# Patient Record
Sex: MALE | Race: WHITE | Employment: UNEMPLOYED | URBAN - METROPOLITAN AREA
[De-identification: names, ages, dates, MRNs, and addresses within clinical notes are randomized per-mention and may not be internally consistent; named-entity substitution may affect disease eponyms.]

---

## 2017-01-11 ENCOUNTER — TELEPHONE (OUTPATIENT)
Dept: NEUROLOGY | Facility: CLINIC | Age: 32
End: 2017-01-11

## 2017-01-11 DIAGNOSIS — F51.01 PRIMARY INSOMNIA: Primary | ICD-10-CM

## 2017-01-11 NOTE — TELEPHONE ENCOUNTER
Prior Authorization Retail Medication Request  Medication/Dose: Zolpidem 10 MG 1 tablet nightly PRN  Diagnosis and ICD code: Primary insomnia [F51.01]  - Primary    New/Renewal/Insurance Change PA:   Previously Tried and Failed Therapies:     Insurance ID (if provided):   Insurance Phone (if provided):     Any additional info from fax request:     If you received a fax notification from an outside Pharmacy:  Pharmacy Name:  Pharmacy #:  Pharmacy Fax:

## 2017-01-19 NOTE — TELEPHONE ENCOUNTER
[facility] Prior Authorization Team   Phone: [phone]  Fax: [phone]      PA Initiation    Medication: Zolpidem 10 MG 1 tablet nightly PRN  Insurance Company: Comment:  [facility] PMAP- [phone]  Pharmacy Filling the Rx: [facility] [place], [place] - [address]  Filling Pharmacy Phone: [phone]  Filling Pharmacy Fax: [phone]  Start Date: 1/19/2017

## 2017-01-24 NOTE — TELEPHONE ENCOUNTER
Prior Authorization Not Required    Authorization Effective Date:    Authorization Expiration Date:    Medication: Zolpidem 10 MG 1 tablet nightly PRN- no Pa is required  Approved Dose/Quantity:   Reference #:     Insurance Company: [employer] PMAP - Phone [phone] Fax [phone]:  [facility] ([facility]) PMAP- [phone]  Expected CoPay: $1.00     CoPay Card Available:      Foundation Assistance Needed:    Which Pharmacy is filling the prescription (Not needed for infusion/clinic administered): [facility] - [place], [place] - [address]  Pharmacy Notified:  yes  Patient Notified:  Pt picked up script on 01/09/17    No PA required, pt picked up script on 01/06/17

## 2017-02-13 ENCOUNTER — TRANSFERRED RECORDS (OUTPATIENT)
Dept: HEALTH INFORMATION MANAGEMENT | Facility: CLINIC | Age: 32
End: 2017-02-13

## 2017-02-14 ENCOUNTER — TRANSFERRED RECORDS (OUTPATIENT)
Dept: HEALTH INFORMATION MANAGEMENT | Facility: CLINIC | Age: 32
End: 2017-02-14

## 2017-02-22 ENCOUNTER — TRANSFERRED RECORDS (OUTPATIENT)
Dept: HEALTH INFORMATION MANAGEMENT | Facility: CLINIC | Age: 32
End: 2017-02-22

## 2017-03-03 ENCOUNTER — TRANSFERRED RECORDS (OUTPATIENT)
Dept: HEALTH INFORMATION MANAGEMENT | Facility: CLINIC | Age: 32
End: 2017-03-03

## 2017-03-03 ASSESSMENT — ENCOUNTER SYMPTOMS
HALLUCINATIONS: 0
NUMBNESS: 0
CHILLS: 0
WEIGHT GAIN: 0
FATIGUE: 1
POLYPHAGIA: 0
NIGHT SWEATS: 0
DISTURBANCES IN COORDINATION: 1
INSOMNIA: 1
DEPRESSION: 0
DIZZINESS: 1
SPEECH CHANGE: 0
ALTERED TEMPERATURE REGULATION: 0
DECREASED APPETITE: 0
PANIC: 0
FEVER: 0
HEADACHES: 1
PARALYSIS: 0
MEMORY LOSS: 0
INCREASED ENERGY: 1
NERVOUS/ANXIOUS: 0
DECREASED CONCENTRATION: 1
POLYDIPSIA: 1
TREMORS: 1
SEIZURES: 0
LOSS OF CONSCIOUSNESS: 0
WEIGHT LOSS: 0
TINGLING: 0
WEAKNESS: 1

## 2017-03-06 ENCOUNTER — RADIANT APPOINTMENT (OUTPATIENT)
Dept: GENERAL RADIOLOGY | Facility: CLINIC | Age: 32
End: 2017-03-06
Attending: PREVENTIVE MEDICINE
Payer: COMMERCIAL

## 2017-03-06 ENCOUNTER — OFFICE VISIT (OUTPATIENT)
Dept: ORTHOPEDICS | Facility: CLINIC | Age: 32
End: 2017-03-06
Payer: OTHER MISCELLANEOUS

## 2017-03-06 VITALS — DIASTOLIC BLOOD PRESSURE: 78 MMHG | SYSTOLIC BLOOD PRESSURE: 123 MMHG | HEART RATE: 89 BPM

## 2017-03-06 DIAGNOSIS — M25.552 PAIN IN LEFT HIP: ICD-10-CM

## 2017-03-06 DIAGNOSIS — M54.16 LUMBAR RADICULAR PAIN: ICD-10-CM

## 2017-03-06 DIAGNOSIS — M54.16 LUMBAR RADICULAR PAIN: Primary | ICD-10-CM

## 2017-03-06 DIAGNOSIS — M62.830 LUMBAR PARASPINAL MUSCLE SPASM: ICD-10-CM

## 2017-03-06 PROCEDURE — 99214 OFFICE O/P EST MOD 30 MIN: CPT | Performed by: PREVENTIVE MEDICINE

## 2017-03-06 PROCEDURE — 72100 X-RAY EXAM L-S SPINE 2/3 VWS: CPT | Performed by: RADIOLOGY

## 2017-03-06 RX ORDER — GABAPENTIN 100 MG/1
100 CAPSULE ORAL 3 TIMES DAILY
Qty: 60 CAPSULE | Refills: 0 | Status: SHIPPED | OUTPATIENT
Start: 2017-03-06 | End: 2017-04-05

## 2017-03-06 ASSESSMENT — PAIN SCALES - GENERAL: PAINLEVEL: SEVERE PAIN (7)

## 2017-03-06 NOTE — MR AVS SNAPSHOT
After Visit Summary   3/6/2017    Hoang Kiser    MRN: [id number]           Patient Information     Date Of Birth          1985        Visit Information        Provider Department      3/6/2017 2:20 PM Faustino Feldman MD [facility]        Today's Diagnoses     Lumbar radicular pain    -  1      Care Instructions    Thanks for coming today.  Ortho/Sports Medicine Clinic  [address] [place] [address]    To schedule future appointments in Ortho Clinic, you may call [phone].    To schedule ordered imaging by your Provider: Call [facility] at [phone]    [employer]Puntos available online at:   [email or web address]    Please call if any further questions or concerns [phone] and ask for the Orthopedic Department. Clinic hours 8 am to 5 pm.    Return to clinic if symptoms worsen.        Follow-ups after your visit        Your next 10 appointments already scheduled     Mar 08, 2017  3:30 PM CST   MR LUMBAR SPINE W/O CONTRAST with MGMR1   [facility] ([facility])    [address] [place] [address] [phone]           Take your medicines as usual, unless your doctor tells you not to. Bring a list of your current medicines to your exam (including vitamins, minerals and over-the-counter drugs). Also bring the results of similar scans you may have had.  Please remove any body piercings and hair extensions before you arrive.  Follow your doctor s orders. If you do not, we may have to postpone your exam.  You will not have contrast for this exam. You do not need to do anything special to prepare.  The MRI machine uses a strong magnet. Please wear clothes without metal (snaps, zippers). A sweatsuit works well, or we may give you a hospital gown.   **IMPORTANT** THE INSTRUCTIONS BELOW ARE ONLY FOR THOSE PATIENTS WHO HAVE BEEN TOLD THEY WILL RECEIVE SEDATION OR GENERAL ANESTHESIA DURING THEIR MRI  PROCEDURE:  IF YOU WILL RECEIVE SEDATION (take medicine to help you relax during your exam):   You must get the medicine from your doctor before you arrive. Bring the medicine to the exam. Do not take it at home.   Arrive one hour early. Bring someone who can take you home after the test. Your medicine will make you sleepy. After the exam, you may not drive, take a bus or take a taxi by yourself.   No eating 8 hours before your exam. You may have clear liquids up until 4 hours before your exam. (Clear liquids include water, clear tea, black coffee and fruit juice without pulp.)  IF YOU WILL RECEIVE ANESTHESIA (be asleep for your exam):   Arrive 1 1/2 hours early. Bring someone who can take you home after the test. You may not drive, take a bus or take a taxi by yourself.   No eating 8 hours before your exam. You may have clear liquids up until 4 hours before your exam. (Clear liquids include water, clear tea, black coffee and fruit juice without pulp.)   You will spend four to five hours in the recovery room.  Please call the Imaging Department at your exam site with any questions.            Apr 12, 2017  3:00 PM CDT   (Arrive by 2:45 PM)   Return Movement Disorder with Chuck Dominguez MD   [facility] ([facility])    [address] [place] [address] [phone]              Future tests that were ordered for you today     Open Future Orders        Priority Expected Expires Ordered    MR Lumbar Spine w/o Contrast Routine  3/6/2018 3/6/2017            Who to contact     If you have questions or need follow up information about today's clinic visit or your schedule please contact [facility] directly at [phone].  Normal or non-critical lab and imaging results will be communicated to you by MyChart, letter or phone within 4 business days after the clinic has received the results. If you do not hear from us within 7 days, please  contact the clinic through RentBits or phone. If you have a critical or abnormal lab result, we will notify you by phone as soon as possible.  Submit refill requests through RentBits or call your pharmacy and they will forward the refill request to us. Please allow 3 business days for your refill to be completed.          Additional Information About Your Visit        [employer] Information     RentBits gives you secure access to your electronic health record. If you see a primary care provider, you can also send messages to your care team and make appointments. If you have questions, please call your primary care clinic.  If you do not have a primary care provider, please call [phone] and they will assist you.      RentBits is an electronic gateway that provides easy, online access to your medical records. With RentBits, you can request a clinic appointment, read your test results, renew a prescription or communicate with your care team.     To access your existing account, please contact your [facility] or call [phone] for assistance.        Care EveryWhere ID     This is your Care EveryWhere ID. This could be used by other organizations to access your [place] medical records  [id number]        Your Vitals Were     Pulse                   89            Blood Pressure from Last 3 Encounters:   03/06/17 123/78   12/01/16 127/66   11/18/16 114/71    Weight from Last 3 Encounters:   10/12/16 82.9 kg (182 lb 11.2 oz)   04/26/16 95.3 kg (210 lb)   04/07/16 96.2 kg (212 lb)                 Today's Medication Changes          These changes are accurate as of: 3/6/17  4:05 PM.  If you have any questions, ask your nurse or doctor.               Stop taking these medicines if you haven't already. Please contact your care team if you have questions.     ammonium lactate 12 % lotion   Commonly known as:  LAC-HYDRIN   Stopped by:  Faustino Feldman MD           doxycycline 100 MG capsule    Commonly known as:  VIBRAMYCIN   Stopped by:  Faustino Feldman MD           DULoxetine 60 MG EC capsule   Commonly known as:  CYMBALTA   Stopped by:  Faustino Feldman MD           gabapentin 100 MG capsule   Commonly known as:  NEURONTIN   Stopped by:  Faustino Feldman MD           gabapentin 300 MG capsule   Commonly known as:  NEURONTIN   Stopped by:  Faustino Feldman MD           hydrOXYzine 25 MG tablet   Commonly known as:  ATARAX   Stopped by:  Faustino Feldman MD           triamcinolone 0.1 % cream   Commonly known as:  KENALOG   Stopped by:  Faustino Feldman MD           UNABLE TO FIND   Stopped by:  Faustino Feldman MD                    Primary Care Provider Office Phone # Fax #    Phill Floyd [phone]       [facility] [address] [facility] [address]        Thank you!     Thank you for choosing [facility]  for your care. Our goal is always to provide you with excellent care. Hearing back from our patients is one way we can continue to improve our services. Please take a few minutes to complete the written survey that you may receive in the mail after your visit with us. Thank you!             Your Updated Medication List - Protect others around you: Learn how to safely use, store and throw away your medicines at www.disposemymeds.org.          This list is accurate as of: 3/6/17  4:05 PM.  Always use your most recent med list.                   Brand Name Dispense Instructions for use    * amphetamine-dextroamphetamine 25 MG 24 hr capsule    ADDERALL XR    30 capsule    Take 1 capsule (25 mg) by mouth every morning       * amphetamine-dextroamphetamine 20 MG per tablet    ADDERALL     Reported on 3/6/2017       clonazePAM 0.5 MG tablet    klonoPIN         NAPROXEN PO      Take 500 mg by mouth       TRAMADOL HCL PO          zolpidem 10 MG tablet    AMBIEN    90 tablet    Take 1 tablet (10 mg) by mouth  nightly as needed for sleep       * Notice:  This list has 2 medication(s) that are the same as other medications prescribed for you. Read the directions carefully, and ask your doctor or other care provider to review them with you.

## 2017-03-06 NOTE — NURSING NOTE
"Hoang Kiser's goals for this visit include: Evaluate left hip/groin strain.   He requests these members of his care team be copied on today's visit information: no    PCP: Phill Floyd    Referring Provider:  No referring provider defined for this encounter.    Chief Complaint   Patient presents with     RECHECK     Left groin strain from work injury.        Initial /78  Pulse 89 Estimated body mass index is 28.61 kg/(m^2) as calculated from the following:    Height as of 10/12/16: 1.702 m (5' 7\").    Weight as of 10/12/16: 82.9 kg (182 lb 11.2 oz).  Medication Reconciliation: complete  "

## 2017-03-06 NOTE — PATIENT INSTRUCTIONS
Thanks for coming today.  Ortho/Sports Medicine Clinic  [address] [place] [address]    To schedule future appointments in Ortho Clinic, you may call [phone].    To schedule ordered imaging by your Provider: Call [facility] at [phone]    [employer] available online at:   [email or web address]    Please call if any further questions or concerns [phone] and ask for the Orthopedic Department. Clinic hours 8 am to 5 pm.    Return to clinic if symptoms worsen.

## 2017-03-06 NOTE — PROGRESS NOTES
HISTORY OF PRESENT ILLNESS  Mr. Kiser is a pleasant 31 year old year old male who  presents to clinic today with left leg and groin weakness.  Hoang explains that while working his left leg and groin became weak and painful.  He has been worked up for a left  Hip injury, MRI pelvis reviewed, and there doesn't seem to be a diagnosis as a result of his MRI which seems to be within normal imits  He has used flexeril, naproxen, and tramadol to this point, and done PT and been seen by [facility]  He is currently working with restrictions  Accompanied by work liason for work comp  Location: left leg and groin  Quality: weak and achy pain    Severity: 8/10 at worst    Duration: 2.5 months   Timing: occurs intermittently  Context: occurs while walking and lifting  Modifying factors:  Resting, cold packs, and non-use makes it better, movement and use makes it worse  Associated signs & symptoms: warm, stabbing pain, no tingling  Previous similar pain: no  Exercise: lifting packages and walking   Additional history: as documented    MEDICAL HISTORY  Patient Active Problem List   Diagnosis     Neuropathy (H)     Moderate major depression (H)     Tobacco abuse     Attention deficit hyperactivity disorder (ADHD), predominantly inattentive type     Primary insomnia     History of MRI of brain and brain stem     Panic disorder without agoraphobia     Abnormal involuntary movement     Tic disorder       Current Outpatient Prescriptions   Medication Sig Dispense Refill     NAPROXEN PO Take 500 mg by mouth       TRAMADOL HCL PO        zolpidem (AMBIEN) 10 MG tablet Take 1 tablet (10 mg) by mouth nightly as needed for sleep 90 tablet 1     clonazePAM (KLONOPIN) 0.5 MG tablet   0     amphetamine-dextroamphetamine (ADDERALL) 20 MG tablet Reported on 3/6/2017  0     amphetamine-dextroamphetamine (ADDERALL XR) 25 MG 24 hr capsule Take 1 capsule (25 mg) by mouth every morning 30 capsule 0       Allergies   Allergen Reactions      Buspirone Hcl Other (See Comments)     Panic attacks     Keflex [Cephalexin] Diarrhea       Family History   Problem Relation Age of Onset     Lipids Father      hyperlipidemia     Hyperlipidemia Father      Obesity Father      Arthritis Mother      Hyperlipidemia Mother      Depression Mother      Anxiety Disorder Mother      MENTAL ILLNESS Mother      Obesity Mother      Asthma Brother      Asthma Sister      Depression Other      Hearing Loss Other      Psychotic Disorder Other      CEREBROVASCULAR DISEASE Paternal Grandfather      Alzheimer Disease Paternal Grandfather      Depression Brother      MENTAL ILLNESS Brother      Bipolar     Depression Sister      MENTAL ILLNESS Brother      Bipolar     Asthma Brother      Asthma Sister      Obesity Sister      Asthma Brother      Obesity Brother      Obesity Maternal Grandmother      Asthma Sister      Asthma Brother      Obesity Other      Substance Abuse Sister      Alcohol     Genetic Disorder Maternal Grandmother      Epilepsy     Obesity Other        Additional medical/Social/Surgical histories reviewed in [facility] and updated as appropriate.     REVIEW OF SYSTEMS (3/6/2017)  10 point ROS of systems including Constitutional, Eyes, Respiratory, Cardiovascular, Gastroenterology, Genitourinary, Integumentary, Musculoskeletal, Psychiatric were all negative except for pertinent positives noted in my HPI.     PHYSICAL EXAM  Vitals:    03/06/17 1421   BP: 123/78   Pulse: 89     Vital Signs: /78  Pulse 89 Patient declined being weighed. There is no height or weight on file to calculate BMI.    General  - normal appearance, in no obvious distress  CV  - normal peripheral perfusion  Pulm  - normal respiratory pattern, non-labored  Musculoskeletal - lumbar spine  - stance: normal gait without limp, no obvious leg length discrepancy, normal heel and toe walk  - inspection: normal bone and joint alignment, no obvious scoliosis  - palpation: no paravertebral or bony  tenderness, except bilateral lumbar paraspinal muscle groups  - ROM: flexion exacerbates pain in left low back, abnormal extension- limited by pain in low back, sidebending, rotation feels pain with both movements  - strength: lower extremities 5/5 in all planes  - special tests:  (+) straight leg raise left leg  (+) slump test- left leg and hip and low back  Neuro  - patellar and Achilles DTRs 2+ bilaterally, left lower extremity sensory deficit throughout L4/5 distribution, grossly normal coordination, normal muscle tone  Skin  - no ecchymosis, erythema, warmth, or induration, no obvious rash  Psych  - interactive, appropriate, normal mood and affect    ASSESSMENT & PLAN    30 yo male with low back pain. Left hip pain, and lumbar radicular pain into left leg due to possible herniated lumbar disc    -start gabapentin 100 tid as it has worked for him in the past  Start tizanadine at night  Wean off remaining tramadol  Ordered lumbar spine xray, WNL  Will order a lumbar MRI to evaluate for herniated disc as cause of pain that has continued  Cont. HEP, stop PT for now until MRI  Given note to continue work restrictions limiting bending and weight lifting over 15 pounds    Faustino Feldman MD, CAQSM

## 2017-03-08 ENCOUNTER — RADIANT APPOINTMENT (OUTPATIENT)
Dept: GENERAL RADIOLOGY | Facility: CLINIC | Age: 32
End: 2017-03-08
Attending: PREVENTIVE MEDICINE
Payer: COMMERCIAL

## 2017-03-08 ENCOUNTER — RADIANT APPOINTMENT (OUTPATIENT)
Dept: MRI IMAGING | Facility: CLINIC | Age: 32
End: 2017-03-08
Attending: PREVENTIVE MEDICINE
Payer: COMMERCIAL

## 2017-03-08 ENCOUNTER — OFFICE VISIT (OUTPATIENT)
Dept: ORTHOPEDICS | Facility: CLINIC | Age: 32
End: 2017-03-08
Payer: OTHER MISCELLANEOUS

## 2017-03-08 VITALS — OXYGEN SATURATION: 100 % | SYSTOLIC BLOOD PRESSURE: 119 MMHG | HEART RATE: 91 BPM | DIASTOLIC BLOOD PRESSURE: 81 MMHG

## 2017-03-08 DIAGNOSIS — M62.830 LUMBAR PARASPINAL MUSCLE SPASM: Primary | ICD-10-CM

## 2017-03-08 DIAGNOSIS — M54.16 LUMBAR RADICULAR PAIN: ICD-10-CM

## 2017-03-08 DIAGNOSIS — S76.012S MUSCLE STRAIN OF LEFT HIP, SEQUELA: ICD-10-CM

## 2017-03-08 PROCEDURE — 70200 X-RAY EXAM OF EYE SOCKETS: CPT | Performed by: RADIOLOGY

## 2017-03-08 PROCEDURE — 99213 OFFICE O/P EST LOW 20 MIN: CPT | Performed by: PREVENTIVE MEDICINE

## 2017-03-08 PROCEDURE — 72148 MRI LUMBAR SPINE W/O DYE: CPT | Performed by: RADIOLOGY

## 2017-03-08 ASSESSMENT — PAIN SCALES - GENERAL: PAINLEVEL: SEVERE PAIN (6)

## 2017-03-08 NOTE — NURSING NOTE
"Hoang Kiser's goals for this visit include: Lower back pain along with bilateral hip area, here to follow up from having MRI   He requests these members of his care team be copied on today's visit information: no    PCP: Phill Floyd    Referring Provider:  No referring provider defined for this encounter.    Chief Complaint   Patient presents with     Pain     Lower back pain along with bilateral hip area, here to follow up from having MRI       Initial /81 (BP Location: Left arm, Patient Position: Chair, Cuff Size: Adult Regular)  Pulse 91  SpO2 100% Estimated body mass index is 28.61 kg/(m^2) as calculated from the following:    Height as of 10/12/16: 1.702 m (5' 7\").    Weight as of 10/12/16: 82.9 kg (182 lb 11.2 oz).  Medication Reconciliation: complete    "

## 2017-03-08 NOTE — MR AVS SNAPSHOT
After Visit Summary   3/8/2017    Hoang Kiser    MRN: [id number]           Patient Information     Date Of Birth          1985        Visit Information        Provider Department      3/8/2017 5:00 PM Faustino Feldman MD [facility]        Today's Diagnoses     Lumbar paraspinal muscle spasm    -  1       Follow-ups after your visit        Additional Services     PHYSICAL THERAPY REFERRAL (External-Prints)       Physical Therapy Referral  WORK HARDENING PLEASE FOR HIS JOB DUTIES.                  Your next 10 appointments already scheduled     Mar 21, 2017  7:40 AM CDT   Return Visit with Faustino Feldman MD   [facility]    [address] [place] [address] [phone]            Apr 12, 2017  3:00 PM CDT   (Arrive by 2:45 PM)   Return Movement Disorder with Chuck Dominguez MD   [facility] ([facility])    [address] [place] [address] [phone]              Who to contact     If you have questions or need follow up information about today's clinic visit or your schedule please contact [facility] directly at [phone].  Normal or non-critical lab and imaging results will be communicated to you by MyChart, letter or phone within 4 business days after the clinic has received the results. If you do not hear from us within 7 days, please contact the clinic through MyChart or phone. If you have a critical or abnormal lab result, we will notify you by phone as soon as possible.  Submit refill requests through Everwise or call your pharmacy and they will forward the refill request to us. Please allow 3 business days for your refill to be completed.          Additional Information About Your Visit        [employer] Information     Everwise gives you secure access to your electronic health record. If you see a primary  care provider, you can also send messages to your care team and make appointments. If you have questions, please call your primary care clinic.  If you do not have a primary care provider, please call [phone] and they will assist you.      FatRedCouch is an electronic gateway that provides easy, online access to your medical records. With FatRedCouch, you can request a clinic appointment, read your test results, renew a prescription or communicate with your care team.     To access your existing account, please contact your [facility] or call [phone] for assistance.        Care EveryWhere ID     This is your Care EveryWhere ID. This could be used by other organizations to access your [place] medical records  [id number]        Your Vitals Were     Pulse Pulse Oximetry                91 100%           Blood Pressure from Last 3 Encounters:   03/08/17 119/81   03/06/17 123/78   12/01/16 127/66    Weight from Last 3 Encounters:   10/12/16 82.9 kg (182 lb 11.2 oz)   04/26/16 95.3 kg (210 lb)   04/07/16 96.2 kg (212 lb)              We Performed the Following     PHYSICAL THERAPY REFERRAL (External-Prints)        Primary Care Provider Office Phone # Fax #    Phill Floyd [phone]       [facility] [address] [facility] [address]        Thank you!     Thank you for choosing [facility]  for your care. Our goal is always to provide you with excellent care. Hearing back from our patients is one way we can continue to improve our services. Please take a few minutes to complete the written survey that you may receive in the mail after your visit with us. Thank you!             Your Updated Medication List - Protect others around you: Learn how to safely use, store and throw away your medicines at www.disposemymeds.org.          This list is accurate as of: 3/8/17  5:09 PM.  Always use your most recent med list.                    Brand Name Dispense Instructions for use    * amphetamine-dextroamphetamine 25 MG 24 hr capsule    ADDERALL XR    30 capsule    Take 1 capsule (25 mg) by mouth every morning       * amphetamine-dextroamphetamine 20 MG per tablet    ADDERALL     Reported on 3/6/2017       clonazePAM 0.5 MG tablet    klonoPIN         gabapentin 100 MG capsule    NEURONTIN    60 capsule    Take 1 capsule (100 mg) by mouth 3 times daily       NAPROXEN PO      Take 500 mg by mouth       tiZANidine 4 MG tablet    ZANAFLEX    30 tablet    Take 1-2 tablets (4-8 mg) by mouth nightly as needed       TRAMADOL HCL PO          zolpidem 10 MG tablet    AMBIEN    90 tablet    Take 1 tablet (10 mg) by mouth nightly as needed for sleep       * Notice:  This list has 2 medication(s) that are the same as other medications prescribed for you. Read the directions carefully, and ask your doctor or other care provider to review them with you.

## 2017-03-08 NOTE — PROGRESS NOTES
HISTORY OF PRESENT ILLNESS  Hoang returns to discuss his lumbar MRI after having this afternoon, he feels better, back pain gets worse throughout the day, and he is currently working 8 hours per day.  Taking gabapentin and tizanadine PRN    Previous HPI below:     Mr. Kiser is a pleasant 31 year old year old male who  presents to clinic today with left leg and groin weakness.  Hoang explains that while working his left leg and groin became weak and painful.  He has been worked up for a left  Hip injury, MRI pelvis reviewed, and there doesn't seem to be a diagnosis as a result of his MRI which seems to be within normal imits  He has used flexeril, naproxen, and tramadol to this point, and done PT and been seen by [facility]  He is currently working with restrictions  Accompanied by work liason for work comp      Location: left leg and groin  Quality: weak and achy pain    Severity: 8/10 at worst    Duration: 2.5 months   Timing: occurs intermittently  Context: occurs while walking and lifting  Modifying factors:  Resting, cold packs, and non-use makes it better, movement and use makes it worse  Associated signs & symptoms: warm, stabbing pain, no tingling  Previous similar pain: no  Exercise: lifting packages and walking   Additional history: as documented    MEDICAL HISTORY  Patient Active Problem List   Diagnosis     Neuropathy (H)     Moderate major depression (H)     Tobacco abuse     Attention deficit hyperactivity disorder (ADHD), predominantly inattentive type     Primary insomnia     History of MRI of brain and brain stem     Panic disorder without agoraphobia     Abnormal involuntary movement     Tic disorder       Current Outpatient Prescriptions   Medication Sig Dispense Refill     NAPROXEN PO Take 500 mg by mouth       TRAMADOL HCL PO        gabapentin (NEURONTIN) 100 MG capsule Take 1 capsule (100 mg) by mouth 3 times daily 60 capsule 0     tiZANidine (ZANAFLEX) 4 MG tablet Take 1-2  tablets (4-8 mg) by mouth nightly as needed 30 tablet 1     zolpidem (AMBIEN) 10 MG tablet Take 1 tablet (10 mg) by mouth nightly as needed for sleep 90 tablet 1     clonazePAM (KLONOPIN) 0.5 MG tablet   0     amphetamine-dextroamphetamine (ADDERALL) 20 MG tablet Reported on 3/6/2017  0     amphetamine-dextroamphetamine (ADDERALL XR) 25 MG 24 hr capsule Take 1 capsule (25 mg) by mouth every morning 30 capsule 0       Allergies   Allergen Reactions     Buspirone Hcl Other (See Comments)     Panic attacks     Keflex [Cephalexin] Diarrhea       Family History   Problem Relation Age of Onset     Lipids Father      hyperlipidemia     Hyperlipidemia Father      Obesity Father      Arthritis Mother      Hyperlipidemia Mother      Depression Mother      Anxiety Disorder Mother      MENTAL ILLNESS Mother      Obesity Mother      Asthma Brother      Asthma Sister      Depression Other      Hearing Loss Other      Psychotic Disorder Other      Obesity Other      CEREBROVASCULAR DISEASE Paternal Grandfather      Alzheimer Disease Paternal Grandfather      Depression Brother      MENTAL ILLNESS Brother      Bipolar     Asthma Brother      Depression Sister      Asthma Sister      Substance Abuse Sister      Alcohol     MENTAL ILLNESS Brother      Bipolar     Asthma Brother      Asthma Sister      Obesity Sister      Asthma Brother      Obesity Brother      Obesity Maternal Grandmother      Genetic Disorder Maternal Grandmother      Epilepsy       Additional medical/Social/Surgical histories reviewed in [facility] and updated as appropriate.     REVIEW OF SYSTEMS (3/8/2017)  10 point ROS of systems including Constitutional, Eyes, Respiratory, Cardiovascular, Gastroenterology, Genitourinary, Integumentary, Musculoskeletal, Psychiatric were all negative except for pertinent positives noted in my HPI.     PHYSICAL EXAM  Vitals:    03/08/17 1626   BP: 119/81   BP Location: Left arm   Patient Position: Chair   Cuff Size: Adult Regular    Pulse: 91   SpO2: 100%     Vital Signs: /81 (BP Location: Left arm, Patient Position: Chair, Cuff Size: Adult Regular)  Pulse 91  SpO2 100% Patient declined being weighed. There is no height or weight on file to calculate BMI.    General  - normal appearance, in no obvious distress  CV  - normal peripheral perfusion  Pulm  - normal respiratory pattern, non-labored  Musculoskeletal - lumbar spine  - stance: normal gait without limp, no obvious leg length discrepancy, normal heel and toe walk  - inspection: normal bone and joint alignment, no obvious scoliosis  - palpation: no paravertebral or bony tenderness, except bilateral lumbar paraspinal muscle groups  - ROM: flexion exacerbates pain in left low back, abnormal extension- limited by pain in low back, sidebending, rotation feels pain with both movements  - strength: lower extremities 5/5 in all planes  - special tests:  (+) straight leg raise left leg- improved  (+) slump test- left leg and hip and low back- this is improved from his previous visit  Neuro  - patellar and Achilles DTRs 2+ bilaterally, today, no left lower extremity sensory deficit throughout L4/5 distribution, grossly normal coordination, normal muscle tone  Skin  - no ecchymosis, erythema, warmth, or induration, no obvious rash  Psych  - interactive, appropriate, normal mood and affect    ASSESSMENT & PLAN    32 yo male with low back pain. Left hip pain, and some mild lumbar radicular pain into left leg due to possible herniated lumbar disc    -cont. gabapentin 100 tid as it has worked for him in the past  Cont. tizanadine at night      Discussed lumbar MRI, has some small disc buldges that are not likely to be contributing to the pain in his low back, more due to conditioning and overuse repetitive activity at work  Given order for work hardening PT  Given note to continue work restrictions limiting work day hours to 6 hours and weight lifting over 25 pounds    Faustino Feldman MD,  CAQSM

## 2017-03-08 NOTE — LETTER
March 8, 2017      RE: Hoang Kiser  [address] [address]        To whom it may concern:    Hoang Kiser is under my professional care for Data Unavailable He  may return to work with the following: The employee is ABLE to return to work today with the following restrictions.    When the patient returns to work, the following restrictions apply until I re-evaluate him in 14 days.   A) Bend: Frequently (6 hours)  B) Squat: Frequently (6 hours)  C) Walk/Stand: Frequently (6 hours)  D) Reach Above Shoulders: Frequently (6 hours)  E) Lift, carry, push, and pull no more than:  25 lbs.Light duty-unable to lift more than 25 pounds.      Sincerely,      Faustino Feldman MD

## 2017-03-21 ENCOUNTER — OFFICE VISIT (OUTPATIENT)
Dept: ORTHOPEDICS | Facility: CLINIC | Age: 32
End: 2017-03-21
Payer: OTHER MISCELLANEOUS

## 2017-03-21 ENCOUNTER — MYC MEDICAL ADVICE (OUTPATIENT)
Dept: ORTHOPEDICS | Facility: CLINIC | Age: 32
End: 2017-03-21

## 2017-03-21 ENCOUNTER — TELEPHONE (OUTPATIENT)
Dept: ORTHOPEDICS | Facility: CLINIC | Age: 32
End: 2017-03-21

## 2017-03-21 VITALS — HEART RATE: 79 BPM | DIASTOLIC BLOOD PRESSURE: 78 MMHG | SYSTOLIC BLOOD PRESSURE: 133 MMHG

## 2017-03-21 DIAGNOSIS — M62.830 LUMBAR PARASPINAL MUSCLE SPASM: ICD-10-CM

## 2017-03-21 DIAGNOSIS — M54.16 LUMBAR RADICULAR PAIN: Primary | ICD-10-CM

## 2017-03-21 DIAGNOSIS — M25.552 PAIN IN LEFT HIP: ICD-10-CM

## 2017-03-21 PROCEDURE — 99213 OFFICE O/P EST LOW 20 MIN: CPT | Performed by: PREVENTIVE MEDICINE

## 2017-03-21 RX ORDER — NAPROXEN 500 MG/1
500 TABLET ORAL 2 TIMES DAILY WITH MEALS
Qty: 40 TABLET | Refills: 1 | Status: SHIPPED | OUTPATIENT
Start: 2017-03-21 | End: 2017-04-05

## 2017-03-21 RX ORDER — GABAPENTIN 100 MG/1
100 CAPSULE ORAL 3 TIMES DAILY
Qty: 90 CAPSULE | Refills: 0 | Status: SHIPPED | OUTPATIENT
Start: 2017-03-21 | End: 2017-05-17

## 2017-03-21 ASSESSMENT — PAIN SCALES - GENERAL: PAINLEVEL: MODERATE PAIN (5)

## 2017-03-21 NOTE — TELEPHONE ENCOUNTER
[facility]    Phone Message    Name of Caller: Bryant    Phone Number: Other phone number:  [phone]    Best time to return call: Any    May a detailed message be left on voicemail: yes    Relation to patient: Other Name: Bryant  Relationship: Bryant states Patient was his client    Reason for Call: Form or Letter   Type or form/letter needing completion: Bryant is requesting that patient needs a new letter stating that he needs two breaks per work day instead of just one due to increased hours. Bryant would like to new letter be faxed to his attention. Fax [phone]. Thank you  Provider: Dr. Feldman  Date form needed: ASAP  Once completed: Fax form to: [phone]      Action Taken: Message routed to:  Adult Clinics: Sports Medicine p [phone]

## 2017-03-21 NOTE — PATIENT INSTRUCTIONS
Thanks for coming today.  Ortho/Sports Medicine Clinic  [address] [place] [address]    To schedule future appointments in Ortho Clinic, you may call [phone].    To schedule ordered imaging by your Provider: Call [facility] at [phone]    [email or web address] available online at:   [email or web address]    Please call if any further questions or concerns [phone] and ask for the Orthopedic Department. Clinic hours 8 am to 5 pm.    Return to clinic if symptoms worsen.

## 2017-03-21 NOTE — LETTER
[address] [address]  [phone]        3/21/2017    Hoang Kiser  [address] [facility] [address] [facility] [address]  [phone] (home) [phone] (work)    :     1985      To Whom it May Concern:    Hoang Kiser is under my professional care for his hip injury/strain. He may return to work with the following: The employee is ABLE to return to work today with the following restrictions.     When the patient returns to work, the following restrictions apply until I re-evaluate him in 14 days.     A) Bend: Frequently (7 hours)  B) Squat: Frequently (7 hours)  C) Walk/Stand: Frequently (7 hours)  D) Reach Above Shoulders: Frequently (7 hours)  E) Lift, carry, push, and pull no more than: 25 lbs.Light duty-unable to lift more than 25 pounds    He should be allowed two break periods during his 7 hour shift.      Please contact me for questions or concerns.    Sincerely,    Faustino Feldman MD

## 2017-03-21 NOTE — MR AVS SNAPSHOT
After Visit Summary   3/21/2017    Hoang Kiser    MRN: [id number]           Patient Information     Date Of Birth          1985        Visit Information        Provider Department      3/21/2017 7:40 AM Faustino Feldman MD [facility]        Today's Diagnoses     Lumbar radicular pain    -  1    Lumbar paraspinal muscle spasm        Pain in left hip          Care Instructions    Thanks for coming today.  Ortho/Sports Medicine Clinic  [address] [place] [address]    To schedule future appointments in Ortho Clinic, you may call [phone].    To schedule ordered imaging by your Provider: Call [facility] at [phone]    [employer] available online at:   [email or web address]    Please call if any further questions or concerns [phone] and ask for the Orthopedic Department. Clinic hours 8 am to 5 pm.    Return to clinic if symptoms worsen.        Follow-ups after your visit        Your next 10 appointments already scheduled     Apr 05, 2017  5:00 PM CDT   Return Visit with Faustino Feldman MD   [facility] ([facility])    [address] [place] [address] [phone]            Apr 12, 2017  3:00 PM CDT   (Arrive by 2:45 PM)   Return Movement Disorder with Chuck Dominguez MD   [facility] ([facility])    [address] [place] [id number] [phone]              Who to contact     If you have questions or need follow up information about today's clinic visit or your schedule please contact [facility] directly at [phone].  Normal or non-critical lab and imaging results will be communicated to you by MyChart, letter or phone within 4 business days after the clinic has received the results. If you do not hear from us within 7 days, please contact the clinic through MyChart or phone. If you have a  critical or abnormal lab result, we will notify you by phone as soon as possible.  Submit refill requests through [email or web address] or call your pharmacy and they will forward the refill request to us. Please allow 3 business days for your refill to be completed.          Additional Information About Your Visit        [employer] Information     [email or web address] gives you secure access to your electronic health record. If you see a primary care provider, you can also send messages to your care team and make appointments. If you have questions, please call your primary care clinic.  If you do not have a primary care provider, please call [phone] and they will assist you.      [email or web address] is an electronic gateway that provides easy, online access to your medical records. With [email or web address], you can request a clinic appointment, read your test results, renew a prescription or communicate with your care team.     To access your existing account, please contact your [facility] or call [phone] for assistance.        Care EveryWhere ID     This is your Care EveryWhere ID. This could be used by other organizations to access your [place] medical records  [id number]        Your Vitals Were     Pulse                   79            Blood Pressure from Last 3 Encounters:   03/21/17 133/78   03/08/17 119/81   03/06/17 123/78    Weight from Last 3 Encounters:   10/12/16 82.9 kg (182 lb 11.2 oz)   04/26/16 95.3 kg (210 lb)   04/07/16 96.2 kg (212 lb)              Today, you had the following     No orders found for display         Today's Medication Changes          These changes are accurate as of: 3/21/17  8:49 AM.  If you have any questions, ask your nurse or doctor.               These medicines have changed or have updated prescriptions.        Dose/Directions    * gabapentin 100 MG capsule   Commonly known as:  NEURONTIN   This may have changed:  Another medication with the same name was added. Make sure you  understand how and when to take each.   Used for:  Lumbar radicular pain        Dose:  100 mg   Take 1 capsule (100 mg) by mouth 3 times daily   Quantity:  60 capsule   Refills:  0       * gabapentin 100 MG capsule   Commonly known as:  NEURONTIN   This may have changed:  You were already taking a medication with the same name, and this prescription was added. Make sure you understand how and when to take each.   Used for:  Lumbar radicular pain, Lumbar paraspinal muscle spasm        Dose:  100 mg   Take 1 capsule (100 mg) by mouth 3 times daily   Quantity:  90 capsule   Refills:  0       * NAPROXEN PO   This may have changed:  Another medication with the same name was added. Make sure you understand how and when to take each.        Dose:  500 mg   Take 500 mg by mouth   Refills:  0       * naproxen 500 MG tablet   Commonly known as:  NAPROSYN   This may have changed:  You were already taking a medication with the same name, and this prescription was added. Make sure you understand how and when to take each.   Used for:  Lumbar radicular pain, Lumbar paraspinal muscle spasm        Dose:  500 mg   Take 1 tablet (500 mg) by mouth 2 times daily (with meals)   Quantity:  40 tablet   Refills:  1       * tiZANidine 4 MG tablet   Commonly known as:  ZANAFLEX   This may have changed:  Another medication with the same name was added. Make sure you understand how and when to take each.   Used for:  Lumbar radicular pain        Dose:  4-8 mg   Take 1-2 tablets (4-8 mg) by mouth nightly as needed   Quantity:  30 tablet   Refills:  1       * tiZANidine 4 MG tablet   Commonly known as:  ZANAFLEX   This may have changed:  You were already taking a medication with the same name, and this prescription was added. Make sure you understand how and when to take each.   Used for:  Lumbar radicular pain, Lumbar paraspinal muscle spasm        Dose:  4-8 mg   Take 1-2 tablets (4-8 mg) by mouth nightly as needed   Quantity:  30 tablet    Refills:  1       * Notice:  This list has 6 medication(s) that are the same as other medications prescribed for you. Read the directions carefully, and ask your doctor or other care provider to review them with you.         Where to get your medicines      These medications were sent to [facility] - [place], [place] - [address], [address]  [address], [address], [facility] [address]     Phone:  [phone]     gabapentin 100 MG capsule    naproxen 500 MG tablet    tiZANidine 4 MG tablet                Primary Care Provider Office Phone # Fax #    Phill Floyd [phone]       [facility] [address] [facility] [address]        Thank you!     Thank you for choosing [facility]  for your care. Our goal is always to provide you with excellent care. Hearing back from our patients is one way we can continue to improve our services. Please take a few minutes to complete the written survey that you may receive in the mail after your visit with us. Thank you!             Your Updated Medication List - Protect others around you: Learn how to safely use, store and throw away your medicines at www.disposemymeds.org.          This list is accurate as of: 3/21/17  8:49 AM.  Always use your most recent med list.                   Brand Name Dispense Instructions for use    * amphetamine-dextroamphetamine 25 MG 24 hr capsule    ADDERALL XR    30 capsule    Take 1 capsule (25 mg) by mouth every morning       * amphetamine-dextroamphetamine 20 MG per tablet    ADDERALL     Reported on 3/6/2017       clonazePAM 0.5 MG tablet    klonoPIN         * gabapentin 100 MG capsule    NEURONTIN    60 capsule    Take 1 capsule (100 mg) by mouth 3 times daily       * gabapentin 100 MG capsule    NEURONTIN    90 capsule    Take 1 capsule (100 mg) by mouth 3 times daily       * NAPROXEN PO      Take 500 mg by mouth       * naproxen 500 MG tablet     NAPROSYN    40 tablet    Take 1 tablet (500 mg) by mouth 2 times daily (with meals)       * tiZANidine 4 MG tablet    ZANAFLEX    30 tablet    Take 1-2 tablets (4-8 mg) by mouth nightly as needed       * tiZANidine 4 MG tablet    ZANAFLEX    30 tablet    Take 1-2 tablets (4-8 mg) by mouth nightly as needed       TRAMADOL HCL PO          zolpidem 10 MG tablet    AMBIEN    90 tablet    Take 1 tablet (10 mg) by mouth nightly as needed for sleep       * Notice:  This list has 8 medication(s) that are the same as other medications prescribed for you. Read the directions carefully, and ask your doctor or other care provider to review them with you.

## 2017-03-21 NOTE — PROGRESS NOTES
HISTORY OF PRESENT ILLNESS  Shoaib returns for followup for his low back injury. He is improving overall. He feels better and is doing PT and using gabapentin and tizanadine and naproxen    He has work hardening planned for April date.  He has some work restrictions for hours and weight at this time, he feels like he can increase hours.    Accompanied by work liason for work comp      Location: left leg and groin  Quality: weak and achy pain    Severity: 4/10 at worst    Duration: 2.5+ months   Timing: occurs intermittently  Context: occurs while walking and lifting  Modifying factors:  Resting, cold packs, and non-use makes it better, movement and use makes it worse  Associated signs & symptoms: warm, stabbing pain, no tingling  Previous similar pain: no  Exercise: lifting packages and walking   Additional history: as documented    MEDICAL HISTORY  Patient Active Problem List   Diagnosis     Neuropathy (H)     Moderate major depression (H)     Tobacco abuse     Attention deficit hyperactivity disorder (ADHD), predominantly inattentive type     Primary insomnia     History of MRI of brain and brain stem     Panic disorder without agoraphobia     Abnormal involuntary movement     Tic disorder       Current Outpatient Prescriptions   Medication Sig Dispense Refill     NAPROXEN PO Take 500 mg by mouth       TRAMADOL HCL PO        gabapentin (NEURONTIN) 100 MG capsule Take 1 capsule (100 mg) by mouth 3 times daily 60 capsule 0     tiZANidine (ZANAFLEX) 4 MG tablet Take 1-2 tablets (4-8 mg) by mouth nightly as needed 30 tablet 1     zolpidem (AMBIEN) 10 MG tablet Take 1 tablet (10 mg) by mouth nightly as needed for sleep 90 tablet 1     clonazePAM (KLONOPIN) 0.5 MG tablet   0     amphetamine-dextroamphetamine (ADDERALL) 20 MG tablet Reported on 3/6/2017  0     amphetamine-dextroamphetamine (ADDERALL XR) 25 MG 24 hr capsule Take 1 capsule (25 mg) by mouth every morning 30 capsule 0       Allergies   Allergen Reactions      Buspirone Hcl Other (See Comments)     Panic attacks     Keflex [Cephalexin] Diarrhea       Family History   Problem Relation Age of Onset     Lipids Father      hyperlipidemia     Hyperlipidemia Father      Obesity Father      Arthritis Mother      Hyperlipidemia Mother      Depression Mother      Anxiety Disorder Mother      MENTAL ILLNESS Mother      Obesity Mother      Asthma Brother      Asthma Sister      Depression Other      Hearing Loss Other      Psychotic Disorder Other      Obesity Other      CEREBROVASCULAR DISEASE Paternal Grandfather      Alzheimer Disease Paternal Grandfather      Depression Brother      MENTAL ILLNESS Brother      Bipolar     Asthma Brother      Depression Sister      Asthma Sister      Substance Abuse Sister      Alcohol     MENTAL ILLNESS Brother      Bipolar     Asthma Brother      Asthma Sister      Obesity Sister      Asthma Brother      Obesity Brother      Obesity Maternal Grandmother      Genetic Disorder Maternal Grandmother      Epilepsy       Additional medical/Social/Surgical histories reviewed in [facility] and updated as appropriate.     REVIEW OF SYSTEMS (3/21/2017)  10 point ROS of systems including Constitutional, Eyes, Respiratory, Cardiovascular, Gastroenterology, Genitourinary, Integumentary, Musculoskeletal, Psychiatric were all negative except for pertinent positives noted in my HPI.     PHYSICAL EXAM  Vitals:    03/21/17 0742   BP: 133/78   Pulse: 79     Vital Signs: /78  Pulse 79 Patient declined being weighed. There is no height or weight on file to calculate BMI.    General  - normal appearance, in no obvious distress  CV  - normal peripheral perfusion  Pulm  - normal respiratory pattern, non-labored  Musculoskeletal - lumbar spine  - stance: normal gait without limp, no obvious leg length discrepancy, normal heel and toe walk  - inspection: normal bone and joint alignment, no obvious scoliosis  - palpation: no paravertebral or bony tenderness, except  bilateral lumbar paraspinal muscle groups  - ROM: flexion still exacerbates pain in left low back, abnormal extension- limited by pain in low back, sidebending, rotation feels pain with both movements  - strength: lower extremities 5/5 in all planes  - special tests:  (-) straight leg raise left leg- improved  (-) slump test- left leg and hip and low back- this is improved from his previous visit  Neuro  - patellar and Achilles DTRs 2+ bilaterally, today, no left lower extremity sensory deficit throughout L4/5 distribution, grossly normal coordination, normal muscle tone  Skin  - no ecchymosis, erythema, warmth, or induration, no obvious rash  Psych  - interactive, appropriate, normal mood and affect    ASSESSMENT & PLAN    30 yo male with low back pain. Left hip pain, and some mild lumbar radicular pain into left leg due to possible herniated lumbar disc, improved    -cont. gabapentin 100 tid as it has worked for him in the past  Cont. tizanadine at night        Plan for work hardening PT  Given note to continue work restrictions limiting work day hours to 7 hours and weight lifting over 25 pounds  Cont. PT  Faustino Feldman MD, CAQSM

## 2017-03-21 NOTE — LETTER
[address] [address]  [phone]        3/21/2017    Hoang Kiser  [address] [facility] [address]  [phone] (home) [phone] (work)    :     1985      To Whom it May Concern:    Hoang Kiser is under my professional care for his hip injury/strain. He may return to work with the following: The employee is ABLE to return to work today with the following restrictions.     When the patient returns to work, the following restrictions apply until I re-evaluate him in 14 days.     A) Bend: Frequently (7 hours)  B) Squat: Frequently (7 hours)  C) Walk/Stand: Frequently (7 hours)  D) Reach Above Shoulders: Frequently (7 hours)  E) Lift, carry, push, and pull no more than: 25 lbs.Light duty-unable to lift more than 25 pounds      Please contact me for questions or concerns.    Sincerely,    Faustino Feldman MD

## 2017-04-05 ENCOUNTER — OFFICE VISIT (OUTPATIENT)
Dept: ORTHOPEDICS | Facility: CLINIC | Age: 32
End: 2017-04-05
Payer: OTHER MISCELLANEOUS

## 2017-04-05 VITALS — HEART RATE: 90 BPM | DIASTOLIC BLOOD PRESSURE: 68 MMHG | SYSTOLIC BLOOD PRESSURE: 120 MMHG

## 2017-04-05 DIAGNOSIS — M54.16 LUMBAR RADICULAR PAIN: ICD-10-CM

## 2017-04-05 DIAGNOSIS — M62.830 LUMBAR PARASPINAL MUSCLE SPASM: ICD-10-CM

## 2017-04-05 PROCEDURE — 99213 OFFICE O/P EST LOW 20 MIN: CPT | Performed by: PREVENTIVE MEDICINE

## 2017-04-05 RX ORDER — NAPROXEN 500 MG/1
500 TABLET ORAL 2 TIMES DAILY WITH MEALS
Qty: 40 TABLET | Refills: 1 | Status: SHIPPED | OUTPATIENT
Start: 2017-04-05 | End: 2017-09-12

## 2017-04-05 RX ORDER — GABAPENTIN 100 MG/1
100 CAPSULE ORAL 3 TIMES DAILY
Qty: 60 CAPSULE | Refills: 0 | Status: SHIPPED | OUTPATIENT
Start: 2017-04-05 | End: 2017-08-11

## 2017-04-05 ASSESSMENT — PAIN SCALES - GENERAL: PAINLEVEL: MILD PAIN (3)

## 2017-04-05 NOTE — LETTER
[address] [address]  [phone]        2017    Hoang Kiser  [address] [facility] [address]  [phone] (home) [phone] (work)    :     1985      To Whom it May Concern:    Hoang Kiser is under my professional care for his hip injury/strain. He may return to work with the following: The employee is ABLE to return to work today with the following restrictions.     When the patient returns to work, the following restrictions apply until I re-evaluate him in 21 days.     A) Bend: Frequently (8 hours)  B) Squat: Frequently (8 hours)  C) Walk/Stand: Frequently (8 hours)  D) Reach Above Shoulders: Frequently (8 hours)  E) Lift, carry, push, and pull no more than: 35 lbs.Light duty-unable to lift more than 35 pounds    He should be allowed the normal break periods during his 8 hour shift.      Please contact me for questions or concerns.      Sincerely,    Faustino Feldman MD

## 2017-04-05 NOTE — PROGRESS NOTES
HISTORY OF PRESENT ILLNESS  Shoaib returns for followup for his low back injury.   He is improving overall. He feels better and is doing PT and using gabapentin and tizanadine and naproxen    He has work hardening planned for April date.  He has some work restrictions for hours and weight at this time, he feels like he can increase hours again.    Accompanied by work liason for work comp      Location: left leg and groin  Quality: weak and achy pain    Severity: 2/10 at worst    Duration: 3+ months   Timing: occurs intermittently  Context: occurs while walking and lifting  Modifying factors:  Resting, cold packs, and non-use makes it better, movement and use makes it worse  Associated signs & symptoms: warm, stabbing pain, no tingling  Previous similar pain: no  Exercise: lifting packages and walking   Additional history: as documented    MEDICAL HISTORY  Patient Active Problem List   Diagnosis     Neuropathy (H)     Moderate major depression (H)     Tobacco abuse     Attention deficit hyperactivity disorder (ADHD), predominantly inattentive type     Primary insomnia     History of MRI of brain and brain stem     Panic disorder without agoraphobia     Abnormal involuntary movement     Tic disorder       Current Outpatient Prescriptions   Medication Sig Dispense Refill     gabapentin (NEURONTIN) 100 MG capsule Take 1 capsule (100 mg) by mouth 3 times daily 90 capsule 0     tiZANidine (ZANAFLEX) 4 MG tablet Take 1-2 tablets (4-8 mg) by mouth nightly as needed 30 tablet 1     naproxen (NAPROSYN) 500 MG tablet Take 1 tablet (500 mg) by mouth 2 times daily (with meals) 40 tablet 1     NAPROXEN PO Take 500 mg by mouth       TRAMADOL HCL PO        gabapentin (NEURONTIN) 100 MG capsule Take 1 capsule (100 mg) by mouth 3 times daily 60 capsule 0     tiZANidine (ZANAFLEX) 4 MG tablet Take 1-2 tablets (4-8 mg) by mouth nightly as needed 30 tablet 1     zolpidem (AMBIEN) 10 MG tablet Take 1 tablet (10 mg) by mouth nightly as  needed for sleep 90 tablet 1     clonazePAM (KLONOPIN) 0.5 MG tablet   0     amphetamine-dextroamphetamine (ADDERALL) 20 MG tablet Reported on 3/6/2017  0     amphetamine-dextroamphetamine (ADDERALL XR) 25 MG 24 hr capsule Take 1 capsule (25 mg) by mouth every morning 30 capsule 0       Allergies   Allergen Reactions     Buspirone Hcl Other (See Comments)     Panic attacks     Keflex [Cephalexin] Diarrhea       Family History   Problem Relation Age of Onset     Lipids Father      hyperlipidemia     Hyperlipidemia Father      Obesity Father      Arthritis Mother      Hyperlipidemia Mother      Depression Mother      Anxiety Disorder Mother      MENTAL ILLNESS Mother      Obesity Mother      Asthma Brother      Asthma Sister      Depression Other      Hearing Loss Other      Psychotic Disorder Other      Obesity Other      CEREBROVASCULAR DISEASE Paternal Grandfather      Alzheimer Disease Paternal Grandfather      Depression Brother      MENTAL ILLNESS Brother      Bipolar     Asthma Brother      Depression Sister      Asthma Sister      Substance Abuse Sister      Alcohol     MENTAL ILLNESS Brother      Bipolar     Asthma Brother      Asthma Sister      Obesity Sister      Asthma Brother      Obesity Brother      Obesity Maternal Grandmother      Genetic Disorder Maternal Grandmother      Epilepsy       Additional medical/Social/Surgical histories reviewed in EPIC and updated as appropriate.     REVIEW OF SYSTEMS (4/5/2017)  10 point ROS of systems including Constitutional, Eyes, Respiratory, Cardiovascular, Gastroenterology, Genitourinary, Integumentary, Musculoskeletal, Psychiatric were all negative except for pertinent positives noted in my HPI.     PHYSICAL EXAM  Vitals:    04/05/17 1645   BP: 120/68   Pulse: 90     Vital Signs: /68  Pulse 90 Patient declined being weighed. There is no height or weight on file to calculate BMI.    General  - normal appearance, in no obvious distress  CV  - normal  peripheral perfusion  Pulm  - normal respiratory pattern, non-labored  Musculoskeletal - lumbar spine  - stance: normal gait without limp, no obvious leg length discrepancy, normal heel and toe walk  - inspection: normal bone and joint alignment, no obvious scoliosis  - palpation: no paravertebral or bony tenderness, except bilateral lumbar paraspinal muscle groups  - ROM: flexion still exacerbates a small amount of pain in left low back, slightly abnormal extension- not greatly limited by pain in low back, sidebending, rotation feels some pain with both movements  - strength: lower extremities 5/5 in all planes  - special tests:  (-) straight leg raise left leg- improved  (-) slump test- left leg and hip and low back- this is improved from his previous visit  Neuro  - patellar and Achilles DTRs 2+ bilaterally, today, no left lower extremity sensory deficit throughout L4/5 distribution, grossly normal coordination, normal muscle tone  Skin  - no ecchymosis, erythema, warmth, or induration, no obvious rash  Psych  - interactive, appropriate, normal mood and affect    ASSESSMENT & PLAN    30 yo male with low back pain. Left hip pain, and some mild lumbar radicular pain into left leg due to possible herniated lumbar disc, improved    -cont. gabapentin 100 tid as it continues to help him  Cont. tizanadine at night        Plan for work hardening PT  Given note to continue work restrictions limiting work day hours to 8 hours and weight lifting over 35 pounds  Cont. PT  Faustino Feldman MD, CAQSM

## 2017-04-05 NOTE — NURSING NOTE
"Hoang Kiser's goals for this visit include: Follow up for low back pain. Report on progress.   He requests these members of his care team be copied on today's visit information: no    PCP: Phill Floyd    Referring Provider:  ESTABLISHED PATIENT  No address on file    Chief Complaint   Patient presents with     RECHECK     Follow up for low back pain. Reporting improvement since last visit.       Initial /68  Pulse 90 Estimated body mass index is 28.61 kg/(m^2) as calculated from the following:    Height as of 10/12/16: 1.702 m (5' 7\").    Weight as of 10/12/16: 82.9 kg (182 lb 11.2 oz).  Medication Reconciliation: complete  "

## 2017-04-05 NOTE — PATIENT INSTRUCTIONS
Thanks for coming today.  Ortho/Sports Medicine Clinic  [address] [place] [address]    To schedule future appointments in Ortho Clinic, you may call [phone].    To schedule ordered imaging by your Provider: Call [facility] at [phone]    [employer] available online at:   [email or web address]    Please call if any further questions or concerns [phone] and ask for the Orthopedic Department. Clinic hours 8 am to 5 pm.    Return to clinic if symptoms worsen.

## 2017-04-05 NOTE — MR AVS SNAPSHOT
After Visit Summary   4/5/2017    Hoang Kiser    MRN: [id number]           Patient Information     Date Of Birth          1985        Visit Information        Provider Department      4/5/2017 5:00 PM Faustino Feldman MD [facility]        Today's Diagnoses     Lumbar radicular pain        Lumbar paraspinal muscle spasm          Care Instructions    Thanks for coming today.  Ortho/Sports Medicine Clinic  [address] [place] [address]    To schedule future appointments in Ortho Clinic, you may call [phone].    To schedule ordered imaging by your Provider: Call [facility] at [phone]    [employer] available online at:   [email or web address]    Please call if any further questions or concerns [phone] and ask for the Orthopedic Department. Clinic hours 8 am to 5 pm.    Return to clinic if symptoms worsen.        Follow-ups after your visit        Your next 10 appointments already scheduled     Apr 12, 2017  3:00 PM CDT   (Arrive by 2:45 PM)   Return Movement Disorder with Chuck Dominguez MD   [facility] ([facility])    [address] [place] [address] [phone]            Apr 27, 2017  8:00 AM CDT   Return Visit with Faustino Feldman MD   [facility] ([facility])    [address] [place] [address] [phone]              Who to contact     If you have questions or need follow up information about today's clinic visit or your schedule please contact [facility] directly at [phone].  Normal or non-critical lab and imaging results will be communicated to you by MyChart, letter or phone within 4 business days after the clinic has received the results. If you do not hear from us within 7 days, please contact the clinic through MyChart or phone. If you have a critical or abnormal lab result,  we will notify you by phone as soon as possible.  Submit refill requests through [employer] or call your pharmacy and they will forward the refill request to us. Please allow 3 business days for your refill to be completed.          Additional Information About Your Visit        [employer] Information     [employer] gives you secure access to your electronic health record. If you see a primary care provider, you can also send messages to your care team and make appointments. If you have questions, please call your primary care clinic.  If you do not have a primary care provider, please call [phone] and they will assist you.      [employer] is an electronic gateway that provides easy, online access to your medical records. With [employer], you can request a clinic appointment, read your test results, renew a prescription or communicate with your care team.     To access your existing account, please contact your [facility] or call [phone] for assistance.        Care EveryWhere ID     This is your Care EveryWhere ID. This could be used by other organizations to access your [place] medical records  [id number]        Your Vitals Were     Pulse                   90            Blood Pressure from Last 3 Encounters:   04/05/17 120/68   03/21/17 133/78   03/08/17 119/81    Weight from Last 3 Encounters:   10/12/16 82.9 kg (182 lb 11.2 oz)   04/26/16 95.3 kg (210 lb)   04/07/16 96.2 kg (212 lb)              Today, you had the following     No orders found for display         Where to get your medicines      These medications were sent to [facility] [address] [place] [address] AT [place] [address], [facility] [address]     Phone:  [phone]     gabapentin 100 MG capsule    naproxen 500 MG tablet    tiZANidine 4 MG tablet          Primary Care Provider Office Phone # Fax #    Phill Floyd [phone]       [facility] [address] [facility] [address]        Thank you!     Thank you for choosing [facility]  for your care. Our goal is always to provide you with excellent care. Hearing back from our patients is one way we can continue to improve our services. Please take a few minutes to complete the written survey that you may receive in the mail after your visit with us. Thank you!             Your Updated Medication List - Protect others around you: Learn how to safely use, store and throw away your medicines at www.disposemymeds.org.          This list is accurate as of: 4/5/17  5:31 PM.  Always use your most recent med list.                   Brand Name Dispense Instructions for use    * amphetamine-dextroamphetamine 25 MG 24 hr capsule    ADDERALL XR    30 capsule    Take 1 capsule (25 mg) by mouth every morning       * amphetamine-dextroamphetamine 20 MG per tablet    ADDERALL     Reported on 3/6/2017       clonazePAM 0.5 MG tablet    klonoPIN         * gabapentin 100 MG capsule    NEURONTIN    90 capsule    Take 1 capsule (100 mg) by mouth 3 times daily       * gabapentin 100 MG capsule    NEURONTIN    60 capsule    Take 1 capsule (100 mg) by mouth 3 times daily       * NAPROXEN PO      Take 500 mg by mouth       * naproxen 500 MG tablet    NAPROSYN    40 tablet    Take 1 tablet (500 mg) by mouth 2 times daily (with meals)       * tiZANidine 4 MG tablet    ZANAFLEX    30 tablet    Take 1-2 tablets (4-8 mg) by mouth nightly as needed       * tiZANidine 4 MG tablet    ZANAFLEX    30 tablet    Take 1-2 tablets (4-8 mg) by mouth nightly as needed       TRAMADOL HCL PO          zolpidem 10 MG tablet    AMBIEN    90 tablet    Take 1 tablet (10 mg) by mouth nightly as needed for sleep       * Notice:  This list has 8 medication(s) that are the same as other medications prescribed for you. Read the directions carefully, and ask your doctor or other care provider to review them with you.

## 2017-04-12 ENCOUNTER — OFFICE VISIT (OUTPATIENT)
Dept: NEUROLOGY | Facility: CLINIC | Age: 32
End: 2017-04-12

## 2017-04-12 VITALS
HEART RATE: 85 BPM | DIASTOLIC BLOOD PRESSURE: 65 MMHG | OXYGEN SATURATION: 97 % | BODY MASS INDEX: 27.28 KG/M2 | HEIGHT: 67 IN | WEIGHT: 173.8 LBS | RESPIRATION RATE: 16 BRPM | SYSTOLIC BLOOD PRESSURE: 119 MMHG

## 2017-04-12 DIAGNOSIS — F51.01 PRIMARY INSOMNIA: ICD-10-CM

## 2017-04-12 RX ORDER — ZOLPIDEM TARTRATE 10 MG/1
10 TABLET ORAL
Qty: 90 TABLET | Refills: 1 | Status: SHIPPED | OUTPATIENT
Start: 2017-04-12 | End: 2017-10-31

## 2017-04-12 RX ORDER — IBUPROFEN 600 MG/1
1-2 TABLET, FILM COATED ORAL 3 TIMES DAILY PRN
COMMUNITY
Start: 2016-12-12 | End: 2017-08-11

## 2017-04-12 ASSESSMENT — PAIN SCALES - GENERAL: PAINLEVEL: SEVERE PAIN (6)

## 2017-04-12 NOTE — MR AVS SNAPSHOT
After Visit Summary   4/12/2017    Hoang Kiser    MRN: [id number]           Patient Information     Date Of Birth          1985        Visit Information        Provider Department      4/12/2017 3:00 PM Chuck Dominguez MD [facility]        Today's Diagnoses     Primary insomnia           Follow-ups after your visit        Your next 10 appointments already scheduled     Apr 27, 2017  8:00 AM CDT   Return Visit with Faustino Feldman MD   [facility] ([facility])    [address] [place] [address] [phone]            Jun 21, 2017  3:30 PM CDT   (Arrive by 3:15 PM)   Return Movement Disorder with Chuck Dominguez MD   [facility] ([facility])    [address] [place] [address] [phone]              Who to contact     Please call your clinic at [phone] to:    Ask questions about your health    Make or cancel appointments    Discuss your medicines    Learn about your test results    Speak to your doctor   If you have compliments or concerns about an experience at your clinic, or if you wish to file a complaint, please contact [facility] Patient Relations at [phone] or email us at [email or web address]         Additional Information About Your Visit        MyChart Information     [employer] gives you secure access to your electronic health record. If you see a primary care provider, you can also send messages to your care team and make appointments. If you have questions, please call your primary care clinic.  If you do not have a primary care provider, please call [phone] and they will assist you.      [employer] is an electronic gateway that provides easy, online access to your medical records. With [employer], you can request a clinic appointment, read your test results, renew a prescription or communicate  "with your care team.     To access your existing account, please contact your [facility] or call [phone] for assistance.        Care EveryWhere ID     This is your Care EveryWhere ID. This could be used by other organizations to access your [place] medical records  [id number]        Your Vitals Were     Pulse Respirations Height Pulse Oximetry BMI (Body Mass Index)       85 16 1.702 m (5' 7\") 97% 27.22 kg/m2        Blood Pressure from Last 3 Encounters:   04/12/17 119/65   04/05/17 120/68   03/21/17 133/78    Weight from Last 3 Encounters:   04/12/17 78.8 kg (173 lb 12.8 oz)   10/12/16 82.9 kg (182 lb 11.2 oz)   04/26/16 95.3 kg (210 lb)              Today, you had the following     No orders found for display         Where to get your medicines      Some of these will need a paper prescription and others can be bought over the counter.  Ask your nurse if you have questions.     Bring a paper prescription for each of these medications     zolpidem 10 MG tablet          Primary Care Provider Office Phone # Fax #    Phill Floyd [phone]       [address] [address]        Thank you!     Thank you for choosing [facility]  for your care. Our goal is always to provide you with excellent care. Hearing back from our patients is one way we can continue to improve our services. Please take a few minutes to complete the written survey that you may receive in the mail after your visit with us. Thank you!             Your Updated Medication List - Protect others around you: Learn how to safely use, store and throw away your medicines at www.disposemymeds.org.          This list is accurate as of: 4/12/17  4:20 PM.  Always use your most recent med list.                   Brand Name Dispense Instructions for use    * amphetamine-dextroamphetamine 25 MG 24 hr capsule    ADDERALL XR    30 capsule    Take 1 capsule (25 " mg) by mouth every morning       * amphetamine-dextroamphetamine 20 MG per tablet    ADDERALL     Reported on 3/6/2017       clonazePAM 0.5 MG tablet    klonoPIN         * gabapentin 100 MG capsule    NEURONTIN    90 capsule    Take 1 capsule (100 mg) by mouth 3 times daily       * gabapentin 100 MG capsule    NEURONTIN    60 capsule    Take 1 capsule (100 mg) by mouth 3 times daily       ibuprofen 600 MG tablet    ADVIL/MOTRIN     Take 1-2 tablets by mouth 3 times daily as needed       * NAPROXEN PO      Take 500 mg by mouth       * naproxen 500 MG tablet    NAPROSYN    40 tablet    Take 1 tablet (500 mg) by mouth 2 times daily (with meals)       * tiZANidine 4 MG tablet    ZANAFLEX    30 tablet    Take 1-2 tablets (4-8 mg) by mouth nightly as needed       * tiZANidine 4 MG tablet    ZANAFLEX    30 tablet    Take 1-2 tablets (4-8 mg) by mouth nightly as needed       TRAMADOL HCL PO          zolpidem 10 MG tablet    AMBIEN    90 tablet    Take 1 tablet (10 mg) by mouth nightly as needed for sleep       * Notice:  This list has 8 medication(s) that are the same as other medications prescribed for you. Read the directions carefully, and ask your doctor or other care provider to review them with you.

## 2017-04-12 NOTE — LETTER
4/12/2017       RE: Hoang Kiser  [address] [facility] [address]     Dear Colleague,    Thank you for referring your patient, Hoang Kiser, to the [facility] NEUROLOG[facility] at [facility]. Please see a copy of my visit note below.    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  He has been taking N-acetylcysteine 1800 mg a day and has found this helpful as he did stop it for 1 month and found that this tic ear clicking was worse.  We discussed seeing Dr. Nelson who he did see in ENT for Botox injections.  He, I think appropriately, is not really wanting to do this, one just because of the nature of the procedure and secondly he is unsure if it will be covered by his insurance and he does not have any extra money to spend on this at this time.  In fact, he unfortunately got injured at work and is having even worse financial situation than usual, but he continues to meditate and have a positive mood.  He also had new sort of movements that he thinks are back spasms related to this work injury he has had.  He demonstrates them as sort of a truncal movement that did not seem very stereotyped but could possibly be a tic as well and these have for the most part subsided.      PHYSICAL EXAMINATION:   He is sitting in the chair, no distress.  No abnormal movements, although he does at times swallow during the middle of talking, which I assume is corresponding with a throat clicking.  He otherwise has no tremor, no other abnormal movements and walks normally.       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  We discussed options that have helped central palatal myoclonus including valproic acid, clonazepam and the sumatriptan.  He does not really have headaches and does not like the sound of side effects of Depakote.  He is already on a stimulant so the mix of a stimulant and sumatriptan is not ideal.  He is already taking clonazepam for anxiety and takes 0.5 mg at night, and can take more if his anxiety is worse and we agreed that probably the best option would be to trial an increase of this medication to see if it helps with his palatal movements.  He brought up a good point and I think it is prudent for him to review this with his psychiatrist as I did discuss that this can potentially lead to more depression as well as a cognitive fog and fullness in thinking so the plan will be for him to discuss increasing clonazepam with his psychiatrist, let me know via MyChart and then I will advise him to increase to maybe 1 mg at night for several days to see if there is any difference.  Otherwise, we can come up with a new strategy.  I will set a follow up with him in .      Forty-five minutes total was spent with this patient, more than half the time in discussing his diagnosis and treatment options and counseling.      Chuck Dominguez MD      D: 2017 17:14   T: 2017 12:12   MT: CHEYANNE      Name:     VLADISLAV LEUNG   MRN:      [id number]        Account:      [id number]   :      1985           Service Date: 2017      Document: [id number]

## 2017-04-12 NOTE — NURSING NOTE
Chief Complaint   Patient presents with     RECHECK     [facility]- MOVEMENT DISORDER F/U, MEDS DISCUSSION

## 2017-04-13 NOTE — PROGRESS NOTES
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  He has been taking N-acetylcysteine 1800 mg a day and has found this helpful as he did stop it for 1 month and found that this tic ear clicking was worse.  We discussed seeing Dr. Nelson who he did see in ENT for Botox injections.  He, I think appropriately, is not really wanting to do this, one just because of the nature of the procedure and secondly he is unsure if it will be covered by his insurance and he does not have any extra money to spend on this at this time.  In fact, he unfortunately got injured at work and is having even worse financial situation than usual, but he continues to meditate and have a positive mood.  He also had new sort of movements that he thinks are back spasms related to this work injury he has had.  He demonstrates them as sort of a truncal movement that did not seem very stereotyped but could possibly be a tic as well and these have for the most part subsided.      PHYSICAL EXAMINATION:   He is sitting in the chair, no distress.  No abnormal movements, although he does at times swallow during the middle of talking, which I assume is corresponding with a throat clicking.  He otherwise has no tremor, no other abnormal movements and walks normally.      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  We discussed options that have helped central palatal myoclonus including valproic acid, clonazepam and the sumatriptan.  He does not really have headaches and does not like the sound of side effects of  Depakote.  He is already on a stimulant so the mix of a stimulant and sumatriptan is not ideal.  He is already taking clonazepam for anxiety and takes 0.5 mg at night, and can take more if his anxiety is worse and we agreed that probably the best option would be to trial an increase of this medication to see if it helps with his palatal movements.  He brought up a good point and I think it is prudent for him to review this with his psychiatrist as I did discuss that this can potentially lead to more depression as well as a cognitive fog and fullness in thinking so the plan will be for him to discuss increasing clonazepam with his psychiatrist, let me know via Infotrievehart and then I will advise him to increase to maybe 1 mg at night for several days to see if there is any difference.  Otherwise, we can come up with a new strategy.  I will set a follow up with him in .      Forty-five minutes total was spent with this patient, more than half the time in discussing his diagnosis and treatment options and counseling.      MD WILLEM Leung MD             D: 2017 17:14   T: 2017 12:12   MT: CHEYANNE      Name:     VLADISLAV LEUNG   MRN:      [id number]        Account:      [id number]   :      1985           Service Date: 2017      Document: [id number]

## 2017-04-20 ENCOUNTER — TELEPHONE (OUTPATIENT)
Dept: NEUROLOGY | Facility: CLINIC | Age: 32
End: 2017-04-20

## 2017-04-26 ENCOUNTER — TRANSFERRED RECORDS (OUTPATIENT)
Dept: HEALTH INFORMATION MANAGEMENT | Facility: CLINIC | Age: 32
End: 2017-04-26

## 2017-04-26 NOTE — TELEPHONE ENCOUNTER
[facility] Prior Authorization Team   Phone: [phone]  Fax: [phone]      PA Initiation    Medication: zolpidem (AMBIEN) 10 MG tablet  Insurance Company: [employer] - Phone [phone] Fax [phone]  Pharmacy Filling the Rx: [facility] [address] [place] [address] [address] [place] [facility] [place] [address] [facility]  Filling Pharmacy Phone: [phone]  Filling Pharmacy Fax:    Start Date: 4/26/2017

## 2017-04-26 NOTE — TELEPHONE ENCOUNTER
Prior Authorization Approval    Authorization Effective Date: 4/26/2017  Authorization Expiration Date: 4/26/2020  Medication: zolpidem (AMBIEN) 10 MG tablet - APPROVED  Approved Dose/Quantity:   Reference #:     Insurance Company: [employer] - Phone [phone] Fax [phone]  Expected CoPay: $2.80     CoPay Card Available:      Foundation Assistance Needed:    Which Pharmacy is filling the prescription (Not needed for infusion/clinic administered): [facility] [address] [place], [place] [address] [place] AT [facility] [address] [facility]  Pharmacy Notified: Yes  Patient Notified: Yes    I spoke with patients insurance and Hoang is able to  60 tablets per 45 days. On June 6, 2017 he will be able to fill 90 tablets per 90 days. Pharmacy stated that the 60 tablets processed with out a problem.

## 2017-04-26 NOTE — TELEPHONE ENCOUNTER
"Called [facility] to discuss what the problem is with this prescription. Spoke to \"Coral\".    zolpidem (AMBIEN) 10 MG tablet 90 tablet 1 4/12/2017  No   Sig: Take 1 tablet (10 mg) by mouth nightly as needed for sleep       Quantity limit problem. Insurance allows 45 tabs in 75 days. I will check with the PA team to see if they have called insurance because taking 1 per night for 90 days would = 90 tabs.      "

## 2017-04-27 ENCOUNTER — OFFICE VISIT (OUTPATIENT)
Dept: ORTHOPEDICS | Facility: CLINIC | Age: 32
End: 2017-04-27
Payer: OTHER MISCELLANEOUS

## 2017-04-27 VITALS — SYSTOLIC BLOOD PRESSURE: 118 MMHG | HEART RATE: 88 BPM | OXYGEN SATURATION: 98 % | DIASTOLIC BLOOD PRESSURE: 64 MMHG

## 2017-04-27 DIAGNOSIS — M62.830 LUMBAR PARASPINAL MUSCLE SPASM: ICD-10-CM

## 2017-04-27 DIAGNOSIS — M54.16 LUMBAR RADICULAR PAIN: Primary | ICD-10-CM

## 2017-04-27 PROCEDURE — 99213 OFFICE O/P EST LOW 20 MIN: CPT | Performed by: PREVENTIVE MEDICINE

## 2017-04-27 ASSESSMENT — PAIN SCALES - GENERAL: PAINLEVEL: MILD PAIN (2)

## 2017-04-27 NOTE — PROGRESS NOTES
HISTORY OF PRESENT ILLNESS  Shoaib returns for followup for his low back injury. He continues to do PT and is getting better and more comfortable with lifting at work.   He is taking gabapentin and tizanadine. Occasional naproxen. Feels good today.    Accompanied by work liason for work comp      Location: left leg and groin  Quality: weak and achy pain    Severity: 2/10 at worst    Duration: 3+ months   Timing: occurs intermittently  Context: occurs while walking and lifting  Modifying factors:  Resting, cold packs, and non-use makes it better, movement and use makes it worse  Associated signs & symptoms: warm, stabbing pain, no tingling  Previous similar pain: no  Exercise: lifting packages and walking   Additional history: as documented    MEDICAL HISTORY  Patient Active Problem List   Diagnosis     Neuropathy (H)     Moderate major depression (H)     Tobacco abuse     Attention deficit hyperactivity disorder (ADHD), predominantly inattentive type     Primary insomnia     History of MRI of brain and brain stem     Panic disorder without agoraphobia     Abnormal involuntary movement     Tic disorder       Current Outpatient Prescriptions   Medication Sig Dispense Refill     ibuprofen (ADVIL/MOTRIN) 600 MG tablet Take 1-2 tablets by mouth 3 times daily as needed       zolpidem (AMBIEN) 10 MG tablet Take 1 tablet (10 mg) by mouth nightly as needed for sleep 90 tablet 1     tiZANidine (ZANAFLEX) 4 MG tablet Take 1-2 tablets (4-8 mg) by mouth nightly as needed 30 tablet 1     naproxen (NAPROSYN) 500 MG tablet Take 1 tablet (500 mg) by mouth 2 times daily (with meals) 40 tablet 1     gabapentin (NEURONTIN) 100 MG capsule Take 1 capsule (100 mg) by mouth 3 times daily 60 capsule 0     gabapentin (NEURONTIN) 100 MG capsule Take 1 capsule (100 mg) by mouth 3 times daily 90 capsule 0     NAPROXEN PO Take 500 mg by mouth       TRAMADOL HCL PO        tiZANidine (ZANAFLEX) 4 MG tablet Take 1-2 tablets (4-8 mg) by mouth nightly  as needed 30 tablet 1     clonazePAM (KLONOPIN) 0.5 MG tablet   0     amphetamine-dextroamphetamine (ADDERALL) 20 MG tablet Reported on 3/6/2017  0     amphetamine-dextroamphetamine (ADDERALL XR) 25 MG 24 hr capsule Take 1 capsule (25 mg) by mouth every morning 30 capsule 0       Allergies   Allergen Reactions     Buspirone Hcl Other (See Comments)     Panic attacks     Doxycycline Diarrhea, Fatigue, GI Disturbance and Nausea     Trazodone Fatigue     Keflex [Cephalexin] Diarrhea       Family History   Problem Relation Age of Onset     Lipids Father      hyperlipidemia     Hyperlipidemia Father      Obesity Father      Arthritis Mother      Hyperlipidemia Mother      Depression Mother      Anxiety Disorder Mother      MENTAL ILLNESS Mother      Obesity Mother      Asthma Brother      Asthma Sister      Depression Other      Hearing Loss Other      Psychotic Disorder Other      Obesity Other      CEREBROVASCULAR DISEASE Paternal Grandfather      Alzheimer Disease Paternal Grandfather      Depression Brother      MENTAL ILLNESS Brother      Bipolar     Asthma Brother      Depression Sister      Asthma Sister      Substance Abuse Sister      Alcohol     MENTAL ILLNESS Brother      Bipolar     Asthma Brother      Asthma Sister      Obesity Sister      Asthma Brother      Obesity Brother      Obesity Maternal Grandmother      Genetic Disorder Maternal Grandmother      Epilepsy       Additional medical/Social/Surgical histories reviewed in [facility] and updated as appropriate.     REVIEW OF SYSTEMS (4/27/2017)  10 point ROS of systems including Constitutional, Eyes, Respiratory, Cardiovascular, Gastroenterology, Genitourinary, Integumentary, Musculoskeletal, Psychiatric were all negative except for pertinent positives noted in my HPI.     PHYSICAL EXAM  Vitals:    04/27/17 0752   BP: 118/64   Pulse: 88   SpO2: 98%     Vital Signs: /64  Pulse 88  SpO2 98% Patient declined being weighed. There is no height or weight on  file to calculate BMI.    General  - normal appearance, in no obvious distress  CV  - normal peripheral perfusion  Pulm  - normal respiratory pattern, non-labored  Musculoskeletal - lumbar spine  - stance: normal gait without limp, no obvious leg length discrepancy, normal heel and toe walk  - inspection: normal bone and joint alignment, no obvious scoliosis  - palpation: no paravertebral or bony tenderness, except bilateral lumbar paraspinal muscle groups  - ROM: flexion still exacerbates a small amount of pain in left low back, slightly abnormal extension- not greatly limited by pain in low back, sidebending, rotation feels some pain with both movements  - strength: lower extremities 5/5 in all planes  - special tests:  (-) straight leg raise left leg- improved  (-) slump test- left leg and hip and low back- this is improved from his previous visit  Neuro  - patellar and Achilles DTRs 2+ bilaterally, today, no left lower extremity sensory deficit throughout L4/5 distribution, grossly normal coordination, normal muscle tone  Skin  - no ecchymosis, erythema, warmth, or induration, no obvious rash  Psych  - interactive, appropriate, normal mood and affect    ASSESSMENT & PLAN    32 yo male with low back pain. Left hip pain, and some mild lumbar radicular pain into left leg due to possible herniated lumbar disc, continues to improve    -cont. gabapentin 100 tid as it continues to help him  Cont. tizanadine at night      Given note to advance work restrictions to expected duties  Cont. PT for another month  Faustino Feldman MD, CAQSM

## 2017-04-27 NOTE — MR AVS SNAPSHOT
After Visit Summary   2017    Hoang Kiser    MRN: [id number]           Patient Information     Date Of Birth          1985        Visit Information        Provider Department      2017 8:00 AM Faustino Feldman MD [facility]        Care Instructions    Thanks for coming today.  Ortho/Sports Medicine Clinic  [address] [place] [address]    To schedule future appointments in Ortho Clinic, you may call [phone].    To schedule ordered imaging by your provider:   Call Central [facility] [phone]    To schedule an injection ordered by your provider:  Call Central Imaging Injection scheduling line: [phone]  [email or web address] available online at:  [email or web address]    Please call if any further questions or concerns ([phone]).  Clinic hours 8 am to 5 pm.    Return to clinic (call) if symptoms worsen or fail to improve.          Follow-ups after your visit        Your next 10 appointments already scheduled     May 17, 2017  3:40 PM CDT   Return Visit with Faustino Feldman MD   [facility] ([facility])    [address] [place] [id number] [phone]            2017  3:30 PM CDT   (Arrive by 3:15 PM)   Return Movement Disorder with Chuck Dominguez MD   [facility] ([facility])    [address] [place] [id number] [phone]              Who to contact     If you have questions or need follow up information about today's clinic visit or your schedule please contact [facility] directly at [phone].  Normal or non-critical lab and imaging results will be communicated to you by MyChart, letter or phone within 4 business days after the clinic has received the results. If you do not hear from us within 7 days, please contact the clinic through MyChart or phone. If you have a critical  or abnormal lab result, we will notify you by phone as soon as possible.  Submit refill requests through Use It Better or call your pharmacy and they will forward the refill request to us. Please allow 3 business days for your refill to be completed.          Additional Information About Your Visit        TinyCohart Information     Use It Better gives you secure access to your electronic health record. If you see a primary care provider, you can also send messages to your care team and make appointments. If you have questions, please call your primary care clinic.  If you do not have a primary care provider, please call [phone] and they will assist you.      Use It Better is an electronic gateway that provides easy, online access to your medical records. With Use It Better, you can request a clinic appointment, read your test results, renew a prescription or communicate with your care team.     To access your existing account, please contact your [facility] or call [phone] for assistance.        Care EveryWhere ID     This is your Care EveryWhere ID. This could be used by other organizations to access your [place] medical records  [id number]        Your Vitals Were     Pulse Pulse Oximetry                88 98%           Blood Pressure from Last 3 Encounters:   04/27/17 118/64   04/12/17 119/65   04/05/17 120/68    Weight from Last 3 Encounters:   04/12/17 78.8 kg (173 lb 12.8 oz)   10/12/16 82.9 kg (182 lb 11.2 oz)   04/26/16 95.3 kg (210 lb)              Today, you had the following     No orders found for display       Primary Care Provider Office Phone # Fax #    Phill Floyd [phone]       [facility] [address] [address] [facility] [address]        Thank you!     Thank you for choosing [facility]  for your care. Our goal is always to provide you with excellent care. Hearing back from our patients is one way we can continue to improve our  services. Please take a few minutes to complete the written survey that you may receive in the mail after your visit with us. Thank you!             Your Updated Medication List - Protect others around you: Learn how to safely use, store and throw away your medicines at www.disposemymeds.org.          This list is accurate as of: 4/27/17  8:47 AM.  Always use your most recent med list.                   Brand Name Dispense Instructions for use    * amphetamine-dextroamphetamine 25 MG 24 hr capsule    ADDERALL XR    30 capsule    Take 1 capsule (25 mg) by mouth every morning       * amphetamine-dextroamphetamine 20 MG per tablet    ADDERALL     Reported on 3/6/2017       clonazePAM 0.5 MG tablet    klonoPIN         * gabapentin 100 MG capsule    NEURONTIN    90 capsule    Take 1 capsule (100 mg) by mouth 3 times daily       * gabapentin 100 MG capsule    NEURONTIN    60 capsule    Take 1 capsule (100 mg) by mouth 3 times daily       ibuprofen 600 MG tablet    ADVIL/MOTRIN     Take 1-2 tablets by mouth 3 times daily as needed       * NAPROXEN PO      Take 500 mg by mouth       * naproxen 500 MG tablet    NAPROSYN    40 tablet    Take 1 tablet (500 mg) by mouth 2 times daily (with meals)       * tiZANidine 4 MG tablet    ZANAFLEX    30 tablet    Take 1-2 tablets (4-8 mg) by mouth nightly as needed       * tiZANidine 4 MG tablet    ZANAFLEX    30 tablet    Take 1-2 tablets (4-8 mg) by mouth nightly as needed       TRAMADOL HCL PO          zolpidem 10 MG tablet    AMBIEN    90 tablet    Take 1 tablet (10 mg) by mouth nightly as needed for sleep       * Notice:  This list has 8 medication(s) that are the same as other medications prescribed for you. Read the directions carefully, and ask your doctor or other care provider to review them with you.

## 2017-04-27 NOTE — PATIENT INSTRUCTIONS
Thanks for coming today.  Ortho/Sports Medicine Clinic  [address] [place] [address]    To schedule future appointments in Ortho Clinic, you may call [phone].    To schedule ordered imaging by your provider:   Call Central Imaging [phone]    To schedule an injection ordered by your provider:  Call Central Imaging Injection scheduling line: [phone]  [email or web address] available online at:  [email or web address]    Please call if any further questions or concerns ([phone]).  Clinic hours 8 am to 5 pm.    Return to clinic (call) if symptoms worsen or fail to improve.

## 2017-04-27 NOTE — LETTER
[facility]  [address] [place] [address]  [phone]        2017    Hoang Kiser  [address] [facility] [address]  [phone] (home) [phone] (work)    :     1985      To Whom it May Concern:  Hoang Kiser is under my professional care for his hip injury/strain. He may return to work with the following: He may return to work to do his essential job functions.  Hoang should continue to work with [employer] over the next 4 weeks.   I will re-evaluate him at that time.    Sincerely,    Faustino Feldman MD

## 2017-04-27 NOTE — NURSING NOTE
"Hoang Kiser's goals for this visit include: Rehceck lumbar pain, patient reports improvement.  He requests these members of his care team be copied on today's visit information: yes    PCP: Phill Floyd    Referring Provider:  No referring provider defined for this encounter.    Chief Complaint   Patient presents with     RECHECK     Recheck lumbar pain.       Initial /64  Pulse 88  SpO2 98% Estimated body mass index is 27.22 kg/(m^2) as calculated from the following:    Height as of 4/12/17: 1.702 m (5' 7\").    Weight as of 4/12/17: 78.8 kg (173 lb 12.8 oz).  Medication Reconciliation: complete    "

## 2017-05-05 DIAGNOSIS — F51.01 PRIMARY INSOMNIA: ICD-10-CM

## 2017-05-08 ENCOUNTER — TRANSFERRED RECORDS (OUTPATIENT)
Dept: HEALTH INFORMATION MANAGEMENT | Facility: CLINIC | Age: 32
End: 2017-05-08

## 2017-05-16 ENCOUNTER — TRANSFERRED RECORDS (OUTPATIENT)
Dept: HEALTH INFORMATION MANAGEMENT | Facility: CLINIC | Age: 32
End: 2017-05-16

## 2017-05-17 ENCOUNTER — OFFICE VISIT (OUTPATIENT)
Dept: ORTHOPEDICS | Facility: CLINIC | Age: 32
End: 2017-05-17
Payer: OTHER MISCELLANEOUS

## 2017-05-17 VITALS — SYSTOLIC BLOOD PRESSURE: 128 MMHG | HEART RATE: 97 BPM | DIASTOLIC BLOOD PRESSURE: 71 MMHG

## 2017-05-17 DIAGNOSIS — M53.3 PAIN OF LEFT SACROILIAC JOINT: Primary | ICD-10-CM

## 2017-05-17 DIAGNOSIS — M25.9 DISORDER OF LEFT SACROILIAC JOINT: ICD-10-CM

## 2017-05-17 DIAGNOSIS — M54.16 LUMBAR RADICULAR PAIN: ICD-10-CM

## 2017-05-17 DIAGNOSIS — S76.012S MUSCLE STRAIN OF LEFT HIP, SEQUELA: ICD-10-CM

## 2017-05-17 DIAGNOSIS — M47.818 ARTHROPATHY OF LEFT SACROILIAC JOINT: ICD-10-CM

## 2017-05-17 DIAGNOSIS — M62.830 LUMBAR PARASPINAL MUSCLE SPASM: ICD-10-CM

## 2017-05-17 PROCEDURE — 99199 UNLISTED SPECIAL SVC PX/RPRT: CPT | Performed by: PREVENTIVE MEDICINE

## 2017-05-17 PROCEDURE — 99213 OFFICE O/P EST LOW 20 MIN: CPT | Performed by: PREVENTIVE MEDICINE

## 2017-05-17 RX ORDER — GABAPENTIN 100 MG/1
100 CAPSULE ORAL DAILY
Qty: 30 CAPSULE | Refills: 3 | Status: SHIPPED | OUTPATIENT
Start: 2017-05-17 | End: 2017-06-09

## 2017-05-17 ASSESSMENT — PAIN SCALES - GENERAL: PAINLEVEL: MILD PAIN (3)

## 2017-05-17 NOTE — PATIENT INSTRUCTIONS
Thanks for coming today.  Ortho/Sports Medicine Clinic  [address] [place] [address]    To schedule future appointments in Ortho Clinic, you may call [phone].    To schedule ordered imaging by your Provider: Call [facility] at [phone]    GradFly available online at:   [email or web address]    Please call if any further questions or concerns [phone] and ask for the Orthopedic Department. Clinic hours 8 am to 5 pm.    Return to clinic if symptoms worsen.

## 2017-05-17 NOTE — LETTER
[facility]  [address] [place] [address]  [phone]        2017    Hoang Kiser  [address] [facility] [address]  [phone] (home) [phone] (work)    :     1985      To Whom it May Concern:  Hoang Kiser is under my professional care for his hip injury/strain, he also has a component of SI joint dysfunction/pain.  He may return to work with the following: He may return to work to do his essential job functions.  Hoang should then followup with me in 5-6 weeks.  I will re-evaluate him at that time.    Sincerely,    Faustino Feldman MD

## 2017-05-17 NOTE — MR AVS SNAPSHOT
After Visit Summary   5/17/2017    Hoang Kiser    MRN: [id number]           Patient Information     Date Of Birth          1985        Visit Information        Provider Department      5/17/2017 3:40 PM Faustino Feldman MD [facility]        Today's Diagnoses     Pain of left sacroiliac joint    -  1    Arthropathy of left sacroiliac joint        Disorder of left sacroiliac joint        Lumbar paraspinal muscle spasm        Lumbar radicular pain        Muscle strain of left hip, sequela          Care Instructions    Thanks for coming today.  Ortho[facility] Medicine Clinic  [address] [place] [address]    To schedule future appointments in Ortho Clinic, you may call [phone].    To schedule ordered imaging by your Provider: Call [facility] at [phone]    Docin available online at:   Splash[employer]dy    Please call if any further questions or concerns [phone] and ask for the Orthopedic Department. Clinic hours 8 am to 5 pm.    Return to clinic if symptoms worsen.        Follow-ups after your visit        Your next 10 appointments already scheduled     Jun 21, 2017  3:30 PM CDT   (Arrive by 3:15 PM)   Return Movement Disorder with Chuck Dominguez MD   [facility] ([facility])    [address] [place] [id number] [phone]              Future tests that were ordered for you today     Open Future Orders        Priority Expected Expires Ordered    XR Sacroiliac Therapeutic Injection Left Routine 5/17/2017 5/17/2018 5/17/2017            Who to contact     If you have questions or need follow up information about today's clinic visit or your schedule please contact [facility] directly at [phone].  Normal or non-critical lab and imaging results will be communicated to you by MyChart, letter or phone within 4 business days after the clinic has  received the results. If you do not hear from us within 7 days, please contact the clinic through Paybook or phone. If you have a critical or abnormal lab result, we will notify you by phone as soon as possible.  Submit refill requests through Paybook or call your pharmacy and they will forward the refill request to us. Please allow 3 business days for your refill to be completed.          Additional Information About Your Visit        LifeLockharLiveLoop Information     Paybook gives you secure access to your electronic health record. If you see a primary care provider, you can also send messages to your care team and make appointments. If you have questions, please call your primary care clinic.  If you do not have a primary care provider, please call [phone] and they will assist you.      Paybook is an electronic gateway that provides easy, online access to your medical records. With Paybook, you can request a clinic appointment, read your test results, renew a prescription or communicate with your care team.     To access your existing account, please contact your [facility] or call [phone] for assistance.        Care EveryWhere ID     This is your Care EveryWhere ID. This could be used by other organizations to access your [facility] medical records  [id number]        Your Vitals Were     Pulse                   97            Blood Pressure from Last 3 Encounters:   05/17/17 128/71   04/27/17 118/64   04/12/17 119/65    Weight from Last 3 Encounters:   04/12/17 78.8 kg (173 lb 12.8 oz)   10/12/16 82.9 kg (182 lb 11.2 oz)   04/26/16 95.3 kg (210 lb)                 Today's Medication Changes          These changes are accurate as of: 5/17/17  4:21 PM.  If you have any questions, ask your nurse or doctor.               These medicines have changed or have updated prescriptions.        Dose/Directions    * gabapentin 100 MG capsule   Commonly known as:  NEURONTIN   This may have  changed:  Another medication with the same name was changed. Make sure you understand how and when to take each.   Used for:  Lumbar radicular pain        Dose:  100 mg   Take 1 capsule (100 mg) by mouth 3 times daily   Quantity:  60 capsule   Refills:  0       * gabapentin 100 MG capsule   Commonly known as:  NEURONTIN   This may have changed:  when to take this   Used for:  Lumbar radicular pain, Lumbar paraspinal muscle spasm        Dose:  100 mg   Take 1 capsule (100 mg) by mouth daily   Quantity:  30 capsule   Refills:  3       * Notice:  This list has 2 medication(s) that are the same as other medications prescribed for you. Read the directions carefully, and ask your doctor or other care provider to review them with you.         Where to get your medicines      These medications were sent to [facility] [id number] [place], [place] [address]E [place] AT [place] [address] [facility] [address]     Phone:  [phone]     gabapentin 100 MG capsule    tiZANidine 4 MG tablet                Primary Care Provider Office Phone # Fax #    Phill Floyd [phone]       [facility] [address] [facility] [address]        Thank you!     Thank you for choosing [facility]  for your care. Our goal is always to provide you with excellent care. Hearing back from our patients is one way we can continue to improve our services. Please take a few minutes to complete the written survey that you may receive in the mail after your visit with us. Thank you!             Your Updated Medication List - Protect others around you: Learn how to safely use, store and throw away your medicines at www.disposemymeds.org.          This list is accurate as of: 5/17/17  4:21 PM.  Always use your most recent med list.                   Brand Name Dispense Instructions for use    * amphetamine-dextroamphetamine 25 MG 24 hr capsule    ADDERALL XR    30  capsule    Take 1 capsule (25 mg) by mouth every morning       * amphetamine-dextroamphetamine 20 MG per tablet    ADDERALL     Reported on 3/6/2017       clonazePAM 0.5 MG tablet    klonoPIN         * gabapentin 100 MG capsule    NEURONTIN    60 capsule    Take 1 capsule (100 mg) by mouth 3 times daily       * gabapentin 100 MG capsule    NEURONTIN    30 capsule    Take 1 capsule (100 mg) by mouth daily       ibuprofen 600 MG tablet    ADVIL/MOTRIN     Take 1-2 tablets by mouth 3 times daily as needed       * NAPROXEN PO      Take 500 mg by mouth       * naproxen 500 MG tablet    NAPROSYN    40 tablet    Take 1 tablet (500 mg) by mouth 2 times daily (with meals)       * tiZANidine 4 MG tablet    ZANAFLEX    30 tablet    Take 1-2 tablets (4-8 mg) by mouth nightly as needed       * tiZANidine 4 MG tablet    ZANAFLEX    30 tablet    Take 1-2 tablets (4-8 mg) by mouth nightly as needed       TRAMADOL HCL PO          zolpidem 10 MG tablet    AMBIEN    90 tablet    Take 1 tablet (10 mg) by mouth nightly as needed for sleep       * Notice:  This list has 8 medication(s) that are the same as other medications prescribed for you. Read the directions carefully, and ask your doctor or other care provider to review them with you.

## 2017-05-17 NOTE — PROGRESS NOTES
HISTORY OF PRESENT ILLNESS  Shoaib returns for followup for his low back injury and hip strain. He has been doing well, completed PT with [facility] and doing HEP. Doesn't work more than 10 hours. And has noticed more localized pain in left low back especially after standing a lot.   He is taking gabapentin and tizanadine. Occasional naproxen. Feels good today.    Accompanied by work liason for work comp      Location: left leg and groin  Quality: weak and achy pain    Severity: 2/10 at worst    Duration: 3+ months   Timing: occurs intermittently  Context: occurs while walking and lifting  Modifying factors:  Resting, cold packs, and non-use makes it better, movement and use makes it worse  Associated signs & symptoms: warm, stabbing pain, no tingling  Previous similar pain: no  Exercise: lifting packages and walking   Additional history: as documented    MEDICAL HISTORY  Patient Active Problem List   Diagnosis     Neuropathy (H)     Moderate major depression (H)     Tobacco abuse     Attention deficit hyperactivity disorder (ADHD), predominantly inattentive type     Primary insomnia     History of MRI of brain and brain stem     Panic disorder without agoraphobia     Abnormal involuntary movement     Tic disorder       Current Outpatient Prescriptions   Medication Sig Dispense Refill     ibuprofen (ADVIL/MOTRIN) 600 MG tablet Take 1-2 tablets by mouth 3 times daily as needed       zolpidem (AMBIEN) 10 MG tablet Take 1 tablet (10 mg) by mouth nightly as needed for sleep 90 tablet 1     tiZANidine (ZANAFLEX) 4 MG tablet Take 1-2 tablets (4-8 mg) by mouth nightly as needed 30 tablet 1     naproxen (NAPROSYN) 500 MG tablet Take 1 tablet (500 mg) by mouth 2 times daily (with meals) 40 tablet 1     gabapentin (NEURONTIN) 100 MG capsule Take 1 capsule (100 mg) by mouth 3 times daily 60 capsule 0     gabapentin (NEURONTIN) 100 MG capsule Take 1 capsule (100 mg) by mouth 3 times daily 90 capsule 0     NAPROXEN PO Take 500 mg by  mouth       TRAMADOL HCL PO        tiZANidine (ZANAFLEX) 4 MG tablet Take 1-2 tablets (4-8 mg) by mouth nightly as needed 30 tablet 1     clonazePAM (KLONOPIN) 0.5 MG tablet   0     amphetamine-dextroamphetamine (ADDERALL) 20 MG tablet Reported on 3/6/2017  0     amphetamine-dextroamphetamine (ADDERALL XR) 25 MG 24 hr capsule Take 1 capsule (25 mg) by mouth every morning 30 capsule 0       Allergies   Allergen Reactions     Buspirone Hcl Other (See Comments)     Panic attacks     Doxycycline Diarrhea, Fatigue, GI Disturbance and Nausea     Trazodone Fatigue     Keflex [Cephalexin] Diarrhea       Family History   Problem Relation Age of Onset     Lipids Father      hyperlipidemia     Hyperlipidemia Father      Obesity Father      Arthritis Mother      Hyperlipidemia Mother      Depression Mother      Anxiety Disorder Mother      MENTAL ILLNESS Mother      Obesity Mother      Asthma Brother      Asthma Sister      Depression Other      Hearing Loss Other      Psychotic Disorder Other      Obesity Other      CEREBROVASCULAR DISEASE Paternal Grandfather      Alzheimer Disease Paternal Grandfather      Depression Brother      MENTAL ILLNESS Brother      Bipolar     Asthma Brother      Depression Sister      Asthma Sister      Substance Abuse Sister      Alcohol     MENTAL ILLNESS Brother      Bipolar     Asthma Brother      Asthma Sister      Obesity Sister      Asthma Brother      Obesity Brother      Obesity Maternal Grandmother      Genetic Disorder Maternal Grandmother      Epilepsy       Additional medical/Social/Surgical histories reviewed in [facility] and updated as appropriate.     REVIEW OF SYSTEMS (5/17/2017)  10 point ROS of systems including Constitutional, Eyes, Respiratory, Cardiovascular, Gastroenterology, Genitourinary, Integumentary, Musculoskeletal, Psychiatric were all negative except for pertinent positives noted in my HPI.     PHYSICAL EXAM  Vitals:    05/17/17 1538   BP: 128/71   Pulse: 97     Vital  Signs: /71  Pulse 97 Patient declined being weighed. There is no height or weight on file to calculate BMI.    General  - normal appearance, in no obvious distress  CV  - normal peripheral perfusion  Pulm  - normal respiratory pattern, non-labored  Musculoskeletal - lumbar spine  - stance: normal gait without limp, no obvious leg length discrepancy, normal heel and toe walk  - inspection: normal bone and joint alignment, no obvious scoliosis  - palpation: no paravertebral or bony tenderness, except bilateral lumbar paraspinal muscle groups  - ROM: flexion still exacerbates a small amount of pain in left low back, mainly in area of left SI joint which is tender on exam today, and positive pain with testing, slightly abnormal extension- not greatly limited by pain in low back, sidebending, rotation feels some pain with both movements  - strength: lower extremities 5/5 in all planes  - special tests:  (-) straight leg raise left leg- improved  (-) slump test- left leg and hip and low back- this is improved from his previous visit  Neuro  - patellar and Achilles DTRs 2+ bilaterally, today, no left lower extremity sensory deficit throughout L4/5 distribution, grossly normal coordination, normal muscle tone  Skin  - no ecchymosis, erythema, warmth, or induration, no obvious rash  Psych  - interactive, appropriate, normal mood and affect    ASSESSMENT & PLAN    30 yo male with low back pain. Left hip pain, and some mild lumbar radicular pain into left leg due to possible herniated lumbar disc, continues to improve, also has developed some left SI joint dysfunction and pain as a result    -cont. gabapentin 100 tid as it continues to help him  Cont. tizanadine at night    Discussed the left SI joint pain and some dysfunction, will order a diagnostic and theraputic injection of left SI joint  Given note to expected duties for work  Cont. PT for another month  Faustino Feldman MD, CAM

## 2017-06-02 ENCOUNTER — HOSPITAL ENCOUNTER (OUTPATIENT)
Dept: CT IMAGING | Facility: CLINIC | Age: 32
End: 2017-06-02
Attending: PREVENTIVE MEDICINE
Payer: COMMERCIAL

## 2017-06-02 ENCOUNTER — HOSPITAL ENCOUNTER (OUTPATIENT)
Facility: CLINIC | Age: 32
Discharge: HOME | End: 2017-06-02
Attending: PREVENTIVE MEDICINE | Admitting: PREVENTIVE MEDICINE
Payer: COMMERCIAL

## 2017-06-02 VITALS
HEART RATE: 97 BPM | OXYGEN SATURATION: 97 % | TEMPERATURE: 97.4 F | SYSTOLIC BLOOD PRESSURE: 115 MMHG | DIASTOLIC BLOOD PRESSURE: 73 MMHG | RESPIRATION RATE: 18 BRPM

## 2017-06-02 DIAGNOSIS — M47.818 ARTHROPATHY OF LEFT SACROILIAC JOINT: ICD-10-CM

## 2017-06-02 DIAGNOSIS — M53.3 PAIN OF LEFT SACROILIAC JOINT: ICD-10-CM

## 2017-06-02 DIAGNOSIS — M25.9 DISORDER OF LEFT SACROILIAC JOINT: ICD-10-CM

## 2017-06-02 PROCEDURE — 25000125 ZZHC RX 250: Performed by: PREVENTIVE MEDICINE

## 2017-06-02 PROCEDURE — 27096 INJECT SACROILIAC JOINT: CPT

## 2017-06-02 RX ORDER — BUPIVACAINE HYDROCHLORIDE 2.5 MG/ML
10 INJECTION, SOLUTION EPIDURAL; INFILTRATION; INTRACAUDAL ONCE
Status: DISCONTINUED | OUTPATIENT
Start: 2017-06-02 | End: 2017-06-03 | Stop reason: HOSPADM

## 2017-06-02 RX ORDER — TRIAMCINOLONE ACETONIDE 40 MG/ML
40 INJECTION, SUSPENSION INTRA-ARTICULAR; INTRAMUSCULAR ONCE
Status: COMPLETED | OUTPATIENT
Start: 2017-06-02 | End: 2017-06-02

## 2017-06-02 RX ADMIN — TRIAMCINOLONE ACETONIDE 40 MG: 40 INJECTION, SUSPENSION INTRA-ARTICULAR; INTRAMUSCULAR at 11:26

## 2017-06-09 DIAGNOSIS — M62.830 LUMBAR PARASPINAL MUSCLE SPASM: ICD-10-CM

## 2017-06-09 DIAGNOSIS — M54.16 LUMBAR RADICULAR PAIN: ICD-10-CM

## 2017-06-13 RX ORDER — GABAPENTIN 100 MG/1
100 CAPSULE ORAL DAILY
Qty: 30 CAPSULE | Refills: 3 | Status: SHIPPED | OUTPATIENT
Start: 2017-06-13 | End: 2017-10-18

## 2017-06-19 ASSESSMENT — ENCOUNTER SYMPTOMS
MYALGIAS: 1
HALLUCINATIONS: 0
DISTURBANCES IN COORDINATION: 1
TROUBLE SWALLOWING: 0
POLYPHAGIA: 0
MEMORY LOSS: 0
DIZZINESS: 1
POLYDIPSIA: 0
TASTE DISTURBANCE: 0
CHILLS: 0
WEIGHT LOSS: 1
NUMBNESS: 1
LOSS OF CONSCIOUSNESS: 0
BACK PAIN: 1
NIGHT SWEATS: 0
NECK MASS: 0
HEMATURIA: 0
SORE THROAT: 1
FATIGUE: 1
DIFFICULTY URINATING: 0
HOARSE VOICE: 1
SMELL DISTURBANCE: 0
MUSCLE WEAKNESS: 1
TINGLING: 0
HEADACHES: 1
SINUS CONGESTION: 0
INCREASED ENERGY: 1
MUSCLE CRAMPS: 1
PARALYSIS: 0
DECREASED APPETITE: 1
NECK PAIN: 1
WEAKNESS: 1
TREMORS: 1
WEIGHT GAIN: 0
ARTHRALGIAS: 1
FLANK PAIN: 0
SINUS PAIN: 0
SEIZURES: 0
JOINT SWELLING: 0
DYSURIA: 1
STIFFNESS: 1
ALTERED TEMPERATURE REGULATION: 0
SPEECH CHANGE: 1
FEVER: 0

## 2017-06-20 ENCOUNTER — OFFICE VISIT (OUTPATIENT)
Dept: ORTHOPEDICS | Facility: CLINIC | Age: 32
End: 2017-06-20
Payer: OTHER MISCELLANEOUS

## 2017-06-20 VITALS — SYSTOLIC BLOOD PRESSURE: 122 MMHG | DIASTOLIC BLOOD PRESSURE: 76 MMHG | HEART RATE: 83 BPM

## 2017-06-20 DIAGNOSIS — M62.830 LUMBAR PARASPINAL MUSCLE SPASM: Primary | ICD-10-CM

## 2017-06-20 PROCEDURE — 99214 OFFICE O/P EST MOD 30 MIN: CPT | Performed by: PREVENTIVE MEDICINE

## 2017-06-20 PROCEDURE — 99199 UNLISTED SPECIAL SVC PX/RPRT: CPT | Performed by: PREVENTIVE MEDICINE

## 2017-06-20 RX ORDER — NAPROXEN 500 MG/1
500 TABLET ORAL 2 TIMES DAILY WITH MEALS
Qty: 40 TABLET | Refills: 1 | Status: SHIPPED | OUTPATIENT
Start: 2017-06-20 | End: 2017-08-03

## 2017-06-20 ASSESSMENT — PAIN SCALES - GENERAL: PAINLEVEL: MILD PAIN (3)

## 2017-06-20 NOTE — PROGRESS NOTES
HISTORY OF PRESENT ILLNESS  Shoaib returns for followup for his low back pain. He states that his left sided low back pain is improved, and had an SI joint injection about 3 weeks ago.   He has continued to work at his job   Working 8 hours per day 5 days per week. No restrictions for weight.   He now states that his right leg 'goes numb and tingling' after standing for a period of time.   He is taking gabapentin and tizanadine. Occasional naproxen. Feels like things are worsening.    Accompanied by work liason for work tobiBryant Angélica  [phone]      Location: low back and right leg  Quality: weak and achy pain    Severity: 8/10 at worst    Duration: 3 weeks most recently worsened  Timing: occurs intermittently  Context: occurs while walking and lifting  Modifying factors:  Resting, cold packs, and non-use makes it better, movement and use makes it worse  Associated signs & symptoms: warm, stabbing pain, no tingling  Previous similar pain: no  Exercise: lifting packages and walking   Additional history: as documented    MEDICAL HISTORY  Patient Active Problem List   Diagnosis     Neuropathy (H)     Moderate major depression (H)     Tobacco abuse     Attention deficit hyperactivity disorder (ADHD), predominantly inattentive type     Primary insomnia     History of MRI of brain and brain stem     Panic disorder without agoraphobia     Abnormal involuntary movement     Tic disorder       Current Outpatient Prescriptions   Medication Sig Dispense Refill     gabapentin (NEURONTIN) 100 MG capsule Take 1 capsule (100 mg) by mouth daily 30 capsule 3     tiZANidine (ZANAFLEX) 4 MG tablet Take 1-2 tablets (4-8 mg) by mouth nightly as needed 30 tablet 1     ibuprofen (ADVIL/MOTRIN) 600 MG tablet Take 1-2 tablets by mouth 3 times daily as needed       zolpidem (AMBIEN) 10 MG tablet Take 1 tablet (10 mg) by mouth nightly as needed for sleep 90 tablet 1     naproxen (NAPROSYN) 500 MG tablet Take 1 tablet (500 mg) by mouth 2  times daily (with meals) 40 tablet 1     gabapentin (NEURONTIN) 100 MG capsule Take 1 capsule (100 mg) by mouth 3 times daily 60 capsule 0     NAPROXEN PO Take 500 mg by mouth       TRAMADOL HCL PO        tiZANidine (ZANAFLEX) 4 MG tablet Take 1-2 tablets (4-8 mg) by mouth nightly as needed 30 tablet 1     clonazePAM (KLONOPIN) 0.5 MG tablet   0     amphetamine-dextroamphetamine (ADDERALL) 20 MG tablet Reported on 3/6/2017  0     amphetamine-dextroamphetamine (ADDERALL XR) 25 MG 24 hr capsule Take 1 capsule (25 mg) by mouth every morning 30 capsule 0       Allergies   Allergen Reactions     Buspirone Hcl Other (See Comments)     Panic attacks     Doxycycline Diarrhea, Fatigue, GI Disturbance and Nausea     Trazodone Fatigue     Keflex [Cephalexin] Diarrhea       Family History   Problem Relation Age of Onset     Lipids Father      hyperlipidemia     Hyperlipidemia Father      Obesity Father      Arthritis Mother      Hyperlipidemia Mother      Depression Mother      Anxiety Disorder Mother      MENTAL ILLNESS Mother      Obesity Mother      Asthma Brother      Asthma Sister      Depression Other      Hearing Loss Other      Psychotic Disorder Other      Obesity Other      CEREBROVASCULAR DISEASE Paternal Grandfather      Alzheimer Disease Paternal Grandfather      Depression Brother      MENTAL ILLNESS Brother      Bipolar     Asthma Brother      Depression Sister      Asthma Sister      Substance Abuse Sister      Alcohol     MENTAL ILLNESS Brother      Bipolar     Asthma Brother      Asthma Sister      Obesity Sister      Asthma Brother      Obesity Brother      Obesity Maternal Grandmother      Genetic Disorder Maternal Grandmother      Epilepsy       Additional medical/Social/Surgical histories reviewed in [facility] and updated as appropriate.     REVIEW OF SYSTEMS (6/20/2017)  10 point ROS of systems including Constitutional, Eyes, Respiratory, Cardiovascular, Gastroenterology, Genitourinary, Integumentary,  Musculoskeletal, Psychiatric were all negative except for pertinent positives noted in my HPI.     PHYSICAL EXAM  Vitals:    06/20/17 0756   BP: 122/76   Pulse: 83     Vital Signs: /76  Pulse 83 Patient declined being weighed. There is no height or weight on file to calculate BMI.    General  - normal appearance, in no obvious distress  CV  - normal peripheral perfusion  Pulm  - normal respiratory pattern, non-labored  Musculoskeletal - lumbar spine  - stance: normal gait without limp, no obvious leg length discrepancy, normal heel and toe walk  - inspection: normal bone and joint alignment, no obvious scoliosis  - palpation: no paravertebral or bony tenderness, except bilateral lumbar paraspinal muscle groups  - ROM: flexion exacerbates an amount of pain in right low back,  The left SI joint is not tender on exam today, and positive right low back pain with testing SLR , slightly abnormal extension- not greatly limited by pain in low back, sidebending, rotation feels some pain with both movements  - strength: lower extremities 5/5 in all planes  - special tests:  (-) straight leg raise right - positive  (-) slump test- left leg and hip and low back- this is improved from his previous visit  Neuro  - patellar and Achilles DTRs 2+ bilaterally, today, no significant lower extremity sensory deficit throughout L4/5 distribution, grossly normal coordination, normal muscle tone  Skin  - no ecchymosis, erythema, warmth, or induration, no obvious rash  Psych  - interactive, appropriate, normal mood and affect    ASSESSMENT & PLAN    30 yo male with low back pain due to ddd, disc herniations, worse again, spasms      -cont. gabapentin 100 tid as it continues to help him  Cont. tizanadine at night, naproxen PRN    Discussed return to temporary work resctrictions  Cont. HEP, will consider FCE and more imaging or ESTER at next visit in 2 weeks  Faustino Feldman MD, CAQSM

## 2017-06-20 NOTE — NURSING NOTE
"Hoang Kiser's goals for this visit include: Follow up for continued low back pain.   He requests these members of his care team be copied on today's visit information: no    PCP: Phill Floyd    Referring Provider:  ESTABLISHED PATIENT  No address on file    Chief Complaint   Patient presents with     RECHECK     Follow up for low back. Reporting worsening symptoms since last visit.        Initial /76  Pulse 83 Estimated body mass index is 27.22 kg/(m^2) as calculated from the following:    Height as of 4/12/17: 1.702 m (5' 7\").    Weight as of 4/12/17: 78.8 kg (173 lb 12.8 oz).  Medication Reconciliation: complete  "

## 2017-06-20 NOTE — PATIENT INSTRUCTIONS
Thanks for coming today.  Ortho[facility] Medicine Clinic  [address] [place] [address]    To schedule future appointments in Ortho Clinic, you may call [phone].    To schedule ordered imaging by your Provider: Call [facility] at [phone]    Trippy available online at:   [email or web address]    Please call if any further questions or concerns [phone] and ask for the Orthopedic Department. Clinic hours 8 am to 5 pm.    Return to clinic if symptoms worsen.

## 2017-06-20 NOTE — MR AVS SNAPSHOT
After Visit Summary   6/20/2017    Hoang Kiser    MRN: [id number]           Patient Information     Date Of Birth          1985        Visit Information        Provider Department      6/20/2017 8:00 AM Faustino Feldman MD [facility]        Today's Diagnoses     Lumbar paraspinal muscle spasm    -  1      Care Instructions    Thanks for coming today.  Ortho/Sports Medicine Clinic  [address] [place] [address]    To schedule future appointments in Ortho Clinic, you may call [phone].    To schedule ordered imaging by your Provider: Call [facility] at [phone]    Jellynote available online at:   [email or web address]    Please call if any further questions or concerns [phone] and ask for the Orthopedic Department. Clinic hours 8 am to 5 pm.    Return to clinic if symptoms worsen.          Follow-ups after your visit        Your next 10 appointments already scheduled     Jun 21, 2017  3:30 PM CDT   (Arrive by 3:15 PM)   Return Movement Disorder with Chuck Dominguez MD   [facility] Neurology ([facility])    [address] [place] [address] [phone]            Jul 06, 2017  9:20 AM CDT   Return Visit with aFustino Feldman MD   [facility] ([facility])    [address] [place] [address] [phone]              Who to contact     If you have questions or need follow up information about today's clinic visit or your schedule please contact [facility] directly at [phone].  Normal or non-critical lab and imaging results will be communicated to you by MyChart, letter or phone within 4 business days after the clinic has received the results. If you do not hear from us within 7 days, please contact the clinic through MyChart or phone. If you have a critical or abnormal lab result, we will notify you  by phone as soon as possible.  Submit refill requests through [employer] or call your pharmacy and they will forward the refill request to us. Please allow 3 business days for your refill to be completed.          Additional Information About Your Visit        [employer]Me[employer] Information     [employer] gives you secure access to your electronic health record. If you see a primary care provider, you can also send messages to your care team and make appointments. If you have questions, please call your primary care clinic.  If you do not have a primary care provider, please call [phone] and they will assist you.      [employer] is an electronic gateway that provides easy, online access to your medical records. With [employer], you can request a clinic appointment, read your test results, renew a prescription or communicate with your care team.     To access your existing account, please contact your [facility] or call [phone] for assistance.        Care EveryWhere ID     This is your Care EveryWhere ID. This could be used by other organizations to access your [place] medical records  [id number]        Your Vitals Were     Pulse                   83            Blood Pressure from Last 3 Encounters:   06/20/17 122/76   06/02/17 115/73   05/17/17 128/71    Weight from Last 3 Encounters:   04/12/17 78.8 kg (173 lb 12.8 oz)   10/12/16 82.9 kg (182 lb 11.2 oz)   04/26/16 95.3 kg (210 lb)              Today, you had the following     No orders found for display         Today's Medication Changes          These changes are accurate as of: 6/20/17  9:01 AM.  If you have any questions, ask your nurse or doctor.               These medicines have changed or have updated prescriptions.        Dose/Directions    * NAPROXEN PO   This may have changed:  Another medication with the same name was added. Make sure you understand how and when to take each.        Dose:  500 mg   Take 500 mg by mouth   Refills:  0        * naproxen 500 MG tablet   Commonly known as:  NAPROSYN   This may have changed:  Another medication with the same name was added. Make sure you understand how and when to take each.   Used for:  Lumbar radicular pain, Lumbar paraspinal muscle spasm        Dose:  500 mg   Take 1 tablet (500 mg) by mouth 2 times daily (with meals)   Quantity:  40 tablet   Refills:  1       * naproxen 500 MG tablet   Commonly known as:  NAPROSYN   This may have changed:  You were already taking a medication with the same name, and this prescription was added. Make sure you understand how and when to take each.   Used for:  Lumbar paraspinal muscle spasm        Dose:  500 mg   Take 1 tablet (500 mg) by mouth 2 times daily (with meals)   Quantity:  40 tablet   Refills:  1       * tiZANidine 4 MG tablet   Commonly known as:  ZANAFLEX   This may have changed:  Another medication with the same name was added. Make sure you understand how and when to take each.   Used for:  Lumbar radicular pain        Dose:  4-8 mg   Take 1-2 tablets (4-8 mg) by mouth nightly as needed   Quantity:  30 tablet   Refills:  1       * tiZANidine 4 MG tablet   Commonly known as:  ZANAFLEX   This may have changed:  Another medication with the same name was added. Make sure you understand how and when to take each.   Used for:  Lumbar radicular pain, Lumbar paraspinal muscle spasm        Dose:  4-8 mg   Take 1-2 tablets (4-8 mg) by mouth nightly as needed   Quantity:  30 tablet   Refills:  1       * tiZANidine 4 MG tablet   Commonly known as:  ZANAFLEX   This may have changed:  You were already taking a medication with the same name, and this prescription was added. Make sure you understand how and when to take each.   Used for:  Lumbar paraspinal muscle spasm        Dose:  4-8 mg   Take 1-2 tablets (4-8 mg) by mouth nightly as needed   Quantity:  30 tablet   Refills:  1       * Notice:  This list has 6 medication(s) that are the same as other medications  prescribed for you. Read the directions carefully, and ask your doctor or other care provider to review them with you.         Where to get your medicines      These medications were sent to [facility] - [place], [place] [address] [facility] [address] [address] [facility] [address], [facility] [address]     Phone:  [phone]     naproxen 500 MG tablet    tiZANidine 4 MG tablet                Primary Care Provider Office Phone # Fax #    Phill Floyd [phone]       [facility] [address] [address] [facility] [address]        Thank you!     Thank you for choosing [facility]  for your care. Our goal is always to provide you with excellent care. Hearing back from our patients is one way we can continue to improve our services. Please take a few minutes to complete the written survey that you may receive in the mail after your visit with us. Thank you!             Your Updated Medication List - Protect others around you: Learn how to safely use, store and throw away your medicines at www.disposemymeds.org.          This list is accurate as of: 6/20/17  9:01 AM.  Always use your most recent med list.                   Brand Name Dispense Instructions for use    * amphetamine-dextroamphetamine 25 MG 24 hr capsule    ADDERALL XR    30 capsule    Take 1 capsule (25 mg) by mouth every morning       * amphetamine-dextroamphetamine 20 MG per tablet    ADDERALL     Reported on 3/6/2017       clonazePAM 0.5 MG tablet    klonoPIN         * gabapentin 100 MG capsule    NEURONTIN    60 capsule    Take 1 capsule (100 mg) by mouth 3 times daily       * gabapentin 100 MG capsule    NEURONTIN    30 capsule    Take 1 capsule (100 mg) by mouth daily       ibuprofen 600 MG tablet    ADVIL/MOTRIN     Take 1-2 tablets by mouth 3 times daily as needed       * NAPROXEN PO      Take 500 mg by mouth       * naproxen 500 MG tablet    NAPROSYN    40 tablet    Take 1 tablet  (500 mg) by mouth 2 times daily (with meals)       * naproxen 500 MG tablet    NAPROSYN    40 tablet    Take 1 tablet (500 mg) by mouth 2 times daily (with meals)       * tiZANidine 4 MG tablet    ZANAFLEX    30 tablet    Take 1-2 tablets (4-8 mg) by mouth nightly as needed       * tiZANidine 4 MG tablet    ZANAFLEX    30 tablet    Take 1-2 tablets (4-8 mg) by mouth nightly as needed       * tiZANidine 4 MG tablet    ZANAFLEX    30 tablet    Take 1-2 tablets (4-8 mg) by mouth nightly as needed       TRAMADOL HCL PO          zolpidem 10 MG tablet    AMBIEN    90 tablet    Take 1 tablet (10 mg) by mouth nightly as needed for sleep       * Notice:  This list has 10 medication(s) that are the same as other medications prescribed for you. Read the directions carefully, and ask your doctor or other care provider to review them with you.

## 2017-06-20 NOTE — LETTER
June 20, 2017      RE: Hoang Kiser  [address] [facility] [address]        To whom it may concern:    Hoang Kiser is under my professional care for a work related condition.  He  may return to work with the following: The employee is ABLE to return to work today with the following restrictions:     When the patient returns to work, the following restrictions apply until I re-evaluate him in 2 weeks:  A) Bend: Occasionally (4 hours)  B) Squat: occasionally (4 hours)  C) Walk/Stand: Occasionally (4 hours)  D) Reach Above Shoulders: Occasionally (4 hours)  E) Lift, carry, push, and pull no more than:  10-15 lbs.    Sincerely,      Faustino Feldman MD

## 2017-06-21 ENCOUNTER — OFFICE VISIT (OUTPATIENT)
Dept: NEUROLOGY | Facility: CLINIC | Age: 32
End: 2017-06-21

## 2017-06-21 VITALS — SYSTOLIC BLOOD PRESSURE: 113 MMHG | HEART RATE: 83 BPM | DIASTOLIC BLOOD PRESSURE: 67 MMHG | HEIGHT: 67 IN

## 2017-06-21 DIAGNOSIS — G25.69 TICS OF ORGANIC ORIGIN: Primary | ICD-10-CM

## 2017-06-21 ASSESSMENT — PAIN SCALES - GENERAL: PAINLEVEL: NO PAIN (0)

## 2017-06-21 NOTE — NURSING NOTE
Chief Complaint   Patient presents with     RECHECK     Movement disorder    Solange Whitfield CMA

## 2017-06-21 NOTE — LETTER
6/21/2017       RE: Hoang Kiser  [address] [facility] [address]     Dear Colleague,    Thank you for referring your patient, Hoang Kiser, to the [facility] NEUROLOGY at [facility]. Please see a copy of my visit note below.    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 Eliseo Estrella.  For this reason, we started N-acetylcysteine.  He has found this clearly improves his ear popping or clicking.  At our last visit, which was in April, I discussed options that would treat specifically palatal myoclonus including valproic acid, clonazepam, sumatriptan.  He did try going up on the clonazepam since we met last and found it too sedating, so he went back down to 0.5 mg at night.  He also takes Ambien at night which helps with his sleep and also this ear clicking . Unfortunately, he has been going through some trouble with low back pain due to injury at work and back spasms.  He says that he would be a bit unsteady when standing upright and distracted and was diagnosed with paraspinal muscle spasms as a cause for this.      PHYSICAL EXAMINATION:     VITAL SIGNS:   Blood pressure 113/67.  Heart rate 83.     GENERAL:   He is pleasant.  He has no distress.     NEUROLOGIC:   There is no abnormal movement.  There are no tics.  He has a normal spontaneous body movements and coordination.  His walking is normal.      IMPRESSION:  31-year-old man with tics and likely Tourette syndrome who has been doing pretty well.  He continues to take Adderall for ADHD, which may be causing some  worsening of his tics, but will continue on N-acetylcysteine.  Unfortunately since I am leaving I will refer him to [facility] with Dr. Martinez who also sees patients with Tourette's syndrome.  I have given him a referral for this and he will call in the meantime if he has any further concerns or questions.      45 minutes total was spent seeing the patient, more than half that time discussing his diagnosis and treatment options.      Chuck Dominguez MD

## 2017-06-21 NOTE — MR AVS SNAPSHOT
After Visit Summary   6/21/2017    Hoang Kiser    MRN: [id number]           Patient Information     Date Of Birth          1985        Visit Information        Provider Department      6/21/2017 3:30 PM Chuck Dominguez MD [facility] Neurology        Today's Diagnoses     Tics of organic origin    -  1       Follow-ups after your visit        Additional Services     NEUROLOGY ADULT REFERRAL       Your provider has referred you to: [facility]: Neurology [facility] [phone]   [email or web address]  Movement Disorder    Reason for Referral: Consult- staci Coats    Please be aware that coverage of these services is subject to the terms and limitations of your health insurance plan.  Call member services at your health plan with any benefit or coverage questions.      Please bring the following with you to your appointment:    (1) Any X-Rays, CTs or MRIs which have been performed.  Contact the facility where they were done to arrange for  prior to your scheduled appointment.    (2) List of current medications  (3) This referral request   (4) Any documents/labs given to you for this referral            NEUROLOGY ADULT REFERRAL       Your provider has referred you to: [facility]: [facility] Neurological [facility] [phone]   [email or web address]    Reason for Referral: Consult Dr Juan Skinner    Please be aware that coverage of these services is subject to the terms and limitations of your health insurance plan.  Call member services at your health plan with any benefit or coverage questions.      Please bring the following with you to your appointment:    (1) Any X-Rays, CTs or MRIs which have been performed.  Contact the facility where they were done to arrange for  prior to your scheduled appointment.    (2) List of current medications  (3) This referral request   (4) Any documents/labs given to you for this  "referral                  Your next 10 appointments already scheduled     Jul 06, 2017  9:20 AM CDT   Return Visit with Faustino Feldman MD   [facility] ([facility])    [address] [address] [place] [address] [phone]              Who to contact     Please call your clinic at [phone] to:    Ask questions about your health    Make or cancel appointments    Discuss your medicines    Learn about your test results    Speak to your doctor   If you have compliments or concerns about an experience at your clinic, or if you wish to file a complaint, please contact [facility] Patient Relations at [phone] or email us at [email or web address]         Additional Information About Your Visit        [employer] Information     [employer] gives you secure access to your electronic health record. If you see a primary care provider, you can also send messages to your care team and make appointments. If you have questions, please call your primary care clinic.  If you do not have a primary care provider, please call [phone] and they will assist you.      [employer] is an electronic gateway that provides easy, online access to your medical records. With [employer], you can request a clinic appointment, read your test results, renew a prescription or communicate with your care team.     To access your existing account, please contact your [facility] or call [phone] for assistance.        Care EveryWhere ID     This is your Care EveryWhere ID. This could be used by other organizations to access your [place] medical records  [id number]        Your Vitals Were     Pulse Height                83 1.702 m (5' 7\")           Blood Pressure from Last 3 Encounters:   06/21/17 113/67   06/20/17 122/76   06/02/17 115/73    Weight from Last 3 Encounters:   04/12/17 78.8 kg (173 lb 12.8 oz)   10/12/16 82.9 kg (182 lb " 11.2 oz)   04/26/16 95.3 kg (210 lb)              We Performed the Following     NEUROLOGY ADULT REFERRAL     NEUROLOGY ADULT REFERRAL        Primary Care Provider Office Phone # Fax #    Phill Floyd [phone]       [facility] [address] [facility] [address]        Equal Access to Services     [facility] BETSY : Hadii aad ku hadasho Soomaali, waaxda luqadaha, qaybta kaalmada adeegyada, waxay idiin hayaan adeeg kharash la'aan . So [facility] [phone].    ATENCIÓN: Si habla español, tiene a cutler disposición servicios gratuitos de asistencia lingüística. Llame al [phone].    We comply with applicable federal civil rights laws and [place] laws. We do not discriminate on the basis of race, color, national origin, age, disability sex, sexual orientation or gender identity.            Thank you!     Thank you for choosing [facility] NEUROLOGY  for your care. Our goal is always to provide you with excellent care. Hearing back from our patients is one way we can continue to improve our services. Please take a few minutes to complete the written survey that you may receive in the mail after your visit with us. Thank you!             Your Updated Medication List - Protect others around you: Learn how to safely use, store and throw away your medicines at www.disposemymeds.org.          This list is accurate as of: 6/21/17 11:59 PM.  Always use your most recent med list.                   Brand Name Dispense Instructions for use Diagnosis    * amphetamine-dextroamphetamine 25 MG 24 hr capsule    ADDERALL XR    30 capsule    Take 1 capsule (25 mg) by mouth every morning    Attention deficit hyperactivity disorder (ADHD), predominantly inattentive type       * amphetamine-dextroamphetamine 20 MG per tablet    ADDERALL     Reported on 3/6/2017        clonazePAM 0.5 MG tablet    klonoPIN          * gabapentin 100 MG capsule    NEURONTIN    60 capsule    Take 1 capsule (100 mg) by mouth 3  times daily    Lumbar radicular pain       * gabapentin 100 MG capsule    NEURONTIN    30 capsule    Take 1 capsule (100 mg) by mouth daily    Lumbar radicular pain, Lumbar paraspinal muscle spasm       ibuprofen 600 MG tablet    ADVIL/MOTRIN     Take 1-2 tablets by mouth 3 times daily as needed        * NAPROXEN PO      Take 500 mg by mouth        * naproxen 500 MG tablet    NAPROSYN    40 tablet    Take 1 tablet (500 mg) by mouth 2 times daily (with meals)    Lumbar radicular pain, Lumbar paraspinal muscle spasm       * naproxen 500 MG tablet    NAPROSYN    40 tablet    Take 1 tablet (500 mg) by mouth 2 times daily (with meals)    Lumbar paraspinal muscle spasm       * tiZANidine 4 MG tablet    ZANAFLEX    30 tablet    Take 1-2 tablets (4-8 mg) by mouth nightly as needed    Lumbar radicular pain       * tiZANidine 4 MG tablet    ZANAFLEX    30 tablet    Take 1-2 tablets (4-8 mg) by mouth nightly as needed    Lumbar radicular pain, Lumbar paraspinal muscle spasm       * tiZANidine 4 MG tablet    ZANAFLEX    30 tablet    Take 1-2 tablets (4-8 mg) by mouth nightly as needed    Lumbar paraspinal muscle spasm       TRAMADOL HCL PO           zolpidem 10 MG tablet    AMBIEN    90 tablet    Take 1 tablet (10 mg) by mouth nightly as needed for sleep    Primary insomnia       * Notice:  This list has 10 medication(s) that are the same as other medications prescribed for you. Read the directions carefully, and ask your doctor or other care provider to review them with you.

## 2017-06-22 NOTE — PROGRESS NOTES
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 Eliseo Estrella.  For this reason, we started N-acetylcysteine.  He has found this clearly improves his ear popping or clicking.  At our last visit, which was in April, I discussed options that would treat specifically palatal myoclonus including valproic acid, clonazepam, sumatriptan.  He did try going up on the clonazepam since we met last and found it too sedating, so he went back down to 0.5 mg at night.  He also takes Ambien at night which helps with his sleep and also this ear clicking . Unfortunately, he has been going through some trouble with low back pain due to injury at work and back spasms.  He says that he would be a bit unsteady when standing upright and distracted and was diagnosed with paraspinal muscle spasms as a cause for this.      PHYSICAL EXAMINATION:     VITAL SIGNS:   Blood pressure 113/67.  Heart rate 83.     GENERAL:   He is pleasant.  He has no distress.     NEUROLOGIC:   There is no abnormal movement.  There are no tics.  He has a normal spontaneous body movements and coordination.  His walking is normal.      IMPRESSION:  31-year-old man with tics and likely Tourette syndrome who has been doing pretty well.  He continues to take Adderall for ADHD, which may be causing some worsening of his tics, but will continue on N-acetylcysteine.  Unfortunately since I am leaving I will refer him to [facility] with Dr. Martinez who also sees patients with Tourette's syndrome.  I have given him a referral for this and he will call in the meantime if he has any further concerns or  questions.      45 minutes total was spent seeing the patient, more than half that time discussing his diagnosis and treatment options.      MD WILLEM Leung MD             D: 2017 16:42   T: 2017 09:45   MT: CHEYANNE      Name:     VLADISLAV LEUNG   MRN:      [id number]        Account:      [id number]   :      1985           Service Date: 2017      Document: [id number]

## 2017-07-06 ENCOUNTER — OFFICE VISIT (OUTPATIENT)
Dept: ORTHOPEDICS | Facility: CLINIC | Age: 32
End: 2017-07-06
Payer: OTHER MISCELLANEOUS

## 2017-07-06 VITALS — DIASTOLIC BLOOD PRESSURE: 74 MMHG | HEART RATE: 90 BPM | SYSTOLIC BLOOD PRESSURE: 123 MMHG

## 2017-07-06 DIAGNOSIS — M51.26 LUMBAR DISC HERNIATION: ICD-10-CM

## 2017-07-06 DIAGNOSIS — R20.2 NUMBNESS AND TINGLING OF BOTH LEGS BELOW KNEES: ICD-10-CM

## 2017-07-06 DIAGNOSIS — R20.0 NUMBNESS AND TINGLING OF BOTH LEGS BELOW KNEES: ICD-10-CM

## 2017-07-06 DIAGNOSIS — M54.16 LUMBAR RADICULAR PAIN: Primary | ICD-10-CM

## 2017-07-06 PROCEDURE — 99199 UNLISTED SPECIAL SVC PX/RPRT: CPT | Performed by: PREVENTIVE MEDICINE

## 2017-07-06 PROCEDURE — 99213 OFFICE O/P EST LOW 20 MIN: CPT | Performed by: PREVENTIVE MEDICINE

## 2017-07-06 ASSESSMENT — PAIN SCALES - GENERAL: PAINLEVEL: MODERATE PAIN (4)

## 2017-07-06 NOTE — LETTER
[address] [address]  [phone]        2017    Hoang Kiser  [address] [facility] [address]  [phone] (home) [phone] (work)    :     1985      To Whom it May Concern:    Hoang Kiser is under my professional care for a work related condition.  He  may return to work with the following: The employee is ABLE to return to work today with the following restrictions:     When the patient returns to work, the following restrictions apply until I re-evaluate him by 2017    A) Bend: Occasionally (4 hours)  B) Squat: occasionally (4 hours)  C) Walk/Stand: Occasionally (4 hours)  D) Reach Above Shoulders: Occasionally (4 hours)  E) Lift, carry, push, and pull no more than:  10-15 lbs.    Please contact me for questions or concerns.    Sincerely,    Faustino Feldman MD

## 2017-07-06 NOTE — PROGRESS NOTES
HISTORY OF PRESENT ILLNESS  Shoaib returns for followup for his low back pain. He states that it is 'about the same'   He has continued to have symptoms toward the end of his 4 hour work shifts  He also states that his psychiatrist has been helping him manage his mental health with the stress that has come with being off of work  He has continued to work at his job with restrictions      Accompanied by work liason for work Bryant britton  [phone]      Location: low back and right leg  Quality: weak and achy pain    Severity: 8/10 at worst    Duration: 5 weeks most recently worsened  Timing: occurs intermittently  Context: occurs while walking and lifting  Modifying factors:  Resting, cold packs, and non-use makes it better, movement and use makes it worse  Associated signs & symptoms: warm, stabbing pain, no tingling  Previous similar pain: no  Exercise: lifting packages and walking   Additional history: as documented    MEDICAL HISTORY  Patient Active Problem List   Diagnosis     Neuropathy (H)     Moderate major depression (H)     Tobacco abuse     Attention deficit hyperactivity disorder (ADHD), predominantly inattentive type     Primary insomnia     History of MRI of brain and brain stem     Panic disorder without agoraphobia     Abnormal involuntary movement     Tic disorder       Current Outpatient Prescriptions   Medication Sig Dispense Refill     naproxen (NAPROSYN) 500 MG tablet Take 1 tablet (500 mg) by mouth 2 times daily (with meals) 40 tablet 1     tiZANidine (ZANAFLEX) 4 MG tablet Take 1-2 tablets (4-8 mg) by mouth nightly as needed 30 tablet 1     gabapentin (NEURONTIN) 100 MG capsule Take 1 capsule (100 mg) by mouth daily 30 capsule 3     tiZANidine (ZANAFLEX) 4 MG tablet Take 1-2 tablets (4-8 mg) by mouth nightly as needed 30 tablet 1     ibuprofen (ADVIL/MOTRIN) 600 MG tablet Take 1-2 tablets by mouth 3 times daily as needed       zolpidem (AMBIEN) 10 MG tablet Take 1 tablet (10 mg) by  mouth nightly as needed for sleep 90 tablet 1     naproxen (NAPROSYN) 500 MG tablet Take 1 tablet (500 mg) by mouth 2 times daily (with meals) 40 tablet 1     gabapentin (NEURONTIN) 100 MG capsule Take 1 capsule (100 mg) by mouth 3 times daily 60 capsule 0     NAPROXEN PO Take 500 mg by mouth       TRAMADOL HCL PO        tiZANidine (ZANAFLEX) 4 MG tablet Take 1-2 tablets (4-8 mg) by mouth nightly as needed 30 tablet 1     clonazePAM (KLONOPIN) 0.5 MG tablet   0     amphetamine-dextroamphetamine (ADDERALL) 20 MG tablet Reported on 3/6/2017  0     amphetamine-dextroamphetamine (ADDERALL XR) 25 MG 24 hr capsule Take 1 capsule (25 mg) by mouth every morning 30 capsule 0       Allergies   Allergen Reactions     Buspirone Hcl Other (See Comments)     Panic attacks     Doxycycline Diarrhea, Fatigue, GI Disturbance and Nausea     Trazodone Fatigue     Keflex [Cephalexin] Diarrhea       Family History   Problem Relation Age of Onset     Lipids Father      hyperlipidemia     Hyperlipidemia Father      Obesity Father      Arthritis Mother      Hyperlipidemia Mother      Depression Mother      Anxiety Disorder Mother      MENTAL ILLNESS Mother      Obesity Mother      Asthma Brother      Asthma Sister      Depression Other      Hearing Loss Other      Psychotic Disorder Other      Obesity Other      CEREBROVASCULAR DISEASE Paternal Grandfather      Alzheimer Disease Paternal Grandfather      Depression Brother      MENTAL ILLNESS Brother      Bipolar     Asthma Brother      Depression Sister      Asthma Sister      Substance Abuse Sister      Alcohol     MENTAL ILLNESS Brother      Bipolar     Asthma Brother      Asthma Sister      Obesity Sister      Asthma Brother      Obesity Brother      Obesity Maternal Grandmother      Genetic Disorder Maternal Grandmother      Epilepsy       Additional medical/Social/Surgical histories reviewed in [facility] and updated as appropriate.     REVIEW OF SYSTEMS (7/6/2017)  10 point ROS of systems  including Constitutional, Eyes, Respiratory, Cardiovascular, Gastroenterology, Genitourinary, Integumentary, Musculoskeletal, Psychiatric were all negative except for pertinent positives noted in my HPI.     PHYSICAL EXAM  Vitals:    07/06/17 0908   BP: 123/74   Pulse: 90     Vital Signs: /74  Pulse 90 Patient declined being weighed. There is no height or weight on file to calculate BMI.    General  - normal appearance, in no obvious distress  CV  - normal peripheral perfusion  Pulm  - normal respiratory pattern, non-labored  Musculoskeletal - lumbar spine  - stance: normal gait without limp, no obvious leg length discrepancy, normal heel and toe walk  - inspection: normal bone and joint alignment, no obvious scoliosis  - palpation: no paravertebral or bony tenderness, except bilateral lumbar paraspinal muscle groups  - ROM: flexion exacerbates an amount of pain in right low back,  The left SI joint is not tender on exam today, and positive right low back pain with testing SLR , slightly abnormal extension- not greatly limited by pain in low back, sidebending, rotation feels some pain with both movements  - strength: lower extremities 5/5 in all planes  - special tests:  (-) straight leg raise right - positive  (-) slump test- left leg and hip and low back- this is improved from his previous visit  Neuro  - patellar and Achilles DTRs 2+ bilaterally, today, no significant lower extremity sensory deficit throughout L4/5 distribution, grossly normal coordination, normal muscle tone  Skin  - no ecchymosis, erythema, warmth, or induration, no obvious rash  Psych  - interactive, appropriate, normal mood and affect  Overall unchanged from previous visit    ASSESSMENT & PLAN    30 yo male with low back pain due to ddd, disc herniations, stable      -cont. gabapentin 100 tid as it continues to help him  Cont. tizanadine at night, naproxen PRN    Discussed return to temporary work resctrictions continue to 4 hours per  day  Discussed ESTER order for disc buldging in lumbar spine due to worsened subjective radicular symptoms, will use ESTER for therapeutic and diagnostic purpose  Discussed ordering EMG bilateral LE  Will f/u in 2-3 weeks   will also discuss mental health treatment with his psychiatrist as well  Cont. HEP, will consider FCE and more imaging or ESTER at next visit in 2 weeks  Faustino Feldman MD, CAM

## 2017-07-06 NOTE — MR AVS SNAPSHOT
After Visit Summary   7/6/2017    Hoang Kiser    MRN: [id number]           Patient Information     Date Of Birth          1985        Visit Information        Provider Department      7/6/2017 9:20 AM Faustino Feldman MD [facility]        Today's Diagnoses     Lumbar radicular pain    -  1    Lumbar disc herniation        Numbness and tingling of both legs below knees          Care Instructions    Thanks for coming today.  Ortho/Sports Medicine Clinic  [address] [place] [id number]    To schedule future appointments in Ortho Clinic, you may call [phone].    To schedule ordered imaging by your Provider: Call [facility] at [phone]    [employer] available online at:   [email or web address]    Please call if any further questions or concerns [phone] and ask for the Orthopedic Department. Clinic hours 8 am to 5 pm.    Return to clinic if symptoms worsen.          Follow-ups after your visit        Your next 10 appointments already scheduled     Jul 14, 2017  8:00 AM CDT   XR LUMBAR EPIDURAL INJECTION with MGXR3, MG NEURO RAD   [facility]    [address] [address] [place] [id number] [phone]           For nerve root injection, please send or bring copies of any MRIs or other scans you have had.  Bring a list of your current medicines to your exam. (Include vitamins, minerals and over-the-counter medicines.) Leave your valuables at home.  Plan to have someone drive you home afterward.  Stop taking the following medicines (but talk to your doctor first):   If you take blood thinners, you may need to stop taking them a few days before treatment. Talk to your doctor before stopping these medicines.Stop taking Coumadin (warfarin) 3 days before treatment. Restart the day after treatment.   If you take Plavix, Ticlid, Pletal or Persantine, please ask your doctor if you should stop  these medicines. You may need extra tests on the morning of your scan. You may take your other medicines as normal.  Stop all food and drink (including water) 3 hours before your test or treatment.  Please tell the doctor:   If you are allergic to X-ray dye (contrast fluid).   If you may be pregnant.  Injections take about 30 to 45 minutes. Most people spend up to 2 hours in the clinic or hospital.  Please call the Imaging Department at your exam site with any questions            Aug 07, 2017  3:00 PM CDT   (Arrive by 2:45 PM)   EMG with Rodney Mena MD   [facility] EMG ([facility])    [address] [place] [id number] [phone]           Do not use lotions or creams on the area to be tested. If you are on blood thinners (Warfarin, Coumadin, Lovenox, etc), please contact your primary care physician to check if it is safe to stop them 3 days prior to testing. If you have anxiety, please check with your referring physician to obtain anti-anxiety medication for the procedure.            Aug 11, 2017  4:15 PM CDT   (Arrive by 4:00 PM)   New Patient Visit with Royce Taylor MD   [facility] Neurology ([facility])    [address] [place] [address] [phone]              Future tests that were ordered for you today     Open Future Orders        Priority Expected Expires Ordered    EMG Routine  7/6/2018 7/6/2017    X-ray Lumbar epidural injection Routine 7/6/2017 7/6/2018 7/6/2017            Who to contact     If you have questions or need follow up information about today's clinic visit or your schedule please contact [facility] directly at [phone].  Normal or non-critical lab and imaging results will be communicated to you by MyChart, letter or phone within 4 business days after the clinic has received the results. If you do not hear from us within 7 days, please contact the  clinic through [employer] or phone. If you have a critical or abnormal lab result, we will notify you by phone as soon as possible.  Submit refill requests through [employer] or call your pharmacy and they will forward the refill request to us. Please allow 3 business days for your refill to be completed.          Additional Information About Your Visit        Self-A-r-Thart Information     [employer] gives you secure access to your electronic health record. If you see a primary care provider, you can also send messages to your care team and make appointments. If you have questions, please call your primary care clinic.  If you do not have a primary care provider, please call [phone] and they will assist you.      [employer] is an electronic gateway that provides easy, online access to your medical records. With [employer], you can request a clinic appointment, read your test results, renew a prescription or communicate with your care team.     To access your existing account, please contact your [facility] or call [phone] for assistance.        Care EveryWhere ID     This is your Care EveryWhere ID. This could be used by other organizations to access your [facility] medical records  [id number]        Your Vitals Were     Pulse                   90            Blood Pressure from Last 3 Encounters:   07/06/17 123/74   06/21/17 113/67   06/20/17 122/76    Weight from Last 3 Encounters:   04/12/17 78.8 kg (173 lb 12.8 oz)   10/12/16 82.9 kg (182 lb 11.2 oz)   04/26/16 95.3 kg (210 lb)               Primary Care Provider Office Phone # Fax #    Phill Floyd [phone]       [facility] [address] [facility] [address]        Equal Access to Services     BERLIN MEYER : Mariana Armenta, ishan rinaldi, qaarnav barrett. So [facility] [phone].    ATENCIÓN: Si habla español, tiene a cutler disposición  servicios gratuitos de asistencia lingüística. Rodríguez oscar [phone].    We comply with applicable federal civil rights laws and [place] laws. We do not discriminate on the basis of race, color, national origin, age, disability sex, sexual orientation or gender identity.            Thank you!     Thank you for choosing [facility]  for your care. Our goal is always to provide you with excellent care. Hearing back from our patients is one way we can continue to improve our services. Please take a few minutes to complete the written survey that you may receive in the mail after your visit with us. Thank you!             Your Updated Medication List - Protect others around you: Learn how to safely use, store and throw away your medicines at www.disposemymeds.org.          This list is accurate as of: 7/6/17 10:12 AM.  Always use your most recent med list.                   Brand Name Dispense Instructions for use Diagnosis    * amphetamine-dextroamphetamine 25 MG 24 hr capsule    ADDERALL XR    30 capsule    Take 1 capsule (25 mg) by mouth every morning    Attention deficit hyperactivity disorder (ADHD), predominantly inattentive type       * amphetamine-dextroamphetamine 20 MG per tablet    ADDERALL     Reported on 3/6/2017        clonazePAM 0.5 MG tablet    klonoPIN          * gabapentin 100 MG capsule    NEURONTIN    60 capsule    Take 1 capsule (100 mg) by mouth 3 times daily    Lumbar radicular pain       * gabapentin 100 MG capsule    NEURONTIN    30 capsule    Take 1 capsule (100 mg) by mouth daily    Lumbar radicular pain, Lumbar paraspinal muscle spasm       ibuprofen 600 MG tablet    ADVIL/MOTRIN     Take 1-2 tablets by mouth 3 times daily as needed        * NAPROXEN PO      Take 500 mg by mouth        * naproxen 500 MG tablet    NAPROSYN    40 tablet    Take 1 tablet (500 mg) by mouth 2 times daily (with meals)    Lumbar radicular pain, Lumbar paraspinal muscle spasm       * naproxen 500  MG tablet    NAPROSYN    40 tablet    Take 1 tablet (500 mg) by mouth 2 times daily (with meals)    Lumbar paraspinal muscle spasm       * tiZANidine 4 MG tablet    ZANAFLEX    30 tablet    Take 1-2 tablets (4-8 mg) by mouth nightly as needed    Lumbar radicular pain       * tiZANidine 4 MG tablet    ZANAFLEX    30 tablet    Take 1-2 tablets (4-8 mg) by mouth nightly as needed    Lumbar radicular pain, Lumbar paraspinal muscle spasm       * tiZANidine 4 MG tablet    ZANAFLEX    30 tablet    Take 1-2 tablets (4-8 mg) by mouth nightly as needed    Lumbar paraspinal muscle spasm       TRAMADOL HCL PO           zolpidem 10 MG tablet    AMBIEN    90 tablet    Take 1 tablet (10 mg) by mouth nightly as needed for sleep    Primary insomnia       * Notice:  This list has 10 medication(s) that are the same as other medications prescribed for you. Read the directions carefully, and ask your doctor or other care provider to review them with you.

## 2017-07-06 NOTE — PATIENT INSTRUCTIONS
Thanks for coming today.  Ortho/Sports Medicine Clinic  [address] [place] [address]    To schedule future appointments in Ortho Clinic, you may call [phone].    To schedule ordered imaging by your Provider: Call [facility] at [phone]    [email or web address] available online at:   [email or web address]    Please call if any further questions or concerns [phone] and ask for the Orthopedic Department. Clinic hours 8 am to 5 pm.    Return to clinic if symptoms worsen.

## 2017-07-11 ENCOUNTER — TELEPHONE (OUTPATIENT)
Dept: ORTHOPEDICS | Facility: CLINIC | Age: 32
End: 2017-07-11

## 2017-07-11 NOTE — TELEPHONE ENCOUNTER
[facility] Call Center    Phone Message    Name of Caller: Bryant    Phone Number: Other phone number:  [phone]    Best time to return call: ANy    May a detailed message be left on voicemail: yes    Relation to patient: Other Name: [employer] person   Relationship: [employer]  Is there legal documentation in chart to discuss information with this person: na    Reason for Call: Other: [employer] is requesting office visit notes form 07/06/17 faxed to them along with request for approval orders for EMG and ESTER in order to get scheduled. Please fax to [phone]. Please advise.       Action Taken: Message routed to:  Adult Clinics: Sports Medicine p [id number]

## 2017-07-14 ENCOUNTER — MYC MEDICAL ADVICE (OUTPATIENT)
Dept: ORTHOPEDICS | Facility: CLINIC | Age: 32
End: 2017-07-14

## 2017-07-14 ENCOUNTER — RADIANT APPOINTMENT (OUTPATIENT)
Dept: GENERAL RADIOLOGY | Facility: CLINIC | Age: 32
End: 2017-07-14
Attending: PREVENTIVE MEDICINE
Payer: OTHER MISCELLANEOUS

## 2017-07-14 VITALS — HEART RATE: 73 BPM | DIASTOLIC BLOOD PRESSURE: 71 MMHG | OXYGEN SATURATION: 100 % | SYSTOLIC BLOOD PRESSURE: 110 MMHG

## 2017-07-14 DIAGNOSIS — M51.26 LUMBAR DISC HERNIATION: ICD-10-CM

## 2017-07-14 DIAGNOSIS — M54.16 LUMBAR RADICULAR PAIN: ICD-10-CM

## 2017-07-14 PROCEDURE — 62323 NJX INTERLAMINAR LMBR/SAC: CPT | Performed by: RADIOLOGY

## 2017-07-14 RX ORDER — IOPAMIDOL 408 MG/ML
10 INJECTION, SOLUTION INTRATHECAL ONCE
Status: COMPLETED | OUTPATIENT
Start: 2017-07-14 | End: 2017-07-14

## 2017-07-14 RX ORDER — LIDOCAINE HYDROCHLORIDE 10 MG/ML
5 INJECTION, SOLUTION EPIDURAL; INFILTRATION; INTRACAUDAL; PERINEURAL ONCE
Status: COMPLETED | OUTPATIENT
Start: 2017-07-14 | End: 2017-07-14

## 2017-07-14 RX ORDER — BUPIVACAINE HYDROCHLORIDE 5 MG/ML
10 INJECTION, SOLUTION EPIDURAL; INTRACAUDAL ONCE
Status: COMPLETED | OUTPATIENT
Start: 2017-07-14 | End: 2017-07-14

## 2017-07-14 RX ORDER — METHYLPREDNISOLONE ACETATE 80 MG/ML
80 INJECTION, SUSPENSION INTRA-ARTICULAR; INTRALESIONAL; INTRAMUSCULAR; SOFT TISSUE ONCE
Status: COMPLETED | OUTPATIENT
Start: 2017-07-14 | End: 2017-07-14

## 2017-07-14 RX ADMIN — IOPAMIDOL 2 ML: 408 INJECTION, SOLUTION INTRATHECAL at 08:11

## 2017-07-14 RX ADMIN — METHYLPREDNISOLONE ACETATE 80 MG: 80 INJECTION, SUSPENSION INTRA-ARTICULAR; INTRALESIONAL; INTRAMUSCULAR; SOFT TISSUE at 08:11

## 2017-07-14 RX ADMIN — BUPIVACAINE HYDROCHLORIDE 2 ML: 5 INJECTION, SOLUTION EPIDURAL; INTRACAUDAL at 08:12

## 2017-07-14 RX ADMIN — LIDOCAINE HYDROCHLORIDE 50 MG: 10 INJECTION, SOLUTION EPIDURAL; INFILTRATION; INTRACAUDAL; PERINEURAL at 08:11

## 2017-07-14 NOTE — PROGRESS NOTES
: sidney was seen in X-ray today for a lumbar epidural injection. Patient rated pain before procedure 4/10. After procedure patient rated pain 2/10. This pain level is (is/is not) acceptable to patient. Patient discharged home with friend.

## 2017-07-19 ENCOUNTER — TELEPHONE (OUTPATIENT)
Dept: ORTHOPEDICS | Facility: CLINIC | Age: 32
End: 2017-07-19

## 2017-07-19 NOTE — TELEPHONE ENCOUNTER
[facility] Call Center     Phone Message     Name of Caller: Bryant     Phone Number: Other phone number:  [phone]     Best time to return call: ANy     May a detailed message be left on voicemail: yes     Relation to patient: Other Name: [employer] person   Relationship: [employer]  Is there legal documentation in chart to discuss information with this person: na     Reason for Call: Other: Please send again, previously had wrong fax number.    [facility] is requesting office visit notes form 07/06/17 faxed to them along with request for approval orders for EMG and ESTER in order to get scheduled. Please fax to [phone].

## 2017-07-20 NOTE — TELEPHONE ENCOUNTER
Bryant called and has not received the July 6 office note and an order request for approval of the EMG and ESTER injection.  Please call with any questions.  [phone].  Fax: [phone].

## 2017-07-30 ASSESSMENT — ENCOUNTER SYMPTOMS
HALLUCINATIONS: 0
BACK PAIN: 1
DECREASED CONCENTRATION: 1
HEADACHES: 1
ARTHRALGIAS: 1
TINGLING: 1
WEIGHT LOSS: 1
ALTERED TEMPERATURE REGULATION: 0
SEIZURES: 0
JOINT SWELLING: 0
MUSCLE WEAKNESS: 1
WEAKNESS: 1
DISTURBANCES IN COORDINATION: 1
SINUS CONGESTION: 0
NUMBNESS: 1
WEIGHT GAIN: 1
LOSS OF CONSCIOUSNESS: 0
TREMORS: 1
FATIGUE: 1
HOARSE VOICE: 1
SMELL DISTURBANCE: 0
INSOMNIA: 1
CHILLS: 0
PANIC: 0
MEMORY LOSS: 0
INCREASED ENERGY: 1
POLYPHAGIA: 0
SINUS PAIN: 0
SPEECH CHANGE: 1
TROUBLE SWALLOWING: 0
NECK PAIN: 1
MUSCLE CRAMPS: 1
SORE THROAT: 0
MYALGIAS: 1
DIZZINESS: 1
STIFFNESS: 0
TASTE DISTURBANCE: 0
NECK MASS: 0
NIGHT SWEATS: 0
FEVER: 0
POLYDIPSIA: 1
NERVOUS/ANXIOUS: 1
PARALYSIS: 0
DEPRESSION: 1
DECREASED APPETITE: 1

## 2017-08-03 ENCOUNTER — PRE VISIT (OUTPATIENT)
Dept: NEUROLOGY | Facility: CLINIC | Age: 32
End: 2017-08-03

## 2017-08-03 DIAGNOSIS — M62.830 LUMBAR PARASPINAL MUSCLE SPASM: ICD-10-CM

## 2017-08-03 RX ORDER — NAPROXEN 500 MG/1
500 TABLET ORAL 2 TIMES DAILY WITH MEALS
Qty: 40 TABLET | Refills: 1 | Status: SHIPPED | OUTPATIENT
Start: 2017-08-03 | End: 2017-08-11

## 2017-08-03 NOTE — TELEPHONE ENCOUNTER
1.  Date/reason for appt:8/11/17, Tic disorder  2.  Referring provider: WILLEM BOWLES  3.  Call to patient (Yes / No - short description): No, referred   4.  Previous care at / records requested from:   [facility]

## 2017-08-07 ENCOUNTER — OFFICE VISIT (OUTPATIENT)
Dept: NEUROLOGY | Facility: CLINIC | Age: 32
End: 2017-08-07

## 2017-08-07 DIAGNOSIS — M54.5 CHRONIC BILATERAL LOW BACK PAIN, WITH SCIATICA PRESENCE UNSPECIFIED: Primary | ICD-10-CM

## 2017-08-07 DIAGNOSIS — R20.0 NUMBNESS AND TINGLING OF BOTH LEGS BELOW KNEES: ICD-10-CM

## 2017-08-07 DIAGNOSIS — R20.2 NUMBNESS AND TINGLING OF BOTH LEGS BELOW KNEES: ICD-10-CM

## 2017-08-07 DIAGNOSIS — G89.29 CHRONIC BILATERAL LOW BACK PAIN, WITH SCIATICA PRESENCE UNSPECIFIED: Primary | ICD-10-CM

## 2017-08-07 NOTE — LETTER
8/7/2017       RE: Hoang Kiser  [address] [facility] [address]     Dear Colleague,    Thank you for referring your patient, Hoang Kiser, to the [facility] EMG at [facility]. Please see a copy of my visit note below.        [facility]  Electrodiagnostic Laboratory    Nerve Conduction & EMG Report      Patient:       Hoang Kiser  Patient ID:    [id number]  Gender:        Male  YOB: 1985  Age:           32 Years 0 Months        History & Examination:  32 year old man with low back pain. Both sides affected, right more than left. Eval for radiculopathy.     Techniques: Motor and sensory conduction studies were done with surface recording electrodes. EMG was done with a concentric needle electrode.      Results:  Nerve conduction studies:   1. Bilateral sural and right superficial peroneal sensory responses were normal.   2. Bilateral peroneal-EDB and tibial-AH motor responses were normal.     Needle EMG of selected right and left lower limb muscles was performed as tabulated below. No abnormal spontaneous activity was observed in the sampled muscles. Motor unit potential morphology and recruitment patterns were normal.     Interpretation:  This is a normal study. There is no electrophysiologic evidence of a lumbosacral radiculopathy affecting the right or left lower extremity on the basis of this study.     Rodney Mena MD  Department of Neurology           Sensory NCS      Nerve / Sites Rec. Site Onset Peak NP Amp Ref. PP Amp Dist Morteza Ref. Temp     ms ms  V  V  V cm m/s m/s  C   R SURAL - Lat Mall      Calf Ankle 3.07 3.80 19.8 8.0 21.0 14 45.6 38.0 31.3   L SURAL - Lat Mall      Calf Ankle 2.92 3.75 15.8 8.0 17.7 14 48.0 38.0 30.4   R SUP PERONEAL      Lat Leg Du 2.97 3.70 15.5  18.7 12.5 42.1 38.0 31.5       Motor NCS      Nerve / Sites Rec. Site Lat Ref. Amp Ref. Rel Amp Dist Morteza Ref. Dur. Area Temp.     ms ms  mV mV % cm m/s m/s ms %  C   R DEEP PERONEAL - EDB      Ankle EDB 3.75 6.00 7.2 2.5 100 8   5.99 100 31.5      FibHead EDB 11.15  7.1  99.4 34 46.0 38.0 6.82 90.1 31.6      Pop Fos EDB 12.97  6.9  96.6 10 54.9 38.0 6.72 88.6 31.6   L DEEP PERONEAL - EDB      Ankle EDB 4.27 6.00 6.4 2.5 100 8   5.99 100 30.1   R TIBIAL - AH      Ankle AH 3.18 6.00 12.9 4.0 100 8   5.99 100 31.6      Pop Fos AH 12.34  9.3  72.3 42 45.8 38.0 6.61 94.9 31.6   L TIBIAL - AH      Ankle AH 3.07 6.00 16.8 4.0 100 8   6.35 100 30.7       F  Wave      Nerve Min F Lat Max F Lat Mean FLat Temp.    ms ms ms  C   R TIBIAL 50.78 60.63 55.41 31.6       EMG Summary Table     Spontaneous MUAP Recruitment    IA Fib PSW Fasc H.F. Amp Dur. PPP Pattern   R. VAST LATERALIS N None None None None N N N N   R. TIB ANTERIOR N None None None None N N N N   R. GASTROCN (MED) N None None None None N N N N   R. PERON LONGUS N None None None None N N N N   R. LUMB PSP (L) N None None None None       L. TIB ANTERIOR N None None None None N N N N   L. GASTROCN (MED) N None None None None N N N N                            Again, thank you for allowing me to participate in the care of your patient.      Sincerely,    Rodney Mena MD

## 2017-08-07 NOTE — MR AVS SNAPSHOT
After Visit Summary   8/7/2017    Hoang Kiser    MRN: [id number]           Patient Information     Date Of Birth          1985        Visit Information        Provider Department      8/7/2017 3:00 PM Rodney Mena MD [facility] EMG        Today's Diagnoses     Chronic bilateral low back pain, with sciatica presence unspecified    -  1    Numbness and tingling of both legs below knees           Follow-ups after your visit        Your next 10 appointments already scheduled     Aug 11, 2017  4:15 PM CDT   (Arrive by 4:00 PM)   New Patient Visit with Royce Taylor MD   [facility] ([facility])    [address]  [address] [facility] [address] [phone]            Aug 14, 2017  1:40 PM CDT   Return Visit with Faustino Feldman MD   [facility] ([facility])    [address] [place] [address] [phone]              Who to contact     Please call your clinic at [phone] to:    Ask questions about your health    Make or cancel appointments    Discuss your medicines    Learn about your test results    Speak to your doctor   If you have compliments or concerns about an experience at your clinic, or if you wish to file a complaint, please contact [facility] Patient Relations at [phone] or email us at [email or web address]         Additional Information About Your Visit        MyChart Information     [employer] gives you secure access to your electronic health record. If you see a primary care provider, you can also send messages to your care team and make appointments. If you have questions, please call your primary care clinic.  If you do not have a primary care provider, please call [phone] and they will assist you.      [employer] is an electronic gateway that provides easy, online access to your medical records. With [employer], you can  request a clinic appointment, read your test results, renew a prescription or communicate with your care team.     To access your existing account, please contact your [facility] or call [phone] for assistance.        Care EveryWhere ID     This is your Care EveryWhere ID. This could be used by other organizations to access your [place] medical records  [id number]         Blood Pressure from Last 3 Encounters:   07/14/17 110/71   07/06/17 123/74   06/21/17 113/67    Weight from Last 3 Encounters:   04/12/17 78.8 kg (173 lb 12.8 oz)   10/12/16 82.9 kg (182 lb 11.2 oz)   04/26/16 95.3 kg (210 lb)              We Performed the Following     EMG     HC NCS MOTOR W OR W/O F-WAVE, 7 OR 8     HC NEEDLE EMG EA EXTREMITY W/PARASPINAL AREA LIMITED     HC NEEDLE EMG EA EXTREMTY W/PARASPINAL AREA COMPLETE        Primary Care Provider Office Phone # Fax #    Phill Floyd [phone]       [address] [address]        Equal Access to Services     BERLIN MEYER : Hadii aad ku hadasho Soomaali, waaxda luqadaha, qaybta kaalmada adegunneryada, arnav lorenzana . So [facility] [phone].    ATENCIÓN: Si habla español, tiene a cutler disposición servicios gratuitos de asistencia lingüística. [facility] [phone].    We comply with applicable federal civil rights laws and [place] laws. We do not discriminate on the basis of race, color, national origin, age, disability sex, sexual orientation or gender identity.            Thank you!     Thank you for choosing [facility]  for your care. Our goal is always to provide you with excellent care. Hearing back from our patients is one way we can continue to improve our services. Please take a few minutes to complete the written survey that you may receive in the mail after your visit with us. Thank you!             Your Updated Medication List - Protect others around you:  Learn how to safely use, store and throw away your medicines at www.disposemymeds.org.          This list is accurate as of: 8/7/17  3:43 PM.  Always use your most recent med list.                   Brand Name Dispense Instructions for use Diagnosis    * amphetamine-dextroamphetamine 25 MG 24 hr capsule    ADDERALL XR    30 capsule    Take 1 capsule (25 mg) by mouth every morning    Attention deficit hyperactivity disorder (ADHD), predominantly inattentive type       * amphetamine-dextroamphetamine 20 MG per tablet    ADDERALL     Reported on 3/6/2017        clonazePAM 0.5 MG tablet    klonoPIN          * gabapentin 100 MG capsule    NEURONTIN    60 capsule    Take 1 capsule (100 mg) by mouth 3 times daily    Lumbar radicular pain       * gabapentin 100 MG capsule    NEURONTIN    30 capsule    Take 1 capsule (100 mg) by mouth daily    Lumbar radicular pain, Lumbar paraspinal muscle spasm       ibuprofen 600 MG tablet    ADVIL/MOTRIN     Take 1-2 tablets by mouth 3 times daily as needed        * NAPROXEN PO      Take 500 mg by mouth        * naproxen 500 MG tablet    NAPROSYN    40 tablet    Take 1 tablet (500 mg) by mouth 2 times daily (with meals)    Lumbar radicular pain, Lumbar paraspinal muscle spasm       * naproxen 500 MG tablet    NAPROSYN    40 tablet    Take 1 tablet (500 mg) by mouth 2 times daily (with meals)    Lumbar paraspinal muscle spasm       * tiZANidine 4 MG tablet    ZANAFLEX    30 tablet    Take 1-2 tablets (4-8 mg) by mouth nightly as needed    Lumbar radicular pain, Lumbar paraspinal muscle spasm       * tiZANidine 4 MG tablet    ZANAFLEX    30 tablet    Take 1-2 tablets (4-8 mg) by mouth nightly as needed    Lumbar paraspinal muscle spasm       * tiZANidine 4 MG tablet    ZANAFLEX    30 tablet    Take 1-2 tablets (4-8 mg) by mouth nightly as needed    Lumbar radicular pain       TRAMADOL HCL PO           zolpidem 10 MG tablet    AMBIEN    90 tablet    Take 1 tablet (10 mg) by mouth nightly as  needed for sleep    Primary insomnia       * Notice:  This list has 10 medication(s) that are the same as other medications prescribed for you. Read the directions carefully, and ask your doctor or other care provider to review them with you.

## 2017-08-07 NOTE — PROGRESS NOTES
[facility]  Electrodiagnostic Laboratory    Nerve Conduction & EMG Report          Patient:       Hoang Kiser  Patient ID:    [id number]  Gender:        Male  YOB: 1985  Age:           32 Years 0 Months        History & Examination:  32 year old man with low back pain. Both sides affected, right more than left. Eval for radiculopathy.     Techniques: Motor and sensory conduction studies were done with surface recording electrodes. EMG was done with a concentric needle electrode.      Results:  Nerve conduction studies:   1. Bilateral sural and right superficial peroneal sensory responses were normal.   2. Bilateral peroneal-EDB and tibial-AH motor responses were normal.     Needle EMG of selected right and left lower limb muscles was performed as tabulated below. No abnormal spontaneous activity was observed in the sampled muscles. Motor unit potential morphology and recruitment patterns were normal.     Interpretation:  This is a normal study. There is no electrophysiologic evidence of a lumbosacral radiculopathy affecting the right or left lower extremity on the basis of this study.     Rodney Mena MD  Department of Neurology           Sensory NCS      Nerve / Sites Rec. Site Onset Peak NP Amp Ref. PP Amp Dist Morteza Ref. Temp     ms ms  V  V  V cm m/s m/s  C   R SURAL - Lat Mall      Calf Ankle 3.07 3.80 19.8 8.0 21.0 14 45.6 38.0 31.3   L SURAL - Lat Mall      Calf Ankle 2.92 3.75 15.8 8.0 17.7 14 48.0 38.0 30.4   R SUP PERONEAL      Lat Leg Du 2.97 3.70 15.5  18.7 12.5 42.1 38.0 31.5       Motor NCS      Nerve / Sites Rec. Site Lat Ref. Amp Ref. Rel Amp Dist Morteza Ref. Dur. Area Temp.     ms ms mV mV % cm m/s m/s ms %  C   R DEEP PERONEAL - EDB      Ankle EDB 3.75 6.00 7.2 2.5 100 8   5.99 100 31.5      FibHead EDB 11.15  7.1  99.4 34 46.0 38.0 6.82 90.1 31.6      Pop Fos EDB 12.97  6.9  96.6 10 54.9 38.0 6.72 88.6 31.6   L DEEP PERONEAL - EDB      Ankle EDB 4.27 6.00 6.4 2.5  100 8   5.99 100 30.1   R TIBIAL - AH      Ankle AH 3.18 6.00 12.9 4.0 100 8   5.99 100 31.6      Pop Fos AH 12.34  9.3  72.3 42 45.8 38.0 6.61 94.9 31.6   L TIBIAL - AH      Ankle AH 3.07 6.00 16.8 4.0 100 8   6.35 100 30.7       F  Wave      Nerve Min F Lat Max F Lat Mean FLat Temp.    ms ms ms  C   R TIBIAL 50.78 60.63 55.41 31.6       EMG Summary Table     Spontaneous MUAP Recruitment    IA Fib PSW Fasc H.F. Amp Dur. PPP Pattern   R. VAST LATERALIS N None None None None N N N N   R. TIB ANTERIOR N None None None None N N N N   R. GASTROCN (MED) N None None None None N N N N   R. PERON LONGUS N None None None None N N N N   R. LUMB PSP (L) N None None None None       L. TIB ANTERIOR N None None None None N N N N   L. GASTROCN (MED) N None None None None N N N N

## 2017-08-11 ENCOUNTER — OFFICE VISIT (OUTPATIENT)
Dept: NEUROLOGY | Facility: CLINIC | Age: 32
End: 2017-08-11

## 2017-08-11 VITALS
OXYGEN SATURATION: 97 % | DIASTOLIC BLOOD PRESSURE: 61 MMHG | HEART RATE: 91 BPM | RESPIRATION RATE: 20 BRPM | HEIGHT: 67 IN | SYSTOLIC BLOOD PRESSURE: 122 MMHG | WEIGHT: 163.4 LBS | TEMPERATURE: 98.7 F | BODY MASS INDEX: 25.65 KG/M2

## 2017-08-11 DIAGNOSIS — H69.93 ETD (EUSTACHIAN TUBE DYSFUNCTION), BILATERAL: Primary | ICD-10-CM

## 2017-08-11 RX ORDER — DULOXETIN HYDROCHLORIDE 20 MG/1
60 CAPSULE, DELAYED RELEASE ORAL DAILY
Refills: 0 | COMMUNITY
Start: 2017-07-28 | End: 2018-08-13

## 2017-08-11 RX ORDER — DEXTROAMPHETAMINE SACCHARATE, AMPHETAMINE ASPARTATE MONOHYDRATE, DEXTROAMPHETAMINE SULFATE AND AMPHETAMINE SULFATE 7.5; 7.5; 7.5; 7.5 MG/1; MG/1; MG/1; MG/1
1 CAPSULE, EXTENDED RELEASE ORAL EVERY MORNING
Refills: 0 | COMMUNITY
Start: 2017-07-18 | End: 2018-07-12

## 2017-08-11 ASSESSMENT — PAIN SCALES - GENERAL: PAINLEVEL: MILD PAIN (3)

## 2017-08-11 NOTE — MR AVS SNAPSHOT
After Visit Summary   8/11/2017    Hoang Kiser    MRN: [id number]           Patient Information     Date Of Birth          1985        Visit Information        Provider Department      8/11/2017 4:15 PM Royce Taylor MD [facility] Neurology        Today's Diagnoses     ETD (eustachian tube dysfunction), bilateral    -  1       Follow-ups after your visit        Additional Services     OTOLARYNGOLOGY REFERRAL       [facility] (Otolaryngology) [facility] [phone]  [email or web address]    Please be aware that coverage of these services is subject to the terms and limitations of your health insurance plan.  Call member services at your health plan with any benefit or coverage questions.      Please bring the following with you to your appointment:    (1) Any X-Rays, CTs or MRIs which have been performed.  Contact the facility where they were done to arrange for  prior to your scheduled appointment.   (2) List of current medications  (3) This referral request   (4) Any documents/labs given to you for this referral                  Your next 10 appointments already scheduled     Aug 14, 2017  1:40 PM CDT   Return Visit with Faustino Feldman MD   [facility] ([facility])    [id number] [place] [address] [phone]              Who to contact     Please call your clinic at [phone] to:    Ask questions about your health    Make or cancel appointments    Discuss your medicines    Learn about your test results    Speak to your doctor   If you have compliments or concerns about an experience at your clinic, or if you wish to file a complaint, please contact [facility] Patient Relations at [phone] or email us at [email or web address]          "Additional Information About Your Visit        [employer] Information     [employer] gives you secure access to your electronic health record. If you see a primary care provider, you can also send messages to your care team and make appointments. If you have questions, please call your primary care clinic.  If you do not have a primary care provider, please call [phone] and they will assist you.      [employer] is an electronic gateway that provides easy, online access to your medical records. With [employer], you can request a clinic appointment, read your test results, renew a prescription or communicate with your care team.     To access your existing account, please contact your [facility] or call [phone] for assistance.        Care EveryWhere ID     This is your Care EveryWhere ID. This could be used by other organizations to access your [place] medical records  [id number]        Your Vitals Were     Pulse Temperature Respirations Height Pulse Oximetry BMI (Body Mass Index)    91 98.7  F (37.1  C) (Oral) 20 1.702 m (5' 7\") 97% 25.59 kg/m2       Blood Pressure from Last 3 Encounters:   08/11/17 122/61   07/14/17 110/71   07/06/17 123/74    Weight from Last 3 Encounters:   08/11/17 74.1 kg (163 lb 6.4 oz)   04/12/17 78.8 kg (173 lb 12.8 oz)   10/12/16 82.9 kg (182 lb 11.2 oz)              We Performed the Following     OTOLARYNGOLOGY REFERRAL        Primary Care Provider Office Phone # Fax #    Phill Floyd [phone] [address] [facility] [address]        Equal Access to Services     [facility] BETSY : Hadii aad ku hadasho Soevelyn, waaxda luqadaha, qaybta kaalmada arnav hawkins. So [facility] [phone].    ATENCIÓN: Si habla español, tiene a cutler disposición servicios gratuitos de asistencia lingüística. Llame al [phone].    We comply with applicable federal civil rights laws and [place] laws. We do not " discriminate on the basis of race, color, national origin, age, disability sex, sexual orientation or gender identity.            Thank you!     Thank you for choosing [facility] NEUROLOGY  for your care. Our goal is always to provide you with excellent care. Hearing back from our patients is one way we can continue to improve our services. Please take a few minutes to complete the written survey that you may receive in the mail after your visit with us. Thank you!             Your Updated Medication List - Protect others around you: Learn how to safely use, store and throw away your medicines at www.disposemymeds.org.          This list is accurate as of: 8/11/17  5:28 PM.  Always use your most recent med list.                   Brand Name Dispense Instructions for use Diagnosis    acetylcysteine 600 MG Caps capsule    N-ACETYL CYSTEINE     Take 1,800 mg by mouth daily        * amphetamine-dextroamphetamine 20 MG per tablet    ADDERALL     Take 20 mg by mouth daily Reported on 3/6/2017        * amphetamine-dextroamphetamine 30 MG per 24 hr capsule    ADDERALL XR     Take 1 capsule by mouth every morning        clonazePAM 0.5 MG tablet    klonoPIN     Take 0.5-1 mg by mouth nightly as needed        DULoxetine 20 MG EC capsule    CYMBALTA     Take 1 capsule by mouth daily        gabapentin 100 MG capsule    NEURONTIN    30 capsule    Take 1 capsule (100 mg) by mouth daily    Lumbar radicular pain, Lumbar paraspinal muscle spasm       naproxen 500 MG tablet    NAPROSYN    40 tablet    Take 1 tablet (500 mg) by mouth 2 times daily (with meals)    Lumbar radicular pain, Lumbar paraspinal muscle spasm       tiZANidine 4 MG tablet    ZANAFLEX    30 tablet    Take 1-2 tablets (4-8 mg) by mouth nightly as needed    Lumbar radicular pain, Lumbar paraspinal muscle spasm       zolpidem 10 MG tablet    AMBIEN    90 tablet    Take 1 tablet (10 mg) by mouth nightly as needed for sleep    Primary insomnia       * Notice:  This list  has 2 medication(s) that are the same as other medications prescribed for you. Read the directions carefully, and ask your doctor or other care provider to review them with you.

## 2017-08-11 NOTE — LETTER
"2017       RE: Hoang Kiser  [address] [address]     Dear Colleague,    Thank you for referring your patient, Hoang Kiser, to the [facility] NEUROLOGY at [facility]. Please see a copy of my visit note below.    2017      Phill Floyd MD   [facility]    [address] [place] [address]      RE: Hoang Kiser   MRN: [id number]   : 2017      Dear Dr. Floyd:      This is regard to followup on Hoang Kiser.  The patient requested neurologic followup today on 2017.  His chief complaint is that of a sensation of \"ear clicking.\"      The patient has been seen at [facility] now for some time.  He had a sensation that began in 10/2014 where he had a clicking sensation in his ears and a tension in his throat.  The patient in the past had had episodes of mouth pursing.  He does have a fairly complex history.  He has ADHD and he did have issues with the drug addiction.  He was forthright about this.  He was using higher amounts of amphetamines than he had been prescribed and had also used opiates.  He said he would snort or ingest them.  He now has had 2 years of sobriety.  He also has been diagnosed with depression along with ADHD.  He had had a prior history of absence seizures from about age 15 months until age 13.  He had been treated on Tegretol at that time and no further events.  He has been diagnosed with possible Tourette syndrome although he said he has read about Tourette and he does not think he has it.  He has been offered treatments but he has only had partial benefit from them.  He has been seen by Sameer Bain and Angelica in our department.  He had been referred to [facility] to see a Dr. Martinez, but she had evidently refused to see him because of his age.  The patient did note that he had had many air flights between  and .  He had noted trouble with " "ears then and had eustachian problems.  He said he has continued to suffer with it daily and it can occur many times a day.  He has always had trouble since that time with air flights.  The patient has been a chronic tobacco user.  He feels that nicotine has helped some of his complaints.  He had been tried on Huperzinea and then had switched over to N-acetylcysteine.  He thinks these possibly have been effective.  The patient did have an MRI scan of his head which I reviewed with him which essentially was unremarkable.  He had seen Dr. Nelson who recommended Botox but he said he cannot afford it and really is not interested in it.  He has suffered prior symptoms from repetitive use of his arms and did have some type of injection in his neck a few years ago which helped his arm and neck complaints.  At that time he had normal thyroid and B12 levels done.  He is now being treated for workmen's compensation injury that occurred on 12/12/2016 after lifting a \"tote\" that weighed 80-90 pounds.  He has had low back pain and pain that has radiated into either leg.  He recently had a normal EMG of his legs and earlier he had normal EMG of his arms.  The patient did review with me his prior history of an indication that he had an adverse reaction to BuSpar that he reported caused panic attacks.  The patient did note that with the ear popping his throat has tightened.  The patient does drink caffeinated beverage and estimates he uses between 100-150 mg of caffeine per day.  Earlier he had used some energy drinks, but he denied that now.      The patient denied any type of vocal utterances or coprolalia.  He has not had any significant eye closure and nothing to suggest random movements of his limbs or repetitive movements.  He denied any type of dystonic posturing.  Nobody in his family had any neurologic problems like this.  He has continued on Adderall now for ADHD.  The patient does use clonazepam 0.5 mg at night which he " feels helps but he does not tolerate higher doses because of fatigue issues.  He has been told to consider treatment with valproic acid or sumatriptan, but he does not want to try these medications.      Neurologic examination was entirely normal.  This included gait, station, cerebellar testing, as well as careful cranial nerve examination.  He had no obvious tics.  I did not see any palatal myoclonus.  He assured me he had had a movie made of his throat that showed this, although I could not retrieve it from our records on [facility] website.  The patient said that during the time I was with him, he had the same sensation in his throat and clicking in his ears.  I could not auscultate cervical bruits.  He had a regular cardiac rhythm.  He had no extrapyramidal findings.      IMPRESSION:   1.  Eustachian tube dysfunction.   2.  Prior history of lip pursing.      I really cannot diagnose the patient with Tourette syndrome based on today's exam.  He possibly has it, although he himself has read about it and does not feel he has it.  It is very possible he had some type of movement of his palate, but I did not observe it on today's exam either.  I did suggest that he have formal ENT evaluation now for eustachian tube dysfunction.  If this does not prove to be beneficial to him, I have talked with him about referral to another movement disorder specialist.      Thank you for allowing me to see this patient.      Sincerely yours,      Allen Taylor MD      I spent 35 minutes with the patient today.  Over 50% of the time this involved counseling and coordination of care.         ALLEN TAYLOR MD             D: 2017 05:45   T: 2017 07:17   MT: CHEYANNE      Name:     VLADISLAV LEUNG   MRN:      [id number]        Account:      [id number]   :      1985           Service Date: 2017      Document: [id number]

## 2017-08-14 ENCOUNTER — OFFICE VISIT (OUTPATIENT)
Dept: ORTHOPEDICS | Facility: CLINIC | Age: 32
End: 2017-08-14
Payer: OTHER MISCELLANEOUS

## 2017-08-14 VITALS — HEART RATE: 97 BPM | SYSTOLIC BLOOD PRESSURE: 132 MMHG | DIASTOLIC BLOOD PRESSURE: 77 MMHG

## 2017-08-14 DIAGNOSIS — M62.830 LUMBAR PARASPINAL MUSCLE SPASM: ICD-10-CM

## 2017-08-14 DIAGNOSIS — M54.16 LUMBAR RADICULAR PAIN: Primary | ICD-10-CM

## 2017-08-14 DIAGNOSIS — M51.26 LUMBAR DISC HERNIATION: ICD-10-CM

## 2017-08-14 PROCEDURE — 99213 OFFICE O/P EST LOW 20 MIN: CPT | Performed by: PREVENTIVE MEDICINE

## 2017-08-14 PROCEDURE — 99199 UNLISTED SPECIAL SVC PX/RPRT: CPT | Performed by: PREVENTIVE MEDICINE

## 2017-08-14 ASSESSMENT — PAIN SCALES - GENERAL: PAINLEVEL: MILD PAIN (2)

## 2017-08-14 NOTE — PROGRESS NOTES
HISTORY OF PRESENT ILLNESS  Shoaib returns for followup for his low back pain. He states that he is doing better. He has   He has continued to have symptoms toward the end of his 4 hour work shifts and doesn't feel comfortable advancing them at this time  He has continued to work at his job with restrictions      Accompanied by work liason for work Bryant britton  [phone]      Location: low back and right leg  Quality: weak and achy pain    Severity: 8/10 at worst    Duration: several months  Timing: occurs intermittently  Context: occurs while walking and lifting  Modifying factors:  Resting, cold packs, and non-use makes it better, movement and use makes it worse  Associated signs & symptoms: warm, stabbing pain, no tingling  Previous similar pain: no  Exercise: lifting packages and walking   Additional history: as documented    MEDICAL HISTORY  Patient Active Problem List   Diagnosis     Neuropathy (H)     Moderate major depression (H)     Tobacco abuse     Attention deficit hyperactivity disorder (ADHD), predominantly inattentive type     Primary insomnia     History of MRI of brain and brain stem     Panic disorder without agoraphobia     Abnormal involuntary movement     Tic disorder     normal emg 2017     Anxiety     Panic attack     Posttraumatic stress disorder with dissociative symptoms       Current Outpatient Prescriptions   Medication Sig Dispense Refill     DULoxetine (CYMBALTA) 20 MG EC capsule Take 1 capsule by mouth daily  0     amphetamine-dextroamphetamine (ADDERALL XR) 30 MG per 24 hr capsule Take 1 capsule by mouth every morning  0     acetylcysteine (N-ACETYL-L-CYSTEINE) 600 MG CAPS capsule Take 1,800 mg by mouth daily       gabapentin (NEURONTIN) 100 MG capsule Take 1 capsule (100 mg) by mouth daily 30 capsule 3     tiZANidine (ZANAFLEX) 4 MG tablet Take 1-2 tablets (4-8 mg) by mouth nightly as needed 30 tablet 1     zolpidem (AMBIEN) 10 MG tablet Take 1 tablet (10 mg) by mouth  nightly as needed for sleep 90 tablet 1     naproxen (NAPROSYN) 500 MG tablet Take 1 tablet (500 mg) by mouth 2 times daily (with meals) 40 tablet 1     clonazePAM (KLONOPIN) 0.5 MG tablet Take 0.5-1 mg by mouth nightly as needed   0     amphetamine-dextroamphetamine (ADDERALL) 20 MG tablet Take 20 mg by mouth daily Reported on 3/6/2017  0       Allergies   Allergen Reactions     Buspirone Hcl Other (See Comments)     Panic attacks     Doxycycline Diarrhea, Fatigue, GI Disturbance and Nausea     Trazodone Fatigue     Keflex [Cephalexin] Diarrhea       Family History   Problem Relation Age of Onset     Lipids Father      hyperlipidemia     Hyperlipidemia Father      Obesity Father      Arthritis Mother      Hyperlipidemia Mother      Depression Mother      Anxiety Disorder Mother      MENTAL ILLNESS Mother      Obesity Mother      Asthma Brother      Asthma Sister      Depression Other      Hearing Loss Other      Psychotic Disorder Other      Obesity Other      CEREBROVASCULAR DISEASE Paternal Grandfather      Alzheimer Disease Paternal Grandfather      Depression Brother      MENTAL ILLNESS Brother      Bipolar     Asthma Brother      Depression Sister      Asthma Sister      Substance Abuse Sister      Alcohol     MENTAL ILLNESS Brother      Bipolar     Asthma Brother      Asthma Sister      Obesity Sister      Asthma Brother      Obesity Brother      Obesity Maternal Grandmother      Genetic Disorder Maternal Grandmother      Epilepsy       Additional medical/Social/Surgical histories reviewed in [facility] and updated as appropriate.     REVIEW OF SYSTEMS (8/14/2017)  10 point ROS of systems including Constitutional, Eyes, Respiratory, Cardiovascular, Gastroenterology, Genitourinary, Integumentary, Musculoskeletal, Psychiatric were all negative except for pertinent positives noted in my HPI.     PHYSICAL EXAM  Vitals:    08/14/17 1324   BP: 132/77   Pulse: 97     Vital Signs: /77  Pulse 97 Patient declined  being weighed. There is no height or weight on file to calculate BMI.    General  - normal appearance, in no obvious distress  CV  - normal peripheral perfusion  Pulm  - normal respiratory pattern, non-labored  Musculoskeletal - lumbar spine  - stance: normal gait without limp, no obvious leg length discrepancy, normal heel and toe walk  - inspection: normal bone and joint alignment, no obvious scoliosis  - palpation: no paravertebral or bony tenderness, except bilateral lumbar paraspinal muscle groups  - ROM: flexion exacerbates an amount of pain in right low back,  The left SI joint is not tender on exam today, and positive right low back pain with testing SLR , slightly abnormal extension- not greatly limited by pain in low back, sidebending, rotation feels some pain with both movements  - strength: lower extremities 5/5 in all planes  - special tests:  (-) straight leg raise right - positive  (-) slump test- left leg and hip and low back- this is improved from his previous visit  Neuro  - patellar and Achilles DTRs 2+ bilaterally, today, no significant lower extremity sensory deficit throughout L4/5 distribution, grossly normal coordination, normal muscle tone  Skin  - no ecchymosis, erythema, warmth, or induration, no obvious rash  Psych  - interactive, appropriate, normal mood and affect  Overall unchanged from previous visit    ASSESSMENT & PLAN    32 yo male with low back pain due to ddd, disc herniations, stable, not resolved      -cont. gabapentin 100 tid as it continues to help him  Cont. tizanadine at night, naproxen PRN    Cont. Work restrictions    Will f/u after FCE   cont. mental health treatment with his psychiatrist as well  Ordered PT for FCE as patient is not tolerating advancing work restrictions  Faustino Feldman MD, CAQSM

## 2017-08-14 NOTE — NURSING NOTE
"Hoang Kiser's goals for this visit include:   Chief Complaint   Patient presents with     RECHECK       He requests these members of his care team be copied on today's visit information: PCP    PCP: Phill Floyd    Referring Provider:  ESTABLISHED PATIENT  No address on file    Chief Complaint   Patient presents with     RECHECK       Initial /77  Pulse 97 Estimated body mass index is 25.59 kg/(m^2) as calculated from the following:    Height as of 8/11/17: 1.702 m (5' 7\").    Weight as of 8/11/17: 74.1 kg (163 lb 6.4 oz).  Medication Reconciliation: complete   SMA Dominga       "

## 2017-08-14 NOTE — MR AVS SNAPSHOT
After Visit Summary   8/14/2017    Hoang Kiser    MRN: [id number]           Patient Information     Date Of Birth          1985        Visit Information        Provider Department      8/14/2017 1:40 PM Faustino Feldman MD [facility]        Today's Diagnoses     Lumbar radicular pain    -  1    Lumbar paraspinal muscle spasm        Lumbar disc herniation           Follow-ups after your visit        Additional Services     PHYSICAL THERAPY REFERRAL (External-Prints)       Physical Therapy Referral  FUNCTIONAL CAPACITY EXAMINATION PER PHYSICAL THERAPY                  Your next 10 appointments already scheduled     Sep 08, 2017  8:00 AM CDT   Walk In From ENT with Hugo Lee   [facility] ([facility])    [address] [place] [id number] [phone]            Sep 08, 2017  9:10 AM CDT   (Arrive by 8:55 AM)   NEW NEUROTOLOGY VISIT with Miley Trivedi MD   [facility] ([facility])    [address] [place] [id number] [phone]              Who to contact     If you have questions or need follow up information about today's clinic visit or your schedule please contact [facility] directly at [phone].  Normal or non-critical lab and imaging results will be communicated to you by MyChart, letter or phone within 4 business days after the clinic has received the results. If you do not hear from us within 7 days, please contact the clinic through MyChart or phone. If you have a critical or abnormal lab result, we will notify you by phone as soon as possible.  Submit refill requests through [employer] or call your pharmacy and they will forward the refill request to us. Please allow 3 business days for your refill to be completed.          Additional Information About Your Visit        MyChart Information      Takepin gives you secure access to your electronic health record. If you see a primary care provider, you can also send messages to your care team and make appointments. If you have questions, please call your primary care clinic.  If you do not have a primary care provider, please call [phone] and they will assist you.      Takepin is an electronic gateway that provides easy, online access to your medical records. With [employer], you can request a clinic appointment, read your test results, renew a prescription or communicate with your care team.     To access your existing account, please contact your [facility] or call [phone] for assistance.        Care EveryWhere ID     This is your Care EveryWhere ID. This could be used by other organizations to access your [facility] medical records  [id number]        Your Vitals Were     Pulse                   97            Blood Pressure from Last 3 Encounters:   08/14/17 132/77   08/11/17 122/61   07/14/17 110/71    Weight from Last 3 Encounters:   08/11/17 74.1 kg (163 lb 6.4 oz)   04/12/17 78.8 kg (173 lb 12.8 oz)   10/12/16 82.9 kg (182 lb 11.2 oz)              We Performed the Following     PHYSICAL THERAPY REFERRAL (External-Prints)        Primary Care Provider Office Phone # Fax #    Phill Floyd [phone] [address] [facility] [address]        Equal Access to Services     [facility] : Hadii rosemary sterling hadasho Socathyali, waaxda luqadaha, qaybta kaalmada shruthi, arnav lorenzana . So [facility] [phone].    ATENCIÓN: Si habla español, tiene a cutler disposición servicios gratuitos de asistencia lingüística. Llame al [phone].    We comply with applicable federal civil rights laws and [place] laws. We do not discriminate on the basis of race, color, national origin, age, disability sex, sexual orientation or gender identity.            Thank you!     Thank you for  [facility]  for your care. Our goal is always to provide you with excellent care. Hearing back from our patients is one way we can continue to improve our services. Please take a few minutes to complete the written survey that you may receive in the mail after your visit with us. Thank you!             Your Updated Medication List - Protect others around you: Learn how to safely use, store and throw away your medicines at www.disposemymeds.org.          This list is accurate as of: 8/14/17 11:59 PM.  Always use your most recent med list.                   Brand Name Dispense Instructions for use Diagnosis    acetylcysteine 600 MG Caps capsule    N-ACETYL CYSTEINE     Take 1,800 mg by mouth daily        * amphetamine-dextroamphetamine 20 MG per tablet    ADDERALL     Take 20 mg by mouth daily Reported on 3/6/2017        * amphetamine-dextroamphetamine 30 MG per 24 hr capsule    ADDERALL XR     Take 1 capsule by mouth every morning        clonazePAM 0.5 MG tablet    klonoPIN     Take 0.5-1 mg by mouth nightly as needed        DULoxetine 20 MG EC capsule    CYMBALTA     Take 1 capsule by mouth daily        gabapentin 100 MG capsule    NEURONTIN    30 capsule    Take 1 capsule (100 mg) by mouth daily    Lumbar radicular pain, Lumbar paraspinal muscle spasm       naproxen 500 MG tablet    NAPROSYN    40 tablet    Take 1 tablet (500 mg) by mouth 2 times daily (with meals)    Lumbar radicular pain, Lumbar paraspinal muscle spasm       zolpidem 10 MG tablet    AMBIEN    90 tablet    Take 1 tablet (10 mg) by mouth nightly as needed for sleep    Primary insomnia       * Notice:  This list has 2 medication(s) that are the same as other medications prescribed for you. Read the directions carefully, and ask your doctor or other care provider to review them with you.

## 2017-08-14 NOTE — PROGRESS NOTES
"2017      Phill Floyd MD   [facility]    [address] [place] [address]      RE: Hoang Kiser   MRN: [id number]   : 2017      Dear Dr. Floyd:      This is regard to followup on Hoang Kiser.  The patient requested neurologic followup today on 2017.  His chief complaint is that of a sensation of \"ear clicking.\"      The patient has been seen at [facility] [place] now for some time.  He had a sensation that began in 10/2014 where he had a clicking sensation in his ears and a tension in his throat.  The patient in the past had had episodes of mouth pursing.  He does have a fairly complex history.  He has ADHD and he did have issues with the drug addiction.  He was forthright about this.  He was using higher amounts of amphetamines than he had been prescribed and had also used opiates.  He said he would snort or ingest them.  He now has had 2 years of sobriety.  He also has been diagnosed with depression along with ADHD.  He had had a prior history of absence seizures from about age 15 months until age 13.  He had been treated on Tegretol at that time and no further events.  He has been diagnosed with possible Tourette syndrome although he said he has read about Tourette and he does not think he has it.  He has been offered treatments but he has only had partial benefit from them.  He has been seen by Sameer Bain and Angelica in our department.  He had been referred to [facility] to see a Dr. Martinez, but she had evidently refused to see him because of his age.  The patient did note that he had had many air flights between  and .  He had noted trouble with ears then and had eustachian problems.  He said he has continued to suffer with it daily and it can occur many times a day.  He has always had trouble since that time with air flights.  The patient has been a chronic tobacco user.  He feels that nicotine has helped some of his complaints.  He had " "been tried on Huperzinea and then had switched over to N-acetylcysteine.  He thinks these possibly have been effective.  The patient did have an MRI scan of his head which I reviewed with him which essentially was unremarkable.  He had seen Dr. Nelson who recommended Botox but he said he cannot afford it and really is not interested in it.  He has suffered prior symptoms from repetitive use of his arms and did have some type of injection in his neck a few years ago which helped his arm and neck complaints.  At that time he had normal thyroid and B12 levels done.  He is now being treated for workmen's compensation injury that occurred on 12/12/2016 after lifting a \"tote\" that weighed 80-90 pounds.  He has had low back pain and pain that has radiated into either leg.  He recently had a normal EMG of his legs and earlier he had normal EMG of his arms.  The patient did review with me his prior history of an indication that he had an adverse reaction to BuSpar that he reported caused panic attacks.  The patient did note that with the ear popping his throat has tightened.  The patient does drink caffeinated beverage and estimates he uses between 100-150 mg of caffeine per day.  Earlier he had used some energy drinks, but he denied that now.      The patient denied any type of vocal utterances or coprolalia.  He has not had any significant eye closure and nothing to suggest random movements of his limbs or repetitive movements.  He denied any type of dystonic posturing.  Nobody in his family had any neurologic problems like this.  He has continued on Adderall now for ADHD.  The patient does use clonazepam 0.5 mg at night which he feels helps but he does not tolerate higher doses because of fatigue issues.  He has been told to consider treatment with valproic acid or sumatriptan, but he does not want to try these medications.      Neurologic examination was entirely normal.  This included gait, station, cerebellar testing, " as well as careful cranial nerve examination.  He had no obvious tics.  I did not see any palatal myoclonus.  He assured me he had had a movie made of his throat that showed this, although I could not retrieve it from our records on [facility] website.  The patient said that during the time I was with him, he had the same sensation in his throat and clicking in his ears.  I could not auscultate cervical bruits.  He had a regular cardiac rhythm.  He had no extrapyramidal findings.      IMPRESSION:   1.  Eustachian tube dysfunction.   2.  Prior history of lip pursing.      I really cannot diagnose the patient with Tourette syndrome based on today's exam.  He possibly has it, although he himself has read about it and does not feel he has it.  It is very possible he had some type of movement of his palate, but I did not observe it on today's exam either.  I did suggest that he have formal ENT evaluation now for eustachian tube dysfunction.  If this does not prove to be beneficial to him, I have talked with him about referral to another movement disorder specialist.      Thank you for allowing me to see this patient.      Sincerely yours,      Allen Taylor MD      I spent 35 minutes with the patient today.  Over 50% of the time this involved counseling and coordination of care.         ALLEN TAYLOR MD             D: 2017 05:45   T: 2017 07:17   MT: CHEYANNE      Name:     VLADISLAV LEUNG   MRN:      [id number]        Account:      [id number]   :      1985           Service Date: 2017      Document: [id number]

## 2017-08-14 NOTE — LETTER
August 14, 2017      RE: Hoang Kiser  [address] [facility] [address] [facility] [address]       To Whom it May Concern:    Hoang Kiser is under my professional care for a work related condition.  He  may return to work with the following: The employee is ABLE to return to work today with the following restrictions:     When the patient returns to work, the following restrictions apply until I re-evaluate him by August 25th, 2017    A) Bend: Occasionally (6 hours)  B) Squat: occasionally (6 hours)  C) Walk/Stand: Occasionally (6 hours)  D) Reach Above Shoulders: Occasionally (6 hours)  E) Lift, carry, push, and pull no more than:  10-15 lbs.    Please contact me for questions or concerns.    Sincerely,    Faustino Feldman MD

## 2017-08-15 DIAGNOSIS — M54.16 LUMBAR RADICULAR PAIN: ICD-10-CM

## 2017-08-15 DIAGNOSIS — M62.830 LUMBAR PARASPINAL MUSCLE SPASM: ICD-10-CM

## 2017-08-29 DIAGNOSIS — H69.93 DYSFUNCTION OF EUSTACHIAN TUBE, BILATERAL: Primary | ICD-10-CM

## 2017-09-01 ENCOUNTER — TRANSFERRED RECORDS (OUTPATIENT)
Dept: HEALTH INFORMATION MANAGEMENT | Facility: CLINIC | Age: 32
End: 2017-09-01

## 2017-09-12 DIAGNOSIS — M62.830 LUMBAR PARASPINAL MUSCLE SPASM: ICD-10-CM

## 2017-09-12 DIAGNOSIS — M54.16 LUMBAR RADICULAR PAIN: ICD-10-CM

## 2017-09-12 RX ORDER — NAPROXEN 500 MG/1
500 TABLET ORAL 2 TIMES DAILY WITH MEALS
Qty: 40 TABLET | Refills: 1 | Status: SHIPPED | OUTPATIENT
Start: 2017-09-12 | End: 2017-10-27

## 2017-10-05 ENCOUNTER — MYC MEDICAL ADVICE (OUTPATIENT)
Dept: ORTHOPEDICS | Facility: CLINIC | Age: 32
End: 2017-10-05

## 2017-10-05 DIAGNOSIS — M62.830 LUMBAR PARASPINAL MUSCLE SPASM: ICD-10-CM

## 2017-10-05 DIAGNOSIS — M54.16 LUMBAR RADICULAR PAIN: ICD-10-CM

## 2017-10-12 ENCOUNTER — TELEPHONE (OUTPATIENT)
Dept: ORTHOPEDICS | Facility: CLINIC | Age: 32
End: 2017-10-12

## 2017-10-12 DIAGNOSIS — H69.90 DYSFUNCTION OF EUSTACHIAN TUBE: Primary | ICD-10-CM

## 2017-10-12 NOTE — TELEPHONE ENCOUNTER
Contacted patient via My Chart to let him know that it may be best for him to come in for appointment to go over the independent medical exam.

## 2017-10-12 NOTE — TELEPHONE ENCOUNTER
[facility] Call Center    Phone Message    Name of Caller: Hoang    Phone Number: Cell number on file:    Telephone Information:   Mobile [phone]       Best time to return call: ANY    May a detailed message be left on voicemail: yes    Relation to patient: Self    Reason for Call: Other: PAtient is wondering if Dr. Feldman would like him to send Independent Medical examination or come in for an office visit to go over it?     Action Taken: Message routed to:  Adult Clinics: Sports Medicine p [phone]

## 2017-10-17 DIAGNOSIS — M54.16 LUMBAR RADICULAR PAIN: ICD-10-CM

## 2017-10-17 DIAGNOSIS — M62.830 LUMBAR PARASPINAL MUSCLE SPASM: ICD-10-CM

## 2017-10-18 ENCOUNTER — TELEPHONE (OUTPATIENT)
Dept: ORTHOPEDICS | Facility: CLINIC | Age: 32
End: 2017-10-18

## 2017-10-18 ENCOUNTER — OFFICE VISIT (OUTPATIENT)
Dept: ORTHOPEDICS | Facility: CLINIC | Age: 32
End: 2017-10-18
Payer: COMMERCIAL

## 2017-10-18 DIAGNOSIS — M62.830 LUMBAR PARASPINAL MUSCLE SPASM: ICD-10-CM

## 2017-10-18 DIAGNOSIS — M54.16 LUMBAR RADICULAR PAIN: ICD-10-CM

## 2017-10-18 PROCEDURE — 99207 ZZC NO CHARGE LOS: CPT | Performed by: PREVENTIVE MEDICINE

## 2017-10-18 RX ORDER — NAPROXEN 500 MG/1
500 TABLET ORAL 2 TIMES DAILY WITH MEALS
Qty: 40 TABLET | Refills: 1 | OUTPATIENT
Start: 2017-10-18

## 2017-10-18 RX ORDER — GABAPENTIN 100 MG/1
100 CAPSULE ORAL DAILY
Qty: 30 CAPSULE | Refills: 3 | Status: SHIPPED | OUTPATIENT
Start: 2017-10-18 | End: 2018-01-24

## 2017-10-18 NOTE — MR AVS SNAPSHOT
After Visit Summary   10/18/2017    Hoang Kiser    MRN: [id number]           Patient Information     Date Of Birth          1985        Visit Information        Provider Department      10/18/2017 5:20 PM Faustino Feldman MD [facility]        Today's Diagnoses     Lumbar radicular pain        Lumbar paraspinal muscle spasm           Follow-ups after your visit        Your next 10 appointments already scheduled     Nov 21, 2017  8:30 AM CST   Walk In From ENT with Hugo Vance   [facility] ([facility])    [address] [place] [address] [phone]            Nov 21, 2017  9:10 AM CST   (Arrive by 8:55 AM)   NEW NEUROTOLOGY VISIT with Miley Trivedi MD   [facility] ([facility])    [address] [place] [address] [phone]              Who to contact     If you have questions or need follow up information about today's clinic visit or your schedule please contact [facility] directly at [phone].  Normal or non-critical lab and imaging results will be communicated to you by MyChart, letter or phone within 4 business days after the clinic has received the results. If you do not hear from us within 7 days, please contact the clinic through MyChart or phone. If you have a critical or abnormal lab result, we will notify you by phone as soon as possible.  Submit refill requests through [employer]nerPlace or call your pharmacy and they will forward the refill request to us. Please allow 3 business days for your refill to be completed.          Additional Information About Your Visit        MyChart Information     RunnerPlace gives you secure access to your electronic health record. If you see a primary care provider, you can also send messages to your care team and make appointments. If you have questions, please  call your primary care clinic.  If you do not have a primary care provider, please call [phone] and they will assist you.      [employer] is an electronic gateway that provides easy, online access to your medical records. With [employer], you can request a clinic appointment, read your test results, renew a prescription or communicate with your care team.     To access your existing account, please contact your [facility] or call [phone] for assistance.        Care EveryWhere ID     This is your Care EveryWhere ID. This could be used by other organizations to access your [facility] medical records  [id number]         Blood Pressure from Last 3 Encounters:   08/14/17 132/77   08/11/17 122/61   07/14/17 110/71    Weight from Last 3 Encounters:   08/11/17 74.1 kg (163 lb 6.4 oz)   04/12/17 78.8 kg (173 lb 12.8 oz)   10/12/16 82.9 kg (182 lb 11.2 oz)              Today, you had the following     No orders found for display         Where to get your medicines      These medications were sent to [facility] [id number] AT [facility] [address]  [address], [facility] [address]     Phone:  [phone]     gabapentin 100 MG capsule          Primary Care Provider Office Phone # Fax #    Phill Floyd [phone] [address] [facility] [address]        Equal Access to Services     BERLIN MEYER : Hadii aad ku hadasho Soomaali, waaxda luqadaha, qaybta kaalmada benjamínegyatanvi, arnav billy. So [facility] [phone].    ATENCIÓN: Si habla español, tiene a cutler disposición servicios gratuitos de asistencia lingüística. Rodríguez al [phone].    We comply with applicable federal civil rights laws and [place] laws. We do not discriminate on the basis of race, color, national origin, age, disability, sex, sexual orientation, or gender identity.            Thank you!     Thank you for choosing [facility]  for your care. Our goal is always to provide you with excellent care. Hearing back from our patients is one way we can continue to improve our services. Please take a few minutes to complete the written survey that you may receive in the mail after your visit with us. Thank you!             Your Updated Medication List - Protect others around you: Learn how to safely use, store and throw away your medicines at www.disposemymeds.org.          This list is accurate as of: 10/18/17  6:50 PM.  Always use your most recent med list.                   Brand Name Dispense Instructions for use Diagnosis    acetylcysteine 600 MG Caps capsule    N-ACETYL CYSTEINE     Take 1,800 mg by mouth daily        * amphetamine-dextroamphetamine 20 MG per tablet    ADDERALL     Take 20 mg by mouth daily Reported on 3/6/2017        * amphetamine-dextroamphetamine 30 MG per 24 hr capsule    ADDERALL XR     Take 1 capsule by mouth every morning        clonazePAM 0.5 MG tablet    klonoPIN     Take 0.5-1 mg by mouth nightly as needed        DULoxetine 20 MG EC capsule    CYMBALTA     Take 1 capsule by mouth daily        gabapentin 100 MG capsule    NEURONTIN    30 capsule    Take 1 capsule (100 mg) by mouth daily    Lumbar radicular pain, Lumbar paraspinal muscle spasm       naproxen 500 MG tablet    NAPROSYN    40 tablet    Take 1 tablet (500 mg) by mouth 2 times daily (with meals)    Lumbar radicular pain, Lumbar paraspinal muscle spasm       tiZANidine 4 MG tablet    ZANAFLEX    30 tablet    Take 1-2 tablets (4-8 mg) by mouth nightly as needed    Lumbar radicular pain, Lumbar paraspinal muscle spasm       zolpidem 10 MG tablet    AMBIEN    90 tablet    Take 1 tablet (10 mg) by mouth nightly as needed for sleep    Primary insomnia       * Notice:  This list has 2 medication(s) that are the same as other medications prescribed for you. Read the directions carefully, and ask your doctor or other care provider to  review them with you.

## 2017-10-18 NOTE — TELEPHONE ENCOUNTER
10.18.17  Patient states Dr Jewison called him regarding his appt tonight.  He is calling back. No answer in dept or lync.  Patient needs to know if he should still come in.  Needs to know in the next few minutes due to drive time.     Home Phone [phone]   Mobile [phone]

## 2017-10-27 DIAGNOSIS — M62.830 LUMBAR PARASPINAL MUSCLE SPASM: ICD-10-CM

## 2017-10-27 DIAGNOSIS — M54.16 LUMBAR RADICULAR PAIN: ICD-10-CM

## 2017-10-27 RX ORDER — NAPROXEN 500 MG/1
500 TABLET ORAL 2 TIMES DAILY WITH MEALS
Qty: 40 TABLET | Refills: 1 | Status: SHIPPED | OUTPATIENT
Start: 2017-10-27 | End: 2017-12-05

## 2017-10-31 ENCOUNTER — OFFICE VISIT (OUTPATIENT)
Dept: ORTHOPEDICS | Facility: CLINIC | Age: 32
End: 2017-10-31
Payer: COMMERCIAL

## 2017-10-31 VITALS — SYSTOLIC BLOOD PRESSURE: 116 MMHG | DIASTOLIC BLOOD PRESSURE: 79 MMHG | HEART RATE: 90 BPM | OXYGEN SATURATION: 100 %

## 2017-10-31 DIAGNOSIS — M62.830 LUMBAR PARASPINAL MUSCLE SPASM: ICD-10-CM

## 2017-10-31 DIAGNOSIS — M54.16 LUMBAR RADICULAR PAIN: ICD-10-CM

## 2017-10-31 DIAGNOSIS — M51.26 LUMBAR DISC HERNIATION: Primary | ICD-10-CM

## 2017-10-31 PROCEDURE — 99213 OFFICE O/P EST LOW 20 MIN: CPT | Performed by: PREVENTIVE MEDICINE

## 2017-10-31 ASSESSMENT — PAIN SCALES - GENERAL: PAINLEVEL: MILD PAIN (3)

## 2017-10-31 NOTE — MR AVS SNAPSHOT
After Visit Summary   10/31/2017    Hoang Kiser    MRN: [id number]           Patient Information     Date Of Birth          1985        Visit Information        Provider Department      10/31/2017 9:00 AM Faustino Feldman MD [facility]        Today's Diagnoses     Lumbar disc herniation    -  1    Lumbar paraspinal muscle spasm        Lumbar radicular pain          Care Instructions    Thanks for coming today.  Ortho/Sports Medicine Clinic  [address] [place] [address]    To schedule future appointments in Ortho Clinic, you may call [phone].    To schedule ordered imaging by your provider:   Call Central Imaging [phone]    To schedule an injection ordered by your provider:  Call Central Imaging Injection scheduling line: [phone]  Lola PirindolaharFabulyzer available online at:  [email or web address]    Please call if any further questions or concerns ([phone]).  Clinic hours 8 am to 5 pm.    Return to clinic (call) if symptoms worsen or fail to improve.          Follow-ups after your visit        Your next 10 appointments already scheduled     2017  8:30 AM CST   Walk In From ENT with Hugo Vance   [facility] ([facility])    [address] [place] [address] [phone]            2017  9:10 AM CST   (Arrive by 8:55 AM)   NEW NEUROTOLOGY VISIT with Miley Trivedi MD   [facility] ([facility])    [address] [place] [address] [phone]            2017  7:00 AM CST   Return Visit with Faustino Feldman MD   [facility] ([facility])    [id number] [place] [address] [phone]              Who to contact     If you have questions or need follow up information about today's clinic visit or your schedule  please contact [facility] directly at [phone].  Normal or non-critical lab and imaging results will be communicated to you by [employer], letter or phone within 4 business days after the clinic has received the results. If you do not hear from us within 7 days, please contact the clinic through [employer] or phone. If you have a critical or abnormal lab result, we will notify you by phone as soon as possible.  Submit refill requests through [employer] or call your pharmacy and they will forward the refill request to us. Please allow 3 business days for your refill to be completed.          Additional Information About Your Visit        Roundscapes Information     [employer]apes gives you secure access to your electronic health record. If you see a primary care provider, you can also send messages to your care team and make appointments. If you have questions, please call your primary care clinic.  If you do not have a primary care provider, please call [phone] and they will assist you.      Round[employer]apes is an electronic gateway that provides easy, online access to your medical records. With [employer], you can request a clinic appointment, read your test results, renew a prescription or communicate with your care team.     To access your existing account, please contact your [facility] or call [phone] for assistance.        Care EveryWhere ID     This is your Care EveryWhere ID. This could be used by other organizations to access your [place] medical records  [id number]        Your Vitals Were     Pulse Pulse Oximetry                90 100%           Blood Pressure from Last 3 Encounters:   10/31/17 116/79   08/14/17 132/77   08/11/17 122/61    Weight from Last 3 Encounters:   08/11/17 74.1 kg (163 lb 6.4 oz)   04/12/17 78.8 kg (173 lb 12.8 oz)   10/12/16 82.9 kg (182 lb 11.2 oz)              Today, you had the following     No orders found for display       Primary Care  Provider Office Phone # Fax #    Phill Floyd [phone] [address] [facility] [address]        Equal Access to Services     BERLIN MEYER : Mariana rosemary sterling ana Armenta, waaxda luqadaha, qaybta kaclarada shruthi, arnav perry benjamíngunner benoit laDimitriosmisty billy. So [facility] [phone].    ATENCIÓN: Si habla español, tiene a cutler disposición servicios gratuitos de asistencia lingüística. Llame al [phone].    We comply with applicable federal civil rights laws and [place] laws. We do not discriminate on the basis of race, color, national origin, age, disability, sex, sexual orientation, or gender identity.            Thank you!     Thank you for choosing [facility]  for your care. Our goal is always to provide you with excellent care. Hearing back from our patients is one way we can continue to improve our services. Please take a few minutes to complete the written survey that you may receive in the mail after your visit with us. Thank you!             Your Updated Medication List - Protect others around you: Learn how to safely use, store and throw away your medicines at www.disposemymeds.org.          This list is accurate as of: 10/31/17 11:59 PM.  Always use your most recent med list.                   Brand Name Dispense Instructions for use Diagnosis    acetylcysteine 600 MG Caps capsule    N-ACETYL CYSTEINE     Take 1,800 mg by mouth daily        * amphetamine-dextroamphetamine 20 MG per tablet    ADDERALL     Take 20 mg by mouth daily Reported on 3/6/2017        * amphetamine-dextroamphetamine 30 MG per 24 hr capsule    ADDERALL XR     Take 1 capsule by mouth every morning        clonazePAM 0.5 MG tablet    klonoPIN     Take 0.5-1 mg by mouth nightly as needed        DULoxetine 20 MG EC capsule    CYMBALTA     Take 1 capsule by mouth daily        gabapentin 100 MG capsule    NEURONTIN    30 capsule    Take 1 capsule (100 mg) by mouth daily    Lumbar radicular  pain, Lumbar paraspinal muscle spasm       naproxen 500 MG tablet    NAPROSYN    40 tablet    Take 1 tablet (500 mg) by mouth 2 times daily (with meals)    Lumbar radicular pain, Lumbar paraspinal muscle spasm       * Notice:  This list has 2 medication(s) that are the same as other medications prescribed for you. Read the directions carefully, and ask your doctor or other care provider to review them with you.

## 2017-10-31 NOTE — PROGRESS NOTES
HISTORY OF PRESENT ILLNESS  Shoaib returns for followup for his low back pain.   Still has low back pain. He states that it is constant and aching and throbbing, in his left low back. He has been working 6 hours per day with one break per shift.  He is able to tolerate doing this level of work.   He still is doing HEP and   He has continued to work at his job with restrictions  He states that he has a hearing this week regarding his status with insurance coverage and work accomodations.   He states that he has been following up with his mental health provider as well.       Location: low back and left hip area  Quality: weak and achy pain    Severity: 3-5/10 at worst    Duration: on/off for 10++ months  Timing: occurs intermittently  Context: occurs while walking and lifting and standing at work  Modifying factors:  Resting, cold packs, and non-use makes it better, movement and use makes it worse  Associated signs & symptoms: warm, stabbing pain, no tingling  Previous similar pain: no  Exercise: lifting packages and walking   Additional history: as documented    MEDICAL HISTORY  Patient Active Problem List   Diagnosis     Neuropathy     Moderate major depression (H)     Tobacco abuse     Attention deficit hyperactivity disorder (ADHD), predominantly inattentive type     Primary insomnia     History of MRI of brain and brain stem     Panic disorder without agoraphobia     Abnormal involuntary movement     Tic disorder     normal emg 2017     Anxiety     Panic attack     Posttraumatic stress disorder with dissociative symptoms       Current Outpatient Prescriptions   Medication Sig Dispense Refill     naproxen (NAPROSYN) 500 MG tablet Take 1 tablet (500 mg) by mouth 2 times daily (with meals) 40 tablet 1     gabapentin (NEURONTIN) 100 MG capsule Take 1 capsule (100 mg) by mouth daily 30 capsule 3     DULoxetine (CYMBALTA) 20 MG EC capsule Take 1 capsule by mouth daily  0     amphetamine-dextroamphetamine (ADDERALL XR)  30 MG per 24 hr capsule Take 1 capsule by mouth every morning  0     acetylcysteine (N-ACETYL-L-CYSTEINE) 600 MG CAPS capsule Take 1,800 mg by mouth daily       clonazePAM (KLONOPIN) 0.5 MG tablet Take 0.5-1 mg by mouth nightly as needed   0     amphetamine-dextroamphetamine (ADDERALL) 20 MG tablet Take 20 mg by mouth daily Reported on 3/6/2017  0     tiZANidine (ZANAFLEX) 4 MG tablet Take 1-2 tablets (4-8 mg) by mouth nightly as needed 30 tablet 1       Allergies   Allergen Reactions     Buspirone Hcl Other (See Comments)     Panic attacks     Doxycycline Diarrhea, Fatigue, GI Disturbance and Nausea     Trazodone Fatigue     Keflex [Cephalexin] Diarrhea       Family History   Problem Relation Age of Onset     Lipids Father      hyperlipidemia     Hyperlipidemia Father      Obesity Father      Arthritis Mother      Hyperlipidemia Mother      Depression Mother      Anxiety Disorder Mother      MENTAL ILLNESS Mother      Obesity Mother      Asthma Brother      Asthma Sister      Depression Other      Hearing Loss Other      Psychotic Disorder Other      Obesity Other      CEREBROVASCULAR DISEASE Paternal Grandfather      Alzheimer Disease Paternal Grandfather      Depression Brother      MENTAL ILLNESS Brother      Bipolar     Asthma Brother      Depression Sister      Asthma Sister      Substance Abuse Sister      Alcohol     MENTAL ILLNESS Brother      Bipolar     Asthma Brother      Asthma Sister      Obesity Sister      Asthma Brother      Obesity Brother      Obesity Maternal Grandmother      Genetic Disorder Maternal Grandmother      Epilepsy       Additional medical/Social/Surgical histories reviewed in [facility] and updated as appropriate.     REVIEW OF SYSTEMS (10/31/2017)  10 point ROS of systems including Constitutional, Eyes, Respiratory, Cardiovascular, Gastroenterology, Genitourinary, Integumentary, Musculoskeletal, Psychiatric were all negative except for pertinent positives noted in my HPI.     PHYSICAL  EXAM  Vitals:    10/31/17 0902   BP: 116/79   Pulse: 90   SpO2: 100%     Vital Signs: /79  Pulse 90  SpO2 100% Patient declined being weighed. There is no height or weight on file to calculate BMI.    General  - normal appearance, in no obvious distress  CV  - normal peripheral perfusion  Pulm  - normal respiratory pattern, non-labored  Musculoskeletal - lumbar spine  - stance: normal gait without limp, no obvious leg length discrepancy, normal heel and toe walk  - inspection: normal bone and joint alignment, no obvious scoliosis  - palpation: no paravertebral or bony tenderness, except bilateral lumbar paraspinal muscle groups  - ROM: flexion exacerbates a small amount of pain in right low back,  The left SI joint is not tender on exam today, and very mild right low back pain with testing SLR , slightly abnormal extension- not greatly limited by pain in low back, sidebending, rotation feels some pain with both movements  - strength: lower extremities 5/5 in all planes  - special tests:  (-) straight leg raise right   (-) slump test- left leg and hip and low back- this is improved from his previous visit  Neuro  - patellar and Achilles DTRs 2+ bilaterally, today, no significant lower extremity sensory deficit throughout L4/5 distribution, grossly normal coordination, normal muscle tone  Skin  - no ecchymosis, erythema, warmth, or induration, no obvious rash  Psych  - interactive, appropriate, normal mood and affect  Overall  Improved from previous visit    ASSESSMENT & PLAN    30 yo male with low back pain due to radicular pain and muscles spasms      -cont. gabapentin 100 tid as it continues to help him  Cont. tizanadine at night, naproxen PRN    Cont. Work restrictions as stated    I continue to recommend that he should have an FCE to determine where he is objectively on ability so that he can be advanced appropriately at his work with regards to restrictions.  With his history of mental health  condition(s) I think this would be beneficial in moving him forward in his rehabilitation   cont. mental health treatment with his psychiatrist as well    Faustino Feldman MD, CAM

## 2017-10-31 NOTE — LETTER
October 31, 2017      Hoang Kiser  [address] [facility] [address]              To whom it may concern:     Hoang Kiser is under my professional care for a work related condition.  He  may return to work with the following: The employee is ABLE to return to work today with the following restrictions:     When the patient returns to work, the following restrictions apply until I re-evaluate him by December 1st , 2017.    A) Bend: Occasionally (6 hours)  B) Squat: occasionally (6 hours)  C) Walk/Stand: Occasionally (6 hours)  D) Reach Above Shoulders: Occasionally (6 hours)  E) Lift, carry, push, and pull no more than:  25 lbs.  D) Please allow 2 breaks per 6 hour shift    Please contact me for questions or concerns.    Sincerely,    Faustino Feldman MD

## 2017-11-01 DIAGNOSIS — M54.16 LUMBAR RADICULAR PAIN: ICD-10-CM

## 2017-11-01 DIAGNOSIS — M62.830 LUMBAR PARASPINAL MUSCLE SPASM: ICD-10-CM

## 2017-11-01 NOTE — PATIENT INSTRUCTIONS
Thanks for coming today.  Ortho/Sports Medicine Clinic  [address] [place] [address]    To schedule future appointments in Ortho Clinic, you may call [phone].    To schedule ordered imaging by your provider:   Call Central Imaging [phone]    To schedule an injection ordered by your provider:  Call Central Imaging Injection scheduling line: [phone]  [employer] available online at:  [email or web address]    Please call if any further questions or concerns ([phone]).  Clinic hours 8 am to 5 pm.    Return to clinic (call) if symptoms worsen or fail to improve.

## 2017-11-20 DIAGNOSIS — H69.90 DYSFUNCTION OF EUSTACHIAN TUBE: Primary | ICD-10-CM

## 2017-11-27 ENCOUNTER — OFFICE VISIT (OUTPATIENT)
Dept: ORTHOPEDICS | Facility: CLINIC | Age: 32
End: 2017-11-27
Payer: OTHER MISCELLANEOUS

## 2017-11-27 VITALS — BODY MASS INDEX: 25.43 KG/M2 | HEIGHT: 67 IN | WEIGHT: 162 LBS

## 2017-11-27 DIAGNOSIS — M62.830 LUMBAR PARASPINAL MUSCLE SPASM: Primary | ICD-10-CM

## 2017-11-27 DIAGNOSIS — M54.16 LUMBAR RADICULAR PAIN: ICD-10-CM

## 2017-11-27 DIAGNOSIS — M25.552 PAIN IN LEFT HIP: ICD-10-CM

## 2017-11-27 DIAGNOSIS — M62.830 LUMBAR PARASPINAL MUSCLE SPASM: ICD-10-CM

## 2017-11-27 PROCEDURE — 99213 OFFICE O/P EST LOW 20 MIN: CPT | Performed by: PREVENTIVE MEDICINE

## 2017-11-27 RX ORDER — ZOLPIDEM TARTRATE 10 MG/1
1 TABLET ORAL
Refills: 1 | COMMUNITY
Start: 2017-09-15 | End: 2018-02-16

## 2017-11-27 ASSESSMENT — PAIN SCALES - GENERAL: PAINLEVEL: MILD PAIN (3)

## 2017-11-27 NOTE — MR AVS SNAPSHOT
After Visit Summary   2017    Hoang Kiser    MRN: [id number]           Patient Information     Date Of Birth          1985        Visit Information        Provider Department      2017 7:00 AM Faustino Feldman MD [facility]        Today's Diagnoses     Lumbar paraspinal muscle spasm    -  1      Care Instructions    Thanks for coming today.  Ortho/Sports Medicine Clinic  [address] [place] [address]    To schedule future appointments in Ortho Clinic, you may call [phone].    To schedule ordered imaging by your provider:   Call Central Imaging [phone]    To schedule an injection ordered by your provider:  Call Central Imaging Injection scheduling line: [phone]  MyChart available online at:  [email or web address]    Please call if any further questions or concerns ([phone]).  Clinic hours 8 am to 5 pm.    Return to clinic (call) if symptoms worsen or fail to improve.               Follow-ups after your visit        Your next 10 appointments already scheduled     2018 10:30 AM CST   Walk In From ENT with Hugo Lee   [facility]    [address] [place] [id number] [phone]            2018 11:30 AM CST   (Arrive by 11:15 AM)   NEW NEUROTOLOGY VISIT with Miley Trivedi MD   [facility] ([facility]    [address] [place] [id number] [phone]              Who to contact     If you have questions or need follow up information about today's clinic visit or your schedule please contact [facility] directly at [phone].  Normal or non-critical lab and imaging results will be communicated to you by MyChart, letter or phone within 4 business days after the clinic has received the results. If you do not hear from us  "within 7 days, please contact the clinic through Service at Home or phone. If you have a critical or abnormal lab result, we will notify you by phone as soon as possible.  Submit refill requests through Service at Home or call your pharmacy and they will forward the refill request to us. Please allow 3 business days for your refill to be completed.          Additional Information About Your Visit        [employer] Information     Service at Home gives you secure access to your electronic health record. If you see a primary care provider, you can also send messages to your care team and make appointments. If you have questions, please call your primary care clinic.  If you do not have a primary care provider, please call [phone] and they will assist you.      Service at Home is an electronic gateway that provides easy, online access to your medical records. With Service at Home, you can request a clinic appointment, read your test results, renew a prescription or communicate with your care team.     To access your existing account, please contact your [facility] or call [phone] for assistance.        Care EveryWhere ID     This is your Care EveryWhere ID. This could be used by other organizations to access your [place] medical records  [id number]        Your Vitals Were     Height BMI (Body Mass Index)                1.702 m (5' 7\") 25.37 kg/m2           Blood Pressure from Last 3 Encounters:   10/31/17 116/79   08/14/17 132/77   08/11/17 122/61    Weight from Last 3 Encounters:   11/27/17 73.5 kg (162 lb)   08/11/17 74.1 kg (163 lb 6.4 oz)   04/12/17 78.8 kg (173 lb 12.8 oz)              Today, you had the following     No orders found for display       Primary Care Provider Office Phone # Fax #    Phill Floyd [phone] [address] [facility] [address]        Equal Access to Services     BERLIN EMYER : Mariana Armenta, ishan rinaldi, qacezarta lubna hawkins, " arnav buttsgunner amaro'aan ah. So [facility] [phone].    ATENCIÓN: Si ernestola vibha, tiene a cutler disposición servicios gratuitos de asistencia lingüística. Rodríguez oscar [phone].    We comply with applicable federal civil rights laws and [place] laws. We do not discriminate on the basis of race, color, national origin, age, disability, sex, sexual orientation, or gender identity.            Thank you!     Thank you for choosing [facility]  for your care. Our goal is always to provide you with excellent care. Hearing back from our patients is one way we can continue to improve our services. Please take a few minutes to complete the written survey that you may receive in the mail after your visit with us. Thank you!             Your Updated Medication List - Protect others around you: Learn how to safely use, store and throw away your medicines at www.disposemymeds.org.          This list is accurate as of: 11/27/17  7:36 AM.  Always use your most recent med list.                   Brand Name Dispense Instructions for use Diagnosis    acetylcysteine 600 MG Caps capsule    N-ACETYL CYSTEINE     Take 1,800 mg by mouth daily        * amphetamine-dextroamphetamine 20 MG per tablet    ADDERALL     Take 20 mg by mouth daily Reported on 3/6/2017        * amphetamine-dextroamphetamine 30 MG per 24 hr capsule    ADDERALL XR     Take 1 capsule by mouth every morning        clonazePAM 0.5 MG tablet    klonoPIN     Take 0.5-1 mg by mouth nightly as needed        DULoxetine 20 MG EC capsule    CYMBALTA     Take 1 capsule by mouth daily        gabapentin 100 MG capsule    NEURONTIN    30 capsule    Take 1 capsule (100 mg) by mouth daily    Lumbar radicular pain, Lumbar paraspinal muscle spasm       naproxen 500 MG tablet    NAPROSYN    40 tablet    Take 1 tablet (500 mg) by mouth 2 times daily (with meals)    Lumbar radicular pain, Lumbar paraspinal muscle spasm       tiZANidine 4 MG tablet    ZANAFLEX     30 tablet    Take 1-2 tablets (4-8 mg) by mouth nightly as needed    Lumbar radicular pain, Lumbar paraspinal muscle spasm       * Notice:  This list has 2 medication(s) that are the same as other medications prescribed for you. Read the directions carefully, and ask your doctor or other care provider to review them with you.

## 2017-11-27 NOTE — NURSING NOTE
"Hoang Kiser's goals for this visit include: pain has increased. Scale of 1-10 patients states he is a 2-3.    He requests these members of his care team be copied on today's visit information: pcp    PCP: Phill Floyd    Referring Provider:  No referring provider defined for this encounter.    Chief Complaint   Patient presents with     RECHECK     lumbar disc       Initial Ht 1.702 m (5' 7\")  Wt 73.5 kg (162 lb)  BMI 25.37 kg/m2 Estimated body mass index is 25.37 kg/(m^2) as calculated from the following:    Height as of this encounter: 1.702 m (5' 7\").    Weight as of this encounter: 73.5 kg (162 lb).  Medication Reconciliation: complete    "

## 2017-11-27 NOTE — PATIENT INSTRUCTIONS
Thanks for coming today.  Ortho[facility]Sports Medicine Clinic  [address] [place] [address]    To schedule future appointments in Ortho Clinic, you may call [phone].    To schedule ordered imaging by your provider:   Call Central Imaging [phone]    To schedule an injection ordered by your provider:  Call Central Imaging Injection scheduling line: [phone]  [employer] available online at:  [email or web address]    Please call if any further questions or concerns ([phone]).  Clinic hours 8 am to 5 pm.    Return to clinic (call) if symptoms worsen or fail to improve.

## 2017-11-27 NOTE — PROGRESS NOTES
HISTORY OF PRESENT ILLNESS  Hoang grey   He last worked last week for 6 hours, and it worsens as the day goes on. Taking naproxen, occasionally, every other day is when he feels he can take it. Gabapentin and tizanadine as prescribed.  He continues to do HEP for his back and hips.    He recently had a hearing regarding partial disability and it did not result in his gaining any disability.  He is considering whether to return to work full time or not.  He has a plan to see his mental health physician this afternoon.  Currently sitting here, he feels spasms in his low back bilaterally. He states that he has some tingling in his left leg and around his hip has some 'throbbing'  Overall he states that he feels about the same    MEDICAL HISTORY  Patient Active Problem List   Diagnosis     Neuropathy     Moderate major depression (H)     Tobacco abuse     Attention deficit hyperactivity disorder (ADHD), predominantly inattentive type     Primary insomnia     History of MRI of brain and brain stem     Panic disorder without agoraphobia     Abnormal involuntary movement     Tic disorder     normal emg 2017     Anxiety     Panic attack     Posttraumatic stress disorder with dissociative symptoms       Current Outpatient Prescriptions   Medication Sig Dispense Refill     tiZANidine (ZANAFLEX) 4 MG tablet Take 1-2 tablets (4-8 mg) by mouth nightly as needed 30 tablet 1     naproxen (NAPROSYN) 500 MG tablet Take 1 tablet (500 mg) by mouth 2 times daily (with meals) 40 tablet 1     gabapentin (NEURONTIN) 100 MG capsule Take 1 capsule (100 mg) by mouth daily 30 capsule 3     DULoxetine (CYMBALTA) 20 MG EC capsule Take 1 capsule by mouth daily  0     amphetamine-dextroamphetamine (ADDERALL XR) 30 MG per 24 hr capsule Take 1 capsule by mouth every morning  0     acetylcysteine (N-ACETYL-L-CYSTEINE) 600 MG CAPS capsule Take 1,800 mg by mouth daily       clonazePAM (KLONOPIN) 0.5 MG tablet Take 0.5-1 mg by mouth nightly as  "needed   0     amphetamine-dextroamphetamine (ADDERALL) 20 MG tablet Take 20 mg by mouth daily Reported on 3/6/2017  0       Allergies   Allergen Reactions     Buspirone Hcl Other (See Comments)     Panic attacks     Doxycycline Diarrhea, Fatigue, GI Disturbance and Nausea     Trazodone Fatigue     Keflex [Cephalexin] Diarrhea       Family History   Problem Relation Age of Onset     Lipids Father      hyperlipidemia     Hyperlipidemia Father      Obesity Father      Arthritis Mother      Hyperlipidemia Mother      Depression Mother      Anxiety Disorder Mother      MENTAL ILLNESS Mother      Obesity Mother      Asthma Brother      Asthma Sister      Depression Other      Hearing Loss Other      Psychotic Disorder Other      Obesity Other      CEREBROVASCULAR DISEASE Paternal Grandfather      Alzheimer Disease Paternal Grandfather      Depression Brother      MENTAL ILLNESS Brother      Bipolar     Asthma Brother      Depression Sister      Asthma Sister      Substance Abuse Sister      Alcohol     MENTAL ILLNESS Brother      Bipolar     Asthma Brother      Asthma Sister      Obesity Sister      Asthma Brother      Obesity Brother      Obesity Maternal Grandmother      Genetic Disorder Maternal Grandmother      Epilepsy       Additional medical/Social/Surgical histories reviewed in [facility] and updated as appropriate.     REVIEW OF SYSTEMS (11/27/2017)  10 point ROS of systems including Constitutional, Eyes, Respiratory, Cardiovascular, Gastroenterology, Genitourinary, Integumentary, Musculoskeletal, Psychiatric were all negative except for pertinent positives noted in my HPI.     PHYSICAL EXAM  Vitals:    11/27/17 0711   Weight: 73.5 kg (162 lb)   Height: 1.702 m (5' 7\")     Vital Signs: Ht 1.702 m (5' 7\")  Wt 73.5 kg (162 lb)  BMI 25.37 kg/m2 Patient declined being weighed. Body mass index is 25.37 kg/(m^2).    General  - normal appearance, in no obvious distress  CV  - normal peripheral perfusion  Pulm  - normal " respiratory pattern, non-labored  Musculoskeletal - lumbar spine  - stance: normal gait without limp, no obvious leg length discrepancy, normal heel and toe walk  - inspection: normal bone and joint alignment, no obvious scoliosis  - palpation: no paravertebral or bony tenderness, except bilateral lumbar paraspinal muscle groups  - ROM: flexion exacerbates a small amount of pain in right low back,  The left SI joint is not tender on exam today, and very mild right low back pain with testing SLR , slightly abnormal extension- not greatly limited by pain in low back, sidebending, rotation feels some pain with both movements  Overall similar to previous visit and exam  - strength: lower extremities 5/5 in all planes  - special tests:  (-) straight leg raise right   (-) slump test- left leg and hip and low back- this is improved from his previous visit  Neuro  - patellar and Achilles DTRs 2+ bilaterally, today, no significant lower extremity sensory deficit throughout L4/5 distribution, grossly normal coordination, normal muscle tone  Skin  - no ecchymosis, erythema, warmth, or induration, no obvious rash  Psych  - interactive, appropriate, normal mood and affect  Overall  Stable exam compared to previous visit    ASSESSMENT & PLAN    30 yo male with low back pain due to radicular pain and muscles spasms, stable, not resolved      -cont. gabapentin 100 tid as it continues to help him    Cont. tizanadine at night  Decreased naproxen use as discussed    Cont. Work restrictions as stated    I continue to recommend that he should have an FCE to determine where he is objectively on ability so that he can be advanced appropriately at his work with regards to restrictions.    With his history of mental health condition(s) I think this would be beneficial in moving him forward in his rehabilitation  He has an appointment with his psychiatrist as well    Faustino Feldman MD, [facility]

## 2017-11-27 NOTE — LETTER
November 27, 2017      Hoang Moniqueerin  [address] [facility] [address]              Dear Hoang    Hoangmakenzie Kiser is under my professional care for a work related condition.  He  may return to work with the following: The employee is ABLE to return to work today with the following restrictions:     When the patient returns to work, the following restrictions apply until I re-evaluate him by January 1st , 2018.      A) Bend: Occasionally (6 hours)  B) Squat: occasionally (6 hours)  C) Walk/Stand: Occasionally (6 hours)  D) Reach Above Shoulders: Occasionally (6 hours)  E) Lift, carry, push, and pull no more than:  25 lbs.  D) Please allow 2 breaks per 6 hour shift      Sincerely,      Faustino Feldman MD

## 2017-12-05 DIAGNOSIS — M54.16 LUMBAR RADICULAR PAIN: ICD-10-CM

## 2017-12-05 DIAGNOSIS — M62.830 LUMBAR PARASPINAL MUSCLE SPASM: ICD-10-CM

## 2017-12-05 RX ORDER — NAPROXEN 500 MG/1
500 TABLET ORAL 2 TIMES DAILY WITH MEALS
Qty: 40 TABLET | Refills: 1 | Status: SHIPPED | OUTPATIENT
Start: 2017-12-05 | End: 2018-01-15

## 2017-12-18 ENCOUNTER — TELEPHONE (OUTPATIENT)
Dept: ORTHOPEDICS | Facility: CLINIC | Age: 32
End: 2017-12-18

## 2017-12-18 NOTE — TELEPHONE ENCOUNTER
[facility] Call Center    Phone Message    Name of Caller: Britney calling from [employer] [place].    Phone Number: Other phone number:  [phone]*    Reason for Call: Other:  Please call to give an update on the Health care Narrative that was sent. fax to [phone] if complete.     Action Taken: Message routed to:  Adult Clinics: Sports Medicine p [phone]

## 2017-12-21 ENCOUNTER — OFFICE VISIT (OUTPATIENT)
Dept: ORTHOPEDICS | Facility: CLINIC | Age: 32
End: 2017-12-21
Payer: COMMERCIAL

## 2017-12-21 VITALS
WEIGHT: 162 LBS | BODY MASS INDEX: 25.43 KG/M2 | SYSTOLIC BLOOD PRESSURE: 114 MMHG | DIASTOLIC BLOOD PRESSURE: 75 MMHG | HEART RATE: 82 BPM | HEIGHT: 67 IN

## 2017-12-21 DIAGNOSIS — M25.552 PAIN IN LEFT HIP: ICD-10-CM

## 2017-12-21 DIAGNOSIS — M62.830 LUMBAR PARASPINAL MUSCLE SPASM: ICD-10-CM

## 2017-12-21 DIAGNOSIS — M54.16 LUMBAR RADICULAR PAIN: Primary | ICD-10-CM

## 2017-12-21 DIAGNOSIS — M53.3 PAIN OF LEFT SACROILIAC JOINT: ICD-10-CM

## 2017-12-21 PROCEDURE — 99213 OFFICE O/P EST LOW 20 MIN: CPT | Performed by: PREVENTIVE MEDICINE

## 2017-12-21 ASSESSMENT — PAIN SCALES - GENERAL: PAINLEVEL: MILD PAIN (3)

## 2017-12-21 NOTE — NURSING NOTE
"Hoang Kiser's goals for this visit include: Follow up for low back  He requests these members of his care team be copied on today's visit information: no    PCP: Phill Floyd    Referring Provider:  No referring provider defined for this encounter.    Chief Complaint   Patient presents with     RECHECK     Follow up, reporting no change in back pain and more pain in groin area       Initial /75  Pulse 82  Ht 1.702 m (5' 7\")  Wt 73.5 kg (162 lb)  BMI 25.37 kg/m2 Estimated body mass index is 25.37 kg/(m^2) as calculated from the following:    Height as of this encounter: 1.702 m (5' 7\").    Weight as of this encounter: 73.5 kg (162 lb).  Medication Reconciliation: complete  "

## 2017-12-21 NOTE — LETTER
December 21, 2017      Hoang Kiser  [address] [address]              Hoang Kiser is under my professional care for a work related condition.  He  may return to work with the following: The employee is ABLE to return to work today with the following restrictions:     When the patient returns to work, the following restrictions apply until I re-evaluate him by January 31st , 2018.      A) Bend: Occasionally (6 hours)  B) Squat: occasionally (6 hours)  C) Walk/Stand: Occasionally (6 hours)  D) Reach Above Shoulders: Occasionally (6 hours)  E) Lift, carry, push, and pull no more than:  25 lbs.  D) Please allow 2 breaks per 6 hour shift    Sincerely,      Faustino Feldman MD

## 2017-12-21 NOTE — PATIENT INSTRUCTIONS
Thanks for coming today.  Ortho/Sports Medicine Clinic  [address] [place] [address]    To schedule future appointments in Ortho Clinic, you may call [phone].    To schedule ordered imaging by your provider:   Call Central Imaging [phone]    To schedule an injection ordered by your provider:  Call Central Imaging Injection scheduling line: [phone]  [employer] available online at:  [email or web address]    Please call if any further questions or concerns ([phone]).  Clinic hours 8 am to 5 pm.    Return to clinic (call) if symptoms worsen or fail to improve.

## 2017-12-21 NOTE — PROGRESS NOTES
HISTORY OF PRESENT ILLNESS  Hoang returns for followup. Still taking gabapentin and tizanadine also taking naproxen (1/2 pill per day)  He continues to do HEP for his back and his hips.   He is still awaiting a hearing for his job status which occurs on Jan. 12th, 2018  He is currently working 9-3.  Currently sitting here he feels muscle spasms in his low back. He feels some pain in his lower abdomen on his hips too.  He doesn't have any tingling in his legs or feet today.  He has been following up with his psychiatrist and has a plan to see them again on the 16th of Jan.  He doesn't have any feeling of improving.  His work comp physician stated that his left hip pain from work related injury was not caused by his lumbar pain problem.    MEDICAL HISTORY  Patient Active Problem List   Diagnosis     Neuropathy     Moderate major depression (H)     Tobacco abuse     Attention deficit hyperactivity disorder (ADHD), predominantly inattentive type     Primary insomnia     History of MRI of brain and brain stem     Panic disorder without agoraphobia     Abnormal involuntary movement     Tic disorder     normal emg 2017     Anxiety     Panic attack     Posttraumatic stress disorder with dissociative symptoms       Current Outpatient Prescriptions   Medication Sig Dispense Refill     naproxen (NAPROSYN) 500 MG tablet Take 1 tablet (500 mg) by mouth 2 times daily (with meals) 40 tablet 1     tiZANidine (ZANAFLEX) 4 MG tablet Take 1-2 tablets (4-8 mg) by mouth nightly as needed 30 tablet 1     zolpidem (AMBIEN) 10 MG tablet Take 1 tablet by mouth nightly as needed for sleep  1     gabapentin (NEURONTIN) 100 MG capsule Take 1 capsule (100 mg) by mouth daily 30 capsule 3     DULoxetine (CYMBALTA) 20 MG EC capsule Take 1 capsule by mouth daily  0     amphetamine-dextroamphetamine (ADDERALL XR) 30 MG per 24 hr capsule Take 1 capsule by mouth every morning  0     acetylcysteine (N-ACETYL-L-CYSTEINE) 600 MG CAPS capsule Take 1,800  "mg by mouth daily       clonazePAM (KLONOPIN) 0.5 MG tablet Take 0.5-1 mg by mouth nightly as needed   0     amphetamine-dextroamphetamine (ADDERALL) 20 MG tablet Take 20 mg by mouth daily Reported on 3/6/2017  0       Allergies   Allergen Reactions     Buspirone Hcl Other (See Comments)     Panic attacks     Doxycycline Diarrhea, Fatigue, GI Disturbance and Nausea     Trazodone Fatigue     Keflex [Cephalexin] Diarrhea       Family History   Problem Relation Age of Onset     Lipids Father      hyperlipidemia     Hyperlipidemia Father      Obesity Father      Arthritis Mother      Hyperlipidemia Mother      Depression Mother      Anxiety Disorder Mother      MENTAL ILLNESS Mother      Obesity Mother      Asthma Brother      Asthma Sister      Depression Other      Hearing Loss Other      Psychotic Disorder Other      Obesity Other      CEREBROVASCULAR DISEASE Paternal Grandfather      Alzheimer Disease Paternal Grandfather      Depression Brother      MENTAL ILLNESS Brother      Bipolar     Asthma Brother      Depression Sister      Asthma Sister      Substance Abuse Sister      Alcohol     MENTAL ILLNESS Brother      Bipolar     Asthma Brother      Asthma Sister      Obesity Sister      Asthma Brother      Obesity Brother      Obesity Maternal Grandmother      Genetic Disorder Maternal Grandmother      Epilepsy       Additional medical/Social/Surgical histories reviewed in [facility] and updated as appropriate.     REVIEW OF SYSTEMS (12/21/2017)  10 point ROS of systems including Constitutional, Eyes, Respiratory, Cardiovascular, Gastroenterology, Genitourinary, Integumentary, Musculoskeletal, Psychiatric were all negative except for pertinent positives noted in my HPI.     PHYSICAL EXAM  Vitals:    12/21/17 0718   BP: 114/75   Pulse: 82   Weight: 73.5 kg (162 lb)   Height: 1.702 m (5' 7\")     Vital Signs: /75  Pulse 82  Ht 1.702 m (5' 7\")  Wt 73.5 kg (162 lb)  BMI 25.37 kg/m2 Patient declined being weighed. " Body mass index is 25.37 kg/(m^2).    General  - normal appearance, in no obvious distress  CV  - normal peripheral perfusion  Pulm  - normal respiratory pattern, non-labored  Musculoskeletal - lumbar spine and left hip  - stance: normal gait without limp, no obvious leg length discrepancy, normal heel and toe walk  - inspection: normal bone and joint alignment, no obvious scoliosis  - palpation: no paravertebral or bony tenderness, except bilateral lumbar paraspinal muscle groups  - ROM: again, on exam today,  flexion exacerbates a small amount of pain in right low back,  The left SI joint is not tender on exam today, and very mild right low back pain with testing SLR , slightly abnormal extension- not greatly limited by pain in low back, sidebending, rotation feels some pain with both movements  Overall similar to previous visit and exam  - strength: lower extremities 5/5 in all planes  - special tests:  (-) straight leg raise right   (-) slump test- left leg and hip and low back- this is similar to his previous visit  Neuro  - patellar and Achilles DTRs 2+ bilaterally, today, no significant lower extremity sensory deficit throughout L4/5 distribution, grossly normal coordination, normal muscle tone  Skin  - no ecchymosis, erythema, warmth, or induration, no obvious rash  Psych  - interactive, appropriate, normal mood and affect  Overall stable exam to his previous visit    ASSESSMENT & PLAN    30 yo male with low back pain due to radicular pain and muscles spasms, left hip flexor pain and left SI joint pain    -cont. gabapentin 100 tid as it continues to help him    Cont. tizanadine at night  Decreased naproxen  He would like to continue his current work restrictions  To this point, I do not believe pursuing another epidural injection nor SI joint injection would improve his pain at this point  I believe he continues to have both subjective and objective feelings of pain and discomfort and that combined with his  psychiatry diagnoses contribute to his current condition and feelings of pain    I continue to recommend that he should have an FCE to determine where he is objectively on ability so that he can be advanced appropriately at his work with regards to restrictions.    With his history of mental health condition(s) I think this would be beneficial in moving him forward in his rehabilitation  He will continue to followup with his psychiatrist as well    Faustino Feldman MD, [facility]

## 2017-12-21 NOTE — MR AVS SNAPSHOT
After Visit Summary   2017    Hoang Kiser    MRN: [id number]           Patient Information     Date Of Birth          1985        Visit Information        Provider Department      2017 7:20 AM Faustino Feldman MD [facility]        Today's Diagnoses     Lumbar radicular pain    -  1    Lumbar paraspinal muscle spasm        Pain in left hip        Pain of left sacroiliac joint          Care Instructions    Thanks for coming today.  [facility]  [address] [place] [address]    To schedule future appointments in Ortho Clinic, you may call [phone].    To schedule ordered imaging by your provider:   Call Central Imaging [phone]    To schedule an injection ordered by your provider:  Call Central Imaging Injection scheduling line: [phone]  MyChart available online at:  [email or web address]    Please call if any further questions or concerns ([phone]).  Clinic hours 8 am to 5 pm.    Return to clinic (call) if symptoms worsen or fail to improve.          Follow-ups after your visit        Your next 10 appointments already scheduled     2018  3:00 PM CST   Walk In From ENT with Hugo Haney   [facility] ([facility])    [address] [place] [id number] [phone]            2018  4:00 PM CST   (Arrive by 3:45 PM)   New Patient Visit with Chuck Corona MD   [facility] ([facility])    [address] [place] [id number] [phone]              Who to contact     If you have questions or need follow up information about today's clinic visit or your schedule please contact [facility] directly at [phone].  Normal or non-critical lab and imaging results will be communicated to you by MyChart, letter or phone  "within 4 business days after the clinic has received the results. If you do not hear from us within 7 days, please contact the clinic through [employer] or phone. If you have a critical or abnormal lab result, we will notify you by phone as soon as possible.  Submit refill requests through [employer] or call your pharmacy and they will forward the refill request to us. Please allow 3 business days for your refill to be completed.          Additional Information About Your Visit        [employer] Information     [employer] gives you secure access to your electronic health record. If you see a primary care provider, you can also send messages to your care team and make appointments. If you have questions, please call your primary care clinic.  If you do not have a primary care provider, please call [phone] and they will assist you.      [employer] is an electronic gateway that provides easy, online access to your medical records. With [employer], you can request a clinic appointment, read your test results, renew a prescription or communicate with your care team.     To access your existing account, please contact your [facility] or call [phone] for assistance.        Care EveryWhere ID     This is your Care EveryWhere ID. This could be used by other organizations to access your [facility] medical records  [id number]        Your Vitals Were     Pulse Height BMI (Body Mass Index)             82 1.702 m (5' 7\") 25.37 kg/m2          Blood Pressure from Last 3 Encounters:   12/21/17 114/75   10/31/17 116/79   08/14/17 132/77    Weight from Last 3 Encounters:   12/21/17 73.5 kg (162 lb)   11/27/17 73.5 kg (162 lb)   08/11/17 74.1 kg (163 lb 6.4 oz)              Today, you had the following     No orders found for display       Primary Care Provider Office Phone # Fax #    Phill Floyd [phone] [address] [facility] [address]        Equal Access to Services     " [facility]: Hadii aad ku ana Armenta, waaxda luqadaha, qaybta kaalmada ademallory, arnav bill oseasbarbara abarca chemarilynn lorenzana . So [facility] [phone].    ATENCIÓN: Si habla español, tiene a cutler disposición servicios gratuitos de asistencia lingüística. Llame al [phone].    We comply with applicable federal civil rights laws and [place] laws. We do not discriminate on the basis of race, color, national origin, age, disability, sex, sexual orientation, or gender identity.            Thank you!     Thank you for choosing [facility]  for your care. Our goal is always to provide you with excellent care. Hearing back from our patients is one way we can continue to improve our services. Please take a few minutes to complete the written survey that you may receive in the mail after your visit with us. Thank you!             Your Updated Medication List - Protect others around you: Learn how to safely use, store and throw away your medicines at www.disposemymeds.org.          This list is accurate as of: 12/21/17 10:21 AM.  Always use your most recent med list.                   Brand Name Dispense Instructions for use Diagnosis    acetylcysteine 600 MG Caps capsule    N-ACETYL CYSTEINE     Take 1,800 mg by mouth daily        * amphetamine-dextroamphetamine 20 MG per tablet    ADDERALL     Take 20 mg by mouth daily Reported on 3/6/2017        * amphetamine-dextroamphetamine 30 MG per 24 hr capsule    ADDERALL XR     Take 1 capsule by mouth every morning        clonazePAM 0.5 MG tablet    klonoPIN     Take 0.5-1 mg by mouth nightly as needed        DULoxetine 20 MG EC capsule    CYMBALTA     Take 1 capsule by mouth daily        gabapentin 100 MG capsule    NEURONTIN    30 capsule    Take 1 capsule (100 mg) by mouth daily    Lumbar radicular pain, Lumbar paraspinal muscle spasm       naproxen 500 MG tablet    NAPROSYN    40 tablet    Take 1 tablet (500 mg) by mouth 2 times daily (with meals)     Lumbar radicular pain, Lumbar paraspinal muscle spasm       tiZANidine 4 MG tablet    ZANAFLEX    30 tablet    Take 1-2 tablets (4-8 mg) by mouth nightly as needed    Lumbar radicular pain, Lumbar paraspinal muscle spasm       zolpidem 10 MG tablet    AMBIEN     Take 1 tablet by mouth nightly as needed for sleep        * Notice:  This list has 2 medication(s) that are the same as other medications prescribed for you. Read the directions carefully, and ask your doctor or other care provider to review them with you.

## 2017-12-22 NOTE — TELEPHONE ENCOUNTER
Have not received Health care narrative for patient, will fax a request to re-send narrative to our clinic to fill out.

## 2017-12-26 DIAGNOSIS — M62.830 LUMBAR PARASPINAL MUSCLE SPASM: ICD-10-CM

## 2017-12-26 DIAGNOSIS — M54.16 LUMBAR RADICULAR PAIN: ICD-10-CM

## 2017-12-26 NOTE — TELEPHONE ENCOUNTER
Disp Refills Start End YAIR      tiZANidine (ZANAFLEX) 4 MG tablet 30 tablet 1 11/27/2017  No     Sig: Take 1-2 tablets (4-8 mg) by mouth nightly as needed     Class: E-Prescribe     Route: Oral     Order: [id number]     E-Prescribing Status: Receipt confirmed by pharmacy (11/27/2017 11:02 AM CST)

## 2017-12-27 ENCOUNTER — MYC MEDICAL ADVICE (OUTPATIENT)
Dept: ORTHOPEDICS | Facility: CLINIC | Age: 32
End: 2017-12-27

## 2018-01-02 ENCOUNTER — MYC MEDICAL ADVICE (OUTPATIENT)
Dept: ORTHOPEDICS | Facility: CLINIC | Age: 33
End: 2018-01-02

## 2018-01-05 NOTE — TELEPHONE ENCOUNTER
APPT INFO    Date /Time: 1/5/18   Reason for Appt: Eustachian tube dysfunction   Ref Provider/Clinic: Dr. Taylor   Are there internal records? Yes/No?  IF YES, list clinic names: Yes;  [facility] Neurology  [facility] ENT (Dr. Nelson 1/5/16)  Head MRI 12/11/15   Are there outside records? Yes/No? No   Patient Contact (Y/N) & Call Details: No   Action: Chart Reviewed

## 2018-01-15 DIAGNOSIS — M54.16 LUMBAR RADICULAR PAIN: ICD-10-CM

## 2018-01-15 DIAGNOSIS — M62.830 LUMBAR PARASPINAL MUSCLE SPASM: ICD-10-CM

## 2018-01-15 NOTE — TELEPHONE ENCOUNTER
naproxen (NAPROSYN) 500 MG tablet 40 tablet 1 12/5/2017  No     Sig: Take 1 tablet (500 mg) by mouth 2 times daily (with meals)     Class: E-Prescribe     Route: Oral     Order: [id number]     E-Prescribing Status: Receipt confirmed by pharmacy (12/5/2017  5:28 PM CST)

## 2018-01-18 ENCOUNTER — PRE VISIT (OUTPATIENT)
Dept: OTOLARYNGOLOGY | Facility: CLINIC | Age: 33
End: 2018-01-18

## 2018-01-24 ENCOUNTER — OFFICE VISIT (OUTPATIENT)
Dept: FAMILY MEDICINE | Facility: CLINIC | Age: 33
End: 2018-01-24
Payer: COMMERCIAL

## 2018-01-24 ENCOUNTER — MYC REFILL (OUTPATIENT)
Dept: ORTHOPEDICS | Facility: CLINIC | Age: 33
End: 2018-01-24

## 2018-01-24 VITALS
OXYGEN SATURATION: 99 % | SYSTOLIC BLOOD PRESSURE: 122 MMHG | TEMPERATURE: 97.1 F | DIASTOLIC BLOOD PRESSURE: 78 MMHG | HEIGHT: 68 IN | HEART RATE: 101 BPM | WEIGHT: 164 LBS | BODY MASS INDEX: 24.86 KG/M2

## 2018-01-24 DIAGNOSIS — Z13.1 SCREENING FOR DIABETES MELLITUS: ICD-10-CM

## 2018-01-24 DIAGNOSIS — Z23 NEED FOR HEPATITIS A VACCINATION: ICD-10-CM

## 2018-01-24 DIAGNOSIS — Z80.0 FAMILY HISTORY OF COLON CANCER: ICD-10-CM

## 2018-01-24 DIAGNOSIS — Z72.0 TOBACCO ABUSE: ICD-10-CM

## 2018-01-24 DIAGNOSIS — M62.830 LUMBAR PARASPINAL MUSCLE SPASM: ICD-10-CM

## 2018-01-24 DIAGNOSIS — M54.16 LUMBAR RADICULAR PAIN: ICD-10-CM

## 2018-01-24 DIAGNOSIS — Z13.6 CARDIOVASCULAR SCREENING; LDL GOAL LESS THAN 160: ICD-10-CM

## 2018-01-24 DIAGNOSIS — Z00.00 ROUTINE GENERAL MEDICAL EXAMINATION AT A HEALTH CARE FACILITY: Primary | ICD-10-CM

## 2018-01-24 PROCEDURE — 99395 PREV VISIT EST AGE 18-39: CPT | Mod: 25 | Performed by: FAMILY MEDICINE

## 2018-01-24 PROCEDURE — 90471 IMMUNIZATION ADMIN: CPT | Performed by: FAMILY MEDICINE

## 2018-01-24 PROCEDURE — 90632 HEPA VACCINE ADULT IM: CPT | Performed by: FAMILY MEDICINE

## 2018-01-24 RX ORDER — NICOTINE 21 MG/24HR
1 PATCH, TRANSDERMAL 24 HOURS TRANSDERMAL EVERY 24 HOURS
Qty: 30 PATCH | Refills: 0 | Status: SHIPPED | OUTPATIENT
Start: 2018-01-24 | End: 2019-02-14

## 2018-01-24 ASSESSMENT — PATIENT HEALTH QUESTIONNAIRE - PHQ9: SUM OF ALL RESPONSES TO PHQ QUESTIONS 1-9: 2

## 2018-01-24 NOTE — LETTER
My Depression Action Plan  Name: Hoang Kiser   Date of Birth 1985  Date: 1/24/2018    My doctor: Phill Floyd   My clinic: [facility]  [address] [place] [address] [phone]          GREEN    ZONE   Good Control    What it looks like:     Things are going generally well. You have normal up s and down s. You may even feel depressed from time to time, but bad moods usually last less than a day.   What you need to do:  1. Continue to care for yourself (see self care plan)  2. Check your depression survival kit and update it as needed  3. Follow your physician s recommendations including any medication.  4. Do not stop taking medication unless you consult with your physician first.           YELLOW         ZONE Getting Worse    What it looks like:     Depression is starting to interfere with your life.     It may be hard to get out of bed; you may be starting to isolate yourself from others.    Symptoms of depression are starting to last most all day and this has happened for several days.     You may have suicidal thoughts but they are not constant.   What you need to do:     1. Call your care team, your response to treatment will improve if you keep your care team informed of your progress. Yellow periods are signs an adjustment may need to be made.     2. Continue your self-care, even if you have to fake it!    3. Talk to someone in your support network    4. Open up your depression survival kit           RED    ZONE Medical Alert - Get Help    What it looks like:     Depression is seriously interfering with your life.     You may experience these or other symptoms: You can t get out of bed most days, can t work or engage in other necessary activities, you have trouble taking care of basic hygiene, or basic responsibilities, thoughts of suicide or death that will not go away, self-injurious behavior.     What you need to do:  1. Call your care team and  request a same-day appointment. If they are not available (weekends or after hours) call your local crisis line, emergency room or 911.      Electronically signed by: Tiny Amaral, January 24, 2018    Depression Self Care Plan / Survival Kit    Self-Care for Depression  Here s the deal. Your body and mind are really not as separate as most people think.  What you do and think affects how you feel and how you feel influences what you do and think. This means if you do things that people who feel good do, it will help you feel better.  Sometimes this is all it takes.  There is also a place for medication and therapy depending on how severe your depression is, so be sure to consult with your medical provider and/ or Behavioral Health Consultant if your symptoms are worsening or not improving.     In order to better manage my stress, I will:    Exercise  Get some form of exercise, every day. This will help reduce pain and release endorphins, the  feel good  chemicals in your brain. This is almost as good as taking antidepressants!  This is not the same as joining a gym and then never going! (they count on that by the way ) It can be as simple as just going for a walk or doing some gardening, anything that will get you moving.      Hygiene   Maintain good hygiene (Get out of bed in the morning, Make your bed, Brush your teeth, Take a shower, and Get dressed like you were going to work, even if you are unemployed).  If your clothes don't fit try to get ones that do.    Diet  I will strive to eat foods that are good for me, drink plenty of water, and avoid excessive sugar, caffeine, alcohol, and other mood-altering substances.  Some foods that are helpful in depression are: complex carbohydrates, B vitamins, flaxseed, fish or fish oil, fresh fruits and vegetables.    Psychotherapy  I agree to participate in Individual Therapy (if recommended).    Medication  If prescribed medications, I agree to take them.  Missing  doses can result in serious side effects.  I understand that drinking alcohol, or other illicit drug use, may cause potential side effects.  I will not stop my medication abruptly without first discussing it with my provider.    Staying Connected With Others  I will stay in touch with my friends, family members, and my primary care provider/team.    Use your imagination  Be creative.  We all have a creative side; it doesn t matter if it s oil painting, sand castles, or mud pies! This will also kick up the endorphins.    Witness Beauty  (AKA stop and smell the roses) Take a look outside, even in mid-winter. Notice colors, textures. Watch the squirrels and birds.     Service to others  Be of service to others.  There is always someone else in need.  By helping others we can  get out of ourselves  and remember the really important things.  This also provides opportunities for practicing all the other parts of the program.    Humor  Laugh and be silly!  Adjust your TV habits for less news and crime-drama and more comedy.    Control your stress  Try breathing deep, massage therapy, biofeedback, and meditation. Find time to relax each day.     My support system    Clinic Contact:  Phone number:    Contact 1:  Phone number:    Contact 2:  Phone number:    Spiritism/:  Phone number:    Therapist:  Phone number:    Local crisis center:    Phone number:    Other community support:  Phone number:

## 2018-01-24 NOTE — TELEPHONE ENCOUNTER
Message from JohnathonGarber:  Hannah Merino RN Wed Jan 24, 2018 12:47 PM        ----- Message -----   From: Hoang Kiser   Sent: 1/24/2018 12:44 PM   To: Ortho Triage-  Subject: Medication Renewal Request     Original authorizing provider: MD Hoang Arenas would like a refill of the following medications:  gabapentin (NEURONTIN) 100 MG capsule [Faustino Feldman MD]  tiZANidine (ZANAFLEX) 4 MG tablet [Faustino Feldman MD]    Preferred pharmacy: [facility] [address] [place] [address] AVE [address] AT [facility]    Comment:

## 2018-01-24 NOTE — NURSING NOTE
"Chief Complaint   Patient presents with     Physical       Initial /76  Pulse 101  Temp 97.1  F (36.2  C) (Oral)  Ht 5' 7.5\" (1.715 m)  Wt 164 lb (74.4 kg)  SpO2 99%  BMI 25.31 kg/m2 Estimated body mass index is 25.31 kg/(m^2) as calculated from the following:    Height as of this encounter: 5' 7.5\" (1.715 m).    Weight as of this encounter: 164 lb (74.4 kg).  Medication Reconciliation: complete   Tiny Amaral cma    "

## 2018-01-24 NOTE — PROGRESS NOTES
SUBJECTIVE:   CC: Hoang Kiser is an 32 year old male who presents for preventative health visit.     Physical   Annual:     Getting at least 3 servings of Calcium per day::  Yes    Bi-annual eye exam::  NO    Dental care twice a year::  Yes    Sleep apnea or symptoms of sleep apnea::  None    Diet::  Regular (no restrictions)    Frequency of exercise::  4-5 days/week    Duration of exercise::  30-45 minutes    Taking medications regularly::  Yes    Medication side effects::  Other    Additional concerns today::  YES                    Today's PHQ-2 Score:   PHQ-2 ( 1999 [employer]) 1/22/2018   Q1: Little interest or pleasure in doing things 0   Q2: Feeling down, depressed or hopeless 0   PHQ-2 Score 0   Q1: Little interest or pleasure in doing things Not at all   Q2: Feeling down, depressed or hopeless Not at all   PHQ-2 Score 0       Abuse: Current or Past(Physical, Sexual or Emotional)- CHOICE NOT TO ANSWER  Do you feel safe in your environment - Yes    Social History   Substance Use Topics     Smoking status: Current Every Day Smoker     Packs/day: 0.50     Years: 10.00     Types: Cigarettes     Start date: 1/1/2002     Smokeless tobacco: Current User      Comment: Transdermal patches have helped me the most in the past     Alcohol use 1.2 - 2.4 oz/week     0 Standard drinks or equivalent, 1 - 2 Glasses of wine, 1 - 2 Cans of beer per week      Comment: EVERY 3 MONTHS     Alcohol Use 1/22/2018   If you drink alcohol, do you typically have greater than 3 drinks per day OR greater than 7 drinks per week?   Not applicable   No flowsheet data found.    Last PSA: No results found for: PSA    Reviewed orders with patient. Reviewed health maintenance and updated orders accordingly -       Reviewed and updated as needed this visit by clinical staff  Tobacco  Allergies  Meds  Med Hx  Surg Hx  Fam Hx  Soc Hx        Reviewed and updated as needed this visit by Provider            Review of Systems      OBJECTIVE:  "  /78  Pulse 101  Temp 97.1  F (36.2  C) (Oral)  Ht 5' 7.5\" (1.715 m)  Wt 164 lb (74.4 kg)  SpO2 99%  BMI 25.31 kg/m2    Physical Exam      ASSESSMENT/PLAN:       ICD-10-CM    1. Routine general medical examination at a health care facility Z00.00 Lipid panel reflex to direct LDL Fasting     Comprehensive metabolic panel   2. Family history of colon cancer Z80.0 GASTROENTEROLOGY ADULT REF PROCEDURE ONLY   3. Need for hepatitis A vaccination Z23 HEPATITIS A VACCINE (ADULT)     VACCINE ADMINISTRATION, INITIAL   4. Tobacco abuse Z72.0 nicotine (NICODERM CQ) 14 MG/24HR 24 hr patch     nicotine (NICODERM CQ) 7 MG/24HR 24 hr patch   5. Screening for diabetes mellitus Z13.1 Comprehensive metabolic panel   6. CARDIOVASCULAR SCREENING; LDL GOAL LESS THAN 160 Z13.6 Lipid panel reflex to direct LDL Fasting     Comprehensive metabolic panel       COUNSELING:   Reviewed preventive health counseling, as reflected in patient instructions       Regular exercise       Vision screening       Family planning       Colon cancer screening       Osteoporosis Prevention/Bone Health    BP Screening:   Last 3 BP Readings:    BP Readings from Last 3 Encounters:   01/24/18 122/78   12/21/17 114/75   10/31/17 116/79       The following was recommended to the patient:  Re-screen BP within a year and recommended lifestyle modifications     reports that he has been smoking Cigarettes.  He started smoking about 16 years ago. He has a 5.00 pack-year smoking history. He uses smokeless tobacco.  Tobacco Cessation Action Plan: Pharmacotherapies : Nicotine patch  Estimated body mass index is 25.31 kg/(m^2) as calculated from the following:    Height as of this encounter: 5' 7.5\" (1.715 m).    Weight as of this encounter: 164 lb (74.4 kg).       Counseling Resources:  ATP IV Guidelines  Pooled Cohorts Equation Calculator  FRAX Risk Assessment  ICSI Preventive Guidelines  Dietary Guidelines for Americans, 2010  USDA's MyPlate  ASA " Prophylaxis  Lung CA Screening    Phill Floyd MD  [facility]  Answers for HPI/ROS submitted by the patient on 1/22/2018   PHQ-2 Score: 0    --------------------------------------------------------------------------------------------------------------------------------------    SUBJECTIVE:  Hoang Kiser is a 32 year old male who presents to the clinic today for a routine physical exam.    The patient's last physical was many  years ago.     No results found for: CHOL  No results found for: HDL  No results found for: LDL  No results found for: TRIG  No results found for: CHOLHDLRATIO  The patient's last fasting lipid panel was done MULTIPLE  years ago and the results were abnormal.       The ASCVD Risk score ([place] Jr, et al., 2013) failed to calculate for the following reasons:    The 2013 ASCVD risk score is only valid for ages 40 to 79        The patient reports that he has never been treated for high blood pressure.    The patient reports that he does not take a daily aspirin.    No results found for: HCVAB  The patient reports that he has not been screened for Hepatitis C    (Screen all baby boomers once per CDC-- the generation born from 1945 through 1965)    Immunization History   Administered Date(s) Administered     DTAP (<7y) 1985, 1985, 1985, 1985, 02/08/1986, 02/28/1986, 02/28/1986, 04/27/1987, 09/06/1990, 09/06/1995     HEPA 02/11/2010     HepB 10/27/1997, 08/19/1998, 11/20/2000     Influenza Intranasal Vaccine 4 valent 11/01/2017     MMR 04/27/1987, 10/27/1997     Pneumococcal 23 valent 02/05/2016     Poliovirus, inactivated (IPV) 1985, 1985, 02/28/1986, 04/27/1987     Tdap (Adacel,Boostrix) 01/01/2000, 02/11/2010     The patient's believes that his last tetanus shot was given 8 year(s) ago.   The patient believes that he has not had a Zostavax in the past  The patient believes that he has had a PPSV23 in the past.  The patient believes  that he has not had a PCV13 in the past.  The patient believes that he has had a seasonal flu vaccination this fall or winter.  The patient would like to have a Hepatitis A      No results found for this or any previous visit.]   The patient reports a family history of colon cancer. His brother was just diagnosed with colon cancer at age 26 in 2017. He was told he was a stage 2 and treated with chemotherapy and is in remission.   The patient reports that he has not had a colonoscopy.    The patient reports that he does performs a self testicular exam monthly.. He is not interested in a vasectomy in the near future.  The patient denies a family history of diabetes.    The patient reports that he eats or drinks 2 servings of dairy products per day. He does not take a calcium supplement at all.  The patient reports that he has dental appointments approximately every 6 months.  The patient reports that he  has an eye examination approximately every 3 year(s).    Do you currently smoke? Yes  How many years have you smoked? 12   How many packs per day did you smoke on average? 3/4  (if more than 30 pack year history and the patient is age 55-80 consider ordering an annual low dose radiation lung CT to screen for cancer)  (Do not order if patient has quit more than 15 years ago or has a health condition that limits life expectancy or could not tolerate curative lung surgery)  Are you interested having a lung CT to screen for lung cancer? N/A    If the patient has smoked more that 100 cigarettes, has the patient had an imaging study (US or CT) for an AAA between the ages of 65 and 75? N/A            Patient Active Problem List   Diagnosis     Neuropathy     Moderate major depression (H)     Tobacco abuse     Attention deficit hyperactivity disorder (ADHD), predominantly inattentive type     Primary insomnia     History of MRI of brain and brain stem     Panic disorder without agoraphobia     Abnormal involuntary movement      Tic disorder     normal emg 2017     Anxiety     Panic attack     Posttraumatic stress disorder with dissociative symptoms       Past Surgical History:   Procedure Laterality Date     HC TOOTH EXTRACTION W/FORCEP Bilateral 2003       Family History   Problem Relation Age of Onset     Lipids Father      hyperlipidemia     Hyperlipidemia Father      Obesity Father      Arthritis Mother      Hyperlipidemia Mother      Depression Mother      Anxiety Disorder Mother      MENTAL ILLNESS Mother      Obesity Mother      Asthma Brother      Asthma Sister      Depression Other      Hearing Loss Other      Psychotic Disorder Other      Obesity Other      CEREBROVASCULAR DISEASE Paternal Grandfather      Alzheimer Disease Paternal Grandfather      Depression Brother      MENTAL ILLNESS Brother      Bipolar     Asthma Brother      Depression Sister      Asthma Sister      Substance Abuse Sister      Alcohol     MENTAL ILLNESS Brother      Bipolar     Asthma Brother      Asthma Sister      Obesity Sister      Asthma Brother      Obesity Brother      Obesity Maternal Grandmother      Genetic Disorder Maternal Grandmother      Epilepsy       Social History     Social History     Marital status: Single     Spouse name: N/A     Number of children: 0     Years of education: 14     Occupational History     Assembly/Packaging      Social History Main Topics     Smoking status: Current Every Day Smoker     Packs/day: 0.50     Years: 10.00     Types: Cigarettes     Start date: 1/1/2002     Smokeless tobacco: Current User      Comment: Transdermal patches have helped me the most in the past     Alcohol use 1.2 - 2.4 oz/week     0 Standard drinks or equivalent, 1 - 2 Glasses of wine, 1 - 2 Cans of beer per week      Comment: EVERY 3 MONTHS     Drug use: No     Sexual activity: Yes     Partners: Female     Birth control/ protection: Condom, Pill      Comment: Infrequent     Other Topics Concern     Parent/Sibling W/ Cabg, Mi Or  Angioplasty Before 65f 55m? No     Caffeine Concern Yes     Coffee, Engergy Drinks, 3 cups daily     Social History Narrative    He lives in [facility] [facility] in a [place] - there are 11 people there. He arrives today through [employer] ride    He has 3 brothers and 3 sisters. He has not children. He has never been .      Mother and father are alive    One sister is an alcoholic and bulemic    depression is present in the family : mother, sister and 2 brothers are bipolar.      He denies bipolar. He has depression.    He denies recurring thoughts. He has had substance abuse issues. He has had cravings in the past.    He exercises and plays guitar and draws.      He has used meth as well as prescription pain killers.    He has used marijuana when young. Used cocaine in the past.    Has not used iv drugs    He does not drink alcohol.      50% Russian and is [place]i, english and french and a bit native.    He smokes cigarettes - 1 pack per day.       Current Outpatient Prescriptions   Medication Sig Dispense Refill     tiZANidine (ZANAFLEX) 4 MG tablet Take 1-2 tablets (4-8 mg) by mouth nightly as needed 30 tablet 1     naproxen (NAPROSYN) 500 MG tablet Take 1 tablet (500 mg) by mouth 2 times daily (with meals) 40 tablet 1     gabapentin (NEURONTIN) 100 MG capsule Take 1 capsule (100 mg) by mouth daily 30 capsule 3     DULoxetine (CYMBALTA) 20 MG EC capsule Take 1 capsule by mouth daily  0     amphetamine-dextroamphetamine (ADDERALL XR) 30 MG per 24 hr capsule Take 1 capsule by mouth every morning  0     acetylcysteine (N-ACETYL-L-CYSTEINE) 600 MG CAPS capsule Take 1,800 mg by mouth daily       clonazePAM (KLONOPIN) 0.5 MG tablet Take 0.5-1 mg by mouth nightly as needed   0     amphetamine-dextroamphetamine (ADDERALL) 20 MG tablet Take 20 mg by mouth daily Reported on 3/6/2017  0     zolpidem (AMBIEN) 10 MG tablet Take 1 tablet by mouth nightly as needed for sleep  1   PHYSICAL  "EXAMINATION:  Blood pressure 122/78, pulse 101, temperature 97.1  F (36.2  C), temperature source Oral, height 5' 7.5\" (1.715 m), weight 164 lb (74.4 kg), SpO2 99 %.  General appearance - healthy, alert and no distress  Skin - Skin color, texture, turgor normal. No rashes or lesions.  Head - Normocephalic. No masses, lesions, tenderness or abnormalities  Eyes - conjunctivae/corneas clear. PERRL, EOM's intact. Fundi benign  Ears - External ears normal. Canals clear. TM's normal.  Nose/Sinuses - Nares normal. Septum midline. Mucosa normal. No drainage or sinus tenderness.  Oropharynx - Lips, mucosa, and tongue normal. Teeth and gums normal.  Neck - Neck supple. No adenopathy. Thyroid symmetric, normal size,  Lungs - Percussion normal. Good diaphragmatic excursion. Lungs clear  Heart - PMI normal. No lifts, heaves, or thrills. RRR. No murmurs, clicks gallops or rub  Abdomen - Abdomen soft, non-tender. BS normal. No masses, organomegaly  Extremities - Extremities normal. No deformities, edema, or skin discoloration.  Musculoskeletal - Spine ROM normal. Muscular strength intact.  Peripheral pulses - radial=4/4, femoral=4/4, popliteal=4/4, dorsalis pedis=4/4,  Neuro - Gait normal. Reflexes normal and symmetric. Sensation grossly WNL.  Genitalia - Penis normal. No urethral discharge. Scrotum normal to palpation. No hernia.  Rectal - deferred      Office Visit on 03/30/2016   Component Date Value Ref Range Status     Vitamin B12 03/30/2016 337  193 - 986 pg/mL Final    Interp: 247-911 = Normal     TSH 03/30/2016 1.07  0.40 - 4.00 mU/L Final       ASSESSMENT:    ICD-10-CM    1. Routine general medical examination at a health care facility Z00.00        Well-Adult Physical Exam.  Health Maintenance Due   Topic Date Due     DEPRESSION ACTION PLAN Q1 YR  06/01/2016     PHQ-9 Q6 MONTHS  07/22/2016     JIN QUESTIONNAIRE 1 YEAR  01/22/2017     INFLUENZA VACCINE (SYSTEM ASSIGNED)  09/01/2017     Health Maintenance   Topic Date Due "     DEPRESSION ACTION PLAN Q1 YR  06/01/2016     PHQ-9 Q6 MONTHS  07/22/2016     JIN QUESTIONNAIRE 1 YEAR  01/22/2017     INFLUENZA VACCINE (SYSTEM ASSIGNED)  09/01/2017     TETANUS IMMUNIZATION (SYSTEM ASSIGNED)  02/11/2020         HEALTH CARE MAINTENENCE: The recommended screening tests and vaccinatons for this patient have been discussed as above.  The appropriate tests and vaccinations  have been ordered or declined by the patient. Please see the orders in EPIC.The patient specifically declines: n/a     Immunization Status:  up to date and documented except for Hep A    Patient Active Problem List   Diagnosis     Neuropathy     Moderate major depression (H)     Tobacco abuse     Attention deficit hyperactivity disorder (ADHD), predominantly inattentive type     Primary insomnia     History of MRI of brain and brain stem     Panic disorder without agoraphobia     Abnormal involuntary movement     Tic disorder     normal emg 2017     Anxiety     Panic attack     Posttraumatic stress disorder with dissociative symptoms        ATP III Guidelines  ICSI Preventive Guidelines    PLAN:   Check a fasting lipid profile  Hep A #2 vaccine recommended  Colonoscopy recommended  Testicular self-exam encouraged  Discussed calcium intake, vitamins and supplements. Recommended 1000 mg of calcium daily  Sunscreen use was recommended especially in the area of tatoos  Recommended dental exams every 6 months  Recommended eye exam every 1-2 years  Follow up in 1 year for the next preventative medical visit        Body mass index is 25.31 kg/(m^2).      --------------------------------------------------------------------------------------------------------------------------------------    SUBJECTIVE:  Hoang Kiser is a 32 year old male who presents to the clinic today to discuss options to help him quit smoking. He started smoking at age 20. He reports that he smokes about 3/4 ppd recently. He has tried to quit in the past using  "the following methods:Chantix, Zyban, Nicotine patch and Nicotine gum   He did not have good luck with the Chantix or zyban  He reports that Nicotine patch was the most helpful.  The patient denies a history of seizures. (both buproprion and varenicline can increase seizure risk)      Immunization History   Administered Date(s) Administered     DTAP (<7y) 1985, 1985, 1985, 1985, 02/08/1986, 02/28/1986, 02/28/1986, 04/27/1987, 09/06/1990, 09/06/1995     HEPA 02/11/2010     HepB 10/27/1997, 08/19/1998, 11/20/2000     Influenza Intranasal Vaccine 4 valent 11/01/2017     MMR 04/27/1987, 10/27/1997     Pneumococcal 23 valent 02/05/2016     Poliovirus, inactivated (IPV) 1985, 1985, 02/28/1986, 04/27/1987     Tdap (Adacel,Boostrix) 01/01/2000, 02/11/2010      He has  had a pneumovax.      OBJECTIVE:  Blood pressure 122/78, pulse 101, temperature 97.1  F (36.2  C), temperature source Oral, height 5' 7.5\" (1.715 m), weight 164 lb (74.4 kg), SpO2 99 %.      ASSESSMENT  Tobacco Abuse  Smoking cessation consultation    PLAN:  After we discussed the options and their individual efficacy and risks the patient felt that Nicotine patch was the best choice for him.  We also discussed:  -that weight gain may occur after smoking cessation and that a low fat low calorie diet and regular aerobic exercise can help prevent this.  -encouraging his spouse or significant other to quit at the same time.  Identifying a quit date and informing family and friends of your plans to quit and your quit date.      Screening Questionnaire for Adult Immunization    Are you sick today?   No   Do you have allergies to medications, food, a vaccine component or latex?   yes   Have you ever had a serious reaction after receiving a vaccination?   No   Do you have a long-term health problem with heart disease, lung disease, asthma, kidney disease, metabolic disease (e.g. diabetes), anemia, or other blood disorder?   No   Do " you have cancer, leukemia, HIV/AIDS, or any other immune system problem?   No   In the past 3 months, have you taken medications that affect  your immune system, such as prednisone, other steroids, or anticancer drugs; drugs for the treatment of rheumatoid arthritis, Crohn s disease, or psoriasis; or have you had radiation treatments?   No   Have you had a seizure, or a brain or other nervous system problem?   No   During the past year, have you received a transfusion of blood or blood     products, or been given immune (gamma) globulin or antiviral drug?   No   For women: Are you pregnant or is there a chance you could become        pregnant during the next month?   No   Have you received any vaccinations in the past 4 weeks?   No     Immunization questionnaire was positive for at least one answer.  Notified Dr. Floyd.           Screening performed by Tiny Amaral on 1/24/2018 at 10:46 AM.

## 2018-01-24 NOTE — MR AVS SNAPSHOT
After Visit Summary   1/24/2018    Hoang Kiser    MRN: [id number]           Patient Information     Date Of Birth          1985        Visit Information        Provider Department      1/24/2018 10:00 AM Phill Floyd MD [facility]        Today's Diagnoses     Routine general medical examination at a health care facility    -  1    Family history of colon cancer        Need for hepatitis A vaccination        Tobacco abuse        Screening for diabetes mellitus        CARDIOVASCULAR SCREENING; LDL GOAL LESS THAN 160          Care Instructions      Preventive Health Recommendations  Male Ages 26 - 39    Yearly exam:             See your health care provider every year in order to  o   Review health changes.   o   Discuss preventive care.    o   Review your medicines if your doctor has prescribed any.    You should be tested each year for STDs (sexually transmitted diseases), if you re at risk.     After age 35, talk to your provider about cholesterol testing. If you are at risk for heart disease, have your cholesterol tested at least every 5 years.     If you are at risk for diabetes, you should have a diabetes test (fasting glucose).  Shots: Get a flu shot each year. Get a tetanus shot every 10 years.     Nutrition:    Eat at least 5 servings of fruits and vegetables daily.     Eat whole-grain bread, whole-wheat pasta and brown rice instead of white grains and rice.     Talk to your provider about Calcium and Vitamin D.     Lifestyle    Exercise for at least 150 minutes a week (30 minutes a day, 5 days a week). This will help you control your weight and prevent disease.     Limit alcohol to one drink per day.     No smoking.     Wear sunscreen to prevent skin cancer.     See your dentist every six months for an exam and cleaning.       Please schedule a future laboratory appointment(s) and be sure to have fasted for 10 hours prior to arrival            Preventing  Osteoporosis: Meeting Your Calcium Needs    Your body needs calcium to build and repair bones. But it can't make calcium on its own. That's why it's important to eat calcium-rich foods. Some foods are naturally rich in calcium. Others have calcium added (fortified). It's best to get calcium from the foods you eat. But if you can't get enough, you may want to take calcium supplements. To meet your daily calcium needs, try the foods listed below.  Dairy Fish & beans Other sources      Source   Calcium (mg) per serving   Source   Calcium (mg) per serving   Source   Calcium (mg) per serving      Low-fat yogurt, plain   415 mg/8 oz.   Sardines, Atlantic, canned, with bones   351 mg/3 oz.   Oatmeal, instant, fortified   215 mg/1 cup   Nonfat milk   302 mg/1 cup   [place], sockeye, canned, with bones   239 mg/3 oz.   Tofu made with calcium sulfate   204 mg/3 oz.   Low-fat milk   297 mg/1 cup   Soybeans, fresh, boiled   131 mg/1/2 cup   Collards   179 mg/1/2 cup   Swiss cheese   272 mg/1 oz.   White beans, cooked   81 mg/1/2 cup   English muffin, whole wheat   175 mg/1 muffin   Cheddar cheese   205 mg/1 oz.   Navy beans, cooked   79 mg/1/2 cup   Kale   90 mg/1/2 cup   Ice cream strawberry   79 mg/1/2 cup           Orange, navel   56 mg/1 medium   Note: Calcium levels may vary depending on brand and size.  Daily calcium needs  14-18 years old: 1,300 mg  19-30 years old: 1,000 mg  31-50 years old: 1,000 mg  51-70 years old, women: 1,200 mg  51-70 years old, men: 1,000 mg  Pregnant or nursin-28 years old: 1,300 mg, 19-50 years old: 1,000 mg  Older than 70 (women and men): 1,200 mg   Date Last Reviewed: 10/17/2015    5969-1830 [employer]. [address] [place] [address]. All rights reserved. This information is not intended as a substitute for professional medical care. Always follow your healthcare professional's instructions.                Follow-ups after your visit        Additional Services      GASTROENTEROLOGY ADULT REF PROCEDURE ONLY       Last Lab Result: No results found for: CR  Body mass index is 25.31 kg/(m^2).      Patient will be contacted to schedule procedure.     Please be aware that coverage of these services is subject to the terms and limitations of your health insurance plan.  Call member services at your health plan with any benefit or coverage questions.  Any procedures must be performed at a [facility] facility OR coordinated by your clinic's referral office.    Please bring the following with you to your appointment:    (1) Any X-Rays, CTs or MRIs which have been performed.  Contact the facility where they were done to arrange for  prior to your scheduled appointment.    (2) List of current medications   (3) This referral request   (4) Any documents/labs given to you for this referral                  Follow-up notes from your care team     Return in about 1 year (around 1/24/2019) for Physical Exam.      Your next 10 appointments already scheduled     Mar 01, 2018 12:30 PM CST   Walk In From ENT with Hugo Galvez   [facility] ([facility]    [address] [place] [id number] [phone]            Mar 01, 2018  3:00 PM CST   (Arrive by 2:45 PM)   New Patient Visit with Chuck Corona MD   [facility] ([facility] [place]    [address] [place] [id number] [phone]              Future tests that were ordered for you today     Open Future Orders        Priority Expected Expires Ordered    Lipid panel reflex to direct LDL Fasting Routine  4/24/2018 1/24/2018    Comprehensive metabolic panel Routine  4/24/2018 1/24/2018            Who to contact     If you have questions or need follow up information about today's clinic visit or your schedule please contact [facility] directly at [phone].  Normal or non-critical lab and  "imaging results will be communicated to you by MyChart, letter or phone within 4 business days after the clinic has received the results. If you do not hear from us within 7 days, please contact the clinic through [employer] or phone. If you have a critical or abnormal lab result, we will notify you by phone as soon as possible.  Submit refill requests through [employer] or call your pharmacy and they will forward the refill request to us. Please allow 3 business days for your refill to be completed.          Additional Information About Your Visit        [employer] Information     [employer] gives you secure access to your electronic health record. If you see a primary care provider, you can also send messages to your care team and make appointments. If you have questions, please call your primary care clinic.  If you do not have a primary care provider, please call [phone] and they will assist you.        Care EveryWhere ID     This is your Care EveryWhere ID. This could be used by other organizations to access your [facility] medical records  [id number]        Your Vitals Were     Pulse Temperature Height Pulse Oximetry BMI (Body Mass Index)       101 97.1  F (36.2  C) (Oral) 5' 7.5\" (1.715 m) 99% 25.31 kg/m2        Blood Pressure from Last 3 Encounters:   01/24/18 122/78   12/21/17 114/75   10/31/17 116/79    Weight from Last 3 Encounters:   01/24/18 164 lb (74.4 kg)   12/21/17 162 lb (73.5 kg)   11/27/17 162 lb (73.5 kg)              We Performed the Following     GASTROENTEROLOGY ADULT REF PROCEDURE ONLY     HEPATITIS A VACCINE (ADULT)     VACCINE ADMINISTRATION, INITIAL          Today's Medication Changes          These changes are accurate as of 1/24/18 10:46 AM.  If you have any questions, ask your nurse or doctor.               Start taking these medicines.        Dose/Directions    * nicotine 14 MG/24HR 24 hr patch   Commonly known as:  NICODERM CQ   Used for:  Tobacco abuse   Started by:  Phill Floyd MD "        Dose:  1 patch   Place 1 patch onto the skin every 24 hours Use this patch first   Quantity:  30 patch   Refills:  0       * nicotine 7 MG/24HR 24 hr patch   Commonly known as:  NICODERM CQ   Used for:  Tobacco abuse   Started by:  Phill Floyd MD        Dose:  1 patch   Place 1 patch onto the skin every 24 hours   Quantity:  30 patch   Refills:  0       * Notice:  This list has 2 medication(s) that are the same as other medications prescribed for you. Read the directions carefully, and ask your doctor or other care provider to review them with you.         Where to get your medicines      These medications were sent to [id number] - [place], [place] - [facility] [facility] [address], [facility] [address]     Phone:  [phone]     nicotine 14 MG/24HR 24 hr patch    nicotine 7 MG/24HR 24 hr patch                Primary Care Provider Office Phone # Fax #    Phill Floyd [phone] [address] [facility] [address]        Equal Access to Services     [facility] AH: Hadii aad ku hadasho Soomaali, waaxda luqadaha, qaybta kaalmada adeegyada, waxay idiin hayaan adegunner lorenzana . So [facility] [phone].    ATENCIÓN: Si habla español, tiene a cutler disposición servicios gratuitos de asistencia lingüística. Llame al [phone].    We comply with applicable federal civil rights laws and [place] laws. We do not discriminate on the basis of race, color, national origin, age, disability, sex, sexual orientation, or gender identity.            Thank you!     Thank you for choosing [facility]  for your care. Our goal is always to provide you with excellent care. Hearing back from our patients is one way we can continue to improve our services. Please take a few minutes to complete the written survey that you may receive in the mail after your visit with us. Thank you!             Your Updated Medication List - Protect  others around you: Learn how to safely use, store and throw away your medicines at www.disposemymeds.org.          This list is accurate as of 1/24/18 10:46 AM.  Always use your most recent med list.                   Brand Name Dispense Instructions for use Diagnosis    acetylcysteine 600 MG Caps capsule    N-ACETYL CYSTEINE     Take 1,800 mg by mouth daily        * amphetamine-dextroamphetamine 20 MG per tablet    ADDERALL     Take 20 mg by mouth daily Reported on 3/6/2017        * amphetamine-dextroamphetamine 30 MG per 24 hr capsule    ADDERALL XR     Take 1 capsule by mouth every morning        clonazePAM 0.5 MG tablet    klonoPIN     Take 0.5-1 mg by mouth nightly as needed        DULoxetine 20 MG EC capsule    CYMBALTA     Take 1 capsule by mouth daily        gabapentin 100 MG capsule    NEURONTIN    30 capsule    Take 1 capsule (100 mg) by mouth daily    Lumbar radicular pain, Lumbar paraspinal muscle spasm       naproxen 500 MG tablet    NAPROSYN    40 tablet    Take 1 tablet (500 mg) by mouth 2 times daily (with meals)    Lumbar radicular pain, Lumbar paraspinal muscle spasm       * nicotine 14 MG/24HR 24 hr patch    NICODERM CQ    30 patch    Place 1 patch onto the skin every 24 hours Use this patch first    Tobacco abuse       * nicotine 7 MG/24HR 24 hr patch    NICODERM CQ    30 patch    Place 1 patch onto the skin every 24 hours    Tobacco abuse       tiZANidine 4 MG tablet    ZANAFLEX    30 tablet    Take 1-2 tablets (4-8 mg) by mouth nightly as needed    Lumbar radicular pain, Lumbar paraspinal muscle spasm       zolpidem 10 MG tablet    AMBIEN     Take 1 tablet by mouth nightly as needed for sleep        * Notice:  This list has 4 medication(s) that are the same as other medications prescribed for you. Read the directions carefully, and ask your doctor or other care provider to review them with you.

## 2018-01-24 NOTE — PATIENT INSTRUCTIONS
Preventive Health Recommendations  Male Ages 26 - 39    Yearly exam:             See your health care provider every year in order to  o   Review health changes.   o   Discuss preventive care.    o   Review your medicines if your doctor has prescribed any.    You should be tested each year for STDs (sexually transmitted diseases), if you re at risk.     After age 35, talk to your provider about cholesterol testing. If you are at risk for heart disease, have your cholesterol tested at least every 5 years.     If you are at risk for diabetes, you should have a diabetes test (fasting glucose).  Shots: Get a flu shot each year. Get a tetanus shot every 10 years.     Nutrition:    Eat at least 5 servings of fruits and vegetables daily.     Eat whole-grain bread, whole-wheat pasta and brown rice instead of white grains and rice.     Talk to your provider about Calcium and Vitamin D.     Lifestyle    Exercise for at least 150 minutes a week (30 minutes a day, 5 days a week). This will help you control your weight and prevent disease.     Limit alcohol to one drink per day.     No smoking.     Wear sunscreen to prevent skin cancer.     See your dentist every six months for an exam and cleaning.       Please schedule a future laboratory appointment(s) and be sure to have fasted for 10 hours prior to arrival            Preventing Osteoporosis: Meeting Your Calcium Needs    Your body needs calcium to build and repair bones. But it can't make calcium on its own. That's why it's important to eat calcium-rich foods. Some foods are naturally rich in calcium. Others have calcium added (fortified). It's best to get calcium from the foods you eat. But if you can't get enough, you may want to take calcium supplements. To meet your daily calcium needs, try the foods listed below.  Dairy Fish & beans Other sources      Source   Calcium (mg) per serving   Source   Calcium (mg) per serving   Source   Calcium (mg) per serving       Low-fat yogurt, plain   415 mg/8 oz.   Sardines, Atlantic, canned, with bones   351 mg/3 oz.   Oatmeal, instant, fortified   215 mg/1 cup   Nonfat milk   302 mg/1 cup   [place], sockeye, canned, with bones   239 mg/3 oz.   Tofu made with calcium sulfate   204 mg/3 oz.   Low-fat milk   297 mg/1 cup   Soybeans, fresh, boiled   131 mg/1/2 cup   Collards   179 mg/1/2 cup   Swiss cheese   272 mg/1 oz.   White beans, cooked   81 mg/1/2 cup   English muffin, whole wheat   175 mg/1 muffin   Cheddar cheese   205 mg/1 oz.   Navy beans, cooked   79 mg/1/2 cup   Kale   90 mg/1/2 cup   Ice cream strawberry   79 mg/1/2 cup           Orange, navel   56 mg/1 medium   Note: Calcium levels may vary depending on brand and size.  Daily calcium needs  14-18 years old: 1,300 mg  19-30 years old: 1,000 mg  31-50 years old: 1,000 mg  51-70 years old, women: 1,200 mg  51-70 years old, men: 1,000 mg  Pregnant or nursin-28 years old: 1,300 mg, 19-50 years old: 1,000 mg  Older than 70 (women and men): 1,200 mg   Date Last Reviewed: 10/17/2015    1685-5951 [employer]. [address] [place] [address]. All rights reserved. This information is not intended as a substitute for professional medical care. Always follow your healthcare professional's instructions.

## 2018-01-25 ENCOUNTER — TELEPHONE (OUTPATIENT)
Dept: SURGERY | Facility: CLINIC | Age: 33
End: 2018-01-25

## 2018-01-25 ENCOUNTER — MYC MEDICAL ADVICE (OUTPATIENT)
Dept: FAMILY MEDICINE | Facility: CLINIC | Age: 33
End: 2018-01-25

## 2018-01-25 DIAGNOSIS — R74.8 ELEVATED LIVER ENZYMES: Primary | ICD-10-CM

## 2018-01-25 DIAGNOSIS — Z13.1 SCREENING FOR DIABETES MELLITUS: ICD-10-CM

## 2018-01-25 DIAGNOSIS — Z00.00 ROUTINE GENERAL MEDICAL EXAMINATION AT A HEALTH CARE FACILITY: ICD-10-CM

## 2018-01-25 DIAGNOSIS — Z13.6 CARDIOVASCULAR SCREENING; LDL GOAL LESS THAN 160: ICD-10-CM

## 2018-01-25 LAB
ALBUMIN SERPL-MCNC: 3.9 G/DL (ref 3.4–5)
ALP SERPL-CCNC: 57 U/L (ref 40–150)
ALT SERPL W P-5'-P-CCNC: 119 U/L (ref 0–70)
ANION GAP SERPL CALCULATED.3IONS-SCNC: 7 MMOL/L (ref 3–14)
AST SERPL W P-5'-P-CCNC: 234 U/L (ref 0–45)
BILIRUB SERPL-MCNC: 0.5 MG/DL (ref 0.2–1.3)
BUN SERPL-MCNC: 11 MG/DL (ref 7–30)
CALCIUM SERPL-MCNC: 9.2 MG/DL (ref 8.5–10.1)
CHLORIDE SERPL-SCNC: 105 MMOL/L (ref 94–109)
CHOLEST SERPL-MCNC: 176 MG/DL
CO2 SERPL-SCNC: 27 MMOL/L (ref 20–32)
CREAT SERPL-MCNC: 0.92 MG/DL (ref 0.66–1.25)
GFR SERPL CREATININE-BSD FRML MDRD: >90 ML/MIN/1.7M2
GLUCOSE SERPL-MCNC: 98 MG/DL (ref 70–99)
HDLC SERPL-MCNC: 49 MG/DL
LDLC SERPL CALC-MCNC: 105 MG/DL
NONHDLC SERPL-MCNC: 127 MG/DL
POTASSIUM SERPL-SCNC: 4.3 MMOL/L (ref 3.4–5.3)
PROT SERPL-MCNC: 7.2 G/DL (ref 6.8–8.8)
SODIUM SERPL-SCNC: 139 MMOL/L (ref 133–144)
TRIGL SERPL-MCNC: 110 MG/DL

## 2018-01-25 PROCEDURE — 80061 LIPID PANEL: CPT | Performed by: FAMILY MEDICINE

## 2018-01-25 PROCEDURE — 80053 COMPREHEN METABOLIC PANEL: CPT | Performed by: FAMILY MEDICINE

## 2018-01-25 PROCEDURE — 36415 COLL VENOUS BLD VENIPUNCTURE: CPT | Performed by: FAMILY MEDICINE

## 2018-01-25 NOTE — TELEPHONE ENCOUNTER
Reason for Call:  Other call back    Detailed comments: Pt has a referral for a colonoscopy due to family history. Pt would like to ask questions about the sedation before scheduling the procedure. Please call pt to discuss.    Phone Number Patient can be reached at: Home number on file [phone] (home)    Best Time: any    Can we leave a detailed message on this number? YES    Call taken on 1/25/2018 at 2:44 PM by Coral Perez

## 2018-01-26 NOTE — TELEPHONE ENCOUNTER
Dear Hoang,   I have received the results of your blood test and your cholesterol looked good. Your liver tests were slightly abnormal. The results can be found in this letter. You were found to have an elevated ALT and/or AST. These are enzymes produced in the liver. When the liver cells are being damaged for any reason they leak this enzyme into the blood at a faster than normal rate and their level in the blood increases. This is a common problem so I don't want you to be alarmed but we do need to do further testing.   I want you to completely abstain from alcohol for 3 weeks. I also want you to not take any acetominophen(Tylenol) or any non steroidal anti-inflammatory drugs like ibuprofen (Advil and Motrin) or naproxen (Aleve and Naprosyn) during this time. I would like you to make a lab appointment at the end of the 3 weeks and then have a follow up clinic appointment with me 1 week later.   Please also let me know if you have ever had this problem noticed before by other doctors as this is the first time we have ever checked these blood tests.     Sincerely,   Phill Floyd

## 2018-01-26 NOTE — TELEPHONE ENCOUNTER
Per protocol, will route encounter to be cosigned by provider for Verbal Orders.  Dayanara Boyer RN

## 2018-01-28 RX ORDER — GABAPENTIN 100 MG/1
100 CAPSULE ORAL DAILY
Qty: 30 CAPSULE | Refills: 3 | Status: SHIPPED | OUTPATIENT
Start: 2018-01-28 | End: 2018-02-16

## 2018-01-29 RX ORDER — NAPROXEN 500 MG/1
500 TABLET ORAL 2 TIMES DAILY WITH MEALS
Qty: 40 TABLET | Refills: 1 | Status: SHIPPED | OUTPATIENT
Start: 2018-01-29 | End: 2018-04-19 | Stop reason: SINTOL

## 2018-01-29 NOTE — TELEPHONE ENCOUNTER
I have discussed this patient's issue with the RN involved and we have come up with this plan.  Phill Floyd MD

## 2018-01-31 ENCOUNTER — MYC MEDICAL ADVICE (OUTPATIENT)
Dept: ORTHOPEDICS | Facility: CLINIC | Age: 33
End: 2018-01-31

## 2018-02-01 ENCOUNTER — TELEPHONE (OUTPATIENT)
Dept: PALLIATIVE MEDICINE | Facility: CLINIC | Age: 33
End: 2018-02-01

## 2018-02-01 ENCOUNTER — OFFICE VISIT (OUTPATIENT)
Dept: ORTHOPEDICS | Facility: CLINIC | Age: 33
End: 2018-02-01
Payer: COMMERCIAL

## 2018-02-01 VITALS
HEIGHT: 68 IN | BODY MASS INDEX: 24.86 KG/M2 | SYSTOLIC BLOOD PRESSURE: 126 MMHG | HEART RATE: 92 BPM | WEIGHT: 164 LBS | DIASTOLIC BLOOD PRESSURE: 75 MMHG

## 2018-02-01 DIAGNOSIS — M62.830 LUMBAR PARASPINAL MUSCLE SPASM: ICD-10-CM

## 2018-02-01 DIAGNOSIS — M25.551 BILATERAL HIP PAIN: ICD-10-CM

## 2018-02-01 DIAGNOSIS — M54.16 LUMBAR RADICULAR PAIN: Primary | ICD-10-CM

## 2018-02-01 DIAGNOSIS — M25.552 BILATERAL HIP PAIN: ICD-10-CM

## 2018-02-01 PROCEDURE — 99214 OFFICE O/P EST MOD 30 MIN: CPT | Performed by: PREVENTIVE MEDICINE

## 2018-02-01 ASSESSMENT — PAIN SCALES - GENERAL: PAINLEVEL: MODERATE PAIN (4)

## 2018-02-01 NOTE — LETTER
February 1, 2018    Hoang Kiser  [address] [facility] [address]    Dear Hoang,                                                                   Welcome to the [facility] at the [facility] [place]. We are located on the 6th floor, [address], of the [facility] located at [address] [place] [address]. For general parking, the [facility] [address] is the closest to our building.     Your appointment at the [facility] has been scheduled on Wednesday February 28th, 2018 at 1:00 pm with Tamiko Stevens NP.    At your first visit, you will meet your team of caregivers who will help you to develop pain management strategies that will last a lifetime. You will meet with our support staff to validate parking at a reduced rate, review your insurance information, and collect your co-payment if required by your insurance company. You will also meet with a medical pain specialist and care coordinator who will assess your pain and develop a plan of care for your successful pain rehabilitation. You should expect to spend 1-2 hours at your first visit with us. Usually, patients work with us for a period of 6-12 months, and eventually return to their primary doctor once their pain management has stabilized.      To help us make your visit go as smoothly as possible, please bring the following items with you on your visit:   Completed Pain Questionnaire enclosed in this packet.  If you do not bring the completed questionnaire, we may have to reschedule your appointment.  List of any medicines that you are currently taking or have been prescribed  Important NON-[facility] medical information such as medical records or tests results (X-rays, or laboratory tests)  Your health insurance card  Financial resources to cover your co-payment or balance due at the time of service (cash, personal check, Visa, and  MasterCard are acceptable methods of payment)     Due to the demand for new patient evaluations, you must notify the scheduling department 48 hours in advance if you are not able to keep this appointment.  Failure to do so could affect your ability to reschedule with our clinic. Please do not assume that you will  receive any prescription medications at your first visit.    Please call [phone] with any questions regarding your appointment.  We look forward to meeting you and working to address your health care needs.       Sincerely,        [facility]

## 2018-02-01 NOTE — MR AVS SNAPSHOT
After Visit Summary   2/1/2018    Hoang Kiser    MRN: [id number]           Patient Information     Date Of Birth          1985        Visit Information        Provider Department      2/1/2018 8:00 AM Faustino Feldman MD [facility]        Today's Diagnoses     Lumbar radicular pain    -  1    Lumbar paraspinal muscle spasm           Follow-ups after your visit        Additional Services     PAIN MANAGEMENT REFERRAL       Your provider [place] [facility] [facility] -    Reason for Referral: Evaluation for comprehensive services- patients will be evaluated if appropriate for comprehensive service including medication changes, procedures, pain psychology, and pain physical therapy.  While involved with comprehensive services, pain providers will work with referring provider/PCP to stabilize appropriate medication management, with long-term plan of transition of prescribing back to referring provider/PCP upon completion of comprehensive services.      Please complete the following questions:    Do you have any specific questions for the pain specialist? No    Are there any red flags that may impact the assessment or management of the patient?       What is your diagnosis for the patient's pain? Lumbar radicular pain      For any questions, contact the [facility] at [phone].     **ANY DIAGNOSTIC TESTS THAT ARE NOT IN EPIC SHOULD BE SENT TO THE PAIN CENTER**    REGARDING OPIOID MEDICATIONS:  The discussion of opioids management, appropriateness of therapy, and dosing will be discussed in patients being seen for evaluation.  The pain management clinics are not long-term prescribing clinics, with transition of prescribing of medications ultimately going back to the referring provider/PCP.  If prescribing is taken over at the pain clinic, it is in actively involved patients whom are appropriate for opioids, urine drug  screening is completed, and long-term prescribing plan has been determined.  Therefore, we will not be automatically taking over prescribing at the patient's first visit.  Is this agreeable to you? agrees.     Please be aware that coverage of these services is subject to the terms and limitations of your health insurance plan.  Call member services at your health plan with any benefit or coverage questions.      Please bring the following with you to your appointment:    (1) Any X-Rays, CTs or MRIs which have been performed.  Contact the facility where they were done to arrange for  prior to your scheduled appointment.    (2) List of current medications   (3) This referral request   (4) Any documents/labs given to you for this referral                  Your next 10 appointments already scheduled     Feb 15, 2018   Procedure with Liam Kincaid MD   [facility] (--)    [address] [facility] [id number] [phone]            Mar 01, 2018 12:30 PM CST   Walk In From ENT with Hugo Galvez   [facility] ([facility])    [address] [place] [id number] [phone]            Mar 01, 2018  3:00 PM CST   (Arrive by 2:45 PM)   New Patient Visit with Chuck Corona MD   [facility] ([facility])    [address] [place] [id number] [phone]              Who to contact     If you have questions or need follow up information about today's clinic visit or your schedule please contact [facility] directly at [phone].  Normal or non-critical lab and imaging results will be communicated to you by MyChart, letter or phone within 4 business days after the clinic has received the results. If you do not hear from us within 7 days, please contact the clinic through MyChart or phone. If you have a critical or abnormal lab  "result, we will notify you by phone as soon as possible.  Submit refill requests through [facility] or call your pharmacy and they will forward the refill request to us. Please allow 3 business days for your refill to be completed.          Additional Information About Your Visit        [email or web address] Information     [facility] gives you secure access to your electronic health record. If you see a primary care provider, you can also send messages to your care team and make appointments. If you have questions, please call your primary care clinic.  If you do not have a primary care provider, please call [phone] and they will assist you.      [facility] is an electronic gateway that provides easy, online access to your medical records. With [facility], you can request a clinic appointment, read your test results, renew a prescription or communicate with your care team.     To access your existing account, please contact your [facility] or call [phone] for assistance.        Care EveryWhere ID     This is your Care EveryWhere ID. This could be used by other organizations to access your [facility] [place] medical records  [id number]        Your Vitals Were     Pulse Height BMI (Body Mass Index)             92 1.715 m (5' 7.5\") 25.31 kg/m2          Blood Pressure from Last 3 Encounters:   02/01/18 126/75   01/24/18 122/78   12/21/17 114/75    Weight from Last 3 Encounters:   02/01/18 74.4 kg (164 lb)   01/24/18 74.4 kg (164 lb)   12/21/17 73.5 kg (162 lb)              We Performed the Following     PAIN MANAGEMENT REFERRAL        Primary Care Provider Office Phone # Fax #    Phill Floyd [phone] [address] [address] [facility] [address]        Equal Access to Services     [facility] : Hadii rosemary Armenta, ishan rinaldi, qaarnav barrett. So [facility] [phone].    ATENCIÓN: Si habla español, tiene a cutler disposición " servicios gratuitos de asistencia lingüística. Rodríguez oscar [phone].    We comply with applicable federal civil rights laws and [place] laws. We do not discriminate on the basis of race, color, national origin, age, disability, sex, sexual orientation, or gender identity.            Thank you!     Thank you for choosing [facility]  for your care. Our goal is always to provide you with excellent care. Hearing back from our patients is one way we can continue to improve our services. Please take a few minutes to complete the written survey that you may receive in the mail after your visit with us. Thank you!             Your Updated Medication List - Protect others around you: Learn how to safely use, store and throw away your medicines at www.disposemymeds.org.          This list is accurate as of 2/1/18  8:36 AM.  Always use your most recent med list.                   Brand Name Dispense Instructions for use Diagnosis    acetylcysteine 600 MG Caps capsule    N-ACETYL CYSTEINE     Take 1,800 mg by mouth daily        * amphetamine-dextroamphetamine 20 MG per tablet    ADDERALL     Take 20 mg by mouth daily Reported on 3/6/2017        * amphetamine-dextroamphetamine 30 MG per 24 hr capsule    ADDERALL XR     Take 1 capsule by mouth every morning        clonazePAM 0.5 MG tablet    klonoPIN     Take 0.5-1 mg by mouth nightly as needed        DULoxetine 20 MG EC capsule    CYMBALTA     Take 1 capsule by mouth daily        gabapentin 100 MG capsule    NEURONTIN    30 capsule    Take 1 capsule (100 mg) by mouth daily    Lumbar radicular pain, Lumbar paraspinal muscle spasm       naproxen 500 MG tablet    NAPROSYN    40 tablet    Take 1 tablet (500 mg) by mouth 2 times daily (with meals)    Lumbar radicular pain, Lumbar paraspinal muscle spasm       * nicotine 14 MG/24HR 24 hr patch    NICODERM CQ    30 patch    Place 1 patch onto the skin every 24 hours Use this patch first    Tobacco abuse       *  nicotine 7 MG/24HR 24 hr patch    NICODERM CQ    30 patch    Place 1 patch onto the skin every 24 hours    Tobacco abuse       tiZANidine 4 MG tablet    ZANAFLEX    30 tablet    Take 1-2 tablets (4-8 mg) by mouth nightly as needed    Lumbar radicular pain, Lumbar paraspinal muscle spasm       zolpidem 10 MG tablet    AMBIEN     Take 1 tablet by mouth nightly as needed for sleep        * Notice:  This list has 4 medication(s) that are the same as other medications prescribed for you. Read the directions carefully, and ask your doctor or other care provider to review them with you.

## 2018-02-01 NOTE — TELEPHONE ENCOUNTER
Pain Management Center Referral      1. Confirmed address with patient? Yes  2. Confirmed phone number with patient? Yes  3. Confirmed referring provider? Yes  4. Is the PCP the same as the referring provider? No  5. Has the patient been to any previous pain clinics? No  (If yes, send [facility] with welcome letter)  6. Which insurance are we to bill for this appointment?  Med[employer] and Medicaid- pt will be switching to [employer]    7. Informed pt of cancellation (48 hour) policy? Yes    REGARDING OPIOID MEDICATIONS: We will always address appropriateness of opioid pain medications, but we generally will not automatically take on a prescribing role. When we do take on prescribing of opioids for chronic pain, it is in collaboration with the referring physician for an intermediate period of time (months), with an expectation that the primary physician or provider will assume the prescribing role if medications are effective at stable doses with demonstrated compliance. Therefore, please do not assume that your prescribing responsibilities end on the day of pain clinic consultation.  7. Informed pt of prescribing policy? Yes      8. Referring Provider: Faustino Feldman    9. Criteria for Triage Eval:   -Missed/Failed 1st DUAL appointment? N/A   -Medication Focused? N/A   -Mental Health Concerns? (e.g. Recent psych hospitalization/snap shot)? N/A   -Active substance abuse? N/A   -Patient behaviors (e.g. Offensive language/raised voice)? N/A    Ann ORANTES    [facility]

## 2018-02-01 NOTE — LETTER
2/1/2018         RE: Hoang Kiser  [address] [facility] [address] [facility] [address]        Dear Colleague,    Thank you for referring your patient, Hoang Kiser, to the [facility]. Please see a copy of my visit note below.    HISTORY OF PRESENT ILLNESS  Mr. Kiser is a pleasant 32 year old year old male who presents to clinic today with ongoing low back and hip pain. He states that he has been 'going to the gym' and trying the elliptical machine and his low back has felt a little better when he does this, he states that he gets low back and bilateral hip pain if he sits or stands too long, he is not going to PT at this time and recently came to a settlement with his previous job, he is not currently working.     Taking gabapentin 100mg at nighttime  Still taking cymbalta  Hoang explains that he is still in pain   Location: low back and hip  Quality:  achy pain    Severity: 5/10    MEDICAL HISTORY  Patient Active Problem List   Diagnosis     Neuropathy     Moderate major depression (H)     Tobacco abuse     Attention deficit hyperactivity disorder (ADHD), predominantly inattentive type     Primary insomnia     History of MRI of brain and brain stem     Panic disorder without agoraphobia     Abnormal involuntary movement     Tic disorder     normal emg 2017     Anxiety     Panic attack     Posttraumatic stress disorder with dissociative symptoms       Current Outpatient Prescriptions   Medication Sig Dispense Refill     naproxen (NAPROSYN) 500 MG tablet Take 1 tablet (500 mg) by mouth 2 times daily (with meals) 40 tablet 1     gabapentin (NEURONTIN) 100 MG capsule Take 1 capsule (100 mg) by mouth daily 30 capsule 3     tiZANidine (ZANAFLEX) 4 MG tablet Take 1-2 tablets (4-8 mg) by mouth nightly as needed 30 tablet 1     nicotine (NICODERM CQ) 14 MG/24HR 24 hr patch Place 1 patch onto the skin every 24 hours Use this patch first 30 patch 0     nicotine (NICODERM CQ) 7 MG/24HR 24 hr patch  Place 1 patch onto the skin every 24 hours 30 patch 0     zolpidem (AMBIEN) 10 MG tablet Take 1 tablet by mouth nightly as needed for sleep  1     DULoxetine (CYMBALTA) 20 MG EC capsule Take 1 capsule by mouth daily  0     amphetamine-dextroamphetamine (ADDERALL XR) 30 MG per 24 hr capsule Take 1 capsule by mouth every morning  0     acetylcysteine (N-ACETYL-L-CYSTEINE) 600 MG CAPS capsule Take 1,800 mg by mouth daily       clonazePAM (KLONOPIN) 0.5 MG tablet Take 0.5-1 mg by mouth nightly as needed   0     amphetamine-dextroamphetamine (ADDERALL) 20 MG tablet Take 20 mg by mouth daily Reported on 3/6/2017  0       Allergies   Allergen Reactions     Buspirone Hcl Other (See Comments)     Panic attacks     Doxycycline Diarrhea, Fatigue, GI Disturbance and Nausea     Trazodone Fatigue     Keflex [Cephalexin] Diarrhea       Family History   Problem Relation Age of Onset     Lipids Father      hyperlipidemia     Hyperlipidemia Father      Obesity Father      Arthritis Mother      Hyperlipidemia Mother      Depression Mother      Anxiety Disorder Mother      MENTAL ILLNESS Mother      Obesity Mother      Asthma Brother      Asthma Sister      Depression Other      Hearing Loss Other      Psychotic Disorder Other      Obesity Other      CEREBROVASCULAR DISEASE Paternal Grandfather      Alzheimer Disease Paternal Grandfather      Depression Brother      MENTAL ILLNESS Brother      Bipolar     Asthma Brother      Depression Sister      Asthma Sister      Substance Abuse Sister      Alcohol     MENTAL ILLNESS Brother      Bipolar     Asthma Brother      Asthma Sister      Obesity Sister      Asthma Brother      Obesity Brother      Obesity Maternal Grandmother      Genetic Disorder Maternal Grandmother      Epilepsy       Additional medical/Social/Surgical histories reviewed in [facility] and updated as appropriate.     REVIEW OF SYSTEMS (2/1/2018)  10 point ROS of systems including Constitutional, Eyes, Respiratory,  "Cardiovascular, Gastroenterology, Genitourinary, Integumentary, Musculoskeletal, Psychiatric were all negative except for pertinent positives noted in my HPI.     PHYSICAL EXAM  Vitals:    02/01/18 0753   BP: 126/75   Pulse: 92   Weight: 74.4 kg (164 lb)   Height: 1.715 m (5' 7.5\")     Vital Signs: /75  Pulse 92  Ht 1.715 m (5' 7.5\")  Wt 74.4 kg (164 lb)  BMI 25.31 kg/m2 Patient declined being weighed. Body mass index is 25.31 kg/(m^2).    General  - normal appearance, in no obvious distress  CV  - normal peripheral perfusion  Pulm  - normal respiratory pattern, non-labored  Musculoskeletal - lumbar spine and hips  - stance: normal gait without limp, no obvious leg length discrepancy, normal heel and toe walk  - inspection: normal bone and joint alignment, no obvious scoliosis  - palpation: no paravertebral or bony tenderness, except he states over his lower lumbar muscles and over bilateral lateral hips  - ROM: flexion exacerbates pain in low back and hips, normal extension, sidebending, rotation  No pain with internal rotation of hips, has some pain with external rotation of hips in his low back  - strength: lower extremities 5/5 in all planes  - special tests:  (+) straight leg raise- states that he has low back pain with leg extension, no radation into legs  (+) slump test- states that he has low back pain and hip pain  Neuro  - patellar and Achilles DTRs 2+ bilaterally, NO lower extremity sensory deficit throughout L5 distribution, grossly normal coordination, normal muscle tone  Skin  - no ecchymosis, erythema, warmth, or induration, no obvious rash  Psych  - interactive, appropriate, normal mood and affect    ASSESSMENT & PLAN  31 yo male with lumbar pain and radicular pain into hips from lumbar spasms and buldging discs  -patient has had a lumbar epidural in the past, and would like to reconsider it, and will refer him to pain clinic for further evaluation and management  Reviewed his medications, " and will d/c gabapentin and naproxen at this time due to his elevated liver enzymes  Cont. HEP  He will f/u with me after seeing pain clinic    Faustino Feldman MD, CAM      Again, thank you for allowing me to participate in the care of your patient.        Sincerely,        Faustino Feldman MD

## 2018-02-01 NOTE — PROGRESS NOTES
HISTORY OF PRESENT ILLNESS  Mr. Kiser is a pleasant 32 year old year old male who presents to clinic today with ongoing low back and hip pain. He states that he has been 'going to the gym' and trying the elliptical machine and his low back has felt a little better when he does this, he states that he gets low back and bilateral hip pain if he sits or stands too long, he is not going to PT at this time and recently came to a settlement with his previous job, he is not currently working.     Taking gabapentin 100mg at nighttime  Still taking cymbalta  Hoang explains that he is still in pain   Location: low back and hip  Quality:  achy pain    Severity: 5/10    MEDICAL HISTORY  Patient Active Problem List   Diagnosis     Neuropathy     Moderate major depression (H)     Tobacco abuse     Attention deficit hyperactivity disorder (ADHD), predominantly inattentive type     Primary insomnia     History of MRI of brain and brain stem     Panic disorder without agoraphobia     Abnormal involuntary movement     Tic disorder     normal emg 2017     Anxiety     Panic attack     Posttraumatic stress disorder with dissociative symptoms       Current Outpatient Prescriptions   Medication Sig Dispense Refill     naproxen (NAPROSYN) 500 MG tablet Take 1 tablet (500 mg) by mouth 2 times daily (with meals) 40 tablet 1     gabapentin (NEURONTIN) 100 MG capsule Take 1 capsule (100 mg) by mouth daily 30 capsule 3     tiZANidine (ZANAFLEX) 4 MG tablet Take 1-2 tablets (4-8 mg) by mouth nightly as needed 30 tablet 1     nicotine (NICODERM CQ) 14 MG/24HR 24 hr patch Place 1 patch onto the skin every 24 hours Use this patch first 30 patch 0     nicotine (NICODERM CQ) 7 MG/24HR 24 hr patch Place 1 patch onto the skin every 24 hours 30 patch 0     zolpidem (AMBIEN) 10 MG tablet Take 1 tablet by mouth nightly as needed for sleep  1     DULoxetine (CYMBALTA) 20 MG EC capsule Take 1 capsule by mouth daily  0     amphetamine-dextroamphetamine  (ADDERALL XR) 30 MG per 24 hr capsule Take 1 capsule by mouth every morning  0     acetylcysteine (N-ACETYL-L-CYSTEINE) 600 MG CAPS capsule Take 1,800 mg by mouth daily       clonazePAM (KLONOPIN) 0.5 MG tablet Take 0.5-1 mg by mouth nightly as needed   0     amphetamine-dextroamphetamine (ADDERALL) 20 MG tablet Take 20 mg by mouth daily Reported on 3/6/2017  0       Allergies   Allergen Reactions     Buspirone Hcl Other (See Comments)     Panic attacks     Doxycycline Diarrhea, Fatigue, GI Disturbance and Nausea     Trazodone Fatigue     Keflex [Cephalexin] Diarrhea       Family History   Problem Relation Age of Onset     Lipids Father      hyperlipidemia     Hyperlipidemia Father      Obesity Father      Arthritis Mother      Hyperlipidemia Mother      Depression Mother      Anxiety Disorder Mother      MENTAL ILLNESS Mother      Obesity Mother      Asthma Brother      Asthma Sister      Depression Other      Hearing Loss Other      Psychotic Disorder Other      Obesity Other      CEREBROVASCULAR DISEASE Paternal Grandfather      Alzheimer Disease Paternal Grandfather      Depression Brother      MENTAL ILLNESS Brother      Bipolar     Asthma Brother      Depression Sister      Asthma Sister      Substance Abuse Sister      Alcohol     MENTAL ILLNESS Brother      Bipolar     Asthma Brother      Asthma Sister      Obesity Sister      Asthma Brother      Obesity Brother      Obesity Maternal Grandmother      Genetic Disorder Maternal Grandmother      Epilepsy       Additional medical/Social/Surgical histories reviewed in [facility] and updated as appropriate.     REVIEW OF SYSTEMS (2/1/2018)  10 point ROS of systems including Constitutional, Eyes, Respiratory, Cardiovascular, Gastroenterology, Genitourinary, Integumentary, Musculoskeletal, Psychiatric were all negative except for pertinent positives noted in my HPI.     PHYSICAL EXAM  Vitals:    02/01/18 0753   BP: 126/75   Pulse: 92   Weight: 74.4 kg (164 lb)   Height:  "1.715 m (5' 7.5\")     Vital Signs: /75  Pulse 92  Ht 1.715 m (5' 7.5\")  Wt 74.4 kg (164 lb)  BMI 25.31 kg/m2 Patient declined being weighed. Body mass index is 25.31 kg/(m^2).    General  - normal appearance, in no obvious distress  CV  - normal peripheral perfusion  Pulm  - normal respiratory pattern, non-labored  Musculoskeletal - lumbar spine and hips  - stance: normal gait without limp, no obvious leg length discrepancy, normal heel and toe walk  - inspection: normal bone and joint alignment, no obvious scoliosis  - palpation: no paravertebral or bony tenderness, except he states over his lower lumbar muscles and over bilateral lateral hips  - ROM: flexion exacerbates pain in low back and hips, normal extension, sidebending, rotation  No pain with internal rotation of hips, has some pain with external rotation of hips in his low back  - strength: lower extremities 5/5 in all planes  - special tests:  (+) straight leg raise- states that he has low back pain with leg extension, no radation into legs  (+) slump test- states that he has low back pain and hip pain  Neuro  - patellar and Achilles DTRs 2+ bilaterally, NO lower extremity sensory deficit throughout L5 distribution, grossly normal coordination, normal muscle tone  Skin  - no ecchymosis, erythema, warmth, or induration, no obvious rash  Psych  - interactive, appropriate, normal mood and affect    ASSESSMENT & PLAN  31 yo male with lumbar pain and radicular pain into hips from lumbar spasms and buldging discs  -patient has had a lumbar epidural in the past, and would like to reconsider it, and will refer him to pain clinic for further evaluation and management  Reviewed his medications, and will d/c gabapentin and naproxen at this time due to his elevated liver enzymes  Cont. HEP  He will f/u with me after seeing pain clinic    Faustino Feldman MD, CAQSM    "

## 2018-02-08 ENCOUNTER — TELEPHONE (OUTPATIENT)
Dept: PALLIATIVE MEDICINE | Facility: CLINIC | Age: 33
End: 2018-02-08

## 2018-02-08 ENCOUNTER — OFFICE VISIT (OUTPATIENT)
Dept: PALLIATIVE MEDICINE | Facility: CLINIC | Age: 33
End: 2018-02-08
Attending: PREVENTIVE MEDICINE
Payer: COMMERCIAL

## 2018-02-08 ENCOUNTER — HOSPITAL ENCOUNTER (OUTPATIENT)
Facility: CLINIC | Age: 33
Setting detail: SPECIMEN
Discharge: HOME | End: 2018-02-08
Attending: NURSE PRACTITIONER | Admitting: NURSE PRACTITIONER
Payer: COMMERCIAL

## 2018-02-08 VITALS
HEART RATE: 77 BPM | OXYGEN SATURATION: 99 % | BODY MASS INDEX: 25.92 KG/M2 | DIASTOLIC BLOOD PRESSURE: 80 MMHG | RESPIRATION RATE: 16 BRPM | WEIGHT: 168 LBS | SYSTOLIC BLOOD PRESSURE: 122 MMHG

## 2018-02-08 DIAGNOSIS — G89.29 CHRONIC LOW BACK PAIN WITH SCIATICA, SCIATICA LATERALITY UNSPECIFIED, UNSPECIFIED BACK PAIN LATERALITY: ICD-10-CM

## 2018-02-08 DIAGNOSIS — M54.40 CHRONIC LOW BACK PAIN WITH SCIATICA, SCIATICA LATERALITY UNSPECIFIED, UNSPECIFIED BACK PAIN LATERALITY: ICD-10-CM

## 2018-02-08 DIAGNOSIS — Z79.891 LONG TERM (CURRENT) USE OF OPIATE ANALGESIC: Primary | ICD-10-CM

## 2018-02-08 PROCEDURE — 99205 OFFICE O/P NEW HI 60 MIN: CPT | Performed by: NURSE PRACTITIONER

## 2018-02-08 PROCEDURE — 80307 DRUG TEST PRSMV CHEM ANLYZR: CPT | Performed by: NURSE PRACTITIONER

## 2018-02-08 ASSESSMENT — PAIN SCALES - GENERAL: PAINLEVEL: MODERATE PAIN (4)

## 2018-02-08 NOTE — TELEPHONE ENCOUNTER
Pre-screening Questions for Radiology Injections:    Injection to be done at which interventional clinic site? [facility] [place]    Procedure ordered by Tamiko Stevens    Procedure ordered? Lumbar Epidural Steroid Injection    What insurance would patient like us to bill for this procedure? Blue Plus      Worker's comp or MVA (motor vehicle accident) -Any injection DO NOT SCHEDULE and route to Cyndi Scott.      [employer] insurance - For SI joint injections, DO NOT SCHEDULE and route Dominique King.      [employer]- MBB's must be scheduled at LEAST two weeks apart      [employer] - Any injection besides hip/shoulder/knee joint DO NOT SCHEDULE and route to Dominique King. She will obtain PA and call pt back to schedule procedure or notify pt of denial.       HP CIGNA-PA REQUIRED FOR NON-ESTER OR Joint injections    Any chance of pregnancy? NO   If YES, do NOT schedule and route to RN pool    Is an  needed? No     Patient has a drive home? (mandatory) YES: INFORMED    Is patient taking any blood thinners (plavix, coumadin, jantoven, warfarin, heparin, pradaxa or dabigatran )? No   If hold needed, do NOT schedule, route to RN pool     Is patient taking any aspirin products? No     If more than 325mg/day do NOT schedule; route to RN pool     For CERVICAL procedures, hold all aspirin products for 6 days.      Does the patient have a bleeding or clotting disorder? No     If YES, okay to schedule AND route to RN nurse pool    **For any patients with platelet count <100, must be forwarded to provider**    Is patient diabetic?  No  If YES, have them bring their glucometer.    Does patient have an active infection or treated for one within the past week? No     Is patient currently taking any antibiotics?  No     For patients on chronic, preventative, or prophylactic antibiotics, procedures may be scheduled.     For patients on antibiotics for active or recent infection:    Cali Ferguson,  Ronnie Boss Snitzer-antibiotic course must have been completed for 4 days    Dr. Flor-antibiotic course must have been completed for 7 days    Is patient currently taking any steroid medications? (i.e. Prednisone, Medrol)  No     For patients on steroid medications:    Karyn Cadena Burton, Snitzer-steroid course must have been completed for 4 days    -steroid course must have been completed for 7 days    Reviewed with patient:  If you are started on any steroids or antibiotics between now and your appointment, you must contact us because it may affect our ability to perform your procedure.  Yes    Is patient actively being treated for cancer or immunocompromised? No  If YES, do NOT schedule and route to RN pool     Are you able to get on and off an exam table with minimal or no assistance? Yes  If NO, do NOT schedule and route to RN pool    Are you able to roll over and lay on your stomach with minimal or no assistance? Yes  If NO, do NOT schedule and route to RN pool     Any allergies to contrast dye, iodine, shellfish, or numbing and steroid medications? No  If YES, route to RN pool AND add allergy information to appointment notes    Allergies: Buspirone hcl; Doxycycline; Trazodone; and Keflex [cephalexin]      Has the patient had a flu shot or any other vaccinations within 7 days before or after the procedure.  No     Does patient have an MRI/CT?  YES: MRI  (SI joint, hip injections, lumbar sympathetic blocks, and stellate ganglion blocks do not require an MRI)    Was the MRI done w/in the last 3 years?  Yes    Was MRI done at [facility]? Yes      If not, where was it done? N/A       If MRI was not done at [facility], [facility] or [facility] do NOT schedule and route to nursing.  If pt has an imaging disc, the injection may be scheduled but pt has to bring disc to appt. If they show up w/out disc the injection cannot be done    Reminders (please tell patient if applicable):       Instructed pt  to arrive 30 minutes early for IV start if this is for a cervical procedure, ALL sympathetic (stellate ganglion, hypogastric, or lumbar sympathetic block) and all sedation procedures (RFA, spinal cord stimulation trials).  Not Applicable   -IVs are not routinely placed for Dr. Trujillo cervical cases   -Dr. Sahu: IVs for cervical ESIs and cervical TBDs (not CMBBs/facet inj)      If NPO for sedation, informed patient that it is okay to take medications with sips of water (except if they are to hold blood thinners).  Not Applicable   *DO take blood pressure medication if it is prescribed*      If this is for a cervical ESTER, informed patient that aspirin needs to be held for 6 days.   Not Applicable      For all patients not having spinal cord stimulator (SCS) trials or radiofrequency ablations (RFAs), informed patient:    IV sedation is not provided for this procedure.  If you feel that an oral anti-anxiety medication is needed, you can discuss this further with your referring provider or primary care provider.  The Pain Clinic provider will discuss specifics of what the procedure includes at your appointment.  Most procedures last 10-20 minutes.  We use numbing medications to help with any discomfort during the procedure.  Not Applicable      Do not schedule procedures requiring IV placement in the first appointment of the day or first appointment after lunch. N/A      For patients 85 or older we recommend having an adult stay w/ them for the remainder of the day.   N/A    Does the patient have any questions?  NO  Ana Pemberton  [facility]

## 2018-02-08 NOTE — PROGRESS NOTES
"Hoang Kiser is a 32 year old male     This patient is being seen at the request of his sports med provider Dr. Feldman, for evaluation of his pain issues and recommendations for management, with specific emphasis on:     Reason for Referral: Evaluation for comprehensive services      Please complete the following questions:    Do you have any specific questions for the pain specialist? No    Are there any red flags that may impact the assessment or management of the patient?       What is your diagnosis for the patient's pain? Lumbar radicular pain    Primary Care Provider is Phill Floyd    The current opiate pain medications are being prescribed by N/A    Please see the [facility] health questionnaire which the patient completed and reviewed with me in detail    CHIEF COMPLAINT:  \"To learn how to manage my pain.\"     HISTORY OF PRESENT ILLNESS:  Hoang Kiser is a 32 year old male with history of low back pain       Pain Information:   Onset/Progression:  Pain started 12/12/16 with lifting 80 lb tote off the floor. Felt twinge in thigh and core. Groin and left hamstring pain. Had some previous back pain due to work issues. Was seen in ER that night. Was told he had muscle strain and should rest. Had to go back to work right away. Then had back pain started about three weeks after initial injury. Had PT x2 and work hardening. Still has back and both hip pain L>R. Occasional has hamstring pain as well. 2013-14 fell on a chair and handle of chair bruised area under right side of ribs.    Pain quality: Aching, Sharp and spasm, tight, pressure, burning     Pain timing: Constant     Pain rating: intensity ranges from 1/10 to 7/10, and averages 3/10 on a 0-10 scale.   Aggravating factors include: Bending over, squatting, standing or sitting for long periods of time, stairs.    Relieving factors include: Stretching, laying down on hot pad, sleeping with a pillow between knees, " medication, PT exercises      Past Pain Treatments:    Pain Clinic:   No     PT: Yes: [facility]    Psychologist: No   Relaxation techniques/biofeedback: No   Chiropractor: No   Acupuncture: No   Pharmacotherapy:     Opioids: Yes      Non-opioids:  Yes    TENs Unit:Yes   Injections: Yes: Hip injections and right L4-5 interlaminar injections, H,    Self-care:   Yes     Current Pain Relevant Medications:    Clonazepam .5 mg 1-2 tab at HS PRN  Duloxetine 20 mg daily  Gabapentin 100 mg daily: on hold re: elevated LFTs   Naproxen 500 mg BID: on hold re: elevated LFTs   Tizanidine 4 mg 1-2 tabs at HS PRN    Previous Pain Relevant Medications: (H--helped; HI--Helped initially; [facility]--Somewhat helpful; NH--No help; W--worse; SE--side effects; ?--Unsure if helpful)   NOTE: This medication information taken from patient's intake form, not medical records.    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 Multiple controlled substances, no early refills, 5 prescribers in last 12 months as of 2/8/18      PAST MEDICAL HISTORY:   Past Medical History:   Diagnosis Date     Abnormal involuntary movement 6/2/2016     Anxiety state, unspecified 04/30/2012      "Depressive disorder 2003    I have been on and off medication for depression since this     Epilepsy (H)     as a child. Says that \" he grew out of it by age 13\"     History of MRI of brain and brain stem 12/11/2015     MR BRAIN W/O & W CONTRAST, 12/11/2015 3:46 PM.  History: Myoclonus.  Comparison: None.  Technique:  1. MRI of the Brain: Sagittal T1-weighted, axial T2-weighted, axial turboFLAIR, axial susceptibility, and axial diffusion-weighted with ADC map images of the brain were obtained without intravenous contrast. After intravenous administration of gadolinium, axial and sagittal T1-weighted images of th     Neuralgia, neuritis, and radiculitis, unspecified 04/30/2012     normal emg 2017 8/8/2017    Interpretation: This is a normal study. There is no electrophysiologic evidence of a lumbosacral radiculopathy affecting the right or left lower extremity on the basis of this study.    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     FAMILY MEDICAL HISTORY:  Chronic pain: Yes Mom has OA  Family history of headaches:  No      HEALTH & LIFESTYLE PRACTICES:  Social History     Social History     Marital status: Single     Spouse name: N/A     Number of children: 0     Years of education: 14     Occupational History     Assembly/Packaging      Social History Main Topics     Smoking status: Current Every Day Smoker     Packs/day: 0.50     Years: 10.00     Types: Cigarettes     Start date: 1/1/2002     Smokeless tobacco: Current User      Comment: Transdermal patches have helped me the most in the past "     Alcohol use 1.2 - 2.4 oz/week     0 Standard drinks or equivalent, 1 - 2 Glasses of wine, 1 - 2 Cans of beer per week      Comment: EVERY 3 MONTHS     Drug use: No     Sexual activity: Yes     Partners: Female     Birth control/ protection: Condom, Pill      Comment: Infrequent     Other Topics Concern     Parent/Sibling W/ Cabg, Mi Or Angioplasty Before 65f 55m? No     Caffeine Concern Yes     Coffee, Engergy Drinks, 3 cups daily     Social History Narrative    He lives in [facility] in a chemical treatment center - there are 11 people there. He arrives today through [employer] ride    He has 3 brothers and 3 sisters. He has not children. He has never been .      Mother and father are alive    One sister is an alcoholic and bulemic    depression is present in the family : mother, sister and 2 brothers are bipolar.      He denies bipolar. He has depression.    He denies recurring thoughts. He has had substance abuse issues. He has had cravings in the past.    He exercises and plays guitar and draws.      He has used meth as well as prescription pain killers.    He has used marijuana when young. Used cocaine in the past.    Has not used iv drugs    He does not drink alcohol.      50% [place] and is Ethiopian, english and french and a bit native.    He smokes cigarettes - 1 pack per day.       ALLERGIES:  Allergies   Allergen Reactions     Buspirone Hcl Other (See Comments)     Panic attacks     Doxycycline Diarrhea, Fatigue, GI Disturbance and Nausea     Trazodone Fatigue     Keflex [Cephalexin] Diarrhea       MEDICATIONS:  Current Outpatient Prescriptions   Medication Sig Dispense Refill     naproxen (NAPROSYN) 500 MG tablet Take 1 tablet (500 mg) by mouth 2 times daily (with meals) 40 tablet 1     gabapentin (NEURONTIN) 100 MG capsule Take 1 capsule (100 mg) by mouth daily 30 capsule 3     tiZANidine (ZANAFLEX) 4 MG tablet Take 1-2 tablets (4-8 mg) by mouth nightly as needed 30 tablet 1      "nicotine (NICODERM CQ) 14 MG/24HR 24 hr patch Place 1 patch onto the skin every 24 hours Use this patch first 30 patch 0     nicotine (NICODERM CQ) 7 MG/24HR 24 hr patch Place 1 patch onto the skin every 24 hours 30 patch 0     zolpidem (AMBIEN) 10 MG tablet Take 1 tablet by mouth nightly as needed for sleep  1     DULoxetine (CYMBALTA) 20 MG EC capsule Take 1 capsule by mouth daily  0     amphetamine-dextroamphetamine (ADDERALL XR) 30 MG per 24 hr capsule Take 1 capsule by mouth every morning  0     acetylcysteine (N-ACETYL-L-CYSTEINE) 600 MG CAPS capsule Take 1,800 mg by mouth daily       clonazePAM (KLONOPIN) 0.5 MG tablet Take 0.5-1 mg by mouth nightly as needed   0     amphetamine-dextroamphetamine (ADDERALL) 20 MG tablet Take 20 mg by mouth daily Reported on 3/6/2017  0     PHQ-9: Patient Score: 7, \"somewhat difficult\"   Oswestry Disability Index: Patient score: 54%    REVIEW OF SYSTEMS:   Constitutional:  Fatigue and Malaise  Eyes/Head: Headache  Ears/Nose/Throat: Earache  Allergy/Immune: Negative  Skin:Negative  Hematologic/Lymphatic/Immunologic:Negative  Respiratory: Negative  Cardiovascular: Negative  Gastrointestinal: Negative  Endocrine: Negative  Musculoskeletal: Back pain, Joint pain, Neck pain and Stiffness  Urinary:  Negative   Any bowel or bladder incontinence: Denies   Neurologic: Tremor and Weakness, N/T  Psychiatric: Anxiety, Mood swings and Stress    PHYSICAL EXAM    /80 (BP Location: Right arm, Patient Position: Chair, Cuff Size: Adult Small)  Pulse 77  Resp 16  Wt 76.2 kg (168 lb)  SpO2 99%  BMI 25.92 kg/m2     Appearance:     A&O. Patient is appropriate.   Patient is in NAD.   Patient is well groomed and appears stated age.   Patient is not overweight/underweight.     HEENT:   Normocephalic, atraumatic, sclera, conjunctiva and pharynx normal. Pupils are equal, round. Hearing is adequate for exam .  Neck: Supple.  No deformities or adenopathy  Skin:  No rashes, erythema, breakdowns, " "lesions to exposed skin.   Hematologic:  No bruises, petechiae or ecchymosis.  Musculoskeletal:  Posture upright, shoulders and pelvis are leveled.   Deltoid: R: 5/5 L: 5/5  Biceps: R: 5/5 L: 5/5  Triceps: R: 5/5 L: 5/5  Intrinsic hand:R: 5/5 L: 5/5  Hip flexion: R: 4/5 L: 4/5  Knee ext: R: 4/5 L: 4/5  Knee flex: R: 4/5 L: 4/5  Dorsiflexion: R: 4/5 L: 4/5  Plantarflexion:R: 4/5 L: 4/5    Gait pattern:  Unable to walk on the heels and toes. Patient has antalgic gait favoring the right and left side.     Neurological:   Deep Tendon Reflex exam:   Biceps:     R:  2/4   L: 2/4   Brachioradialis   R:  2/4   L: 2/4:   Triceps:  R:  2/4   L: 2/4   Patella:  R:  2/4   L: 2/4   Achilles:  R:  2/4   L: 2/4    Saddle anesthesia: Denies    Sensory exam:   Light touch: normal bilateral upper and lower extremities    Vibration: R>L in LE   No allodynia, dysesthesia, or hyperalgesia.    Cervical spine:   Flex:  20 degrees   Ext: 20 degrees   Rotation to right: 20 degrees   Rotation to left: 20 degrees   Rotation/ext to right: painful    Rotation/ext to left: painful    Tenderness in the cervical spine at midline. No   Tenderness in the cervical paraspinal muscles. No  Thoracic spine:    Kyphosis. No   Tenderness in the thoracic spine at midline. Yes   Tenderness in the thoracic paraspinal muscles. Yes  Lumbar/Sacral spine:   Forward Flexion:  90 degrees   Ext: 20 degrees \"tight\"   Rotation/ext to right: painful    Rotation/ext to left:: painful    Lordosis. No   Tenderness in the lumbar spine at midline. Yes   Tenderness in the lumbar paraspinal muscles.Yes   Straight leg exam:    Right: positive     Left:  positive   Stephanie/Tavon's test:      Right: positive     Left:  positive   Passive internal rotation:     Right: positive     Left: positive    Active external rotation:      Right: positive     Left: positive   Tenderness over piriformis:     Right: positive     Left:  positive   Tenderness over Trochanteric Bursa:     Right: " positive     Left: positive     Psychiatric:  mentation appears normal., affect and mood normal    DIRE Score for ongoing opioid management is calculated as follows:    Diagnosis = 2 pts (slowly progressive; moderate pain/objective findings)    Intractability = 2 pts (most treatments tried; patient not fully engaged/barriers)    Risk        Psych = 2 pts (personality dysfunction/mental illness that moderately interferes with care)         Chem Hlth = 2 pts (use of medications to cope with stress; chemical dependency in remission)       Reliability = 3 pts (highly reliable with meds, appointments, treatments)       Social = 3 pts (supportive family/close relationships; involved in work/school; no isolation)       (Psych + Chem hlth + Reliability + Social) = 14    Efficacy = 2 pts (moderate benefit/function; low med dose; too early/not tried meds)    DIRE Score = 16        7-13: likely NOT suitable candidate for long-term opioid analgesia       14-21: may be a suitable candidate for long-term opioid analgesia      Previous Diagnostic Tests:   Imaging Studies:   MR LUMBAR SPINE W/O CONTRAST 3/8/2017 4:08 PM  Provided History: Radiculopathy, lumbar region  Comparison: None available  Technique: Sagittal T1-weighted, sagittal T2-weighted, sagittal STIR,  sagittal diffusion-weighted, axial T2-weighted, and axial gradient  echo images of the lumbar spine were obtained without the  administration of intravenous contrast.  Findings: Regarding numbering convention, there are 5 lumbar-type  vertebrae assumed for the purposes of this dictation.  The tip of the  conus medullaris is at  L1.  Regarding alignment, the lumbar vertebral  column appears normally aligned.  There is no significant disc height  narrowing at any level.  Regarding bone marrow signal intensity, no  abnormality is visualized on STIR images.  On a level by level basis:  T12-L1: No spinal canal or neuroforaminal stenosis.  L1-2: No spinal canal or  neuroforaminal stenosis.  L2-3: Mild posterior disc bulge. No spinal canal or neuroforaminal  stenosis.  L3-4: Mild posterior disc bulge. No spinal canal or neuroforaminal  stenosis. Mild bilateral ligamentum flavum hypertrophy.  L4-5: Mild posterior disc bulge. No spinal canal or neuroforaminal  stenosis. Mild bilateral ligamentum flavum hypertrophy.  L5-S1: Mild posterior disc bulge. No spinal canal or neuroforaminal  stenosis.  Paraspinous tissues are within normal limits.  Impression: No lumbar spine or neuroforaminal stenosis.       ASSESSMENT:   1.  Lumbar DDD, mild  2.  Lumbar muscle spasm  3.  Hx: anxiety, chemical dependence in remission.    Shoaib is a very pleasant young man who presents for evaluation of chronic low back pain sustain in a work related injury. Work comp has been settled. Interventional injections have been helpful in the past and we will repeat lumbar injection as noted below. He is interested and would benefit from multidisciplinary pain management approach and will schedule therapy assessments.     He has a hx of chemical dependency with two years sobriety. We reviewed that long term use of opiates is not recommended when other options for treating pain exist. It is reasonable to have a small amount of Tramadol for severe pain days, not to be taken daily, as he works on therapies. We will collect a urine drug screen today and will discuss options after it results.     PLAN:    Diagnosis reviewed, treatment option addressed, and risk/benefits discussed.  Self-care instructions given.  I am recommending a multidisciplinary treatment plan to help this patient better manage pain.       1. Schedule pain psychology assessment  2. Schedule physical therapy assessment  3. Schedule follow-up with CARLOS A Higuera NP-C in 2 weeks after injection   4. Labs: Urine drug screen  5. Procedures recommended: Repeat Right L 4-5 Interlaminar  ESTER injection  Interventional procedures  are done at our  [facility].   6. Medication recommendations:   1. We will talk about Tramadol after drug screen is back.    TIME SPENT:   A total of 60  minutes was spent on the patient today, greater than 50% of that time was spent on face to face counseling and care coordination regarding diagnoses and treatment options as mentioned above.    I would like to thank Dr. Feldman for allowing me to participate in the management of this patient.     CARLOS A Bass, NP-C  [facility]    Chart documentation done in part with Dragon Voice recognition Software. Although reviewed after completion, some word and grammatical error may remain.

## 2018-02-08 NOTE — MR AVS SNAPSHOT
After Visit Summary   2/8/2018    Hoang Kiser    MRN: [id number]           Patient Information     Date Of Birth          1985        Visit Information        Provider Department      2/8/2018 10:00 AM Tamiko Stevens APRN CNP [facility]        Today's Diagnoses     Long term (current) use of opiate analgesic    -  1    Chronic low back pain with sciatica, sciatica laterality unspecified, unspecified back pain laterality          Care Instructions    After Visit Instructions:     Thank you for coming to [facility] for your care. It is my goal to partner with you to help you reach your optimal state of health.     I am recommending multidisciplinary care at this time.  The focus of care will be to continue gradual rehabilitation and pain management with medication adjustments as needed.    Continue daily self-care, identifying contributing factors, and monitoring variations in pain level. Continue to integrate self-care into your life.      1. Schedule pain psychology assessment  2. Schedule physical therapy assessment  3. Schedule follow-up with CARLOS A Higuera NP-C in 2 weeks after injection   4. Labs: Urine drug screen  5. Procedures recommended: Repeat Right L 4-5 Interlaminar  ESTER injection  Interventional procedures  are done at our [place] and [place] locations.   6. Medication recommendations:   1. We will talk about Tramadol after drug screen is back.       CARLOS A Bass NP-C  [facility]    Contact information: [facility]    Please call if any side effects, questions, or concerns arise.    Nurse Triage line:  [phone]   Call this number with any questions or concerns. You may leave a detailed message anytime. Calls are typically returned Monday through Friday between 8 AM and 4:30 PM. We usually get back to you within 2 business days depending on the  issue/request.    Scheduling number: [phone].  Call this number to schedule or change appointments.          Medication Refills Policy:    For non-narcotic medications, please your pharmacy directly to request a refill and the pharmacy will call the [facility] for authorization. Please allow 3-4 days for these refills.    For narcotic refills, call the nurse triage line and leave a message requesting your refill along with the name of the pharmacy that you use. Narcotic prescriptions will be mailed to your pharmacy or you may pick them up at the clinic.  Please call 7-10 days before your refill is due  The above policy allows adequate time so that you do not run out of medication.    No Show - Late Cancellation - Late Arrival Policy  We believe regular attendance is key to your success in our program.    Any time you are unable to keep your appointment we ask that you call us at  least 24 hours in advance to let us know. This will allow us to offer the appointment time to another patient. The following is our policy for missed appointments. This also applies to appointments cancelled with less than 24 hours notice.    You will receive a letter after missing your 1st and 2nd appointments without contacting the clinic before your scheduled appointment time.     After missing 3 appointments without calling first, we will cancel all of your future appointments at [facility].    At that point, you will not be able to resume services unless approved by your care team  We understand that unforseen circumstances arise, however, out of respect for all concerned and to provide this appointment to another patient, this policy will be enforced.    Please note that most follow up appointments are 30 minutes long. If you arrive late, your provider may not be able to see you for the entire 30 minutes. Please also note that if you arrive more than 15 minutes for any appointment, it may be  rescheduled.                                     Follow-ups after your visit        Additional Services     PAIN INJECTION EVAL/TREAT/FOLLOW UP           PAIN PHD EVAL/TREAT/FOLLOW UP           PAIN PT EVAL AND TREAT                 Your next 10 appointments already scheduled     Feb 09, 2018  7:45 AM CST   LAB with CP LAB   [facility]    [address] [facility] [id number] [phone]           Please do not eat 10-12 hours before your appointment if you are coming in fasting for labs on lipids, cholesterol, or glucose (sugar). This does not apply to pregnant women. Water, hot tea and black coffee (with nothing added) are okay. Do not drink other fluids, diet soda or chew gum.            Feb 15, 2018   Procedure with Liam Kincaid MD   [facility] (--)    [address]  [facility] [id number] [phone]            Feb 16, 2018 12:15 PM CST   [facility] with Phill Floyd MD   [facility]    [address] [facility] [id number] [phone]            Mar 01, 2018 12:30 PM CST   Walk In From ENT with Hugo Galvez   [facility] ([facility])    [address] [place] [id number] [phone]            Mar 01, 2018  3:00 PM CST   (Arrive by 2:45 PM)   New Patient Visit with Chuck Corona MD   [facility] ([facility])    [address] [place] [id number] [phone]              Who to contact     If you have questions or need follow up information about today's clinic visit or your schedule please contact [facility] directly at [phone].  Normal or non-critical lab and imaging results will be communicated to you by MyChart, letter or phone within 4 business days after the clinic has  received the results. If you do not hear from us within 7 days, please contact the clinic through [employer] or phone. If you have a critical or abnormal lab result, we will notify you by phone as soon as possible.  Submit refill requests through [employer] or call your pharmacy and they will forward the refill request to us. Please allow 3 business days for your refill to be completed.          Additional Information About Your Visit        [employer] Information     [employer] gives you secure access to your electronic health record. If you see a primary care provider, you can also send messages to your care team and make appointments. If you have questions, please call your primary care clinic.  If you do not have a primary care provider, please call [phone] and they will assist you.        Care EveryWhere ID     This is your Care EveryWhere ID. This could be used by other organizations to access your [facility] medical records  [id number]        Your Vitals Were     Pulse Respirations Pulse Oximetry BMI (Body Mass Index)          77 16 99% 25.92 kg/m2         Blood Pressure from Last 3 Encounters:   02/08/18 122/80   02/01/18 126/75   01/24/18 122/78    Weight from Last 3 Encounters:   02/08/18 76.2 kg (168 lb)   02/01/18 74.4 kg (164 lb)   01/24/18 74.4 kg (164 lb)              We Performed the Following     Pain Drug Scr UR W Rptd Meds     PAIN INJECTION EVAL/TREAT/FOLLOW UP     PAIN PHD EVAL/TREAT/FOLLOW UP     PAIN PT EVAL AND TREAT        Primary Care Provider Office Phone # Fax #    Phill Floyd [phone] [address] [facility] [address]        Equal Access to Services     [facility] Center[facility]ARM[facility] : Hadii aad ku hadasho Soomaali, waaxda luqadaha, qaybta kaalmada shruthi, arnav billy. So [facility] [phone].    ATENCIÓN: Si habla español, tiene a cutler disposición servicios gratuitos de asistencia lingüística. Llame al [phone].    We comply with applicable  federal civil rights laws and [place] laws. We do not discriminate on the basis of race, color, national origin, age, disability, sex, sexual orientation, or gender identity.            Thank you!     Thank you for choosing [facility]  for your care. Our goal is always to provide you with excellent care. Hearing back from our patients is one way we can continue to improve our services. Please take a few minutes to complete the written survey that you may receive in the mail after your visit with us. Thank you!             Your Updated Medication List - Protect others around you: Learn how to safely use, store and throw away your medicines at www.disposemymeds.org.          This list is accurate as of 2/8/18 11:07 AM.  Always use your most recent med list.                   Brand Name Dispense Instructions for use Diagnosis    acetylcysteine 600 MG Caps capsule    N-ACETYL CYSTEINE     Take 1,800 mg by mouth daily        * amphetamine-dextroamphetamine 20 MG per tablet    ADDERALL     Take 20 mg by mouth daily Reported on 3/6/2017        * amphetamine-dextroamphetamine 30 MG per 24 hr capsule    ADDERALL XR     Take 1 capsule by mouth every morning        clonazePAM 0.5 MG tablet    klonoPIN     Take 0.5-1 mg by mouth nightly as needed        DULoxetine 20 MG EC capsule    CYMBALTA     Take 1 capsule by mouth daily        gabapentin 100 MG capsule    NEURONTIN    30 capsule    Take 1 capsule (100 mg) by mouth daily    Lumbar radicular pain, Lumbar paraspinal muscle spasm       naproxen 500 MG tablet    NAPROSYN    40 tablet    Take 1 tablet (500 mg) by mouth 2 times daily (with meals)    Lumbar radicular pain, Lumbar paraspinal muscle spasm       * nicotine 14 MG/24HR 24 hr patch    NICODERM CQ    30 patch    Place 1 patch onto the skin every 24 hours Use this patch first    Tobacco abuse       * nicotine 7 MG/24HR 24 hr patch    NICODERM CQ    30 patch    Place 1 patch onto the skin every 24  hours    Tobacco abuse       tiZANidine 4 MG tablet    ZANAFLEX    30 tablet    Take 1-2 tablets (4-8 mg) by mouth nightly as needed    Lumbar radicular pain, Lumbar paraspinal muscle spasm       zolpidem 10 MG tablet    AMBIEN     Take 1 tablet by mouth nightly as needed for sleep        * Notice:  This list has 4 medication(s) that are the same as other medications prescribed for you. Read the directions carefully, and ask your doctor or other care provider to review them with you.

## 2018-02-08 NOTE — NURSING NOTE
Obtained urine specimen.  Tracking number [id number] Specimen sent to the lab.        Campbell Kemp MA  [facility]

## 2018-02-08 NOTE — PATIENT INSTRUCTIONS
After Visit Instructions:     Thank you for coming to [facility] for your care. It is my goal to partner with you to help you reach your optimal state of health.     I am recommending multidisciplinary care at this time.  The focus of care will be to continue gradual rehabilitation and pain management with medication adjustments as needed.    Continue daily self-care, identifying contributing factors, and monitoring variations in pain level. Continue to integrate self-care into your life.      1. Schedule pain psychology assessment  2. Schedule physical therapy assessment  3. Schedule follow-up with CARLOS A Higuera NP-C in 2 weeks after injection   4. Labs: Urine drug screen  5. Procedures recommended: Repeat Right L 4-5 Interlaminar  ESTER injection  Interventional procedures  are done at our [place] and [place] locations.   6. Medication recommendations:   1. We will talk about Tramadol after drug screen is back.       CARLOS A Bass NP-C  [facility]3]    Contact information: [facility]    Please call if any side effects, questions, or concerns arise.    Nurse Triage line:  [phone]   Call this number with any questions or concerns. You may leave a detailed message anytime. Calls are typically returned Monday through Friday between 8 AM and 4:30 PM. We usually get back to you within 2 business days depending on the issue/request.    Scheduling number: [phone].  Call this number to schedule or change appointments.          Medication Refills Policy:    For non-narcotic medications, please your pharmacy directly to request a refill and the pharmacy will call the [facility] for authorization. Please allow 3-4 days for these refills.    For narcotic refills, call the nurse triage line and leave a message requesting your refill along with the name of the pharmacy that you use. Narcotic prescriptions will be  mailed to your pharmacy or you may pick them up at the clinic.  Please call 7-10 days before your refill is due  The above policy allows adequate time so that you do not run out of medication.    No Show - Late Cancellation - Late Arrival Policy  We believe regular attendance is key to your success in our program.    Any time you are unable to keep your appointment we ask that you call us at  least 24 hours in advance to let us know. This will allow us to offer the appointment time to another patient. The following is our policy for missed appointments. This also applies to appointments cancelled with less than 24 hours notice.    You will receive a letter after missing your 1st and 2nd appointments without contacting the clinic before your scheduled appointment time.     After missing 3 appointments without calling first, we will cancel all of your future appointments at [facility].    At that point, you will not be able to resume services unless approved by your care team  We understand that unforseen circumstances arise, however, out of respect for all concerned and to provide this appointment to another patient, this policy will be enforced.    Please note that most follow up appointments are 30 minutes long. If you arrive late, your provider may not be able to see you for the entire 30 minutes. Please also note that if you arrive more than 15 minutes for any appointment, it may be rescheduled.

## 2018-02-09 DIAGNOSIS — R74.8 ELEVATED LIVER ENZYMES: ICD-10-CM

## 2018-02-09 LAB
ALBUMIN SERPL-MCNC: 3.9 G/DL (ref 3.4–5)
ALP SERPL-CCNC: 62 U/L (ref 40–150)
ALT SERPL W P-5'-P-CCNC: 38 U/L (ref 0–70)
AST SERPL W P-5'-P-CCNC: 21 U/L (ref 0–45)
BILIRUB DIRECT SERPL-MCNC: <0.1 MG/DL (ref 0–0.2)
BILIRUB SERPL-MCNC: 0.3 MG/DL (ref 0.2–1.3)
GGT SERPL-CCNC: 37 U/L (ref 0–75)
HBV CORE IGM SERPL QL IA: NONREACTIVE
HBV SURFACE AG SERPL QL IA: NONREACTIVE
HCV AB SERPL QL IA: NONREACTIVE
PROT SERPL-MCNC: 7.1 G/DL (ref 6.8–8.8)

## 2018-02-09 PROCEDURE — 80076 HEPATIC FUNCTION PANEL: CPT | Performed by: FAMILY MEDICINE

## 2018-02-09 PROCEDURE — 36415 COLL VENOUS BLD VENIPUNCTURE: CPT | Performed by: FAMILY MEDICINE

## 2018-02-09 PROCEDURE — 82977 ASSAY OF GGT: CPT | Performed by: FAMILY MEDICINE

## 2018-02-09 PROCEDURE — 86705 HEP B CORE ANTIBODY IGM: CPT | Performed by: FAMILY MEDICINE

## 2018-02-09 PROCEDURE — 86665 EPSTEIN-BARR CAPSID VCA: CPT | Performed by: FAMILY MEDICINE

## 2018-02-09 PROCEDURE — 86803 HEPATITIS C AB TEST: CPT | Performed by: FAMILY MEDICINE

## 2018-02-09 PROCEDURE — G0499 HEPB SCREEN HIGH RISK INDIV: HCPCS | Performed by: FAMILY MEDICINE

## 2018-02-09 NOTE — TELEPHONE ENCOUNTER
APPT INFO    Date /Time: 3/1/18 at 3PM   Reason for Appt: ETD bilateral   Ref Provider/Clinic: Dr Taylor   Are there internal records? Yes/No?  IF YES, list clinic names: [facility]  [facility] -last seen in 2016  MRI Head 12/11/15   Are there outside records? Yes/No? No   Patient Contact (Y/N) & Call Details: No   Action: Chart reviewed

## 2018-02-11 ENCOUNTER — MYC MEDICAL ADVICE (OUTPATIENT)
Dept: PALLIATIVE MEDICINE | Facility: CLINIC | Age: 33
End: 2018-02-11

## 2018-02-12 LAB
EBV VCA IGG SER QL IA: >8 AI (ref 0–0.8)
EBV VCA IGM SER QL IA: 0.2 AI (ref 0–0.8)

## 2018-02-12 NOTE — TELEPHONE ENCOUNTER
I spoke to Hoang in response to his [employer] message. He had all of his questions verbally answered, and will touch base with me in a week. He is still awaiting decision with his  regarding some of his questions as to what paperwork to fill out next.   Dr Feldman

## 2018-02-13 LAB — PAIN DRUG SCR UR W RPTD MEDS: NORMAL

## 2018-02-13 NOTE — TELEPHONE ENCOUNTER
Last read by Hoang Kiser at 10:37 AM on 2/12/2018. A follow up with Tamiko Stevens is scheduled for 3/13/18.    KATERINA CardenasN, RN  Care Coordinator  [facility]

## 2018-02-14 ENCOUNTER — MYC MEDICAL ADVICE (OUTPATIENT)
Dept: PALLIATIVE MEDICINE | Facility: CLINIC | Age: 33
End: 2018-02-14

## 2018-02-14 NOTE — TELEPHONE ENCOUNTER
This is being managed in a seperate encounter.    Sarina Polanco, KATERINAN, RN  Care Coordinator  [facility]

## 2018-02-14 NOTE — TELEPHONE ENCOUNTER
Sherrell from patient    Tamiko,     Noted and thank you for the update. Tizanidine makes sense due to the short half life. I'm absolutely baffled by the Cymbalta and Adderall Test results. I take them in the morning daily. I know what withdrawals from both are like. I also know I am not able to function without them. I lost my first job in sobriety when there was a mix up with insurance coverage. I've not exhibited any symptoms. But, regardless, I'm more than happy to provide UA's, have my my Med's monitored, etc.     We'll talk more in person. Thank you for the update.     Sincerely,     Shoaib     I have a question about PAIN DRUG SCR UR W RPTD MEDS resulted on 2/13/18, 4:56 PM.

## 2018-02-15 ENCOUNTER — HOSPITAL ENCOUNTER (OUTPATIENT)
Facility: AMBULATORY SURGERY CENTER | Age: 33
Discharge: HOME | End: 2018-02-15
Attending: SURGERY | Admitting: SURGERY
Payer: COMMERCIAL

## 2018-02-15 ENCOUNTER — SURGERY (OUTPATIENT)
Age: 33
End: 2018-02-15

## 2018-02-15 VITALS
SYSTOLIC BLOOD PRESSURE: 119 MMHG | OXYGEN SATURATION: 100 % | DIASTOLIC BLOOD PRESSURE: 74 MMHG | TEMPERATURE: 97.1 F | RESPIRATION RATE: 16 BRPM

## 2018-02-15 LAB — COLONOSCOPY: NORMAL

## 2018-02-15 PROCEDURE — 99152 MOD SED SAME PHYS/QHP 5/>YRS: CPT | Performed by: SURGERY

## 2018-02-15 PROCEDURE — 88305 TISSUE EXAM BY PATHOLOGIST: CPT | Performed by: SURGERY

## 2018-02-15 PROCEDURE — G8918 PT W/O PREOP ORDER IV AB PRO: HCPCS

## 2018-02-15 PROCEDURE — 45380 COLONOSCOPY AND BIOPSY: CPT | Mod: PT | Performed by: SURGERY

## 2018-02-15 PROCEDURE — G8907 PT DOC NO EVENTS ON DISCHARG: HCPCS

## 2018-02-15 PROCEDURE — 45380 COLONOSCOPY AND BIOPSY: CPT

## 2018-02-15 RX ORDER — LIDOCAINE 40 MG/G
CREAM TOPICAL
Status: DISCONTINUED | OUTPATIENT
Start: 2018-02-15 | End: 2018-02-16 | Stop reason: HOSPADM

## 2018-02-15 RX ORDER — FENTANYL CITRATE 50 UG/ML
INJECTION, SOLUTION INTRAMUSCULAR; INTRAVENOUS PRN
Status: DISCONTINUED | OUTPATIENT
Start: 2018-02-15 | End: 2018-02-15 | Stop reason: HOSPADM

## 2018-02-15 RX ADMIN — FENTANYL CITRATE 100 MCG: 50 INJECTION, SOLUTION INTRAMUSCULAR; INTRAVENOUS at 07:34

## 2018-02-15 RX ADMIN — FENTANYL CITRATE 50 MCG: 50 INJECTION, SOLUTION INTRAMUSCULAR; INTRAVENOUS at 07:38

## 2018-02-15 RX ADMIN — FENTANYL CITRATE 50 MCG: 50 INJECTION, SOLUTION INTRAMUSCULAR; INTRAVENOUS at 07:41

## 2018-02-15 ASSESSMENT — PATIENT HEALTH QUESTIONNAIRE - PHQ9: SUM OF ALL RESPONSES TO PHQ QUESTIONS 1-9: 7

## 2018-02-16 ENCOUNTER — OFFICE VISIT (OUTPATIENT)
Dept: FAMILY MEDICINE | Facility: CLINIC | Age: 33
End: 2018-02-16
Payer: COMMERCIAL

## 2018-02-16 VITALS
HEART RATE: 85 BPM | OXYGEN SATURATION: 100 % | WEIGHT: 166 LBS | BODY MASS INDEX: 25.62 KG/M2 | SYSTOLIC BLOOD PRESSURE: 124 MMHG | DIASTOLIC BLOOD PRESSURE: 79 MMHG | TEMPERATURE: 97.5 F

## 2018-02-16 DIAGNOSIS — F51.01 PRIMARY INSOMNIA: ICD-10-CM

## 2018-02-16 DIAGNOSIS — M62.830 LUMBAR PARASPINAL MUSCLE SPASM: ICD-10-CM

## 2018-02-16 DIAGNOSIS — M54.16 LUMBAR RADICULAR PAIN: ICD-10-CM

## 2018-02-16 DIAGNOSIS — Z80.0 FAMILY HISTORY OF COLON CANCER: ICD-10-CM

## 2018-02-16 DIAGNOSIS — K76.9 HEPATOCELLULAR INJURY: Primary | ICD-10-CM

## 2018-02-16 LAB — COPATH REPORT: NORMAL

## 2018-02-16 PROCEDURE — 99214 OFFICE O/P EST MOD 30 MIN: CPT | Performed by: FAMILY MEDICINE

## 2018-02-16 RX ORDER — GABAPENTIN 100 MG/1
100 CAPSULE ORAL DAILY
Qty: 90 CAPSULE | Refills: 3 | Status: SHIPPED | OUTPATIENT
Start: 2018-02-16 | End: 2018-05-22

## 2018-02-16 RX ORDER — ZOLPIDEM TARTRATE 10 MG/1
10 TABLET ORAL
Qty: 30 TABLET | Refills: 2 | Status: SHIPPED | OUTPATIENT
Start: 2018-02-16 | End: 2018-04-19

## 2018-02-16 NOTE — PROGRESS NOTES
SUBJECTIVE:  Hoang Kiser is a 32 year old male who scheduled an appointment to discuss the following issues:    He reports that his brother was diagnosed with colon cancer at age 25 or 26.  He just had his colonoscopy but has not received the result(s) of the polyp pathology.      He last saw Dr Feldman on February 1st and was taken off of gabapentin. He has not taken it since.  He stopped taking taking Alleve after our last appointment(s) which was on January 24th .    He does feel that his back pain symptom(s) are worse since not taking the medications.     He has a shot scheduled for his back (epidural steroid injection) at the [facility]      He was taking ambien until about 6 month(s) ago.     He reports that he has difficulty falliing alseep because he can not relax. The ambien was very helpful. He used to take it most nights but not every night.           Past Medical, social, family histories, medications, and allergies reviewed and updated   ROS: other than that noted above all other review of systems was negative    ROS:       Current Outpatient Prescriptions:      tiZANidine (ZANAFLEX) 4 MG tablet, Take 1-2 tablets (4-8 mg) by mouth nightly as needed, Disp: 30 tablet, Rfl: 1     DULoxetine (CYMBALTA) 20 MG EC capsule, Take 1 capsule by mouth daily, Disp: , Rfl: 0     amphetamine-dextroamphetamine (ADDERALL XR) 30 MG per 24 hr capsule, Take 1 capsule by mouth every morning, Disp: , Rfl: 0     clonazePAM (KLONOPIN) 0.5 MG tablet, Take 0.5-1 mg by mouth nightly as needed , Disp: , Rfl: 0     amphetamine-dextroamphetamine (ADDERALL) 20 MG tablet, Take 20 mg by mouth daily Reported on 3/6/2017, Disp: , Rfl: 0     naproxen (NAPROSYN) 500 MG tablet, Take 1 tablet (500 mg) by mouth 2 times daily (with meals) (Patient not taking: Reported on 2/16/2018), Disp: 40 tablet, Rfl: 1     gabapentin (NEURONTIN) 100 MG capsule, Take 1 capsule (100 mg) by mouth daily (Patient not taking: Reported on 2/16/2018), Disp: 30  capsule, Rfl: 3     nicotine (NICODERM CQ) 14 MG/24HR 24 hr patch, Place 1 patch onto the skin every 24 hours Use this patch first (Patient not taking: Reported on 2/16/2018), Disp: 30 patch, Rfl: 0     nicotine (NICODERM CQ) 7 MG/24HR 24 hr patch, Place 1 patch onto the skin every 24 hours, Disp: 30 patch, Rfl: 0     zolpidem (AMBIEN) 10 MG tablet, Take 1 tablet by mouth nightly as needed for sleep, Disp: , Rfl: 1     acetylcysteine (N-ACETYL-L-CYSTEINE) 600 MG CAPS capsule, Take 1,800 mg by mouth daily, Disp: , Rfl:     OBJECTIVE:  /79  Pulse 85  Temp 97.5  F (36.4  C) (Oral)  Wt 166 lb (75.3 kg)  SpO2 100%  BMI 25.62 kg/m2    EXAM:  GENERAL APPEARANCE: healthy, alert and no distress  EYES: EOMI,  PERRL  HENT: ear canals and TM's normal and nose and mouth without ulcers or lesions  RESP: lungs clear to auscultation - no rales, rhonchi or wheezes  CV: regular rates and rhythm, normal S1 S2, no S3 or S4 and no murmur, click or rub -  ABDOMEN:  soft, nontender, no HSM or masses and bowel sounds normal    Results for orders placed or performed during the hospital encounter of 02/15/18   COLONOSCOPY   Result Value Ref Range    COLONOSCOPY       [facility]  Endoscopy Department-[place]  _______________________________________________________________________________  Patient Name: Hoang Kiser        Procedure Date: 2/15/2018 6:55 AM  MRN: [id number]                       YOB: 1985  Admit Type: Outpatient                Age: 32  Gender: Male                          Note Status: Finalized  Attending MD: Liam Kincaid MD       _______________________________________________________________________________     Procedure:                Colonoscopy  Indications:              Screening for colon cancer: Family history of                             colorectal cancer in distant relative(s) before age                             60, Screening in patient at increased  risk:                             Colorectal cancer in brother before age 60  Providers:                Liam Kincaid MD, Virginia Jacob RN  Referring MD:             Phill Floyd MD  Medicines:                Fen tanyl 200 micrograms IV, Midazolam 4 mg IV  Complications:            No immediate complications.  _______________________________________________________________________________  Procedure:                Pre-Anesthesia Assessment:                            - Prior to the procedure, a History and Physical                             was performed, and patient medications and                             allergies were reviewed. The risks and benefits of                             the procedure and the sedation options and risks                             were discussed with the patient. All questions were                             answered and informed consent was obtained. Patient                             identification and proposed procedure were verified                             by the physician in the pre-procedure area. Mental                             Status Examination: alert and oriented. Airway                             Examination: normal oropharyngeal airway a nd neck                             mobility. Respiratory Examination: clear to                             auscultation. CV Examination: normal. Prophylactic                             Antibiotics: The patient does not require                             prophylactic antibiotics. Prior Anticoagulants: The                             patient has taken no previous anticoagulant or                             antiplatelet agents. ASA Grade Assessment: II - A                             patient with mild systemic disease. After reviewing                             the risks and benefits, the patient was deemed in                             satisfactory condition to undergo the procedure.                              The anesthesia plan was to use moderate sedation /                             analgesia (conscious sedation). This assessment was                             completed before the administration of sedation at                             07:25 AM.                             After obtaining informed consent, the colonoscope                             was passed under direct vision. Throughout the                             procedure, the patient's blood pressure, pulse, and                             oxygen saturations were monitored continuously. The                             Colonoscope was introduced through the anus and                             advanced to the cecum, identified by appendiceal                             orifice and ileocecal valve. The colonoscopy was                             performed without difficulty. The patient tolerated                             the procedure well. The quality of the bowel                             preparation was good.                                                                                   Findings:       The perianal and digital rectal examinations were normal.       Two sessile polyps were found at 10 cm proximal to the anus. The polyps        were diminutive in size. Biopsies were ta alison with a cold forceps for        histology.       A few small-mouthed diverticula were found in the sigmoid colon.       The exam was otherwise without abnormality.                                                                                   Moderate Sedation:       Moderate (conscious) sedation was administered by the endoscopy nurse        and supervised by the endoscopist. The following parameters were        monitored: oxygen saturation, heart rate, blood pressure, and response        to care. Total physician intraservice time was 26 minutes.  Impression:               - Two diminutive polyps at 10 cm proximal to the                             anus.  Biopsied.                            - Diverticulosis in the sigmoid colon.                            - The examination was otherwise normal.  Recommendation:           - Path report will tell when next colonoscopy                             should be.                            For the diverticulosis will need to start on                              Metamucil or its equivalent for more details look                             at patient instructions.                            I would recomend that you get some genetic                             counseling with your brother so young and an uncle                             that was young also. If the genetic path report                             from your brother shows a genetic problem then that                             may change how often you need a colonoscopy. For                             right now I would have you on the 5 year plan. But                             you may be needing to have one a year or to be the                             10 year plan if you do not have the genetic                             predisposition that your brother had.                            5                                                                                     ___________________  Liam Kincaid MD  2/15/2018 8:10:19 AM  I was physically  present for the entire viewing portion of the exam.Liam Kincaid MD  Number of Addenda: 0    Note Initiated On: 2/15/2018 6:55 AM  Scope In:  Scope Out:       Orders Only on 02/09/2018   Component Date Value Ref Range Status     Bilirubin Direct 02/09/2018 <0.1  0.0 - 0.2 mg/dL Final     Bilirubin Total 02/09/2018 0.3  0.2 - 1.3 mg/dL Final     Albumin 02/09/2018 3.9  3.4 - 5.0 g/dL Final     Protein Total 02/09/2018 7.1  6.8 - 8.8 g/dL Final     Alkaline Phosphatase 02/09/2018 62  40 - 150 U/L Final     ALT 02/09/2018 38  0 - 70 U/L Final     AST 02/09/2018 21  0 - 45 U/L Final     GGT 02/09/2018 37  0  - 75 U/L Final     Hep B Surface Agn 02/09/2018 Nonreactive  NR^Nonreactive Final     Hepatitis C Antibody 02/09/2018 Nonreactive  NR^Nonreactive Final    Comment: Assay performance characteristics have not been established for newborns,   infants, and children       EBV Capsid Antibody IgG 02/09/2018 >8.0* 0.0 - 0.8 AI Final    Comment: Positive, suggests recent or past exposure  Antibody index (AI) values reflect qualitative changes in antibody   concentration that cannot be directly associated with clinical condition or   disease state.       EBV Capsid Antibody IgM 02/09/2018 0.2  0.0 - 0.8 AI Final    Comment: No detectable antibody.  Antibody index (AI) values reflect qualitative changes in antibody   concentration that cannot be directly associated with clinical condition or   disease state.       Hepatitis B Core IgM 02/09/2018 Nonreactive  NR^Nonreactive Final    Comment: A nonreactive result suggests lack of recent exposure to the virus in the   preceding 6 months.     Office Visit on 02/08/2018   Component Date Value Ref Range Status     Pain Drug SCR UR W RPTD Meds 02/08/2018 FINAL   Final    Comment: (Note)  ====================================================================  [facility] COMP DRUG ANALYSIS,UR  ====================================================================  Test                             Result       Flag       Units        Drug Present and Declared for Prescription Verification   Amphetamine                    5353         EXPECTED   ng/mg creat    Amphetamine is available as a schedule II prescription drug.  Drug Present not Declared for Prescription Verification   Oxazepam                       206          UNEXPECTED ng/mg creat    Oxazepam may be administered as a scheduled prescription    medication; it is also an expected metabolite of other    benzodiazepine drugs, including diazepam, chlordiazepoxide,    prazepam, clorazepate, halazepam, and temazepam.  Drug Absent but  Declared for Prescription Verification   Clonazepam                     Not Detected UNEXPECTED ng/mg creat   Gabapentin                     Not Detected UNEXPECTED               Tizan                           idine                     Not Detected UNEXPECTED                Tizanidine, as indicated in the declared medication list, is not    always detected even when used as directed.   Duloxetine                     Not Detected UNEXPECTED              ====================================================================  Test                      Result    Flag   Units      Ref Range        Creatinine              53               mg/dL      >=20            ====================================================================  Declared Medications:  The flagging and interpretation on this report are based on the  following declared medications.  Unexpected results may arise from  inaccuracies in the declared medications.  **Note: The testing scope of this panel includes these medications:  Amphetamine (Adderall)  Clonazepam  Clonazepam (Klonopin)  Duloxetine  Duloxetine (Cymbalta)  Gabapentin  **Note: The testing scope of this panel does not include small to  moderate amounts of these reporte                           d medications:  Tizanidine  ====================================================================  For clinical consultation, please call [phone].  ====================================================================  Analysis performed by [facility] [place] [address]     Orders Only on 01/25/2018   Component Date Value Ref Range Status     Cholesterol 01/25/2018 176  <200 mg/dL Final     Triglycerides 01/25/2018 110  <150 mg/dL Final    Fasting specimen     HDL Cholesterol 01/25/2018 49  >39 mg/dL Final     LDL Cholesterol Calculated 01/25/2018 105* <100 mg/dL Final    Comment: Above desirable:  100-129 mg/dl  Borderline High:  130-159 mg/dL  High:             160-189  mg/dL  Very high:       >189 mg/dl       Non HDL Cholesterol 01/25/2018 127  <130 mg/dL Final     Sodium 01/25/2018 139  133 - 144 mmol/L Final     Potassium 01/25/2018 4.3  3.4 - 5.3 mmol/L Final     Chloride 01/25/2018 105  94 - 109 mmol/L Final     Carbon Dioxide 01/25/2018 27  20 - 32 mmol/L Final     Anion Gap 01/25/2018 7  3 - 14 mmol/L Final     Glucose 01/25/2018 98  70 - 99 mg/dL Final    Fasting specimen     Urea Nitrogen 01/25/2018 11  7 - 30 mg/dL Final     Creatinine 01/25/2018 0.92  0.66 - 1.25 mg/dL Final     GFR Estimate 01/25/2018 >90  >60 mL/min/1.7m2 Final    Non  GFR Calc     GFR Estimate If Black 01/25/2018 >90  >60 mL/min/1.7m2 Final    African American GFR Calc     Calcium 01/25/2018 9.2  8.5 - 10.1 mg/dL Final     Bilirubin Total 01/25/2018 0.5  0.2 - 1.3 mg/dL Final     Albumin 01/25/2018 3.9  3.4 - 5.0 g/dL Final     Protein Total 01/25/2018 7.2  6.8 - 8.8 g/dL Final     Alkaline Phosphatase 01/25/2018 57  40 - 150 U/L Final     ALT 01/25/2018 119* 0 - 70 U/L Final     AST 01/25/2018 234* 0 - 45 U/L Final           ASSESSMENT/PLAN:    Family history of colon cancer:  He had his first colonoscopy which showed 2 polyps  The pathology is pending  I recommend(ed) that the most important thing to do is to keep up with his screening colonoscopies and that the frequency of those will be determined by his pathology results.       Hepatocellular injury:  Resolved since being off of naproxen and gabapentin     He will restart the gabapentin and we will recheck the liver function tests  in a month(s) at a laboratory only appointment(s)     Future labs ordered    Patient Instructions   Restart the gabapentin 100 mg at bedtime and get a laboratory appointment(s) in about 3-4 weeks

## 2018-02-16 NOTE — LETTER
My Depression Action Plan  Name: Hoang Kiser   Date of Birth 1985  Date: 2/16/2018    My doctor: Phill Floyd   My clinic: [facility]  [id number] [place] [id number] [phone]          GREEN    ZONE   Good Control    What it looks like:     Things are going generally well. You have normal up s and down s. You may even feel depressed from time to time, but bad moods usually last less than a day.   What you need to do:  1. Continue to care for yourself (see self care plan)  2. Check your depression survival kit and update it as needed  3. Follow your physician s recommendations including any medication.  4. Do not stop taking medication unless you consult with your physician first.           YELLOW         ZONE Getting Worse    What it looks like:     Depression is starting to interfere with your life.     It may be hard to get out of bed; you may be starting to isolate yourself from others.    Symptoms of depression are starting to last most all day and this has happened for several days.     You may have suicidal thoughts but they are not constant.   What you need to do:     1. Call your care team, your response to treatment will improve if you keep your care team informed of your progress. Yellow periods are signs an adjustment may need to be made.     2. Continue your self-care, even if you have to fake it!    3. Talk to someone in your support network    4. Open up your depression survival kit           RED    ZONE Medical Alert - Get Help    What it looks like:     Depression is seriously interfering with your life.     You may experience these or other symptoms: You can t get out of bed most days, can t work or engage in other necessary activities, you have trouble taking care of basic hygiene, or basic responsibilities, thoughts of suicide or death that will not go away, self-injurious behavior.     What you need to do:  1. Call your care team and  request a same-day appointment. If they are not available (weekends or after hours) call your local crisis line, emergency room or 911.          Depression Self Care Plan / Survival Kit    Self-Care for Depression  Here s the deal. Your body and mind are really not as separate as most people think.  What you do and think affects how you feel and how you feel influences what you do and think. This means if you do things that people who feel good do, it will help you feel better.  Sometimes this is all it takes.  There is also a place for medication and therapy depending on how severe your depression is, so be sure to consult with your medical provider and/ or Behavioral Health Consultant if your symptoms are worsening or not improving.     In order to better manage my stress, I will:    Exercise  Get some form of exercise, every day. This will help reduce pain and release endorphins, the  feel good  chemicals in your brain. This is almost as good as taking antidepressants!  This is not the same as joining a gym and then never going! (they count on that by the way ) It can be as simple as just going for a walk or doing some gardening, anything that will get you moving.      Hygiene   Maintain good hygiene (Get out of bed in the morning, Make your bed, Brush your teeth, Take a shower, and Get dressed like you were going to work, even if you are unemployed).  If your clothes don't fit try to get ones that do.    Diet  I will strive to eat foods that are good for me, drink plenty of water, and avoid excessive sugar, caffeine, alcohol, and other mood-altering substances.  Some foods that are helpful in depression are: complex carbohydrates, B vitamins, flaxseed, fish or fish oil, fresh fruits and vegetables.    Psychotherapy  I agree to participate in Individual Therapy (if recommended).    Medication  If prescribed medications, I agree to take them.  Missing doses can result in serious side effects.  I understand that  drinking alcohol, or other illicit drug use, may cause potential side effects.  I will not stop my medication abruptly without first discussing it with my provider.    Staying Connected With Others  I will stay in touch with my friends, family members, and my primary care provider/team.    Use your imagination  Be creative.  We all have a creative side; it doesn t matter if it s oil painting, sand castles, or mud pies! This will also kick up the endorphins.    Witness Beauty  (AKA stop and smell the roses) Take a look outside, even in mid-winter. Notice colors, textures. Watch the squirrels and birds.     Service to others  Be of service to others.  There is always someone else in need.  By helping others we can  get out of ourselves  and remember the really important things.  This also provides opportunities for practicing all the other parts of the program.    Humor  Laugh and be silly!  Adjust your TV habits for less news and crime-drama and more comedy.    Control your stress  Try breathing deep, massage therapy, biofeedback, and meditation. Find time to relax each day.     My support system    Clinic Contact:  Phone number:    Contact 1:  Phone number:    Contact 2:  Phone number:    Voodoo/:  Phone number:    Therapist:  Phone number:    Local crisis center:    Phone number:    Other community support:  Phone number:

## 2018-02-16 NOTE — MR AVS SNAPSHOT
After Visit Summary   2/16/2018    Hoang Kiser    MRN: [id number]           Patient Information     Date Of Birth          1985        Visit Information        Provider Department      2/16/2018 12:15 PM Phill Floyd MD [facility]        Today's Diagnoses     Primary insomnia    -  1    Lumbar radicular pain        Lumbar paraspinal muscle spasm        Hepatocellular injury          Care Instructions    Restart the gabapentin 100 mg at bedtime and get a laboratory appointment(s) in about 3-4 weeks           Follow-ups after your visit        Your next 10 appointments already scheduled     Feb 23, 2018  1:00 PM CST   New Visit with Fili Wesley, LP   [facility] ([facility])    [address]  [facility] [address] [phone]            Feb 23, 2018  2:30 PM CST   New Visit with Faustino Ryan PT   [facility] ([facility])    [address]  [address]  [facility] [address] [phone]            Feb 27, 2018  7:45 AM CST   Radiology Injections with Giovani Ferguson MD   [facility] [place] ([facility] [place])    [address] [facility] [address] [phone]            Mar 01, 2018 12:30 PM CST   Walk In From ENT with Hugo Galvez   [facility] Audiology ([facility])    [address]  [address]th [facility] [id number] [phone]            Mar 01, 2018  3:00 PM CST   (Arrive by 2:45 PM)   New Patient Visit with Chuck Corona MD   [facility] Ear Nose and Throat ([facility])    [address]  4th Floor  [facility] [address] [phone]            Mar 13, 2018  8:00 AM CDT   Return Visit with CARLOS A Guy CNP   [facility] ([facility])    [address]  [facility] [address] [phone]               Future tests that were ordered for you today     Open Future Orders        Priority Expected Expires Ordered    Hepatic panel Routine 3/16/2018 6/16/2018 2/16/2018            Who to contact     If you have questions or need follow up information about today's clinic visit or your schedule please contact [facility] directly at [phone].  Normal or non-critical lab and imaging results will be communicated to you by MyChart, letter or phone within 4 business days after the clinic has received the results. If you do not hear from us within 7 days, please contact the clinic through RentersQhart or phone. If you have a critical or abnormal lab result, we will notify you by phone as soon as possible.  Submit refill requests through Onyvax or call your pharmacy and they will forward the refill request to us. Please allow 3 business days for your refill to be completed.          Additional Information About Your Visit        MyChart Information     [facility] gives you secure access to your electronic health record. If you see a primary care provider, you can also send messages to your care team and make appointments. If you have questions, please call your primary care clinic.  If you do not have a primary care provider, please call [phone] and they will assist you.        Care EveryWhere ID     This is your Care EveryWhere ID. This could be used by other organizations to access your [place] medical records  [id number]        Your Vitals Were     Pulse Temperature Pulse Oximetry BMI (Body Mass Index)          85 97.5  F (36.4  C) (Oral) 100% 25.62 kg/m2         Blood Pressure from Last 3 Encounters:   02/16/18 124/79   02/15/18 119/74   02/08/18 122/80    Weight from Last 3 Encounters:   02/16/18 166 lb (75.3 kg)   02/08/18 168 lb (76.2 kg)   02/01/18 164 lb (74.4 kg)                 Where to get your medicines      These medications were sent to [facility] [address] [facility] [address] AT [facility] [place] [address], [facility] [address]     Phone:  [phone]     gabapentin 100 MG capsule         Some of these will need a paper prescription and others can be bought over the counter.  Ask your nurse if you have questions.     Bring a paper prescription for each of these medications     zolpidem 10 MG tablet          Primary Care Provider Office Phone # Fax #    Phill Floyd [phone] [address] [facility] [address]        Equal Access to Services     : Hadii aad ku hadasho Soomaali, waaxda luqadaha, qaybta kaalmada adeegyada, waxay oscarin haychavon adegunner lorenzana . So [facility] [phone].    ATENCIÓN: Si habla español, tiene a cutler disposición servicios gratuitos de asistencia lingüística. [facility] [phone].    We comply with applicable federal civil rights laws and [place] laws. We do not discriminate on the basis of race, color, national origin, age, disability, sex, sexual orientation, or gender identity.            Thank you!     Thank you for choosing [facility]  for your care. Our goal is always to provide you with excellent care. Hearing back from our patients is one way we can continue to improve our services. Please take a few minutes to complete the written survey that you may receive in the mail after your visit with us. Thank you!             Your Updated Medication List - Protect others around you: Learn how to safely use, store and throw away your medicines at www.disposemymeds.org.          This list is accurate as of 2/16/18 12:55 PM.  Always use your most recent med list.                   Brand Name Dispense Instructions for use Diagnosis    acetylcysteine 600 MG Caps capsule    N-ACETYL CYSTEINE     Take 1,800 mg by mouth daily        * amphetamine-dextroamphetamine 20 MG per tablet    ADDERALL     Take 20 mg by mouth daily Reported on 3/6/2017        *  amphetamine-dextroamphetamine 30 MG per 24 hr capsule    ADDERALL XR     Take 1 capsule by mouth every morning        clonazePAM 0.5 MG tablet    klonoPIN     Take 0.5-1 mg by mouth nightly as needed        DULoxetine 20 MG EC capsule    CYMBALTA     Take 1 capsule by mouth daily        gabapentin 100 MG capsule    NEURONTIN    90 capsule    Take 1 capsule (100 mg) by mouth daily    Lumbar radicular pain, Lumbar paraspinal muscle spasm       naproxen 500 MG tablet    NAPROSYN    40 tablet    Take 1 tablet (500 mg) by mouth 2 times daily (with meals)    Lumbar radicular pain, Lumbar paraspinal muscle spasm       * nicotine 14 MG/24HR 24 hr patch    NICODERM CQ    30 patch    Place 1 patch onto the skin every 24 hours Use this patch first    Tobacco abuse       * nicotine 7 MG/24HR 24 hr patch    NICODERM CQ    30 patch    Place 1 patch onto the skin every 24 hours    Tobacco abuse       tiZANidine 4 MG tablet    ZANAFLEX    30 tablet    Take 1-2 tablets (4-8 mg) by mouth nightly as needed    Lumbar radicular pain, Lumbar paraspinal muscle spasm       zolpidem 10 MG tablet    AMBIEN    30 tablet    Take 1 tablet (10 mg) by mouth nightly as needed for sleep    Primary insomnia       * Notice:  This list has 4 medication(s) that are the same as other medications prescribed for you. Read the directions carefully, and ask your doctor or other care provider to review them with you.

## 2018-02-18 ENCOUNTER — MYC MEDICAL ADVICE (OUTPATIENT)
Dept: ORTHOPEDICS | Facility: CLINIC | Age: 33
End: 2018-02-18

## 2018-02-18 DIAGNOSIS — M54.16 LUMBAR RADICULAR PAIN: ICD-10-CM

## 2018-02-18 DIAGNOSIS — M62.830 LUMBAR PARASPINAL MUSCLE SPASM: ICD-10-CM

## 2018-02-23 ENCOUNTER — OFFICE VISIT (OUTPATIENT)
Dept: PALLIATIVE MEDICINE | Facility: CLINIC | Age: 33
End: 2018-02-23
Attending: NURSE PRACTITIONER
Payer: COMMERCIAL

## 2018-02-23 DIAGNOSIS — G89.29 CHRONIC LOW BACK PAIN WITH SCIATICA, SCIATICA LATERALITY UNSPECIFIED, UNSPECIFIED BACK PAIN LATERALITY: Primary | ICD-10-CM

## 2018-02-23 DIAGNOSIS — G89.29 CHRONIC PAIN: Primary | ICD-10-CM

## 2018-02-23 DIAGNOSIS — M54.50 CHRONIC LOW BACK PAIN: ICD-10-CM

## 2018-02-23 DIAGNOSIS — G89.29 CHRONIC LOW BACK PAIN: ICD-10-CM

## 2018-02-23 DIAGNOSIS — M54.40 CHRONIC LOW BACK PAIN WITH SCIATICA, SCIATICA LATERALITY UNSPECIFIED, UNSPECIFIED BACK PAIN LATERALITY: Primary | ICD-10-CM

## 2018-02-23 PROCEDURE — 96150 HC HEALTH & BEHAVIOR ASSESS INITIAL, EA 15MIN: CPT | Performed by: PSYCHOLOGIST

## 2018-02-23 PROCEDURE — 97161 PT EVAL LOW COMPLEX 20 MIN: CPT | Mod: GP | Performed by: PHYSICAL THERAPIST

## 2018-02-23 PROCEDURE — 97112 NEUROMUSCULAR REEDUCATION: CPT | Mod: GP | Performed by: PHYSICAL THERAPIST

## 2018-02-23 NOTE — PROGRESS NOTES
PHYSICAL THERAPY INITIAL EVALUATION and PLAN OF CARE    Patient Name:  Hoang Kiser  : 1985  MRN:[id number]    SUBJECTIVE:  PRESENTATION AND ETIOLOGY  Chief Complaint:  LBP limiting the ability to perform perform activities of daily living.      Onset / Etiology: 2016    Pattern Since Onset: Worsened     Pertinent Medical  / Surgical History: Epic Snapshot Reviewed:  Contributing factors to the severity / complexity of the patient's chief complaint include: see providers notes     Symptom Pattern: typically worse as day progresses    Pain:  Frequency: Constant or Activity Dependent           Intensity: Current 4/10, range 1-7/10    LEVEL OF FUNCTION AT START OF CARE  Current Aggrevating Activities / Functional Limitations: Pt estimates walk 60', sit 60', stand 40'.   After walking at slower pace, pt acknowledges he likely can't walk 60 minutes without increasing his pain.  More likely 15' for walking tolerance.    Home or Community Barriers: stairs at home    Patient's selected goals for physical therapy: be able to run again, bike.  Be able to manage pain better    CURRENT / PREVIOUS INTERVENTION(S):     Relieving Activities / Self Care / Exercise: stretching, heat, baths all help some. Medications    Previous / Current therapies for current chief complaint: traditonal PT, work hardening     DEMOGRAPHICS  Employment Status: states he is looking for work    ===============================================================  OBJECTIVE:  POSTURE:  Observation: Holds breath during transitional movements.  Minimal core engagement observed to support posture and exhibits overactive UT.    GAIT, LOCOMOTION, and BALANCE:  Gait and Locomotion: Antalgic when cued to slow down.  Note decreased trunk rotation, arm swing and minimal toe off.    RANGE OF MOTION:   Active: Lumbar flex 50%, ext 25%, B shldrs WFL.  Neck flex 75%, ext 25%    MUSCLE PERFORMANCE:   Strength: Poor abdominal activation, scapular setting.   "Able to perform heel walk, toe walk. SLS x 5\" on R and on L.    ===============================================================  Today's Treatment:  Initial evaluation  Neuromuscular Reeducation 10:   Posture  and Kinesthetic Body Awareness - ed on neutral spine  Ed on chronic pain PT POC, walking program  ===============================================================  ASSESSMENT:  Physical Therapy Diagnosis:  Musculoskeletal Patterns    Patient requires PT intervention for the following impairments: Limited knowledge of condition and / or self care - inability to control symptoms, Impaired gait / weight bearing tolerance, Impaired posture / muscle imbalance, Joint hypomobility, Muscle strength and endurance limitations, Pain and Deconditioning    Anticipated Goals and Expected Outcomes:EDUCATION AND SELF CARE  Independent and safe with home exercise / self care program in 6 week(s).  Good working knowledge of posture principles / joint protection in 6 week(s).  FUNCTIONAL MOBILITY  Ambulation without increased pain or symptoms for community distances on all surfaces in 12 week(s).  ADL'S  Standing tolerance 60 minutes without increased pain or symptoms in 12 week(s).  Homemaking / Yardwork with increased ease in 12 week(s).    Rehab potential for achieving goals: fair.      Therapy Evaluation Codes:   1) History comprised of:   Personal factors that impact the plan of care:   Anxiety and Past/current experiences.   Comorbidity factors that impact the plan of care are:   Depression, Smoking and chemical dependence in remission.   Medications impacting care: Pain.   2) Examination of Body Systems comprised of:   Body structures and functions that impact the plan of care:   Lumbar spine.   Activity limitations that impact the plan of care are:   Lifting and Standing.   3) Clinical presentation characteristics are:   Stable/Uncomplicated.   4) Decision-Making   Low complexity using standardized patient assessment " instrument and/or measureable assessment of functional outcome.   Cumulative Therapy Evaluation is: Low complexity.      ===============================================================  PLAN:   Patient will benefit from skilled physical therapy consisting of: neuromuscular reeducation of: movement, balance, coordination, kinesthetic sense, posture and proprioception for sitting and / or standing activities, education in self care / home management training to include instruction in: joint protection principles and symptom control techniques, therapeutic activities to achieve improved functional performance in: daily actvities and work activities and therapeutic exercise to develop: strength and endurance, joint mobility and joint stability    Assessment will be ongoing with changes in treatment as indicated.  Benefits/risks/alternatives to treatment have been reviewed and the patient has been instructed to contact this office if there are any questions or concerns.  This plan of care has been discussed with the patient and the patient is in agreement.     Frequency / Duration:  Patient will be seen for a total of 8-12 visits; at a frequency of every 1-3 weeks.    Total Visit Time: 50  minutes            Faustino Ryan          PT                                Date:  2/23/2018    =====================================================  **  Referring Provider Certification: Referring Provider reviewing certifies that the above treatment / plan of care is required and authorized, and that the patient's plan will be reviewed every thirty (30) days **.   ======================================================     PRESENT: NA    MULTIDISCIPLINARY PATIENT / FAMILY EDUCATION RECORD  Department:  Physical Therapy    Readiness to Learn: Ability to understand verbal instructions,Ability to understand written instructions,Knowledge of educational needs / treatment plan  Specific Barriers to Learning: None  Referrals:  None  Learning Needs: Rehabilitation techniques to improve functional independence  Who: Patient  How: Demonstration,Verbal instructions,Written instructions  Response: Appropriate verbal response,Asked questions,Demonstrated ability,Verbalized recall / understanding

## 2018-02-23 NOTE — MR AVS SNAPSHOT
After Visit Summary   2/23/2018    Hoang Kiser    MRN: [id number]           Patient Information     Date Of Birth          1985        Visit Information        Provider Department      2/23/2018 2:30 PM Faustino Ryan, PT [facility]        Today's Diagnoses     Chronic pain    -  1    Chronic low back pain           Follow-ups after your visit        Your next 10 appointments already scheduled     Feb 27, 2018  7:45 AM CST   Radiology Injections with Giovani Ferguson MD   [facility] [place] ([facility] [place])    [address]  [place] [address]   [phone]            Feb 27, 2018  8:00 AM CST   XR LUMBAR EPIDURAL INJECTION with BEPAIN1   [facility] [place] [facility] ([facility] [place])    [address]  [address]  [place] [address]   [phone]           For nerve root injection, please send or bring copies of any MRIs or other scans you have had.  Bring a list of your current medicines to your exam. (Include vitamins, minerals and over-the-counter medicines.) Leave your valuables at home.  Plan to have someone drive you home afterward.  Stop taking the following medicines (but talk to your doctor first):   If you take blood thinners, you may need to stop taking them a few days before treatment. Talk to your doctor before stopping these medicines.Stop taking Coumadin (warfarin) 3 days before treatment. Restart the day after treatment.   If you take Plavix, Ticlid, Pletal or Persantine, please ask your doctor if you should stop these medicines. You may need extra tests on the morning of your scan.   If you take Xarelto (Rivaroxaban), you may need to stop taking it 24 hours before treatment. Talk to your doctor before stopping this medicine. Restart the day after treatment.  You may take your other medicines as normal.  Stop all food and drink (including water) 3 hours before your test or treatment.  Please tell  the doctor:   If you are allergic to X-ray dye (contrast fluid).   If you may be pregnant.  Injections take about 30 to 45 minutes. Most people spend up to 2 hours in the clinic or hospital.  Please call the Imaging Department at your exam site with any questions.            Mar 01, 2018 12:30 PM CST   Walk In From ENT with Hugo Galvez [facility] ([facility])    [address] [place] [id number] [phone]            Mar 01, 2018  3:00 PM CST   (Arrive by 2:45 PM)   New Patient Visit with Chuck Corona MD   [facility] Ear Nose and Throat ([facility])    [address] [place] [id number] [phone]            Mar 07, 2018  9:30 AM CST   Return Visit with Faustino Ryan PT   [facility] ([facility])    [address]  [address]  [facility] [address] [phone]            Mar 13, 2018  8:00 AM CDT   Return Visit with CARLOS A Guy CNP   [facility] ([facility])    [address]  [address]  [facility] [address] [phone]            Mar 16, 2018  7:30 AM CDT   LAB with CP LAB   [facility]    [address] [facility] [address] [phone]           Please do not eat 10-12 hours before your appointment if you are coming in fasting for labs on lipids, cholesterol, or glucose (sugar). This does not apply to pregnant women. Water, hot tea and black coffee (with nothing added) are okay. Do not drink other fluids, diet soda or chew gum.            Mar 21, 2018  9:30 AM CDT   Return Visit with Faustino Ryan PT   [facility] ([facility])    [address]  [facility] [address] [phone]              Future tests that were ordered for you today     Open Future Orders         Priority Expected Expires Ordered    XR Lumbar Epidural Injection Routine 2/23/2018 2/23/2019 2/23/2018            Who to contact     If you have questions or need follow up information about today's clinic visit or your schedule please contact [facility] directly at [phone].  Normal or non-critical lab and imaging results will be communicated to you by MyChart, letter or phone within 4 business days after the clinic has received the results. If you do not hear from us within 7 days, please contact the clinic through Thrupointhart or phone. If you have a critical or abnormal lab result, we will notify you by phone as soon as possible.  Submit refill requests through Rocketboom or call your pharmacy and they will forward the refill request to us. Please allow 3 business days for your refill to be completed.          Additional Information About Your Visit        MyChart Information     [employer] gives you secure access to your electronic health record. If you see a primary care provider, you can also send messages to your care team and make appointments. If you have questions, please call your primary care clinic.  If you do not have a primary care provider, please call [phone] and they will assist you.        Care EveryWhere ID     This is your Care EveryWhere ID. This could be used by other organizations to access your [place] medical records  [id number]         Blood Pressure from Last 3 Encounters:   02/16/18 124/79   02/15/18 119/74   02/08/18 122/80    Weight from Last 3 Encounters:   02/16/18 75.3 kg (166 lb)   02/08/18 76.2 kg (168 lb)   02/01/18 74.4 kg (164 lb)              We Performed the Following     HC PT EVAL, LOW COMPLEXITY     NEUROMUSCULAR RE-EDUCATION        Primary Care Provider Office Phone # Fax #    Phill Floyd [phone] [address] [facility] [address]        Equal Access to Services     [place] HUI: Mariana perez  Geovany, waadithyada luqadaha, qacezarta kabay hawkins, arnav perry benjamíngunner josefasalima laDimitriosmisty wenceslao. So [facility] [phone].    ATENCIÓN: Si melissa dunne, tiene a cutler disposición servicios gratuitos de asistencia lingüística. Rodríguez al [phone].    We comply with applicable federal civil rights laws and [place] laws. We do not discriminate on the basis of race, color, national origin, age, disability, sex, sexual orientation, or gender identity.            Thank you!     Thank you for choosing [facility]  for your care. Our goal is always to provide you with excellent care. Hearing back from our patients is one way we can continue to improve our services. Please take a few minutes to complete the written survey that you may receive in the mail after your visit with us. Thank you!             Your Updated Medication List - Protect others around you: Learn how to safely use, store and throw away your medicines at www.disposemymeds.org.          This list is accurate as of 2/23/18  3:32 PM.  Always use your most recent med list.                   Brand Name Dispense Instructions for use Diagnosis    acetylcysteine 600 MG Caps capsule    N-ACETYL CYSTEINE     Take 1,800 mg by mouth daily        * amphetamine-dextroamphetamine 20 MG per tablet    ADDERALL     Take 20 mg by mouth daily Reported on 3/6/2017        * amphetamine-dextroamphetamine 30 MG per 24 hr capsule    ADDERALL XR     Take 1 capsule by mouth every morning        clonazePAM 0.5 MG tablet    klonoPIN     Take 0.5-1 mg by mouth nightly as needed        DULoxetine 20 MG EC capsule    CYMBALTA     Take 1 capsule by mouth daily        gabapentin 100 MG capsule    NEURONTIN    90 capsule    Take 1 capsule (100 mg) by mouth daily    Lumbar radicular pain, Lumbar paraspinal muscle spasm       naproxen 500 MG tablet    NAPROSYN    40 tablet    Take 1 tablet (500 mg) by mouth 2 times daily (with meals)    Lumbar radicular pain, Lumbar  paraspinal muscle spasm       * nicotine 14 MG/24HR 24 hr patch    NICODERM CQ    30 patch    Place 1 patch onto the skin every 24 hours Use this patch first    Tobacco abuse       * nicotine 7 MG/24HR 24 hr patch    NICODERM CQ    30 patch    Place 1 patch onto the skin every 24 hours    Tobacco abuse       tiZANidine 4 MG tablet    ZANAFLEX    30 tablet    Take 1-2 tablets (4-8 mg) by mouth nightly as needed    Lumbar radicular pain, Lumbar paraspinal muscle spasm       zolpidem 10 MG tablet    AMBIEN    30 tablet    Take 1 tablet (10 mg) by mouth nightly as needed for sleep    Primary insomnia       * Notice:  This list has 4 medication(s) that are the same as other medications prescribed for you. Read the directions carefully, and ask your doctor or other care provider to review them with you.
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
## 2018-02-23 NOTE — MR AVS SNAPSHOT
After Visit Summary   2/23/2018    Hoang Kiser    MRN: [id number]           Patient Information     Date Of Birth          1985        Visit Information        Provider Department      2/23/2018 1:00 PM Fili Wesley LP [facility]        Today's Diagnoses     Chronic low back pain with sciatica, sciatica laterality unspecified, unspecified back pain laterality    -  1       Follow-ups after your visit        Your next 10 appointments already scheduled     Feb 27, 2018  7:45 AM CST   Radiology Injections with Giovani Ferguson MD   [facility] ([facility] [place])    [address] [facility] [address]   [phone]            Mar 01, 2018 12:30 PM CST   Walk In From ENT with Hugo Galvez   [facility] ([facility])    [address] [place] [address]   [phone]            Mar 01, 2018  3:00 PM CST   (Arrive by 2:45 PM)   New Patient Visit with Chuck Corona MD   [facility] ([facility])    [address] [place] [address]   [phone]            Mar 13, 2018  8:00 AM CDT   Return Visit with CARLOS A Guy CNP   [facility] ([facility])    [address]  [facility] [address]  [facility] [address]   [phone]            Mar 16, 2018  7:30 AM CDT   LAB with CP LAB   [facility] ([facility])    [address] [facility] [address] [phone]           Please do not eat 10-12 hours before your appointment if you are coming in fasting for labs on lipids, cholesterol, or glucose (sugar). This does not apply to pregnant women. Water, hot tea and black coffee (with nothing added) are okay. Do not drink other fluids, diet soda or chew gum.              Who to contact     If  you have questions or need follow up information about today's clinic visit or your schedule please contact [facility] directly at [phone].  Normal or non-critical lab and imaging results will be communicated to you by MyChart, letter or phone within 4 business days after the clinic has received the results. If you do not hear from us within 7 days, please contact the clinic through [employer] or phone. If you have a critical or abnormal lab result, we will notify you by phone as soon as possible.  Submit refill requests through [employer] or call your pharmacy and they will forward the refill request to us. Please allow 3 business days for your refill to be completed.          Additional Information About Your Visit        [employer] Information     [employer] gives you secure access to your electronic health record. If you see a primary care provider, you can also send messages to your care team and make appointments. If you have questions, please call your primary care clinic.  If you do not have a primary care provider, please call [phone] and they will assist you.        Care EveryWhere ID     This is your Care EveryWhere ID. This could be used by other organizations to access your [facility] medical records  [id number]         Blood Pressure from Last 3 Encounters:   02/16/18 124/79   02/15/18 119/74   02/08/18 122/80    Weight from Last 3 Encounters:   02/16/18 75.3 kg (166 lb)   02/08/18 76.2 kg (168 lb)   02/01/18 74.4 kg (164 lb)              We Performed the Following     HEALTH & BEHAVIOR ASSESS INITIAL, EA 15MIN        Primary Care Provider Office Phone # Fax #    Phill Floyd [phone] [address] [facility] [address]        Equal Access to Services     [facility]: Hadii rosemary Armenta, wajuani rinaldi, qaybta arnav cantrell . So [facility] [phone].    ATENCIÓN: Si remedios tidwell cutler  disposición servicios gratuitos de asistencia lingüística. Rodríguez oscar [phone].    We comply with applicable federal civil rights laws and [place] laws. We do not discriminate on the basis of race, color, national origin, age, disability, sex, sexual orientation, or gender identity.            Thank you!     Thank you for choosing [facility]  for your care. Our goal is always to provide you with excellent care. Hearing back from our patients is one way we can continue to improve our services. Please take a few minutes to complete the written survey that you may receive in the mail after your visit with us. Thank you!             Your Updated Medication List - Protect others around you: Learn how to safely use, store and throw away your medicines at www.disposemymeds.org.          This list is accurate as of 2/23/18  2:45 PM.  Always use your most recent med list.                   Brand Name Dispense Instructions for use Diagnosis    acetylcysteine 600 MG Caps capsule    N-ACETYL CYSTEINE     Take 1,800 mg by mouth daily        * amphetamine-dextroamphetamine 20 MG per tablet    ADDERALL     Take 20 mg by mouth daily Reported on 3/6/2017        * amphetamine-dextroamphetamine 30 MG per 24 hr capsule    ADDERALL XR     Take 1 capsule by mouth every morning        clonazePAM 0.5 MG tablet    klonoPIN     Take 0.5-1 mg by mouth nightly as needed        DULoxetine 20 MG EC capsule    CYMBALTA     Take 1 capsule by mouth daily        gabapentin 100 MG capsule    NEURONTIN    90 capsule    Take 1 capsule (100 mg) by mouth daily    Lumbar radicular pain, Lumbar paraspinal muscle spasm       naproxen 500 MG tablet    NAPROSYN    40 tablet    Take 1 tablet (500 mg) by mouth 2 times daily (with meals)    Lumbar radicular pain, Lumbar paraspinal muscle spasm       * nicotine 14 MG/24HR 24 hr patch    NICODERM CQ    30 patch    Place 1 patch onto the skin every 24 hours Use this patch first    Tobacco  abuse       * nicotine 7 MG/24HR 24 hr patch    NICODERM CQ    30 patch    Place 1 patch onto the skin every 24 hours    Tobacco abuse       tiZANidine 4 MG tablet    ZANAFLEX    30 tablet    Take 1-2 tablets (4-8 mg) by mouth nightly as needed    Lumbar radicular pain, Lumbar paraspinal muscle spasm       zolpidem 10 MG tablet    AMBIEN    30 tablet    Take 1 tablet (10 mg) by mouth nightly as needed for sleep    Primary insomnia       * Notice:  This list has 4 medication(s) that are the same as other medications prescribed for you. Read the directions carefully, and ask your doctor or other care provider to review them with you.

## 2018-02-27 ENCOUNTER — RADIANT APPOINTMENT (OUTPATIENT)
Dept: RADIOLOGY | Facility: CLINIC | Age: 33
End: 2018-02-27
Attending: ANESTHESIOLOGY
Payer: COMMERCIAL

## 2018-02-27 ENCOUNTER — RADIOLOGY INJECTION OFFICE VISIT (OUTPATIENT)
Dept: PALLIATIVE MEDICINE | Facility: CLINIC | Age: 33
End: 2018-02-27
Payer: COMMERCIAL

## 2018-02-27 VITALS — OXYGEN SATURATION: 100 % | DIASTOLIC BLOOD PRESSURE: 86 MMHG | SYSTOLIC BLOOD PRESSURE: 121 MMHG | HEART RATE: 96 BPM

## 2018-02-27 DIAGNOSIS — M54.16 LUMBAR RADICULOPATHY: ICD-10-CM

## 2018-02-27 DIAGNOSIS — M51.369 DDD (DEGENERATIVE DISC DISEASE), LUMBAR: Primary | ICD-10-CM

## 2018-02-27 PROCEDURE — 62323 NJX INTERLAMINAR LMBR/SAC: CPT | Performed by: ANESTHESIOLOGY

## 2018-02-27 ASSESSMENT — PAIN SCALES - GENERAL: PAINLEVEL: MILD PAIN (3)

## 2018-02-27 NOTE — NURSING NOTE
"Chief Complaint   Patient presents with     Pain       Initial There were no vitals taken for this visit. Estimated body mass index is 25.62 kg/(m^2) as calculated from the following:    Height as of 2/1/18: 1.715 m (5' 7.5\").    Weight as of 2/16/18: 75.3 kg (166 lb).  Medication Reconciliation: complete     Pre-procedure Intake    Have you been fasting? NA    If yes, for how long? No     Are you taking a prescribed blood thinner such as coumadin, Plavix, Xarelto?    No    If yes, when did you take your last dose? No     Do you take aspirin?  No    If cervical procedure, have you held aspirin for 6 days?   NA    Do you have any allergies to contrast dye, iodine, steroid and/or numbing medications?  NO    Are you currently taking antibiotics or have an active infection?  NO    Have you had a fever/elevated temperature within the past week? NO    Are you currently taking oral steroids? NO    Do you have a ? Yes       Are you pregnant or breastfeeding?  Not Applicable    Are the vital signs normal?  Yes    Everton Li MA           "

## 2018-02-27 NOTE — PATIENT INSTRUCTIONS
[facility]   Procedure Discharge Instructions    Today you saw: Dr. Giovani Ferguson     You had an:  Epidural steroid injection   -lumbar     Medications used:  Lidocaine   Bupivacaine   Dexamethasone Depo-medrol  Omnipaque            Be cautious when walking. Numbness and/or weakness in the lower extremities may occur for up to 6-8 hours after the procedure due to effect of the local anesthetic    Do not drive for 6 hours. The effect of the local anesthetic could slow your reflexes.     You may resume your regular activities after 24 hours    Avoid strenuous activity for the first 24 hours    You may shower, however avoid swimming, tub baths or hot tubs for 24 hours following your procedure    You may have a mild to moderate increase in pain for several days following the injection.    It may take up to 14 days for the steroid medication to start working although you may feel the effect as early as a few days after the procedure.       You may use ice packs for 10-15 minutes, 3 to 4 times a day at the injection site for comfort    Do not use heat to painful areas for 6 to 8 hours. This will give the local anesthetic time to wear off and prevent you from accidentally burning your skin.     You may use anti-inflammatory medications (such as Ibuprofen or Aleve or Advil) or Tylenol for pain control if necessary    If you were fasting, you may resume your normal diet and medications after the procedure    If you have diabetes, check your blood sugar more frequently than usual as your blood sugar may be higher than normal for 10-14 days following a steroid injection. Contact your doctor who manages your diabetes if your blood sugar is higher than usual    If you experience any of the following, call the pain center nursing line during work hours at [phone] or the after hours provider line at [phone]:  -Fever over 100 degree F  -Swelling, bleeding, redness, drainage, warmth at the  injection site  -Progressive weakness or numbness in your legs or arms  -Loss of bowel or bladder function  -Unusual headache that is not relieved by Tylenol or other pain reliever  -Unusual new onset of pain that is not improving      Phone #s:  Appointment line: [phone];  Nurse line: [phone]

## 2018-02-27 NOTE — PROGRESS NOTES
Pre procedure Diagnosis: lumbar radiculopathy, lumbar degenerative disc disease   Post procedure Diagnosis: Same  Procedure performed: lumbar interlaminar epidural steroid injection at L4-5, fluoroscopically guided, contrast controlled  Anesthesia: local  Complications: none  Operators: Giovani Ferguson MD     Indications:   Hoang Kiser is a 32 year old male was sent by CARLOS A Higuera for repeat lumbar epidural steroid injection.  The patient has a history of chronic lower back pain with pain into the hips bilaterally with occasional right knee pain.  Examination shows lumbar tenderness, normal gait, and negative straight leg raise.  he has tried conservative treatment including physical therapy, medications, and interventional procedures.    MRI was done on 3/8/17 which showed   Findings: Regarding numbering convention, there are 5 lumbar-type  vertebrae assumed for the purposes of this dictation.  The tip of the  conus medullaris is at  L1.  Regarding alignment, the lumbar vertebral  column appears normally aligned.  There is no significant disc height  narrowing at any level.  Regarding bone marrow signal intensity, no  abnormality is visualized on STIR images.  On a level by level basis:     T12-L1: No spinal canal or neuroforaminal stenosis.     L1-2: No spinal canal or neuroforaminal stenosis.     L2-3: Mild posterior disc bulge. No spinal canal or neuroforaminal  stenosis.     L3-4: Mild posterior disc bulge. No spinal canal or neuroforaminal  stenosis. Mild bilateral ligamentum flavum hypertrophy.     L4-5: Mild posterior disc bulge. No spinal canal or neuroforaminal  stenosis. Mild bilateral ligamentum flavum hypertrophy.     L5-S1: Mild posterior disc bulge. No spinal canal or neuroforaminal  stenosis.     Paraspinous tissues are within normal limits.       Impression: No lumbar spine or neuroforaminal stenosis.    Options/alternatives, benefits and risks were discussed with the patient  including but not limited to bleeding, infection, no pain relief, tissue trauma, exposure to radiation, reaction to medications, spinal cord injury, dural puncture, weakness, numbness and headache.  Questions were answered to his satisfaction and he wishes to proceed. Voluntary informed consent was obtained and signed.     Vitals were reviewed: Yes  /75  Pulse 85  Allergies were reviewed:  Yes   Medications were reviewed:  Yes   Pre-procedure pain score: 3/10    Procedure:  The patient's medical history, medications, and allergies were reviewed and reconciled.  After obtaining signed informed consent, the patient was brought into the procedure suite and was placed in a prone position on the procedure table.   A Pause for the Cause was performed.  Patient was prepped and draped in the usual sterile fashion.     The L4-5 interspace was identified with AP fluoroscopy.  A total of 2 ml of 1% lidocaine was used to anesthetize the skin and subcutaneous tissues for a midline approach.    A 20 gauge 3.5 inch Touhy needle was advanced utilizing intermittent AP and Lateral fluoroscopy and air for loss of resistance.  The epidural space was encountered on the first pass without difficulties.  Aspiration for blood and CSF was negative.  Needle position was verified by injecting 1 ml of Omnipaque 300 utilizing real-time fluoroscopy that showed good needle placement and epidural spread without signs of intravascular or intrathecal uptake.  Omnipaque wasted:  9 ml.    Then, after repeated negative aspiration for blood or CSF, a test dose was performed by injecting 1.5 ml of Lidocaine 1% with epinephrine into the epidural space which was negative for heart rate or blood pressure changes, tinnitus, metallic taste, lower extremity paresthesias, or other complaints.    Then, after repeated negative aspiration for blood or CSF, a combination of Depomedrol 40 mg, Dexamethasone 5 mg, Bupivicaine 0.5% 1 ml, diluted with 2.5 ml of  normal saline to a total injectate volume of 5 ml was injected into the epidural space in a slow and incremental fashion and the needle was restyletted and withdrawn.  All injected medications were preservative free.    The injection site was cleaned and a sterile dressing was applied.    The patient tolerated the procedure well without complications and was taken to the recovery room for continued observation.    Images were saved to PACS.    Post-procedure pain score: 1/10  Follow-up includes:   -f/u phone call in one week  -f/u with referring provider    Giovani Ferguson MD  [facility]

## 2018-02-27 NOTE — NURSING NOTE
Discharge Information    IV Discontiued Time:  NA    Amount of Fluid Infused:  NA    Discharge Criteria = When patient returns to baseline or as per MD order    Consciousness:  Pt is fully awake    Circulation:  BP +/- 20% of pre-procedure level    Respiration:  Patient is able to breathe deeply    O2 Sat:  Patient is able to maintain O2 Sat >92% on room air    Activity:  Moves 4 extremities on command    Ambulation:  Patient is able to stand and walk or stand and pivot into wheelchair    Dressing:  Clean/dry or No Dressing    Notes:   Discharge instructions and AVS given to patient    Patient meets criteria for discharge?  YES    Admitted to PCU?  No    Responsible adult present to accompany patient home?  Yes    Signature/Title:    harris rosales RN Care Coordinator  [facility]

## 2018-02-27 NOTE — MR AVS SNAPSHOT
After Visit Summary   2/27/2018    Hoang Kiser    MRN: [id number]           Patient Information     Date Of Birth          1985        Visit Information        Provider Department      2/27/2018 7:45 AM Giovani Dupree MD [facility]        Care Instructions    [facility]   Procedure Discharge Instructions    Today you saw: Dr. Giovani Ferguson     You had an:  Epidural steroid injection   -lumbar     Medications used:  Lidocaine   Bupivacaine   Dexamethasone Depo-medrol  Omnipaque            Be cautious when walking. Numbness and/or weakness in the lower extremities may occur for up to 6-8 hours after the procedure due to effect of the local anesthetic    Do not drive for 6 hours. The effect of the local anesthetic could slow your reflexes.     You may resume your regular activities after 24 hours    Avoid strenuous activity for the first 24 hours    You may shower, however avoid swimming, tub baths or hot tubs for 24 hours following your procedure    You may have a mild to moderate increase in pain for several days following the injection.    It may take up to 14 days for the steroid medication to start working although you may feel the effect as early as a few days after the procedure.       You may use ice packs for 10-15 minutes, 3 to 4 times a day at the injection site for comfort    Do not use heat to painful areas for 6 to 8 hours. This will give the local anesthetic time to wear off and prevent you from accidentally burning your skin.     You may use anti-inflammatory medications (such as Ibuprofen or Aleve or Advil) or Tylenol for pain control if necessary    If you were fasting, you may resume your normal diet and medications after the procedure    If you have diabetes, check your blood sugar more frequently than usual as your blood sugar may be higher than normal for 10-14 days following a steroid injection. Contact your doctor who  manages your diabetes if your blood sugar is higher than usual    If you experience any of the following, call the pain center nursing line during work hours at [phone] or the after hours provider line at [phone]:  -Fever over 100 degree F  -Swelling, bleeding, redness, drainage, warmth at the injection site  -Progressive weakness or numbness in your legs or arms  -Loss of bowel or bladder function  -Unusual headache that is not relieved by Tylenol or other pain reliever  -Unusual new onset of pain that is not improving      Phone #s:  Appointment line: [phone];  Nurse line: [phone]              Follow-ups after your visit        Your next 10 appointments already scheduled     Mar 01, 2018 12:30 PM CST   Walk In From ENT with Hugo Galvez   [facility] ([facility])    [address] [place] [id number] [phone]            Mar 01, 2018  3:00 PM CST   (Arrive by 2:45 PM)   New Patient Visit with Chuck Corona MD   [facility] ([facility])    [address] [place] [id number] [phone]            Mar 07, 2018  9:30 AM CST   Return Visit with Faustino Ryan PT   [facility] ([facility])    [address]  [facility] [id number] [phone]            Mar 13, 2018  8:00 AM CDT   Return Visit with CARLOS A Guy CNP   [facility] ([facility])    [address]  [address]  [facility] [id number] [phone]            Mar 16, 2018  7:30 AM CDT   LAB with CP LAB   [facility] ([facility])    [address] [facility] [address] [phone]           Please do not eat 10-12 hours before your appointment if you are coming in fasting for labs on lipids, cholesterol, or glucose (sugar). This  does not apply to pregnant women. Water, hot tea and black coffee (with nothing added) are okay. Do not drink other fluids, diet soda or chew gum.            Mar 21, 2018  9:30 AM CDT   Return Visit with Faustino Ryan PT   [facility] ([facility])    [address]  [facility] [address]  [facility] [address] [phone]              Who to contact     If you have questions or need follow up information about today's clinic visit or your schedule please contact [facility] directly at [phone].  Normal or non-critical lab and imaging results will be communicated to you by MyChart, letter or phone within 4 business days after the clinic has received the results. If you do not hear from us within 7 days, please contact the clinic through [employer] or phone. If you have a critical or abnormal lab result, we will notify you by phone as soon as possible.  Submit refill requests through [employer] or call your pharmacy and they will forward the refill request to us. Please allow 3 business days for your refill to be completed.          Additional Information About Your Visit        MyChart Information     [employer] gives you secure access to your electronic health record. If you see a primary care provider, you can also send messages to your care team and make appointments. If you have questions, please call your primary care clinic.  If you do not have a primary care provider, please call [phone] and they will assist you.        Care EveryWhere ID     This is your Care EveryWhere ID. This could be used by other organizations to access your [facility] medical records  [id number]        Your Vitals Were     Pulse                   85            Blood Pressure from Last 3 Encounters:   02/27/18 111/75   02/16/18 124/79   02/15/18 119/74    Weight from Last 3 Encounters:   02/16/18 75.3 kg (166 lb)   02/08/18 76.2 kg (168 lb)   02/01/18 74.4 kg (164 lb)              Today, you had the  following     No orders found for display       Primary Care Provider Office Phone # Fax #    Phill Floyd [phone] [address] [address] [facility] [address]        Equal Access to Services     BERLIN MEYER : Hadii rosemary ku hadrositao Soomaali, waaxda luqadaha, qaybta kaalmada adeegyada, arnav benoit laDimitriosmisty billy. So [facility] [phone].    ATENCIÓN: Si habla español, tiene a cutler disposición servicios gratuitos de asistencia lingüística. Llame al [phone].    We comply with applicable federal civil rights laws and [place] laws. We do not discriminate on the basis of race, color, national origin, age, disability, sex, sexual orientation, or gender identity.            Thank you!     Thank you for choosing [facility]  for your care. Our goal is always to provide you with excellent care. Hearing back from our patients is one way we can continue to improve our services. Please take a few minutes to complete the written survey that you may receive in the mail after your visit with us. Thank you!             Your Updated Medication List - Protect others around you: Learn how to safely use, store and throw away your medicines at www.disposemymeds.org.          This list is accurate as of 2/27/18  8:12 AM.  Always use your most recent med list.                   Brand Name Dispense Instructions for use Diagnosis    acetylcysteine 600 MG Caps capsule    N-ACETYL CYSTEINE     Take 1,800 mg by mouth daily        * amphetamine-dextroamphetamine 20 MG per tablet    ADDERALL     Take 20 mg by mouth daily Reported on 3/6/2017        * amphetamine-dextroamphetamine 30 MG per 24 hr capsule    ADDERALL XR     Take 1 capsule by mouth every morning        clonazePAM 0.5 MG tablet    klonoPIN     Take 0.5-1 mg by mouth nightly as needed        DULoxetine 20 MG EC capsule    CYMBALTA     Take 1 capsule by mouth daily        gabapentin 100 MG capsule    NEURONTIN    90 capsule    Take  1 capsule (100 mg) by mouth daily    Lumbar radicular pain, Lumbar paraspinal muscle spasm       naproxen 500 MG tablet    NAPROSYN    40 tablet    Take 1 tablet (500 mg) by mouth 2 times daily (with meals)    Lumbar radicular pain, Lumbar paraspinal muscle spasm       * nicotine 14 MG/24HR 24 hr patch    NICODERM CQ    30 patch    Place 1 patch onto the skin every 24 hours Use this patch first    Tobacco abuse       * nicotine 7 MG/24HR 24 hr patch    NICODERM CQ    30 patch    Place 1 patch onto the skin every 24 hours    Tobacco abuse       tiZANidine 4 MG tablet    ZANAFLEX    30 tablet    Take 1-2 tablets (4-8 mg) by mouth nightly as needed    Lumbar radicular pain, Lumbar paraspinal muscle spasm       zolpidem 10 MG tablet    AMBIEN    30 tablet    Take 1 tablet (10 mg) by mouth nightly as needed for sleep    Primary insomnia       * Notice:  This list has 4 medication(s) that are the same as other medications prescribed for you. Read the directions carefully, and ask your doctor or other care provider to review them with you.

## 2018-02-28 DIAGNOSIS — H69.93 ETD (EUSTACHIAN TUBE DYSFUNCTION), BILATERAL: Primary | ICD-10-CM

## 2018-03-01 ENCOUNTER — OFFICE VISIT (OUTPATIENT)
Dept: OTOLARYNGOLOGY | Facility: CLINIC | Age: 33
End: 2018-03-01

## 2018-03-01 ENCOUNTER — OFFICE VISIT (OUTPATIENT)
Dept: AUDIOLOGY | Facility: CLINIC | Age: 33
End: 2018-03-01
Attending: OTOLARYNGOLOGY

## 2018-03-01 ENCOUNTER — PRE VISIT (OUTPATIENT)
Dept: OTOLARYNGOLOGY | Facility: CLINIC | Age: 33
End: 2018-03-01

## 2018-03-01 VITALS — WEIGHT: 168 LBS | BODY MASS INDEX: 25.46 KG/M2 | HEIGHT: 68 IN

## 2018-03-01 DIAGNOSIS — M26.609 TMJ (TEMPOROMANDIBULAR JOINT SYNDROME): Primary | ICD-10-CM

## 2018-03-01 DIAGNOSIS — H93.8X3 EAR FULLNESS, BILATERAL: ICD-10-CM

## 2018-03-01 DIAGNOSIS — H93.8X3 EAR POPPING, BILATERAL: Primary | ICD-10-CM

## 2018-03-01 ASSESSMENT — PAIN SCALES - GENERAL: PAINLEVEL: NO PAIN (1)

## 2018-03-01 NOTE — MR AVS SNAPSHOT
After Visit Summary   3/1/2018    Hoang Kiser    MRN: [id number]           Patient Information     Date Of Birth          1985        Visit Information        Provider Department      3/1/2018 3:00 PM Chuck Corona MD M [facility]        Today's Diagnoses     TMJ (temporomandibular joint syndrome)    -  1      Care Instructions    The patient presents with a history of pain in the area of the temporomandibular joints.  The patient appears to have Temporomanidubular Joint Disorder.  The patient will be referred to the Dental temporomandibular joint disorder specialists for further evaluation and treatment.           Follow-ups after your visit        Additional Services     DENTAL REFERRAL       Consult for temporomandibular joint disorder                  Your next 10 appointments already scheduled     Mar 01, 2018 12:30 PM CST   Walk In From ENT with Hugo Cervantes [facility] ([facility])    [address] [place] [address]   [phone]            Mar 01, 2018  3:00 PM CST   (Arrive by 2:45 PM)   New Patient Visit with Chuck Corona MD   [facility] ([facility])    [address] [place] [address]   [phone]            Mar 07, 2018  9:30 AM CST   Return Visit with Faustino Ryan PT   [facility] ([facility])    [address]  [facility] [address] [phone]            Mar 13, 2018  8:00 AM CDT   Return Visit with CARLOS A Guy CNP   [facility] ([facility])    [address]  [facility] [address]   [phone]            Mar 16, 2018  7:30 AM CDT   LAB with CP LAB   [facility] ([facility])    [address] [place] [id number]  "  [phone]           Please do not eat 10-12 hours before your appointment if you are coming in fasting for labs on lipids, cholesterol, or glucose (sugar). This does not apply to pregnant women. Water, hot tea and black coffee (with nothing added) are okay. Do not drink other fluids, diet soda or chew gum.            Mar 21, 2018  9:30 AM CDT   Return Visit with Faustino Ryan PT   [facility] ([facility])    [address]  [address] [address] [phone]              Who to contact     Please call your clinic at [phone] to:    Ask questions about your health    Make or cancel appointments    Discuss your medicines    Learn about your test results    Speak to your doctor            Additional Information About Your Visit        [employer] Information     [employer] gives you secure access to your electronic health record. If you see a primary care provider, you can also send messages to your care team and make appointments. If you have questions, please call your primary care clinic.  If you do not have a primary care provider, please call [phone] and they will assist you.      [employer] is an electronic gateway that provides easy, online access to your medical records. With [employer], you can request a clinic appointment, read your test results, renew a prescription or communicate with your care team.     To access your existing account, please contact your [facility] or call [phone] for assistance.        Care EveryWhere ID     This is your Care EveryWhere ID. This could be used by other organizations to access your [facility] medical records  [id number]        Your Vitals Were     Height BMI (Body Mass Index)                1.72 m (5' 7.72\") 25.76 kg/m2           Blood Pressure from Last 3 Encounters:   02/27/18 121/86   02/16/18 124/79   02/15/18 119/74    Weight from Last 3 Encounters:   03/01/18 76.2 kg (168 lb) "   02/16/18 75.3 kg (166 lb)   02/08/18 76.2 kg (168 lb)              We Performed the Following     DENTAL REFERRAL        Primary Care Provider Office Phone # Fax #    Phill Floyd [phone] [address] [address] [facility] [address]        Equal Access to Services     [facility] BETSY : Hadii aad ku hadasho Soomaali, waaxda luqadaha, qaybta kaalmada adeegyada, waxay idiin hayaan adeeg chemarilynn laradhan . So [facility] [phone].    ATENCIÓN: Si habla español, tiene a cutler disposición servicios gratuitos de asistencia lingüística. Llame al [phone].    We comply with applicable federal civil rights laws and [place] laws. We do not discriminate on the basis of race, color, national origin, age, disability, sex, sexual orientation, or gender identity.            Thank you!     Thank you for choosing [facility]  for your care. Our goal is always to provide you with excellent care. Hearing back from our patients is one way we can continue to improve our services. Please take a few minutes to complete the written survey that you may receive in the mail after your visit with us. Thank you!             Your Updated Medication List - Protect others around you: Learn how to safely use, store and throw away your medicines at www.disposemymeds.org.          This list is accurate as of 3/1/18 11:04 AM.  Always use your most recent med list.                   Brand Name Dispense Instructions for use Diagnosis    acetylcysteine 600 MG Caps capsule    N-ACETYL CYSTEINE     Take 1,800 mg by mouth daily        * amphetamine-dextroamphetamine 20 MG per tablet    ADDERALL     Take 20 mg by mouth daily Reported on 3/6/2017        * amphetamine-dextroamphetamine 30 MG per 24 hr capsule    ADDERALL XR     Take 1 capsule by mouth every morning        clonazePAM 0.5 MG tablet    klonoPIN     Take 0.5-1 mg by mouth nightly as needed        DULoxetine 20 MG EC capsule    CYMBALTA     Take 1 capsule by  mouth daily        gabapentin 100 MG capsule    NEURONTIN    90 capsule    Take 1 capsule (100 mg) by mouth daily    Lumbar radicular pain, Lumbar paraspinal muscle spasm       naproxen 500 MG tablet    NAPROSYN    40 tablet    Take 1 tablet (500 mg) by mouth 2 times daily (with meals)    Lumbar radicular pain, Lumbar paraspinal muscle spasm       * nicotine 14 MG/24HR 24 hr patch    NICODERM CQ    30 patch    Place 1 patch onto the skin every 24 hours Use this patch first    Tobacco abuse       * nicotine 7 MG/24HR 24 hr patch    NICODERM CQ    30 patch    Place 1 patch onto the skin every 24 hours    Tobacco abuse       tiZANidine 4 MG tablet    ZANAFLEX    30 tablet    Take 1-2 tablets (4-8 mg) by mouth nightly as needed    Lumbar radicular pain, Lumbar paraspinal muscle spasm       zolpidem 10 MG tablet    AMBIEN    30 tablet    Take 1 tablet (10 mg) by mouth nightly as needed for sleep    Primary insomnia       * Notice:  This list has 4 medication(s) that are the same as other medications prescribed for you. Read the directions carefully, and ask your doctor or other care provider to review them with you.

## 2018-03-01 NOTE — LETTER
"3/1/2018       RE: Hoang Kiser  [address]  [address]  [facility] [address]     Dear Colleague,    Thank you for referring your patient, Hoang Kiser, to the [facility] EAR NOSE AND THROAT at [facility]. Please see a copy of my visit note below.    The patient presents with a history of pain in the area anterior to and around his ears and tinnitus with a clicking and popping sensation in the ears.  The patient reports no improvement of this problem with the use of antibiotics.  The patient has had no otorrhea.  The patient has discomfort with wide extension of the mouth and chewing.  He denies dysphagia or odynophagia.  The patient denies sinusitis, rhinitis, facial pain, nasal obstruction or purulent nasal discharge. The patient denies chronic or recurrent tonsillitis, chronic or recurrent pharyngitis. The patient denies otorrhea, eustachian tube dysfunction, ear infections or dizziness. The patient's Audiogram and Tympanogram are reviewed with him and they demonstrate bilaterally normal hearing with 100% word recognition scores and normal tympanograms.     This patient is seen in consultation at the request of Dr. Royce Taylor.    All other systems were reviewed and they are either negative or they are not directly pertinent to this Otolaryngology examination.      Past Medical History:  Past Medical History:   Diagnosis Date     Abnormal involuntary movement 6/2/2016     Anxiety state, unspecified 04/30/2012     Benign positional vertigo Dec 2016    Started after my groin/back injury. Sitting on Toilet.     Depressive disorder 2003    I have been on and off medication for depression since this     Dysphonia 2014    Nothing significant.     Epilepsy (H)     as a child. Says that \" he grew out of it by age 13\"     Gastroesophageal reflux disease 2004    Occassional. Spicy foods set it off.     Hearing problem 2015    Theorized Diag: Essential Palatal Myoclonus     " History of MRI of brain and brain stem 12/11/2015     MR BRAIN W/O & W CONTRAST, 12/11/2015 3:46 PM.  History: Myoclonus.  Comparison: None.  Technique:  1. MRI of the Brain: Sagittal T1-weighted, axial T2-weighted, axial turboFLAIR, axial susceptibility, and axial diffusion-weighted with ADC map images of the brain were obtained without intravenous contrast. After intravenous administration of gadolinium, axial and sagittal T1-weighted images of th     Hoarseness 2017    Dry mouth, started after taking Tizanidine     Neuralgia, neuritis, and radiculitis, unspecified 04/30/2012     normal emg 2017 8/8/2017    Interpretation: This is a normal study. There is no electrophysiologic evidence of a lumbosacral radiculopathy affecting the right or left lower extremity on the basis of this study.    Rodney Mena MD Department of Neurology       Panic disorder without agoraphobia 04/30/2012     Problem, psychiatric 2016    PTSD     Seizures (H) [id number]    Grew out of it. Absence Seizures.     Tic disorder 6/2/2016     Tinnitus 2015    Had it most of my life. Got worse in 2015     Past Surgical History:  Past Surgical History:   Procedure Laterality Date     BACK SURGERY      injections     COLONOSCOPY  02/15/18    Has not happened yet.     COLONOSCOPY N/A 2/15/2018    Procedure: COMBINED COLONOSCOPY, SINGLE OR MULTIPLE BIOPSY/POLYPECTOMY BY BIOPSY;;  Surgeon: Liam Kincaid MD;  Location: [facility] OR     COLONOSCOPY WITH CO2 INSUFFLATION N/A 2/15/2018    Procedure: COLONOSCOPY WITH CO2 INSUFFLATION;  COLON-FAMILY HX OF COLON CANCER/ SYPURA;  Surgeon: Liam Kincaid MD;  Location: [facility] OR     HC TOOTH EXTRACTION W/FORCEP Bilateral 2003     PE TUBES  1990     Medications:  Current Outpatient Prescriptions:      tiZANidine (ZANAFLEX) 4 MG tablet, Take 1-2 tablets (4-8 mg) by mouth nightly as needed, Disp: 30 tablet, Rfl: 1     zolpidem (AMBIEN) 10 MG tablet, Take 1 tablet (10 mg) by mouth nightly as needed for  sleep, Disp: 30 tablet, Rfl: 2     gabapentin (NEURONTIN) 100 MG capsule, Take 1 capsule (100 mg) by mouth daily, Disp: 90 capsule, Rfl: 3     naproxen (NAPROSYN) 500 MG tablet, Take 1 tablet (500 mg) by mouth 2 times daily (with meals), Disp: 40 tablet, Rfl: 1     nicotine (NICODERM CQ) 14 MG/24HR 24 hr patch, Place 1 patch onto the skin every 24 hours Use this patch first, Disp: 30 patch, Rfl: 0     nicotine (NICODERM CQ) 7 MG/24HR 24 hr patch, Place 1 patch onto the skin every 24 hours, Disp: 30 patch, Rfl: 0     DULoxetine (CYMBALTA) 20 MG EC capsule, Take 1 capsule by mouth daily, Disp: , Rfl: 0     amphetamine-dextroamphetamine (ADDERALL XR) 30 MG per 24 hr capsule, Take 1 capsule by mouth every morning, Disp: , Rfl: 0     acetylcysteine (N-ACETYL-L-CYSTEINE) 600 MG CAPS capsule, Take 1,800 mg by mouth daily, Disp: , Rfl:      clonazePAM (KLONOPIN) 0.5 MG tablet, Take 0.5-1 mg by mouth nightly as needed , Disp: , Rfl: 0     amphetamine-dextroamphetamine (ADDERALL) 20 MG tablet, Take 20 mg by mouth daily Reported on 3/6/2017, Disp: , Rfl: 0    Allergies:  Buspirone hcl; Doxycycline; Trazodone; and Keflex [cephalexin]    Physical Examination:  The patient is a well developed, well nourished male in no apparent distress.  He is normocephalic, atraumatic with pupils equally round and reactive to light.    Oral Cavity Examination:  Normal mucosa with no masses or lesions  Nasal Examination: Normal mucosa with no masses or lesions  Ear Examination: Ear canals clear, tympanic membranes and middle ear spaces normal  Neurological Examination: Facial nerve function intact and symmetric  Integumentary Examination: No lesions on the skin of the head and neck  Neck Examination: No masses or lesions, no lymphadenopathy  Endocrine Examination: Normal thyroid examination  TMJ Examination:  Malrotation in the area of the temporomandibular joints bilaterally.     Assessment and Plan:  The patient presents with a history of pain  in the area of the temporomandibular joints.  The patient appears to have Temporomanidubular Joint Disorder.  The patient will be referred to the Dental temporomandibular joint disorder specialists for further evaluation and treatment.     CC: Dr. Royce Taylor    Again, thank you for allowing me to participate in the care of your patient.    Sincerely,    Chuck Corona MD

## 2018-03-01 NOTE — PATIENT INSTRUCTIONS
The patient presents with a history of pain in the area of the temporomandibular joints.  The patient appears to have Temporomanidubular Joint Disorder.  The patient will be referred to the Dental temporomandibular joint disorder specialists for further evaluation and treatment.

## 2018-03-01 NOTE — PROGRESS NOTES
"The patient presents with a history of pain in the area anterior to and around his ears and tinnitus with a clicking and popping sensation in the ears.  The patient reports no improvement of this problem with the use of antibiotics.  The patient has had no otorrhea.  The patient has discomfort with wide extension of the mouth and chewing.  He denies dysphagia or odynophagia.  The patient denies sinusitis, rhinitis, facial pain, nasal obstruction or purulent nasal discharge. The patient denies chronic or recurrent tonsillitis, chronic or recurrent pharyngitis. The patient denies otorrhea, eustachian tube dysfunction, ear infections or dizziness. The patient's Audiogram and Tympanogram are reviewed with him and they demonstrate bilaterally normal hearing with 100% word recognition scores and normal tympanograms.     This patient is seen in consultation at the request of Dr. Royce Taylor.    All other systems were reviewed and they are either negative or they are not directly pertinent to this Otolaryngology examination.      Past Medical History:    Past Medical History:   Diagnosis Date     Abnormal involuntary movement 6/2/2016     Anxiety state, unspecified 04/30/2012     Benign positional vertigo Dec 2016    Started after my groin/back injury. Sitting on Toilet.     Depressive disorder 2003    I have been on and off medication for depression since this     Dysphonia 2014    Nothing significant.     Epilepsy (H)     as a child. Says that \" he grew out of it by age 13\"     Gastroesophageal reflux disease 2004    Occassional. Spicy foods set it off.     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  History: Myoclonus.  Comparison: None.  Technique:  1. MRI of the Brain: Sagittal T1-weighted, axial T2-weighted, axial turboFLAIR, axial susceptibility, and axial diffusion-weighted with ADC " map images of the brain were obtained without intravenous contrast. After intravenous administration of gadolinium, axial and sagittal T1-weighted images of th     Hoarseness 2017    Dry mouth, started after taking Tizanidine     Neuralgia, neuritis, and radiculitis, unspecified 04/30/2012     normal emg 2017 8/8/2017    Interpretation: This is a normal study. There is no electrophysiologic evidence of a lumbosacral radiculopathy affecting the right or left lower extremity on the basis of this study.    Rodney Mena MD Department of Neurology       Panic disorder without agoraphobia 04/30/2012     Problem, psychiatric 2016    PTSD     Seizures (H) [id number]    Grew out of it. Absence Seizures.     Tic disorder 6/2/2016     Tinnitus 2015    Had it most of my life. Got worse in 2015       Past Surgical History:    Past Surgical History:   Procedure Laterality Date     BACK SURGERY      injections     COLONOSCOPY  02/15/18    Has not happened yet.     COLONOSCOPY N/A 2/15/2018    Procedure: COMBINED COLONOSCOPY, SINGLE OR MULTIPLE BIOPSY/POLYPECTOMY BY BIOPSY;;  Surgeon: Liam Kincaid MD;  Location: [facility] OR     COLONOSCOPY WITH CO2 INSUFFLATION N/A 2/15/2018    Procedure: COLONOSCOPY WITH CO2 INSUFFLATION;  COLON-FAMILY HX OF COLON CANCER/ SYPURA;  Surgeon: Liam Kincaid MD;  Location: [facility] OR     HC TOOTH EXTRACTION W/FORCEP Bilateral 2003     PE TUBES  1990       Medications:      Current Outpatient Prescriptions:      tiZANidine (ZANAFLEX) 4 MG tablet, Take 1-2 tablets (4-8 mg) by mouth nightly as needed, Disp: 30 tablet, Rfl: 1     zolpidem (AMBIEN) 10 MG tablet, Take 1 tablet (10 mg) by mouth nightly as needed for sleep, Disp: 30 tablet, Rfl: 2     gabapentin (NEURONTIN) 100 MG capsule, Take 1 capsule (100 mg) by mouth daily, Disp: 90 capsule, Rfl: 3     naproxen (NAPROSYN) 500 MG tablet, Take 1 tablet (500 mg) by mouth 2 times daily (with meals), Disp: 40 tablet, Rfl: 1     nicotine (NICODERM CQ)  14 MG/24HR 24 hr patch, Place 1 patch onto the skin every 24 hours Use this patch first, Disp: 30 patch, Rfl: 0     nicotine (NICODERM CQ) 7 MG/24HR 24 hr patch, Place 1 patch onto the skin every 24 hours, Disp: 30 patch, Rfl: 0     DULoxetine (CYMBALTA) 20 MG EC capsule, Take 1 capsule by mouth daily, Disp: , Rfl: 0     amphetamine-dextroamphetamine (ADDERALL XR) 30 MG per 24 hr capsule, Take 1 capsule by mouth every morning, Disp: , Rfl: 0     acetylcysteine (N-ACETYL-L-CYSTEINE) 600 MG CAPS capsule, Take 1,800 mg by mouth daily, Disp: , Rfl:      clonazePAM (KLONOPIN) 0.5 MG tablet, Take 0.5-1 mg by mouth nightly as needed , Disp: , Rfl: 0     amphetamine-dextroamphetamine (ADDERALL) 20 MG tablet, Take 20 mg by mouth daily Reported on 3/6/2017, Disp: , Rfl: 0    Allergies:    Buspirone hcl; Doxycycline; Trazodone; and Keflex [cephalexin]    Physical Examination:    The patient is a well developed, well nourished male in no apparent distress.  He is normocephalic, atraumatic with pupils equally round and reactive to light.    Oral Cavity Examination:  Normal mucosa with no masses or lesions  Nasal Examination: Normal mucosa with no masses or lesions  Ear Examination: Ear canals clear, tympanic membranes and middle ear spaces normal  Neurological Examination: Facial nerve function intact and symmetric  Integumentary Examination: No lesions on the skin of the head and neck  Neck Examination: No masses or lesions, no lymphadenopathy  Endocrine Examination: Normal thyroid examination  TMJ Examination:  Malrotation in the area of the temporomandibular joints bilaterally.     Assessment and Plan:    The patient presents with a history of pain in the area of the temporomandibular joints.  The patient appears to have Temporomanidubular Joint Disorder.  The patient will be referred to the Dental temporomandibular joint disorder specialists for further evaluation and treatment.     CC: Dr. Royce Taylor

## 2018-03-01 NOTE — MR AVS SNAPSHOT
After Visit Summary   3/1/2018    Hoang Kiser    MRN: [id number]           Patient Information     Date Of Birth          1985        Visit Information        Provider Department      3/1/2018 12:30 PM Tamiko Velasquez [facility]D [facility]        Today's Diagnoses     Ear popping, bilateral    -  1    Ear fullness, bilateral           Follow-ups after your visit        Your next 10 appointments already scheduled     Mar 01, 2018 12:30 PM CST   Walk In From ENT with Hugo Cervantes [facility] ([facility])    [address] [place] [id number]   [phone]            Mar 01, 2018  3:00 PM CST   (Arrive by 2:45 PM)   New Patient Visit with MD REI Phillips [facility] Ear Nose and Throat ([facility])    [address] [place] [address]   [phone]            Mar 07, 2018  9:30 AM CST   Return Visit with Faustino Ryan PT   [facility] ([facility])    [address]  [facility] [address]   [phone]            Mar 13, 2018  8:00 AM CDT   Return Visit with CARLOS A Guy CNP   [facility] ([facility])    [address]  [address]  [facility] [address]   [phone]            Mar 16, 2018  7:30 AM CDT   [facility]    [address] [facility] [id number] [phone]           Please do not eat 10-12 hours before your appointment if you are coming in fasting for labs on lipids, cholesterol, or glucose (sugar). This does not apply to pregnant women. Water, hot tea and black coffee (with nothing added) are okay. Do not drink other fluids, diet soda or chew gum.            Mar 21, 2018  9:30 AM CDT   Return Visit with Faustino Ryan PT   [facility]  ([facility])    [address]  [address]  [facility] [address] [phone]              Who to contact     Please call your clinic at [phone] to:    Ask questions about your health    Make or cancel appointments    Discuss your medicines    Learn about your test results    Speak to your doctor            Additional Information About Your Visit        [employer] Information     [employer] gives you secure access to your electronic health record. If you see a primary care provider, you can also send messages to your care team and make appointments. If you have questions, please call your primary care clinic.  If you do not have a primary care provider, please call [phone] and they will assist you.      [employer] is an electronic gateway that provides easy, online access to your medical records. With [employer], you can request a clinic appointment, read your test results, renew a prescription or communicate with your care team.     To access your existing account, please contact your [facility] or call [phone] for assistance.        Care EveryWhere ID     This is your Care EveryWhere ID. This could be used by other organizations to access your [facility] medical records  [id number]         Blood Pressure from Last 3 Encounters:   02/27/18 121/86   02/16/18 124/79   02/15/18 119/74    Weight from Last 3 Encounters:   02/16/18 75.3 kg (166 lb)   02/08/18 76.2 kg (168 lb)   02/01/18 74.4 kg (164 lb)              We Performed the Following     AUDIOGRAM/TYMPANOGRAM - INTERFACE     [facility] Audiometry Thrshld Eval & Speech Recog ([id number])     Tymps / Reflex   ([id number])        Primary Care Provider Office Phone # Fax #    Phill Floyd [phone] [address] [facility] [address]        Equal Access to Services     BERLIN MEYER : Mariana Armenta, ishan rinaldi, arnav smith  ah. So [facility] [phone].    ATENCIÓN: Si melissa dunne, tiene a cutler disposición servicios gratuitos de asistencia lingüística. Rodríguez al [phone].    We comply with applicable federal civil rights laws and [place] laws. We do not discriminate on the basis of race, color, national origin, age, disability, sex, sexual orientation, or gender identity.            Thank you!     Thank you for choosing [facility]UD[facility]  for your care. Our goal is always to provide you with excellent care. Hearing back from our patients is one way we can continue to improve our services. Please take a few minutes to complete the written survey that you may receive in the mail after your visit with us. Thank you!             Your Updated Medication List - Protect others around you: Learn how to safely use, store and throw away your medicines at www.disposemymeds.org.          This list is accurate as of 3/1/18 10:47 AM.  Always use your most recent med list.                   Brand Name Dispense Instructions for use Diagnosis    acetylcysteine 600 MG Caps capsule    N-ACETYL CYSTEINE     Take 1,800 mg by mouth daily        * amphetamine-dextroamphetamine 20 MG per tablet    ADDERALL     Take 20 mg by mouth daily Reported on 3/6/2017        * amphetamine-dextroamphetamine 30 MG per 24 hr capsule    ADDERALL XR     Take 1 capsule by mouth every morning        clonazePAM 0.5 MG tablet    klonoPIN     Take 0.5-1 mg by mouth nightly as needed        DULoxetine 20 MG EC capsule    CYMBALTA     Take 1 capsule by mouth daily        gabapentin 100 MG capsule    NEURONTIN    90 capsule    Take 1 capsule (100 mg) by mouth daily    Lumbar radicular pain, Lumbar paraspinal muscle spasm       naproxen 500 MG tablet    NAPROSYN    40 tablet    Take 1 tablet (500 mg) by mouth 2 times daily (with meals)    Lumbar radicular pain, Lumbar paraspinal muscle spasm       * nicotine 14 MG/24HR 24 hr patch    NICODERM CQ    30 patch    Place 1 patch onto the  skin every 24 hours Use this patch first    Tobacco abuse       * nicotine 7 MG/24HR 24 hr patch    NICODERM CQ    30 patch    Place 1 patch onto the skin every 24 hours    Tobacco abuse       tiZANidine 4 MG tablet    ZANAFLEX    30 tablet    Take 1-2 tablets (4-8 mg) by mouth nightly as needed    Lumbar radicular pain, Lumbar paraspinal muscle spasm       zolpidem 10 MG tablet    AMBIEN    30 tablet    Take 1 tablet (10 mg) by mouth nightly as needed for sleep    Primary insomnia       * Notice:  This list has 4 medication(s) that are the same as other medications prescribed for you. Read the directions carefully, and ask your doctor or other care provider to review them with you.

## 2018-03-01 NOTE — PROGRESS NOTES
AUDIOLOGY REPORT    SUMMARY: Audiology visit completed. See audiogram for results.      RECOMMENDATIONS: Follow-up with ENT.    Jean Carlos Alarcon.  Licensed Audiologist  [place] #[id number]

## 2018-03-01 NOTE — NURSING NOTE
"Chief Complaint   Patient presents with     Consult     bilateral eustachian tube dysfunction     Height 1.72 m (5' 7.72\"), weight 76.2 kg (168 lb).    Chandler Ruiz LPN    "

## 2018-03-06 ENCOUNTER — TELEPHONE (OUTPATIENT)
Dept: PALLIATIVE MEDICINE | Facility: CLINIC | Age: 33
End: 2018-03-06

## 2018-03-06 NOTE — TELEPHONE ENCOUNTER
Patient had a lumbar interlaminar epidural steroid injection at L4-5 injection on 2/27/18.  Called patient for an update.      Pt reported the following details:  Feels his overall pain level has been reduced to about a 2. Said he was pain free for almost a week post injection.   Told patient that the information will be forwarded to his provider.  Also explained that, if a steroid medication was used, it could take up to 14 days to feel the full effect and if pt has any further questions or concerns pt should call the clinic at [phone].

## 2018-03-07 NOTE — TELEPHONE ENCOUNTER
Information noted - allow two weeks for steroid effect.    Giovani Ferguson MD  [facility]

## 2018-03-09 ENCOUNTER — OFFICE VISIT (OUTPATIENT)
Dept: PALLIATIVE MEDICINE | Facility: CLINIC | Age: 33
End: 2018-03-09
Payer: COMMERCIAL

## 2018-03-09 DIAGNOSIS — M54.40 CHRONIC LOW BACK PAIN WITH SCIATICA, SCIATICA LATERALITY UNSPECIFIED, UNSPECIFIED BACK PAIN LATERALITY: ICD-10-CM

## 2018-03-09 DIAGNOSIS — G89.29 CHRONIC LOW BACK PAIN: ICD-10-CM

## 2018-03-09 DIAGNOSIS — M54.50 CHRONIC LOW BACK PAIN: ICD-10-CM

## 2018-03-09 DIAGNOSIS — M51.369 DDD (DEGENERATIVE DISC DISEASE), LUMBAR: Primary | ICD-10-CM

## 2018-03-09 DIAGNOSIS — G89.29 CHRONIC LOW BACK PAIN WITH SCIATICA, SCIATICA LATERALITY UNSPECIFIED, UNSPECIFIED BACK PAIN LATERALITY: ICD-10-CM

## 2018-03-09 DIAGNOSIS — M54.16 LUMBAR RADICULOPATHY: ICD-10-CM

## 2018-03-09 DIAGNOSIS — G89.29 CHRONIC PAIN: Primary | ICD-10-CM

## 2018-03-09 PROCEDURE — 97110 THERAPEUTIC EXERCISES: CPT | Mod: GP | Performed by: PHYSICAL THERAPIST

## 2018-03-09 PROCEDURE — 97112 NEUROMUSCULAR REEDUCATION: CPT | Mod: GP | Performed by: PHYSICAL THERAPIST

## 2018-03-09 PROCEDURE — 96152 HC HEALTH AND BEHAVIOR INTERVENTION, INDIVIDUAL, EACH 15 MINUTES: CPT | Performed by: PSYCHOLOGIST

## 2018-03-09 NOTE — MR AVS SNAPSHOT
After Visit Summary   3/9/2018    Hoang Kiser    MRN: [id number]           Patient Information     Date Of Birth          1985        Visit Information        Provider Department      3/9/2018 2:00 PM Fili Wesley LP [facility]        Today's Diagnoses     DDD (degenerative disc disease), lumbar    -  1    Lumbar radiculopathy        Chronic low back pain with sciatica, sciatica laterality unspecified, unspecified back pain laterality           Follow-ups after your visit        Your next 10 appointments already scheduled     Mar 13, 2018  8:00 AM CDT   Return Visit with CARLOS A Guy CNP   [facility] ([facility])    [address]  [facility] [address] [phone]            Mar 16, 2018  7:30 AM CDT   LAB with CP LAB   [facility] ([facility]    [address] [place] [address] [phone]           Please do not eat 10-12 hours before your appointment if you are coming in fasting for labs on lipids, cholesterol, or glucose (sugar). This does not apply to pregnant women. Water, hot tea and black coffee (with nothing added) are okay. Do not drink other fluids, diet soda or chew gum.            Mar 21, 2018  9:30 AM CDT   Return Visit with Faustino Ryan PT   [facility] ([facility])    [address]  [address]  [facility] [id number] [phone]            Mar 22, 2018  9:00 AM CDT   Return Visit with Fili Wesley LP   [facility] ([facility])    [address]  [address]  [facility] [id number] [phone]              Who to contact     If you have questions or need follow up information about today's clinic visit or your schedule please contact [facility] directly at [phone].  Normal or non-critical lab and imaging  results will be communicated to you by MyChart, letter or phone within 4 business days after the clinic has received the results. If you do not hear from us within 7 days, please contact the clinic through Interfolio or phone. If you have a critical or abnormal lab result, we will notify you by phone as soon as possible.  Submit refill requests through Interfolio or call your pharmacy and they will forward the refill request to us. Please allow 3 business days for your refill to be completed.          Additional Information About Your Visit        Interfolio Information     Interfolio gives you secure access to your electronic health record. If you see a primary care provider, you can also send messages to your care team and make appointments. If you have questions, please call your primary care clinic.  If you do not have a primary care provider, please call [phone] and they will assist you.        Care EveryWhere ID     This is your Care EveryWhere ID. This could be used by other organizations to access your [place] medical records  [id number]         Blood Pressure from Last 3 Encounters:   02/27/18 121/86   02/16/18 124/79   02/15/18 119/74    Weight from Last 3 Encounters:   03/01/18 76.2 kg (168 lb)   02/16/18 75.3 kg (166 lb)   02/08/18 76.2 kg (168 lb)              We Performed the Following     HEALTH & BEHAVIOR ASSESS, INITIAL, EA 15MIN PHD        Primary Care Provider Office Phone # Fax #    Phill Floyd [phone] [address] [facility] [address]        Equal Access to Services     FRANCESCO MEYER : Hadii aad ku hadasho Soomaali, waaxda luqadaha, qaybta kaalmada adeegyada, arnav lorenzana . So [facility] [phone].    ATENCIÓN: Si habla español, tiene a cutler disposición servicios gratuitos de asistencia lingüística. Llame al [phone].    We comply with applicable federal civil rights laws and [place] laws. We do not discriminate on the basis of race,  color, national origin, age, disability, sex, sexual orientation, or gender identity.            Thank you!     Thank you for choosing [facility]  for your care. Our goal is always to provide you with excellent care. Hearing back from our patients is one way we can continue to improve our services. Please take a few minutes to complete the written survey that you may receive in the mail after your visit with us. Thank you!             Your Updated Medication List - Protect others around you: Learn how to safely use, store and throw away your medicines at www.disposemymeds.org.          This list is accurate as of 3/9/18 11:59 PM.  Always use your most recent med list.                   Brand Name Dispense Instructions for use Diagnosis    acetylcysteine 600 MG Caps capsule    N-ACETYL CYSTEINE     Take 1,800 mg by mouth daily        * amphetamine-dextroamphetamine 20 MG per tablet    ADDERALL     Take 20 mg by mouth daily Reported on 3/6/2017        * amphetamine-dextroamphetamine 30 MG per 24 hr capsule    ADDERALL XR     Take 1 capsule by mouth every morning        clonazePAM 0.5 MG tablet    klonoPIN     Take 0.5-1 mg by mouth nightly as needed        DULoxetine 20 MG EC capsule    CYMBALTA     Take 1 capsule by mouth daily        gabapentin 100 MG capsule    NEURONTIN    90 capsule    Take 1 capsule (100 mg) by mouth daily    Lumbar radicular pain, Lumbar paraspinal muscle spasm       naproxen 500 MG tablet    NAPROSYN    40 tablet    Take 1 tablet (500 mg) by mouth 2 times daily (with meals)    Lumbar radicular pain, Lumbar paraspinal muscle spasm       * nicotine 14 MG/24HR 24 hr patch    NICODERM CQ    30 patch    Place 1 patch onto the skin every 24 hours Use this patch first    Tobacco abuse       * nicotine 7 MG/24HR 24 hr patch    NICODERM CQ    30 patch    Place 1 patch onto the skin every 24 hours    Tobacco abuse       tiZANidine 4 MG tablet    ZANAFLEX    30 tablet    Take 1-2  tablets (4-8 mg) by mouth nightly as needed    Lumbar radicular pain, Lumbar paraspinal muscle spasm       zolpidem 10 MG tablet    AMBIEN    30 tablet    Take 1 tablet (10 mg) by mouth nightly as needed for sleep    Primary insomnia       * Notice:  This list has 4 medication(s) that are the same as other medications prescribed for you. Read the directions carefully, and ask your doctor or other care provider to review them with you.

## 2018-03-09 NOTE — MR AVS SNAPSHOT
After Visit Summary   3/9/2018    Hoang Kiser    MRN: [id number]           Patient Information     Date Of Birth          1985        Visit Information        Provider Department      3/9/2018 1:00 PM Faustino Ryan, PT [facility]        Today's Diagnoses     Chronic pain    -  1    Chronic low back pain           Follow-ups after your visit        Your next 10 appointments already scheduled     Mar 13, 2018  8:00 AM CDT   Return Visit with CARLOS A Guy CNP   [facility] ([facility])    [address]  [address]  [facility] [id number] [phone]            Mar 16, 2018  7:30 AM CDT   LAB with CP LAB   [facility] ([facility])    [address] [place] [address] [phone]           Please do not eat 10-12 hours before your appointment if you are coming in fasting for labs on lipids, cholesterol, or glucose (sugar). This does not apply to pregnant women. Water, hot tea and black coffee (with nothing added) are okay. Do not drink other fluids, diet soda or chew gum.            Mar 21, 2018  9:30 AM CDT   Return Visit with Faustino Ryan, PT   [facility] ([facility])    [address]  [address]  [facility] [id number] [phone]              Who to contact     If you have questions or need follow up information about today's clinic visit or your schedule please contact [facility] directly at [phone].  Normal or non-critical lab and imaging results will be communicated to you by MyChart, letter or phone within 4 business days after the clinic has received the results. If you do not hear from us within 7 days, please contact the clinic through MyChart or phone. If you have a critical or abnormal lab result, we will notify you by phone as soon as possible.  Submit refill requests through  Freepath or call your pharmacy and they will forward the refill request to us. Please allow 3 business days for your refill to be completed.          Additional Information About Your Visit        MyChart Information     Freepath gives you secure access to your electronic health record. If you see a primary care provider, you can also send messages to your care team and make appointments. If you have questions, please call your primary care clinic.  If you do not have a primary care provider, please call [phone] and they will assist you.        Care EveryWhere ID     This is your Care EveryWhere ID. This could be used by other organizations to access your [place] medical records  [id number]         Blood Pressure from Last 3 Encounters:   02/27/18 121/86   02/16/18 124/79   02/15/18 119/74    Weight from Last 3 Encounters:   03/01/18 76.2 kg (168 lb)   02/16/18 75.3 kg (166 lb)   02/08/18 76.2 kg (168 lb)              We Performed the Following     NEUROMUSCULAR RE-EDUCATION     Therapeutic Procedure per 15 min        Primary Care Provider Office Phone # Fax #    Phill Floyd [phone] [address] [facility] [address]        Equal Access to Services     BERLIN MEYER : Hadii rosemary bobbyo Soevelyn, waaxda luqadaha, qaybta kaalmada aderuthda, arnav billy. So [facility] [phone].    ATENCIÓN: Si habla español, tiene a cutler disposición servicios gratuitos de asistencia lingüística. Rodríguez al [phone].    We comply with applicable federal civil rights laws and [place] laws. We do not discriminate on the basis of race, color, national origin, age, disability, sex, sexual orientation, or gender identity.            Thank you!     Thank you for choosing [facility]  for your care. Our goal is always to provide you with excellent care. Hearing back from our patients is one way we can continue to improve our services. Please take a  few minutes to complete the written survey that you may receive in the mail after your visit with us. Thank you!             Your Updated Medication List - Protect others around you: Learn how to safely use, store and throw away your medicines at www.disposemymeds.org.          This list is accurate as of 3/9/18  2:33 PM.  Always use your most recent med list.                   Brand Name Dispense Instructions for use Diagnosis    acetylcysteine 600 MG Caps capsule    N-ACETYL CYSTEINE     Take 1,800 mg by mouth daily        * amphetamine-dextroamphetamine 20 MG per tablet    ADDERALL     Take 20 mg by mouth daily Reported on 3/6/2017        * amphetamine-dextroamphetamine 30 MG per 24 hr capsule    ADDERALL XR     Take 1 capsule by mouth every morning        clonazePAM 0.5 MG tablet    klonoPIN     Take 0.5-1 mg by mouth nightly as needed        DULoxetine 20 MG EC capsule    CYMBALTA     Take 1 capsule by mouth daily        gabapentin 100 MG capsule    NEURONTIN    90 capsule    Take 1 capsule (100 mg) by mouth daily    Lumbar radicular pain, Lumbar paraspinal muscle spasm       naproxen 500 MG tablet    NAPROSYN    40 tablet    Take 1 tablet (500 mg) by mouth 2 times daily (with meals)    Lumbar radicular pain, Lumbar paraspinal muscle spasm       * nicotine 14 MG/24HR 24 hr patch    NICODERM CQ    30 patch    Place 1 patch onto the skin every 24 hours Use this patch first    Tobacco abuse       * nicotine 7 MG/24HR 24 hr patch    NICODERM CQ    30 patch    Place 1 patch onto the skin every 24 hours    Tobacco abuse       tiZANidine 4 MG tablet    ZANAFLEX    30 tablet    Take 1-2 tablets (4-8 mg) by mouth nightly as needed    Lumbar radicular pain, Lumbar paraspinal muscle spasm       zolpidem 10 MG tablet    AMBIEN    30 tablet    Take 1 tablet (10 mg) by mouth nightly as needed for sleep    Primary insomnia       * Notice:  This list has 4 medication(s) that are the same as other medications prescribed for  you. Read the directions carefully, and ask your doctor or other care provider to review them with you.

## 2018-03-09 NOTE — PROGRESS NOTES
"Patient Name: Hoang Kiser      YOB: 1985     Medical Record Number: [id number]  Diagnosis:    Chronic pain  Chronic low back pain  Visit Info Length of Visit: 50  Arrived: On Time   Exercise/Activity Education Instruction:  Postural Training/Anatomy  Pacing/Energy Conservation  Home Program  Self Care  Activity:  Therapeutic Exercise 30':  Aerobic Conditioning (*see \"Strength/Functional Actitivities Record for details of exercises performed)  Tmill walk x 10' with good tolerance  Stretching:  Upper Ext  bilateral, Lower Ext  bilateral  Postural Training:  standing, sitting  Added stretches for KTC, thor ext  Added partial sit up with modifications (pt was doing larger motion sit ups with mult compensations until cued to modify)    Neuro re-ed 18':  Ed on neutral spine in all positions  Diaphragm breathing ed in supine with self manual cues  Ed on sequence of 'breathe, stabilize, move' concept  Body mechanics ed for supine to/from sit   Notes: Tolerated session well and engaged in the chronic pain PT approach and HEP.  Progressing appropriately toward goals.   Demonstrates/Verbalizes Technique: 3 (1= poor technique-difficulty performing exercises,significant cues required; 5= good technique-performs exercises without cues)  Body Awareness: 2, 3 (1=low awareness; 5=high awareness)  Posture/Stability: 2, 3 (1= poor posture, stability; 5= good posture, stability)   Motivational Level:   Ask questions, Eager to learn and Cooperative Participation:  Full   Patient Satisfaction:  satisfied   Response to Teaching:   cooperative, needs reinforcement and asked questions  Factors that affect learning: None   Plan of Care  Cont PT to support reactivation and integration of self regulation pain management skills.   Therapist: Faustino Ryan PT                 Date: 3/9/2018                                                                                               "

## 2018-03-12 NOTE — PROGRESS NOTES
[facility]   [facility] [place]  Behavioral Medicine Visit    Patient Name: Hoang Kiser    YOB: 1985   Medical Record Number: [id number]  Date: 3/12/2018                SUBJECTIVE: Patient reports that his pain is better he attributes this to the possibility that he has finally moved out of an environment that was very negative for him.  This occurred approximately 10 days ago.  Patient reports also that he has accomplished completing a hurddle of appearing before an unemployment board to present reasons of why he should be awarded unemployment compensation from a job he was formally at.  Patient reports that both of these events he believes have reduced his stress significantly which has made his pain more tolerable.  The patient reports he also had an epidural injection.  He is not sure whether or not it is the environment or the injection which has made his pain feel better.  He surmises perhaps it is both.    Today the patient reports he has not reviewed the material we discussed at our last visit on self hypnosis and mind-body connection.  Patient reports that the materials got packed when he moved and he has not yet found them.  He was given new copies to take with and reports he will review these prior to our next visit in 2 weeks time.          OBJECTIVE: Met with the patient on this date for an elective return appointment.  This is our second visit, the first since the initial assessment.  Today the patient reports the following: His pain level is moderately improved, his mood is mildly improved, his activity level has moderately increased, for his pain 2-3 times per day his stress level is mildly improved and his sleep is mildly improved.  Today the patient reports that he is doing self-care.  Today the patient is reporting since beginning to receive services with [facility] he has  experienced moderate improvement with his pain.   Length of Visit: 60 minutes      Assessment: Current Emotional / Mental Status    Appearance:   Appropriate   Eye Contact:   Good   Psychomotor Behavior: Normal   Attitude:   Cooperative   Orientation:   All  Speech  Rate / Production:             Normal   Volume:              Normal   Mood:    Normal  Affect:    Appropriate   Thought Content:  Clear   Thought Form:  Coherent  Logical   Insight:    Fair     ASSESSMENT:       Progress toward goals: fair.    Pain Status: improved    Emotional Status: improved              Medication / chemical use concerns: None    PLAN: Per the patient's pain provider: Degenerative disc disease, lumbar, lumbar radiculopathy, chronic low back pain with sciatica, sciatica laterality unspecified, unspecified back pain laterality  Next Appointment: Hoang Kiser will schedule a follow-up appointment in 2 weeks.  Assignment: See above  Objectives / interventions for next session: See above    Fili Wesley MA, LP, [facility]  Behavioral Pain Specialist  [facility]

## 2018-03-13 ENCOUNTER — OFFICE VISIT (OUTPATIENT)
Dept: PALLIATIVE MEDICINE | Facility: CLINIC | Age: 33
End: 2018-03-13
Payer: COMMERCIAL

## 2018-03-13 VITALS
RESPIRATION RATE: 16 BRPM | HEART RATE: 86 BPM | SYSTOLIC BLOOD PRESSURE: 120 MMHG | DIASTOLIC BLOOD PRESSURE: 84 MMHG | OXYGEN SATURATION: 100 %

## 2018-03-13 DIAGNOSIS — M54.16 LUMBAR RADICULAR PAIN: ICD-10-CM

## 2018-03-13 DIAGNOSIS — M79.2 NEUROPATHIC PAIN: Primary | ICD-10-CM

## 2018-03-13 DIAGNOSIS — M62.830 LUMBAR PARASPINAL MUSCLE SPASM: ICD-10-CM

## 2018-03-13 PROCEDURE — 99214 OFFICE O/P EST MOD 30 MIN: CPT | Performed by: NURSE PRACTITIONER

## 2018-03-13 RX ORDER — AMITRIPTYLINE HYDROCHLORIDE 10 MG/1
10-30 TABLET ORAL AT BEDTIME
Qty: 90 TABLET | Refills: 1 | Status: SHIPPED | OUTPATIENT
Start: 2018-03-13 | End: 2018-04-19

## 2018-03-13 ASSESSMENT — PAIN SCALES - GENERAL: PAINLEVEL: MILD PAIN (2)

## 2018-03-13 NOTE — PATIENT INSTRUCTIONS
After Visit Instructions:     Thank you for coming to [facility] for your care. It is my goal to partner with you to help you reach your optimal state of health.     I am recommending multidisciplinary care at this time.  The focus of care will be to continue gradual rehabilitation and pain management with medication adjustments as needed.    Continue daily self-care, identifying contributing factors, and monitoring variations in pain level. Continue to integrate self-care into your life.      1. Schedule pain psychology visits  2. Schedule physical therapy visits  3. Schedule follow-up with CARLOS A Higuera NP-C in 8 weeks  4. Medication recommendations:   1. Amitriptyline 10 m-3 tabs at bedtime. Start with 1 tab and increase by 1 tab every 4-5 days as tolerated.   2. Refill of Tizanidine 4 mg given  3. Stop Gabapentin.       CARLOS A Bass NP-C  [facility]    Contact information: [facility]    Please call if any side effects, questions, or concerns arise.    Nurse Triage line:  [phone]   Call this number with any questions or concerns. You may leave a detailed message anytime. Calls are typically returned Monday through Friday between 8 AM and 4:30 PM. We usually get back to you within 2 business days depending on the issue/request.    Scheduling number: [phone].  Call this number to schedule or change appointments.          Medication Refills Policy:    For non-narcotic medications, please your pharmacy directly to request a refill and the pharmacy will call the [facility] for authorization. Please allow 3-4 days for these refills.    For narcotic refills, call the nurse triage line and leave a message requesting your refill along with the name of the pharmacy that you use. Narcotic prescriptions will be mailed to your pharmacy or you may pick them up at the clinic.  Please call 7-10 days before  your refill is due  The above policy allows adequate time so that you do not run out of medication.    No Show - Late Cancellation - Late Arrival Policy  We believe regular attendance is key to your success in our program.    Any time you are unable to keep your appointment we ask that you call us at  least 24 hours in advance to let us know. This will allow us to offer the appointment time to another patient. The following is our policy for missed appointments. This also applies to appointments cancelled with less than 24 hours notice.    You will receive a letter after missing your 1st and 2nd appointments without contacting the clinic before your scheduled appointment time.     After missing 3 appointments without calling first, we will cancel all of your future appointments at [facility].    At that point, you will not be able to resume services unless approved by your care team  We understand that unforseen circumstances arise, however, out of respect for all concerned and to provide this appointment to another patient, this policy will be enforced.    Please note that most follow up appointments are 30 minutes long. If you arrive late, your provider may not be able to see you for the entire 30 minutes. Please also note that if you arrive more than 15 minutes for any appointment, it may be rescheduled.

## 2018-03-13 NOTE — NURSING NOTE
"No chief complaint on file.      Initial /84 (BP Location: Right arm, Patient Position: Chair, Cuff Size: Adult Small)  Pulse 86  Resp 16  SpO2 100% Estimated body mass index is 25.76 kg/(m^2) as calculated from the following:    Height as of 3/1/18: 1.72 m (5' 7.72\").    Weight as of 3/1/18: 76.2 kg (168 lb).  Medication Reconciliation: complete       Campbell Kemp MA  [facility]      "

## 2018-03-13 NOTE — MR AVS SNAPSHOT
After Visit Summary   3/13/2018    Hoang Kiser    MRN: [id number]           Patient Information     Date Of Birth          1985        Visit Information        Provider Department      3/13/2018 8:00 AM Tamiko Stevens APRN CNP [facility]        Today's Diagnoses     Neuropathic pain    -  1    Lumbar radicular pain        Lumbar paraspinal muscle spasm          Care Instructions    After Visit Instructions:     Thank you for coming to [facility] for your care. It is my goal to partner with you to help you reach your optimal state of health.     I am recommending multidisciplinary care at this time.  The focus of care will be to continue gradual rehabilitation and pain management with medication adjustments as needed.    Continue daily self-care, identifying contributing factors, and monitoring variations in pain level. Continue to integrate self-care into your life.      1. Schedule pain psychology visits  2. Schedule physical therapy visits  3. Schedule follow-up with CARLOS A Higuera NP-C in 8 weeks  4. Medication recommendations:   1. Amitriptyline 10 m-3 tabs at bedtime. Start with 1 tab and increase by 1 tab every 4-5 days as tolerated.   2. Refill of Tizanidine 4 mg given  3. Stop Gabapentin.       CARLOS A Bass NP-C  [facility]    Contact information: [facility]    Please call if any side effects, questions, or concerns arise.    Nurse Triage line:  [phone]   Call this number with any questions or concerns. You may leave a detailed message anytime. Calls are typically returned Monday through Friday between 8 AM and 4:30 PM. We usually get back to you within 2 business days depending on the issue/request.    Scheduling number: [phone].  Call this number to schedule or change appointments.          Medication Refills Policy:    For non-narcotic  medications, please your pharmacy directly to request a refill and the pharmacy will call the [facility] for authorization. Please allow 3-4 days for these refills.    For narcotic refills, call the nurse triage line and leave a message requesting your refill along with the name of the pharmacy that you use. Narcotic prescriptions will be mailed to your pharmacy or you may pick them up at the clinic.  Please call 7-10 days before your refill is due  The above policy allows adequate time so that you do not run out of medication.    No Show - Late Cancellation - Late Arrival Policy  We believe regular attendance is key to your success in our program.    Any time you are unable to keep your appointment we ask that you call us at  least 24 hours in advance to let us know. This will allow us to offer the appointment time to another patient. The following is our policy for missed appointments. This also applies to appointments cancelled with less than 24 hours notice.    You will receive a letter after missing your 1st and 2nd appointments without contacting the clinic before your scheduled appointment time.     After missing 3 appointments without calling first, we will cancel all of your future appointments at [facility].    At that point, you will not be able to resume services unless approved by your care team  We understand that unforseen circumstances arise, however, out of respect for all concerned and to provide this appointment to another patient, this policy will be enforced.    Please note that most follow up appointments are 30 minutes long. If you arrive late, your provider may not be able to see you for the entire 30 minutes. Please also note that if you arrive more than 15 minutes for any appointment, it may be rescheduled.                                     Follow-ups after your visit        Your next 10 appointments already scheduled     Mar 16, 2018  7:30 AM CDT   LAB  with CP LAB   [facility]    [address] [facility] [id number] [phone]           Please do not eat 10-12 hours before your appointment if you are coming in fasting for labs on lipids, cholesterol, or glucose (sugar). This does not apply to pregnant women. Water, hot tea and black coffee (with nothing added) are okay. Do not drink other fluids, diet soda or chew gum.            Mar 21, 2018  9:30 AM CDT   Return Visit with Faustino Ryan PT   [facility] ([facility])    [address]  [address]  [facility] [address] [phone]            Mar 22, 2018  9:00 AM CDT   Return Visit with Fili Wesley LP   [facility] ([facility])    [address]  [address]  [facility] [address] [phone]              Who to contact     If you have questions or need follow up information about today's clinic visit or your schedule please contact [facility] directly at [phone].  Normal or non-critical lab and imaging results will be communicated to you by KnoCohart, letter or phone within 4 business days after the clinic has received the results. If you do not hear from us within 7 days, please contact the clinic through KnoCohart or phone. If you have a critical or abnormal lab result, we will notify you by phone as soon as possible.  Submit refill requests through Alliqua or call your pharmacy and they will forward the refill request to us. Please allow 3 business days for your refill to be completed.          Additional Information About Your Visit        KnoCoharSoapbox Mobile Information     Alliqua gives you secure access to your electronic health record. If you see a primary care provider, you can also send messages to your care team and make appointments. If you have questions, please call your primary care clinic.  If you do not have a primary  care provider, please call [phone] and they will assist you.        Care EveryWhere ID     This is your Care EveryWhere ID. This could be used by other organizations to access your [facility] medical records  [id number]        Your Vitals Were     Pulse Respirations Pulse Oximetry             86 16 100%          Blood Pressure from Last 3 Encounters:   03/13/18 120/84   02/27/18 121/86   02/16/18 124/79    Weight from Last 3 Encounters:   03/01/18 76.2 kg (168 lb)   02/16/18 75.3 kg (166 lb)   02/08/18 76.2 kg (168 lb)              Today, you had the following     No orders found for display         Today's Medication Changes          These changes are accurate as of 3/13/18  8:25 AM.  If you have any questions, ask your nurse or doctor.               Start taking these medicines.        Dose/Directions    amitriptyline 10 MG tablet   Commonly known as:  ELAVIL   Used for:  Neuropathic pain   Started by:  Tamiko Stevens APRN CNP        Dose:  10-30 mg   Take 1-3 tablets (10-30 mg) by mouth At Bedtime   Quantity:  90 tablet   Refills:  1            Where to get your medicines      These medications were sent to [facility] [address] [place] [address] [facility]  [address] [facility] [address]     Phone:  [phone]     amitriptyline 10 MG tablet    tiZANidine 4 MG tablet                Primary Care Provider Office Phone # Fax #    Phill Floyd [phone] [address] [address] [facility] [address]        Equal Access to Services     [facility] AH: Hadii aad ku hadasho Soomaali, waaxda luqadaha, qaybta kaalmada arnav hawkins. So [facility] [phone].    ATENCIÓN: Si habla español, tiene a cutler disposición servicios gratuitos de asistencia lingüística. Lorrieame al [phone].    We comply with applicable federal civil rights laws and [place] laws. We do not discriminate on the basis  of race, color, national origin, age, disability, sex, sexual orientation, or gender identity.            Thank you!     Thank you for choosing [facility]  for your care. Our goal is always to provide you with excellent care. Hearing back from our patients is one way we can continue to improve our services. Please take a few minutes to complete the written survey that you may receive in the mail after your visit with us. Thank you!             Your Updated Medication List - Protect others around you: Learn how to safely use, store and throw away your medicines at www.disposemymeds.org.          This list is accurate as of 3/13/18  8:25 AM.  Always use your most recent med list.                   Brand Name Dispense Instructions for use Diagnosis    acetylcysteine 600 MG Caps capsule    N-ACETYL CYSTEINE     Take 1,800 mg by mouth daily        amitriptyline 10 MG tablet    ELAVIL    90 tablet    Take 1-3 tablets (10-30 mg) by mouth At Bedtime    Neuropathic pain       * amphetamine-dextroamphetamine 20 MG per tablet    ADDERALL     Take 20 mg by mouth daily Reported on 3/6/2017        * amphetamine-dextroamphetamine 30 MG per 24 hr capsule    ADDERALL XR     Take 1 capsule by mouth every morning        clonazePAM 0.5 MG tablet    klonoPIN     Take 0.5-1 mg by mouth nightly as needed        DULoxetine 20 MG EC capsule    CYMBALTA     Take 1 capsule by mouth daily        gabapentin 100 MG capsule    NEURONTIN    90 capsule    Take 1 capsule (100 mg) by mouth daily    Lumbar radicular pain, Lumbar paraspinal muscle spasm       naproxen 500 MG tablet    NAPROSYN    40 tablet    Take 1 tablet (500 mg) by mouth 2 times daily (with meals)    Lumbar radicular pain, Lumbar paraspinal muscle spasm       * nicotine 14 MG/24HR 24 hr patch    NICODERM CQ    30 patch    Place 1 patch onto the skin every 24 hours Use this patch first    Tobacco abuse       * nicotine 7 MG/24HR 24 hr patch    NICODERM CQ    30  patch    Place 1 patch onto the skin every 24 hours    Tobacco abuse       tiZANidine 4 MG tablet    ZANAFLEX    60 tablet    Take 1-2 tablets (4-8 mg) by mouth nightly as needed    Lumbar radicular pain, Lumbar paraspinal muscle spasm       zolpidem 10 MG tablet    AMBIEN    30 tablet    Take 1 tablet (10 mg) by mouth nightly as needed for sleep    Primary insomnia       * Notice:  This list has 4 medication(s) that are the same as other medications prescribed for you. Read the directions carefully, and ask your doctor or other care provider to review them with you.

## 2018-03-13 NOTE — PROGRESS NOTES
[facility]    CHIEF COMPLAINT: Pain molly lower back, hips, groin sometimes.     INTERVAL HISTORY:  Last seen on 2/8/18.        Recommendations/plan at the last visit included:  1. Schedule pain psychology assessment  2. Schedule physical therapy assessment  3. Schedule follow-up with CARLOS A Higuera NP-C in 2 weeks after injection   4. Labs: Urine drug screen  5. Procedures recommended: Repeat Right L 4-5 Interlaminar  ESTER injection  Interventional procedures  are done at our [place] and [place] locations.   6. Medication recommendations:                  1. We will talk about Tramadol after drug screen is back.    Since his last visit, Hoang J Margi reports:  - See encounters about house mate tainting coffee. Has now moved into a new housing situation. 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 Mood has improved with this situation.   - Has been doing pain psych and PT which has helped with understanding and having a better outlook on pain issues.Learning about how his mood is affecting his pain.  Injection on 2/27/18 has helped with pain.   - Doing 30 min of cardio per day and going to the gym 2-3 x per week. Monitoring health with a Fitbit. Has been meditating daily.   - Still having testing r/t liver issues.     Pain Information:   Pain quality: Throbbing    Pain timing: Intermittent     Pain rating: intensity ranges from 1/10 to 6/10, and averages 3/10 on a 0-10 scale.   Aggravating factors include: Long periods of standing, sitting   Relieving factors include: Laying down, hot pad    Annual Controlled Substance Agreement/UDS due date: N/A    Current Pain Relevant Medications:    Clonazepam .5 mg 1-2 tab at HS PRN  Duloxetine 20 mg daily  Gabapentin 100 mg daily  Naproxen 500 mg BID: on hold re: elevated LFTs   Tizanidine 4 mg 1-2 tabs at HS PRN  Ambien 10 mg at HS  Adderall 50 mg daily, combination of  long and short acting.      Previous Pain Relevant Medications: (H--helped; HI--Helped initially; SWH--Somewhat helpful; NH--No help; W--worse; SE--side effects; ?--Unsure if helpful)   NOTE: This medication information taken from patient's intake form, not medical records.                         Opiates: Tramadol: H                        NSAIDS: Ibuprofen:H, naproxen:SWH, Relafen: NH                        Muscle Relaxants: Cyclobenzaprine:H,Med interaction, Tizanidine:H                        Anti-migraine mediations: Prednisone:H                        Anti-depressants: Bupropion:SE, Celexa:SE, Duloxetine:H, Trazodone:Too strong, Venlafaxine:too strong                        Sleep aids:Anbien: H                        Anxiolytics: Clonazepam:H                        Neuropathics: Tegretol:taken for seizures in childhood, Gabapentin:H                                          Topicals: Lidocaine:H                        Other medications not covered above: Tylenol:NH     Any illicit drug use: 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 Multiple controlled substances, no early refills, 5 prescribers in last 12 months as of 2/8/18    SELF CARE:   How often do you practice SELF-CARE (relaxing, stretching, pacing, monitoring posture, taking mini-breaks) in a typical day:  10+ times daily       Medications:  Current Outpatient Prescriptions   Medication Sig Dispense Refill     tiZANidine (ZANAFLEX) 4 MG tablet Take 1-2 tablets (4-8 mg) by mouth nightly as needed 30 tablet 1     zolpidem (AMBIEN) 10 MG tablet Take 1 tablet (10 mg) by mouth nightly as needed for sleep 30 tablet 2     gabapentin (NEURONTIN) 100 MG capsule Take 1  capsule (100 mg) by mouth daily 90 capsule 3     naproxen (NAPROSYN) 500 MG tablet Take 1 tablet (500 mg) by mouth 2 times daily (with meals) 40 tablet 1     nicotine (NICODERM CQ) 14 MG/24HR 24 hr patch Place 1 patch onto the skin every 24 hours Use this patch first 30 patch 0     nicotine (NICODERM CQ) 7 MG/24HR 24 hr patch Place 1 patch onto the skin every 24 hours 30 patch 0     DULoxetine (CYMBALTA) 20 MG EC capsule Take 1 capsule by mouth daily  0     amphetamine-dextroamphetamine (ADDERALL XR) 30 MG per 24 hr capsule Take 1 capsule by mouth every morning  0     acetylcysteine (N-ACETYL-L-CYSTEINE) 600 MG CAPS capsule Take 1,800 mg by mouth daily       clonazePAM (KLONOPIN) 0.5 MG tablet Take 0.5-1 mg by mouth nightly as needed   0     amphetamine-dextroamphetamine (ADDERALL) 20 MG tablet Take 20 mg by mouth daily Reported on 3/6/2017  0       Review of Systems: A 10-point review of systems was negative, with the exception of chronic pain issues.      Social History: Reviewed; unchanged from initial consultation.      Family history: Reviewed; unchanged from initial consultation.     PHYSICAL EXAM    Vitals:    03/13/18 0754   BP: 120/84   BP Location: Right arm   Patient Position: Chair   Cuff Size: Adult Small   Pulse: 86   Resp: 16   SpO2: 100%       Constitutional: healthy, alert and no distress  HEENT: Head atraumatic, normocephalic. Eyes without conjunctival injection or jaundice. Neck supple. No obvious neck masses.  Skin: No rash, lesions, or petechiae of exposed skin.   Extremities: No clubbing, cyanosis, or edema to visible extremities.   Psychiatric/mental status: Alert, without lethargy or stupor. Appropriate affect. Mood normal.     Neurologic exam:  CN:  Cranial nerves 2-12 are grossly normal.  Motor:  5/5 UE and 4/5 LE strength    Musculoskeletal exam:  Cervical spine:                       Flex:  20 degrees                       Ext: 20 degrees                       Rotation to right: 20  "degrees                       Rotation to left: 20 degrees                       Rotation/ext to right: painful                        Rotation/ext to left: painful                        Tenderness in the cervical spine at midline. No                       Tenderness in the cervical paraspinal muscles. No  Thoracic spine:                        Kyphosis. No                       Tenderness in the thoracic spine at midline. Yes                       Tenderness in the thoracic paraspinal muscles. Yes  Lumbar/Sacral spine:                       Forward Flexion:  90 degrees                       Ext: 20 degrees \"tight\"                       Rotation/ext to right: painful                        Rotation/ext to left:: painful                        Lordosis. No                       Tenderness in the lumbar spine at midline. Yes                       Tenderness in the lumbar paraspinal muscles.Yes     Psychiatric:  mentation appears normal., affect and mood normal     DIRE Score for ongoing opioid management is calculated as follows:    Diagnosis = 2 pts (slowly progressive; moderate pain/objective findings)    Intractability = 2 pts (most treatments tried; patient not fully engaged/barriers)    Risk        Psych = 2 pts (personality dysfunction/mental illness that moderately interferes with care)         Chem Hlth = 2 pts (use of medications to cope with stress; chemical dependency in remission)       Reliability = 3 pts (highly reliable with meds, appointments, treatments)       Social = 3 pts (supportive family/close relationships; involved in work/school; no isolation)       (Psych + Chem hlth + Reliability + Social) = 14    Efficacy = 2 pts (moderate benefit/function; low med dose; too early/not tried meds)    DIRE Score = 16        7-13: likely NOT suitable candidate for long-term opioid analgesia       14-21: may be a suitable candidate for long-term opioid analgesia        Previous Diagnostic Tests:   Imaging " Studies:   MR LUMBAR SPINE W/O CONTRAST 3/8/2017 4:08 PM  Provided History: Radiculopathy, lumbar region  Comparison: None available  Technique: Sagittal T1-weighted, sagittal T2-weighted, sagittal STIR,  sagittal diffusion-weighted, axial T2-weighted, and axial gradient  echo images of the lumbar spine were obtained without the  administration of intravenous contrast.  Findings: Regarding numbering convention, there are 5 lumbar-type  vertebrae assumed for the purposes of this dictation.  The tip of the  conus medullaris is at  L1.  Regarding alignment, the lumbar vertebral  column appears normally aligned.  There is no significant disc height  narrowing at any level.  Regarding bone marrow signal intensity, no  abnormality is visualized on STIR images.  On a level by level basis:  T12-L1: No spinal canal or neuroforaminal stenosis.  L1-2: No spinal canal or neuroforaminal stenosis.  L2-3: Mild posterior disc bulge. No spinal canal or neuroforaminal  stenosis.  L3-4: Mild posterior disc bulge. No spinal canal or neuroforaminal  stenosis. Mild bilateral ligamentum flavum hypertrophy.  L4-5: Mild posterior disc bulge. No spinal canal or neuroforaminal  stenosis. Mild bilateral ligamentum flavum hypertrophy.  L5-S1: Mild posterior disc bulge. No spinal canal or neuroforaminal  stenosis.  Paraspinous tissues are within normal limits.  Impression: No lumbar spine or neuroforaminal stenosis.        ASSESSMENT:   1.  Lumbar DDD, mild  2.  Lumbar muscle spasm  3.  Hx: anxiety, chemical dependence in remission.    Shoaib returns for f/u after attending a few sessions each of PT and pain psychology. Both are very helpful. New housing situation does not allow opiates in the house. Given his other medications, opiates are a very poor choice. Not getting any relief from non-therapeutic dose of Gabapentin but does not tolerate higher dose. We will stop Gabapentin and try TCA. Continue therapies and f/u in 8 weeks or sooner as  needed.     Plan:    Diagnosis reviewed, treatment option addressed, and risk/benifits discussed.  Self-care instructions given.  I am recommending a multidisciplinary treatment plan to help this patient better manage pain.        1. Schedule pain psychology visits  2. Schedule physical therapy visits  3. Schedule follow-up with CARLOS A Higuera, NP-C in 8 weeks  4. Medication recommendations:   1. Amitriptyline 10 m-3 tabs at bedtime. Start with 1 tab and increase by 1 tab every 4-5 days as tolerated.   2. Refill of Tizanidine 4 mg given  3. Stop Gabapentin.     Total time spent face to face was 30 minutes and more than 50% of face to face time was spent in counseling and/or coordination of care regarding the diagnosis and recommendations above.      CARLOS A Bass, NP-C   [facility]

## 2018-03-14 ENCOUNTER — MYC MEDICAL ADVICE (OUTPATIENT)
Dept: PALLIATIVE MEDICINE | Facility: CLINIC | Age: 33
End: 2018-03-14

## 2018-03-16 DIAGNOSIS — K76.9 HEPATOCELLULAR INJURY: ICD-10-CM

## 2018-03-16 LAB
ALBUMIN SERPL-MCNC: 3.8 G/DL (ref 3.4–5)
ALP SERPL-CCNC: 56 U/L (ref 40–150)
ALT SERPL W P-5'-P-CCNC: 33 U/L (ref 0–70)
AST SERPL W P-5'-P-CCNC: 24 U/L (ref 0–45)
BILIRUB DIRECT SERPL-MCNC: <0.1 MG/DL (ref 0–0.2)
BILIRUB SERPL-MCNC: 0.3 MG/DL (ref 0.2–1.3)
PROT SERPL-MCNC: 7.1 G/DL (ref 6.8–8.8)

## 2018-03-16 PROCEDURE — 80076 HEPATIC FUNCTION PANEL: CPT | Performed by: FAMILY MEDICINE

## 2018-03-16 PROCEDURE — 36415 COLL VENOUS BLD VENIPUNCTURE: CPT | Performed by: FAMILY MEDICINE

## 2018-03-18 ENCOUNTER — MYC MEDICAL ADVICE (OUTPATIENT)
Dept: PALLIATIVE MEDICINE | Facility: CLINIC | Age: 33
End: 2018-03-18

## 2018-03-18 NOTE — PROGRESS NOTES
Hoang,  I have reviewed the results of the laboratory tests that we recently ordered. All of the lab work performed was normal or considered normal for you. It should be safe to continue(s) taking the gabapentin. I put naproxen on your Allergy/intolerance list.  Sincerely,   Phill Floyd

## 2018-03-21 ENCOUNTER — TELEPHONE (OUTPATIENT)
Dept: PALLIATIVE MEDICINE | Facility: CLINIC | Age: 33
End: 2018-03-21

## 2018-03-21 ENCOUNTER — OFFICE VISIT (OUTPATIENT)
Dept: PALLIATIVE MEDICINE | Facility: CLINIC | Age: 33
End: 2018-03-21
Payer: COMMERCIAL

## 2018-03-21 DIAGNOSIS — M54.50 CHRONIC LOW BACK PAIN: ICD-10-CM

## 2018-03-21 DIAGNOSIS — G89.29 CHRONIC PAIN: Primary | ICD-10-CM

## 2018-03-21 DIAGNOSIS — G89.29 CHRONIC LOW BACK PAIN: ICD-10-CM

## 2018-03-21 PROCEDURE — 97112 NEUROMUSCULAR REEDUCATION: CPT | Mod: GP | Performed by: PHYSICAL THERAPIST

## 2018-03-21 PROCEDURE — 97110 THERAPEUTIC EXERCISES: CPT | Mod: GP | Performed by: PHYSICAL THERAPIST

## 2018-03-21 NOTE — MR AVS SNAPSHOT
After Visit Summary   3/21/2018    Hoang Kiser    MRN: [id number]           Patient Information     Date Of Birth          1985        Visit Information        Provider Department      3/21/2018 9:30 AM Faustino Ryan, PT [facility]        Today's Diagnoses     Chronic pain    -  1    Chronic low back pain           Follow-ups after your visit        Your next 10 appointments already scheduled     Mar 22, 2018  9:00 AM CDT   Return Visit with Fili Wesley LP   [facility] ([facility])    [address]  [address]  [facility] [address] [phone]            Apr 10, 2018  9:30 AM CDT   Return Visit with Faustino Ryan, PT   [facility] ([facility])    [address]  [address]  [facility] [address] [phone]            May 08, 2018 10:00 AM CDT   Return Visit with CARLOS A [facility] ([facility])    [address]  [address]  [facility] [address] [phone]              Who to contact     If you have questions or need follow up information about today's clinic visit or your schedule please contact [facility] directly at [phone].  Normal or non-critical lab and imaging results will be communicated to you by MyChart, letter or phone within 4 business days after the clinic has received the results. If you do not hear from us within 7 days, please contact the clinic through MyChart or phone. If you have a critical or abnormal lab result, we will notify you by phone as soon as possible.  Submit refill requests through VideoBurst or call your pharmacy and they will forward the refill request to us. Please allow 3 business days for your refill to be completed.          Additional Information About Your Visit        [email or web address] Information     VideoBurst gives you secure access to your electronic health record. If you  see a primary care provider, you can also send messages to your care team and make appointments. If you have questions, please call your primary care clinic.  If you do not have a primary care provider, please call [phone] and they will assist you.        Care EveryWhere ID     This is your Care EveryWhere ID. This could be used by other organizations to access your [place] medical records  [id number]         Blood Pressure from Last 3 Encounters:   03/13/18 120/84   02/27/18 121/86   02/16/18 124/79    Weight from Last 3 Encounters:   03/01/18 76.2 kg (168 lb)   02/16/18 75.3 kg (166 lb)   02/08/18 76.2 kg (168 lb)              We Performed the Following     NEUROMUSCULAR RE-EDUCATION     Therapeutic Procedure per 15 min        Primary Care Provider Office Phone # Fax #    Phill Floyd [phone] [address] [facility] [address]        Equal Access to Services     BERLIN MEYER : Hadii aad ku hadasho Soomaali, waaxda luqadaha, qaybta kaalmada adeegyada, waxay oscarin haychavon tevin lorenzana . So [facility] [phone].    ATENCIÓN: Si ernestola español, tiene a cutler disposición servicios gratuitos de asistencia lingüística. Llame al [phone].    We comply with applicable federal civil rights laws and [place] laws. We do not discriminate on the basis of race, color, national origin, age, disability, sex, sexual orientation, or gender identity.            Thank you!     Thank you for choosing [facility]  for your care. Our goal is always to provide you with excellent care. Hearing back from our patients is one way we can continue to improve our services. Please take a few minutes to complete the written survey that you may receive in the mail after your visit with us. Thank you!             Your Updated Medication List - Protect others around you: Learn how to safely use, store and throw away your medicines at www.disposemymeds.org.          This list is  accurate as of 3/21/18  2:47 PM.  Always use your most recent med list.                   Brand Name Dispense Instructions for use Diagnosis    acetylcysteine 600 MG Caps capsule    N-ACETYL CYSTEINE     Take 1,800 mg by mouth daily        amitriptyline 10 MG tablet    ELAVIL    90 tablet    Take 1-3 tablets (10-30 mg) by mouth At Bedtime    Neuropathic pain       * amphetamine-dextroamphetamine 20 MG per tablet    ADDERALL     Take 20 mg by mouth daily Reported on 3/6/2017        * amphetamine-dextroamphetamine 30 MG per 24 hr capsule    ADDERALL XR     Take 1 capsule by mouth every morning        clonazePAM 0.5 MG tablet    klonoPIN     Take 0.5-1 mg by mouth nightly as needed        DULoxetine 20 MG EC capsule    CYMBALTA     Take 1 capsule by mouth daily        gabapentin 100 MG capsule    NEURONTIN    90 capsule    Take 1 capsule (100 mg) by mouth daily    Lumbar radicular pain, Lumbar paraspinal muscle spasm       naproxen 500 MG tablet    NAPROSYN    40 tablet    Take 1 tablet (500 mg) by mouth 2 times daily (with meals)    Lumbar radicular pain, Lumbar paraspinal muscle spasm       * nicotine 14 MG/24HR 24 hr patch    NICODERM CQ    30 patch    Place 1 patch onto the skin every 24 hours Use this patch first    Tobacco abuse       * nicotine 7 MG/24HR 24 hr patch    NICODERM CQ    30 patch    Place 1 patch onto the skin every 24 hours    Tobacco abuse       tiZANidine 4 MG tablet    ZANAFLEX    60 tablet    Take 1-2 tablets (4-8 mg) by mouth nightly as needed    Lumbar radicular pain, Lumbar paraspinal muscle spasm       zolpidem 10 MG tablet    AMBIEN    30 tablet    Take 1 tablet (10 mg) by mouth nightly as needed for sleep    Primary insomnia       * Notice:  This list has 4 medication(s) that are the same as other medications prescribed for you. Read the directions carefully, and ask your doctor or other care provider to review them with you.

## 2018-03-21 NOTE — TELEPHONE ENCOUNTER
3- at 9:06am    Reason for Message:  Patient returned copies of self-hypnosis reading material.    When patient was checking in for his physical therapy appointment, he stated Fili Wesley had given him it before and he's returning them back to the provider. Patient inquired if Donte Reinoso was working today and was informed that he wasn't at the [place] location today. Copies were placed in Fili Wesley's in-box at the [place] site.    Annalee Thomas  Patient Representative  [facility]

## 2018-03-22 ENCOUNTER — OFFICE VISIT (OUTPATIENT)
Dept: PALLIATIVE MEDICINE | Facility: CLINIC | Age: 33
End: 2018-03-22
Payer: COMMERCIAL

## 2018-03-22 DIAGNOSIS — M79.2 NEUROPATHIC PAIN: ICD-10-CM

## 2018-03-22 DIAGNOSIS — M51.369 DDD (DEGENERATIVE DISC DISEASE), LUMBAR: Primary | ICD-10-CM

## 2018-03-22 DIAGNOSIS — M62.830 LUMBAR PARASPINAL MUSCLE SPASM: ICD-10-CM

## 2018-03-22 PROCEDURE — 96152 HC HEALTH AND BEHAVIOR INTERVENTION, INDIVIDUAL, EACH 15 MINUTES: CPT | Performed by: PSYCHOLOGIST

## 2018-03-22 NOTE — MR AVS SNAPSHOT
After Visit Summary   3/22/2018    Hoang Kiser    MRN: [id number]           Patient Information     Date Of Birth          1985        Visit Information        Provider Department      3/22/2018 9:00 AM Fili Wesley LP [facility]        Today's Diagnoses     DDD (degenerative disc disease), lumbar    -  1    Neuropathic pain        Lumbar paraspinal muscle spasm           Follow-ups after your visit        Your next 10 appointments already scheduled     Apr 05, 2018 10:00 AM CDT   Return Visit with Fili Wesley LP   [facility] ([facility])    [address]  [facility] [id number] [phone]            Apr 10, 2018  9:30 AM CDT   Return Visit with Faustino Ryan PT   [facility] ([facility])    [address]  [facility] [id number] [phone]            May 08, 2018 10:00 AM CDT   Return Visit with CARLOS A Guy CNP   [facility] ([facility])    [address]  [facility] [id number] [phone]              Who to contact     If you have questions or need follow up information about today's clinic visit or your schedule please contact [facility] directly at [phone].  Normal or non-critical lab and imaging results will be communicated to you by MyChart, letter or phone within 4 business days after the clinic has received the results. If you do not hear from us within 7 days, please contact the clinic through MyChart or phone. If you have a critical or abnormal lab result, we will notify you by phone as soon as possible.  Submit refill requests through Betterment or call your pharmacy and they will forward the refill request to us. Please allow 3 business days for your refill to be completed.          Additional Information About Your Visit        MyChart Information     [facility]  gives you secure access to your electronic health record. If you see a primary care provider, you can also send messages to your care team and make appointments. If you have questions, please call your primary care clinic.  If you do not have a primary care provider, please call [phone] and they will assist you.        Care EveryWhere ID     This is your Care EveryWhere ID. This could be used by other organizations to access your [place] medical records  [id number]         Blood Pressure from Last 3 Encounters:   03/13/18 120/84   02/27/18 121/86   02/16/18 124/79    Weight from Last 3 Encounters:   03/01/18 76.2 kg (168 lb)   02/16/18 75.3 kg (166 lb)   02/08/18 76.2 kg (168 lb)              We Performed the Following     HEALTH & BEHAVIOR ASSESS, INITIAL, SHIRLEY 15MIN PHD        Primary Care Provider Office Phone # Fax #    Phill Floyd [phone] [address] [facility] [address]        Equal Access to Services     [facility] : Hadii aad ku hadasho Soomaali, waaxda luqadaha, qaybta kaalmada adeegyada, waxay oscarin haychavon tevin lorenzana . So [facility] [phone].    ATENCIÓN: Si habla español, tiene a cutler disposición servicios gratuitos de asistencia lingüística. Llame al [phone].    We comply with applicable federal civil rights laws and [place] laws. We do not discriminate on the basis of race, color, national origin, age, disability, sex, sexual orientation, or gender identity.            Thank you!     Thank you for choosing [facility] [place]  for your care. Our goal is always to provide you with excellent care. Hearing back from our patients is one way we can continue to improve our services. Please take a few minutes to complete the written survey that you may receive in the mail after your visit with us. Thank you!             Your Updated Medication List - Protect others around you: Learn how to safely use, store and throw away your medicines  at www.disposemymeds.org.          This list is accurate as of 3/22/18 11:59 PM.  Always use your most recent med list.                   Brand Name Dispense Instructions for use Diagnosis    acetylcysteine 600 MG Caps capsule    N-ACETYL CYSTEINE     Take 1,800 mg by mouth daily        amitriptyline 10 MG tablet    ELAVIL    90 tablet    Take 1-3 tablets (10-30 mg) by mouth At Bedtime    Neuropathic pain       * amphetamine-dextroamphetamine 20 MG per tablet    ADDERALL     Take 20 mg by mouth daily Reported on 3/6/2017        * amphetamine-dextroamphetamine 30 MG per 24 hr capsule    ADDERALL XR     Take 1 capsule by mouth every morning        clonazePAM 0.5 MG tablet    klonoPIN     Take 0.5-1 mg by mouth nightly as needed        DULoxetine 20 MG EC capsule    CYMBALTA     Take 1 capsule by mouth daily        gabapentin 100 MG capsule    NEURONTIN    90 capsule    Take 1 capsule (100 mg) by mouth daily    Lumbar radicular pain, Lumbar paraspinal muscle spasm       naproxen 500 MG tablet    NAPROSYN    40 tablet    Take 1 tablet (500 mg) by mouth 2 times daily (with meals)    Lumbar radicular pain, Lumbar paraspinal muscle spasm       * nicotine 14 MG/24HR 24 hr patch    NICODERM CQ    30 patch    Place 1 patch onto the skin every 24 hours Use this patch first    Tobacco abuse       * nicotine 7 MG/24HR 24 hr patch    NICODERM CQ    30 patch    Place 1 patch onto the skin every 24 hours    Tobacco abuse       tiZANidine 4 MG tablet    ZANAFLEX    60 tablet    Take 1-2 tablets (4-8 mg) by mouth nightly as needed    Lumbar radicular pain, Lumbar paraspinal muscle spasm       zolpidem 10 MG tablet    AMBIEN    30 tablet    Take 1 tablet (10 mg) by mouth nightly as needed for sleep    Primary insomnia       * Notice:  This list has 4 medication(s) that are the same as other medications prescribed for you. Read the directions carefully, and ask your doctor or other care provider to review them with you.

## 2018-03-22 NOTE — PROGRESS NOTES
[facility]   [facility] [place]  Behavioral Medicine Visit    Patient Name: Hoang Kiser    YOB: 1985   Medical Record Number: [id number]  Date: 3/22/2018                SUBJECTIVE: Patient discusses that his recently started medication, amitriptyline, is not working.  He reports that he is heavily sedated by the medication and has had multiple days where he missed getting up on time.  Patient reports he has my charted his pain provider regarding this concern.      During today's session we discussed the various Ideas associated with relaxation training.  The patient has done a considerable amount of work on his own with formal meditation and uses those routinely as a part of his overall well-being.  The patient is less accustomed to using relaxation skills for acute pain management.  The patient is unclear at this point as to what goals he would like to establish for pain psychology.  He is encouraged to reflect on this so we can discuss further in his next appointment.  He is given some general ideas about his difficulty with acceptance.          OBJECTIVE: Today the patient reports the following: His pain level remains the same, his mood is mildly improved, his activity level has mildly increased, his stress level remains the same and his sleep has been moderately worse.  Today the patient reports that he is involved in self-care activities 2-3 times per day.  Today the patient reports since beginning receiving services at [facility] his overall progress is moderately improved. Length of Visit: 60 minutes      Assessment: Current Emotional / Mental Status    Appearance:   Appropriate   Eye Contact:   Good   Psychomotor Behavior: Normal   Attitude:   Cooperative   Orientation:   All  Speech  Rate / Production:             Normal   Volume:              Normal   Mood:    Anxious   Affect:    Appropriate    Thought Content:  Clear   Thought Form:  Coherent  Logical   Insight:    Fair     ASSESSMENT:   Per patient pain provider: Degenerative disc disease, lumbar, neuropathic pain, lumbar paraspinal muscle spasm    Progress toward goals: fair.    Pain Status: unchanged    Emotional Status: improved              Medication / chemical use concerns: No concerns    PLAN:   Next Appointment: Hoang Kiser will schedule a follow-up appointment in 2 weeks.  Assignment: Consider treatment planning goals  Objectives / interventions for next session: Define treatment goals    Fili Wesley MA, LP, [facility]  Behavioral Pain Specialist  [facility] [place]

## 2018-03-28 ENCOUNTER — MYC MEDICAL ADVICE (OUTPATIENT)
Dept: PALLIATIVE MEDICINE | Facility: CLINIC | Age: 33
End: 2018-03-28

## 2018-03-28 DIAGNOSIS — Z79.891 LONG TERM (CURRENT) USE OF OPIATE ANALGESIC: Primary | ICD-10-CM

## 2018-03-29 NOTE — TELEPHONE ENCOUNTER
My chart patient sent from patient:    Tamiko,     I've kept taking the 10mg of Amitriptyline. My drowsiness has not abated through out the morning and seems to last till the early afternoon. Again, today I was almost late for a job workshop because I cannot wake up despite when I take the 10mg the night before. I'm not gaining any benefit with this medication. My pain levels have varied the last few days and it's clear to me that while I cannot take NSAIDs, some other type of mediation for the pain is warranted. Constantly learning what my limitations are and what really will put me out; it's a process and I know this will take time.     I spoke with the  here and she has given me the green light to not refuse other pain medication prescribed by my doctor.  Again, I'm more than willing to be held accountable by not only UA' s at your office but I've already put contingency plans in place with my sponsor and the coordinator. I am confident in my decision making and have handled narcotic pain medication during the beginning of my injury.     Additionally, I am done with my prescription of Zolpidem. After reviewing my sleep records, it has has lost it's effectiveness. So many silver linings and so many things to rethink and be open to a different tack. My PT and Therapy has been going well. New things to work on every time I leave [place]. I am in much gratittude to you and the whole team.     Am I permitted to stop taking Amitriptyline? Should we discuss other options when I see you next or can we discuss this over messaging? I will be in the office tomorrow with Low. Please let me know. Thanks!     Sincerely,     Shoaib Morrison,     My appointment with Low is next week. Sorry about the mistype.     Again, Wish you a good day!     Shoaib BORGESN-RN Care Coordinator  [facility] [place]

## 2018-04-02 NOTE — TELEPHONE ENCOUNTER
Spoke with patient and scheduled a nurse visit for him to complete a UDS on 4/5/18.    KATERINA CardenasN, RN  Care Coordinator  [facility] [place]

## 2018-04-02 NOTE — TELEPHONE ENCOUNTER
UDS ordered. Please call Shoaib and have him make a nurse visit appt tomorrow when he is here to collect test. Then make f/u appt in 2 weeks to discuss medication options.    CARLOS A Bass, NP-C  [facility] [place]

## 2018-04-05 ENCOUNTER — ALLIED HEALTH/NURSE VISIT (OUTPATIENT)
Dept: PALLIATIVE MEDICINE | Facility: CLINIC | Age: 33
End: 2018-04-05
Payer: COMMERCIAL

## 2018-04-05 ENCOUNTER — OFFICE VISIT (OUTPATIENT)
Dept: PALLIATIVE MEDICINE | Facility: CLINIC | Age: 33
End: 2018-04-05
Payer: COMMERCIAL

## 2018-04-05 DIAGNOSIS — M79.2 NEUROPATHIC PAIN: ICD-10-CM

## 2018-04-05 DIAGNOSIS — M51.369 DDD (DEGENERATIVE DISC DISEASE), LUMBAR: Primary | ICD-10-CM

## 2018-04-05 DIAGNOSIS — M62.830 LUMBAR PARASPINAL MUSCLE SPASM: ICD-10-CM

## 2018-04-05 DIAGNOSIS — Z79.891 ADMISSION FOR LONG-TERM OPIATE ANALGESIC USE: Primary | ICD-10-CM

## 2018-04-05 PROCEDURE — 96152 HC HEALTH AND BEHAVIOR INTERVENTION, INDIVIDUAL, EACH 15 MINUTES: CPT | Performed by: PSYCHOLOGIST

## 2018-04-05 PROCEDURE — 99207 ZZC NO CHARGE NURSE ONLY: CPT

## 2018-04-05 PROCEDURE — 80307 DRUG TEST PRSMV CHEM ANLYZR: CPT | Performed by: NURSE PRACTITIONER

## 2018-04-05 NOTE — PROGRESS NOTES
[facility]   [facility] [place]  Behavioral Medicine Visit    Patient Name: Hoang Kiser    YOB: 1985   Medical Record Number: [id number]  Date: 4/5/2018                SUBJECTIVE: Met with the patient on this date for an elective pain psychology return appointment.  Today's visit is our third visit post initial assessment.  Today's session focused on multiple topics including urine drug screening, and current views about medications and their efficacy.  Sleep and pain.  Additionally the patient was interested to know more about specifics of hypnosis so we reviewed the pros and cons as well as the risks associated with the practice of hypnosis.  After discussion we agreed to do a session at our next meeting.          OBJECTIVE: Today the patient reports the following: His pain level is mildly worse, his mood is the same, his activity level has mildly increased, his stress level remains the same and his sleep remains the same.  Today the patient reports he is involved in self-care activities at least 2-3 times per day.  Today the patient reports since receiving services at [facility] his progress has moved from slightly improved to approaching moderately improved.   Length of Visit: 60 minutes      Assessment: Current Emotional / Mental Status    Appearance:   Appropriate   Eye Contact:   Good   Psychomotor Behavior: Normal   Attitude:   Cooperative   Orientation:   All  Speech  Rate / Production:             Normal   Volume:              Normal   Mood:    Normal  Affect:    Appropriate   Thought Content:  Clear   Thought Form:  Coherent  Logical   Insight:    Fair     ASSESSMENT:   Per patient pain provider: Degenerative disc disease, lumbar, neuropathic pain, lumbar paraspinal muscle spasm  Axis I: Per patient report posttraumatic stress disorder    Progress toward goals: fair.    Pain Status:  worsened    Emotional Status: unchanged              Medication / chemical use concerns: None    PLAN:   Next Appointment: Hoang Kiser will schedule a follow-up appointment in 2-4 weeks.  Assignment: See above  Objectives / interventions for next session: Try first guided imagery exercise    Fili Wesley MA LP, [facility]  Behavioral Pain Specialist  [facility] [place]

## 2018-04-05 NOTE — MR AVS SNAPSHOT
After Visit Summary   4/5/2018    Hoang Kiser    MRN: [id number]           Patient Information     Date Of Birth          1985        Visit Information        Provider Department      4/5/2018 9:30 AM [facility]        Today's Diagnoses     Admission for long-term opiate analgesic use    -  1       Follow-ups after your visit        Your next 10 appointments already scheduled     Apr 10, 2018  9:30 AM CDT   Return Visit with Faustino Ryan PT   [facility] ([facility])    [address]  [facility] [address] [phone]            Apr 20, 2018 11:30 AM CDT   Return Visit with Fili Wesley LP   [facility] ([facility])    [address]  [facility] [address] [phone]            May 08, 2018 10:00 AM CDT   Return Visit with CARLOS A Guy CNP   [facility] ([facility])    [address]  [facility] [address] [phone]              Who to contact     If you have questions or need follow up information about today's clinic visit or your schedule please contact [facility] directly at [phone].  Normal or non-critical lab and imaging results will be communicated to you by [facility], letter or phone within 4 business days after the clinic has received the results. If you do not hear from us within 7 days, please contact the clinic through [facility] or phone. If you have a critical or abnormal lab result, we will notify you by phone as soon as possible.  Submit refill requests through HuTerra or call your pharmacy and they will forward the refill request to us. Please allow 3 business days for your refill to be completed.          Additional Information About Your Visit        [facility] Mobile Information     HuTerra gives you secure access to your electronic health record. If you  see a primary care provider, you can also send messages to your care team and make appointments. If you have questions, please call your primary care clinic.  If you do not have a primary care provider, please call [phone] and they will assist you.        Care EveryWhere ID     This is your Care EveryWhere ID. This could be used by other organizations to access your [place] medical records  [id number]         Blood Pressure from Last 3 Encounters:   03/13/18 120/84   02/27/18 121/86   02/16/18 124/79    Weight from Last 3 Encounters:   03/01/18 76.2 kg (168 lb)   02/16/18 75.3 kg (166 lb)   02/08/18 76.2 kg (168 lb)              Today, you had the following     No orders found for display       Primary Care Provider Office Phone # Fax #    Phill Floyd [phone] [address] [facility] [address]        Equal Access to Services     BERLIN MEYER : Hadii aad ku hadasho Soomaali, waaxda luqadaha, qaybta kaalmada adeegyada, waxay oscarin haychavon tevin lorenzana . So [facility] [phone].    ATENCIÓN: Si habla español, tiene a cutler disposición servicios gratuitos de asistencia lingüística. Llame al [phone].    We comply with applicable federal civil rights laws and [place] laws. We do not discriminate on the basis of race, color, national origin, age, disability, sex, sexual orientation, or gender identity.            Thank you!     Thank you for choosing [facility]  for your care. Our goal is always to provide you with excellent care. Hearing back from our patients is one way we can continue to improve our services. Please take a few minutes to complete the written survey that you may receive in the mail after your visit with us. Thank you!             Your Updated Medication List - Protect others around you: Learn how to safely use, store and throw away your medicines at www.disposemymeds.org.          This list is accurate as of 4/5/18 11:59 PM.   Always use your most recent med list.                   Brand Name Dispense Instructions for use Diagnosis    acetylcysteine 600 MG Caps capsule    N-ACETYL CYSTEINE     Take 1,800 mg by mouth daily        amitriptyline 10 MG tablet    ELAVIL    90 tablet    Take 1-3 tablets (10-30 mg) by mouth At Bedtime    Neuropathic pain       * amphetamine-dextroamphetamine 20 MG per tablet    ADDERALL     Take 20 mg by mouth daily Reported on 3/6/2017        * amphetamine-dextroamphetamine 30 MG per 24 hr capsule    ADDERALL XR     Take 1 capsule by mouth every morning        clonazePAM 0.5 MG tablet    klonoPIN     Take 0.5-1 mg by mouth nightly as needed        DULoxetine 20 MG EC capsule    CYMBALTA     Take 1 capsule by mouth daily        gabapentin 100 MG capsule    NEURONTIN    90 capsule    Take 1 capsule (100 mg) by mouth daily    Lumbar radicular pain, Lumbar paraspinal muscle spasm       naproxen 500 MG tablet    NAPROSYN    40 tablet    Take 1 tablet (500 mg) by mouth 2 times daily (with meals)    Lumbar radicular pain, Lumbar paraspinal muscle spasm       * nicotine 14 MG/24HR 24 hr patch    NICODERM CQ    30 patch    Place 1 patch onto the skin every 24 hours Use this patch first    Tobacco abuse       * nicotine 7 MG/24HR 24 hr patch    NICODERM CQ    30 patch    Place 1 patch onto the skin every 24 hours    Tobacco abuse       tiZANidine 4 MG tablet    ZANAFLEX    60 tablet    Take 1-2 tablets (4-8 mg) by mouth nightly as needed    Lumbar radicular pain, Lumbar paraspinal muscle spasm       zolpidem 10 MG tablet    AMBIEN    30 tablet    Take 1 tablet (10 mg) by mouth nightly as needed for sleep    Primary insomnia       * Notice:  This list has 4 medication(s) that are the same as other medications prescribed for you. Read the directions carefully, and ask your doctor or other care provider to review them with you.

## 2018-04-05 NOTE — MR AVS SNAPSHOT
After Visit Summary   4/5/2018    Hoang Kiser    MRN: [id number]           Patient Information     Date Of Birth          1985        Visit Information        Provider Department      4/5/2018 10:00 AM Fili Wesley LP [facility]        Today's Diagnoses     DDD (degenerative disc disease), lumbar    -  1    Neuropathic pain        Lumbar paraspinal muscle spasm           Follow-ups after your visit        Your next 10 appointments already scheduled     Apr 10, 2018  9:30 AM CDT   Return Visit with Faustino Ryan PT   [facility] ([facility])    [address]  [facility] [id number] [phone]            Apr 20, 2018 11:30 AM CDT   Return Visit with Fili Wesley LP   [facility] ([facility])    [address]  [facility] [id number] [phone]            May 08, 2018 10:00 AM CDT   Return Visit with CARLOS A Guy CNP   [facility] ([facility])    [address]  [facility] [id number] [phone]              Who to contact     If you have questions or need follow up information about today's clinic visit or your schedule please contact [facility] directly at [phone].  Normal or non-critical lab and imaging results will be communicated to you by MyChart, letter or phone within 4 business days after the clinic has received the results. If you do not hear from us within 7 days, please contact the clinic through MyChart or phone. If you have a critical or abnormal lab result, we will notify you by phone as soon as possible.  Submit refill requests through MightyNest or call your pharmacy and they will forward the refill request to us. Please allow 3 business days for your refill to be completed.          Additional Information About Your Visit        MyChart Information     [facility]  gives you secure access to your electronic health record. If you see a primary care provider, you can also send messages to your care team and make appointments. If you have questions, please call your primary care clinic.  If you do not have a primary care provider, please call [phone] and they will assist you.        Care EveryWhere ID     This is your Care EveryWhere ID. This could be used by other organizations to access your [place] medical records  [id number]         Blood Pressure from Last 3 Encounters:   03/13/18 120/84   02/27/18 121/86   02/16/18 124/79    Weight from Last 3 Encounters:   03/01/18 76.2 kg (168 lb)   02/16/18 75.3 kg (166 lb)   02/08/18 76.2 kg (168 lb)              We Performed the Following     HEALTH & BEHAVIOR ASSESS, INITIAL, SHIRLEY 15MIN PHD        Primary Care Provider Office Phone # Fax #    Phill Floyd [phone] [address] [facility] [address]        Equal Access to Services     [facility] : Hadii aad ku hadasho Soomaali, waaxda luqadaha, qaybta kaalmada adeegyada, waxay oscarin haychavon tevin lorenzana . So [facility] [phone].    ATENCIÓN: Si habla español, tiene a cutler disposición servicios gratuitos de asistencia lingüística. Llame al [phone].    We comply with applicable federal civil rights laws and [place] laws. We do not discriminate on the basis of race, color, national origin, age, disability, sex, sexual orientation, or gender identity.            Thank you!     Thank you for choosing [facility] [place]  for your care. Our goal is always to provide you with excellent care. Hearing back from our patients is one way we can continue to improve our services. Please take a few minutes to complete the written survey that you may receive in the mail after your visit with us. Thank you!             Your Updated Medication List - Protect others around you: Learn how to safely use, store and throw away your medicines  at www.disposemymeds.org.          This list is accurate as of 4/5/18 11:51 AM.  Always use your most recent med list.                   Brand Name Dispense Instructions for use Diagnosis    acetylcysteine 600 MG Caps capsule    N-ACETYL CYSTEINE     Take 1,800 mg by mouth daily        amitriptyline 10 MG tablet    ELAVIL    90 tablet    Take 1-3 tablets (10-30 mg) by mouth At Bedtime    Neuropathic pain       * amphetamine-dextroamphetamine 20 MG per tablet    ADDERALL     Take 20 mg by mouth daily Reported on 3/6/2017        * amphetamine-dextroamphetamine 30 MG per 24 hr capsule    ADDERALL XR     Take 1 capsule by mouth every morning        clonazePAM 0.5 MG tablet    klonoPIN     Take 0.5-1 mg by mouth nightly as needed        DULoxetine 20 MG EC capsule    CYMBALTA     Take 1 capsule by mouth daily        gabapentin 100 MG capsule    NEURONTIN    90 capsule    Take 1 capsule (100 mg) by mouth daily    Lumbar radicular pain, Lumbar paraspinal muscle spasm       naproxen 500 MG tablet    NAPROSYN    40 tablet    Take 1 tablet (500 mg) by mouth 2 times daily (with meals)    Lumbar radicular pain, Lumbar paraspinal muscle spasm       * nicotine 14 MG/24HR 24 hr patch    NICODERM CQ    30 patch    Place 1 patch onto the skin every 24 hours Use this patch first    Tobacco abuse       * nicotine 7 MG/24HR 24 hr patch    NICODERM CQ    30 patch    Place 1 patch onto the skin every 24 hours    Tobacco abuse       tiZANidine 4 MG tablet    ZANAFLEX    60 tablet    Take 1-2 tablets (4-8 mg) by mouth nightly as needed    Lumbar radicular pain, Lumbar paraspinal muscle spasm       zolpidem 10 MG tablet    AMBIEN    30 tablet    Take 1 tablet (10 mg) by mouth nightly as needed for sleep    Primary insomnia       * Notice:  This list has 4 medication(s) that are the same as other medications prescribed for you. Read the directions carefully, and ask your doctor or other care provider to review them with you.

## 2018-04-09 ENCOUNTER — MYC MEDICAL ADVICE (OUTPATIENT)
Dept: FAMILY MEDICINE | Facility: CLINIC | Age: 33
End: 2018-04-09

## 2018-04-10 ENCOUNTER — OFFICE VISIT (OUTPATIENT)
Dept: PALLIATIVE MEDICINE | Facility: CLINIC | Age: 33
End: 2018-04-10
Payer: COMMERCIAL

## 2018-04-10 ENCOUNTER — MYC MEDICAL ADVICE (OUTPATIENT)
Dept: PALLIATIVE MEDICINE | Facility: CLINIC | Age: 33
End: 2018-04-10

## 2018-04-10 DIAGNOSIS — G89.29 CHRONIC LOW BACK PAIN: ICD-10-CM

## 2018-04-10 DIAGNOSIS — M54.50 CHRONIC LOW BACK PAIN: ICD-10-CM

## 2018-04-10 DIAGNOSIS — G89.29 CHRONIC PAIN: Primary | ICD-10-CM

## 2018-04-10 PROCEDURE — 97110 THERAPEUTIC EXERCISES: CPT | Mod: GP | Performed by: PHYSICAL THERAPIST

## 2018-04-10 PROCEDURE — 97112 NEUROMUSCULAR REEDUCATION: CPT | Mod: GP | Performed by: PHYSICAL THERAPIST

## 2018-04-10 NOTE — PROGRESS NOTES
"Patient Name: Hoang Kiser      YOB: 1985     Medical Record Number: [id number]  Diagnosis:    Chronic pain  Chronic low back pain          Visit Info Length of Visit: 45  Arrived: On Time   Exercise/Activity Education Instruction:  Postural Training/Anatomy  Pacing/Energy Conservation  Home Program  Self Care  Activity:  Therapeutic Exercise 30':  Aerobic Conditioning (*see \"Strength/Functional Actitivities Record for details of exercises performed)  Tmill walk x 12'  Stretching:  Upper Ext  bilateral, Lower Ext  bilateral  Postural Training:  dynamic  Ongoing activation Transverse Abdominus (TA), madi before any dynamic joint movement  Performed SLGlut med with emphasis on TA activation first, then movement  Also performed BKFO madi as alternate to SLGM  Continued ed on his hip flexor overuse and how this 'shuts down' his TA.  Self manual palpation very helpful for pt awareness. Also used cane/dowel for feedback when doing at home  Reviewed correct technique for partial sit up       Neuro re-ed 15':  Standing in front of mirror, cues for thumbs fwd, shldr joint neutral  Continued, emphasis on neutral spine in all positions  Diaphragm breathing cues throughout session  Body mechanics emphasis for supine to/from sit  Ed on components of aerobic exercise, topic #4 with h/o   Notes:  Pt is very actively engaged in his chronic pain PT HEP, self care efforts.  Tolerated session well again and pt is actively engaged in his pain PT HEP and self care efforts.  Progressing well toward goals.   Demonstrates/Verbalizes Technique:  4 (1= poor technique-difficulty performing exercises,significant cues required; 5= good technique-performs exercises without cues)  Body Awareness:  3 (1=low awareness; 5=high awareness)  Posture/Stability:   3 (1= poor posture, stability; 5= good posture, stability)   Motivational Level:   Ask questions, Eager to learn and Cooperative Participation:  Full   Patient " Satisfaction:  satisfied Response to Teaching:   cooperative, needs reinforcement and asked questions  Factors that affect learning: None   Plan of Care  Anticipate continuing PT every 2-4 weeks to support reactivation and integration of self regulation pain management skills.  Pt is very actively engaged in his PT HEP and self care efforts.   Therapist: Faustino Ryan PT                 Date:  4/10/2018

## 2018-04-10 NOTE — MR AVS SNAPSHOT
After Visit Summary   4/10/2018    Hoang Kiser    MRN: [id number]           Patient Information     Date Of Birth          1985        Visit Information        Provider Department      4/10/2018 9:30 AM Faustino Ryan, PT [facility]        Today's Diagnoses     Chronic pain    -  1    Chronic low back pain           Follow-ups after your visit        Your next 10 appointments already scheduled     Apr 20, 2018 11:30 AM CDT   Return Visit with Fili Wesley LP   [facility] ([facility])    [address]  [facility] [id number] [phone]            May 08, 2018 10:00 AM CDT   Return Visit with CARLOS A Guy CNP   [facility])    [address]  [facility] [address] [phone]            May 08, 2018 11:00 AM CDT   Return Visit with Faustino Ryan PT   [facility] ([facility])    [address]  [facility] [address] [phone]              Who to contact     If you have questions or need follow up information about today's clinic visit or your schedule please contact [facility] directly at [phone].  Normal or non-critical lab and imaging results will be communicated to you by MyChart, letter or phone within 4 business days after the clinic has received the results. If you do not hear from us within 7 days, please contact the clinic through MyChart or phone. If you have a critical or abnormal lab result, we will notify you by phone as soon as possible.  Submit refill requests through Solmentum or call your pharmacy and they will forward the refill request to us. Please allow 3 business days for your refill to be completed.          Additional Information About Your Visit        MetaNotesharChronicity Information     Solmentum gives you secure access to your electronic health record. If you  see a primary care provider, you can also send messages to your care team and make appointments. If you have questions, please call your primary care clinic.  If you do not have a primary care provider, please call [phone] and they will assist you.        Care EveryWhere ID     This is your Care EveryWhere ID. This could be used by other organizations to access your [place] medical records  [id number]         Blood Pressure from Last 3 Encounters:   03/13/18 120/84   02/27/18 121/86   02/16/18 124/79    Weight from Last 3 Encounters:   03/01/18 76.2 kg (168 lb)   02/16/18 75.3 kg (166 lb)   02/08/18 76.2 kg (168 lb)              We Performed the Following     NEUROMUSCULAR RE-EDUCATION     Therapeutic Procedure per 15 min        Primary Care Provider Office Phone # Fax #    Phill Floyd [phone] [address] [facility] [address]        Equal Access to Services     BERLIN MEYER : Hadii aad ku hadasho Soomaali, waaxda luqadaha, qaybta kaalmada adeegyada, waxay oscarin haychavon tevin lorenzana . So [facility] [phone].    ATENCIÓN: Si ernestola español, tiene a cutler disposición servicios gratuitos de asistencia lingüística. Llame al [phone].    We comply with applicable federal civil rights laws and [place] laws. We do not discriminate on the basis of race, color, national origin, age, disability, sex, sexual orientation, or gender identity.            Thank you!     Thank you for choosing [facility] [place]  for your care. Our goal is always to provide you with excellent care. Hearing back from our patients is one way we can continue to improve our services. Please take a few minutes to complete the written survey that you may receive in the mail after your visit with us. Thank you!             Your Updated Medication List - Protect others around you: Learn how to safely use, store and throw away your medicines at www.disposemymeds.org.          This list is  accurate as of 4/10/18  1:43 PM.  Always use your most recent med list.                   Brand Name Dispense Instructions for use Diagnosis    acetylcysteine 600 MG Caps capsule    N-ACETYL CYSTEINE     Take 1,800 mg by mouth daily        amitriptyline 10 MG tablet    ELAVIL    90 tablet    Take 1-3 tablets (10-30 mg) by mouth At Bedtime    Neuropathic pain       * amphetamine-dextroamphetamine 20 MG per tablet    ADDERALL     Take 20 mg by mouth daily Reported on 3/6/2017        * amphetamine-dextroamphetamine 30 MG per 24 hr capsule    ADDERALL XR     Take 1 capsule by mouth every morning        clonazePAM 0.5 MG tablet    klonoPIN     Take 0.5-1 mg by mouth nightly as needed        DULoxetine 20 MG EC capsule    CYMBALTA     Take 1 capsule by mouth daily        gabapentin 100 MG capsule    NEURONTIN    90 capsule    Take 1 capsule (100 mg) by mouth daily    Lumbar radicular pain, Lumbar paraspinal muscle spasm       naproxen 500 MG tablet    NAPROSYN    40 tablet    Take 1 tablet (500 mg) by mouth 2 times daily (with meals)    Lumbar radicular pain, Lumbar paraspinal muscle spasm       * nicotine 14 MG/24HR 24 hr patch    NICODERM CQ    30 patch    Place 1 patch onto the skin every 24 hours Use this patch first    Tobacco abuse       * nicotine 7 MG/24HR 24 hr patch    NICODERM CQ    30 patch    Place 1 patch onto the skin every 24 hours    Tobacco abuse       tiZANidine 4 MG tablet    ZANAFLEX    60 tablet    Take 1-2 tablets (4-8 mg) by mouth nightly as needed    Lumbar radicular pain, Lumbar paraspinal muscle spasm       zolpidem 10 MG tablet    AMBIEN    30 tablet    Take 1 tablet (10 mg) by mouth nightly as needed for sleep    Primary insomnia       * Notice:  This list has 4 medication(s) that are the same as other medications prescribed for you. Read the directions carefully, and ask your doctor or other care provider to review them with you.

## 2018-04-17 ENCOUNTER — HOSPITAL ENCOUNTER (EMERGENCY)
Facility: CLINIC | Age: 33
Discharge: HOME | End: 2018-04-17
Attending: EMERGENCY MEDICINE | Admitting: EMERGENCY MEDICINE
Payer: COMMERCIAL

## 2018-04-17 ENCOUNTER — MYC MEDICAL ADVICE (OUTPATIENT)
Dept: PALLIATIVE MEDICINE | Facility: CLINIC | Age: 33
End: 2018-04-17

## 2018-04-17 VITALS
OXYGEN SATURATION: 99 % | HEART RATE: 84 BPM | TEMPERATURE: 96.6 F | DIASTOLIC BLOOD PRESSURE: 77 MMHG | SYSTOLIC BLOOD PRESSURE: 132 MMHG | RESPIRATION RATE: 16 BRPM

## 2018-04-17 DIAGNOSIS — M54.41 CHRONIC MIDLINE LOW BACK PAIN WITH RIGHT-SIDED SCIATICA: ICD-10-CM

## 2018-04-17 DIAGNOSIS — G89.29 CHRONIC MIDLINE LOW BACK PAIN WITH RIGHT-SIDED SCIATICA: ICD-10-CM

## 2018-04-17 LAB — LAB SCANNED RESULT: NORMAL

## 2018-04-17 PROCEDURE — 99284 EMERGENCY DEPT VISIT MOD MDM: CPT | Mod: 25 | Performed by: EMERGENCY MEDICINE

## 2018-04-17 PROCEDURE — 96374 THER/PROPH/DIAG INJ IV PUSH: CPT

## 2018-04-17 PROCEDURE — 25000128 H RX IP 250 OP 636: Performed by: EMERGENCY MEDICINE

## 2018-04-17 PROCEDURE — 99283 EMERGENCY DEPT VISIT LOW MDM: CPT | Mod: Z6 | Performed by: EMERGENCY MEDICINE

## 2018-04-17 RX ORDER — KETOROLAC TROMETHAMINE 30 MG/ML
30 INJECTION, SOLUTION INTRAMUSCULAR; INTRAVENOUS ONCE
Status: COMPLETED | OUTPATIENT
Start: 2018-04-17 | End: 2018-04-17

## 2018-04-17 RX ADMIN — KETOROLAC TROMETHAMINE 30 MG: 30 INJECTION, SOLUTION INTRAMUSCULAR at 14:23

## 2018-04-17 ASSESSMENT — ENCOUNTER SYMPTOMS
CONFUSION: 0
SHORTNESS OF BREATH: 0
ARTHRALGIAS: 0
NECK STIFFNESS: 0
COLOR CHANGE: 0
FEVER: 0
ABDOMINAL PAIN: 0
EYE REDNESS: 0
DIFFICULTY URINATING: 0
HEADACHES: 0

## 2018-04-17 NOTE — ED AVS SNAPSHOT
[facility], Emergency Department    [address]    [facility] [address]    Phone:  [phone]    Fax:  [phone]                                       Hoang Kiser   MRN: [id number]    Department:  [facility], Emergency Department   Date of Visit:  4/17/2018           After Visit Summary Signature Page     I have received my discharge instructions, and my questions have been answered. I have discussed any challenges I see with this plan with the nurse or doctor.    ..........................................................................................................................................  Patient/Patient Representative Signature      ..........................................................................................................................................  Patient Representative Print Name and Relationship to Patient    ..................................................               ................................................  Date                                            Time    ..........................................................................................................................................  Reviewed by Signature/Title    ...................................................              ..............................................  Date                                                            Time

## 2018-04-17 NOTE — ED AVS SNAPSHOT
[facility], Emergency Department    [address]    [place] [address]    Phone:  [phone]    Fax:  [phone]                                       Hoang Kiser   MRN: [id number]    Department:  [facility], Emergency Department   Date of Visit:  4/17/2018           Patient Information     Date Of Birth          1985        Your diagnoses for this visit were:     Chronic midline low back pain with right-sided sciatica        You were seen by Moises Carter MD.        Discharge Instructions         General Neck and Back Pain    Both neck and back pain are usually caused by injury to the muscles or ligaments of the spine. Sometimes the disks that separate each bone of the spine may cause pain by pressing on a nearby nerve. Back and neck pain may appear after a sudden twisting or bending force (such as in a car accident), or sometimes after a simple awkward movement. In either case, muscle spasm is often present and adds to the pain.  Acute neck and back pain usually gets better in 1 to 2 weeks. Pain related to disk disease, arthritis in the spinal joints or spinal stenosis (narrowing of the spinal canal) can become chronic and last for months or years.  Back and neck pain are common problems. Most people feel better in 1 or 2 weeks, and most of the rest in 1 to 2 months. Most people can remain active.  People experience and describe pain differently.    Pain can be sharp, stabbing, shooting, aching, cramping, or burning    Movement, standing, bending, lifting, sitting, or walking may worsen the pain    Pain can be localized to one spot or area, or it can be more generalized    Pain can spread or radiate upwards, downwards, to the front, or go down your arms    Muscle spasm may occur.  Most of the time mechanical problems with the muscles or spine cause the pain. it is usually caused by an injury, whether known or not, to the muscles or ligaments. While illnesses can cause back pain, it  is usually not caused by a serious illness. Pain is usually related to physical activity, whether sports, exercise, work, or normal activity. Sometimes it can occur without an identifiable cause. This can happen simply by stretching or moving wrong, without noting pain at the time. Other causes include:    Overexertion, lifting, pushing, pulling incorrectly or too aggressively.    Sudden twisting, bending or stretching from an accident (car or fall), or accidental movement.    Poor posture    Poor conditioning, lack of regular exercise    Spinal disc disease or arthritis    Stress    Pregnancy, or illness like appendicitis, bladder or kidney infection, pelvic infections   Home care    For neck pain: Use a comfortable pillow that supports the head and keeps the spine in a neutral position. The position of the head should not be tilted forward or backward.    When in bed, try to find a position of comfort. A firm mattress is best. Try lying flat on your back with pillows under your knees. You can also try lying on your side with your knees bent up towards your chest and a pillow between your knees.    At first, do not try to stretch out the sore spots. If there is a strain, it is not like the good soreness you get after exercising without an injury. In this case, stretching may make it worse.    Avoid prolonged sitting, long car rides or travel. This puts more stress on the lower back than standing or walking.    During the first 24 to 72 hours after an injury, apply an ice pack to the painful area for 20 minutes and then remove it for 20 minutes over a period of 60 to 90 minutes or several times a day.     You can alternate ice and heat therapies. Talk with your healthcare provider about the best treatment for your back or neck pain. As a safety precaution, do not use a heating pad at bedtime. Sleeping with a heating pad can lead to skin burns or tissue damage.    Therapeutic massage can help relax the back and neck  muscles without stretching them.    Be aware of safe lifting methods and do not lift anything over 15 pounds until all the pain is gone.  Medications  Talk to your healthcare provider before using medicine, especially if you have other medical problems or are taking other medicines.    You may use over-the-counter medicine to control pain, unless another pain medicine was prescribed. If you have chronic conditions like diabetes, liver or kidney disease, stomach ulcers,  gastrointestinal bleeding, or are taking blood thinner medicines.    Be careful if you are given pain medicines, narcotics, or medicine for muscle spasm. They can cause drowsiness, and can affect your coordination, reflexes, and judgment. Do not drive or operate heavy machinery.  Follow-up care  Follow up with your healthcare provider, or as advised. Physical therapy or further tests may be needed.  If X-rays were taken, you will be notified of any new findings that may affect your care.  Call 911  Seek emergency medical care if any of the following occur:    Trouble breathing    Confusion    Very drowsy or trouble awakening    Fainting or loss of consciousness    Rapid or very slow heart rate    Loss of bowel or bladder control  When to seek medical advice  Call your healthcare provider right away if any of these occur:    Pain becomes worse or spreads into your arms or legs    Weakness, numbness or pain in one or both arms or legs    Numbness in the groin area    Difficulty walking    Fever of 100.4 F (38 C) or higher, or as directed by your healthcare provider  Date Last Reviewed: 7/1/2016 2000-2017 [employer]. [address] [place] [address], [place] [address]. All rights reserved. This information is not intended as a substitute for professional medical care. Always follow your healthcare professional's instructions.          Possible Causes of Low Back or Leg Pain    The symptoms in your back or leg may be due to pressure on a nerve.  This pressure may be caused by a damaged disk or by abnormal bone growth. Either way, you may feel pain, burning, tingling, or numbness. If you have pressure on a nerve that connects to the sciatic nerve, pain may shoot down your leg.    Pressure from the disk  Constant wear and tear can weaken a disk over time and cause back pain. The disk can then be damaged by a sudden movement or injury. If its soft center begins to bulge, the disk may press on a nerve. Or the outside of the disk may tear, and the soft center may squeeze through and pinch a nerve.    Pressure from bone  As a disk wears out, the vertebrae right above and below the disk begin to touch. This can put pressure on a nerve. Often, abnormal bone (called bone spurs) grows where the vertebrae rub against each other. This can cause the foramen or the spinal canal to narrow (called stenosis) and press against a nerve.  Date Last Reviewed: 10/4/2015    1695-2111 [employer]. [address] [place] [address]. All rights reserved. This information is not intended as a substitute for professional medical care. Always follow your healthcare professional's instructions.          Exercises to Strengthen Your Lower Back  Strong lower back and abdominal muscles work together to support your spine. The exercises below will help strengthen the lower back. It is important that you begin exercising slowly and increase levels gradually.  Always begin any exercise program with stretching. If you feel pain while doing any of these exercises, stop and talk to your doctor about a more specific exercise program that better suits your condition.   Low back stretch  The point of stretching is to make you more flexible and increase your range of motion. Stretch only as much as you are able. Stretch slowly. Do not push your stretch to the limit. If at any point you feel pain while stretching, this is your (temporary) limit.    Lie on your back with your  knees bent and both feet on the ground.    Slowly raise your left knee to your chest as you flatten your lower back against the floor. Hold for 5 seconds.    Relax and repeat the exercise with your right knee.    Do 10 of these exercises for each leg.    Repeat hugging both knees to your chest at the same time.  Building lower back strength  Start your exercise routine with 10 to 30 minutes a day, 1 to 3 times a day.  Initial exercises  Lying on your back:  1. Ankle pumps: Move your foot up and down, towards your head, and then away. Repeat 10 times with each foot.  2. Heel slides: Slowly bend your knee, drawing the heel of your foot towards you. Then slide your heel/foot from you, straightening your knee. Do not lift your foot off the floor (this is not a leg lift).  3. Abdominal contraction: Bend your knees and put your hands on your stomach. Tighten your stomach muscles. Hold for 5 seconds, then relax. Repeat 10 times.  4. Straight leg raise: Bend one leg at the knee and keep the other leg straight. Tighten your stomach muscles. Slowly lift your straight leg 6 to 12 inches off the floor and hold for up to 5 seconds. Repeat 10 times on each side.  Standin. Wall squats: Stand with your back against the wall. Move your feet about 12 inches away from the wall. Tighten your stomach muscles, and slowly bend your knees until they are at about a 45 degree angle. Do not go down too far. Hold about 5 seconds. Then slowly return to your starting position. Repeat 10 times.  2. Heel raises: Stand facing the wall. Slowly raise the heels of your feet up and down, while keeping your toes on the floor. If you have trouble balancing, you can touch the wall with your hands. Repeat 10 times.  More advanced exercises  When you feel comfortable enough, try these exercises.  1. Kneeling lumbar extension: Begin on your hands and knees. At the same time, raise and straighten your right arm and left leg until they are parallel to the  ground. Hold for 2 seconds and come back slowly to a starting position. Repeat with left arm and right leg, alternating 10 times.  2. Prone lumbar extension: Lie face down, arms extended overhead, palms on the floor. At the same time, raise your right arm and left leg as high as comfortably possible. Hold for 10 seconds and slowly return to start. Repeat with left arm and right leg, alternating 10 times. Gradually build up to 20 times. (Advanced: Repeat this exercise raising both arms and both legs a few inches off the floor at the same time. Hold for 5 seconds and release.)  3. Pelvic tilt: Lie on the floor on your back with your knees bent at 90 degrees. Your feet should be flat on the floor. Inhale, exhale, then slowly contract your abdominal muscles bringing your navel toward your spine. Let your pelvis rock back until your lower back is flat on the floor. Hold for 10 seconds while breathing smoothly.  4. Abdominal crunch: Perform a pelvic tilt (above) flattening your lower back against the floor. Holding the tension in your abdominal muscles, take another breath and raise your shoulder blades off the ground (this is not a full sit-up). Keep your head in line with your body (don t bend your neck forward). Hold for 2 seconds, then slowly lower.  Date Last Reviewed: 6/1/2016 2000-2017 [employer]. [address] [place], [place] [id number]. All rights reserved. This information is not intended as a substitute for professional medical care. Always follow your healthcare professional's instructions.          Back Basics: A Healthy Spine  A healthy spine supports the body while letting it move freely. It does this with the help of three natural curves. Strong, flexible muscles help, too. They support the spine by keeping its curves properly aligned. The disks that cushion the bones of your spine also play a role in back fitness.    Three natural curves  The spine is made of bones (vertebrae) and pads  of soft tissue (disks). These parts are arranged in three curves: cervical, thoracic, and lumbar. When properly aligned, these curves keep your body balanced. They also support your body when you move. By distributing your weight throughout your spine, the curves make back injuries less likely.  Strong, flexible muscles  Strong, flexible back muscles help support the three curves of the spine. They do so by holding the vertebrae and disks in proper alignment. Strong, flexible abdominal, hip, and leg muscles also reduce strain on the back.  The lumbar curve  The lumbar curve is the hardest-working part of the spine. It carries more weight and moves the most. Aligning this curve helps prevent damage to vertebrae, disks, and other parts of the spine.  Cushioning disks  Disks are the soft pads of tissue between the vertebrae. The disks absorb shock caused by movement. Each disk has a spongy center (nucleus) and a tougher outer ring (annulus). Movement within the nucleus allows the vertebrae to rock back and forth on the disks. This provides the flexibility needed to bend and move.       Date Last Reviewed: 10/18/2015    2824-5191 [employer]. [address] [place] [address]. All rights reserved. This information is not intended as a substitute for professional medical care. Always follow your healthcare professional's instructions.          Your next 10 appointments already scheduled     Apr 20, 2018 11:30 AM CDT   Return Visit with Fili Wesley LP   [facility]    [address]  [address]  [facility] [address]   [phone]            Apr 24, 2018 11:00 AM CDT   Return Visit with Faustino Ryan PT   [facility] ([facility])    [address]  [address]  [facility] [address]   [phone]            May 08, 2018 10:00 AM CDT   Return Visit with CARLOS A Guy CNP   [facility] ([facility])    [address]  [address]  [facility] [address]   [phone]            May 08, 2018 11:00 AM CDT   Return Visit with Faustino Ryan PT   [facility] ([facility])    [address]  [address]  [facility] [address]   [phone]              24 Hour Appointment Hotline       To make an appointment at any [facility] clinic, call [phone] ([phone]). If you don't have a family doctor or clinic, we will help you find one. [facility] clinics are conveniently located to serve the needs of you and your family.             Review of your medicines      Our records show that you are taking the medicines listed below. If these are incorrect, please call your family doctor or clinic.        Dose / Directions Last dose taken    acetylcysteine 600 MG Caps capsule   Commonly known as:  N-ACETYL CYSTEINE   Dose:  1800 mg        Take 1,800 mg by mouth daily   Refills:  0        amitriptyline 10 MG tablet   Commonly known as:  ELAVIL   Dose:  10-30 mg   Quantity:  90 tablet        Take 1-3 tablets (10-30 mg) by mouth At Bedtime   Refills:  1        * amphetamine-dextroamphetamine 20 MG per tablet   Commonly known as:  ADDERALL   Dose:  20 mg        Take 20 mg by mouth daily Reported on 3/6/2017   Refills:  0        * amphetamine-dextroamphetamine 30 MG per 24 hr capsule   Commonly known as:  ADDERALL XR   Dose:  1 capsule        Take 1 capsule by mouth every morning   Refills:  0        clonazePAM 0.5 MG tablet   Commonly known as:  klonoPIN   Dose:  0.5-1 mg        Take 0.5-1 mg by mouth nightly as needed   Refills:  0        DULoxetine 20 MG EC capsule   Commonly known as:  CYMBALTA   Dose:  1 capsule        Take 1 capsule by mouth daily   Refills:  0        gabapentin 100 MG capsule   Commonly known as:  NEURONTIN   Dose:  100 mg   Quantity:  90 capsule        Take 1 capsule (100 mg) by mouth daily   Refills:  3        naproxen 500 MG tablet    Commonly known as:  NAPROSYN   Dose:  500 mg   Quantity:  40 tablet        Take 1 tablet (500 mg) by mouth 2 times daily (with meals)   Refills:  1        * nicotine 14 MG/24HR 24 hr patch   Commonly known as:  NICODERM CQ   Dose:  1 patch   Quantity:  30 patch        Place 1 patch onto the skin every 24 hours Use this patch first   Refills:  0        * nicotine 7 MG/24HR 24 hr patch   Commonly known as:  NICODERM CQ   Dose:  1 patch   Quantity:  30 patch        Place 1 patch onto the skin every 24 hours   Refills:  0        tiZANidine 4 MG tablet   Commonly known as:  ZANAFLEX   Dose:  4-8 mg   Quantity:  60 tablet        Take 1-2 tablets (4-8 mg) by mouth nightly as needed   Refills:  3        zolpidem 10 MG tablet   Commonly known as:  AMBIEN   Dose:  10 mg   Quantity:  30 tablet        Take 1 tablet (10 mg) by mouth nightly as needed for sleep   Refills:  2        * Notice:  This list has 4 medication(s) that are the same as other medications prescribed for you. Read the directions carefully, and ask your doctor or other care provider to review them with you.            Orders Needing Specimen Collection     None      Pending Results     No orders found from 4/15/2018 to 4/18/2018.            Pending Culture Results     No orders found from 4/15/2018 to 4/18/2018.            Pending Results Instructions     If you had any lab results that were not finalized at the time of your Discharge, you can call the ED Lab Result RN at [phone]. You will be contacted by this team for any positive Lab results or changes in treatment. The nurses are available 7 days a week from 10A to 6:30P.  You can leave a message 24 hours per day and they will return your call.        Thank you for choosing [facility]       Thank you for choosing [facility] for your care. Our goal is always to provide you with excellent care. Hearing back from our patients is one way we can continue to improve our services. Please take a few minutes to  complete the written survey that you may receive in the mail after you visit with us. Thank you!        [employer] Information     [employer] gives you secure access to your electronic health record. If you see a primary care provider, you can also send messages to your care team and make appointments. If you have questions, please call your primary care clinic.  If you do not have a primary care provider, please call [phone] and they will assist you.        Care EveryWhere ID     This is your Care EveryWhere ID. This could be used by other organizations to access your [facility] medical records  [id number]        Equal Access to Services     [facility] : Mariana Armenta, ishan rinaldi, arnav smith. So [facility] [phone].    ATENCIÓN: Si habla español, tiene a cutler disposición servicios gratuitos de asistencia lingüística. Llame al [phone].    We comply with applicable federal civil rights laws and [place] laws. We do not discriminate on the basis of race, color, national origin, age, disability, sex, sexual orientation, or gender identity.            After Visit Summary       This is your record. Keep this with you and show to your community pharmacist(s) and doctor(s) at your next visit.

## 2018-04-17 NOTE — TELEPHONE ENCOUNTER
UDS results were faxed from [email or web address] and placed on LesConcierges desk for her to review when she returns to clinic.    KATERINA CardenasN, RN  Care Coordinator  [facility] [facility]

## 2018-04-17 NOTE — ED PROVIDER NOTES
"  History     Chief Complaint   Patient presents with     Hip Pain     increased back pain, muscle spasms, R hip pain for past week; sees pain management clinic, says \"they are not getting back to me\"     HPI  Hoang Kiser is a 32 year old male presents for evaluation of ongoing back pain with radiation to his right proximal quadriceps.  He apparently states that this has been ongoing for more than 18 months.  He has been through multiple programs and has had multiple evaluations and is currently being followed by a chronic pain clinic.  He apparently has been attempting to call the pain clinic and has not been able to speak with his provider.  Because of that he presents to the emergency room.  He denies new injury or new symptoms.  He is wondering what we could do for his chronic pain.    I have reviewed the Medications, Allergies, Past Medical and Surgical History, and Social History in the Epic system.    Review of Systems   Constitutional: Negative for fever.   HENT: Negative for congestion.    Eyes: Negative for redness.   Respiratory: Negative for shortness of breath.    Cardiovascular: Negative for chest pain.   Gastrointestinal: Negative for abdominal pain.   Genitourinary: Negative for difficulty urinating.   Musculoskeletal: Negative for arthralgias and neck stiffness.   Skin: Negative for color change.   Neurological: Negative for headaches.   Psychiatric/Behavioral: Negative for confusion.       Physical Exam   BP: 132/77  Pulse: 84  Temp: 96.6  F (35.9  C)  Resp: 16  SpO2: 99 %      Physical Exam   Constitutional: No distress.   HENT:   Head: Atraumatic.   Mouth/Throat: Oropharynx is clear and moist. No oropharyngeal exudate.   Eyes: Pupils are equal, round, and reactive to light. No scleral icterus.   Cardiovascular: Normal heart sounds and intact distal pulses.    Pulmonary/Chest: Breath sounds normal. No respiratory distress.   Abdominal: Soft. Bowel sounds are normal. There is no tenderness. "   Musculoskeletal: He exhibits no edema.        Lumbar back: He exhibits decreased range of motion, tenderness and pain.        Back:    Skin: Skin is warm. No rash noted. He is not diaphoretic.       ED Course     ED Course     Procedures             Labs Ordered and Resulted from Time of ED Arrival Up to the Time of Departure from the ED - No data to display         Assessments & Plan (with Medical Decision Making)   32-year-old male with a two-month history of back pain with radiation to right hip presents for further evaluation.  His exam reveals normal vital signs with mild tenderness over the low lumbar paraspinous musculature.  He has no red flag symptoms to suggest cauda equina, epidural abscess or other serious pathology.  He is here secondary to his pain clinic not contacting him.  We discussed the the role that the emergency room plays in his condition.  We discussed treatment options and the patient elects to try Toradol.  He was given 60 mg of Toradol and within 40 minutes stated that he was ready to leave.  I have recommended that he follow-up with his primary physician and pain clinic.    I have reviewed the nursing notes.    I have reviewed the findings, diagnosis, plan and need for follow up with the patient.    New Prescriptions    No medications on file       Final diagnoses:   Chronic midline low back pain with right-sided sciatica       4/17/2018   [facility], [place], EMERGENCY DEPARTMENT     Moises Carter MD  04/17/18 [id number]

## 2018-04-17 NOTE — DISCHARGE INSTRUCTIONS
General Neck and Back Pain    Both neck and back pain are usually caused by injury to the muscles or ligaments of the spine. Sometimes the disks that separate each bone of the spine may cause pain by pressing on a nearby nerve. Back and neck pain may appear after a sudden twisting or bending force (such as in a car accident), or sometimes after a simple awkward movement. In either case, muscle spasm is often present and adds to the pain.  Acute neck and back pain usually gets better in 1 to 2 weeks. Pain related to disk disease, arthritis in the spinal joints or spinal stenosis (narrowing of the spinal canal) can become chronic and last for months or years.  Back and neck pain are common problems. Most people feel better in 1 or 2 weeks, and most of the rest in 1 to 2 months. Most people can remain active.  People experience and describe pain differently.    Pain can be sharp, stabbing, shooting, aching, cramping, or burning    Movement, standing, bending, lifting, sitting, or walking may worsen the pain    Pain can be localized to one spot or area, or it can be more generalized    Pain can spread or radiate upwards, downwards, to the front, or go down your arms    Muscle spasm may occur.  Most of the time mechanical problems with the muscles or spine cause the pain. it is usually caused by an injury, whether known or not, to the muscles or ligaments. While illnesses can cause back pain, it is usually not caused by a serious illness. Pain is usually related to physical activity, whether sports, exercise, work, or normal activity. Sometimes it can occur without an identifiable cause. This can happen simply by stretching or moving wrong, without noting pain at the time. Other causes include:    Overexertion, lifting, pushing, pulling incorrectly or too aggressively.    Sudden twisting, bending or stretching from an accident (car or fall), or accidental movement.    Poor posture    Poor conditioning, lack of regular  exercise    Spinal disc disease or arthritis    Stress    Pregnancy, or illness like appendicitis, bladder or kidney infection, pelvic infections   Home care    For neck pain: Use a comfortable pillow that supports the head and keeps the spine in a neutral position. The position of the head should not be tilted forward or backward.    When in bed, try to find a position of comfort. A firm mattress is best. Try lying flat on your back with pillows under your knees. You can also try lying on your side with your knees bent up towards your chest and a pillow between your knees.    At first, do not try to stretch out the sore spots. If there is a strain, it is not like the good soreness you get after exercising without an injury. In this case, stretching may make it worse.    Avoid prolonged sitting, long car rides or travel. This puts more stress on the lower back than standing or walking.    During the first 24 to 72 hours after an injury, apply an ice pack to the painful area for 20 minutes and then remove it for 20 minutes over a period of 60 to 90 minutes or several times a day.     You can alternate ice and heat therapies. Talk with your healthcare provider about the best treatment for your back or neck pain. As a safety precaution, do not use a heating pad at bedtime. Sleeping with a heating pad can lead to skin burns or tissue damage.    Therapeutic massage can help relax the back and neck muscles without stretching them.    Be aware of safe lifting methods and do not lift anything over 15 pounds until all the pain is gone.  Medications  Talk to your healthcare provider before using medicine, especially if you have other medical problems or are taking other medicines.    You may use over-the-counter medicine to control pain, unless another pain medicine was prescribed. If you have chronic conditions like diabetes, liver or kidney disease, stomach ulcers,  gastrointestinal bleeding, or are taking blood thinner  medicines.    Be careful if you are given pain medicines, narcotics, or medicine for muscle spasm. They can cause drowsiness, and can affect your coordination, reflexes, and judgment. Do not drive or operate heavy machinery.  Follow-up care  Follow up with your healthcare provider, or as advised. Physical therapy or further tests may be needed.  If X-rays were taken, you will be notified of any new findings that may affect your care.  Call 911  Seek emergency medical care if any of the following occur:    Trouble breathing    Confusion    Very drowsy or trouble awakening    Fainting or loss of consciousness    Rapid or very slow heart rate    Loss of bowel or bladder control  When to seek medical advice  Call your healthcare provider right away if any of these occur:    Pain becomes worse or spreads into your arms or legs    Weakness, numbness or pain in one or both arms or legs    Numbness in the groin area    Difficulty walking    Fever of 100.4 F (38 C) or higher, or as directed by your healthcare provider  Date Last Reviewed: 7/1/2016 2000-2017 [employer]. [address] [place] [address]. All rights reserved. This information is not intended as a substitute for professional medical care. Always follow your healthcare professional's instructions.          Possible Causes of Low Back or Leg Pain    The symptoms in your back or leg may be due to pressure on a nerve. This pressure may be caused by a damaged disk or by abnormal bone growth. Either way, you may feel pain, burning, tingling, or numbness. If you have pressure on a nerve that connects to the sciatic nerve, pain may shoot down your leg.    Pressure from the disk  Constant wear and tear can weaken a disk over time and cause back pain. The disk can then be damaged by a sudden movement or injury. If its soft center begins to bulge, the disk may press on a nerve. Or the outside of the disk may tear, and the soft center may squeeze  through and pinch a nerve.    Pressure from bone  As a disk wears out, the vertebrae right above and below the disk begin to touch. This can put pressure on a nerve. Often, abnormal bone (called bone spurs) grows where the vertebrae rub against each other. This can cause the foramen or the spinal canal to narrow (called stenosis) and press against a nerve.  Date Last Reviewed: 10/4/2015    5725-7875 [employer]. [address] [place] [address], [place] [address]. All rights reserved. This information is not intended as a substitute for professional medical care. Always follow your healthcare professional's instructions.          Exercises to Strengthen Your Lower Back  Strong lower back and abdominal muscles work together to support your spine. The exercises below will help strengthen the lower back. It is important that you begin exercising slowly and increase levels gradually.  Always begin any exercise program with stretching. If you feel pain while doing any of these exercises, stop and talk to your doctor about a more specific exercise program that better suits your condition.   Low back stretch  The point of stretching is to make you more flexible and increase your range of motion. Stretch only as much as you are able. Stretch slowly. Do not push your stretch to the limit. If at any point you feel pain while stretching, this is your (temporary) limit.    Lie on your back with your knees bent and both feet on the ground.    Slowly raise your left knee to your chest as you flatten your lower back against the floor. Hold for 5 seconds.    Relax and repeat the exercise with your right knee.    Do 10 of these exercises for each leg.    Repeat hugging both knees to your chest at the same time.  Building lower back strength  Start your exercise routine with 10 to 30 minutes a day, 1 to 3 times a day.  Initial exercises  Lying on your back:  1. Ankle pumps: Move your foot up and down, towards your head, and then  away. Repeat 10 times with each foot.  2. Heel slides: Slowly bend your knee, drawing the heel of your foot towards you. Then slide your heel/foot from you, straightening your knee. Do not lift your foot off the floor (this is not a leg lift).  3. Abdominal contraction: Bend your knees and put your hands on your stomach. Tighten your stomach muscles. Hold for 5 seconds, then relax. Repeat 10 times.  4. Straight leg raise: Bend one leg at the knee and keep the other leg straight. Tighten your stomach muscles. Slowly lift your straight leg 6 to 12 inches off the floor and hold for up to 5 seconds. Repeat 10 times on each side.  Standin. Wall squats: Stand with your back against the wall. Move your feet about 12 inches away from the wall. Tighten your stomach muscles, and slowly bend your knees until they are at about a 45 degree angle. Do not go down too far. Hold about 5 seconds. Then slowly return to your starting position. Repeat 10 times.  2. Heel raises: Stand facing the wall. Slowly raise the heels of your feet up and down, while keeping your toes on the floor. If you have trouble balancing, you can touch the wall with your hands. Repeat 10 times.  More advanced exercises  When you feel comfortable enough, try these exercises.  1. Kneeling lumbar extension: Begin on your hands and knees. At the same time, raise and straighten your right arm and left leg until they are parallel to the ground. Hold for 2 seconds and come back slowly to a starting position. Repeat with left arm and right leg, alternating 10 times.  2. Prone lumbar extension: Lie face down, arms extended overhead, palms on the floor. At the same time, raise your right arm and left leg as high as comfortably possible. Hold for 10 seconds and slowly return to start. Repeat with left arm and right leg, alternating 10 times. Gradually build up to 20 times. (Advanced: Repeat this exercise raising both arms and both legs a few inches off the floor at  the same time. Hold for 5 seconds and release.)  3. Pelvic tilt: Lie on the floor on your back with your knees bent at 90 degrees. Your feet should be flat on the floor. Inhale, exhale, then slowly contract your abdominal muscles bringing your navel toward your spine. Let your pelvis rock back until your lower back is flat on the floor. Hold for 10 seconds while breathing smoothly.  4. Abdominal crunch: Perform a pelvic tilt (above) flattening your lower back against the floor. Holding the tension in your abdominal muscles, take another breath and raise your shoulder blades off the ground (this is not a full sit-up). Keep your head in line with your body (don t bend your neck forward). Hold for 2 seconds, then slowly lower.  Date Last Reviewed: 6/1/2016 2000-2017 [employer]. [address] [place] [address]. All rights reserved. This information is not intended as a substitute for professional medical care. Always follow your healthcare professional's instructions.          Back Basics: A Healthy Spine  A healthy spine supports the body while letting it move freely. It does this with the help of three natural curves. Strong, flexible muscles help, too. They support the spine by keeping its curves properly aligned. The disks that cushion the bones of your spine also play a role in back fitness.    Three natural curves  The spine is made of bones (vertebrae) and pads of soft tissue (disks). These parts are arranged in three curves: cervical, thoracic, and lumbar. When properly aligned, these curves keep your body balanced. They also support your body when you move. By distributing your weight throughout your spine, the curves make back injuries less likely.  Strong, flexible muscles  Strong, flexible back muscles help support the three curves of the spine. They do so by holding the vertebrae and disks in proper alignment. Strong, flexible abdominal, hip, and leg muscles also reduce strain on  the back.  The lumbar curve  The lumbar curve is the hardest-working part of the spine. It carries more weight and moves the most. Aligning this curve helps prevent damage to vertebrae, disks, and other parts of the spine.  Cushioning disks  Disks are the soft pads of tissue between the vertebrae. The disks absorb shock caused by movement. Each disk has a spongy center (nucleus) and a tougher outer ring (annulus). Movement within the nucleus allows the vertebrae to rock back and forth on the disks. This provides the flexibility needed to bend and move.       Date Last Reviewed: 10/18/2015    [id number] [employer]. [address] [place] [address]. All rights reserved. This information is not intended as a substitute for professional medical care. Always follow your healthcare professional's instructions.

## 2018-04-18 NOTE — TELEPHONE ENCOUNTER
UDS okay. Would recommend that he come in to discuss his concerns. (See other TE from yesterday)     CARLOS A Bass, NP-C  [facility]

## 2018-04-19 ENCOUNTER — OFFICE VISIT (OUTPATIENT)
Dept: PALLIATIVE MEDICINE | Facility: CLINIC | Age: 33
End: 2018-04-19
Payer: COMMERCIAL

## 2018-04-19 VITALS — DIASTOLIC BLOOD PRESSURE: 87 MMHG | SYSTOLIC BLOOD PRESSURE: 117 MMHG | OXYGEN SATURATION: 98 % | HEART RATE: 97 BPM

## 2018-04-19 DIAGNOSIS — G89.29 CHRONIC LOW BACK PAIN WITH SCIATICA, SCIATICA LATERALITY UNSPECIFIED, UNSPECIFIED BACK PAIN LATERALITY: ICD-10-CM

## 2018-04-19 DIAGNOSIS — M51.369 DDD (DEGENERATIVE DISC DISEASE), LUMBAR: ICD-10-CM

## 2018-04-19 DIAGNOSIS — M54.16 LUMBAR RADICULOPATHY: ICD-10-CM

## 2018-04-19 DIAGNOSIS — R10.31 GROIN PAIN, RIGHT: ICD-10-CM

## 2018-04-19 DIAGNOSIS — M62.830 BACK MUSCLE SPASM: ICD-10-CM

## 2018-04-19 DIAGNOSIS — M25.551 HIP PAIN, RIGHT: Primary | ICD-10-CM

## 2018-04-19 DIAGNOSIS — M54.40 CHRONIC LOW BACK PAIN WITH SCIATICA, SCIATICA LATERALITY UNSPECIFIED, UNSPECIFIED BACK PAIN LATERALITY: ICD-10-CM

## 2018-04-19 PROCEDURE — 99214 OFFICE O/P EST MOD 30 MIN: CPT | Performed by: NURSE PRACTITIONER

## 2018-04-19 PROCEDURE — 96152 ZZHC HEALTH AND BEHAVIOR INTERVENTION, INDIVIDUAL, EACH 15 MINUTES: CPT | Performed by: PSYCHOLOGIST

## 2018-04-19 RX ORDER — CYCLOBENZAPRINE HCL 5 MG
5-10 TABLET ORAL 3 TIMES DAILY PRN
Qty: 60 TABLET | Refills: 1 | Status: SHIPPED | OUTPATIENT
Start: 2018-04-19 | End: 2018-05-03

## 2018-04-19 RX ORDER — TRAMADOL HYDROCHLORIDE 50 MG/1
50 TABLET ORAL EVERY 6 HOURS PRN
Qty: 20 TABLET | Refills: 0 | Status: SHIPPED | OUTPATIENT
Start: 2018-04-19 | End: 2018-05-03

## 2018-04-19 NOTE — PATIENT INSTRUCTIONS
After Visit Instructions:     Thank you for coming to [facility] for your care. It is my goal to partner with you to help you reach your optimal state of health.     I am recommending multidisciplinary care at this time.  The focus of care will be to continue gradual rehabilitation and pain management with medication adjustments as needed.    Continue daily self-care, identifying contributing factors, and monitoring variations in pain level. Continue to integrate self-care into your life.      1. Schedule pain psychology assessment/visits  2. Schedule physical therapy assessment/visits  3. Schedule follow-up with CARLOS A Higuera NP-C in 2 days after MRI  4. Imaging: MRIs of low back and right hip   5. Procedures recommended: We will discuss after MRIs    Interventional procedures  are done at our [place], [place]  and [place] locations.   6. Medication recommendations:   1. Cyclobenzaprine 5 mgL 1-2 tabs up to three times daily  2. Tramadol 50 mg 1 tab up to three times daily. Max three tabs per day.     CARLOS A Bass NP-C  [facility]    Contact information: [facility]    Please call if any side effects, questions, or concerns arise.    Nurse Triage line:  [phone]   Call this number with any questions or concerns. You may leave a detailed message anytime. Calls are typically returned Monday through Friday between 8 AM and 4:30 PM. We usually get back to you within 2 business days depending on the issue/request.    Scheduling number: [phone].  Call this number to schedule or change appointments.          Medication Refills Policy:    For non-narcotic medications, please your pharmacy directly to request a refill and the pharmacy will call the [facility] for authorization. Please allow 3-4 days for these refills.    For narcotic refills, call the nurse triage line and leave a message requesting your  refill along with the name of the pharmacy that you use. Narcotic prescriptions will be mailed to your pharmacy or you may pick them up at the clinic.  Please call 7-10 days before your refill is due  The above policy allows adequate time so that you do not run out of medication.    No Show - Late Cancellation - Late Arrival Policy  We believe regular attendance is key to your success in our program.    Any time you are unable to keep your appointment we ask that you call us at  least 24 hours in advance to let us know. This will allow us to offer the appointment time to another patient. The following is our policy for missed appointments. This also applies to appointments cancelled with less than 24 hours notice.    You will receive a letter after missing your 1st and 2nd appointments without contacting the clinic before your scheduled appointment time.     After missing 3 appointments without calling first, we will cancel all of your future appointments at [facility].    At that point, you will not be able to resume services unless approved by your care team  We understand that unforseen circumstances arise, however, out of respect for all concerned and to provide this appointment to another patient, this policy will be enforced.    Please note that most follow up appointments are 30 minutes long. If you arrive late, your provider may not be able to see you for the entire 30 minutes. Please also note that if you arrive more than 15 minutes for any appointment, it may be rescheduled.

## 2018-04-19 NOTE — MR AVS SNAPSHOT
After Visit Summary   4/19/2018    Hoang Kiser    MRN: [id number]           Patient Information     Date Of Birth          1985        Visit Information        Provider Department      4/19/2018 3:30 PM Tamiko Stevens APRN CNP [facility]        Today's Diagnoses     Hip pain, right    -  1    Groin pain, right        DDD (degenerative disc disease), lumbar        Lumbar radiculopathy        Back muscle spasm          Care Instructions    After Visit Instructions:     Thank you for coming to [facility] for your care. It is my goal to partner with you to help you reach your optimal state of health.     I am recommending multidisciplinary care at this time.  The focus of care will be to continue gradual rehabilitation and pain management with medication adjustments as needed.    Continue daily self-care, identifying contributing factors, and monitoring variations in pain level. Continue to integrate self-care into your life.      1. Schedule pain psychology assessment/visits  2. Schedule physical therapy assessment/visits  3. Schedule follow-up with CARLOS A Higuera NP-C in 2 days after MRI  4. Imaging: MRIs of low back and right hip   5. Procedures recommended: We will discuss after MRIs    Interventional procedures  are done at our [place], [place]  and [place] locations.   6. Medication recommendations:   1. Cyclobenzaprine 5 mgL 1-2 tabs up to three times daily  2. Tramadol 50 mg 1 tab up to three times daily. Max three tabs per day.     CARLOS A Bass NP-C  [facility]    Contact information: [facility]    Please call if any side effects, questions, or concerns arise.    Nurse Triage line:  [phone]   Call this number with any questions or concerns. You may leave a detailed message anytime. Calls are typically returned Monday through Friday between 8 AM and 4:30  PM. We usually get back to you within 2 business days depending on the issue/request.    Scheduling number: [phone].  Call this number to schedule or change appointments.          Medication Refills Policy:    For non-narcotic medications, please your pharmacy directly to request a refill and the pharmacy will call the [facility] for authorization. Please allow 3-4 days for these refills.    For narcotic refills, call the nurse triage line and leave a message requesting your refill along with the name of the pharmacy that you use. Narcotic prescriptions will be mailed to your pharmacy or you may pick them up at the clinic.  Please call 7-10 days before your refill is due  The above policy allows adequate time so that you do not run out of medication.    No Show - Late Cancellation - Late Arrival Policy  We believe regular attendance is key to your success in our program.    Any time you are unable to keep your appointment we ask that you call us at  least 24 hours in advance to let us know. This will allow us to offer the appointment time to another patient. The following is our policy for missed appointments. This also applies to appointments cancelled with less than 24 hours notice.    You will receive a letter after missing your 1st and 2nd appointments without contacting the clinic before your scheduled appointment time.     After missing 3 appointments without calling first, we will cancel all of your future appointments at [facility].    At that point, you will not be able to resume services unless approved by your care team  We understand that unforseen circumstances arise, however, out of respect for all concerned and to provide this appointment to another patient, this policy will be enforced.    Please note that most follow up appointments are 30 minutes long. If you arrive late, your provider may not be able to see you for the entire 30 minutes. Please also note that  if you arrive more than 15 minutes for any appointment, it may be rescheduled.                                     Follow-ups after your visit        Your next 10 appointments already scheduled     Apr 24, 2018 11:00 AM CDT   Return Visit with Faustino Ryan, PT   [facility] ([facility])    [address]  [address]  [facility] [id number] [phone]            May 08, 2018 10:00 AM CDT   Return Visit with CARLOS A Guy CNP   [facility] ([facility])    [address]  [facility] [id number] [phone]            May 08, 2018 11:00 AM CDT   Return Visit with Faustino Ryan, PT   [facility] ([facility])    [address]  [facility] [id number] [phone]              Future tests that were ordered for you today     Open Future Orders        Priority Expected Expires Ordered    MR Lumbar Spine w/o Contrast Routine  4/19/2019 4/19/2018    MR Hip Right w/o Contrast Routine  4/19/2019 4/19/2018            Who to contact     If you have questions or need follow up information about today's clinic visit or your schedule please contact [facility] directly at [phone].  Normal or non-critical lab and imaging results will be communicated to you by [employer], letter or phone within 4 business days after the clinic has received the results. If you do not hear from us within 7 days, please contact the clinic through [employer] or phone. If you have a critical or abnormal lab result, we will notify you by phone as soon as possible.  Submit refill requests through [employer] or call your pharmacy and they will forward the refill request to us. Please allow 3 business days for your refill to be completed.          Additional Information About Your Visit        [employer] Information     [employer] gives you secure access to your electronic health record. If you see a primary  care provider, you can also send messages to your care team and make appointments. If you have questions, please call your primary care clinic.  If you do not have a primary care provider, please call [phone] and they will assist you.        Care EveryWhere ID     This is your Care EveryWhere ID. This could be used by other organizations to access your [facility] medical records  [id number]        Your Vitals Were     Pulse Pulse Oximetry                97 98%           Blood Pressure from Last 3 Encounters:   04/19/18 117/87   04/17/18 132/77   03/13/18 120/84    Weight from Last 3 Encounters:   03/01/18 76.2 kg (168 lb)   02/16/18 75.3 kg (166 lb)   02/08/18 76.2 kg (168 lb)                 Today's Medication Changes          These changes are accurate as of 4/19/18  4:10 PM.  If you have any questions, ask your nurse or doctor.               Start taking these medicines.        Dose/Directions    cyclobenzaprine 5 MG tablet   Commonly known as:  FLEXERIL   Used for:  Back muscle spasm   Started by:  Tamiko Stevens APRN CNP        Dose:  5-10 mg   Take 1-2 tablets (5-10 mg) by mouth 3 times daily as needed for muscle spasms   Quantity:  60 tablet   Refills:  1       traMADol 50 MG tablet   Commonly known as:  ULTRAM   Used for:  Lumbar radiculopathy, Groin pain, right, Hip pain, right, DDD (degenerative disc disease), lumbar   Started by:  Tamiko Stevens APRN CNP        Dose:  50 mg   Take 1 tablet (50 mg) by mouth every 6 hours as needed for severe pain Max three tabs per day.   Quantity:  20 tablet   Refills:  0         Stop taking these medicines if you haven't already. Please contact your care team if you have questions.     acetylcysteine 600 MG Caps capsule   Commonly known as:  N-ACETYL CYSTEINE   Stopped by:  Tamiko Stevens APRN CNP           naproxen 500 MG tablet   Commonly known as:  NAPROSYN   Stopped by:  Tamiko Stevens APRN CNP           zolpidem 10 MG tablet    Commonly known as:  AMBIEN   Stopped by:  Tamiko Stevens APRN CNP                Where to get your medicines      These medications were sent to [facility] [place], [place] - [address]  [address], [facility] [address]     Phone:  [phone]     cyclobenzaprine 5 MG tablet         Some of these will need a paper prescription and others can be bought over the counter.  Ask your nurse if you have questions.     Bring a paper prescription for each of these medications     traMADol 50 MG tablet               Information about OPIOIDS     PRESCRIPTION OPIOIDS: WHAT YOU NEED TO KNOW   You have a prescription for an opioid (narcotic) pain medicine. Opioids can cause addiction. If you have a history of chemical dependency of any type, you are at a higher risk of becoming addicted to opioids. Only take this medicine after all other options have been tried. Take it for as short a time and as few doses as possible.     Do not:    Drive. If you drive while taking these medicines, you could be arrested for driving under the influence (DUI).    Operate heavy machinery    Do any other dangerous activities while taking these medicines.     Drink any alcohol while taking these medicines.      Take with any other medicines that contain acetaminophen. Read all labels carefully. Look for the word  acetaminophen  or  Tylenol.  Ask your pharmacist if you have questions or are unsure.    Store your pills in a secure place, locked if possible. We will not replace any lost or stolen medicine. If you don t finish your medicine, please throw away (dispose) as directed by your pharmacist. The [place] Pollution Control Agency has more information about safe disposal: https://www.pca.[facility].[place].us/living-green/managing-unwanted-medications    All opioids tend to cause constipation. Drink plenty of water and eat foods that have a lot of fiber, such as fruits, vegetables, prune juice, apple juice and  high-fiber cereal. Take a laxative (Miralax, milk of magnesia, Colace, Senna) if you don t move your bowels at least every other day.          Primary Care Provider Office Phone # Fax #    Phill Floyd [phone] [id number] [facility] [address]        Equal Access to Services     [facility]: Hadii aad ku hadasho Soomaali, waaxda luqadaha, qaybta kaalmada adeegyada, waxay idiin hayaan adeeg khmichaelamarilynn latami . So [facility] [phone].    ATENCIÓN: Si habla español, tiene a cutler disposición servicios gratuitos de asistencia lingüística. Lorrieame al [phone].    We comply with applicable federal civil rights laws and [place] laws. We do not discriminate on the basis of race, color, national origin, age, disability, sex, sexual orientation, or gender identity.            Thank you!     Thank you for choosing [facility]  for your care. Our goal is always to provide you with excellent care. Hearing back from our patients is one way we can continue to improve our services. Please take a few minutes to complete the written survey that you may receive in the mail after your visit with us. Thank you!             Your Updated Medication List - Protect others around you: Learn how to safely use, store and throw away your medicines at www.disposemymeds.org.          This list is accurate as of 4/19/18  4:10 PM.  Always use your most recent med list.                   Brand Name Dispense Instructions for use Diagnosis    * amphetamine-dextroamphetamine 20 MG per tablet    ADDERALL     Take 20 mg by mouth daily Reported on 3/6/2017        * amphetamine-dextroamphetamine 30 MG per 24 hr capsule    ADDERALL XR     Take 1 capsule by mouth every morning        clonazePAM 0.5 MG tablet    klonoPIN     Take 0.5-1 mg by mouth nightly as needed        cyclobenzaprine 5 MG tablet    FLEXERIL    60 tablet    Take 1-2 tablets (5-10 mg) by mouth 3 times daily as needed for muscle spasms     Back muscle spasm       DULoxetine 20 MG EC capsule    CYMBALTA     Take 1 capsule by mouth daily        gabapentin 100 MG capsule    NEURONTIN    90 capsule    Take 1 capsule (100 mg) by mouth daily    Lumbar radicular pain, Lumbar paraspinal muscle spasm       * nicotine 14 MG/24HR 24 hr patch    NICODERM CQ    30 patch    Place 1 patch onto the skin every 24 hours Use this patch first    Tobacco abuse       * nicotine 7 MG/24HR 24 hr patch    NICODERM CQ    30 patch    Place 1 patch onto the skin every 24 hours    Tobacco abuse       tiZANidine 4 MG tablet    ZANAFLEX    60 tablet    Take 1-2 tablets (4-8 mg) by mouth nightly as needed    Lumbar radicular pain, Lumbar paraspinal muscle spasm       traMADol 50 MG tablet    ULTRAM    20 tablet    Take 1 tablet (50 mg) by mouth every 6 hours as needed for severe pain Max three tabs per day.    Lumbar radiculopathy, Groin pain, right, Hip pain, right, DDD (degenerative disc disease), lumbar       * Notice:  This list has 4 medication(s) that are the same as other medications prescribed for you. Read the directions carefully, and ask your doctor or other care provider to review them with you.

## 2018-04-19 NOTE — LETTER
[facility]    04/19/18    Patient: Hoang Kiser  YOB: 1985  Medical Record Number: [id number]                                                                  Controlled Substance Agreement  I understand that my care provider has prescribed controlled substances (narcotics, tranquilizers, and/or stimulants) to help manage my condition(s).  I am taking this medicine to help me function or work.  I know that this is strong medicine.  It could have serious side effects and even cause a dependency on the drug.  If I stop these medicines suddenly, I could have severe withdrawal symptoms.    The risks, benefits, and side effects of these medication(s) were explained to me.  I agree that:  1. I will take part in other treatments as advised by my provider.  This may be psychiatry or counseling, physical therapy, behavioral therapy, group treatment, or a referral to a pain clinic.  I will reduce or stop my medicine when my provider tells me to do so.   2. I will take my medicines as prescribed.  I will not change the dose or schedule unless my provider tells me to.  There will be no refills if I  run out early.   I may be contacted at any time without warning and asked to complete a drug test or pill count.   3. I will keep all my appointments at the clinic.  If I miss appointments or fail to follow instructions, my provider may stop my medicine.  4. I will not ask other providers to prescribe controlled substances. And I will not accept controlled substances from other people. If I need another prescribed controlled substance for a new reason, I will notify my provider within one business day.  5. If I enroll in the [place] Medical Marijuana program, I will tell my provider.  I will also sign an agreement to share my medical records with my provider.  6. I will use one pharmacy to fill all of my controlled substance prescriptions.  If my prescription is mailed to my pharmacy, it may  take 5 to 7 days for my medicine to be ready.  7. I understand that my provider, clinic care team, and pharmacy can track controlled substance prescriptions from other providers through a central database (prescription monitoring program).  8. I will bring in my list of medications (or my medicine bottles) each time I come to the clinic.  REV-  04/2016                                                                                                                                            Page 1 of 2      [facility]    04/19/18    Patient: Hoang Kiser  YOB: 1985  Medical Record Number: [id number]    9. Refills of controlled substances will be made only during office hours.  It is up to me to make sure that I do not run out of my medicines on weekends or holidays.    10. I am responsible for my prescriptions.  If the medicine is lost or stolen, it will not be replaced.   I also agree not to share these medicines with anyone.  11. I agree to not use ANY illegal or recreational drugs.  This includes marijuana, cocaine, bath salts or other drugs.  I agree not to use alcohol unless my provider says I may.  I agree to give urine samples whenever asked.  If I fail to give a urine sample, the provider may stop my medicine.     12. I will tell my nurse or provider right away if I become pregnant or have a new medical problem treated outside of [facility].  13. I understand that this medicine can affect my thinking and judgment.  It may be unsafe for me to drive, use machinery and do dangerous tasks.  I will not do any of these things until I know how the medicine affects me.  If my dose changes, I will wait to see how it affects me.  I will contact my provider if I have concerns about medicine side effects.  I understand that if I do not follow any of the conditions above, my prescriptions or treatment may be stopped.    I agree that my provider, clinic care team, and pharmacy may  work with any city, state or federal law enforcement agency that investigates the misuse, sale, or other diversion of my controlled medicine. I will allow my provider to discuss my care with or share a copy of this agreement with any other treating provider, pharmacy or emergency room where I receive care.  I agree to give up (waive) any right of privacy or confidentiality with respect to these authorizations.   I have read this agreement and have asked questions about anything I did not understand.   ___________________________________    ___________________________  Patient Signature                                                           Date and Time  ___________________________________     ____________________________  Witness                                                                            Date and Time  ___________________________________  CARLOS A Guy CNP  REV-  04/2016                                                                                                                                                                 Page 2 of 2

## 2018-04-19 NOTE — MR AVS SNAPSHOT
After Visit Summary   4/19/2018    Hoang Kiser    MRN: [id number]           Patient Information     Date Of Birth          1985        Visit Information        Provider Department      4/19/2018 2:30 PM Fili Wesley LP [facility] [place]        Today's Diagnoses     Hip pain, right    -  1    Groin pain, right        DDD (degenerative disc disease), lumbar        Chronic low back pain with sciatica, sciatica laterality unspecified, unspecified back pain laterality           Follow-ups after your visit        Your next 10 appointments already scheduled     Apr 24, 2018 11:00 AM CDT   Return Visit with Faustino Ryan, PT   [facility] ([facility] [place])    [address]  [facility] [address]  [facility] [address] [phone]            May 02, 2018  7:00 PM CDT   (Arrive by 6:45 PM)   MR HIP RIGHT W/O CONTRAST with [id number]   [facility] ([facility])    [address]  1[address]  [facility] [address] [phone]           Take your medicines as usual, unless your doctor tells you not to. Bring a list of your current medicines to your exam (including vitamins, minerals and over-the-counter drugs). Also bring the results of similar scans you may have had.  Please remove any body piercings and hair extensions before you arrive.  Follow your doctor s orders. If you do not, we may have to postpone your exam.  You may or may not receive IV contrast for this exam pending the discretion of the Radiologist.  You do not need to do anything special to prepare.  The MRI machine uses a strong magnet. Please wear clothes without metal (snaps, zippers). A sweatsuit works well, or we may give you a hospital gown.   **IMPORTANT** THE INSTRUCTIONS BELOW ARE ONLY FOR THOSE PATIENTS WHO HAVE BEEN PRESCRIBED SEDATION OR GENERAL ANESTHESIA DURING THEIR MRI PROCEDURE:  IF YOUR DOCTOR PRESCRIBED ORAL SEDATION (take medicine  to help you relax during your exam):   You must get the medicine from your doctor (oral medication) before you arrive. Bring the medicine to the exam. Do not take it at home. You ll be told when to take it upon arriving for your exam.   Arrive one hour early. Bring someone who can take you home after the test. Your medicine will make you sleepy. After the exam, you may not drive, take a bus or take a taxi by yourself.  IF YOUR DOCTOR PRESCRIBED IV SEDATION:   Arrive one hour early. Bring someone who can take you home after the test. Your medicine will make you sleepy. After the exam, you may not drive, take a bus or take a taxi by yourself.   No eating 6 hours before your exam. You may have clear liquids up until 4 hours before your exam. (Clear liquids include water, clear tea, black coffee and fruit juice without pulp.)  IF YOUR DOCTOR PRESCRIBED ANESTHESIA (be asleep for your exam):   Arrive 1 1/2 hours early. Bring someone who can take you home after the test. You may not drive, take a bus or take a taxi by yourself.   No eating 8 hours before your exam. You may have clear liquids up until 4 hours before your exam. (Clear liquids include water, clear tea, black coffee and fruit juice without pulp.)   You will spend four to five hours in the recovery room.  Please call the Imaging Department at your exam site with any questions.            May 02, 2018  7:45 PM CDT   (Arrive by 7:30 PM)   MR LUMBAR SPINE W/O CONTRAST with [id number]   [facility] ([facility])    [address] [address] [phone]           Take your medicines as usual, unless your doctor tells you not to. Bring a list of your current medicines to your exam (including vitamins, minerals and over-the-counter drugs). Also bring the results of similar scans you may have had.  Please remove any body piercings and hair extensions before you arrive.  Follow your doctor s  orders. If you do not, we may have to postpone your exam.  You may or may not receive IV contrast for this exam pending the discretion of the Radiologist.  You do not need to do anything special to prepare.  The MRI machine uses a strong magnet. Please wear clothes without metal (snaps, zippers). A sweatsuit works well, or we may give you a hospital gown.   **IMPORTANT** THE INSTRUCTIONS BELOW ARE ONLY FOR THOSE PATIENTS WHO HAVE BEEN PRESCRIBED SEDATION OR GENERAL ANESTHESIA DURING THEIR MRI PROCEDURE:  IF YOUR DOCTOR PRESCRIBED ORAL SEDATION (take medicine to help you relax during your exam):   You must get the medicine from your doctor (oral medication) before you arrive. Bring the medicine to the exam. Do not take it at home. You ll be told when to take it upon arriving for your exam.   Arrive one hour early. Bring someone who can take you home after the test. Your medicine will make you sleepy. After the exam, you may not drive, take a bus or take a taxi by yourself.  IF YOUR DOCTOR PRESCRIBED IV SEDATION:   Arrive one hour early. Bring someone who can take you home after the test. Your medicine will make you sleepy. After the exam, you may not drive, take a bus or take a taxi by yourself.   No eating 6 hours before your exam. You may have clear liquids up until 4 hours before your exam. (Clear liquids include water, clear tea, black coffee and fruit juice without pulp.)  IF YOUR DOCTOR PRESCRIBED ANESTHESIA (be asleep for your exam):   Arrive 1 1/2 hours early. Bring someone who can take you home after the test. You may not drive, take a bus or take a taxi by yourself.   No eating 8 hours before your exam. You may have clear liquids up until 4 hours before your exam. (Clear liquids include water, clear tea, black coffee and fruit juice without pulp.)   You will spend four to five hours in the recovery room.  Please call the Imaging Department at your exam site with any questions.            May 04, 2018  1:30  PM CDT   Return Visit with CARLOS A Guy CNP   [facility] ([facility])    [address]  [address]  [facility] [address] [phone]            May 08, 2018 11:00 AM CDT   Return Visit with Faustino Ryan PT   [facility] ([facility])    [address]  [address]  [facility] [address] [phone]            May 10, 2018 10:00 AM CDT   Return Visit with Fili Wesley LP   [facility] ([facility])    [address]  [address]  [facility] [address] [phone]              Future tests that were ordered for you today     Open Future Orders        Priority Expected Expires Ordered    MR Lumbar Spine w/o Contrast Routine  4/19/2019 4/19/2018    MR Hip Right w/o Contrast Routine  4/19/2019 4/19/2018            Who to contact     If you have questions or need follow up information about today's clinic visit or your schedule please contact [facility] directly at [phone].  Normal or non-critical lab and imaging results will be communicated to you by true[x] Mediahart, letter or phone within 4 business days after the clinic has received the results. If you do not hear from us within 7 days, please contact the clinic through MegaBitst or phone. If you have a critical or abnormal lab result, we will notify you by phone as soon as possible.  Submit refill requests through Pinstripe or call your pharmacy and they will forward the refill request to us. Please allow 3 business days for your refill to be completed.          Additional Information About Your Visit        true[x] Mediahart Information     Pinstripe gives you secure access to your electronic health record. If you see a primary care provider, you can also send messages to your care team and make appointments. If you have questions, please call your primary care clinic.  If you do not have a primary care provider, please call [phone]  and they will assist you.        Care EveryWhere ID     This is your Care EveryWhere ID. This could be used by other organizations to access your [facility] medical records  [id number]         Blood Pressure from Last 3 Encounters:   04/19/18 117/87   04/17/18 132/77   03/13/18 120/84    Weight from Last 3 Encounters:   03/01/18 76.2 kg (168 lb)   02/16/18 75.3 kg (166 lb)   02/08/18 76.2 kg (168 lb)              Today, you had the following     No orders found for display         Today's Medication Changes          These changes are accurate as of 4/19/18 11:59 PM.  If you have any questions, ask your nurse or doctor.               Start taking these medicines.        Dose/Directions    cyclobenzaprine 5 MG tablet   Commonly known as:  FLEXERIL   Used for:  Back muscle spasm   Started by:  Tamiko Stevens APRN CNP        Dose:  5-10 mg   Take 1-2 tablets (5-10 mg) by mouth 3 times daily as needed for muscle spasms   Quantity:  60 tablet   Refills:  1       traMADol 50 MG tablet   Commonly known as:  ULTRAM   Used for:  Lumbar radiculopathy, Groin pain, right, Hip pain, right, DDD (degenerative disc disease), lumbar   Started by:  Tamiko Stevens APRN CNP        Dose:  50 mg   Take 1 tablet (50 mg) by mouth every 6 hours as needed for severe pain Max three tabs per day.   Quantity:  20 tablet   Refills:  0         Stop taking these medicines if you haven't already. Please contact your care team if you have questions.     acetylcysteine 600 MG Caps capsule   Commonly known as:  N-ACETYL CYSTEINE   Stopped by:  Tamiko Stevens APRN CNP           naproxen 500 MG tablet   Commonly known as:  NAPROSYN   Stopped by:  Tamiko Stevens APRN CNP           zolpidem 10 MG tablet   Commonly known as:  AMBIEN   Stopped by:  Tamiko Stevens APRN CNP                Where to get your medicines      These medications were sent to [facility] [place], [place] - [address], [facility] [address]     Phone:  [phone]     cyclobenzaprine 5 MG tablet         Some of these will need a paper prescription and others can be bought over the counter.  Ask your nurse if you have questions.     Bring a paper prescription for each of these medications     traMADol 50 MG tablet                Primary Care Provider Office Phone # Fax #    Phill Floyd [phone] [address] [facility] [address]        Equal Access to Services     BERLIN MEYER : Hadii aad ku hadasho Soomaali, waaxda luqadaha, qaybta kaalmada adeegyada, waxay idiin hayaan adeeg kharash latami . So [facility] [place] [phone].    ATENCIÓN: Si habla español, tiene a cutler disposición servicios gratuitos de asistencia lingüística. [facility] [phone].    We comply with applicable federal civil rights laws and [place] laws. We do not discriminate on the basis of race, color, national origin, age, disability, sex, sexual orientation, or gender identity.            Thank you!     Thank you for choosing [facility]  for your care. Our goal is always to provide you with excellent care. Hearing back from our patients is one way we can continue to improve our services. Please take a few minutes to complete the written survey that you may receive in the mail after your visit with us. Thank you!             Your Updated Medication List - Protect others around you: Learn how to safely use, store and throw away your medicines at www.disposemymeds.org.          This list is accurate as of 4/19/18 11:59 PM.  Always use your most recent med list.                   Brand Name Dispense Instructions for use Diagnosis    * amphetamine-dextroamphetamine 20 MG per tablet    ADDERALL     Take 20 mg by mouth daily Reported on 3/6/2017        * amphetamine-dextroamphetamine 30 MG per 24 hr capsule    ADDERALL XR     Take 1 capsule by mouth every morning        clonazePAM 0.5 MG tablet    klonoPIN      Take 0.5-1 mg by mouth nightly as needed        cyclobenzaprine 5 MG tablet    FLEXERIL    60 tablet    Take 1-2 tablets (5-10 mg) by mouth 3 times daily as needed for muscle spasms    Back muscle spasm       DULoxetine 20 MG EC capsule    CYMBALTA     Take 1 capsule by mouth daily        gabapentin 100 MG capsule    NEURONTIN    90 capsule    Take 1 capsule (100 mg) by mouth daily    Lumbar radicular pain, Lumbar paraspinal muscle spasm       * nicotine 14 MG/24HR 24 hr patch    NICODERM CQ    30 patch    Place 1 patch onto the skin every 24 hours Use this patch first    Tobacco abuse       * nicotine 7 MG/24HR 24 hr patch    NICODERM CQ    30 patch    Place 1 patch onto the skin every 24 hours    Tobacco abuse       tiZANidine 4 MG tablet    ZANAFLEX    60 tablet    Take 1-2 tablets (4-8 mg) by mouth nightly as needed    Lumbar radicular pain, Lumbar paraspinal muscle spasm       traMADol 50 MG tablet    ULTRAM    20 tablet    Take 1 tablet (50 mg) by mouth every 6 hours as needed for severe pain Max three tabs per day.    Lumbar radiculopathy, Groin pain, right, Hip pain, right, DDD (degenerative disc disease), lumbar       * Notice:  This list has 4 medication(s) that are the same as other medications prescribed for you. Read the directions carefully, and ask your doctor or other care provider to review them with you.

## 2018-04-19 NOTE — PROGRESS NOTES
"[facility]    CHIEF COMPLAINT: \"Unmanagable pain, lack of understanding of symptoms and what I can do. Pain in groin, right hip flexor, sides of hips, lumbar, right knee sometimes, left hamstring. \"     INTERVAL HISTORY:  Last seen on 3/13/18.        Recommendations/plan at the last visit included:  1.                  Schedule pain psychology visits  2.                  Schedule physical therapy visits  3.                  Schedule follow-up with CARLOS A Higuera NP-C in 8 weeks  4.                  Medication recommendations:                  1. Amitriptyline 10 m-3 tabs at bedtime. Start with 1 tab and increase by 1 tab every 4-5 days as tolerated.   2. Refill of Tizanidine 4 mg given  3. Stop Gabapentin.    Since his last visit, Hoang J Margi reports:  - Was seen in ED for flare of chronic pain. Pain flared after being at a convention, walking a lot. Very frustrated with ongoing pain issues.     Pain Information:   Pain quality: Miserable    Pain timing: Worst in the after noon and evening, after a lot of walking.    Pain rating: intensity ranges from 3/10 to 8/10, and averages 5/10 on a 0-10 scale.   Aggravating factors include: Walking, sitting, bending, twisting   Relieving factors include: Laying down, hot pad, stretching    Annual Controlled Substance Agreement/UDS due date: N/A     Current Pain Relevant Medications:    Clonazepam .5 mg 1-2 tab at HS PRN  Duloxetine 20 mg daily  Gabapentin 100 mg daily  Naproxen 500 mg BID: on hold re: elevated LFTs   Tizanidine 4 mg 1-2 tabs at HS PRN  Ambien 10 mg at HS  Adderall 50 mg daily, combination of long and short acting.       Previous Pain Relevant Medications: (H--helped; HI--Helped initially; SWH--Somewhat helpful; NH--No help; W--worse; SE--side effects; ?--Unsure if helpful)   NOTE: This medication information taken from patient's intake form, not medical records.                         Opiates: Tramadol: " H                        NSAIDS: Ibuprofen:H, naproxen:SWH, Relafen: NH                        Muscle Relaxants: Cyclobenzaprine:H,Med interaction, Tizanidine:H                        Anti-migraine mediations: Prednisone:H                        Anti-depressants: Bupropion:SE, Celexa:SE, Duloxetine:H, Trazodone:Too strong, Venlafaxine:too strong                        Sleep aids:Anbien: H                        Anxiolytics: Clonazepam:H                        Neuropathics: Tegretol:taken for seizures in childhood, Gabapentin:H                                          Topicals: Lidocaine:H                        Other medications not covered above: Tylenol:NH      Any illicit drug use: 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    SELF CARE:   How often do you practice SELF-CARE (relaxing, stretching, pacing, monitoring posture, taking mini-breaks) in a typical day:  Concern for multiple controlled substances including benzodiazepines, stimulants. Patient states that he is no being prescribed Ambien.     Is Narcan prescribed for opiate use >50 MME daily? N/A    THE 4 As OF OPIOID MAINTENANCE ANALGESIA    Analgesia: Is pain relief clinically significant? N/A   Activity: Is patient functional and able to perform Activities of Daily Living? N/A   Adverse effects: Is patient free from adverse side effects from opiates? N/A   Adherence to Rx protocol: Is patient adhering to Controlled Substance Agreement and taking medications ONLY as ordered? N/A           Medications:  Current Outpatient Prescriptions   Medication Sig Dispense Refill     cyclobenzaprine (FLEXERIL) 5 MG tablet Take 1-2 tablets (5-10 mg) by mouth 3 times  daily as needed for muscle spasms 60 tablet 1     traMADol (ULTRAM) 50 MG tablet Take 1 tablet (50 mg) by mouth every 6 hours as needed for severe pain Max three tabs per day. 20 tablet 0     amphetamine-dextroamphetamine (ADDERALL XR) 30 MG per 24 hr capsule Take 1 capsule by mouth every morning  0     amphetamine-dextroamphetamine (ADDERALL) 20 MG tablet Take 20 mg by mouth daily Reported on 3/6/2017  0     clonazePAM (KLONOPIN) 0.5 MG tablet Take 0.5-1 mg by mouth nightly as needed   0     DULoxetine (CYMBALTA) 20 MG EC capsule Take 1 capsule by mouth daily  0     gabapentin (NEURONTIN) 100 MG capsule Take 1 capsule (100 mg) by mouth daily 90 capsule 3     nicotine (NICODERM CQ) 14 MG/24HR 24 hr patch Place 1 patch onto the skin every 24 hours Use this patch first 30 patch 0     nicotine (NICODERM CQ) 7 MG/24HR 24 hr patch Place 1 patch onto the skin every 24 hours 30 patch 0     tiZANidine (ZANAFLEX) 4 MG tablet Take 1-2 tablets (4-8 mg) by mouth nightly as needed 60 tablet 3       Review of Systems: A 10-point review of systems was negative, with the exception of chronic pain issues.      Social History: Reviewed; unchanged from previous consultation.      Family history: Reviewed; unchanged from previous consultation.     PHYSICAL EXAM    Vitals:    04/19/18 1530   BP: 117/87   Pulse: 97   SpO2: 98%       Constitutional: healthy, alert, mild distress, pain behaviores and anxious  HEENT: Head atraumatic, normocephalic. Eyes without conjunctival injection or jaundice. Neck supple. No obvious neck masses.  Skin: No rash, lesions, or petechiae of exposed skin.   Extremities: No clubbing, cyanosis, or edema to exposed extremities  Psychiatric/mental status: Alert, without lethargy or stupor. Appropriate affect. See above.   Neurologic exam:  CN:  Cranial nerves 2-12 are grossly normal.    Motor:  5/5 UE and 4/5 LE strength     Musculoskeletal exam:  Cervical spine:                       Flex:  " 20 degrees                       Ext: 20 degrees                       Rotation to right: 20 degrees                       Rotation to left: 20 degrees                       Rotation/ext to right: painful                        Rotation/ext to left: painful                        Tenderness in the cervical spine at midline. No                       Tenderness in the cervical paraspinal muscles. No  Thoracic spine:                        Kyphosis. No                       Tenderness in the thoracic spine at midline. Yes                       Tenderness in the thoracic paraspinal muscles. Yes  Lumbar/Sacral spine:                       Forward Flexion:  90 degrees                       Ext: 20 degrees \"tight\"                       Rotation/ext to right: painful                        Rotation/ext to left: painful                        Lordosis. No                       Tenderness in the lumbar spine at midline. Yes                       Tenderness in the lumbar paraspinal muscles.Yes      Psychiatric:  mentation appears normal., affect and mood normal      DIRE Score for ongoing opioid management is calculated as follows:    Diagnosis = 2 pts (slowly progressive; moderate pain/objective findings)    Intractability = 2 pts (most treatments tried; patient not fully engaged/barriers)    Risk        Psych = 2 pts (personality dysfunction/mental illness that moderately interferes with care)         Chem Hlth = 2 pts (use of medications to cope with stress; chemical dependency in remission)       Reliability = 3 pts (highly reliable with meds, appointments, treatments)       Social = 3 pts (supportive family/close relationships; involved in work/school; no isolation)       (Psych + Chem hlth + Reliability + Social) = 14    Efficacy = 2 pts (moderate benefit/function; low med dose; too early/not tried meds)    DIRE Score = 16        7-13: likely NOT suitable candidate for long-term opioid analgesia       14-21: may be a " suitable candidate for long-term opioid analgesia          Previous Diagnostic Tests:   Imaging Studies:   MR LUMBAR SPINE W/O CONTRAST 3/8/2017 4:08 PM  Provided History: Radiculopathy, lumbar region  Comparison: None available  Technique: Sagittal T1-weighted, sagittal T2-weighted, sagittal STIR,  sagittal diffusion-weighted, axial T2-weighted, and axial gradient  echo images of the lumbar spine were obtained without the  administration of intravenous contrast.  Findings: Regarding numbering convention, there are 5 lumbar-type  vertebrae assumed for the purposes of this dictation.  The tip of the  conus medullaris is at  L1.  Regarding alignment, the lumbar vertebral  column appears normally aligned.  There is no significant disc height  narrowing at any level.  Regarding bone marrow signal intensity, no  abnormality is visualized on STIR images.  On a level by level basis:  T12-L1: No spinal canal or neuroforaminal stenosis.  L1-2: No spinal canal or neuroforaminal stenosis.  L2-3: Mild posterior disc bulge. No spinal canal or neuroforaminal  stenosis.  L3-4: Mild posterior disc bulge. No spinal canal or neuroforaminal  stenosis. Mild bilateral ligamentum flavum hypertrophy.  L4-5: Mild posterior disc bulge. No spinal canal or neuroforaminal  stenosis. Mild bilateral ligamentum flavum hypertrophy.  L5-S1: Mild posterior disc bulge. No spinal canal or neuroforaminal  stenosis.  Paraspinous tissues are within normal limits.  Impression: No lumbar spine or neuroforaminal stenosis.          ASSESSMENT:   1.  Lumbar DDD, mild  2.  Lumbar muscle spasm  3.  Hx: anxiety, chemical dependence in remission.    Shoaib presents for follow up re: acute on chronic pain flare of low back and hip pain. Given the significant change in his pain, as well as more hip related pain, I'd liek to update his imaging. Follow up with me after MRIs.     Will allow small amount of Tramadol, not for daily use. Recommend Cyclobenzaprine at HS for  pain and sleep.     Plan:    Diagnosis reviewed, treatment option addressed, and risk/benifits discussed.  Self-care instructions given.  I am recommending a multidisciplinary treatment plan to help this patient better manage pain.      1. Schedule pain psychology assessment/visits  2. Schedule physical therapy assessment/visits  3. Schedule follow-up with CARLOS A Higuera NP-C in 2 days after MRI  4. Imaging: MRIs of low back and right hip   5. Procedures recommended: We will discuss after MRIs    Interventional procedures  are done at our [place], [place]  and [place] locations.   6. Medication recommendations:   1. Cyclobenzaprine 5 mg 1-2 tabs up to three times daily  2. Tramadol 50 mg 1 tab up to three times daily. Max three tabs per day.     Total time spent face to face was 30 minutes and more than 50% of face to face time was spent in counseling and/or coordination of care regarding the diagnosis and recommendations above.      CARLOS A Bass, NP-C   [facility]

## 2018-04-19 NOTE — TELEPHONE ENCOUNTER
Patient is scheduled today for a follow up with Tamiko Stevens.     KATERINA CardenasN, RN  Care Coordinator  [facility]

## 2018-04-20 NOTE — PROGRESS NOTES
[facility]   [facility] [place]  Behavioral Medicine Visit    Patient Name: Hoang Kiser    YOB: 1985   Medical Record Number: [id number]  Date: 4/19/2018                SUBJECTIVE: Met with the patient on this date for an elective return appointment.  Today the patient reports that he has had an extreme increase in his pain particularly in his right hip and groin.  He also reports increased bilateral low back pain.  Patient describes the pain as a sensation of pressure and as though spontaneously he is having sharp spikes in the region.  Patient reports he has been using multiple methods of self-care including ice, heat, meditation, medication, gentle exercise.    We discussed the current situation in the context of it being a flare.  We discussed the possibility that extended activity described by the patient last weekend may have been a contributing factor to his pain flare.    Today we had previously made a plan to attempt to do a session of self hypnosis but due to time constraints were unable to complete that.  We did discuss metaphors that might be useful or unhelpful for relaxation.  The patient reports that the use of boxes as a metaphor would be unhelpful as it reminds him of some negative experiences with a former employer.  He reports however that any consideration of water might be of benefit.  We have agreed to return to this question at our next visit.  The patient is to follow-up with his pain provider regarding concerns noted above.        OBJECTIVE: Today the patient reports the following: His level of pain has been greatly worse, his mood has been mildly worse, his activity level has moderately decreased, his stress level has been mildly worse and his sleep has been moderately worse.  Today the patient reports that he is participating in self-care activities at least 2-3 times per day.  Today  the patient reports that since receiving services with [facility] he has experienced his overall progress to be slightly improved.     Length of Visit: 60 minutes      Assessment: Current Emotional / Mental Status    Appearance:   Appropriate   Eye Contact:   Good   Psychomotor Behavior: Normal   Attitude:   Cooperative   Orientation:   All  Speech  Rate / Production:             Normal   Volume:              Normal   Mood:    Anxious  Irritable   Affect:    Appropriate   Thought Content:  Clear   Thought Form:  Coherent  Logical   Insight:    Fair     ASSESSMENT:   Per pain provider: Hip pain, right, groin pain, right, degenerative disc disease, lumbar, chronic low back pain with sciatica    Progress toward goals: fair.    Pain Status: worsened    Emotional Status: worsened              Medication / chemical use concerns: None    PLAN:   Next Appointment: Hoang Kiser will schedule a follow-up appointment in 2 weeks.  Assignment: Follow-up with pain care provider  Objectives / interventions for next session: Return to treatment plan with focus on self hypnosis teaching    Fili Wesley MA, LP, [facility]  Behavioral Pain Specialist  [facility]

## 2018-04-22 ENCOUNTER — MYC MEDICAL ADVICE (OUTPATIENT)
Dept: PALLIATIVE MEDICINE | Facility: CLINIC | Age: 33
End: 2018-04-22

## 2018-04-23 ENCOUNTER — MYC MEDICAL ADVICE (OUTPATIENT)
Dept: PALLIATIVE MEDICINE | Facility: CLINIC | Age: 33
End: 2018-04-23

## 2018-04-24 ENCOUNTER — OFFICE VISIT (OUTPATIENT)
Dept: PALLIATIVE MEDICINE | Facility: CLINIC | Age: 33
End: 2018-04-24
Payer: COMMERCIAL

## 2018-04-24 DIAGNOSIS — M54.50 CHRONIC LOW BACK PAIN: ICD-10-CM

## 2018-04-24 DIAGNOSIS — G89.29 CHRONIC PAIN: Primary | ICD-10-CM

## 2018-04-24 DIAGNOSIS — G89.29 CHRONIC LOW BACK PAIN: ICD-10-CM

## 2018-04-24 PROCEDURE — 97112 NEUROMUSCULAR REEDUCATION: CPT | Mod: GP | Performed by: PHYSICAL THERAPIST

## 2018-04-24 PROCEDURE — 97110 THERAPEUTIC EXERCISES: CPT | Mod: GP | Performed by: PHYSICAL THERAPIST

## 2018-04-24 PROCEDURE — 97535 SELF CARE MNGMENT TRAINING: CPT | Mod: GP | Performed by: PHYSICAL THERAPIST

## 2018-04-24 NOTE — MR AVS SNAPSHOT
After Visit Summary   4/24/2018    Hoang Kiser    MRN: [id number]           Patient Information     Date Of Birth          1985        Visit Information        Provider Department      4/24/2018 11:00 AM Faustino Ryan, PT [facility]        Today's Diagnoses     Chronic pain    -  1    Chronic low back pain           Follow-ups after your visit        Your next 10 appointments already scheduled     May 02, 2018  7:00 PM CDT   (Arrive by 6:45 PM)   MR HIP RIGHT W/O CONTRAST with [id number]   [facility] ([facility] [place])    [address] [address] [phone]           Take your medicines as usual, unless your doctor tells you not to. Bring a list of your current medicines to your exam (including vitamins, minerals and over-the-counter drugs). Also bring the results of similar scans you may have had.  Please remove any body piercings and hair extensions before you arrive.  Follow your doctor s orders. If you do not, we may have to postpone your exam.  You may or may not receive IV contrast for this exam pending the discretion of the Radiologist.  You do not need to do anything special to prepare.  The MRI machine uses a strong magnet. Please wear clothes without metal (snaps, zippers). A sweatsuit works well, or we may give you a hospital gown.   **IMPORTANT** THE INSTRUCTIONS BELOW ARE ONLY FOR THOSE PATIENTS WHO HAVE BEEN PRESCRIBED SEDATION OR GENERAL ANESTHESIA DURING THEIR MRI PROCEDURE:  IF YOUR DOCTOR PRESCRIBED ORAL SEDATION (take medicine to help you relax during your exam):   You must get the medicine from your doctor (oral medication) before you arrive. Bring the medicine to the exam. Do not take it at home. You ll be told when to take it upon arriving for your exam.   Arrive one hour early. Bring someone who can take you home after the test. Your medicine will make you sleepy. After the  exam, you may not drive, take a bus or take a taxi by yourself.  IF YOUR DOCTOR PRESCRIBED IV SEDATION:   Arrive one hour early. Bring someone who can take you home after the test. Your medicine will make you sleepy. After the exam, you may not drive, take a bus or take a taxi by yourself.   No eating 6 hours before your exam. You may have clear liquids up until 4 hours before your exam. (Clear liquids include water, clear tea, black coffee and fruit juice without pulp.)  IF YOUR DOCTOR PRESCRIBED ANESTHESIA (be asleep for your exam):   Arrive 1 1/2 hours early. Bring someone who can take you home after the test. You may not drive, take a bus or take a taxi by yourself.   No eating 8 hours before your exam. You may have clear liquids up until 4 hours before your exam. (Clear liquids include water, clear tea, black coffee and fruit juice without pulp.)   You will spend four to five hours in the recovery room.  Please call the Imaging Department at your exam site with any questions.            May 02, 2018  7:45 PM CDT   (Arrive by 7:30 PM)   MR LUMBAR SPINE W/O CONTRAST with [id number]   [facility] ([facility])    [address] [address] [phone]           Take your medicines as usual, unless your doctor tells you not to. Bring a list of your current medicines to your exam (including vitamins, minerals and over-the-counter drugs). Also bring the results of similar scans you may have had.  Please remove any body piercings and hair extensions before you arrive.  Follow your doctor s orders. If you do not, we may have to postpone your exam.  You may or may not receive IV contrast for this exam pending the discretion of the Radiologist.  You do not need to do anything special to prepare.  The MRI machine uses a strong magnet. Please wear clothes without metal (snaps, zippers). A sweatsuit works well, or we may give you a hospital gown.    **IMPORTANT** THE INSTRUCTIONS BELOW ARE ONLY FOR THOSE PATIENTS WHO HAVE BEEN PRESCRIBED SEDATION OR GENERAL ANESTHESIA DURING THEIR MRI PROCEDURE:  IF YOUR DOCTOR PRESCRIBED ORAL SEDATION (take medicine to help you relax during your exam):   You must get the medicine from your doctor (oral medication) before you arrive. Bring the medicine to the exam. Do not take it at home. You ll be told when to take it upon arriving for your exam.   Arrive one hour early. Bring someone who can take you home after the test. Your medicine will make you sleepy. After the exam, you may not drive, take a bus or take a taxi by yourself.  IF YOUR DOCTOR PRESCRIBED IV SEDATION:   Arrive one hour early. Bring someone who can take you home after the test. Your medicine will make you sleepy. After the exam, you may not drive, take a bus or take a taxi by yourself.   No eating 6 hours before your exam. You may have clear liquids up until 4 hours before your exam. (Clear liquids include water, clear tea, black coffee and fruit juice without pulp.)  IF YOUR DOCTOR PRESCRIBED ANESTHESIA (be asleep for your exam):   Arrive 1 1/2 hours early. Bring someone who can take you home after the test. You may not drive, take a bus or take a taxi by yourself.   No eating 8 hours before your exam. You may have clear liquids up until 4 hours before your exam. (Clear liquids include water, clear tea, black coffee and fruit juice without pulp.)   You will spend four to five hours in the recovery room.  Please call the Imaging Department at your exam site with any questions.            May 04, 2018  1:30 PM CDT   Return Visit with CARLOS A Guy CNP   [facility] ([facility])    [address]  [address]  [facility] [address]   [phone]            May 08, 2018 11:00 AM CDT   Return Visit with Faustino Ryan PT   [facility] ([facility])    [address]  [address]  [place]  [place] [address] [phone]            May 10, 2018 10:00 AM CDT   Return Visit with Fili Wesley LP   [facility] ([facility])    [address]  [address]  [facility] [id number] [phone]              Who to contact     If you have questions or need follow up information about today's clinic visit or your schedule please contact [facility] directly at [phone].  Normal or non-critical lab and imaging results will be communicated to you by [employer], letter or phone within 4 business days after the clinic has received the results. If you do not hear from us within 7 days, please contact the clinic through [employer] or phone. If you have a critical or abnormal lab result, we will notify you by phone as soon as possible.  Submit refill requests through [employer] or call your pharmacy and they will forward the refill request to us. Please allow 3 business days for your refill to be completed.          Additional Information About Your Visit        Online [employer] Information     [employer] gives you secure access to your electronic health record. If you see a primary care provider, you can also send messages to your care team and make appointments. If you have questions, please call your primary care clinic.  If you do not have a primary care provider, please call [phone] and they will assist you.        Care EveryWhere ID     This is your Care EveryWhere ID. This could be used by other organizations to access your [facility] medical records  [id number]         Blood Pressure from Last 3 Encounters:   04/19/18 117/87   04/17/18 132/77   03/13/18 120/84    Weight from Last 3 Encounters:   03/01/18 76.2 kg (168 lb)   02/16/18 75.3 kg (166 lb)   02/08/18 76.2 kg (168 lb)              We Performed the Following     NEUROMUSCULAR RE-EDUCATION     SELF CARE MNGMENT TRAINING     Therapeutic Procedure per 15 min        Primary Care Provider Office Phone # Fax #     Phill Floyd [phone]       [address] [facility] [address]        Equal Access to Services     BERLIN MEYER : Hadsana aad ku hadvianney Armenta, nadiada nickashiaha, alejandra kaclarada shruthi, arnav perry benjamíngunner benoit laDimitriosmisty billy. So [facility] [phone].    ATENCIÓN: Si habla español, tiene a cutler disposición servicios gratuitos de asistencia lingüística. Llame al [phone].    We comply with applicable federal civil rights laws and [place] laws. We do not discriminate on the basis of race, color, national origin, age, disability, sex, sexual orientation, or gender identity.            Thank you!     Thank you for choosing [facility]  for your care. Our goal is always to provide you with excellent care. Hearing back from our patients is one way we can continue to improve our services. Please take a few minutes to complete the written survey that you may receive in the mail after your visit with us. Thank you!             Your Updated Medication List - Protect others around you: Learn how to safely use, store and throw away your medicines at www.disposemymeds.org.          This list is accurate as of 4/24/18  4:31 PM.  Always use your most recent med list.                   Brand Name Dispense Instructions for use Diagnosis    * amphetamine-dextroamphetamine 20 MG per tablet    ADDERALL     Take 20 mg by mouth daily Reported on 3/6/2017        * amphetamine-dextroamphetamine 30 MG per 24 hr capsule    ADDERALL XR     Take 1 capsule by mouth every morning        clonazePAM 0.5 MG tablet    klonoPIN     Take 0.5-1 mg by mouth nightly as needed        cyclobenzaprine 5 MG tablet    FLEXERIL    60 tablet    Take 1-2 tablets (5-10 mg) by mouth 3 times daily as needed for muscle spasms    Back muscle spasm       DULoxetine 20 MG EC capsule    CYMBALTA     Take 1 capsule by mouth daily        gabapentin 100 MG capsule    NEURONTIN    90 capsule    Take 1 capsule (100 mg)  by mouth daily    Lumbar radicular pain, Lumbar paraspinal muscle spasm       * nicotine 14 MG/24HR 24 hr patch    NICODERM CQ    30 patch    Place 1 patch onto the skin every 24 hours Use this patch first    Tobacco abuse       * nicotine 7 MG/24HR 24 hr patch    NICODERM CQ    30 patch    Place 1 patch onto the skin every 24 hours    Tobacco abuse       tiZANidine 4 MG tablet    ZANAFLEX    60 tablet    Take 1-2 tablets (4-8 mg) by mouth nightly as needed    Lumbar radicular pain, Lumbar paraspinal muscle spasm       traMADol 50 MG tablet    ULTRAM    20 tablet    Take 1 tablet (50 mg) by mouth every 6 hours as needed for severe pain Max three tabs per day.    Lumbar radiculopathy, Groin pain, right, Hip pain, right, DDD (degenerative disc disease), lumbar       * Notice:  This list has 4 medication(s) that are the same as other medications prescribed for you. Read the directions carefully, and ask your doctor or other care provider to review them with you.

## 2018-04-24 NOTE — PROGRESS NOTES
"Patient Name: Hoang Kiser      YOB: 1985     Medical Record Number: [id number]  Diagnosis:    Chronic pain  Chronic low back pain             Visit Info Length of Visit: 50  Arrived: On Time   Exercise/Activity Education Instruction:  Postural Training/Anatomy  Pacing/Energy Conservation  Home Program  Self Care  Activity:  Therapeutic Exercise 15':  Aerobic Conditioning (*see \"Strength/Functional Actitivities Record for details of exercises performed)  Declined Tmill today for fear of flaring pain  Stretching:  Upper Ext  bilateral, Lower Ext  bilateral  Performed stretch routine  Postural Training:  static focus today  Worked on engaging abdominals without increasing pain or feeling like he is guarding excessively  Self manual cues for diaphragm breathing technique and had pt count his breaths per minute and able to achieve 13 breaths per minute and felt more calm, madi with power muscles      Neuro re-ed 15':  Verbal body scan with pt in supine and focus on breathing, recognizing tension, tightness, etc.  Pt liked this and gave Relaxation CD for home use  Verbal re-ed for Sstanding in front of mirror, cues for thumbs fwd, shldr joint neutral  Verbal cues with 'finding' neutral spine in sitting  Diaphragm breathing cues throughout session    Self Care Management 12':  Emphasis on self care efforts, understanding of activity tolerance boundaries, limitations.    Ed on check ins on more regular basis, at least one time per hour  Discussed 'centering' concept and how this can be a 'reset' button to allow him to perform the 'breathe, stabilize, move' concept more efficiently   Notes:  Pt reports feels things have flared up recently and expresses frustration with this flare.  He does remain actively engaged in his chronic pain PT HEP, self care efforts as able.      Demonstrates/Verbalizes Technique:  4, 5 (1= poor technique-difficulty performing exercises,significant cues required; 5= good " technique-performs exercises without cues)  Body Awareness:  4 (1=low awareness; 5=high awareness)  Posture/Stability:   3, 4 (1= poor posture, stability; 5= good posture, stability)   Motivational Level:   Ask questions, Eager to learn and Cooperative Participation:  Full   Patient Satisfaction:  satisfied Response to Teaching:   cooperative, needs reinforcement and asked questions  Factors that affect learning: None   Plan of Care  Anticipate continuing PT every 2-4 weeks to support reactivation and integration of self regulation pain management skills.  Pt remains actively engaged in his PT HEP and self care efforts.   Therapist: Faustino Ryan PT                 Date:  4/24/2018

## 2018-04-27 ENCOUNTER — MYC MEDICAL ADVICE (OUTPATIENT)
Dept: PALLIATIVE MEDICINE | Facility: CLINIC | Age: 33
End: 2018-04-27

## 2018-04-30 ENCOUNTER — MYC MEDICAL ADVICE (OUTPATIENT)
Dept: PALLIATIVE MEDICINE | Facility: CLINIC | Age: 33
End: 2018-04-30

## 2018-04-30 NOTE — TELEPHONE ENCOUNTER
VM left April 28th, 2018 at: 3:18 pm    Pt calling again with below information hoping to get a call back ASAP today.     [phone] (home)         Ann ORANTES    [facility]

## 2018-04-30 NOTE — TELEPHONE ENCOUNTER
Rosanne message from patient on Friday 4/27 at 2308:      Tamiko,     I'm not sure how relevant this information is but it's disconcerting to me.     [facility] my right hip flared up again, my hip flexor got tight but didn't completely lock. I had the foreign sensation of sharp tendrils emanating from my S1 joint slithering into my right glute. It became 'cross my eyes' painful and eventually it became numb. Then my right leg went numb. I came back home and while lying on my bed   the sensation of blood rushing back into a limb that was asleep   I had this feeling in my right hip joint. Almost like opening an old wound. It feels like the spurs are on the ball of my femur, not the socket.     Lumbar and Groin spasms at the same time. Today: 20mg of Cyclobenzeprine and 100mg of Tramadol.     I can't sleep right now. If anything, this if for the record. I've given myself plenty of rest and limited my time being vertical. Tomorrow is another day. I will stretch, practice autogenics, hypnosis, mindfulness, and being kind to me, because it's tough when I know mind over matter has so much power but I'm not there yet  for reducing the pain. I'm trying to be friends with pain. There's clearly communication problems.     Wish you a pleasant weekend,     Shoaib   ---------------------  Pt called on Saturday and LM asking for a call back. Will route to Tamiko Stevens CNP for review. Pt changed his appointment from 5/4 to today at 2 pm.     DEANNA Mckeon, RN-BC  Patient Care Supervisor/Care Coordinator  [facility]

## 2018-04-30 NOTE — TELEPHONE ENCOUNTER
Rosanne message from patient on 4.30 at 1003:      Tamiko,     I have a [facility] Assessment on Thursday at 1pm in [facility]. If I can come in in the morning that would work for me.     I've made a detailed pain log which I hope will be helpful. I'll call and reschedule.     Sincerely,     Shoaib   ---------  Pt rescheduled appt from today to 5/3 so MRIs will be completed before the appointment.     Will route to Tamiko Stevens CNP as FYGUANACO.     Felicita Woody, KATERINAN, RN-BC  Patient Care Supervisor/Care Coordinator  [facility]

## 2018-05-02 ENCOUNTER — RADIANT APPOINTMENT (OUTPATIENT)
Dept: MRI IMAGING | Facility: CLINIC | Age: 33
End: 2018-05-02
Attending: NURSE PRACTITIONER

## 2018-05-02 ENCOUNTER — TRANSFERRED RECORDS (OUTPATIENT)
Dept: HEALTH INFORMATION MANAGEMENT | Facility: CLINIC | Age: 33
End: 2018-05-02

## 2018-05-02 DIAGNOSIS — M51.369 DDD (DEGENERATIVE DISC DISEASE), LUMBAR: ICD-10-CM

## 2018-05-02 DIAGNOSIS — R10.31 GROIN PAIN, RIGHT: ICD-10-CM

## 2018-05-02 DIAGNOSIS — M25.551 HIP PAIN, RIGHT: ICD-10-CM

## 2018-05-02 DIAGNOSIS — M54.16 LUMBAR RADICULOPATHY: ICD-10-CM

## 2018-05-03 ENCOUNTER — MYC MEDICAL ADVICE (OUTPATIENT)
Dept: PALLIATIVE MEDICINE | Facility: CLINIC | Age: 33
End: 2018-05-03

## 2018-05-03 ENCOUNTER — OFFICE VISIT (OUTPATIENT)
Dept: PALLIATIVE MEDICINE | Facility: CLINIC | Age: 33
End: 2018-05-03
Payer: COMMERCIAL

## 2018-05-03 VITALS
RESPIRATION RATE: 16 BRPM | SYSTOLIC BLOOD PRESSURE: 110 MMHG | WEIGHT: 174 LBS | DIASTOLIC BLOOD PRESSURE: 60 MMHG | BODY MASS INDEX: 26.68 KG/M2 | HEART RATE: 98 BPM | OXYGEN SATURATION: 100 %

## 2018-05-03 DIAGNOSIS — R10.31 GROIN PAIN, RIGHT: ICD-10-CM

## 2018-05-03 DIAGNOSIS — M62.830 BACK MUSCLE SPASM: ICD-10-CM

## 2018-05-03 DIAGNOSIS — M54.16 LUMBAR RADICULOPATHY: ICD-10-CM

## 2018-05-03 DIAGNOSIS — M51.369 DDD (DEGENERATIVE DISC DISEASE), LUMBAR: ICD-10-CM

## 2018-05-03 DIAGNOSIS — S73.191A TEAR OF RIGHT ACETABULAR LABRUM, INITIAL ENCOUNTER: Primary | ICD-10-CM

## 2018-05-03 DIAGNOSIS — M25.551 HIP PAIN, RIGHT: ICD-10-CM

## 2018-05-03 PROCEDURE — 99214 OFFICE O/P EST MOD 30 MIN: CPT | Performed by: NURSE PRACTITIONER

## 2018-05-03 RX ORDER — TRAMADOL HYDROCHLORIDE 50 MG/1
50 TABLET ORAL EVERY 6 HOURS PRN
Qty: 50 TABLET | Refills: 0 | Status: SHIPPED | OUTPATIENT
Start: 2018-05-03 | End: 2018-05-22

## 2018-05-03 RX ORDER — CYCLOBENZAPRINE HCL 5 MG
5-10 TABLET ORAL 3 TIMES DAILY PRN
Qty: 90 TABLET | Refills: 1 | Status: SHIPPED | OUTPATIENT
Start: 2018-05-03 | End: 2018-06-12

## 2018-05-03 ASSESSMENT — PAIN SCALES - GENERAL: PAINLEVEL: SEVERE PAIN (6)

## 2018-05-03 NOTE — PROGRESS NOTES
"[facility]    CHIEF COMPLAINT: \"Limited activity, unmanageable pain\"     INTERVAL HISTORY:  Last seen on 4/19/18.        Recommendations/plan at the last visit included:  1. Schedule pain psychology assessment/visits  2. Schedule physical therapy assessment/visits  3. Schedule follow-up with CARLOS A Higuera NP-C in 2 days after MRI  4. Imaging: MRIs of low back and right hip   5. Procedures recommended: We will discuss after MRIs    Interventional procedures  are done at our [place], [place]  and [place] locations.   6. Medication recommendations:                  1. Cyclobenzaprine 5 mg 1-2 tabs up to three times daily  2. Tramadol 50 mg 1 tab up to three times daily. Max three tabs per day.     Since his last visit, Hoang ANIL Kiser reports:  - Continues to have debilitating pain in right hip. He brings in a drawing representin his pain as well as a detailed log of his pain levels, activity, medications, self care, sleep hours and pain scores. He noted what he called \"hyperviglance\" related to creating this log and monitoring his pain issues.     Pain Information:   Pain quality: Exhausting    Pain timing: Constant     Pain rating: intensity ranges from 3/10 to 8/10, and averages 5/10 on a 0-10 scale.   Aggravating factors include: \"Sitting or standing long periods of time\"    Relieving factors include: \"Laying down, hot pad, stretching, breathing, medication\"      Annual Controlled Substance Agreement/UDS due date: April 2019      Current Pain Relevant Medications:   Tramadol 50 mg, max three tabs per day.    Total opiate dose: 15 MME daily  Clonazepam .5 mg 1-2 tab at HS PRN  Duloxetine 20 mg daily  Gabapentin 100 mg daily  Naproxen 500 mg BID: on hold re: elevated LFTs   Tizanidine 4 mg 1-2 tabs at HS PRN  Ambien 10 mg at HS  Adderall 50 mg daily, combination of long and short acting.       Previous Pain Relevant Medications: (H--helped; HI--Helped initially; [facility]--Somewhat helpful; " NH--No help; W--worse; SE--side effects; ?--Unsure if helpful)   NOTE: This medication information taken from patient's intake form, not medical records.                         Opiates: Tramadol: H                        NSAIDS: Ibuprofen:H, naproxen:[facility], Relafen: NH                        Muscle Relaxants: Cyclobenzaprine:H,Med interaction, Tizanidine:H                        Anti-migraine mediations: Prednisone:H                        Anti-depressants: Bupropion:SE, Celexa:SE, Duloxetine:H, Trazodone:Too strong, Venlafaxine:too strong                        Sleep aids:Anbien: H                        Anxiolytics: Clonazepam:H                        Neuropathics: Tegretol:taken for seizures in childhood, Gabapentin:H                                          Topicals: Lidocaine:H                        Other medications not covered above: Tylenol:NH      Any illicit drug use: 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     SELF CARE:   How often do you practice SELF-CARE (relaxing, stretching, pacing, monitoring posture, taking mini-breaks) in a typical day:  Concern for multiple controlled substances including benzodiazepines, stimulants, opiates.    Is Narcan prescribed for opiate use >50 MME daily? N/A     THE 4 As OF OPIOID MAINTENANCE ANALGESIA    Analgesia: Is pain relief clinically significant? N/A   Activity: Is patient functional and able to perform Activities of Daily Living? N/A   Adverse effects: Is patient free from adverse side effects from opiates? N/A   Adherence to Rx protocol: Is patient adhering to Controlled Substance Agreement and taking medications ONLY as ordered? N/A           Medications:  Current Outpatient Prescriptions   Medication Sig Dispense Refill     amphetamine-dextroamphetamine (ADDERALL XR) 30 MG per 24 hr capsule Take 1 capsule by mouth every morning  0     amphetamine-dextroamphetamine (ADDERALL) 20 MG tablet Take 20 mg by mouth daily Reported on 3/6/2017  0     clonazePAM (KLONOPIN) 0.5 MG tablet Take 0.5-1 mg by mouth nightly as needed   0     cyclobenzaprine (FLEXERIL) 5 MG tablet Take 1-2 tablets (5-10 mg) by mouth 3 times daily as needed for muscle spasms 60 tablet 1     DULoxetine (CYMBALTA) 20 MG EC capsule Take 1 capsule by mouth daily  0     gabapentin (NEURONTIN) 100 MG capsule Take 1 capsule (100 mg) by mouth daily 90 capsule 3     nicotine (NICODERM CQ) 14 MG/24HR 24 hr patch Place 1 patch onto the skin every 24 hours Use this patch first 30 patch 0     nicotine (NICODERM CQ) 7 MG/24HR 24 hr patch Place 1 patch onto the skin every 24 hours 30 patch 0     tiZANidine (ZANAFLEX) 4 MG tablet Take 1-2 tablets (4-8 mg) by mouth nightly as needed 60 tablet 3     traMADol (ULTRAM) 50 MG tablet Take 1 tablet (50 mg) by mouth every 6 hours as needed for severe pain Max three tabs per day. 20 tablet 0       Review of Systems: A 10-point review of systems was negative, with the exception of chronic pain issues.      Social History: Reviewed; unchanged from previous consultation.      Family history: Reviewed; unchanged from previous consultation.     PHYSICAL EXAM    Vitals:    05/03/18 1017   BP: 110/60   BP Location: Left arm   Patient Position: Chair   Cuff Size: Adult Small   Pulse: 98   Resp: 16   SpO2: 100%   Weight: 78.9 kg (174 lb)       Constitutional: healthy, alert, mild distress, pain behaviores and anxious  HEENT: Head atraumatic, normocephalic. Eyes without conjunctival injection or jaundice. Neck supple. No obvious neck masses.  Skin: No rash, lesions, or petechiae of exposed skin.   Extremities: No clubbing, cyanosis, or edema to exposed  "extremities  Psychiatric/mental status: Alert, without lethargy or stupor. Appropriate affect. See above.   Neurologic exam:  CN:  Cranial nerves 2-12 are grossly normal.     Motor:  5/5 UE and 4/5 LE strength      Musculoskeletal exam:  Cervical spine:                       Flex:  20 degrees                       Ext: 20 degrees                       Rotation to right: 20 degrees                       Rotation to left: 20 degrees                       Rotation/ext to right: painful                        Rotation/ext to left: painful                        Tenderness in the cervical spine at midline. No                       Tenderness in the cervical paraspinal muscles. No  Thoracic spine:                        Kyphosis. No                       Tenderness in the thoracic spine at midline. Yes                       Tenderness in the thoracic paraspinal muscles. Yes  Lumbar/Sacral spine:                       Forward Flexion:  90 degrees                       Ext: 20 degrees \"tight\"                       Rotation/ext to right: painful                        Rotation/ext to left: painful                        Lordosis. No                       Tenderness in the lumbar spine at midline. Yes                       Tenderness in the lumbar paraspinal muscles.Yes      Psychiatric:  mentation appears normal., affect and mood normal      DIRE Score for ongoing opioid management is calculated as follows:    Diagnosis = 2 pts (slowly progressive; moderate pain/objective findings)    Intractability = 2 pts (most treatments tried; patient not fully engaged/barriers)    Risk        Psych = 2 pts (personality dysfunction/mental illness that moderately interferes with care)         Chem Hlth = 2 pts (use of medications to cope with stress; chemical dependency in remission)       Reliability = 3 pts (highly reliable with meds, appointments, treatments)       Social = 3 pts (supportive family/close relationships; involved in " work/school; no isolation)       (Psych + Chem hlth + Reliability + Social) = 14    Efficacy = 2 pts (moderate benefit/function; low med dose; too early/not tried meds)    DIRE Score = 16        7-13: likely NOT suitable candidate for long-term opioid analgesia       14-21: may be a suitable candidate for long-term opioid analgesia          Previous Diagnostic Tests:   Imaging Studies:   MR LUMBAR SPINE W/O CONTRAST 5/2/2018 7:27 PM  History: DDD (degenerative disc disease), lumbar; Lumbar  radiculopathy; Hip pain, right; Groin pain, right.  ICD-10: DDD (degenerative disc disease), lumbar; Lumbar radiculopathy;  Hip pain, right; Groin pain, right  Comparison: Lumbar spine MRI 3/8/2017  Technique: Sagittal STIR, T1-weighted turbo spin-echo, 3-D volumetric  T2-weighted and axial reconstructed T2-weighted images of the lumbar  spine were obtained without intravenous contrast.   Findings: 5 lumbar-type vertebra. The tip of the conus medullaris is  at L1.  The lumbar vertebral column is normally aligned.   Straightening of lumbar lordosis, unchanged. The intervertebral disc  heights are maintained. Normal marrow signal. At L5-S1, there is a  disc bulge and facet hypertrophy with mild left neural foraminal  narrowing, unchanged. No spinal canal stenosis.  Impression:  1. Lumbar spondylosis with mild left neural foraminal narrowing at  L5-S1.  2. No spinal canal stenosis.       MR right hip without contrast 5/2/2018 6:47 PM  Techniques: Multiplanar multisequence imaging of the right hip was  obtained without  administration of intra-articular or intravenous  contrast using routing protocol.  History: Hip pain.  Comparison: None available.  Findings:  Osseous structures  Osseous structures: No fracture, stress reaction, avascular necrosis,  or focal osseous lesion is seen. There is small focal area of  subchondral cystic changes in the anterior right femoral head neck  junction, most compatible with synovial herniation pit.  Findings  suggestive of reduced femoral head neck junction though alpha angle  cannot be assessed owing to no dedicated oblique axial sequence  obtained.  Articular cartilage and labrum  Assessment limited on this arthrographic study due to relative lack of  joint distension.  Articular cartilage: No high grade chondral loss.  Labrum: Labral tearing approximately from 12:00 to 3:00 with 3:00  being anterior and 6:00 being the center of transverse ligament in  this convention with associated subtle area of subchondral edema in  the superior acetabulum.  Ligament teres and transverse ligament of acetabulum: Intact.  Joint or bursal effusion  Joint effusion: A physiologic amount of joint fluid.  Bursal effusion: Minimal nonspecific edema over the greater  trochanter. No substantial iliopsoas or trochanteric bursal effusion.  Muscles and tendons  Muscles and tendons: The rectus femoris, the visualized portion  iliopsoas, proximal hamstrings, and hip abductors are intact.  The  visualized adductor muscles are unremarkable.   Nerves:  The visualized course of the sciatic nerve is unremarkable.   Other Findings:  There is fat-containing right inguinal hernia.  Impression:  1. Approximately 12-3 o'clock labral tearing with synovial herniation  pit and likely reduced femoral head neck junction offset. 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  2. Fat containing right inguinal hernia.          ASSESSMENT:   1.  Lumbar DDD, mild  2.  Lumbar muscle spasm  3.  Labral tear, right hip  4.  Hx: anxiety, chemical dependence in remission.    Shoaib returns to review imaging. Labral tear noted on right hip MRI, will send to ortho for further evaluation. Will hold on interventional injections for low back pain until determination of treatment for hip. Continue therapies.       Plan:    Diagnosis reviewed, treatment option addressed, and risk/benifits discussed.  Self-care instructions  given.  I am recommending a multidisciplinary treatment plan to help this patient better manage pain.        1. Schedule pain psychology visit  2. Schedule physical therapy visit  3. Schedule follow-up with CARLOS A Higuera, NPDoloresC in 1-2 weeks after Orthopedics consultation  4. Referrals: Orthopedics to address hip pain, labral tear   5. Medication recommendations:   1. Tramadol 50 mg tabs: #50 tabs to last 30 days.     Total time spent face to face was 30 minutes and more than 50% of face to face time was spent in counseling and/or coordination of care regarding the diagnosis and recommendations above.      CARLOS A Bass, NP-C   [facility]

## 2018-05-03 NOTE — NURSING NOTE
"Chief Complaint   Patient presents with     Pain       Initial /60 (BP Location: Left arm, Patient Position: Chair, Cuff Size: Adult Small)  Pulse 98  Resp 16  Wt 78.9 kg (174 lb)  SpO2 100%  BMI 26.68 kg/m2 Estimated body mass index is 26.68 kg/(m^2) as calculated from the following:    Height as of 3/1/18: 1.72 m (5' 7.72\").    Weight as of this encounter: 78.9 kg (174 lb).  Medication Reconciliation: complete       Campbell Kemp MA  [facility]      "

## 2018-05-03 NOTE — PATIENT INSTRUCTIONS
After Visit Instructions:     Thank you for coming to [facility] for your care. It is my goal to partner with you to help you reach your optimal state of health.     I am recommending multidisciplinary care at this time.  The focus of care will be to continue gradual rehabilitation and pain management with medication adjustments as needed.    Continue daily self-care, identifying contributing factors, and monitoring variations in pain level. Continue to integrate self-care into your life.      1. Schedule pain psychology visit  2. Schedule physical therapy visit  3. Schedule follow-up with CARLOS A Higuera NP-C in 1-2 weeks after Orthopedics consultation  4. Referrals: Orthopedics to address hip pain, labral tear   5. Medication recommendations:   1. Tramadol 50 mg tabs: #50 tabs to last 30 days.     CARLOS A Bass NP-C  [facility]    Contact information: [facility]    Please call if any side effects, questions, or concerns arise.    Nurse Triage line:  [phone]   Call this number with any questions or concerns. You may leave a detailed message anytime. Calls are typically returned Monday through Friday between 8 AM and 4:30 PM. We usually get back to you within 2 business days depending on the issue/request.    Scheduling number: [phone].  Call this number to schedule or change appointments.          Medication Refills Policy:    For non-narcotic medications, please your pharmacy directly to request a refill and the pharmacy will call the [facility] for authorization. Please allow 3-4 days for these refills.    For narcotic refills, call the nurse triage line and leave a message requesting your refill along with the name of the pharmacy that you use. Narcotic prescriptions will be mailed to your pharmacy or you may pick them up at the clinic.  Please call 7-10 days before your refill is due  The above  policy allows adequate time so that you do not run out of medication.    No Show - Late Cancellation - Late Arrival Policy  We believe regular attendance is key to your success in our program.    Any time you are unable to keep your appointment we ask that you call us at  least 24 hours in advance to let us know. This will allow us to offer the appointment time to another patient. The following is our policy for missed appointments. This also applies to appointments cancelled with less than 24 hours notice.    You will receive a letter after missing your 1st and 2nd appointments without contacting the clinic before your scheduled appointment time.     After missing 3 appointments without calling first, we will cancel all of your future appointments at [facility].    At that point, you will not be able to resume services unless approved by your care team  We understand that unforseen circumstances arise, however, out of respect for all concerned and to provide this appointment to another patient, this policy will be enforced.    Please note that most follow up appointments are 30 minutes long. If you arrive late, your provider may not be able to see you for the entire 30 minutes. Please also note that if you arrive more than 15 minutes for any appointment, it may be rescheduled.

## 2018-05-03 NOTE — MR AVS SNAPSHOT
After Visit Summary   5/3/2018    Hoang Kiser    MRN: [id number]           Patient Information     Date Of Birth          1985        Visit Information        Provider Department      5/3/2018 10:30 AM Tamiko Stevens APRN CNP [facility]        Today's Diagnoses     Tear of right acetabular labrum, initial encounter    -  1    Lumbar radiculopathy        Groin pain, right        Hip pain, right        DDD (degenerative disc disease), lumbar          Care Instructions    After Visit Instructions:     Thank you for coming to [facility] for your care. It is my goal to partner with you to help you reach your optimal state of health.     I am recommending multidisciplinary care at this time.  The focus of care will be to continue gradual rehabilitation and pain management with medication adjustments as needed.    Continue daily self-care, identifying contributing factors, and monitoring variations in pain level. Continue to integrate self-care into your life.      1. Schedule pain psychology visit  2. Schedule physical therapy visit  3. Schedule follow-up with CARLOS A Higuera NP-C in 1-2 weeks after Orthopedics consultation  4. Referrals: Orthopedics to address hip pain, labral tear   5. Medication recommendations:   1. Tramadol 50 mg tabs: #50 tabs to last 30 days.     CARLOS A Bass NP-C  [facility]    Contact information: [facility]    Please call if any side effects, questions, or concerns arise.    Nurse Triage line:  [phone]   Call this number with any questions or concerns. You may leave a detailed message anytime. Calls are typically returned Monday through Friday between 8 AM and 4:30 PM. We usually get back to you within 2 business days depending on the issue/request.    Scheduling number: [phone].  Call this number to schedule or change appointments.           Medication Refills Policy:    For non-narcotic medications, please your pharmacy directly to request a refill and the pharmacy will call the [facility] for authorization. Please allow 3-4 days for these refills.    For narcotic refills, call the nurse triage line and leave a message requesting your refill along with the name of the pharmacy that you use. Narcotic prescriptions will be mailed to your pharmacy or you may pick them up at the clinic.  Please call 7-10 days before your refill is due  The above policy allows adequate time so that you do not run out of medication.    No Show - Late Cancellation - Late Arrival Policy  We believe regular attendance is key to your success in our program.    Any time you are unable to keep your appointment we ask that you call us at  least 24 hours in advance to let us know. This will allow us to offer the appointment time to another patient. The following is our policy for missed appointments. This also applies to appointments cancelled with less than 24 hours notice.    You will receive a letter after missing your 1st and 2nd appointments without contacting the clinic before your scheduled appointment time.     After missing 3 appointments without calling first, we will cancel all of your future appointments at [facility].    At that point, you will not be able to resume services unless approved by your care team  We understand that unforseen circumstances arise, however, out of respect for all concerned and to provide this appointment to another patient, this policy will be enforced.    Please note that most follow up appointments are 30 minutes long. If you arrive late, your provider may not be able to see you for the entire 30 minutes. Please also note that if you arrive more than 15 minutes for any appointment, it may be rescheduled.                                     Follow-ups after your visit        Additional Services     ORTHO   REFERRAL       [facility] is referring you to the Orthopedic  Services at [facility].       The  Representative will assist you in the coordination of your Orthopedic and Musculoskeletal Care as prescribed by your physician.    The  Representative will call you within 1 business day to help schedule your appointment, or you may contact the  Representative at:    All areas ~ [phone]     Type of Referral : Surgical / Specialist       Timeframe requested: 3 - 5 days    Coverage of these services is subject to the terms and limitations of your health insurance plan.  Please call member services at your health plan with any benefit or coverage questions.      If X-rays, CT or MRI's have been performed, please contact the facility where they were done to arrange for , prior to your scheduled appointment.  Please bring this referral request to your appointment and present it to your specialist.                  Your next 10 appointments already scheduled     May 08, 2018 11:00 AM CDT   Return Visit with Faustino Ryan PT   [facility] ([facility])    [address]  [address]  [facility] [address] [phone]            May 10, 2018 10:00 AM CDT   Return Visit with Fili Wesley LP   [facility] ([facility])    [address]  [address]  [facility] [address] [phone]              Who to contact     If you have questions or need follow up information about today's clinic visit or your schedule please contact [facility] directly at [phone].  Normal or non-critical lab and imaging results will be communicated to you by MyChart, letter or phone within 4 business days after the clinic has received the results. If you do not hear from us within 7 days, please contact the clinic through MyChart or phone. If you have a  critical or abnormal lab result, we will notify you by phone as soon as possible.  Submit refill requests through [email or web address] or call your pharmacy and they will forward the refill request to us. Please allow 3 business days for your refill to be completed.          Additional Information About Your Visit        mymxloghart Information     [email or web address] gives you secure access to your electronic health record. If you see a primary care provider, you can also send messages to your care team and make appointments. If you have questions, please call your primary care clinic.  If you do not have a primary care provider, please call [phone] and they will assist you.        Your Vitals Were     Pulse Respirations Pulse Oximetry BMI (Body Mass Index)          98 16 100% 26.68 kg/m2         Blood Pressure from Last 3 Encounters:   05/03/18 110/60   04/19/18 117/87   04/17/18 132/77    Weight from Last 3 Encounters:   05/03/18 78.9 kg (174 lb)   03/01/18 76.2 kg (168 lb)   02/16/18 75.3 kg (166 lb)              We Performed the Following     [facility] REFERRAL          Where to get your medicines      Some of these will need a paper prescription and others can be bought over the counter.  Ask your nurse if you have questions.     Bring a paper prescription for each of these medications     traMADol 50 MG tablet         Information about OPIOIDS     PRESCRIPTION OPIOIDS: WHAT YOU NEED TO KNOW   You have a prescription for an opioid (narcotic) pain medicine. Opioids can cause addiction. If you have a history of chemical dependency of any type, you are at a higher risk of becoming addicted to opioids. Only take this medicine after all other options have been tried. Take it for as short a time and as few doses as possible.     Do not:    Drive. If you drive while taking these medicines, you could be arrested for driving under the influence (DUI).    Operate heavy machinery    Do any other dangerous activities while taking these  medicines.     Drink any alcohol while taking these medicines.      Take with any other medicines that contain acetaminophen. Read all labels carefully. Look for the word  acetaminophen  or  Tylenol.  Ask your pharmacist if you have questions or are unsure.    Store your pills in a secure place, locked if possible. We will not replace any lost or stolen medicine. If you don t finish your medicine, please throw away (dispose) as directed by your pharmacist. The [place] Pollution Control Agency has more information about safe disposal: https://www.pca.[facility].[place].us/living-green/managing-unwanted-medications    All opioids tend to cause constipation. Drink plenty of water and eat foods that have a lot of fiber, such as fruits, vegetables, prune juice, apple juice and high-fiber cereal. Take a laxative (Miralax, milk of magnesia, Colace, Senna) if you don t move your bowels at least every other day.          Primary Care Provider Office Phone # Fax #    Phill Floyd [phone] [address] [facility] [address]        Equal Access to Services     [facility] : Hadii aad ku hadasho Soomaali, waaxda luqadaha, qaybta kaalmada adeegyatanvi, arnav lorenzana . So [facility] [phone].    ATENCIÓN: Si habla español, tiene a cutler disposición servicios gratuitos de asistencia lingüística. Llame al [phone].    We comply with applicable federal civil rights laws and [place] laws. We do not discriminate on the basis of race, color, national origin, age, disability, sex, sexual orientation, or gender identity.            Thank you!     Thank you for choosing [facility]  for your care. Our goal is always to provide you with excellent care. Hearing back from our patients is one way we can continue to improve our services. Please take a few minutes to complete the written survey that you may receive in the mail after your visit with us. Thank you!              Your Updated Medication List - Protect others around you: Learn how to safely use, store and throw away your medicines at www.disposemymeds.org.          This list is accurate as of 5/3/18 10:38 AM.  Always use your most recent med list.                   Brand Name Dispense Instructions for use Diagnosis    * amphetamine-dextroamphetamine 20 MG per tablet    ADDERALL     Take 20 mg by mouth daily Reported on 3/6/2017        * amphetamine-dextroamphetamine 30 MG per 24 hr capsule    ADDERALL XR     Take 1 capsule by mouth every morning        clonazePAM 0.5 MG tablet    klonoPIN     Take 0.5-1 mg by mouth nightly as needed        cyclobenzaprine 5 MG tablet    FLEXERIL    60 tablet    Take 1-2 tablets (5-10 mg) by mouth 3 times daily as needed for muscle spasms    Back muscle spasm       DULoxetine 20 MG EC capsule    CYMBALTA     Take 1 capsule by mouth daily        gabapentin 100 MG capsule    NEURONTIN    90 capsule    Take 1 capsule (100 mg) by mouth daily    Lumbar radicular pain, Lumbar paraspinal muscle spasm       * nicotine 14 MG/24HR 24 hr patch    NICODERM CQ    30 patch    Place 1 patch onto the skin every 24 hours Use this patch first    Tobacco abuse       * nicotine 7 MG/24HR 24 hr patch    NICODERM CQ    30 patch    Place 1 patch onto the skin every 24 hours    Tobacco abuse       tiZANidine 4 MG tablet    ZANAFLEX    60 tablet    Take 1-2 tablets (4-8 mg) by mouth nightly as needed    Lumbar radicular pain, Lumbar paraspinal muscle spasm       traMADol 50 MG tablet    ULTRAM    50 tablet    Take 1 tablet (50 mg) by mouth every 6 hours as needed for severe pain Max three tabs per day.    Lumbar radiculopathy, Groin pain, right, Hip pain, right, DDD (degenerative disc disease), lumbar       * Notice:  This list has 4 medication(s) that are the same as other medications prescribed for you. Read the directions carefully, and ask your doctor or other care provider to review them with you.

## 2018-05-03 NOTE — PROGRESS NOTES
Test results were as expected with no cause for concern. Will discuss further at the next appointment.     CARLOS A Bass, NP-C   [facility]

## 2018-05-08 ENCOUNTER — OFFICE VISIT (OUTPATIENT)
Dept: ORTHOPEDICS | Facility: CLINIC | Age: 33
End: 2018-05-08
Payer: COMMERCIAL

## 2018-05-08 ENCOUNTER — OFFICE VISIT (OUTPATIENT)
Dept: PALLIATIVE MEDICINE | Facility: CLINIC | Age: 33
End: 2018-05-08
Payer: COMMERCIAL

## 2018-05-08 VITALS
OXYGEN SATURATION: 99 % | DIASTOLIC BLOOD PRESSURE: 77 MMHG | SYSTOLIC BLOOD PRESSURE: 118 MMHG | BODY MASS INDEX: 26.52 KG/M2 | RESPIRATION RATE: 16 BRPM | HEART RATE: 98 BPM | WEIGHT: 173 LBS

## 2018-05-08 DIAGNOSIS — S73.191A TEAR OF RIGHT ACETABULAR LABRUM, INITIAL ENCOUNTER: Primary | ICD-10-CM

## 2018-05-08 DIAGNOSIS — G89.29 CHRONIC PAIN: Primary | ICD-10-CM

## 2018-05-08 DIAGNOSIS — G89.29 CHRONIC LOW BACK PAIN: ICD-10-CM

## 2018-05-08 DIAGNOSIS — M54.50 CHRONIC LOW BACK PAIN: ICD-10-CM

## 2018-05-08 PROCEDURE — 97112 NEUROMUSCULAR REEDUCATION: CPT | Mod: GP | Performed by: PHYSICAL THERAPIST

## 2018-05-08 PROCEDURE — 97110 THERAPEUTIC EXERCISES: CPT | Mod: GP | Performed by: PHYSICAL THERAPIST

## 2018-05-08 PROCEDURE — 97535 SELF CARE MNGMENT TRAINING: CPT | Mod: GP | Performed by: PHYSICAL THERAPIST

## 2018-05-08 PROCEDURE — 99203 OFFICE O/P NEW LOW 30 MIN: CPT | Performed by: ORTHOPAEDIC SURGERY

## 2018-05-08 ASSESSMENT — PAIN SCALES - GENERAL: PAINLEVEL: MODERATE PAIN (5)

## 2018-05-08 NOTE — MR AVS SNAPSHOT
After Visit Summary   5/8/2018    Hoang Kiser    MRN: [id number]           Patient Information     Date Of Birth          1985        Visit Information        Provider Department      5/8/2018 11:00 AM Faustino Ryan, PT [facility]        Today's Diagnoses     Chronic pain    -  1    Chronic low back pain           Follow-ups after your visit        Your next 10 appointments already scheduled     May 08, 2018  3:00 PM CDT   New Visit with Faustino Montesinos MD   [facility]    [address]  [facility] [address] [phone]            May 10, 2018 10:00 AM CDT   Return Visit with Fili Wesley LP   [facility] ([facility])    [address]  [address]  [facility] [address] [phone]            May 22, 2018  9:30 AM CDT   Return Visit with CARLOS A Guy CNP   [facility] ([facility])    [address]  [address]  [facility] [address] [phone]              Who to contact     If you have questions or need follow up information about today's clinic visit or your schedule please contact [facility] directly at [phone].  Normal or non-critical lab and imaging results will be communicated to you by imgixhart, letter or phone within 4 business days after the clinic has received the results. If you do not hear from us within 7 days, please contact the clinic through imgixhart or phone. If you have a critical or abnormal lab result, we will notify you by phone as soon as possible.  Submit refill requests through Anexon or call your pharmacy and they will forward the refill request to us. Please allow 3 business days for your refill to be completed.          Additional Information About Your Visit        imgixharSysomos Information     Anexon gives you secure access to your electronic health  record. If you see a primary care provider, you can also send messages to your care team and make appointments. If you have questions, please call your primary care clinic.  If you do not have a primary care provider, please call [phone] and they will assist you.         Blood Pressure from Last 3 Encounters:   05/03/18 110/60   04/19/18 117/87   04/17/18 132/77    Weight from Last 3 Encounters:   05/03/18 78.9 kg (174 lb)   03/01/18 76.2 kg (168 lb)   02/16/18 75.3 kg (166 lb)              We Performed the Following     NEUROMUSCULAR RE-EDUCATION     SELF CARE MNGMENT TRAINING     Therapeutic Procedure per 15 min        Primary Care Provider Office Phone # Fax #    Phill Floyd [phone] [address] [facility] [address]        Equal Access to Services     [facility]: Hadii aad ku hadasho Soevelyn, waaxda luqadaha, qaybta kaalmada adegunneryada, arnav lorenzana . So [facility] [phone].    ATENCIÓN: Si habla español, tiene a cutler disposición servicios gratuitos de asistencia lingüística. Llame al [phone].    We comply with applicable federal civil rights laws and [place] laws. We do not discriminate on the basis of race, color, national origin, age, disability, sex, sexual orientation, or gender identity.            Thank you!     Thank you for choosing [facility]  for your care. Our goal is always to provide you with excellent care. Hearing back from our patients is one way we can continue to improve our services. Please take a few minutes to complete the written survey that you may receive in the mail after your visit with us. Thank you!             Your Updated Medication List - Protect others around you: Learn how to safely use, store and throw away your medicines at www.disposemymeds.org.          This list is accurate as of 5/8/18  2:48 PM.  Always use your most recent med list.                   Brand Name Dispense  Instructions for use Diagnosis    * amphetamine-dextroamphetamine 20 MG per tablet    ADDERALL     Take 20 mg by mouth daily Reported on 3/6/2017        * amphetamine-dextroamphetamine 30 MG per 24 hr capsule    ADDERALL XR     Take 1 capsule by mouth every morning        clonazePAM 0.5 MG tablet    klonoPIN     Take 0.5-1 mg by mouth nightly as needed        cyclobenzaprine 5 MG tablet    FLEXERIL    90 tablet    Take 1-2 tablets (5-10 mg) by mouth 3 times daily as needed for muscle spasms    Back muscle spasm       DULoxetine 20 MG EC capsule    CYMBALTA     Take 1 capsule by mouth daily        gabapentin 100 MG capsule    NEURONTIN    90 capsule    Take 1 capsule (100 mg) by mouth daily    Lumbar radicular pain, Lumbar paraspinal muscle spasm       * nicotine 14 MG/24HR 24 hr patch    NICODERM CQ    30 patch    Place 1 patch onto the skin every 24 hours Use this patch first    Tobacco abuse       * nicotine 7 MG/24HR 24 hr patch    NICODERM CQ    30 patch    Place 1 patch onto the skin every 24 hours    Tobacco abuse       tiZANidine 4 MG tablet    ZANAFLEX    60 tablet    Take 1-2 tablets (4-8 mg) by mouth nightly as needed    Lumbar radicular pain, Lumbar paraspinal muscle spasm       traMADol 50 MG tablet    ULTRAM    50 tablet    Take 1 tablet (50 mg) by mouth every 6 hours as needed for severe pain Max three tabs per day.    Lumbar radiculopathy, Groin pain, right, Hip pain, right, DDD (degenerative disc disease), lumbar       * Notice:  This list has 4 medication(s) that are the same as other medications prescribed for you. Read the directions carefully, and ask your doctor or other care provider to review them with you.

## 2018-05-08 NOTE — MR AVS SNAPSHOT
After Visit Summary   5/8/2018    Hoang Kiser    MRN: [id number]           Patient Information     Date Of Birth          1985        Visit Information        Provider Department      5/8/2018 3:00 PM lEda Nichols MD [facility]        Today's Diagnoses     Tear of right acetabular labrum, initial encounter    -  1       Follow-ups after your visit        Additional Services     ORTHO  REFERRAL       [facility] is referring you to the Orthopedic  Services at [facility].       The  Representative will assist you in the coordination of your Orthopedic and Musculoskeletal Care as prescribed by your physician.    The  Representative will call you within 1 business day to help schedule your appointment, or you may contact the  Representative at:    All areas ~ [phone]     Type of Referral : Surgical / Specialist - [facility] - Dr. Tavon Ennis - Right acetabular labrum tear - REFERRED BY ELDA NICHOLS MD      Timeframe requested: Routine    Coverage of these services is subject to the terms and limitations of your health insurance plan.  Please call member services at your health plan with any benefit or coverage questions.      If X-rays, CT or MRI's have been performed, please contact the facility where they were done to arrange for , prior to your scheduled appointment.  Please bring this referral request to your appointment and present it to your specialist.                  Your next 10 appointments already scheduled     May 10, 2018 10:00 AM CDT   Return Visit with Fili Wesley LP   [facility] ([facility])    [address] [address]   [phone]            May 22, 2018  9:30 AM CDT   Return Visit with CARLOS A Guy CNP   [facility] ([facility]    [address]  [address]  [facility] [address] [phone]              Who to contact     If you have questions or need follow up information about today's clinic visit or your schedule please contact [facility] directly at [phone].  Normal or non-critical lab and imaging results will be communicated to you by MyChart, letter or phone within 4 business days after the clinic has received the results. If you do not hear from us within 7 days, please contact the clinic through MyChart or phone. If you have a critical or abnormal lab result, we will notify you by phone as soon as possible.  Submit refill requests through GINKGOTREE or call your pharmacy and they will forward the refill request to us. Please allow 3 business days for your refill to be completed.          Additional Information About Your Visit        Actively Learnhart Information     GINKGOTREE gives you secure access to your electronic health record. If you see a primary care provider, you can also send messages to your care team and make appointments. If you have questions, please call your primary care clinic.  If you do not have a primary care provider, please call [phone] and they will assist you.        Your Vitals Were     Pulse Respirations Pulse Oximetry BMI (Body Mass Index)          98 16 99% 26.52 kg/m2         Blood Pressure from Last 3 Encounters:   05/08/18 118/77   05/03/18 110/60   04/19/18 117/87    Weight from Last 3 Encounters:   05/08/18 78.5 kg (173 lb)   05/03/18 78.9 kg (174 lb)   03/01/18 76.2 kg (168 lb)              We Performed the Following     ORTHO  REFERRAL        Primary Care Provider Office Phone # Fax #    Phill Floyd [phone] [address] [facility] [address]        Equal Access to Services     BERLIN MEYER : Mariana Armenta, ishan rinaldi, arnav smith  kaycee lorenzana ah. So [facility] [phone].    ATENCIÓN: Si habla vibha, tiene a cutler disposición servicios gratuitos de asistencia lingüística. Rodríguez al [phone].    We comply with applicable federal civil rights laws and [place] laws. We do not discriminate on the basis of race, color, national origin, age, disability, sex, sexual orientation, or gender identity.            Thank you!     Thank you for choosing [facility]  for your care. Our goal is always to provide you with excellent care. Hearing back from our patients is one way we can continue to improve our services. Please take a few minutes to complete the written survey that you may receive in the mail after your visit with us. Thank you!             Your Updated Medication List - Protect others around you: Learn how to safely use, store and throw away your medicines at www.disposemymeds.org.          This list is accurate as of 5/8/18  3:49 PM.  Always use your most recent med list.                   Brand Name Dispense Instructions for use Diagnosis    * amphetamine-dextroamphetamine 20 MG per tablet    ADDERALL     Take 20 mg by mouth daily Reported on 3/6/2017        * amphetamine-dextroamphetamine 30 MG per 24 hr capsule    ADDERALL XR     Take 1 capsule by mouth every morning        clonazePAM 0.5 MG tablet    klonoPIN     Take 0.5-1 mg by mouth nightly as needed        cyclobenzaprine 5 MG tablet    FLEXERIL    90 tablet    Take 1-2 tablets (5-10 mg) by mouth 3 times daily as needed for muscle spasms    Back muscle spasm       DULoxetine 20 MG EC capsule    CYMBALTA     Take 1 capsule by mouth daily        gabapentin 100 MG capsule    NEURONTIN    90 capsule    Take 1 capsule (100 mg) by mouth daily    Lumbar radicular pain, Lumbar paraspinal muscle spasm       * nicotine 14 MG/24HR 24 hr patch    NICODERM CQ    30 patch    Place 1 patch onto the skin every 24 hours Use this patch first    Tobacco abuse       * nicotine 7  MG/24HR 24 hr patch    NICODERM CQ    30 patch    Place 1 patch onto the skin every 24 hours    Tobacco abuse       tiZANidine 4 MG tablet    ZANAFLEX    60 tablet    Take 1-2 tablets (4-8 mg) by mouth nightly as needed    Lumbar radicular pain, Lumbar paraspinal muscle spasm       traMADol 50 MG tablet    ULTRAM    50 tablet    Take 1 tablet (50 mg) by mouth every 6 hours as needed for severe pain Max three tabs per day.    Lumbar radiculopathy, Groin pain, right, Hip pain, right, DDD (degenerative disc disease), lumbar       * Notice:  This list has 4 medication(s) that are the same as other medications prescribed for you. Read the directions carefully, and ask your doctor or other care provider to review them with you.

## 2018-05-08 NOTE — PROGRESS NOTES
"Patient Name: Hoang Kiser      YOB: 1985     Medical Record Number: [id number]  Diagnosis:    Chronic pain  Chronic low back pain             Visit Info Length of Visit: 50  Arrived: On Time   Exercise/Activity Education Instruction:  Postural Training/Anatomy  Pacing/Energy Conservation  Home Program  Self Care  Activity:  Therapeutic Exercise 20':  Aerobic Conditioning (*see \"Strength/Functional Actitivities Record for details of exercises performed)  Tmill x 12'  Stretching:  Upper Ext  bilateral, Lower Ext  bilateral  Performed partial stretch routine - emphasis on sequence  Postural Training:  dyamic, avoiding pivoting to protect hip  Worked on TA activation, general abdom activation without increasing pain or feeling like he is guarding excessively.  Ed for soft contraction ok      Neuro re-ed 12':  Seated neutral spine, feet flat on floor, finding most stable position, including prep for transitional movments  Verbal re-ed for standing in front of mirror, cues for thumbs fwd, shldr joint neutral  Standing position for 'finding' neutral spine  Continued ed on diaphragm breathing cues throughout session     Self Care Management 10':  Ongoing emphasis on self care efforts, understanding of activity tolerance boundaries, limitations. Pt getting better grasp of these limits.  Ed on check ins on more regular basis, at least one time per hour  Emphasis for 'centering' concept and how this can be a 'reset' button    Notes:  Pt reports he remains engaged in his PT HEP and self care efforts.       Demonstrates/Verbalizes Technique:  4, 5 (1= poor technique-difficulty performing exercises,significant cues required; 5= good technique-performs exercises without cues)  Body Awareness:  4 (1=low awareness; 5=high awareness)  Posture/Stability:   3, 4 (1= poor posture, stability; 5= good posture, stability)   Motivational Level:   Ask questions, Eager to learn and Cooperative Participation:  Full "   Patient Satisfaction:  satisfied Response to Teaching:   cooperative, needs reinforcement and asked questions  Factors that affect learning: None   Plan of Care  Rec to continue chronic pain PT to support reactivation and integration of self regulation pain management skills.  Pt remains actively engaged in his PT HEP and self care efforts.   Therapist: Faustino Ryan PT                 Date:  5/8/2018

## 2018-05-08 NOTE — LETTER
"    5/8/2018         RE: Hoang Kiser  [address]  [facility] [address]        Dear Colleague,    Thank you for referring your patient, Hoang Kiser, to the [facility]. Please see a copy of my visit note below.    Hoang Kiser is a 32 year old male who is seen as self referral for bilateral hip pain.    Past Medical History:   Diagnosis Date     Abnormal involuntary movement 6/2/2016     Anxiety state, unspecified 04/30/2012     Benign positional vertigo Dec 2016    Started after my groin/back injury. Sitting on Toilet.     Depressive disorder 2003    I have been on and off medication for depression since this     Dysphonia 2014    Nothing significant.     Epilepsy (H)     as a child. Says that \" he grew out of it by age 13\"     Gastroesophageal reflux disease 2004    Occassional. Spicy foods set it off.     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  History: Myoclonus.  Comparison: None.  Technique:  1. MRI of the Brain: Sagittal T1-weighted, axial T2-weighted, axial turboFLAIR, axial susceptibility, and axial diffusion-weighted with ADC map images of the brain were obtained without intravenous contrast. After intravenous administration of gadolinium, axial and sagittal T1-weighted images of th     Hoarseness 2017    Dry mouth, started after taking Tizanidine     Neuralgia, neuritis, and radiculitis, unspecified 04/30/2012     normal emg 2017 8/8/2017    Interpretation: This is a normal study. There is no electrophysiologic evidence of a lumbosacral radiculopathy affecting the right or left lower extremity on the basis of this study.    Rodney Mena MD Department of Neurology       Panic disorder without agoraphobia 04/30/2012     Problem, psychiatric 2016    PTSD     Seizures (H) [id number]    Grew out of it. Absence Seizures.     Tic " disorder 6/2/2016     Tinnitus 2015    Had it most of my life. Got worse in 2015       Past Surgical History:   Procedure Laterality Date     BACK SURGERY      injections     COLONOSCOPY  02/15/18    Has not happened yet.     COLONOSCOPY N/A 2/15/2018    Procedure: COMBINED COLONOSCOPY, SINGLE OR MULTIPLE BIOPSY/POLYPECTOMY BY BIOPSY;;  Surgeon: Liam Kincaid MD;  Location: [facility] OR     COLONOSCOPY WITH CO2 INSUFFLATION N/A 2/15/2018    Procedure: COLONOSCOPY WITH CO2 INSUFFLATION;  COLON-FAMILY HX OF COLON CANCER/ SYPURA;  Surgeon: Liam Kincaid MD;  Location: [facility] OR     HC TOOTH EXTRACTION W/FORCEP Bilateral 2003     PE TUBES  1990       Family History   Problem Relation Age of Onset     Lipids Father      hyperlipidemia     Hyperlipidemia Father      Obesity Father      Arthritis Mother      Hyperlipidemia Mother      Depression Mother      Anxiety Disorder Mother      MENTAL ILLNESS Mother      Obesity Mother      Asthma Brother      Asthma Sister      Depression Other      Hearing Loss Other      Psychotic Disorder Other      Obesity Other      CEREBROVASCULAR DISEASE Paternal Grandfather      Alzheimer Disease Paternal Grandfather      Depression Brother      MENTAL ILLNESS Brother      Bipolar     Asthma Brother      Depression Sister      Asthma Sister      Substance Abuse Sister      Alcohol     MENTAL ILLNESS Brother      Bipolar     Asthma Brother      Asthma Sister      Obesity Sister      Asthma Brother      Obesity Brother      Obesity Maternal Grandmother      Genetic Disorder Maternal Grandmother      Epilepsy       Social History     Social History     Marital status: Single     Spouse name: N/A     Number of children: 0     Years of education: 14     Occupational History     Assembly/Packaging      Social History Main Topics     Smoking status: Current Every Day Smoker     Packs/day: 0.50     Years: 10.00     Types: Cigarettes     Start date: 1/1/2002     Smokeless tobacco: Current  User      Comment: Transdermal patches have helped me the most in the past     Alcohol use No      Comment: none since 2013     Drug use: No      Comment: hx of meth, none since 2015      Sexual activity: Yes     Partners: Female     Birth control/ protection: Condom, Pill      Comment: Infrequent     Other Topics Concern     Parent/Sibling W/ Cabg, Mi Or Angioplasty Before 65f 55m? No     Caffeine Concern Yes     Coffee, Engergy Drinks, 3 cups daily     Social History Narrative    He lives in [facility] in a [facility] treatment [facility] - there are 11 people there. He arrives today through [employer] ride    He has 3 brothers and 3 sisters. He has not children. He has never been .      Mother and father are alive    One sister is an alcoholic and bulemic    depression is present in the family : mother, sister and 2 brothers are bipolar.      He denies bipolar. He has depression.    He denies recurring thoughts. He has had substance abuse issues. He has had cravings in the past.    He exercises and plays guitar and draws.      He has used meth as well as prescription pain killers.    He has used marijuana when young. Used cocaine in the past.    Has not used iv drugs    He does not drink alcohol.      50% Colombian and is [place], english and french and a bit native.    He smokes cigarettes - 1 pack per day.       Current Outpatient Prescriptions   Medication Sig Dispense Refill     amphetamine-dextroamphetamine (ADDERALL XR) 30 MG per 24 hr capsule Take 1 capsule by mouth every morning  0     amphetamine-dextroamphetamine (ADDERALL) 20 MG tablet Take 20 mg by mouth daily Reported on 3/6/2017  0     clonazePAM (KLONOPIN) 0.5 MG tablet Take 0.5-1 mg by mouth nightly as needed   0     cyclobenzaprine (FLEXERIL) 5 MG tablet Take 1-2 tablets (5-10 mg) by mouth 3 times daily as needed for muscle spasms 90 tablet 1     DULoxetine (CYMBALTA) 20 MG EC capsule Take 1 capsule by mouth daily  0     gabapentin  (NEURONTIN) 100 MG capsule Take 1 capsule (100 mg) by mouth daily 90 capsule 3     nicotine (NICODERM CQ) 14 MG/24HR 24 hr patch Place 1 patch onto the skin every 24 hours Use this patch first 30 patch 0     nicotine (NICODERM CQ) 7 MG/24HR 24 hr patch Place 1 patch onto the skin every 24 hours 30 patch 0     tiZANidine (ZANAFLEX) 4 MG tablet Take 1-2 tablets (4-8 mg) by mouth nightly as needed 60 tablet 3     traMADol (ULTRAM) 50 MG tablet Take 1 tablet (50 mg) by mouth every 6 hours as needed for severe pain Max three tabs per day. 50 tablet 0       Allergies   Allergen Reactions     Buspirone Hcl Other (See Comments)     Panic attacks     Doxycycline Diarrhea, Fatigue, GI Disturbance and Nausea     Naproxen      Possible abnormal liver function tests      Trazodone Fatigue     Keflex [Cephalexin] Diarrhea       REVIEW OF SYSTEMS:  CONSTITUTIONAL:  NEGATIVE for fever, chills, change in weight, not feeling tired  SKIN:  NEGATIVE for worrisome rashes, no skin lumps, no skin ulcers and no non-healing wounds  EYES:  NEGATIVE for vision changes or irritation.  ENT/MOUTH:  NEGATIVE.  No hearing loss, no hoarseness, no difficulty swallowing.  RESP:  NEGATIVE. No cough or shortness of breath.  CV:  NEGATIVE for chest pain, palpitations or peripheral edema  GI:  NEGATIVE for nausea, abdominal pain, heartburn, or change in bowel habits  :  Negative. No dysuria, no hematuria  MUSCULOSKELETAL:  See HPI above  NEURO:  NEGATIVE . No headaches, no dizziness,  no numbness  ENDOCRINE:  NEGATIVE for temperature intolerance, skin/hair changes  HEME/ALLERGY/IMMUNE:  NEGATIVE for bleeding problems  PSYCHIATRIC:  NEGATIVE. no anxiety, no depression.     Exam:  Vitals: /77 (BP Location: Right arm, Patient Position: Chair, Cuff Size: Adult Large)  Pulse 98  Resp 16  Wt 78.5 kg (173 lb)  SpO2 99%  BMI 26.52 kg/m2  BMI= Body mass index is 26.52 kg/(m^2).  Constitutional:  healthy, alert and no distress  Neuro: Alert and  Oriented x 3, Gait normal. Sensation grossly WNL.  Psych: Affect normal   Respiratory: Breathing not labored.  Cardiovascular: normal peripheral pulses  Lymph: no adenopathy  Skin: No rashes,worrisome lesions or skin problems      Again, thank you for allowing me to participate in the care of your patient.        Sincerely,        Faustino Montesinos MD

## 2018-05-09 NOTE — PROGRESS NOTES
Visit Date:   05/08/2018      HISTORY OF PRESENT ILLNESS:  Mr. Kiser is a 32-year-old male seen for evaluation of bilateral hip pain, worse on the right than the left.  He has had this since 12/12/2016, initially with a lifting injury.  He has had pain in the hip for quite some time.  It is worse with sitting, standing, low disposition for long periods of time,  better if he lies down, uses a hot pad, stretches or medications.  He has sharp aching constant pain rated 5/10.  He has had multiple images performed, but has not had x-ray of the pelvis.  He has had MRI scan of the right hip without arthrogram and this showed suspected labral tearing between 12:00 and 3:00 from the superior to anterior labrum.  There also appeared to be some elongation of the lateral femoral head consistent with a Cam effect impingement.  He has had also MRI scans of the lumbar spine done at least twice showing mild narrowing at the left foraminal L5-S1 area.  No other stenosis.  He has had SI joint injection on the left.  He has had epidural steroid injection at L4-5.  He has previously had MRI scan of the pelvis, x-ray of the cervical and thoracic spine, MRI scan of the cervical spine, MRI scan of the brain.  He is now seen because of his suspected labrum tear.      PHYSICAL EXAMINATION:  Shows a very good motion of the hips.  He has external rotation to about 60 degrees bilaterally.  Has internal rotation to at least 50 degrees on the right and a bit more than that on the left, closer to 60 degrees.  He does have pain with internal rotation of both hips and has positive impingement test on both hips.  He had negative straight leg raise to 90 degrees and no tenderness over the trochanters and no pain with range of motion of the knees.  He walks without limp.      IMPRESSION:  Right hip pain with suspected labrum tear.  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  I have discussed options with him and will refer to Dr. Tavon Ennis.  I told him that he may need new x-rays and possibly MR arthrogram when he sees Dr. Ennis.         ELDA NICHOLS MD             D: 2018   T: 2018   GUILLERMINA BARONE      Name:     VLADISLAV LEUNG   MRN:      [id number]        Account:      [id number]   :      1985           Visit Date:   2018      Document: [id number]

## 2018-05-09 NOTE — PROGRESS NOTES
"Hoang Kiser is a 32 year old male who is seen as self referral for bilateral hip pain.    Past Medical History:   Diagnosis Date     Abnormal involuntary movement 6/2/2016     Anxiety state, unspecified 04/30/2012     Benign positional vertigo Dec 2016    Started after my groin/back injury. Sitting on Toilet.     Depressive disorder 2003    I have been on and off medication for depression since this     Dysphonia 2014    Nothing significant.     Epilepsy (H)     as a child. Says that \" he grew out of it by age 13\"     Gastroesophageal reflux disease 2004    Occassional. Spicy foods set it off.     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  History: Myoclonus.  Comparison: None.  Technique:  1. MRI of the Brain: Sagittal T1-weighted, axial T2-weighted, axial turboFLAIR, axial susceptibility, and axial diffusion-weighted with ADC map images of the brain were obtained without intravenous contrast. After intravenous administration of gadolinium, axial and sagittal T1-weighted images of th     Hoarseness 2017    Dry mouth, started after taking Tizanidine     Neuralgia, neuritis, and radiculitis, unspecified 04/30/2012     normal emg 2017 8/8/2017    Interpretation: This is a normal study. There is no electrophysiologic evidence of a lumbosacral radiculopathy affecting the right or left lower extremity on the basis of this study.    Rodney Mena MD Department of Neurology       Panic disorder without agoraphobia 04/30/2012     Problem, psychiatric 2016    PTSD     Seizures (H) [id number]    Grew out of it. Absence Seizures.     Tic disorder 6/2/2016     Tinnitus 2015    Had it most of my life. Got worse in 2015       Past Surgical History:   Procedure Laterality Date     BACK SURGERY      injections     COLONOSCOPY  02/15/18    Has not happened yet.     COLONOSCOPY N/A 2/15/2018    " Procedure: COMBINED COLONOSCOPY, SINGLE OR MULTIPLE BIOPSY/POLYPECTOMY BY BIOPSY;;  Surgeon: Liam Kincaid MD;  Location: [facility] OR     COLONOSCOPY WITH CO2 INSUFFLATION N/A 2/15/2018    Procedure: COLONOSCOPY WITH CO2 INSUFFLATION;  COLON-FAMILY HX OF COLON CANCER/ SYPURA;  Surgeon: Liam Kincaid MD;  Location: [facility] OR     HC TOOTH EXTRACTION W/FORCEP Bilateral 2003     PE TUBES  1990       Family History   Problem Relation Age of Onset     Lipids Father      hyperlipidemia     Hyperlipidemia Father      Obesity Father      Arthritis Mother      Hyperlipidemia Mother      Depression Mother      Anxiety Disorder Mother      MENTAL ILLNESS Mother      Obesity Mother      Asthma Brother      Asthma Sister      Depression Other      Hearing Loss Other      Psychotic Disorder Other      Obesity Other      CEREBROVASCULAR DISEASE Paternal Grandfather      Alzheimer Disease Paternal Grandfather      Depression Brother      MENTAL ILLNESS Brother      Bipolar     Asthma Brother      Depression Sister      Asthma Sister      Substance Abuse Sister      Alcohol     MENTAL ILLNESS Brother      Bipolar     Asthma Brother      Asthma Sister      Obesity Sister      Asthma Brother      Obesity Brother      Obesity Maternal Grandmother      Genetic Disorder Maternal Grandmother      Epilepsy       Social History     Social History     Marital status: Single     Spouse name: N/A     Number of children: 0     Years of education: 14     Occupational History     Assembly/Packaging      Social History Main Topics     Smoking status: Current Every Day Smoker     Packs/day: 0.50     Years: 10.00     Types: Cigarettes     Start date: 1/1/2002     Smokeless tobacco: Current User      Comment: Transdermal patches have helped me the most in the past     Alcohol use No      Comment: none since 2013     Drug use: No      Comment: hx of meth, none since 2015      Sexual activity: Yes     Partners: Female     Birth control/  protection: Condom, Pill      Comment: Infrequent     Other Topics Concern     Parent/Sibling W/ Cabg, Mi Or Angioplasty Before 65f 55m? 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 He arrives today through [employer] ride    He has 3 brothers and 3 sisters. He has not children. He has never been .      Mother and father are alive    One sister is an alcoholic and bulemic    depression is present in the family : mother, sister and 2 brothers are bipolar.      He denies bipolar. He has depression.    He denies recurring thoughts. He has had substance abuse issues. He has had cravings in the past.    He exercises and plays guitar and draws.      He has used meth as well as prescription pain killers.    He has used marijuana when young. Used cocaine in the past.    Has not used iv drugs    He does not drink alcohol.      50% Andorran and is Indonesian, english and french and a bit native.    He smokes cigarettes - 1 pack per day.       Current Outpatient Prescriptions   Medication Sig Dispense Refill     amphetamine-dextroamphetamine (ADDERALL XR) 30 MG per 24 hr capsule Take 1 capsule by mouth every morning  0     amphetamine-dextroamphetamine (ADDERALL) 20 MG tablet Take 20 mg by mouth daily Reported on 3/6/2017  0     clonazePAM (KLONOPIN) 0.5 MG tablet Take 0.5-1 mg by mouth nightly as needed   0     cyclobenzaprine (FLEXERIL) 5 MG tablet Take 1-2 tablets (5-10 mg) by mouth 3 times daily as needed for muscle spasms 90 tablet 1     DULoxetine (CYMBALTA) 20 MG EC capsule Take 1 capsule by mouth daily  0     gabapentin (NEURONTIN) 100 MG capsule Take 1 capsule (100 mg) by mouth daily 90 capsule 3     nicotine (NICODERM CQ) 14 MG/24HR 24 hr patch Place 1 patch onto the skin every 24 hours Use this patch first 30 patch 0     nicotine (NICODERM CQ) 7 MG/24HR 24 hr patch  Place 1 patch onto the skin every 24 hours 30 patch 0     tiZANidine (ZANAFLEX) 4 MG tablet Take 1-2 tablets (4-8 mg) by mouth nightly as needed 60 tablet 3     traMADol (ULTRAM) 50 MG tablet Take 1 tablet (50 mg) by mouth every 6 hours as needed for severe pain Max three tabs per day. 50 tablet 0       Allergies   Allergen Reactions     Buspirone Hcl Other (See Comments)     Panic attacks     Doxycycline Diarrhea, Fatigue, GI Disturbance and Nausea     Naproxen      Possible abnormal liver function tests      Trazodone Fatigue     Keflex [Cephalexin] Diarrhea       REVIEW OF SYSTEMS:  CONSTITUTIONAL:  NEGATIVE for fever, chills, change in weight, not feeling tired  SKIN:  NEGATIVE for worrisome rashes, no skin lumps, no skin ulcers and no non-healing wounds  EYES:  NEGATIVE for vision changes or irritation.  ENT/MOUTH:  NEGATIVE.  No hearing loss, no hoarseness, no difficulty swallowing.  RESP:  NEGATIVE. No cough or shortness of breath.  CV:  NEGATIVE for chest pain, palpitations or peripheral edema  GI:  NEGATIVE for nausea, abdominal pain, heartburn, or change in bowel habits  :  Negative. No dysuria, no hematuria  MUSCULOSKELETAL:  See HPI above  NEURO:  NEGATIVE . No headaches, no dizziness,  no numbness  ENDOCRINE:  NEGATIVE for temperature intolerance, skin/hair changes  HEME/ALLERGY/IMMUNE:  NEGATIVE for bleeding problems  PSYCHIATRIC:  NEGATIVE. no anxiety, no depression.     Exam:  Vitals: /77 (BP Location: Right arm, Patient Position: Chair, Cuff Size: Adult Large)  Pulse 98  Resp 16  Wt 78.5 kg (173 lb)  SpO2 99%  BMI 26.52 kg/m2  BMI= Body mass index is 26.52 kg/(m^2).  Constitutional:  healthy, alert and no distress  Neuro: Alert and Oriented x 3, Gait normal. Sensation grossly WNL.  Psych: Affect normal   Respiratory: Breathing not labored.  Cardiovascular: normal peripheral pulses  Lymph: no adenopathy  Skin: No rashes,worrisome lesions or skin problems

## 2018-05-10 ENCOUNTER — OFFICE VISIT (OUTPATIENT)
Dept: PALLIATIVE MEDICINE | Facility: CLINIC | Age: 33
End: 2018-05-10
Payer: COMMERCIAL

## 2018-05-10 DIAGNOSIS — M79.2 NEUROPATHIC PAIN: ICD-10-CM

## 2018-05-10 DIAGNOSIS — M25.551 HIP PAIN, RIGHT: ICD-10-CM

## 2018-05-10 DIAGNOSIS — M51.369 DDD (DEGENERATIVE DISC DISEASE), LUMBAR: Primary | ICD-10-CM

## 2018-05-10 PROCEDURE — 96152 HC HEALTH AND BEHAVIOR INTERVENTION, INDIVIDUAL, EACH 15 MINUTES: CPT | Performed by: PSYCHOLOGIST

## 2018-05-10 NOTE — MR AVS SNAPSHOT
After Visit Summary   5/10/2018    Hoang Kiser    MRN: [id number]           Patient Information     Date Of Birth          1985        Visit Information        Provider Department      5/10/2018 10:00 AM Fili Wesley LP [facility]        Today's Diagnoses     DDD (degenerative disc disease), lumbar    -  1    Hip pain, right        Neuropathic pain           Follow-ups after your visit        Your next 10 appointments already scheduled     May 15, 2018  8:00 AM CDT   New Visit with Tavon Ennis MD   [facility] ([facility])    [address] [place] [address]   [phone]            May 22, 2018  8:45 AM CDT   Return Visit with Faustino Ryan PT   [facility] ([facility])    [address]  [facility] [address] [phone]            May 22, 2018  9:30 AM CDT   Return Visit with CARLOS A Guy CNP   [facility] ([facility])    [address]  [facility] [address] [phone]            May 24, 2018 11:00 AM CDT   Return Visit with Fili Wesley LP   [facility] ([facility])    [address]  [facility] [address] [phone]              Who to contact     If you have questions or need follow up information about today's clinic visit or your schedule please contact [facility] directly at [phone].  Normal or non-critical lab and imaging results will be communicated to you by MyChart, letter or phone within 4 business days after the clinic has received the results. If you do not hear from us within 7 days, please contact the clinic through MyChart or phone. If you have a critical or abnormal lab result, we will notify you by phone as soon as possible.  Submit refill requests through [employer] or call your pharmacy and they  will forward the refill request to us. Please allow 3 business days for your refill to be completed.          Additional Information About Your Visit        [employer] Information     [employer] gives you secure access to your electronic health record. If you see a primary care provider, you can also send messages to your care team and make appointments. If you have questions, please call your primary care clinic.  If you do not have a primary care provider, please call [phone] and they will assist you.         Blood Pressure from Last 3 Encounters:   05/08/18 118/77   05/03/18 110/60   04/19/18 117/87    Weight from Last 3 Encounters:   05/08/18 78.5 kg (173 lb)   05/03/18 78.9 kg (174 lb)   03/01/18 76.2 kg (168 lb)              We Performed the Following     HEALTH & BEHAVIOR ASSESS, INITIAL, SHIRLEY 15MIN PHD        Primary Care Provider Office Phone # Fax #    Phill Floyd [phone] [address] [facility] [address]        Equal Access to Services     [facility]: Hadii aad ku hadasho Soomaali, waaxda luqadaha, qaybta kaalmada adeegyada, waxay oscarin haymisty lorenzana . So [facility] [phone].    ATENCIÓN: Si habla español, tiene a cutler disposición servicios gratuitos de asistencia lingüística. [facility] [phone].    We comply with applicable federal civil rights laws and [place] laws. We do not discriminate on the basis of race, color, national origin, age, disability, sex, sexual orientation, or gender identity.            Thank you!     Thank you for choosing [facility]  for your care. Our goal is always to provide you with excellent care. Hearing back from our patients is one way we can continue to improve our services. Please take a few minutes to complete the written survey that you may receive in the mail after your visit with us. Thank you!             Your Updated Medication List - Protect others around you: Learn how to safely use, store  and throw away your medicines at www.disposemymeds.org.          This list is accurate as of 5/10/18  3:17 PM.  Always use your most recent med list.                   Brand Name Dispense Instructions for use Diagnosis    * amphetamine-dextroamphetamine 20 MG per tablet    ADDERALL     Take 20 mg by mouth daily Reported on 3/6/2017        * amphetamine-dextroamphetamine 30 MG per 24 hr capsule    ADDERALL XR     Take 1 capsule by mouth every morning        clonazePAM 0.5 MG tablet    klonoPIN     Take 0.5-1 mg by mouth nightly as needed        cyclobenzaprine 5 MG tablet    FLEXERIL    90 tablet    Take 1-2 tablets (5-10 mg) by mouth 3 times daily as needed for muscle spasms    Back muscle spasm       DULoxetine 20 MG EC capsule    CYMBALTA     Take 1 capsule by mouth daily        gabapentin 100 MG capsule    NEURONTIN    90 capsule    Take 1 capsule (100 mg) by mouth daily    Lumbar radicular pain, Lumbar paraspinal muscle spasm       * nicotine 14 MG/24HR 24 hr patch    NICODERM CQ    30 patch    Place 1 patch onto the skin every 24 hours Use this patch first    Tobacco abuse       * nicotine 7 MG/24HR 24 hr patch    NICODERM CQ    30 patch    Place 1 patch onto the skin every 24 hours    Tobacco abuse       tiZANidine 4 MG tablet    ZANAFLEX    60 tablet    Take 1-2 tablets (4-8 mg) by mouth nightly as needed    Lumbar radicular pain, Lumbar paraspinal muscle spasm       traMADol 50 MG tablet    ULTRAM    50 tablet    Take 1 tablet (50 mg) by mouth every 6 hours as needed for severe pain Max three tabs per day.    Lumbar radiculopathy, Groin pain, right, Hip pain, right, DDD (degenerative disc disease), lumbar       * Notice:  This list has 4 medication(s) that are the same as other medications prescribed for you. Read the directions carefully, and ask your doctor or other care provider to review them with you.

## 2018-05-10 NOTE — PROGRESS NOTES
[facility]   [facility] [place]  Behavioral Medicine Visit    Patient Name: Hoang Kiser    YOB: 1985   Medical Record Number: [id number]  Date: 5/10/2018                SUBJECTIVE: Met with the patient on this date for an elective return appointment.  Today the patient reports that he has recently learned that he has a more defined set of problems with his hip.  He has apparently been recommended to have hip surgery and he has anxiety about it.  The patient reports that he feels that he has the following concerns #1 he is concerned about the degree of pain he will be in in relationship to the surgery #2 the patient reports that he has mild concerns about the post surgical pain and the medications prescribed to him for pain #3 patient reports that he is discouraged about the recommendation for surgery as it he will be disruptive to his plans to get on with his life relative to employment and school #4 the patient reports he is concerned about all of the above will impact his mental health functioning.        Due to time constraints and the patient's noted concerns we did not spend time on The previous plan focusing on pain and self hypnosis.  The patient reports he is unsure if he wants to continue with that but also he is open to the idea.  I suggested we discuss it further at our next appointment.  The patient agrees to return for a follow-up appointment in 2-3 weeks time.          OBJECTIVE:   Length of Visit: 60 minutes      Assessment: Current Emotional / Mental Status    Appearance:   Appropriate   Eye Contact:   Good   Psychomotor Behavior: Normal   Attitude:   Cooperative   Orientation:   All  Speech  Rate / Production:             Slow   Volume:              Normal   Mood:    Anxious   Affect:    Flat   Thought Content:  Clear   Thought Form:  Coherent  Logical   Insight:    Fair     ASSESSMENT:    Degenerative disc disease lumbar, hip pain right, neuropathic pain    Progress toward goals: adequate.    Pain Status: unchanged    Emotional Status: worsened              Medication / chemical use concerns: None    PLAN:   Next Appointment: Hoang Kiser will schedule a follow-up appointment in 2-3  weeks.  Assignment: see above  Objectives / interventions for next session: See above    Fili Wesley MA, LP, [facility]  Behavioral Pain Specialist  [facility]

## 2018-05-11 ENCOUNTER — PRE VISIT (OUTPATIENT)
Dept: ORTHOPEDICS | Facility: CLINIC | Age: 33
End: 2018-05-11

## 2018-05-11 ENCOUNTER — MYC MEDICAL ADVICE (OUTPATIENT)
Dept: PALLIATIVE MEDICINE | Facility: CLINIC | Age: 33
End: 2018-05-11

## 2018-05-11 DIAGNOSIS — M25.551 HIP PAIN, RIGHT: Primary | ICD-10-CM

## 2018-05-11 NOTE — TELEPHONE ENCOUNTER
Discussed with patient reason for visit Right hip pain  Patient prepared for appointment through the followin. Have you had any recent xrays in the last 6 months? No -  Patient informed that they will have x-rays done before appointment and to arrive at least 30 minutes before MD appointment. Xrays ordered per standing orders.    2. Have you had an MRI? No.     3. Have you had any surgery in past related to complaint?  No.  If yes, Patient advised that implant stickers are needed for any previous total joint surgery as well for appointment.     4. Are you being referred by another provider? Yes: Dr. Montesinos  If yes-Records in Epic.    5. Have you received the intake form in mail?.Yes.    6. Is this work comp or MVA related? No.     Shaylee Black RN

## 2018-05-14 ENCOUNTER — TRANSFERRED RECORDS (OUTPATIENT)
Dept: HEALTH INFORMATION MANAGEMENT | Facility: CLINIC | Age: 33
End: 2018-05-14

## 2018-05-15 ENCOUNTER — RADIANT APPOINTMENT (OUTPATIENT)
Dept: GENERAL RADIOLOGY | Facility: CLINIC | Age: 33
End: 2018-05-15
Attending: ORTHOPAEDIC SURGERY
Payer: COMMERCIAL

## 2018-05-15 ENCOUNTER — MYC MEDICAL ADVICE (OUTPATIENT)
Dept: PALLIATIVE MEDICINE | Facility: CLINIC | Age: 33
End: 2018-05-15

## 2018-05-15 ENCOUNTER — OFFICE VISIT (OUTPATIENT)
Dept: ORTHOPEDICS | Facility: CLINIC | Age: 33
End: 2018-05-15
Payer: COMMERCIAL

## 2018-05-15 VITALS
HEIGHT: 68 IN | BODY MASS INDEX: 26.22 KG/M2 | DIASTOLIC BLOOD PRESSURE: 69 MMHG | SYSTOLIC BLOOD PRESSURE: 104 MMHG | HEART RATE: 84 BPM | OXYGEN SATURATION: 98 % | WEIGHT: 173 LBS

## 2018-05-15 DIAGNOSIS — M25.551 HIP PAIN, RIGHT: Primary | ICD-10-CM

## 2018-05-15 DIAGNOSIS — M25.551 HIP PAIN, RIGHT: ICD-10-CM

## 2018-05-15 PROCEDURE — 73502 X-RAY EXAM HIP UNI 2-3 VIEWS: CPT | Performed by: RADIOLOGY

## 2018-05-15 PROCEDURE — 99214 OFFICE O/P EST MOD 30 MIN: CPT | Performed by: ORTHOPAEDIC SURGERY

## 2018-05-15 NOTE — PATIENT INSTRUCTIONS
Thanks for coming today.  Ortho[facility] Medicine Clinic  [address] [place] [address]    To schedule future appointments in Ortho Clinic, you may call [phone].    To schedule ordered imaging by your provider:   Call Central Imaging [phone]    To schedule an injection ordered by your provider:  Call Central [facility] Injection scheduling line: [phone]  [employer] available online at:  [email or web address]    Please call if any further questions or concerns ([phone]).  Clinic hours 8 am to 5 pm.    Return to clinic (call) if symptoms worsen or fail to improve.

## 2018-05-15 NOTE — LETTER
"    5/15/2018         RE: Hoang Kiser  [address]  [facility] [address]        Dear Colleague,    Thank you for referring your patient, Hoang Kiser, to the [email or web address] Please see a copy of my visit note below.    Chief Complaint: Consult ( Tear of right acetabular labrum/Dr. Montesinos/Blue plus/MR/XR/ortho con)      Physician:  Moises Gallo    HPI: Hoang Kiser is a 32 year old male who presents today for evaluation of right hip     Symptom Profile  Location of symptoms:  Superior to the ASIS, tailbone, bilateral low back at the level of the lumbar spine   Onset: reports that he had an acute onset of anterior abdominal pain with a squat/lift/twist and a start of back pain about three weeks after   Duration of symptoms: about 18 months with an significant increase in the last 3 months   Quality of symptoms: aching, sharp/stabbing, aching   Severity: moderate   Alleviate: activity modification   Exacerbating: activities, cedric position, squat  Previous Treatments: Previous treatments include activity modification, oral pain medication,     Left SI joint injection with relief of symptoms in the lower left lumbar spine and into the buttock short term. He reports that this is \"pretty much\" the same symptoms that he has on right \"expect for the groin pain now\"    Current Status:  Results of the patient s Hip Disability and Osteoarthritis Outcome Score (HOOS)  are as follows (0-100 scales with 100 being the theoretical best):  Pain: 4  Symptoms:47.5  ADLs:45  Sports/Recreation:50  Quality of Life:43.75  (http://koos.nu/)  [facility] Activity Score: 4    MEDICAL HISTORY:   Past Medical History:   Diagnosis Date     Abnormal involuntary movement 6/2/2016     Anxiety state, unspecified 04/30/2012     Benign positional vertigo Dec 2016    Started after my groin/back injury. Sitting on Toilet.     Depressive disorder 2003    I have been on and off medication for depression since this " "    Dysphonia 2014    Nothing significant.     Epilepsy (H)     as a child. Says that \" he grew out of it by age 13\"     Gastroesophageal reflux disease 2004    Occassional. Spicy foods set it off.     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  History: Myoclonus.  Comparison: None.  Technique:  1. MRI of the Brain: Sagittal T1-weighted, axial T2-weighted, axial turboFLAIR, axial susceptibility, and axial diffusion-weighted with ADC map images of the brain were obtained without intravenous contrast. After intravenous administration of gadolinium, axial and sagittal T1-weighted images of th     Hoarseness 2017    Dry mouth, started after taking Tizanidine     Neuralgia, neuritis, and radiculitis, unspecified 04/30/2012     normal emg 2017 8/8/2017    Interpretation: This is a normal study. There is no electrophysiologic evidence of a lumbosacral radiculopathy affecting the right or left lower extremity on the basis of this study.    Rodney Mena MD Department of Neurology       Panic disorder without agoraphobia 04/30/2012     Problem, psychiatric 2016    PTSD     Seizures (H) 3093-2398    Grew out of it. Absence Seizures.     Tic disorder 6/2/2016     Tinnitus 2015    Had it most of my life. Got worse in 2015       Pertinent negatives:  Patient has no history of DVT or PE. Discussed risk factors.    Medications:     Current Outpatient Prescriptions:      amphetamine-dextroamphetamine (ADDERALL XR) 30 MG per 24 hr capsule, Take 1 capsule by mouth every morning, Disp: , Rfl: 0     amphetamine-dextroamphetamine (ADDERALL) 20 MG tablet, Take 20 mg by mouth daily Reported on 3/6/2017, Disp: , Rfl: 0     clonazePAM (KLONOPIN) 0.5 MG tablet, Take 0.5-1 mg by mouth nightly as needed , Disp: , Rfl: 0     cyclobenzaprine (FLEXERIL) 5 MG tablet, Take 1-2 tablets (5-10 mg) by mouth 3 times daily as " needed for muscle spasms, Disp: 90 tablet, Rfl: 1     DULoxetine (CYMBALTA) 20 MG EC capsule, Take 1 capsule by mouth daily, Disp: , Rfl: 0     gabapentin (NEURONTIN) 100 MG capsule, Take 1 capsule (100 mg) by mouth daily, Disp: 90 capsule, Rfl: 3     nicotine (NICODERM CQ) 14 MG/24HR 24 hr patch, Place 1 patch onto the skin every 24 hours Use this patch first, Disp: 30 patch, Rfl: 0     nicotine (NICODERM CQ) 7 MG/24HR 24 hr patch, Place 1 patch onto the skin every 24 hours, Disp: 30 patch, Rfl: 0     tiZANidine (ZANAFLEX) 4 MG tablet, Take 1-2 tablets (4-8 mg) by mouth nightly as needed, Disp: 60 tablet, Rfl: 3     traMADol (ULTRAM) 50 MG tablet, Take 1 tablet (50 mg) by mouth every 6 hours as needed for severe pain Max three tabs per day., Disp: 50 tablet, Rfl: 0    Allergies: Buspirone hcl; Doxycycline; Naproxen; Trazodone; and Keflex [cephalexin]    SURGICAL HISTORY:   Past Surgical History:   Procedure Laterality Date     BACK SURGERY      injections     COLONOSCOPY  02/15/18    Has not happened yet.     COLONOSCOPY N/A 2/15/2018    Procedure: COMBINED COLONOSCOPY, SINGLE OR MULTIPLE BIOPSY/POLYPECTOMY BY BIOPSY;;  Surgeon: Liam Kincaid MD;  Location: [facility] OR     COLONOSCOPY WITH CO2 INSUFFLATION N/A 2/15/2018    Procedure: COLONOSCOPY WITH CO2 INSUFFLATION;  COLON-FAMILY HX OF COLON CANCER/ SYPURA;  Surgeon: Liam Kincaid MD;  Location: [facility] OR     HC TOOTH EXTRACTION W/FORCEP Bilateral 2003     PE TUBES  1990       FAMILY HISTORY:   Family History   Problem Relation Age of Onset     Lipids Father      hyperlipidemia     Hyperlipidemia Father      Obesity Father      Arthritis Mother      Hyperlipidemia Mother      Depression Mother      Anxiety Disorder Mother      MENTAL ILLNESS Mother      Obesity Mother      Asthma Brother      Asthma Sister      Depression Other      Hearing Loss Other      Psychotic Disorder Other      Obesity Other      CEREBROVASCULAR DISEASE Paternal Grandfather       "Alzheimer Disease Paternal Grandfather      Depression Brother      MENTAL ILLNESS Brother      Bipolar     Asthma Brother      Depression Sister      Asthma Sister      Substance Abuse Sister      Alcohol     MENTAL ILLNESS Brother      Bipolar     Asthma Brother      Asthma Sister      Obesity Sister      Asthma Brother      Obesity Brother      Obesity Maternal Grandmother      Genetic Disorder Maternal Grandmother      Epilepsy       SOCIAL HISTORY:   Social History   Substance Use Topics     Smoking status: Current Every Day Smoker     Packs/day: 0.50     Years: 10.00     Types: Cigarettes     Start date: 1/1/2002     Smokeless tobacco: Current User      Comment: Transdermal patches have helped me the most in the past     Alcohol use No      Comment: none since 2013   working,   Single, livbes in an independent living lodge     REVIEW OF SYSTEMS:  The comprehensive review of systems from the intake form was reviewed with the patient.  No fever, weight change or fatigue. No dry eyes. No oral ulcers, sore throat or voice change. No palpitations, syncope, angina or edema.  No chest pain, excessive sleepiness, shortness of breath or hemoptysis.   No abdominal pain, nausea, vomiting, diarrhea or heartburn.  No skin rash. No focal weakness or numbness. No bleeding or lymphadenopathy. No rhinitis or hives.     Exam:  On physical examination the patient appears the stated age, is in no acute distress, affectThe is appropriate, and breathing is non-labored.  Vitals are documented in the EMR and have been reviewed:    /69 (BP Location: Left arm)  Pulse 84  Ht 1.72 m (5' 7.72\")  Wt 78.5 kg (173 lb)  SpO2 98%  BMI 26.52 kg/m2  5' 7.717\"  Body mass index is 26.52 kg/(m^2).    Rises from chair: easily   Gait:normal   Trendelenburg test: difficulty with SLS bilateral   Gains the exam table: easily     RIGHTand left hip ROM symmetric  Abd: 30  Add: 20  PFC:  Flexion: 100  IRF: 15  ERF: " 30  Impingement test: + on the right and equivocal on the left   Mild TTP on the right     Unable to do a crunch so unable to do a full sports abdominal examination     Much of this examination is dominated by low back pain, especially on the left side     Distally, the circulatory, motor, and sensation exam is intact with 5/5 EHL, gastroc-soleus, and tibialis anterior.  Sensation to light touch is intact.  Dorsalis pedis and posterior tibialis pulses are palpable.  There are no sores on the feet, no bruising, and no lymphedema.    X-rays:   LCE 30  Alpha 60  Tonnis 2  Final Report   MR right hip without contrast 5/2/2018 6:47 PM    Techniques: Multiplanar multisequence imaging of the right hip was  obtained without administration of intra-articular or intravenous  contrast using routing protocol.    History: Hip pain.    Comparison: None available.    Findings:    Osseous structures  Osseous structures: No fracture, stress reaction, avascular necrosis,  or focal osseous lesion is seen. There is small focal area of  subchondral cystic changes in the anterior right femoral head neck  junction, most compatible with synovial herniation pit. Findings  suggestive of reduced femoral head neck junction though alpha angle  cannot be assessed owing to no dedicated oblique axial sequence  obtained.    Articular cartilage and labrum  Assessment limited on this arthrographic study due to relative lack of  joint distension.    Articular cartilage: No high grade chondral loss.    Labrum: Labral tearing approximately from 12:00 to 3:00 with 3:00  being anterior and 6:00 being the center of transverse ligament in  this convention with associated subtle area of subchondral edema in  the superior acetabulum.    Ligament teres and transverse ligament of acetabulum: Intact.    Joint or bursal effusion    Joint effusion: A physiologic amount of joint fluid.    Bursal effusion: Minimal nonspecific edema over the greater  trochanter. No  "substantial iliopsoas or trochanteric bursal effusion.    Muscles and tendons  Muscles and tendons: The rectus femoris, the visualized portion  iliopsoas, proximal hamstrings, and hip abductors are intact. The  visualized adductor muscles are unremarkable.     Nerves:  The visualized course of the sciatic nerve is unremarkable.    Other Findings:  There is fat-containing right inguinal hernia.    Impression:  1. Approximately 12-3 o'clock labral tearing with synovial herniation  pit and likely reduced femoral head neck junction offset. 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  2. Fat containing right inguinal hernia.    JOSE BAILEY  Principal   Name: JOSE GARNICAASHI  Provider ID: [id number]    Assessment: This is a 32 year old with multifactorial symptoms around the right hip joint, specifically low back pain (per history and examination), SI joint, lateral hip at the trochanter, \"sports hernia,\" and-- based on exam and imaging-- his right hip. With so many onjective pain generators it is impossible to know what % of symptoms are hip related and the high percentage of false positive MRs of the hip makes a dx injection especially helpful in this case.     If he does not have good relief of symptoms with the hip injection the next step is a right SI injection.     Plan:    1. Cont with PT  2. Intra-articular right hip steroid injection  3. Possible right SI injection as above.     Again, thank you for allowing me to participate in the care of your patient.        Sincerely,        Tavon Ennis MD    "

## 2018-05-15 NOTE — NURSING NOTE
"Hoang Kiser's chief complaint for this visit includes:  Chief Complaint   Patient presents with     Consult      Tear of right acetabular labrum/Dr. Montesinos/Blue plus/MR/XR/ortho con     PCP: Phill Floyd    Referring Provider:  Moises Gallo PA-C  [address] [place] [address]    /69 (BP Location: Left arm)  Pulse 84  Ht 1.72 m (5' 7.72\")  Wt 78.5 kg (173 lb)  SpO2 98%  BMI 26.52 kg/m2 Data Unavailable         Latha Montague CMA    "

## 2018-05-15 NOTE — MR AVS SNAPSHOT
After Visit Summary   5/15/2018    Hoang Kiser    MRN: [id number]           Patient Information     Date Of Birth          1985        Visit Information        Provider Department      5/15/2018 8:00 AM Tavon Ennis MD [facility]        Today's Diagnoses     Hip pain, right    -  1      Care Instructions    Thanks for coming today.  [facility]  [address] [place] [address]    To schedule future appointments in Ortho Clinic, you may call [phone].    To schedule ordered imaging by your provider:   Call Central Imaging [phone]    To schedule an injection ordered by your provider:  Call [facility] Injection scheduling line: [phone]  Lumetahart available online at:  [email or web address]    Please call if any further questions or concerns ([phone]).  Clinic hours 8 am to 5 pm.    Return to clinic (call) if symptoms worsen or fail to improve.            Follow-ups after your visit        Your next 10 appointments already scheduled     May 22, 2018  8:45 AM CDT   Return Visit with Faustino Ryan PT   [facility] ([facility])    [address]  [address]  [facility] [address] [phone]            May 22, 2018  9:30 AM CDT   Return Visit with CARLOS A Guy CNP   [facility] ([facility])    [address]  [address]  [facility] [address] [phone]            May 24, 2018 11:00 AM CDT   Return Visit with Fili Wesley LP   [facility] ([facility])    [address]  [address]  [facility] [id number] [phone]              Who to contact     If you have questions or need follow up information about today's clinic visit or your schedule please contact [facility] directly at [phone].  Normal or non-critical lab and imaging results will be communicated to  "you by MyChart, letter or phone within 4 business days after the clinic has received the results. If you do not hear from us within 7 days, please contact the clinic through [employer] or phone. If you have a critical or abnormal lab result, we will notify you by phone as soon as possible.  Submit refill requests through [employer] or call your pharmacy and they will forward the refill request to us. Please allow 3 business days for your refill to be completed.          Additional Information About Your Visit        [employer] Information     [employer] gives you secure access to your electronic health record. If you see a primary care provider, you can also send messages to your care team and make appointments. If you have questions, please call your primary care clinic.  If you do not have a primary care provider, please call [phone] and they will assist you.      [employer] is an electronic gateway that provides easy, online access to your medical records. With [employer], you can request a clinic appointment, read your test results, renew a prescription or communicate with your care team.     To access your existing account, please contact your [facility] or call [phone] for assistance.        Your Vitals Were     Pulse Height Pulse Oximetry BMI (Body Mass Index)          84 1.72 m (5' 7.72\") 98% 26.52 kg/m2         Blood Pressure from Last 3 Encounters:   05/15/18 104/69   05/08/18 118/77   05/03/18 110/60    Weight from Last 3 Encounters:   05/15/18 78.5 kg (173 lb)   05/08/18 78.5 kg (173 lb)   05/03/18 78.9 kg (174 lb)              Today, you had the following     No orders found for display       Primary Care Provider Office Phone # Fax #    Phill Floyd [phone] [address] [facility] [address]        Equal Access to Services     BERLIN MEYER AH: Hadii rosemary Armenta, waaxda luqadaha, qaybta kaalki hawkins, arnav abarca " kaycee jacksonaabarbara ah. So [facility] [phone].    ATENCIÓN: Si ernestola vibha, tiene a cutler disposición servicios gratuitos de asistencia lingüística. Rodríguez al [phone].    We comply with applicable federal civil rights laws and [place] laws. We do not discriminate on the basis of race, color, national origin, age, disability, sex, sexual orientation, or gender identity.            Thank you!     Thank you for choosing [facility]  for your care. Our goal is always to provide you with excellent care. Hearing back from our patients is one way we can continue to improve our services. Please take a few minutes to complete the written survey that you may receive in the mail after your visit with us. Thank you!             Your Updated Medication List - Protect others around you: Learn how to safely use, store and throw away your medicines at www.disposemymeds.org.          This list is accurate as of 5/15/18  8:30 AM.  Always use your most recent med list.                   Brand Name Dispense Instructions for use Diagnosis    * amphetamine-dextroamphetamine 20 MG per tablet    ADDERALL     Take 20 mg by mouth daily Reported on 3/6/2017        * amphetamine-dextroamphetamine 30 MG per 24 hr capsule    ADDERALL XR     Take 1 capsule by mouth every morning        clonazePAM 0.5 MG tablet    klonoPIN     Take 0.5-1 mg by mouth nightly as needed        cyclobenzaprine 5 MG tablet    FLEXERIL    90 tablet    Take 1-2 tablets (5-10 mg) by mouth 3 times daily as needed for muscle spasms    Back muscle spasm       DULoxetine 20 MG EC capsule    CYMBALTA     Take 1 capsule by mouth daily        gabapentin 100 MG capsule    NEURONTIN    90 capsule    Take 1 capsule (100 mg) by mouth daily    Lumbar radicular pain, Lumbar paraspinal muscle spasm       * nicotine 14 MG/24HR 24 hr patch    NICODERM CQ    30 patch    Place 1 patch onto the skin every 24 hours Use this patch first    Tobacco abuse       * nicotine 7  MG/24HR 24 hr patch    NICODERM CQ    30 patch    Place 1 patch onto the skin every 24 hours    Tobacco abuse       tiZANidine 4 MG tablet    ZANAFLEX    60 tablet    Take 1-2 tablets (4-8 mg) by mouth nightly as needed    Lumbar radicular pain, Lumbar paraspinal muscle spasm       traMADol 50 MG tablet    ULTRAM    50 tablet    Take 1 tablet (50 mg) by mouth every 6 hours as needed for severe pain Max three tabs per day.    Lumbar radiculopathy, Groin pain, right, Hip pain, right, DDD (degenerative disc disease), lumbar       * Notice:  This list has 4 medication(s) that are the same as other medications prescribed for you. Read the directions carefully, and ask your doctor or other care provider to review them with you.

## 2018-05-15 NOTE — PROGRESS NOTES
"Chief Complaint: Consult ( Tear of right acetabular labrum/Dr. Montesinos/Blue plus/MR/XR/ortho con)      Physician:  Moises Gallo    HPI: Hoang Kiser is a 32 year old male who presents today for evaluation of right hip     Symptom Profile  Location of symptoms:  Superior to the ASIS, tailbone, bilateral low back at the level of the lumbar spine   Onset: reports that he had an acute onset of anterior abdominal pain with a squat/lift/twist and a start of back pain about three weeks after   Duration of symptoms: about 18 months with an significant increase in the last 3 months   Quality of symptoms: aching, sharp/stabbing, aching   Severity: moderate   Alleviate: activity modification   Exacerbating: activities, cedric position, squat  Previous Treatments: Previous treatments include activity modification, oral pain medication,     Left SI joint injection with relief of symptoms in the lower left lumbar spine and into the buttock short term. He reports that this is \"pretty much\" the same symptoms that he has on right \"expect for the groin pain now\"    Current Status:  Results of the patient s Hip Disability and Osteoarthritis Outcome Score (HOOS)  are as follows (0-100 scales with 100 being the theoretical best):  Pain: 4  Symptoms:47.5  ADLs:45  Sports/Recreation:50  Quality of Life:43.75  (http://koos.nu/)  [facility] Activity Score: 4    MEDICAL HISTORY:   Past Medical History:   Diagnosis Date     Abnormal involuntary movement 6/2/2016     Anxiety state, unspecified 04/30/2012     Benign positional vertigo Dec 2016    Started after my groin/back injury. Sitting on Toilet.     Depressive disorder 2003    I have been on and off medication for depression since this     Dysphonia 2014    Nothing significant.     Epilepsy (H)     as a child. Says that \" he grew out of it by age 13\"     Gastroesophageal reflux disease 2004    Occassional. Spicy foods set it off.     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  History: Myoclonus.  Comparison: None.  Technique:  1. MRI of the Brain: Sagittal T1-weighted, axial T2-weighted, axial turboFLAIR, axial susceptibility, and axial diffusion-weighted with ADC map images of the brain were obtained without intravenous contrast. After intravenous administration of gadolinium, axial and sagittal T1-weighted images of th     Hoarseness 2017    Dry mouth, started after taking Tizanidine     Neuralgia, neuritis, and radiculitis, unspecified 04/30/2012     normal emg 2017 8/8/2017    Interpretation: This is a normal study. There is no electrophysiologic evidence of a lumbosacral radiculopathy affecting the right or left lower extremity on the basis of this study.    Rodney Mena MD Department of Neurology       Panic disorder without agoraphobia 04/30/2012     Problem, psychiatric 2016    PTSD     Seizures (H) [id number]    Grew out of it. Absence Seizures.     Tic disorder 6/2/2016     Tinnitus 2015    Had it most of my life. Got worse in 2015       Pertinent negatives:  Patient has no history of DVT or PE. Discussed risk factors.    Medications:     Current Outpatient Prescriptions:      amphetamine-dextroamphetamine (ADDERALL XR) 30 MG per 24 hr capsule, Take 1 capsule by mouth every morning, Disp: , Rfl: 0     amphetamine-dextroamphetamine (ADDERALL) 20 MG tablet, Take 20 mg by mouth daily Reported on 3/6/2017, Disp: , Rfl: 0     clonazePAM (KLONOPIN) 0.5 MG tablet, Take 0.5-1 mg by mouth nightly as needed , Disp: , Rfl: 0     cyclobenzaprine (FLEXERIL) 5 MG tablet, Take 1-2 tablets (5-10 mg) by mouth 3 times daily as needed for muscle spasms, Disp: 90 tablet, Rfl: 1     DULoxetine (CYMBALTA) 20 MG EC capsule, Take 1 capsule by mouth daily, Disp: , Rfl: 0     gabapentin (NEURONTIN) 100 MG capsule, Take 1 capsule (100 mg) by mouth daily, Disp: 90 capsule, Rfl: 3      nicotine (NICODERM CQ) 14 MG/24HR 24 hr patch, Place 1 patch onto the skin every 24 hours Use this patch first, Disp: 30 patch, Rfl: 0     nicotine (NICODERM CQ) 7 MG/24HR 24 hr patch, Place 1 patch onto the skin every 24 hours, Disp: 30 patch, Rfl: 0     tiZANidine (ZANAFLEX) 4 MG tablet, Take 1-2 tablets (4-8 mg) by mouth nightly as needed, Disp: 60 tablet, Rfl: 3     traMADol (ULTRAM) 50 MG tablet, Take 1 tablet (50 mg) by mouth every 6 hours as needed for severe pain Max three tabs per day., Disp: 50 tablet, Rfl: 0    Allergies: Buspirone hcl; Doxycycline; Naproxen; Trazodone; and Keflex [cephalexin]    SURGICAL HISTORY:   Past Surgical History:   Procedure Laterality Date     BACK SURGERY      injections     COLONOSCOPY  02/15/18    Has not happened yet.     COLONOSCOPY N/A 2/15/2018    Procedure: COMBINED COLONOSCOPY, SINGLE OR MULTIPLE BIOPSY/POLYPECTOMY BY BIOPSY;;  Surgeon: Liam Kincaid MD;  Location: [facility] OR     COLONOSCOPY WITH CO2 INSUFFLATION N/A 2/15/2018    Procedure: COLONOSCOPY WITH CO2 INSUFFLATION;  COLON-FAMILY HX OF COLON CANCER/ SYPURA;  Surgeon: Liam Kincaid MD;  Location: [facility] OR     HC TOOTH EXTRACTION W/FORCEP Bilateral 2003     PE TUBES  1990       FAMILY HISTORY:   Family History   Problem Relation Age of Onset     Lipids Father      hyperlipidemia     Hyperlipidemia Father      Obesity Father      Arthritis Mother      Hyperlipidemia Mother      Depression Mother      Anxiety Disorder Mother      MENTAL ILLNESS Mother      Obesity Mother      Asthma Brother      Asthma Sister      Depression Other      Hearing Loss Other      Psychotic Disorder Other      Obesity Other      CEREBROVASCULAR DISEASE Paternal Grandfather      Alzheimer Disease Paternal Grandfather      Depression Brother      MENTAL ILLNESS Brother      Bipolar     Asthma Brother      Depression Sister      Asthma Sister      Substance Abuse Sister      Alcohol     MENTAL ILLNESS Brother      Bipolar      "Asthma Brother      Asthma Sister      Obesity Sister      Asthma Brother      Obesity Brother      Obesity Maternal Grandmother      Genetic Disorder Maternal Grandmother      Epilepsy       SOCIAL HISTORY:   Social History   Substance Use Topics     Smoking status: Current Every Day Smoker     Packs/day: 0.50     Years: 10.00     Types: Cigarettes     Start date: 1/1/2002     Smokeless tobacco: Current User      Comment: Transdermal patches have helped me the most in the past     Alcohol use No      Comment: none since 2013   working,   Single, livbes in an independent living lodge     REVIEW OF SYSTEMS:  The comprehensive review of systems from the intake form was reviewed with the patient.  No fever, weight change or fatigue. No dry eyes. No oral ulcers, sore throat or voice change. No palpitations, syncope, angina or edema.  No chest pain, excessive sleepiness, shortness of breath or hemoptysis.   No abdominal pain, nausea, vomiting, diarrhea or heartburn.  No skin rash. No focal weakness or numbness. No bleeding or lymphadenopathy. No rhinitis or hives.     Exam:  On physical examination the patient appears the stated age, is in no acute distress, affectThe is appropriate, and breathing is non-labored.  Vitals are documented in the EMR and have been reviewed:    /69 (BP Location: Left arm)  Pulse 84  Ht 1.72 m (5' 7.72\")  Wt 78.5 kg (173 lb)  SpO2 98%  BMI 26.52 kg/m2  5' 7.717\"  Body mass index is 26.52 kg/(m^2).    Rises from chair: easily   Gait:normal   Trendelenburg test: difficulty with SLS bilateral   Gains the exam table: easily     RIGHTand left hip ROM symmetric  Abd: 30  Add: 20  PFC:  Flexion: 100  IRF: 15  ERF: 30  Impingement test: + on the right and equivocal on the left   Mild TTP on the right     Unable to do a crunch so unable to do a full sports abdominal examination     Much of this examination is dominated by low back pain, especially on the left side "     Distally, the circulatory, motor, and sensation exam is intact with 5/5 EHL, gastroc-soleus, and tibialis anterior.  Sensation to light touch is intact.  Dorsalis pedis and posterior tibialis pulses are palpable.  There are no sores on the feet, no bruising, and no lymphedema.    X-rays:   LCE 30  Alpha 60  Tonnis 2  Final Report   MR right hip without contrast 5/2/2018 6:47 PM    Techniques: Multiplanar multisequence imaging of the right hip was  obtained without administration of intra-articular or intravenous  contrast using routing protocol.    History: Hip pain.    Comparison: None available.    Findings:    Osseous structures  Osseous structures: No fracture, stress reaction, avascular necrosis,  or focal osseous lesion is seen. There is small focal area of  subchondral cystic changes in the anterior right femoral head neck  junction, most compatible with synovial herniation pit. Findings  suggestive of reduced femoral head neck junction though alpha angle  cannot be assessed owing to no dedicated oblique axial sequence  obtained.    Articular cartilage and labrum  Assessment limited on this arthrographic study due to relative lack of  joint distension.    Articular cartilage: No high grade chondral loss.    Labrum: Labral tearing approximately from 12:00 to 3:00 with 3:00  being anterior and 6:00 being the center of transverse ligament in  this convention with associated subtle area of subchondral edema in  the superior acetabulum.    Ligament teres and transverse ligament of acetabulum: Intact.    Joint or bursal effusion    Joint effusion: A physiologic amount of joint fluid.    Bursal effusion: Minimal nonspecific edema over the greater  trochanter. No substantial iliopsoas or trochanteric bursal effusion.    Muscles and tendons  Muscles and tendons: The rectus femoris, the visualized portion  iliopsoas, proximal hamstrings, and hip abductors are intact. The  visualized adductor muscles are  "unremarkable.     Nerves:  The visualized course of the sciatic nerve is unremarkable.    Other Findings:  There is fat-containing right inguinal hernia.    Impression:  1. Approximately 12-3 o'clock labral tearing with synovial herniation  pit and likely reduced femoral head neck junction offset. 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  2. Fat containing right inguinal hernia.    JOSE BAILEY  Principal   Name: JOSE GARNICAASHI  Provider ID: [id number]    Assessment: This is a 32 year old with multifactorial symptoms around the right hip joint, specifically low back pain (per history and examination), SI joint, lateral hip at the trochanter, \"sports hernia,\" and-- based on exam and imaging-- his right hip. With so many onjective pain generators it is impossible to know what % of symptoms are hip related and the high percentage of false positive MRs of the hip makes a dx injection especially helpful in this case.     If he does not have good relief of symptoms with the hip injection the next step is a right SI injection.     Plan:    1. Cont with PT  2. Intra-articular right hip steroid injection  3. Possible right SI injection as above.   "

## 2018-05-16 NOTE — TELEPHONE ENCOUNTER
Last read by Hoang Kiser at 1:53 PM on 5/15/2018.     KATERINA CardenasN, RN  Care Coordinator  [facility]

## 2018-05-22 ENCOUNTER — OFFICE VISIT (OUTPATIENT)
Dept: PALLIATIVE MEDICINE | Facility: CLINIC | Age: 33
End: 2018-05-22
Payer: COMMERCIAL

## 2018-05-22 VITALS
SYSTOLIC BLOOD PRESSURE: 110 MMHG | BODY MASS INDEX: 26.37 KG/M2 | DIASTOLIC BLOOD PRESSURE: 72 MMHG | OXYGEN SATURATION: 100 % | HEART RATE: 85 BPM | WEIGHT: 172 LBS | RESPIRATION RATE: 16 BRPM

## 2018-05-22 DIAGNOSIS — M25.551 HIP PAIN, RIGHT: ICD-10-CM

## 2018-05-22 DIAGNOSIS — G89.29 CHRONIC LOW BACK PAIN: ICD-10-CM

## 2018-05-22 DIAGNOSIS — G89.29 CHRONIC PAIN: Primary | ICD-10-CM

## 2018-05-22 DIAGNOSIS — M54.50 CHRONIC LOW BACK PAIN: ICD-10-CM

## 2018-05-22 DIAGNOSIS — M25.551 HIP PAIN, RIGHT: Primary | ICD-10-CM

## 2018-05-22 PROCEDURE — 99214 OFFICE O/P EST MOD 30 MIN: CPT | Performed by: NURSE PRACTITIONER

## 2018-05-22 PROCEDURE — 97110 THERAPEUTIC EXERCISES: CPT | Mod: GP | Performed by: PHYSICAL THERAPIST

## 2018-05-22 PROCEDURE — 97112 NEUROMUSCULAR REEDUCATION: CPT | Mod: GP | Performed by: PHYSICAL THERAPIST

## 2018-05-22 PROCEDURE — 97535 SELF CARE MNGMENT TRAINING: CPT | Mod: GP | Performed by: PHYSICAL THERAPIST

## 2018-05-22 RX ORDER — HYDROCODONE BITARTRATE AND ACETAMINOPHEN 5; 325 MG/1; MG/1
1 TABLET ORAL 3 TIMES DAILY PRN
Qty: 60 TABLET | Refills: 0 | Status: SHIPPED | OUTPATIENT
Start: 2018-05-22 | End: 2018-06-12

## 2018-05-22 ASSESSMENT — PAIN SCALES - GENERAL: PAINLEVEL: MODERATE PAIN (5)

## 2018-05-22 NOTE — PROGRESS NOTES
"[facility]    CHIEF COMPLAINT: Unmanageable pain and reduced     INTERVAL HISTORY:  Last seen on 5/3/18.        Recommendations/plan at the last visit included:  1.                  Schedule pain psychology visit  2.                  Schedule physical therapy visit  3.                  Schedule follow-up with CARLOS A Higuera NP-C in 1-2 weeks after Orthopedics consultation  4.                  Referrals: Orthopedics to address hip pain, labral tear   5.                  Medication recommendations:                  1. Tramadol 50 mg tabs: #50 tabs to last 30 days.     Since his last visit, Hoang Kiser reports:  - Had appt with Dr Ennis in Ortho. Scheduled for intra-articular joint injection on right hip.       Pain Information:   Pain quality: Aching, Exhausting and Tender    Pain timing: Constant     Pain rating: intensity ranges from 2/10 to 8/10, and averages 5/10 on a 0-10 scale.   Aggravating factors include: \"standing, sitting for extended periods of time, twisting, bad posture, coughing, not listening to my body, PT (stregthening). \"   Relieving factors include: \"Stretching, moving, walking, after waking, hot pad, rest, meditation, medication\"       Annual Controlled Substance Agreement/UDS due date: April 2019      Current Pain Relevant Medications:   Tramadol 50 mg, max three tabs per day.                         Total opiate dose: 15 MME daily  Clonazepam .5 mg 1-2 tab at HS PRN  Duloxetine 20 mg daily  Naproxen 500 mg BID: on hold re: elevated LFTs   Tizanidine 4 mg 1-2 tabs at HS PRN  Ambien 10 mg at HS  Adderall 50 mg daily, combination of long and short acting.       Previous Pain Relevant Medications: (H--helped; HI--Helped initially; SWH--Somewhat helpful; NH--No help; W--worse; SE--side effects; ?--Unsure if helpful)   NOTE: This medication information taken from patient's intake form, not medical records.                         Opiates: Tramadol: H, Hydrocodone: " H                        NSAIDS: Ibuprofen:H, naproxen:[facility], Relafen: NH                        Muscle Relaxants: Cyclobenzaprine:H,Med interaction, Tizanidine:H                        Anti-migraine mediations: Prednisone:H                        Anti-depressants: Bupropion:SE, Celexa:SE, Duloxetine:H, Trazodone:Too strong, Venlafaxine:too strong                        Sleep aids:Anbien: H                        Anxiolytics: Clonazepam:H                        Neuropathics: Tegretol:taken for seizures in childhood, Gabapentin:H                                          Topicals: Lidocaine:H                        Other medications not covered above: Tylenol:NH      Any illicit drug use: 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      SELF CARE:   How often do you practice SELF-CARE (relaxing, stretching, pacing, monitoring posture, taking mini-breaks) in a typical day:  Concern for multiple controlled substances including benzodiazepines, stimulants, opiates.    THE 4 As OF OPIOID MAINTENANCE ANALGESIA    Analgesia: Is pain relief clinically significant? YES   Activity: Is patient functional and able to perform Activities of Daily Living? YES   Adverse effects: Is patient free from adverse side effects from opiates? YES   Adherence to Rx protocol: Is patient adhering to Controlled Substance Agreement and taking medications ONLY as ordered? YES       Is Narcan prescribed for opiate use >50 MME daily? N/A    Medications:  Current Outpatient Prescriptions   Medication Sig Dispense Refill     amphetamine-dextroamphetamine (ADDERALL XR) 30 MG per 24 hr capsule Take 1 capsule by mouth every morning  0      amphetamine-dextroamphetamine (ADDERALL) 20 MG tablet Take 20 mg by mouth daily Reported on 3/6/2017  0     clonazePAM (KLONOPIN) 0.5 MG tablet Take 0.5-1 mg by mouth nightly as needed   0     cyclobenzaprine (FLEXERIL) 5 MG tablet Take 1-2 tablets (5-10 mg) by mouth 3 times daily as needed for muscle spasms 90 tablet 1     DULoxetine (CYMBALTA) 20 MG EC capsule Take 1 capsule by mouth daily  0     HYDROcodone-acetaminophen (NORCO) 5-325 MG per tablet Take 1 tablet by mouth 3 times daily as needed for pain (Max 3 times daily. #60 tabs to last 30 days.) 60 tablet 0     nicotine (NICODERM CQ) 14 MG/24HR 24 hr patch Place 1 patch onto the skin every 24 hours Use this patch first 30 patch 0     nicotine (NICODERM CQ) 7 MG/24HR 24 hr patch Place 1 patch onto the skin every 24 hours 30 patch 0     tiZANidine (ZANAFLEX) 4 MG tablet Take 1-2 tablets (4-8 mg) by mouth nightly as needed 60 tablet 3       Review of Systems: A 10-point review of systems was negative, with the exception of chronic pain issues.      Social History: Reviewed; unchanged from previous consultation.      Family history: Reviewed; unchanged from previous consultation.     PHYSICAL EXAM    Vitals:    05/22/18 0938   BP: 110/72   BP Location: Right arm   Patient Position: Chair   Cuff Size: Adult Small   Pulse: 85   Resp: 16   SpO2: 100%   Weight: 78 kg (172 lb)       Constitutional: healthy, alert, mild distress, pain behaviores and anxious  HEENT: Head atraumatic, normocephalic. Eyes without conjunctival injection or jaundice. Neck supple. No obvious neck masses.  Skin: No rash, lesions, or petechiae of exposed skin.   Extremities: No clubbing, cyanosis, or edema to exposed extremities  Psychiatric/mental status: Alert, without lethargy or stupor. Appropriate affect. See above.   Neurologic exam:  CN:  Cranial nerves 2-12 are grossly normal.      Motor:  5/5 UE and 4/5 LE strength      Musculoskeletal exam:  Cervical spine:                       Flex:  " 20 degrees                       Ext: 20 degrees                       Rotation to right: 20 degrees                       Rotation to left: 20 degrees                       Rotation/ext to right: painful                        Rotation/ext to left: painful                        Tenderness in the cervical spine at midline. No                       Tenderness in the cervical paraspinal muscles. No  Thoracic spine:                        Kyphosis. No                       Tenderness in the thoracic spine at midline. Yes                       Tenderness in the thoracic paraspinal muscles. Yes  Lumbar/Sacral spine:                       Forward Flexion:  90 degrees                       Ext: 20 degrees \"tight\"                       Rotation/ext to right: painful                        Rotation/ext to left: painful                        Lordosis. No                       Tenderness in the lumbar spine at midline. Yes                       Tenderness in the lumbar paraspinal muscles.Yes      Psychiatric:  mentation appears normal., affect and mood normal      DIRE Score for ongoing opioid management is calculated as follows:    Diagnosis = 2 pts (slowly progressive; moderate pain/objective findings)    Intractability = 2 pts (most treatments tried; patient not fully engaged/barriers)    Risk        Psych = 2 pts (personality dysfunction/mental illness that moderately interferes with care)         Chem Hlth = 2 pts (use of medications to cope with stress; chemical dependency in remission)       Reliability = 3 pts (highly reliable with meds, appointments, treatments)       Social = 3 pts (supportive family/close relationships; involved in work/school; no isolation)       (Psych + Chem hlth + Reliability + Social) = 14    Efficacy = 2 pts (moderate benefit/function; low med dose; too early/not tried meds)    DIRE Score = 16        7-13: likely NOT suitable candidate for long-term opioid analgesia       14-21: may be a " suitable candidate for long-term opioid analgesia          Previous Diagnostic Tests:   Imaging Studies:   MR LUMBAR SPINE W/O CONTRAST 5/2/2018 7:27 PM  History: DDD (degenerative disc disease), lumbar; Lumbar  radiculopathy; Hip pain, right; Groin pain, right.  ICD-10: DDD (degenerative disc disease), lumbar; Lumbar radiculopathy;  Hip pain, right; Groin pain, right  Comparison: Lumbar spine MRI 3/8/2017  Technique: Sagittal STIR, T1-weighted turbo spin-echo, 3-D volumetric  T2-weighted and axial reconstructed T2-weighted images of the lumbar  spine were obtained without intravenous contrast.   Findings: 5 lumbar-type vertebra. The tip of the conus medullaris is  at L1.  The lumbar vertebral column is normally aligned.   Straightening of lumbar lordosis, unchanged. The intervertebral disc  heights are maintained. Normal marrow signal. At L5-S1, there is a  disc bulge and facet hypertrophy with mild left neural foraminal  narrowing, unchanged. No spinal canal stenosis.  Impression:  1. Lumbar spondylosis with mild left neural foraminal narrowing at  L5-S1.  2. No spinal canal stenosis.         MR right hip without contrast 5/2/2018 6:47 PM  Techniques: Multiplanar multisequence imaging of the right hip was  obtained without  administration of intra-articular or intravenous  contrast using routing protocol.  History: Hip pain.  Comparison: None available.  Findings:  Osseous structures  Osseous structures: No fracture, stress reaction, avascular necrosis,  or focal osseous lesion is seen. There is small focal area of  subchondral cystic changes in the anterior right femoral head neck  junction, most compatible with synovial herniation pit. Findings  suggestive of reduced femoral head neck junction though alpha angle  cannot be assessed owing to no dedicated oblique axial sequence  obtained.  Articular cartilage and labrum  Assessment limited on this arthrographic study due to relative lack of  joint  distension.  Articular cartilage: No high grade chondral loss.  Labrum: Labral tearing approximately from 12:00 to 3:00 with 3:00  being anterior and 6:00 being the center of transverse ligament in  this convention with associated subtle area of subchondral edema in  the superior acetabulum.  Ligament teres and transverse ligament of acetabulum: Intact.  Joint or bursal effusion  Joint effusion: A physiologic amount of joint fluid.  Bursal effusion: Minimal nonspecific edema over the greater  trochanter. No substantial iliopsoas or trochanteric bursal effusion.  Muscles and tendons  Muscles and tendons: The rectus femoris, the visualized portion  iliopsoas, proximal hamstrings, and hip abductors are intact.  The  visualized adductor muscles are unremarkable.   Nerves:  The visualized course of the sciatic nerve is unremarkable.   Other Findings:  There is fat-containing right inguinal hernia.  Impression:  1. Approximately 12-3 o'clock labral tearing with synovial herniation  pit and likely reduced femoral head neck junction offset. 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  2. Fat containing right inguinal hernia.          ASSESSMENT:   1.  Lumbar DDD, mild  2.  Lumbar muscle spasm  3.  Labral tear, right hip  4.  Hx: anxiety, chemical dependence in remission.    Shoaib returns for follow up re: above pain issues. Has appt for R hip intra-articular joint injection on 5/24/18. If that is not successful in reducing his pain, ortho recommends trying SI joint injections. He notes that Tramadol is not controlling his pain. Will try Norco. Reviewed minimizing use of opiates, particularly in light of his hx of chemical dependency. Continue current therapies.     Plan:    Diagnosis reviewed, treatment option addressed, and risk/benifits discussed.  Self-care instructions given.  I am recommending a multidisciplinary treatment plan to help this patient better manage pain.         1. Schedule pain psychology visits  2. Schedule physical therapy visits  3. Schedule follow-up with CARLOS A Higuera, NPDoloresC in 4 weeks or after hip injection if having pain   4. Procedures recommended: Hip injection as scheduled   5. Medication recommendations:   1. Stop Tramadol  2. Start Hydrocodone/APAP 5/325 mg. One tab up to 3 times daily BUT 60 tabs must last 30 days.       Total time spent face to face was 30 minutes and more than 50% of face to face time was spent in counseling and/or coordination of care regarding the diagnosis and recommendations above.      CARLOS A Bass, NP-C   [facility]    Disclaimer: This note consists of symbols derived from keyboarding, dictation and/or voice recognition software. As a result, there may be errors in the script that have gone undetected. Please consider this when interpreting information found in this chart.

## 2018-05-22 NOTE — MR AVS SNAPSHOT
After Visit Summary   5/22/2018    Hoang Kiser    MRN: [id number]           Patient Information     Date Of Birth          1985        Visit Information        Provider Department      5/22/2018 8:45 AM Faustino Ryan, PT [facility]        Today's Diagnoses     Chronic pain    -  1    Chronic low back pain        Hip pain, right           Follow-ups after your visit        Your next 10 appointments already scheduled     May 24, 2018  2:30 PM CDT   XR JOINT INJECTION ASPIRATION MAJOR RT with MGXR3, MG NEURO RAD   [facility])    [address] [address] [place] [id number] [phone]           Stop drinking 1 hour before the exam.  You may take your medicines as usual, except for blood thinners (Coumadin, Plavix, Ticlid, Persantine, Aggrenox, Pletal, Effient, Brilliant). Talk to your doctor if you take these.  Tell your doctor if:   You have ever had an allergic reaction to X-ray dye (contrast fluid).   There is a chance you may be pregnant.  Please bring a list of your current medicines to your exam. Include vitamins, minerals and over-the-counter medicines.  Please call the Imaging Department at your exam site with any questions.            May 29, 2018  8:40 AM CDT   Return Visit with Faustino Feldman MD   [facility])    [id number] [place] [address] [phone]            May 31, 2018  9:00 AM CDT   Return Visit with Fili Wesley LP   [facility] ([facility])    [address]  [address]  [facility] [id number] [phone]            Jun 12, 2018 10:15 AM CDT   Return Visit with Faustino Ryan, PT   [facility] ([facility])    [address]  [address]  [facility] [id number] [phone]            Jun 19, 2018 10:00 AM CDT   Return Visit with CARLOS A Guy  CNP   [facility] ([facility])    [address]  [facility] [address] [phone]              Who to contact     If you have questions or need follow up information about today's clinic visit or your schedule please contact [facility] directly at [phone].  Normal or non-critical lab and imaging results will be communicated to you by MyChart, letter or phone within 4 business days after the clinic has received the results. If you do not hear from us within 7 days, please contact the clinic through [employer] or phone. If you have a critical or abnormal lab result, we will notify you by phone as soon as possible.  Submit refill requests through [employer] or call your pharmacy and they will forward the refill request to us. Please allow 3 business days for your refill to be completed.          Additional Information About Your Visit        PAN[employer] Information     [employer] gives you secure access to your electronic health record. If you see a primary care provider, you can also send messages to your care team and make appointments. If you have questions, please call your primary care clinic.  If you do not have a primary care provider, please call [phone] and they will assist you.         Blood Pressure from Last 3 Encounters:   05/22/18 110/72   05/15/18 104/69   05/08/18 118/77    Weight from Last 3 Encounters:   05/22/18 78 kg (172 lb)   05/15/18 78.5 kg (173 lb)   05/08/18 78.5 kg (173 lb)              We Performed the Following     NEUROMUSCULAR RE-EDUCATION     SELF CARE MNGMENT TRAINING     Therapeutic Procedure per 15 min          Today's Medication Changes          These changes are accurate as of 5/22/18 11:07 AM.  If you have any questions, ask your nurse or doctor.               Start taking these medicines.        Dose/Directions    HYDROcodone-acetaminophen 5-325 MG per tablet   Commonly known as:  NORCO   Used for:  Hip pain,  right   Started by:  Tamiko Stevens APRN CNP        Dose:  1 tablet   Take 1 tablet by mouth 3 times daily as needed for pain (Max 3 times daily. #60 tabs to last 30 days.)   Quantity:  60 tablet   Refills:  0            Where to get your medicines      Some of these will need a paper prescription and others can be bought over the counter.  Ask your nurse if you have questions.     Bring a paper prescription for each of these medications     HYDROcodone-acetaminophen 5-325 MG per tablet                Primary Care Provider Office Phone # Fax #    Phill Floyd [phone] [address] [facility] [address]        Equal Access to Services     [facility]: Hadii rosemary Armenta, waaxda luisaiah, qaybta kaalmada shruthi, arnav lorenzana . So [facility] [phone].    ATENCIÓN: Si habla español, tiene a cutler disposición servicios gratuitos de asistencia lingüística. [facility] [phone].    We comply with applicable federal civil rights laws and [place] laws. We do not discriminate on the basis of race, color, national origin, age, disability, sex, sexual orientation, or gender identity.            Thank you!     Thank you for choosing [facility]  for your care. Our goal is always to provide you with excellent care. Hearing back from our patients is one way we can continue to improve our services. Please take a few minutes to complete the written survey that you may receive in the mail after your visit with us. Thank you!             Your Updated Medication List - Protect others around you: Learn how to safely use, store and throw away your medicines at www.disposemymeds.org.          This list is accurate as of 5/22/18 11:07 AM.  Always use your most recent med list.                   Brand Name Dispense Instructions for use Diagnosis    * amphetamine-dextroamphetamine 20 MG per tablet    ADDERALL     Take 20 mg by mouth daily  Reported on 3/6/2017        * amphetamine-dextroamphetamine 30 MG per 24 hr capsule    ADDERALL XR     Take 1 capsule by mouth every morning        clonazePAM 0.5 MG tablet    klonoPIN     Take 0.5-1 mg by mouth nightly as needed        cyclobenzaprine 5 MG tablet    FLEXERIL    90 tablet    Take 1-2 tablets (5-10 mg) by mouth 3 times daily as needed for muscle spasms    Back muscle spasm       DULoxetine 20 MG EC capsule    CYMBALTA     Take 1 capsule by mouth daily        HYDROcodone-acetaminophen 5-325 MG per tablet    NORCO    60 tablet    Take 1 tablet by mouth 3 times daily as needed for pain (Max 3 times daily. #60 tabs to last 30 days.)    Hip pain, right       * nicotine 14 MG/24HR 24 hr patch    NICODERM CQ    30 patch    Place 1 patch onto the skin every 24 hours Use this patch first    Tobacco abuse       * nicotine 7 MG/24HR 24 hr patch    NICODERM CQ    30 patch    Place 1 patch onto the skin every 24 hours    Tobacco abuse       tiZANidine 4 MG tablet    ZANAFLEX    60 tablet    Take 1-2 tablets (4-8 mg) by mouth nightly as needed    Lumbar radicular pain, Lumbar paraspinal muscle spasm       * Notice:  This list has 4 medication(s) that are the same as other medications prescribed for you. Read the directions carefully, and ask your doctor or other care provider to review them with you.

## 2018-05-22 NOTE — PROGRESS NOTES
"Patient Name: Hoang Kiser      YOB: 1985     Medical Record Number: [id number]  Diagnosis:    Chronic pain  Chronic low back pain             Visit Info Length of Visit: 45  Arrived: On Time   Exercise/Activity Education Instruction:  Postural Training/Anatomy  Pacing/Energy Conservation  Home Program  Self Care  Activity:  Therapeutic Exercise 20':  Aerobic Conditioning (*see \"Strength/Functional Actitivities Record for details of exercises performed)  Stretching:  Upper Ext  bilateral, Lower Ext  bilateral  Added standing hip flexor stretch  Postural Training:  dynamic, avoiding pivoting to protect hip  Continued ed/work on isolating TA, lower abdominal activation without increasing pain or feeling like he is guarding excessively.  Emphasis that soft contraction is acceptable and often preferred.  Lateral stepping motion with self manual palpation of hip flexor and avoiding overuse      Neuro re-ed 15':  Recognition of hip flexor activation and ed, madi with self manual cues, to avoid overactivation of hip flexors.  Bridging ex #1 with ed for smaller more controlled movement and hip flexor recognition  Seated neutral spine, feet flat on floor, finding most stable position, including prep for transitional movments  Verbal re-ed for standing in front of mirror, cues for thumbs fwd, shldr joint neutral  Standing position for 'finding' neutral spine  Diaphragm breathing cues madi helpful with TA activation      Self Care Management 10':  Ongoing emphasis on self care efforts, understanding of activity tolerance boundaries, limitations. .  Ongoing recfor check ins on more regular basis, at least one time per hour  Ongoing emphasis and ed for 'centering' concept and how this can be a 'reset' button    Notes:      Demonstrates/Verbalizes Technique:  4, 5 (1= poor technique-difficulty performing exercises,significant cues required; 5= good technique-performs exercises without cues)  Body Awareness: "  4 (1=low awareness; 5=high awareness)  Posture/Stability:   3, 4 (1= poor posture, stability; 5= good posture, stability)   Motivational Level:   Ask questions, Eager to learn and Cooperative Participation:  Full   Patient Satisfaction:  satisfied Response to Teaching:   cooperative, needs reinforcement and asked questions  Factors that affect learning: None   Plan of Care  Rec to continue chronic pain PT to support reactivation and integration of self regulation pain management skills and gentle strengthening for hip.  Pt remains actively engaged in his PT HEP and self care efforts.   Therapist: Faustino Ryan PT                 Date:  5/22/2018

## 2018-05-22 NOTE — PATIENT INSTRUCTIONS
After Visit Instructions:     Thank you for coming to [facility] for your care. It is my goal to partner with you to help you reach your optimal state of health.     I am recommending multidisciplinary care at this time.  The focus of care will be to continue gradual rehabilitation and pain management with medication adjustments as needed.    Continue daily self-care, identifying contributing factors, and monitoring variations in pain level. Continue to integrate self-care into your life.      1. Schedule pain psychology visits  2. Schedule physical therapy visits  3. Schedule follow-up with CARLOS A Higuera NP-C in 4 weeks or after hip injection if having pain   4. Procedures recommended: Hip injection as scheduled   5. Medication recommendations:   1. Stop Tramadol  2. Start Hydrocodone/APAP 5/325 mg. On tab up to 3 times daily BUT 60 tabs must last 30 days.     CARLOS A Bass NP-C  [facility]    Contact information: [facility]    Please call if any side effects, questions, or concerns arise.    Nurse Triage line:  [phone]   Call this number with any questions or concerns. You may leave a detailed message anytime. Calls are typically returned Monday through Friday between 8 AM and 4:30 PM. We usually get back to you within 2 business days depending on the issue/request.    Scheduling number: [phone].  Call this number to schedule or change appointments.          Medication Refills Policy:    For non-narcotic medications, please your pharmacy directly to request a refill and the pharmacy will call the [facility] Management Center for authorization. Please allow 3-4 days for these refills.    For narcotic refills, call the nurse triage line and leave a message requesting your refill along with the name of the pharmacy that you use. Narcotic prescriptions will be mailed to your pharmacy or you may pick them up at the  clinic.  Please call 7-10 days before your refill is due  The above policy allows adequate time so that you do not run out of medication.    No Show - Late Cancellation - Late Arrival Policy  We believe regular attendance is key to your success in our program.    Any time you are unable to keep your appointment we ask that you call us at  least 24 hours in advance to let us know. This will allow us to offer the appointment time to another patient. The following is our policy for missed appointments. This also applies to appointments cancelled with less than 24 hours notice.    You will receive a letter after missing your 1st and 2nd appointments without contacting the clinic before your scheduled appointment time.     After missing 3 appointments without calling first, we will cancel all of your future appointments at [facility].    At that point, you will not be able to resume services unless approved by your care team  We understand that unforseen circumstances arise, however, out of respect for all concerned and to provide this appointment to another patient, this policy will be enforced.    Please note that most follow up appointments are 30 minutes long. If you arrive late, your provider may not be able to see you for the entire 30 minutes. Please also note that if you arrive more than 15 minutes for any appointment, it may be rescheduled.

## 2018-05-22 NOTE — MR AVS SNAPSHOT
After Visit Summary   5/22/2018    Hoang Kiser    MRN: [id number]           Patient Information     Date Of Birth          1985        Visit Information        Provider Department      5/22/2018 9:30 AM Tamiko Stevens APRN CNP [facility]        Today's Diagnoses     Hip pain, right    -  1      Care Instructions    After Visit Instructions:     Thank you for coming to [facility] for your care. It is my goal to partner with you to help you reach your optimal state of health.     I am recommending multidisciplinary care at this time.  The focus of care will be to continue gradual rehabilitation and pain management with medication adjustments as needed.    Continue daily self-care, identifying contributing factors, and monitoring variations in pain level. Continue to integrate self-care into your life.      1. Schedule pain psychology visits  2. Schedule physical therapy visits  3. Schedule follow-up with CARLOS A Higuera, NP-C in 4 weeks or after hip injection if having pain   4. Procedures recommended: Hip injection as scheduled   5. Medication recommendations:   1. Stop Tramadol  2. Start Hydrocodone/APAP 5/325 mg. On tab up to 3 times daily BUT 60 tabs must last 30 days.     CARLOS A Bass, NP-C  [facility]    Contact information: [facility]    Please call if any side effects, questions, or concerns arise.    Nurse Triage line:  [phone]   Call this number with any questions or concerns. You may leave a detailed message anytime. Calls are typically returned Monday through Friday between 8 AM and 4:30 PM. We usually get back to you within 2 business days depending on the issue/request.    Scheduling number: [phone].  Call this number to schedule or change appointments.          Medication Refills Policy:    For non-narcotic medications, please your pharmacy  directly to request a refill and the pharmacy will call the [facility] Center for authorization. Please allow 3-4 days for these refills.    For narcotic refills, call the nurse triage line and leave a message requesting your refill along with the name of the pharmacy that you use. Narcotic prescriptions will be mailed to your pharmacy or you may pick them up at the clinic.  Please call 7-10 days before your refill is due  The above policy allows adequate time so that you do not run out of medication.    No Show - Late Cancellation - Late Arrival Policy  We believe regular attendance is key to your success in our program.    Any time you are unable to keep your appointment we ask that you call us at  least 24 hours in advance to let us know. This will allow us to offer the appointment time to another patient. The following is our policy for missed appointments. This also applies to appointments cancelled with less than 24 hours notice.    You will receive a letter after missing your 1st and 2nd appointments without contacting the clinic before your scheduled appointment time.     After missing 3 appointments without calling first, we will cancel all of your future appointments at [facility].    At that point, you will not be able to resume services unless approved by your care team  We understand that unforseen circumstances arise, however, out of respect for all concerned and to provide this appointment to another patient, this policy will be enforced.    Please note that most follow up appointments are 30 minutes long. If you arrive late, your provider may not be able to see you for the entire 30 minutes. Please also note that if you arrive more than 15 minutes for any appointment, it may be rescheduled.                                     Follow-ups after your visit        Your next 10 appointments already scheduled     May 24, 2018 11:00 AM CDT   Return Visit with Flii Wesley LP    [facility] ([facility])    [address]  [facility] [address]  [facility] [id number] [phone]            May 24, 2018  2:30 PM CDT   XR JOINT INJECTION ASPIRATION MAJOR RT with MGXR3, MG NEURO RAD   [facility] ([facility])    [address] [place] [id number] [phone]           Stop drinking 1 hour before the exam.  You may take your medicines as usual, except for blood thinners (Coumadin, Plavix, Ticlid, Persantine, Aggrenox, Pletal, Effient, Brilliant). Talk to your doctor if you take these.  Tell your doctor if:   You have ever had an allergic reaction to X-ray dye (contrast fluid).   There is a chance you may be pregnant.  Please bring a list of your current medicines to your exam. Include vitamins, minerals and over-the-counter medicines.  Please call the Imaging Department at your exam site with any questions.            May 29, 2018  8:40 AM CDT   Return Visit with Faustino Feldman MD   [facility] ([facility])    [address] [place] [address] [phone]              Who to contact     If you have questions or need follow up information about today's clinic visit or your schedule please contact [facility] directly at [phone].  Normal or non-critical lab and imaging results will be communicated to you by MyChart, letter or phone within 4 business days after the clinic has received the results. If you do not hear from us within 7 days, please contact the clinic through MyChart or phone. If you have a critical or abnormal lab result, we will notify you by phone as soon as possible.  Submit refill requests through [employer] or call your pharmacy and they will forward the refill request to us. Please allow 3 business days for your refill to be completed.          Additional Information About Your Visit        MyChart Information     [employer]  gives you secure access to your electronic health record. If you see a primary care provider, you can also send messages to your care team and make appointments. If you have questions, please call your primary care clinic.  If you do not have a primary care provider, please call [phone] and they will assist you.        Your Vitals Were     Pulse Respirations Pulse Oximetry BMI (Body Mass Index)          85 16 100% 26.37 kg/m2         Blood Pressure from Last 3 Encounters:   05/22/18 110/72   05/15/18 104/69   05/08/18 118/77    Weight from Last 3 Encounters:   05/22/18 78 kg (172 lb)   05/15/18 78.5 kg (173 lb)   05/08/18 78.5 kg (173 lb)              Today, you had the following     No orders found for display         Today's Medication Changes          These changes are accurate as of 5/22/18 10:08 AM.  If you have any questions, ask your nurse or doctor.               Start taking these medicines.        Dose/Directions    HYDROcodone-acetaminophen 5-325 MG per tablet   Commonly known as:  NORCO   Used for:  Hip pain, right   Started by:  Tamiko Stevens APRN CNP        Dose:  1 tablet   Take 1 tablet by mouth 3 times daily as needed for pain (Max 3 times daily. #60 tabs to last 30 days.)   Quantity:  60 tablet   Refills:  0            Where to get your medicines      Some of these will need a paper prescription and others can be bought over the counter.  Ask your nurse if you have questions.     Bring a paper prescription for each of these medications     HYDROcodone-acetaminophen 5-325 MG per tablet               Information about OPIOIDS     PRESCRIPTION OPIOIDS: WHAT YOU NEED TO KNOW   You have a prescription for an opioid (narcotic) pain medicine. Opioids can cause addiction. If you have a history of chemical dependency of any type, you are at a higher risk of becoming addicted to opioids. Only take this medicine after all other options have been tried. Take it for as short a time and as few doses  as possible.     Do not:    Drive. If you drive while taking these medicines, you could be arrested for driving under the influence (DUI).    Operate heavy machinery    Do any other dangerous activities while taking these medicines.     Drink any alcohol while taking these medicines.      Take with any other medicines that contain acetaminophen. Read all labels carefully. Look for the word  acetaminophen  or  Tylenol.  Ask your pharmacist if you have questions or are unsure.    Store your pills in a secure place, locked if possible. We will not replace any lost or stolen medicine. If you don t finish your medicine, please throw away (dispose) as directed by your pharmacist. The [place] Pollution Control Agency has more information about safe disposal: https://www.pca.[facility].mn.us/living-green/managing-unwanted-medications    All opioids tend to cause constipation. Drink plenty of water and eat foods that have a lot of fiber, such as fruits, vegetables, prune juice, apple juice and high-fiber cereal. Take a laxative (Miralax, milk of magnesia, Colace, Senna) if you don t move your bowels at least every other day.          Primary Care Provider Office Phone # Fax #    Phill Floyd [phone] [address] [facility] [address]        Equal Access to Services     BERLIN MEYER AH: Hadii aad ku hadasho Soomaali, waaxda luqadaha, qaybta kaalmada adeegyada, arnav billy. So [facility] [phone].    ATENCIÓN: Si habla español, tiene a cutler disposición servicios gratuitos de asistencia lingüística. Llame al [phone].    We comply with applicable federal civil rights laws and [place] laws. We do not discriminate on the basis of race, color, national origin, age, disability, sex, sexual orientation, or gender identity.            Thank you!     Thank you for choosing [facility]  for your care. Our goal is always to provide you with excellent  care. Hearing back from our patients is one way we can continue to improve our services. Please take a few minutes to complete the written survey that you may receive in the mail after your visit with us. Thank you!             Your Updated Medication List - Protect others around you: Learn how to safely use, store and throw away your medicines at www.disposemymeds.org.          This list is accurate as of 5/22/18 10:08 AM.  Always use your most recent med list.                   Brand Name Dispense Instructions for use Diagnosis    * amphetamine-dextroamphetamine 20 MG per tablet    ADDERALL     Take 20 mg by mouth daily Reported on 3/6/2017        * amphetamine-dextroamphetamine 30 MG per 24 hr capsule    ADDERALL XR     Take 1 capsule by mouth every morning        clonazePAM 0.5 MG tablet    klonoPIN     Take 0.5-1 mg by mouth nightly as needed        cyclobenzaprine 5 MG tablet    FLEXERIL    90 tablet    Take 1-2 tablets (5-10 mg) by mouth 3 times daily as needed for muscle spasms    Back muscle spasm       DULoxetine 20 MG EC capsule    CYMBALTA     Take 1 capsule by mouth daily        HYDROcodone-acetaminophen 5-325 MG per tablet    NORCO    60 tablet    Take 1 tablet by mouth 3 times daily as needed for pain (Max 3 times daily. #60 tabs to last 30 days.)    Hip pain, right       * nicotine 14 MG/24HR 24 hr patch    NICODERM CQ    30 patch    Place 1 patch onto the skin every 24 hours Use this patch first    Tobacco abuse       * nicotine 7 MG/24HR 24 hr patch    NICODERM CQ    30 patch    Place 1 patch onto the skin every 24 hours    Tobacco abuse       tiZANidine 4 MG tablet    ZANAFLEX    60 tablet    Take 1-2 tablets (4-8 mg) by mouth nightly as needed    Lumbar radicular pain, Lumbar paraspinal muscle spasm       * Notice:  This list has 4 medication(s) that are the same as other medications prescribed for you. Read the directions carefully, and ask your doctor or other care provider to review them with  you.

## 2018-05-24 ENCOUNTER — RADIANT APPOINTMENT (OUTPATIENT)
Dept: GENERAL RADIOLOGY | Facility: CLINIC | Age: 33
End: 2018-05-24
Attending: ORTHOPAEDIC SURGERY
Payer: COMMERCIAL

## 2018-05-24 VITALS — DIASTOLIC BLOOD PRESSURE: 64 MMHG | HEART RATE: 106 BPM | SYSTOLIC BLOOD PRESSURE: 97 MMHG | OXYGEN SATURATION: 97 %

## 2018-05-24 DIAGNOSIS — M25.551 HIP PAIN, RIGHT: ICD-10-CM

## 2018-05-24 PROCEDURE — 77002 NEEDLE LOCALIZATION BY XRAY: CPT | Performed by: RADIOLOGY

## 2018-05-24 PROCEDURE — 20610 DRAIN/INJ JOINT/BURSA W/O US: CPT | Mod: RT | Performed by: RADIOLOGY

## 2018-05-24 RX ORDER — METHYLPREDNISOLONE ACETATE 40 MG/ML
40 INJECTION, SUSPENSION INTRA-ARTICULAR; INTRALESIONAL; INTRAMUSCULAR; SOFT TISSUE ONCE
Status: COMPLETED | OUTPATIENT
Start: 2018-05-24 | End: 2018-05-24

## 2018-05-24 RX ORDER — BUPIVACAINE HYDROCHLORIDE 2.5 MG/ML
5 INJECTION, SOLUTION INFILTRATION; PERINEURAL ONCE
Status: COMPLETED | OUTPATIENT
Start: 2018-05-24 | End: 2018-05-24

## 2018-05-24 RX ORDER — IOPAMIDOL 408 MG/ML
10 INJECTION, SOLUTION INTRATHECAL ONCE
Status: COMPLETED | OUTPATIENT
Start: 2018-05-24 | End: 2018-05-24

## 2018-05-24 RX ORDER — LIDOCAINE HYDROCHLORIDE 10 MG/ML
5 INJECTION, SOLUTION EPIDURAL; INFILTRATION; INTRACAUDAL; PERINEURAL ONCE
Status: COMPLETED | OUTPATIENT
Start: 2018-05-24 | End: 2018-05-24

## 2018-05-24 RX ADMIN — BUPIVACAINE HYDROCHLORIDE 12.5 MG: 2.5 INJECTION, SOLUTION INFILTRATION; PERINEURAL at 14:56

## 2018-05-24 RX ADMIN — METHYLPREDNISOLONE ACETATE 40 MG: 40 INJECTION, SUSPENSION INTRA-ARTICULAR; INTRALESIONAL; INTRAMUSCULAR; SOFT TISSUE at 14:57

## 2018-05-24 RX ADMIN — IOPAMIDOL 1 ML: 408 INJECTION, SOLUTION INTRATHECAL at 14:56

## 2018-05-24 RX ADMIN — LIDOCAINE HYDROCHLORIDE 5 ML: 10 INJECTION, SOLUTION EPIDURAL; INFILTRATION; INTRACAUDAL; PERINEURAL at 14:57

## 2018-05-24 NOTE — PROGRESS NOTES
: Hoang was seen in X-ray today for a right hip injection. Patient rated pain before procedure 5/10. After procedure patient rated pain 2/10. This pain level is (is/is not) acceptable to patient.      AFTER YOU GO HOME    ? DO relax; minimize your activity for 24 hours  ? You may resume normal activity tomorrow  ? You may remove the bandage in the evening or next morning  ? You may resume bathing the next day  ? Drink at least 4 extra glasses of fluid today if not on fluid restrictions  ? DO NOT drive or operate machinery at home or at work for at least 24 hours      VISIT THE EMERGENCY ROOM OR URGENT CARE IF:    ? There is redness or swelling at the injection site  ? There is discharge from the injection site  ? You develop a temperature of 101  F or greater      ADDITIONAL INSTRUCTIONS:     ? You may resume your Coumadin or other blood thinner at your regular dose today.  Follow up with your physician to have your INR rechecked if indicated.  ? If you gain no relief from the injection after two (2) weeks, follow-up with your provider for your options.        Contacts:    During business hours from 8 to 5 pm, you may call [phone] to reach a nurse advisor at [facility].  After hours, call [facility]  [phone].  Ask for the Radiologist on-call.  Someone is on-call 24 hrs/day.  [facility] Toll Free Number   [phone]

## 2018-05-28 ENCOUNTER — MYC MEDICAL ADVICE (OUTPATIENT)
Dept: PALLIATIVE MEDICINE | Facility: CLINIC | Age: 33
End: 2018-05-28

## 2018-05-29 ENCOUNTER — OFFICE VISIT (OUTPATIENT)
Dept: ORTHOPEDICS | Facility: CLINIC | Age: 33
End: 2018-05-29
Payer: COMMERCIAL

## 2018-05-29 VITALS
BODY MASS INDEX: 26.07 KG/M2 | WEIGHT: 172 LBS | SYSTOLIC BLOOD PRESSURE: 128 MMHG | HEIGHT: 68 IN | DIASTOLIC BLOOD PRESSURE: 82 MMHG | HEART RATE: 89 BPM

## 2018-05-29 DIAGNOSIS — M25.552 BILATERAL HIP PAIN: ICD-10-CM

## 2018-05-29 DIAGNOSIS — M62.830 LUMBAR PARASPINAL MUSCLE SPASM: Primary | ICD-10-CM

## 2018-05-29 DIAGNOSIS — F19.11 HX OF SUBSTANCE ABUSE (H): ICD-10-CM

## 2018-05-29 DIAGNOSIS — M25.551 BILATERAL HIP PAIN: ICD-10-CM

## 2018-05-29 DIAGNOSIS — M25.859 HIP IMPINGEMENT SYNDROME, UNSPECIFIED LATERALITY: ICD-10-CM

## 2018-05-29 PROCEDURE — 99214 OFFICE O/P EST MOD 30 MIN: CPT | Performed by: PREVENTIVE MEDICINE

## 2018-05-29 ASSESSMENT — PAIN SCALES - GENERAL: PAINLEVEL: MODERATE PAIN (5)

## 2018-05-29 NOTE — NURSING NOTE
"Hoang Kiser's chief complaint for this visit includes:  Chief Complaint   Patient presents with     RECHECK     Follow up, discuss work restrictions     PCP: Phill Floyd    Referring Provider:  No referring provider defined for this encounter.    /82  Pulse 89  Ht 1.72 m (5' 7.72\")  Wt 78 kg (172 lb)  BMI 26.37 kg/m2  Moderate Pain (5)     Do you need any medication refills at today's visit? No    "

## 2018-05-29 NOTE — LETTER
5/29/2018         RE: Hoang Kiser  [address]  [place] [address]        Dear Colleague,    Thank you for referring your patient, Hoang Kiser, to the [facility]. Please see a copy of my visit note below.    HISTORY OF PRESENT ILLNESS  Mr. Kiser is a pleasant 32 year old year old male who presents to clinic today for followup for lumbar spasms  He has expressed the reason for this visit as 'I need work restrictions because I am on unemployment'  He states that he 'needs to see his psychiatrist again because he doesn't think his current cymbalta isn't working as well'  He states that he has seen two surgeons, one of whom is my partner who is a hip surgeon' who discussed xrays of his hips and suggested he may 'need a hip surgery because of impingement' and I noted that now his hip pain is more subjectively worse and he is less concerned about his back.      MEDICAL HISTORY  Patient Active Problem List   Diagnosis     Neuropathy     Moderate major depression (H)     Tobacco abuse     Attention deficit hyperactivity disorder (ADHD), predominantly inattentive type     Primary insomnia     History of MRI of brain and brain stem     Panic disorder without agoraphobia     Abnormal involuntary movement     Tic disorder     normal emg 2017     Anxiety     Panic attack     Posttraumatic stress disorder with dissociative symptoms       Current Outpatient Prescriptions   Medication Sig Dispense Refill     amphetamine-dextroamphetamine (ADDERALL XR) 30 MG per 24 hr capsule Take 1 capsule by mouth every morning  0     amphetamine-dextroamphetamine (ADDERALL) 20 MG tablet Take 20 mg by mouth daily Reported on 3/6/2017  0     clonazePAM (KLONOPIN) 0.5 MG tablet Take 0.5-1 mg by mouth nightly as needed   0     cyclobenzaprine (FLEXERIL) 5 MG tablet Take 1-2 tablets (5-10 mg) by mouth 3 times daily as needed for muscle spasms 90 tablet 1     DULoxetine (CYMBALTA) 20 MG EC capsule Take 1  capsule by mouth daily  0     HYDROcodone-acetaminophen (NORCO) 5-325 MG per tablet Take 1 tablet by mouth 3 times daily as needed for pain (Max 3 times daily. #60 tabs to last 30 days.) 60 tablet 0     nicotine (NICODERM CQ) 14 MG/24HR 24 hr patch Place 1 patch onto the skin every 24 hours Use this patch first 30 patch 0     nicotine (NICODERM CQ) 7 MG/24HR 24 hr patch Place 1 patch onto the skin every 24 hours 30 patch 0     tiZANidine (ZANAFLEX) 4 MG tablet Take 1-2 tablets (4-8 mg) by mouth nightly as needed 60 tablet 3       Allergies   Allergen Reactions     Buspirone Hcl Other (See Comments)     Panic attacks     Doxycycline Diarrhea, Fatigue, GI Disturbance and Nausea     Naproxen      Possible abnormal liver function tests      Trazodone Fatigue     Keflex [Cephalexin] Diarrhea       Family History   Problem Relation Age of Onset     Lipids Father      hyperlipidemia     Hyperlipidemia Father      Obesity Father      Arthritis Mother      Hyperlipidemia Mother      Depression Mother      Anxiety Disorder Mother      MENTAL ILLNESS Mother      Obesity Mother      Asthma Brother      Asthma Sister      Depression Other      Hearing Loss Other      Psychotic Disorder Other      Obesity Other      CEREBROVASCULAR DISEASE Paternal Grandfather      Alzheimer Disease Paternal Grandfather      Depression Brother      MENTAL ILLNESS Brother      Bipolar     Asthma Brother      Depression Sister      Asthma Sister      Substance Abuse Sister      Alcohol     MENTAL ILLNESS Brother      Bipolar     Asthma Brother      Asthma Sister      Obesity Sister      Asthma Brother      Obesity Brother      Obesity Maternal Grandmother      Genetic Disorder Maternal Grandmother      Epilepsy       Additional medical/Social/Surgical histories reviewed in [facility] and updated as appropriate.     REVIEW OF SYSTEMS (5/29/2018)  10 point ROS of systems including Constitutional, Eyes, Respiratory, Cardiovascular, Gastroenterology,  "Genitourinary, Integumentary, Musculoskeletal, Psychiatric were all negative except for pertinent positives noted in my HPI.     PHYSICAL EXAM  Vitals:    05/29/18 0835   BP: 128/82   Pulse: 89   Weight: 78 kg (172 lb)   Height: 1.72 m (5' 7.72\")     Vital Signs: /82  Pulse 89  Ht 1.72 m (5' 7.72\")  Wt 78 kg (172 lb)  BMI 26.37 kg/m2 Patient declined being weighed. Body mass index is 26.37 kg/(m^2).    General  - normal appearance, in no obvious distress  CV  - normal peripheral perfusion  Pulm  - normal respiratory pattern, non-labored  Musculoskeletal - lumbar spine  - stance: normal gait without limp, no obvious leg length discrepancy, normal heel and toe walk  - inspection: normal bone and joint alignment, no obvious scoliosis  - palpation: no paravertebral or bony tenderness  - ROM: flexion exacerbates pain in low back, and deliberately starts shaking in his low back and legs when asked to bend, however, I witnessed him not visibly shake in his low back and legs when he bended over on his own normal extension, sidebending, rotation  Reports bilateral hip pain with internal rotation but tolerable, he did not have this pain on previous exams when I completed these exams, and I noted that he 'started feeling this after he was seen and examined by two surgeons who reviewed his xrays of his hips and discussed the topic of impingement with him  - strength: lower extremities 5/5 in all planes  - special tests:  (+) straight leg raise- causes bilateral lower back pain  (+) slump test- subjective pain, no change in pain in legs  Neuro  - patellar and Achilles DTRs 2+ bilaterally,NO  lower extremity sensory deficit throughout L5 distribution, grossly normal coordination, normal muscle tone  Skin  - no ecchymosis, erythema, warmth, or induration, no obvious rash  Psych  - interactive, appropriate, normal mood and affect    ASSESSMENT & PLAN  31 yo male with bilateral hip pain and lumbar spasms, " improved  Reviewed his recent treatment through pain clinic and PT, has been doing better, but now has stated he is receiving unemployment and requests that he have work restrictions, because 'he can't find a job and is not motivated'  He has expressed that he may need more mental health treatment, he has no HI or SI, and has not felt like his medication is helping as much as it used to.  Cont. PT and followup for pain clinic  I will connect with his psychiatrist and consider muchausen's syndrome as a cause of some of his physical pain.  Will give work restrictions as stated in letter, and followup in 1 month        Faustino Feldman MD, CAQSM    Again, thank you for allowing me to participate in the care of your patient.        Sincerely,        Faustino Feldman MD

## 2018-05-29 NOTE — PATIENT INSTRUCTIONS
Thanks for coming today.  [facility]  [address] [place] [address]    To schedule future appointments in Ortho Clinic, you may call [phone].    To schedule ordered imaging by your provider:   Call [facility] [phone]    To schedule an injection ordered by your provider:  Call [facility] Injection scheduling line: [phone]  [email or web address] available online at:  [email or web address]    Please call if any further questions or concerns ([phone]).  Clinic hours 8 am to 5 pm.    Return to clinic (call) if symptoms worsen or fail to improve.

## 2018-05-29 NOTE — PROGRESS NOTES
HISTORY OF PRESENT ILLNESS  Mr. Kiser is a pleasant 32 year old year old male who presents to clinic today for followup for lumbar spasms  He has expressed the reason for this visit as 'I need work restrictions because I am on unemployment'  He states that he 'needs to see his psychiatrist again because he doesn't think his current cymbalta isn't working as well'  He states that he has seen two surgeons, one of whom is my partner who is a hip surgeon' who discussed xrays of his hips and suggested he may 'need a hip surgery because of impingement' and I noted that now his hip pain is more subjectively worse and he is less concerned about his back.      MEDICAL HISTORY  Patient Active Problem List   Diagnosis     Neuropathy     Moderate major depression (H)     Tobacco abuse     Attention deficit hyperactivity disorder (ADHD), predominantly inattentive type     Primary insomnia     History of MRI of brain and brain stem     Panic disorder without agoraphobia     Abnormal involuntary movement     Tic disorder     normal emg 2017     Anxiety     Panic attack     Posttraumatic stress disorder with dissociative symptoms       Current Outpatient Prescriptions   Medication Sig Dispense Refill     amphetamine-dextroamphetamine (ADDERALL XR) 30 MG per 24 hr capsule Take 1 capsule by mouth every morning  0     amphetamine-dextroamphetamine (ADDERALL) 20 MG tablet Take 20 mg by mouth daily Reported on 3/6/2017  0     clonazePAM (KLONOPIN) 0.5 MG tablet Take 0.5-1 mg by mouth nightly as needed   0     cyclobenzaprine (FLEXERIL) 5 MG tablet Take 1-2 tablets (5-10 mg) by mouth 3 times daily as needed for muscle spasms 90 tablet 1     DULoxetine (CYMBALTA) 20 MG EC capsule Take 1 capsule by mouth daily  0     HYDROcodone-acetaminophen (NORCO) 5-325 MG per tablet Take 1 tablet by mouth 3 times daily as needed for pain (Max 3 times daily. #60 tabs to last 30 days.) 60 tablet 0     nicotine (NICODERM CQ) 14 MG/24HR 24 hr patch  "Place 1 patch onto the skin every 24 hours Use this patch first 30 patch 0     nicotine (NICODERM CQ) 7 MG/24HR 24 hr patch Place 1 patch onto the skin every 24 hours 30 patch 0     tiZANidine (ZANAFLEX) 4 MG tablet Take 1-2 tablets (4-8 mg) by mouth nightly as needed 60 tablet 3       Allergies   Allergen Reactions     Buspirone Hcl Other (See Comments)     Panic attacks     Doxycycline Diarrhea, Fatigue, GI Disturbance and Nausea     Naproxen      Possible abnormal liver function tests      Trazodone Fatigue     Keflex [Cephalexin] Diarrhea       Family History   Problem Relation Age of Onset     Lipids Father      hyperlipidemia     Hyperlipidemia Father      Obesity Father      Arthritis Mother      Hyperlipidemia Mother      Depression Mother      Anxiety Disorder Mother      MENTAL ILLNESS Mother      Obesity Mother      Asthma Brother      Asthma Sister      Depression Other      Hearing Loss Other      Psychotic Disorder Other      Obesity Other      CEREBROVASCULAR DISEASE Paternal Grandfather      Alzheimer Disease Paternal Grandfather      Depression Brother      MENTAL ILLNESS Brother      Bipolar     Asthma Brother      Depression Sister      Asthma Sister      Substance Abuse Sister      Alcohol     MENTAL ILLNESS Brother      Bipolar     Asthma Brother      Asthma Sister      Obesity Sister      Asthma Brother      Obesity Brother      Obesity Maternal Grandmother      Genetic Disorder Maternal Grandmother      Epilepsy       Additional medical/Social/Surgical histories reviewed in [facility] and updated as appropriate.     REVIEW OF SYSTEMS (5/29/2018)  10 point ROS of systems including Constitutional, Eyes, Respiratory, Cardiovascular, Gastroenterology, Genitourinary, Integumentary, Musculoskeletal, Psychiatric were all negative except for pertinent positives noted in my HPI.     PHYSICAL EXAM  Vitals:    05/29/18 0835   BP: 128/82   Pulse: 89   Weight: 78 kg (172 lb)   Height: 1.72 m (5' 7.72\")     Vital " "Signs: /82  Pulse 89  Ht 1.72 m (5' 7.72\")  Wt 78 kg (172 lb)  BMI 26.37 kg/m2 Patient declined being weighed. Body mass index is 26.37 kg/(m^2).    General  - normal appearance, in no obvious distress  CV  - normal peripheral perfusion  Pulm  - normal respiratory pattern, non-labored  Musculoskeletal - lumbar spine  - stance: normal gait without limp, no obvious leg length discrepancy, normal heel and toe walk  - inspection: normal bone and joint alignment, no obvious scoliosis  - palpation: no paravertebral or bony tenderness  - ROM: flexion exacerbates pain in low back, and deliberately starts shaking in his low back and legs when asked to bend, however, I witnessed him not visibly shake in his low back and legs when he bended over on his own normal extension, sidebending, rotation  Reports bilateral hip pain with internal rotation but tolerable, he did not have this pain on previous exams when I completed these exams, and I noted that he 'started feeling this after he was seen and examined by two surgeons who reviewed his xrays of his hips and discussed the topic of impingement with him  - strength: lower extremities 5/5 in all planes  - special tests:  (+) straight leg raise- causes bilateral lower back pain  (+) slump test- subjective pain, no change in pain in legs  Neuro  - patellar and Achilles DTRs 2+ bilaterally,NO  lower extremity sensory deficit throughout L5 distribution, grossly normal coordination, normal muscle tone  Skin  - no ecchymosis, erythema, warmth, or induration, no obvious rash  Psych  - interactive, appropriate, normal mood and affect    ASSESSMENT & PLAN  31 yo male with bilateral hip pain and lumbar spasms, improved  Reviewed his recent treatment through pain clinic and PT, has been doing better, but now has stated he is receiving unemployment and requests that he have work restrictions, because 'he can't find a job and is not motivated'  He has expressed that he may need more " mental health treatment, he has no HI or SI, and has not felt like his medication is helping as much as it used to.  Cont. PT and followup for pain clinic  I will connect with his psychiatrist and consider muchausen's syndrome as a cause of some of his physical pain.  Will give work restrictions as stated in letter, and followup in 1 month        Faustino Feldman MD, CAQSM

## 2018-05-29 NOTE — LETTER
May 29, 2018      RE: Hoang Kiser  [address]  [facility] [address]        To whom it may concern:    Hoang Kiser is under my professional care for low back pain and hip pain. He  may return to work with the following: The employee is ABLE to return to work today with the following restrictions:    When the patient returns to work, the following restrictions apply until I re-evaluate him by July 1st, 2018:    A) Bend: Frequently (up to 8 hours- with a 45 minute break every 4 hours)  B) Squat: Frequently (up to 8 hours- with a 45 minute break every 4 hours)  C) Walk/Stand: Frequently (up to 8 hours- with a 45 minute break every 4 hours)  D) Reach Above Shoulders: Frequently (up to 8 hours- with a 45 minute break every 4 hours)  E) Lift, carry, push, and pull no more than:  20-25 lbs.   F) Hoang should be allowed a 45 minute break every 4 hours to stretch and rest his back and hips.      Sincerely,      Faustino Feldman MD

## 2018-05-31 ENCOUNTER — OFFICE VISIT (OUTPATIENT)
Dept: PALLIATIVE MEDICINE | Facility: CLINIC | Age: 33
End: 2018-05-31
Payer: COMMERCIAL

## 2018-05-31 DIAGNOSIS — M62.830 BACK MUSCLE SPASM: ICD-10-CM

## 2018-05-31 DIAGNOSIS — M51.369 DDD (DEGENERATIVE DISC DISEASE), LUMBAR: Primary | ICD-10-CM

## 2018-05-31 DIAGNOSIS — G89.29 CHRONIC LOW BACK PAIN WITH SCIATICA, SCIATICA LATERALITY UNSPECIFIED, UNSPECIFIED BACK PAIN LATERALITY: ICD-10-CM

## 2018-05-31 DIAGNOSIS — M54.40 CHRONIC LOW BACK PAIN WITH SCIATICA, SCIATICA LATERALITY UNSPECIFIED, UNSPECIFIED BACK PAIN LATERALITY: ICD-10-CM

## 2018-05-31 DIAGNOSIS — M79.2 NEUROPATHIC PAIN: ICD-10-CM

## 2018-05-31 PROCEDURE — 96152 HC HEALTH AND BEHAVIOR INTERVENTION, INDIVIDUAL, EACH 15 MINUTES: CPT | Performed by: PSYCHOLOGIST

## 2018-05-31 NOTE — MR AVS SNAPSHOT
After Visit Summary   5/31/2018    Hoang Kiser    MRN: [id number]           Patient Information     Date Of Birth          1985        Visit Information        Provider Department      5/31/2018 9:00 AM Fili Wesley LP [facility]        Today's Diagnoses     DDD (degenerative disc disease), lumbar    -  1    Back muscle spasm        Neuropathic pain        Chronic low back pain with sciatica, sciatica laterality unspecified, unspecified back pain laterality           Follow-ups after your visit        Your next 10 appointments already scheduled     Jun 12, 2018 10:15 AM CDT   Return Visit with Faustino Ryan PT   [facility] ([facility])    [address]  [facility] [id number] [phone]            Jun 14, 2018  9:00 AM CDT   Return Visit with Fili Wesley LP   [facility] ([facility])    [address]  [address]  [facility] [id number] [phone]            Jun 19, 2018 10:00 AM CDT   Return Visit with CARLOS A Guy CNP   [facility] ([facility])    [address]  [address]  [facility] [id number] [phone]            Jun 26, 2018  8:00 AM CDT   Return Visit with Faustino Feldman MD   [facility] ([facility])    [address] [place] [id number] [phone]              Who to contact     If you have questions or need follow up information about today's clinic visit or your schedule please contact [facility] directly at [phone].  Normal or non-critical lab and imaging results will be communicated to you by MyChart, letter or phone within 4 business days after the clinic has received the results. If you do not hear from us within 7 days, please contact the clinic through MyChart or phone. If you have a critical or abnormal lab result, we  will notify you by phone as soon as possible.  Submit refill requests through [employer] or call your pharmacy and they will forward the refill request to us. Please allow 3 business days for your refill to be completed.          Additional Information About Your Visit        [employer] Information     [employer] gives you secure access to your electronic health record. If you see a primary care provider, you can also send messages to your care team and make appointments. If you have questions, please call your primary care clinic.  If you do not have a primary care provider, please call [phone] and they will assist you.         Blood Pressure from Last 3 Encounters:   05/29/18 128/82   05/24/18 97/64   05/22/18 110/72    Weight from Last 3 Encounters:   05/29/18 78 kg (172 lb)   05/22/18 78 kg (172 lb)   05/15/18 78.5 kg (173 lb)              We Performed the Following     HEALTH & BEHAVIOR ASSESS, INITIAL, EA 15MIN PHD        Primary Care Provider Office Phone # Fax #    Phill Floyd [phone] [address] [facility] [address]        Equal Access to Services     [facility] : Hadii rosemary bobbyo Soevelyn, waaxda luqadaha, qaybta kaalmada shruthi, arnav lorenzana . So [facility] [phone].    ATENCIÓN: Si habla español, tiene a cutler disposición servicios gratuitos de asistencia lingüística. [facility] [phone].    We comply with applicable federal civil rights laws and [place] laws. We do not discriminate on the basis of race, color, national origin, age, disability, sex, sexual orientation, or gender identity.            Thank you!     Thank you for choosing [facility]  for your care. Our goal is always to provide you with excellent care. Hearing back from our patients is one way we can continue to improve our services. Please take a few minutes to complete the written survey that you may receive in the mail after your visit with us.  Thank you!             Your Updated Medication List - Protect others around you: Learn how to safely use, store and throw away your medicines at www.disposemymeds.org.          This list is accurate as of 5/31/18 11:59 PM.  Always use your most recent med list.                   Brand Name Dispense Instructions for use Diagnosis    * amphetamine-dextroamphetamine 20 MG per tablet    ADDERALL     Take 20 mg by mouth daily Reported on 3/6/2017        * amphetamine-dextroamphetamine 30 MG per 24 hr capsule    ADDERALL XR     Take 1 capsule by mouth every morning        clonazePAM 0.5 MG tablet    klonoPIN     Take 0.5-1 mg by mouth nightly as needed        cyclobenzaprine 5 MG tablet    FLEXERIL    90 tablet    Take 1-2 tablets (5-10 mg) by mouth 3 times daily as needed for muscle spasms    Back muscle spasm       DULoxetine 20 MG EC capsule    CYMBALTA     Take 1 capsule by mouth daily        HYDROcodone-acetaminophen 5-325 MG per tablet    NORCO    60 tablet    Take 1 tablet by mouth 3 times daily as needed for pain (Max 3 times daily. #60 tabs to last 30 days.)    Hip pain, right       * nicotine 14 MG/24HR 24 hr patch    NICODERM CQ    30 patch    Place 1 patch onto the skin every 24 hours Use this patch first    Tobacco abuse       * nicotine 7 MG/24HR 24 hr patch    NICODERM CQ    30 patch    Place 1 patch onto the skin every 24 hours    Tobacco abuse       tiZANidine 4 MG tablet    ZANAFLEX    60 tablet    Take 1-2 tablets (4-8 mg) by mouth nightly as needed    Lumbar radicular pain, Lumbar paraspinal muscle spasm       * Notice:  This list has 4 medication(s) that are the same as other medications prescribed for you. Read the directions carefully, and ask your doctor or other care provider to review them with you.

## 2018-05-31 NOTE — PROGRESS NOTES
"                                  [facility]   [facility] [place]  Behavioral Medicine Visit    Patient Name: Hoang Kiser    YOB: 1985   Medical Record Number: [id number]  Date: 5/31/2018    SUBJECTIVE: \"I have been to 18 medical appointments this month\".  Patient expresses high frustration regarding pain services.  Patient reports he is continuing to have muscle spasms that create distress.  Patient reports positively that his recent medications which has given him an appreciation of the fact that he is not engaged in any negative cognitive thinking regarding his addiction issues.  Today we discuss whether patients proneness towards analytical thinking is barrier for pain relief.  Patient assures me that pain generally does not dominate his day and that he is trying to get answers from medical professionals but he feels when he is away from those services he is living his life as fully as is allowed by his pain limitations.  The patient reports at this time he does not want to proceed with hypnosis related interventions.        OBJECTIVE: Today the patient reports the following: His pain level is moderately worse, his mood is mildly worse, his activity level has remained the same, his stress level is mildly worse and his sleep is mildly worse.  Today the patient reports he is involved in self-care activities 2-3 times per day.  Today the patient reports since receiving services at [facility] he is overall experienced slight improvement.   Length of Visit: 60 minutes      Assessment: Current Emotional / Mental Status    Appearance:   Appropriate   Eye Contact:   Good   Psychomotor Behavior: Normal   Attitude:   Cooperative   Orientation:   All  Speech  Rate / Production:             Normal   Volume:              Normal   Mood:    Normal  Affect:    Appropriate   Thought Content:  Clear   Thought Form:  Coherent  Logical "   Insight:    Fair     ASSESSMENT:   Per patient pain provider: Degenerative disc disease, lumbar, muscle spasm, neuropathic pain, chronic low back pain with sciatica, sciatica laterality unspecified, unspecified back pain laterality.    Progress toward goals: good.    Pain Status: worsened    Emotional Status: worsened              Medication / chemical use concerns: None    PLAN:   Next Appointment: Hoang Kiser will schedule a follow-up appointment in 2 weeks.  Assignment: Continue positive engagement and relaxation based activities  Objectives / interventions for next session: Continue to process chronic pain and pain acceptance    Fili Wesley MA, LP, [facility]  Behavioral Pain Specialist  [facility]

## 2018-06-04 ENCOUNTER — MYC MEDICAL ADVICE (OUTPATIENT)
Dept: PALLIATIVE MEDICINE | Facility: CLINIC | Age: 33
End: 2018-06-04

## 2018-06-06 ENCOUNTER — MYC MEDICAL ADVICE (OUTPATIENT)
Dept: PALLIATIVE MEDICINE | Facility: CLINIC | Age: 33
End: 2018-06-06

## 2018-06-12 ENCOUNTER — TRANSFERRED RECORDS (OUTPATIENT)
Dept: HEALTH INFORMATION MANAGEMENT | Facility: CLINIC | Age: 33
End: 2018-06-12

## 2018-06-12 ENCOUNTER — OFFICE VISIT (OUTPATIENT)
Dept: PALLIATIVE MEDICINE | Facility: CLINIC | Age: 33
End: 2018-06-12
Payer: COMMERCIAL

## 2018-06-12 VITALS
DIASTOLIC BLOOD PRESSURE: 68 MMHG | RESPIRATION RATE: 16 BRPM | BODY MASS INDEX: 26.37 KG/M2 | OXYGEN SATURATION: 99 % | WEIGHT: 172 LBS | HEART RATE: 87 BPM | SYSTOLIC BLOOD PRESSURE: 108 MMHG

## 2018-06-12 DIAGNOSIS — M54.50 CHRONIC LOW BACK PAIN: ICD-10-CM

## 2018-06-12 DIAGNOSIS — M25.551 HIP PAIN, RIGHT: ICD-10-CM

## 2018-06-12 DIAGNOSIS — G89.29 CHRONIC LOW BACK PAIN: ICD-10-CM

## 2018-06-12 DIAGNOSIS — Z79.899 POLYPHARMACY: Primary | ICD-10-CM

## 2018-06-12 DIAGNOSIS — G89.29 CHRONIC PAIN: Primary | ICD-10-CM

## 2018-06-12 LAB — PHQ9 SCORE: 9

## 2018-06-12 PROCEDURE — 97535 SELF CARE MNGMENT TRAINING: CPT | Mod: GP | Performed by: PHYSICAL THERAPIST

## 2018-06-12 PROCEDURE — 99214 OFFICE O/P EST MOD 30 MIN: CPT | Performed by: NURSE PRACTITIONER

## 2018-06-12 PROCEDURE — 97112 NEUROMUSCULAR REEDUCATION: CPT | Mod: GP | Performed by: PHYSICAL THERAPIST

## 2018-06-12 PROCEDURE — 97110 THERAPEUTIC EXERCISES: CPT | Mod: GP | Performed by: PHYSICAL THERAPIST

## 2018-06-12 RX ORDER — HYDROCODONE BITARTRATE AND ACETAMINOPHEN 5; 325 MG/1; MG/1
1 TABLET ORAL 3 TIMES DAILY PRN
Qty: 60 TABLET | Refills: 0 | Status: SHIPPED | OUTPATIENT
Start: 2018-06-12 | End: 2018-07-10

## 2018-06-12 ASSESSMENT — PAIN SCALES - GENERAL: PAINLEVEL: SEVERE PAIN (6)

## 2018-06-12 NOTE — PROGRESS NOTES
"Patient Name: Hoang Kiser      YOB: 1985     Medical Record Number: [id number]  Diagnosis:    Chronic pain  Chronic low back pain             Visit Info Length of Visit: 45  Arrived: On Time   Exercise/Activity Education Instruction:  Postural Training/Anatomy  Pacing/Energy Conservation  Home Program  Self Care  Activity:  Therapeutic Exercise 12':  Aerobic Conditioning (*see \"Strength/Functional Actitivities Record for details of exercises performed)  Tmill walking x 6' with approp HR increase  Stretching:  Upper Ext  bilateral, Lower Ext  bilateral  Added standing hip flexor stretch (not today)  Postural Training:  dynamic during Tmill walk.  Pt able to keep hips level and avoid rotation but preferred position would be to go into L lumbar/trunk rotation.  Pt feels this puts him in a 'twist' position.  Ed on it is ok to come out of ideal neutral spine position at times if needed to modify, adjust pain/sxs and that goal is to be aware of most neutral spine positioning long term.        Neuro re-ed 12':  Verbal review of being able to recognize of hip flexor activation and to avoid overactivation of hip flexors.    Seated neutral spine 'finding' least stressed joint position  Verbal re-ed for standing in front of mirror, cues for thumbs fwd, shldr joint neutral - ed for it being ok to not always be in neutral spine position if too symptomatic.  Emphasis on functional application of 3 principles of Pain PT - utilize your awareness of body, think self care and engage in low intensity aerobic exercise/activity consistently  Standing position for 'finding' neutral spine      Self Care Management 15':  Lengthy discussion regarding ways he is currently engaged in self care efforts and ed on the positive benefits that these established self care efforts have to assist in managing his ongoing pain and spasm complaintsOngoing emphasis on self care efforts, understanding of activity tolerance boundaries, " limitations. .  Ongoing recfor check ins on more regular basis, at least one time per hour  Ongoing emphasis and ed for 'centering' concept and how this can be a 'reset' button    Notes:   Generally frustrated by ongoing pain. Complains of muscle spasms that alter his ability to stay in most neutral spine position.  Emphasized the positive aspect of what he is able to do regarding his physical therapy program.  Pt does acknowledge he has some good things established and is attempting to stay compliant and engaged in his PT HEP and self care efforts.  Slowly progressing toward goals.  Note that at end of session today, pt shared a drawing that he created himself.  It depicted a skeleton with various pain-type symbols where he feels the pain.  Offered to give the drawing back but he declined and was also fine to share this with pain provider, Tamiko Stevens NP.   Demonstrates/Verbalizes Technique:  4, 5 (1= poor technique-difficulty performing exercises,significant cues required; 5= good technique-performs exercises without cues)  Body Awareness:  4 (1=low awareness; 5=high awareness)  Posture/Stability:   3, 4 (1= poor posture, stability; 5= good posture, stability)   Motivational Level:   Ask questions, Eager to learn and Cooperative Participation:  Full   Patient Satisfaction:  satisfied Response to Teaching:   cooperative, needs reinforcement and asked questions  Factors that affect learning: None   Plan of Care  Recommend to continue chronic pain PT to support reactivation and integration of self regulation pain management skills and gentle strengthening for hip.  Pt remains engaged in his PT HEP but does verbalize frustration with lack of recent progress.    Therapist: Faustino Ryan PT                 Date:  6/12/2018

## 2018-06-12 NOTE — MR AVS SNAPSHOT
After Visit Summary   6/12/2018    Hoang Kiser    MRN: [id number]           Patient Information     Date Of Birth          1985        Visit Information        Provider Department      6/12/2018 9:30 AM Tamiko Stevens APRN CNP [facility]        Today's Diagnoses     Polypharmacy    -  1    Hip pain, right          Care Instructions    After Visit Instructions:     Thank you for coming to [facility] for your care. It is my goal to partner with you to help you reach your optimal state of health.     I am recommending multidisciplinary care at this time.  The focus of care will be to continue gradual rehabilitation and pain management with medication adjustments as needed.    Continue daily self-care, identifying contributing factors, and monitoring variations in pain level. Continue to integrate self-care into your life.      1. Schedule pain psychology assessment/visits  2. Schedule physical therapy assessment/visits  3. Schedule follow-up with CARLOS A Higuera NP-C in 4 weeks  4. Labs: metabolic panel  5. Release of information: FIGUEROA Taylor for Tamiko to speak with her.   6. Medication recommendations:   1. No change to medications. Refill of Norco provided      CARLOS A Bass NP-C  [facility]    Contact information: [facility]    Please call if any side effects, questions, or concerns arise.    Nurse Triage line:  [phone]   Call this number with any questions or concerns. You may leave a detailed message anytime. Calls are typically returned Monday through Friday between 8 AM and 4:30 PM. We usually get back to you within 2 business days depending on the issue/request.    Scheduling number: [phone].  Call this number to schedule or change appointments.          Medication Refills Policy:    For non-narcotic medications, please your pharmacy directly to  request a refill and the pharmacy will call the [facility] for authorization. Please allow 3-4 days for these refills.    For narcotic refills, call the nurse triage line and leave a message requesting your refill along with the name of the pharmacy that you use. Narcotic prescriptions will be mailed to your pharmacy or you may pick them up at the clinic.  Please call 7-10 days before your refill is due  The above policy allows adequate time so that you do not run out of medication.    No Show - Late Cancellation - Late Arrival Policy  We believe regular attendance is key to your success in our program.    Any time you are unable to keep your appointment we ask that you call us at  least 24 hours in advance to let us know. This will allow us to offer the appointment time to another patient. The following is our policy for missed appointments. This also applies to appointments cancelled with less than 24 hours notice.    You will receive a letter after missing your 1st and 2nd appointments without contacting the clinic before your scheduled appointment time.     After missing 3 appointments without calling first, we will cancel all of your future appointments at [facility].    At that point, you will not be able to resume services unless approved by your care team  We understand that unforseen circumstances arise, however, out of respect for all concerned and to provide this appointment to another patient, this policy will be enforced.    Please note that most follow up appointments are 30 minutes long. If you arrive late, your provider may not be able to see you for the entire 30 minutes. Please also note that if you arrive more than 15 minutes for any appointment, it may be rescheduled.                                     Follow-ups after your visit        Your next 10 appointments already scheduled     Jun 12, 2018 10:15 AM CDT   Return Visit with Faustino Ryan, PT   [facility] ([facility])    [address]  [address]  [facility] [address] [phone]            Jun 14, 2018  9:00 AM CDT   Return Visit with Fili Wesley LP   [facility] ([facility])    [address]  [address]  [facility] [address] [phone]            Jun 26, 2018  8:00 AM CDT   Return Visit with Faustino Feldman MD   [facility] ([facility])    [address] [place] [address] [phone]              Future tests that were ordered for you today     Open Future Orders        Priority Expected Expires Ordered    Comprehensive metabolic panel Routine  6/12/2019 6/12/2018            Who to contact     If you have questions or need follow up information about today's clinic visit or your schedule please contact [facility] directly at [phone].  Normal or non-critical lab and imaging results will be communicated to you by [employer], letter or phone within 4 business days after the clinic has received the results. If you do not hear from us within 7 days, please contact the clinic through [employer] or phone. If you have a critical or abnormal lab result, we will notify you by phone as soon as possible.  Submit refill requests through [employer] or call your pharmacy and they will forward the refill request to us. Please allow 3 business days for your refill to be completed.          Additional Information About Your Visit        [employer] Information     [employer] gives you secure access to your electronic health record. If you see a primary care provider, you can also send messages to your care team and make appointments. If you have questions, please call your primary care clinic.  If you do not have a primary care provider, please call [phone] and they will assist you.        Your Vitals Were     Pulse Respirations Pulse Oximetry BMI (Body Mass Index)          87  16 99% 26.37 kg/m2         Blood Pressure from Last 3 Encounters:   06/12/18 108/68   05/29/18 128/82   05/24/18 97/64    Weight from Last 3 Encounters:   06/12/18 78 kg (172 lb)   05/29/18 78 kg (172 lb)   05/22/18 78 kg (172 lb)                 Today's Medication Changes          These changes are accurate as of 6/12/18 10:08 AM.  If you have any questions, ask your nurse or doctor.               These medicines have changed or have updated prescriptions.        Dose/Directions    HYDROcodone-acetaminophen 5-325 MG per tablet   Commonly known as:  NORCO   This may have changed:  additional instructions   Used for:  Hip pain, right   Changed by:  Tamiko Stevens APRN CNP        Dose:  1 tablet   Take 1 tablet by mouth 3 times daily as needed for pain (Max 3 times daily. #60 tabs to last 30 days.) Okay to fill on 6/21/18   Quantity:  60 tablet   Refills:  0            Where to get your medicines      Some of these will need a paper prescription and others can be bought over the counter.  Ask your nurse if you have questions.     Bring a paper prescription for each of these medications     HYDROcodone-acetaminophen 5-325 MG per tablet               Information about OPIOIDS     PRESCRIPTION OPIOIDS: WHAT YOU NEED TO KNOW   You have a prescription for an opioid (narcotic) pain medicine. Opioids can cause addiction. If you have a history of chemical dependency of any type, you are at a higher risk of becoming addicted to opioids. Only take this medicine after all other options have been tried. Take it for as short a time and as few doses as possible.     Do not:    Drive. If you drive while taking these medicines, you could be arrested for driving under the influence (DUI).    Operate heavy machinery    Do any other dangerous activities while taking these medicines.     Drink any alcohol while taking these medicines.      Take with any other medicines that contain acetaminophen. Read all labels carefully. Look  for the word  acetaminophen  or  Tylenol.  Ask your pharmacist if you have questions or are unsure.    Store your pills in a secure place, locked if possible. We will not replace any lost or stolen medicine. If you don t finish your medicine, please throw away (dispose) as directed by your pharmacist. The [place] [employer]ution Control Agency has more information about safe disposal: https://www.pca.[facility].[place]/living-green/managing-unwanted-medications    All opioids tend to cause constipation. Drink plenty of water and eat foods that have a lot of fiber, such as fruits, vegetables, prune juice, apple juice and high-fiber cereal. Take a laxative (Miralax, milk of magnesia, Colace, Senna) if you don t move your bowels at least every other day.          Primary Care Provider Office Phone # Fax #    Phill Floyd [phone] [address] [facility] [address]        Equal Access to Services     BERLIN MEYER : Hadii rosemary ku hadasho Soomaali, waaxda luqadaha, qaybta kaalmada adeegyada, arnav moorein haymisty lorenzana . So [facility] [phone].    ATENCIÓN: Si habla español, tiene a cutler disposición servicios gratuitos de asistencia lingüística. Llame al [phone].    We comply with applicable federal civil rights laws and [place] laws. We do not discriminate on the basis of race, color, national origin, age, disability, sex, sexual orientation, or gender identity.            Thank you!     Thank you for choosing [facility] [place]  for your care. Our goal is always to provide you with excellent care. Hearing back from our patients is one way we can continue to improve our services. Please take a few minutes to complete the written survey that you may receive in the mail after your visit with us. Thank you!             Your Updated Medication List - Protect others around you: Learn how to safely use, store and throw away your medicines at www.disposemymeds.org.           This list is accurate as of 6/12/18 10:08 AM.  Always use your most recent med list.                   Brand Name Dispense Instructions for use Diagnosis    * amphetamine-dextroamphetamine 20 MG per tablet    ADDERALL     Take 20 mg by mouth daily Reported on 3/6/2017        * amphetamine-dextroamphetamine 30 MG per 24 hr capsule    ADDERALL XR     Take 1 capsule by mouth every morning        clonazePAM 0.5 MG tablet    klonoPIN     Take 0.5-1 mg by mouth nightly as needed        DULoxetine 20 MG EC capsule    CYMBALTA     Take 2 capsules by mouth daily        HYDROcodone-acetaminophen 5-325 MG per tablet    NORCO    60 tablet    Take 1 tablet by mouth 3 times daily as needed for pain (Max 3 times daily. #60 tabs to last 30 days.) Okay to fill on 6/21/18    Hip pain, right       * nicotine 14 MG/24HR 24 hr patch    NICODERM CQ    30 patch    Place 1 patch onto the skin every 24 hours Use this patch first    Tobacco abuse       * nicotine 7 MG/24HR 24 hr patch    NICODERM CQ    30 patch    Place 1 patch onto the skin every 24 hours    Tobacco abuse       tiZANidine 4 MG tablet    ZANAFLEX    60 tablet    Take 1-2 tablets (4-8 mg) by mouth nightly as needed    Lumbar radicular pain, Lumbar paraspinal muscle spasm       * Notice:  This list has 4 medication(s) that are the same as other medications prescribed for you. Read the directions carefully, and ask your doctor or other care provider to review them with you.

## 2018-06-12 NOTE — MR AVS SNAPSHOT
After Visit Summary   6/12/2018    Hoang Kiser    MRN: [id number]           Patient Information     Date Of Birth          1985        Visit Information        Provider Department      6/12/2018 10:15 AM Faustino Ryan, PT [facility]        Today's Diagnoses     Chronic pain    -  1    Chronic low back pain        Hip pain, right           Follow-ups after your visit        Your next 10 appointments already scheduled     Jun 14, 2018  9:00 AM CDT   Return Visit with Fili Wesley LP   [facility] ([facility])    [address]  [address]  [facility] [id number] [phone]            Richard 15, 2018  9:00 AM CDT   [facility] with [facility]   [facility]    [address] [facility] [address] [phone]           Please do not eat 10-12 hours before your appointment if you are coming in fasting for labs on lipids, cholesterol, or glucose (sugar). This does not apply to pregnant women. Water, hot tea and black coffee (with nothing added) are okay. Do not drink other fluids, diet soda or chew gum.            Jun 26, 2018  8:00 AM CDT   Return Visit with Faustino Feldman MD   [facility] ([facility])    [address] [address] [place] [address] [phone]            Jul 10, 2018  8:30 AM CDT   Return Visit with CARLOS A Guy CNP   [facility] ([facility])    [address]  [facility] [address] [phone]            Jul 10, 2018  9:30 AM CDT   Return Visit with Faustino Ryan, PT   [facility] ([facility])    [address]  [facility] [address] [phone]              Future tests that were ordered for you today     Open Future Orders        Priority Expected Expires Ordered    Comprehensive metabolic panel Routine   6/12/2019 6/12/2018            Who to contact     If you have questions or need follow up information about today's clinic visit or your schedule please contact [facility] directly at [phone].  Normal or non-critical lab and imaging results will be communicated to you by MyChart, letter or phone within 4 business days after the clinic has received the results. If you do not hear from us within 7 days, please contact the clinic through MyChart or phone. If you have a critical or abnormal lab result, we will notify you by phone as soon as possible.  Submit refill requests through [employer] or call your pharmacy and they will forward the refill request to us. Please allow 3 business days for your refill to be completed.          Additional Information About Your Visit        [email or web address] Information     [employer] gives you secure access to your electronic health record. If you see a primary care provider, you can also send messages to your care team and make appointments. If you have questions, please call your primary care clinic.  If you do not have a primary care provider, please call [phone] and they will assist you.         Blood Pressure from Last 3 Encounters:   06/12/18 108/68   05/29/18 128/82   05/24/18 97/64    Weight from Last 3 Encounters:   06/12/18 78 kg (172 lb)   05/29/18 78 kg (172 lb)   05/22/18 78 kg (172 lb)              We Performed the Following     NEUROMUSCULAR RE-EDUCATION     SELF CARE MNGMENT TRAINING     Therapeutic Procedure per 15 min          Today's Medication Changes          These changes are accurate as of 6/12/18  1:54 PM.  If you have any questions, ask your nurse or doctor.               These medicines have changed or have updated prescriptions.        Dose/Directions    HYDROcodone-acetaminophen 5-325 MG per tablet   Commonly known as:  NORCO   This may have changed:  additional instructions   Used for:  Hip pain, right   Changed by:  Tamiko Stevens,  APRN CNP        Dose:  1 tablet   Take 1 tablet by mouth 3 times daily as needed for pain (Max 3 times daily. #60 tabs to last 30 days.) Okay to fill on 6/21/18   Quantity:  60 tablet   Refills:  0            Where to get your medicines      Some of these will need a paper prescription and others can be bought over the counter.  Ask your nurse if you have questions.     Bring a paper prescription for each of these medications     HYDROcodone-acetaminophen 5-325 MG per tablet                Primary Care Provider Office Phone # Fax #    Phill Floyd [phone] [address] [facility] [address]        Equal Access to Services     [facility] : Hadsana Armenta, wajuani rinaldi, alejandra looneymatanvi hawkins, arnav lorenzana . So [facility] [phone].    ATENCIÓN: Si habla español, tiene a cutler disposición servicios gratuitos de asistencia lingüística. [facility] [phone].    We comply with applicable federal civil rights laws and [place] laws. We do not discriminate on the basis of race, color, national origin, age, disability, sex, sexual orientation, or gender identity.            Thank you!     Thank you for choosing [facility]  for your care. Our goal is always to provide you with excellent care. Hearing back from our patients is one way we can continue to improve our services. Please take a few minutes to complete the written survey that you may receive in the mail after your visit with us. Thank you!             Your Updated Medication List - Protect others around you: Learn how to safely use, store and throw away your medicines at www.disposemymeds.org.          This list is accurate as of 6/12/18  1:54 PM.  Always use your most recent med list.                   Brand Name Dispense Instructions for use Diagnosis    * amphetamine-dextroamphetamine 20 MG per tablet    ADDERALL     Take 20 mg by mouth daily Reported on  3/6/2017        * amphetamine-dextroamphetamine 30 MG per 24 hr capsule    ADDERALL XR     Take 1 capsule by mouth every morning        clonazePAM 0.5 MG tablet    klonoPIN     Take 0.5-1 mg by mouth nightly as needed        DULoxetine 20 MG EC capsule    CYMBALTA     Take 2 capsules by mouth daily        HYDROcodone-acetaminophen 5-325 MG per tablet    NORCO    60 tablet    Take 1 tablet by mouth 3 times daily as needed for pain (Max 3 times daily. #60 tabs to last 30 days.) Okay to fill on 6/21/18    Hip pain, right       * nicotine 14 MG/24HR 24 hr patch    NICODERM CQ    30 patch    Place 1 patch onto the skin every 24 hours Use this patch first    Tobacco abuse       * nicotine 7 MG/24HR 24 hr patch    NICODERM CQ    30 patch    Place 1 patch onto the skin every 24 hours    Tobacco abuse       tiZANidine 4 MG tablet    ZANAFLEX    60 tablet    Take 1-2 tablets (4-8 mg) by mouth nightly as needed    Lumbar radicular pain, Lumbar paraspinal muscle spasm       * Notice:  This list has 4 medication(s) that are the same as other medications prescribed for you. Read the directions carefully, and ask your doctor or other care provider to review them with you.

## 2018-06-12 NOTE — PROGRESS NOTES
[facility]    CHIEF COMPLAINT: Spasms and pain    INTERVAL HISTORY:  Last seen on 5/22/18.        Recommendations/plan at the last visit included:  1. Schedule pain psychology visits  2. Schedule physical therapy visits  3. Schedule follow-up with CARLOS A Higuera NP-C in 4 weeks or after hip injection if having pain   4. Procedures recommended: Hip injection as scheduled   5. Medication recommendations:                  1. Stop Tramadol  2. Start Hydrocodone/APAP 5/325 mg. One tab up to 3 times daily BUT 60 tabs must last 30 days.     Since his last visit, Hoang Kiser reports:  -He describes his  about an twisting posture getting worse.  He also notes body spasming tremor getting worse.    Pain Information:   Pain quality: Miserable    Pain timing: Constant     Pain rating: intensity ranges from 2/10 to 8/10, and averages 6/10 on a 0-10 scale.   Aggravating factors include: Stretching, strengthening, walking, standing   Relieving factors include: Resting, stretching, moving, medication, heat, breathing      Annual Controlled Substance Agreement/UDS due date: April 2019      Current Pain Relevant Medications:   Tramadol 50 mg, max three tabs per day.                         Total opiate dose: 15 MME daily  Clonazepam .5 mg 1-2 tab at HS PRN  Duloxetine 20 mg daily  Naproxen 500 mg BID: on hold re: elevated LFTs   Tizanidine 4 mg 1-2 tabs at HS PRN  Ambien 10 mg at HS  Adderall 50 mg daily, combination of long and short acting.       Previous Pain Relevant Medications: (H--helped; HI--Helped initially; SWH--Somewhat helpful; NH--No help; W--worse; SE--side effects; ?--Unsure if helpful)   NOTE: This medication information taken from patient's intake form, not medical records.                         Opiates: Tramadol: H, Hydrocodone: H                        NSAIDS: Ibuprofen:H, naproxen:[facility], Relafen: NH                        Muscle Relaxants: Cyclobenzaprine:H,Med interaction,  Tizanidine:H                        Anti-migraine mediations: Prednisone:H                        Anti-depressants: Bupropion:SE, Celexa:SE, Duloxetine:H, Trazodone:Too strong, Venlafaxine:too strong                        Sleep aids:Anbien: H                        Anxiolytics: Clonazepam:H                        Neuropathics: Tegretol:taken for seizures in childhood, Gabapentin:H                                          Topicals: Lidocaine:H                        Other medications not covered above: Tylenol:NH      Any illicit drug use: 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      SELF CARE:   How often do you practice SELF-CARE (relaxing, stretching, pacing, monitoring posture, taking mini-breaks) in a typical day:  Concern for multiple controlled substances including benzodiazepines, stimulants, opiates.     THE 4 As OF OPIOID MAINTENANCE ANALGESIA    Analgesia: Is pain relief clinically significant? YES   Activity: Is patient functional and able to perform Activities of Daily Living? YES   Adverse effects: Is patient free from adverse side effects from opiates? YES   Adherence to Rx protocol: Is patient adhering to Controlled Substance Agreement and taking medications ONLY as ordered? YES         Is Narcan prescribed for opiate use >50 MME daily? N/A          Medications:  Current Outpatient Prescriptions   Medication Sig Dispense Refill     amphetamine-dextroamphetamine (ADDERALL XR) 30 MG per 24 hr capsule Take 1 capsule by mouth every morning  0     amphetamine-dextroamphetamine (ADDERALL) 20 MG tablet Take 20 mg by mouth daily Reported on 3/6/2017  0     clonazePAM (KLONOPIN) 0.5 MG tablet Take 0.5-1 mg  by mouth nightly as needed   0     cyclobenzaprine (FLEXERIL) 5 MG tablet Take 1-2 tablets (5-10 mg) by mouth 3 times daily as needed for muscle spasms 90 tablet 1     DULoxetine (CYMBALTA) 20 MG EC capsule Take 1 capsule by mouth 2 times daily   0     HYDROcodone-acetaminophen (NORCO) 5-325 MG per tablet Take 1 tablet by mouth 3 times daily as needed for pain (Max 3 times daily. #60 tabs to last 30 days.) 60 tablet 0     nicotine (NICODERM CQ) 14 MG/24HR 24 hr patch Place 1 patch onto the skin every 24 hours Use this patch first 30 patch 0     nicotine (NICODERM CQ) 7 MG/24HR 24 hr patch Place 1 patch onto the skin every 24 hours 30 patch 0     tiZANidine (ZANAFLEX) 4 MG tablet Take 1-2 tablets (4-8 mg) by mouth nightly as needed 60 tablet 3       Review of Systems: A 10-point review of systems was negative, with the exception of chronic pain issues.      Social History: Reviewed; unchanged from previous consultation.      Family history: Reviewed; unchanged from previous consultation.     PHYSICAL EXAM    Vitals:    06/12/18 0930   BP: 108/68   BP Location: Left arm   Patient Position: Chair   Cuff Size: Adult Small   Pulse: 87   Resp: 16   SpO2: 99%   Weight: 78 kg (172 lb)       Constitutional: healthy, alert, mild distress, pain behaviores and anxious  HEENT: Head atraumatic, normocephalic. Eyes without conjunctival injection or jaundice. Neck supple. No obvious neck masses.  Skin: No rash, lesions, or petechiae of exposed skin.   Extremities: No clubbing, cyanosis, or edema to exposed extremities  Psychiatric/mental status: Alert, without lethargy or stupor. Appropriate affect. See above.   Neurologic exam:  CN:  Cranial nerves 2-12 are grossly normal.      Motor:  5/5 UE and 4/5 LE strength      Musculoskeletal exam:  Cervical spine:                       Flex:  20 degrees                       Ext: 20 degrees                       Rotation to right: 20 degrees                       Rotation to left:  "20 degrees                       Rotation/ext to right: painful                        Rotation/ext to left: painful                        Tenderness in the cervical spine at midline. No                       Tenderness in the cervical paraspinal muscles. No  Thoracic spine:                        Kyphosis. No                       Tenderness in the thoracic spine at midline. Yes                       Tenderness in the thoracic paraspinal muscles. Yes  Lumbar/Sacral spine:                       Forward Flexion:  90 degrees                       Ext: 20 degrees \"tight\"                       Rotation/ext to right: painful                        Rotation/ext to left: painful                        Lordosis. No                       Tenderness in the lumbar spine at midline. Yes                       Tenderness in the lumbar paraspinal muscles.Yes      Psychiatric:  mentation appears normal., affect and mood normal      DIRE Score for ongoing opioid management is calculated as follows:    Diagnosis = 2 pts (slowly progressive; moderate pain/objective findings)    Intractability = 2 pts (most treatments tried; patient not fully engaged/barriers)    Risk        Psych = 2 pts (personality dysfunction/mental illness that moderately interferes with care)         Chem Hlth = 2 pts (use of medications to cope with stress; chemical dependency in remission)       Reliability = 3 pts (highly reliable with meds, appointments, treatments)       Social = 3 pts (supportive family/close relationships; involved in work/school; no isolation)       (Psych + Chem hlth + Reliability + Social) = 14    Efficacy = 2 pts (moderate benefit/function; low med dose; too early/not tried meds)    DIRE Score = 16        7-13: likely NOT suitable candidate for long-term opioid analgesia       14-21: may be a suitable candidate for long-term opioid analgesia          Previous Diagnostic Tests:   Imaging Studies:   MR LUMBAR SPINE W/O CONTRAST 5/2/2018 " 7:27 PM  History: DDD (degenerative disc disease), lumbar; Lumbar  radiculopathy; Hip pain, right; Groin pain, right.  ICD-10: DDD (degenerative disc disease), lumbar; Lumbar radiculopathy;  Hip pain, right; Groin pain, right  Comparison: Lumbar spine MRI 3/8/2017  Technique: Sagittal STIR, T1-weighted turbo spin-echo, 3-D volumetric  T2-weighted and axial reconstructed T2-weighted images of the lumbar  spine were obtained without intravenous contrast.   Findings: 5 lumbar-type vertebra. The tip of the conus medullaris is  at L1.  The lumbar vertebral column is normally aligned.   Straightening of lumbar lordosis, unchanged. The intervertebral disc  heights are maintained. Normal marrow signal. At L5-S1, there is a  disc bulge and facet hypertrophy with mild left neural foraminal  narrowing, unchanged. No spinal canal stenosis.  Impression:  1. Lumbar spondylosis with mild left neural foraminal narrowing at  L5-S1.  2. No spinal canal stenosis.          MR right hip without contrast 5/2/2018 6:47 PM  Techniques: Multiplanar multisequence imaging of the right hip was  obtained without  administration of intra-articular or intravenous  contrast using routing protocol.  History: Hip pain.  Comparison: None available.  Findings:  Osseous structures  Osseous structures: No fracture, stress reaction, avascular necrosis,  or focal osseous lesion is seen. There is small focal area of  subchondral cystic changes in the anterior right femoral head neck  junction, most compatible with synovial herniation pit. Findings  suggestive of reduced femoral head neck junction though alpha angle  cannot be assessed owing to no dedicated oblique axial sequence  obtained.  Articular cartilage and labrum  Assessment limited on this arthrographic study due to relative lack of  joint distension.  Articular cartilage: No high grade chondral loss.  Labrum: Labral tearing approximately from 12:00 to 3:00 with 3:00  being anterior and 6:00 being  the center of transverse ligament in  this convention with associated subtle area of subchondral edema in  the superior acetabulum.  Ligament teres and transverse ligament of acetabulum: Intact.  Joint or bursal effusion  Joint effusion: A physiologic amount of joint fluid.  Bursal effusion: Minimal nonspecific edema over the greater  trochanter. No substantial iliopsoas or trochanteric bursal effusion.  Muscles and tendons  Muscles and tendons: The rectus femoris, the visualized portion  iliopsoas, proximal hamstrings, and hip abductors are intact.  The  visualized adductor muscles are unremarkable.   Nerves:  The visualized course of the sciatic nerve is unremarkable.   Other Findings:  There is fat-containing right inguinal hernia.  Impression:  1. Approximately 12-3 o'clock labral tearing with synovial herniation  pit and likely reduced femoral head neck junction offset. 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  2. Fat containing right inguinal hernia.          ASSESSMENT:   1.  Lumbar DDD, mild  2.  Lumbar muscle spasm  3.  Labral tear, right hip  4.  Hx: anxiety, chemical dependence in remission.    He presents with new symptoms of spasming stating his body is twisting uncontrollably.  He also notes tremor increasing.  He does have an upper body tremor that is present today.  It is difficult to assess if this is intentional organic.  He has sent me another diagram via my chart of spasms where they are located etc.  He expresses frustration about continued ongoing medical appointments and lack of a specific diagnosis.  I have reviewed Dr. Feldman's last note which does make note of a concern for Munchhausen syndrome.  It does appear that regardless of what we try to treat his pain Shoaib's symptoms get worse or change.     Barney discussion regarding my concern that there may be a mental health issue affecting his perception of his pain in his health issues.  He  notes hypervigilance which is evident in the highly detailed graphs that he brings to appointments describing his pain.  I have obtained a release of information to speak with his counselor regarding these concerns.  I will also send a note to Dr. Feldman for his thoughts.    Plan:    Diagnosis reviewed, treatment option addressed, and risk/benifits discussed.  Self-care instructions given.  I am recommending a multidisciplinary treatment plan to help this patient better manage pain.        1. Schedule pain psychology assessment/visits  2. Schedule physical therapy assessment/visits  3. Schedule follow-up with CARLOS A Higuera NP-C in 4 weeks  4. Labs: metabolic panel  5. Release of information: FIGUEROA Taylor for Tamiko to speak with her.   6. Medication recommendations:   1. No change to medications. Refill of Norco provided    Total time spent face to face was 30 minutes and more than 50% of face to face time was spent in counseling and/or coordination of care regarding the diagnosis and recommendations above.      CARLOS A Bass, NP-C   [facility]    Disclaimer: This note consists of symbols derived from keyboarding, dictation and/or voice recognition software. As a result, there may be errors in the script that have gone undetected. Please consider this when interpreting information found in this chart.

## 2018-06-12 NOTE — Clinical Note
Dr Feldman,   I am at a bit of loss as to where to go with Shoaib's everchanging pain issues. Do you think there could be a neurologic component? I could refer to Neuro if you think it's appropriate. Given his hypervigilance, honestly bordering on obsession, with his perception of pain issues I am concerned about a mental health component.  I have obtained a release of information to speak with his therapist and I will do so.  Please let me know your thoughts. Thank you, CARLOS A Bass, NP-C [facility]

## 2018-06-12 NOTE — PATIENT INSTRUCTIONS
After Visit Instructions:     Thank you for coming to [facility] for your care. It is my goal to partner with you to help you reach your optimal state of health.     I am recommending multidisciplinary care at this time.  The focus of care will be to continue gradual rehabilitation and pain management with medication adjustments as needed.    Continue daily self-care, identifying contributing factors, and monitoring variations in pain level. Continue to integrate self-care into your life.      1. Schedule pain psychology assessment/visits  2. Schedule physical therapy assessment/visits  3. Schedule follow-up with CARLOS A Higuera NP-C in 4 weeks  4. Labs: metabolic panel  5. Release of information: FIGUEROA Taylor for Tamiko to speak with her.   6. Medication recommendations:   1. No change to medications. Refill of Norco provided      CARLOS A Bass NP-C  [facility]3]    Contact information: [facility]    Please call if any side effects, questions, or concerns arise.    Nurse Triage line:  [phone]   Call this number with any questions or concerns. You may leave a detailed message anytime. Calls are typically returned Monday through Friday between 8 AM and 4:30 PM. We usually get back to you within 2 business days depending on the issue/request.    Scheduling number: [phone].  Call this number to schedule or change appointments.          Medication Refills Policy:    For non-narcotic medications, please your pharmacy directly to request a refill and the pharmacy will call the [facility] Management Center for authorization. Please allow 3-4 days for these refills.    For narcotic refills, call the nurse triage line and leave a message requesting your refill along with the name of the pharmacy that you use. Narcotic prescriptions will be mailed to your pharmacy or you may pick them up at the clinic.  Please call 7-10 days  before your refill is due  The above policy allows adequate time so that you do not run out of medication.    No Show - Late Cancellation - Late Arrival Policy  We believe regular attendance is key to your success in our program.    Any time you are unable to keep your appointment we ask that you call us at  least 24 hours in advance to let us know. This will allow us to offer the appointment time to another patient. The following is our policy for missed appointments. This also applies to appointments cancelled with less than 24 hours notice.    You will receive a letter after missing your 1st and 2nd appointments without contacting the clinic before your scheduled appointment time.     After missing 3 appointments without calling first, we will cancel all of your future appointments at [facility].    At that point, you will not be able to resume services unless approved by your care team  We understand that unforseen circumstances arise, however, out of respect for all concerned and to provide this appointment to another patient, this policy will be enforced.    Please note that most follow up appointments are 30 minutes long. If you arrive late, your provider may not be able to see you for the entire 30 minutes. Please also note that if you arrive more than 15 minutes for any appointment, it may be rescheduled.

## 2018-06-13 ENCOUNTER — MYC MEDICAL ADVICE (OUTPATIENT)
Dept: PALLIATIVE MEDICINE | Facility: CLINIC | Age: 33
End: 2018-06-13

## 2018-06-15 DIAGNOSIS — Z79.899 POLYPHARMACY: ICD-10-CM

## 2018-06-15 LAB
ALBUMIN SERPL-MCNC: 4.7 G/DL (ref 3.4–5)
ALP SERPL-CCNC: 55 U/L (ref 40–150)
ALT SERPL W P-5'-P-CCNC: 31 U/L (ref 0–70)
ANION GAP SERPL CALCULATED.3IONS-SCNC: 7 MMOL/L (ref 3–14)
AST SERPL W P-5'-P-CCNC: 21 U/L (ref 0–45)
BILIRUB SERPL-MCNC: 0.6 MG/DL (ref 0.2–1.3)
BUN SERPL-MCNC: 16 MG/DL (ref 7–30)
CALCIUM SERPL-MCNC: 9.1 MG/DL (ref 8.5–10.1)
CHLORIDE SERPL-SCNC: 105 MMOL/L (ref 94–109)
CO2 SERPL-SCNC: 27 MMOL/L (ref 20–32)
CREAT SERPL-MCNC: 0.92 MG/DL (ref 0.66–1.25)
GFR SERPL CREATININE-BSD FRML MDRD: >90 ML/MIN/1.7M2
GLUCOSE SERPL-MCNC: 103 MG/DL (ref 70–99)
POTASSIUM SERPL-SCNC: 3.8 MMOL/L (ref 3.4–5.3)
PROT SERPL-MCNC: 7.6 G/DL (ref 6.8–8.8)
SODIUM SERPL-SCNC: 139 MMOL/L (ref 133–144)

## 2018-06-15 PROCEDURE — 36415 COLL VENOUS BLD VENIPUNCTURE: CPT | Performed by: NURSE PRACTITIONER

## 2018-06-15 PROCEDURE — 80053 COMPREHEN METABOLIC PANEL: CPT | Performed by: NURSE PRACTITIONER

## 2018-06-18 NOTE — PROGRESS NOTES
Test results were as expected with no cause for concern. Will discuss further at the next appointment.     CARLOS A Bass, NP-C   [facility]

## 2018-06-20 ENCOUNTER — MYC MEDICAL ADVICE (OUTPATIENT)
Dept: PALLIATIVE MEDICINE | Facility: CLINIC | Age: 33
End: 2018-06-20

## 2018-06-21 ENCOUNTER — OFFICE VISIT (OUTPATIENT)
Dept: PALLIATIVE MEDICINE | Facility: CLINIC | Age: 33
End: 2018-06-21
Payer: COMMERCIAL

## 2018-06-21 DIAGNOSIS — G89.29 CHRONIC LOW BACK PAIN WITH SCIATICA, SCIATICA LATERALITY UNSPECIFIED, UNSPECIFIED BACK PAIN LATERALITY: ICD-10-CM

## 2018-06-21 DIAGNOSIS — M79.2 NEUROPATHIC PAIN: Primary | ICD-10-CM

## 2018-06-21 DIAGNOSIS — M62.830 BACK MUSCLE SPASM: ICD-10-CM

## 2018-06-21 DIAGNOSIS — M54.40 CHRONIC LOW BACK PAIN WITH SCIATICA, SCIATICA LATERALITY UNSPECIFIED, UNSPECIFIED BACK PAIN LATERALITY: ICD-10-CM

## 2018-06-21 PROCEDURE — 96152 HC HEALTH AND BEHAVIOR INTERVENTION, INDIVIDUAL, EACH 15 MINUTES: CPT | Performed by: PSYCHOLOGIST

## 2018-06-21 NOTE — PROGRESS NOTES
[facility]   [facility], [facility]  Behavioral Medicine Visit    Patient Name: Hoang Kiser    YOB: 1985   Medical Record Number: [id number]  Date: 6/21/2018                SUBJECTIVE: Today the patient discusses continued frustration with his health.  He appears to be stuck between trying to come up with more answers and solutions for his chronic pain condition versus trying to process and cope with loss associated with his pain.  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  He is asked to consider a that drawing is reflective of his life activity, especially where he has some hope.    Patient reports he finds our meeting helpful.  As such for the time being we will continue to meet.          OBJECTIVE: Today the patient reports the following: His pain level is mildly worse, his mood is mildly worse, his activity level remains the same, his stress level has been mildly worse and his sleep has been moderately worse.  Today the patient reports that he is involved in self-care activities 2-3 times per day.  Today the patient reports since receiving services with [facility] [place] he has experienced slight improvement Length of Visit: 60 minutes      Assessment: Current Emotional / Mental Status    Appearance:   Appropriate   Eye Contact:   Fair   Psychomotor Behavior: Restless  Retarded (Slowed)   Attitude:   Cooperative   Orientation:   All  Speech  Rate / Production:             Normal   Volume:              Normal   Mood:    Depressed   Affect:    Flat  Worrisome   Thought Content:  Rumination   Thought Form:  Coherent  Logical   Insight:    Fair     ASSESSMENT:       Progress toward goals: fair.    Pain Status: worsened    Emotional Status: worsened              Medication / chemical use concerns:  None  PLAN:   Next Appointment: Hoang MA  Margi will schedule a follow-up appointment in 2 weeks.  Assignment: Work on delineating when he is focusing on health issues versus when he is not.  Objectives / interventions for next session: Increase time spent focusing on non-health-related  activities    Fili Wesley MA, LP, [facility]  Behavioral Pain Specialist  [facility]

## 2018-06-21 NOTE — MR AVS SNAPSHOT
After Visit Summary   6/21/2018    Hoang Kiser    MRN: [id number]           Patient Information     Date Of Birth          1985        Visit Information        Provider Department      6/21/2018 8:00 AM Fili Wesley LP [facility]        Today's Diagnoses     Neuropathic pain    -  1    Chronic low back pain with sciatica, sciatica laterality unspecified, unspecified back pain laterality        Back muscle spasm           Follow-ups after your visit        Your next 10 appointments already scheduled     Jun 26, 2018  8:00 AM CDT   Return Visit with Faustino Feldman MD   [facility] ([facility])    [address] [place] [address]   [phone]            Jul 05, 2018  9:00 AM CDT   Return Visit with Fili Wesley LP   [facility] ([facility])    [address]  [facility] [id number] [phone]            Jul 10, 2018  8:30 AM CDT   Return Visit with CARLOS A Guy CNP   [facility] ([facility])    [address]  [facility] [id number] [phone]            Jul 10, 2018  9:30 AM CDT   Return Visit with Faustino Ryan PT   [facility] ([facility])    [address]  [facility] [id number] [phone]            Jul 19, 2018 11:00 AM CDT   Return Visit with Fili Wesley LP   [facility] ([facility])    [address]  [facility] [id number] [phone]              Who to contact     If you have questions or need follow up information about today's clinic visit or your schedule please contact [facility] directly at [phone].  Normal or non-critical lab and imaging results will be communicated to you by MyChart, letter or phone within 4 business days after the clinic has  received the results. If you do not hear from us within 7 days, please contact the clinic through [employer] or phone. If you have a critical or abnormal lab result, we will notify you by phone as soon as possible.  Submit refill requests through [employer] or call your pharmacy and they will forward the refill request to us. Please allow 3 business days for your refill to be completed.          Additional Information About Your Visit        [employer] Information     [employer] gives you secure access to your electronic health record. If you see a primary care provider, you can also send messages to your care team and make appointments. If you have questions, please call your primary care clinic.  If you do not have a primary care provider, please call [phone] and they will assist you.         Blood Pressure from Last 3 Encounters:   06/12/18 108/68   05/29/18 128/82   05/24/18 97/64    Weight from Last 3 Encounters:   06/12/18 78 kg (172 lb)   05/29/18 78 kg (172 lb)   05/22/18 78 kg (172 lb)              We Performed the Following     HEALTH & BEHAVIOR ASSESS, INITIAL, EA 15MIN PHD        Primary Care Provider Office Phone # Fax #    Phill Floyd [phone] [address] [facility] [address]        Equal Access to Services     BERLIN MEYER : Hadii aad ku hadasho Soomaali, waaxda luqadaha, qaybta kaalmada adeegyada, waxay bill billy. So [facility] [phone].    ATENCIÓN: Si habla español, tiene a cutler disposición servicios gratuitos de asistencia lingüística. Llame al [phone].    We comply with applicable federal civil rights laws and [place] laws. We do not discriminate on the basis of race, color, national origin, age, disability, sex, sexual orientation, or gender identity.            Thank you!     Thank you for choosing [facility]  for your care. Our goal is always to provide you with excellent care. Hearing back from our patients is  one way we can continue to improve our services. Please take a few minutes to complete the written survey that you may receive in the mail after your visit with us. Thank you!             Your Updated Medication List - Protect others around you: Learn how to safely use, store and throw away your medicines at www.disposemymeds.org.          This list is accurate as of 6/21/18  9:16 AM.  Always use your most recent med list.                   Brand Name Dispense Instructions for use Diagnosis    * amphetamine-dextroamphetamine 20 MG per tablet    ADDERALL     Take 20 mg by mouth daily Reported on 3/6/2017        * amphetamine-dextroamphetamine 30 MG per 24 hr capsule    ADDERALL XR     Take 1 capsule by mouth every morning        clonazePAM 0.5 MG tablet    klonoPIN     Take 0.5-1 mg by mouth nightly as needed        DULoxetine 20 MG EC capsule    CYMBALTA     Take 2 capsules by mouth daily        HYDROcodone-acetaminophen 5-325 MG per tablet    NORCO    60 tablet    Take 1 tablet by mouth 3 times daily as needed for pain (Max 3 times daily. #60 tabs to last 30 days.) Okay to fill on 6/21/18    Hip pain, right       * nicotine 14 MG/24HR 24 hr patch    NICODERM CQ    30 patch    Place 1 patch onto the skin every 24 hours Use this patch first    Tobacco abuse       * nicotine 7 MG/24HR 24 hr patch    NICODERM CQ    30 patch    Place 1 patch onto the skin every 24 hours    Tobacco abuse       tiZANidine 4 MG tablet    ZANAFLEX    60 tablet    Take 1-2 tablets (4-8 mg) by mouth nightly as needed    Lumbar radicular pain, Lumbar paraspinal muscle spasm       * Notice:  This list has 4 medication(s) that are the same as other medications prescribed for you. Read the directions carefully, and ask your doctor or other care provider to review them with you.

## 2018-06-26 ENCOUNTER — OFFICE VISIT (OUTPATIENT)
Dept: ORTHOPEDICS | Facility: CLINIC | Age: 33
End: 2018-06-26
Payer: COMMERCIAL

## 2018-06-26 VITALS — SYSTOLIC BLOOD PRESSURE: 122 MMHG | OXYGEN SATURATION: 99 % | HEART RATE: 88 BPM | DIASTOLIC BLOOD PRESSURE: 73 MMHG

## 2018-06-26 DIAGNOSIS — M25.552 BILATERAL HIP PAIN: ICD-10-CM

## 2018-06-26 DIAGNOSIS — M25.551 BILATERAL HIP PAIN: ICD-10-CM

## 2018-06-26 DIAGNOSIS — M62.830 LUMBAR PARASPINAL MUSCLE SPASM: Primary | ICD-10-CM

## 2018-06-26 PROCEDURE — 99213 OFFICE O/P EST LOW 20 MIN: CPT | Performed by: PREVENTIVE MEDICINE

## 2018-06-26 ASSESSMENT — PAIN SCALES - GENERAL: PAINLEVEL: MODERATE PAIN (4)

## 2018-06-26 NOTE — NURSING NOTE
Hoang Kiser's chief complaint for this visit includes:  Chief Complaint   Patient presents with     RECHECK     follow up on bilateral hip pain and back spasm.     PCP: Phill Floyd    Referring Provider:  No referring provider defined for this encounter.    /73  Pulse 88  SpO2 99%  Moderate Pain (4)     Do you need any medication refills at today's visit? No

## 2018-06-26 NOTE — MR AVS SNAPSHOT
After Visit Summary   6/26/2018    Hoang Kiser    MRN: [id number]           Patient Information     Date Of Birth          1985        Visit Information        Provider Department      6/26/2018 8:00 AM Faustino Feldman MD [facility]        Today's Diagnoses     Lumbar paraspinal muscle spasm    -  1    Bilateral hip pain           Follow-ups after your visit        Your next 10 appointments already scheduled     Jul 05, 2018  9:00 AM CDT   Return Visit with Fili Wesley LP   [facility] ([facility])    [address]  [facility] [address]   [phone]            Jul 10, 2018  8:30 AM CDT   Return Visit with CARLOS A Guy CNP   [facility] ([facility])    [address]  [facility] [address] [phone]            Jul 10, 2018  9:30 AM CDT   Return Visit with Faustino Ryan PT   [facility] ([facility])    [address]  [facility] [address] [phone]            Jul 12, 2018 12:00 PM CDT   Return Visit with Faustino Feldman MD   [facility] ([facility])    [address] [place] [address] [phone]            Jul 19, 2018 11:00 AM CDT   Return Visit with Fili Wesley LP   [facility] ([facility])    [address]  [facility] [id number] [phone]              Who to contact     If you have questions or need follow up information about today's clinic visit or your schedule please contact [facility] directly at [phone].  Normal or non-critical lab and imaging results will be communicated to you by MyChart, letter or phone within 4 business days after the clinic has received the results. If you do not hear from us within 7 days, please contact the clinic through  [employer] or phone. If you have a critical or abnormal lab result, we will notify you by phone as soon as possible.  Submit refill requests through [employer] or call your pharmacy and they will forward the refill request to us. Please allow 3 business days for your refill to be completed.          Additional Information About Your Visit        [employer] Information     [employer] gives you secure access to your electronic health record. If you see a primary care provider, you can also send messages to your care team and make appointments. If you have questions, please call your primary care clinic.  If you do not have a primary care provider, please call [phone] and they will assist you.      [employer] is an electronic gateway that provides easy, online access to your medical records. With [employer], you can request a clinic appointment, read your test results, renew a prescription or communicate with your care team.     To access your existing account, please contact your [facility] or call [phone] for assistance.        Your Vitals Were     Pulse Pulse Oximetry                88 99%           Blood Pressure from Last 3 Encounters:   06/26/18 122/73   06/12/18 108/68   05/29/18 128/82    Weight from Last 3 Encounters:   06/12/18 78 kg (172 lb)   05/29/18 78 kg (172 lb)   05/22/18 78 kg (172 lb)              Today, you had the following     No orders found for display       Primary Care Provider Office Phone # Fax #    Phill Floyd [phone] [address] [facility] [address]        Equal Access to Services     BERLIN MEYER : Hadii aad ku hadasho Soomaali, waaxda luqadaha, qaybta kaalmada adeegyada, arnav lorenzana . So [facility] [phone].    ATENCIÓN: Si habla español, tiene a cutler disposición servicios gratuitos de asistencia lingüística. Llame al [phone].    We comply with applicable federal civil rights laws and [place]  laws. We do not discriminate on the basis of race, color, national origin, age, disability, sex, sexual orientation, or gender identity.            Thank you!     Thank you for choosing [facility]  for your care. Our goal is always to provide you with excellent care. Hearing back from our patients is one way we can continue to improve our services. Please take a few minutes to complete the written survey that you may receive in the mail after your visit with us. Thank you!             Your Updated Medication List - Protect others around you: Learn how to safely use, store and throw away your medicines at www.disposemymeds.org.          This list is accurate as of 6/26/18 10:59 AM.  Always use your most recent med list.                   Brand Name Dispense Instructions for use Diagnosis    * amphetamine-dextroamphetamine 20 MG per tablet    ADDERALL     Take 20 mg by mouth daily Reported on 3/6/2017        * amphetamine-dextroamphetamine 30 MG per 24 hr capsule    ADDERALL XR     Take 1 capsule by mouth every morning        clonazePAM 0.5 MG tablet    klonoPIN     Take 0.5-1 mg by mouth nightly as needed        DULoxetine 20 MG EC capsule    CYMBALTA     Take 2 capsules by mouth daily        HYDROcodone-acetaminophen 5-325 MG per tablet    NORCO    60 tablet    Take 1 tablet by mouth 3 times daily as needed for pain (Max 3 times daily. #60 tabs to last 30 days.) Okay to fill on 6/21/18    Hip pain, right       * nicotine 14 MG/24HR 24 hr patch    NICODERM CQ    30 patch    Place 1 patch onto the skin every 24 hours Use this patch first    Tobacco abuse       * nicotine 7 MG/24HR 24 hr patch    NICODERM CQ    30 patch    Place 1 patch onto the skin every 24 hours    Tobacco abuse       tiZANidine 4 MG tablet    ZANAFLEX    60 tablet    Take 1-2 tablets (4-8 mg) by mouth nightly as needed    Lumbar radicular pain, Lumbar paraspinal muscle spasm       * Notice:  This list has 4 medication(s) that are  the same as other medications prescribed for you. Read the directions carefully, and ask your doctor or other care provider to review them with you.

## 2018-06-26 NOTE — PROGRESS NOTES
HISTORY OF PRESENT ILLNESS  Mr. Kiser is a pleasant 32 year old year old male who presents to clinic today with ongoing low back and hip pain  His pain today is located in his lower back and hips  He is able to bike for about an hour and then gets sore in his hips and low back  He walks outside on flat surfaces regularly for about 2 miles  He did get a hip injection about a month ago, in his right hip, and he has had some improvement. His left hip feels similarly and he is wondering if he should get that treated with an injection as well  He continues to see his psychiatrist regularly and followup with pain clinic as well  He recently had cymbalta increased as well. Still feels depressed, but no HI or SI    MEDICAL HISTORY  Patient Active Problem List   Diagnosis     Neuropathy     Moderate major depression (H)     Tobacco abuse     Attention deficit hyperactivity disorder (ADHD), predominantly inattentive type     Primary insomnia     History of MRI of brain and brain stem     Panic disorder without agoraphobia     Abnormal involuntary movement     Tic disorder     normal emg 2017     Anxiety     Panic attack     Posttraumatic stress disorder with dissociative symptoms       Current Outpatient Prescriptions   Medication Sig Dispense Refill     amphetamine-dextroamphetamine (ADDERALL XR) 30 MG per 24 hr capsule Take 1 capsule by mouth every morning  0     amphetamine-dextroamphetamine (ADDERALL) 20 MG tablet Take 20 mg by mouth daily Reported on 3/6/2017  0     clonazePAM (KLONOPIN) 0.5 MG tablet Take 0.5-1 mg by mouth nightly as needed   0     DULoxetine (CYMBALTA) 20 MG EC capsule Take 2 capsules by mouth daily   0     HYDROcodone-acetaminophen (NORCO) 5-325 MG per tablet Take 1 tablet by mouth 3 times daily as needed for pain (Max 3 times daily. #60 tabs to last 30 days.) Okay to fill on 6/21/18 60 tablet 0     tiZANidine (ZANAFLEX) 4 MG tablet Take 1-2 tablets (4-8 mg) by mouth nightly as needed 60 tablet 3      nicotine (NICODERM CQ) 14 MG/24HR 24 hr patch Place 1 patch onto the skin every 24 hours Use this patch first (Patient not taking: Reported on 6/26/2018) 30 patch 0     nicotine (NICODERM CQ) 7 MG/24HR 24 hr patch Place 1 patch onto the skin every 24 hours (Patient not taking: Reported on 6/26/2018) 30 patch 0       Allergies   Allergen Reactions     Buspirone Hcl Other (See Comments)     Panic attacks     Doxycycline Diarrhea, Fatigue, GI Disturbance and Nausea     Naproxen      Possible abnormal liver function tests      Trazodone Fatigue     Keflex [Cephalexin] Diarrhea       Family History   Problem Relation Age of Onset     Lipids Father      hyperlipidemia     Hyperlipidemia Father      Obesity Father      Arthritis Mother      Hyperlipidemia Mother      Depression Mother      Anxiety Disorder Mother      Mental Illness Mother      Obesity Mother      Asthma Brother      Asthma Sister      Depression Other      Hearing Loss Other      Psychotic Disorder Other      Obesity Other      Cerebrovascular Disease Paternal Grandfather      Alzheimer Disease Paternal Grandfather      Depression Brother      Mental Illness Brother      Bipolar     Asthma Brother      Depression Sister      Asthma Sister      Substance Abuse Sister      Alcohol     Mental Illness Brother      Bipolar     Asthma Brother      Asthma Sister      Obesity Sister      Asthma Brother      Obesity Brother      Obesity Maternal Grandmother      Genetic Disorder Maternal Grandmother      Epilepsy       Additional medical/Social/Surgical histories reviewed in [facility] and updated as appropriate.     REVIEW OF SYSTEMS (6/26/2018)  10 point ROS of systems including Constitutional, Eyes, Respiratory, Cardiovascular, Gastroenterology, Genitourinary, Integumentary, Musculoskeletal, Psychiatric were all negative except for pertinent positives noted in my HPI.     PHYSICAL EXAM  Vitals:    06/26/18 0804   BP: 122/73   Pulse: 88   SpO2: 99%     Vital  Signs: /73  Pulse 88  SpO2 99% Patient declined being weighed. There is no height or weight on file to calculate BMI.    General  - normal appearance, in no obvious distress  CV  - normal peripheral perfusion  Pulm  - normal respiratory pattern, non-labored  Musculoskeletal - lumbar spine  - stance: normal gait without limp, no obvious leg length discrepancy, normal heel and toe walk  - inspection: normal bone and joint alignment, no obvious scoliosis  - palpation: no paravertebral or bony tenderness  - ROM: flexion exacerbates pain in low back and hips, abnormal extension, sidebending, rotation all cause discomfort in low back and hips  - strength: lower extremities 5/5 in all planes  - special tests:  (+) straight leg raise- low back and hip pain  (+) slump test- low back pain  Neuro  - patellar and Achilles DTRs 2+ bilaterally, NO lower extremity sensory deficit throughout L5 distribution, grossly normal coordination, normal muscle tone  Skin  - no ecchymosis, erythema, warmth, or induration, no obvious rash  Psych  - interactive, appropriate, normal mood and affect  Bilateral hips: mild pain with internal rotation of both hips    ASSESSMENT & PLAN  31 yo male with bilateral hip impingement pain, and lumbar spasms, stable  Cont. To exercise and do PT and HEP  Cont. To followup with pain clinic, consider changing muscle relaxant and referral to neurology for spasms  Cont. To followup with psychiatry as well      Faustino Feldman MD, [facility]

## 2018-06-26 NOTE — LETTER
6/26/2018         RE: Hoang Kiser  [address]  [facility] [address]        Dear Colleague,    Thank you for referring your patient, Hoang Kiser, to the [facility]. Please see a copy of my visit note below.    HISTORY OF PRESENT ILLNESS  Mr. Kiser is a pleasant 32 year old year old male who presents to clinic today with ongoing low back and hip pain  His pain today is located in his lower back and hips  He is able to bike for about an hour and then gets sore in his hips and low back  He walks outside on flat surfaces regularly for about 2 miles  He did get a hip injection about a month ago, in his right hip, and he has had some improvement. His left hip feels similarly and he is wondering if he should get that treated with an injection as well  He continues to see his psychiatrist regularly and followup with pain clinic as well  He recently had cymbalta increased as well. Still feels depressed, but no HI or SI    MEDICAL HISTORY  Patient Active Problem List   Diagnosis     Neuropathy     Moderate major depression (H)     Tobacco abuse     Attention deficit hyperactivity disorder (ADHD), predominantly inattentive type     Primary insomnia     History of MRI of brain and brain stem     Panic disorder without agoraphobia     Abnormal involuntary movement     Tic disorder     normal emg 2017     Anxiety     Panic attack     Posttraumatic stress disorder with dissociative symptoms       Current Outpatient Prescriptions   Medication Sig Dispense Refill     amphetamine-dextroamphetamine (ADDERALL XR) 30 MG per 24 hr capsule Take 1 capsule by mouth every morning  0     amphetamine-dextroamphetamine (ADDERALL) 20 MG tablet Take 20 mg by mouth daily Reported on 3/6/2017  0     clonazePAM (KLONOPIN) 0.5 MG tablet Take 0.5-1 mg by mouth nightly as needed   0     DULoxetine (CYMBALTA) 20 MG EC capsule Take 2 capsules by mouth daily   0     HYDROcodone-acetaminophen (NORCO) 5-325 MG  per tablet Take 1 tablet by mouth 3 times daily as needed for pain (Max 3 times daily. #60 tabs to last 30 days.) Okay to fill on 6/21/18 60 tablet 0     tiZANidine (ZANAFLEX) 4 MG tablet Take 1-2 tablets (4-8 mg) by mouth nightly as needed 60 tablet 3     nicotine (NICODERM CQ) 14 MG/24HR 24 hr patch Place 1 patch onto the skin every 24 hours Use this patch first (Patient not taking: Reported on 6/26/2018) 30 patch 0     nicotine (NICODERM CQ) 7 MG/24HR 24 hr patch Place 1 patch onto the skin every 24 hours (Patient not taking: Reported on 6/26/2018) 30 patch 0       Allergies   Allergen Reactions     Buspirone Hcl Other (See Comments)     Panic attacks     Doxycycline Diarrhea, Fatigue, GI Disturbance and Nausea     Naproxen      Possible abnormal liver function tests      Trazodone Fatigue     Keflex [Cephalexin] Diarrhea       Family History   Problem Relation Age of Onset     Lipids Father      hyperlipidemia     Hyperlipidemia Father      Obesity Father      Arthritis Mother      Hyperlipidemia Mother      Depression Mother      Anxiety Disorder Mother      Mental Illness Mother      Obesity Mother      Asthma Brother      Asthma Sister      Depression Other      Hearing Loss Other      Psychotic Disorder Other      Obesity Other      Cerebrovascular Disease Paternal Grandfather      Alzheimer Disease Paternal Grandfather      Depression Brother      Mental Illness Brother      Bipolar     Asthma Brother      Depression Sister      Asthma Sister      Substance Abuse Sister      Alcohol     Mental Illness Brother      Bipolar     Asthma Brother      Asthma Sister      Obesity Sister      Asthma Brother      Obesity Brother      Obesity Maternal Grandmother      Genetic Disorder Maternal Grandmother      Epilepsy       Additional medical/Social/Surgical histories reviewed in [facility] and updated as appropriate.     REVIEW OF SYSTEMS (6/26/2018)  10 point ROS of systems including Constitutional, Eyes, Respiratory,  Cardiovascular, Gastroenterology, Genitourinary, Integumentary, Musculoskeletal, Psychiatric were all negative except for pertinent positives noted in my HPI.     PHYSICAL EXAM  Vitals:    06/26/18 0804   BP: 122/73   Pulse: 88   SpO2: 99%     Vital Signs: /73  Pulse 88  SpO2 99% Patient declined being weighed. There is no height or weight on file to calculate BMI.    General  - normal appearance, in no obvious distress  CV  - normal peripheral perfusion  Pulm  - normal respiratory pattern, non-labored  Musculoskeletal - lumbar spine  - stance: normal gait without limp, no obvious leg length discrepancy, normal heel and toe walk  - inspection: normal bone and joint alignment, no obvious scoliosis  - palpation: no paravertebral or bony tenderness  - ROM: flexion exacerbates pain in low back and hips, abnormal extension, sidebending, rotation all cause discomfort in low back and hips  - strength: lower extremities 5/5 in all planes  - special tests:  (+) straight leg raise- low back and hip pain  (+) slump test- low back pain  Neuro  - patellar and Achilles DTRs 2+ bilaterally, NO lower extremity sensory deficit throughout L5 distribution, grossly normal coordination, normal muscle tone  Skin  - no ecchymosis, erythema, warmth, or induration, no obvious rash  Psych  - interactive, appropriate, normal mood and affect  Bilateral hips: mild pain with internal rotation of both hips    ASSESSMENT & PLAN  33 yo male with bilateral hip impingement pain, and lumbar spasms, stable  Cont. To exercise and do PT and HEP  Cont. To followup with pain clinic, consider changing muscle relaxant and referral to neurology for spasms  Cont. To followup with psychiatry as well      Faustino Feldman MD, [facility]    Again, thank you for allowing me to participate in the care of your patient.        Sincerely,        Faustino Feldman MD

## 2018-07-05 ENCOUNTER — OFFICE VISIT (OUTPATIENT)
Dept: PALLIATIVE MEDICINE | Facility: CLINIC | Age: 33
End: 2018-07-05
Payer: COMMERCIAL

## 2018-07-05 DIAGNOSIS — M62.830 BACK MUSCLE SPASM: ICD-10-CM

## 2018-07-05 DIAGNOSIS — M54.40 CHRONIC LOW BACK PAIN WITH SCIATICA, SCIATICA LATERALITY UNSPECIFIED, UNSPECIFIED BACK PAIN LATERALITY: ICD-10-CM

## 2018-07-05 DIAGNOSIS — M79.2 NEUROPATHIC PAIN: Primary | ICD-10-CM

## 2018-07-05 DIAGNOSIS — M25.551 HIP PAIN, RIGHT: ICD-10-CM

## 2018-07-05 DIAGNOSIS — R10.31 GROIN PAIN, RIGHT: ICD-10-CM

## 2018-07-05 DIAGNOSIS — G89.29 CHRONIC LOW BACK PAIN WITH SCIATICA, SCIATICA LATERALITY UNSPECIFIED, UNSPECIFIED BACK PAIN LATERALITY: ICD-10-CM

## 2018-07-05 PROCEDURE — 96152 HC HEALTH AND BEHAVIOR INTERVENTION, INDIVIDUAL, EACH 15 MINUTES: CPT | Performed by: PSYCHOLOGIST

## 2018-07-05 NOTE — MR AVS SNAPSHOT
After Visit Summary   7/5/2018    Hoang Kiser    MRN: [id number]           Patient Information     Date Of Birth          1985        Visit Information        Provider Department      7/5/2018 9:00 AM Fili Wesley LP [facility]        Today's Diagnoses     Neuropathic pain    -  1    Chronic low back pain with sciatica, sciatica laterality unspecified, unspecified back pain laterality        Hip pain, right        Groin pain, right        Back muscle spasm           Follow-ups after your visit        Your next 10 appointments already scheduled     Jul 10, 2018  8:30 AM CDT   Return Visit with CARLOS A Guy CNP   [facility] ([facility])    [address]  [address]  [facility] [address] [phone]            Jul 10, 2018  9:30 AM CDT   Return Visit with Faustino Ryan PT   [facility] ([facility])    [address]  [address]  [facility] [address] [phone]            Jul 12, 2018 12:00 PM CDT   Return Visit with Faustino Feldman MD   [facility] ([facility])    [address] [place] [id number] [phone]            Jul 19, 2018 11:00 AM CDT   Return Visit with Fili Wesley LP   [facility] ([facility])    [address]  [facility] [address] [phone]              Who to contact     If you have questions or need follow up information about today's clinic visit or your schedule please contact [facility] directly at [phone].  Normal or non-critical lab and imaging results will be communicated to you by MyChart, letter or phone within 4 business days after the clinic has received the results. If you do not hear from us within 7 days, please contact the clinic through MyChart or phone. If you have a critical or abnormal lab result, we  will notify you by phone as soon as possible.  Submit refill requests through Dr. Tariff or call your pharmacy and they will forward the refill request to us. Please allow 3 business days for your refill to be completed.          Additional Information About Your Visit        BlueSnaphart Information     Dr. Tariff gives you secure access to your electronic health record. If you see a primary care provider, you can also send messages to your care team and make appointments. If you have questions, please call your primary care clinic.  If you do not have a primary care provider, please call [phone] and they will assist you.         Blood Pressure from Last 3 Encounters:   06/26/18 122/73   06/12/18 108/68   05/29/18 128/82    Weight from Last 3 Encounters:   06/12/18 78 kg (172 lb)   05/29/18 78 kg (172 lb)   05/22/18 78 kg (172 lb)              We Performed the Following     HEALTH & BEHAVIOR ASSESS, INITIAL, EA 15MIN PHD        Primary Care Provider Office Phone # Fax #    Phill Floyd [phone] [address] [facility] [address]        Equal Access to Services     [facility] : Hadii rosemary Armenta, waaxda gentry, qaybta lubna hawkins, arnav lorenzana . So [facility] [phone].    ATENCIÓN: Si habla español, tiene a cutler disposición servicios gratuitos de asistencia lingüística. [facility] [phone].    We comply with applicable federal civil rights laws and [place] laws. We do not discriminate on the basis of race, color, national origin, age, disability, sex, sexual orientation, or gender identity.            Thank you!     Thank you for choosing [facility]  for your care. Our goal is always to provide you with excellent care. Hearing back from our patients is one way we can continue to improve our services. Please take a few minutes to complete the written survey that you may receive in the mail after your visit with us.  Thank you!             Your Updated Medication List - Protect others around you: Learn how to safely use, store and throw away your medicines at www.disposemymeds.org.          This list is accurate as of 7/5/18 10:19 AM.  Always use your most recent med list.                   Brand Name Dispense Instructions for use Diagnosis    * amphetamine-dextroamphetamine 20 MG per tablet    ADDERALL     Take 20 mg by mouth daily Reported on 3/6/2017        * amphetamine-dextroamphetamine 30 MG per 24 hr capsule    ADDERALL XR     Take 1 capsule by mouth every morning        clonazePAM 0.5 MG tablet    klonoPIN     Take 0.5-1 mg by mouth nightly as needed        DULoxetine 20 MG EC capsule    CYMBALTA     Take 2 capsules by mouth daily        HYDROcodone-acetaminophen 5-325 MG per tablet    NORCO    60 tablet    Take 1 tablet by mouth 3 times daily as needed for pain (Max 3 times daily. #60 tabs to last 30 days.) Okay to fill on 6/21/18    Hip pain, right       * nicotine 14 MG/24HR 24 hr patch    NICODERM CQ    30 patch    Place 1 patch onto the skin every 24 hours Use this patch first    Tobacco abuse       * nicotine 7 MG/24HR 24 hr patch    NICODERM CQ    30 patch    Place 1 patch onto the skin every 24 hours    Tobacco abuse       tiZANidine 4 MG tablet    ZANAFLEX    60 tablet    Take 1-2 tablets (4-8 mg) by mouth nightly as needed    Lumbar radicular pain, Lumbar paraspinal muscle spasm       * Notice:  This list has 4 medication(s) that are the same as other medications prescribed for you. Read the directions carefully, and ask your doctor or other care provider to review them with you.

## 2018-07-05 NOTE — PROGRESS NOTES
[facility]   [facility] [place]  Behavioral Medicine Visit    Patient Name: Hoang Kiser    YOB: 1985   Medical Record Number: [id number]  Date: 7/5/2018                SUBJECTIVE: Met with the patient on this date for an elective return appointment.  Today is our eighth visit post initial assessment.  Today the patient reports he is doing roughly the same as his previous visit.  Today the focus is on gated making active plans to continue to do developmentally appropriate activities such as work or school.  He has interest in the human services field and has submitted some applications for work.  He has also in the process of reviewing his status for disability.    The patient reports that he is routinely involved in self-care activities for both optimal mental and chemical health.  He has weekly psychotherapy appointments, monthly psychiatry appointments.  He is a regular participant in 12-step support groups and utilizes strategies in 12-step support groups that are recommended such as regular contact with a sponsor.  The patient participates routinely in meditation practice and has an appropriate protocol for pain response prior to taking opiates.  In short he is working a program.  He is discouraged about not having more medical solutions to his pain concerns but reports he is willing to continue to take the steps necessary to explore the possibility of solutions.  He is aware that there may not be a specific finite set of answers for his chronic pain.  This is discouraging for him but he has seemingly a solid foundation for coping with this should that be the case.    The patient omitted doing his assignment from our last appointment to use his R skills to identify something that is working well in his life.  He commits to doing so for a follow-up appointment.  He also commits to bringing his questions to  the appropriate providers in face-to-face meetings as well as looking into resources for possible metro mobility participation.          OBJECTIVE:   Length of Visit: 60 minutes      Assessment: Current Emotional / Mental Status    Appearance:   Appropriate   Eye Contact:   Fair   Psychomotor Behavior: Normal   Attitude:   Cooperative   Orientation:   All  Speech  Rate / Production:             Normal   Volume:              Normal   Mood:    Depressed   Affect:    Appropriate   Thought Content:  Clear   Thought Form:  Coherent  Logical   Insight:    Fair     ASSESSMENT:       Progress toward goals: normal.    Pain Status: worsened    Emotional Status: unchanged              Medication / chemical use concerns: None    PLAN:   Next Appointment: Hoang Kiser will schedule a follow-up appointment in 2 weeks.  Assignment: See above  Objectives / interventions for next session: See above    Fili Wesley MA, LP, LADC  Behavioral Pain Specialist  [facility]

## 2018-07-08 ENCOUNTER — MYC REFILL (OUTPATIENT)
Dept: PALLIATIVE MEDICINE | Facility: CLINIC | Age: 33
End: 2018-07-08

## 2018-07-08 DIAGNOSIS — M62.830 LUMBAR PARASPINAL MUSCLE SPASM: ICD-10-CM

## 2018-07-08 DIAGNOSIS — M54.16 LUMBAR RADICULAR PAIN: ICD-10-CM

## 2018-07-09 NOTE — TELEPHONE ENCOUNTER
Message from MyChart:  Original authorizing provider: CARLOS A Guy CNP would like a refill of the following medications:  tiZANidine (ZANAFLEX) 4 MG tablet [CARLOS A Guy CNP]    Preferred pharmacy: [facility] [address] [place] [address] [address] AT [facility]    Comment:

## 2018-07-10 ENCOUNTER — OFFICE VISIT (OUTPATIENT)
Dept: PALLIATIVE MEDICINE | Facility: CLINIC | Age: 33
End: 2018-07-10
Payer: COMMERCIAL

## 2018-07-10 VITALS
HEART RATE: 85 BPM | DIASTOLIC BLOOD PRESSURE: 80 MMHG | OXYGEN SATURATION: 100 % | RESPIRATION RATE: 16 BRPM | SYSTOLIC BLOOD PRESSURE: 122 MMHG

## 2018-07-10 DIAGNOSIS — M79.2 NEUROPATHIC PAIN: Primary | ICD-10-CM

## 2018-07-10 DIAGNOSIS — M54.50 CHRONIC LOW BACK PAIN: ICD-10-CM

## 2018-07-10 DIAGNOSIS — M62.838 MUSCLE SPASM: ICD-10-CM

## 2018-07-10 DIAGNOSIS — G89.29 CHRONIC LOW BACK PAIN: ICD-10-CM

## 2018-07-10 DIAGNOSIS — M25.551 HIP PAIN, RIGHT: ICD-10-CM

## 2018-07-10 DIAGNOSIS — R25.1 TREMOR: ICD-10-CM

## 2018-07-10 DIAGNOSIS — G89.29 CHRONIC PAIN: Primary | ICD-10-CM

## 2018-07-10 DIAGNOSIS — G25.5 MUSCLE, JERKY MOVEMENTS (UNCONTROLLED): ICD-10-CM

## 2018-07-10 PROCEDURE — 99214 OFFICE O/P EST MOD 30 MIN: CPT | Performed by: NURSE PRACTITIONER

## 2018-07-10 PROCEDURE — 97112 NEUROMUSCULAR REEDUCATION: CPT | Mod: GP | Performed by: PHYSICAL THERAPIST

## 2018-07-10 PROCEDURE — 97110 THERAPEUTIC EXERCISES: CPT | Mod: GP | Performed by: PHYSICAL THERAPIST

## 2018-07-10 RX ORDER — HYDROCODONE BITARTRATE AND ACETAMINOPHEN 5; 325 MG/1; MG/1
1 TABLET ORAL 3 TIMES DAILY PRN
Qty: 75 TABLET | Refills: 0 | Status: SHIPPED | OUTPATIENT
Start: 2018-07-10 | End: 2018-08-08

## 2018-07-10 RX ORDER — HYDROCODONE BITARTRATE AND ACETAMINOPHEN 5; 325 MG/1; MG/1
1 TABLET ORAL 3 TIMES DAILY PRN
Qty: 33 TABLET | Refills: 0 | Status: SHIPPED | OUTPATIENT
Start: 2018-07-10 | End: 2018-08-07

## 2018-07-10 RX ORDER — ORPHENADRINE CITRATE 100 MG/1
100 TABLET, EXTENDED RELEASE ORAL 2 TIMES DAILY
Qty: 60 TABLET | Refills: 1 | Status: SHIPPED | OUTPATIENT
Start: 2018-07-10 | End: 2018-08-23

## 2018-07-10 RX ORDER — HYDROCODONE BITARTRATE AND ACETAMINOPHEN 5; 325 MG/1; MG/1
1 TABLET ORAL 3 TIMES DAILY PRN
Qty: 75 TABLET | Refills: 0 | Status: SHIPPED | OUTPATIENT
Start: 2018-07-10 | End: 2018-07-10

## 2018-07-10 ASSESSMENT — PAIN SCALES - GENERAL: PAINLEVEL: MODERATE PAIN (5)

## 2018-07-10 NOTE — TELEPHONE ENCOUNTER
Pt received different muscle relaxer today and will close this encounter.     Campbell Kemp MA  [facility]

## 2018-07-10 NOTE — MR AVS SNAPSHOT
After Visit Summary   7/10/2018    Hoang Kiser    MRN: [id number]           Patient Information     Date Of Birth          1985        Visit Information        Provider Department      7/10/2018 9:30 AM Faustino Ryan, PT [facility]        Today's Diagnoses     Chronic pain    -  1    Chronic low back pain        Hip pain, right           Follow-ups after your visit        Your next 10 appointments already scheduled     Jul 12, 2018 12:00 PM CDT   Return Visit with Faustino Feldman MD   [facility] ([facility]    [id number] [place] [id number] [phone]            Jul 19, 2018 11:00 AM CDT   Return Visit with Fili Wesley LP   [facility] ([facility])    [address] [place] [address] [phone]              Who to contact     If you have questions or need follow up information about today's clinic visit or your schedule please contact [facility] directly at [phone].  Normal or non-critical lab and imaging results will be communicated to you by Loop Trolleyhart, letter or phone within 4 business days after the clinic has received the results. If you do not hear from us within 7 days, please contact the clinic through Loop Trolleyhart or phone. If you have a critical or abnormal lab result, we will notify you by phone as soon as possible.  Submit refill requests through Nellix or call your pharmacy and they will forward the refill request to us. Please allow 3 business days for your refill to be completed.          Additional Information About Your Visit        MyChart Information     Nellix gives you secure access to your electronic health record. If you see a primary care provider, you can also send messages to your care team and make appointments. If you have questions, please call your primary care clinic.  If you do not have a primary  care provider, please call [phone] and they will assist you.         Blood Pressure from Last 3 Encounters:   07/10/18 122/80   06/26/18 122/73   06/12/18 108/68    Weight from Last 3 Encounters:   06/12/18 78 kg (172 lb)   05/29/18 78 kg (172 lb)   05/22/18 78 kg (172 lb)              We Performed the Following     NEUROMUSCULAR RE-EDUCATION     Therapeutic Procedure per 15 min          Today's Medication Changes          These changes are accurate as of 7/10/18  2:18 PM.  If you have any questions, ask your nurse or doctor.               Start taking these medicines.        Dose/Directions    * HYDROcodone-acetaminophen 5-325 MG per tablet   Commonly known as:  NORCO   Used for:  Hip pain, right   Started by:  Tamiko Stevens APRN CNP        Dose:  1 tablet   Take 1 tablet by mouth 3 times daily as needed for pain (Max 3 tabs daily. 11 days supply.) Okay to fill on 7/10/18   Quantity:  33 tablet   Refills:  0       * HYDROcodone-acetaminophen 5-325 MG per tablet   Commonly known as:  NORCO   Used for:  Hip pain, right   Started by:  Tamiko Stevens APRN CNP        Dose:  1 tablet   Take 1 tablet by mouth 3 times daily as needed for pain (Max 3 times daily. #75 tabs to last 30 days.) Okay to fill on 7/21/18   Quantity:  75 tablet   Refills:  0       orphenadrine 100 MG 12 hr tablet   Commonly known as:  NORFLEX   Used for:  Muscle spasm   Started by:  Tamiko Stevens APRN CNP        Dose:  100 mg   Take 1 tablet (100 mg) by mouth 2 times daily   Quantity:  60 tablet   Refills:  1       * Notice:  This list has 2 medication(s) that are the same as other medications prescribed for you. Read the directions carefully, and ask your doctor or other care provider to review them with you.         Where to get your medicines      These medications were sent to [facility] [place], [place] - [address]  [address] [place] [address], [facility] [address]     Phone:  [phone]      orphenadrine 100 MG 12 hr tablet         Some of these will need a paper prescription and others can be bought over the counter.  Ask your nurse if you have questions.     Bring a paper prescription for each of these medications     HYDROcodone-acetaminophen 5-325 MG per tablet    HYDROcodone-acetaminophen 5-325 MG per tablet                Primary Care Provider Office Phone # Fax #    Phill Floyd [phone] [address] [facility] [address]        Equal Access to Services     [facility] : Hadii aad ku hadasho Soomaali, waaxda luqadaha, qaybta kaalmada adeegyada, waxay idiin hayaan adeeg kharash la'misty . So [facility] [phone].    ATENCIÓN: Si habla español, tiene a cutler disposición servicios gratuitos de asistencia lingüística. [facility] [phone].    We comply with applicable federal civil rights laws and [place] laws. We do not discriminate on the basis of race, color, national origin, age, disability, sex, sexual orientation, or gender identity.            Thank you!     Thank you for choosing [facility]  for your care. Our goal is always to provide you with excellent care. Hearing back from our patients is one way we can continue to improve our services. Please take a few minutes to complete the written survey that you may receive in the mail after your visit with us. Thank you!             Your Updated Medication List - Protect others around you: Learn how to safely use, store and throw away your medicines at www.disposemymeds.org.          This list is accurate as of 7/10/18  2:18 PM.  Always use your most recent med list.                   Brand Name Dispense Instructions for use Diagnosis    * amphetamine-dextroamphetamine 20 MG per tablet    ADDERALL     Take 20 mg by mouth daily Reported on 3/6/2017        * amphetamine-dextroamphetamine 30 MG per 24 hr capsule    ADDERALL XR     Take 1 capsule by mouth every morning        clonazePAM 0.5 MG  tablet    klonoPIN     Take 0.5-1 mg by mouth nightly as needed        DULoxetine 20 MG EC capsule    CYMBALTA     Take 2 capsules by mouth daily        FOCALIN XR PO           * HYDROcodone-acetaminophen 5-325 MG per tablet    NORCO    33 tablet    Take 1 tablet by mouth 3 times daily as needed for pain (Max 3 tabs daily. 11 days supply.) Okay to fill on 7/10/18    Hip pain, right       * HYDROcodone-acetaminophen 5-325 MG per tablet    NORCO    75 tablet    Take 1 tablet by mouth 3 times daily as needed for pain (Max 3 times daily. #75 tabs to last 30 days.) Okay to fill on 7/21/18    Hip pain, right       * nicotine 14 MG/24HR 24 hr patch    NICODERM CQ    30 patch    Place 1 patch onto the skin every 24 hours Use this patch first    Tobacco abuse       * nicotine 7 MG/24HR 24 hr patch    NICODERM CQ    30 patch    Place 1 patch onto the skin every 24 hours    Tobacco abuse       orphenadrine 100 MG 12 hr tablet    NORFLEX    60 tablet    Take 1 tablet (100 mg) by mouth 2 times daily    Muscle spasm       tiZANidine 4 MG tablet    ZANAFLEX    60 tablet    Take 1-2 tablets (4-8 mg) by mouth nightly as needed    Lumbar radicular pain, Lumbar paraspinal muscle spasm       * Notice:  This list has 6 medication(s) that are the same as other medications prescribed for you. Read the directions carefully, and ask your doctor or other care provider to review them with you.

## 2018-07-10 NOTE — PROGRESS NOTES
"[facility]    CHIEF COMPLAINT: \"Unmanageability of spasms and pain\"     INTERVAL HISTORY:  Last seen on 6/12/18.        Recommendations/plan at the last visit included:  1.                  Schedule pain psychology assessment/visits  2.                  Schedule physical therapy assessment/visits  3.                  Schedule follow-up with CARLOS A Higuera NP-C in 4 weeks  4.                  Labs: metabolic panel  5.                  Release of information: Kiana Kirkland, FIGUEROA for Tamiko to speak with her.   6. Medication recommendations:                  1. No change to medications. Refill of Norco provided    Since his last visit, Hoang Kiser reports:  - Health Psych x 2 and PT x1 since last visit.   - Went on vacation to [place] for 5 days. Changed stimulant for ADD the day before he left and it is not going well. He is seeing psychiatry today to discuss changing meds again.     Pain Information:   Pain quality: Exhausting    Pain timing: Constant     Pain rating: intensity ranges from 2/10 to 8/10, and averages 6/10 on a 0-10 scale.   Aggravating factors include: \"Most things except those that make me feel better\"    Relieving factors include: \"Laughter, Trips, Stretching\"     Annual Controlled Substance Agreement/UDS due date: April 2019      Current Pain Relevant Medications:   Norco 5/325 mg 1 tab BID- TID                        Total opiate dose: 10-15 MME daily  Clonazepam .5 mg 1-2 tab at HS PRN  Duloxetine 20 mg daily  Naproxen 500 mg BID: on hold re: elevated LFTs   Tizanidine 4 mg 1-2 tabs at HS PRN  Ambien 10 mg at HS  Adderall 50 mg daily, combination of long and short acting.       Previous Pain Relevant Medications: (H--helped; HI--Helped initially; [facility]--Somewhat helpful; NH--No help; W--worse; SE--side effects; ?--Unsure if helpful)   NOTE: This medication information taken from patient's intake form, not medical records.                         Opiates: Tramadol: H, " Hydrocodone: H                        NSAIDS: Ibuprofen:H, naproxen:[facility], Relafen: NH                        Muscle Relaxants: Cyclobenzaprine:H,Med interaction, Tizanidine:H                        Anti-migraine mediations: Prednisone:H                        Anti-depressants: Bupropion:SE, Celexa:SE, Duloxetine:H, Trazodone:Too strong, Venlafaxine:too strong                        Sleep aids:Anbien: H                        Anxiolytics: Clonazepam:H                        Neuropathics: Tegretol:taken for seizures in childhood, Gabapentin:H                                          Topicals: Lidocaine:H                        Other medications not covered above: Tylenol:NH      Any illicit drug use: 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      SELF CARE:   How often do you practice SELF-CARE (relaxing, stretching, pacing, monitoring posture, taking mini-breaks) in a typical day:  Concern for multiple controlled substances including benzodiazepines, stimulants, opiates.      THE 4 As OF OPIOID MAINTENANCE ANALGESIA    Analgesia: Is pain relief clinically significant? YES   Activity: Is patient functional and able to perform Activities of Daily Living? YES   Adverse effects: Is patient free from adverse side effects from opiates? YES   Adherence to Rx protocol: Is patient adhering to Controlled Substance Agreement and taking medications ONLY as ordered? YES           Is Narcan prescribed for opiate use >50 MME daily? N/A    SELF CARE:   How often do you practice SELF-CARE (relaxing, stretching, pacing, monitoring posture, taking mini-breaks) in a typical day:  9 + times daily           Medications:  Current Outpatient  Prescriptions   Medication Sig Dispense Refill     clonazePAM (KLONOPIN) 0.5 MG tablet Take 0.5-1 mg by mouth nightly as needed   0     Dexmethylphenidate HCl (FOCALIN XR PO)        DULoxetine (CYMBALTA) 20 MG EC capsule Take 2 capsules by mouth daily   0     HYDROcodone-acetaminophen (NORCO) 5-325 MG per tablet Take 1 tablet by mouth 3 times daily as needed for pain (Max 3 tabs daily. 11 days supply.) Okay to fill on 7/10/18 33 tablet 0     HYDROcodone-acetaminophen (NORCO) 5-325 MG per tablet Take 1 tablet by mouth 3 times daily as needed for pain (Max 3 times daily. #75 tabs to last 30 days.) Okay to fill on 7/21/18 75 tablet 0     nicotine (NICODERM CQ) 14 MG/24HR 24 hr patch Place 1 patch onto the skin every 24 hours Use this patch first 30 patch 0     nicotine (NICODERM CQ) 7 MG/24HR 24 hr patch Place 1 patch onto the skin every 24 hours 30 patch 0     orphenadrine (NORFLEX) 100 MG 12 hr tablet Take 1 tablet (100 mg) by mouth 2 times daily 60 tablet 1     tiZANidine (ZANAFLEX) 4 MG tablet Take 1-2 tablets (4-8 mg) by mouth nightly as needed 60 tablet 3     amphetamine-dextroamphetamine (ADDERALL XR) 30 MG per 24 hr capsule Take 1 capsule by mouth every morning  0     amphetamine-dextroamphetamine (ADDERALL) 20 MG tablet Take 20 mg by mouth daily Reported on 3/6/2017  0       Review of Systems: A 10-point review of systems was negative, with the exception of chronic pain issues.      Social History: Reviewed; unchanged from previous consultation.      Family history: Reviewed; unchanged from previous consultation.     PHYSICAL EXAM    Vitals:    07/10/18 0841   BP: 122/80   BP Location: Right arm   Patient Position: Chair   Cuff Size: Adult Small   Pulse: 85   Resp: 16   SpO2: 100%       Constitutional: healthy, alert, no distress, pain behaviors and mildly anxious   HEENT: Head atraumatic, normocephalic. Eyes without conjunctival injection or jaundice. Neck supple. No obvious neck masses.  Skin: No rash,  "lesions, or petechiae of exposed skin.   Extremities: No clubbing, cyanosis, or edema to exposed extremities  Psychiatric/mental status: Alert, without lethargy or stupor. Appropriate affect. See above.     Neurologic exam:  CN:  Cranial nerves 2-12 are grossly normal.  Has uncontrolled upper body movement/tremor.       Motor:  5/5 UE and 4/5 LE strength      Musculoskeletal exam:  Cervical spine:                       Flex:  20 degrees                       Ext: 20 degrees                       Rotation to right: 20 degrees                       Rotation to left: 20 degrees                       Rotation/ext to right: painful                        Rotation/ext to left: painful                        Tenderness in the cervical spine at midline. No                       Tenderness in the cervical paraspinal muscles. No  Thoracic spine:                        Kyphosis. No                       Tenderness in the thoracic spine at midline. Yes                       Tenderness in the thoracic paraspinal muscles. Yes  Lumbar/Sacral spine:                       Forward Flexion:  90 degrees                       Ext: 20 degrees \"tight\"                       Rotation/ext to right: painful                        Rotation/ext to left: painful                        Lordosis. No                       Tenderness in the lumbar spine at midline. Yes                       Tenderness in the lumbar paraspinal muscles.Yes      Psychiatric:  mentation appears normal., affect and mood normal      DIRE Score for ongoing opioid management is calculated as follows:    Diagnosis = 2 pts (slowly progressive; moderate pain/objective findings)    Intractability = 2 pts (most treatments tried; patient not fully engaged/barriers)    Risk        Psych = 2 pts (personality dysfunction/mental illness that moderately interferes with care)         Chem Hlth = 2 pts (use of medications to cope with stress; chemical dependency in " remission)       Reliability = 3 pts (highly reliable with meds, appointments, treatments)       Social = 3 pts (supportive family/close relationships; involved in work/school; no isolation)       (Psych + Chem hlth + Reliability + Social) = 14    Efficacy = 2 pts (moderate benefit/function; low med dose; too early/not tried meds)    DIRE Score = 16        7-13: likely NOT suitable candidate for long-term opioid analgesia       14-21: may be a suitable candidate for long-term opioid analgesia          Previous Diagnostic Tests:   Imaging Studies:   MR LUMBAR SPINE W/O CONTRAST 5/2/2018 7:27 PM  History: DDD (degenerative disc disease), lumbar; Lumbar  radiculopathy; Hip pain, right; Groin pain, right.  ICD-10: DDD (degenerative disc disease), lumbar; Lumbar radiculopathy;  Hip pain, right; Groin pain, right  Comparison: Lumbar spine MRI 3/8/2017  Technique: Sagittal STIR, T1-weighted turbo spin-echo, 3-D volumetric  T2-weighted and axial reconstructed T2-weighted images of the lumbar  spine were obtained without intravenous contrast.   Findings: 5 lumbar-type vertebra. The tip of the conus medullaris is  at L1.  The lumbar vertebral column is normally aligned.   Straightening of lumbar lordosis, unchanged. The intervertebral disc  heights are maintained. Normal marrow signal. At L5-S1, there is a  disc bulge and facet hypertrophy with mild left neural foraminal  narrowing, unchanged. No spinal canal stenosis.  Impression:  1. Lumbar spondylosis with mild left neural foraminal narrowing at  L5-S1.  2. No spinal canal stenosis.          MR right hip without contrast 5/2/2018 6:47 PM  Techniques: Multiplanar multisequence imaging of the right hip was  obtained without  administration of intra-articular or intravenous  contrast using routing protocol.  History: Hip pain.  Comparison: None available.  Findings:  Osseous structures  Osseous structures: No fracture, stress reaction, avascular necrosis,  or focal osseous  lesion is seen. There is small focal area of  subchondral cystic changes in the anterior right femoral head neck  junction, most compatible with synovial herniation pit. Findings  suggestive of reduced femoral head neck junction though alpha angle  cannot be assessed owing to no dedicated oblique axial sequence  obtained.  Articular cartilage and labrum  Assessment limited on this arthrographic study due to relative lack of  joint distension.  Articular cartilage: No high grade chondral loss.  Labrum: Labral tearing approximately from 12:00 to 3:00 with 3:00  being anterior and 6:00 being the center of transverse ligament in  this convention with associated subtle area of subchondral edema in  the superior acetabulum.  Ligament teres and transverse ligament of acetabulum: Intact.  Joint or bursal effusion  Joint effusion: A physiologic amount of joint fluid.  Bursal effusion: Minimal nonspecific edema over the greater  trochanter. No substantial iliopsoas or trochanteric bursal effusion.  Muscles and tendons  Muscles and tendons: The rectus femoris, the visualized portion  iliopsoas, proximal hamstrings, and hip abductors are intact.  The  visualized adductor muscles are unremarkable.   Nerves:  The visualized course of the sciatic nerve is unremarkable.   Other Findings:  There is fat-containing right inguinal hernia.  Impression:  1. Approximately 12-3 o'clock labral tearing with synovial herniation  pit and likely reduced femoral head neck junction offset. 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  2. Fat containing right inguinal hernia.          ASSESSMENT:   1.  Lumbar DDD, mild  2.  Lumbar muscle spasm  3.  Labral tear, right hip  4.  Hx: anxiety, chemical dependence in remission.  5.  Loss of bladder control    Shoaib returns unaccompanied to discuss ongoing pain issues. He brings in a detailed account of his pain and activities over the last few weeks as well  "as a detailed written protocol for how he manages his pain, takes meds etc.  He continues to have uncontrolled \"spasms\" which have awoken him from sleep. He has a constant upper body tremor throughout this visit. These spasms have been refractory to multiple muscle relaxers. He has reviewed symptoms with Dr Feldman in Sports Med and we both agree that a neurology consult is reasonable given spasms, pain and uncontrolled body movements.     Reviewed medication management: He was taking three tabs of Norco daily over his vacation so is short to make it to his scheduled refill date. I will allow a one time early refill. He is aware that he needs to employ additional pain mangement strategies so that #75 tabs will last 30 days.       Plan:    Diagnosis reviewed, treatment option addressed, and risk/benifits discussed.  Self-care instructions given.  I am recommending a multidisciplinary treatment plan to help this patient better manage pain.      1. Schedule pain psychology visits  2. Schedule physical therapy visits  3. Schedule follow-up with CARLOS A Higuera, NP-C 1 week after neurology appt   4. Referrals: Dr Perez in Neurology  5. Medication recommendations:   1. Norco: 5/325 mg.  Short refill provided to get to 7/21. Refill for #75 tabs to be filled on 7/21/18. No further early refills allowed.     Total time spent face to face was 30 minutes and more than 50% of face to face time was spent in counseling and/or coordination of care regarding the diagnosis and recommendations above.      CARLOS A Bass, NP-C   [facility]    Disclaimer: This note consists of symbols derived from keyboarding, dictation and/or voice recognition software. As a result, there may be errors in the script that have gone undetected. Please consider this when interpreting information found in this chart.                                            "

## 2018-07-10 NOTE — PROGRESS NOTES
"Patient Name: Hoang Kiser      YOB: 1985     Medical Record Number: [id number]  Diagnosis:    Chronic pain  Chronic low back pain             Visit Info Length of Visit: 45  Arrived: On Time   Exercise/Activity Education Instruction:  Postural Training/Anatomy  Pacing/Energy Conservation  Home Program  Self Care  Activity:  Therapeutic Exercise 30':  Aerobic Conditioning (*see \"Strength/Functional Actitivities Record for details of exercises performed)  Tmill walking x 12' with good quality gait mechanics  Stretching:  Upper Ext  bilateral, Lower Ext  bilateral  Postural Training:  emphasis dynamic during Tmill walk.  Pt able to keep hips level and avoid rotation with less effort today than at prior visit   BKFO, KTC, TA activation      Neuro re-ed 15':  Worked on 'finding' neutral spine and neutral joint positioning  Verbal review of being able to recognize of hip flexor activation and to avoid overactivation of hip flexors.    Ongoing ed for standing in front of mirror, cues for thumbs fwd, shldr joint neutral. Re-ed for it being ok to not always be in neutral spine position if too symptomatic. Emphasis on big picture of chronic pain PT: functional application of 3 principles of Pain PT - utilize your awareness of body, think self care and engage in low intensity aerobic exercise/activity consistently   Notes:   Pt states he has slowly become more accepting of his current status related to his ongoing pain.  Continued emphasis on the positive things he is able to do regarding his physical therapy program.  Pt again acknowledges he has some good things established and is attempting to stay compliant and engaged in his PT HEP and self care efforts.  Slowly progressing toward goals.   Demonstrates/Verbalizes Technique:   4, 5 (1= poor technique-difficulty performing exercises,significant cues required; 5= good technique-performs exercises without cues)  Body Awareness:  4 (1=low awareness; 5=high " awareness)  Posture/Stability:   3, 4 (1= poor posture, stability; 5= good posture, stability)   Motivational Level:   Ask questions, Eager to learn and Cooperative Participation:  Full   Patient Satisfaction:  satisfied Response to Teaching:   cooperative  Factors that affect learning: None   Plan of Care  Pt will continue with his PT HEP and self care efforts on his own for now with the understanding he may return to PT as needed.  Pt in agreement with plan, reports he has learned a lot of good things in chronic pain PT and feels confident he can be compliant with his HEP.   Therapist: Faustino Ryan PT                 Date:  7/10/2018

## 2018-07-10 NOTE — MR AVS SNAPSHOT
After Visit Summary   7/10/2018    Hoang Kiser    MRN: [id number]           Patient Information     Date Of Birth          1985        Visit Information        Provider Department      7/10/2018 8:30 AM Tamiko Stevens APRN CNP [facility]        Today's Diagnoses     Neuropathic pain    -  1    Tremor        Muscle, jerky movements (uncontrolled)        Muscle spasm        Hip pain, right          Care Instructions    After Visit Instructions:     Thank you for coming to [facility] for your care. It is my goal to partner with you to help you reach your optimal state of health.     I am recommending multidisciplinary care at this time.  The focus of care will be to continue gradual rehabilitation and pain management with medication adjustments as needed.    Continue daily self-care, identifying contributing factors, and monitoring variations in pain level. Continue to integrate self-care into your life.      1. Schedule pain psychology visits  2. Schedule physical therapy visits  3. Schedule follow-up with CARLOS A Higuera NP-C 1 week after neurology appt   4. Referrals: Dr Perez in Neurology  5. Medication recommendations:   1. Norco: 5/325 mg.  Short refill provided to get to 7/21. Refill for #75 tabs to be filled on 7/21/18. No further early refills allowed.       CARLOS A Bass, NP-C  [facility]    Contact information: [facility]    Please call if any side effects, questions, or concerns arise.    Nurse Triage line:  [phone]   Call this number with any questions or concerns. You may leave a detailed message anytime. Calls are typically returned Monday through Friday between 8 AM and 4:30 PM. We usually get back to you within 2 business days depending on the issue/request.    Scheduling number: [phone].  Call this number to schedule or change appointments.           Medication Refills Policy:    For non-narcotic medications, please your pharmacy directly to request a refill and the pharmacy will call the Pain Management Center for authorization. Please allow 3-4 days for these refills.    For narcotic refills, call the nurse triage line and leave a message requesting your refill along with the name of the pharmacy that you use. Narcotic prescriptions will be mailed to your pharmacy or you may pick them up at the clinic.  Please call 7-10 days before your refill is due  The above policy allows adequate time so that you do not run out of medication.    No Show - Late Cancellation - Late Arrival Policy  We believe regular attendance is key to your success in our program.    Any time you are unable to keep your appointment we ask that you call us at  least 24 hours in advance to let us know. This will allow us to offer the appointment time to another patient. The following is our policy for missed appointments. This also applies to appointments cancelled with less than 24 hours notice.    You will receive a letter after missing your 1st and 2nd appointments without contacting the clinic before your scheduled appointment time.     After missing 3 appointments without calling first, we will cancel all of your future appointments at [facility].    At that point, you will not be able to resume services unless approved by your care team  We understand that unforseen circumstances arise, however, out of respect for all concerned and to provide this appointment to another patient, this policy will be enforced.    Please note that most follow up appointments are 30 minutes long. If you arrive late, your provider may not be able to see you for the entire 30 minutes. Please also note that if you arrive more than 15 minutes for any appointment, it may be rescheduled.                                     Follow-ups after your visit        Additional Services      NEUROLOGY ADULT REFERRAL       Your provider has referred you for the following:   Consult at Dr Perez only please. Any location is okay.     Please be aware that coverage of these services is subject to the terms and limitations of your health insurance plan.  Call member services at your health plan with any benefit or coverage questions.      Please bring the following with you to your appointment:    (1) Any X-Rays, CTs or MRIs which have been performed.  Contact the facility where they were done to arrange for  prior to your scheduled appointment.    (2) List of current medications  (3) This referral request   (4) Any documents/labs given to you for this referral                  Your next 10 appointments already scheduled     Jul 10, 2018  9:30 AM CDT   Return Visit with Faustino Ryan PT   [facility]    [address] [place] [id number] [phone]            Jul 12, 2018 12:00 PM CDT   Return Visit with Faustino Feldman MD   [facility]    [address] [place] [id number] [phone]            Jul 19, 2018 11:00 AM CDT   Return Visit with Fili Wesley LP   [facility] ([facility])    [address] [place] [id number] [phone]              Who to contact     If you have questions or need follow up information about today's clinic visit or your schedule please contact [facility] directly at [phone].  Normal or non-critical lab and imaging results will be communicated to you by MyChart, letter or phone within 4 business days after the clinic has received the results. If you do not hear from us within 7 days, please contact the clinic through MyChart or phone. If you have a critical or abnormal lab result, we will notify you by phone as soon as possible.  Submit refill requests  through [employer] or call your pharmacy and they will forward the refill request to us. Please allow 3 business days for your refill to be completed.          Additional Information About Your Visit        [employer] Information     [employer] gives you secure access to your electronic health record. If you see a primary care provider, you can also send messages to your care team and make appointments. If you have questions, please call your primary care clinic.  If you do not have a primary care provider, please call [phone] and they will assist you.        Your Vitals Were     Pulse Respirations Pulse Oximetry             85 16 100%          Blood Pressure from Last 3 Encounters:   07/10/18 122/80   06/26/18 122/73   06/12/18 108/68    Weight from Last 3 Encounters:   06/12/18 78 kg (172 lb)   05/29/18 78 kg (172 lb)   05/22/18 78 kg (172 lb)              We Performed the Following     NEUROLOGY ADULT REFERRAL          Today's Medication Changes          These changes are accurate as of 7/10/18  9:07 AM.  If you have any questions, ask your nurse or doctor.               Start taking these medicines.        Dose/Directions    * HYDROcodone-acetaminophen 5-325 MG per tablet   Commonly known as:  NORCO   Used for:  Hip pain, right   Started by:  Tamiko Stevens APRN CNP        Dose:  1 tablet   Take 1 tablet by mouth 3 times daily as needed for pain (Max 3 tabs daily. 11 days supply.) Okay to fill on 7/10/18   Quantity:  33 tablet   Refills:  0       * HYDROcodone-acetaminophen 5-325 MG per tablet   Commonly known as:  NORCO   Used for:  Hip pain, right   Started by:  Tamiko Stevens APRN CNP        Dose:  1 tablet   Take 1 tablet by mouth 3 times daily as needed for pain (Max 3 times daily. #75 tabs to last 30 days.) Okay to fill on 6/21/18   Quantity:  75 tablet   Refills:  0       orphenadrine 100 MG 12 hr tablet   Commonly known as:  NORFLEX   Used for:  Muscle spasm   Started by:  Tamiko Stevens  APRN CNP        Dose:  100 mg   Take 1 tablet (100 mg) by mouth 2 times daily   Quantity:  60 tablet   Refills:  1       * Notice:  This list has 2 medication(s) that are the same as other medications prescribed for you. Read the directions carefully, and ask your doctor or other care provider to review them with you.         Where to get your medicines      These medications were sent to [facility] [place], [place] - [address]  [address], [facility] [address]     Phone:  [phone]     orphenadrine 100 MG 12 hr tablet         Some of these will need a paper prescription and others can be bought over the counter.  Ask your nurse if you have questions.     Bring a paper prescription for each of these medications     HYDROcodone-acetaminophen 5-325 MG per tablet    HYDROcodone-acetaminophen 5-325 MG per tablet               Information about OPIOIDS     PRESCRIPTION OPIOIDS: WHAT YOU NEED TO KNOW   We gave you an opioid (narcotic) pain medicine. It is important to manage your pain, but opioids are not always the best choice. You should first try all the other options your care team gave you. Take this medicine for as short a time (and as few doses) as possible.     These medicines have risks:    DO NOT drive when on new or higher doses of pain medicine. These medicines can affect your alertness and reaction times, and you could be arrested for driving under the influence (DUI). If you need to use opioids long-term, talk to your care team about driving.    DO NOT operate heave machinery    DO NOT do any other dangerous activities while taking these medicines.     DO NOT drink any alcohol while taking these medicines.      If the opioid prescribed includes acetaminophen, DO NOT take with any other medicines that contain acetaminophen. Read all labels carefully. Look for the word  acetaminophen  or  Tylenol.  Ask your pharmacist if you have questions or are unsure.    You can get  addicted to pain medicines, especially if you have a history of addiction (chemical, alcohol or substance dependence). Talk to your care team about ways to reduce this risk.    Store your pills in a secure place, locked if possible. We will not replace any lost or stolen medicine. If you don t finish your medicine, please throw away (dispose) as directed by your pharmacist. The [place] Pollution Control Agency has more information about safe disposal: https://www.pca.[facility].[place].us/living-green/managing-unwanted-medications.     All opioids tend to cause constipation. Drink plenty of water and eat foods that have a lot of fiber, such as fruits, vegetables, prune juice, apple juice and high-fiber cereal. Take a laxative (Miralax, milk of magnesia, Colace, Senna) if you don t move your bowels at least every other day.          Primary Care Provider Office Phone # Fax #    Phill Floyd [phone] [address] [facility] [address]        Equal Access to Services     BERLIN MEYER AH: Hadii aad ku hadasho Soomaali, waaxda luqadaha, qaybta kaalmada adeegyada, waxay oscarin haymisty lorenzana . So [facility] [phone].    ATENCIÓN: Si habla español, tiene a cutler disposición servicios gratuitos de asistencia lingüística. Llame al [phone].    We comply with applicable federal civil rights laws and [place] laws. We do not discriminate on the basis of race, color, national origin, age, disability, sex, sexual orientation, or gender identity.            Thank you!     Thank you for choosing [facility]  for your care. Our goal is always to provide you with excellent care. Hearing back from our patients is one way we can continue to improve our services. Please take a few minutes to complete the written survey that you may receive in the mail after your visit with us. Thank you!             Your Updated Medication List - Protect others around you: Learn how to safely  use, store and throw away your medicines at www.disposemymeds.org.          This list is accurate as of 7/10/18  9:07 AM.  Always use your most recent med list.                   Brand Name Dispense Instructions for use Diagnosis    * amphetamine-dextroamphetamine 20 MG per tablet    ADDERALL     Take 20 mg by mouth daily Reported on 3/6/2017        * amphetamine-dextroamphetamine 30 MG per 24 hr capsule    ADDERALL XR     Take 1 capsule by mouth every morning        clonazePAM 0.5 MG tablet    klonoPIN     Take 0.5-1 mg by mouth nightly as needed        DULoxetine 20 MG EC capsule    CYMBALTA     Take 2 capsules by mouth daily        FOCALIN XR PO           * HYDROcodone-acetaminophen 5-325 MG per tablet    NORCO    33 tablet    Take 1 tablet by mouth 3 times daily as needed for pain (Max 3 tabs daily. 11 days supply.) Okay to fill on 7/10/18    Hip pain, right       * HYDROcodone-acetaminophen 5-325 MG per tablet    NORCO    75 tablet    Take 1 tablet by mouth 3 times daily as needed for pain (Max 3 times daily. #75 tabs to last 30 days.) Okay to fill on 6/21/18    Hip pain, right       * nicotine 14 MG/24HR 24 hr patch    NICODERM CQ    30 patch    Place 1 patch onto the skin every 24 hours Use this patch first    Tobacco abuse       * nicotine 7 MG/24HR 24 hr patch    NICODERM CQ    30 patch    Place 1 patch onto the skin every 24 hours    Tobacco abuse       orphenadrine 100 MG 12 hr tablet    NORFLEX    60 tablet    Take 1 tablet (100 mg) by mouth 2 times daily    Muscle spasm       tiZANidine 4 MG tablet    ZANAFLEX    60 tablet    Take 1-2 tablets (4-8 mg) by mouth nightly as needed    Lumbar radicular pain, Lumbar paraspinal muscle spasm       * Notice:  This list has 6 medication(s) that are the same as other medications prescribed for you. Read the directions carefully, and ask your doctor or other care provider to review them with you.

## 2018-07-10 NOTE — PATIENT INSTRUCTIONS
After Visit Instructions:     Thank you for coming to [facility] for your care. It is my goal to partner with you to help you reach your optimal state of health.     I am recommending multidisciplinary care at this time.  The focus of care will be to continue gradual rehabilitation and pain management with medication adjustments as needed.    Continue daily self-care, identifying contributing factors, and monitoring variations in pain level. Continue to integrate self-care into your life.      1. Schedule pain psychology visits  2. Schedule physical therapy visits  3. Schedule follow-up with CARLOS A Higuera NP-C 1 week after neurology appt   4. Referrals: Dr Perez in Neurology  5. Medication recommendations:   1. Norco: 5/325 mg.  Short refill provided to get to 7/21. Refill for #75 tabs to be filled on 7/21/18. No further early refills allowed.       CARLOS A Bass, NP-C  [facility]    Contact information: [facility]    Please call if any side effects, questions, or concerns arise.    Nurse Triage line:  [phone]   Call this number with any questions or concerns. You may leave a detailed message anytime. Calls are typically returned Monday through Friday between 8 AM and 4:30 PM. We usually get back to you within 2 business days depending on the issue/request.    Scheduling number: [phone].  Call this number to schedule or change appointments.          Medication Refills Policy:    For non-narcotic medications, please your pharmacy directly to request a refill and the pharmacy will call the [facility] Management Center for authorization. Please allow 3-4 days for these refills.    For narcotic refills, call the nurse triage line and leave a message requesting your refill along with the name of the pharmacy that you use. Narcotic prescriptions will be mailed to your pharmacy or you may pick them up at the clinic.   Please call 7-10 days before your refill is due  The above policy allows adequate time so that you do not run out of medication.    No Show - Late Cancellation - Late Arrival Policy  We believe regular attendance is key to your success in our program.    Any time you are unable to keep your appointment we ask that you call us at  least 24 hours in advance to let us know. This will allow us to offer the appointment time to another patient. The following is our policy for missed appointments. This also applies to appointments cancelled with less than 24 hours notice.    You will receive a letter after missing your 1st and 2nd appointments without contacting the clinic before your scheduled appointment time.     After missing 3 appointments without calling first, we will cancel all of your future appointments at [facility].    At that point, you will not be able to resume services unless approved by your care team  We understand that unforseen circumstances arise, however, out of respect for all concerned and to provide this appointment to another patient, this policy will be enforced.    Please note that most follow up appointments are 30 minutes long. If you arrive late, your provider may not be able to see you for the entire 30 minutes. Please also note that if you arrive more than 15 minutes for any appointment, it may be rescheduled.

## 2018-07-11 ENCOUNTER — MYC MEDICAL ADVICE (OUTPATIENT)
Dept: PALLIATIVE MEDICINE | Facility: CLINIC | Age: 33
End: 2018-07-11

## 2018-07-11 NOTE — TELEPHONE ENCOUNTER
Patient is scheduled.    KATERINA CardenasN, RN  Care Coordinator  [facility]

## 2018-07-12 ENCOUNTER — OFFICE VISIT (OUTPATIENT)
Dept: ORTHOPEDICS | Facility: CLINIC | Age: 33
End: 2018-07-12
Payer: COMMERCIAL

## 2018-07-12 VITALS
HEART RATE: 85 BPM | BODY MASS INDEX: 26.37 KG/M2 | SYSTOLIC BLOOD PRESSURE: 124 MMHG | DIASTOLIC BLOOD PRESSURE: 79 MMHG | WEIGHT: 172 LBS

## 2018-07-12 DIAGNOSIS — M25.551 HIP PAIN, BILATERAL: ICD-10-CM

## 2018-07-12 DIAGNOSIS — M25.552 HIP PAIN, BILATERAL: ICD-10-CM

## 2018-07-12 DIAGNOSIS — M62.830 LUMBAR PARASPINAL MUSCLE SPASM: Primary | ICD-10-CM

## 2018-07-12 PROCEDURE — 99213 OFFICE O/P EST LOW 20 MIN: CPT | Performed by: PREVENTIVE MEDICINE

## 2018-07-12 ASSESSMENT — PAIN SCALES - GENERAL: PAINLEVEL: MODERATE PAIN (5)

## 2018-07-12 NOTE — PROGRESS NOTES
HISTORY OF PRESENT ILLNESS  Mr. Kiser is a pleasant 32 year old year old male who presents to clinic today with low back and chronic hip pain  He has had some improvement  Has been using norco on an as needed basis.   Has taken about 5 days per week.  He would like to start pool physical therapy      MEDICAL HISTORY  Patient Active Problem List   Diagnosis     Neuropathy     Moderate major depression (H)     Tobacco abuse     Attention deficit hyperactivity disorder (ADHD), predominantly inattentive type     Primary insomnia     History of MRI of brain and brain stem     Panic disorder without agoraphobia     Abnormal involuntary movement     Tic disorder     normal emg 2017     Anxiety     Panic attack     Posttraumatic stress disorder with dissociative symptoms       Current Outpatient Prescriptions   Medication Sig Dispense Refill     clonazePAM (KLONOPIN) 0.5 MG tablet Take 0.5-1 mg by mouth nightly as needed   0     Dexmethylphenidate HCl (FOCALIN XR PO)        DULoxetine (CYMBALTA) 20 MG EC capsule Take 60 mg by mouth daily   0     HYDROcodone-acetaminophen (NORCO) 5-325 MG per tablet Take 1 tablet by mouth 3 times daily as needed for pain (Max 3 tabs daily. 11 days supply.) Okay to fill on 7/10/18 33 tablet 0     HYDROcodone-acetaminophen (NORCO) 5-325 MG per tablet Take 1 tablet by mouth 3 times daily as needed for pain (Max 3 times daily. #75 tabs to last 30 days.) Okay to fill on 7/21/18 75 tablet 0     Methylphenidate HCl (CONCERTA PO) Take 54 mg by mouth       nicotine (NICODERM CQ) 14 MG/24HR 24 hr patch Place 1 patch onto the skin every 24 hours Use this patch first 30 patch 0     nicotine (NICODERM CQ) 7 MG/24HR 24 hr patch Place 1 patch onto the skin every 24 hours 30 patch 0     orphenadrine (NORFLEX) 100 MG 12 hr tablet Take 1 tablet (100 mg) by mouth 2 times daily 60 tablet 1     tiZANidine (ZANAFLEX) 4 MG tablet Take 1-2 tablets (4-8 mg) by mouth nightly as needed 60 tablet 3       Allergies    Allergen Reactions     Buspirone Hcl Other (See Comments)     Panic attacks     Doxycycline Diarrhea, Fatigue, GI Disturbance and Nausea     Naproxen      Possible abnormal liver function tests      Trazodone Fatigue     Keflex [Cephalexin] Diarrhea       Family History   Problem Relation Age of Onset     Lipids Father      hyperlipidemia     Hyperlipidemia Father      Obesity Father      Arthritis Mother      Hyperlipidemia Mother      Depression Mother      Anxiety Disorder Mother      Mental Illness Mother      Obesity Mother      Asthma Brother      Asthma Sister      Depression Other      Hearing Loss Other      Psychotic Disorder Other      Obesity Other      Cerebrovascular Disease Paternal Grandfather      Alzheimer Disease Paternal Grandfather      Depression Brother      Mental Illness Brother      Bipolar     Asthma Brother      Depression Sister      Asthma Sister      Substance Abuse Sister      Alcohol     Mental Illness Brother      Bipolar     Asthma Brother      Asthma Sister      Obesity Sister      Asthma Brother      Obesity Brother      Obesity Maternal Grandmother      Genetic Disorder Maternal Grandmother      Epilepsy       Additional medical/Social/Surgical histories reviewed in [facility] and updated as appropriate.     REVIEW OF SYSTEMS (7/12/2018)  10 point ROS of systems including Constitutional, Eyes, Respiratory, Cardiovascular, Gastroenterology, Genitourinary, Integumentary, Musculoskeletal, Psychiatric were all negative except for pertinent positives noted in my HPI.     PHYSICAL EXAM  Vitals:    07/12/18 1202   BP: 124/79   Pulse: 85   Weight: 78 kg (172 lb)     Vital Signs: /79  Pulse 85  Wt 78 kg (172 lb)  BMI 26.37 kg/m2 Patient declined being weighed. Body mass index is 26.37 kg/(m^2).    General  - normal appearance, in no obvious distress  CV  - normal femoral pulse  Pulm  - normal respiratory pattern, non-labored  Musculoskeletal - hips and low back  - stance: normal  gait without limp, no obvious leg length discrepancy, normal heel and toe walk  - inspection: no swelling or effusion,  normal bone and joint alignment, no obvious deformity  - palpation: no lateral or anterior hip tenderness  - ROM: some pain with flexion in low back and hips, normal extension, external rotation, abduction, and adduction  - strength: 5/5 in all planes  - special tests:  (-) TARA  (+) YADI  Has some positive SLR in low back but no radicular symptoms today  no pain with axial femoral load  Neuro  - no sensory or motor deficit, grossly normal coordination, normal muscle tone  Skin  - no ecchymosis, erythema, warmth, or induration, no obvious rash  Psych  - interactive, appropriate, normal mood and affect  ASSESSMENT & PLAN  31 yo male with ongoing bilateral hip and low back pain, stable  Wants to restart pool physical therapy  Will cont. To see pain clinic  Cont his medications as written    Faustino Feldman MD, CAQSM

## 2018-07-12 NOTE — MR AVS SNAPSHOT
After Visit Summary   7/12/2018    Hoang Kiser    MRN: [id number]           Patient Information     Date Of Birth          1985        Visit Information        Provider Department      7/12/2018 12:00 PM Faustino Feldman MD [facility]        Today's Diagnoses     Lumbar paraspinal muscle spasm    -  1    Hip pain, bilateral           Follow-ups after your visit        Additional Services     PHYSICAL THERAPY REFERRAL (External-Prints)       Physical Therapy Referral                  Your next 10 appointments already scheduled     Aug 03, 2018 10:30 AM CDT   Return Visit with Fili Wesley LP   [facility] ([facility])    [address]  [address] [address] [phone]            Aug 06, 2018  8:40 AM CDT   Return Visit with Faustino Feldman MD   [facility] ([facility])    [address] [place] [address] [phone]            Aug 07, 2018  8:00 AM CDT   New Visit with Ghulam Perez MD   [facility] ([facility])    [address] [facility] [address] [phone]            Aug 08, 2018 10:30 AM CDT   Return Visit with CARLOS A [facility] ([facility])    [address]  [address] [address] [phone]              Who to contact     If you have questions or need follow up information about today's clinic visit or your schedule please contact [facility] directly at [phone].  Normal or non-critical lab and imaging results will be communicated to you by MyChart, letter or phone within 4 business days after the clinic has received the results. If you do not hear from us within 7 days, please contact the clinic through MyChart or phone. If you have a critical or abnormal lab result, we will notify you  by phone as soon as possible.  Submit refill requests through [employer] or call your pharmacy and they will forward the refill request to us. Please allow 3 business days for your refill to be completed.          Additional Information About Your Visit        [employer] Information     [employer] gives you secure access to your electronic health record. If you see a primary care provider, you can also send messages to your care team and make appointments. If you have questions, please call your primary care clinic.  If you do not have a primary care provider, please call [phone] and they will assist you.      [employer] is an electronic gateway that provides easy, online access to your medical records. With [employer], you can request a clinic appointment, read your test results, renew a prescription or communicate with your care team.     To access your existing account, please contact your [facility] or call [phone] for assistance.        Your Vitals Were     Pulse BMI (Body Mass Index)                85 26.37 kg/m2           Blood Pressure from Last 3 Encounters:   07/12/18 124/79   07/10/18 122/80   06/26/18 122/73    Weight from Last 3 Encounters:   07/12/18 78 kg (172 lb)   06/12/18 78 kg (172 lb)   05/29/18 78 kg (172 lb)              We Performed the Following     PHYSICAL THERAPY REFERRAL (External-Prints)        Primary Care Provider Office Phone # Fax #    Phill Floyd [phone] [address] [facility] [address]        Equal Access to Services     BERLIN MEYER : Hadii rosemary bobbyo Soevelyn, waaxda luadaliadaha, qaybta kaalmada shruthi, arnav billy. So [facility] [phone].    ATENCIÓN: Si habla español, tiene a cutler disposición servicios gratuitos de asistencia lingüística. Llame al [phone].    We comply with applicable federal civil rights laws and [place] laws. We do not discriminate on the basis of race,  color, national origin, age, disability, sex, sexual orientation, or gender identity.            Thank you!     Thank you for choosing [facility]  for your care. Our goal is always to provide you with excellent care. Hearing back from our patients is one way we can continue to improve our services. Please take a few minutes to complete the written survey that you may receive in the mail after your visit with us. Thank you!             Your Updated Medication List - Protect others around you: Learn how to safely use, store and throw away your medicines at www.disposemymeds.org.          This list is accurate as of 7/12/18 11:59 PM.  Always use your most recent med list.                   Brand Name Dispense Instructions for use Diagnosis    clonazePAM 0.5 MG tablet    klonoPIN     Take 0.5-1 mg by mouth nightly as needed        CONCERTA PO      Take 54 mg by mouth        DULoxetine 20 MG EC capsule    CYMBALTA     Take 60 mg by mouth daily        FOCALIN XR PO           * HYDROcodone-acetaminophen 5-325 MG per tablet    NORCO    33 tablet    Take 1 tablet by mouth 3 times daily as needed for pain (Max 3 tabs daily. 11 days supply.) Okay to fill on 7/10/18    Hip pain, right       * HYDROcodone-acetaminophen 5-325 MG per tablet    NORCO    75 tablet    Take 1 tablet by mouth 3 times daily as needed for pain (Max 3 times daily. #75 tabs to last 30 days.) Okay to fill on 7/21/18    Hip pain, right       * nicotine 14 MG/24HR 24 hr patch    NICODERM CQ    30 patch    Place 1 patch onto the skin every 24 hours Use this patch first    Tobacco abuse       * nicotine 7 MG/24HR 24 hr patch    NICODERM CQ    30 patch    Place 1 patch onto the skin every 24 hours    Tobacco abuse       orphenadrine 100 MG 12 hr tablet    NORFLEX    60 tablet    Take 1 tablet (100 mg) by mouth 2 times daily    Muscle spasm       tiZANidine 4 MG tablet    ZANAFLEX    60 tablet    Take 1-2 tablets (4-8 mg) by mouth nightly as  needed    Lumbar radicular pain, Lumbar paraspinal muscle spasm       * Notice:  This list has 4 medication(s) that are the same as other medications prescribed for you. Read the directions carefully, and ask your doctor or other care provider to review them with you.

## 2018-07-12 NOTE — NURSING NOTE
Hoang Kiser's chief complaint for this visit includes:  Chief Complaint   Patient presents with     RECHECK     Follow up for hip pain     PCP: Phill Floyd    Referring Provider:  No referring provider defined for this encounter.    /79  Pulse 85  Wt 78 kg (172 lb)  BMI 26.37 kg/m2  Moderate Pain (5)     Do you need any medication refills at today's visit? No

## 2018-07-12 NOTE — LETTER
7/12/2018         RE: Hoang Kiser  [address]  [facility] [address]        Dear Colleague,    Thank you for referring your patient, Hoang Kiser, to the [facility]. Please see a copy of my visit note below.    HISTORY OF PRESENT ILLNESS  Mr. Kiser is a pleasant 32 year old year old male who presents to clinic today with low back and chronic hip pain  He has had some improvement  Has been using norco on an as needed basis.   Has taken about 5 days per week.  He would like to start pool physical therapy      MEDICAL HISTORY  Patient Active Problem List   Diagnosis     Neuropathy     Moderate major depression (H)     Tobacco abuse     Attention deficit hyperactivity disorder (ADHD), predominantly inattentive type     Primary insomnia     History of MRI of brain and brain stem     Panic disorder without agoraphobia     Abnormal involuntary movement     Tic disorder     normal emg 2017     Anxiety     Panic attack     Posttraumatic stress disorder with dissociative symptoms       Current Outpatient Prescriptions   Medication Sig Dispense Refill     clonazePAM (KLONOPIN) 0.5 MG tablet Take 0.5-1 mg by mouth nightly as needed   0     Dexmethylphenidate HCl (FOCALIN XR PO)        DULoxetine (CYMBALTA) 20 MG EC capsule Take 60 mg by mouth daily   0     HYDROcodone-acetaminophen (NORCO) 5-325 MG per tablet Take 1 tablet by mouth 3 times daily as needed for pain (Max 3 tabs daily. 11 days supply.) Okay to fill on 7/10/18 33 tablet 0     HYDROcodone-acetaminophen (NORCO) 5-325 MG per tablet Take 1 tablet by mouth 3 times daily as needed for pain (Max 3 times daily. #75 tabs to last 30 days.) Okay to fill on 7/21/18 75 tablet 0     Methylphenidate HCl (CONCERTA PO) Take 54 mg by mouth       nicotine (NICODERM CQ) 14 MG/24HR 24 hr patch Place 1 patch onto the skin every 24 hours Use this patch first 30 patch 0     nicotine (NICODERM CQ) 7 MG/24HR 24 hr patch Place 1 patch onto the  skin every 24 hours 30 patch 0     orphenadrine (NORFLEX) 100 MG 12 hr tablet Take 1 tablet (100 mg) by mouth 2 times daily 60 tablet 1     tiZANidine (ZANAFLEX) 4 MG tablet Take 1-2 tablets (4-8 mg) by mouth nightly as needed 60 tablet 3       Allergies   Allergen Reactions     Buspirone Hcl Other (See Comments)     Panic attacks     Doxycycline Diarrhea, Fatigue, GI Disturbance and Nausea     Naproxen      Possible abnormal liver function tests      Trazodone Fatigue     Keflex [Cephalexin] Diarrhea       Family History   Problem Relation Age of Onset     Lipids Father      hyperlipidemia     Hyperlipidemia Father      Obesity Father      Arthritis Mother      Hyperlipidemia Mother      Depression Mother      Anxiety Disorder Mother      Mental Illness Mother      Obesity Mother      Asthma Brother      Asthma Sister      Depression Other      Hearing Loss Other      Psychotic Disorder Other      Obesity Other      Cerebrovascular Disease Paternal Grandfather      Alzheimer Disease Paternal Grandfather      Depression Brother      Mental Illness Brother      Bipolar     Asthma Brother      Depression Sister      Asthma Sister      Substance Abuse Sister      Alcohol     Mental Illness Brother      Bipolar     Asthma Brother      Asthma Sister      Obesity Sister      Asthma Brother      Obesity Brother      Obesity Maternal Grandmother      Genetic Disorder Maternal Grandmother      Epilepsy       Additional medical/Social/Surgical histories reviewed in [facility] and updated as appropriate.     REVIEW OF SYSTEMS (7/12/2018)  10 point ROS of systems including Constitutional, Eyes, Respiratory, Cardiovascular, Gastroenterology, Genitourinary, Integumentary, Musculoskeletal, Psychiatric were all negative except for pertinent positives noted in my HPI.     PHYSICAL EXAM  Vitals:    07/12/18 1202   BP: 124/79   Pulse: 85   Weight: 78 kg (172 lb)     Vital Signs: /79  Pulse 85  Wt 78 kg (172 lb)  BMI 26.37 kg/m2  Patient declined being weighed. Body mass index is 26.37 kg/(m^2).    General  - normal appearance, in no obvious distress  CV  - normal femoral pulse  Pulm  - normal respiratory pattern, non-labored  Musculoskeletal - hips and low back  - stance: normal gait without limp, no obvious leg length discrepancy, normal heel and toe walk  - inspection: no swelling or effusion,  normal bone and joint alignment, no obvious deformity  - palpation: no lateral or anterior hip tenderness  - ROM: some pain with flexion in low back and hips, normal extension, external rotation, abduction, and adduction  - strength: 5/5 in all planes  - special tests:  (-) TARA  (+) YADI  Has some positive SLR in low back but no radicular symptoms today  no pain with axial femoral load  Neuro  - no sensory or motor deficit, grossly normal coordination, normal muscle tone  Skin  - no ecchymosis, erythema, warmth, or induration, no obvious rash  Psych  - interactive, appropriate, normal mood and affect  ASSESSMENT & PLAN  31 yo male with ongoing bilateral hip and low back pain, stable  Wants to restart pool physical therapy  Will cont. To see pain clinic  Cont his medications as written    Faustino Feldman MD, CAQSM      Again, thank you for allowing me to participate in the care of your patient.        Sincerely,        Faustino Feldman MD

## 2018-07-19 ENCOUNTER — OFFICE VISIT (OUTPATIENT)
Dept: PALLIATIVE MEDICINE | Facility: CLINIC | Age: 33
End: 2018-07-19
Payer: COMMERCIAL

## 2018-07-19 DIAGNOSIS — G89.29 CHRONIC LOW BACK PAIN WITH SCIATICA, SCIATICA LATERALITY UNSPECIFIED, UNSPECIFIED BACK PAIN LATERALITY: Primary | ICD-10-CM

## 2018-07-19 DIAGNOSIS — M54.16 LUMBAR RADICULAR PAIN: ICD-10-CM

## 2018-07-19 DIAGNOSIS — M54.40 CHRONIC LOW BACK PAIN WITH SCIATICA, SCIATICA LATERALITY UNSPECIFIED, UNSPECIFIED BACK PAIN LATERALITY: Primary | ICD-10-CM

## 2018-07-19 DIAGNOSIS — M79.2 NEUROPATHIC PAIN: ICD-10-CM

## 2018-07-19 PROCEDURE — 96152 ZZHC HEALTH AND BEHAVIOR INTERVENTION, INDIVIDUAL, EACH 15 MINUTES: CPT | Performed by: PSYCHOLOGIST

## 2018-07-19 NOTE — MR AVS SNAPSHOT
After Visit Summary   7/19/2018    Hoang Kiser    MRN: [id number]           Patient Information     Date Of Birth          1985        Visit Information        Provider Department      7/19/2018 11:00 AM Fili Wesley LP [facility]        Today's Diagnoses     Chronic low back pain with sciatica, sciatica laterality unspecified, unspecified back pain laterality    -  1    Neuropathic pain        Lumbar radicular pain           Follow-ups after your visit        Your next 10 appointments already scheduled     Aug 03, 2018 10:30 AM CDT   Return Visit with Fili Wesley LP   [facility] ([facility])    [address]  [address]  [facility] [address] [phone]            Aug 07, 2018  8:00 AM CDT   New Visit with Ghulam Perez MD   [facility] ([facility])    [address] [facility] [address] [phone]            Aug 08, 2018 10:30 AM CDT   Return Visit with CARLOS A Guy CNP   [facility] ([facility])    [address]  [address]  [facility] [address] [phone]            Aug 13, 2018  9:00 AM CDT   Return Visit with Faustino Feldman MD   [facility]    [address] [place] [address] [phone]              Who to contact     If you have questions or need follow up information about today's clinic visit or your schedule please contact [facility] directly at [phone].  Normal or non-critical lab and imaging results will be communicated to you by MyChart, letter or phone within 4 business days after the clinic has received the results. If you do not hear from us within 7 days, please contact the clinic through MyChart or phone. If you have a critical or abnormal lab result, we will notify you by phone as soon  as possible.  Submit refill requests through [employer] or call your pharmacy and they will forward the refill request to us. Please allow 3 business days for your refill to be completed.          Additional Information About Your Visit        [employer] Information     [employer] gives you secure access to your electronic health record. If you see a primary care provider, you can also send messages to your care team and make appointments. If you have questions, please call your primary care clinic.  If you do not have a primary care provider, please call [phone] and they will assist you.         Blood Pressure from Last 3 Encounters:   07/12/18 124/79   07/10/18 122/80   06/26/18 122/73    Weight from Last 3 Encounters:   07/12/18 78 kg (172 lb)   06/12/18 78 kg (172 lb)   05/29/18 78 kg (172 lb)              Today, you had the following     No orders found for display       Primary Care Provider Office Phone # Fax #    Phill Floyd [phone] [address] [facility] [address]        Equal Access to Services     [facility]: Hadii aad ku hadasho Soomaali, waaxda luqadaha, qaybta kaalmada adeegyatanvi, arnav lorenzana . So [facility] [phone].    ATENCIÓN: Si habla español, tiene a cutler disposición servicios gratuitos de asistencia lingüística. [facility] [phone].    We comply with applicable federal civil rights laws and [place] laws. We do not discriminate on the basis of race, color, national origin, age, disability, sex, sexual orientation, or gender identity.            Thank you!     Thank you for choosing [facility]  for your care. Our goal is always to provide you with excellent care. Hearing back from our patients is one way we can continue to improve our services. Please take a few minutes to complete the written survey that you may receive in the mail after your visit with us. Thank you!             Your Updated Medication List -  Protect others around you: Learn how to safely use, store and throw away your medicines at www.disposemymeds.org.          This list is accurate as of 7/19/18 11:57 AM.  Always use your most recent med list.                   Brand Name Dispense Instructions for use Diagnosis    clonazePAM 0.5 MG tablet    klonoPIN     Take 0.5-1 mg by mouth nightly as needed        CONCERTA PO      Take 54 mg by mouth        DULoxetine 20 MG EC capsule    CYMBALTA     Take 60 mg by mouth daily        FOCALIN XR PO           * HYDROcodone-acetaminophen 5-325 MG per tablet    NORCO    33 tablet    Take 1 tablet by mouth 3 times daily as needed for pain (Max 3 tabs daily. 11 days supply.) Okay to fill on 7/10/18    Hip pain, right       * HYDROcodone-acetaminophen 5-325 MG per tablet    NORCO    75 tablet    Take 1 tablet by mouth 3 times daily as needed for pain (Max 3 times daily. #75 tabs to last 30 days.) Okay to fill on 7/21/18    Hip pain, right       * nicotine 14 MG/24HR 24 hr patch    NICODERM CQ    30 patch    Place 1 patch onto the skin every 24 hours Use this patch first    Tobacco abuse       * nicotine 7 MG/24HR 24 hr patch    NICODERM CQ    30 patch    Place 1 patch onto the skin every 24 hours    Tobacco abuse       orphenadrine 100 MG 12 hr tablet    NORFLEX    60 tablet    Take 1 tablet (100 mg) by mouth 2 times daily    Muscle spasm       tiZANidine 4 MG tablet    ZANAFLEX    60 tablet    Take 1-2 tablets (4-8 mg) by mouth nightly as needed    Lumbar radicular pain, Lumbar paraspinal muscle spasm       * Notice:  This list has 4 medication(s) that are the same as other medications prescribed for you. Read the directions carefully, and ask your doctor or other care provider to review them with you.

## 2018-07-19 NOTE — PROGRESS NOTES
[facility]   [facility]  Behavioral Medicine Visit    Patient Name: Hoang Kiser    YOB: 1985   Medical Record Number: [id number]  Date: 7/19/2018                SUBJECTIVE: Met with the patient on this date for an elective return appointment.  Today's visit is our 10th visit postinitial assessment dating to 2/23/2018.  Today's session focused on the patient's difficulty with accepting his pain condition versus the times where he feels positively in general about his life.  He has issues on both the positive and negative sides and unfortunately the latter tends to feed into his pain as the pain feeds into the latter.    The patient does share a piece of art that he created that illustrates a positive aspect of something in his life per his commitment.  Patient continues to report that processing his chronic pain is helpful and he would like to continue with appointments.  This seems okay with me for the time being.  Plan to follow-up in 2 weeks.          OBJECTIVE: Today the patient reports the following: His pain level remains the same, his mood is mildly worse, his activity level has been mildly decreased, his stress level remains the same and his sleep is mildly improved.  Today the patient reports he is involved in self-care activities 2-3 times per day.  Today the patient reports since receiving services with [facility] he has had overall slight improvement.   Length of Visit: 60 minutes      Assessment: Current Emotional / Mental Status    Appearance:   Appropriate   Eye Contact:   Good   Psychomotor Behavior: Normal   Attitude:   Cooperative   Orientation:   All  Speech  Rate / Production:             Normal   Volume:              Normal   Mood:    Sad   Affect:    Flat   Thought Content:  Clear   Thought Form:  Coherent  Logical   Insight:    Fair     ASSESSMENT:   Per patient pain  provider:  Progress toward goals: as expected.    Pain Status: unchanged    Emotional Status: worsened              Medication / chemical use concerns: None    PLAN:   Next Appointment: Hoang Kiser will schedule a follow-up appointment in 2 weeks.  Assignment: None  Objectives / interventions for next session: Support/engagement in pain therapy services    Fili Wesley MA, LP, [facility]  Behavioral Pain Specialist  [facility]

## 2018-08-01 ENCOUNTER — MYC MEDICAL ADVICE (OUTPATIENT)
Dept: PALLIATIVE MEDICINE | Facility: CLINIC | Age: 33
End: 2018-08-01

## 2018-08-03 ENCOUNTER — OFFICE VISIT (OUTPATIENT)
Dept: PALLIATIVE MEDICINE | Facility: CLINIC | Age: 33
End: 2018-08-03
Payer: COMMERCIAL

## 2018-08-03 DIAGNOSIS — M54.40 CHRONIC LOW BACK PAIN WITH SCIATICA, SCIATICA LATERALITY UNSPECIFIED, UNSPECIFIED BACK PAIN LATERALITY: Primary | ICD-10-CM

## 2018-08-03 DIAGNOSIS — M62.838 MUSCLE SPASM: ICD-10-CM

## 2018-08-03 DIAGNOSIS — M54.16 LUMBAR RADICULAR PAIN: ICD-10-CM

## 2018-08-03 DIAGNOSIS — M79.2 NEUROPATHIC PAIN: ICD-10-CM

## 2018-08-03 DIAGNOSIS — G89.29 CHRONIC LOW BACK PAIN WITH SCIATICA, SCIATICA LATERALITY UNSPECIFIED, UNSPECIFIED BACK PAIN LATERALITY: Primary | ICD-10-CM

## 2018-08-03 DIAGNOSIS — M25.551 HIP PAIN, RIGHT: ICD-10-CM

## 2018-08-03 PROCEDURE — 96152 HC HEALTH AND BEHAVIOR INTERVENTION, INDIVIDUAL, EACH 15 MINUTES: CPT | Performed by: PSYCHOLOGIST

## 2018-08-03 NOTE — PROGRESS NOTES
[facility]   [facility]  Behavioral Medicine Visit    Patient Name: Hoang Kiser    YOB: 1985   Medical Record Number: [id number]  Date: 8/3/2018                SUBJECTIVE: Met with the patient on this date for an elective return appointment.  Today's visit is our 11th visit post initial assessment.  Today the patient reports he is markedly more depressed than usual.  Today the patient reports that he had a medication change approximately 2 weeks ago and he wonders if this is a part of the problem.  Patient reports that his self-care remains strong and includes contact with his NA sponsor, contact with his NA members continued meeting attendance, continued psychotherapy participation, continued involvement with relaxation/meditation/mindfulness practices.  He reports that he is not suicidal nor does he perceive himself to be at risk rather he feels poorly.  Our session focused on problem solving in self-care.  He has agreed to communicate further with his therapist and psychiatrist regarding his medication question.  We will follow-up in 2 weeks.          OBJECTIVE: Today the patient reports the following: His pain level is mildly worse, his mood is moderately worse, his activity level has mildly decreased, his stress level is mildly worse and his sleep is been mildly worse.  Today the patient reports he is involved in self-care activities 2-3 times per day.  Today the patient reports since receiving services at [facility] his overall progress is slightly improved.   Length of Visit: 60 minutes      Assessment: Current Emotional / Mental Status    Appearance:   Appropriate   Eye Contact:   Fair   Psychomotor Behavior: Retarded (Slowed)   Attitude:   Cooperative   Orientation:   All  Speech  Rate / Production:             Slow   Volume:              Normal   Mood:    Depressed    Affect:    Appropriate  Blunted  Flat   Thought Content:  Clear   Thought Form:  Coherent  Logical   Insight:    Good     ASSESSMENT:   Per patient pain provider: Chronic low back pain with sciatica, sciatica laterality unspecified, unspecified back pain laterality, neuropathic pain, lumbar radicular pain, muscle spasm, hip pain, right    Progress toward goals: good.    Pain Status: worsened    Emotional Status: worsened              Medication / chemical use concerns: Patient is concerned psychotropic medications may not be working    PLAN:   Next Appointment: Hoang Kiser will schedule a follow-up appointment in 2 weeks.  Assignment: Follow-up with psychotherapist and psychiatrist  Objectives / interventions for next session: Discuss overall care plan progress to date etc.    Fili Wesley MA, LP, [facility]  Behavioral Pain Specialist  [facility]

## 2018-08-06 ENCOUNTER — OFFICE VISIT (OUTPATIENT)
Dept: ORTHOPEDICS | Facility: CLINIC | Age: 33
End: 2018-08-06
Payer: COMMERCIAL

## 2018-08-06 VITALS
SYSTOLIC BLOOD PRESSURE: 113 MMHG | WEIGHT: 172 LBS | HEART RATE: 67 BPM | BODY MASS INDEX: 26.37 KG/M2 | DIASTOLIC BLOOD PRESSURE: 80 MMHG

## 2018-08-06 DIAGNOSIS — M54.16 LUMBAR RADICULAR PAIN: Primary | ICD-10-CM

## 2018-08-06 DIAGNOSIS — M25.552 BILATERAL HIP PAIN: ICD-10-CM

## 2018-08-06 DIAGNOSIS — M25.551 BILATERAL HIP PAIN: ICD-10-CM

## 2018-08-06 PROCEDURE — 99213 OFFICE O/P EST LOW 20 MIN: CPT | Performed by: PREVENTIVE MEDICINE

## 2018-08-06 ASSESSMENT — PAIN SCALES - GENERAL: PAINLEVEL: SEVERE PAIN (6)

## 2018-08-06 NOTE — PROGRESS NOTES
HISTORY OF PRESENT ILLNESS  Mr. Kiser is a pleasant 33 year old year old male who presents to clinic today for followup for lumbar pain and hip pain.  He wants to know who would fill out paperwork for him because he has so many doctor appointments. I directed him to his primary care doctor.  He does say that he is feeling better overall from starting pool therapy, but he still has pain in his back and his hips    MEDICAL HISTORY  Patient Active Problem List   Diagnosis     Neuropathy     Moderate major depression (H)     Tobacco abuse     Attention deficit hyperactivity disorder (ADHD), predominantly inattentive type     Primary insomnia     History of MRI of brain and brain stem     Panic disorder without agoraphobia     Abnormal involuntary movement     Tic disorder     normal emg 2017     Anxiety     Panic attack     Posttraumatic stress disorder with dissociative symptoms       Current Outpatient Prescriptions   Medication Sig Dispense Refill     clonazePAM (KLONOPIN) 0.5 MG tablet Take 0.5-1 mg by mouth nightly as needed   0     DULoxetine (CYMBALTA) 20 MG EC capsule Take 60 mg by mouth daily   0     HYDROcodone-acetaminophen (NORCO) 5-325 MG per tablet Take 1 tablet by mouth 3 times daily as needed for pain (Max 3 tabs daily. 11 days supply.) Okay to fill on 7/10/18 33 tablet 0     HYDROcodone-acetaminophen (NORCO) 5-325 MG per tablet Take 1 tablet by mouth 3 times daily as needed for pain (Max 3 times daily. #75 tabs to last 30 days.) Okay to fill on 7/21/18 75 tablet 0     Methylphenidate HCl (CONCERTA PO) Take 54 mg by mouth       nicotine (NICODERM CQ) 14 MG/24HR 24 hr patch Place 1 patch onto the skin every 24 hours Use this patch first 30 patch 0     nicotine (NICODERM CQ) 7 MG/24HR 24 hr patch Place 1 patch onto the skin every 24 hours 30 patch 0     orphenadrine (NORFLEX) 100 MG 12 hr tablet Take 1 tablet (100 mg) by mouth 2 times daily 60 tablet 1       Allergies   Allergen Reactions     Buspirone  Hcl Other (See Comments)     Panic attacks     Doxycycline Diarrhea, Fatigue, GI Disturbance and Nausea     Naproxen      Possible abnormal liver function tests      Trazodone Fatigue     Keflex [Cephalexin] Diarrhea       Family History   Problem Relation Age of Onset     Lipids Father      hyperlipidemia     Hyperlipidemia Father      Obesity Father      Arthritis Mother      Hyperlipidemia Mother      Depression Mother      Anxiety Disorder Mother      Mental Illness Mother      Obesity Mother      Asthma Brother      Asthma Sister      Depression Other      Hearing Loss Other      Psychotic Disorder Other      Obesity Other      Cerebrovascular Disease Paternal Grandfather      Alzheimer Disease Paternal Grandfather      Depression Brother      Mental Illness Brother      Bipolar     Asthma Brother      Depression Sister      Asthma Sister      Substance Abuse Sister      Alcohol     Mental Illness Brother      Bipolar     Asthma Brother      Asthma Sister      Obesity Sister      Asthma Brother      Obesity Brother      Obesity Maternal Grandmother      Genetic Disorder Maternal Grandmother      Epilepsy       Additional medical/Social/Surgical histories reviewed in [facility] and updated as appropriate.     REVIEW OF SYSTEMS (8/6/2018)  10 point ROS of systems including Constitutional, Eyes, Respiratory, Cardiovascular, Gastroenterology, Genitourinary, Integumentary, Musculoskeletal, Psychiatric were all negative except for pertinent positives noted in my HPI.     PHYSICAL EXAM  Vitals:    08/06/18 0842   BP: 113/80   Pulse: 67   Weight: 78 kg (172 lb)     Vital Signs: /80  Pulse 67  Wt 78 kg (172 lb)  BMI 26.37 kg/m2 Patient declined being weighed. Body mass index is 26.37 kg/(m^2).    General  - normal appearance, in no obvious distress  CV  - normal femoral pulse  Pulm  - normal respiratory pattern, non-labored  Musculoskeletal - low back and bilateral hips  - stance: normal gait without limp, no obvious  leg length discrepancy, normal heel and toe walk  - inspection: no swelling or effusion,  normal bone and joint alignment, no obvious deformity  - palpation: has ttp over lower lumbar paraspinal muscles, SI joints, and anterior hip flexors as well as bilateral hip bursa  - ROM: some pain with flexion and internal rotation of hips, normal extension, external rotation, abduction, and adduction  Lumbar: has some pain with extension and flexion  - strength: 5/5 in all planes  - special tests:  (-) TARA  (+) FADIR- bilateral  no pain with axial femoral load  Neuro  - no sensory or motor deficit, grossly normal coordination, normal muscle tone  Skin  - no ecchymosis, erythema, warmth, or induration, no obvious rash  Psych  - interactive, appropriate, normal mood and affect    ASSESSMENT & PLAN  34 yo male with bilateral hip pain, and low back pain   -lumbar MRI reviewed: disc herniation  Suggested he speak with pain clinic regarding possible injection  Cont. Pool therapy  Follow through with seeing neurology        Faustino Feldman MD, CA[facility]

## 2018-08-06 NOTE — MR AVS SNAPSHOT
After Visit Summary   2018    Hoang Kiser    MRN: [id number]           Patient Information     Date Of Birth          1985        Visit Information        Provider Department      2018 8:40 AM Faustino Feldman MD [facility]        Today's Diagnoses     Lumbar radicular pain    -  1    Bilateral hip pain          Care Instructions    Thanks for coming today.  [facility]  [address] [place] [address]    To schedule future appointments in Ortho Clinic, you may call [phone].    To schedule ordered imaging by your provider:   Call Central Imaging [phone]    To schedule an injection ordered by your provider:  Call Central Imaging Injection scheduling line: [phone]  [email or web address] available online at:  [email or web address]    Please call if any further questions or concerns ([phone]).  Clinic hours 8 am to 5 pm.    Return to clinic (call) if symptoms worsen or fail to improve.          Follow-ups after your visit        Your next 10 appointments already scheduled     Aug 07, 2018  8:00 AM CDT   [facility]    [address] [place] [address] [phone]            Aug 08, 2018 10:30 AM CDT   Return Visit with CARLOS A Guy CNP   [facility]    [address]  [address]  [facility] [id number] [phone]            Aug 17, 2018 11:30 AM CDT   Return Visit with Fili Wesley LP   [facility] ([facility])    [address]  [address]  [facility] [id number] [phone]            Aug 23, 2018  2:45 PM CDT   SHORT with Phill Floyd MD   [facility]    [address] [facility] [address] [phone]              Who to contact     If you  have questions or need follow up information about today's clinic visit or your schedule please contact [facility] directly at [phone].  Normal or non-critical lab and imaging results will be communicated to you by LocPlanethart, letter or phone within 4 business days after the clinic has received the results. If you do not hear from us within 7 days, please contact the clinic through LocPlanethart or phone. If you have a critical or abnormal lab result, we will notify you by phone as soon as possible.  Submit refill requests through BlueVine or call your pharmacy and they will forward the refill request to us. Please allow 3 business days for your refill to be completed.          Additional Information About Your Visit        BlueVine Information     BlueVine gives you secure access to your electronic health record. If you see a primary care provider, you can also send messages to your care team and make appointments. If you have questions, please call your primary care clinic.  If you do not have a primary care provider, please call [phone] and they will assist you.      BlueVine is an electronic gateway that provides easy, online access to your medical records. With BlueVine, you can request a clinic appointment, read your test results, renew a prescription or communicate with your care team.     To access your existing account, please contact your [facility] or call [phone] for assistance.        Your Vitals Were     Pulse BMI (Body Mass Index)                67 26.37 kg/m2           Blood Pressure from Last 3 Encounters:   08/06/18 113/80   07/12/18 124/79   07/10/18 122/80    Weight from Last 3 Encounters:   08/06/18 78 kg (172 lb)   07/12/18 78 kg (172 lb)   06/12/18 78 kg (172 lb)              Today, you had the following     No orders found for display       Primary Care Provider Office Phone # Fax #    Phill Floyd [phone]  [address] [facility] [address]        Equal Access to Services     BERLIN MEYER : Hadii rosemary sterling ana Soevelyn, waaxda luqadaha, qaybta kaclaratanvi hawkins, arnav riveromichaelamarilynn lorenzana . So [facility] [phone].    ATENCIÓN: Si habla español, tiene a cutler disposición servicios gratuitos de asistencia lingüística. LlSelect [facility]ty [facility] [phone].    We comply with applicable federal civil rights laws and [place] laws. We do not discriminate on the basis of race, color, national origin, age, disability, sex, sexual orientation, or gender identity.            Thank you!     Thank you for choosing [facility]  for your care. Our goal is always to provide you with excellent care. Hearing back from our patients is one way we can continue to improve our services. Please take a few minutes to complete the written survey that you may receive in the mail after your visit with us. Thank you!             Your Updated Medication List - Protect others around you: Learn how to safely use, store and throw away your medicines at www.disposemymeds.org.          This list is accurate as of 8/6/18  9:16 AM.  Always use your most recent med list.                   Brand Name Dispense Instructions for use Diagnosis    clonazePAM 0.5 MG tablet    klonoPIN     Take 0.5-1 mg by mouth nightly as needed        CONCERTA PO      Take 54 mg by mouth        DULoxetine 20 MG EC capsule    CYMBALTA     Take 60 mg by mouth daily        * HYDROcodone-acetaminophen 5-325 MG per tablet    NORCO    33 tablet    Take 1 tablet by mouth 3 times daily as needed for pain (Max 3 tabs daily. 11 days supply.) Okay to fill on 7/10/18    Hip pain, right       * HYDROcodone-acetaminophen 5-325 MG per tablet    NORCO    75 tablet    Take 1 tablet by mouth 3 times daily as needed for pain (Max 3 times daily. #75 tabs to last 30 days.) Okay to fill on 7/21/18    Hip pain, right       * nicotine 14 MG/24HR 24 hr patch    NICODERM CQ    30 patch     Place 1 patch onto the skin every 24 hours Use this patch first    Tobacco abuse       * nicotine 7 MG/24HR 24 hr patch    NICODERM CQ    30 patch    Place 1 patch onto the skin every 24 hours    Tobacco abuse       orphenadrine 100 MG 12 hr tablet    NORFLEX    60 tablet    Take 1 tablet (100 mg) by mouth 2 times daily    Muscle spasm       * Notice:  This list has 4 medication(s) that are the same as other medications prescribed for you. Read the directions carefully, and ask your doctor or other care provider to review them with you.

## 2018-08-06 NOTE — NURSING NOTE
Hoang Kiser's chief complaint for this visit includes:  Chief Complaint   Patient presents with     RECHECK     Follow up for continued hip pain and low back pain     PCP: Phill Floyd    Referring Provider:  No referring provider defined for this encounter.    /80  Pulse 67  Wt 78 kg (172 lb)  BMI 26.37 kg/m2  Severe Pain (6)     Do you need any medication refills at today's visit? No

## 2018-08-06 NOTE — PATIENT INSTRUCTIONS
Thanks for coming today.  [facility]  [address] [place] [address]    To schedule future appointments in Ortho Clinic, you may call [phone].    To schedule ordered imaging by your provider:   Call Central Imaging [phone]    To schedule an injection ordered by your provider:  Call Central Imaging Injection scheduling line: [phone]  [employer] H[employer]art available online at:  [email or web address]    Please call if any further questions or concerns ([phone]).  Clinic hours 8 am to 5 pm.    Return to clinic (call) if symptoms worsen or fail to improve.

## 2018-08-06 NOTE — LETTER
8/6/2018         RE: Hoang Kiser  [address]  [facility] [facility] [address]        Dear Colleague,    Thank you for referring your patient, Hoang Kiser, to the [facility]. Please see a copy of my visit note below.    HISTORY OF PRESENT ILLNESS  Mr. Kiser is a pleasant 33 year old year old male who presents to clinic today for followup for lumbar pain and hip pain.  He wants to know who would fill out paperwork for him because he has so many doctor appointments. I directed him to his primary care doctor.  He does say that he is feeling better overall from starting pool therapy, but he still has pain in his back and his hips    MEDICAL HISTORY  Patient Active Problem List   Diagnosis     Neuropathy     Moderate major depression (H)     Tobacco abuse     Attention deficit hyperactivity disorder (ADHD), predominantly inattentive type     Primary insomnia     History of MRI of brain and brain stem     Panic disorder without agoraphobia     Abnormal involuntary movement     Tic disorder     normal emg 2017     Anxiety     Panic attack     Posttraumatic stress disorder with dissociative symptoms       Current Outpatient Prescriptions   Medication Sig Dispense Refill     clonazePAM (KLONOPIN) 0.5 MG tablet Take 0.5-1 mg by mouth nightly as needed   0     DULoxetine (CYMBALTA) 20 MG EC capsule Take 60 mg by mouth daily   0     HYDROcodone-acetaminophen (NORCO) 5-325 MG per tablet Take 1 tablet by mouth 3 times daily as needed for pain (Max 3 tabs daily. 11 days supply.) Okay to fill on 7/10/18 33 tablet 0     HYDROcodone-acetaminophen (NORCO) 5-325 MG per tablet Take 1 tablet by mouth 3 times daily as needed for pain (Max 3 times daily. #75 tabs to last 30 days.) Okay to fill on 7/21/18 75 tablet 0     Methylphenidate HCl (CONCERTA PO) Take 54 mg by mouth       nicotine (NICODERM CQ) 14 MG/24HR 24 hr patch Place 1 patch onto the skin every 24 hours Use this patch first 30 patch 0      nicotine (NICODERM CQ) 7 MG/24HR 24 hr patch Place 1 patch onto the skin every 24 hours 30 patch 0     orphenadrine (NORFLEX) 100 MG 12 hr tablet Take 1 tablet (100 mg) by mouth 2 times daily 60 tablet 1       Allergies   Allergen Reactions     Buspirone Hcl Other (See Comments)     Panic attacks     Doxycycline Diarrhea, Fatigue, GI Disturbance and Nausea     Naproxen      Possible abnormal liver function tests      Trazodone Fatigue     Keflex [Cephalexin] Diarrhea       Family History   Problem Relation Age of Onset     Lipids Father      hyperlipidemia     Hyperlipidemia Father      Obesity Father      Arthritis Mother      Hyperlipidemia Mother      Depression Mother      Anxiety Disorder Mother      Mental Illness Mother      Obesity Mother      Asthma Brother      Asthma Sister      Depression Other      Hearing Loss Other      Psychotic Disorder Other      Obesity Other      Cerebrovascular Disease Paternal Grandfather      Alzheimer Disease Paternal Grandfather      Depression Brother      Mental Illness Brother      Bipolar     Asthma Brother      Depression Sister      Asthma Sister      Substance Abuse Sister      Alcohol     Mental Illness Brother      Bipolar     Asthma Brother      Asthma Sister      Obesity Sister      Asthma Brother      Obesity Brother      Obesity Maternal Grandmother      Genetic Disorder Maternal Grandmother      Epilepsy       Additional medical/Social/Surgical histories reviewed in [facility] and updated as appropriate.     REVIEW OF SYSTEMS (8/6/2018)  10 point ROS of systems including Constitutional, Eyes, Respiratory, Cardiovascular, Gastroenterology, Genitourinary, Integumentary, Musculoskeletal, Psychiatric were all negative except for pertinent positives noted in my HPI.     PHYSICAL EXAM  Vitals:    08/06/18 0842   BP: 113/80   Pulse: 67   Weight: 78 kg (172 lb)     Vital Signs: /80  Pulse 67  Wt 78 kg (172 lb)  BMI 26.37 kg/m2 Patient declined being weighed. Body  mass index is 26.37 kg/(m^2).    General  - normal appearance, in no obvious distress  CV  - normal femoral pulse  Pulm  - normal respiratory pattern, non-labored  Musculoskeletal - low back and bilateral hips  - stance: normal gait without limp, no obvious leg length discrepancy, normal heel and toe walk  - inspection: no swelling or effusion,  normal bone and joint alignment, no obvious deformity  - palpation: has ttp over lower lumbar paraspinal muscles, SI joints, and anterior hip flexors as well as bilateral hip bursa  - ROM: some pain with flexion and internal rotation of hips, normal extension, external rotation, abduction, and adduction  Lumbar: has some pain with extension and flexion  - strength: 5/5 in all planes  - special tests:  (-) TARA  (+) FADIR- bilateral  no pain with axial femoral load  Neuro  - no sensory or motor deficit, grossly normal coordination, normal muscle tone  Skin  - no ecchymosis, erythema, warmth, or induration, no obvious rash  Psych  - interactive, appropriate, normal mood and affect    ASSESSMENT & PLAN  34 yo male with bilateral hip pain, and low back pain   -lumbar MRI reviewed: disc herniation  Suggested he speak with pain clinic regarding possible injection  Cont. Pool therapy  Follow through with seeing neurology        Faustino Feldman MD, CAQSM    Again, thank you for allowing me to participate in the care of your patient.        Sincerely,        Faustino eFldman MD

## 2018-08-07 ENCOUNTER — OFFICE VISIT (OUTPATIENT)
Dept: NEUROLOGY | Facility: CLINIC | Age: 33
End: 2018-08-07
Payer: COMMERCIAL

## 2018-08-07 VITALS
HEART RATE: 74 BPM | OXYGEN SATURATION: 99 % | DIASTOLIC BLOOD PRESSURE: 62 MMHG | TEMPERATURE: 98.4 F | WEIGHT: 166 LBS | SYSTOLIC BLOOD PRESSURE: 106 MMHG | RESPIRATION RATE: 18 BRPM | BODY MASS INDEX: 25.45 KG/M2

## 2018-08-07 DIAGNOSIS — R25.9 ABNORMAL INVOLUNTARY MOVEMENT: Primary | ICD-10-CM

## 2018-08-07 LAB
TSH SERPL DL<=0.005 MIU/L-ACNC: 0.81 MU/L (ref 0.4–4)
VIT B12 SERPL-MCNC: 626 PG/ML (ref 193–986)

## 2018-08-07 PROCEDURE — 84443 ASSAY THYROID STIM HORMONE: CPT | Performed by: PSYCHIATRY & NEUROLOGY

## 2018-08-07 PROCEDURE — 82525 ASSAY OF COPPER: CPT | Mod: 90 | Performed by: PSYCHIATRY & NEUROLOGY

## 2018-08-07 PROCEDURE — 82390 ASSAY OF CERULOPLASMIN: CPT | Performed by: PSYCHIATRY & NEUROLOGY

## 2018-08-07 PROCEDURE — 99245 OFF/OP CONSLTJ NEW/EST HI 55: CPT | Performed by: PSYCHIATRY & NEUROLOGY

## 2018-08-07 PROCEDURE — 82607 VITAMIN B-12: CPT | Performed by: PSYCHIATRY & NEUROLOGY

## 2018-08-07 PROCEDURE — 99000 SPECIMEN HANDLING OFFICE-LAB: CPT | Performed by: PSYCHIATRY & NEUROLOGY

## 2018-08-07 PROCEDURE — 36415 COLL VENOUS BLD VENIPUNCTURE: CPT | Performed by: PSYCHIATRY & NEUROLOGY

## 2018-08-07 PROCEDURE — 83921 ORGANIC ACID SINGLE QUANT: CPT | Mod: 90 | Performed by: PSYCHIATRY & NEUROLOGY

## 2018-08-07 NOTE — LETTER
8/7/2018         RE: Hoang Kiser  [address]  [facility] [address]        Dear Colleague,    Thank you for referring your patient, Hoang Kiser, to the [facility]. Please see a copy of my visit note below.    INITIAL NEUROLOGY CONSULTATION    DATE OF VISIT: 8/7/2018  CLINIC LOCATION: [facility]  MRN: [id number]  PATIENT NAME: Hoang Kiser  YOB: 1985    PRIMARY CARE PROVIDER: Phill Floyd MD     REASON FOR VISIT:   Chief Complaint   Patient presents with     Consult     involentary movement all over the body      HISTORY OF PRESENT ILLNESS:                                                    Mr. Hoang Kiser is 33 year old right handed male patient with past medical history of tic disorder, panic disorder, PTSD, ADHD, depression, anxiety, and seizures, who was seen in consultation today requested by Tamiko Stevens CNP, for jerking movements, tremor, and neuropathic pain.    Per patient's report, he was in his usual state of health until December 2016, when after lifting injury the patient developed involuntary muscle movements that currently involve his hips, trunk, head, back, and, sometimes, left foot.  Initially, the symptoms were present only in his lumbar muscles, but approximately 4 months ago they spread to other parts of his body, as described.  Movements intensified after the patient switched from Adderall to Concerta.  No focal neurological symptoms.  Other associated symptoms include moderate to severe hip pain.  Bending, squatting, sitting, twisting, standing, and walking worsen his symptoms.  Stretching, laying on the back, and hot pads make the symptoms better.  He tried physical therapy, various medications (tizanidine, Flexeril, and Norflex), pool therapy, and epidural injections without significant effect.    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 Taylor, Dr. Dominguez, Dr. Estrella, and Dr. Cao ([facility]) for tics disorder, suspected Tourette's syndrome, and other mentioned above complaints.    EMG of bilateral upper extremities in 2016 was normal, so as EMG of bilateral lower extremities in 2017.    Lumbar spine MRI from 05/02/2018 demonstrated mild left neuroforaminal stenosis at L5-S1 without significant change compared to 2017 study.  Cervical spine MRI from 2016 demonstrated mild left neuroforaminal stenosis at C2-3 and C3-4.  Brain MRI from 2015 was normal.    Recent laboratory evaluation from June 2018 includes CMP, which was unremarkable except marginally elevated glucose at 103.  No additional pertinent information is available in Care Everywhere, which was reviewed.    The patient denies a history of recent head injury. Prior neurological history: negative for migraine, stroke, brain neoplasms, multiple sclerosis, meningitis, encephalitis, and major head injuries.  Has history of absence seizures from 1986 to 1998.    Neurologic Review of Systems - no amaurosis, diplopia, abnormal speech, unilateral numbness or weakness. He endorses recent weight loss, earache, ringing in the ears, TMD, anxiety, depression, restlessness, urinary urgency, arthritis, muscle tenderness, headaches, leg numbness, and memory problems. Otherwise, he denies any other complaints on 14-point comprehensive review of systems.  PAST MEDICAL/SURGICAL HISTORY:                                                    I personally reviewed patient's past medical and surgical history with the patient at today's visit.  Patient Active Problem List   Diagnosis     Neuropathy     Moderate major depression (H)     Tobacco abuse     Attention deficit hyperactivity disorder (ADHD), predominantly inattentive type     Primary insomnia     History of MRI of brain and brain stem     Panic disorder without agoraphobia     Abnormal involuntary movement     Tic disorder     normal emg 2017  "    Anxiety     Panic attack     Posttraumatic stress disorder with dissociative symptoms     Past Medical History:   Diagnosis Date     Abnormal involuntary movement 6/2/2016     Anxiety state, unspecified 04/30/2012     Benign positional vertigo Dec 2016    Started after my groin/back injury. Sitting on Toilet.     Depressive disorder 2003    I have been on and off medication for depression since this     Dysphonia 2014    Nothing significant.     Epilepsy (H)     as a child. Says that \" he grew out of it by age 13\"     Gastroesophageal reflux disease 2004    Occassional. Spicy foods set it off.     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  History: Myoclonus.  Comparison: None.  Technique:  1. MRI of the Brain: Sagittal T1-weighted, axial T2-weighted, axial turboFLAIR, axial susceptibility, and axial diffusion-weighted with ADC map images of the brain were obtained without intravenous contrast. After intravenous administration of gadolinium, axial and sagittal T1-weighted images of th     Hoarseness 2017    Dry mouth, started after taking Tizanidine     Neuralgia, neuritis, and radiculitis, unspecified 04/30/2012     normal emg 2017 8/8/2017    Interpretation: This is a normal study. There is no electrophysiologic evidence of a lumbosacral radiculopathy affecting the right or left lower extremity on the basis of this study.    Rodney Mena MD Department of Neurology       Panic disorder without agoraphobia 04/30/2012     Problem, psychiatric 2016    PTSD     Seizures (H) [id number]    Grew out of it. Absence Seizures.     Tic disorder 6/2/2016     Tinnitus 2015    Had it most of my life. Got worse in 2015     Past Surgical History:   Procedure Laterality Date     BACK SURGERY      injections     COLONOSCOPY  02/15/18    Has not happened yet.     COLONOSCOPY N/A 2/15/2018    " Procedure: COMBINED COLONOSCOPY, SINGLE OR MULTIPLE BIOPSY/POLYPECTOMY BY BIOPSY;;  Surgeon: Liam Kincaid MD;  Location: [facility] OR     COLONOSCOPY WITH CO2 INSUFFLATION N/A 2/15/2018    Procedure: COLONOSCOPY WITH CO2 INSUFFLATION;  COLON-FAMILY HX OF COLON CANCER/ SYPURA;  Surgeon: Liam Kincaid MD;  Location: [facility] OR     HC TOOTH EXTRACTION W/FORCEP Bilateral 2003     PE TUBES  1990     MEDICATIONS:                                                    I personally reviewed patient's medications and allergies with the patient at today's visit.  Current Outpatient Prescriptions on File Prior to Visit:  clonazePAM (KLONOPIN) 0.5 MG tablet Take 0.5-1 mg by mouth nightly as needed    DULoxetine (CYMBALTA) 20 MG EC capsule Take 60 mg by mouth daily    HYDROcodone-acetaminophen (NORCO) 5-325 MG per tablet Take 1 tablet by mouth 3 times daily as needed for pain (Max 3 times daily. #75 tabs to last 30 days.) Okay to fill on 7/21/18   Methylphenidate HCl (CONCERTA PO) Take 54 mg by mouth   orphenadrine (NORFLEX) 100 MG 12 hr tablet Take 1 tablet (100 mg) by mouth 2 times daily   nicotine (NICODERM CQ) 14 MG/24HR 24 hr patch Place 1 patch onto the skin every 24 hours Use this patch first (Patient not taking: Reported on 8/7/2018)   nicotine (NICODERM CQ) 7 MG/24HR 24 hr patch Place 1 patch onto the skin every 24 hours (Patient not taking: Reported on 8/7/2018)     ALLERGIES:                                                      Allergies   Allergen Reactions     Buspirone Hcl Other (See Comments)     Panic attacks     Doxycycline Diarrhea, Fatigue, GI Disturbance and Nausea     Naproxen      Possible abnormal liver function tests      Trazodone Fatigue     Keflex [Cephalexin] Diarrhea     FAMILY/SOCIAL HISTORY:                                                    Family and social history was reviewed with the patient at today's visit.  Family history is positive for Alzheimer's disease, stroke, and multiple  sclerosis.  Single, lives alone.  0.5 pack per day smoker (counseled to quit).  Denies current alcohol and recreational drug use.  Used cocaine and methamphetamine until August 2015.  Used to work in graphic design, but currently unemployed.  REVIEW OF SYSTEMS:                                                    Patient has completed a [facility] Patient Health History, including a 14-system review, which was personally reviewed, and pertinent positives are listed in HPI. He denies any additional problems on the further questioning.  EXAM:                                                    VITAL SIGNS:   /62 (BP Location: Left arm, Patient Position: Sitting, Cuff Size: Adult Regular)  Pulse 74  Temp 98.4  F (36.9  C) (Oral)  Resp 18  Wt 75.3 kg (166 lb)  SpO2 99%  BMI 25.45 kg/m2  Mini-Cog Assessment:  Mini Cog Assessment  Clock Draw Score: 2 Normal  3 Item Recall: 3 objects recalled  Mini Cog Total Score: 5  Administered by: : Agustin Suggs MA    General: pt is in NAD, cooperative.  Skin: normal turgor, moist mucous membranes, no lesions/rashes noticed.  HEENT: ATNC, EOMI, PERRL, white sclera, normal conjunctiva, no nystagmus or ptosis. No carotid bruits bilaterally.  Respiratory: lung sounds clear to auscultation bilaterally, no crackles, wheezes, rhonchi. Symmetric lung excursion, no accessory respiratory muscle use.  Cardiovascular: normal S1/S2, no murmurs/rubs/gallops.   Abdomen: Not distended.  : deferred.    Neurological:  Mental: alert, follows commands, Mini Cog Total Score: 5/5 with 3/3 on memory recall, no aphasia or dysarthria. Fund of knowledge is appropriate for age.  Cranial Nerves:  CN II: visual acuity - able to accurately count fingers with each eye. Visual fields intact, fundi: discs sharp, no papilledema and normal vessels bilaterally.  CN III, IV, VI: EOM intact, pupils equal and reactive  CN V: facial sensation nl  CN VII: face symmetric, no facial droop  CN VIII: hearing  normal  CN IX: palate elevation symmetric, uvula at midline  CN XI SCM normal, shoulder shrug nl  CN XII: tongue midline  Motor: Strength: 5/5 in all major groups of all extremities. Normal tone.  The patient is noted to have intermittent head and torso involuntary movements of various frequency and amplitude that diminish or stop when the patient is asked to perform various limb movements and during parts of the interview.  They became more pronounced when the patient describes his symptoms.  No pronounced hand tremor or abnormal limb movements are seen.  No pronator drift b/l.  Finger tapping is symmetric at normal speed.  Reflexes: Triceps, biceps, brachioradialis, patellar, and achilles reflexes normal and symmetric. No clonus noted. Toes are down-going b/l.   Sensory: temperature, light touch, pinprick, and vibration intact, except the left foot.  The patient reports diminished sensation to pinprick and vibration and increased sensation to temperature on the lateral side of the left foot.  Romberg: negative.  Coordination: FNF and heel-shin tests intact b/l.   Gait:  Normal, able to tandem, toe, and heel walk.  DATA:   LABS/EMG/IMAGING/OTHER STUDIES: I reviewed pertinent medical records, including multiple previous neurology notes, tabulated data of EMG reports, cervical, lumbar spine and brain MRI images, and Care Everywhere, as detailed in the history of present illness.  ASSESSMENT and PLAN:      ASSESSMENT: Hoang Kiser is a 33 year old male patient with past medical history of tic disorder, panic disorder, PTSD, ADHD, depression, anxiety, and seizures, who presents with involuntary movements started in December 2016 that worsened approximately 4 months ago.    We had a prolonged discussion with the patient regarding his symptoms.  Sensory changes in his left foot seen on the neurological exam do not have anatomical explanations given the pattern.  Moreover, recent lower extremity EMG (2017) and  lumbar spine MRI (2018) argue against focal neuropathies, lumbosacral plexopathy, and lumbar radiculopathy.    Seen in the clinic involuntary movements do not clearly fit the pattern of hyperkinetic movement disorders or tics.  They are also not consistent with myoclonus or asterixis.  His vitamin B12 and TSH were normal in 2016.  However, more widespread movements started approximately 4 months ago.  For that reason, I think it would be reasonable to recheck MMA, B12, and TSH along with serum copper, ceruloplasmin, and urinary copper excretion in 24 hour sample (to screen for Chele's disease).  These movements could also be seen with rare movement disorders that would be best addressed by a movement disorder specialist.  The referral was made.    Alternatively, the fact that observed movements vary in amplitude and duration, diminish with distraction, and worsen when the patient describes his symptoms might suggest functional movement disorder, though it would be a diagnosis of exclusion.    DIAGNOSES:    ICD-10-CM    1. Abnormal involuntary movement R25.9 Ceruloplasmin     TSH with free T4 reflex     Vitamin B12     Methylmalonic acid     Copper level     Copper urine     NEUROLOGY ADULT REFERRAL     PLAN: At today's visit we thoroughly discussed various diagnostic possibilities for patient's symptoms and the reasons for work-up, which includes:  Orders Placed This Encounter   Procedures     Ceruloplasmin     TSH with free T4 reflex     Vitamin B12     Methylmalonic acid     Copper level     Copper urine     NEUROLOGY ADULT REFERRAL     No new medications were ordered.    Additional recommendations after the work-up.    Next follow-up appointment is on as needed basis.    I advised the patient to contact me with any questions or concerns.    Total Time:  81 minutes with > 50% spent counseling the patient on stated above assessment and recommendations, including nature of the differential diagnosis, needed w/u,  and proposed plan.  Additional time was used to discuss patient's symptoms and answer his questions regarding his condition and the plan.    Ghulam Perez MD  / Neurology  [place]  (Chart documentation was completed in part with Dragon voice-recognition software. Even though reviewed, some grammatical, spelling, and word errors may remain.)         s    Again, thank you for allowing me to participate in the care of your patient.        Sincerely,        Ghulam Perez MD

## 2018-08-07 NOTE — PROGRESS NOTES
INITIAL NEUROLOGY CONSULTATION    DATE OF VISIT: 8/7/2018  CLINIC LOCATION: [facility]  MRN: [id number]  PATIENT NAME: Hoang Kiser  YOB: 1985    PRIMARY CARE PROVIDER: Phill Floyd MD     REASON FOR VISIT:   Chief Complaint   Patient presents with     Consult     involentary movement all over the body      HISTORY OF PRESENT ILLNESS:                                                    Mr. Hoang Kiser is 33 year old right handed male patient with past medical history of tic disorder, panic disorder, PTSD, ADHD, depression, anxiety, and seizures, who was seen in consultation today requested by Tamiko Stevens CNP, for jerking movements, tremor, and neuropathic pain.    Per patient's report, he was in his usual state of health until December 2016, when after lifting injury the patient developed involuntary muscle movements that currently involve his hips, trunk, head, back, and, sometimes, left foot.  Initially, the symptoms were present only in his lumbar muscles, but approximately 4 months ago they spread to other parts of his body, as described.  Movements intensified after the patient switched from Adderall to Concerta.  No focal neurological symptoms.  Other associated symptoms include moderate to severe hip pain.  Bending, squatting, sitting, twisting, standing, and walking worsen his symptoms.  Stretching, laying on the back, and hot pads make the symptoms better.  He tried physical therapy, various medications (tizanidine, Flexeril, and Norflex), pool therapy, and epidural injections without significant effect.    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 Taylor, Dr. Dominguez, Dr. Estrella, and Dr. Cao ([facility]) for tics disorder, suspected Tourette's syndrome, and other mentioned above complaints.    EMG of bilateral upper extremities in 2016 was normal, so as EMG of bilateral lower  extremities in 2017.    Lumbar spine MRI from 05/02/2018 demonstrated mild left neuroforaminal stenosis at L5-S1 without significant change compared to 2017 study.  Cervical spine MRI from 2016 demonstrated mild left neuroforaminal stenosis at C2-3 and C3-4.  Brain MRI from 2015 was normal.    Recent laboratory evaluation from June 2018 includes CMP, which was unremarkable except marginally elevated glucose at 103.  No additional pertinent information is available in Care Everywhere, which was reviewed.    The patient denies a history of recent head injury. Prior neurological history: negative for migraine, stroke, brain neoplasms, multiple sclerosis, meningitis, encephalitis, and major head injuries.  Has history of absence seizures from 1986 to 1998.    Neurologic Review of Systems - no amaurosis, diplopia, abnormal speech, unilateral numbness or weakness. He endorses recent weight loss, earache, ringing in the ears, TMD, anxiety, depression, restlessness, urinary urgency, arthritis, muscle tenderness, headaches, leg numbness, and memory problems. Otherwise, he denies any other complaints on 14-point comprehensive review of systems.  PAST MEDICAL/SURGICAL HISTORY:                                                    I personally reviewed patient's past medical and surgical history with the patient at today's visit.  Patient Active Problem List   Diagnosis     Neuropathy     Moderate major depression (H)     Tobacco abuse     Attention deficit hyperactivity disorder (ADHD), predominantly inattentive type     Primary insomnia     History of MRI of brain and brain stem     Panic disorder without agoraphobia     Abnormal involuntary movement     Tic disorder     normal emg 2017     Anxiety     Panic attack     Posttraumatic stress disorder with dissociative symptoms     Past Medical History:   Diagnosis Date     Abnormal involuntary movement 6/2/2016     Anxiety state, unspecified 04/30/2012     Benign positional  "vertigo Dec 2016    Started after my groin/back injury. Sitting on Toilet.     Depressive disorder 2003    I have been on and off medication for depression since this     Dysphonia 2014    Nothing significant.     Epilepsy (H)     as a child. Says that \" he grew out of it by age 13\"     Gastroesophageal reflux disease 2004    Occassional. Spicy foods set it off.     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  History: Myoclonus.  Comparison: None.  Technique:  1. MRI of the Brain: Sagittal T1-weighted, axial T2-weighted, axial turboFLAIR, axial susceptibility, and axial diffusion-weighted with ADC map images of the brain were obtained without intravenous contrast. After intravenous administration of gadolinium, axial and sagittal T1-weighted images of th     Hoarseness 2017    Dry mouth, started after taking Tizanidine     Neuralgia, neuritis, and radiculitis, unspecified 04/30/2012     normal emg 2017 8/8/2017    Interpretation: This is a normal study. There is no electrophysiologic evidence of a lumbosacral radiculopathy affecting the right or left lower extremity on the basis of this study.    Rodney Mena MD Department of Neurology       Panic disorder without agoraphobia 04/30/2012     Problem, psychiatric 2016    PTSD     Seizures (H) [id number]    Grew out of it. Absence Seizures.     Tic disorder 6/2/2016     Tinnitus 2015    Had it most of my life. Got worse in 2015     Past Surgical History:   Procedure Laterality Date     BACK SURGERY      injections     COLONOSCOPY  02/15/18    Has not happened yet.     COLONOSCOPY N/A 2/15/2018    Procedure: COMBINED COLONOSCOPY, SINGLE OR MULTIPLE BIOPSY/POLYPECTOMY BY BIOPSY;;  Surgeon: Liam Kincaid MD;  Location: [facility] OR     COLONOSCOPY WITH CO2 INSUFFLATION N/A 2/15/2018    Procedure: COLONOSCOPY WITH CO2 INSUFFLATION;  " COLON-FAMILY HX OF COLON CANCER/ SYPURA;  Surgeon: Liam Kincadi MD;  Location: [facility] OR     HC TOOTH EXTRACTION W/FORCEP Bilateral 2003     PE TUBES  1990     MEDICATIONS:                                                    I personally reviewed patient's medications and allergies with the patient at today's visit.  Current Outpatient Prescriptions on File Prior to Visit:  clonazePAM (KLONOPIN) 0.5 MG tablet Take 0.5-1 mg by mouth nightly as needed    DULoxetine (CYMBALTA) 20 MG EC capsule Take 60 mg by mouth daily    HYDROcodone-acetaminophen (NORCO) 5-325 MG per tablet Take 1 tablet by mouth 3 times daily as needed for pain (Max 3 times daily. #75 tabs to last 30 days.) Okay to fill on 7/21/18   Methylphenidate HCl (CONCERTA PO) Take 54 mg by mouth   orphenadrine (NORFLEX) 100 MG 12 hr tablet Take 1 tablet (100 mg) by mouth 2 times daily   nicotine (NICODERM CQ) 14 MG/24HR 24 hr patch Place 1 patch onto the skin every 24 hours Use this patch first (Patient not taking: Reported on 8/7/2018)   nicotine (NICODERM CQ) 7 MG/24HR 24 hr patch Place 1 patch onto the skin every 24 hours (Patient not taking: Reported on 8/7/2018)     ALLERGIES:                                                      Allergies   Allergen Reactions     Buspirone Hcl Other (See Comments)     Panic attacks     Doxycycline Diarrhea, Fatigue, GI Disturbance and Nausea     Naproxen      Possible abnormal liver function tests      Trazodone Fatigue     Keflex [Cephalexin] Diarrhea     FAMILY/SOCIAL HISTORY:                                                    Family and social history was reviewed with the patient at today's visit.  Family history is positive for Alzheimer's disease, stroke, and multiple sclerosis.  Single, lives alone.  0.5 pack per day smoker (counseled to quit).  Denies current alcohol and recreational drug use.  Used cocaine and methamphetamine until August 2015.  Used to work in graphic design, but currently  unemployed.  REVIEW OF SYSTEMS:                                                    Patient has completed a [facility] Patient Health History, including a 14-system review, which was personally reviewed, and pertinent positives are listed in HPI. He denies any additional problems on the further questioning.  EXAM:                                                    VITAL SIGNS:   /62 (BP Location: Left arm, Patient Position: Sitting, Cuff Size: Adult Regular)  Pulse 74  Temp 98.4  F (36.9  C) (Oral)  Resp 18  Wt 75.3 kg (166 lb)  SpO2 99%  BMI 25.45 kg/m2  Mini-Cog Assessment:  Mini Cog Assessment  Clock Draw Score: 2 Normal  3 Item Recall: 3 objects recalled  Mini Cog Total Score: 5  Administered by: : Agustin Suggs MA    General: pt is in NAD, cooperative.  Skin: normal turgor, moist mucous membranes, no lesions/rashes noticed.  HEENT: ATNC, EOMI, PERRL, white sclera, normal conjunctiva, no nystagmus or ptosis. No carotid bruits bilaterally.  Respiratory: lung sounds clear to auscultation bilaterally, no crackles, wheezes, rhonchi. Symmetric lung excursion, no accessory respiratory muscle use.  Cardiovascular: normal S1/S2, no murmurs/rubs/gallops.   Abdomen: Not distended.  : deferred.    Neurological:  Mental: alert, follows commands, Mini Cog Total Score: 5/5 with 3/3 on memory recall, no aphasia or dysarthria. Fund of knowledge is appropriate for age.  Cranial Nerves:  CN II: visual acuity - able to accurately count fingers with each eye. Visual fields intact, fundi: discs sharp, no papilledema and normal vessels bilaterally.  CN III, IV, VI: EOM intact, pupils equal and reactive  CN V: facial sensation nl  CN VII: face symmetric, no facial droop  CN VIII: hearing normal  CN IX: palate elevation symmetric, uvula at midline  CN XI SCM normal, shoulder shrug nl  CN XII: tongue midline  Motor: Strength: 5/5 in all major groups of all extremities. Normal tone.  The patient is noted to have  intermittent head and torso involuntary movements of various frequency and amplitude that diminish or stop when the patient is asked to perform various limb movements and during parts of the interview.  They became more pronounced when the patient describes his symptoms.  No pronounced hand tremor or abnormal limb movements are seen.  No pronator drift b/l.  Finger tapping is symmetric at normal speed.  Reflexes: Triceps, biceps, brachioradialis, patellar, and achilles reflexes normal and symmetric. No clonus noted. Toes are down-going b/l.   Sensory: temperature, light touch, pinprick, and vibration intact, except the left foot.  The patient reports diminished sensation to pinprick and vibration and increased sensation to temperature on the lateral side of the left foot.  Romberg: negative.  Coordination: FNF and heel-shin tests intact b/l.   Gait:  Normal, able to tandem, toe, and heel walk.  DATA:   LABS/EMG/IMAGING/OTHER STUDIES: I reviewed pertinent medical records, including multiple previous neurology notes, tabulated data of EMG reports, cervical, lumbar spine and brain MRI images, and Care Everywhere, as detailed in the history of present illness.  ASSESSMENT and PLAN:      ASSESSMENT: Hoang Kiser is a 33 year old male patient with past medical history of tic disorder, panic disorder, PTSD, ADHD, depression, anxiety, and seizures, who presents with involuntary movements started in December 2016 that worsened approximately 4 months ago.    We had a prolonged discussion with the patient regarding his symptoms.  Sensory changes in his left foot seen on the neurological exam do not have anatomical explanations given the pattern.  Moreover, recent lower extremity EMG (2017) and lumbar spine MRI (2018) argue against focal neuropathies, lumbosacral plexopathy, and lumbar radiculopathy.    Seen in the clinic involuntary movements do not clearly fit the pattern of hyperkinetic movement disorders or tics.   They are also not consistent with myoclonus or asterixis.  His vitamin B12 and TSH were normal in 2016.  However, more widespread movements started approximately 4 months ago.  For that reason, I think it would be reasonable to recheck MMA, B12, and TSH along with serum copper, ceruloplasmin, and urinary copper excretion in 24 hour sample (to screen for Chele's disease).  These movements could also be seen with rare movement disorders that would be best addressed by a movement disorder specialist.  The referral was made.    Alternatively, the fact that observed movements vary in amplitude and duration, diminish with distraction, and worsen when the patient describes his symptoms might suggest functional movement disorder, though it would be a diagnosis of exclusion.    DIAGNOSES:    ICD-10-CM    1. Abnormal involuntary movement R25.9 Ceruloplasmin     TSH with free T4 reflex     Vitamin B12     Methylmalonic acid     Copper level     Copper urine     NEUROLOGY ADULT REFERRAL     PLAN: At today's visit we thoroughly discussed various diagnostic possibilities for patient's symptoms and the reasons for work-up, which includes:  Orders Placed This Encounter   Procedures     Ceruloplasmin     TSH with free T4 reflex     Vitamin B12     Methylmalonic acid     Copper level     Copper urine     NEUROLOGY ADULT REFERRAL     No new medications were ordered.    Additional recommendations after the work-up.    Next follow-up appointment is on as needed basis.    I advised the patient to contact me with any questions or concerns.    Total Time:  81 minutes with > 50% spent counseling the patient on stated above assessment and recommendations, including nature of the differential diagnosis, needed w/u, and proposed plan.  Additional time was used to discuss patient's symptoms and answer his questions regarding his condition and the plan.    Ghulam Perez MD  / Neurology  [facility]  (Chart documentation was completed  in part with Dragon voice-recognition software. Even though reviewed, some grammatical, spelling, and word errors may remain.)         s

## 2018-08-07 NOTE — MR AVS SNAPSHOT
After Visit Summary   8/7/2018    Hoang Kiser    MRN: [id number]           Patient Information     Date Of Birth          1985        Visit Information        Provider Department      8/7/2018 8:00 AM Ghulam Perez MD [facility]        Today's Diagnoses     Abnormal involuntary movement    -  1      Care Instructions    AFTER VISIT SUMMARY (AVS):    At today's visit we discussed various diagnostic possibilities for your symptoms and the reasons for work-up, which includes:  Orders Placed This Encounter   Procedures     Ceruloplasmin     TSH with free T4 reflex     Vitamin B12     Methylmalonic acid     Copper level     Copper urine     NEUROLOGY ADULT REFERRAL     No new medications were ordered.    Additional recommendations after the work-up.    Next follow-up appointment is on as needed basis.    Please do not hesitate to call me with any questions or concerns.    Thanks.            Follow-ups after your visit        Additional Services     NEUROLOGY ADULT REFERRAL       Your provider has referred you for the following:   Consult at [facility]: Neurology Clinic [facility] [phone]   [email or web address]  Movement Disorder    Please be aware that coverage of these services is subject to the terms and limitations of your health insurance plan.  Call member services at your health plan with any benefit or coverage questions.      Please bring the following with you to your appointment:    (1) Any X-Rays, CTs or MRIs which have been performed.  Contact the facility where they were done to arrange for  prior to your scheduled appointment.    (2) List of current medications  (3) This referral request   (4) Any documents/labs given to you for this referral                  Follow-up notes from your care team     Return if symptoms worsen or fail to improve.      Your next 10 appointments already scheduled     Aug 08,  2018 10:30 AM CDT   Return Visit with CARLOS A Guy CNP   [facility]    [address]  [address]  [facility] [address] [phone]            Aug 17, 2018 11:30 AM CDT   Return Visit with Fili Wesley LP   [facility] ([facility])    [address]  [address]  [facility] [id number] [phone]            Aug 23, 2018  2:45 PM CDT   SHORT with Phill Floyd MD   [facility] ([facility]    [address] [facility] [address] [phone]              Who to contact     If you have questions or need follow up information about today's clinic visit or your schedule please contact [facility] directly at [phone].  Normal or non-critical lab and imaging results will be communicated to you by [employer], letter or phone within 4 business days after the clinic has received the results. If you do not hear from us within 7 days, please contact the clinic through [employer] or phone. If you have a critical or abnormal lab result, we will notify you by phone as soon as possible.  Submit refill requests through [employer] or call your pharmacy and they will forward the refill request to us. Please allow 3 business days for your refill to be completed.          Additional Information About Your Visit        [employer] Information     [employer] gives you secure access to your electronic health record. If you see a primary care provider, you can also send messages to your care team and make appointments. If you have questions, please call your primary care clinic.  If you do not have a primary care provider, please call [phone] and they will assist you.        Your Vitals Were     Pulse Temperature Respirations Pulse Oximetry BMI (Body Mass Index)       74 98.4  F (36.9  C) (Oral) 18 99% 25.45 kg/m2        Blood Pressure from Last 3 Encounters:   08/07/18 106/62   08/06/18  113/80   07/12/18 124/79    Weight from Last 3 Encounters:   08/07/18 75.3 kg (166 lb)   08/06/18 78 kg (172 lb)   07/12/18 78 kg (172 lb)              We Performed the Following     Ceruloplasmin     Copper level     Copper urine     Methylmalonic acid     NEUROLOGY ADULT REFERRAL     TSH with free T4 reflex     Vitamin B12        Primary Care Provider Office Phone # Fax #    Phill Floyd [phone] [address] [facility] [address]        Equal Access to Services     [facility]: Hadii aad ku hadasho Soomaali, waaxda luqadaha, qaybta kaalmada adeegyatanvi, arnav lorenzana . So [facility] [phone].    ATENCIÓN: Si habla español, tiene a cutler disposición servicios gratuitos de asistencia lingüística. [facility] [phone].    We comply with applicable federal civil rights laws and [place] laws. We do not discriminate on the basis of race, color, national origin, age, disability, sex, sexual orientation, or gender identity.            Thank you!     Thank you for choosing [facility]  for your care. Our goal is always to provide you with excellent care. Hearing back from our patients is one way we can continue to improve our services. Please take a few minutes to complete the written survey that you may receive in the mail after your visit with us. Thank you!             Your Updated Medication List - Protect others around you: Learn how to safely use, store and throw away your medicines at www.disposemymeds.org.          This list is accurate as of 8/7/18  9:01 AM.  Always use your most recent med list.                   Brand Name Dispense Instructions for use Diagnosis    clonazePAM 0.5 MG tablet    klonoPIN     Take 0.5-1 mg by mouth nightly as needed        CONCERTA PO      Take 54 mg by mouth        DULoxetine 20 MG EC capsule    CYMBALTA     Take 60 mg by mouth daily        HYDROcodone-acetaminophen 5-325 MG per tablet    NORCO    75 tablet     Take 1 tablet by mouth 3 times daily as needed for pain (Max 3 times daily. #75 tabs to last 30 days.) Okay to fill on 7/21/18    Hip pain, right       * nicotine 14 MG/24HR 24 hr patch    NICODERM CQ    30 patch    Place 1 patch onto the skin every 24 hours Use this patch first    Tobacco abuse       * nicotine 7 MG/24HR 24 hr patch    NICODERM CQ    30 patch    Place 1 patch onto the skin every 24 hours    Tobacco abuse       orphenadrine 100 MG 12 hr tablet    NORFLEX    60 tablet    Take 1 tablet (100 mg) by mouth 2 times daily    Muscle spasm       * Notice:  This list has 2 medication(s) that are the same as other medications prescribed for you. Read the directions carefully, and ask your doctor or other care provider to review them with you.

## 2018-08-07 NOTE — PATIENT INSTRUCTIONS
AFTER VISIT SUMMARY (AVS):    At today's visit we discussed various diagnostic possibilities for your symptoms and the reasons for work-up, which includes:  Orders Placed This Encounter   Procedures     Ceruloplasmin     TSH with free T4 reflex     Vitamin B12     Methylmalonic acid     Copper level     Copper urine     NEUROLOGY ADULT REFERRAL     No new medications were ordered.    Additional recommendations after the work-up.    Next follow-up appointment is on as needed basis.    Please do not hesitate to call me with any questions or concerns.    Thanks.

## 2018-08-08 ENCOUNTER — OFFICE VISIT (OUTPATIENT)
Dept: PALLIATIVE MEDICINE | Facility: CLINIC | Age: 33
End: 2018-08-08
Payer: COMMERCIAL

## 2018-08-08 VITALS — OXYGEN SATURATION: 100 % | DIASTOLIC BLOOD PRESSURE: 78 MMHG | SYSTOLIC BLOOD PRESSURE: 122 MMHG | HEART RATE: 96 BPM

## 2018-08-08 DIAGNOSIS — M51.369 DDD (DEGENERATIVE DISC DISEASE), LUMBAR: ICD-10-CM

## 2018-08-08 DIAGNOSIS — M25.551 HIP PAIN, RIGHT: ICD-10-CM

## 2018-08-08 DIAGNOSIS — M47.816 FACET ARTHROPATHY, LUMBAR: ICD-10-CM

## 2018-08-08 DIAGNOSIS — M62.830 SPASM OF BACK MUSCLES: ICD-10-CM

## 2018-08-08 DIAGNOSIS — Z79.899 HIGH RISK MEDICATION USE: Primary | ICD-10-CM

## 2018-08-08 PROCEDURE — 99214 OFFICE O/P EST MOD 30 MIN: CPT | Performed by: NURSE PRACTITIONER

## 2018-08-08 RX ORDER — BACLOFEN 10 MG/1
5-10 TABLET ORAL 3 TIMES DAILY PRN
Qty: 90 TABLET | Refills: 1 | Status: SHIPPED | OUTPATIENT
Start: 2018-08-08 | End: 2018-09-27

## 2018-08-08 RX ORDER — HYDROCODONE BITARTRATE AND ACETAMINOPHEN 5; 325 MG/1; MG/1
1 TABLET ORAL 3 TIMES DAILY PRN
Qty: 75 TABLET | Refills: 0 | Status: SHIPPED | OUTPATIENT
Start: 2018-08-08 | End: 2018-09-06

## 2018-08-08 ASSESSMENT — PAIN SCALES - GENERAL: PAINLEVEL: SEVERE PAIN (6)

## 2018-08-08 NOTE — MR AVS SNAPSHOT
After Visit Summary   2018    Hoang Kiser    MRN: [id number]           Patient Information     Date Of Birth          1985        Visit Information        Provider Department      2018 10:30 AM Tamiko Stevens APRN CNP [facility]        Today's Diagnoses     High risk medication use    -  1    Spasm of back muscles        Hip pain, right        DDD (degenerative disc disease), lumbar        Facet arthropathy, lumbar          Care Instructions    After Visit Instructions:     Thank you for coming to [facility] for your care. It is my goal to partner with you to help you reach your optimal state of health.     I am recommending multidisciplinary care at this time.  The focus of care will be to continue gradual rehabilitation and pain management with medication adjustments as needed.    Continue daily self-care, identifying contributing factors, and monitoring variations in pain level. Continue to integrate self-care into your life.      1. Schedule pain psychology visit  2. Schedule physical therapy visit  3. Schedule follow-up with CARLOS A Higuera, NPDoloresC in 4 weeks  4. Procedures recommended: Lumbar injection    5. Medication recommendations:   1. Stop Norflex  2. Baclofen 10 m/2-1 tablet up to three times daily   3. Refill of Norco provided.     CARLOS A Bass, NP-C  [facility]    Contact information: [facility]    Please call if any side effects, questions, or concerns arise.    Nurse Triage line:  [phone]   Call this number with any questions or concerns. You may leave a detailed message anytime. Calls are typically returned Monday through Friday between 8 AM and 4:30 PM. We usually get back to you within 2 business days depending on the issue/request.    Scheduling number: [phone].  Call this number to schedule or change appointments.           Medication Refills Policy:    For non-narcotic medications, please your pharmacy directly to request a refill and the pharmacy will call the [facility] for authorization. Please allow 3-4 days for these refills.    For narcotic refills, call the nurse triage line and leave a message requesting your refill along with the name of the pharmacy that you use. Narcotic prescriptions will be mailed to your pharmacy or you may pick them up at the clinic.  Please call 7-10 days before your refill is due  The above policy allows adequate time so that you do not run out of medication.    No Show - Late Cancellation - Late Arrival Policy  We believe regular attendance is key to your success in our program.    Any time you are unable to keep your appointment we ask that you call us at  least 24 hours in advance to let us know. This will allow us to offer the appointment time to another patient. The following is our policy for missed appointments. This also applies to appointments cancelled with less than 24 hours notice.    You will receive a letter after missing your 1st and 2nd appointments without contacting the clinic before your scheduled appointment time.     After missing 3 appointments without calling first, we will cancel all of your future appointments at [facility].    At that point, you will not be able to resume services unless approved by your care team  We understand that unforseen circumstances arise, however, out of respect for all concerned and to provide this appointment to another patient, this policy will be enforced.    Please note that most follow up appointments are 30 minutes long. If you arrive late, your provider may not be able to see you for the entire 30 minutes. Please also note that if you arrive more than 15 minutes for any appointment, it may be rescheduled.                                     Follow-ups after your visit        Additional Services     PAIN  INJECTION EVAL/TREAT/FOLLOW UP                 Your next 10 appointments already scheduled     Aug 17, 2018 11:30 AM CDT   Return Visit with Fili Wesley LP   [facility]    [address]  [address]  [facility] [address] [phone]            Aug 23, 2018  2:45 PM CDT   SHORT with Phill Floyd MD   [facility] ([facility])    [address] [facility] [address] [phone]              Future tests that were ordered for you today     Open Future Orders        Priority Expected Expires Ordered    Copper urine Routine  8/7/2019 8/7/2018            Who to contact     If you have questions or need follow up information about today's clinic visit or your schedule please contact [facility] directly at [phone].  Normal or non-critical lab and imaging results will be communicated to you by MyChart, letter or phone within 4 business days after the clinic has received the results. If you do not hear from us within 7 days, please contact the clinic through [email or web address] or phone. If you have a critical or abnormal lab result, we will notify you by phone as soon as possible.  Submit refill requests through [employer] or call your pharmacy and they will forward the refill request to us. Please allow 3 business days for your refill to be completed.          Additional Information About Your Visit        MyChart Information     [employer] gives you secure access to your electronic health record. If you see a primary care provider, you can also send messages to your care team and make appointments. If you have questions, please call your primary care clinic.  If you do not have a primary care provider, please call [phone] and they will assist you.        Your Vitals Were     Pulse Pulse Oximetry                96 100%           Blood Pressure from Last 3 Encounters:   08/08/18 122/78   08/07/18 106/62   08/06/18  113/80    Weight from Last 3 Encounters:   08/07/18 75.3 kg (166 lb)   08/06/18 78 kg (172 lb)   07/12/18 78 kg (172 lb)              We Performed the Following     PAIN INJECTION EVAL/TREAT/FOLLOW UP          Today's Medication Changes          These changes are accurate as of 8/8/18 10:58 AM.  If you have any questions, ask your nurse or doctor.               Start taking these medicines.        Dose/Directions    baclofen 10 MG tablet   Commonly known as:  LIORESAL   Used for:  Spasm of back muscles   Started by:  Tamiko Stevens APRN CNP        Dose:  5-10 mg   Take 0.5-1 tablets (5-10 mg) by mouth 3 times daily as needed for muscle spasms   Quantity:  90 tablet   Refills:  1       naloxone nasal spray   Commonly known as:  NARCAN   Used for:  High risk medication use   Started by:  Tamiko Stevens APRN CNP        Dose:  4 mg   Spray 1 spray (4 mg) into one nostril alternating nostrils as needed for opioid reversal every 2-3 minutes until assistance arrives   Quantity:  0.2 mL   Refills:  0         These medicines have changed or have updated prescriptions.        Dose/Directions    HYDROcodone-acetaminophen 5-325 MG per tablet   Commonly known as:  NORCO   This may have changed:  additional instructions   Used for:  Hip pain, right   Changed by:  Tamiko Stevens APRN CNP        Dose:  1 tablet   Take 1 tablet by mouth 3 times daily as needed for pain (Max 3 times daily. #75 tabs to last 30 days.) Okay to fill on 8/20/18   Quantity:  75 tablet   Refills:  0            Where to get your medicines      These medications were sent to [facility] [place], [place] - [address]  [address] [address]     Phone:  [phone]     baclofen 10 MG tablet    naloxone nasal spray         Some of these will need a paper prescription and others can be bought over the counter.  Ask your nurse if you have questions.     Bring a paper prescription for each of these  medications     HYDROcodone-acetaminophen 5-325 MG per tablet               Information about OPIOIDS     PRESCRIPTION OPIOIDS: WHAT YOU NEED TO KNOW   We gave you an opioid (narcotic) pain medicine. It is important to manage your pain, but opioids are not always the best choice. You should first try all the other options your care team gave you. Take this medicine for as short a time (and as few doses) as possible.    Some activities can increase your pain, such as bandage changes or therapy sessions. It may help to take your pain medicine 30 to 60 minutes before these activities. Reduce your stress by getting enough sleep, working on hobbies you enjoy and practicing relaxation or meditation. Talk to your care team about ways to manage your pain beyond prescription opioids.    These medicines have risks:    DO NOT drive when on new or higher doses of pain medicine. These medicines can affect your alertness and reaction times, and you could be arrested for driving under the influence (DUI). If you need to use opioids long-term, talk to your care team about driving.    DO NOT operate heavy machinery    DO NOT do any other dangerous activities while taking these medicines.    DO NOT drink any alcohol while taking these medicines.     If the opioid prescribed includes acetaminophen, DO NOT take with any other medicines that contain acetaminophen. Read all labels carefully. Look for the word  acetaminophen  or  Tylenol.  Ask your pharmacist if you have questions or are unsure.    You can get addicted to pain medicines, especially if you have a history of addiction (chemical, alcohol or substance dependence). Talk to your care team about ways to reduce this risk.    All opioids tend to cause constipation. Drink plenty of water and eat foods that have a lot of fiber, such as fruits, vegetables, prune juice, apple juice and high-fiber cereal. Take a laxative (Miralax, milk of magnesia, Colace, Senna) if you don t move your  bowels at least every other day. Other side effects include upset stomach, sleepiness, dizziness, throwing up, tolerance (needing more of the medicine to have the same effect), physical dependence and slowed breathing.    Store your pills in a secure place, locked if possible. We will not replace any lost or stolen medicine. If you don t finish your medicine, please throw away (dispose) as directed by your pharmacist. The [place] [employer]ution [employer] has more information about safe disposal: [email or web address]         Primary Care Provider Office Phone # Fax #    Phill Floyd [phone] [address] [facility] [address]        Equal Access to Services     BERLIN MEYER : Mariana bobbyo Geovany, waaxda luqadaha, qaybta kaalmada shruthi, arnav lorenzana . So [facility] [phone].    ATENCIÓN: Si habla español, tiene a cutler disposición servicios gratuitos de asistencia lingüística. [facility] [phone].    We comply with applicable federal civil rights laws and [place] laws. We do not discriminate on the basis of race, color, national origin, age, disability, sex, sexual orientation, or gender identity.            Thank you!     Thank you for choosing [facility]  for your care. Our goal is always to provide you with excellent care. Hearing back from our patients is one way we can continue to improve our services. Please take a few minutes to complete the written survey that you may receive in the mail after your visit with us. Thank you!             Your Updated Medication List - Protect others around you: Learn how to safely use, store and throw away your medicines at www.disposemymeds.org.          This list is accurate as of 8/8/18 10:58 AM.  Always use your most recent med list.                   Brand Name Dispense Instructions for use Diagnosis    baclofen 10 MG tablet     LIORESAL    90 tablet    Take 0.5-1 tablets (5-10 mg) by mouth 3 times daily as needed for muscle spasms    Spasm of back muscles       clonazePAM 0.5 MG tablet    klonoPIN     Take 0.5-1 mg by mouth nightly as needed        CONCERTA PO      Take 54 mg by mouth        DULoxetine 20 MG EC capsule    CYMBALTA     Take 60 mg by mouth daily        HYDROcodone-acetaminophen 5-325 MG per tablet    NORCO    75 tablet    Take 1 tablet by mouth 3 times daily as needed for pain (Max 3 times daily. #75 tabs to last 30 days.) Okay to fill on 8/20/18    Hip pain, right       naloxone nasal spray    NARCAN    0.2 mL    Spray 1 spray (4 mg) into one nostril alternating nostrils as needed for opioid reversal every 2-3 minutes until assistance arrives    High risk medication use       * nicotine 14 MG/24HR 24 hr patch    NICODERM CQ    30 patch    Place 1 patch onto the skin every 24 hours Use this patch first    Tobacco abuse       * nicotine 7 MG/24HR 24 hr patch    NICODERM CQ    30 patch    Place 1 patch onto the skin every 24 hours    Tobacco abuse       orphenadrine 100 MG 12 hr tablet    NORFLEX    60 tablet    Take 1 tablet (100 mg) by mouth 2 times daily    Muscle spasm       * Notice:  This list has 2 medication(s) that are the same as other medications prescribed for you. Read the directions carefully, and ask your doctor or other care provider to review them with you.

## 2018-08-08 NOTE — PROGRESS NOTES
"[facility]    CHIEF COMPLAINT: \"Unmanageable pain and abnormal movements\" Pain in groin, hips, back, left leg    INTERVAL HISTORY:  Last seen on 7/10/18.        Recommendations/plan at the last visit included:  1. Schedule pain psychology visits  2. Schedule physical therapy visits  3. Schedule follow-up with CARLOS A Higuera NP-C 1 week after neurology appt   4. Referrals: Dr Perez in Neurology  5. Medication recommendations:                  1. Norco: 5/325 mg.  Short refill provided to get to 7/21. Refill for #75 tabs to be filled on 7/21/18. No further early refills allowed.        Since his last visit, Hoang Kiser reports:  - Saw Dr Perez in Neurology. Very thorough note reviewed. Dr Perez referred to [facility] for abnormal body movements.   - Pain is still unmanaged. Norflex is not helpful for back and body spasms.   - Started pool PT through [facility], very helpful.     Pain Information:   Pain quality: Exhausting, Tiring and Unbearable    Pain timing: Constant     Pain rating: intensity ranges from 2/10 to 8/10, and averages 6/10 on a 0-10 scale.   Aggravating factors include: \"Mostly everything else\" (Except as noted below)    Relieving factors include: \"Laying down, rest, stretching, daily movement, medication, pool therapy, laughing, connection, Meditation\"       Annual Controlled Substance Agreement/UDS due date: April 2019      Current Pain Relevant Medications:   Norco 5/325 mg 1 tab BID- TID                        Total opiate dose: 10-15 MME daily  Clonazepam .5 mg 1-2 tab at HS PRN  Duloxetine 20 mg daily  Naproxen 500 mg BID: on hold re: elevated LFTs   Tizanidine 4 mg 1-2 tabs at HS PRN  Ambien 10 mg at HS  Adderall 50 mg daily, combination of long and short acting.       Previous Pain Relevant Medications: (H--helped; HI--Helped initially; [facility]--Somewhat helpful; [facility]--No help; W--worse; SE--side effects; ?--Unsure if helpful)   NOTE: This " medication information taken from patient's intake form, not medical records.                         Opiates: Tramadol: H, Hydrocodone: H                        NSAIDS: Ibuprofen:H, naproxen:[facility], Relafen: [facility]                        Muscle Relaxants: Cyclobenzaprine:H,Med interaction, Tizanidine:H                        Anti-migraine mediations: Prednisone:H                        Anti-depressants: Bupropion:SE, Celexa:SE, Duloxetine:H, Trazodone:Too strong, Venlafaxine:too strong                        Sleep aids:Anbien: H                        Anxiolytics: Clonazepam:H                        Neuropathics: Tegretol:taken for seizures in childhood, Gabapentin:H                                          Topicals: Lidocaine:H                        Other medications not covered above: Tylenol:NH      Any illicit drug use: 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      SELF CARE:   How often do you practice SELF-CARE (relaxing, stretching, pacing, monitoring posture, taking mini-breaks) in a typical day:  Concern for multiple controlled substances including benzodiazepines, stimulants, opiates.      THE 4 As OF OPIOID MAINTENANCE ANALGESIA    Analgesia: Is pain relief clinically significant? YES   Activity: Is patient functional and able to perform Activities of Daily Living? YES   Adverse effects: Is patient free from adverse side effects from opiates? YES   Adherence to Rx protocol: Is patient adhering to Controlled Substance Agreement and taking medications ONLY as ordered? YES           Is Narcan prescribed for opiate use >50 MME daily? Ordered today for combination of opiates and benzodiazepines      SELF CARE:    How often do you practice SELF-CARE (relaxing, stretching, pacing, monitoring posture, taking mini-breaks) in a typical day:  10 + times daily       Medications:  Current Outpatient Prescriptions   Medication Sig Dispense Refill     clonazePAM (KLONOPIN) 0.5 MG tablet Take 0.5-1 mg by mouth nightly as needed   0     DULoxetine (CYMBALTA) 20 MG EC capsule Take 60 mg by mouth daily   0     HYDROcodone-acetaminophen (NORCO) 5-325 MG per tablet Take 1 tablet by mouth 3 times daily as needed for pain (Max 3 times daily. #75 tabs to last 30 days.) Okay to fill on 7/21/18 75 tablet 0     Methylphenidate HCl (CONCERTA PO) Take 54 mg by mouth       nicotine (NICODERM CQ) 14 MG/24HR 24 hr patch Place 1 patch onto the skin every 24 hours Use this patch first (Patient not taking: Reported on 8/7/2018) 30 patch 0     nicotine (NICODERM CQ) 7 MG/24HR 24 hr patch Place 1 patch onto the skin every 24 hours (Patient not taking: Reported on 8/7/2018) 30 patch 0     orphenadrine (NORFLEX) 100 MG 12 hr tablet Take 1 tablet (100 mg) by mouth 2 times daily 60 tablet 1       Review of Systems: A 10-point review of systems was negative, with the exception of chronic pain issues.      Social History: Reviewed; unchanged from previous consultation.      Family history: Reviewed; unchanged from previous consultation.     PHYSICAL EXAM    There were no vitals filed for this visit.    Constitutional: healthy, alert, mild distress, pain behaviors and mildly anxious Seems more depressed.   HEENT: Head atraumatic, normocephalic. Eyes without conjunctival injection or jaundice. Neck supple. No obvious neck masses.  Skin: No rash, lesions, or petechiae of exposed skin.   Extremities: No clubbing, cyanosis, or edema to exposed extremities  Psychiatric/mental status: Alert, without lethargy or stupor. Appropriate affect. See above.      Neurologic exam:  CN:  Cranial nerves 2-12 are grossly normal.  Has uncontrolled upper body movement/tremor.  "      Motor:  5/5 UE and 4/5 LE strength      Musculoskeletal exam:  Cervical spine:                       Flex:  20 degrees                       Ext: 20 degrees                       Rotation to right: 20 degrees                       Rotation to left: 20 degrees                       Rotation/ext to right: painful                        Rotation/ext to left: painful                        Tenderness in the cervical spine at midline. No                       Tenderness in the cervical paraspinal muscles. No  Thoracic spine:                        Kyphosis. No                       Tenderness in the thoracic spine at midline. Yes                       Tenderness in the thoracic paraspinal muscles. Yes  Lumbar/Sacral spine:                       Forward Flexion:  90 degrees                       Ext: 20 degrees \"tight\"                       Rotation/ext to right: painful                        Rotation/ext to left: painful                        Lordosis. No                       Tenderness in the lumbar spine at midline. Yes                       Tenderness in the lumbar paraspinal muscles.Yes      Psychiatric:  mentation appears normal., affect and mood normal      DIRE Score for ongoing opioid management is calculated as follows:    Diagnosis = 2 pts (slowly progressive; moderate pain/objective findings)    Intractability = 2 pts (most treatments tried; patient not fully engaged/barriers)    Risk        Psych = 2 pts (personality dysfunction/mental illness that moderately interferes with care)         Chem Hlth = 2 pts (use of medications to cope with stress; chemical dependency in remission)       Reliability = 3 pts (highly reliable with meds, appointments, treatments)       Social = 3 pts (supportive family/close relationships; involved in work/school; no isolation)       (Psych + Chem hlth + Reliability + Social) = 14    Efficacy = 2 pts (moderate benefit/function; low med dose; too early/not tried " meds)    DIRE Score = 16        7-13: likely NOT suitable candidate for long-term opioid analgesia       14-21: may be a suitable candidate for long-term opioid analgesia          Previous Diagnostic Tests:   Imaging Studies:   MR LUMBAR SPINE W/O CONTRAST 5/2/2018 7:27 PM  History: DDD (degenerative disc disease), lumbar; Lumbar  radiculopathy; Hip pain, right; Groin pain, right.  ICD-10: DDD (degenerative disc disease), lumbar; Lumbar radiculopathy;  Hip pain, right; Groin pain, right  Comparison: Lumbar spine MRI 3/8/2017  Technique: Sagittal STIR, T1-weighted turbo spin-echo, 3-D volumetric  T2-weighted and axial reconstructed T2-weighted images of the lumbar  spine were obtained without intravenous contrast.   Findings: 5 lumbar-type vertebra. The tip of the conus medullaris is  at L1.  The lumbar vertebral column is normally aligned.   Straightening of lumbar lordosis, unchanged. The intervertebral disc  heights are maintained. Normal marrow signal. At L5-S1, there is a  disc bulge and facet hypertrophy with mild left neural foraminal  narrowing, unchanged. No spinal canal stenosis.  Impression:  1. Lumbar spondylosis with mild left neural foraminal narrowing at  L5-S1.  2. No spinal canal stenosis.          MR right hip without contrast 5/2/2018 6:47 PM  Techniques: Multiplanar multisequence imaging of the right hip was  obtained without  administration of intra-articular or intravenous  contrast using routing protocol.  History: Hip pain.  Comparison: None available.  Findings:  Osseous structures  Osseous structures: No fracture, stress reaction, avascular necrosis,  or focal osseous lesion is seen. There is small focal area of  subchondral cystic changes in the anterior right femoral head neck  junction, most compatible with synovial herniation pit. Findings  suggestive of reduced femoral head neck junction though alpha angle  cannot be assessed owing to no dedicated oblique axial  sequence  obtained.  Articular cartilage and labrum  Assessment limited on this arthrographic study due to relative lack of  joint distension.  Articular cartilage: No high grade chondral loss.  Labrum: Labral tearing approximately from 12:00 to 3:00 with 3:00  being anterior and 6:00 being the center of transverse ligament in  this convention with associated subtle area of subchondral edema in  the superior acetabulum.  Ligament teres and transverse ligament of acetabulum: Intact.  Joint or bursal effusion  Joint effusion: A physiologic amount of joint fluid.  Bursal effusion: Minimal nonspecific edema over the greater  trochanter. No substantial iliopsoas or trochanteric bursal effusion.  Muscles and tendons  Muscles and tendons: The rectus femoris, the visualized portion  iliopsoas, proximal hamstrings, and hip abductors are intact.  The  visualized adductor muscles are unremarkable.   Nerves:  The visualized course of the sciatic nerve is unremarkable.   Other Findings:  There is fat-containing right inguinal hernia.  Impression:  1. Approximately 12-3 o'clock labral tearing with synovial herniation  pit and likely reduced femoral head neck junction offset. 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  2. Fat containing right inguinal hernia.          ASSESSMENT:   1.  Lumbar DDD, mild  2.  Lumbar muscle spasm  3.  Labral tear, right hip  4.  Hx: anxiety, chemical dependence in remission.  5.  Uncontrolled body movements       Shoaib follows up after Neuro consult. Will be seeing [facility] for further work up. None of the muscle relaxers we've tried have been helpful. Will try Baclofen. Advised not to stop abruptly if taking fill dose TID. Continue current therapies.         Plan:    Diagnosis reviewed, treatment option addressed, and risk/benifits discussed.  Self-care instructions given.  I am recommending a multidisciplinary treatment plan to help this patient  better manage pain.        1. Schedule pain psychology visit  2. Schedule physical therapy visit  3. Schedule follow-up with CARLOS A Higuera, NP-C in 4 weeks  4. Procedures recommended: Lumbar injection    5. Medication recommendations:   1. Stop Norflex  2. Baclofen 10 m/2-1 tablet up to three times daily   3. Refill of Norco provided.     Total time spent face to face was 30 minutes and more than 50% of face to face time was spent in counseling and/or coordination of care regarding the diagnosis and recommendations above.      CARLOS A Bass, NP-C   [facility]    Disclaimer: This note consists of symbols derived from keyboarding, dictation and/or voice recognition software. As a result, there may be errors in the script that have gone undetected. Please consider this when interpreting information found in this chart.

## 2018-08-08 NOTE — PATIENT INSTRUCTIONS
After Visit Instructions:     Thank you for coming to [facility] for your care. It is my goal to partner with you to help you reach your optimal state of health.     I am recommending multidisciplinary care at this time.  The focus of care will be to continue gradual rehabilitation and pain management with medication adjustments as needed.    Continue daily self-care, identifying contributing factors, and monitoring variations in pain level. Continue to integrate self-care into your life.      1. Schedule pain psychology visit  2. Schedule physical therapy visit  3. Schedule follow-up with CARLOS A Higuera NP-C in 4 weeks  4. Procedures recommended: Lumbar injection    5. Medication recommendations:   1. Stop Norflex  2. Baclofen 10 m/2-1 tablet up to three times daily   3. Refill of Norco provided.     CARLOS A Bass NP-C  [facility]    Contact information: [facility]    Please call if any side effects, questions, or concerns arise.    Nurse Triage line:  [phone]   Call this number with any questions or concerns. You may leave a detailed message anytime. Calls are typically returned Monday through Friday between 8 AM and 4:30 PM. We usually get back to you within 2 business days depending on the issue/request.    Scheduling number: [phone].  Call this number to schedule or change appointments.          Medication Refills Policy:    For non-narcotic medications, please your pharmacy directly to request a refill and the pharmacy will call the [facility] for authorization. Please allow 3-4 days for these refills.    For narcotic refills, call the nurse triage line and leave a message requesting your refill along with the name of the pharmacy that you use. Narcotic prescriptions will be mailed to your pharmacy or you may pick them up at the clinic.  Please call 7-10 days before your refill is due  The  above policy allows adequate time so that you do not run out of medication.    No Show - Late Cancellation - Late Arrival Policy  We believe regular attendance is key to your success in our program.    Any time you are unable to keep your appointment we ask that you call us at  least 24 hours in advance to let us know. This will allow us to offer the appointment time to another patient. The following is our policy for missed appointments. This also applies to appointments cancelled with less than 24 hours notice.    You will receive a letter after missing your 1st and 2nd appointments without contacting the clinic before your scheduled appointment time.     After missing 3 appointments without calling first, we will cancel all of your future appointments at [facility].    At that point, you will not be able to resume services unless approved by your care team  We understand that unforseen circumstances arise, however, out of respect for all concerned and to provide this appointment to another patient, this policy will be enforced.    Please note that most follow up appointments are 30 minutes long. If you arrive late, your provider may not be able to see you for the entire 30 minutes. Please also note that if you arrive more than 15 minutes for any appointment, it may be rescheduled.

## 2018-08-09 LAB
CERULOPLASMIN SERPL-MCNC: 20 MG/DL (ref 23–53)
METHYLMALONATE SERPL-SCNC: 0.15 UMOL/L (ref 0–0.4)

## 2018-08-10 LAB — COPPER SERPL-MCNC: 85 UG/DL (ref 70–140)

## 2018-08-10 RX ORDER — GABAPENTIN 100 MG/1
CAPSULE ORAL
Refills: 2 | COMMUNITY
Start: 2018-08-08 | End: 2018-08-14

## 2018-08-10 RX ORDER — NAPROXEN 500 MG/1
TABLET ORAL
Refills: 0 | COMMUNITY
Start: 2018-07-24 | End: 2018-08-15

## 2018-08-10 RX ORDER — DEXTROAMPHETAMINE SACCHARATE, AMPHETAMINE ASPARTATE, DEXTROAMPHETAMINE SULFATE AND AMPHETAMINE SULFATE 5; 5; 5; 5 MG/1; MG/1; MG/1; MG/1
TABLET ORAL
Refills: 0 | COMMUNITY
Start: 2018-06-04 | End: 2018-08-13

## 2018-08-10 RX ORDER — DULOXETIN HYDROCHLORIDE 60 MG/1
CAPSULE, DELAYED RELEASE ORAL
Refills: 1 | COMMUNITY
Start: 2018-07-24 | End: 2018-08-13

## 2018-08-10 RX ORDER — DEXTROAMPHETAMINE SACCHARATE, AMPHETAMINE ASPARTATE MONOHYDRATE, DEXTROAMPHETAMINE SULFATE AND AMPHETAMINE SULFATE 7.5; 7.5; 7.5; 7.5 MG/1; MG/1; MG/1; MG/1
CAPSULE, EXTENDED RELEASE ORAL
Refills: 0 | COMMUNITY
Start: 2018-06-04 | End: 2018-08-13

## 2018-08-10 RX ORDER — AMITRIPTYLINE HYDROCHLORIDE 10 MG/1
TABLET ORAL
Refills: 1 | COMMUNITY
Start: 2018-03-13 | End: 2018-08-13

## 2018-08-10 RX ORDER — ZOLPIDEM TARTRATE 10 MG/1
TABLET ORAL
Refills: 2 | COMMUNITY
Start: 2018-07-24 | End: 2018-08-23

## 2018-08-10 NOTE — PROGRESS NOTES
Diagnosis/Summary/Recommendations:    IMPRESSION:  Hoang Kiser is a 33 year old man who returned for evaluation of abnormal involuntary movements, which have changed in character and are distractible. 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 He was open to treatment of this disorder, focused on physical therapy.     PLAN:  - Referred for neuro PT   - Will follow up 24-hour urine copper  - Return in 6 months    Alessandra Camargo MD  Movement Disorders Fellow    Discussed family history of epilepsy, etc.     Slightly low ceruloplasmin  Urine copper pending  Other tests were normal.     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     Bryant Cao MD         PATIENT: Hoang Kiser  33 year old male     : 1985    HARSHA: 2018    Movement  Seen by me, Dr Dominguez, Claudia, etcd.     HISTORY OF PRESENT ILLNESS:  Hoang Kiser is a 33 year old right handed man who returns for follow up of abnormal involuntary movements. He was initially seen in  in [place] with Dr. Cao. At that time he complained of ear clicking. He was referred to Dr. Estrella and was subsequently followed by movement disorder fellow Dr. Liao until he left in 2017. The clinical impression varied between tics, Tourette Syndrome, and palatal myoclonus. Given that symptoms responded to nicotine, he was treated with  "NAC and huperzine A, which was helpful for some time. He was not able to follow up with Dr. Martinez because she only sees children.     At this time, the ear clicking, palate movements, and jaw movements are not bothersome. They have become less frequent. Now, he is more concerned with truncal and head movements. These started some time in 2016 after a lifting injury at work. Since this time he has suffered paraspinal spasms, pain, and involuntary movement of the trunk. He denied a compulsion to perform the movements or an ability to suppress them. They are present most of the day and vary in severity. He feels they make him fatigued. He is participating in physical therapy for his back pain.     History obtained from patient     MEDICATIONS:    Medications     8am 12pm 11pm   Amitryptyline elavil 10mg Not taking     Amphetamine-dextroamphetamine adderall XR 30mg per 24 hr capsule Not taking     Amphetamine-dextroamphetamine adderall XR 20mg per 24 hr capsule Not taking     Baclofen lioresal 10mg 1 1 1   Clonazepam klonopin 0.5mg   1   Duloxetine cymbalta 20mg EC capsule Not taking     Duloxetine cymbalta 60mg EC capsule 2 x 60mg     Gabapentin neurontin 100mg Not taking     Hydrocodone-acetaminophen norco 5-325 prn     Methylphenidate concerta 54mg 1     Naloxone narcan nasal spray      Naproxen naprosyn 500mg      Nicotine nicoderm cq 14mg/24 hr 24 hr patch Not using     Nicotine nicoderm cq 7mg/24 hr 24 hr patch Not using     Orphenadrine norflex 100mg 12 hr tablet Not using     Tizanidine zanaflex 4mg Not using.     Zolpidem ambien 10mg Not using         PHYSICAL EXAM:  VITAL SIGNS: /73 (BP Location: Left arm, Cuff Size: Adult Regular)  Pulse 67  Ht 1.702 m (5' 7\")  Wt 74.8 kg (165 lb)  SpO2 99%  BMI 25.84 kg/m2  GENERAL: Pleasant, participates in exam, does not appear to be in distress  NEUROLOGIC:  Alert and attentive, but only intermittent eye contact through exam. Normal blink rate. Speaks slightly " slow, appears psychomotor slowness. No dysarthria. Facial movements symmetric. No apraxia of eyelid opening. No facial dystonia or dyskinesia. Nearly continuous head and truncal bobbing type movements which are distractable with calculation and conversation. No abnormal posturing of the head or limbs. Normal finger tapping, hand movements, foot tapping bilaterally. Strength is 5/5 upper and lower extremities. DTR 2+ biceps, brachioradialis, patellae, Achilles. No clonus. Toes down going. Normal gait. Movements stopped during ambulation.       ______________________________________    Last visit date and details:     INITIAL NEUROLOGY CONSULTATION     DATE OF VISIT: 8/7/2018  CLINIC LOCATION: [facility]  MRN: [id number]  PATIENT NAME: Hoang Kiser  YOB: 1985     PRIMARY CARE PROVIDER: Phill Floyd MD      REASON FOR VISIT:        Chief Complaint   Patient presents with     Consult       involentary movement all over the body       HISTORY OF PRESENT ILLNESS:      Mr. Hoang Kiser is 33 year old right handed male patient with past medical history of tic disorder, panic disorder, PTSD, ADHD, depression, anxiety, and seizures, who was seen in consultation today requested by Tamiko Stevens CNP, for jerking movements, tremor, and neuropathic pain.     Per patient's report, he was in his usual state of health until December 2016, when after lifting injury the patient developed involuntary muscle movements that currently involve his hips, trunk, head, back, and, sometimes, left foot.  Initially, the symptoms were present only in his lumbar muscles, but approximately 4 months ago they spread to other parts of his body, as described.  Movements intensified after the patient switched from Adderall to Concerta.  No focal neurological symptoms.  Other associated symptoms include moderate to severe hip pain.  Bending, squatting, sitting, twisting, standing, and walking worsen his  symptoms.  Stretching, laying on the back, and hot pads make the symptoms better.  He tried physical therapy, various medications (tizanidine, Flexeril, and Norflex), pool therapy, and epidural injections without significant effect.     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 Taylor, Dr. Dominguez, Dr. Estrella, and Dr. Cao ([facility]) for tics disorder, suspected Tourette's syndrome, and other mentioned above complaints.     EMG of bilateral upper extremities in 2016 was normal, so as EMG of bilateral lower extremities in 2017.     Lumbar spine MRI from 05/02/2018 demonstrated mild left neuroforaminal stenosis at L5-S1 without significant change compared to 2017 study.  Cervical spine MRI from 2016 demonstrated mild left neuroforaminal stenosis at C2-3 and C3-4.  Brain MRI from 2015 was normal.     Recent laboratory evaluation from June 2018 includes CMP, which was unremarkable except marginally elevated glucose at 103.  No additional pertinent information is available in Care Everywhere, which was reviewed.     The patient denies a history of recent head injury. Prior neurological history: negative for migraine, stroke, brain neoplasms, multiple sclerosis, meningitis, encephalitis, and major head injuries.  Has history of absence seizures from 1986 to 1998.     Neurologic Review of Systems - no amaurosis, diplopia, abnormal speech, unilateral numbness or weakness. He endorses recent weight loss, earache, ringing in the ears, TMD, anxiety, depression, restlessness, urinary urgency, arthritis, muscle tenderness, headaches, leg numbness, and memory problems. Otherwise, he denies any other complaints on 14-point comprehensive review of systems.  PAST MEDICAL/SURGICAL HISTORY:      I personally reviewed patient's past medical and surgical history with the patient at today's visit.  Patient Active Problem List   Diagnosis     Neuropathy      "Moderate major depression (H)     Tobacco abuse     Attention deficit hyperactivity disorder (ADHD), predominantly inattentive type     Primary insomnia     History of MRI of brain and brain stem     Panic disorder without agoraphobia     Abnormal involuntary movement     Tic disorder     normal emg 2017     Anxiety     Panic attack     Posttraumatic stress disorder with dissociative symptoms           Past Medical History:   Diagnosis Date     Abnormal involuntary movement 6/2/2016     Anxiety state, unspecified 04/30/2012     Benign positional vertigo Dec 2016     Started after my groin/back injury. Sitting on Toilet.     Depressive disorder 2003     I have been on and off medication for depression since this     Dysphonia 2014     Nothing significant.     Epilepsy (H)       as a child. Says that \" he grew out of it by age 13\"     Gastroesophageal reflux disease 2004     Occassional. Spicy foods set it off.     Hearing problem 2015     Theorized Diag: Essential Palatal Myoclonus     History of MRI of brain and brain stem 12/11/2015                            MR BRAIN W/O & W CONTRAST, 12/11/2015 3:46 PM.  History: Myoclonus.  Comparison: None.  Technique:  1. MRI of the Brain: Sagittal T1-weighted, axial T2-weighted, axial turboFLAIR, axial susceptibility, and axial diffusion-weighted with ADC map images of the brain were obtained without intravenous contrast. After intravenous administration of gadolinium, axial and sagittal T1-weighted images of th     Hoarseness 2017     Dry mouth, started after taking Tizanidine     Neuralgia, neuritis, and radiculitis, unspecified 04/30/2012     normal emg 2017 8/8/2017     Interpretation: This is a normal study. There is no electrophysiologic evidence of a lumbosacral radiculopathy affecting the right or left lower extremity on the basis of this study.    Rodney Mena MD Department of Neurology       Panic disorder without agoraphobia 04/30/2012     Problem, psychiatric " 2016     PTSD     Seizures (H) [id number]     Grew out of it. Absence Seizures.     Tic disorder 6/2/2016     Tinnitus 2015     Had it most of my life. Got worse in 2015       Past Surgical History          Past Surgical History:   Procedure Laterality Date     BACK SURGERY         injections     COLONOSCOPY   02/15/18     Has not happened yet.     COLONOSCOPY N/A 2/15/2018     Procedure: COMBINED COLONOSCOPY, SINGLE OR MULTIPLE BIOPSY/POLYPECTOMY BY BIOPSY;;  Surgeon: Liam Kincaid MD;  Location: [facility] OR     COLONOSCOPY WITH CO2 INSUFFLATION N/A 2/15/2018     Procedure: COLONOSCOPY WITH CO2 INSUFFLATION;  COLON-FAMILY HX OF COLON CANCER/ SYPURA;  Surgeon: Liam Kincaid MD;  Location: [facility] OR     HC TOOTH EXTRACTION W/FORCEP Bilateral 2003     PE TUBES   1990         MEDICATIONS:      I personally reviewed patient's medications and allergies with the patient at today's visit.  Current Outpatient Prescriptions on File Prior to Visit:  clonazePAM (KLONOPIN) 0.5 MG tablet Take 0.5-1 mg by mouth nightly as needed    DULoxetine (CYMBALTA) 20 MG EC capsule Take 60 mg by mouth daily    HYDROcodone-acetaminophen (NORCO) 5-325 MG per tablet Take 1 tablet by mouth 3 times daily as needed for pain (Max 3 times daily. #75 tabs to last 30 days.) Okay to fill on 7/21/18   Methylphenidate HCl (CONCERTA PO) Take 54 mg by mouth   orphenadrine (NORFLEX) 100 MG 12 hr tablet Take 1 tablet (100 mg) by mouth 2 times daily   nicotine (NICODERM CQ) 14 MG/24HR 24 hr patch Place 1 patch onto the skin every 24 hours Use this patch first (Patient not taking: Reported on 8/7/2018)   nicotine (NICODERM CQ) 7 MG/24HR 24 hr patch Place 1 patch onto the skin every 24 hours (Patient not taking: Reported on 8/7/2018)      ALLERGIES:               Allergies   Allergen Reactions     Buspirone Hcl Other (See Comments)       Panic attacks     Doxycycline Diarrhea, Fatigue, GI Disturbance and Nausea     Naproxen         Possible abnormal  liver function tests      Trazodone Fatigue     Keflex [Cephalexin] Diarrhea      FAMILY/SOCIAL HISTORY:      Family and social history was reviewed with the patient at today's visit.  Family history is positive for Alzheimer's disease, stroke, and multiple sclerosis.  Single, lives alone.  0.5 pack per day smoker (counseled to quit).  Denies current alcohol and recreational drug use.  Used cocaine and methamphetamine until August 2015.  Used to work in graphic design, but currently unemployed.  REVIEW OF SYSTEMS:      Patient has completed a [facility] Patient Health History, including a 14-system review, which was personally reviewed, and pertinent positives are listed in HPI. He denies any additional problems on the further questioning.  EXAM:      VITAL SIGNS:   /62 (BP Location: Left arm, Patient Position: Sitting, Cuff Size: Adult Regular)  Pulse 74  Temp 98.4  F (36.9  C) (Oral)  Resp 18  Wt 75.3 kg (166 lb)  SpO2 99%  BMI 25.45 kg/m2  Mini-Cog Assessment:  Mini Cog Assessment  Clock Draw Score: 2 Normal  3 Item Recall: 3 objects recalled  Mini Cog Total Score: 5  Administered by: : Agustin Suggs MA     General: pt is in NAD, cooperative.  Skin: normal turgor, moist mucous membranes, no lesions/rashes noticed.  HEENT: ATNC, EOMI, PERRL, white sclera, normal conjunctiva, no nystagmus or ptosis. No carotid bruits bilaterally.  Respiratory: lung sounds clear to auscultation bilaterally, no crackles, wheezes, rhonchi. Symmetric lung excursion, no accessory respiratory muscle use.  Cardiovascular: normal S1/S2, no murmurs/rubs/gallops.   Abdomen: Not distended.  : deferred.     Neurological:  Mental: alert, follows commands, Mini Cog Total Score: 5/5 with 3/3 on memory recall, no aphasia or dysarthria. Fund of knowledge is appropriate for age.  Cranial Nerves:  CN II: visual acuity - able to accurately count fingers with each eye. Visual fields intact, fundi: discs sharp, no papilledema and  normal vessels bilaterally.  CN III, IV, VI: EOM intact, pupils equal and reactive  CN V: facial sensation nl  CN VII: face symmetric, no facial droop  CN VIII: hearing normal  CN IX: palate elevation symmetric, uvula at midline  CN XI SCM normal, shoulder shrug nl  CN XII: tongue midline  Motor: Strength: 5/5 in all major groups of all extremities. Normal tone.  The patient is noted to have intermittent head and torso involuntary movements of various frequency and amplitude that diminish or stop when the patient is asked to perform various limb movements and during parts of the interview.  They became more pronounced when the patient describes his symptoms.  No pronounced hand tremor or abnormal limb movements are seen.  No pronator drift b/l.  Finger tapping is symmetric at normal speed.  Reflexes: Triceps, biceps, brachioradialis, patellar, and achilles reflexes normal and symmetric. No clonus noted. Toes are down-going b/l.   Sensory: temperature, light touch, pinprick, and vibration intact, except the left foot.  The patient reports diminished sensation to pinprick and vibration and increased sensation to temperature on the lateral side of the left foot.  Romberg: negative.  Coordination: FNF and heel-shin tests intact b/l.   Gait:  Normal, able to tandem, toe, and heel walk.  DATA:   LABS/EMG/IMAGING/OTHER STUDIES: I reviewed pertinent medical records, including multiple previous neurology notes, tabulated data of EMG reports, cervical, lumbar spine and brain MRI images, and Care Everywhere, as detailed in the history of present illness.  ASSESSMENT and PLAN:        ASSESSMENT: Honag Kiser is a 33 year old male patient with past medical history of tic disorder, panic disorder, PTSD, ADHD, depression, anxiety, and seizures, who presents with involuntary movements started in December 2016 that worsened approximately 4 months ago.     We had a prolonged discussion with the patient regarding his symptoms.   Sensory changes in his left foot seen on the neurological exam do not have anatomical explanations given the pattern.  Moreover, recent lower extremity EMG (2017) and lumbar spine MRI (2018) argue against focal neuropathies, lumbosacral plexopathy, and lumbar radiculopathy.     Seen in the clinic involuntary movements do not clearly fit the pattern of hyperkinetic movement disorders or tics.  They are also not consistent with myoclonus or asterixis.  His vitamin B12 and TSH were normal in 2016.  However, more widespread movements started approximately 4 months ago.  For that reason, I think it would be reasonable to recheck MMA, B12, and TSH along with serum copper, ceruloplasmin, and urinary copper excretion in 24 hour sample (to screen for Chele's disease).  These movements could also be seen with rare movement disorders that would be best addressed by a movement disorder specialist.  The referral was made.     Alternatively, the fact that observed movements vary in amplitude and duration, diminish with distraction, and worsen when the patient describes his symptoms might suggest functional movement disorder, though it would be a diagnosis of exclusion.     DIAGNOSES:      ICD-10-CM     1. Abnormal involuntary movement R25.9 Ceruloplasmin       TSH with free T4 reflex       Vitamin B12       Methylmalonic acid       Copper level       Copper urine       NEUROLOGY ADULT REFERRAL      PLAN: At today's visit we thoroughly discussed various diagnostic possibilities for patient's symptoms and the reasons for work-up, which includes:      Orders Placed This Encounter   Procedures     Ceruloplasmin     TSH with free T4 reflex     Vitamin B12     Methylmalonic acid     Copper level     Copper urine     NEUROLOGY ADULT REFERRAL      No new medications were ordered.     Additional recommendations after the work-up.     Next follow-up appointment is on as needed basis.     I advised the patient to contact me with any  "questions or concerns.     Total Time:  81 minutes with > 50% spent counseling the patient on stated above assessment and recommendations, including nature of the differential diagnosis, needed w/u, and proposed plan.  Additional time was used to discuss patient's symptoms and answer his questions regarding his condition and the plan.     Ghulam Perez MD  / Neurology  [place]  (Chart documentation was completed in part with Dragon voice-recognition software. Even though reviewed, some grammatical, spelling, and word errors may remain.)       2017       Phill Floyd MD   [facility]    [address] [place] [address]       RE:                Hoang Kiser   MRN:             [id number]   :             2017       Dear Dr. Floyd:       This is regard to followup on Hoang Kiser.  The patient requested neurologic followup today on 2017.  His chief complaint is that of a sensation of \"ear clicking.\"       The patient has been seen at [facility] now for some time.  He had a sensation that began in 10/2014 where he had a clicking sensation in his ears and a tension in his throat.  The patient in the past had had episodes of mouth pursing.  He does have a fairly complex history.  He has ADHD and he did have issues with the drug addiction.  He was forthright about this.  He was using higher amounts of amphetamines than he had been prescribed and had also used opiates.  He said he would snort or ingest them.  He now has had 2 years of sobriety.  He also has been diagnosed with depression along with ADHD.  He had had a prior history of absence seizures from about age 15 months until age 13.  He had been treated on Tegretol at that time and no further events.  He has been diagnosed with possible Tourette syndrome although he said he has read about Tourette and he does not think he has it.  He has been offered treatments but he has only had partial benefit " "from them.  He has been seen by Dr. Cao, Sameer and Angelica in our department.  He had been referred to [facility] to see a Dr. Martinez, but she had evidently refused to see him because of his age.  The patient did note that he had had many air flights between 2008 and 2010.  He had noted trouble with ears then and had eustachian problems.  He said he has continued to suffer with it daily and it can occur many times a day.  He has always had trouble since that time with air flights.  The patient has been a chronic tobacco user.  He feels that nicotine has helped some of his complaints.  He had been tried on Huperzinea and then had switched over to N-acetylcysteine.  He thinks these possibly have been effective.  The patient did have an MRI scan of his head which I reviewed with him which essentially was unremarkable.  He had seen Dr. Nelson who recommended Botox but he said he cannot afford it and really is not interested in it.  He has suffered prior symptoms from repetitive use of his arms and did have some type of injection in his neck a few years ago which helped his arm and neck complaints.  At that time he had normal thyroid and B12 levels done.  He is now being treated for workmen's compensation injury that occurred on 12/12/2016 after lifting a \"tote\" that weighed 80-90 pounds.  He has had low back pain and pain that has radiated into either leg.  He recently had a normal EMG of his legs and earlier he had normal EMG of his arms.  The patient did review with me his prior history of an indication that he had an adverse reaction to BuSpar that he reported caused panic attacks.  The patient did note that with the ear popping his throat has tightened.  The patient does drink caffeinated beverage and estimates he uses between 100-150 mg of caffeine per day.  Earlier he had used some energy drinks, but he denied that now.       The patient denied any type of vocal utterances or coprolalia.  He has not had any " significant eye closure and nothing to suggest random movements of his limbs or repetitive movements.  He denied any type of dystonic posturing.  Nobody in his family had any neurologic problems like this.  He has continued on Adderall now for ADHD.  The patient does use clonazepam 0.5 mg at night which he feels helps but he does not tolerate higher doses because of fatigue issues.  He has been told to consider treatment with valproic acid or sumatriptan, but he does not want to try these medications.       Neurologic examination was entirely normal.  This included gait, station, cerebellar testing, as well as careful cranial nerve examination.  He had no obvious tics.  I did not see any palatal myoclonus.  He assured me he had had a movie made of his throat that showed this, although I could not retrieve it from our records on [facility] website.  The patient said that during the time I was with him, he had the same sensation in his throat and clicking in his ears.  I could not auscultate cervical bruits.  He had a regular cardiac rhythm.  He had no extrapyramidal findings.       IMPRESSION:   1.  Eustachian tube dysfunction.   2.  Prior history of lip pursing.       I really cannot diagnose the patient with Tourette syndrome based on today's exam.  He possibly has it, although he himself has read about it and does not feel he has it.  It is very possible he had some type of movement of his palate, but I did not observe it on today's exam either.  I did suggest that he have formal ENT evaluation now for eustachian tube dysfunction.  If this does not prove to be beneficial to him, I have talked with him about referral to another movement disorder specialist.       Thank you for allowing me to see this patient.       Sincerely yours,       Allen Taylor MD       I spent 35 minutes with the patient today.  Over 50% of the time this involved counseling and coordination of care.           ALLEN TAYLOR MD                 D: 2017 05:45   T: 2017 07:17   MT: CHEYANNE       Name:     HOANG KISER   MRN:      [id number]        Account:      [id number]   :      1985           Service Date: 2017       Document: [id number]        Interpretation:  This is a normal study. There is no electrophysiologic evidence of a lumbosacral radiculopathy affecting the right or left lower extremity on the basis of this study.      Rodney Mena MD  Department of Neurology       Movement Disorders follow up  17     INTERVAL HISTORY:   Hoang Kiser is a very nice 31-year-old man who I initially saw for possible essential palatal myoclonus, but in asking him it sounded as if he had ear clicking that was more of a tic as he did have some compulsion to do it, some thoughts of keeping his ears clean and ADHD that met fairly well the triad of Tourette syndrome when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

## 2018-08-11 ENCOUNTER — MYC MEDICAL ADVICE (OUTPATIENT)
Dept: PALLIATIVE MEDICINE | Facility: CLINIC | Age: 33
End: 2018-08-11

## 2018-08-11 DIAGNOSIS — R25.9 ABNORMAL INVOLUNTARY MOVEMENT: ICD-10-CM

## 2018-08-11 PROCEDURE — 82525 ASSAY OF COPPER: CPT | Mod: 90 | Performed by: PSYCHIATRY & NEUROLOGY

## 2018-08-11 PROCEDURE — 99000 SPECIMEN HANDLING OFFICE-LAB: CPT | Performed by: PSYCHIATRY & NEUROLOGY

## 2018-08-11 ASSESSMENT — ENCOUNTER SYMPTOMS
POLYPHAGIA: 0
MUSCLE CRAMPS: 1
DECREASED APPETITE: 1
WEAKNESS: 1
INCREASED ENERGY: 1
FLANK PAIN: 0
WEIGHT GAIN: 0
PANIC: 0
SINUS PAIN: 0
ARTHRALGIAS: 1
TREMORS: 1
DIZZINESS: 1
TASTE DISTURBANCE: 0
CHILLS: 0
TROUBLE SWALLOWING: 0
DIFFICULTY URINATING: 0
DYSURIA: 0
INSOMNIA: 1
NIGHT SWEATS: 0
ALTERED TEMPERATURE REGULATION: 0
SMELL DISTURBANCE: 0
NERVOUS/ANXIOUS: 1
NECK MASS: 0
NECK PAIN: 1
PARALYSIS: 0
HALLUCINATIONS: 0
DISTURBANCES IN COORDINATION: 1
NUMBNESS: 1
MYALGIAS: 1
SPEECH CHANGE: 0
BACK PAIN: 1
FEVER: 0
DECREASED CONCENTRATION: 1
SORE THROAT: 0
HEMATURIA: 0
MUSCLE WEAKNESS: 1
HOARSE VOICE: 1
JOINT SWELLING: 0
STIFFNESS: 1
SEIZURES: 0
FATIGUE: 1
TINGLING: 1
DEPRESSION: 1
WEIGHT LOSS: 0
SINUS CONGESTION: 0
POLYDIPSIA: 1
MEMORY LOSS: 0
HEADACHES: 1
LOSS OF CONSCIOUSNESS: 0

## 2018-08-13 RX ORDER — DULOXETIN HYDROCHLORIDE 60 MG/1
CAPSULE, DELAYED RELEASE ORAL
Qty: 30 CAPSULE | Refills: 1 | COMMUNITY
Start: 2018-08-13 | End: 2020-07-30

## 2018-08-13 RX ORDER — METHYLPHENIDATE HYDROCHLORIDE 54 MG/1
54 TABLET ORAL EVERY MORNING
Refills: 0 | COMMUNITY
Start: 2018-08-13 | End: 2018-08-23

## 2018-08-14 ENCOUNTER — OFFICE VISIT (OUTPATIENT)
Dept: NEUROLOGY | Facility: CLINIC | Age: 33
End: 2018-08-14

## 2018-08-14 VITALS
OXYGEN SATURATION: 99 % | BODY MASS INDEX: 25.9 KG/M2 | SYSTOLIC BLOOD PRESSURE: 110 MMHG | WEIGHT: 165 LBS | HEART RATE: 67 BPM | DIASTOLIC BLOOD PRESSURE: 73 MMHG | HEIGHT: 67 IN

## 2018-08-14 DIAGNOSIS — R25.9 ABNORMAL INVOLUNTARY MOVEMENT: Primary | ICD-10-CM

## 2018-08-14 DIAGNOSIS — G25.9 FUNCTIONAL MOVEMENT DISORDER: ICD-10-CM

## 2018-08-14 ASSESSMENT — ANXIETY QUESTIONNAIRES
2. NOT BEING ABLE TO STOP OR CONTROL WORRYING: MORE THAN HALF THE DAYS
6. BECOMING EASILY ANNOYED OR IRRITABLE: NEARLY EVERY DAY
GAD7 TOTAL SCORE: 15
5. BEING SO RESTLESS THAT IT IS HARD TO SIT STILL: NEARLY EVERY DAY
IF YOU CHECKED OFF ANY PROBLEMS ON THIS QUESTIONNAIRE, HOW DIFFICULT HAVE THESE PROBLEMS MADE IT FOR YOU TO DO YOUR WORK, TAKE CARE OF THINGS AT HOME, OR GET ALONG WITH OTHER PEOPLE: SOMEWHAT DIFFICULT
3. WORRYING TOO MUCH ABOUT DIFFERENT THINGS: SEVERAL DAYS
1. FEELING NERVOUS, ANXIOUS, OR ON EDGE: NEARLY EVERY DAY
7. FEELING AFRAID AS IF SOMETHING AWFUL MIGHT HAPPEN: NOT AT ALL

## 2018-08-14 ASSESSMENT — PAIN SCALES - GENERAL: PAINLEVEL: MODERATE PAIN (4)

## 2018-08-14 ASSESSMENT — PATIENT HEALTH QUESTIONNAIRE - PHQ9: 5. POOR APPETITE OR OVEREATING: NEARLY EVERY DAY

## 2018-08-14 NOTE — LETTER
2018      RE: Hoang Kiser  [address]  [facility] [address]       Diagnosis/Summary/Recommendations:    IMPRESSION:  Hoang Kiser is a 33 year old man who returned for evaluation of abnormal involuntary movements, which have changed in character and are distractible. 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 He was open to treatment of this disorder, focused on physical therapy.     PLAN:  - Referred for neuro PT   - Will follow up 24-hour urine copper  - Return in 6 months    Alessandra Camargo MD  Movement Disorders Fellow    Discussed family history of epilepsy, etc.     Slightly low ceruloplasmin  Urine copper pending  Other tests were normal.     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     Bryant Cao MD         PATIENT: Hoang Kiser  33 year old male     : 1985    HARSHA: 2018    Movement  Seen by me, Dr Dominguez, Claudia, etcd.     HISTORY OF PRESENT ILLNESS:  Hoang Kiser is a 33 year old right handed man who returns for follow up of abnormal involuntary movements. He was initially seen in  in [place] with Dr. Cao. At that time he complained of ear clicking. He was referred to Dr. Estrella and was subsequently followed by movement disorder fellow Dr. Liao until he left in 2017. The clinical impression varied between tics, Tourette Syndrome,  "and palatal myoclonus. Given that symptoms responded to nicotine, he was treated with NAC and huperzine A, which was helpful for some time. He was not able to follow up with Dr. Martinez because she only sees children.     At this time, the ear clicking, palate movements, and jaw movements are not bothersome. They have become less frequent. Now, he is more concerned with truncal and head movements. These started some time in 2016 after a lifting injury at work. Since this time he has suffered paraspinal spasms, pain, and involuntary movement of the trunk. He denied a compulsion to perform the movements or an ability to suppress them. They are present most of the day and vary in severity. He feels they make him fatigued. He is participating in physical therapy for his back pain.     History obtained from patient     MEDICATIONS:    Medications     8am 12pm 11pm   Amitryptyline elavil 10mg Not taking     Amphetamine-dextroamphetamine adderall XR 30mg per 24 hr capsule Not taking     Amphetamine-dextroamphetamine adderall XR 20mg per 24 hr capsule Not taking     Baclofen lioresal 10mg 1 1 1   Clonazepam klonopin 0.5mg   1   Duloxetine cymbalta 20mg EC capsule Not taking     Duloxetine cymbalta 60mg EC capsule 2 x 60mg     Gabapentin neurontin 100mg Not taking     Hydrocodone-acetaminophen norco 5-325 prn     Methylphenidate concerta 54mg 1     Naloxone narcan nasal spray      Naproxen naprosyn 500mg      Nicotine nicoderm cq 14mg/24 hr 24 hr patch Not using     Nicotine nicoderm cq 7mg/24 hr 24 hr patch Not using     Orphenadrine norflex 100mg 12 hr tablet Not using     Tizanidine zanaflex 4mg Not using.     Zolpidem ambien 10mg Not using         PHYSICAL EXAM:  VITAL SIGNS: /73 (BP Location: Left arm, Cuff Size: Adult Regular)  Pulse 67  Ht 1.702 m (5' 7\")  Wt 74.8 kg (165 lb)  SpO2 99%  BMI 25.84 kg/m2  GENERAL: Pleasant, participates in exam, does not appear to be in distress  NEUROLOGIC:  Alert and " attentive, but only intermittent eye contact through exam. Normal blink rate. Speaks slightly slow, appears psychomotor slowness. No dysarthria. Facial movements symmetric. No apraxia of eyelid opening. No facial dystonia or dyskinesia. Nearly continuous head and truncal bobbing type movements which are distractable with calculation and conversation. No abnormal posturing of the head or limbs. Normal finger tapping, hand movements, foot tapping bilaterally. Strength is 5/5 upper and lower extremities. DTR 2+ biceps, brachioradialis, patellae, Achilles. No clonus. Toes down going. Normal gait. Movements stopped during ambulation.       ______________________________________    Last visit date and details:     INITIAL NEUROLOGY CONSULTATION     DATE OF VISIT: 8/7/2018  CLINIC LOCATION[facility]  MRN: [id number]  PATIENT NAME: Hoang Kiser  YOB: 1985     PRIMARY CARE PROVIDER: Phill Floyd MD      REASON FOR VISIT:        Chief Complaint   Patient presents with     Consult       involentary movement all over the body       HISTORY OF PRESENT ILLNESS:      Mr. Hoang Kiser is 33 year old right handed male patient with past medical history of tic disorder, panic disorder, PTSD, ADHD, depression, anxiety, and seizures, who was seen in consultation today requested by Tamiko Stevens CNP, for jerking movements, tremor, and neuropathic pain.     Per patient's report, he was in his usual state of health until December 2016, when after lifting injury the patient developed involuntary muscle movements that currently involve his hips, trunk, head, back, and, sometimes, left foot.  Initially, the symptoms were present only in his lumbar muscles, but approximately 4 months ago they spread to other parts of his body, as described.  Movements intensified after the patient switched from Adderall to Concerta.  No focal neurological symptoms.  Other associated symptoms include moderate to  severe hip pain.  Bending, squatting, sitting, twisting, standing, and walking worsen his symptoms.  Stretching, laying on the back, and hot pads make the symptoms better.  He tried physical therapy, various medications (tizanidine, Flexeril, and Norflex), pool therapy, and epidural injections without significant effect.     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 Tayolr, Dr. Dominguez, Dr. Estrella, and Dr. Cao ([facility]) for tics disorder, suspected Tourette's syndrome, and other mentioned above complaints.     EMG of bilateral upper extremities in 2016 was normal, so as EMG of bilateral lower extremities in 2017.     Lumbar spine MRI from 05/02/2018 demonstrated mild left neuroforaminal stenosis at L5-S1 without significant change compared to 2017 study.  Cervical spine MRI from 2016 demonstrated mild left neuroforaminal stenosis at C2-3 and C3-4.  Brain MRI from 2015 was normal.     Recent laboratory evaluation from June 2018 includes CMP, which was unremarkable except marginally elevated glucose at 103.  No additional pertinent information is available in Care Everywhere, which was reviewed.     The patient denies a history of recent head injury. Prior neurological history: negative for migraine, stroke, brain neoplasms, multiple sclerosis, meningitis, encephalitis, and major head injuries.  Has history of absence seizures from 1986 to 1998.     Neurologic Review of Systems - no amaurosis, diplopia, abnormal speech, unilateral numbness or weakness. He endorses recent weight loss, earache, ringing in the ears, TMD, anxiety, depression, restlessness, urinary urgency, arthritis, muscle tenderness, headaches, leg numbness, and memory problems. Otherwise, he denies any other complaints on 14-point comprehensive review of systems.  PAST MEDICAL/SURGICAL HISTORY:      I personally reviewed patient's past medical and surgical history with  "the patient at today's visit.      Patient Active Problem List   Diagnosis     Neuropathy     Moderate major depression (H)     Tobacco abuse     Attention deficit hyperactivity disorder (ADHD), predominantly inattentive type     Primary insomnia     History of MRI of brain and brain stem     Panic disorder without agoraphobia     Abnormal involuntary movement     Tic disorder     normal emg 2017     Anxiety     Panic attack     Posttraumatic stress disorder with dissociative symptoms           Past Medical History:   Diagnosis Date     Abnormal involuntary movement 6/2/2016     Anxiety state, unspecified 04/30/2012     Benign positional vertigo Dec 2016     Started after my groin/back injury. Sitting on Toilet.     Depressive disorder 2003     I have been on and off medication for depression since this     Dysphonia 2014     Nothing significant.     Epilepsy (H)       as a child. Says that \" he grew out of it by age 13\"     Gastroesophageal reflux disease 2004     Occassional. Spicy foods set it off.     Hearing problem 2015     Theorized Diag: Essential Palatal Myoclonus     History of MRI of brain and brain stem 12/11/2015                            MR BRAIN W/O & W CONTRAST, 12/11/2015 3:46 PM.  History: Myoclonus.  Comparison: None.  Technique:  1. MRI of the Brain: Sagittal T1-weighted, axial T2-weighted, axial turboFLAIR, axial susceptibility, and axial diffusion-weighted with ADC map images of the brain were obtained without intravenous contrast. After intravenous administration of gadolinium, axial and sagittal T1-weighted images of th     Hoarseness 2017     Dry mouth, started after taking Tizanidine     Neuralgia, neuritis, and radiculitis, unspecified 04/30/2012     normal emg 2017 8/8/2017     Interpretation: This is a normal study. There is no electrophysiologic evidence of a lumbosacral radiculopathy affecting the right or left lower extremity on the basis of this study.    Rodney Mena MD Department " of Neurology       Panic disorder without agoraphobia 04/30/2012     Problem, psychiatric 2016     PTSD     Seizures (H) [id number]     Grew out of it. Absence Seizures.     Tic disorder 6/2/2016     Tinnitus 2015     Had it most of my life. Got worse in 2015       Past Surgical History          Past Surgical History:   Procedure Laterality Date     BACK SURGERY         injections     COLONOSCOPY   02/15/18     Has not happened yet.     COLONOSCOPY N/A 2/15/2018     Procedure: COMBINED COLONOSCOPY, SINGLE OR MULTIPLE BIOPSY/POLYPECTOMY BY BIOPSY;;  Surgeon: Liam Kincaid MD;  Location: [facility] OR     COLONOSCOPY WITH CO2 INSUFFLATION N/A 2/15/2018     Procedure: COLONOSCOPY WITH CO2 INSUFFLATION;  COLON-FAMILY HX OF COLON CANCER/ SYPURA;  Surgeon: Liam Kincaid MD;  Location: [facility] OR     HC TOOTH EXTRACTION W/FORCEP Bilateral 2003     PE TUBES   1990         MEDICATIONS:      I personally reviewed patient's medications and allergies with the patient at today's visit.  Current Outpatient Prescriptions on File Prior to Visit:  clonazePAM (KLONOPIN) 0.5 MG tablet Take 0.5-1 mg by mouth nightly as needed    DULoxetine (CYMBALTA) 20 MG EC capsule Take 60 mg by mouth daily    HYDROcodone-acetaminophen (NORCO) 5-325 MG per tablet Take 1 tablet by mouth 3 times daily as needed for pain (Max 3 times daily. #75 tabs to last 30 days.) Okay to fill on 7/21/18   Methylphenidate HCl (CONCERTA PO) Take 54 mg by mouth   orphenadrine (NORFLEX) 100 MG 12 hr tablet Take 1 tablet (100 mg) by mouth 2 times daily   nicotine (NICODERM CQ) 14 MG/24HR 24 hr patch Place 1 patch onto the skin every 24 hours Use this patch first (Patient not taking: Reported on 8/7/2018)   nicotine (NICODERM CQ) 7 MG/24HR 24 hr patch Place 1 patch onto the skin every 24 hours (Patient not taking: Reported on 8/7/2018)      ALLERGIES:               Allergies   Allergen Reactions     Buspirone Hcl Other (See Comments)       Panic attacks      Doxycycline Diarrhea, Fatigue, GI Disturbance and Nausea     Naproxen         Possible abnormal liver function tests      Trazodone Fatigue     Keflex [Cephalexin] Diarrhea      FAMILY/SOCIAL HISTORY:      Family and social history was reviewed with the patient at today's visit.  Family history is positive for Alzheimer's disease, stroke, and multiple sclerosis.  Single, lives alone.  0.5 pack per day smoker (counseled to quit).  Denies current alcohol and recreational drug use.  Used cocaine and methamphetamine until August 2015.  Used to work in graphic design, but currently unemployed.  REVIEW OF SYSTEMS:      Patient has completed a [facility] Patient Health History, including a 14-system review, which was personally reviewed, and pertinent positives are listed in HPI. He denies any additional problems on the further questioning.  EXAM:      VITAL SIGNS:   /62 (BP Location: Left arm, Patient Position: Sitting, Cuff Size: Adult Regular)  Pulse 74  Temp 98.4  F (36.9  C) (Oral)  Resp 18  Wt 75.3 kg (166 lb)  SpO2 99%  BMI 25.45 kg/m2  Mini-Cog Assessment:  Mini Cog Assessment  Clock Draw Score: 2 Normal  3 Item Recall: 3 objects recalled  Mini Cog Total Score: 5  Administered by: : Agustin Suggs MA     General: pt is in NAD, cooperative.  Skin: normal turgor, moist mucous membranes, no lesions/rashes noticed.  HEENT: ATNC, EOMI, PERRL, white sclera, normal conjunctiva, no nystagmus or ptosis. No carotid bruits bilaterally.  Respiratory: lung sounds clear to auscultation bilaterally, no crackles, wheezes, rhonchi. Symmetric lung excursion, no accessory respiratory muscle use.  Cardiovascular: normal S1/S2, no murmurs/rubs/gallops.   Abdomen: Not distended.  : deferred.     Neurological:  Mental: alert, follows commands, Mini Cog Total Score: 5/5 with 3/3 on memory recall, no aphasia or dysarthria. Fund of knowledge is appropriate for age.  Cranial Nerves:  CN II: visual acuity - able to  accurately count fingers with each eye. Visual fields intact, fundi: discs sharp, no papilledema and normal vessels bilaterally.  CN III, IV, VI: EOM intact, pupils equal and reactive  CN V: facial sensation nl  CN VII: face symmetric, no facial droop  CN VIII: hearing normal  CN IX: palate elevation symmetric, uvula at midline  CN XI SCM normal, shoulder shrug nl  CN XII: tongue midline  Motor: Strength: 5/5 in all major groups of all extremities. Normal tone.  The patient is noted to have intermittent head and torso involuntary movements of various frequency and amplitude that diminish or stop when the patient is asked to perform various limb movements and during parts of the interview.  They became more pronounced when the patient describes his symptoms.  No pronounced hand tremor or abnormal limb movements are seen.  No pronator drift b/l.  Finger tapping is symmetric at normal speed.  Reflexes: Triceps, biceps, brachioradialis, patellar, and achilles reflexes normal and symmetric. No clonus noted. Toes are down-going b/l.   Sensory: temperature, light touch, pinprick, and vibration intact, except the left foot.  The patient reports diminished sensation to pinprick and vibration and increased sensation to temperature on the lateral side of the left foot.  Romberg: negative.  Coordination: FNF and heel-shin tests intact b/l.   Gait:  Normal, able to tandem, toe, and heel walk.  DATA:   LABS/EMG/IMAGING/OTHER STUDIES: I reviewed pertinent medical records, including multiple previous neurology notes, tabulated data of EMG reports, cervical, lumbar spine and brain MRI images, and Care Everywhere, as detailed in the history of present illness.  ASSESSMENT and PLAN:        ASSESSMENT: Hoang Kiser is a 33 year old male patient with past medical history of tic disorder, panic disorder, PTSD, ADHD, depression, anxiety, and seizures, who presents with involuntary movements started in December 2016 that worsened  approximately 4 months ago.     We had a prolonged discussion with the patient regarding his symptoms.  Sensory changes in his left foot seen on the neurological exam do not have anatomical explanations given the pattern.  Moreover, recent lower extremity EMG (2017) and lumbar spine MRI (2018) argue against focal neuropathies, lumbosacral plexopathy, and lumbar radiculopathy.     Seen in the clinic involuntary movements do not clearly fit the pattern of hyperkinetic movement disorders or tics.  They are also not consistent with myoclonus or asterixis.  His vitamin B12 and TSH were normal in 2016.  However, more widespread movements started approximately 4 months ago.  For that reason, I think it would be reasonable to recheck MMA, B12, and TSH along with serum copper, ceruloplasmin, and urinary copper excretion in 24 hour sample (to screen for Chele's disease).  These movements could also be seen with rare movement disorders that would be best addressed by a movement disorder specialist.  The referral was made.     Alternatively, the fact that observed movements vary in amplitude and duration, diminish with distraction, and worsen when the patient describes his symptoms might suggest functional movement disorder, though it would be a diagnosis of exclusion.     DIAGNOSES:      ICD-10-CM     1. Abnormal involuntary movement R25.9 Ceruloplasmin       TSH with free T4 reflex       Vitamin B12       Methylmalonic acid       Copper level       Copper urine       NEUROLOGY ADULT REFERRAL      PLAN: At today's visit we thoroughly discussed various diagnostic possibilities for patient's symptoms and the reasons for work-up, which includes:      Orders Placed This Encounter   Procedures     Ceruloplasmin     TSH with free T4 reflex     Vitamin B12     Methylmalonic acid     Copper level     Copper urine     NEUROLOGY ADULT REFERRAL      No new medications were ordered.     Additional recommendations after the  "work-up.     Next follow-up appointment is on as needed basis.     I advised the patient to contact me with any questions or concerns.     Total Time:  81 minutes with > 50% spent counseling the patient on stated above assessment and recommendations, including nature of the differential diagnosis, needed w/u, and proposed plan.  Additional time was used to discuss patient's symptoms and answer his questions regarding his condition and the plan.     Ghulam Perez MD  / Neurology  [place]  (Chart documentation was completed in part with Dragon voice-recognition software. Even though reviewed, some grammatical, spelling, and word errors may remain.)       2017       Phill Floyd MD   [facility]    [address] [place] [address]       RE:                Hoang Kiser   MRN:             [id number]   :             2017       Dear Dr. Floyd:       This is regard to followup on Hoang Kiser.  The patient requested neurologic followup today on 2017.  His chief complaint is that of a sensation of \"ear clicking.\"       The patient has been seen at [facility] now for some time.  He had a sensation that began in 10/2014 where he had a clicking sensation in his ears and a tension in his throat.  The patient in the past had had episodes of mouth pursing.  He does have a fairly complex history.  He has ADHD and he did have issues with the drug addiction.  He was forthright about this.  He was using higher amounts of amphetamines than he had been prescribed and had also used opiates.  He said he would snort or ingest them.  He now has had 2 years of sobriety.  He also has been diagnosed with depression along with ADHD.  He had had a prior history of absence seizures from about age 15 months until age 13.  He had been treated on Tegretol at that time and no further events.  He has been diagnosed with possible Tourette syndrome although he said he has read about " "Tourette and he does not think he has it.  He has been offered treatments but he has only had partial benefit from them.  He has been seen by Dr. Cao, Sameer and Angelica in our department.  He had been referred to [facility] to see a Dr. Martinez, but she had evidently refused to see him because of his age.  The patient did note that he had had many air flights between 2008 and 2010.  He had noted trouble with ears then and had eustachian problems.  He said he has continued to suffer with it daily and it can occur many times a day.  He has always had trouble since that time with air flights.  The patient has been a chronic tobacco user.  He feels that nicotine has helped some of his complaints.  He had been tried on Huperzinea and then had switched over to N-acetylcysteine.  He thinks these possibly have been effective.  The patient did have an MRI scan of his head which I reviewed with him which essentially was unremarkable.  He had seen Dr. Nelson who recommended Botox but he said he cannot afford it and really is not interested in it.  He has suffered prior symptoms from repetitive use of his arms and did have some type of injection in his neck a few years ago which helped his arm and neck complaints.  At that time he had normal thyroid and B12 levels done.  He is now being treated for workmen's compensation injury that occurred on 12/12/2016 after lifting a \"tote\" that weighed 80-90 pounds.  He has had low back pain and pain that has radiated into either leg.  He recently had a normal EMG of his legs and earlier he had normal EMG of his arms.  The patient did review with me his prior history of an indication that he had an adverse reaction to BuSpar that he reported caused panic attacks.  The patient did note that with the ear popping his throat has tightened.  The patient does drink caffeinated beverage and estimates he uses between 100-150 mg of caffeine per day.  Earlier he had used some energy drinks, " but he denied that now.       The patient denied any type of vocal utterances or coprolalia.  He has not had any significant eye closure and nothing to suggest random movements of his limbs or repetitive movements.  He denied any type of dystonic posturing.  Nobody in his family had any neurologic problems like this.  He has continued on Adderall now for ADHD.  The patient does use clonazepam 0.5 mg at night which he feels helps but he does not tolerate higher doses because of fatigue issues.  He has been told to consider treatment with valproic acid or sumatriptan, but he does not want to try these medications.       Neurologic examination was entirely normal.  This included gait, station, cerebellar testing, as well as careful cranial nerve examination.  He had no obvious tics.  I did not see any palatal myoclonus.  He assured me he had had a movie made of his throat that showed this, although I could not retrieve it from our records on [facility] website.  The patient said that during the time I was with him, he had the same sensation in his throat and clicking in his ears.  I could not auscultate cervical bruits.  He had a regular cardiac rhythm.  He had no extrapyramidal findings.       IMPRESSION:   1.  Eustachian tube dysfunction.   2.  Prior history of lip pursing.       I really cannot diagnose the patient with Tourette syndrome based on today's exam.  He possibly has it, although he himself has read about it and does not feel he has it.  It is very possible he had some type of movement of his palate, but I did not observe it on today's exam either.  I did suggest that he have formal ENT evaluation now for eustachian tube dysfunction.  If this does not prove to be beneficial to him, I have talked with him about referral to another movement disorder specialist.       Thank you for allowing me to see this patient.       Sincerely yours,       Royce Taylor MD       I spent 35 minutes with the patient today.   Over 50% of the time this involved counseling and coordination of care.           ALLEN ALMONTE MD                D: 2017 05:45   T: 2017 07:17   MT: CHEYANNE       Name:     HOANG KISER   MRN:      [id number]        Account:      [id number]   :      1985           Service Date: 2017       Document: [id number]        Interpretation:  This is a normal study. There is no electrophysiologic evidence of a lumbosacral radiculopathy affecting the right or left lower extremity on the basis of this study.      Rodney Mena MD  Department of Neurology       Movement Disorders follow up  17     INTERVAL HISTORY:   Hoang Kiser is a very nice 31-year-old man who I initially saw for possible essential palatal myoclonus, but in asking him it sounded as if he had ear clicking that was more of a tic as he did have some compulsion to do it, some thoughts of keeping his ears clean and ADHD that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

## 2018-08-14 NOTE — PATIENT INSTRUCTIONS
Today you saw Dr. Cao and Dr. Camargo    We will refer you to Melinda Mcgraw, a physical therapist who specializes in functional movement problems    There is a website that can be very helpful in understanding functional movement disorders:    www.neurosymptoms.org    We know from functional MRI (research imaging) that there are underlying changes in brain function, but we do not see changes in brain structure on standard imaging. The good news is that by retraining the brain, many people can recover from functional movement disorders.

## 2018-08-14 NOTE — MR AVS SNAPSHOT
After Visit Summary   8/14/2018    Hoang Kiser    MRN: [id number]           Patient Information     Date Of Birth          1985        Visit Information        Provider Department      8/14/2018 12:10 PM Bryant Cao MD [facility] Neurology        Today's Diagnoses     Abnormal involuntary movement    -  1    Functional movement disorder          Care Instructions    Today you saw Dr. Cao and Dr. Camargo    We will refer you to Melinda Mcgraw, a physical therapist who specializes in functional movement problems    There is a website that can be very helpful in understanding functional movement disorders:    www.neurosymptoms.org    We know from functional MRI (research imaging) that there are underlying changes in brain function, but we do not see changes in brain structure on standard imaging. The good news is that by retraining the brain, many people can recover from functional movement disorders.           Follow-ups after your visit        Additional Services     PHYSICAL THERAPY REFERRAL       *This therapy referral will be filtered to a centralized scheduling office at [facility] and the patient will receive a call to schedule an appointment at a [facility] location most convenient for them. *     [facility] provides Physical Therapy evaluation and treatment and many specialty services across the [facility] system.  If requesting a specialty program, please choose from the list below.    If you have not heard from the scheduling office within 2 business days, please call [phone] for all locations, with the exception of [place], please call [phone] and [facility], please call [phone]  Treatment: Evaluation & Treatment  Special Instructions/Modalities: Neuro PT for functional movement disorder  Special Programs: Neuro PT    Please be aware that coverage of these services is subject to the terms and limitations of your health  "insurance plan.  Call member services at your health plan with any benefit or coverage questions.      **Note to Provider:  If you are referring outside of [place] for the therapy appointment, please list the name of the location in the \"special instructions\" above, print the referral and give to the patient to schedule the appointment.                  Follow-up notes from your care team     Return in about 6 months (around 2/14/2019).      Your next 10 appointments already scheduled     Aug 17, 2018 11:30 AM CDT   Return Visit with Fili Wesley LP   [facility] ([facility])    [address]  [facility] [address] [phone]            Aug 23, 2018  2:45 PM CDT   SHORT with Phill Floyd MD   [facility] ([facility])    [address] [address]   [phone]            Aug 29, 2018  9:15 AM CDT   Radiology Injections with Brynn Trujillo MD   [facility] ([facility])    [address] [place] [address]  [address]  [facility] [address]   [phone]            Sep 06, 2018 10:30 AM CDT   Return Visit with CARLOS A Guy CNP   [facility] ([facility])    [address]  [facility] [address] [phone]            Feb 14, 2019 12:10 PM CST   (Arrive by 11:55 AM)   Return Movement Disorder with Bryant Cao MD   [facility] ([facility])    [address]  [address] [facility] [address] [phone]              Who to contact     Please call your clinic at [phone] to:    Ask questions about your health    Make or cancel appointments    Discuss your medicines    Learn about your test results    Speak to your doctor            Additional Information About Your Visit        MyChart Information     [email or web address] gives you secure access to your electronic health record. If you " "see a primary care provider, you can also send messages to your care team and make appointments. If you have questions, please call your primary care clinic.  If you do not have a primary care provider, please call [phone] and they will assist you.      GameFly is an electronic gateway that provides easy, online access to your medical records. With [employer], you can request a clinic appointment, read your test results, renew a prescription or communicate with your care team.     To access your existing account, please contact your [facility] or call [phone] for assistance.        Your Vitals Were     Pulse Height Pulse Oximetry BMI (Body Mass Index)          67 1.702 m (5' 7\") 99% 25.84 kg/m2         Blood Pressure from Last 3 Encounters:   08/14/18 110/73   08/08/18 122/78   08/07/18 106/62    Weight from Last 3 Encounters:   08/14/18 74.8 kg (165 lb)   08/07/18 75.3 kg (166 lb)   08/06/18 78 kg (172 lb)              We Performed the Following     PHYSICAL THERAPY REFERRAL          Today's Medication Changes          These changes are accurate as of 8/14/18  1:49 PM.  If you have any questions, ask your nurse or doctor.               These medicines have changed or have updated prescriptions.        Dose/Directions    CONCERTA 54 MG CR tablet   This may have changed:    - medication strength  - when to take this   Generic drug:  methylphenidate ER   Changed by:  Bryant Cao MD        Dose:  54 mg   Take 1 tablet (54 mg) by mouth every morning   Refills:  0       DULoxetine 60 MG EC capsule   Commonly known as:  CYMBALTA   This may have changed:    - See the new instructions.  - Another medication with the same name was removed. Continue taking this medication, and follow the directions you see here.   Changed by:  Bryant Cao MD        2 x 60mg tab by mouth daily   Quantity:  30 capsule   Refills:  1         Stop taking these medicines if you haven't already. " Please contact your care team if you have questions.     amitriptyline 10 MG tablet   Commonly known as:  ELAVIL   Stopped by:  Bryant Cao MD           amphetamine-dextroamphetamine 20 MG per tablet   Commonly known as:  ADDERALL   Stopped by:  Bryant Cao MD           amphetamine-dextroamphetamine 30 MG per 24 hr capsule   Commonly known as:  ADDERALL XR   Stopped by:  Bryant Cao MD           gabapentin 100 MG capsule   Commonly known as:  NEURONTIN   Stopped by:  Bryant Cao MD           tiZANidine 4 MG tablet   Commonly known as:  ZANAFLEX   Stopped by:  Bryant Cao MD                    Primary Care Provider Office Phone # Fax #    Phill RUCKER MD [place] [phone] [address] [facility] [address]        Equal Access to Services     [facility]: Hadii rosemary ku hadasho Soomaali, waaxda luqadaha, qaybta kaalmada adeegyada, waxay idiin haychavon tevin lorenzana . So [facility] [phone].    ATENCIÓN: Si habla español, tiene a cutler disposición servicios gratuitos de asistencia lingüística. Llame al [phone].    We comply with applicable federal civil rights laws and [place] laws. We do not discriminate on the basis of race, color, national origin, age, disability, sex, sexual orientation, or gender identity.            Thank you!     Thank you for choosing [facility]UROLOGY  for your care. Our goal is always to provide you with excellent care. Hearing back from our patients is one way we can continue to improve our services. Please take a few minutes to complete the written survey that you may receive in the mail after your visit with us. Thank you!             Your Updated Medication List - Protect others around you: Learn how to safely use, store and throw away your medicines at www.disposemymeds.org.          This list is accurate as of 8/14/18  1:49 PM.  Always use your most recent med list.                   Brand Name Dispense Instructions  for use Diagnosis    baclofen 10 MG tablet    LIORESAL    90 tablet    Take 0.5-1 tablets (5-10 mg) by mouth 3 times daily as needed for muscle spasms    Spasm of back muscles       clonazePAM 0.5 MG tablet    klonoPIN     Take 0.5-1 mg by mouth nightly as needed        CONCERTA 54 MG CR tablet   Generic drug:  methylphenidate ER      Take 1 tablet (54 mg) by mouth every morning        DULoxetine 60 MG EC capsule    CYMBALTA    30 capsule    2 x 60mg tab by mouth daily        HYDROcodone-acetaminophen 5-325 MG per tablet    NORCO    75 tablet    Take 1 tablet by mouth 3 times daily as needed for pain (Max 3 times daily. #75 tabs to last 30 days.) Okay to fill on 8/20/18    Hip pain, right       naloxone nasal spray    NARCAN    0.2 mL    Spray 1 spray (4 mg) into one nostril alternating nostrils as needed for opioid reversal every 2-3 minutes until assistance arrives    High risk medication use       naproxen 500 MG tablet    NAPROSYN          * nicotine 14 MG/24HR 24 hr patch    NICODERM CQ    30 patch    Place 1 patch onto the skin every 24 hours Use this patch first    Tobacco abuse       * nicotine 7 MG/24HR 24 hr patch    NICODERM CQ    30 patch    Place 1 patch onto the skin every 24 hours    Tobacco abuse       orphenadrine 100 MG 12 hr tablet    NORFLEX    60 tablet    Take 1 tablet (100 mg) by mouth 2 times daily    Muscle spasm       zolpidem 10 MG tablet    AMBIEN     TK 1 T PO QD HS PRF SLEEP        * Notice:  This list has 2 medication(s) that are the same as other medications prescribed for you. Read the directions carefully, and ask your doctor or other care provider to review them with you.

## 2018-08-14 NOTE — Clinical Note
8/14/2018       RE: Hoang Kiser  [address]  [facility] [address]     Dear Colleague,    Thank you for referring your patient, Hoang Kiser, to the [facility] NEUROLOGY at [facility]. Please see a copy of my visit note below.    No notes on file    Again, thank you for allowing me to participate in the care of your patient.      Sincerely,    Bryant Cao MD

## 2018-08-15 LAB
COLLECT DURATION TIME UR: 23 HR
COPPER 24H UR-MRATE: ABNORMAL UG/D (ref 3–45)
COPPER ?TM UR-MCNC: <1 UG/DL (ref 0.3–3.2)
COPPER/CREAT 24H UR: ABNORMAL UG/G CRT (ref 10–45)
CREAT 24H UR-MRATE: ABNORMAL MG/D (ref 1000–2500)
CREAT UR-MCNC: 55 MG/DL
SPECIMEN VOL ?TM UR: ABNORMAL ML

## 2018-08-15 RX ORDER — NAPROXEN 500 MG/1
500 TABLET ORAL 2 TIMES DAILY WITH MEALS
Qty: 40 TABLET | Refills: 0 | Status: SHIPPED | OUTPATIENT
Start: 2018-08-15 | End: 2018-09-06

## 2018-08-15 ASSESSMENT — ANXIETY QUESTIONNAIRES: GAD7 TOTAL SCORE: 15

## 2018-08-15 NOTE — TELEPHONE ENCOUNTER
naproxen (NAPROSYN) 500 MG tablet 40 tablet 1 1/29/2018       Last Written Prescription Date:  04/19/2018  Last Fill Quantity: 40,   # refills: 1  Last Office Visit: 08/06/18  Future Office visit:    Next 5 appointments (look out 90 days)     Aug 17, 2018 11:30 AM CDT   Return Visit with Fili Wesley LP   [facility] ([facility])    [address]  [facility] [address] [phone]            Aug 23, 2018  2:45 PM CDT   SHORT with Phill Floyd MD   [facility] ([facility])    [address] [facility] [address] [phone]            Sep 06, 2018 10:30 AM CDT   Return Visit with CARLOS A Guy CNP   [facility] ([facility])    [address]  [facility] [address]   [phone]

## 2018-08-17 ENCOUNTER — MYC MEDICAL ADVICE (OUTPATIENT)
Dept: PALLIATIVE MEDICINE | Facility: CLINIC | Age: 33
End: 2018-08-17

## 2018-08-17 ENCOUNTER — OFFICE VISIT (OUTPATIENT)
Dept: PALLIATIVE MEDICINE | Facility: CLINIC | Age: 33
End: 2018-08-17
Payer: COMMERCIAL

## 2018-08-17 DIAGNOSIS — M62.830 SPASM OF BACK MUSCLES: Primary | ICD-10-CM

## 2018-08-17 DIAGNOSIS — M25.551 HIP PAIN, RIGHT: ICD-10-CM

## 2018-08-17 DIAGNOSIS — M51.369 DDD (DEGENERATIVE DISC DISEASE), LUMBAR: ICD-10-CM

## 2018-08-17 PROCEDURE — 96152 HC HEALTH AND BEHAVIOR INTERVENTION, INDIVIDUAL, EACH 15 MINUTES: CPT | Performed by: PSYCHOLOGIST

## 2018-08-17 NOTE — PROGRESS NOTES
[facility]   [facility] [place]  Behavioral Medicine Visit    Patient Name: Hoang Kiser    YOB: 1985   Medical Record Number: [id number]  Date: 8/17/2018                SUBJECTIVE: Met with the patient on this date for an elective return appointment.  Today the patient discusses her new diagnosis is received and its implications for him.  Additionally today the patient discusses the possibility that he would like to have a new psychiatry referral.  We discussed how to go about establishing that contact should he make that decision.  Patient continues to feel as though his medications are not correct and that some of his current mood difficulties are associated with that.          OBJECTIVE: Today the patient reports the following: His pain level is mildly worse, his mood is moderately worse, his activity level remains the same, his stress level is mildly worse and his sleep is mildly worse.  Today the patient reports that he is involved in self-care activities 2-3 times per day.  Today the patient reports since receiving services with [facility] his overall progress has shown slight improvement. Length of Visit: 60 minutes      Assessment: Current Emotional / Mental Status    Appearance:   Appropriate   Eye Contact:   Good   Psychomotor Behavior: Normal   Attitude:   Cooperative   Orientation:   All  Speech  Rate / Production:             Normal   Volume:              Normal   Mood:    Depressed   Affect:    Appropriate   Thought Content:  Clear   Thought Form:  Coherent  Logical   Insight:    Fair     ASSESSMENT:   Per patient pain provider: Spasm of back muscles, hip pain right, degenerative disc disease, lumbar    Progress toward goals: fair.    Pain Status: worsened    Emotional Status: worsened              Medication / chemical use concerns: None    PLAN:   Next Appointment: Hoanglatricia Kiser  will schedule a follow-up appointment in 5 weeks.  Assignment: None  Objectives / interventions for next session: Continue with pain management skill development    Fili Wesley MA, LP, [facility]  Behavioral Pain Specialist  [facility]

## 2018-08-17 NOTE — MR AVS SNAPSHOT
After Visit Summary   8/17/2018    Hoang Kiser    MRN: [id number]           Patient Information     Date Of Birth          1985        Visit Information        Provider Department      8/17/2018 11:30 AM Fili Wesley LP [facility]        Today's Diagnoses     Spasm of back muscles    -  1    Hip pain, right        DDD (degenerative disc disease), lumbar           Follow-ups after your visit        Your next 10 appointments already scheduled     Aug 23, 2018  2:45 PM CDT   SHORT with Phill Floyd MD   [facility] ([facility])    [address] [facility] [id number] [phone]            Aug 29, 2018  9:15 AM CDT   Radiology Injections with Brynn Trujillo MD   [facility] ([facility])    [address]  [address]  [facility] [address]   [phone]            Sep 06, 2018 10:30 AM CDT   Return Visit with CARLOS A Guy CNP   [facility] ([facility])    [address]  [facility] [address]  [facility] [address] [phone]            Sep 07, 2018  9:30 AM CDT   (Arrive by 9:15 AM)   Evaluation with Melinda Mcgraw PT   [facility] ([facility])    [address] [facility]  [address] [facility] [address] [phone]            Sep 27, 2018 11:00 AM CDT   Return Visit with Fili Wesley LP   [facility] ([facility])    [address]  [facility] [address] [phone]            Feb 14, 2019 12:10 PM CST   (Arrive by 11:55 AM)   Return Movement Disorder with Bryant Cao MD   [facility] ([facility])    [address]  [address] [facility] [address] [phone]              Who to contact     If you have questions or need follow up information about today's clinic visit or your schedule please contact  [facility] directly at [phone].  Normal or non-critical lab and imaging results will be communicated to you by MyChart, letter or phone within 4 business days after the clinic has received the results. If you do not hear from us within 7 days, please contact the clinic through [employer] or phone. If you have a critical or abnormal lab result, we will notify you by phone as soon as possible.  Submit refill requests through GreenNote or call your pharmacy and they will forward the refill request to us. Please allow 3 business days for your refill to be completed.          Additional Information About Your Visit        [employer] Information     GreenNote gives you secure access to your electronic health record. If you see a primary care provider, you can also send messages to your care team and make appointments. If you have questions, please call your primary care clinic.  If you do not have a primary care provider, please call [phone] and they will assist you.         Blood Pressure from Last 3 Encounters:   08/14/18 110/73   08/08/18 122/78   08/07/18 106/62    Weight from Last 3 Encounters:   08/14/18 74.8 kg (165 lb)   08/07/18 75.3 kg (166 lb)   08/06/18 78 kg (172 lb)              We Performed the Following     HEALTH & BEHAVIOR ASSESS, INITIAL, EA 15MIN PHD        Primary Care Provider Office Phone # Fax #    Phill Floyd [phone] [address] [address] [address]        Equal Access to Services     [facility] : Hadii aad ku hadasho Soomaali, waaxda luqadaha, qaybta kaalmada shruthi, arnav billy. So [facility] [place] [facility] [phone].    ATENCIÓN: Si habla español, tiene a cutler disposición servicios gratuitos de asistencia lingüística. Llame al [phone].    We comply with applicable federal civil rights laws and [place] laws. We do not discriminate on the basis of race, color, national origin, age, disability, sex, sexual  orientation, or gender identity.            Thank you!     Thank you for choosing [facility]  for your care. Our goal is always to provide you with excellent care. Hearing back from our patients is one way we can continue to improve our services. Please take a few minutes to complete the written survey that you may receive in the mail after your visit with us. Thank you!             Your Updated Medication List - Protect others around you: Learn how to safely use, store and throw away your medicines at www.disposemymeds.org.          This list is accurate as of 8/17/18 11:59 PM.  Always use your most recent med list.                   Brand Name Dispense Instructions for use Diagnosis    baclofen 10 MG tablet    LIORESAL    90 tablet    Take 0.5-1 tablets (5-10 mg) by mouth 3 times daily as needed for muscle spasms    Spasm of back muscles       clonazePAM 0.5 MG tablet    klonoPIN     Take 0.5-1 mg by mouth nightly as needed        CONCERTA 54 MG CR tablet   Generic drug:  methylphenidate ER      Take 1 tablet (54 mg) by mouth every morning        DULoxetine 60 MG EC capsule    CYMBALTA    30 capsule    2 x 60mg tab by mouth daily        HYDROcodone-acetaminophen 5-325 MG per tablet    NORCO    75 tablet    Take 1 tablet by mouth 3 times daily as needed for pain (Max 3 times daily. #75 tabs to last 30 days.) Okay to fill on 8/20/18    Hip pain, right       naloxone nasal spray    NARCAN    0.2 mL    Spray 1 spray (4 mg) into one nostril alternating nostrils as needed for opioid reversal every 2-3 minutes until assistance arrives    High risk medication use       naproxen 500 MG tablet    NAPROSYN    40 tablet    Take 1 tablet (500 mg) by mouth 2 times daily (with meals)        * nicotine 14 MG/24HR 24 hr patch    NICODERM CQ    30 patch    Place 1 patch onto the skin every 24 hours Use this patch first    Tobacco abuse       * nicotine 7 MG/24HR 24 hr patch    NICODERM CQ    30 patch    Place 1  patch onto the skin every 24 hours    Tobacco abuse       orphenadrine 100 MG 12 hr tablet    NORFLEX    60 tablet    Take 1 tablet (100 mg) by mouth 2 times daily    Muscle spasm       zolpidem 10 MG tablet    AMBIEN     TK 1 T PO QD HS PRF SLEEP        * Notice:  This list has 2 medication(s) that are the same as other medications prescribed for you. Read the directions carefully, and ask your doctor or other care provider to review them with you.

## 2018-08-20 ENCOUNTER — MYC MEDICAL ADVICE (OUTPATIENT)
Dept: PALLIATIVE MEDICINE | Facility: CLINIC | Age: 33
End: 2018-08-20

## 2018-08-20 NOTE — TELEPHONE ENCOUNTER
8- 8:47pm  Copy of letter printed and given to patient per provider's approval.    Annalee Walling  Patient Representative  [facility]    8- at 8:34am    Walk-in. Patient's at the [place] site, wondering if he can  the paper copy now.    Annalee Walling  Patient Representative  [facility] [place]

## 2018-08-21 NOTE — TELEPHONE ENCOUNTER
Please let the patient know to call the provider who has previously prescribed this as I have not prescribed it.

## 2018-08-21 NOTE — TELEPHONE ENCOUNTER
Routing refill request to provider for review/approval because:  Drug not on the [facility] refill protocol . Medication is also historical.

## 2018-08-22 ENCOUNTER — MYC MEDICAL ADVICE (OUTPATIENT)
Dept: PALLIATIVE MEDICINE | Facility: CLINIC | Age: 33
End: 2018-08-22

## 2018-08-22 RX ORDER — ZOLPIDEM TARTRATE 10 MG/1
TABLET ORAL
Qty: 30 TABLET | Refills: 0 | OUTPATIENT
Start: 2018-08-22

## 2018-08-23 ENCOUNTER — OFFICE VISIT (OUTPATIENT)
Dept: FAMILY MEDICINE | Facility: CLINIC | Age: 33
End: 2018-08-23
Payer: COMMERCIAL

## 2018-08-23 ENCOUNTER — TELEPHONE (OUTPATIENT)
Dept: FAMILY MEDICINE | Facility: CLINIC | Age: 33
End: 2018-08-23

## 2018-08-23 VITALS
BODY MASS INDEX: 25.69 KG/M2 | HEART RATE: 104 BPM | RESPIRATION RATE: 22 BRPM | TEMPERATURE: 97.2 F | WEIGHT: 164 LBS | SYSTOLIC BLOOD PRESSURE: 126 MMHG | DIASTOLIC BLOOD PRESSURE: 80 MMHG | OXYGEN SATURATION: 96 %

## 2018-08-23 DIAGNOSIS — F90.0 ATTENTION DEFICIT HYPERACTIVITY DISORDER (ADHD), PREDOMINANTLY INATTENTIVE TYPE: ICD-10-CM

## 2018-08-23 DIAGNOSIS — F32.1 MODERATE MAJOR DEPRESSION (H): Primary | ICD-10-CM

## 2018-08-23 DIAGNOSIS — Z83.79 FAMILY HISTORY OF CROHN'S DISEASE: ICD-10-CM

## 2018-08-23 DIAGNOSIS — F43.10 POSTTRAUMATIC STRESS DISORDER WITH DISSOCIATIVE SYMPTOMS: ICD-10-CM

## 2018-08-23 DIAGNOSIS — F41.0 PANIC DISORDER WITHOUT AGORAPHOBIA: ICD-10-CM

## 2018-08-23 PROCEDURE — 99214 OFFICE O/P EST MOD 30 MIN: CPT | Performed by: FAMILY MEDICINE

## 2018-08-23 RX ORDER — DEXTROAMPHETAMINE SACCHARATE, AMPHETAMINE ASPARTATE MONOHYDRATE, DEXTROAMPHETAMINE SULFATE AND AMPHETAMINE SULFATE 7.5; 7.5; 7.5; 7.5 MG/1; MG/1; MG/1; MG/1
30 CAPSULE, EXTENDED RELEASE ORAL DAILY
COMMUNITY
End: 2019-08-20

## 2018-08-23 RX ORDER — ZOLPIDEM TARTRATE 10 MG/1
10 TABLET ORAL
Qty: 30 TABLET | Refills: 1 | Status: SHIPPED | OUTPATIENT
Start: 2018-08-23 | End: 2018-08-29

## 2018-08-23 ASSESSMENT — PAIN SCALES - GENERAL: PAINLEVEL: SEVERE PAIN (6)

## 2018-08-23 NOTE — TELEPHONE ENCOUNTER
I discussed this with the patient at his visit today and he declines using the medication this way.   I did not relay the name of the called.

## 2018-08-23 NOTE — TELEPHONE ENCOUNTER
Friend-Sarina is calling with pure concerns about patient and wanted to make provider aware. Sarina stated she knows clinic cannot give any details about patient to caller, but wanted to still address concerns. Sarina advised would like this strictly confidential for patient does not know she is calling. Please call to advise. Thank you.

## 2018-08-23 NOTE — TELEPHONE ENCOUNTER
Called and listened to what this friend had to say about misusing medication. Saving Ambien up till the end of the day and said to be snorting it. Saving the ADHD medication and snorting this as well. This person know patient from the sober house. She stated she is not the only one that is worried about him. They feel he is going backwards with his treatment and could cause harm to himself. Lost the phone call they were having phone issues. Tried to call back with no answer.  MOLLY Arvizu

## 2018-08-23 NOTE — NURSING NOTE
"Chief Complaint   Patient presents with     Musculoskeletal Problem     both hips and back pain. Discus paula for aid.     Health Maintenance     screening     Referral     mental health       Initial /80  Pulse 104  Temp 97.2  F (36.2  C) (Oral)  Resp 22  Wt 164 lb (74.4 kg)  SpO2 96%  BMI 25.69 kg/m2 Estimated body mass index is 25.69 kg/(m^2) as calculated from the following:    Height as of 8/14/18: 5' 7\" (1.702 m).    Weight as of this encounter: 164 lb (74.4 kg).  Medication Reconciliation: complete  Tiny Amaral, LIANE  "

## 2018-08-23 NOTE — PROGRESS NOTES
SUBJECTIVE:  Hoang Kiser is a 33 year old male who scheduled an appointment to discuss the following issues:    He reports that his 21 year old brother has been diagnosed with Crohn's disease.   His brother complained of abdominal pain since he was a teenager.   The patient is worried about his chance of getting it.     He sees a psychiatry(ist)   Dr Duckworth at [facility].   He is interested in starting to seeing a psychiatry(ist) within the  system.   He is having side effects from the cymblata and does no feel hat he is being listened to.     His house coodinator helped him with his application for  SSDI   He has been denied   [employer] in [facility] did help him pro min.    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     He is living in an [facility] living lodge   His medication is in a locked box that is chain to something in his room       Past Medical, social, family histories, medications, and allergies reviewed and updated   ROS: other than that noted above all other review of systems was negative    ROS:       Current Outpatient Prescriptions:      amphetamine-dextroamphetamine (ADDERALL XR) 30 MG per 24 hr capsule, Take 30 mg by mouth daily, Disp: , Rfl:      baclofen (LIORESAL) 10 MG tablet, Take 0.5-1 tablets (5-10 mg) by mouth 3 times daily as needed for muscle spasms, Disp: 90 tablet, Rfl: 1     clonazePAM (KLONOPIN) 0.5 MG tablet, Take 0.5-1 mg by mouth nightly as needed , Disp: , Rfl: 0     DULoxetine (CYMBALTA) 60 MG EC capsule, 2 x 60mg tab by mouth daily, Disp: 30 capsule, Rfl: 1     HYDROcodone-acetaminophen (NORCO) 5-325 MG per tablet, Take 1 tablet by mouth 3 times daily as needed for pain (Max 3 times daily. #75 tabs to last 30 days.) Okay to fill on 8/20/18, Disp: 75 tablet, Rfl: 0     naloxone (NARCAN) nasal spray, Spray 1 spray (4 mg) into one nostril  alternating nostrils as needed for opioid reversal every 2-3 minutes until assistance arrives, Disp: 0.2 mL, Rfl: 0     naproxen (NAPROSYN) 500 MG tablet, Take 1 tablet (500 mg) by mouth 2 times daily (with meals), Disp: 40 tablet, Rfl: 0     zolpidem (AMBIEN) 10 MG tablet, TK 1 T PO QD HS PRF SLEEP, Disp: , Rfl: 2     nicotine (NICODERM CQ) 14 MG/24HR 24 hr patch, Place 1 patch onto the skin every 24 hours Use this patch first (Patient not taking: Reported on 8/23/2018), Disp: 30 patch, Rfl: 0     nicotine (NICODERM CQ) 7 MG/24HR 24 hr patch, Place 1 patch onto the skin every 24 hours (Patient not taking: Reported on 8/23/2018), Disp: 30 patch, Rfl: 0    OBJECTIVE:  /80  Pulse 104  Temp 97.2  F (36.2  C) (Oral)  Resp 22  Wt 164 lb (74.4 kg)  SpO2 96%  BMI 25.69 kg/m2    EXAM:  GENERAL APPEARANCE: healthy, alert and no distress  EYES: EOMI,  PERRL  HENT: ear canals and TM's normal and nose and mouth without ulcers or lesions  RESP: lungs clear to auscultation - no rales, rhonchi or wheezes  CV: regular rates and rhythm, normal S1 S2, no S3 or S4 and no murmur, click or rub -  ABDOMEN:  soft, nontender, no HSM or masses and bowel sounds normal  NEURO: upper body tremor         Results for orders placed or performed in visit on 08/11/18   Copper urine   Result Value Ref Range    Copper Duration Urine 23 hr    Volume Not Provided mL    Copper Random Urine <1.0 (L) 0.3 - 3.2 ug/dL    Copper 24 h Urine Not Applicable 3.0 - 45.0 ug/d    Copper Urine ug/g Cr Not Applicable 10.0 - 45.0 ug/g CRT    Copper Creatinine Quant Urine 55 mg/dL    Creatinine 24 Hr Urine Not Applicable 1000 - 2500 mg/d         ASSESSMENT/PLAN:      (Z83.79) Family history of Crohn's disease  Comment:   Plan:   We will continue to monitor for symptom(s) of IBD in the future  Per UpTo Date:  First-degree relatives of patients with IBD are approximately 3 to 20 times more likely to develop the disease than the general population [9-14].            (F32.1) Moderate major depression (H)  (primary encounter diagnosis)  Comment:   Plan: zolpidem (AMBIEN) 10 MG tablet, MENTAL HEALTH         REFERRAL  - Adult; Psychiatry and Medication         Management; Psychiatry; [facility]: Psychiatry Clinic         [phone]; We will contact you to schedule        the appointment or please call with any         questions, CARE COORDINATION REFERRAL            (F41.0) Panic disorder without agoraphobia  Comment:   Plan: MENTAL HEALTH REFERRAL  - Adult; Psychiatry and        Medication Management; Psychiatry; [facility]:         Psychiatry Clinic [phone]; We will         contact you to schedule the appointment or         please call with any questions, CARE         COORDINATION REFERRAL            (F43.10) Posttraumatic stress disorder with dissociative symptoms  Comment:   Plan: MENTAL HEALTH REFERRAL  - Adult; Psychiatry and        Medication Management; Psychiatry; [facility]:         Psychiatry [facility] [phone]; We will         contact you to schedule the appointment or         please call with any questions, CARE         COORDINATION REFERRAL            (F90.0) Attention deficit hyperactivity disorder (ADHD), predominantly inattentive type  Comment:   Plan: MENTAL HEALTH REFERRAL  - Adult; Psychiatry and        Medication Management; Psychiatry; [facility]:         Psychiatry [facility] [phone]; We will         contact you to schedule the appointment or         please call with any questions, CARE         COORDINATION REFERRAL

## 2018-08-23 NOTE — MR AVS SNAPSHOT
After Visit Summary   8/23/2018    Hoang Kiser    MRN: [id number]           Patient Information     Date Of Birth          1985        Visit Information        Provider Department      8/23/2018 2:45 PM Phill Floyd MD [facility]        Today's Diagnoses     Moderate major depression (H)    -  1    Panic disorder without agoraphobia        Posttraumatic stress disorder with dissociative symptoms        Attention deficit hyperactivity disorder (ADHD), predominantly inattentive type           Follow-ups after your visit        Additional Services     CARE COORDINATION REFERRAL       Services are provided by a care coordinator for people with complex needs such as; medical, social, or  financial issues.  The care coordinator works with the patient and their primary care provider to determine health goals, obtain resources, achieve outcomes, and develop care plans that help coordinate a patient's care.     REFERRAL REASON:   The patient has applied for SSDI and was denied wondering about what to do    Provide additional details for Care Coordination to best meet Patient's current needs: he is in a sober house, history of meth use. He has other medical problems.     Clinic Staff has discussed Care Coordination Referral with the Patient/Caregiver: yes            MENTAL HEALTH REFERRAL  - Adult; Psychiatry and Medication Management; Psychiatry; P: Psychiatry Clinic [phone]; We will contact you to schedule the appointment or please call with any questions       All scheduling is subject to the client's specific insurance plan & benefits, provider/location availability, and provider clinical specialities.  Please arrive 15 minutes early for your first appointment and bring your completed paperwork.    Please be aware that coverage of these services is subject to the terms and limitations of your health insurance plan.  Call member services at your health plan with any  benefit or coverage questions.                            Your next 10 appointments already scheduled     Aug 29, 2018  9:15 AM CDT   Radiology Injections with Brynn Trujillo MD   [facility] ([facility])    [address]  [address]  [facility] [address]   [phone]            Aug 29, 2018  9:15 AM CDT   XR LUMBAR EPIDURAL INJECTION with [email or web address]   [facility] ([facility])    [address]  [address]  [facility] [address]   [phone]           Please bring any scans or X-rays taken at other hospitals, if similar tests were done. Also bring a list of your medicines, including vitamins, minerals and over-the-counter drugs. It is safest to leave personal items at home.  Injections take about 30 to 45 minutes. Most people spend up to 2 hours in the clinic or hospital.  Plan to have someone drive you home afterward.  Tell your doctor in advance:   If you are allergic to X-ray dye (contrast fluid).   If you may be pregnant.   If you take Coumadin (or any other blood thinners) you may need to stop taking them up to 5 days prior to the exam. Talk to your doctor before stopping.   If you take Plavix, Ticlid, Pletal or Persantine, please ask your doctor if you should stop these medicines. You may need extra tests on the morning of your scan.   If you take Xarelto (Rivaroxaban), you may need to stop taking it 24 hours before treatment. Talk to your doctor before stopping this medicine.   If you have any questions, please call the Imaging Department where you will have your exam. Directions, parking instructions, and other information are available on our website, [email or web address].            Sep 06, 2018 10:30 AM CDT   Return Visit with CARLOS A Guy CNP   [facility] ([facility])    [address]  [address]  [facility] [id number] [phone]            Sep 07, 2018   9:30 AM CDT   (Arrive by 9:15 AM)   Evaluation with Melinda Mcgraw PT   [facility] ([facility] [place] [facility])    [address]  4th Floor  [facility] [address] [phone]            Sep 27, 2018 11:00 AM CDT   Return Visit with Fili Wesley LP   [facility] ([facility])    [address]  [address]  [facility] [address] [phone]            Feb 14, 2019 12:10 PM CST   (Arrive by 11:55 AM)   Return Movement Disorder with Bryant Cao MD   [facility] ([facility])    [address]  3rd Floor  [facility] [address] [phone]              Future tests that were ordered for you today     Open Future Orders        Priority Expected Expires Ordered    XR Lumbar Epidural Injection Incl Imaging Routine 8/23/2018 8/23/2019 8/23/2018            Who to contact     If you have questions or need follow up information about today's clinic visit or your schedule please contact [facility] directly at [phone].  Normal or non-critical lab and imaging results will be communicated to you by MyChart, letter or phone within 4 business days after the clinic has received the results. If you do not hear from us within 7 days, please contact the clinic through [employer] or phone. If you have a critical or abnormal lab result, we will notify you by phone as soon as possible.  Submit refill requests through [facility] or call your pharmacy and they will forward the refill request to us. Please allow 3 business days for your refill to be completed.          Additional Information About Your Visit        MyChart Information     [facility] gives you secure access to your electronic health record. If you see a primary care provider, you can also send messages to your care team and make appointments. If you have questions, please call your primary care clinic.  If you do not have a primary care provider,  please call [phone] and they will assist you.        Your Vitals Were     Pulse Temperature Respirations Pulse Oximetry BMI (Body Mass Index)       104 97.2  F (36.2  C) (Oral) 22 96% 25.69 kg/m2        Blood Pressure from Last 3 Encounters:   08/23/18 126/80   08/14/18 110/73   08/08/18 122/78    Weight from Last 3 Encounters:   08/23/18 164 lb (74.4 kg)   08/14/18 165 lb (74.8 kg)   08/07/18 166 lb (75.3 kg)              We Performed the Following     CARE COORDINATION REFERRAL     MENTAL HEALTH REFERRAL  - Adult; Psychiatry and Medication Management; Psychiatry; [facility]: Psychiatry Clinic [phone]; We will contact you to schedule the appointment or please call with any questions          Today's Medication Changes          These changes are accurate as of 8/23/18  3:24 PM.  If you have any questions, ask your nurse or doctor.               These medicines have changed or have updated prescriptions.        Dose/Directions    zolpidem 10 MG tablet   Commonly known as:  AMBIEN   This may have changed:  See the new instructions.   Used for:  Moderate major depression (H)   Changed by:  Phill Floyd MD        Dose:  10 mg   Take 1 tablet (10 mg) by mouth nightly as needed for sleep   Quantity:  30 tablet   Refills:  1         Stop taking these medicines if you haven't already. Please contact your care team if you have questions.     CONCERTA 54 MG CR tablet   Generic drug:  methylphenidate ER   Stopped by:  Phill Floyd MD           orphenadrine 100 MG 12 hr tablet   Commonly known as:  NORFLEX   Stopped by:  Phill Floyd MD                Where to get your medicines      Some of these will need a paper prescription and others can be bought over the counter.  Ask your nurse if you have questions.     Bring a paper prescription for each of these medications     zolpidem 10 MG tablet                Primary Care Provider Office Phone # Fax #    Phill Floyd [phone]        [address] [facility] [address]        Equal Access to Services     BERLIN MEYER : Hadii rosemary sterling ana Soevelyn, waadithyada luqadaha, qacezarta kaalmada benjamínruthtanvi, waxay idiin haychavobarbara riveromichaelamarilynn billy. So [facility] [phone].    ATENCIÓN: Si habla español, tiene a cutler disposición servicios gratuitos de asistencia lingüística. [facility] [phone].    We comply with applicable federal civil rights laws and [place] laws. We do not discriminate on the basis of race, color, national origin, age, disability, sex, sexual orientation, or gender identity.            Thank you!     Thank you for choosing [facility]  for your care. Our goal is always to provide you with excellent care. Hearing back from our patients is one way we can continue to improve our services. Please take a few minutes to complete the written survey that you may receive in the mail after your visit with us. Thank you!             Your Updated Medication List - Protect others around you: Learn how to safely use, store and throw away your medicines at www.disposemymeds.org.          This list is accurate as of 8/23/18  3:24 PM.  Always use your most recent med list.                   Brand Name Dispense Instructions for use Diagnosis    amphetamine-dextroamphetamine 30 MG per 24 hr capsule    ADDERALL XR     Take 30 mg by mouth daily        baclofen 10 MG tablet    LIORESAL    90 tablet    Take 0.5-1 tablets (5-10 mg) by mouth 3 times daily as needed for muscle spasms    Spasm of back muscles       clonazePAM 0.5 MG tablet    klonoPIN     Take 0.5-1 mg by mouth nightly as needed        DULoxetine 60 MG EC capsule    CYMBALTA    30 capsule    2 x 60mg tab by mouth daily        HYDROcodone-acetaminophen 5-325 MG per tablet    NORCO    75 tablet    Take 1 tablet by mouth 3 times daily as needed for pain (Max 3 times daily. #75 tabs to last 30 days.) Okay to fill on 8/20/18    Hip pain, right       naloxone nasal spray    NARCAN    0.2 mL     Spray 1 spray (4 mg) into one nostril alternating nostrils as needed for opioid reversal every 2-3 minutes until assistance arrives    High risk medication use       naproxen 500 MG tablet    NAPROSYN    40 tablet    Take 1 tablet (500 mg) by mouth 2 times daily (with meals)        * nicotine 14 MG/24HR 24 hr patch    NICODERM CQ    30 patch    Place 1 patch onto the skin every 24 hours Use this patch first    Tobacco abuse       * nicotine 7 MG/24HR 24 hr patch    NICODERM CQ    30 patch    Place 1 patch onto the skin every 24 hours    Tobacco abuse       zolpidem 10 MG tablet    AMBIEN    30 tablet    Take 1 tablet (10 mg) by mouth nightly as needed for sleep    Moderate major depression (H)       * Notice:  This list has 2 medication(s) that are the same as other medications prescribed for you. Read the directions carefully, and ask your doctor or other care provider to review them with you.

## 2018-08-24 ENCOUNTER — TELEPHONE (OUTPATIENT)
Dept: FAMILY MEDICINE | Facility: CLINIC | Age: 33
End: 2018-08-24

## 2018-08-24 NOTE — TELEPHONE ENCOUNTER
Sharonda said Hoang was seen yesterday and was told by the doctor that he wouldn't fill out a medical opinion form.  Please call to discuss.

## 2018-08-27 ENCOUNTER — MYC MEDICAL ADVICE (OUTPATIENT)
Dept: NEUROLOGY | Facility: CLINIC | Age: 33
End: 2018-08-27

## 2018-08-27 ENCOUNTER — PATIENT OUTREACH (OUTPATIENT)
Dept: CARE COORDINATION | Facility: CLINIC | Age: 33
End: 2018-08-27

## 2018-08-27 ENCOUNTER — TELEPHONE (OUTPATIENT)
Dept: BEHAVIORAL HEALTH | Facility: CLINIC | Age: 33
End: 2018-08-27

## 2018-08-27 NOTE — PROGRESS NOTES
Clinic Care Coordination Contact 8/27/18  Care Team Conversations-SW    Referral received to assist the pt with questions about SSDI, etc. Phone call made to the pt who explained that he has a form he needs signed by a provider from the [facility]. SW looked the form up and will route to his provider.    Discussion about SSDI and the complexity of applying for it. He recently applied and is working with an . Pt expressed interest in a MH CM. We discussed [facility] services. Pt provided verbal permission for me to send him a non-secure email with a link to ARM providers in the state.    Dane Bolton,    It was nice talking with you today- per our conversation, here is the link to [facility] workers in the [facility]. You can choose any company you wish that is in your county.    https://[place].gov/dhs/partners-and-providers/policies-procedures/adult-mental-health/adult-rehabilitative-mental-health-services/armhs-certified-providers/    Let me know if you have any questions about this or need more assistance.     1.) SW routing this to his PCP to have the form completed  2.) SW will follow up in approximately 2 weeks to assure he has no further questions/concerns.  3.) SW to send referral to [facility] in clinic as pt was requesting a SW to work with.    Brinda Garcia, [facility]  Care Coordinator Social Work    [facility] Kathy  [phone]  8/27/2018 12:56 PM

## 2018-08-27 NOTE — TELEPHONE ENCOUNTER
Spoke with Sharonda  informed her that Care coordination would be contacting him with in 1-2 days.  MOLLY Arvizu

## 2018-08-29 ENCOUNTER — OFFICE VISIT (OUTPATIENT)
Dept: ORTHOPEDICS | Facility: CLINIC | Age: 33
End: 2018-08-29
Payer: COMMERCIAL

## 2018-08-29 VITALS
WEIGHT: 164 LBS | HEART RATE: 92 BPM | DIASTOLIC BLOOD PRESSURE: 47 MMHG | BODY MASS INDEX: 25.69 KG/M2 | SYSTOLIC BLOOD PRESSURE: 131 MMHG

## 2018-08-29 DIAGNOSIS — M25.551 BILATERAL HIP PAIN: ICD-10-CM

## 2018-08-29 DIAGNOSIS — M62.830 LUMBAR PARASPINAL MUSCLE SPASM: ICD-10-CM

## 2018-08-29 DIAGNOSIS — M54.16 LUMBAR RADICULAR PAIN: Primary | ICD-10-CM

## 2018-08-29 DIAGNOSIS — M25.552 BILATERAL HIP PAIN: ICD-10-CM

## 2018-08-29 DIAGNOSIS — M25.859 HIP IMPINGEMENT SYNDROME, UNSPECIFIED LATERALITY: ICD-10-CM

## 2018-08-29 PROCEDURE — 99214 OFFICE O/P EST MOD 30 MIN: CPT | Performed by: PREVENTIVE MEDICINE

## 2018-08-29 NOTE — MR AVS SNAPSHOT
After Visit Summary   2018    Hoang Kiser    MRN: [id number]           Patient Information     Date Of Birth          1985        Visit Information        Provider Department      2018 9:20 AM Faustino Feldman MD [facility]        Today's Diagnoses     Lumbar radicular pain    -  1    Bilateral hip pain        Lumbar paraspinal muscle spasm        Hip impingement syndrome, unspecified laterality          Care Instructions    Thanks for coming today.  [facility] Medicine Clinic  [address] [place] [address]    To schedule future appointments in Ortho Clinic, you may call [phone].    To schedule ordered imaging by your provider:   Call Central Imaging [phone]    To schedule an injection ordered by your provider:  Call Central Imaging Injection scheduling line: [phone]  MyChart available online at:  [email or web address]    Please call if any further questions or concerns ([phone]).  Clinic hours 8 am to 5 pm.    Return to clinic (call) if symptoms worsen or fail to improve.          Follow-ups after your visit        Your next 10 appointments already scheduled     Sep 06, 2018 10:30 AM CDT   Return Visit with CARLOS A Guy CNP   [facility] ([facility])    [address]  [facility] [address]  [facility] [id number] [phone]            Sep 07, 2018  9:30 AM CDT   (Arrive by 9:15 AM)   Evaluation with Melinda Mcgraw PT    [facility] ([facility])    [address]  4th Floor  [facility] [id number] [phone]            Sep 12, 2018  8:45 AM CDT   Radiology Injections with Brynn Trujillo MD   [facility] ([facility])    [address] [place] [address]  [address]  [facility] [address] [phone]            Sep 27, 2018 11:00 AM CDT   Return Visit with Fili Wesley LP   [facility] ([facility])    [address]  [facility] [address]   [phone]            Feb 14, 2019 12:10 PM CST   (Arrive by 11:55 AM)   Return Movement Disorder with Bryant Cao MD   [facility] ([facility])    [address]  3rd Floor  [facility] [address] [phone]              Who to contact     If you have questions or need follow up information about today's clinic visit or your schedule please contact [facility] directly at [phone].  Normal or non-critical lab and imaging results will be communicated to you by Medical Joy[employer]hart, letter or phone within 4 business days after the clinic has received the results. If you do not hear from us within 7 days, please contact the clinic through Medical Joyworkshart or phone. If you have a critical or abnormal lab result, we will notify you by phone as soon as possible.  Submit refill requests through [employer] or call your pharmacy and they will forward the refill request to us. Please allow 3 business days for your refill to be completed.          Additional Information About Your Visit        [employer] Information     [employer] gives you secure access to your electronic health record. If you see a primary care provider, you can also send messages to your care team and make appointments. If you have questions, please call your primary care clinic.  If you do not have a primary care provider, please call [phone] and they will assist you.      [employer] is an electronic gateway that provides easy, online access to your medical records. With Re[employer], you can request a clinic appointment, read your test results, renew a prescription or communicate with your care team.     To access your existing account, please contact your [facility] or call [phone] for assistance.        Your Vitals Were     Pulse BMI (Body Mass Index)                92 25.69  kg/m2           Blood Pressure from Last 3 Encounters:   08/29/18 131/47   08/23/18 126/80   08/14/18 110/73    Weight from Last 3 Encounters:   08/29/18 74.4 kg (164 lb)   08/23/18 74.4 kg (164 lb)   08/14/18 74.8 kg (165 lb)              Today, you had the following     No orders found for display       Primary Care Provider Office Phone # Fax #    Phill Floyd [phone] [address] [facility] [address]        Equal Access to Services     [facility]: Hadii aad ku hadasho Soomaali, waaxda luqadaha, qaybta kaalmada adegunneryatanvi, arnav lorenzana . [facility] [phone].    ATENCIÓN: Si habla español, tiene a cutler disposición servicios gratuitos de asistencia lingüística. [facility] [phone].    We comply with applicable federal civil rights laws and [place] laws. We do not discriminate on the basis of race, color, national origin, age, disability, sex, sexual orientation, or gender identity.            Thank you!     Thank you for choosing [facility]  for your care. Our goal is always to provide you with excellent care. Hearing back from our patients is one way we can continue to improve our services. Please take a few minutes to complete the written survey that you may receive in the mail after your visit with us. Thank you!             Your Updated Medication List - Protect others around you: Learn how to safely use, store and throw away your medicines at www.disposemymeds.org.          This list is accurate as of 8/29/18  3:38 PM.  Always use your most recent med list.                   Brand Name Dispense Instructions for use Diagnosis    amphetamine-dextroamphetamine 30 MG per 24 hr capsule    ADDERALL XR     Take 30 mg by mouth daily        baclofen 10 MG tablet    LIORESAL    90 tablet    Take 0.5-1 tablets (5-10 mg) by mouth 3 times daily as needed for muscle spasms    Spasm of back muscles       clonazePAM 0.5 MG tablet     klonoPIN     Take 0.5-1 mg by mouth nightly as needed        DULoxetine 60 MG EC capsule    CYMBALTA    30 capsule    2 x 60mg tab by mouth daily        HYDROcodone-acetaminophen 5-325 MG per tablet    NORCO    75 tablet    Take 1 tablet by mouth 3 times daily as needed for pain (Max 3 times daily. #75 tabs to last 30 days.) Okay to fill on 8/20/18    Hip pain, right       naloxone nasal spray    NARCAN    0.2 mL    Spray 1 spray (4 mg) into one nostril alternating nostrils as needed for opioid reversal every 2-3 minutes until assistance arrives    High risk medication use       naproxen 500 MG tablet    NAPROSYN    40 tablet    Take 1 tablet (500 mg) by mouth 2 times daily (with meals)        * nicotine 14 MG/24HR 24 hr patch    NICODERM CQ    30 patch    Place 1 patch onto the skin every 24 hours Use this patch first    Tobacco abuse       * nicotine 7 MG/24HR 24 hr patch    NICODERM CQ    30 patch    Place 1 patch onto the skin every 24 hours    Tobacco abuse       * Notice:  This list has 2 medication(s) that are the same as other medications prescribed for you. Read the directions carefully, and ask your doctor or other care provider to review them with you.

## 2018-08-29 NOTE — PROGRESS NOTES
HISTORY OF PRESENT ILLNESS  Mr. Kiser presents for followup for lumbar and hip pain  He has been doing ok, with PT exercises, has not been able to find work that he can tolerate that matches his work restrictions  He occasionally uses naproxen, and using baclofen daily.  He states that he has been diagnosed with a movement disorder as well.  Has paperwork he would like me to fill out in relation to the above for disability for the [facility]    MEDICAL HISTORY  Patient Active Problem List   Diagnosis     Neuropathy     Moderate major depression (H)     Tobacco abuse     Attention deficit hyperactivity disorder (ADHD), predominantly inattentive type     Primary insomnia     History of MRI of brain and brain stem     Panic disorder without agoraphobia     Abnormal involuntary movement     Tic disorder     normal emg 2017     Anxiety     Panic attack     Posttraumatic stress disorder with dissociative symptoms     Chronic left hip pain     Chronic pain of right hip     Degenerative disc disease at L5-S1 level     Functional movement disorder     Family history of Crohn's disease       Current Outpatient Prescriptions   Medication Sig Dispense Refill     amphetamine-dextroamphetamine (ADDERALL XR) 30 MG per 24 hr capsule Take 30 mg by mouth daily       baclofen (LIORESAL) 10 MG tablet Take 0.5-1 tablets (5-10 mg) by mouth 3 times daily as needed for muscle spasms 90 tablet 1     clonazePAM (KLONOPIN) 0.5 MG tablet Take 0.5-1 mg by mouth nightly as needed   0     DULoxetine (CYMBALTA) 60 MG EC capsule 2 x 60mg tab by mouth daily 30 capsule 1     HYDROcodone-acetaminophen (NORCO) 5-325 MG per tablet Take 1 tablet by mouth 3 times daily as needed for pain (Max 3 times daily. #75 tabs to last 30 days.) Okay to fill on 8/20/18 75 tablet 0     naloxone (NARCAN) nasal spray Spray 1 spray (4 mg) into one nostril alternating nostrils as needed for opioid reversal every 2-3 minutes until assistance arrives 0.2 mL 0     naproxen  (NAPROSYN) 500 MG tablet Take 1 tablet (500 mg) by mouth 2 times daily (with meals) 40 tablet 0     nicotine (NICODERM CQ) 14 MG/24HR 24 hr patch Place 1 patch onto the skin every 24 hours Use this patch first 30 patch 0     nicotine (NICODERM CQ) 7 MG/24HR 24 hr patch Place 1 patch onto the skin every 24 hours 30 patch 0       Allergies   Allergen Reactions     Adderall      Replaced by 1 tablet pf Concerta 54mg x 1 every morning. My involuntary movements increased in frequency and severity with this switch in ADD medication & management. Replacement is a test in better pain management through duoluxetine/methylphenidate combo. Next Psychiatry visit: Request to go back on this medication.      Buspirone Hcl Other (See Comments)     Panic attacks     Doxycycline Diarrhea, Fatigue, GI Disturbance and Nausea     Elavil [Amitriptyline]      Ineffective in reducing spasms/movement, increased fatigue     Naproxen      Possible abnormal liver function tests      Norflex [Orphenadrine]      Discontinued due to ineffective in spasm/involuntary movement     Trazodone Fatigue     Zanaflex [Tizanidine]      Discontinued due to ineffective in spasm/involuntary movement management     Buspirone Anxiety     Keflex [Cephalexin] Diarrhea       Family History   Problem Relation Age of Onset     Lipids Father      hyperlipidemia     Hyperlipidemia Father      Obesity Father      Arthritis Mother      Hyperlipidemia Mother      Depression Mother      Anxiety Disorder Mother      Mental Illness Mother      Obesity Mother      Asthma Brother      Asthma Sister      Depression Other      Hearing Loss Other      Psychotic Disorder Other      Obesity Other      Cerebrovascular Disease Paternal Grandfather      Alzheimer Disease Paternal Grandfather      Depression Brother      Mental Illness Brother      Bipolar     Asthma Brother      Depression Sister      Asthma Sister      Substance Abuse Sister      Alcohol     Mental Illness Brother       Bipolar     Asthma Brother      Asthma Sister      Obesity Sister      Asthma Brother      Obesity Brother      Obesity Maternal Grandmother      Genetic Disorder Maternal Grandmother      Epilepsy     Obesity Paternal Grandmother        Additional medical/Social/Surgical histories reviewed in [facility] and updated as appropriate.     REVIEW OF SYSTEMS (8/29/2018)  10 point ROS of systems including Constitutional, Eyes, Respiratory, Cardiovascular, Gastroenterology, Genitourinary, Integumentary, Musculoskeletal, Psychiatric were all negative except for pertinent positives noted in my HPI.     PHYSICAL EXAM  Vitals:    08/29/18 0919   BP: 131/47   Pulse: 92   Weight: 74.4 kg (164 lb)     Vital Signs: /47  Pulse 92  Wt 74.4 kg (164 lb)  BMI 25.69 kg/m2 Patient declined being weighed. Body mass index is 25.69 kg/(m^2).    General  - normal appearance, in no obvious distress  CV  - normal femoral pulse  Pulm  - normal respiratory pattern, non-labored  Musculoskeletal - low back and bilateral hips  - stance: normal gait without limp, no obvious leg length discrepancy, normal heel and toe walk  - inspection: no swelling or effusion,  normal bone and joint alignment, no obvious deformity  - palpation: has ttp over lower lumbar paraspinal muscles, SI joints, and anterior hip flexors as well as bilateral hip bursa  - ROM: some pain with flexion and internal rotation of hips-similar to previous, normal extension, external rotation, abduction, and adduction  Lumbar: has some pain with extension and flexion  - strength: 5/5 in all planes  - special tests:  (-) TARA  (+) FADIR- bilateral- similar to previous  no pain with axial femoral load  Neuro  - no sensory or motor deficit, grossly normal coordination, normal muscle tone  Skin  - no ecchymosis, erythema, warmth, or induration, no obvious rash  Psych  - interactive, appropriate, normal mood and affect    ASSESSMENT & PLAN  32 yo male with bilateral hip pain, and low  back pain, stable, not resolved  Cont to followup with pain clinic and mental health providers, as his anxiety and mental health continue to contribute to his pain and feeling of 'not getting better'    Cont. Pool therapy  F/u with neurology as planned for his movement d/o as well  Filled out paper work and encouraged him to ask for pain and mental health providers to add to disability paperwork as well        Faustino Feldman MD, CAM

## 2018-08-29 NOTE — LETTER
8/29/2018         RE: Hoang Kiser  [address]  [facility] [address]        Dear Colleague,    Thank you for referring your patient, Hoang Kiser, to the [facility]. Please see a copy of my visit note below.    HISTORY OF PRESENT ILLNESS  Mr. Kiser presents for followup for lumbar and hip pain  He has been doing ok, with PT exercises, has not been able to find work that he can tolerate that matches his work restrictions  He occasionally uses naproxen, and using baclofen daily.  He states that he has been diagnosed with a movement disorder as well.  Has paperwork he would like me to fill out in relation to the above for disability for the [facility]    MEDICAL HISTORY  Patient Active Problem List   Diagnosis     Neuropathy     Moderate major depression (H)     Tobacco abuse     Attention deficit hyperactivity disorder (ADHD), predominantly inattentive type     Primary insomnia     History of MRI of brain and brain stem     Panic disorder without agoraphobia     Abnormal involuntary movement     Tic disorder     normal emg 2017     Anxiety     Panic attack     Posttraumatic stress disorder with dissociative symptoms     Chronic left hip pain     Chronic pain of right hip     Degenerative disc disease at L5-S1 level     Functional movement disorder     Family history of Crohn's disease       Current Outpatient Prescriptions   Medication Sig Dispense Refill     amphetamine-dextroamphetamine (ADDERALL XR) 30 MG per 24 hr capsule Take 30 mg by mouth daily       baclofen (LIORESAL) 10 MG tablet Take 0.5-1 tablets (5-10 mg) by mouth 3 times daily as needed for muscle spasms 90 tablet 1     clonazePAM (KLONOPIN) 0.5 MG tablet Take 0.5-1 mg by mouth nightly as needed   0     DULoxetine (CYMBALTA) 60 MG EC capsule 2 x 60mg tab by mouth daily 30 capsule 1     HYDROcodone-acetaminophen (NORCO) 5-325 MG per tablet Take 1 tablet by mouth 3 times daily as needed for pain (Max 3 times daily.  #75 tabs to last 30 days.) Okay to fill on 8/20/18 75 tablet 0     naloxone (NARCAN) nasal spray Spray 1 spray (4 mg) into one nostril alternating nostrils as needed for opioid reversal every 2-3 minutes until assistance arrives 0.2 mL 0     naproxen (NAPROSYN) 500 MG tablet Take 1 tablet (500 mg) by mouth 2 times daily (with meals) 40 tablet 0     nicotine (NICODERM CQ) 14 MG/24HR 24 hr patch Place 1 patch onto the skin every 24 hours Use this patch first 30 patch 0     nicotine (NICODERM CQ) 7 MG/24HR 24 hr patch Place 1 patch onto the skin every 24 hours 30 patch 0       Allergies   Allergen Reactions     Adderall      Replaced by 1 tablet pf Concerta 54mg x 1 every morning. My involuntary movements increased in frequency and severity with this switch in ADD medication & management. Replacement is a test in better pain management through duoluxetine/methylphenidate combo. Next Psychiatry visit: Request to go back on this medication.      Buspirone Hcl Other (See Comments)     Panic attacks     Doxycycline Diarrhea, Fatigue, GI Disturbance and Nausea     Elavil [Amitriptyline]      Ineffective in reducing spasms/movement, increased fatigue     Naproxen      Possible abnormal liver function tests      Norflex [Orphenadrine]      Discontinued due to ineffective in spasm/involuntary movement     Trazodone Fatigue     Zanaflex [Tizanidine]      Discontinued due to ineffective in spasm/involuntary movement management     Buspirone Anxiety     Keflex [Cephalexin] Diarrhea       Family History   Problem Relation Age of Onset     Lipids Father      hyperlipidemia     Hyperlipidemia Father      Obesity Father      Arthritis Mother      Hyperlipidemia Mother      Depression Mother      Anxiety Disorder Mother      Mental Illness Mother      Obesity Mother      Asthma Brother      Asthma Sister      Depression Other      Hearing Loss Other      Psychotic Disorder Other      Obesity Other      Cerebrovascular Disease Paternal  Grandfather      Alzheimer Disease Paternal Grandfather      Depression Brother      Mental Illness Brother      Bipolar     Asthma Brother      Depression Sister      Asthma Sister      Substance Abuse Sister      Alcohol     Mental Illness Brother      Bipolar     Asthma Brother      Asthma Sister      Obesity Sister      Asthma Brother      Obesity Brother      Obesity Maternal Grandmother      Genetic Disorder Maternal Grandmother      Epilepsy     Obesity Paternal Grandmother        Additional medical/Social/Surgical histories reviewed in [facility] and updated as appropriate.     REVIEW OF SYSTEMS (8/29/2018)  10 point ROS of systems including Constitutional, Eyes, Respiratory, Cardiovascular, Gastroenterology, Genitourinary, Integumentary, Musculoskeletal, Psychiatric were all negative except for pertinent positives noted in my HPI.     PHYSICAL EXAM  Vitals:    08/29/18 0919   BP: 131/47   Pulse: 92   Weight: 74.4 kg (164 lb)     Vital Signs: /47  Pulse 92  Wt 74.4 kg (164 lb)  BMI 25.69 kg/m2 Patient declined being weighed. Body mass index is 25.69 kg/(m^2).    General  - normal appearance, in no obvious distress  CV  - normal femoral pulse  Pulm  - normal respiratory pattern, non-labored  Musculoskeletal - low back and bilateral hips  - stance: normal gait without limp, no obvious leg length discrepancy, normal heel and toe walk  - inspection: no swelling or effusion,  normal bone and joint alignment, no obvious deformity  - palpation: has ttp over lower lumbar paraspinal muscles, SI joints, and anterior hip flexors as well as bilateral hip bursa  - ROM: some pain with flexion and internal rotation of hips-similar to previous, normal extension, external rotation, abduction, and adduction  Lumbar: has some pain with extension and flexion  - strength: 5/5 in all planes  - special tests:  (-) TARA  (+) FADIR- bilateral- similar to previous  no pain with axial femoral load  Neuro  - no sensory or motor  deficit, grossly normal coordination, normal muscle tone  Skin  - no ecchymosis, erythema, warmth, or induration, no obvious rash  Psych  - interactive, appropriate, normal mood and affect    ASSESSMENT & PLAN  34 yo male with bilateral hip pain, and low back pain, stable, not resolved  Cont to followup with pain clinic and mental health providers, as his anxiety and mental health continue to contribute to his pain and feeling of 'not getting better'    Cont. Pool therapy  F/u with neurology as planned for his movement d/o as well  Filled out paper work and encouraged him to ask for pain and mental health providers to add to disability paperwork as well        Faustion Feldman MD, CAQSM    Again, thank you for allowing me to participate in the care of your patient.        Sincerely,        Faustino Feldman MD

## 2018-08-29 NOTE — PATIENT INSTRUCTIONS
Thanks for coming today.  Ortho[facility] Medicine Clinic  [address] [place] [address]    To schedule future appointments in Ortho Clinic, you may call [phone].    To schedule ordered imaging by your provider:   Call Central Imaging [phone]    To schedule an injection ordered by your provider:  Call Central Imaging Injection scheduling line: [phone]  Magist[employer]art available online at:  [email or web address]    Please call if any further questions or concerns ([phone]).  Clinic hours 8 am to 5 pm.    Return to clinic (call) if symptoms worsen or fail to improve.

## 2018-08-30 ENCOUNTER — TELEPHONE (OUTPATIENT)
Dept: BEHAVIORAL HEALTH | Facility: CLINIC | Age: 33
End: 2018-08-30

## 2018-08-30 NOTE — TELEPHONE ENCOUNTER
[facility]  No Data Recorded      Social Work Care Navigator Note      Patient: Hoang Kiser  Date: August 30, 2018  Preferred Name: Hoang    Previous PHQ-9:   PHQ-9 SCORE 1/22/2016 1/24/2018 2/14/2018   Total Score - - -   Total Score 4 2 7     Previous JIN-7:   JIN-7 SCORE 8/20/2015 1/22/2016 8/14/2018   Total Score - - -   Total Score 7 3 15     MOLLY LEVEL:  MOLLY Score (Last Two) 3/23/2016   MOLLY Raw Score 52   Activation Score 100   MOLLY Level 4       Preferred Contact:  No Data Recorded    Type of Contact Today: Phone call (patient / identified key support person reached)      Data: (subjective / Objective):    SW attempted to call Pt to provide information about [facility] and to see if Pt was interested in enrolling in [facility]. Pt answered the phone, but sounded like he was driving and SW could hear other voices in the background. Pt requested that SW call back later today or tomorrow. SW agreed to call back at a different time that would allow Pt to speak freely with SW.     PLAN: SW will attempt to reach pt by phone at a different time/day.       Hailey Tim, Social Work Care Coordinator             Next 5 appointments (look out 90 days)     Sep 06, 2018 10:30 AM CDT   Return Visit with CARLOS A Guy CNP   [facility] ([facility]    [address]  [address]  [facility] [address] [phone]            Sep 27, 2018 11:00 AM CDT   Return Visit with Fili Wesley LP   [facility] ([facility])    [address]  [address]  [facility] [address] [phone]

## 2018-09-05 ENCOUNTER — TELEPHONE (OUTPATIENT)
Dept: BEHAVIORAL HEALTH | Facility: CLINIC | Age: 33
End: 2018-09-05

## 2018-09-05 NOTE — TELEPHONE ENCOUNTER
[facility]  No Data Recorded      Social Work Care Navigator Note      Patient: Hoang Kiser  Date: September 5, 2018  Preferred Name: Hoang    Previous PHQ-9:   PHQ-9 SCORE 1/22/2016 1/24/2018 2/14/2018   Total Score - - -   Total Score 4 2 7     Previous JIN-7:   JIN-7 SCORE 8/20/2015 1/22/2016 8/14/2018   Total Score - - -   Total Score 7 3 15     MOLLY LEVEL:  MOLLY Score (Last Two) 3/23/2016   MOLLY Raw Score 52   Activation Score 100   MOLLY Level 4       Preferred Contact:  No Data Recorded    Type of Contact Today: Phone call (patient / identified key support person reached)      Data: (subjective / Objective):    [facility] Introduction: SW received referral from [facility], Brinda Garcia. SW called Pt to explain [facility] and offer the service. Pt agreed to enroll.         Patient response to [facility] Service offering:   Interested in enrolling in [facility] services and scheduled appt / will drop-in to complete the consent form and Brief Needs Assessment     Pt scheduled appointment with [facility] SW for 9/13/18 to complete HW and sign consent forms.   SW will contact psychiatrist (once FIGUEROA is signed by pt) to get a copy of the DA that was completed.       Hailey Tim, Social Work Care Coordinator             Next 5 appointments (look out 90 days)     Sep 06, 2018 10:30 AM CDT   Return Visit with CARLOS A Guy CNP   [facility]    [address]  [address]  [facility] [id number] [phone]            Sep 27, 2018 11:00 AM CDT   Return Visit with Fili Wesley LP   [facility]    [address]  [address]  [facility] [id number] [phone]

## 2018-09-06 ENCOUNTER — OFFICE VISIT (OUTPATIENT)
Dept: PALLIATIVE MEDICINE | Facility: CLINIC | Age: 33
End: 2018-09-06
Payer: COMMERCIAL

## 2018-09-06 VITALS — OXYGEN SATURATION: 100 % | HEART RATE: 91 BPM | DIASTOLIC BLOOD PRESSURE: 80 MMHG | SYSTOLIC BLOOD PRESSURE: 128 MMHG

## 2018-09-06 DIAGNOSIS — F41.9 ANXIETY: Primary | ICD-10-CM

## 2018-09-06 DIAGNOSIS — M25.551 HIP PAIN, RIGHT: ICD-10-CM

## 2018-09-06 DIAGNOSIS — G47.09 OTHER INSOMNIA: ICD-10-CM

## 2018-09-06 PROCEDURE — 99214 OFFICE O/P EST MOD 30 MIN: CPT | Performed by: NURSE PRACTITIONER

## 2018-09-06 RX ORDER — HYDROCODONE BITARTRATE AND ACETAMINOPHEN 5; 325 MG/1; MG/1
1 TABLET ORAL 3 TIMES DAILY PRN
Qty: 75 TABLET | Refills: 0 | Status: SHIPPED | OUTPATIENT
Start: 2018-09-06 | End: 2018-10-19

## 2018-09-06 RX ORDER — NAPROXEN 500 MG/1
500 TABLET ORAL 2 TIMES DAILY WITH MEALS
Qty: 40 TABLET | Refills: 0 | Status: SHIPPED | OUTPATIENT
Start: 2018-09-06 | End: 2018-09-27

## 2018-09-06 RX ORDER — HYDROXYZINE HYDROCHLORIDE 25 MG/1
TABLET, FILM COATED ORAL
Qty: 60 TABLET | Refills: 1 | Status: SHIPPED | OUTPATIENT
Start: 2018-09-06 | End: 2018-10-26

## 2018-09-06 ASSESSMENT — PAIN SCALES - GENERAL: PAINLEVEL: MODERATE PAIN (4)

## 2018-09-06 NOTE — PROGRESS NOTES
[facility]    CHIEF COMPLAINT: Involuntary movements, Fatigue, Unpredictable pain , worried about left hip and leg    INTERVAL HISTORY:  Last seen on 18.        Recommendations/plan at the last visit included:     1.                  Schedule pain psychology visit  2.                  Schedule physical therapy visit  3.                  Schedule follow-up with CARLOS A Higurea NP-C in 4 weeks  4.                  Procedures recommended: Lumbar injection    5.                  Medication recommendations:                  1. Stop Norflex  2. Baclofen 10 m/2-1 tablet up to three times daily   3. Refill of Norco provided.     Since his last visit, Hoang MA Margi reports:  - Stopped Ambien (from PCP) due to exacerbation of PTSD sx.   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 Having a diagnosis has been reassuring for Shoaib.     Pain Information:   Pain quality: Miserable and Stabbing    Pain timing: Constant and Variable     Pain rating: intensity ranges from 2/10 to 7/10, and averages 6/10 on a 0-10 scale.   Aggravating factors include: Uncontrolled movements, sitting, standing, walking, sustained activity for extended periods of time, not stretching, not being mindful of posture and movement.    Relieving factors include: Heat, resting, medication, pool therpay, meditation, stretching, exercise, humor    Annual Controlled Substance Agreement/UDS due date: 2019      Current Pain Relevant Medications:   Norco 5/325 mg 1 tab BID- TID                        Total opiate dose: 10-15 MME daily  Clonazepam .5 mg 1-2 tab at HS PRN  Duloxetine 20 mg daily  Naproxen 500 mg BID: on hold re: elevated LFTs   Tizanidine 4 mg 1-2 tabs at HS PRN  Ambien 10 mg at HS  Adderall 50 mg daily, combination of long and short acting.       Previous Pain Relevant Medications: (H--helped; HI--Helped initially; [facility]--Somewhat helpful; [facility]--No help; W--worse; SE--side  effects; ?--Unsure if helpful)   NOTE: This medication information taken from patient's intake form, not medical records.                         Opiates: Tramadol: H, Hydrocodone: H                        NSAIDS: Ibuprofen:H, naproxen:[facility], Relafen: [facility]                        Muscle Relaxants: Cyclobenzaprine:H,Med interaction, Tizanidine:H                        Anti-migraine mediations: Prednisone:H                        Anti-depressants: Bupropion:SE, Celexa:SE, Duloxetine:H, Trazodone:Too strong, Venlafaxine:too strong                        Sleep aids:Anbien: H                        Anxiolytics: Clonazepam:H                        Neuropathics: Tegretol:taken for seizures in childhood, Gabapentin:H                                          Topicals: Lidocaine:H                        Other medications not covered above: Tylenol:[facility]      Any illicit drug use: 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 Concern for multiple controlled substances including benzodiazepines, stimulants, opiates.      SELF CARE:   How often do you practice SELF-CARE (relaxing, stretching, pacing, monitoring posture, taking mini-breaks) in a typical day:  10+ daily    THE 4 As OF OPIOID MAINTENANCE ANALGESIA    Analgesia: Is pain relief clinically significant? YES   Activity: Is patient functional and able to perform Activities of Daily Living? YES   Adverse effects: Is patient free from adverse side effects from opiates? YES   Adherence to Rx protocol: Is patient adhering to Controlled Substance Agreement and taking medications ONLY as ordered? YES       Is Narcan prescribed for opiate use >50 MME daily? Ordered for combination of  opiates and benzodiazepines.       Medications:  Current Outpatient Prescriptions   Medication Sig Dispense Refill     amphetamine-dextroamphetamine (ADDERALL XR) 30 MG per 24 hr capsule Take 30 mg by mouth daily       baclofen (LIORESAL) 10 MG tablet Take 0.5-1 tablets (5-10 mg) by mouth 3 times daily as needed for muscle spasms 90 tablet 1     clonazePAM (KLONOPIN) 0.5 MG tablet Take 0.5-1 mg by mouth nightly as needed   0     DULoxetine (CYMBALTA) 60 MG EC capsule 2 x 60mg tab by mouth daily 30 capsule 1     HYDROcodone-acetaminophen (NORCO) 5-325 MG per tablet Take 1 tablet by mouth 3 times daily as needed for pain (Max 3 times daily. #75 tabs to last 30 days.) Okay to fill on 8/20/18 75 tablet 0     naloxone (NARCAN) nasal spray Spray 1 spray (4 mg) into one nostril alternating nostrils as needed for opioid reversal every 2-3 minutes until assistance arrives 0.2 mL 0     naproxen (NAPROSYN) 500 MG tablet Take 1 tablet (500 mg) by mouth 2 times daily (with meals) 40 tablet 0     nicotine (NICODERM CQ) 14 MG/24HR 24 hr patch Place 1 patch onto the skin every 24 hours Use this patch first 30 patch 0     nicotine (NICODERM CQ) 7 MG/24HR 24 hr patch Place 1 patch onto the skin every 24 hours 30 patch 0       Review of Systems: A 10-point review of systems was negative, with the exception of chronic pain issues.      Social History: Reviewed; unchanged from previous consultation.      Family history: Reviewed; unchanged from previous consultation.     PHYSICAL EXAM    Vitals:    09/06/18 1028   BP: 128/80   Pulse: 91   SpO2: 100%       Constitutional: healthy, alert, no distress, pain behaviors and in good spirits today  HEENT: Head atraumatic, normocephalic. Eyes without conjunctival injection or jaundice. Neck supple. No obvious neck masses.  Skin: No rash, lesions, or petechiae of exposed skin.   Extremities: No clubbing, cyanosis, or edema to exposed extremities  Psychiatric/mental status: Alert, without lethargy  "or stupor. Appropriate affect.       Neurologic exam:  CN:  Cranial nerves 2-12 are grossly normal.  Has uncontrolled upper body movement/tremor.       Motor:  5/5 UE and 4/5 LE strength      Musculoskeletal exam:  Cervical spine:                       Flex:  20 degrees                       Ext: 20 degrees                       Rotation to right: 20 degrees                       Rotation to left: 20 degrees                       Rotation/ext to right: painful                        Rotation/ext to left: painful                        Tenderness in the cervical spine at midline. No                       Tenderness in the cervical paraspinal muscles. No  Thoracic spine:                        Kyphosis. No                       Tenderness in the thoracic spine at midline. Yes                       Tenderness in the thoracic paraspinal muscles. Yes  Lumbar/Sacral spine:                       Forward Flexion:  90 degrees                       Ext: 20 degrees \"tight\"                       Rotation/ext to right: painful                        Rotation/ext to left: painful                        Lordosis. No                       Tenderness in the lumbar spine at midline. Yes                       Tenderness in the lumbar paraspinal muscles.Yes      Psychiatric:  mentation appears normal., affect and mood normal      DIRE Score for ongoing opioid management is calculated as follows:    Diagnosis = 2 pts (slowly progressive; moderate pain/objective findings)    Intractability = 2 pts (most treatments tried; patient not fully engaged/barriers)    Risk        Psych = 2 pts (personality dysfunction/mental illness that moderately interferes with care)         Chem Hlth = 2 pts (use of medications to cope with stress; chemical dependency in remission)       Reliability = 3 pts (highly reliable with meds, appointments, treatments)       Social = 3 pts (supportive family/close relationships; involved in work/school; no " isolation)       (Psych + Chem hlth + Reliability + Social) = 14    Efficacy = 2 pts (moderate benefit/function; low med dose; too early/not tried meds)    DIRE Score = 16        7-13: likely NOT suitable candidate for long-term opioid analgesia       14-21: may be a suitable candidate for long-term opioid analgesia          Previous Diagnostic Tests:   Imaging Studies:   MR LUMBAR SPINE W/O CONTRAST 5/2/2018 7:27 PM  History: DDD (degenerative disc disease), lumbar; Lumbar  radiculopathy; Hip pain, right; Groin pain, right.  ICD-10: DDD (degenerative disc disease), lumbar; Lumbar radiculopathy;  Hip pain, right; Groin pain, right  Comparison: Lumbar spine MRI 3/8/2017  Technique: Sagittal STIR, T1-weighted turbo spin-echo, 3-D volumetric  T2-weighted and axial reconstructed T2-weighted images of the lumbar  spine were obtained without intravenous contrast.   Findings: 5 lumbar-type vertebra. The tip of the conus medullaris is  at L1.  The lumbar vertebral column is normally aligned.   Straightening of lumbar lordosis, unchanged. The intervertebral disc  heights are maintained. Normal marrow signal. At L5-S1, there is a  disc bulge and facet hypertrophy with mild left neural foraminal  narrowing, unchanged. No spinal canal stenosis.  Impression:  1. Lumbar spondylosis with mild left neural foraminal narrowing at  L5-S1.  2. No spinal canal stenosis.      MR right hip without contrast 5/2/2018 6:47 PM  Techniques: Multiplanar multisequence imaging of the right hip was  obtained without  administration of intra-articular or intravenous  contrast using routing protocol.  History: Hip pain.  Comparison: None available.  Findings:  Osseous structures  Osseous structures: No fracture, stress reaction, avascular necrosis,  or focal osseous lesion is seen. There is small focal area of  subchondral cystic changes in the anterior right femoral head neck  junction, most compatible with synovial herniation pit.  Findings  suggestive of reduced femoral head neck junction though alpha angle  cannot be assessed owing to no dedicated oblique axial sequence  obtained.  Articular cartilage and labrum  Assessment limited on this arthrographic study due to relative lack of  joint distension.  Articular cartilage: No high grade chondral loss.  Labrum: Labral tearing approximately from 12:00 to 3:00 with 3:00  being anterior and 6:00 being the center of transverse ligament in  this convention with associated subtle area of subchondral edema in  the superior acetabulum.  Ligament teres and transverse ligament of acetabulum: Intact.  Joint or bursal effusion  Joint effusion: A physiologic amount of joint fluid.  Bursal effusion: Minimal nonspecific edema over the greater  trochanter. No substantial iliopsoas or trochanteric bursal effusion.  Muscles and tendons  Muscles and tendons: The rectus femoris, the visualized portion  iliopsoas, proximal hamstrings, and hip abductors are intact.  The  visualized adductor muscles are unremarkable.   Nerves:  The visualized course of the sciatic nerve is unremarkable.   Other Findings:  There is fat-containing right inguinal hernia.  Impression:  1. Approximately 12-3 o'clock labral tearing with synovial herniation  pit and likely reduced femoral head neck junction offset. 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  2. Fat containing right inguinal hernia.          ASSESSMENT:   1.  Lumbar DDD, mild  2.  Lumbar muscle spasm  3.  Labral tear, right hip  4.  Hx: anxiety, chemical dependence in remission.  5. Functional neurologic movement disorder    Shoaib presents for medication management visit. Overall his mood is improved with diagnosis for involuntary movements. He is participating in multiple therapies to address pain issues. Having difficulty sleeping. Will trial hydroxyzine.     Plan:    Diagnosis reviewed, treatment option addressed, and  risk/benifits discussed.  Self-care instructions given.  I am recommending a multidisciplinary treatment plan to help this patient better manage pain.      1. Schedule pain psychology visits with Low   2. Schedule physical therapy visits   3. Schedule follow-up with CARLOS A Higuera, NP-C in 4 weeks  4. Medication recommendations:   1. Hydroxyzine 25 m=2 tabs at bedtime. If too strong okay to take 1/2 tablet.   2. Refill of Vicodin provided.     Total time spent face to face was 30 minutes and more than 50% of face to face time was spent in counseling and/or coordination of care regarding the diagnosis and recommendations above.      CARLOS A Bass, NP-C   [facility]    Disclaimer: This note consists of symbols derived from keyboarding, dictation and/or voice recognition software. As a result, there may be errors in the script that have gone undetected. Please consider this when interpreting information found in this chart.

## 2018-09-06 NOTE — MR AVS SNAPSHOT
After Visit Summary   2018    Hoang Kiser    MRN: [id number]           Patient Information     Date Of Birth          1985        Visit Information        Provider Department      2018 10:30 AM Tamiko Stevens APRN CNP [facility]        Today's Diagnoses     Anxiety    -  1    Other insomnia        Hip pain, right          Care Instructions    After Visit Instructions:     Thank you for coming to [facility] for your care. It is my goal to partner with you to help you reach your optimal state of health.     I am recommending multidisciplinary care at this time.  The focus of care will be to continue gradual rehabilitation and pain management with medication adjustments as needed.    Continue daily self-care, identifying contributing factors, and monitoring variations in pain level. Continue to integrate self-care into your life.      1. Schedule pain psychology visits with Low   2. Schedule physical therapy visits   3. Schedule follow-up with CARLOS A Higuera NP-C in 4 weeks  4. Medication recommendations:   1. Hydroxyzine 25 m=2 tabs at bedtime. If too strong okay to take 1/2 tablet.   2. Refill of Vicodin provided.       CARLOS A Bass NP-C  [facility]    Contact information: [facility]    Please call if any side effects, questions, or concerns arise.    Nurse Triage line:  [phone]   Call this number with any questions or concerns. You may leave a detailed message anytime. Calls are typically returned Monday through Friday between 8 AM and 4:30 PM. We usually get back to you within 2 business days depending on the issue/request.    Scheduling number: [phone].  Call this number to schedule or change appointments.          Medication Refills Policy:    For non-narcotic medications, please your pharmacy directly to request a refill and the pharmacy  will call the [facility] Center for authorization. Please allow 3-4 days for these refills.    For narcotic refills, call the nurse triage line and leave a message requesting your refill along with the name of the pharmacy that you use. Narcotic prescriptions will be mailed to your pharmacy or you may pick them up at the clinic.  Please call 7-10 days before your refill is due  The above policy allows adequate time so that you do not run out of medication.    No Show - Late Cancellation - Late Arrival Policy  We believe regular attendance is key to your success in our program.    Any time you are unable to keep your appointment we ask that you call us at  least 24 hours in advance to let us know. This will allow us to offer the appointment time to another patient. The following is our policy for missed appointments. This also applies to appointments cancelled with less than 24 hours notice.    You will receive a letter after missing your 1st and 2nd appointments without contacting the clinic before your scheduled appointment time.     After missing 3 appointments without calling first, we will cancel all of your future appointments at [facility].    At that point, you will not be able to resume services unless approved by your care team  We understand that unforseen circumstances arise, however, out of respect for all concerned and to provide this appointment to another patient, this policy will be enforced.    Please note that most follow up appointments are 30 minutes long. If you arrive late, your provider may not be able to see you for the entire 30 minutes. Please also note that if you arrive more than 15 minutes for any appointment, it may be rescheduled.                                     Follow-ups after your visit        Your next 10 appointments already scheduled     Sep 07, 2018  9:30 AM CDT   (Arrive by 9:15 AM)   Evaluation with Melinda Mcgraw PT   [facility]    [address]  4th Floor  [facility] [id number] [phone]            Sep 12, 2018  8:45 AM CDT   Radiology Injections with Brynn Trujillo MD   [facility] ([facility])    [address]  [address]  [facility] [address]   [phone]            Sep 13, 2018 10:30 AM CDT   Return Visit with Hailey Tim   [facility] ([facility])    [address] [facility] [address] [phone]            Sep 27, 2018 11:00 AM CDT   Return Visit with Fili Wesley LP   [facility] ([facility] [place])    [address]  [facility] [address]  [facility] [id number] [phone]            Feb 14, 2019 12:10 PM CST   (Arrive by 11:55 AM)   Return Movement Disorder with Bryant Cao MD   [facility] ([facility] [place])    [address]  3rd [facility] [id number] [phone]              Who to contact     If you have questions or need follow up information about today's clinic visit or your schedule please contact [facility] [place] directly at [phone].  Normal or non-critical lab and imaging results will be communicated to you by MyChart, letter or phone within 4 business days after the clinic has received the results. If you do not hear from us within 7 days, please contact the clinic through MyChart or phone. If you have a critical or abnormal lab result, we will notify you by phone as soon as possible.  Submit refill requests through [employer] or call your pharmacy and they will forward the refill request to us. Please allow 3 business days for your refill to be completed.          Additional Information About Your Visit        [employer] Information     [employer] gives you secure access to your electronic health record. If you see a primary care provider, you can also send messages to your care team and make  appointments. If you have questions, please call your primary care clinic.  If you do not have a primary care provider, please call [phone] and they will assist you.        Your Vitals Were     Pulse Pulse Oximetry                91 100%           Blood Pressure from Last 3 Encounters:   09/06/18 128/80   08/29/18 131/47   08/23/18 126/80    Weight from Last 3 Encounters:   08/29/18 74.4 kg (164 lb)   08/23/18 74.4 kg (164 lb)   08/14/18 74.8 kg (165 lb)              Today, you had the following     No orders found for display         Today's Medication Changes          These changes are accurate as of 9/6/18 10:57 AM.  If you have any questions, ask your nurse or doctor.               Start taking these medicines.        Dose/Directions    hydrOXYzine 25 MG tablet   Commonly known as:  ATARAX   Used for:  Anxiety, Other insomnia   Started by:  Tamiko Stevens APRN CNP        Take one to two tablets at HS for insomnia or anxiety   Quantity:  60 tablet   Refills:  1         These medicines have changed or have updated prescriptions.        Dose/Directions    HYDROcodone-acetaminophen 5-325 MG per tablet   Commonly known as:  NORCO   This may have changed:  additional instructions   Used for:  Hip pain, right   Changed by:  Tamiko Stevens APRN CNP        Dose:  1 tablet   Take 1 tablet by mouth 3 times daily as needed for pain (Max 3 times daily. #75 tabs to last 30 days.) Okay to fill on 9/19/18   Quantity:  75 tablet   Refills:  0            Where to get your medicines      These medications were sent to [facility] [place], [place] - [address]  [address] [address]     Phone:  [phone]     hydrOXYzine 25 MG tablet         Some of these will need a paper prescription and others can be bought over the counter.  Ask your nurse if you have questions.     Bring a paper prescription for each of these medications     HYDROcodone-acetaminophen 5-325 MG per  tablet               Information about OPIOIDS     PRESCRIPTION OPIOIDS: WHAT YOU NEED TO KNOW   We gave you an opioid (narcotic) pain medicine. It is important to manage your pain, but opioids are not always the best choice. You should first try all the other options your care team gave you. Take this medicine for as short a time (and as few doses) as possible.    Some activities can increase your pain, such as bandage changes or therapy sessions. It may help to take your pain medicine 30 to 60 minutes before these activities. Reduce your stress by getting enough sleep, working on hobbies you enjoy and practicing relaxation or meditation. Talk to your care team about ways to manage your pain beyond prescription opioids.    These medicines have risks:    DO NOT drive when on new or higher doses of pain medicine. These medicines can affect your alertness and reaction times, and you could be arrested for driving under the influence (DUI). If you need to use opioids long-term, talk to your care team about driving.    DO NOT operate heavy machinery    DO NOT do any other dangerous activities while taking these medicines.    DO NOT drink any alcohol while taking these medicines.     If the opioid prescribed includes acetaminophen, DO NOT take with any other medicines that contain acetaminophen. Read all labels carefully. Look for the word  acetaminophen  or  Tylenol.  Ask your pharmacist if you have questions or are unsure.    You can get addicted to pain medicines, especially if you have a history of addiction (chemical, alcohol or substance dependence). Talk to your care team about ways to reduce this risk.    All opioids tend to cause constipation. Drink plenty of water and eat foods that have a lot of fiber, such as fruits, vegetables, prune juice, apple juice and high-fiber cereal. Take a laxative (Miralax, milk of magnesia, Colace, Senna) if you don t move your bowels at least every other day. Other side effects  include upset stomach, sleepiness, dizziness, throwing up, tolerance (needing more of the medicine to have the same effect), physical dependence and slowed breathing.    Store your pills in a secure place, locked if possible. We will not replace any lost or stolen medicine. If you don t finish your medicine, please throw away (dispose) as directed by your pharmacist. The [place] Pollution Control Agency has more information about safe disposal: https://www.pca.[email or web address].[place].[email or web address]/living-[email or web address]/[email or web address]-unwanted-medications         Primary Care Provider Office Phone # Fax #    Phill Floyd [phone] [address] [facility] [address]        Equal Access to Services     BERLIN MEYER : Mariana Armenta, wajuani rinaldi, alejandra looneymatanvi hawkins, arnav billy. So [facility] [phone].    ATENCIÓN: Si habla español, tiene a cutler disposición servicios gratuitos de asistencia lingüística. Llame al [phone].    We comply with applicable federal civil rights laws and [place] laws. We do not discriminate on the basis of race, color, national origin, age, disability, sex, sexual orientation, or gender identity.            Thank you!     Thank you for choosing [facility] [place]  for your care. Our goal is always to provide you with excellent care. Hearing back from our patients is one way we can continue to improve our services. Please take a few minutes to complete the written survey that you may receive in the mail after your visit with us. Thank you!             Your Updated Medication List - Protect others around you: Learn how to safely use, store and throw away your medicines at www.disposemymeds.org.          This list is accurate as of 9/6/18 10:57 AM.  Always use your most recent med list.                   Brand Name Dispense Instructions for use Diagnosis    amphetamine-dextroamphetamine 30 MG per 24 hr capsule    ADDERALL XR      Take 30 mg by mouth daily        baclofen 10 MG tablet    LIORESAL    90 tablet    Take 0.5-1 tablets (5-10 mg) by mouth 3 times daily as needed for muscle spasms    Spasm of back muscles       clonazePAM 0.5 MG tablet    klonoPIN     Take 0.5-1 mg by mouth nightly as needed        DULoxetine 60 MG EC capsule    CYMBALTA    30 capsule    2 x 60mg tab by mouth daily        HYDROcodone-acetaminophen 5-325 MG per tablet    NORCO    75 tablet    Take 1 tablet by mouth 3 times daily as needed for pain (Max 3 times daily. #75 tabs to last 30 days.) Okay to fill on 9/19/18    Hip pain, right       hydrOXYzine 25 MG tablet    ATARAX    60 tablet    Take one to two tablets at HS for insomnia or anxiety    Anxiety, Other insomnia       naloxone nasal spray    NARCAN    0.2 mL    Spray 1 spray (4 mg) into one nostril alternating nostrils as needed for opioid reversal every 2-3 minutes until assistance arrives    High risk medication use       naproxen 500 MG tablet    NAPROSYN    40 tablet    Take 1 tablet (500 mg) by mouth 2 times daily (with meals)        * nicotine 14 MG/24HR 24 hr patch    NICODERM CQ    30 patch    Place 1 patch onto the skin every 24 hours Use this patch first    Tobacco abuse       * nicotine 7 MG/24HR 24 hr patch    NICODERM CQ    30 patch    Place 1 patch onto the skin every 24 hours    Tobacco abuse       * Notice:  This list has 2 medication(s) that are the same as other medications prescribed for you. Read the directions carefully, and ask your doctor or other care provider to review them with you.

## 2018-09-06 NOTE — PATIENT INSTRUCTIONS
After Visit Instructions:     Thank you for coming to [facility] for your care. It is my goal to partner with you to help you reach your optimal state of health.     I am recommending multidisciplinary care at this time.  The focus of care will be to continue gradual rehabilitation and pain management with medication adjustments as needed.    Continue daily self-care, identifying contributing factors, and monitoring variations in pain level. Continue to integrate self-care into your life.      1. Schedule pain psychology visits with Low   2. Schedule physical therapy visits   3. Schedule follow-up with CARLOS A Higuera NP-C in 4 weeks  4. Medication recommendations:   1. Hydroxyzine 25 m=2 tabs at bedtime. If too strong okay to take 1/2 tablet.   2. Refill of Vicodin provided.       CARLOS A Bass NP-C  [facility]    Contact information: [facility]    Please call if any side effects, questions, or concerns arise.    Nurse Triage line:  [phone]   Call this number with any questions or concerns. You may leave a detailed message anytime. Calls are typically returned Monday through Friday between 8 AM and 4:30 PM. We usually get back to you within 2 business days depending on the issue/request.    Scheduling number: [phone].  Call this number to schedule or change appointments.          Medication Refills Policy:    For non-narcotic medications, please your pharmacy directly to request a refill and the pharmacy will call the [facility] Management Center for authorization. Please allow 3-4 days for these refills.    For narcotic refills, call the nurse triage line and leave a message requesting your refill along with the name of the pharmacy that you use. Narcotic prescriptions will be mailed to your pharmacy or you may pick them up at the clinic.  Please call 7-10 days before your refill is due  The above policy allows  adequate time so that you do not run out of medication.    No Show - Late Cancellation - Late Arrival Policy  We believe regular attendance is key to your success in our program.    Any time you are unable to keep your appointment we ask that you call us at  least 24 hours in advance to let us know. This will allow us to offer the appointment time to another patient. The following is our policy for missed appointments. This also applies to appointments cancelled with less than 24 hours notice.    You will receive a letter after missing your 1st and 2nd appointments without contacting the clinic before your scheduled appointment time.     After missing 3 appointments without calling first, we will cancel all of your future appointments at [facility] [place].    At that point, you will not be able to resume services unless approved by your care team  We understand that unforseen circumstances arise, however, out of respect for all concerned and to provide this appointment to another patient, this policy will be enforced.    Please note that most follow up appointments are 30 minutes long. If you arrive late, your provider may not be able to see you for the entire 30 minutes. Please also note that if you arrive more than 15 minutes for any appointment, it may be rescheduled.

## 2018-09-06 NOTE — LETTER
[facility]    09/06/18    Patient: Hoang Kiser  YOB: 1985  Medical Record Number: [id number]                                                                  Controlled Substance Agreement  I understand that my care provider has prescribed controlled substances (narcotics, tranquilizers, and/or stimulants) to help manage my condition(s).  I am taking this medicine to help me function or work.  I know that this is strong medicine.  It could have serious side effects and even cause a dependency on the drug.  If I stop these medicines suddenly, I could have severe withdrawal symptoms.    The risks, benefits, and side effects of these medication(s) were explained to me.  I agree that:  1. I will take part in other treatments as advised by my provider.  This may be psychiatry or counseling, physical therapy, behavioral therapy, group treatment, or a referral to a pain clinic.  I will reduce or stop my medicine when my provider tells me to do so.   2. I will take my medicines as prescribed.  I will not change the dose or schedule unless my provider tells me to.  There will be no refills if I  run out early.   I may be contacted at any time without warning and asked to complete a drug test or pill count.   3. I will keep all my appointments at the clinic.  If I miss appointments or fail to follow instructions, my provider may stop my medicine.  4. I will not ask other providers to prescribe controlled substances. And I will not accept controlled substances from other people. If I need another prescribed controlled substance for a new reason, I will notify my provider within one business day.  5. If I enroll in the [place] Medical Marijuana program, I will tell my provider.  I will also sign an agreement to share my medical records with my provider.  6. I will use one pharmacy to fill all of my controlled substance prescriptions.  If my prescription is mailed to my pharmacy, it may  take 5 to 7 days for my medicine to be ready.  7. I understand that my provider, clinic care team, and pharmacy can track controlled substance prescriptions from other providers through a central database (prescription monitoring program).  8. I will bring in my list of medications (or my medicine bottles) each time I come to the clinic.  [id number] REV-  07/2018                                                                                                                                   Page 1 of 2      [facility]    09/06/18    Patient: Hoang Kiser  YOB: 1985  Medical Record Number: [id number]    9. Refills of controlled substances will be made only during office hours.  It is up to me to make sure that I do not run out of my medicines on weekends or holidays.    10. I am responsible for my prescriptions.  If the medicine/prescription is lost or stolen, it will not be replaced.   I also agree not to share these medicines with anyone.  11. I agree to not use ANY illegal or recreational drugs.  This includes marijuana, cocaine, bath salts or other drugs.  I agree not to use alcohol unless my provider says I may.  I agree to give urine samples whenever asked.  If I fail to give a urine sample, the provider may stop my medicine.     12. I will tell my nurse or provider right away if I become pregnant or have a new medical problem treated outside of [facility].  13. I understand that this medicine can affect my thinking and judgment.  It may be unsafe for me to drive, use machinery and do dangerous tasks.  I will not do any of these things until I know how the medicine affects me.  If my dose changes, I will wait to see how it affects me.  I will contact my provider if I have concerns about medicine side effects.  I understand that if I do not follow any of the conditions above, my prescriptions or treatment may be stopped.    I agree that my provider, clinic care team, and  pharmacy may work with any city, state or federal law enforcement agency that investigates the misuse, sale, or other diversion of my controlled medicine. I will allow my provider to discuss my care with or share a copy of this agreement with any other treating provider, pharmacy or emergency room where I receive care.  I agree to give up (waive) any right of privacy or confidentiality with respect to these authorizations.   I have read this agreement and have asked questions about anything I did not understand.   ___________________________________    ___________________________  Patient Signature                                                           Date and Time  ___________________________________     ____________________________  Witness                                                                            Date and Time  ___________________________________  Tamiko Stevens, CARLOS A CNP  [id number] REV-  07/2018                                                                                                                                                   Page 2 of 2

## 2018-09-06 NOTE — TELEPHONE ENCOUNTER
naproxen (NAPROSYN) 500 MG tablet 40 tablet 0 8/15/2018       Last Written Prescription Date:  08/15/2018  Last Fill Quantity: 40,   # refills: 0  Last Office Visit: 08/29/18  Future Office visit:    Next 5 appointments (look out 90 days)     Sep 06, 2018 10:30 AM CDT   Return Visit with CARLOS A Guy CNP   [facility]    [address]  [facility] [address] [phone]            Sep 13, 2018 10:30 AM CDT   Return Visit with Hailey Tim   [facility] ([facility])    [address] [facility] [address] [phone]            Sep 27, 2018 11:00 AM CDT   Return Visit with Fili Wesley LP   [facility]    [address]  [address]  [facility] [id number] [phone]

## 2018-09-07 ENCOUNTER — MYC MEDICAL ADVICE (OUTPATIENT)
Dept: PALLIATIVE MEDICINE | Facility: CLINIC | Age: 33
End: 2018-09-07

## 2018-09-11 NOTE — PROGRESS NOTES
[facility] - Procedure Note    Date of Visit: 9/12/2018    Pre procedure Diagnosis: facet arthropathy   Post procedure Diagnosis: Same  Procedure performed: Bilateral L5-S1 facet joint injections  Anesthesia: none  Complications: none  Operators: Brynn Trujillo MD & Jitendra Yu DO (pain fellow)     Indications:   Hoang Kiser is a 33 year old male was sent by Tamiko Stevens CNP for lumbar facet joint injections.  They have a history of central axial low back pain.  Exam shows pain with extension and rotation and they have tried conservative treatment including PT and medications.    Options/alternatives, benefits and risks were discussed with the patient including bleeding, infection, flared pain, tissue trauma, exposure to radiation, reaction to medications including seizure, spinal cord injury, paralysis, weakness, numbness and headache.    Questions were answered to his satisfaction and he agrees to proceed. Voluntary informed consent was obtained and signed.     Vitals were reviewed: Yes  Allergies were reviewed:  Yes   Medications were reviewed:  Yes   Pre-procedure pain score: 5/10    Imaging: MRI lumbar spine 5/02/2018  Findings: 5 lumbar-type vertebra. The tip of the conus medullaris is  at L1.  The lumbar vertebral column is normally aligned.   Straightening of lumbar lordosis, unchanged. The intervertebral disc  heights are maintained. Normal marrow signal. At L5-S1, there is a  disc bulge and facet hypertrophy with mild left neural foraminal  narrowing, unchanged. No spinal canal stenosis.       Impression:  1. Lumbar spondylosis with mild left neural foraminal narrowing at  L5-S1.  2. No spinal canal stenosis.       Procedure:  After getting informed consent, patient was brought into the procedure suite and was placed in a prone position on the procedure table.   A Pause for the Cause was performed.  Patient was prepped and draped in sterile fashion.     Under AP fluoroscopic  guidance the L5-S1 facet joints on the right and left sides were identified, and the C-arm was rotated obliquely to the affected side to open the joint space. A total of 3 ml of 1% lidocaine was injected at the needle entry point and needle tract. Then a 22 gauge 3.5 inch quincke type spinal needle was inserted and advanced under fluoroscopic guidance targeting the superior articular pillar of each joint. Once the needle made a contact with SAP, it was rotated and was then advanced into the joint.    AP fluoroscopic views were obtained to confirm the needle placement. Then,  Omnipaque 300 contrast dye was injected after negative aspiration for heme and CSF in each joint, confirming appropriate placement.  A total of 1mL of Omnipaque was used and 9mL was wasted.    The injection was then accomplished using a solution containing 1ml of 0.5% bupivacaine mixed with 1ml of 1% Lidocaine and 80mg of kenolog, divided between the two joints. The needles were removed..     Hemostasis was achieved, the area was cleaned, and bandaids were placed when appropriate.  The patient tolerated the procedure well, and was taken to the recovery room.    Images were saved to PACS.    Post-procedure pain score: 2/10  Follow-up includes:   -f/u phone call in one week  -f/u with the referring provider      Brynn Trujillo MD   [facility]

## 2018-09-12 ENCOUNTER — RADIOLOGY INJECTION OFFICE VISIT (OUTPATIENT)
Dept: PALLIATIVE MEDICINE | Facility: CLINIC | Age: 33
End: 2018-09-12
Attending: NURSE PRACTITIONER
Payer: COMMERCIAL

## 2018-09-12 ENCOUNTER — RADIANT APPOINTMENT (OUTPATIENT)
Dept: GENERAL RADIOLOGY | Facility: CLINIC | Age: 33
End: 2018-09-12
Attending: ANESTHESIOLOGY
Payer: COMMERCIAL

## 2018-09-12 VITALS — HEART RATE: 78 BPM | OXYGEN SATURATION: 99 % | DIASTOLIC BLOOD PRESSURE: 86 MMHG | SYSTOLIC BLOOD PRESSURE: 121 MMHG

## 2018-09-12 DIAGNOSIS — M51.369 DDD (DEGENERATIVE DISC DISEASE), LUMBAR: ICD-10-CM

## 2018-09-12 DIAGNOSIS — M47.819 FACET ARTHROPATHY: Primary | ICD-10-CM

## 2018-09-12 PROCEDURE — 64493 INJ PARAVERT F JNT L/S 1 LEV: CPT | Mod: 50 | Performed by: ANESTHESIOLOGY

## 2018-09-12 NOTE — NURSING NOTE
Discharge Information    IV Discontiued Time:  NA    Amount of Fluid Infused:  NA    Discharge Criteria = When patient returns to baseline or as per MD order    Consciousness:  Pt is fully awake    Circulation:  BP +/- 20% of pre-procedure level    Respiration:  Patient is able to breathe deeply    O2 Sat:  Patient is able to maintain O2 Sat >92% on room air    Activity:  Moves 4 extremities on command    Ambulation:  Patient is able to stand and walk or stand and pivot into wheelchair    Dressing:  Clean/dry or No Dressing    Notes:   Discharge instructions and AVS given to patient    Patient meets criteria for discharge?  YES    Admitted to PCU?  No    Responsible adult present to accompany patient home?  Yes    Signature/Title:    Shaniqua Carvajal RN Care Coordinator  [facility]

## 2018-09-12 NOTE — MR AVS SNAPSHOT
After Visit Summary   9/12/2018    Hoang Kiser    MRN: [id number]           Patient Information     Date Of Birth          1985        Visit Information        Provider Department      9/12/2018 8:45 AM Brynn Trujillo MD [facility]        Care Instructions    [facility] Procedure Discharge Instructions    Today you saw:   Dr. Brynn Trujillo       Your procedure:   Facet joint injection injection     Medications used:  Lidocaine (anesthetic)  Bupivacaine (anesthetic)     Kenalog (steroid)  Omnipaque (contrast)              Be cautious when walking as numbness and/or weakness in the legs may occur up to 6-8 hours after the procedure due to effect of the local anesthetic     The effect of the local anesthetic could slow your reflexes.     Avoid strenuous activity for the first 24 hours. You may resume your regular activities after that.     You may shower, however avoid swimming, tub baths or hot tubs for 24 hours following your procedure    You may have a mild to moderate increase in pain for several days following the injection.      You may use ice packs for 10-15 minutes, 3 to 4 times a day at the injection site for comfort    Do not use heat to painful areas for 6 to 8 hours. This will give the local anesthetic time to wear off and prevent you from accidentally burning your skin.    You may use anti-inflammatory medications (such as Ibuprofen/Advil or Aleve) or Tylenol for pain control if necessary    With diabetes, check your blood sugar more frequently than usual as your blood sugar may be higher than normal for 10-14 days following a steroid injection. Contact your doctor who manages your diabetes if your blood sugar is higher than usual    It may take up to 14 days for the steroid medication to start working although you may feel the effect as early as a few days after the procedure.     Follow up with your referring provider in 2-3 weeks      If you  experience any of the following, call the pain center line during work hours at [phone] or on-call physician after hours at [phone]:  -Fever over 100 degree F  -Swelling, bleeding, redness, drainage, warmth at the injection site  -Progressive weakness or numbness in your legs   -Unusual new onset of pain that is not improving            Follow-ups after your visit        Your next 10 appointments already scheduled     Sep 13, 2018 10:30 AM CDT   [facility]    [address] [facility] [address] [phone]            Sep 27, 2018 11:00 AM CDT   Return Visit with Fili Wesley LP   [facility] ([facility])    [address]  [facility] [id number] [phone]            Sep 28, 2018 11:00 AM CDT   (Arrive by 10:45 AM)   Evaluation with Melinda Mcgraw PT   [facility] ([facility])    [address]  4th Floor  [facility] [id number] [phone]            Oct 19, 2018 11:30 AM CDT   Return Visit with Fili Wesley LP   [facility] ([facility])    [address]  [facility] [id number] [phone]            Oct 19, 2018 12:30 PM CDT   Return Visit with CARLOS A Guy CNP   [facility] ([facility])    [address]  [facility] [id number] [phone]            Feb 14, 2019 12:10 PM CST   (Arrive by 11:55 AM)   Return Movement Disorder with Bryant Cao MD   [facility] ([facility])    [address]  3rd Floor  [facility] [id number] [phone]              Who to contact     If you have questions or need follow up information about today's clinic visit or your schedule please contact [facility] directly at [phone].  Normal or non-critical lab and  imaging results will be communicated to you by MyChart, letter or phone within 4 business days after the clinic has received the results. If you do not hear from us within 7 days, please contact the clinic through [employer] or phone. If you have a critical or abnormal lab result, we will notify you by phone as soon as possible.  Submit refill requests through [employer] or call your pharmacy and they will forward the refill request to us. Please allow 3 business days for your refill to be completed.          Additional Information About Your Visit        "[employer] Information     [employer] gives you secure access to your electronic health record. If you see a primary care provider, you can also send messages to your care team and make appointments. If you have questions, please call your primary care clinic.  If you do not have a primary care provider, please call [phone] and they will assist you.        Your Vitals Were     Pulse Pulse Oximetry                80 99%           Blood Pressure from Last 3 Encounters:   09/12/18 114/80   09/06/18 128/80   08/29/18 131/47    Weight from Last 3 Encounters:   08/29/18 74.4 kg (164 lb)   08/23/18 74.4 kg (164 lb)   08/14/18 74.8 kg (165 lb)              Today, you had the following     No orders found for display       Primary Care Provider Office Phone # Fax #    Phill Floyd [phone] [address] [facility] [address]        Equal Access to Services     [facility] : Hadii rosemary bobbyo Soevelyn, waaxda luqadaha, qaybta kaalmada shruthi, arnav lorenzana . So [facility] [phone].    ATENCIÓN: Si habla español, tiene a cutler disposición servicios gratuitos de asistencia lingüística. Llame al [phone].    We comply with applicable federal civil rights laws and [place] laws. We do not discriminate on the basis of race, color, national origin, age, disability, sex, sexual orientation, or gender identity.             Thank you!     Thank you for choosing [facility]  for your care. Our goal is always to provide you with excellent care. Hearing back from our patients is one way we can continue to improve our services. Please take a few minutes to complete the written survey that you may receive in the mail after your visit with us. Thank you!             Your Updated Medication List - Protect others around you: Learn how to safely use, store and throw away your medicines at www.disposemymeds.org.          This list is accurate as of 9/12/18  9:06 AM.  Always use your most recent med list.                   Brand Name Dispense Instructions for use Diagnosis    amphetamine-dextroamphetamine 30 MG per 24 hr capsule    ADDERALL XR     Take 30 mg by mouth daily        baclofen 10 MG tablet    LIORESAL    90 tablet    Take 0.5-1 tablets (5-10 mg) by mouth 3 times daily as needed for muscle spasms    Spasm of back muscles       clonazePAM 0.5 MG tablet    klonoPIN     Take 0.5-1 mg by mouth nightly as needed        DULoxetine 60 MG EC capsule    CYMBALTA    30 capsule    2 x 60mg tab by mouth daily        HYDROcodone-acetaminophen 5-325 MG per tablet    NORCO    75 tablet    Take 1 tablet by mouth 3 times daily as needed for pain (Max 3 times daily. #75 tabs to last 30 days.) Okay to fill on 9/19/18    Hip pain, right       hydrOXYzine 25 MG tablet    ATARAX    60 tablet    Take one to two tablets at HS for insomnia or anxiety    Anxiety, Other insomnia       naloxone nasal spray    NARCAN    0.2 mL    Spray 1 spray (4 mg) into one nostril alternating nostrils as needed for opioid reversal every 2-3 minutes until assistance arrives    High risk medication use       naproxen 500 MG tablet    NAPROSYN    40 tablet    Take 1 tablet (500 mg) by mouth 2 times daily (with meals)        * nicotine 14 MG/24HR 24 hr patch    NICODERM CQ    30 patch    Place 1 patch onto the skin every 24 hours Use this patch first    Tobacco  abuse       * nicotine 7 MG/24HR 24 hr patch    NICODERM CQ    30 patch    Place 1 patch onto the skin every 24 hours    Tobacco abuse       * Notice:  This list has 2 medication(s) that are the same as other medications prescribed for you. Read the directions carefully, and ask your doctor or other care provider to review them with you.

## 2018-09-12 NOTE — PATIENT INSTRUCTIONS
[facility] Procedure Discharge Instructions    Today you saw:   Dr. Brynn Trujillo       Your procedure:   Facet joint injection injection     Medications used:  Lidocaine (anesthetic)  Bupivacaine (anesthetic)     Kenalog (steroid)  Omnipaque (contrast)              Be cautious when walking as numbness and/or weakness in the legs may occur up to 6-8 hours after the procedure due to effect of the local anesthetic     The effect of the local anesthetic could slow your reflexes.     Avoid strenuous activity for the first 24 hours. You may resume your regular activities after that.     You may shower, however avoid swimming, tub baths or hot tubs for 24 hours following your procedure    You may have a mild to moderate increase in pain for several days following the injection.      You may use ice packs for 10-15 minutes, 3 to 4 times a day at the injection site for comfort    Do not use heat to painful areas for 6 to 8 hours. This will give the local anesthetic time to wear off and prevent you from accidentally burning your skin.    You may use anti-inflammatory medications (such as Ibuprofen/Advil or Aleve) or Tylenol for pain control if necessary    With diabetes, check your blood sugar more frequently than usual as your blood sugar may be higher than normal for 10-14 days following a steroid injection. Contact your doctor who manages your diabetes if your blood sugar is higher than usual    It may take up to 14 days for the steroid medication to start working although you may feel the effect as early as a few days after the procedure.     Follow up with your referring provider in 2-3 weeks      If you experience any of the following, call the pain center line during work hours at [phone] or on-call physician after hours at [phone]:  -Fever over 100 degree F  -Swelling, bleeding, redness, drainage, warmth at the injection site  -Progressive weakness or numbness in your legs   -Unusual new onset of  pain that is not improving

## 2018-09-13 ENCOUNTER — TELEPHONE (OUTPATIENT)
Dept: BEHAVIORAL HEALTH | Facility: CLINIC | Age: 33
End: 2018-09-13

## 2018-09-13 NOTE — TELEPHONE ENCOUNTER
[facility]  No Data Recorded      Social Work Care Navigator Note      Patient: Hoang Kiser  Date: September 13, 2018  Preferred Name: Shoaib    Previous PHQ-9:   PHQ-9 SCORE 1/22/2016 1/24/2018 2/14/2018   Total Score - - -   Total Score 4 2 7     Previous JIN-7:   JIN-7 SCORE 8/20/2015 1/22/2016 8/14/2018   Total Score - - -   Total Score 7 3 15     MOLLY LEVEL:  MOLLY Score (Last Two) 3/23/2016   MOLLY Raw Score 52   Activation Score 100   MOLLY Level 4       Preferred Contact:  No Data Recorded    Type of Contact Today: Phone call (patient / identified key support person reached)      Data: (subjective / Objective):  Recent ED/IP Admission or Discharge?   None    Patient Goals:  No Data Recorded      [facility] Core Service Provided:  Care Coordination: provided care management services/referrals necessary to ensure patient and their identified supports have access to medical, behavioral health, pharmacology and recovery support services.  Ensured that patient's care is integrated across all settings and services.       Plan: (Homework, other):  SW called Patient to reschedule appointment in clinic. New appointment scheduled for 9/18 at 10am. Patient was encouraged to continue to seek condition-related information and education.        Hailey Tim, Social Work Care Coordinator                 Next 5 appointments (look out 90 days)     Sep 13, 2018 10:30 AM CDT   [facility] Hailey [facility]   [facility]    [address] [facility] [address]   [phone]            Sep 27, 2018 11:00 AM CDT   Return Visit with Fili Wesley LP   [facility]    [address]  [facility] [address]   [phone]            Oct 19, 2018 11:30 AM CDT   Return Visit with Fili Wesley LP   [facility])    [address]  [facility]  [address]   [phone]            Oct 19, 2018 12:30 PM CDT   Return Visit with CARLOS A Guy CNP   [facility] ([facility])    [address]  [facility] [address]  [place] [address] [phone]

## 2018-09-18 ENCOUNTER — OFFICE VISIT (OUTPATIENT)
Dept: BEHAVIORAL HEALTH | Facility: CLINIC | Age: 33
End: 2018-09-18

## 2018-09-18 ENCOUNTER — MYC MEDICAL ADVICE (OUTPATIENT)
Dept: PALLIATIVE MEDICINE | Facility: CLINIC | Age: 33
End: 2018-09-18

## 2018-09-18 DIAGNOSIS — R69 DIAGNOSIS DEFERRED: Primary | ICD-10-CM

## 2018-09-18 NOTE — LETTER
[facility] ([facility]): Health Action Plan  [facility]: [place]  [employer]      Name: Hoang Kiser  Preferred Name: Shoaib  : 1985  MRN: [id number]    How to Contact me  Need for : No  Preferred Contact: Cell    Home Phone [phone]   Mobile [phone]     Ok to contact me by email?* No   *Signed & Scanned Consent form Required*   [email or web address]  No Data Recorded      My Goals  Goal Areas: Mental health; feeling supported in personal relationships, and having an advocate to help him navigate community resources.     Strengths related to each goal: Shoaib is a thoughtful individual; who is invested in his own health and wellness. He is able to navigate these goals independently but has expressed desire to receive encouragement to obtain employment in the future. Shoaib advocates his needs and excels at finding community resources that will fit his needs (currently receiving [place] and SNAP). Shoaib has a large support system made up of a LMFT, Psychiatrist, and PCP. Shoaib is active in his recovery from substance use.    Services and supports needed: Shoaib would benefit from ongoing support from the [facility] team related to his physical health, mental health, and employment needs.     Activites/Actions of team to support goal(s): [facility] team will reach out to Shoaib monthly to assist in goals. This can be done by [facility] [place] Krystle Atkins or  Hailey Tim.     Activities/Actions of Patient/Parent/Guardian to support goal(s): Shoaib will communicate his needs to the team and consider goal attainment.       Recommended Referral  - [employer]   - [facility] Pending   - Tobacco cessation referrals made: Shoaib smokes cigarettes but is not yet interested in receiving resources to quit.  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 Shoaib attends weekly meetings/support groups.           My Team Members and Their Contact Information  Patient Care Team       Relationship Specialty Notifications Start End    Phill Floyd MD PCP - General Family Practice  2/25/16     Phone: [phone] Fax: [phone] [address] [address] [facility] [address]    Bryant Cao MD Referring Physician Neurology  11/23/15     Comment:  Refer Evaluate for palatal tremor - essential vs symptomatic vs functional vs tic related.     Phone: [phone] Fax: [phone]         [address] [facility] [facility] [address]    Jorge Nelson MD MD Otolaryngology  11/23/15     Phone: [phone] Fax: [phone]         [facility] [address] [facility] [address]    Chuck Dominguez MD MD Student in organized health care education/training program  8/31/16     Phone: [phone] Fax: [phone]         XXX RESIGNED  [facility] [address]    Royce Taylor MD MD Neurology  6/27/17     Phone: [phone] Fax: [phone]         [facility] NEUROLOGY [address] [address]    Deidre Hunter, RN Nurse Coordinator Neurology Admissions 8/16/17     Phone: [phone] Pager: [phone] Fax: [phone]       Miley Trivedi MD MD Otolaryngology  10/17/17     Phone: [phone] Fax: [phone]         [address] [facility] [address] [facility] [address]    Melinda Diaz AuD Audiologist Audiology  10/17/17     Phone: [phone] Fax: [phone]         [address] [facility] [address] [facility] [address]          My Wellness Plan  Safety Concerns: None Reported / Observed  Crisis Plan (emergencies/when urgent support needed): Pt will call 911 in an event of an emergency and will call [facility] Crisis #[phone]        Hoang Kiser co-developed the Health Action Plan with the [facility] Team and received a copy of this document.

## 2018-09-18 NOTE — MR AVS SNAPSHOT
After Visit Summary   9/18/2018    Hoang Kiser    MRN: [id number]           Patient Information     Date Of Birth          1985        Visit Information        Provider Department      9/18/2018 10:00 AM Hailey Tim [facility]        Today's Diagnoses     Diagnosis deferred    -  1       Follow-ups after your visit        Your next 10 appointments already scheduled     Sep 27, 2018 11:00 AM CDT   Return Visit with Fili Wesley LP   [facility] ([facility])    [address]  [facility] [address] [phone]            Sep 28, 2018 11:00 AM CDT   (Arrive by 10:45 AM)   Evaluation with Melinda Mcgraw PT   [facility] ([facility])    [address]  4th [address]  [facility] [address] [phone]            Oct 01, 2018  2:30 PM CDT   [facility]le [facility]   [facility]    [address] [facility] [address] [phone]            Oct 19, 2018 11:30 AM CDT   Return Visit with Fili Wesley LP   [facility] ([facility])    [address]  [facility] [address] [phone]            Oct 19, 2018 12:30 PM CDT   Return Visit with CARLOS A Guy CNP   [facility] ([facility])    [address]  [facility] [address] [phone]            Feb 14, 2019 12:10 PM CST   (Arrive by 11:55 AM)   Return Movement Disorder with Bryant Cao MD   [facility] ([facility])    [address]  3rd Floor  [facility] [address] [phone]              Who to contact     If you have questions or need follow up information about today's clinic visit or your schedule please contact [facility] directly at [phone].  Normal or non-critical lab and imaging  results will be communicated to you by MyChart, letter or phone within 4 business days after the clinic has received the results. If you do not hear from us within 7 days, please contact the clinic through [employer] or phone. If you have a critical or abnormal lab result, we will notify you by phone as soon as possible.  Submit refill requests through [employer] or call your pharmacy and they will forward the refill request to us. Please allow 3 business days for your refill to be completed.          Additional Information About Your Visit        "[employer] Information     [employer] gives you secure access to your electronic health record. If you see a primary care provider, you can also send messages to your care team and make appointments. If you have questions, please call your primary care clinic.  If you do not have a primary care provider, please call [phone] and they will assist you.         Blood Pressure from Last 3 Encounters:   09/12/18 121/86   09/06/18 128/80   08/29/18 131/47    Weight from Last 3 Encounters:   08/29/18 74.4 kg (164 lb)   08/23/18 74.4 kg (164 lb)   08/14/18 74.8 kg (165 lb)              Today, you had the following     No orders found for display       Primary Care Provider Office Phone # Fax #    Phill Floyd [phone] [address] [facility] [address]        Equal Access to Services     FRANCESCO MEYER : Hadii rosemary ku hadasho Socathyali, waaxda luqadaha, qaybta kaalmada arnav hawkins. So [facility] [phone].    ATENCIÓN: Si habla español, tiene a cutler disposición servicios gratuitos de asistencia lingüística. Llelysia al [phone].    We comply with applicable federal civil rights laws and [place] laws. We do not discriminate on the basis of race, color, national origin, age, disability, sex, sexual orientation, or gender identity.            Thank you!     Thank you for choosing [facility]  for your care.  Our goal is always to provide you with excellent care. Hearing back from our patients is one way we can continue to improve our services. Please take a few minutes to complete the written survey that you may receive in the mail after your visit with us. Thank you!             Your Updated Medication List - Protect others around you: Learn how to safely use, store and throw away your medicines at www.disposemymeds.org.          This list is accurate as of 9/18/18 11:59 PM.  Always use your most recent med list.                   Brand Name Dispense Instructions for use Diagnosis    amphetamine-dextroamphetamine 30 MG per 24 hr capsule    ADDERALL XR     Take 30 mg by mouth daily        baclofen 10 MG tablet    LIORESAL    90 tablet    Take 0.5-1 tablets (5-10 mg) by mouth 3 times daily as needed for muscle spasms    Spasm of back muscles       clonazePAM 0.5 MG tablet    klonoPIN     Take 0.5-1 mg by mouth nightly as needed        DULoxetine 60 MG EC capsule    CYMBALTA    30 capsule    2 x 60mg tab by mouth daily        HYDROcodone-acetaminophen 5-325 MG per tablet    NORCO    75 tablet    Take 1 tablet by mouth 3 times daily as needed for pain (Max 3 times daily. #75 tabs to last 30 days.) Okay to fill on 9/19/18    Hip pain, right       hydrOXYzine 25 MG tablet    ATARAX    60 tablet    Take one to two tablets at HS for insomnia or anxiety    Anxiety, Other insomnia       naloxone nasal spray    NARCAN    0.2 mL    Spray 1 spray (4 mg) into one nostril alternating nostrils as needed for opioid reversal every 2-3 minutes until assistance arrives    High risk medication use       naproxen 500 MG tablet    NAPROSYN    40 tablet    Take 1 tablet (500 mg) by mouth 2 times daily (with meals)        * nicotine 14 MG/24HR 24 hr patch    NICODERM CQ    30 patch    Place 1 patch onto the skin every 24 hours Use this patch first    Tobacco abuse       * nicotine 7 MG/24HR 24 hr patch    NICODERM CQ    30 patch    Place 1  patch onto the skin every 24 hours    Tobacco abuse       * Notice:  This list has 2 medication(s) that are the same as other medications prescribed for you. Read the directions carefully, and ask your doctor or other care provider to review them with you.

## 2018-09-19 ENCOUNTER — TELEPHONE (OUTPATIENT)
Dept: BEHAVIORAL HEALTH | Facility: CLINIC | Age: 33
End: 2018-09-19

## 2018-09-19 NOTE — PROGRESS NOTES
Behavioral Health Home Services  [facility]: [place]      Social Work Care Navigator Note      Patient: Hoang Kiser  Date: September 19, 2018  Preferred Name: Shoaib    Previous PHQ-9:   PHQ-9 SCORE 1/22/2016 1/24/2018 2/14/2018   Total Score - - -   Total Score 4 2 7     Previous JIN-7:   JIN-7 SCORE 8/20/2015 1/22/2016 8/14/2018   Total Score - - -   Total Score 7 3 15     MOLLY LEVEL:  MOLLY Score (Last Two) 3/23/2016   MOLLY Raw Score 52   Activation Score 100   MOLLY Level 4       Preferred Contact:  Need for : No  Preferred Contact: Cell    Type of Contact Today: Face to Face in Clinic      Data: (subjective / Objective):    SW met with patient to complete enrollment forms. Patient signed enrollment forms and agree to enroll in [facility]. Patient stated that his therapist has already completed a DA and a release to contact therapist (Rebecca) was on file at . SW will reach out to Pt's therapist to request copy of DA. Once SW receives a copy of DA. Pt will be enrolled in [facility].         Patient response to [facility] Service offering:   Considering enrolling in [facility] services    Hailey Tim, Social Work Care Coordinator             Next 5 appointments (look out 90 days)     Sep 27, 2018 11:00 AM CDT   Return Visit with Fili Wesley LP   [facility] ([facility])    [address]  [facility] [address] [phone]            Oct 19, 2018 11:30 AM CDT   Return Visit with Fili Wesley LP   [facility] ([facility])    [address]  [facility] [address] [phone]            Oct 19, 2018 12:30 PM CDT   Return Visit with CARLOS A Guy CNP   [facility] ([facility])    [address]  [facility] [address] [phone]

## 2018-09-19 NOTE — TELEPHONE ENCOUNTER
Behavioral Health Home Services  No Data Recorded      Social Work Care Navigator Note      Patient: Hoang Kiser  Date: September 19, 2018  Preferred Name: Shoaib    Previous PHQ-9:   PHQ-9 SCORE 1/22/2016 1/24/2018 2/14/2018   Total Score - - -   Total Score 4 2 7     Previous JIN-7:   JIN-7 SCORE 8/20/2015 1/22/2016 8/14/2018   Total Score - - -   Total Score 7 3 15     MOLLY LEVEL:  MOLLY Score (Last Two) 3/23/2016   MOLLY Raw Score 52   Activation Score 100   MOLLY Level 4       Preferred Contact:  No Data Recorded    Type of Contact Today: Phone call (patient / identified key support person reached)      Data: (subjective / Objective):  Recent ED/IP Admission or Discharge?   None    Patient Goals:  No Data Recorded      [facility] Core Service Provided:  Care Coordination: provided care management services/referrals necessary to ensure patient and their identified supports have access to medical, behavioral health, pharmacology and recovery support services.  Ensured that patient's care is integrated across all settings and services.     Current Stressors / Issues / Care Plan Objective Addressed Today:  No stressors reported       Assessment: (Progress on Goals / Homework):  SW received a call back from Rebecca (Pt's therapist) stating that the last DA was completed in 2016.     BROWN will schedule Pt to complete DA with [facility] in clinic at [place] for [facility] enrollment purposes.     Plan: (Homework, other):  Patient was encouraged to continue to seek condition-related information and education.         Hailey Tim, Social Work Care Coordinator                 Next 5 appointments (look out 90 days)     Sep 27, 2018 11:00 AM CDT   Return Visit with Fili Wesley LP   [facility] ([facility])    [address] [place] [address]   [phone]            Oct 19, 2018 11:30 AM CDT   Return Visit with Fili Wesley LP   [facility] ([facility]    [address]  [facility] [address]   [phone]            Oct 19, 2018 12:30 PM CDT   Return Visit with CARLOS A Guy CNP   [facility] ([facility])    [address]  [facility] [address] [phone]

## 2018-09-27 ENCOUNTER — OFFICE VISIT (OUTPATIENT)
Dept: PALLIATIVE MEDICINE | Facility: CLINIC | Age: 33
End: 2018-09-27
Payer: COMMERCIAL

## 2018-09-27 ENCOUNTER — TELEPHONE (OUTPATIENT)
Dept: PALLIATIVE MEDICINE | Facility: CLINIC | Age: 33
End: 2018-09-27

## 2018-09-27 DIAGNOSIS — M25.551 HIP PAIN, RIGHT: ICD-10-CM

## 2018-09-27 DIAGNOSIS — M62.830 SPASM OF BACK MUSCLES: ICD-10-CM

## 2018-09-27 DIAGNOSIS — G89.29 CHRONIC LOW BACK PAIN WITH SCIATICA, SCIATICA LATERALITY UNSPECIFIED, UNSPECIFIED BACK PAIN LATERALITY: ICD-10-CM

## 2018-09-27 DIAGNOSIS — M79.2 NEUROPATHIC PAIN: ICD-10-CM

## 2018-09-27 DIAGNOSIS — M51.369 DDD (DEGENERATIVE DISC DISEASE), LUMBAR: Primary | ICD-10-CM

## 2018-09-27 DIAGNOSIS — M54.40 CHRONIC LOW BACK PAIN WITH SCIATICA, SCIATICA LATERALITY UNSPECIFIED, UNSPECIFIED BACK PAIN LATERALITY: ICD-10-CM

## 2018-09-27 PROCEDURE — 96152 HC HEALTH AND BEHAVIOR INTERVENTION, INDIVIDUAL, EACH 15 MINUTES: CPT | Performed by: PSYCHOLOGIST

## 2018-09-27 RX ORDER — BACLOFEN 10 MG/1
5-10 TABLET ORAL 3 TIMES DAILY PRN
Qty: 90 TABLET | Refills: 1 | Status: SHIPPED | OUTPATIENT
Start: 2018-09-27 | End: 2018-10-26

## 2018-09-27 NOTE — TELEPHONE ENCOUNTER
naproxen (NAPROSYN) 500 MG tablet 40 tablet 0 9/6/2018       Last Written Prescription Date:  08/29/2018  Last Fill Quantity: 40,   # refills: 0  Last Office Visit: 08/29/18  Future Office visit:    Next 5 appointments (look out 90 days)     Oct 08, 2018 11:00 AM CDT   Long [facility] Call with Phill Floyd [facility]   [facility]    [address] [facility] [address] [phone]            Oct 19, 2018 11:00 AM CDT   Return Visit with CARLOS A Guy CNP   [facility])    [address]  [facility] [address] [phone]            Oct 19, 2018 11:30 AM CDT   Return Visit with Fili Wesley LP   [facility] ([facility])    [address]  [facility] [address] [phone]

## 2018-09-27 NOTE — PROGRESS NOTES
[facility]   [facility] [place]  Behavioral Medicine Visit    Patient Name: Hoang Kiser    YOB: 1985   Medical Record Number: [id number]  Date: 9/27/2018                SUBJECTIVE:   Met with the patient on this date for an elective return appointment.  Today's visit is our 11th visit post initial assessment.  This is our first visit since August 17, 2018.  Today Shoaib reports that he feels as though he is somewhat more accepting of his overall circumstances.  This includes his perception that his mood has stabilized somewhat more, is making progress on his movement disorder (having consultation with PT later this week), has been working with his pain provider regarding sleep management, has been volunteering to do specifics with [employer] for self-help support organization and musical performers and is participating regularly in his biweekly psychotherapy and psychiatry consultation.  He reports that his pain is essentially the same and that he is using tools consistently to try and cope with it.    Today we discuss CBT, hypnosis and questions about sleep.  The patient is interested in trying hypnosis after previously deciding he did not want to do so.  We talked briefly about some possible metaphors and he notes that ones that have been previously helpful R the perception that he is manipulating controls and modulating his pain as well as using music as a possible adjunct.  The patient agrees to generate a list of ideas for metaphors to discuss at our next visit and then we will do a initial session of self hypnosis.          OBJECTIVE:   Length of Visit: 60 minutes      Assessment: Current Emotional / Mental Status    Appearance:   Appropriate   Eye Contact:   Good   Psychomotor Behavior: Continuous motor movement   Attitude:   Cooperative   Orientation:   All  Speech  Rate / Production:              Normal   Volume:              Normal   Mood:    Normal  Affect:    Appropriate   Thought Content:  Clear   Thought Form:  Coherent  Logical   Insight:    Fair     ASSESSMENT:   Per patient pain provider: Degenerative disc disease, lumbar, hip pain, right, spasm of back muscles, chronic low back pain with sciatica, sciatica laterality unspecified, unspecified back pain laterality, neuropathic pain    Progress toward goals: good.    Pain Status: unchanged    Emotional Status: improved              Medication / chemical use concerns: None    PLAN:   Next Appointment: Hoang Kiser will schedule a follow-up appointment in 3 weeks.  Assignment: See above  Objectives / interventions for next session: See above    Fili Wesley MA, LP, [facility]  Behavioral Pain Specialist  [facility]

## 2018-09-27 NOTE — TELEPHONE ENCOUNTER
Signed and sent to pharmacy on 2nd floor.     CARLOS A Bass, NP-C  [facility] [place]

## 2018-09-27 NOTE — MR AVS SNAPSHOT
After Visit Summary   9/27/2018    Hoang Kiser    MRN: [id number]           Patient Information     Date Of Birth          1985        Visit Information        Provider Department      9/27/2018 11:00 AM Fili Wesley LP [facility]        Today's Diagnoses     DDD (degenerative disc disease), lumbar    -  1    Hip pain, right        Spasm of back muscles        Chronic low back pain with sciatica, sciatica laterality unspecified, unspecified back pain laterality        Neuropathic pain           Follow-ups after your visit        Your next 10 appointments already scheduled     Sep 28, 2018 11:00 AM CDT   (Arrive by 10:45 AM)   Evaluation with Melinda Mcgraw PT   [facility] ([facility])    [address]  [address] [facility] [address] [phone]            Oct 01, 2018  2:30 PM CDT   New Visit with Krystle Atkins   [facility]    [address] [facility] [address]   [phone]            Oct 08, 2018 11:00 AM CDT   Long Office Call with Phill Floyd MD   [facility]    [address] [facility] [address]   [phone]            Oct 19, 2018 11:00 AM CDT   Return Visit with CARLOS A Guy CNP   [facility] ([facility])    [address]  [facility] [address]   [phone]            Oct 19, 2018 11:30 AM CDT   Return Visit with Fili Wesley LP   [facility] ([facility])    [address]  [facility] [address]   [phone]            Feb 14, 2019 12:10 PM CST   (Arrive by 11:55 AM)   Return Movement Disorder with Bryant Cao MD   [facility] ([facility])    [address]  [address] [facility] [address] [phone]              Who to contact      If you have questions or need follow up information about today's clinic visit or your schedule please contact [facility] directly at [phone].  Normal or non-critical lab and imaging results will be communicated to you by MyChart, letter or phone within 4 business days after the clinic has received the results. If you do not hear from us within 7 days, please contact the clinic through MyChart or phone. If you have a critical or abnormal lab result, we will notify you by phone as soon as possible.  Submit refill requests through [employer] or call your pharmacy and they will forward the refill request to us. Please allow 3 business days for your refill to be completed.          Additional Information About Your Visit        [employer] Information     [employer] gives you secure access to your electronic health record. If you see a primary care provider, you can also send messages to your care team and make appointments. If you have questions, please call your primary care clinic.  If you do not have a primary care provider, please call [phone] and they will assist you.         Blood Pressure from Last 3 Encounters:   09/12/18 121/86   09/06/18 128/80   08/29/18 131/47    Weight from Last 3 Encounters:   08/29/18 74.4 kg (164 lb)   08/23/18 74.4 kg (164 lb)   08/14/18 74.8 kg (165 lb)              We Performed the Following     HEALTH & BEHAVIOR ASSESS, INITIAL, EA 15MIN PHD          Where to get your medicines      These medications were sent to [facility] [place], [place] - [address]  [address] [address]     Phone:  [phone]     baclofen 10 MG tablet          Primary Care Provider Office Phone # Fax #    Phill Floyd [phone] [address] [address] [facility] [address]        Equal Access to Services     BERLIN MEYER AH: Mariana Armenta, ishan rinaldi, qatrey hawkins, arnav perry  tevin riveromichaelamarilynn amaro'aabarbara ah. So [facility] [phone].    ATENCIÓN: Si melissa dunne, tiene a cutler disposición servicios gratuitos de asistencia lingüística. Rodríguez oscar [phone].    We comply with applicable federal civil rights laws and [place] laws. We do not discriminate on the basis of race, color, national origin, age, disability, sex, sexual orientation, or gender identity.            Thank you!     Thank you for choosing [facility]  for your care. Our goal is always to provide you with excellent care. Hearing back from our patients is one way we can continue to improve our services. Please take a few minutes to complete the written survey that you may receive in the mail after your visit with us. Thank you!             Your Updated Medication List - Protect others around you: Learn how to safely use, store and throw away your medicines at www.disposemymeds.org.          This list is accurate as of 9/27/18 12:51 PM.  Always use your most recent med list.                   Brand Name Dispense Instructions for use Diagnosis    amphetamine-dextroamphetamine 30 MG per 24 hr capsule    ADDERALL XR     Take 30 mg by mouth daily        baclofen 10 MG tablet    LIORESAL    90 tablet    Take 0.5-1 tablets (5-10 mg) by mouth 3 times daily as needed for muscle spasms    Spasm of back muscles       clonazePAM 0.5 MG tablet    klonoPIN     Take 0.5-1 mg by mouth nightly as needed        DULoxetine 60 MG EC capsule    CYMBALTA    30 capsule    2 x 60mg tab by mouth daily        HYDROcodone-acetaminophen 5-325 MG per tablet    NORCO    75 tablet    Take 1 tablet by mouth 3 times daily as needed for pain (Max 3 times daily. #75 tabs to last 30 days.) Okay to fill on 9/19/18    Hip pain, right       hydrOXYzine 25 MG tablet    ATARAX    60 tablet    Take one to two tablets at HS for insomnia or anxiety    Anxiety, Other insomnia       naloxone nasal spray    NARCAN    0.2 mL    Spray 1 spray (4 mg) into one  nostril alternating nostrils as needed for opioid reversal every 2-3 minutes until assistance arrives    High risk medication use       naproxen 500 MG tablet    NAPROSYN    40 tablet    Take 1 tablet (500 mg) by mouth 2 times daily (with meals)        * nicotine 14 MG/24HR 24 hr patch    NICODERM CQ    30 patch    Place 1 patch onto the skin every 24 hours Use this patch first    Tobacco abuse       * nicotine 7 MG/24HR 24 hr patch    NICODERM CQ    30 patch    Place 1 patch onto the skin every 24 hours    Tobacco abuse       * Notice:  This list has 2 medication(s) that are the same as other medications prescribed for you. Read the directions carefully, and ask your doctor or other care provider to review them with you.

## 2018-09-27 NOTE — TELEPHONE ENCOUNTER
9- at 10:34AM    In-person    Reason for Message:  Medication  If this is a refill request, has the caller requested the refill from the pharmacy already? No  Will the patient be using a [facility]? Yes  Name of the pharmacy and phone number for the current request: [facility] [place], [place] - [address]    Name of the medication requested: Baclofen (Lioresal) 10 mg tablet    Patient will be out of medication before his next appointment with Tamiko Stevens (dos 10-).    If clinic needs to reach patient, please use TarariStevie Mantilla McAllister  Patient Representative  [facility]

## 2018-09-28 ENCOUNTER — THERAPY VISIT (OUTPATIENT)
Dept: PHYSICAL THERAPY | Facility: CLINIC | Age: 33
End: 2018-09-28

## 2018-09-28 DIAGNOSIS — Z74.09 IMPAIRED FUNCTIONAL MOBILITY, BALANCE, GAIT, AND ENDURANCE: Primary | ICD-10-CM

## 2018-09-28 DIAGNOSIS — F41.9 ANXIETY: ICD-10-CM

## 2018-09-28 DIAGNOSIS — G25.9 FUNCTIONAL MOVEMENT DISORDER: ICD-10-CM

## 2018-10-01 ENCOUNTER — OFFICE VISIT (OUTPATIENT)
Dept: BEHAVIORAL HEALTH | Facility: CLINIC | Age: 33
End: 2018-10-01
Payer: COMMERCIAL

## 2018-10-01 DIAGNOSIS — F43.10 PTSD (POST-TRAUMATIC STRESS DISORDER): Primary | ICD-10-CM

## 2018-10-01 DIAGNOSIS — F43.22 ADJUSTMENT DISORDER WITH ANXIOUS MOOD: ICD-10-CM

## 2018-10-01 DIAGNOSIS — F32.1 MODERATE MAJOR DEPRESSION (H): ICD-10-CM

## 2018-10-01 PROCEDURE — 90791 PSYCH DIAGNOSTIC EVALUATION: CPT | Performed by: MARRIAGE & FAMILY THERAPIST

## 2018-10-01 RX ORDER — NAPROXEN 500 MG/1
500 TABLET ORAL 2 TIMES DAILY WITH MEALS
Qty: 40 TABLET | Refills: 0 | Status: SHIPPED | OUTPATIENT
Start: 2018-10-01 | End: 2018-11-02

## 2018-10-01 ASSESSMENT — ANXIETY QUESTIONNAIRES
2. NOT BEING ABLE TO STOP OR CONTROL WORRYING: SEVERAL DAYS
IF YOU CHECKED OFF ANY PROBLEMS ON THIS QUESTIONNAIRE, HOW DIFFICULT HAVE THESE PROBLEMS MADE IT FOR YOU TO DO YOUR WORK, TAKE CARE OF THINGS AT HOME, OR GET ALONG WITH OTHER PEOPLE: VERY DIFFICULT
1. FEELING NERVOUS, ANXIOUS, OR ON EDGE: NEARLY EVERY DAY
GAD7 TOTAL SCORE: 13
7. FEELING AFRAID AS IF SOMETHING AWFUL MIGHT HAPPEN: MORE THAN HALF THE DAYS
5. BEING SO RESTLESS THAT IT IS HARD TO SIT STILL: SEVERAL DAYS
3. WORRYING TOO MUCH ABOUT DIFFERENT THINGS: NEARLY EVERY DAY
6. BECOMING EASILY ANNOYED OR IRRITABLE: SEVERAL DAYS

## 2018-10-01 ASSESSMENT — PATIENT HEALTH QUESTIONNAIRE - PHQ9: 5. POOR APPETITE OR OVEREATING: MORE THAN HALF THE DAYS
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
## 2018-10-01 NOTE — MR AVS SNAPSHOT
After Visit Summary   10/1/2018    Hoang Kiser    MRN: [id number]           Patient Information     Date Of Birth          1985        Visit Information        Provider Department      10/1/2018 2:30 PM Krystle Atkins [facility]        Today's Diagnoses     PTSD (post-traumatic stress disorder)    -  1    Moderate major depression (H)        Adjustment disorder with anxious mood           Follow-ups after your visit        Your next 10 appointments already scheduled     Oct 08, 2018 11:00 AM CDT   Long Office Call with Phill Floyd MD   [facility]    [address] [facility] [address]   [phone]            Oct 19, 2018 11:00 AM CDT   Return Visit with CARLOS A Guy CNP   [facility]    [address]  [address]  [facility] [address] [phone]            Oct 19, 2018 11:30 AM CDT   Return Visit with Fili Wesley LP   [facility]    [address]  [address]  [facility] [address] [phone]            Feb 14, 2019 12:10 PM CST   (Arrive by 11:55 AM)   Return Movement Disorder with Bryant Cao MD   [facility] ([facility])    [address] [place] [id number] [phone]              Who to contact     If you have questions or need follow up information about today's clinic visit or your schedule please contact [facility] directly at [phone].  Normal or non-critical lab and imaging results will be communicated to you by MyChart, letter or phone within 4 business days after the clinic has received the results. If you do not hear from us within 7 days, please contact the clinic through MyChart or phone. If you have a critical or abnormal lab result, we will notify you by phone as soon as possible.  Submit  refill requests through [employer] or call your pharmacy and they will forward the refill request to us. Please allow 3 business days for your refill to be completed.          Additional Information About Your Visit        ubigratehart Information     [employer] gives you secure access to your electronic health record. If you see a primary care provider, you can also send messages to your care team and make appointments. If you have questions, please call your primary care clinic.  If you do not have a primary care provider, please call [phone] and they will assist you.         Blood Pressure from Last 3 Encounters:   09/12/18 121/86   09/06/18 128/80   08/29/18 131/47    Weight from Last 3 Encounters:   08/29/18 74.4 kg (164 lb)   08/23/18 74.4 kg (164 lb)   08/14/18 74.8 kg (165 lb)              Today, you had the following     No orders found for display       Primary Care Provider Office Phone # Fax #    Phill Floyd [phone] [address] [facility] [address]        Equal Access to Services     [facility]: Hadii aad ku hadasho Soomaali, waaxda luqadaha, qaybta kaalmada adeegyatanvi, arnav lorenzana . So [facility] [phone].    ATENCIÓN: Si habla español, tiene a cutler disposición servicios gratuitos de asistencia lingüística. Llame al [phone].    We comply with applicable federal civil rights laws and [place] laws. We do not discriminate on the basis of race, color, national origin, age, disability, sex, sexual orientation, or gender identity.            Thank you!     Thank you for choosing [facility]  for your care. Our goal is always to provide you with excellent care. Hearing back from our patients is one way we can continue to improve our services. Please take a few minutes to complete the written survey that you may receive in the mail after your visit with us. Thank you!             Your Updated Medication List - Protect others around  you: Learn how to safely use, store and throw away your medicines at www.disposemymeds.org.          This list is accurate as of 10/1/18 11:59 PM.  Always use your most recent med list.                   Brand Name Dispense Instructions for use Diagnosis    amphetamine-dextroamphetamine 30 MG per 24 hr capsule    ADDERALL XR     Take 30 mg by mouth daily        baclofen 10 MG tablet    LIORESAL    90 tablet    Take 0.5-1 tablets (5-10 mg) by mouth 3 times daily as needed for muscle spasms    Spasm of back muscles       clonazePAM 0.5 MG tablet    klonoPIN     Take 0.5-1 mg by mouth nightly as needed        DULoxetine 60 MG EC capsule    CYMBALTA    30 capsule    2 x 60mg tab by mouth daily        HYDROcodone-acetaminophen 5-325 MG per tablet    NORCO    75 tablet    Take 1 tablet by mouth 3 times daily as needed for pain (Max 3 times daily. #75 tabs to last 30 days.) Okay to fill on 9/19/18    Hip pain, right       hydrOXYzine 25 MG tablet    ATARAX    60 tablet    Take one to two tablets at HS for insomnia or anxiety    Anxiety, Other insomnia       naloxone nasal spray    NARCAN    0.2 mL    Spray 1 spray (4 mg) into one nostril alternating nostrils as needed for opioid reversal every 2-3 minutes until assistance arrives    High risk medication use       naproxen 500 MG tablet    NAPROSYN    40 tablet    Take 1 tablet (500 mg) by mouth 2 times daily (with meals)        * nicotine 14 MG/24HR 24 hr patch    NICODERM CQ    30 patch    Place 1 patch onto the skin every 24 hours Use this patch first    Tobacco abuse       * nicotine 7 MG/24HR 24 hr patch    NICODERM CQ    30 patch    Place 1 patch onto the skin every 24 hours    Tobacco abuse       * Notice:  This list has 2 medication(s) that are the same as other medications prescribed for you. Read the directions carefully, and ask your doctor or other care provider to review them with you.

## 2018-10-02 NOTE — PROGRESS NOTES
Behavioral Health Home Services  [facility] Clinic: [place]      Social Work Care Navigator Note      Patient: Hoang Kiser  Date: October 2, 2018  Preferred Name: Shoaib    Previous PHQ-9:   PHQ-9 SCORE 1/24/2018 2/14/2018 10/1/2018   Total Score - - -   Total Score 2 7 13     Previous JIN-7:   JIN-7 SCORE 1/22/2016 8/14/2018 10/1/2018   Total Score - - -   Total Score 3 15 13     MOLLY LEVEL:  MOLLY Score (Last Two) 3/23/2016 10/1/2018   MOLLY Raw Score 52 31   Activation Score 100 59.3   MOLLY Level 4 3       Preferred Contact:  Need for : No  Preferred Contact: Cell    Type of Contact Today: Face to Face in Clinic      Data: (subjective / Objective):    Patient came in to complete the comprehensive wellness assessment for Behavioral Health Home Services.  See [facility] Flowsheets for details on the assessment.  See [facility], Behavioral Health Home for a copy of the patient's care plan.    Hailey Tim, Social Work Care Coordinator        Next 5 appointments (look out 90 days)     Oct 08, 2018 11:00 AM CDT   [facility]   [facility]    [address] [address] [facility] [address] [phone]            Oct 19, 2018 11:00 AM CDT   Return Visit with CARLOS A Guy CNP   [facility] ([facility]    [address]  [address]  [facility] [address] [phone]            Oct 19, 2018 11:30 AM CDT   Return Visit with Fili Wesley LP   [facility] ([facility])    [address]  [address]  [facility] [address] [phone]

## 2018-10-03 ASSESSMENT — ANXIETY QUESTIONNAIRES: GAD7 TOTAL SCORE: 13

## 2018-10-03 ASSESSMENT — PATIENT HEALTH QUESTIONNAIRE - PHQ9: SUM OF ALL RESPONSES TO PHQ QUESTIONS 1-9: 13

## 2018-10-08 ENCOUNTER — OFFICE VISIT (OUTPATIENT)
Dept: FAMILY MEDICINE | Facility: CLINIC | Age: 33
End: 2018-10-08
Payer: COMMERCIAL

## 2018-10-08 VITALS
BODY MASS INDEX: 25.84 KG/M2 | TEMPERATURE: 98.3 F | OXYGEN SATURATION: 98 % | DIASTOLIC BLOOD PRESSURE: 74 MMHG | RESPIRATION RATE: 20 BRPM | HEART RATE: 92 BPM | WEIGHT: 165 LBS | SYSTOLIC BLOOD PRESSURE: 135 MMHG

## 2018-10-08 DIAGNOSIS — R30.0 DYSURIA: Primary | ICD-10-CM

## 2018-10-08 DIAGNOSIS — R32 URINARY INCONTINENCE, UNSPECIFIED TYPE: ICD-10-CM

## 2018-10-08 LAB
ALBUMIN UR-MCNC: NEGATIVE MG/DL
APPEARANCE UR: CLEAR
BILIRUB UR QL STRIP: NEGATIVE
COLOR UR AUTO: YELLOW
GLUCOSE UR STRIP-MCNC: NEGATIVE MG/DL
HGB UR QL STRIP: NEGATIVE
KETONES UR STRIP-MCNC: NEGATIVE MG/DL
LEUKOCYTE ESTERASE UR QL STRIP: NEGATIVE
NITRATE UR QL: NEGATIVE
PH UR STRIP: 5.5 PH (ref 5–7)
RBC #/AREA URNS AUTO: NORMAL /HPF
SOURCE: NORMAL
SP GR UR STRIP: 1.01 (ref 1–1.03)
UROBILINOGEN UR STRIP-ACNC: 0.2 EU/DL (ref 0.2–1)
WBC #/AREA URNS AUTO: NORMAL /HPF

## 2018-10-08 PROCEDURE — 87491 CHLMYD TRACH DNA AMP PROBE: CPT | Performed by: FAMILY MEDICINE

## 2018-10-08 PROCEDURE — 99213 OFFICE O/P EST LOW 20 MIN: CPT | Performed by: FAMILY MEDICINE

## 2018-10-08 PROCEDURE — 81001 URINALYSIS AUTO W/SCOPE: CPT | Performed by: FAMILY MEDICINE

## 2018-10-08 ASSESSMENT — PAIN SCALES - GENERAL: PAINLEVEL: NO PAIN (0)

## 2018-10-08 NOTE — NURSING NOTE
"Chief Complaint   Patient presents with     Groin Pain     there was a lifting injury causing incontinence dec 2016, x 1 months ago cramping when urinating.     Health Maintenance     Screening,       Initial /74  Pulse 92  Temp 98.3  F (36.8  C) (Oral)  Resp 20  Wt 165 lb (74.8 kg)  SpO2 98%  BMI 25.84 kg/m2 Estimated body mass index is 25.84 kg/(m^2) as calculated from the following:    Height as of 8/14/18: 5' 7\" (1.702 m).    Weight as of this encounter: 165 lb (74.8 kg).  Medication Reconciliation: complete  Tiny Amaral, LIANE  "

## 2018-10-08 NOTE — PROGRESS NOTES
"SUBJECTIVE:   Hoang Kiser is a 33 year old male who presents today for symptom of dysuria. He started having these symptoms 1month(s) ago.  He reports some cramping when urinating.      This patient does not  have a history of frequent urinary tract infections.     He reports that in 2016 he was lifting something and strained his left groin and hamstring   Since then he has had some urinary incontinence   He reports that if he is out of the house and away from a bathrom for a while he will feel a  pressure and then will urinate a little    Also occassional(ly) when he is at home he will get the sudden urge to go and will leak a small amount.    He has had sex in the last year and it was without condoms     He reports that he also has incontinence stool but this only happened once when he did not have access to a bathroom.         Past Medical History:   Diagnosis Date     Abnormal involuntary movement 6/2/2016     Anxiety state, unspecified 04/30/2012     Benign positional vertigo Dec 2016    Started after my groin/back injury. Sitting on Toilet.     Degenerative disc disease at L5-S1 level 2/27/2018     Depressive disorder 2003    I have been on and off medication for depression since this     Dysphonia 2014    Nothing significant.     Epilepsy (H)     as a child. 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 Spicy foods set it off.     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  History: Myoclonus.  Comparison: None.  Technique:  1. MRI of the Brain: Sagittal T1-weighted, axial T2-weighted, axial turboFLAIR, axial susceptibility, and axial diffusion-weighted with ADC map images of the brain were obtained without intravenous contrast. After intravenous administration of gadolinium, axial " and sagittal T1-weighted images of th     Hoarseness 2017    Dry mouth, started after taking Tizanidine     Neuralgia, neuritis, and radiculitis, unspecified 04/30/2012     normal emg 2017 8/8/2017    Interpretation: This is a normal study. There is no electrophysiologic evidence of a lumbosacral radiculopathy affecting the right or left lower extremity on the basis of this study.    Rodney Mena MD Department of Neurology       Panic disorder without agoraphobia 04/30/2012     Problem, psychiatric 2016    PTSD     Seizures (H) [id number]    Grew out of it. Absence Seizures.     Tic disorder 6/2/2016     Tinnitus 2015    Had it most of my life. Got worse in 2015     Current Outpatient Prescriptions   Medication Sig Dispense Refill     amphetamine-dextroamphetamine (ADDERALL XR) 30 MG per 24 hr capsule Take 30 mg by mouth daily       baclofen (LIORESAL) 10 MG tablet Take 0.5-1 tablets (5-10 mg) by mouth 3 times daily as needed for muscle spasms 90 tablet 1     clonazePAM (KLONOPIN) 0.5 MG tablet Take 0.5-1 mg by mouth nightly as needed   0     DULoxetine (CYMBALTA) 60 MG EC capsule 2 x 60mg tab by mouth daily 30 capsule 1     HYDROcodone-acetaminophen (NORCO) 5-325 MG per tablet Take 1 tablet by mouth 3 times daily as needed for pain (Max 3 times daily. #75 tabs to last 30 days.) Okay to fill on 9/19/18 75 tablet 0     hydrOXYzine (ATARAX) 25 MG tablet Take one to two tablets at HS for insomnia or anxiety 60 tablet 1     naloxone (NARCAN) nasal spray Spray 1 spray (4 mg) into one nostril alternating nostrils as needed for opioid reversal every 2-3 minutes until assistance arrives 0.2 mL 0     naproxen (NAPROSYN) 500 MG tablet Take 1 tablet (500 mg) by mouth 2 times daily (with meals) 40 tablet 0     nicotine (NICODERM CQ) 14 MG/24HR 24 hr patch Place 1 patch onto the skin every 24 hours Use this patch first 30 patch 0     nicotine (NICODERM CQ) 7 MG/24HR 24 hr patch Place 1 patch onto the skin every 24 hours 30 patch  0     Social History   Substance Use Topics     Smoking status: Current Every Day Smoker     Packs/day: 0.50     Years: 10.00     Types: Cigarettes     Start date: 1/1/2002     Smokeless tobacco: Never Used      Comment: Transdermal patches have helped me the most in the past     Alcohol use No      Comment: none since 2013         OBJECTIVE:  /74  Pulse 92  Temp 98.3  F (36.8  C) (Oral)  Resp 20  Wt 165 lb (74.8 kg)  SpO2 98%  BMI 25.84 kg/m2  GENERAL APPEARANCE: healthy, alert and no distress        Results for orders placed or performed in visit on 10/08/18   UA with Microscopic   Result Value Ref Range    Color Urine Yellow     Appearance Urine Clear     Glucose Urine Negative NEG^Negative mg/dL    Bilirubin Urine Negative NEG^Negative    Ketones Urine Negative NEG^Negative mg/dL    Specific Gravity Urine 1.010 1.003 - 1.035    pH Urine 5.5 5.0 - 7.0 pH    Protein Albumin Urine Negative NEG^Negative mg/dL    Urobilinogen Urine 0.2 0.2 - 1.0 EU/dL    Nitrite Urine Negative NEG^Negative    Blood Urine Negative NEG^Negative    Leukocyte Esterase Urine Negative NEG^Negative    Source Midstream Urine     WBC Urine 0 - 5 OTO5^0 - 5 /HPF    RBC Urine O - 2 OTO2^O - 2 /HPF        (R30.0) Dysuria  (primary encounter diagnosis)  Comment:   Plan: UA with Microscopic, Chlamydia trachomatis PCR            (R32) Urinary incontinence, unspecified type  Comment:   Plan: UROLOGY ADULT REFERRAL

## 2018-10-08 NOTE — MR AVS SNAPSHOT
After Visit Summary   10/8/2018    Hoang Kiser    MRN: [id number]           Patient Information     Date Of Birth          1985        Visit Information        Provider Department      10/8/2018 11:00 AM Phill Floyd MD [facility]        Today's Diagnoses     Dysuria    -  1      Care Instructions    Your provider has referred you to: [facility]: [facility] [phone]           Follow-ups after your visit        Your next 10 appointments already scheduled     Oct 19, 2018 11:00 AM CDT   Return Visit with CARLOS A Guy CNP   [facility] ([facility])    [address]  [address]  [facility] [address] [phone]            Oct 19, 2018 11:30 AM CDT   Return Visit with Fili Wesley LP   [facility] ([facility])    [address]  [address]  [facility] [id number] [phone]            Feb 14, 2019 12:10 PM CST   (Arrive by 11:55 AM)   Return Movement Disorder with Bryant Cao MD   [facility] ([facility])    [address] [facility] [address] [phone]              Who to contact     If you have questions or need follow up information about today's clinic visit or your schedule please contact [facility] directly at [phone].  Normal or non-critical lab and imaging results will be communicated to you by MyChart, letter or phone within 4 business days after the clinic has received the results. If you do not hear from us within 7 days, please contact the clinic through MyChart or phone. If you have a critical or abnormal lab result, we will notify you by phone as soon as possible.  Submit refill requests through [employer] or call your pharmacy and they will forward the refill request to us. Please allow 3 business days for your refill to be completed.           Additional Information About Your Visit        MyChart Information     [employer] gives you secure access to your electronic health record. If you see a primary care provider, you can also send messages to your care team and make appointments. If you have questions, please call your primary care clinic.  If you do not have a primary care provider, please call [phone] and they will assist you.        Your Vitals Were     Pulse Temperature Respirations Pulse Oximetry BMI (Body Mass Index)       92 98.3  F (36.8  C) (Oral) 20 98% 25.84 kg/m2        Blood Pressure from Last 3 Encounters:   10/08/18 135/74   09/12/18 121/86   09/06/18 128/80    Weight from Last 3 Encounters:   10/08/18 165 lb (74.8 kg)   08/29/18 164 lb (74.4 kg)   08/23/18 164 lb (74.4 kg)              We Performed the Following     Chlamydia trachomatis PCR     UA with Microscopic        Primary Care Provider Office Phone # Fax #    Phill Floyd [phone] [address] [facility] [address]        Equal Access to Services     [facility]: Hadii rosemary ku hadasho Soomaali, waaxda luqadaha, qaybta kaalmada shruthi, arnav lorenzana . So [facility] [phone].    ATENCIÓN: Si habla español, tiene a cutler disposición servicios gratuitos de asistencia lingüística. [facility] [phone].    We comply with applicable federal civil rights laws and [place] laws. We do not discriminate on the basis of race, color, national origin, age, disability, sex, sexual orientation, or gender identity.            Thank you!     Thank you for choosing [facility]  for your care. Our goal is always to provide you with excellent care. Hearing back from our patients is one way we can continue to improve our services. Please take a few minutes to complete the written survey that you may receive in the mail after your visit with us. Thank you!             Your Updated Medication List - Protect others around you:  Learn how to safely use, store and throw away your medicines at www.disposemymeds.org.          This list is accurate as of 10/8/18 12:39 PM.  Always use your most recent med list.                   Brand Name Dispense Instructions for use Diagnosis    amphetamine-dextroamphetamine 30 MG per 24 hr capsule    ADDERALL XR     Take 30 mg by mouth daily        baclofen 10 MG tablet    LIORESAL    90 tablet    Take 0.5-1 tablets (5-10 mg) by mouth 3 times daily as needed for muscle spasms    Spasm of back muscles       clonazePAM 0.5 MG tablet    klonoPIN     Take 0.5-1 mg by mouth nightly as needed        DULoxetine 60 MG EC capsule    CYMBALTA    30 capsule    2 x 60mg tab by mouth daily        HYDROcodone-acetaminophen 5-325 MG per tablet    NORCO    75 tablet    Take 1 tablet by mouth 3 times daily as needed for pain (Max 3 times daily. #75 tabs to last 30 days.) Okay to fill on 9/19/18    Hip pain, right       hydrOXYzine 25 MG tablet    ATARAX    60 tablet    Take one to two tablets at HS for insomnia or anxiety    Anxiety, Other insomnia       naloxone nasal spray    NARCAN    0.2 mL    Spray 1 spray (4 mg) into one nostril alternating nostrils as needed for opioid reversal every 2-3 minutes until assistance arrives    High risk medication use       naproxen 500 MG tablet    NAPROSYN    40 tablet    Take 1 tablet (500 mg) by mouth 2 times daily (with meals)        * nicotine 14 MG/24HR 24 hr patch    NICODERM CQ    30 patch    Place 1 patch onto the skin every 24 hours Use this patch first    Tobacco abuse       * nicotine 7 MG/24HR 24 hr patch    NICODERM CQ    30 patch    Place 1 patch onto the skin every 24 hours    Tobacco abuse       * Notice:  This list has 2 medication(s) that are the same as other medications prescribed for you. Read the directions carefully, and ask your doctor or other care provider to review them with you.

## 2018-10-09 ENCOUNTER — TELEPHONE (OUTPATIENT)
Dept: BEHAVIORAL HEALTH | Facility: CLINIC | Age: 33
End: 2018-10-09

## 2018-10-09 LAB
C TRACH DNA SPEC QL NAA+PROBE: NEGATIVE
SPECIMEN SOURCE: NORMAL

## 2018-10-09 NOTE — PROGRESS NOTES
Hoang,  I have reviewed the results of the laboratory tests that we recently ordered. All of the lab work performed was normal or considered normal for you.  Sincerely,   Phill Floyd

## 2018-10-09 NOTE — TELEPHONE ENCOUNTER
Behavioral Health Home Services  [facility]: [place]      Social Work Care Navigator Note      Patient: Hoang Kiser  Date: October 9, 2018  Preferred Name: Shoaib    Previous PHQ-9:   PHQ-9 SCORE 1/24/2018 2/14/2018 10/1/2018   Total Score - - -   Total Score 2 7 13     Previous JIN-7:   JIN-7 SCORE 1/22/2016 8/14/2018 10/1/2018   Total Score - - -   Total Score 3 15 13     MOLLY LEVEL:  MOLLY Score (Last Two) 3/23/2016 10/1/2018   MOLLY Raw Score 52 31   Activation Score 100 59.3   MOLLY Level 4 3       Preferred Contact:  Need for : No  Preferred Contact: Cell    Type of Contact Today: Phone call (patient / identified key support person reached)      Data: (subjective / Objective):  Recent ED/IP Admission or Discharge?   None    Patient Goals:  No Data Recorded      [facility] Core Service Provided:  Care Coordination: provided care management services/referrals necessary to ensure patient and their identified supports have access to medical, behavioral health, pharmacology and recovery support services.  Ensured that patient's care is integrated across all settings and services.   Health and Wellness Promotion      Assessment: (Progress on Goals / Homework):  SW received a v/m from Patient requesting a call back. SW called Pt but Pt stated this was not a good time to talk due to Pt being in the checkout line at the grocery store.    UPDATE: Pt  Returned SW's call by calling the clinic directly. SW was notified by scheduling that Pt called. SW called PT to check in. Pt asked how he could get a copy of his DA. SW gave Pt the phone number for Medical Records (#[phone]). Pt also asked SW how to be assessed for a CADI Waiver. SW gave Pt the phone number for [facility] with [facility] (#[phone]) to request a MNChoices Assessment.        Plan: (Homework, other):   Pt will call Med Records to obtain a copy of the DA. Pt will call [facility] to request an assessment for a MNChoices Assessment.          Hailey Tim, Social Work Care Coordinator                 Next 5 appointments (look out 90 days)     Oct 19, 2018 11:00 AM CDT   Return Visit with CARLOS A Guy CNP   [facility] ([facility])    [address]  [address]  [facility] [id number] [phone]            Oct 19, 2018 11:30 AM CDT   Return Visit with Fili Wesley LP   [facility] ([facility])    [address]  [facility] [id number] [phone]

## 2018-10-09 NOTE — TELEPHONE ENCOUNTER
Patient is calling stated that he missed a call from Hailey TRINH  Please call to advise  Thank you

## 2018-10-13 ENCOUNTER — MYC MEDICAL ADVICE (OUTPATIENT)
Dept: PALLIATIVE MEDICINE | Facility: CLINIC | Age: 33
End: 2018-10-13

## 2018-10-15 NOTE — TELEPHONE ENCOUNTER
Noted. Will discuss with Shoaib at his appt on Friday.   CARLOS A Bass, NP-C  [facility]

## 2018-10-19 DIAGNOSIS — M25.551 HIP PAIN, RIGHT: ICD-10-CM

## 2018-10-19 RX ORDER — HYDROCODONE BITARTRATE AND ACETAMINOPHEN 5; 325 MG/1; MG/1
1 TABLET ORAL 3 TIMES DAILY PRN
Qty: 75 TABLET | Refills: 0 | Status: SHIPPED | OUTPATIENT
Start: 2018-10-19 | End: 2018-10-26

## 2018-10-19 NOTE — TELEPHONE ENCOUNTER
Prescription signed and given to RN/MA for processing.     CARLOS A Bass, NP-C  [facility]

## 2018-10-19 NOTE — TELEPHONE ENCOUNTER
Patient had to reschedule a future appt due to his medical cab mixing up his appt days.   Patient is out of medication (NORCO).  He would like to speak to the care team in regards to this ([phone] (home)).    Patient also r/s future appts.       Ann ORANTES    [facility]

## 2018-10-19 NOTE — TELEPHONE ENCOUNTER
Received call from patient requesting refill(s) of Norco     Last picked up from pharmacy on 9/19/18  Due date 10/19/18    Pt last seen on 9/6  Next appt scheduled for 10/26/18     checked in the past 6 months? Yes If no, print current report and give to RN    Last urine drug screen 4/5/18  Current opioid agreement on file Yes Date: 4/19/18    Processing (pick one):      Called pt. Let him know that Tamiko is willing to provide a prescription but that we need to know how he would like it processed. Pt states that he is grateful that Tamiko is willing to prescribe. States that he had expected to see her today so didn't call for a refill earlier. States that he took his last pill this morning. States that he thinks he could find a friend to drive him to the pharmacy tomorrow so would appreciate it if the Rx could be dropped of at [place] today.      Deliver Rx to [facility] pharmacy    Will facilitate refill.    KATERINA MckeonN, RN-BC  Patient Care Supervisor/Care Coordinator  [facility]

## 2018-10-19 NOTE — TELEPHONE ENCOUNTER
Delivered Rx to the [facility] pharmacy. Pt is informed     Campbell Kemp MA  [facility]

## 2018-10-23 ENCOUNTER — TELEPHONE (OUTPATIENT)
Dept: NEUROLOGY | Facility: CLINIC | Age: 33
End: 2018-10-23

## 2018-10-23 ENCOUNTER — THERAPY VISIT (OUTPATIENT)
Dept: PHYSICAL THERAPY | Facility: CLINIC | Age: 33
End: 2018-10-23

## 2018-10-23 DIAGNOSIS — G25.9 FUNCTIONAL MOVEMENT DISORDER: Primary | ICD-10-CM

## 2018-10-23 DIAGNOSIS — Z74.09 IMPAIRED FUNCTIONAL MOBILITY, BALANCE, GAIT, AND ENDURANCE: ICD-10-CM

## 2018-10-23 NOTE — MR AVS SNAPSHOT
After Visit Summary   10/23/2018    Hoang Kiser    MRN: [id number]           Patient Information     Date Of Birth          1985        Visit Information        Provider Department      10/23/2018 8:30 AM Melinda Mcgraw PT M [facility]        Today's Diagnoses     Functional movement disorder    -  1    Impaired functional mobility, balance, gait, and endurance           Follow-ups after your visit        Your next 10 appointments already scheduled     Oct 26, 2018 10:30 AM CDT   Return Visit with CARLOS A Guy CNP   [facility] ([facility]    [address]  [address]  [facility] [address]   [phone]            Nov 01, 2018  8:00 AM CDT   Return Visit with Fili Wesley LP   [facility] ([facility])    [address]  [address]  [facility] [address]   [phone]            Nov 02, 2018  3:00 PM CDT   (Arrive by 2:45 PM)   New Movement Disorder with  MOVEMENT DISORDER FELLOW   [facility] ([facility])    [address] [place] [id number] [phone]            Feb 14, 2019 12:10 PM CST   (Arrive by 11:55 AM)   Return Movement Disorder with Bryant Cao MD   [facility] ([facility])    [address] [place] [id number] [phone]              Who to contact     Please call your clinic at [phone] to:    Ask questions about your health    Make or cancel appointments    Discuss your medicines    Learn about your test results    Speak to your doctor            Additional Information About Your Visit        MyChart Information     [employer] gives you secure access to your electronic health record. If you see a primary care provider, you can also send messages to your care team and make appointments. If you have questions, please call your primary care clinic.  If you do  not have a primary care provider, please call [phone] and they will assist you.      [facility] is an electronic gateway that provides easy, online access to your medical records. With [facility], you can request a clinic appointment, read your test results, renew a prescription or communicate with your care team.     To access your existing account, please contact your [facility] or call [phone] for assistance.         Blood Pressure from Last 3 Encounters:   10/08/18 135/74   09/12/18 121/86   09/06/18 128/80    Weight from Last 3 Encounters:   10/08/18 74.8 kg (165 lb)   08/29/18 74.4 kg (164 lb)   08/23/18 74.4 kg (164 lb)              Today, you had the following     No orders found for display       Primary Care Provider Office Phone # Fax #    Phill Floyd [phone] [address] [facility] [address]        Equal Access to Services     [facility] : Hadii aad ku hadasho Soomaali, waaxda luqadaha, qaybta kaalmada adeegyada, waxay idiin hayaan benjamíneg kharamarilynn la'misty . So [facility] [phone].    ATENCIÓN: Si habla español, tiene a cutler disposición servicios gratuitos de asistencia lingüística. Llame al [phone].    We comply with applicable federal civil rights laws and [place] laws. We do not discriminate on the basis of race, color, national origin, age, disability, sex, sexual orientation, or gender identity.            Thank you!     Thank you for choosing [facility]  for your care. Our goal is always to provide you with excellent care. Hearing back from our patients is one way we can continue to improve our services. Please take a few minutes to complete the written survey that you may receive in the mail after your visit with us. Thank you!             Your Updated Medication List - Protect others around you: Learn how to safely use, store and throw away your medicines at www.disposemymeds.org.          This list is accurate as of  10/23/18  8:59 PM.  Always use your most recent med list.                   Brand Name Dispense Instructions for use Diagnosis    amphetamine-dextroamphetamine 30 MG per 24 hr capsule    ADDERALL XR     Take 30 mg by mouth daily        baclofen 10 MG tablet    LIORESAL    90 tablet    Take 0.5-1 tablets (5-10 mg) by mouth 3 times daily as needed for muscle spasms    Spasm of back muscles       clonazePAM 0.5 MG tablet    klonoPIN     Take 0.5-1 mg by mouth nightly as needed        DULoxetine 60 MG EC capsule    CYMBALTA    30 capsule    2 x 60mg tab by mouth daily        HYDROcodone-acetaminophen 5-325 MG per tablet    NORCO    75 tablet    Take 1 tablet by mouth 3 times daily as needed for pain (Max 3 times daily. #75 tabs to last 30 days.) Okay to fill and start on/after 10/19/18    Hip pain, right       hydrOXYzine 25 MG tablet    ATARAX    60 tablet    Take one to two tablets at HS for insomnia or anxiety    Anxiety, Other insomnia       naloxone nasal spray    NARCAN    0.2 mL    Spray 1 spray (4 mg) into one nostril alternating nostrils as needed for opioid reversal every 2-3 minutes until assistance arrives    High risk medication use       naproxen 500 MG tablet    NAPROSYN    40 tablet    Take 1 tablet (500 mg) by mouth 2 times daily (with meals)        * nicotine 14 MG/24HR 24 hr patch    NICODERM CQ    30 patch    Place 1 patch onto the skin every 24 hours Use this patch first    Tobacco abuse       * nicotine 7 MG/24HR 24 hr patch    NICODERM CQ    30 patch    Place 1 patch onto the skin every 24 hours    Tobacco abuse       * Notice:  This list has 2 medication(s) that are the same as other medications prescribed for you. Read the directions carefully, and ask your doctor or other care provider to review them with you.

## 2018-10-23 NOTE — TELEPHONE ENCOUNTER
"Received forms for \"Discharge Application: Total and Permanent Disability\" to be able to be discharged from student loans.    Left voice mail that the Movement Disorder division of neurology will not be signing this form as we do not certify disability for functional movement disorders.  "

## 2018-10-26 ENCOUNTER — OFFICE VISIT (OUTPATIENT)
Dept: PALLIATIVE MEDICINE | Facility: CLINIC | Age: 33
End: 2018-10-26
Payer: COMMERCIAL

## 2018-10-26 VITALS
BODY MASS INDEX: 26.63 KG/M2 | DIASTOLIC BLOOD PRESSURE: 70 MMHG | OXYGEN SATURATION: 98 % | HEART RATE: 97 BPM | SYSTOLIC BLOOD PRESSURE: 112 MMHG | RESPIRATION RATE: 16 BRPM | WEIGHT: 170 LBS

## 2018-10-26 DIAGNOSIS — M25.551 HIP PAIN, RIGHT: ICD-10-CM

## 2018-10-26 DIAGNOSIS — G47.09 OTHER INSOMNIA: ICD-10-CM

## 2018-10-26 DIAGNOSIS — M62.830 SPASM OF BACK MUSCLES: ICD-10-CM

## 2018-10-26 DIAGNOSIS — F41.9 ANXIETY: ICD-10-CM

## 2018-10-26 PROCEDURE — 99214 OFFICE O/P EST MOD 30 MIN: CPT | Performed by: NURSE PRACTITIONER

## 2018-10-26 RX ORDER — HYDROXYZINE HYDROCHLORIDE 25 MG/1
TABLET, FILM COATED ORAL
Qty: 60 TABLET | Refills: 1 | Status: SHIPPED | OUTPATIENT
Start: 2018-10-26 | End: 2018-12-06

## 2018-10-26 RX ORDER — BACLOFEN 10 MG/1
5-10 TABLET ORAL 3 TIMES DAILY PRN
Qty: 90 TABLET | Refills: 1 | Status: SHIPPED | OUTPATIENT
Start: 2018-10-26 | End: 2018-12-06

## 2018-10-26 RX ORDER — HYDROCODONE BITARTRATE AND ACETAMINOPHEN 5; 325 MG/1; MG/1
1 TABLET ORAL 3 TIMES DAILY PRN
Qty: 75 TABLET | Refills: 0 | Status: SHIPPED | OUTPATIENT
Start: 2018-10-26 | End: 2018-12-06

## 2018-10-26 ASSESSMENT — PAIN SCALES - GENERAL: PAINLEVEL: MODERATE PAIN (5)

## 2018-10-26 NOTE — MR AVS SNAPSHOT
After Visit Summary   10/26/2018    Hoang Kiser    MRN: [id number]           Patient Information     Date Of Birth          1985        Visit Information        Provider Department      10/26/2018 10:30 AM Tamiko [facility] APRN CNP [facility]        Today's Diagnoses     Hip pain, right          Care Instructions    After Visit Instructions:     Thank you for coming to [facility] for your care. It is my goal to partner with you to help you reach your optimal state of health.     I am recommending multidisciplinary care at this time.  The focus of care will be to continue gradual rehabilitation and pain management with medication adjustments as needed.    Continue daily self-care, identifying contributing factors, and monitoring variations in pain level. Continue to integrate self-care into your life.      1. Schedule pain psychology and PT visits  2. Schedule follow-up with CARLOS A Higuera, NPORAL in mid December  3. Let us know about medical cannabis  4. Medication recommendations:   1. No change to pain medications  2. Next refill of Norco provided  3.       CARLOS A aBss NP-C  [facility]    Contact information: [facility]  Clinic Number:  [phone]   Call this number with any questions about your care and for scheduling assistance. Calls are returned Monday through Friday between 8 AM and 4:30 PM. We usually get back to you within 2 business days depending on the issue/request.           Medication Refills Policy:    For non-narcotic medications, please your pharmacy directly to request a refill and the pharmacy will call the [facility] for authorization. Please allow 3-4 days for these refills.    For narcotic refills, call the nurse triage line and leave a message requesting your refill along with the name of the pharmacy that you use. Narcotic  prescriptions will be mailed to your pharmacy or you may pick them up at the clinic.  Please call 7-10 days before your refill is due  The above policy allows adequate time so that you do not run out of medication.    No Show - Late Cancellation - Late Arrival Policy  We believe regular attendance is key to your success in our program.    Any time you are unable to keep your appointment we ask that you call us at  least 24 hours in advance to let us know. This will allow us to offer the appointment time to another patient. The following is our policy for missed appointments. This also applies to appointments cancelled with less than 24 hours notice.    After missing 3 appointments without calling first, we will cancel all of your future appointments at [facility].    At that point, you will not be able to resume services unless approved by your care team  We understand that unforseen circumstances arise, however, out of respect for all concerned and to provide this appointment to another patient, this policy will be enforced.    Please note that most follow up appointments are 30 minutes long. If you arrive late, your provider may not be able to see you for the entire 30 minutes. Please also note that if you arrive more than 15 minutes for any appointment, it may be rescheduled.                                     Follow-ups after your visit        Your next 10 appointments already scheduled     Nov 01, 2018  8:00 AM CDT   Return Visit with Fili Wesley LP   [facility]    [address] [place] [address]   [phone]            Nov 02, 2018  3:00 PM CDT   (Arrive by 2:45 PM)   New Movement Disorder with  MOVEMENT DISORDER FELLOW   [facility] ([facility])    [address]  3rd [address]  [facility] [address]   [phone]            Nov 08, 2018 10:00 AM CST   New Visit with Prabhjot  ALKA Braswell MD   [facility] ([facility])    [address] [place] [id number] [phone]            Feb 14, 2019 12:10 PM CST   (Arrive by 11:55 AM)   Return Movement Disorder with Bryant Cao MD   [facility] ([facility])    [address]  [address] [facility] [address] [phone]              Who to contact     If you have questions or need follow up information about today's clinic visit or your schedule please contact [facility] directly at [phone].  Normal or non-critical lab and imaging results will be communicated to you by MyChart, letter or phone within 4 business days after the clinic has received the results. If you do not hear from us within 7 days, please contact the clinic through [employer] or phone. If you have a critical or abnormal lab result, we will notify you by phone as soon as possible.  Submit refill requests through [employer] or call your pharmacy and they will forward the refill request to us. Please allow 3 business days for your refill to be completed.          Additional Information About Your Visit        [employer] Information     [employer] gives you secure access to your electronic health record. If you see a primary care provider, you can also send messages to your care team and make appointments. If you have questions, please call your primary care clinic.  If you do not have a primary care provider, please call [phone] and they will assist you.        Your Vitals Were     Pulse Respirations Pulse Oximetry BMI (Body Mass Index)          97 16 98% 26.63 kg/m2         Blood Pressure from Last 3 Encounters:   10/26/18 112/70   10/08/18 135/74   09/12/18 121/86    Weight from Last 3 Encounters:   10/26/18 77.1 kg (170 lb)   10/08/18 74.8 kg (165 lb)   08/29/18 74.4 kg (164 lb)              Today, you had the following     No orders found for display          Today's Medication Changes          These changes are accurate as of 10/26/18 10:56 AM.  If you have any questions, ask your nurse or doctor.               These medicines have changed or have updated prescriptions.        Dose/Directions    HYDROcodone-acetaminophen 5-325 MG per tablet   Commonly known as:  NORCO   This may have changed:  additional instructions   Used for:  Hip pain, right        Dose:  1 tablet   Take 1 tablet by mouth 3 times daily as needed for pain (Max 3 times daily. #75 tabs to last 30 days.) Okay to fill and start on/after 11/18/18   Quantity:  75 tablet   Refills:  0            Where to get your medicines      Some of these will need a paper prescription and others can be bought over the counter.  Ask your nurse if you have questions.     Bring a paper prescription for each of these medications     HYDROcodone-acetaminophen 5-325 MG per tablet               Information about OPIOIDS     PRESCRIPTION OPIOIDS: WHAT YOU NEED TO KNOW   We gave you an opioid (narcotic) pain medicine. It is important to manage your pain, but opioids are not always the best choice. You should first try all the other options your care team gave you. Take this medicine for as short a time (and as few doses) as possible.    Some activities can increase your pain, such as bandage changes or therapy sessions. It may help to take your pain medicine 30 to 60 minutes before these activities. Reduce your stress by getting enough sleep, working on hobbies you enjoy and practicing relaxation or meditation. Talk to your care team about ways to manage your pain beyond prescription opioids.    These medicines have risks:    DO NOT drive when on new or higher doses of pain medicine. These medicines can affect your alertness and reaction times, and you could be arrested for driving under the influence (DUI). If you need to use opioids long-term, talk to your care team about driving.    DO NOT operate heavy machinery    DO NOT  do any other dangerous activities while taking these medicines.    DO NOT drink any alcohol while taking these medicines.     If the opioid prescribed includes acetaminophen, DO NOT take with any other medicines that contain acetaminophen. Read all labels carefully. Look for the word  acetaminophen  or  Tylenol.  Ask your pharmacist if you have questions or are unsure.    You can get addicted to pain medicines, especially if you have a history of addiction (chemical, alcohol or substance dependence). Talk to your care team about ways to reduce this risk.    All opioids tend to cause constipation. Drink plenty of water and eat foods that have a lot of fiber, such as fruits, vegetables, prune juice, apple juice and high-fiber cereal. Take a laxative (Miralax, milk of magnesia, Colace, Senna) if you don t move your bowels at least every other day. Other side effects include upset stomach, sleepiness, dizziness, throwing up, tolerance (needing more of the medicine to have the same effect), physical dependence and slowed breathing.    Store your pills in a secure place, locked if possible. We will not replace any lost or stolen medicine. If you don t finish your medicine, please throw away (dispose) as directed by your pharmacist. The [place] [employer]ution [employer] has more information about safe disposal: https://www.pca.[facility].us/living-green/managing-unwanted-medications         Primary Care Provider Office Phone # Fax #    Phill Floyd [phone] [address] [facility] [address]        Equal Access to Services     BERLIN MEYER : Hadii rosemary bobbyo Soevelyn, waaxda luqadaha, qaybta kaalmada shruthi, arnav billy. So [facility] [phone].    ATENCIÓN: Si habla español, tiene a cutler disposición servicios gratuitos de asistencia lingüística. Llame al [phone].    We comply with applicable federal civil rights laws and [place] laws. We do not  discriminate on the basis of race, color, national origin, age, disability, sex, sexual orientation, or gender identity.            Thank you!     Thank you for choosing [facility]  for your care. Our goal is always to provide you with excellent care. Hearing back from our patients is one way we can continue to improve our services. Please take a few minutes to complete the written survey that you may receive in the mail after your visit with us. Thank you!             Your Updated Medication List - Protect others around you: Learn how to safely use, store and throw away your medicines at www.disposemymeds.org.          This list is accurate as of 10/26/18 10:56 AM.  Always use your most recent med list.                   Brand Name Dispense Instructions for use Diagnosis    amphetamine-dextroamphetamine 30 MG per 24 hr capsule    ADDERALL XR     Take 30 mg by mouth daily        baclofen 10 MG tablet    LIORESAL    90 tablet    Take 0.5-1 tablets (5-10 mg) by mouth 3 times daily as needed for muscle spasms    Spasm of back muscles       clonazePAM 0.5 MG tablet    klonoPIN     Take 0.5-1 mg by mouth nightly as needed        DULoxetine 60 MG EC capsule    CYMBALTA    30 capsule    2 x 60mg tab by mouth daily        HYDROcodone-acetaminophen 5-325 MG per tablet    NORCO    75 tablet    Take 1 tablet by mouth 3 times daily as needed for pain (Max 3 times daily. #75 tabs to last 30 days.) Okay to fill and start on/after 11/18/18    Hip pain, right       hydrOXYzine 25 MG tablet    ATARAX    60 tablet    Take one to two tablets at HS for insomnia or anxiety    Anxiety, Other insomnia       naloxone nasal spray    NARCAN    0.2 mL    Spray 1 spray (4 mg) into one nostril alternating nostrils as needed for opioid reversal every 2-3 minutes until assistance arrives    High risk medication use       naproxen 500 MG tablet    NAPROSYN    40 tablet    Take 1 tablet (500 mg) by mouth 2 times daily (with  meals)        * nicotine 14 MG/24HR 24 hr patch    NICODERM CQ    30 patch    Place 1 patch onto the skin every 24 hours Use this patch first    Tobacco abuse       * nicotine 7 MG/24HR 24 hr patch    NICODERM CQ    30 patch    Place 1 patch onto the skin every 24 hours    Tobacco abuse       * Notice:  This list has 2 medication(s) that are the same as other medications prescribed for you. Read the directions carefully, and ask your doctor or other care provider to review them with you.

## 2018-10-26 NOTE — PATIENT INSTRUCTIONS
After Visit Instructions:     Thank you for coming to [facility] for your care. It is my goal to partner with you to help you reach your optimal state of health.     I am recommending multidisciplinary care at this time.  The focus of care will be to continue gradual rehabilitation and pain management with medication adjustments as needed.    Continue daily self-care, identifying contributing factors, and monitoring variations in pain level. Continue to integrate self-care into your life.      1. Schedule pain psychology and PT visits  2. Schedule follow-up with CARLOS A Higuera NP-C in mid December  3. Let us know about medical cannabis  4. Medication recommendations:   1. No change to pain medications  2. Next refill of Norco provided      CARLOS A Bass NP-C  [facility]    Contact information: [facility]  Clinic Number:  [phone]   Call this number with any questions about your care and for scheduling assistance. Calls are returned Monday through Friday between 8 AM and 4:30 PM. We usually get back to you within 2 business days depending on the issue/request.           Medication Refills Policy:    For non-narcotic medications, please your pharmacy directly to request a refill and the pharmacy will call the [facility] for authorization. Please allow 3-4 days for these refills.    For narcotic refills, call the nurse triage line and leave a message requesting your refill along with the name of the pharmacy that you use. Narcotic prescriptions will be mailed to your pharmacy or you may pick them up at the clinic.  Please call 7-10 days before your refill is due  The above policy allows adequate time so that you do not run out of medication.    No Show - Late Cancellation - Late Arrival Policy  We believe regular attendance is key to your success in our program.    Any time you are unable to keep your  appointment we ask that you call us at  least 24 hours in advance to let us know. This will allow us to offer the appointment time to another patient. The following is our policy for missed appointments. This also applies to appointments cancelled with less than 24 hours notice.    After missing 3 appointments without calling first, we will cancel all of your future appointments at [facility].    At that point, you will not be able to resume services unless approved by your care team  We understand that unforseen circumstances arise, however, out of respect for all concerned and to provide this appointment to another patient, this policy will be enforced.    Please note that most follow up appointments are 30 minutes long. If you arrive late, your provider may not be able to see you for the entire 30 minutes. Please also note that if you arrive more than 15 minutes for any appointment, it may be rescheduled.

## 2018-10-26 NOTE — PROGRESS NOTES
"[facility]    CHIEF COMPLAINT: \"involuntary movements, fatigue, unpredictable and increased pain, insomnia due to spastic movements and anxiety\"     INTERVAL HISTORY:  Last seen on 18.        Recommendations/plan at the last visit included:     1. Schedule pain psychology visits with Low   2. Schedule physical therapy visits   3. Schedule follow-up with CARLOS A Higuera NP-C in 4 weeks  4. Medication recommendations:                  1. Hydroxyzine 25 m=2 tabs at bedtime. If too strong okay to take 1/2 tablet.   2. Refill of Vicodin provided.     Since his last visit, Hoang MA Margi reports:  - Have more episodes of flailing movements. Has short video that was recorded by his security camera.     Pain Information:   Pain quality: Exhausting and Stabbing    Pain rating: intensity ranges from 3/10 to 8/10, and averages 6/10 on a 0-10 scale.      Annual Controlled Substance Agreement/UDS due date: 2019      Current Pain Relevant Medications:   Norco 5/325 mg 1 tab BID- TID                        Total opiate dose: 10-15 MME daily  Clonazepam .5 mg 1-2 tab at HS PRN  Duloxetine 20 mg daily  Naproxen 500 mg BID: on hold re: elevated LFTs   Tizanidine 4 mg 1-2 tabs at HS PRN  Ambien 10 mg at HS  Adderall 50 mg daily, combination of long and short acting.       Previous Pain Relevant Medications: (H--helped; HI--Helped initially; [facility]--Somewhat helpful; NH--No help; W--worse; SE--side effects; ?--Unsure if helpful)   NOTE: This medication information taken from patient's intake form, not medical records.                         Opiates: Tramadol: H, Hydrocodone: H                        NSAIDS: Ibuprofen:H, naproxen:[facility], Relafen: NH                        Muscle Relaxants: Cyclobenzaprine:H,Med interaction, Tizanidine:H                        Anti-migraine mediations: Prednisone:H                        Anti-depressants: Bupropion:SE, Celexa:SE, Duloxetine:H, Trazodone:Too strong, " Venlafaxine:too strong                        Sleep aids:Anbien: H                        Anxiolytics: Clonazepam:H                        Neuropathics: Tegretol:taken for seizures in childhood, Gabapentin:H                                          Topicals: Lidocaine:H                        Other medications not covered above: Tylenol:NH      Any illicit drug use: 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 Concern for multiple controlled substances including benzodiazepines, stimulants, opiates.        SELF CARE:   How often do you practice SELF-CARE (relaxing, stretching, pacing, monitoring posture, taking mini-breaks) in a typical day:  10+ daily     THE 4 As OF OPIOID MAINTENANCE ANALGESIA    Analgesia: Is pain relief clinically significant? YES   Activity: Is patient functional and able to perform Activities of Daily Living? YES   Adverse effects: Is patient free from adverse side effects from opiates? YES   Adherence to Rx protocol: Is patient adhering to Controlled Substance Agreement and taking medications ONLY as ordered? YES         Is Narcan prescribed for opiate use >50 MME daily? Ordered for combination of opiates and benzodiazepines.             Medications:  Current Outpatient Prescriptions   Medication Sig Dispense Refill     amphetamine-dextroamphetamine (ADDERALL XR) 30 MG per 24 hr capsule Take 30 mg by mouth daily       baclofen (LIORESAL) 10 MG tablet Take 0.5-1 tablets (5-10 mg) by mouth 3 times daily as needed for muscle spasms 90 tablet 1     clonazePAM (KLONOPIN) 0.5 MG tablet Take 0.5-1 mg by mouth nightly as needed   0     DULoxetine (CYMBALTA) 60 MG EC capsule 2 x 60mg tab by mouth daily  30 capsule 1     HYDROcodone-acetaminophen (NORCO) 5-325 MG per tablet Take 1 tablet by mouth 3 times daily as needed for pain (Max 3 times daily. #75 tabs to last 30 days.) Okay to fill and start on/after 10/19/18 75 tablet 0     hydrOXYzine (ATARAX) 25 MG tablet Take one to two tablets at HS for insomnia or anxiety 60 tablet 1     naloxone (NARCAN) nasal spray Spray 1 spray (4 mg) into one nostril alternating nostrils as needed for opioid reversal every 2-3 minutes until assistance arrives 0.2 mL 0     naproxen (NAPROSYN) 500 MG tablet Take 1 tablet (500 mg) by mouth 2 times daily (with meals) 40 tablet 0     nicotine (NICODERM CQ) 14 MG/24HR 24 hr patch Place 1 patch onto the skin every 24 hours Use this patch first 30 patch 0     nicotine (NICODERM CQ) 7 MG/24HR 24 hr patch Place 1 patch onto the skin every 24 hours 30 patch 0       Review of Systems: A 10-point review of systems was negative, with the exception of chronic pain issues.      Social History: Reviewed; unchanged from previous consultation.      Family history: Reviewed; unchanged from previous consultation.     PHYSICAL EXAM    There were no vitals filed for this visit.    Constitutional: healthy, alert, no distress, pain behaviors and somewhat down r/t increased spastic movements   HEENT: Head atraumatic, normocephalic. Eyes without conjunctival injection or jaundice. Neck supple. No obvious neck masses.  Skin: No rash, lesions, or petechiae of exposed skin.   Extremities: No clubbing, cyanosis, or edema to exposed extremities  Psychiatric/mental status: Alert, without lethargy or stupor. Appropriate affect.       Neurologic exam:  CN:  Cranial nerves 2-12 are grossly normal.  Has uncontrolled upper body movement/tremor.           Musculoskeletal exam:  Cervical spine:                       Flex:  20 degrees                       Ext: 20 degrees                       Rotation to right: 20 degrees                       Rotation to left:  "20 degrees                       Rotation/ext to right: painful                        Rotation/ext to left: painful                        Tenderness in the cervical spine at midline. No                       Tenderness in the cervical paraspinal muscles. No  Thoracic spine:                        Kyphosis. No                       Tenderness in the thoracic spine at midline. Yes                       Tenderness in the thoracic paraspinal muscles. Yes  Lumbar/Sacral spine:                       Forward Flexion:  90 degrees                       Ext: 20 degrees \"tight\"                       Rotation/ext to right: painful                        Rotation/ext to left: painful                        Lordosis. No                       Tenderness in the lumbar spine at midline. Yes                       Tenderness in the lumbar paraspinal muscles.Yes      Psychiatric:  mentation appears normal., affect and mood normal      DIRE Score for ongoing opioid management is calculated as follows:    Diagnosis = 2 pts (slowly progressive; moderate pain/objective findings)    Intractability = 2 pts (most treatments tried; patient not fully engaged/barriers)    Risk        Psych = 2 pts (personality dysfunction/mental illness that moderately interferes with care)         Chem Hlth = 2 pts (use of medications to cope with stress; chemical dependency in remission)       Reliability = 3 pts (highly reliable with meds, appointments, treatments)       Social = 3 pts (supportive family/close relationships; involved in work/school; no isolation)       (Psych + Chem hlth + Reliability + Social) = 14    Efficacy = 2 pts (moderate benefit/function; low med dose; too early/not tried meds)    DIRE Score = 16        7-13: likely NOT suitable candidate for long-term opioid analgesia       14-21: may be a suitable candidate for long-term opioid analgesia          Previous Diagnostic Tests:   Imaging Studies:   MR LUMBAR SPINE W/O CONTRAST 5/2/2018 " 7:27 PM  History: DDD (degenerative disc disease), lumbar; Lumbar  radiculopathy; Hip pain, right; Groin pain, right.  ICD-10: DDD (degenerative disc disease), lumbar; Lumbar radiculopathy;  Hip pain, right; Groin pain, right  Comparison: Lumbar spine MRI 3/8/2017  Technique: Sagittal STIR, T1-weighted turbo spin-echo, 3-D volumetric  T2-weighted and axial reconstructed T2-weighted images of the lumbar  spine were obtained without intravenous contrast.   Findings: 5 lumbar-type vertebra. The tip of the conus medullaris is  at L1.  The lumbar vertebral column is normally aligned.   Straightening of lumbar lordosis, unchanged. The intervertebral disc  heights are maintained. Normal marrow signal. At L5-S1, there is a  disc bulge and facet hypertrophy with mild left neural foraminal  narrowing, unchanged. No spinal canal stenosis.  Impression:  1. Lumbar spondylosis with mild left neural foraminal narrowing at  L5-S1.  2. No spinal canal stenosis.      MR right hip without contrast 5/2/2018 6:47 PM  Techniques: Multiplanar multisequence imaging of the right hip was  obtained without  administration of intra-articular or intravenous  contrast using routing protocol.  History: Hip pain.  Comparison: None available.  Findings:  Osseous structures  Osseous structures: No fracture, stress reaction, avascular necrosis,  or focal osseous lesion is seen. There is small focal area of  subchondral cystic changes in the anterior right femoral head neck  junction, most compatible with synovial herniation pit. Findings  suggestive of reduced femoral head neck junction though alpha angle  cannot be assessed owing to no dedicated oblique axial sequence  obtained.  Articular cartilage and labrum  Assessment limited on this arthrographic study due to relative lack of  joint distension.  Articular cartilage: No high grade chondral loss.  Labrum: Labral tearing approximately from 12:00 to 3:00 with 3:00  being anterior and 6:00 being the  center of transverse ligament in  this convention with associated subtle area of subchondral edema in  the superior acetabulum.  Ligament teres and transverse ligament of acetabulum: Intact.  Joint or bursal effusion  Joint effusion: A physiologic amount of joint fluid.  Bursal effusion: Minimal nonspecific edema over the greater  trochanter. No substantial iliopsoas or trochanteric bursal effusion.  Muscles and tendons  Muscles and tendons: The rectus femoris, the visualized portion  iliopsoas, proximal hamstrings, and hip abductors are intact.  The  visualized adductor muscles are unremarkable.   Nerves:  The visualized course of the sciatic nerve is unremarkable.   Other Findings:  There is fat-containing right inguinal hernia.  Impression:  1. Approximately 12-3 o'clock labral tearing with synovial herniation  pit and likely reduced femoral head neck junction offset. 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  2. Fat containing right inguinal hernia.          ASSESSMENT:   1.  Lumbar DDD, mild  2.  Lumbar muscle spasm  3.  Labral tear, right hip  4.  Hx: anxiety, chemical dependence in remission.  5. Functional neurologic movement disorder    Shoaib presents for med management appt. Extensive discuss re: adjusting pain meds. Given his past CD history we are both concerned about increasing opiates and he is not asking me to. Reviewed medical cannabis. Also discussed that this can be a trigger for relapse. He will talk over with , neurologist and his sponsor. May consider Suboxone in the future. Continue current POC.     Email for med cannabis certification is [email or web address]      Plan:    Diagnosis reviewed, treatment option addressed, and risk/benifits discussed.  Self-care instructions given.  I am recommending a multidisciplinary treatment plan to help this patient better manage pain.        1. Schedule pain psychology and PT visits  2. Schedule  follow-up with CARLOS A Higuera, NPDoloresC in mid December  3. Let us know about medical cannabis  4. Medication recommendations:   1. No change to pain medications  2. Next refill of Norco provided    Total time spent face to face was 30 minutes and more than 50% of face to face time was spent in counseling and/or coordination of care regarding the diagnosis and recommendations above.      CARLOS A Bass, NP-C   [facility]    Disclaimer: This note consists of symbols derived from keyboarding, dictation and/or voice recognition software. As a result, there may be errors in the script that have gone undetected. Please consider this when interpreting information found in this chart.

## 2018-10-29 ENCOUNTER — TRANSFERRED RECORDS (OUTPATIENT)
Dept: HEALTH INFORMATION MANAGEMENT | Facility: CLINIC | Age: 33
End: 2018-10-29

## 2018-10-29 ENCOUNTER — TELEPHONE (OUTPATIENT)
Dept: BEHAVIORAL HEALTH | Facility: CLINIC | Age: 33
End: 2018-10-29

## 2018-10-29 ASSESSMENT — ENCOUNTER SYMPTOMS
NUMBNESS: 0
FEVER: 0
SMELL DISTURBANCE: 0
HOARSE VOICE: 1
ALTERED TEMPERATURE REGULATION: 0
HEADACHES: 0
TINGLING: 0
SORE THROAT: 0
INSOMNIA: 1
HALLUCINATIONS: 0
DECREASED APPETITE: 1
TREMORS: 1
NERVOUS/ANXIOUS: 1
JOINT SWELLING: 1
TROUBLE SWALLOWING: 0
INCREASED ENERGY: 1
HEMATURIA: 0
TASTE DISTURBANCE: 0
WEIGHT LOSS: 0
POLYPHAGIA: 0
ARTHRALGIAS: 1
MYALGIAS: 1
BACK PAIN: 1
NECK MASS: 0
NECK PAIN: 1
MEMORY LOSS: 1
SINUS CONGESTION: 0
WEIGHT GAIN: 0
WEAKNESS: 1
PARALYSIS: 0
LOSS OF CONSCIOUSNESS: 0
FLANK PAIN: 0
DYSURIA: 0
FATIGUE: 1
DISTURBANCES IN COORDINATION: 1
DIZZINESS: 1
DIFFICULTY URINATING: 0
PANIC: 0
SPEECH CHANGE: 1
MUSCLE CRAMPS: 1
STIFFNESS: 1
DEPRESSION: 1
SINUS PAIN: 0
SEIZURES: 1
DECREASED CONCENTRATION: 1
NIGHT SWEATS: 0
CHILLS: 0
MUSCLE WEAKNESS: 1
POLYDIPSIA: 0

## 2018-10-29 NOTE — TELEPHONE ENCOUNTER
[facility]al [facility]  [facility]: [place]      Social Work Care Navigator Note      Patient: Hoang Kiser  Date: October 29, 2018  Preferred Name: Shoaib    Previous PHQ-9:   PHQ-9 SCORE 1/24/2018 2/14/2018 10/1/2018   Total Score - - -   Total Score 2 7 13     Previous JIN-7:   JIN-7 SCORE 1/22/2016 8/14/2018 10/1/2018   Total Score - - -   Total Score 3 15 13     MOLLY LEVEL:  MOLLY Score (Last Two) 3/23/2016 10/1/2018   MOLLY Raw Score 52 31   Activation Score 100 59.3   MOLLY Level 4 3       Preferred Contact:  Need for : No  Preferred Contact: Cell    Type of Contact Today: MyChart / Email (patient reached)      Data: (subjective / Objective):  Recent ED/IP Admission or Discharge?   None    Patient Goals:  No Data Recorded      [facility] Core Service Provided:  Care Coordination: provided care management services/referrals necessary to ensure patient and their identified supports have access to medical, behavioral health, pharmacology and recovery support services.  Ensured that patient's care is integrated across all settings and services.   Health and Wellness Promotion    Current Stressors / Issues / Care Plan Objective Addressed Today:  - Pt needs a form filled out so that he can be relieved of paying his student loans due to not being able to work.      Assessment: (Progress on Goals / Homework):  Pt contacted SW by email in order to see if Pt's PCP would fill out a form that would indicate this pt cannot fulfill paying back his student loan debt due to his disability.     SW passed the document/letter on to Pt's PCP via email. Pt's PCP requested the PT provide more info as to why he cannot fulfill paying of loans.     SW emailed Pt and requested that Pt provide more info to PCP. SW has not heard back from Pt.     Plan: (Homework, other):  Patient was encouraged to continue to seek condition-related information and education.       Hailey Tim, Social Work Care Coordinator                  Next 5 appointments (look out 90 days)     Nov 01, 2018  8:00 AM CDT   Return Visit with Fili Wesley LP   [facility] ([facility])    [address]  [address]  [facility] [id number] [phone]            Dec 13, 2018 10:30 AM CST   Return Visit with CARLOS A Guy CNP   [facility] ([facility])    [address]  [address]  [facility] [id number] [phone]

## 2018-10-30 NOTE — PROGRESS NOTES
Computerized Dynamic Posturography    Sensory Organization Test:   I only tested him on condition one to see about his COG and sway at baseline: Normal for his age for sway is 90 and he scored 87, 88,79 on 3 trials of condition one. COG was directly in the center.     Motor Control Test:  The patient's ability to recover from forward and backward translations of the support surface was WNLs. Latencies and amplitude were WNLs on 5 trials forward and 5 trials backwards.     Adaptation Test:  The patients ability to recover from unexpected perturbations of the support surface was WNLs. 5 trials of toes up and 5 trials of toes down were WNLs.    Summary:  He had normal MOTOR reactions to perturbations. Plan to continue with more PT    Aarti Young DPT, MPT, NCS

## 2018-10-31 ENCOUNTER — MYC MEDICAL ADVICE (OUTPATIENT)
Dept: PHYSICAL THERAPY | Facility: CLINIC | Age: 33
End: 2018-10-31

## 2018-10-31 DIAGNOSIS — G25.9 MOVEMENT DISORDER: Primary | ICD-10-CM

## 2018-10-31 DIAGNOSIS — G25.9 FUNCTIONAL MOVEMENT DISORDER: ICD-10-CM

## 2018-10-31 RX ORDER — ORPHENADRINE CITRATE 100 MG/1
TABLET, EXTENDED RELEASE ORAL
Refills: 0 | COMMUNITY
Start: 2018-07-10 | End: 2018-12-06

## 2018-10-31 RX ORDER — INFLUENZA A VIRUS A/VICTORIA/2454/2019 IVR-207 (H1N1) ANTIGEN (PROPIOLACTONE INACTIVATED), INFLUENZA A VIRUS A/HONG KONG/2671/2019 IVR-208 (H3N2) ANTIGEN (PROPIOLACTONE INACTIVATED), INFLUENZA B VIRUS B/VICTORIA/705/2018 BVR-11 ANTIGEN (PROPIOLACTONE INACTIVATED), INFLUENZA B VIRUS B/PHUKET/3073/2013 BVR-1B ANTIGEN (PROPIOLACTONE INACTIVATED) 15; 15; 15; 15 UG/.5ML; UG/.5ML; UG/.5ML; UG/.5ML
INJECTION, SUSPENSION INTRAMUSCULAR
Refills: 0 | COMMUNITY
Start: 2018-10-10 | End: 2019-02-13

## 2018-10-31 RX ORDER — ZOLPIDEM TARTRATE 10 MG/1
TABLET ORAL
Refills: 0 | COMMUNITY
Start: 2018-08-23 | End: 2019-02-04

## 2018-10-31 RX ORDER — CYCLOBENZAPRINE HCL 5 MG
TABLET ORAL
Refills: 1 | COMMUNITY
Start: 2018-05-22 | End: 2018-12-06

## 2018-10-31 RX ORDER — DULOXETIN HYDROCHLORIDE 20 MG/1
CAPSULE, DELAYED RELEASE ORAL
Refills: 0 | COMMUNITY
Start: 2018-07-03 | End: 2018-11-08 | Stop reason: DRUGHIGH

## 2018-10-31 RX ORDER — TRAMADOL HYDROCHLORIDE 50 MG/1
TABLET ORAL
Refills: 0 | COMMUNITY
Start: 2018-05-03 | End: 2018-12-06

## 2018-10-31 RX ORDER — METHYLPHENIDATE HYDROCHLORIDE 36 MG/1
TABLET ORAL
Refills: 0 | COMMUNITY
Start: 2018-06-27 | End: 2018-12-06

## 2018-10-31 RX ORDER — METHYLPHENIDATE HYDROCHLORIDE 54 MG/1
TABLET ORAL
Refills: 0 | COMMUNITY
Start: 2018-08-06 | End: 2018-12-06

## 2018-10-31 RX ORDER — GABAPENTIN 100 MG/1
CAPSULE ORAL
Refills: 2 | COMMUNITY
Start: 2018-10-08 | End: 2019-02-04

## 2018-11-01 ENCOUNTER — MYC MEDICAL ADVICE (OUTPATIENT)
Dept: PALLIATIVE MEDICINE | Facility: CLINIC | Age: 33
End: 2018-11-01

## 2018-11-01 DIAGNOSIS — M79.18 CHRONIC MYOFASCIAL PAIN: ICD-10-CM

## 2018-11-01 DIAGNOSIS — G89.29 CHRONIC MYOFASCIAL PAIN: ICD-10-CM

## 2018-11-01 DIAGNOSIS — G62.9 NEUROPATHY: Primary | ICD-10-CM

## 2018-11-02 ENCOUNTER — MYC MEDICAL ADVICE (OUTPATIENT)
Dept: PALLIATIVE MEDICINE | Facility: CLINIC | Age: 33
End: 2018-11-02

## 2018-11-02 ENCOUNTER — OFFICE VISIT (OUTPATIENT)
Dept: NEUROLOGY | Facility: CLINIC | Age: 33
End: 2018-11-02

## 2018-11-02 VITALS
DIASTOLIC BLOOD PRESSURE: 80 MMHG | HEART RATE: 64 BPM | SYSTOLIC BLOOD PRESSURE: 116 MMHG | BODY MASS INDEX: 26.63 KG/M2 | WEIGHT: 170 LBS

## 2018-11-02 DIAGNOSIS — G25.9 FUNCTIONAL MOVEMENT DISORDER: ICD-10-CM

## 2018-11-02 DIAGNOSIS — G25.9 FUNCTIONAL MOVEMENT DISORDER: Primary | ICD-10-CM

## 2018-11-02 DIAGNOSIS — M47.817 LUMBOSACRAL SPONDYLOSIS WITHOUT MYELOPATHY: ICD-10-CM

## 2018-11-02 DIAGNOSIS — M62.838 MUSCLE SPASM: Primary | ICD-10-CM

## 2018-11-02 RX ORDER — NAPROXEN 500 MG/1
500 TABLET ORAL 2 TIMES DAILY WITH MEALS
Qty: 40 TABLET | Refills: 3 | Status: SHIPPED | OUTPATIENT
Start: 2018-11-02 | End: 2019-01-24

## 2018-11-02 ASSESSMENT — PAIN SCALES - GENERAL: PAINLEVEL: MODERATE PAIN (5)

## 2018-11-02 NOTE — PROGRESS NOTES
"[facility] Neurology  Movement Disorder Clinic    Hoang Kiser  YOB: 1985  MRN: [id number]    REASON FOR VISIT: Follow up for functional movement disorder    HISTORY OF PRESENT ILLNESS:  Hoang Kiser is a 33 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 There are several videos and the movements are not entirely stereotyped. He is laying supine watching television with his legs bent at the knee in front of him. Suddenly he bends in half at the waist. In some cases he has a couple hip thrusts. Other cases he turns to the side and curls his legs to his chest and straightens a couple times. During the events he appears to be conscious and participates in highly purposeful activity. For example, during one event he reached for the television remote and turned it off and continued with a couple hip thrusts. He confirmed with me that he is conscious during the events. In the videos, the movements last for a few minutes but he tells me it can go on for up to 10 minutes. Afterwards he gets up and immediately engages in purposeful activity - putting on a coat, grabbing cigarettes, and leaving his apartment.     He has near continuous involuntary movements involving the head and trunk. He has observed that movements cease when he is engaged in concentration. Movement attacks are 2-3 times per week. The character of these are different than previous movement attacks, occurring during times of rest rather than pain or activity.     He has been researching functional movement disorder. He has applied for participation in a research study. Today, he asked for details to help with a plan to be developed by his physical therapist and psychologist. He appears to be very engaged, but is discouraged by the continued movements despite his efforts.       MEDICATIONS:  Outpatient Prescriptions " Marked as Taking for the 11/2/18 encounter (Office Visit) with  MOVEMENT DISORDER FELLOW   Medication Sig     AFLURIA QUADRIVALENT 0.5 ML injection ADM 0.5ML IM UTD     amphetamine-dextroamphetamine (ADDERALL XR) 30 MG per 24 hr capsule Take 30 mg by mouth daily     baclofen (LIORESAL) 10 MG tablet Take 0.5-1 tablets (5-10 mg) by mouth 3 times daily as needed for muscle spasms     clonazePAM (KLONOPIN) 0.5 MG tablet Take 0.5-1 mg by mouth nightly as needed      cyclobenzaprine (FLEXERIL) 5 MG tablet      DULoxetine (CYMBALTA) 20 MG EC capsule      DULoxetine (CYMBALTA) 60 MG EC capsule 2 x 60mg tab by mouth daily     gabapentin (NEURONTIN) 100 MG capsule      HYDROcodone-acetaminophen (NORCO) 5-325 MG per tablet Take 1 tablet by mouth 3 times daily as needed for pain (Max 3 times daily. #75 tabs to last 30 days.) Okay to fill and start on/after 11/18/18     hydrOXYzine (ATARAX) 25 MG tablet Take one to two tablets at HS for insomnia or anxiety     methylphenidate ER (CONCERTA) 36 MG CR tablet      methylphenidate ER (CONCERTA) 54 MG CR tablet TK 1 T PO QAM     naloxone (NARCAN) nasal spray Spray 1 spray (4 mg) into one nostril alternating nostrils as needed for opioid reversal every 2-3 minutes until assistance arrives     naproxen (NAPROSYN) 500 MG tablet Take 1 tablet (500 mg) by mouth 2 times daily (with meals)     nicotine (NICODERM CQ) 14 MG/24HR 24 hr patch Place 1 patch onto the skin every 24 hours Use this patch first     nicotine (NICODERM CQ) 7 MG/24HR 24 hr patch Place 1 patch onto the skin every 24 hours     orphenadrine (NORFLEX) 100 MG 12 hr tablet      tiZANidine (ZANAFLEX) 4 MG tablet      traMADol (ULTRAM) 50 MG tablet      zolpidem (AMBIEN) 10 MG tablet        ALLERGIES:  Adderall; Buspirone hcl; Doxycycline; Elavil [amitriptyline]; Naproxen; Norflex [orphenadrine]; Trazodone; Zanaflex [tizanidine]; Buspirone; and Keflex [cephalexin]      PAST MEDICAL HISTORY:  Medical history reviewed and  "updated in Epic, no new problems    REVIEW OF SYSTEMS:  12 point review of systems completed. Pertinent positives and negatives above and in HPI    PHYSICAL EXAM:  VITALS: /80 (BP Location: Right arm)  Pulse 64  Wt 77.1 kg (170 lb)  BMI 26.63 kg/m2  GENERAL: Cooperative, no acute distress  HEENT: Normocephalic and nontraumatic, sclera white, moist mucous membranes  CARDIAC: Regular rate and rhythm   RESPIRATORY: Nonlabored breathing  EXTREMITIES: Distal pulses intact. No UE or LE edema    PSYCHIATRIC: Poor eye contact. Flat affect. Good fund of knowledge. Fair insight.     NEUROLOGIC:  He was alert and attentive. Speech clear, fluent. Normal rate, tone, volume. EOM full. Facial movements symmetric. No dysarthria. No palatal or tongue tremor. No dysarthria. Frequent, near continuous jerky movements of the head and trunk. No tremor or clear myoclonus. No dystonic posturing. Movements are distractible and stop for a short time while he is explaining the videos to me. Gait is normal.       DATA REVIEWED:   Reviewed videos patient brought with him, summarized above      IMPRESSION:   Hoang Kiser is a 33 year old man with functional movement disorder who presented to discuss movement attacks. These have a different character than previous attacks - with an appearance similar to nonepileptic events (\"pseudoseizures\"). The length of the attacks, purposeful activity during the attacks, and quick recovery makes them very unlikely to be epileptic. Frontal lobe seizures can have the appearance of bizarre movements - but a patient would not be expected to retain highly coordinated purposeful behavior. It is not unusual for patient with functional movement disorders to develop new movements, rather it is quite characteristic.     He was encouraged to continue with PT and psychology. It would be helpful for him to develop some short term and long term goals to avoid discouragement. Generally, he should try to " "identify triggers at home, then use these triggers to cause involuntary movements during therapy sessions so he can work on ways of stopping them, ie \"retrain the brain.\"      PLAN:  Agree with continuing PT and psychology cognitive behavior therapy    Follow up as scheduled with Dr. Cao, he may follow up with me prn after that        Alessandra Camargo MD  Movement Disorders Fellow    Total time greater than 45 minutes, over half in counseling and coordination of care as outlined above    Answers for HPI/ROS submitted by the patient on 10/29/2018   General Symptoms: Yes  Skin Symptoms: No  HENT Symptoms: Yes  EYE SYMPTOMS: No  HEART SYMPTOMS: No  LUNG SYMPTOMS: No  INTESTINAL SYMPTOMS: No  URINARY SYMPTOMS: Yes  REPRODUCTIVE SYMPTOMS: No  SKELETAL SYMPTOMS: Yes  BLOOD SYMPTOMS: No  NERVOUS SYSTEM SYMPTOMS: Yes  MENTAL HEALTH SYMPTOMS: Yes  Fever: No  Loss of appetite: Yes  Weight loss: No  Weight gain: No  Fatigue: Yes  Night sweats: No  Chills: No  Increased stress: Yes  Excessive hunger: No  Excessive thirst: No  Feeling hot or cold when others believe the temperature is normal: No  Loss of height: No  Post-operative complications: No  Surgical site pain: No  Hallucinations: No  Change in or Loss of Energy: Yes  Hyperactivity: No  Confusion: Yes  Ear pain: No  Ear discharge: No  Hearing loss: No  Tinnitus: Yes  Nosebleeds: No  Congestion: No  Sinus pain: No  Trouble swallowing: No   Voice hoarseness: Yes  Mouth sores: No  Sore throat: No  Tooth pain: No  Gum tenderness: No  Bleeding gums: No  Change in taste: No  Change in sense of smell: No  Dry mouth: Yes  Hearing aid used: No  Neck lump: No  Trouble holding urine or incontinence: Yes  Pain or burning: No  Trouble starting or stopping: Yes  Increased frequency of urination: Yes  Blood in urine: No  Decreased frequency of urination: No  Frequent nighttime urination: Yes  Flank pain: No  Difficulty emptying bladder: No  Back pain: Yes  Muscle aches: Yes  Neck pain: " Yes  Swollen joints: Yes  Joint pain: Yes  Bone pain: Yes  Muscle cramps: Yes  Muscle weakness: Yes  Joint stiffness: Yes  Bone fracture: No  Trouble with coordination: Yes  Dizziness or trouble with balance: Yes  Fainting or black-out spells: No  Memory loss: Yes  Headache: No  Seizures: Yes  Speech problems: Yes  Tingling: No  Tremor: Yes  Weakness: Yes  Difficulty walking: Yes  Paralysis: No  Numbness: No  Nervous or Anxious: Yes  Depression: Yes  Trouble sleeping: Yes  Trouble thinking or concentrating: Yes  Mood changes: Yes  Panic attacks: No

## 2018-11-02 NOTE — TELEPHONE ENCOUNTER
Patient requesting provider take over refill(s) for Naproxen     Last refilled on 10/1/18    Pt last seen on 10/26/18  Next appt scheduled for 12/13/18    Will facilitate refill.

## 2018-11-02 NOTE — MR AVS SNAPSHOT
After Visit Summary   11/2/2018    Hoang Kiser    MRN: [id number]           Patient Information     Date Of Birth          1985        Visit Information        Provider Department      11/2/2018 3:00 PM [facility] MOVEMENT DISORDER FELLOW [facility]         Follow-ups after your visit        Your next 10 appointments already scheduled     Nov 08, 2018  8:00 AM CST   Return Visit with Fili Wesley LP   [facility] ([facility])    [address]  [address]  [facility] [id number] [phone]            Nov 08, 2018 10:00 AM CST   New Visit with Prabhjot Braswell MD   [facility]    [address] [place] [address] [phone]            Dec 13, 2018 10:30 AM CST   Return Visit with CARLOS A Guy CNP   [facility] ([facility])    [address]  [address]  [facility] [id number] [phone]            Feb 14, 2019 12:10 PM CST   (Arrive by 11:55 AM)   Return Movement Disorder with Bryant Cao MD   [facility] ([facility])    [address] [address] [phone]              Who to contact     Please call your clinic at [phone] to:    Ask questions about your health    Make or cancel appointments    Discuss your medicines    Learn about your test results    Speak to your doctor            Additional Information About Your Visit        [employer] Information     [employer] gives you secure access to your electronic health record. If you see a primary care provider, you can also send messages to your care team and make appointments. If you have questions, please call your primary care clinic.  If you do not have a primary care provider, please call [phone] and they will assist you.      [employer] is an electronic gateway that provides easy, online access to your medical  records. With [employer], you can request a clinic appointment, read your test results, renew a prescription or communicate with your care team.     To access your existing account, please contact your [facility] or call [phone] for assistance.        Your Vitals Were     Pulse BMI (Body Mass Index)                64 26.63 kg/m2           Blood Pressure from Last 3 Encounters:   11/02/18 116/80   10/26/18 112/70   10/08/18 135/74    Weight from Last 3 Encounters:   11/02/18 77.1 kg (170 lb)   10/26/18 77.1 kg (170 lb)   10/08/18 74.8 kg (165 lb)              Today, you had the following     No orders found for display       Primary Care Provider Office Phone # Fax #    Phill Floyd [phone] [address] [facility] [address]        Equal Access to Services     [facility]: Hadii aad ku hadasho Soevelyn, waaxda luqadaha, qaybta kaalmada adegunneryada, arnav lorenzana . So [facility] [phone].    ATENCIÓN: Si habla español, tiene a cutler disposición servicios gratuitos de asistencia lingüística. Llame al [phone].    We comply with applicable federal civil rights laws and [place] laws. We do not discriminate on the basis of race, color, national origin, age, disability, sex, sexual orientation, or gender identity.            Thank you!     Thank you for choosing [facility]  for your care. Our goal is always to provide you with excellent care. Hearing back from our patients is one way we can continue to improve our services. Please take a few minutes to complete the written survey that you may receive in the mail after your visit with us. Thank you!             Your Updated Medication List - Protect others around you: Learn how to safely use, store and throw away your medicines at www.disposemymeds.org.          This list is accurate as of 11/2/18  4:01 PM.  Always use your most recent med list.                   Brand  Name Dispense Instructions for use Diagnosis    AFLURIA QUADRIVALENT 0.5 ML injection   Generic drug:  influenza quadrivalent (PF) vacc      ADM 0.5ML IM UTD        amphetamine-dextroamphetamine 30 MG per 24 hr capsule    ADDERALL XR     Take 30 mg by mouth daily        baclofen 10 MG tablet    LIORESAL    90 tablet    Take 0.5-1 tablets (5-10 mg) by mouth 3 times daily as needed for muscle spasms    Spasm of back muscles       clonazePAM 0.5 MG tablet    klonoPIN     Take 0.5-1 mg by mouth nightly as needed        cyclobenzaprine 5 MG tablet    FLEXERIL          * DULoxetine 20 MG EC capsule    CYMBALTA          * DULoxetine 60 MG EC capsule    CYMBALTA    30 capsule    2 x 60mg tab by mouth daily        gabapentin 100 MG capsule    NEURONTIN          HYDROcodone-acetaminophen 5-325 MG per tablet    NORCO    75 tablet    Take 1 tablet by mouth 3 times daily as needed for pain (Max 3 times daily. #75 tabs to last 30 days.) Okay to fill and start on/after 11/18/18    Hip pain, right       hydrOXYzine 25 MG tablet    ATARAX    60 tablet    Take one to two tablets at HS for insomnia or anxiety    Anxiety, Other insomnia       * methylphenidate ER 36 MG CR tablet    CONCERTA          * methylphenidate ER 54 MG CR tablet    CONCERTA     TK 1 T PO QAM        naloxone nasal spray    NARCAN    0.2 mL    Spray 1 spray (4 mg) into one nostril alternating nostrils as needed for opioid reversal every 2-3 minutes until assistance arrives    High risk medication use       naproxen 500 MG tablet    NAPROSYN    40 tablet    Take 1 tablet (500 mg) by mouth 2 times daily (with meals)    Neuropathy, Chronic myofascial pain       * nicotine 14 MG/24HR 24 hr patch    NICODERM CQ    30 patch    Place 1 patch onto the skin every 24 hours Use this patch first    Tobacco abuse       * nicotine 7 MG/24HR 24 hr patch    NICODERM CQ    30 patch    Place 1 patch onto the skin every 24 hours    Tobacco abuse       orphenadrine 100 MG 12 hr  tablet    NORFLEX          tiZANidine 4 MG tablet    ZANAFLEX          traMADol 50 MG tablet    ULTRAM          zolpidem 10 MG tablet    AMBIEN          * Notice:  This list has 6 medication(s) that are the same as other medications prescribed for you. Read the directions carefully, and ask your doctor or other care provider to review them with you.

## 2018-11-08 ENCOUNTER — OFFICE VISIT (OUTPATIENT)
Dept: UROLOGY | Facility: CLINIC | Age: 33
End: 2018-11-08
Payer: COMMERCIAL

## 2018-11-08 ENCOUNTER — OFFICE VISIT (OUTPATIENT)
Dept: PALLIATIVE MEDICINE | Facility: CLINIC | Age: 33
End: 2018-11-08
Payer: COMMERCIAL

## 2018-11-08 VITALS
HEART RATE: 64 BPM | HEIGHT: 67 IN | OXYGEN SATURATION: 98 % | BODY MASS INDEX: 26.7 KG/M2 | WEIGHT: 170.1 LBS | SYSTOLIC BLOOD PRESSURE: 120 MMHG | DIASTOLIC BLOOD PRESSURE: 82 MMHG

## 2018-11-08 DIAGNOSIS — N39.41 URGE INCONTINENCE: ICD-10-CM

## 2018-11-08 DIAGNOSIS — M25.551 HIP PAIN, RIGHT: ICD-10-CM

## 2018-11-08 DIAGNOSIS — R39.15 URINARY URGENCY: Primary | ICD-10-CM

## 2018-11-08 DIAGNOSIS — M79.18 CHRONIC MYOFASCIAL PAIN: ICD-10-CM

## 2018-11-08 DIAGNOSIS — G89.29 CHRONIC MYOFASCIAL PAIN: ICD-10-CM

## 2018-11-08 DIAGNOSIS — M62.838 MUSCLE SPASM: Primary | ICD-10-CM

## 2018-11-08 DIAGNOSIS — M51.369 DDD (DEGENERATIVE DISC DISEASE), LUMBAR: ICD-10-CM

## 2018-11-08 PROCEDURE — 99204 OFFICE O/P NEW MOD 45 MIN: CPT | Performed by: UROLOGY

## 2018-11-08 PROCEDURE — 96152 HC HEALTH AND BEHAVIOR INTERVENTION, INDIVIDUAL, EACH 15 MINUTES: CPT | Performed by: PSYCHOLOGIST

## 2018-11-08 RX ORDER — OXYBUTYNIN CHLORIDE 5 MG/1
5 TABLET, EXTENDED RELEASE ORAL DAILY
Qty: 90 TABLET | Refills: 1 | Status: SHIPPED | OUTPATIENT
Start: 2018-11-08 | End: 2019-05-10

## 2018-11-08 ASSESSMENT — PAIN SCALES - GENERAL: PAINLEVEL: MODERATE PAIN (5)

## 2018-11-08 NOTE — PROGRESS NOTES
"Urology Consult History and Physical  [facility]  Name: Hoang Kiser    MRN: [id number]   YOB: 1985       We were asked to see Hoang Kiser at the request of Self for evaluation and treatment of urgency with urge incontinence .        Chief Complaint:   Urgency with urge incontinence     History is obtained from the patient            History of Present Illness:   Hoang Kiser is a 33 year old male who is being seen for evaluation of urgency with urge incontinence.    Lifting injury in 12/2016 during which he strained his groin and left hamstring. Following this he was then diagnosed with low back pain. He also has abnormal involventary movement disorder and is following with neurology. Undergoing physical therapy and pain management.     His main issue is urgency and urge incontinence which has been stable for the past year. Has daily issues with small volume urine leakage on the way to the bathroom. Required change of cloths 2-3 x /month.   No dysuria, hematuria, no UTIs. Nocturia 1-2 x / night.  Bowel function is normal. Has daily soft bowel movements.     AUASS: 3-5-1-5-1-1-5 = 21  QOL = 5  THEODORA: 3-4-3-4-4 = 18    (4 or >)  Location: Urine  Quality: Urgency   Severity: Urge incontinence   Duration: 1 year           Past Medical History:     Past Medical History:   Diagnosis Date     Abnormal involuntary movement 6/2/2016     Anxiety state, unspecified 04/30/2012     Benign positional vertigo Dec 2016    Started after my groin/back injury. Sitting on Toilet.     Degenerative disc disease at L5-S1 level 2/27/2018     Depressive disorder 2003    I have been on and off medication for depression since this     Dysphonia 2014    Nothing significant.     Epilepsy (H)     as a child. 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 Spicy foods set it off.     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  History: Myoclonus.  Comparison: None.  Technique:  1. MRI of the Brain: Sagittal T1-weighted, axial T2-weighted, axial turboFLAIR, axial susceptibility, and axial diffusion-weighted with ADC map images of the brain were obtained without intravenous contrast. After intravenous administration of gadolinium, axial and sagittal T1-weighted images of th     Hoarseness 2017    Dry mouth, started after taking Tizanidine     Neuralgia, neuritis, and radiculitis, unspecified 04/30/2012     normal emg 2017 8/8/2017    Interpretation: This is a normal study. There is no electrophysiologic evidence of a lumbosacral radiculopathy affecting the right or left lower extremity on the basis of this study.    Rodney Mena MD Department of Neurology       Panic disorder without agoraphobia 04/30/2012     Problem, psychiatric 2016    PTSD     Seizures (H) 1180-7270    Grew out of it. Absence Seizures.     Tic disorder 6/2/2016     Tinnitus 2015    Had it most of my life. Got worse in 2015            Past Surgical History:     Past Surgical History:   Procedure Laterality Date     BACK SURGERY      injections     COLONOSCOPY  02/15/18    Has not happened yet.     COLONOSCOPY N/A 2/15/2018    Procedure: COMBINED COLONOSCOPY, SINGLE OR MULTIPLE BIOPSY/POLYPECTOMY BY BIOPSY;;  Surgeon: Liam Kincaid MD;  Location: [facility] OR     COLONOSCOPY WITH CO2 INSUFFLATION N/A 2/15/2018    Procedure: COLONOSCOPY WITH CO2 INSUFFLATION;  COLON-FAMILY HX OF COLON CANCER/ SYPURA;  Surgeon: Liam Kincaid MD;  Location: [facility] OR     HC TOOTH EXTRACTION W/FORCEP Bilateral 2003     PE TUBES  1990            Social History:     Social History   Substance Use Topics     Smoking status: Current Every Day Smoker     Packs/day: 0.50     Years: 10.00     Types: Cigarettes     Start date: 1/1/2002     Smokeless  tobacco: Never Used      Comment: Transdermal patches have helped me the most in the past     Alcohol use No      Comment: none since 2013       History   Smoking Status     Current Every Day Smoker     Packs/day: 0.50     Years: 10.00     Types: Cigarettes     Start date: 1/1/2002   Smokeless Tobacco     Never Used     Comment: Transdermal patches have helped me the most in the past            Family History:     Family History   Problem Relation Age of Onset     Lipids Father      hyperlipidemia     Hyperlipidemia Father      Obesity Father      Arthritis Mother      Hyperlipidemia Mother      Depression Mother      Anxiety Disorder Mother      Mental Illness Mother      Obesity Mother      Asthma Brother      Asthma Sister      Depression Other      Hearing Loss Other      Psychotic Disorder Other      Obesity Other      Cerebrovascular Disease Paternal Grandfather      Alzheimer Disease Paternal Grandfather      Depression Brother      Mental Illness Brother      Bipolar     Asthma Brother      Depression Sister      Asthma Sister      Substance Abuse Sister      Alcohol     Mental Illness Brother      Bipolar     Asthma Brother      Asthma Sister      Obesity Sister      Asthma Brother      Obesity Brother      Obesity Maternal Grandmother      Genetic Disorder Maternal Grandmother      Epilepsy     Obesity Paternal Grandmother               Allergies:     Allergies   Allergen Reactions     Buspirone Hcl Other (See Comments)     Panic attacks     Doxycycline Diarrhea, Fatigue, GI Disturbance and Nausea     Elavil [Amitriptyline]      Ineffective in reducing spasms/movement, increased fatigue     Trazodone Fatigue     Keflex [Cephalexin] Diarrhea            Medications:     Current Outpatient Prescriptions   Medication Sig     amphetamine-dextroamphetamine (ADDERALL XR) 30 MG per 24 hr capsule Take 30 mg by mouth daily     baclofen (LIORESAL) 10 MG tablet Take 0.5-1 tablets (5-10 mg) by mouth 3 times daily as  needed for muscle spasms     clonazePAM (KLONOPIN) 0.5 MG tablet Take 0.5-1 mg by mouth nightly as needed      DULoxetine (CYMBALTA) 60 MG EC capsule 2 x 60mg tab by mouth daily     HYDROcodone-acetaminophen (NORCO) 5-325 MG per tablet Take 1 tablet by mouth 3 times daily as needed for pain (Max 3 times daily. #75 tabs to last 30 days.) Okay to fill and start on/after 11/18/18     hydrOXYzine (ATARAX) 25 MG tablet Take one to two tablets at HS for insomnia or anxiety (Patient taking differently: Take 10 mg by mouth nightly as needed Take one to two tablets at HS for insomnia or anxiety)     naloxone (NARCAN) nasal spray Spray 1 spray (4 mg) into one nostril alternating nostrils as needed for opioid reversal every 2-3 minutes until assistance arrives     naproxen (NAPROSYN) 500 MG tablet Take 1 tablet (500 mg) by mouth 2 times daily (with meals)     AFLURIA QUADRIVALENT 0.5 ML injection ADM 0.5ML IM UTD     cyclobenzaprine (FLEXERIL) 5 MG tablet      gabapentin (NEURONTIN) 100 MG capsule      methylphenidate ER (CONCERTA) 36 MG CR tablet      methylphenidate ER (CONCERTA) 54 MG CR tablet TK 1 T PO QAM     nicotine (NICODERM CQ) 14 MG/24HR 24 hr patch Place 1 patch onto the skin every 24 hours Use this patch first (Patient not taking: Reported on 11/8/2018)     nicotine (NICODERM CQ) 7 MG/24HR 24 hr patch Place 1 patch onto the skin every 24 hours (Patient not taking: Reported on 11/8/2018)     orphenadrine (NORFLEX) 100 MG 12 hr tablet      tiZANidine (ZANAFLEX) 4 MG tablet      traMADol (ULTRAM) 50 MG tablet      zolpidem (AMBIEN) 10 MG tablet      [DISCONTINUED] DULoxetine (CYMBALTA) 20 MG EC capsule      No current facility-administered medications for this visit.              Review of Systems:     Skin: negative  Eyes: negative  Ears/Nose/Throat: negative  Respiratory: No shortness of breath, dyspnea on exertion, cough, or hemoptysis  Cardiovascular: negative  Gastrointestinal: negative  Genitourinary: as  "above  Musculoskeletal: as above  Neurologic: as above  Psychiatric: negative  Hematologic/Lymphatic/Immunologic: negative  Endocrine: negative          Physical Exam:   Patient Vitals for the past 24 hrs:   BP Pulse SpO2 Height Weight   11/08/18 0949 120/82 64 98 % 1.702 m (5' 7\") 77.2 kg (170 lb 1.6 oz)     Body mass index is 26.64 kg/(m^2).     General: age-appropriate appearing male in NAD  HEENT: Head AT/NC, EOMI, CN Grossly intact  Lungs: no respiratory distress, or pursed lip breathing  Heart: No obvious jugular venous distension present  Back: no bony midline tenderness, no CVAT bilaterally.  Abdomen: soft, non-distended, non-tender. No organomegaly  : normal male external genitalia.   Lymph: no palpable inguinal lymphadenopathy.  LE: no edema.   Musculoskeltal: extremities normal, no peripheral edema  Skin: no suspicious lesions or rashes  Neuro: Alert, oriented, speech and mentation normal; moving all 4 extremities equally.  Psych: affect and mood normal          Data:   All laboratory data reviewed:    UA RESULTS:  Recent Labs   Lab Test  10/08/18   1215   COLOR  Yellow   APPEARANCE  Clear   URINEGLC  Negative   URINEBILI  Negative   URINEKETONE  Negative   SG  1.010   UBLD  Negative   URINEPH  5.5   PROTEIN  Negative   UROBILINOGEN  0.2   NITRITE  Negative   LEUKEST  Negative   RBCU  O - 2   WBCU  0 - 5      Lab Results   Component Value Date    CR 0.92 06/15/2018             Impression and Plan:   Impression:   34 y/o man with functional movement disorder and also urinary urgency and urge incontinence.       Plan:   Urinary urgency with urge incontinence  - We discussed that his symptoms are consistent with a OAB (over-active bladder) picture. Given his age and otherwise lack of urologic history it is unlikely that he has any outlet issues related to his prostate  - We discussed options for management of OAB (over-active bladder) and discussed trial of anti-cholinergics. We discussed the risks and " benefits and common side effects being constipation and dry mouth  - Script for oxybutinin 5mg XL daily sent to pharmacy  - F/U in 6 months for symptom check     Thank you for the kind consultation.    Time spent: 30 minutes of which >50% was spent counseling.    Prabhjot Braswell MD   Urology  [facility]  [facility] Phone: [phone]  [facility] Phone: [phone]

## 2018-11-08 NOTE — MR AVS SNAPSHOT
After Visit Summary   11/8/2018    Hoang Kiser    MRN: [id number]           Patient Information     Date Of Birth          1985        Visit Information        Provider Department      11/8/2018 10:00 AM Prabhjot Braswell MD [facility]        Today's Diagnoses     Urinary urgency    -  1    Urge incontinence           Follow-ups after your visit        Follow-up notes from your care team     Return in about 6 months (around 5/8/2019).      Your next 10 appointments already scheduled     Nov 29, 2018  8:00 AM CST   Return Visit with Fili Wesley LP   [facility] ([facility])    [address]  [address]  [facility] [address]   [phone]            Dec 06, 2018 10:30 AM CST   Return Visit with CARLOS A Guy CNP   [facility] ([facility])    [address]  [address]  [facility] [address]   [phone]            Feb 14, 2019 12:10 PM CST   (Arrive by 11:55 AM)   Return Movement Disorder with Bryant Cao MD   [facility] ([facility])    [address] [place] [address] [phone]            May 07, 2019 10:00 AM CDT   Return Visit with Prabhjot Braswell MD   [facility] ([facility])    [id number] [place] [address] [phone]              Who to contact     If you have questions or need follow up information about today's clinic visit or your schedule please contact [facility] directly at [phone].  Normal or non-critical lab and imaging results will be communicated to you by MyChart, letter or phone within 4 business days after the clinic has received the results. If you do not hear from us within 7 days, please contact the clinic through MyChart or phone. If you have a critical or abnormal lab result, we will notify you by phone as soon as  "possible.  Submit refill requests through [employer] or call your pharmacy and they will forward the refill request to us. Please allow 3 business days for your refill to be completed.          Additional Information About Your Visit        [employer] Information     [employer] gives you secure access to your electronic health record. If you see a primary care provider, you can also send messages to your care team and make appointments. If you have questions, please call your primary care clinic.  If you do not have a primary care provider, please call [phone] and they will assist you.      [employer] is an electronic gateway that provides easy, online access to your medical records. With [employer], you can request a clinic appointment, read your test results, renew a prescription or communicate with your care team.     To access your existing account, please contact your [facility] or call [phone] for assistance.        Your Vitals Were     Pulse Height Pulse Oximetry BMI (Body Mass Index)          64 1.702 m (5' 7\") 98% 26.64 kg/m2         Blood Pressure from Last 3 Encounters:   11/08/18 120/82   11/02/18 116/80   10/26/18 112/70    Weight from Last 3 Encounters:   11/08/18 77.2 kg (170 lb 1.6 oz)   11/02/18 77.1 kg (170 lb)   10/26/18 77.1 kg (170 lb)              Today, you had the following     No orders found for display         Today's Medication Changes          These changes are accurate as of 11/8/18 10:38 AM.  If you have any questions, ask your nurse or doctor.               Start taking these medicines.        Dose/Directions    oxybutynin 5 MG 24 hr tablet   Commonly known as:  DITROPAN-XL   Used for:  Urinary urgency, Urge incontinence   Started by:  Prabhjot Braswell MD        Dose:  5 mg   Take 1 tablet (5 mg) by mouth daily   Quantity:  90 tablet   Refills:  1         These medicines have changed or have updated prescriptions.        Dose/Directions    DULoxetine 60 MG EC " capsule   Commonly known as:  CYMBALTA   This may have changed:  Another medication with the same name was removed. Continue taking this medication, and follow the directions you see here.   Changed by:  Prabhjot Braswell MD        2 x 60mg tab by mouth daily   Quantity:  30 capsule   Refills:  1       hydrOXYzine 25 MG tablet   Commonly known as:  ATARAX   This may have changed:    - how much to take  - how to take this  - when to take this  - reasons to take this  - additional instructions   Used for:  Anxiety, Other insomnia        Take one to two tablets at  for insomnia or anxiety   Quantity:  60 tablet   Refills:  1            Where to get your medicines      [facility] [address] [place] [address] [address] AT [facility]  [address], [facility] [address]     Phone:  [phone]     oxybutynin 5 MG 24 hr tablet                Primary Care Provider Office Phone # Fax #    Phill Floyd [phone] [address] [facility] [address]        Equal Access to Services     : Hadii aad ku hadasho Soomaali, waaxda luqadaha, qaybta kaalmada adeegyada, arnav lorenzana . So [facility] [phone].    ATENCIÓN: Si habla español, tiene a cutler disposición servicios gratuitos de asistencia lingüística. [facility] [phone].    We comply with applicable federal civil rights laws and [place] laws. We do not discriminate on the basis of race, color, national origin, age, disability, sex, sexual orientation, or gender identity.            Thank you!     Thank you for choosing [facility]  for your care. Our goal is always to provide you with excellent care. Hearing back from our patients is one way we can continue to improve our services. Please take a few minutes to complete the written survey that you may receive in the mail after your visit with us. Thank you!              Your Updated Medication List - Protect others around you: Learn how to safely use, store and throw away your medicines at www.disposemymeds.org.          This list is accurate as of 11/8/18 10:38 AM.  Always use your most recent med list.                   Brand Name Dispense Instructions for use Diagnosis    AFLURIA QUADRIVALENT 0.5 ML injection   Generic drug:  influenza quadrivalent (PF) vacc      ADM 0.5ML IM UTD        amphetamine-dextroamphetamine 30 MG per 24 hr capsule    ADDERALL XR     Take 30 mg by mouth daily        baclofen 10 MG tablet    LIORESAL    90 tablet    Take 0.5-1 tablets (5-10 mg) by mouth 3 times daily as needed for muscle spasms    Spasm of back muscles       clonazePAM 0.5 MG tablet    klonoPIN     Take 0.5-1 mg by mouth nightly as needed        cyclobenzaprine 5 MG tablet    FLEXERIL          DULoxetine 60 MG EC capsule    CYMBALTA    30 capsule    2 x 60mg tab by mouth daily        gabapentin 100 MG capsule    NEURONTIN          HYDROcodone-acetaminophen 5-325 MG per tablet    NORCO    75 tablet    Take 1 tablet by mouth 3 times daily as needed for pain (Max 3 times daily. #75 tabs to last 30 days.) Okay to fill and start on/after 11/18/18    Hip pain, right       hydrOXYzine 25 MG tablet    ATARAX    60 tablet    Take one to two tablets at HS for insomnia or anxiety    Anxiety, Other insomnia       * methylphenidate ER 36 MG CR tablet    CONCERTA          * methylphenidate ER 54 MG CR tablet    CONCERTA     TK 1 T PO QAM        naloxone nasal spray    NARCAN    0.2 mL    Spray 1 spray (4 mg) into one nostril alternating nostrils as needed for opioid reversal every 2-3 minutes until assistance arrives    High risk medication use       naproxen 500 MG tablet    NAPROSYN    40 tablet    Take 1 tablet (500 mg) by mouth 2 times daily (with meals)    Neuropathy, Chronic myofascial pain       * nicotine 14 MG/24HR 24 hr patch    NICODERM CQ    30 patch    Place 1 patch onto the skin  every 24 hours Use this patch first    Tobacco abuse       * nicotine 7 MG/24HR 24 hr patch    NICODERM CQ    30 patch    Place 1 patch onto the skin every 24 hours    Tobacco abuse       orphenadrine 100 MG 12 hr tablet    NORFLEX          oxybutynin 5 MG 24 hr tablet    DITROPAN-XL    90 tablet    Take 1 tablet (5 mg) by mouth daily    Urinary urgency, Urge incontinence       tiZANidine 4 MG tablet    ZANAFLEX          traMADol 50 MG tablet    ULTRAM          zolpidem 10 MG tablet    AMBIEN          * Notice:  This list has 4 medication(s) that are the same as other medications prescribed for you. Read the directions carefully, and ask your doctor or other care provider to review them with you.

## 2018-11-08 NOTE — PROGRESS NOTES
"                                  [facility]   [facility] [place]  Behavioral Medicine Visit    Patient Name: Hoang Kiser    YOB: 1985   Medical Record Number: [id number]  Date: 11/8/2018                SUBJECTIVE: Met with the patient on this date for an elective return appointment.  Today's visit is our 12th visit post initial assessment.  This is our first visit since 9/27/2018.  The patient did not show up for his last scheduled appointment.  Today he reports that he missed his Ride.  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  He reports that there is no clear diagnosis or particularly that there is no treatment to date that has been helpful.  The patient reports that his pain level has been approximately the same to moderately worse.  Today the patient reports in particular he has had difficulty with sleeping noting that when he sleeps he has \"an attack\" and that it disrupts his sleep.  He estimates getting between 1-1-1/2 hours nightly and then every third night getting approximately 6 hours.    The patient's primary concerns appear to be related to his neurological condition as such our focus has been predominantly in that area.  The patient acknowledges that he is here for his chronic pain rather than his condition and as such perhaps may need to be seen less frequently.  We have agreed to spread out our visits to every third week.  The patient reports that he is not interested in doing hypnosis as previously discussed because he is apprehensive about what would happen with his \"attacks\".        OBJECTIVE: Today the patient reports that his pain level is moderately worse, his mood is moderately worse, his activity level has been the same, his stress level is mildly worse and his sleep has been greatly worse.  Today the patient reports he is involved in self-care activities 2-3 times per " day.  Today the patient reports since receiving services with [facility] his overall progress has slightly improved.   Length of Visit: 60 minutes      Assessment: Current Emotional / Mental Status    Appearance:   Appropriate   Eye Contact:   Good   Psychomotor Behavior: Agitated, continuously tremorous  Attitude:   Cooperative   Orientation:   All  Speech  Rate / Production:             Normal   Volume:              Normal   Mood:    Depressed   Affect:    Flat  Worrisome   Thought Content:  Clear   Thought Form:  Coherent  Logical   Insight:    Fair     ASSESSMENT:   Per patient pain provider: Muscle spasm, chronic myofascial pain, hip pain, right, degenerative disc disease, lumbar    Progress toward goals: fair.    Pain Status: worsened    Emotional Status: worsened              Medication / chemical use concerns: None    PLAN:   Next Appointment: Hoang Kiser will schedule a follow-up appointment in 3 weeks.  Assignment: None  Objectives / interventions for next session: Support    Fili Wesley MA LP, [facility]  Behavioral Pain Specialist  [facility]

## 2018-11-08 NOTE — NURSING NOTE
"Hoang Kiser's goals for this visit include:   Chief Complaint   Patient presents with     Consult     Pain with urination       He requests these members of his care team be copied on today's visit information: Yes    PCP: Phill Floyd    Referring Provider:  Prabhjot Braswell MD  [address] [place] [address]    /82 (BP Location: Right arm, Patient Position: Sitting, Cuff Size: Adult Regular)  Pulse 64  Ht 1.702 m (5' 7\")  Wt 77.2 kg (170 lb 1.6 oz)  SpO2 98%  BMI 26.64 kg/m2    Do you need any medication refills at today's visit? No    "

## 2018-11-08 NOTE — MR AVS SNAPSHOT
After Visit Summary   11/8/2018    Hoang Kiser    MRN: [id number]           Patient Information     Date Of Birth          1985        Visit Information        Provider Department      11/8/2018 8:00 AM Fili Wesley LP [facility]        Today's Diagnoses     Muscle spasm    -  1    Chronic myofascial pain        Hip pain, right        DDD (degenerative disc disease), lumbar           Follow-ups after your visit        Your next 10 appointments already scheduled     Nov 29, 2018  8:00 AM CST   Return Visit with Fili Wesley LP   [facility] ([facility])    [address]  [address]  [facility] [address]   [phone]            Dec 06, 2018 10:30 AM CST   Return Visit with CARLOS A Guy CNP   [facility] ([facility])    [address]  [address]  [facility] [id number] [phone]            Feb 14, 2019 12:10 PM CST   (Arrive by 11:55 AM)   Return Movement Disorder with Bryant Cao MD   [facility] ([facility])    [address] [place] [address] [phone]            May 07, 2019 10:00 AM CDT   [facility] Visit with Pr[facility]ot [facility]well [facility]   [facility]    [address] [place] [address] [phone]              Who to contact     If you have questions or need follow up information about today's clinic visit or your schedule please contact [facility] directly at [phone].  Normal or non-critical lab and imaging results will be communicated to you by MyChart, letter or phone within 4 business days after the clinic has received the results. If you do not hear from us within 7 days, please contact the clinic through MyChart or phone. If you have a critical or abnormal lab result, we will notify you by phone as soon as  possible.  Submit refill requests through [employer] or call your pharmacy and they will forward the refill request to us. Please allow 3 business days for your refill to be completed.          Additional Information About Your Visit        [employer] Information     [employer] gives you secure access to your electronic health record. If you see a primary care provider, you can also send messages to your care team and make appointments. If you have questions, please call your primary care clinic.  If you do not have a primary care provider, please call [phone] and they will assist you.         Blood Pressure from Last 3 Encounters:   11/08/18 120/82   11/02/18 116/80   10/26/18 112/70    Weight from Last 3 Encounters:   11/08/18 77.2 kg (170 lb 1.6 oz)   11/02/18 77.1 kg (170 lb)   10/26/18 77.1 kg (170 lb)              We Performed the Following     HEALTH & BEHAVIOR ASSESS, INITIAL, EA 15MIN PHD          Today's Medication Changes          These changes are accurate as of 11/8/18 11:59 PM.  If you have any questions, ask your nurse or doctor.               Start taking these medicines.        Dose/Directions    oxybutynin 5 MG 24 hr tablet   Commonly known as:  DITROPAN-XL   Used for:  Urinary urgency, Urge incontinence   Started by:  Prabhjot Braswell MD        Dose:  5 mg   Take 1 tablet (5 mg) by mouth daily   Quantity:  90 tablet   Refills:  1         These medicines have changed or have updated prescriptions.        Dose/Directions    DULoxetine 60 MG EC capsule   Commonly known as:  CYMBALTA   This may have changed:  Another medication with the same name was removed. Continue taking this medication, and follow the directions you see here.   Changed by:  Prabhjot Braswell MD        2 x 60mg tab by mouth daily   Quantity:  30 capsule   Refills:  1       hydrOXYzine 25 MG tablet   Commonly known as:  ATARAX   This may have changed:    - how much to take  - how to take this  - when to take this  - reasons to take  this  - additional instructions   Used for:  Anxiety, Other insomnia        Take one to two tablets at  for insomnia or anxiety   Quantity:  60 tablet   Refills:  1            Where to get your medicines      These medications were sent to [facility] [id number] [address] AT [facility]  [address], [facility] [address]     Phone:  [phone]     oxybutynin 5 MG 24 hr tablet                Primary Care Provider Office Phone # Fax #    Phill Floyd [phone] [address] [address] [facility] [address]        Equal Access to Services     [facility]: Hadii rosemary sterling hadasho Soevelyn, waaxda luqadaha, qaybta kaalmada adeegyada, arnav lorenzana . So [facility] [phone].    ATENCIÓN: Si habla español, tiene a cutler disposición servicios gratuitos de asistencia lingüística. [facility] [phone].    We comply with applicable federal civil rights laws and [place] laws. We do not discriminate on the basis of race, color, national origin, age, disability, sex, sexual orientation, or gender identity.            Thank you!     Thank you for choosing [facility]  for your care. Our goal is always to provide you with excellent care. Hearing back from our patients is one way we can continue to improve our services. Please take a few minutes to complete the written survey that you may receive in the mail after your visit with us. Thank you!             Your Updated Medication List - Protect others around you: Learn how to safely use, store and throw away your medicines at www.disposemymeds.org.          This list is accurate as of 11/8/18 11:59 PM.  Always use your most recent med list.                   Brand Name Dispense Instructions for use Diagnosis    AFLURIA QUADRIVALENT 0.5 ML injection   Generic drug:  influenza quadrivalent (PF) vacc      ADM 0.5ML IM UTD         amphetamine-dextroamphetamine 30 MG per 24 hr capsule    ADDERALL XR     Take 30 mg by mouth daily        baclofen 10 MG tablet    LIORESAL    90 tablet    Take 0.5-1 tablets (5-10 mg) by mouth 3 times daily as needed for muscle spasms    Spasm of back muscles       clonazePAM 0.5 MG tablet    klonoPIN     Take 0.5-1 mg by mouth nightly as needed        cyclobenzaprine 5 MG tablet    FLEXERIL          DULoxetine 60 MG EC capsule    CYMBALTA    30 capsule    2 x 60mg tab by mouth daily        gabapentin 100 MG capsule    NEURONTIN          HYDROcodone-acetaminophen 5-325 MG per tablet    NORCO    75 tablet    Take 1 tablet by mouth 3 times daily as needed for pain (Max 3 times daily. #75 tabs to last 30 days.) Okay to fill and start on/after 11/18/18    Hip pain, right       hydrOXYzine 25 MG tablet    ATARAX    60 tablet    Take one to two tablets at HS for insomnia or anxiety    Anxiety, Other insomnia       * methylphenidate ER 36 MG CR tablet    CONCERTA          * methylphenidate ER 54 MG CR tablet    CONCERTA     TK 1 T PO QAM        naloxone nasal spray    NARCAN    0.2 mL    Spray 1 spray (4 mg) into one nostril alternating nostrils as needed for opioid reversal every 2-3 minutes until assistance arrives    High risk medication use       naproxen 500 MG tablet    NAPROSYN    40 tablet    Take 1 tablet (500 mg) by mouth 2 times daily (with meals)    Neuropathy, Chronic myofascial pain       * nicotine 14 MG/24HR 24 hr patch    NICODERM CQ    30 patch    Place 1 patch onto the skin every 24 hours Use this patch first    Tobacco abuse       * nicotine 7 MG/24HR 24 hr patch    NICODERM CQ    30 patch    Place 1 patch onto the skin every 24 hours    Tobacco abuse       orphenadrine 100 MG 12 hr tablet    NORFLEX          oxybutynin 5 MG 24 hr tablet    DITROPAN-XL    90 tablet    Take 1 tablet (5 mg) by mouth daily    Urinary urgency, Urge incontinence       tiZANidine 4 MG tablet    ZANAFLEX          traMADol 50  MG tablet    ULTRAM          zolpidem 10 MG tablet    AMBIEN          * Notice:  This list has 4 medication(s) that are the same as other medications prescribed for you. Read the directions carefully, and ask your doctor or other care provider to review them with you.

## 2018-11-08 NOTE — LETTER
11/8/2018         RE: Hoang Kiser  [address]  [facility] [address]        Dear Colleague,    Thank you for referring your patient, Hoang Kiser, to the [facility]. Please see a copy of my visit note below.    Urology Consult History and Physical  [place]  Name: Hoang Kiser    MRN: [id number]   YOB: 1985       We were asked to see Hoang Kiser at the request of Self for evaluation and treatment of urgency with urge incontinence .        Chief Complaint:   Urgency with urge incontinence     History is obtained from the patient            History of Present Illness:   Hoang Kiser is a 33 year old male who is being seen for evaluation of urgency with urge incontinence.    Lifting injury in 12/2016 during which he strained his groin and left hamstring. Following this he was then diagnosed with low back pain. He also has abnormal involventary movement disorder and is following with neurology. Undergoing physical therapy and pain management.     His main issue is urgency and urge incontinence which has been stable for the past year. Has daily issues with small volume urine leakage on the way to the bathroom. Required change of cloths 2-3 x /month.   No dysuria, hematuria, no UTIs. Nocturia 1-2 x / night.  Bowel function is normal. Has daily soft bowel movements.     AUASS: 3-5-1-5-1-1-5 = 21  QOL = 5  THEODORA: 3-4-3-4-4 = 18    (4 or >)  Location: Urine  Quality: Urgency   Severity: Urge incontinence   Duration: 1 year           Past Medical History:     Past Medical History:   Diagnosis Date     Abnormal involuntary movement 6/2/2016     Anxiety state, unspecified 04/30/2012     Benign positional vertigo Dec 2016    Started after my groin/back injury. Sitting on Toilet.     Degenerative disc disease at L5-S1 level 2/27/2018     Depressive disorder 2003    I have been on and off medication for depression since this     Dysphonia 2014    Nothing  "significant.     Epilepsy (H)     as a child. 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 Spicy foods set it off.     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  History: Myoclonus.  Comparison: None.  Technique:  1. MRI of the Brain: Sagittal T1-weighted, axial T2-weighted, axial turboFLAIR, axial susceptibility, and axial diffusion-weighted with ADC map images of the brain were obtained without intravenous contrast. After intravenous administration of gadolinium, axial and sagittal T1-weighted images of th     Hoarseness 2017    Dry mouth, started after taking Tizanidine     Neuralgia, neuritis, and radiculitis, unspecified 04/30/2012     normal emg 2017 8/8/2017    Interpretation: This is a normal study. There is no electrophysiologic evidence of a lumbosacral radiculopathy affecting the right or left lower extremity on the basis of this study.    Rodney Mena MD Department of Neurology       Panic disorder without agoraphobia 04/30/2012     Problem, psychiatric 2016    PTSD     Seizures (H) [phone]    Grew out of it. Absence Seizures.     Tic disorder 6/2/2016     Tinnitus 2015    Had it most of my life. Got worse in 2015            Past Surgical History:     Past Surgical History:   Procedure Laterality Date     BACK SURGERY      injections     COLONOSCOPY  02/15/18    Has not happened yet.     COLONOSCOPY N/A 2/15/2018    Procedure: COMBINED COLONOSCOPY, SINGLE OR MULTIPLE BIOPSY/POLYPECTOMY BY BIOPSY;;  Surgeon: Liam Kincaid MD;  Location: [facility] OR     COLONOSCOPY WITH CO2 INSUFFLATION N/A 2/15/2018    Procedure: COLONOSCOPY WITH CO2 INSUFFLATION;  COLON-FAMILY HX OF COLON CANCER/ SYPURA;  Surgeon: Liam Kincaid MD;  Location: [facility] OR     HC TOOTH EXTRACTION W/FORCEP " Bilateral 2003     PE TUBES  1990            Social History:     Social History   Substance Use Topics     Smoking status: Current Every Day Smoker     Packs/day: 0.50     Years: 10.00     Types: Cigarettes     Start date: 1/1/2002     Smokeless tobacco: Never Used      Comment: Transdermal patches have helped me the most in the past     Alcohol use No      Comment: none since 2013       History   Smoking Status     Current Every Day Smoker     Packs/day: 0.50     Years: 10.00     Types: Cigarettes     Start date: 1/1/2002   Smokeless Tobacco     Never Used     Comment: Transdermal patches have helped me the most in the past            Family History:     Family History   Problem Relation Age of Onset     Lipids Father      hyperlipidemia     Hyperlipidemia Father      Obesity Father      Arthritis Mother      Hyperlipidemia Mother      Depression Mother      Anxiety Disorder Mother      Mental Illness Mother      Obesity Mother      Asthma Brother      Asthma Sister      Depression Other      Hearing Loss Other      Psychotic Disorder Other      Obesity Other      Cerebrovascular Disease Paternal Grandfather      Alzheimer Disease Paternal Grandfather      Depression Brother      Mental Illness Brother      Bipolar     Asthma Brother      Depression Sister      Asthma Sister      Substance Abuse Sister      Alcohol     Mental Illness Brother      Bipolar     Asthma Brother      Asthma Sister      Obesity Sister      Asthma Brother      Obesity Brother      Obesity Maternal Grandmother      Genetic Disorder Maternal Grandmother      Epilepsy     Obesity Paternal Grandmother               Allergies:     Allergies   Allergen Reactions     Buspirone Hcl Other (See Comments)     Panic attacks     Doxycycline Diarrhea, Fatigue, GI Disturbance and Nausea     Elavil [Amitriptyline]      Ineffective in reducing spasms/movement, increased fatigue     Trazodone Fatigue     Keflex [Cephalexin] Diarrhea            Medications:      Current Outpatient Prescriptions   Medication Sig     amphetamine-dextroamphetamine (ADDERALL XR) 30 MG per 24 hr capsule Take 30 mg by mouth daily     baclofen (LIORESAL) 10 MG tablet Take 0.5-1 tablets (5-10 mg) by mouth 3 times daily as needed for muscle spasms     clonazePAM (KLONOPIN) 0.5 MG tablet Take 0.5-1 mg by mouth nightly as needed      DULoxetine (CYMBALTA) 60 MG EC capsule 2 x 60mg tab by mouth daily     HYDROcodone-acetaminophen (NORCO) 5-325 MG per tablet Take 1 tablet by mouth 3 times daily as needed for pain (Max 3 times daily. #75 tabs to last 30 days.) Okay to fill and start on/after 11/18/18     hydrOXYzine (ATARAX) 25 MG tablet Take one to two tablets at HS for insomnia or anxiety (Patient taking differently: Take 10 mg by mouth nightly as needed Take one to two tablets at HS for insomnia or anxiety)     naloxone (NARCAN) nasal spray Spray 1 spray (4 mg) into one nostril alternating nostrils as needed for opioid reversal every 2-3 minutes until assistance arrives     naproxen (NAPROSYN) 500 MG tablet Take 1 tablet (500 mg) by mouth 2 times daily (with meals)     AFLURIA QUADRIVALENT 0.5 ML injection ADM 0.5ML IM UTD     cyclobenzaprine (FLEXERIL) 5 MG tablet      gabapentin (NEURONTIN) 100 MG capsule      methylphenidate ER (CONCERTA) 36 MG CR tablet      methylphenidate ER (CONCERTA) 54 MG CR tablet TK 1 T PO QAM     nicotine (NICODERM CQ) 14 MG/24HR 24 hr patch Place 1 patch onto the skin every 24 hours Use this patch first (Patient not taking: Reported on 11/8/2018)     nicotine (NICODERM CQ) 7 MG/24HR 24 hr patch Place 1 patch onto the skin every 24 hours (Patient not taking: Reported on 11/8/2018)     orphenadrine (NORFLEX) 100 MG 12 hr tablet      tiZANidine (ZANAFLEX) 4 MG tablet      traMADol (ULTRAM) 50 MG tablet      zolpidem (AMBIEN) 10 MG tablet      [DISCONTINUED] DULoxetine (CYMBALTA) 20 MG EC capsule      No current facility-administered medications for this visit.             "  Review of Systems:     Skin: negative  Eyes: negative  Ears/Nose/Throat: negative  Respiratory: No shortness of breath, dyspnea on exertion, cough, or hemoptysis  Cardiovascular: negative  Gastrointestinal: negative  Genitourinary: as above  Musculoskeletal: as above  Neurologic: as above  Psychiatric: negative  Hematologic/Lymphatic/Immunologic: negative  Endocrine: negative          Physical Exam:   Patient Vitals for the past 24 hrs:   BP Pulse SpO2 Height Weight   11/08/18 0949 120/82 64 98 % 1.702 m (5' 7\") 77.2 kg (170 lb 1.6 oz)     Body mass index is 26.64 kg/(m^2).     General: age-appropriate appearing male in NAD  HEENT: Head AT/NC, EOMI, CN Grossly intact  Lungs: no respiratory distress, or pursed lip breathing  Heart: No obvious jugular venous distension present  Back: no bony midline tenderness, no CVAT bilaterally.  Abdomen: soft, non-distended, non-tender. No organomegaly  : normal male external genitalia.   Lymph: no palpable inguinal lymphadenopathy.  LE: no edema.   Musculoskeltal: extremities normal, no peripheral edema  Skin: no suspicious lesions or rashes  Neuro: Alert, oriented, speech and mentation normal; moving all 4 extremities equally.  Psych: affect and mood normal          Data:   All laboratory data reviewed:    UA RESULTS:  Recent Labs   Lab Test  10/08/18   1215   COLOR  Yellow   APPEARANCE  Clear   URINEGLC  Negative   URINEBILI  Negative   URINEKETONE  Negative   SG  1.010   UBLD  Negative   URINEPH  5.5   PROTEIN  Negative   UROBILINOGEN  0.2   NITRITE  Negative   LEUKEST  Negative   RBCU  O - 2   WBCU  0 - 5      Lab Results   Component Value Date    CR 0.92 06/15/2018             Impression and Plan:   Impression:   32 y/o man with functional movement disorder and also urinary urgency and urge incontinence.       Plan:   Urinary urgency with urge incontinence  - We discussed that his symptoms are consistent with a OAB (over-active bladder) picture. Given his age and " otherwise lack of urologic history it is unlikely that he has any outlet issues related to his prostate  - We discussed options for management of OAB (over-active bladder) and discussed trial of anti-cholinergics. We discussed the risks and benefits and common side effects being constipation and dry mouth  - Script for oxybutinin 5mg XL daily sent to pharmacy  - F/U in 6 months for symptom check     Thank you for the kind consultation.    Time spent: 30 minutes of which >50% was spent counseling.    Prabhjot Braswell MD   Urology  [facility]  [facility] Phone: [phone]  [facility] Phone: [phone]         Again, thank you for allowing me to participate in the care of your patient.        Sincerely,        Prabhjot Braswell MD

## 2018-11-15 ENCOUNTER — TELEPHONE (OUTPATIENT)
Dept: BEHAVIORAL HEALTH | Facility: CLINIC | Age: 33
End: 2018-11-15

## 2018-11-15 NOTE — TELEPHONE ENCOUNTER
..  [facility]  [facility] Clinic: [facility] Worker Note      Patient: Hoang Kiser  Date: November 15, 2018  Preferred Name: Shoaib    Previous PHQ-9:   PHQ-9 SCORE 1/24/2018 2/14/2018 10/1/2018   Total Score - - -   Total Score 2 7 13     Previous JIN-7:   JIN-7 SCORE 1/22/2016 8/14/2018 10/1/2018   Total Score - - -   Total Score 3 15 13     MOLLY LEVEL:  MOLLY Score (Last Two) 3/23/2016 10/1/2018   MOLLY Raw Score 52 31   Activation Score 100 59.3   MOLLY Level 4 3       Preferred Contact:  Need for : No  Preferred Contact: Cell    Type of Contact Today: Phone call (patient / identified key support person reached)      Data: (Subjective / Objective):  Recent ED/IP Admission or Discharge?   None    Patient Goals:  No Data Recorded    [facility] Core Service Provided:  Individual and Family Support: aimed to help clients reduce barriers to achieving goals, increase health literacy and knowledge about chronic condition(s), increase self-efficacy skills, and improve health outcomes    Current Reported Stressors / Issues / Health Action Plan Items Addressed Today:  CHW called PT for monthly [facility] contact. I introduced self, and my role as interim staff until a  is hired for the clinic.  Documentation Supporting inability to pay loans:PT stated they have a document written expalining why he cannot fulfill paying of loans. PT wants the paperwork sent to her PCP who will provide additional support surrounding current streessor. PT stated she had sent a copy of the letter to former [facility] SW a week ago, and was waiting on a response. Informed Pt former  left last month Oct. 30th.  Pt shared e-mail contact [email or web address].  CHW assured PT there will be correspondence e-mail from one of the [facility] SW.     SW emailed Pt and requested that Pt provide more info to PCP. SW has not heard back from Pt.   E-mail sent out to the team.     Plan: (Homework, other):  Patient was  encouraged to continue to seek condition-related information and education.      Scheduled a Phone follow up appointment with CHW in 1 week     Patient has set self-identified goals and will monitor progress until the next appointment on:BENSOND.     Michaela Morales Community Health Worker    [facility]                Next 5 appointments (look out 90 days)     Nov 29, 2018  8:00 AM CST   Return Visit with Fili Wesley LP   [facility]    [address]  [address]  [facility] [id number] [phone]            Dec 06, 2018 10:30 AM CST   Return Visit with CARLOS A Guy CNP   [facility])    [address]  [address]  [facility] [id number] [phone]

## 2018-11-16 ENCOUNTER — TRANSFERRED RECORDS (OUTPATIENT)
Dept: HEALTH INFORMATION MANAGEMENT | Facility: CLINIC | Age: 33
End: 2018-11-16

## 2018-11-16 ENCOUNTER — CARE COORDINATION (OUTPATIENT)
Dept: BEHAVIORAL HEALTH | Facility: CLINIC | Age: 33
End: 2018-11-16

## 2018-11-16 DIAGNOSIS — G47.00 INSOMNIA, UNSPECIFIED TYPE: Primary | ICD-10-CM

## 2018-11-16 NOTE — PROGRESS NOTES
Behavioral Health Home Services  [facility]: [place]      Social Work Care Navigator Note      Patient: Hoang Kiser  Date: November 16, 2018  Preferred Name: Shoaib    Previous PHQ-9:   PHQ-9 SCORE 1/24/2018 2/14/2018 10/1/2018   Total Score - - -   Total Score 2 7 13     Previous JIN-7:   JIN-7 SCORE 1/22/2016 8/14/2018 10/1/2018   Total Score - - -   Total Score 3 15 13     MOLLY LEVEL:  MOLLY Score (Last Two) 3/23/2016 10/1/2018   MOLLY Raw Score 52 31   Activation Score 100 59.3   MOLLY Level 4 3       Preferred Contact:  Need for : No  Preferred Contact: Cell    Type of Contact Today: Phone call (patient / identified key support person reached)      Data: (subjective / Objective):  Recent ED/IP Admission or Discharge?   None    Patient Goals:  No Data Recorded      [facility] Core Service Provided:  Care Coordination: provided care management services/referrals necessary to ensure patient and their identified supports have access to medical, behavioral health, pharmacology and recovery support services.  Ensured that patient's care is integrated across all settings and services.     Current Stressors / Issues / Care Plan Objective Addressed Today:  SW received message that patient had questions regarding his Student Loan. Patient stated that he had a form sent to provider and has written a letter to former [facility] SW. SW suggested that patient bring letter to clinic for provider to read. Patient stated that he will do that.     Intervention:  Motivational Interviewing: Expressed Empathy/Understanding   Target Behavior(s): Explored current social supports and reinforced opportunities to increase engagement      Plan: (Homework, other):  Patient was encouraged to continue to seek condition-related information and education.        Patient has set self-identified goals and will monitor progress until the next appointment.   Reva Acuña, Social Work Care Coordinator                 Next 5 appointments (look out 90  days)     Nov 29, 2018  8:00 AM CST   Return Visit with Fili Wesley LP   [facility] ([facility])    [address]  [facility] [address] [phone]            Dec 06, 2018 10:30 AM CST   Return Visit with CARLOS A Guy CNP   [facility] ([facility])    [address]  [address]  [facility] [address] [phone]

## 2018-11-17 ENCOUNTER — MYC MEDICAL ADVICE (OUTPATIENT)
Dept: PALLIATIVE MEDICINE | Facility: CLINIC | Age: 33
End: 2018-11-17
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
## 2018-11-20 NOTE — TELEPHONE ENCOUNTER
Forwarded to Tamiko STRAUSS NP-C as this is her patient.     Loren STRAUSS, RN CNP, FNP  [facility] [place]

## 2018-12-06 ENCOUNTER — OFFICE VISIT (OUTPATIENT)
Dept: PALLIATIVE MEDICINE | Facility: CLINIC | Age: 33
End: 2018-12-06
Payer: COMMERCIAL

## 2018-12-06 VITALS
DIASTOLIC BLOOD PRESSURE: 70 MMHG | HEART RATE: 100 BPM | RESPIRATION RATE: 16 BRPM | OXYGEN SATURATION: 98 % | BODY MASS INDEX: 27.88 KG/M2 | WEIGHT: 178 LBS | SYSTOLIC BLOOD PRESSURE: 118 MMHG

## 2018-12-06 DIAGNOSIS — M25.551 HIP PAIN, RIGHT: ICD-10-CM

## 2018-12-06 DIAGNOSIS — F41.9 ANXIETY: ICD-10-CM

## 2018-12-06 DIAGNOSIS — M62.830 SPASM OF BACK MUSCLES: ICD-10-CM

## 2018-12-06 DIAGNOSIS — G47.09 OTHER INSOMNIA: ICD-10-CM

## 2018-12-06 PROCEDURE — 99214 OFFICE O/P EST MOD 30 MIN: CPT | Performed by: NURSE PRACTITIONER

## 2018-12-06 RX ORDER — BACLOFEN 10 MG/1
5-10 TABLET ORAL 3 TIMES DAILY PRN
Qty: 90 TABLET | Refills: 1 | Status: SHIPPED | OUTPATIENT
Start: 2018-12-06 | End: 2019-02-04

## 2018-12-06 RX ORDER — HYDROXYZINE HYDROCHLORIDE 25 MG/1
25 TABLET, FILM COATED ORAL
Qty: 30 TABLET | Refills: 3 | Status: SHIPPED | OUTPATIENT
Start: 2018-12-06 | End: 2019-04-22

## 2018-12-06 RX ORDER — HYDROCODONE BITARTRATE AND ACETAMINOPHEN 5; 325 MG/1; MG/1
1 TABLET ORAL 3 TIMES DAILY PRN
Qty: 80 TABLET | Refills: 0 | Status: SHIPPED | OUTPATIENT
Start: 2018-12-06 | End: 2019-01-15

## 2018-12-06 RX ORDER — HYDROCODONE BITARTRATE AND ACETAMINOPHEN 5; 325 MG/1; MG/1
1 TABLET ORAL 3 TIMES DAILY PRN
Qty: 75 TABLET | Refills: 0 | Status: SHIPPED | OUTPATIENT
Start: 2018-12-06 | End: 2018-12-06

## 2018-12-06 ASSESSMENT — PAIN SCALES - GENERAL: PAINLEVEL: SEVERE PAIN (6)

## 2018-12-06 NOTE — MR AVS SNAPSHOT
After Visit Summary   12/6/2018    Hoang Kiser    MRN: [id number]           Patient Information     Date Of Birth          1985        Visit Information        Provider Department      12/6/2018 10:30 AM Tamiko Stevens APRN CNP [facility]        Today's Diagnoses     Hip pain, right        Anxiety        Other insomnia        Spasm of back muscles          Care Instructions    After Visit Instructions:     Thank you for coming to [facility] for your care. It is my goal to partner with you to help you reach your optimal state of health.     I am recommending multidisciplinary care at this time.  The focus of care will be to continue gradual rehabilitation and pain management with medication adjustments as needed.    Continue daily self-care, identifying contributing factors, and monitoring variations in pain level. Continue to integrate self-care into your life.      1. Schedule pain psychology visits  2. Schedule physical therapy visits   3. Schedule follow-up with CARLOS A Higuera NP-C in before next opiate refill.   4. Medication recommendations:   1. Refills of Hydroxyzine, Baclofen, Norco provided.   2. Reduce Baclofen by 5 mg per day every 7-10 days. If symptoms return go back to last effective dose.        CARLOS A Bass, NP-C  [facility]    Contact information: [facility] [place]  Clinic Number:  [phone]   Call this number with any questions about your care and for scheduling assistance. Calls are returned Monday through Friday between 8 AM and 4:30 PM. We usually get back to you within 2 business days depending on the issue/request.           Medication Refills Policy:    For non-narcotic medications, please your pharmacy directly to request a refill and the pharmacy will call the [facility] for authorization. Please allow 3-4 days for these  refills.    For narcotic refills, call the nurse triage line and leave a message requesting your refill along with the name of the pharmacy that you use. Narcotic prescriptions will be mailed to your pharmacy or you may pick them up at the clinic.  Please call 7-10 days before your refill is due  The above policy allows adequate time so that you do not run out of medication.    No Show - Late Cancellation - Late Arrival Policy  We believe regular attendance is key to your success in our program.    Any time you are unable to keep your appointment we ask that you call us at  least 24 hours in advance to let us know. This will allow us to offer the appointment time to another patient. The following is our policy for missed appointments. This also applies to appointments cancelled with less than 24 hours notice.    After missing 3 appointments without calling first, we will cancel all of your future appointments at [facility].    At that point, you will not be able to resume services unless approved by your care team  We understand that unforseen circumstances arise, however, out of respect for all concerned and to provide this appointment to another patient, this policy will be enforced.    Please note that most follow up appointments are 30 minutes long. If you arrive late, your provider may not be able to see you for the entire 30 minutes. Please also note that if you arrive more than 15 minutes for any appointment, it may be rescheduled.                                     Follow-ups after your visit        Your next 10 appointments already scheduled     Dec 20, 2018 10:00 AM CST   Return Visit with Fili Wesley LP   [facility] ([facility])    [address]  [facility] [address]  [facility] [address]   [phone]            Jan 02, 2019  9:00 AM CST   New Sleep Patient with Jaylen Kerns MD   [facility] ([facility]  [place] [facility])    [address] [address] [address]   [phone]            Feb 14, 2019 12:10 PM CST   (Arrive by 11:55 AM)   Return Movement Disorder with Bryant Cao MD   [facility] ([facility])    [address] [facility]  [address] [facility] [address] [phone]            May 07, 2019 10:00 AM CDT   Return Visit with Prabhjot Braswell MD   [facility] ([facility])    [address] [place] [id number] [phone]              Who to contact     If you have questions or need follow up information about today's clinic visit or your schedule please contact [facility] directly at [phone].  Normal or non-critical lab and imaging results will be communicated to you by MyChart, letter or phone within 4 business days after the clinic has received the results. If you do not hear from us within 7 days, please contact the clinic through [employer] or phone. If you have a critical or abnormal lab result, we will notify you by phone as soon as possible.  Submit refill requests through [employer] or call your pharmacy and they will forward the refill request to us. Please allow 3 business days for your refill to be completed.          Additional Information About Your Visit        MyChart Information     [employer] gives you secure access to your electronic health record. If you see a primary care provider, you can also send messages to your care team and make appointments. If you have questions, please call your primary care clinic.  If you do not have a primary care provider, please call [phone] and they will assist you.        Your Vitals Were     Pulse Respirations Pulse Oximetry BMI (Body Mass Index)          100 16 98% 27.88 kg/m2         Blood Pressure from Last 3 Encounters:   12/06/18 118/70   11/08/18 120/82   11/02/18 116/80    Weight from Last 3 Encounters:    12/06/18 80.7 kg (178 lb)   11/08/18 77.2 kg (170 lb 1.6 oz)   11/02/18 77.1 kg (170 lb)              Today, you had the following     No orders found for display         Today's Medication Changes          These changes are accurate as of 12/6/18 10:53 AM.  If you have any questions, ask your nurse or doctor.               Start taking these medicines.        Dose/Directions    HYDROcodone-acetaminophen 5-325 MG tablet   Commonly known as:  NORCO   Used for:  Hip pain, right   Started by:  Tamiko Stevens APRN CNP        Dose:  1 tablet   Take 1 tablet by mouth 3 times daily as needed for pain (Max 3 times daily. #80 tabs to last 30 days.) Okay to fill and start on/after 12/18/18   Quantity:  80 tablet   Refills:  0         These medicines have changed or have updated prescriptions.        Dose/Directions    hydrOXYzine 25 MG tablet   Commonly known as:  ATARAX   This may have changed:    - how much to take  - how to take this  - when to take this  - reasons to take this  - additional instructions   Used for:  Anxiety, Other insomnia   Changed by:  Tamiko Stevens APRN CNP        Dose:  25 mg   Take 1 tablet (25 mg) by mouth nightly as needed (at HS PRN)   Quantity:  30 tablet   Refills:  3            Where to get your medicines      These medications were sent to [facility] [place], [place] - [address]  [address] [address]     Phone:  [phone]     baclofen 10 MG tablet    hydrOXYzine 25 MG tablet         Some of these will need a paper prescription and others can be bought over the counter.  Ask your nurse if you have questions.     Bring a paper prescription for each of these medications     HYDROcodone-acetaminophen 5-325 MG tablet               Information about OPIOIDS     PRESCRIPTION OPIOIDS: WHAT YOU NEED TO KNOW   We gave you an opioid (narcotic) pain medicine. It is important to manage your pain, but opioids are not always the best choice.  You should first try all the other options your care team gave you. Take this medicine for as short a time (and as few doses) as possible.    Some activities can increase your pain, such as bandage changes or therapy sessions. It may help to take your pain medicine 30 to 60 minutes before these activities. Reduce your stress by getting enough sleep, working on hobbies you enjoy and practicing relaxation or meditation. Talk to your care team about ways to manage your pain beyond prescription opioids.    These medicines have risks:    DO NOT drive when on new or higher doses of pain medicine. These medicines can affect your alertness and reaction times, and you could be arrested for driving under the influence (DUI). If you need to use opioids long-term, talk to your care team about driving.    DO NOT operate heavy machinery    DO NOT do any other dangerous activities while taking these medicines.    DO NOT drink any alcohol while taking these medicines.     If the opioid prescribed includes acetaminophen, DO NOT take with any other medicines that contain acetaminophen. Read all labels carefully. Look for the word  acetaminophen  or  Tylenol.  Ask your pharmacist if you have questions or are unsure.    You can get addicted to pain medicines, especially if you have a history of addiction (chemical, alcohol or substance dependence). Talk to your care team about ways to reduce this risk.    All opioids tend to cause constipation. Drink plenty of water and eat foods that have a lot of fiber, such as fruits, vegetables, prune juice, apple juice and high-fiber cereal. Take a laxative (Miralax, milk of magnesia, Colace, Senna) if you don t move your bowels at least every other day. Other side effects include upset stomach, sleepiness, dizziness, throwing up, tolerance (needing more of the medicine to have the same effect), physical dependence and slowed breathing.    Store your pills in a secure place, locked if possible. We  will not replace any lost or stolen medicine. If you don t finish your medicine, please throw away (dispose) as directed by your pharmacist. The [place] Pollution Control Agency has more information about safe disposal: https://www.pca.[email or web address].[place]/living-green/managing-unwanted-medications         Primary Care Provider Office Phone # Fax #    Phill Floyd [phone] [address] [facility] [address]        Equal Access to Services     BERLIN MEYER : Hadii aad ku hadasho Soomaali, waaxda luqadaha, qaybta kaalmada adeegyada, waxay idiin hayaan adeeg kharamarilynn latami billy. So [facility] [phone].    ATENCIÓN: Si habla español, tiene a cutler disposición servicios gratuitos de asistencia lingüística. [facility] [phone].    We comply with applicable federal civil rights laws and [place] laws. We do not discriminate on the basis of race, color, national origin, age, disability, sex, sexual orientation, or gender identity.            Thank you!     Thank you for choosing [facility]  for your care. Our goal is always to provide you with excellent care. Hearing back from our patients is one way we can continue to improve our services. Please take a few minutes to complete the written survey that you may receive in the mail after your visit with us. Thank you!             Your Updated Medication List - Protect others around you: Learn how to safely use, store and throw away your medicines at www.disposemymeds.org.          This list is accurate as of 12/6/18 10:53 AM.  Always use your most recent med list.                   Brand Name Dispense Instructions for use Diagnosis    AFLURIA QUADRIVALENT 0.5 ML injection   Generic drug:  influenza quadrivalent (PF) vacc      ADM 0.5ML IM UTD        amphetamine-dextroamphetamine 30 MG 24 hr capsule    ADDERALL XR     Take 30 mg by mouth daily        baclofen 10 MG tablet    LIORESAL    90 tablet    Take 0.5-1 tablets (5-10 mg) by  mouth 3 times daily as needed for muscle spasms    Spasm of back muscles       clonazePAM 0.5 MG tablet    klonoPIN     Take 0.5-1 mg by mouth nightly as needed        DULoxetine 60 MG capsule    CYMBALTA    30 capsule    2 x 60mg tab by mouth daily        gabapentin 100 MG capsule    NEURONTIN          HYDROcodone-acetaminophen 5-325 MG tablet    NORCO    80 tablet    Take 1 tablet by mouth 3 times daily as needed for pain (Max 3 times daily. #80 tabs to last 30 days.) Okay to fill and start on/after 12/18/18    Hip pain, right       hydrOXYzine 25 MG tablet    ATARAX    30 tablet    Take 1 tablet (25 mg) by mouth nightly as needed (at HS PRN)    Anxiety, Other insomnia       naloxone 4 MG/0.1ML nasal spray    NARCAN    0.2 mL    Spray 1 spray (4 mg) into one nostril alternating nostrils as needed for opioid reversal every 2-3 minutes until assistance arrives    High risk medication use       naproxen 500 MG tablet    NAPROSYN    40 tablet    Take 1 tablet (500 mg) by mouth 2 times daily (with meals)    Neuropathy, Chronic myofascial pain       * nicotine 14 MG/24HR 24 hr patch    NICODERM CQ    30 patch    Place 1 patch onto the skin every 24 hours Use this patch first    Tobacco abuse       * nicotine 7 MG/24HR 24 hr patch    NICODERM CQ    30 patch    Place 1 patch onto the skin every 24 hours    Tobacco abuse       oxybutynin ER 5 MG 24 hr tablet    DITROPAN-XL    90 tablet    Take 1 tablet (5 mg) by mouth daily    Urinary urgency, Urge incontinence       zolpidem 10 MG tablet    AMBIEN          * Notice:  This list has 2 medication(s) that are the same as other medications prescribed for you. Read the directions carefully, and ask your doctor or other care provider to review them with you.

## 2018-12-06 NOTE — PATIENT INSTRUCTIONS
After Visit Instructions:     Thank you for coming to [facility] for your care. It is my goal to partner with you to help you reach your optimal state of health.     I am recommending multidisciplinary care at this time.  The focus of care will be to continue gradual rehabilitation and pain management with medication adjustments as needed.    Continue daily self-care, identifying contributing factors, and monitoring variations in pain level. Continue to integrate self-care into your life.      1. Schedule pain psychology visits  2. Schedule physical therapy visits   3. Schedule follow-up with CARLOS A Higuera NP-C in before next opiate refill.   4. Medication recommendations:   1. Refills of Hydroxyzine, Baclofen, Norco provided.   2. Reduce Baclofen by 5 mg per day every 7-10 days. If symptoms return go back to last effective dose.        CARLOS A Bsas NP-C  [facility]    Contact information: [facility]  Clinic Number:  [phone]   Call this number with any questions about your care and for scheduling assistance. Calls are returned Monday through Friday between 8 AM and 4:30 PM. We usually get back to you within 2 business days depending on the issue/request.           Medication Refills Policy:    For non-narcotic medications, please your pharmacy directly to request a refill and the pharmacy will call the [facility] for authorization. Please allow 3-4 days for these refills.    For narcotic refills, call the nurse triage line and leave a message requesting your refill along with the name of the pharmacy that you use. Narcotic prescriptions will be mailed to your pharmacy or you may pick them up at the clinic.  Please call 7-10 days before your refill is due  The above policy allows adequate time so that you do not run out of medication.    No Show - Late Cancellation - Late Arrival Policy  We believe  regular attendance is key to your success in our program.    Any time you are unable to keep your appointment we ask that you call us at  least 24 hours in advance to let us know. This will allow us to offer the appointment time to another patient. The following is our policy for missed appointments. This also applies to appointments cancelled with less than 24 hours notice.    After missing 3 appointments without calling first, we will cancel all of your future appointments at [facility].    At that point, you will not be able to resume services unless approved by your care team  We understand that unforseen circumstances arise, however, out of respect for all concerned and to provide this appointment to another patient, this policy will be enforced.    Please note that most follow up appointments are 30 minutes long. If you arrive late, your provider may not be able to see you for the entire 30 minutes. Please also note that if you arrive more than 15 minutes for any appointment, it may be rescheduled.

## 2018-12-06 NOTE — PROGRESS NOTES
[facility]    CHIEF COMPLAINT: Multiple pain issues    INTERVAL HISTORY:  Last seen on 10/26/18.        Recommendations/plan at the last visit included:  1.                  Schedule pain psychology and PT visits  2.                  Schedule follow-up with CARLOS A Higuera NP-C in mid December  3.                  Let us know about medical cannabis  4.                  Medication recommendations:                  1. No change to pain medications  2. Next refill of Norco provided    Since his last visit, Hoang MA Margi reports:  -Shoaib continues to have involuntary body movements unpredictable pain, insomnia which varies throughout the day.  He states he has pain on the front back and sides of his hips, his lower back, left leg and foot and neck.  Once again he brings in multiple diagrams, charts, graphic images to detail his regimen for managing pain and activity as well as his feelings about his current health issues.    Pain Information:   Pain quality: Sharp, Throbbing and Searing    Pain rating: intensity ranges from 3/10 to 8/10, and averages 6/10 on a 0-10 scale.      Annual Controlled Substance Agreement/UDS due date: April 2019      Current Pain Relevant Medications:   Norco 5/325 mg 1 tab BID- TID                        Total opiate dose: 10-15 MME daily  Clonazepam .5 mg 1-2 tab at HS PRN  Duloxetine 20 mg daily  Naproxen 500 mg BID: on hold re: elevated LFTs   Tizanidine 4 mg 1-2 tabs at HS PRN  Ambien 10 mg at HS  Adderall 50 mg daily, combination of long and short acting.       Previous Pain Relevant Medications: (H--helped; HI--Helped initially; SWH--Somewhat helpful; NH--No help; W--worse; SE--side effects; ?--Unsure if helpful)   NOTE: This medication information taken from patient's intake form, not medical records.                         Opiates: Tramadol: H, Hydrocodone: H                        NSAIDS: Ibuprofen:H, naproxen:SWH, Relafen:  NH                        Muscle Relaxants: Cyclobenzaprine:H,Med interaction, Tizanidine:H                        Anti-migraine mediations: Prednisone:H                        Anti-depressants: Bupropion:SE, Celexa:SE, Duloxetine:H, Trazodone:Too strong, Venlafaxine:too strong                        Sleep aids:Anbien: H                        Anxiolytics: Clonazepam:H                        Neuropathics: Tegretol:taken for seizures in childhood, Gabapentin:H                                          Topicals: Lidocaine:H                        Other medications not covered above: Tylenol:NH      Any illicit drug use: 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 Concern for multiple controlled substances including benzodiazepines, stimulants, opiates.        SELF CARE:   How often do you practice SELF-CARE (relaxing, stretching, pacing, monitoring posture, taking mini-breaks) in a typical day:  10+ daily     THE 4 As OF OPIOID MAINTENANCE ANALGESIA    Analgesia: Is pain relief clinically significant? YES   Activity: Is patient functional and able to perform Activities of Daily Living? YES   Adverse effects: Is patient free from adverse side effects from opiates? YES   Adherence to Rx protocol: Is patient adhering to Controlled Substance Agreement and taking medications ONLY as ordered? YES         Is Narcan prescribed for opiate use >50 MME daily? Ordered for combination of opiates and benzodiazepines.              Medications:  Current Outpatient Prescriptions   Medication Sig Dispense Refill     AFLURIA QUADRIVALENT 0.5 ML injection ADM 0.5ML IM UTD  0     amphetamine-dextroamphetamine (ADDERALL XR) 30 MG per 24 hr  capsule Take 30 mg by mouth daily       baclofen (LIORESAL) 10 MG tablet Take 0.5-1 tablets (5-10 mg) by mouth 3 times daily as needed for muscle spasms 90 tablet 1     clonazePAM (KLONOPIN) 0.5 MG tablet Take 0.5-1 mg by mouth nightly as needed   0     cyclobenzaprine (FLEXERIL) 5 MG tablet   1     DULoxetine (CYMBALTA) 60 MG EC capsule 2 x 60mg tab by mouth daily 30 capsule 1     gabapentin (NEURONTIN) 100 MG capsule   2     HYDROcodone-acetaminophen (NORCO) 5-325 MG per tablet Take 1 tablet by mouth 3 times daily as needed for pain (Max 3 times daily. #75 tabs to last 30 days.) Okay to fill and start on/after 11/18/18 75 tablet 0     hydrOXYzine (ATARAX) 25 MG tablet Take one to two tablets at HS for insomnia or anxiety (Patient taking differently: Take 10 mg by mouth nightly as needed Take one to two tablets at HS for insomnia or anxiety) 60 tablet 1     methylphenidate ER (CONCERTA) 36 MG CR tablet   0     methylphenidate ER (CONCERTA) 54 MG CR tablet TK 1 T PO QAM  0     naloxone (NARCAN) nasal spray Spray 1 spray (4 mg) into one nostril alternating nostrils as needed for opioid reversal every 2-3 minutes until assistance arrives 0.2 mL 0     naproxen (NAPROSYN) 500 MG tablet Take 1 tablet (500 mg) by mouth 2 times daily (with meals) 40 tablet 3     nicotine (NICODERM CQ) 14 MG/24HR 24 hr patch Place 1 patch onto the skin every 24 hours Use this patch first (Patient not taking: Reported on 11/8/2018) 30 patch 0     nicotine (NICODERM CQ) 7 MG/24HR 24 hr patch Place 1 patch onto the skin every 24 hours (Patient not taking: Reported on 11/8/2018) 30 patch 0     orphenadrine (NORFLEX) 100 MG 12 hr tablet   0     oxybutynin (DITROPAN-XL) 5 MG 24 hr tablet Take 1 tablet (5 mg) by mouth daily 90 tablet 1     tiZANidine (ZANAFLEX) 4 MG tablet   1     traMADol (ULTRAM) 50 MG tablet   0     zolpidem (AMBIEN) 10 MG tablet   0       Review of Systems: A 10-point review of systems was negative, with the exception of  "chronic pain issues.      Social History: Reviewed; unchanged from previous consultation.      Family history: Reviewed; unchanged from previous consultation.     PHYSICAL EXAM    There were no vitals filed for this visit.  Constitutional: healthy, alert, no distress, pain behaviors and somewhat down r/t increased spastic movements   HEENT: Head atraumatic, normocephalic. Eyes without conjunctival injection or jaundice. Neck supple. No obvious neck masses.  Skin: No rash, lesions, or petechiae of exposed skin.   Extremities: No clubbing, cyanosis, or edema to exposed extremities  Psychiatric/mental status: Alert, without lethargy or stupor. Appropriate affect.       Neurologic exam:  CN:  Cranial nerves 2-12 are grossly normal.  Has uncontrolled upper body movement/tremor.           Musculoskeletal exam:  Cervical spine:                       Flex:  20 degrees                       Ext: 20 degrees                       Rotation to right: 20 degrees                       Rotation to left: 20 degrees                       Rotation/ext to right: painful                        Rotation/ext to left: painful                        Tenderness in the cervical spine at midline. No                       Tenderness in the cervical paraspinal muscles. No  Thoracic spine:                        Kyphosis. No                       Tenderness in the thoracic spine at midline. Yes                       Tenderness in the thoracic paraspinal muscles. Yes  Lumbar/Sacral spine:                       Forward Flexion:  90 degrees                       Ext: 20 degrees \"tight\"                       Rotation/ext to right: painful                        Rotation/ext to left: painful                        Lordosis. No                       Tenderness in the lumbar spine at midline. Yes                       Tenderness in the lumbar paraspinal muscles.Yes      Psychiatric:  mentation appears normal., affect and mood normal      DIRE Score for " ongoing opioid management is calculated as follows:    Diagnosis = 2 pts (slowly progressive; moderate pain/objective findings)    Intractability = 2 pts (most treatments tried; patient not fully engaged/barriers)    Risk        Psych = 2 pts (personality dysfunction/mental illness that moderately interferes with care)         Chem Hlth = 2 pts (use of medications to cope with stress; chemical dependency in remission)       Reliability = 3 pts (highly reliable with meds, appointments, treatments)       Social = 3 pts (supportive family/close relationships; involved in work/school; no isolation)       (Psych + Chem hlth + Reliability + Social) = 14    Efficacy = 2 pts (moderate benefit/function; low med dose; too early/not tried meds)    DIRE Score = 16        7-13: likely NOT suitable candidate for long-term opioid analgesia       14-21: may be a suitable candidate for long-term opioid analgesia          Previous Diagnostic Tests:   Imaging Studies:   MR LUMBAR SPINE W/O CONTRAST 5/2/2018 7:27 PM  History: DDD (degenerative disc disease), lumbar; Lumbar  radiculopathy; Hip pain, right; Groin pain, right.  ICD-10: DDD (degenerative disc disease), lumbar; Lumbar radiculopathy;  Hip pain, right; Groin pain, right  Comparison: Lumbar spine MRI 3/8/2017  Technique: Sagittal STIR, T1-weighted turbo spin-echo, 3-D volumetric  T2-weighted and axial reconstructed T2-weighted images of the lumbar  spine were obtained without intravenous contrast.   Findings: 5 lumbar-type vertebra. The tip of the conus medullaris is  at L1.  The lumbar vertebral column is normally aligned.   Straightening of lumbar lordosis, unchanged. The intervertebral disc  heights are maintained. Normal marrow signal. At L5-S1, there is a  disc bulge and facet hypertrophy with mild left neural foraminal  narrowing, unchanged. No spinal canal stenosis.  Impression:  1. Lumbar spondylosis with mild left neural foraminal narrowing at  L5-S1.  2. No spinal  canal stenosis.      MR right hip without contrast 5/2/2018 6:47 PM  Techniques: Multiplanar multisequence imaging of the right hip was  obtained without  administration of intra-articular or intravenous  contrast using routing protocol.  History: Hip pain.  Comparison: None available.  Findings:  Osseous structures  Osseous structures: No fracture, stress reaction, avascular necrosis,  or focal osseous lesion is seen. There is small focal area of  subchondral cystic changes in the anterior right femoral head neck  junction, most compatible with synovial herniation pit. Findings  suggestive of reduced femoral head neck junction though alpha angle  cannot be assessed owing to no dedicated oblique axial sequence  obtained.  Articular cartilage and labrum  Assessment limited on this arthrographic study due to relative lack of  joint distension.  Articular cartilage: No high grade chondral loss.  Labrum: Labral tearing approximately from 12:00 to 3:00 with 3:00  being anterior and 6:00 being the center of transverse ligament in  this convention with associated subtle area of subchondral edema in  the superior acetabulum.  Ligament teres and transverse ligament of acetabulum: Intact.  Joint or bursal effusion  Joint effusion: A physiologic amount of joint fluid.  Bursal effusion: Minimal nonspecific edema over the greater  trochanter. No substantial iliopsoas or trochanteric bursal effusion.  Muscles and tendons  Muscles and tendons: The rectus femoris, the visualized portion  iliopsoas, proximal hamstrings, and hip abductors are intact.  The  visualized adductor muscles are unremarkable.   Nerves:  The visualized course of the sciatic nerve is unremarkable.   Other Findings:  There is fat-containing right inguinal hernia.  Impression:  1. Approximately 12-3 o'clock labral tearing with synovial herniation  pit and likely reduced femoral head neck junction offset. 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  2. Fat containing right inguinal hernia.          ASSESSMENT:   1.  Lumbar DDD, mild  2.  Lumbar muscle spasm  3.  Labral tear, right hip  4.  Hx: anxiety, chemical dependence in remission.  5. Functional neurologic movement disorder     Shoaib presents for 3-month follow-up.  He continues to have uncontrolled movements and what he describes as movement attacks in which he has body flails and he has had loss of bowel and bladder control.  He is following with neurology and there is no clear course of treatment at this point.  It does seem that there is a certain component of somatic system and I am concerned about the level of focus he has sudhir every minute of his day in relation to his medications, his activities, heart rate, steps per day, detailing every activity.  We have talked about trying to be sure that this is not an obsession and he is allowing himself to focus on other more enjoyable things.  The more he seems to excessively track these details and focused his creativity on expressions of his pain, the worst that the visible symptoms seems to be such as the uncontrolled movements.    He does not think that the baclofen is helping at all and he has had a pretty significant dose.  I will have him start reducing by 5 mg (half tab) every 5-7 days.  If symptoms worsen he can go back to the last effective dose.  Refills of appropriate medications provided.    Plan:    Diagnosis reviewed, treatment option addressed, and risk/benifits discussed.  Self-care instructions given.  I am recommending a multidisciplinary treatment plan to help this patient better manage pain.      1. Schedule pain psychology visits  2. Schedule physical therapy visits   3. Schedule follow-up with CARLOS A Higuera, NP-C in before next opiate refill.   4. Medication recommendations:   1. Refills of Hydroxyzine, Baclofen, Norco provided.   2. Reduce Baclofen by 5 mg per day every 7-10 days. If  symptoms return go back to last effective dose.      Total time spent face to face was 30 minutes and more than 50% of face to face time was spent in counseling and/or coordination of care regarding the diagnosis and recommendations above.      CARLOS A Bass, NP-C   [facility]    Disclaimer: This note consists of symbols derived from keyboarding, dictation and/or voice recognition software. As a result, there may be errors in the script that have gone undetected. Please consider this when interpreting information found in this chart.                                              Patient is a current opioid user with chronic pain. To follow patient safety pain recommendations, I have verified that Pain clinic has supported opioid use.

## 2018-12-08 ENCOUNTER — MYC MEDICAL ADVICE (OUTPATIENT)
Dept: PALLIATIVE MEDICINE | Facility: CLINIC | Age: 33
End: 2018-12-08

## 2018-12-08 DIAGNOSIS — M47.819 FACET ARTHROPATHY: Primary | ICD-10-CM

## 2018-12-12 ENCOUNTER — TELEPHONE (OUTPATIENT)
Dept: BEHAVIORAL HEALTH | Facility: CLINIC | Age: 33
End: 2018-12-12

## 2018-12-12 NOTE — TELEPHONE ENCOUNTER
"Patient sent me an e-mail Saturday 12/1/18 wanting to get his mental health records, he \"requested all records\" and only received medical records not mental health.   Left voice mail for patient, indicating he needs to specify on the FIGUEROA/release form \"mental health\" records. Advised patient to call me back so I can explain the forms he needs to complete and send to medical records.   "

## 2018-12-18 ENCOUNTER — TELEPHONE (OUTPATIENT)
Dept: BEHAVIORAL HEALTH | Facility: CLINIC | Age: 33
End: 2018-12-18

## 2018-12-18 NOTE — TELEPHONE ENCOUNTER
..  Behavioral Health Home Services  [facility]: [facility] Health Worker Note      Patient: Hoang Kiser  Date: December 18, 2018  Preferred Name: Shoaib    Previous PHQ-9:   PHQ-9 SCORE 1/24/2018 2/14/2018 10/1/2018   PHQ-9 Total Score - - -   PHQ-9 Total Score 2 7 13     Previous JIN-7:   JIN-7 SCORE 1/22/2016 8/14/2018 10/1/2018   Total Score - - -   Total Score 3 15 13     MOLLY LEVEL:  MOLLY Score (Last Two) 3/23/2016 10/1/2018   MOLLY Raw Score 52 31   Activation Score 100 59.3   MOLLY Level 4 3       Preferred Contact:  Need for : No  Preferred Contact: Cell      Type of Contact Today: Phone call (patient / identified key support person reached)      Data: (Subjective / Objective):  Recent ED/IP Admission or Discharge?   None    Patient Goals:  No Data Recorded    [facility] Core Service Provided:  Individual and Family Support: aimed to help clients reduce barriers to achieving goals, increase health literacy and knowledge about chronic condition(s), increase self-efficacy skills, and improve health outcomes    Current Reported Stressors / Issues / Health Action Plan Items Addressed Today:  CHW called Pt to follow up on FIGUEROA surrounding mental health records release per [facility]. Pt stated he will send [facility] another script message with an updated FIGUEROA/communicate pt needs. Pt stated she still needs the letter in support around student loans. Pt stated the letter indicating his inability to pay the student loans. CHW encouraged Pt calling [facility] to eliminate any discrepancy.    Plan: (Homework, other):  Patient was encouraged to continue to seek condition-related information and education.      Patient has set self-identified goals and will monitor progress until the next appointment on:[facility]    Behavioral Health Home    Next 5 appointments (look out 90 days)    Dec 20, 2018 10:00 AM CST  Return Visit with Fili Wesley,  LP  [facility] ([facility]) [address]  [address]  [facility] [address]  [phone]   Jan 09, 2019  9:10 AM CST  Nurse Only with AN ANCILLARY  [facility] [address] [facility] [address]  [phone]   Sandeep 15, 2019 11:00 AM CST  Return Visit with CARLOS A Guy CNP  [facility] ([facility]) [address]  [address]  [facility] [address]  [phone]

## 2018-12-20 ENCOUNTER — OFFICE VISIT (OUTPATIENT)
Dept: PALLIATIVE MEDICINE | Facility: CLINIC | Age: 33
End: 2018-12-20
Payer: COMMERCIAL

## 2018-12-20 DIAGNOSIS — M62.830 SPASM OF BACK MUSCLES: ICD-10-CM

## 2018-12-20 DIAGNOSIS — G25.9 FUNCTIONAL MOVEMENT DISORDER: ICD-10-CM

## 2018-12-20 DIAGNOSIS — M25.551 HIP PAIN, RIGHT: Primary | ICD-10-CM

## 2018-12-20 PROCEDURE — 96152 HC HEALTH AND BEHAVIOR INTERVENTION, INDIVIDUAL, EACH 15 MINUTES: CPT | Performed by: PSYCHOLOGIST

## 2018-12-20 NOTE — PROGRESS NOTES
appointment.                                  [facility]   [facility] [place]  Behavioral Medicine Visit    Patient Name: Hoang Kiser     YOB: 1985   Medical Record Number: [id number]  Date: 12/20/2018                SUBJECTIVE:   Met with the patient on this date for an elective return appointment.  Today's visit is our 16th visit of the calendar year.  Today the patient discusses his hypervigilance versus his attempts to do self-care, his working on radical acceptance of his conditions and his self-love, his pain and dissociation and shame and how they feed off each other.          OBJECTIVE: Today the patient reports the following: His pain level is mildly worse, his mood is mildly worse, his activity level has mildly increased, his stress level remains the same, his sleep has been moderately worse.  Today the patient reports he is involved in self-care activities 2-3 times per day.  Today the patient reports since beginning treatment with [facility]  his overall progress has slightly improved.   Length of Visit: 60 minutes      Assessment: Current Emotional / Mental Status    Appearance:   Appropriate   Eye Contact:   Good   Psychomotor Behavior: Normal   Attitude:   Cooperative   Orientation:   All  Speech  Rate / Production:             Normal   Volume:              Normal   Mood:    Anxious   Affect:    Appropriate   Thought Content:  Clear   Thought Form:  Coherent  Logical   Insight:    Fair     ASSESSMENT:   Per patient pain provider: Hip pain, right, spasm of back muscles, functional movement disorder    Progress toward goals: fair.    Pain Status: worsened    Emotional Status: worsened              Medication / chemical use concerns: None    PLAN:   Next Appointment: Hoang Kiser will schedule a follow-up appointment in 2 weeks.  Assignment: Work on self-care activities  Objectives / interventions for next session:  Discuss treatment referral    Fili Wesley MA LP, [facility]  Behavioral Pain Specialist  [facility] [place]

## 2019-01-04 NOTE — TELEPHONE ENCOUNTER
Date of Visit: 9/12/2018  Pre procedure Diagnosis: facet arthropathy   Post procedure Diagnosis: Same  Procedure performed: Bilateral L5-S1 facet joint injections    Order pended for repeat procedure. Routing to provider to review.    KATERINA CardenasN, RN  Care Coordinator  [facility] [place]

## 2019-01-10 ENCOUNTER — OFFICE VISIT (OUTPATIENT)
Dept: PALLIATIVE MEDICINE | Facility: CLINIC | Age: 34
End: 2019-01-10
Payer: MEDICAID

## 2019-01-10 DIAGNOSIS — M25.551 HIP PAIN, RIGHT: Primary | ICD-10-CM

## 2019-01-10 DIAGNOSIS — M47.819 FACET ARTHROPATHY: ICD-10-CM

## 2019-01-10 DIAGNOSIS — G25.9 FUNCTIONAL MOVEMENT DISORDER: ICD-10-CM

## 2019-01-10 DIAGNOSIS — M62.830 SPASM OF BACK MUSCLES: ICD-10-CM

## 2019-01-10 PROCEDURE — 96152 ZZHC HEALTH AND BEHAVIOR INTERVENTION, INDIVIDUAL, EACH 15 MINUTES: CPT | Performed by: PSYCHOLOGIST

## 2019-01-10 NOTE — PROGRESS NOTES
[facility]   [facility] [place]  Behavioral Medicine Visit    Patient Name: Hoang Kiser     YOB: 1985   Medical Record Number: [id number]  Date: 1/10/2019                SUBJECTIVE: Met with the patient on this date for an elective return appointment.   Today's visit is our first visit of the calendar year.  Today's session focused on transitioning Shoaib from individual appointments with this provider to consideration of a transition of services to another pain provider due to my pending departure from the department.  In particular we processed the progress gave has made in the past year regarding his pain and his functional movement disorder.  At this point Faustino is uncertain as to whether or not he wants to continue to proceed with pain services though he does feel inclined to consider it.  He is given information on the 2 referral options and reports he will reflect and make a decision in the coming weeks.        OBJECTIVE: Today the patient reports the following: His pain level is mildly worse, his mood is mildly worse, his activity level has mildly increased, his stress level remains the same and his sleep remains the same.  Today the patient reports he is involved in self-care activities 2-3 times per day.  Today the patient reports since receiving services at the [facility] [place] his overall progress has moderately improved.   Length of Visit: 60 minutes      Assessment: Current Emotional / Mental Status    Appearance:   Appropriate   Eye Contact:   Good   Psychomotor Behavior: Functional movement disorder   Attitude:   Cooperative   Orientation:   All  Speech  Rate / Production:             Normal   Volume:              Normal   Mood:    Sad   Affect:    Appropriate   Thought Content:  Clear   Thought Form:  Coherent  Logical   Insight:    Fair     ASSESSMENT:   Per patient pain provider: Hip pain,  functional movement disorder, muscle spasm, low back pain    Progress toward goals: good.    Pain Status: worsened    Emotional Status: worsened              Medication / chemical use concerns: None    PLAN:   Next Appointment: Hoang Kiser will schedule a follow-up after he considers his options  Assignment: None  Objectives / interventions for next session: None    Fili Wesley MA, LP, [facility]  Behavioral Pain Specialist  [facility] [place]

## 2019-01-15 ENCOUNTER — OFFICE VISIT (OUTPATIENT)
Dept: PALLIATIVE MEDICINE | Facility: CLINIC | Age: 34
End: 2019-01-15
Payer: MEDICAID

## 2019-01-15 VITALS
HEART RATE: 95 BPM | DIASTOLIC BLOOD PRESSURE: 80 MMHG | BODY MASS INDEX: 27.25 KG/M2 | SYSTOLIC BLOOD PRESSURE: 122 MMHG | OXYGEN SATURATION: 95 % | RESPIRATION RATE: 16 BRPM | WEIGHT: 174 LBS

## 2019-01-15 DIAGNOSIS — M25.551 HIP PAIN, RIGHT: ICD-10-CM

## 2019-01-15 PROCEDURE — 99214 OFFICE O/P EST MOD 30 MIN: CPT | Performed by: NURSE PRACTITIONER

## 2019-01-15 RX ORDER — HYDROCODONE BITARTRATE AND ACETAMINOPHEN 5; 325 MG/1; MG/1
1 TABLET ORAL 3 TIMES DAILY PRN
Qty: 80 TABLET | Refills: 0 | Status: SHIPPED | OUTPATIENT
Start: 2019-01-15 | End: 2019-01-15 | Stop reason: DRUGHIGH

## 2019-01-15 RX ORDER — HYDROCODONE BITARTRATE AND ACETAMINOPHEN 5; 325 MG/1; MG/1
1 TABLET ORAL 3 TIMES DAILY PRN
Qty: 90 TABLET | Refills: 0 | Status: SHIPPED | OUTPATIENT
Start: 2019-01-15 | End: 2019-02-12

## 2019-01-15 ASSESSMENT — PAIN SCALES - GENERAL: PAINLEVEL: SEVERE PAIN (6)

## 2019-01-15 NOTE — PROGRESS NOTES
"[facility]    CHIEF COMPLAINT: \"Involuntary movements, movement attacks, Fatigue, unpredictable pain, insomnia, oversleeping, right hip pain, low back, left leg and foot, neck pain.    INTERVAL HISTORY:  Last seen on 12/6/18.        Recommendations/plan at the last visit included:  1. Schedule pain psychology visits  2. Schedule physical therapy visits   3. Schedule follow-up with CARLOS A Higuera NP-C in before next opiate refill.   4. Medication recommendations:             1. Refills of Hydroxyzine, Baclofen, Norco provided.   2. Reduce Baclofen by 5 mg per day every 7-10 days. If symptoms return go back to last effective dose.      Since his last visit, Hoang J Margi reports:  - SI joint injections scheduled for tomorrow.  - \"Pain is a factor in movement symptoms\". Having more random movements. Movement makes pain worse. Right hip is more painful related to spasms. The spasms force his leg into 90 degrees which causes hip pain. Attacks will also be so strong that he is \"thrown out of bed\".  Describes being \"stuck in a bridge position for extended periods of time\".   - Continues PT at [facility] and has 2 biofeedback appts.     Pain Information:   Pain quality: Exhausting and searing    Pain rating: intensity ranges from 3/10 to 8/10, and averages 8/10 on a 0-10 scale.    Annual Controlled Substance Agreement/UDS due date: April 2019      Current Pain Relevant Medications:   Norco 5/325 mg 1 tab BID- TID                        Total opiate dose: 10-15 MME daily  Clonazepam .5 mg 1-2 tab at HS PRN  Duloxetine 20 mg daily  Naproxen 500 mg BID: on hold re: elevated LFTs   Tizanidine 4 mg 1-2 tabs at HS PRN  Ambien 10 mg at HS  Adderall 50 mg daily, combination of long and short acting.       Previous Pain Relevant Medications: (H--helped; HI--Helped initially; [facility]--Somewhat helpful; NH--No help; W--worse; SE--side effects; ?--Unsure if helpful)   NOTE: This medication information " taken from patient's intake form, not medical records.                         Opiates: Tramadol: H, Hydrocodone: H                        NSAIDS: Ibuprofen:H, naproxen:[facility], Relafen: NH                        Muscle Relaxants: Cyclobenzaprine:H,Med interaction, Tizanidine:H                        Anti-migraine mediations: Prednisone:H                        Anti-depressants: Bupropion:SE, Celexa:SE, Duloxetine:H, Trazodone:Too strong, Venlafaxine:too strong                        Sleep aids:Anbien: H                        Anxiolytics: Clonazepam:H                        Neuropathics: Tegretol:taken for seizures in childhood, Gabapentin:H                                          Topicals: Lidocaine:H                        Other medications not covered above: Tylenol:NH      Any illicit drug use: 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 Concern for multiple controlled substances including stimulants and opiates.        SELF CARE:   How often do you practice SELF-CARE (relaxing, stretching, pacing, monitoring posture, taking mini-breaks) in a typical day:  10+ daily     THE 4 As OF OPIOID MAINTENANCE ANALGESIA    Analgesia: Is pain relief clinically significant? YES   Activity: Is patient functional and able to perform Activities of Daily Living? YES   Adverse effects: Is patient free from adverse side effects from opiates? YES   Adherence to Rx protocol: Is patient adhering to Controlled Substance Agreement and taking medications ONLY as ordered? YES         Is Narcan prescribed for opiate use >50 MME daily? Ordered for combination of opiates and benzodiazepines.     Medications:  Current Outpatient Medications    Medication Sig Dispense Refill     AFLURIA QUADRIVALENT 0.5 ML injection ADM 0.5ML IM UTD  0     amphetamine-dextroamphetamine (ADDERALL XR) 30 MG per 24 hr capsule Take 30 mg by mouth daily       baclofen (LIORESAL) 10 MG tablet Take 0.5-1 tablets (5-10 mg) by mouth 3 times daily as needed for muscle spasms 90 tablet 1     clonazePAM (KLONOPIN) 0.5 MG tablet Take 0.5-1 mg by mouth nightly as needed   0     DULoxetine (CYMBALTA) 60 MG EC capsule 2 x 60mg tab by mouth daily 30 capsule 1     gabapentin (NEURONTIN) 100 MG capsule   2     HYDROcodone-acetaminophen (NORCO) 5-325 MG tablet Take 1 tablet by mouth 3 times daily as needed for pain (Max 3 times daily. #80 tabs to last 30 days.) Okay to fill and start on/after 12/18/18 80 tablet 0     hydrOXYzine (ATARAX) 25 MG tablet Take 1 tablet (25 mg) by mouth nightly as needed (at HS PRN) 30 tablet 3     naloxone (NARCAN) nasal spray Spray 1 spray (4 mg) into one nostril alternating nostrils as needed for opioid reversal every 2-3 minutes until assistance arrives 0.2 mL 0     naproxen (NAPROSYN) 500 MG tablet Take 1 tablet (500 mg) by mouth 2 times daily (with meals) 40 tablet 3     nicotine (NICODERM CQ) 14 MG/24HR 24 hr patch Place 1 patch onto the skin every 24 hours Use this patch first (Patient not taking: Reported on 11/8/2018) 30 patch 0     nicotine (NICODERM CQ) 7 MG/24HR 24 hr patch Place 1 patch onto the skin every 24 hours (Patient not taking: Reported on 11/8/2018) 30 patch 0     oxybutynin (DITROPAN-XL) 5 MG 24 hr tablet Take 1 tablet (5 mg) by mouth daily 90 tablet 1     zolpidem (AMBIEN) 10 MG tablet   0       Review of Systems: A 10-point review of systems was negative, with the exception of chronic pain issues.      Social History: Reviewed; unchanged from previous consultation.      Family history: Reviewed; unchanged from previous consultation.     PHYSICAL EXAM    There were no vitals filed for this visit.    Constitutional: healthy, alert, no  "distress, pain behaviors and somewhat down r/t increased spastic movements   HEENT: Head atraumatic, normocephalic. Eyes without conjunctival injection or jaundice. Neck supple. No obvious neck masses.  Skin: No rash, lesions, or petechiae of exposed skin.   Extremities: No clubbing, cyanosis, or edema to exposed extremities  Psychiatric/mental status: Alert, without lethargy or stupor. Appropriate affect.       Neurologic exam:  CN:  Cranial nerves 2-12 are grossly normal.  Has uncontrolled upper body movement/tremor.           Musculoskeletal exam:  Cervical spine:                       Flex:  20 degrees                       Ext: 20 degrees                       Rotation to right: 20 degrees                       Rotation to left: 20 degrees                       Rotation/ext to right: painful                        Rotation/ext to left: painful                        Tenderness in the cervical spine at midline. No                       Tenderness in the cervical paraspinal muscles. No  Thoracic spine:                        Kyphosis. No                       Tenderness in the thoracic spine at midline. Yes                       Tenderness in the thoracic paraspinal muscles. Yes  Lumbar/Sacral spine:                       Forward Flexion:  90 degrees                       Ext: 20 degrees \"tight\"                       Rotation/ext to right: painful                        Rotation/ext to left: painful                        Lordosis. No                       Tenderness in the lumbar spine at midline. Yes                       Tenderness in the lumbar paraspinal muscles.Yes      Psychiatric:  mentation appears normal., affect and mood normal      DIRE Score for ongoing opioid management is calculated as follows:    Diagnosis = 2 pts (slowly progressive; moderate pain/objective findings)    Intractability = 2 pts (most treatments tried; patient not fully engaged/barriers)    Risk        Psych = 2 pts (personality " dysfunction/mental illness that moderately interferes with care)         Chem Hlth = 2 pts (use of medications to cope with stress; chemical dependency in remission)       Reliability = 3 pts (highly reliable with meds, appointments, treatments)       Social = 3 pts (supportive family/close relationships; involved in work/school; no isolation)       (Psych + Chem hlth + Reliability + Social) = 14    Efficacy = 2 pts (moderate benefit/function; low med dose; too early/not tried meds)    DIRE Score = 16        7-13: likely NOT suitable candidate for long-term opioid analgesia       14-21: may be a suitable candidate for long-term opioid analgesia          Previous Diagnostic Tests:   Imaging Studies:   MR LUMBAR SPINE W/O CONTRAST 5/2/2018 7:27 PM  History: DDD (degenerative disc disease), lumbar; Lumbar  radiculopathy; Hip pain, right; Groin pain, right.  ICD-10: DDD (degenerative disc disease), lumbar; Lumbar radiculopathy;  Hip pain, right; Groin pain, right  Comparison: Lumbar spine MRI 3/8/2017  Technique: Sagittal STIR, T1-weighted turbo spin-echo, 3-D volumetric  T2-weighted and axial reconstructed T2-weighted images of the lumbar  spine were obtained without intravenous contrast.   Findings: 5 lumbar-type vertebra. The tip of the conus medullaris is  at L1.  The lumbar vertebral column is normally aligned.   Straightening of lumbar lordosis, unchanged. The intervertebral disc  heights are maintained. Normal marrow signal. At L5-S1, there is a  disc bulge and facet hypertrophy with mild left neural foraminal  narrowing, unchanged. No spinal canal stenosis.  Impression:  1. Lumbar spondylosis with mild left neural foraminal narrowing at  L5-S1.  2. No spinal canal stenosis.      MR right hip without contrast 5/2/2018 6:47 PM  Techniques: Multiplanar multisequence imaging of the right hip was  obtained without  administration of intra-articular or intravenous  contrast using routing protocol.  History: Hip  pain.  Comparison: None available.  Findings:  Osseous structures  Osseous structures: No fracture, stress reaction, avascular necrosis,  or focal osseous lesion is seen. There is small focal area of  subchondral cystic changes in the anterior right femoral head neck  junction, most compatible with synovial herniation pit. Findings  suggestive of reduced femoral head neck junction though alpha angle  cannot be assessed owing to no dedicated oblique axial sequence  obtained.  Articular cartilage and labrum  Assessment limited on this arthrographic study due to relative lack of  joint distension.  Articular cartilage: No high grade chondral loss.  Labrum: Labral tearing approximately from 12:00 to 3:00 with 3:00  being anterior and 6:00 being the center of transverse ligament in  this convention with associated subtle area of subchondral edema in  the superior acetabulum.  Ligament teres and transverse ligament of acetabulum: Intact.  Joint or bursal effusion  Joint effusion: A physiologic amount of joint fluid.  Bursal effusion: Minimal nonspecific edema over the greater  trochanter. No substantial iliopsoas or trochanteric bursal effusion.  Muscles and tendons  Muscles and tendons: The rectus femoris, the visualized portion  iliopsoas, proximal hamstrings, and hip abductors are intact.  The  visualized adductor muscles are unremarkable.   Nerves:  The visualized course of the sciatic nerve is unremarkable.   Other Findings:  There is fat-containing right inguinal hernia.  Impression:  1. Approximately 12-3 o'clock labral tearing with synovial herniation  pit and likely reduced femoral head neck junction offset. 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  2. Fat containing right inguinal hernia.          ASSESSMENT:   1.  Lumbar DDD, mild  2.  Lumbar muscle spasm  3.  Labral tear, right hip  4.  Hx: anxiety, chemical dependence in remission.  5. Functional neurologic  "movement disorder     Shoaib presents for medication management appt. He continues to describe extremely painful spasms that at \"pushing my psychological limits\". He will be following up again with Neurology soon. He continues with [facility] programs, etc. Due to the severity and increasing regularity of his painful spasms, will increase Norco to 90 tabs per month. Going off of Baclofen completely has not increased his overall pain per his report.      Plan:    Diagnosis reviewed, treatment option addressed, and risk/benifits discussed.  Self-care instructions given.  I am recommending a multidisciplinary treatment plan to help this patient better manage pain.      1. Continue physical therapy   2. Schedule follow-up with CARLOS A Higuera, NPDoloresC in 4 weeks  3. Medication recommendations:   1. Norco: increase to #90 tabs per month. Ok for early refill today.     Total time spent face to face was 30 minutes and more than 50% of face to face time was spent in counseling and/or coordination of care regarding the diagnosis and recommendations above.      CARLOS A Bass, NP-C   [facility]    Disclaimer: This note consists of symbols derived from keyboarding, dictation and/or voice recognition software. As a result, there may be errors in the script that have gone undetected. Please consider this when interpreting information found in this chart.                                            "

## 2019-01-15 NOTE — PATIENT INSTRUCTIONS
After Visit Instructions:     Thank you for coming to [facility] for your care. It is my goal to partner with you to help you reach your optimal state of health.     Continue daily self-care, identifying contributing factors, and monitoring variations in pain level. Continue to integrate self-care into your life.      1. Continue physical therapy   2. Schedule follow-up with CARLOS A Higuera NP-C in 4 weeks  3. Medication recommendations:   1. Norco: increase to #90 tabs per month. Ok for early refill today.       CARLOS A Bass, NP-C  [facility]  [facility]  Clinic Number:  [phone]

## 2019-01-16 ENCOUNTER — TELEPHONE (OUTPATIENT)
Dept: FAMILY MEDICINE | Facility: CLINIC | Age: 34
End: 2019-01-16

## 2019-01-16 ENCOUNTER — ANCILLARY PROCEDURE (OUTPATIENT)
Dept: RADIOLOGY | Facility: CLINIC | Age: 34
End: 2019-01-16
Payer: MEDICAID

## 2019-01-16 ENCOUNTER — RADIOLOGY INJECTION OFFICE VISIT (OUTPATIENT)
Dept: PALLIATIVE MEDICINE | Facility: CLINIC | Age: 34
End: 2019-01-16
Payer: MEDICAID

## 2019-01-16 ENCOUNTER — DOCUMENTATION ONLY (OUTPATIENT)
Dept: BEHAVIORAL HEALTH | Facility: CLINIC | Age: 34
End: 2019-01-16

## 2019-01-16 VITALS — DIASTOLIC BLOOD PRESSURE: 78 MMHG | OXYGEN SATURATION: 100 % | SYSTOLIC BLOOD PRESSURE: 111 MMHG | HEART RATE: 76 BPM

## 2019-01-16 DIAGNOSIS — M47.816 SPONDYLOSIS OF LUMBAR REGION WITHOUT MYELOPATHY OR RADICULOPATHY: ICD-10-CM

## 2019-01-16 DIAGNOSIS — M47.819 FACET ARTHROPATHY: Primary | ICD-10-CM

## 2019-01-16 PROCEDURE — 64484 NJX AA&/STRD TFRM EPI L/S EA: CPT | Mod: 50 | Performed by: PSYCHIATRY & NEUROLOGY

## 2019-01-16 PROCEDURE — 64483 NJX AA&/STRD TFRM EPI L/S 1: CPT | Mod: 50 | Performed by: PSYCHIATRY & NEUROLOGY

## 2019-01-16 ASSESSMENT — PAIN SCALES - GENERAL: PAINLEVEL: MODERATE PAIN (5)

## 2019-01-16 NOTE — PROGRESS NOTES
Pre procedure Diagnosis: facet arthropathy   Post procedure Diagnosis: Same  Procedure performed: bilateral L4/5 and L5/S1 facet joint injections  Anesthesia: none  Complications: none  Operators: Danae Boss MD     Indications:   Hoang Kiser is a 33 year old male was sent by Tamiko Stevens NP for back pain.  They have a history of low back pain.  Exam shows jerking and swaying movements consistent with his functional neurologic disorder, as well as pain with extension, rotation and they have tried conservative treatment including medication, PT    Options/alternatives, benefits and risks were discussed with the patient including bleeding, infection, flared pain, tissue trauma, exposure to radiation, reaction to medications including seizure, spinal cord injury, paralysis, weakness, numbness and headache.   Questions were answered to his satisfaction and he agrees to proceed. Voluntary informed consent was obtained and signed.     Vitals were reviewed: Yes  Allergies were reviewed:  Yes   Medications were reviewed:  Yes   Pre-procedure pain score: 5/10    Procedure:  After getting informed consent, patient was brought into the procedure suite and was placed in a prone position on the procedure table.   A Pause for the Cause was performed.  Patient was prepped and draped in sterile fashion.     Under AP fluoroscopic guidance the L4/5 and L5/S1 facet joints on the right, then left side were identified, and the C-arm was rotated obliquely to the affected side to open the joint space. A total of 6 ml of 1% lidocaine was injected at the needle entry point and needle tract. Then a 22 gauge 3.5 inch quincke type spinal needle was inserted and advanced under fluoroscopic guidance targeting the superior articular pillar of each joint. Once the needle made a contact with SAP, it was rotated and was then advanced into the joint.    AP fluoroscopic views were obtained to confirm the needle placement. Then, contrast  was injected after negative aspiration for heme and CSF in each joint, confirming appropriate placement.  A total of 1ml of Omnipaque was used, and 9ml was wasted.     The injection was then accomplished using a solution containing 3ml of 0.25% bupivacaine mixed with 10mg of dexamethasone, divided between the 4 joints. The needles were removed.     Hemostasis was achieved, the area was cleaned, and bandaids were placed when appropriate.  The patient tolerated the procedure well, and was taken to the recovery room.    Images were saved to PACS.    Post-procedure pain score: 3/10  Follow-up includes:   -f/u phone call in one week  -f/u with referring provider    Danae Boss MD  [facility]

## 2019-01-16 NOTE — NURSING NOTE
Discharge Information    IV Discontiued Time:  NA    Amount of Fluid Infused:  NA    Discharge Criteria = When patient returns to baseline or as per MD order    Consciousness:  Pt is fully awake    Circulation:  BP +/- 20% of pre-procedure level    Respiration:  Patient is able to breathe deeply    O2 Sat:  Patient is able to maintain O2 Sat >92% on room air    Activity:  Moves 4 extremities on command    Ambulation:  Patient is able to stand and walk or stand and pivot into wheelchair    Dressing:  Clean/dry or No Dressing    Notes:   Discharge instructions and AVS given to patient    Patient meets criteria for discharge?  YES    Admitted to PCU?  No    Responsible adult present to accompany patient home?  Yes    Signature/Title:    harris rosales RN Care Coordinator  [facility]

## 2019-01-16 NOTE — PROGRESS NOTES
Patient e-mailed [employer] requesting his mental health records; forwarded to  to send to records department.    Patient e-mailed documents for Primary Care Provider via my outlook. Informed patient he can not send records or communicate through me to pass records to Primary Care Provider, he needs to use [employer]. Advised he send these records through [email or web address]

## 2019-01-16 NOTE — PATIENT INSTRUCTIONS
[facility]   Procedure Discharge Instructions    Today you saw:     Dr. Montse Boss     You had an:  lumbar  facet joint injection       Medications used:  Lidocaine   Bupivacaine   Dexamethasone Omnipaque            Be cautious when walking. Numbness and/or weakness in the lower extremities may occur for up to 6-8 hours after the procedure due to effect of the local anesthetic    Do not drive for 6 hours. The effect of the local anesthetic could slow your reflexes.     You may resume your regular activities after 24 hours    Avoid strenuous activity for the first 24 hours    You may shower, however avoid swimming, tub baths or hot tubs for 24 hours following your procedure    You may have a mild to moderate increase in pain for several days following the injection.    It may take up to 14 days for the steroid medication to start working although you may feel the effect as early as a few days after the procedure.       You may use ice packs for 10-15 minutes, 3 to 4 times a day at the injection site for comfort    Do not use heat to painful areas for 6 to 8 hours. This will give the local anesthetic time to wear off and prevent you from accidentally burning your skin.     You may use anti-inflammatory medications (such as Ibuprofen or Aleve or Advil) or Tylenol for pain control if necessary    If you were fasting, you may resume your normal diet and medications after the procedure    If you have diabetes, check your blood sugar more frequently than usual as your blood sugar may be higher than normal for 10-14 days following a steroid injection. Contact your doctor who manages your diabetes if your blood sugar is higher than usual    If you experience any of the following, call the Pain Clinic during work hours at [phone] or the Provider Line after hours at [phone]:  -Fever over 100 degree F  -Swelling, bleeding, redness, drainage, warmth at the injection site  -Progressive weakness  or numbness in your legs or arms  -Loss of bowel or bladder function  -Unusual headache that is not relieved by Tylenol or other pain reliever  -Unusual new onset of pain that is not improving

## 2019-01-16 NOTE — TELEPHONE ENCOUNTER
Here are the patients emails that he sent to Krystle regarding the forms that I gave to you. Please advise.  Dahlia Vera,     Dear Krystle,      Please pass this along to Dr. Floyd.     Attached is: The Medical Opinion form for the [facility] and my summary for notes.    Sincerely,      [email or web address]  [phone]    Dear Krystle,     This needs to be faxed in by the 28th of January 2019!    And please let me know if me sending you messages to pass on is perceived as rude or not by the books. I don t have any compass to gauge such things. All I know is as my case is in process, transparency on my chart is not favored.     Here is the last form and my summary.     For the record, this form is to approve me for medical rides in lieu of taking the bus. Dr. Floyd and I have already established I cannot take the bus because I have metro mobility now. With the amount of medical appointments I have and the base [place] rate ($102) as my only income per month, I am in need of medical transportation from [facility]. I cannot afford to pay for my medical rides. Example: Average Appointments per month: 14-18, Cost per round trip: About $8 dollars.     ----------------------------------    My Summary:     Independent Ambulation: Yes  Uses Walker or Cane: No  Walking Difficulty: Yes - my involuntary movements and twitches + existing chronic pain make it difficult daily  Requires Assistance of trained personnel for safety: No  Confined to a Wheelchair: Not Yet  Able to transfer from the wheel chair: No  Alert: Yes  Confused: No  Memory Deficit or Recall: Yes - The disassociation from my Functional Neurological Disorder and lack of sleep affect me slightly.  Vision Difficulties: No  Hearing Impairment: No  Speech Impairment: No    NO Equipment Used    Living Arrangements: Lives Alone in a Community Setting (Independent Living Wing)  Room Number: 5, Number of Steps: Not Applicable    Comes into your  facility: Alone    Diagnosis: I leave this section to Dr. Floyd.    My Opinion: The Functional Neurological Disorder and my Chronic Lumbar and Hip Pain Diagnosis are the most prominent reasons for this form.       Change in mode of transportation due to instability? If they mean medical transport, then Yes.     No recent hospitalization dates.     -----------------------  Thank you again so much!!!!    Feel free to call with any questions.     Sincerely,      [email or web address]  [phone]

## 2019-01-16 NOTE — NURSING NOTE
Pre-procedure Intake    Have you been fasting? NA    If yes, for how long? NA    Are you taking a prescribed blood thinner such as coumadin, Plavix, Xarelto?    No    If yes, when did you take your last dose? NA    Do you take aspirin?  No    If cervical procedure, have you held aspirin for 6 days?   NA    Do you have any allergies to contrast dye, iodine, steroid and/or numbing medications?  NO    Are you currently taking antibiotics or have an active infection?  NO    Have you had a fever/elevated temperature within the past week? NO    Are you currently taking oral steroids? NO    Do you have a ? Yes       Are you pregnant or breastfeeding?  Not Applicable    Are the vital signs normal?  Yes      Clara Rueda CMA ([facility])

## 2019-01-16 NOTE — TELEPHONE ENCOUNTER
"Please let the patient know that I completed the \"Level of Need Assessment Form\"     I am not sure if he is asking me to complete the Discharge Application: Total and Permanent Disability.   If so I think that I have spoken with him before about this. His main disabilty is from his psychiatric diagnoses and that this form should be completed by his psychiatrist.    Phill Floyd MD   "

## 2019-01-18 NOTE — TELEPHONE ENCOUNTER
I called the patient and LM including the providers note below and does he want me to fax the form Dr. Floyd completed. My direct line given [phone] to call if any questions.  Dahlia Vera,

## 2019-01-22 NOTE — TELEPHONE ENCOUNTER
The patient has not returned my call.  I faxed the completed form to Case Management @[phone].  Dahlia Vera,

## 2019-01-31 DIAGNOSIS — M62.830 LUMBAR PARASPINAL MUSCLE SPASM: ICD-10-CM

## 2019-01-31 DIAGNOSIS — M54.16 LUMBAR RADICULAR PAIN: ICD-10-CM

## 2019-02-01 RX ORDER — GABAPENTIN 100 MG/1
CAPSULE ORAL
Qty: 90 CAPSULE | Refills: 0 | OUTPATIENT
Start: 2019-02-01

## 2019-02-01 NOTE — TELEPHONE ENCOUNTER
Please ask the patient to contact the prescribing provider for refills on his gabapentin. Possible(huma) this was Dr Cao.  Phill Floyd MD

## 2019-02-04 ENCOUNTER — ALLIED HEALTH/NURSE VISIT (OUTPATIENT)
Dept: NURSING | Facility: CLINIC | Age: 34
End: 2019-02-04
Payer: COMMERCIAL

## 2019-02-04 DIAGNOSIS — Z23 NEED FOR VACCINATION: Primary | ICD-10-CM

## 2019-02-04 PROCEDURE — 90715 TDAP VACCINE 7 YRS/> IM: CPT

## 2019-02-04 PROCEDURE — 90471 IMMUNIZATION ADMIN: CPT

## 2019-02-04 RX ORDER — DIPHENHYDRAMINE HCL 25 MG
25-50 CAPSULE ORAL
COMMUNITY
Start: 2015-04-01 | End: 2019-02-13

## 2019-02-04 RX ORDER — DEXTROAMPHETAMINE SACCHARATE, AMPHETAMINE ASPARTATE MONOHYDRATE, DEXTROAMPHETAMINE SULFATE AND AMPHETAMINE SULFATE 7.5; 7.5; 7.5; 7.5 MG/1; MG/1; MG/1; MG/1
30 CAPSULE, EXTENDED RELEASE ORAL
COMMUNITY
End: 2019-02-04

## 2019-02-04 RX ORDER — DOXEPIN HYDROCHLORIDE 25 MG/1
CAPSULE ORAL
Refills: 1 | COMMUNITY
Start: 2018-12-18 | End: 2019-02-13

## 2019-02-04 RX ORDER — ORPHENADRINE CITRATE 100 MG/1
TABLET, EXTENDED RELEASE ORAL
Refills: 0 | COMMUNITY
Start: 2018-07-10 | End: 2019-02-13

## 2019-02-04 RX ORDER — METHYLPHENIDATE HYDROCHLORIDE 36 MG/1
TABLET ORAL
Refills: 0 | COMMUNITY
Start: 2018-06-27 | End: 2019-02-13

## 2019-02-04 RX ORDER — AMITRIPTYLINE HYDROCHLORIDE 10 MG/1
TABLET ORAL
Refills: 0 | COMMUNITY
Start: 2018-03-13 | End: 2019-02-13

## 2019-02-04 RX ORDER — DEXTROAMPHETAMINE SACCHARATE, AMPHETAMINE ASPARTATE, DEXTROAMPHETAMINE SULFATE AND AMPHETAMINE SULFATE 5; 5; 5; 5 MG/1; MG/1; MG/1; MG/1
TABLET ORAL
Refills: 0 | COMMUNITY
Start: 2018-06-04 | End: 2019-02-13

## 2019-02-04 RX ORDER — CYCLOBENZAPRINE HCL 5 MG
TABLET ORAL
Refills: 1 | COMMUNITY
Start: 2018-05-22 | End: 2019-02-13

## 2019-02-04 RX ORDER — BACLOFEN 10 MG/1
TABLET ORAL
Refills: 0 | COMMUNITY
Start: 2018-12-06 | End: 2019-02-13

## 2019-02-04 RX ORDER — IBUPROFEN 600 MG/1
600 TABLET, FILM COATED ORAL
COMMUNITY
Start: 2016-12-12 | End: 2019-02-13

## 2019-02-04 RX ORDER — DEXTROAMPHETAMINE SACCHARATE, AMPHETAMINE ASPARTATE, DEXTROAMPHETAMINE SULFATE AND AMPHETAMINE SULFATE 2.5; 2.5; 2.5; 2.5 MG/1; MG/1; MG/1; MG/1
20 TABLET ORAL
COMMUNITY
Start: 2015-05-22 | End: 2019-02-13

## 2019-02-04 RX ORDER — TRAMADOL HYDROCHLORIDE 50 MG/1
TABLET ORAL
Refills: 0 | COMMUNITY
Start: 2018-05-03 | End: 2019-02-13

## 2019-02-04 RX ORDER — FLURBIPROFEN 100 MG
100 TABLET ORAL
COMMUNITY
Start: 2016-12-05 | End: 2019-02-13

## 2019-02-04 RX ORDER — CYCLOBENZAPRINE HCL 10 MG
10 TABLET ORAL
COMMUNITY
Start: 2016-12-12 | End: 2019-02-13

## 2019-02-04 RX ORDER — METHYLPHENIDATE HYDROCHLORIDE 54 MG/1
TABLET ORAL
Refills: 0 | COMMUNITY
Start: 2018-08-05 | End: 2019-02-13

## 2019-02-04 RX ORDER — DOXEPIN HYDROCHLORIDE 10 MG/1
CAPSULE ORAL
COMMUNITY
Start: 2019-01-15 | End: 2019-02-13

## 2019-02-04 RX ORDER — GABAPENTIN 100 MG/1
CAPSULE ORAL
COMMUNITY
Start: 2019-01-08 | End: 2019-02-13

## 2019-02-04 RX ORDER — ZOLPIDEM TARTRATE 10 MG/1
TABLET ORAL
Refills: 0 | COMMUNITY
Start: 2018-08-23 | End: 2019-02-13

## 2019-02-04 NOTE — PROGRESS NOTES
iagnosis/Summary/Recommendations:    PATIENT: Hoang Kiser  33 year old male     : 1985    HARSHA: 2019    He is having movement attacks.   He has had scary things that have happened to him.   He has lost strength from the waist down and falling  He ha had        Medications     8am 12pm 11pm   Amitryptyline elavil 10mg Not taking       Amphetamine-dextroamphetamine adderall XR 30mg per 24 hr capsule taking       Amphetamine-dextroamphetamine adderall XR 20mg per 24 hr capsule Not taking       Amphetamine-dextroamphetamine adderall XR 10mg per 24 hr capsule Not taking     Baclofen lioresal 10mg Not taking     Clonazepam klonopin 0.5mg     1   Cyclobenzaprine flexeril 10mg Not taking     Cyclobenzaprine flexeril 5mg Not taking     Diphenhydramine benadryl 25mg Not taking     Doxepin sinequan 10mg   As needed   Doxepin sinequan 25mg Not taking     Duloxetine cymbalta 20mg EC capsule Not taking       Duloxetine cymbalta 60mg EC capsule 2 x 60mg       Flurbiprofen ansaid 100mg Not taking     Gabapentin neurontin 100mg Not taking       Hydrocodone-acetaminophen norco 5-325 Not tkaing       Hydroxyzine atarax 25mg   As needed   Ibuprofen advil/motrin 600mg Not taking     Methylphenidate concerta 54mg Not taking       Methylphenidate concerta 36mg Not taking     Naloxone narcan nasal spray  as needed       Naproxen naprosyn 500mg  not using       Nicotine nicoderm cq 14mg/24 hr 24 hr patch Not using       Nicotine nicoderm cq 7mg/24 hr 24 hr patch Not using       Orphenadrine norflex 100mg 12 hr tablet Not using       Oxybutynin ditropan XL 5mg 24 hr tablet 1     Tizanidine zanaflex 4mg Not using.       Tramadol ultram 50mg Not using     Zolpidem ambien 10mg Not using          History obtained from patient       Present medication list    He finished his t herapy [facility] in [place] with Graciela Arnold.   He had 10 visits with Graciela Floyd is his pcp  [facility].        Medications     8am 12pm afternoon 11pm   Amphetamine-dextroamphetamine adderall XR 30mg per 24 hr capsule 1      Clonazepam klonopin 0.5mg      1   Doxepin sinequan 10mg    As needed   Duloxetine cymbalta 60mg EC capsule 2 x 60mg        Hydrocodone-acetaminophen norco 5-325 As needed      Hydroxyzine atarax 25mg    As needed   Naloxone narcan nasal spray  as needed        Naproxen naprosyn 500mg 1  1    Oxybutynin ditropan XL 5mg 24 hr tablet 1        Coding statement:   Duration of  Services: patient care and care coordination was 25 minutes  Greater than 50% of this visit was spent in counseling and coordination of care.    SUMMARY/PLAN  1. He has seen Aarti Mcgraw in the past and had been to [facility] for therapy  And continues to have movement issues  2. He has not had a neuropsychological evaluation at the . He had a MMPI when he was a teenager but not recently. He has been diagnosed with ADHD - inattentive.  3. 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   4. Consider visit with rios moore to look at his family history; unlikely he has myoclonus dystonia.   5. Blood work for lactic acid and pyruvate.   6. Return as needed.        Answers for HPI/ROS submitted by the patient on 2/12/2019   General Symptoms: Yes  Skin Symptoms: No  HENT Symptoms: Yes  EYE SYMPTOMS: No  HEART SYMPTOMS: No  LUNG SYMPTOMS: No  INTESTINAL SYMPTOMS: Yes  URINARY SYMPTOMS: Yes  REPRODUCTIVE SYMPTOMS: No  SKELETAL SYMPTOMS: Yes  BLOOD SYMPTOMS: No  NERVOUS SYSTEM SYMPTOMS: Yes  MENTAL HEALTH SYMPTOMS: Yes  Fever: No  Loss of appetite: Yes  Weight loss: Yes  Weight gain: Yes  Fatigue: Yes  Night sweats: No  Chills: No  Increased stress: Yes  Excessive hunger: No  Excessive thirst: Yes  Feeling hot or cold when others believe the temperature is normal: No  Loss of height: No  Post-operative complications:  No  Surgical site pain: Yes  Hallucinations: Yes  Change in or Loss of Energy: Yes  Hyperactivity: Yes  Confusion: Yes  Ear pain: Yes  Ear discharge: No  Hearing loss: No  Tinnitus: Yes  Nosebleeds: No  Congestion: Yes  Sinus pain: No  Trouble swallowing: No   Voice hoarseness: Yes  Mouth sores: No  Sore throat: No  Tooth pain: Yes  Gum tenderness: No  Bleeding gums: No  Change in taste: No  Change in sense of smell: No  Dry mouth: Yes  Hearing aid used: No  Neck lump: No  Heart burn or indigestion: Yes  Nausea: Yes  Vomiting: Yes  Abdominal pain: Yes  Bloating: No  Constipation: No  Diarrhea: Yes  Blood in stool: No  Black stools: No  Rectal or Anal pain: No  Fecal incontinence: No  Yellowing of skin or eyes: No  Vomit with blood: No  Change in stools: No  Trouble holding urine or incontinence: Yes  Pain or burning: Yes  Trouble starting or stopping: No  Increased frequency of urination: Yes  Blood in urine: No  Decreased frequency of urination: No  Frequent nighttime urination: Yes  Flank pain: No  Difficulty emptying bladder: Yes  Back pain: Yes  Muscle aches: Yes  Neck pain: Yes  Swollen joints: Yes  Joint pain: Yes  Bone pain: Yes  Muscle cramps: Yes  Muscle weakness: Yes  Joint stiffness: Yes  Bone fracture: No  Trouble with coordination: Yes  Dizziness or trouble with balance: Yes  Fainting or black-out spells: No  Memory loss: Yes  Headache: Yes  Seizures: No  Speech problems: Yes  Tingling: Yes  Tremor: Yes  Weakness: Yes  Difficulty walking: Yes  Paralysis: No  Numbness: Yes  Nervous or Anxious: Yes  Depression: Yes  Trouble sleeping: Yes  Trouble thinking or concentrating: Yes  Mood changes: Yes  Panic attacks: No      Bryant Cao MD     ______________________________________    Last visit date and details:      [facility]     Hoang Kiser  YOB: 1985  MRN: [id number]     REASON FOR VISIT: Follow up for functional movement disorder     HISTORY OF PRESENT ILLNESS:  Hoang  "Margi is a 33 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 There are several videos and the movements are not entirely stereotyped. He is laying supine watching television with his legs bent at the knee in front of him. Suddenly he bends in half at the waist. In some cases he has a couple hip thrusts. Other cases he turns to the side and curls his legs to his chest and straightens a couple times. During the events he appears to be conscious and participates in highly purposeful activity. For example, during one event he reached for the television remote and turned it off and continued with a couple hip thrusts. He confirmed with me that he is conscious during the events. In the videos, the movements last for a few minutes but he tells me it can go on for up to 10 minutes. Afterwards he gets up and immediately engages in purposeful activity - putting on a coat, grabbing cigarettes, and leaving his apartment.      He has near continuous involuntary movements involving the head and trunk. He has observed that movements cease when he is engaged in concentration. Movement attacks are 2-3 times per week. The character of these are different than previous movement attacks, occurring during times of rest rather than pain or activity.      He has been researching functional movement disorder. He has applied for participation in a research study. Today, he asked for details to help with a plan to be developed by his physical therapist and psychologist. He appears to be very engaged, but is discouraged by the continued movements despite his efforts.         MEDICATIONS:       Outpatient Prescriptions Marked as Taking for the 11/2/18 encounter (Office Visit) with  MOVEMENT DISORDER FELLOW   Medication Sig     AFLURIA QUADRIVALENT 0.5 ML injection ADM 0.5ML IM UTD     amphetamine-dextroamphetamine " (ADDERALL XR) 30 MG per 24 hr capsule Take 30 mg by mouth daily     baclofen (LIORESAL) 10 MG tablet Take 0.5-1 tablets (5-10 mg) by mouth 3 times daily as needed for muscle spasms     clonazePAM (KLONOPIN) 0.5 MG tablet Take 0.5-1 mg by mouth nightly as needed      cyclobenzaprine (FLEXERIL) 5 MG tablet       DULoxetine (CYMBALTA) 20 MG EC capsule       DULoxetine (CYMBALTA) 60 MG EC capsule 2 x 60mg tab by mouth daily     gabapentin (NEURONTIN) 100 MG capsule       HYDROcodone-acetaminophen (NORCO) 5-325 MG per tablet Take 1 tablet by mouth 3 times daily as needed for pain (Max 3 times daily. #75 tabs to last 30 days.) Okay to fill and start on/after 11/18/18     hydrOXYzine (ATARAX) 25 MG tablet Take one to two tablets at HS for insomnia or anxiety     methylphenidate ER (CONCERTA) 36 MG CR tablet       methylphenidate ER (CONCERTA) 54 MG CR tablet TK 1 T PO QAM     naloxone (NARCAN) nasal spray Spray 1 spray (4 mg) into one nostril alternating nostrils as needed for opioid reversal every 2-3 minutes until assistance arrives     naproxen (NAPROSYN) 500 MG tablet Take 1 tablet (500 mg) by mouth 2 times daily (with meals)     nicotine (NICODERM CQ) 14 MG/24HR 24 hr patch Place 1 patch onto the skin every 24 hours Use this patch first     nicotine (NICODERM CQ) 7 MG/24HR 24 hr patch Place 1 patch onto the skin every 24 hours     orphenadrine (NORFLEX) 100 MG 12 hr tablet       tiZANidine (ZANAFLEX) 4 MG tablet       traMADol (ULTRAM) 50 MG tablet       zolpidem (AMBIEN) 10 MG tablet           ALLERGIES:  Adderall; Buspirone hcl; Doxycycline; Elavil [amitriptyline]; Naproxen; Norflex [orphenadrine]; Trazodone; Zanaflex [tizanidine]; Buspirone; and Keflex [cephalexin]        PAST MEDICAL HISTORY:  Medical history reviewed and updated in Epic, no new problems     REVIEW OF SYSTEMS:  12 point review of systems completed. Pertinent positives and negatives above and in HPI     PHYSICAL EXAM:  VITALS: /80 (BP  "Location: Right arm)  Pulse 64  Wt 77.1 kg (170 lb)  BMI 26.63 kg/m2  GENERAL: Cooperative, no acute distress  HEENT: Normocephalic and nontraumatic, sclera white, moist mucous membranes  CARDIAC: Regular rate and rhythm   RESPIRATORY: Nonlabored breathing  EXTREMITIES: Distal pulses intact. No UE or LE edema    PSYCHIATRIC: Poor eye contact. Flat affect. Good fund of knowledge. Fair insight.      NEUROLOGIC:  He was alert and attentive. Speech clear, fluent. Normal rate, tone, volume. EOM full. Facial movements symmetric. No dysarthria. No palatal or tongue tremor. No dysarthria. Frequent, near continuous jerky movements of the head and trunk. No tremor or clear myoclonus. No dystonic posturing. Movements are distractible and stop for a short time while he is explaining the videos to me. Gait is normal.         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 These have a different character than previous attacks - with an appearance similar to nonepileptic events (\"pseudoseizures\"). The length of the attacks, purposeful activity during the attacks, and quick recovery makes them very unlikely to be epileptic. Frontal lobe seizures can have the appearance of bizarre movements - but a patient would not be expected to retain highly coordinated purposeful behavior. It is not unusual for patient with functional movement disorders to develop new movements, rather it is quite characteristic.      He was encouraged to continue with PT and psychology. It would be helpful for him to develop some short term and long term goals to avoid discouragement. Generally, he should try to identify triggers at home, then use these triggers to cause involuntary movements during therapy sessions so he can work on ways of stopping them, ie \"retrain the " "brain.\"        PLAN:  Agree with continuing PT and psychology cognitive behavior therapy     Follow up as scheduled with Dr. Cao, he may follow up with me prn after that           Alessandra Camargo MD  Movement Disorders Fellow     Total time greater than 45 minutes, over half in counseling and coordination of care as outlined above     Answers for HPI/ROS submitted by the patient on 10/29/2018   General Symptoms: Yes  Skin Symptoms: No  HENT Symptoms: Yes  EYE SYMPTOMS: No  HEART SYMPTOMS: No  LUNG SYMPTOMS: No  INTESTINAL SYMPTOMS: No  URINARY SYMPTOMS: Yes  REPRODUCTIVE SYMPTOMS: No  SKELETAL SYMPTOMS: Yes  BLOOD SYMPTOMS: No  NERVOUS SYSTEM SYMPTOMS: Yes  MENTAL HEALTH SYMPTOMS: Yes  Fever: No  Loss of appetite: Yes  Weight loss: No  Weight gain: No  Fatigue: Yes  Night sweats: No  Chills: No  Increased stress: Yes  Excessive hunger: No  Excessive thirst: No  Feeling hot or cold when others believe the temperature is normal: No  Loss of height: No  Post-operative complications: No  Surgical site pain: No  Hallucinations: No  Change in or Loss of Energy: Yes  Hyperactivity: No  Confusion: Yes  Ear pain: No  Ear discharge: No  Hearing loss: No  Tinnitus: Yes  Nosebleeds: No  Congestion: No  Sinus pain: No  Trouble swallowing: No   Voice hoarseness: Yes  Mouth sores: No  Sore throat: No  Tooth pain: No  Gum tenderness: No  Bleeding gums: No  Change in taste: No  Change in sense of smell: No  Dry mouth: Yes  Hearing aid used: No  Neck lump: No  Trouble holding urine or incontinence: Yes  Pain or burning: No  Trouble starting or stopping: Yes  Increased frequency of urination: Yes  Blood in urine: No  Decreased frequency of urination: No  Frequent nighttime urination: Yes  Flank pain: No  Difficulty emptying bladder: No  Back pain: Yes  Muscle aches: Yes  Neck pain: Yes  Swollen joints: Yes  Joint pain: Yes  Bone pain: Yes  Muscle cramps: Yes  Muscle weakness: Yes  Joint stiffness: Yes  Bone fracture: No  Trouble with " coordination: Yes  Dizziness or trouble with balance: Yes  Fainting or black-out spells: No  Memory loss: Yes  Headache: No  Seizures: Yes  Speech problems: Yes  Tingling: No  Tremor: Yes  Weakness: Yes  Difficulty walking: Yes  Paralysis: No  Numbness: No  Nervous or Anxious: Yes  Depression: Yes  Trouble sleeping: Yes  Trouble thinking or concentrating: Yes  Mood changes: Yes  Panic attacks: No    IMPRESSION:  Hoang Kiser is a 33 year old man who returned for evaluation of abnormal involuntary movements, which have changed in character and are distractible. 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 He was open to treatment of this disorder, focused on physical therapy.      PLAN:  - Referred for neuro PT   - Will follow up 24-hour urine copper  - Return in 6 months     Alessandra Camargo MD  Movement Disorders Fellow     Discussed family history of epilepsy, etc.      Slightly low ceruloplasmin  Urine copper pending  Other tests were normal.      Extensive discussion about his condition which appears to be a functional movement disorder  [email or web address]  Katherin Lafavre md information on fmd was provided  Discussed physical therapy with a neuromuscular focus     Patient seen and examined by me today August 14, 2018  I agree with details in this note from today August 14, 2018  Bryant bolden md  August 14, 2018        ______________________________________      Patient was asked about 14 Review of systems including changes in vision (dry eyes, double vision), hearing, heart, lungs, musculoskeletal, depression, anxiety, snoring, RBD, insomnia, urinary frequency, urinary urgency, constipation, swallowing problems, hematological, ID, allergies, skin problems: seborrhea, endocrinological: thyroid, diabetes, cholesterol; balance, weight changes, and other neurological problems and these were not significant  at this time except for   Patient Active Problem List   Diagnosis     Neuropathy     Moderate major depression (H)     Tobacco abuse     Attention deficit hyperactivity disorder (ADHD), predominantly inattentive type     Primary insomnia     History of MRI of brain and brain stem     Panic disorder without agoraphobia     Abnormal involuntary movement     Tic disorder     normal emg 2017     Anxiety     Panic attack     Posttraumatic stress disorder with dissociative symptoms     Chronic left hip pain     Chronic pain of right hip     Degenerative disc disease at L5-S1 level     Functional movement disorder     Family history of Crohn's disease     Spell of behavior change          Allergies   Allergen Reactions     Buspirone Hcl Other (See Comments)     Panic attacks     Doxycycline Diarrhea, Fatigue, GI Disturbance and Nausea     Elavil [Amitriptyline]      Ineffective in reducing spasms/movement, increased fatigue     Trazodone Fatigue     Buspirone Anxiety     Keflex [Cephalexin] Diarrhea     Past Surgical History:   Procedure Laterality Date     BACK SURGERY      injections     COLONOSCOPY  02/15/18    Has not happened yet.     COLONOSCOPY N/A 2/15/2018    Procedure: COMBINED COLONOSCOPY, SINGLE OR MULTIPLE BIOPSY/POLYPECTOMY BY BIOPSY;;  Surgeon: Liam Kincaid MD;  Location: [facility] OR     COLONOSCOPY WITH CO2 INSUFFLATION N/A 2/15/2018    Procedure: COLONOSCOPY WITH CO2 INSUFFLATION;  COLON-FAMILY HX OF COLON CANCER/ SYPURA;  Surgeon: Liam Kincaid MD;  Location: [facility] OR     HC TOOTH EXTRACTION W/FORCEP Bilateral 2003     PE TUBES  1990     Past Medical History:   Diagnosis Date     Abnormal involuntary movement 6/2/2016     Anxiety state, unspecified 04/30/2012     Benign positional vertigo Dec 2016    Started after my groin/back injury. Sitting on Toilet.     Degenerative disc disease at L5-S1 level 2/27/2018     Depressive disorder 2003    I have been on and off medication for depression since  "this     Dysphonia 2014    Nothing significant.     Epilepsy (H)     as a child. 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 Spicy foods set it off.     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  History: Myoclonus.  Comparison: None.  Technique:  1. MRI of the Brain: Sagittal T1-weighted, axial T2-weighted, axial turboFLAIR, axial susceptibility, and axial diffusion-weighted with ADC map images of the brain were obtained without intravenous contrast. After intravenous administration of gadolinium, axial and sagittal T1-weighted images of th     Hoarseness 2017    Dry mouth, started after taking Tizanidine     Neuralgia, neuritis, and radiculitis, unspecified 04/30/2012     normal emg 2017 8/8/2017    Interpretation: This is a normal study. There is no electrophysiologic evidence of a lumbosacral radiculopathy affecting the right or left lower extremity on the basis of this study.    Rodney Mena MD Department of Neurology       Panic disorder without agoraphobia 04/30/2012     Problem, psychiatric 2016    PTSD     Seizures (H) [id number]    Grew out of it. Absence Seizures.     Tic disorder 6/2/2016     Tinnitus 2015    Had it most of my life. Got worse in 2015     Social History     Socioeconomic History     Marital status: Single     Spouse name: Not on file     Number of children: 0     Years of education: 14     Highest education level: Not on file   Social Needs     Financial resource strain: Not on file     Food insecurity - worry: Not on file     Food insecurity - inability: Not on file     Transportation needs - medical: Not on file     Transportation needs - non-medical: Not on file   Occupational History     Occupation: Assembly/Packaging   Tobacco Use     Smoking status: Current " Every Day Smoker     Packs/day: 0.75     Years: 10.00     Pack years: 7.50     Types: Cigarettes     Start date: 1/1/2002     Smokeless tobacco: Never Used     Tobacco comment: Transdermal patches have helped me the most in the past   Substance and Sexual Activity     Alcohol use: No     Alcohol/week: 1.2 - 2.4 oz     Comment: none since 2013     Drug use: No     Comment: hx of meth, none since 2015      Sexual activity: Yes     Partners: Female     Birth control/protection: Condom, Pill     Comment: Infrequent   Other Topics Concern     Parent/sibling w/ CABG, MI or angioplasty before 65F 55M? No      Service Not Asked     Blood Transfusions Not Asked     Caffeine Concern Yes     Comment: Coffee, Engergy Drinks, 3 cups daily     Occupational Exposure Not Asked     Hobby Hazards Not Asked     Sleep Concern Not Asked     Stress Concern Not Asked     Weight Concern Not Asked     Special Diet Not Asked     Back Care Not Asked     Exercise Not Asked     Bike Helmet Not Asked     Seat Belt Not Asked     Self-Exams Not Asked   Social History Narrative    He lives in [facility] in a [facility] - there are 11 people there. He arrives today through [employer] [place]    He has 3 brothers and 3 sisters. He has not children. He has never been .      Mother and father are alive    One sister is an alcoholic and bulemic    depression is present in the family : mother, sister and 2 brothers are bipolar.      He denies bipolar. He has depression.    He denies recurring thoughts. He has had substance abuse issues. He has had cravings in the past.    He exercises and plays guitar and draws.      He has used meth as well as prescription pain killers.    He has used marijuana when young. Used cocaine in the past.    Has not used iv drugs    He does not drink alcohol.      50% [place]an and is [place], english and Portuguese and a bit native.    He smokes cigarettes - 1 pack per day.        single. lives  in [place] is father       Drug and lactation database from the [place] [employer] of Medicine:  http://toxnet.nlm.nih.gov/cgi-bin/sis/htmlgen?LACT      B/P: Data Unavailable, T: Data Unavailable, P: Data Unavailable, R: Data Unavailable 0 lbs 0 oz  There were no vitals taken for this visit., There is no height or weight on file to calculate BMI.  Medications and Vitals not listed above were documented in the cart and reviewed by me.     Current Outpatient Medications   Medication Sig Dispense Refill     amphetamine-dextroamphetamine (ADDERALL) 10 MG tablet Take 20 mg by mouth       cyclobenzaprine (FLEXERIL) 10 MG tablet Take 10 mg by mouth       diphenhydrAMINE (BENADRYL) 25 MG capsule Take 25-50 mg by mouth       doxepin (SINEQUAN) 10 MG capsule        flurbiprofen (ANSAID) 100 MG tablet Take 100 mg by mouth       ibuprofen (ADVIL/MOTRIN) 600 MG tablet Take 600 mg by mouth       AFLURIA QUADRIVALENT 0.5 ML injection ADM 0.5ML IM UTD  0     amitriptyline (ELAVIL) 10 MG tablet   0     amphetamine-dextroamphetamine (ADDERALL XR) 30 MG 24 hr capsule Take 30 mg by mouth       amphetamine-dextroamphetamine (ADDERALL XR) 30 MG per 24 hr capsule Take 30 mg by mouth daily       amphetamine-dextroamphetamine (ADDERALL) 20 MG tablet   0     clonazePAM (KLONOPIN) 0.5 MG tablet Take 0.5-1 mg by mouth nightly as needed   0     cyclobenzaprine (FLEXERIL) 5 MG tablet   1     doxepin (SINEQUAN) 25 MG capsule TK 1 C PO HS  1     DULoxetine (CYMBALTA) 60 MG EC capsule 2 x 60mg tab by mouth daily 30 capsule 1     HYDROcodone-acetaminophen (NORCO) 5-325 MG tablet Take 1 tablet by mouth 3 times daily as needed for pain (Max 3 times daily. #90 tabs to last 30 days.) Okay to fill and start on/after 1/15/19 90 tablet 0     hydrOXYzine (ATARAX) 25 MG tablet Take 1 tablet (25 mg) by mouth nightly as needed (at HS PRN) 30 tablet 3     methylphenidate (CONCERTA) 36 MG CR tablet   0     methylphenidate (CONCERTA) 54 MG CR  tablet   0     naloxone (NARCAN) nasal spray Spray 1 spray (4 mg) into one nostril alternating nostrils as needed for opioid reversal every 2-3 minutes until assistance arrives (Patient not taking: Reported on 1/16/2019) 0.2 mL 0     naproxen (NAPROSYN) 500 MG tablet Take 1 tablet (500 mg) by mouth 2 times daily (with meals) 40 tablet 3     nicotine (NICODERM CQ) 14 MG/24HR 24 hr patch Place 1 patch onto the skin every 24 hours Use this patch first (Patient not taking: Reported on 11/8/2018) 30 patch 0     nicotine (NICODERM CQ) 7 MG/24HR 24 hr patch Place 1 patch onto the skin every 24 hours (Patient not taking: Reported on 11/8/2018) 30 patch 0     orphenadrine ER (NORFLEX) 100 MG 12 hr tablet   0     oxybutynin (DITROPAN-XL) 5 MG 24 hr tablet Take 1 tablet (5 mg) by mouth daily 90 tablet 1     tiZANidine (ZANAFLEX) 4 MG tablet   2     traMADol (ULTRAM) 50 MG tablet   0         Bryant Cao MD

## 2019-02-04 NOTE — PROGRESS NOTES
Screening Questionnaire for Adult Immunization    Are you sick today?   No   Do you have allergies to medications, food, a vaccine component or latex?   No   Have you ever had a serious reaction after receiving a vaccination?   No   Do you have a long-term health problem with heart disease, lung disease, asthma, kidney disease, metabolic disease (e.g. diabetes), anemia, or other blood disorder?   No   Do you have cancer, leukemia, HIV/AIDS, or any other immune system problem?   No   In the past 3 months, have you taken medications that affect  your immune system, such as prednisone, other steroids, or anticancer drugs; drugs for the treatment of rheumatoid arthritis, Crohn s disease, or psoriasis; or have you had radiation treatments?   No   Have you had a seizure, or a brain or other nervous system problem?   No   During the past year, have you received a transfusion of blood or blood     products, or been given immune (gamma) globulin or antiviral drug?   No   For women: Are you pregnant or is there a chance you could become        pregnant during the next month?   No   Have you received any vaccinations in the past 4 weeks?   No     Immunization questionnaire answers were all negative.        Per orders of Dr. Floyd, injection of Tdap given by Trisha Reddy. Patient instructed to remain in clinic for 15 minutes afterwards, and to report any adverse reaction to me immediately.       Screening performed by Trisha Reddy on 2/4/2019 at 10:03 AM.

## 2019-02-05 ENCOUNTER — OFFICE VISIT (OUTPATIENT)
Dept: PALLIATIVE MEDICINE | Facility: CLINIC | Age: 34
End: 2019-02-05
Payer: COMMERCIAL

## 2019-02-05 DIAGNOSIS — F41.9 ANXIETY: ICD-10-CM

## 2019-02-05 DIAGNOSIS — G25.9 FUNCTIONAL MOVEMENT DISORDER: Primary | ICD-10-CM

## 2019-02-05 PROCEDURE — 96152 HC HEALTH AND BEHAVIOR INTERVENTION, INDIVIDUAL, EACH 15 MINUTES: CPT | Performed by: PSYCHOLOGIST

## 2019-02-05 NOTE — PROGRESS NOTES
"                                  [facility]   [facility], [place]  Behavioral Medicine Visit    Patient Name: Hoang Kiser     YOB: 1985   Medical Record Number: [id number]  Date: 2/5/2019                SUBJECTIVE: Patient provided some background on himself and his health issues, mental health issues, and chemical health issues.  Patient states that he sees a therapist every 2 weeks and thinks it would work to see me every other week as well.  Reports he is was working previously with F[facility] Gomez and  his pain and his narrative of shame.  He reports that he would like to learn some more methods to cope specifically with what he calls his \"fight or flight\" response that occurs immediately following an involuntary movement attack.  He shared in activities/pain/FND/self-care log to track and monitor his week and that he hss created.  He hopes that this will be helpful for his providers as well as for him to determine triggers as well as self-care that helps manage his pain.  He reports frustration that he can only manage meditation for about 15 minutes before his movement disorder \"kicks in.\"    OBJECTIVE: This patient's first visit with me, as he is transferred from [facility] the left.  He reports that since his last visit with [facility] that his level of pain is mildly worse.  He reports that his mood is mildly worse as well.  States that his activity level varies based upon his hypersomnia episodes as well as his attacks.  He reports that his stress level has had a varied course as well from being about the same to mildly worse.  States his sleep has been mildly worse.  Reports that he engages in self-care for his pain approximately 2-3 times per day.    Length of Visit: 60 minutes      Assessment: Current Emotional / Mental Status    Appearance:   Appropriate   Eye Contact:   Good   Psychomotor Behavior: Normal "   Attitude:   Cooperative   Orientation:   All  Speech  Rate / Production:             Normal   Volume:              Normal   Mood:    Anxious  Sad   Affect:    Appropriate  Constricted   Thought Content:  Clear   Thought Form:  Coherent  Logical   Insight:    Fair     ASSESSMENT:   Axis I: Pain Disorder   Anxiety    Progress toward goals: First visit/NA.    Pain Status: worsened    Emotional Status: worsened              Medication / chemical use concerns: None    PLAN:   Next Appointment: Hoang Kiser will schedule a follow-up appointment in 2 weeks.  Assignment: Focus on practicing meditation without judgment of his body not being within his control at all times.  Objectives / interventions for next session: Continue to explore strategies that patient has used to manage his anxiety as well as his pain and build upon them.    Sol Kemp PsyD LP  Outpatient Clinic Therapist  [facility] [place]

## 2019-02-07 ENCOUNTER — TRANSFERRED RECORDS (OUTPATIENT)
Dept: HEALTH INFORMATION MANAGEMENT | Facility: CLINIC | Age: 34
End: 2019-02-07

## 2019-02-11 PROBLEM — G89.4 CHRONIC PAIN SYNDROME: Status: ACTIVE | Noted: 2019-02-11

## 2019-02-11 PROBLEM — F19.11 HISTORY OF SUBSTANCE ABUSE (H): Status: ACTIVE | Noted: 2019-02-11

## 2019-02-11 PROBLEM — M54.50 CHRONIC LOW BACK PAIN: Status: ACTIVE | Noted: 2019-02-11

## 2019-02-11 PROBLEM — G89.29 CHRONIC LOW BACK PAIN: Chronic | Status: ACTIVE | Noted: 2019-02-11

## 2019-02-11 PROBLEM — G89.4 CHRONIC PAIN SYNDROME: Chronic | Status: ACTIVE | Noted: 2019-02-11

## 2019-02-11 PROBLEM — G25.9 FUNCTIONAL MOVEMENT DISORDER: Status: ACTIVE | Noted: 2018-08-14

## 2019-02-11 PROBLEM — Z83.79 FAMILY HISTORY OF CROHN'S DISEASE: Status: ACTIVE | Noted: 2018-08-23

## 2019-02-11 PROBLEM — M54.50 CHRONIC LOW BACK PAIN: Chronic | Status: ACTIVE | Noted: 2019-02-11

## 2019-02-11 PROBLEM — G89.29 CHRONIC LEFT HIP PAIN: Status: ACTIVE | Noted: 2018-04-02

## 2019-02-11 PROBLEM — G89.29 CHRONIC LOW BACK PAIN: Status: ACTIVE | Noted: 2019-02-11

## 2019-02-11 PROBLEM — R46.89 SPELL OF BEHAVIOR CHANGE: Status: ACTIVE | Noted: 2018-09-19

## 2019-02-11 PROBLEM — G25.9 FUNCTIONAL MOVEMENT DISORDER: Chronic | Status: ACTIVE | Noted: 2018-08-14

## 2019-02-11 PROBLEM — Z01.89 LABORATORY TEST: Status: ACTIVE | Noted: 2017-08-08

## 2019-02-11 PROBLEM — G89.29 CHRONIC PAIN OF RIGHT HIP: Status: ACTIVE | Noted: 2018-04-02

## 2019-02-11 PROBLEM — M25.552 CHRONIC LEFT HIP PAIN: Status: ACTIVE | Noted: 2018-04-02

## 2019-02-11 PROBLEM — M25.551 CHRONIC PAIN OF RIGHT HIP: Status: ACTIVE | Noted: 2018-04-02

## 2019-02-11 PROBLEM — M51.379 DEGENERATIVE DISC DISEASE AT L5-S1 LEVEL: Status: ACTIVE | Noted: 2018-02-27

## 2019-02-12 ENCOUNTER — OFFICE VISIT (OUTPATIENT)
Dept: PALLIATIVE MEDICINE | Facility: CLINIC | Age: 34
End: 2019-02-12
Payer: COMMERCIAL

## 2019-02-12 VITALS
RESPIRATION RATE: 16 BRPM | SYSTOLIC BLOOD PRESSURE: 112 MMHG | OXYGEN SATURATION: 98 % | HEART RATE: 100 BPM | DIASTOLIC BLOOD PRESSURE: 66 MMHG

## 2019-02-12 DIAGNOSIS — M25.551 HIP PAIN, RIGHT: ICD-10-CM

## 2019-02-12 DIAGNOSIS — G89.29 CHRONIC MYOFASCIAL PAIN: ICD-10-CM

## 2019-02-12 DIAGNOSIS — G62.9 NEUROPATHY: ICD-10-CM

## 2019-02-12 DIAGNOSIS — M79.18 CHRONIC MYOFASCIAL PAIN: ICD-10-CM

## 2019-02-12 PROCEDURE — 99214 OFFICE O/P EST MOD 30 MIN: CPT | Performed by: NURSE PRACTITIONER

## 2019-02-12 RX ORDER — HYDROCODONE BITARTRATE AND ACETAMINOPHEN 5; 325 MG/1; MG/1
1 TABLET ORAL 3 TIMES DAILY PRN
Qty: 90 TABLET | Refills: 0 | Status: SHIPPED | OUTPATIENT
Start: 2019-02-12 | End: 2019-03-12

## 2019-02-12 RX ORDER — NAPROXEN 500 MG/1
500 TABLET ORAL 2 TIMES DAILY WITH MEALS
Qty: 40 TABLET | Refills: 3 | Status: SHIPPED | OUTPATIENT
Start: 2019-02-12 | End: 2019-05-09

## 2019-02-12 ASSESSMENT — ENCOUNTER SYMPTOMS
SMELL DISTURBANCE: 0
HEADACHES: 1
INCREASED ENERGY: 1
NECK MASS: 0
TINGLING: 1
BOWEL INCONTINENCE: 0
CHILLS: 0
TROUBLE SWALLOWING: 0
LOSS OF CONSCIOUSNESS: 0
DECREASED CONCENTRATION: 1
TREMORS: 1
NECK PAIN: 1
JAUNDICE: 0
SINUS CONGESTION: 1
RECTAL PAIN: 0
SINUS PAIN: 0
MUSCLE CRAMPS: 1
POLYDIPSIA: 1
WEIGHT LOSS: 1
DISTURBANCES IN COORDINATION: 1
HOARSE VOICE: 1
INSOMNIA: 1
DIZZINESS: 1
NERVOUS/ANXIOUS: 1
ABDOMINAL PAIN: 1
NIGHT SWEATS: 0
DIFFICULTY URINATING: 1
STIFFNESS: 1
JOINT SWELLING: 1
SORE THROAT: 0
DEPRESSION: 1
SEIZURES: 0
SPEECH CHANGE: 1
DIARRHEA: 1
BLOOD IN STOOL: 0
VOMITING: 1
PANIC: 0
ALTERED TEMPERATURE REGULATION: 0
WEIGHT GAIN: 1
NAUSEA: 1
HEARTBURN: 1
POLYPHAGIA: 0
MUSCLE WEAKNESS: 1
PARALYSIS: 0
HEMATURIA: 0
DECREASED APPETITE: 1
FLANK PAIN: 0
FATIGUE: 1
DYSURIA: 1
TASTE DISTURBANCE: 0
WEAKNESS: 1
CONSTIPATION: 0
ARTHRALGIAS: 1
BACK PAIN: 1
MYALGIAS: 1
HALLUCINATIONS: 1
NUMBNESS: 1
FEVER: 0
MEMORY LOSS: 1
BLOATING: 0

## 2019-02-12 ASSESSMENT — PAIN SCALES - GENERAL: PAINLEVEL: MODERATE PAIN (5)

## 2019-02-12 NOTE — PROGRESS NOTES
[facility]    CHIEF COMPLAINT: spasm pain     INTERVAL HISTORY:  Last seen on 1/15/19.        Recommendations/plan at the last visit included:     1. Continue physical therapy   2. Schedule follow-up with CARLOS A Higuera NP-C in 4 weeks  3. Medication recommendations:             1. Norco: increase to #90 tabs per month. Ok for early refill today.     Since last visit:   - Lumbar facet injection on 1/16/19, modest relief   - Completed [facility]. May go back for refreshers.   - Put himself into outpatient CD treatment two days per week for structure. He says he has not relapsed but wants to have additional structure and supportive due to chronic health issues.     Pain Information:   Pain quality: Miserable    Pain rating: intensity ranges from 2/10 to 8/10, and averages 6/10 on a 0-10 scale.      Annual Controlled Substance Agreement/UDS due date: April 2019      Current Pain Relevant Medications:   Norco 5/325 mg 1 tab BID- TID                        Total opiate dose: 10-15 MME daily  Clonazepam .5 mg 1-2 tab at HS PRN  Duloxetine 20 mg daily  Naproxen 500 mg BID: on hold re: elevated LFTs   Tizanidine 4 mg 1-2 tabs at HS PRN  Ambien 10 mg at HS  Adderall 50 mg daily, combination of long and short acting.       Previous Pain Relevant Medications: (H--helped; HI--Helped initially; [facility]--Somewhat helpful; NH--No help; W--worse; SE--side effects; ?--Unsure if helpful)   NOTE: This medication information taken from patient's intake form, not medical records.                         Opiates: Tramadol: H, Hydrocodone: H                        NSAIDS: Ibuprofen:H, naproxen:[facility], Relafen: NH                        Muscle Relaxants: Cyclobenzaprine:H,Med interaction, Tizanidine:H                        Anti-migraine mediations: Prednisone:H                        Anti-depressants: Bupropion:SE, Celexa:SE, Duloxetine:H, Trazodone:Too strong, Venlafaxine:too strong                        Sleep  aids:Anbien: H                        Anxiolytics: Clonazepam:H                        Neuropathics: Tegretol:taken for seizures in childhood, Gabapentin:H                                          Topicals: Lidocaine:H                        Other medications not covered above: Tylenol:NH      Any illicit drug use: 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 Concern for multiple controlled substances including stimulants and opiates.        SELF CARE:   How often do you practice SELF-CARE (relaxing, stretching, pacing, monitoring posture, taking mini-breaks) in a typical day:  10+ daily     THE 4 As OF OPIOID MAINTENANCE ANALGESIA    Analgesia: Is pain relief clinically significant? YES   Activity: Is patient functional and able to perform Activities of Daily Living? YES   Adverse effects: Is patient free from adverse side effects from opiates? YES   Adherence to Rx protocol: Is patient adhering to Controlled Substance Agreement and taking medications ONLY as ordered? YES         Is Narcan prescribed for opiate use >50 MME daily? Ordered for combination of opiates and benzodiazepines.          Medications:  Current Outpatient Medications   Medication Sig Dispense Refill     AFLURIA QUADRIVALENT 0.5 ML injection ADM 0.5ML IM UTD  0     amitriptyline (ELAVIL) 10 MG tablet   0     amphetamine-dextroamphetamine (ADDERALL XR) 30 MG per 24 hr capsule Take 30 mg by mouth daily       amphetamine-dextroamphetamine (ADDERALL) 10 MG tablet Take 20 mg by mouth       amphetamine-dextroamphetamine (ADDERALL) 20 MG tablet   0     baclofen (LIORESAL) 10 MG tablet   0     clonazePAM (KLONOPIN) 0.5 MG tablet Take 0.5-1 mg by mouth  nightly as needed   0     cyclobenzaprine (FLEXERIL) 10 MG tablet Take 10 mg by mouth       cyclobenzaprine (FLEXERIL) 5 MG tablet   1     diphenhydrAMINE (BENADRYL) 25 MG capsule Take 25-50 mg by mouth       doxepin (SINEQUAN) 10 MG capsule        doxepin (SINEQUAN) 25 MG capsule TK 1 C PO HS  1     DULoxetine (CYMBALTA) 60 MG EC capsule 2 x 60mg tab by mouth daily 30 capsule 1     flurbiprofen (ANSAID) 100 MG tablet Take 100 mg by mouth       gabapentin (NEURONTIN) 100 MG capsule        HYDROcodone-acetaminophen (NORCO) 5-325 MG tablet Take 1 tablet by mouth 3 times daily as needed for pain (Max 3 times daily. #90 tabs to last 30 days.) Okay to fill and start on/after 1/15/19 90 tablet 0     hydrOXYzine (ATARAX) 25 MG tablet Take 1 tablet (25 mg) by mouth nightly as needed (at HS PRN) 30 tablet 3     ibuprofen (ADVIL/MOTRIN) 600 MG tablet Take 600 mg by mouth       methylphenidate (CONCERTA) 36 MG CR tablet   0     methylphenidate (CONCERTA) 54 MG CR tablet   0     naloxone (NARCAN) nasal spray Spray 1 spray (4 mg) into one nostril alternating nostrils as needed for opioid reversal every 2-3 minutes until assistance arrives (Patient not taking: Reported on 1/16/2019) 0.2 mL 0     naproxen (NAPROSYN) 500 MG tablet Take 1 tablet (500 mg) by mouth 2 times daily (with meals) 40 tablet 3     nicotine (NICODERM CQ) 14 MG/24HR 24 hr patch Place 1 patch onto the skin every 24 hours Use this patch first (Patient not taking: Reported on 11/8/2018) 30 patch 0     nicotine (NICODERM CQ) 7 MG/24HR 24 hr patch Place 1 patch onto the skin every 24 hours (Patient not taking: Reported on 11/8/2018) 30 patch 0     orphenadrine ER (NORFLEX) 100 MG 12 hr tablet   0     oxybutynin (DITROPAN-XL) 5 MG 24 hr tablet Take 1 tablet (5 mg) by mouth daily 90 tablet 1     tiZANidine (ZANAFLEX) 4 MG tablet   2     traMADol (ULTRAM) 50 MG tablet   0     zolpidem (AMBIEN) 10 MG tablet   0       Review of Systems: A 10-point review of systems was  "negative, with the exception of chronic pain issues.      Social History: Reviewed; unchanged from previous consultation.      Family history: Reviewed; unchanged from previous consultation.     PHYSICAL EXAM    There were no vitals filed for this visit.    Constitutional: healthy, alert, no distress, pain behaviors and somewhat down r/t increased spastic movements   HEENT: Head atraumatic, normocephalic. Eyes without conjunctival injection or jaundice. Neck supple. No obvious neck masses.  Skin: No rash, lesions, or petechiae of exposed skin.   Extremities: No clubbing, cyanosis, or edema to exposed extremities  Psychiatric/mental status: Alert, without lethargy or stupor. Appropriate affect.       Neurologic exam:  CN:  Cranial nerves 2-12 are grossly normal.  Has uncontrolled upper body movement/tremor.           Musculoskeletal exam:  Cervical spine:                       Flex:  20 degrees                       Ext: 20 degrees                       Rotation to right: 20 degrees                       Rotation to left: 20 degrees                       Rotation/ext to right: painful                        Rotation/ext to left: painful                        Tenderness in the cervical spine at midline. No                       Tenderness in the cervical paraspinal muscles. No  Thoracic spine:                        Kyphosis. No                       Tenderness in the thoracic spine at midline. Yes                       Tenderness in the thoracic paraspinal muscles. Yes  Lumbar/Sacral spine:                       Forward Flexion:  90 degrees                       Ext: 20 degrees \"tight\"                       Rotation/ext to right: painful                        Rotation/ext to left: painful                        Lordosis. No                       Tenderness in the lumbar spine at midline. Yes                       Tenderness in the lumbar paraspinal muscles.Yes      Psychiatric:  mentation appears normal., affect " and mood normal      DIRE Score for ongoing opioid management is calculated as follows:    Diagnosis = 2 pts (slowly progressive; moderate pain/objective findings)    Intractability = 2 pts (most treatments tried; patient not fully engaged/barriers)    Risk        Psych = 2 pts (personality dysfunction/mental illness that moderately interferes with care)         Chem Hlth = 2 pts (use of medications to cope with stress; chemical dependency in remission)       Reliability = 3 pts (highly reliable with meds, appointments, treatments)       Social = 3 pts (supportive family/close relationships; involved in work/school; no isolation)       (Psych + Chem hlth + Reliability + Social) = 14    Efficacy = 2 pts (moderate benefit/function; low med dose; too early/not tried meds)    DIRE Score = 16        7-13: likely NOT suitable candidate for long-term opioid analgesia       14-21: may be a suitable candidate for long-term opioid analgesia          Previous Diagnostic Tests:   Imaging Studies:   MR LUMBAR SPINE W/O CONTRAST 5/2/2018 7:27 PM  History: DDD (degenerative disc disease), lumbar; Lumbar  radiculopathy; Hip pain, right; Groin pain, right.  ICD-10: DDD (degenerative disc disease), lumbar; Lumbar radiculopathy;  Hip pain, right; Groin pain, right  Comparison: Lumbar spine MRI 3/8/2017  Technique: Sagittal STIR, T1-weighted turbo spin-echo, 3-D volumetric  T2-weighted and axial reconstructed T2-weighted images of the lumbar  spine were obtained without intravenous contrast.   Findings: 5 lumbar-type vertebra. The tip of the conus medullaris is  at L1.  The lumbar vertebral column is normally aligned.   Straightening of lumbar lordosis, unchanged. The intervertebral disc  heights are maintained. Normal marrow signal. At L5-S1, there is a  disc bulge and facet hypertrophy with mild left neural foraminal  narrowing, unchanged. No spinal canal stenosis.  Impression:  1. Lumbar spondylosis with mild left neural foraminal  narrowing at  L5-S1.  2. No spinal canal stenosis.      MR right hip without contrast 5/2/2018 6:47 PM  Techniques: Multiplanar multisequence imaging of the right hip was  obtained without  administration of intra-articular or intravenous  contrast using routing protocol.  History: Hip pain.  Comparison: None available.  Findings:  Osseous structures  Osseous structures: No fracture, stress reaction, avascular necrosis,  or focal osseous lesion is seen. There is small focal area of  subchondral cystic changes in the anterior right femoral head neck  junction, most compatible with synovial herniation pit. Findings  suggestive of reduced femoral head neck junction though alpha angle  cannot be assessed owing to no dedicated oblique axial sequence  obtained.  Articular cartilage and labrum  Assessment limited on this arthrographic study due to relative lack of  joint distension.  Articular cartilage: No high grade chondral loss.  Labrum: Labral tearing approximately from 12:00 to 3:00 with 3:00  being anterior and 6:00 being the center of transverse ligament in  this convention with associated subtle area of subchondral edema in  the superior acetabulum.  Ligament teres and transverse ligament of acetabulum: Intact.  Joint or bursal effusion  Joint effusion: A physiologic amount of joint fluid.  Bursal effusion: Minimal nonspecific edema over the greater  trochanter. No substantial iliopsoas or trochanteric bursal effusion.  Muscles and tendons  Muscles and tendons: The rectus femoris, the visualized portion  iliopsoas, proximal hamstrings, and hip abductors are intact.  The  visualized adductor muscles are unremarkable.   Nerves:  The visualized course of the sciatic nerve is unremarkable.   Other Findings:  There is fat-containing right inguinal hernia.  Impression:  1. Approximately 12-3 o'clock labral tearing with synovial herniation  pit and likely reduced femoral head neck junction offset. 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  2. Fat containing right inguinal hernia.          ASSESSMENT:   1.  Lumbar DDD, mild  2.  Lumbar muscle spasm  3.  Labral tear, right hip  4.  Hx: anxiety, chemical dependence in remission.  5. Functional neurologic movement disorder    Shoaib presents for med management appt. He brings in extensive pediatric records which I have reviewed. As these do not apply to pain, I will not scan them however he is bringing a copy to PCP and Neuro as well. As noted, he has enrolled himself in a 2 day per week outpatient CD program as he is concerned that with all the physical pain and stress he is under as well as the concerns about his long term health he does not want to relapse. We will continue current plan of care at this time.       Plan:    Diagnosis reviewed, treatment option addressed, and risk/benifits discussed.  Self-care instructions given.  I am recommending a multidisciplinary treatment plan to help this patient better manage pain.        1. Schedule follow-up with CARLOS A Higuera NP-C in 4 weeks  2. Medication recommendations:   1. No change to current medication regimen. Refills of Norco and Naproxen ordered.     Total time spent face to face was 30 minutes and more than 50% of face to face time was spent in counseling and/or coordination of care regarding the diagnosis and recommendations above.      CARLOS A Bass, NP-C   [facility]    Disclaimer: This note consists of symbols derived from keyboarding, dictation and/or voice recognition software. As a result, there may be errors in the script that have gone undetected. Please consider this when interpreting information found in this chart.

## 2019-02-12 NOTE — PATIENT INSTRUCTIONS
After Visit Instructions:     Thank you for coming to [facility] for your care. It is my goal to partner with you to help you reach your optimal state of health.     Continue daily self-care, identifying contributing factors, and monitoring variations in pain level. Continue to integrate self-care into your life.      1. Schedule follow-up with CARLOS A Higuera NP-C in 4 weeks  2. Medication recommendations:   1. No change to current medication regimen. Refills of Norco and Naproxen ordered.     CARLOS A Bass NP-C  [facility]  Clinic Number:  [phone]

## 2019-02-13 PROBLEM — R46.89 SPELL OF BEHAVIOR CHANGE: Status: RESOLVED | Noted: 2018-09-19 | Resolved: 2019-02-13

## 2019-02-13 RX ORDER — DOXEPIN HYDROCHLORIDE 10 MG/1
CAPSULE ORAL
COMMUNITY
Start: 2019-02-13 | End: 2019-02-14

## 2019-02-14 ENCOUNTER — OFFICE VISIT (OUTPATIENT)
Dept: NEUROLOGY | Facility: CLINIC | Age: 34
End: 2019-02-14
Payer: COMMERCIAL

## 2019-02-14 VITALS
WEIGHT: 167.3 LBS | TEMPERATURE: 98 F | RESPIRATION RATE: 16 BRPM | SYSTOLIC BLOOD PRESSURE: 145 MMHG | BODY MASS INDEX: 26.26 KG/M2 | HEIGHT: 67 IN | HEART RATE: 106 BPM | OXYGEN SATURATION: 97 % | DIASTOLIC BLOOD PRESSURE: 81 MMHG

## 2019-02-14 DIAGNOSIS — Z79.899 HIGH RISK MEDICATION USE: ICD-10-CM

## 2019-02-14 DIAGNOSIS — G25.9 FUNCTIONAL MOVEMENT DISORDER: Primary | ICD-10-CM

## 2019-02-14 DIAGNOSIS — R46.89 SPELL OF ABNORMAL BEHAVIOR: Primary | ICD-10-CM

## 2019-02-14 DIAGNOSIS — R25.9 ABNORMAL INVOLUNTARY MOVEMENT: ICD-10-CM

## 2019-02-14 LAB — LACTATE BLD-SCNC: 0.6 MMOL/L (ref 0.7–2)

## 2019-02-14 RX ORDER — DOXEPIN HYDROCHLORIDE 10 MG/1
CAPSULE ORAL
COMMUNITY
Start: 2019-02-14 | End: 2019-05-23

## 2019-02-14 ASSESSMENT — PAIN SCALES - GENERAL: PAINLEVEL: MODERATE PAIN (5)

## 2019-02-14 ASSESSMENT — MIFFLIN-ST. JEOR: SCORE: 1662.5

## 2019-02-14 NOTE — PATIENT INSTRUCTIONS
Present medication list    He finished his t herapy [facility] in [place] with Graciela Arnold.   He had 10 visits with Graciela Floyd is his pcp  Tamiko Stevens he is seeing at the pain clin  K Karyn Duckworth is his psychiatrist.        Medications     8am 12pm afternoon 11pm   Amphetamine-dextroamphetamine adderall XR 30mg per 24 hr capsule 1      Clonazepam klonopin 0.5mg      1   Doxepin sinequan 10mg    As needed   Duloxetine cymbalta 60mg EC capsule 2 x 60mg        Hydrocodone-acetaminophen norco 5-325 As needed      Hydroxyzine atarax 25mg    As needed   Naloxone narcan nasal spray  as needed        Naproxen naprosyn 500mg 1  1    Oxybutynin ditropan XL 5mg 24 hr tablet 1        Coding statement:   Duration of  Services: patient care and care coordination was 25 minutes  Greater than 50% of this visit was spent in counseling and coordination of care.    SUMMARY/PLAN  1. He has seen Aarti Mcgraw in the past and had been to [facility] for therapy  And continues to have movement issues  2. He has not had a neuropsychological evaluation at the . He had a MMPI when he was a teenager but not recently. He has been diagnosed with ADHD - inattentive.  3. 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   4. Consider visit with rios moore to look at his family history; unlikely he has myoclonus dystonia.   5. Blood work for lactic acid and pyruvate.   6. Return as needed.

## 2019-02-14 NOTE — NURSING NOTE
Chief Complaint   Patient presents with     RECHECK     [facility] RETURN MOVEMENT DISORDER 6 MONTH F/U ABNORMAL INVOLUNTARY MOVEMENT        Paul Chan, EMT

## 2019-02-14 NOTE — LETTER
2019    RE: Hoang Kiser  [address]  [facility] [address]     Diagnosis/Summary/Recommendations:    PATIENT: Hoang Kiser  33 year old male     : 1985    HARSHA: 2019    He is having movement attacks.   He has had scary things that have happened to him.   He has lost strength from the waist down and falling  He ha had        Medications     8am 12pm 11pm   Amitryptyline elavil 10mg Not taking       Amphetamine-dextroamphetamine adderall XR 30mg per 24 hr capsule taking       Amphetamine-dextroamphetamine adderall XR 20mg per 24 hr capsule Not taking       Amphetamine-dextroamphetamine adderall XR 10mg per 24 hr capsule Not taking     Baclofen lioresal 10mg Not taking     Clonazepam klonopin 0.5mg     1   Cyclobenzaprine flexeril 10mg Not taking     Cyclobenzaprine flexeril 5mg Not taking     Diphenhydramine benadryl 25mg Not taking     Doxepin sinequan 10mg   As needed   Doxepin sinequan 25mg Not taking     Duloxetine cymbalta 20mg EC capsule Not taking       Duloxetine cymbalta 60mg EC capsule 2 x 60mg       Flurbiprofen ansaid 100mg Not taking     Gabapentin neurontin 100mg Not taking       Hydrocodone-acetaminophen norco 5-325 Not tkaing       Hydroxyzine atarax 25mg   As needed   Ibuprofen advil/motrin 600mg Not taking     Methylphenidate concerta 54mg Not taking       Methylphenidate concerta 36mg Not taking     Naloxone narcan nasal spray  as needed       Naproxen naprosyn 500mg  not using       Nicotine nicoderm cq 14mg/24 hr 24 hr patch Not using       Nicotine nicoderm cq 7mg/24 hr 24 hr patch Not using       Orphenadrine norflex 100mg 12 hr tablet Not using       Oxybutynin ditropan XL 5mg 24 hr tablet 1     Tizanidine zanaflex 4mg Not using.       Tramadol ultram 50mg Not using     Zolpidem ambien 10mg Not using          History obtained from patient       Present medication list    He finished his t herapy [facility] in [place] with Graciela Arnold.   He had 10  visits with Graciela Floyd is his pcp  [facility] is his psychiatrist.        Medications     8am 12pm afternoon 11pm   Amphetamine-dextroamphetamine adderall XR 30mg per 24 hr capsule 1      Clonazepam klonopin 0.5mg      1   Doxepin sinequan 10mg    As needed   Duloxetine cymbalta 60mg EC capsule 2 x 60mg        Hydrocodone-acetaminophen norco 5-325 As needed      Hydroxyzine atarax 25mg    As needed   Naloxone narcan nasal spray  as needed        Naproxen naprosyn 500mg 1  1    Oxybutynin ditropan XL 5mg 24 hr tablet 1        Coding statement:   Duration of  Services: patient care and care coordination was 25 minutes  Greater than 50% of this visit was spent in counseling and coordination of care.    SUMMARY/PLAN  1. He has seen Aarti Mcgraw in the past and had been to [facility] for therapy  And continues to have movement issues  2. He has not had a neuropsychological evaluation at the . He had a MMPI when he was a teenager but not recently. He has been diagnosed with ADHD - inattentive.  3. 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   4. Consider visit with rios moore to look at his family history; unlikely he has myoclonus dystonia.   5. Blood work for lactic acid and pyruvate.   6. Return as needed.        Answers for HPI/ROS submitted by the patient on 2/12/2019   General Symptoms: Yes  Skin Symptoms: No  HENT Symptoms: Yes  EYE SYMPTOMS: No  HEART SYMPTOMS: No  LUNG SYMPTOMS: No  INTESTINAL SYMPTOMS: Yes  URINARY SYMPTOMS: Yes  REPRODUCTIVE SYMPTOMS: No  SKELETAL SYMPTOMS: Yes  BLOOD SYMPTOMS: No  NERVOUS SYSTEM SYMPTOMS: Yes  MENTAL HEALTH SYMPTOMS: Yes  Fever: No  Loss of appetite: Yes  Weight loss: Yes  Weight gain: Yes  Fatigue: Yes  Night sweats: No  Chills: No  Increased stress: Yes  Excessive hunger: No  Excessive thirst: Yes  Feeling hot or cold when others believe  the temperature is normal: No  Loss of height: No  Post-operative complications: No  Surgical site pain: Yes  Hallucinations: Yes  Change in or Loss of Energy: Yes  Hyperactivity: Yes  Confusion: Yes  Ear pain: Yes  Ear discharge: No  Hearing loss: No  Tinnitus: Yes  Nosebleeds: No  Congestion: Yes  Sinus pain: No  Trouble swallowing: No   Voice hoarseness: Yes  Mouth sores: No  Sore throat: No  Tooth pain: Yes  Gum tenderness: No  Bleeding gums: No  Change in taste: No  Change in sense of smell: No  Dry mouth: Yes  Hearing aid used: No  Neck lump: No  Heart burn or indigestion: Yes  Nausea: Yes  Vomiting: Yes  Abdominal pain: Yes  Bloating: No  Constipation: No  Diarrhea: Yes  Blood in stool: No  Black stools: No  Rectal or Anal pain: No  Fecal incontinence: No  Yellowing of skin or eyes: No  Vomit with blood: No  Change in stools: No  Trouble holding urine or incontinence: Yes  Pain or burning: Yes  Trouble starting or stopping: No  Increased frequency of urination: Yes  Blood in urine: No  Decreased frequency of urination: No  Frequent nighttime urination: Yes  Flank pain: No  Difficulty emptying bladder: Yes  Back pain: Yes  Muscle aches: Yes  Neck pain: Yes  Swollen joints: Yes  Joint pain: Yes  Bone pain: Yes  Muscle cramps: Yes  Muscle weakness: Yes  Joint stiffness: Yes  Bone fracture: No  Trouble with coordination: Yes  Dizziness or trouble with balance: Yes  Fainting or black-out spells: No  Memory loss: Yes  Headache: Yes  Seizures: No  Speech problems: Yes  Tingling: Yes  Tremor: Yes  Weakness: Yes  Difficulty walking: Yes  Paralysis: No  Numbness: Yes  Nervous or Anxious: Yes  Depression: Yes  Trouble sleeping: Yes  Trouble thinking or concentrating: Yes  Mood changes: Yes  Panic attacks: No      Bryant Cao MD     ______________________________________    Last visit date and details:      [facility]     Hoang Kiser  YOB: 1985  MRN: [id number]     REASON FOR VISIT:  "Follow up for functional movement disorder     HISTORY OF PRESENT ILLNESS:  Hoang Kiser is a 33 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 There are several videos and the movements are not entirely stereotyped. He is laying supine watching television with his legs bent at the knee in front of him. Suddenly he bends in half at the waist. In some cases he has a couple hip thrusts. Other cases he turns to the side and curls his legs to his chest and straightens a couple times. During the events he appears to be conscious and participates in highly purposeful activity. For example, during one event he reached for the television remote and turned it off and continued with a couple hip thrusts. He confirmed with me that he is conscious during the events. In the videos, the movements last for a few minutes but he tells me it can go on for up to 10 minutes. Afterwards he gets up and immediately engages in purposeful activity - putting on a coat, grabbing cigarettes, and leaving his apartment.      He has near continuous involuntary movements involving the head and trunk. He has observed that movements cease when he is engaged in concentration. Movement attacks are 2-3 times per week. The character of these are different than previous movement attacks, occurring during times of rest rather than pain or activity.      He has been researching functional movement disorder. He has applied for participation in a research study. Today, he asked for details to help with a plan to be developed by his physical therapist and psychologist. He appears to be very engaged, but is discouraged by the continued movements despite his efforts.         MEDICATIONS:       Outpatient Prescriptions Marked as Taking for the 11/2/18 encounter (Office Visit) with  MOVEMENT DISORDER FELLOW   Medication Sig     AFLURIA " QUADRIVALENT 0.5 ML injection ADM 0.5ML IM UTD     amphetamine-dextroamphetamine (ADDERALL XR) 30 MG per 24 hr capsule Take 30 mg by mouth daily     baclofen (LIORESAL) 10 MG tablet Take 0.5-1 tablets (5-10 mg) by mouth 3 times daily as needed for muscle spasms     clonazePAM (KLONOPIN) 0.5 MG tablet Take 0.5-1 mg by mouth nightly as needed      cyclobenzaprine (FLEXERIL) 5 MG tablet       DULoxetine (CYMBALTA) 20 MG EC capsule       DULoxetine (CYMBALTA) 60 MG EC capsule 2 x 60mg tab by mouth daily     gabapentin (NEURONTIN) 100 MG capsule       HYDROcodone-acetaminophen (NORCO) 5-325 MG per tablet Take 1 tablet by mouth 3 times daily as needed for pain (Max 3 times daily. #75 tabs to last 30 days.) Okay to fill and start on/after 11/18/18     hydrOXYzine (ATARAX) 25 MG tablet Take one to two tablets at HS for insomnia or anxiety     methylphenidate ER (CONCERTA) 36 MG CR tablet       methylphenidate ER (CONCERTA) 54 MG CR tablet TK 1 T PO QAM     naloxone (NARCAN) nasal spray Spray 1 spray (4 mg) into one nostril alternating nostrils as needed for opioid reversal every 2-3 minutes until assistance arrives     naproxen (NAPROSYN) 500 MG tablet Take 1 tablet (500 mg) by mouth 2 times daily (with meals)     nicotine (NICODERM CQ) 14 MG/24HR 24 hr patch Place 1 patch onto the skin every 24 hours Use this patch first     nicotine (NICODERM CQ) 7 MG/24HR 24 hr patch Place 1 patch onto the skin every 24 hours     orphenadrine (NORFLEX) 100 MG 12 hr tablet       tiZANidine (ZANAFLEX) 4 MG tablet       traMADol (ULTRAM) 50 MG tablet       zolpidem (AMBIEN) 10 MG tablet           ALLERGIES:  Adderall; Buspirone hcl; Doxycycline; Elavil [amitriptyline]; Naproxen; Norflex [orphenadrine]; Trazodone; Zanaflex [tizanidine]; Buspirone; and Keflex [cephalexin]        PAST MEDICAL HISTORY:  Medical history reviewed and updated in Epic, no new problems     REVIEW OF SYSTEMS:  12 point review of systems completed. Pertinent positives  "and negatives above and in HPI     PHYSICAL EXAM:  VITALS: /80 (BP Location: Right arm)  Pulse 64  Wt 77.1 kg (170 lb)  BMI 26.63 kg/m2  GENERAL: Cooperative, no acute distress  HEENT: Normocephalic and nontraumatic, sclera white, moist mucous membranes  CARDIAC: Regular rate and rhythm   RESPIRATORY: Nonlabored breathing  EXTREMITIES: Distal pulses intact. No UE or LE edema    PSYCHIATRIC: Poor eye contact. Flat affect. Good fund of knowledge. Fair insight.      NEUROLOGIC:  He was alert and attentive. Speech clear, fluent. Normal rate, tone, volume. EOM full. Facial movements symmetric. No dysarthria. No palatal or tongue tremor. No dysarthria. Frequent, near continuous jerky movements of the head and trunk. No tremor or clear myoclonus. No dystonic posturing. Movements are distractible and stop for a short time while he is explaining the videos to me. Gait is normal.         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 These have a different character than previous attacks - with an appearance similar to nonepileptic events (\"pseudoseizures\"). The length of the attacks, purposeful activity during the attacks, and quick recovery makes them very unlikely to be epileptic. Frontal lobe seizures can have the appearance of bizarre movements - but a patient would not be expected to retain highly coordinated purposeful behavior. It is not unusual for patient with functional movement disorders to develop new movements, rather it is quite characteristic.      He was encouraged to continue with PT and psychology. It would be helpful for him to develop some short term and long term goals to avoid discouragement. Generally, he should try to identify triggers at home, then use these triggers to cause involuntary movements during therapy sessions so he " "can work on ways of stopping them, ie \"retrain the brain.\"        PLAN:  Agree with continuing PT and psychology cognitive behavior therapy     Follow up as scheduled with Dr. Cao, he may follow up with me prn after that           Alessandra Camargo MD  Movement Disorders Fellow     Total time greater than 45 minutes, over half in counseling and coordination of care as outlined above     Answers for HPI/ROS submitted by the patient on 10/29/2018   General Symptoms: Yes  Skin Symptoms: No  HENT Symptoms: Yes  EYE SYMPTOMS: No  HEART SYMPTOMS: No  LUNG SYMPTOMS: No  INTESTINAL SYMPTOMS: No  URINARY SYMPTOMS: Yes  REPRODUCTIVE SYMPTOMS: No  SKELETAL SYMPTOMS: Yes  BLOOD SYMPTOMS: No  NERVOUS SYSTEM SYMPTOMS: Yes  MENTAL HEALTH SYMPTOMS: Yes  Fever: No  Loss of appetite: Yes  Weight loss: No  Weight gain: No  Fatigue: Yes  Night sweats: No  Chills: No  Increased stress: Yes  Excessive hunger: No  Excessive thirst: No  Feeling hot or cold when others believe the temperature is normal: No  Loss of height: No  Post-operative complications: No  Surgical site pain: No  Hallucinations: No  Change in or Loss of Energy: Yes  Hyperactivity: No  Confusion: Yes  Ear pain: No  Ear discharge: No  Hearing loss: No  Tinnitus: Yes  Nosebleeds: No  Congestion: No  Sinus pain: No  Trouble swallowing: No   Voice hoarseness: Yes  Mouth sores: No  Sore throat: No  Tooth pain: No  Gum tenderness: No  Bleeding gums: No  Change in taste: No  Change in sense of smell: No  Dry mouth: Yes  Hearing aid used: No  Neck lump: No  Trouble holding urine or incontinence: Yes  Pain or burning: No  Trouble starting or stopping: Yes  Increased frequency of urination: Yes  Blood in urine: No  Decreased frequency of urination: No  Frequent nighttime urination: Yes  Flank pain: No  Difficulty emptying bladder: No  Back pain: Yes  Muscle aches: Yes  Neck pain: Yes  Swollen joints: Yes  Joint pain: Yes  Bone pain: Yes  Muscle cramps: Yes  Muscle weakness: " Yes  Joint stiffness: Yes  Bone fracture: No  Trouble with coordination: Yes  Dizziness or trouble with balance: Yes  Fainting or black-out spells: No  Memory loss: Yes  Headache: No  Seizures: Yes  Speech problems: Yes  Tingling: No  Tremor: Yes  Weakness: Yes  Difficulty walking: Yes  Paralysis: No  Numbness: No  Nervous or Anxious: Yes  Depression: Yes  Trouble sleeping: Yes  Trouble thinking or concentrating: Yes  Mood changes: Yes  Panic attacks: No    IMPRESSION:  Hoang Kiser is a 33 year old man who returned for evaluation of abnormal involuntary movements, which have changed in character and are distractible. 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 He was open to treatment of this disorder, focused on physical therapy.      PLAN:  - Referred for neuro PT   - Will follow up 24-hour urine copper  - Return in 6 months     Alessandra Camargo MD  Movement Disorders Fellow     Discussed family history of epilepsy, etc.      Slightly low ceruloplasmin  Urine copper pending  Other tests were normal.      Extensive discussion about his condition which appears to be a functional movement disorder  [email or web address]  Katherin Lafavre md information on fmd was provided  Discussed physical therapy with a neuromuscular focus     Patient seen and examined by me today August 14, 2018  I agree with details in this note from today August 14, 2018  Bryant bolden md  August 14, 2018        ______________________________________      Patient was asked about 14 Review of systems including changes in vision (dry eyes, double vision), hearing, heart, lungs, musculoskeletal, depression, anxiety, snoring, RBD, insomnia, urinary frequency, urinary urgency, constipation, swallowing problems, hematological, ID, allergies, skin problems: seborrhea, endocrinological: thyroid, diabetes, cholesterol; balance, weight changes, and  other neurological problems and these were not significant at this time except for   Patient Active Problem List   Diagnosis     Neuropathy     Moderate major depression (H)     Tobacco abuse     Attention deficit hyperactivity disorder (ADHD), predominantly inattentive type     Primary insomnia     History of MRI of brain and brain stem     Panic disorder without agoraphobia     Abnormal involuntary movement     Tic disorder     normal emg 2017     Anxiety     Panic attack     Posttraumatic stress disorder with dissociative symptoms     Chronic left hip pain     Chronic pain of right hip     Degenerative disc disease at L5-S1 level     Functional movement disorder     Family history of Crohn's disease     Spell of behavior change          Allergies   Allergen Reactions     Buspirone Hcl Other (See Comments)     Panic attacks     Doxycycline Diarrhea, Fatigue, GI Disturbance and Nausea     Elavil [Amitriptyline]      Ineffective in reducing spasms/movement, increased fatigue     Trazodone Fatigue     Buspirone Anxiety     Keflex [Cephalexin] Diarrhea     Past Surgical History:   Procedure Laterality Date     BACK SURGERY      injections     COLONOSCOPY  02/15/18    Has not happened yet.     COLONOSCOPY N/A 2/15/2018    Procedure: COMBINED COLONOSCOPY, SINGLE OR MULTIPLE BIOPSY/POLYPECTOMY BY BIOPSY;;  Surgeon: Liam Kincaid MD;  Location: [facility] OR     COLONOSCOPY WITH CO2 INSUFFLATION N/A 2/15/2018    Procedure: COLONOSCOPY WITH CO2 INSUFFLATION;  COLON-FAMILY HX OF COLON CANCER/ SYPURA;  Surgeon: Liam Kincaid MD;  Location: [facility] OR     HC TOOTH EXTRACTION W/FORCEP Bilateral 2003     PE TUBES  1990     Past Medical History:   Diagnosis Date     Abnormal involuntary movement 6/2/2016     Anxiety state, unspecified 04/30/2012     Benign positional vertigo Dec 2016    Started after my groin/back injury. Sitting on Toilet.     Degenerative disc disease at L5-S1 level 2/27/2018     Depressive disorder  "2003    I have been on and off medication for depression since this     Dysphonia 2014    Nothing significant.     Epilepsy (H)     as a child. 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 Spicy foods set it off.     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  History: Myoclonus.  Comparison: None.  Technique:  1. MRI of the Brain: Sagittal T1-weighted, axial T2-weighted, axial turboFLAIR, axial susceptibility, and axial diffusion-weighted with ADC map images of the brain were obtained without intravenous contrast. After intravenous administration of gadolinium, axial and sagittal T1-weighted images of th     Hoarseness 2017    Dry mouth, started after taking Tizanidine     Neuralgia, neuritis, and radiculitis, unspecified 04/30/2012     normal emg 2017 8/8/2017    Interpretation: This is a normal study. There is no electrophysiologic evidence of a lumbosacral radiculopathy affecting the right or left lower extremity on the basis of this study.    Rodney Mena MD Department of Neurology       Panic disorder without agoraphobia 04/30/2012     Problem, psychiatric 2016    PTSD     Seizures (H) [id number]    Grew out of it. Absence Seizures.     Tic disorder 6/2/2016     Tinnitus 2015    Had it most of my life. Got worse in 2015     Social History     Socioeconomic History     Marital status: Single     Spouse name: Not on file     Number of children: 0     Years of education: 14     Highest education level: Not on file   Social Needs     Financial resource strain: Not on file     Food insecurity - worry: Not on file     Food insecurity - inability: Not on file     Transportation needs - medical: Not on file     Transportation needs - non-medical: Not on file   Occupational History     Occupation: " Assembly/Packaging   Tobacco Use     Smoking status: Current Every Day Smoker     Packs/day: 0.75     Years: 10.00     Pack years: 7.50     Types: Cigarettes     Start date: 1/1/2002     Smokeless tobacco: Never Used     Tobacco comment: Transdermal patches have helped me the most in the past   Substance and Sexual Activity     Alcohol use: No     Alcohol/week: 1.2 - 2.4 oz     Comment: none since 2013     Drug use: No     Comment: hx of meth, none since 2015      Sexual activity: Yes     Partners: Female     Birth control/protection: Condom, Pill     Comment: Infrequent   Other Topics Concern     Parent/sibling w/ CABG, MI or angioplasty before 65F 55M? No      Service Not Asked     Blood Transfusions Not Asked     Caffeine Concern Yes     Comment: Coffee, Engergy Drinks, 3 cups daily     Occupational Exposure Not Asked     Hobby Hazards Not Asked     Sleep Concern Not Asked     Stress Concern Not Asked     Weight Concern Not Asked     Special Diet Not Asked     Back Care Not Asked     Exercise Not Asked     Bike Helmet Not Asked     Seat Belt Not Asked     Self-Exams Not Asked   Social History Narrative    He lives in [facility] in a [facility] [place] - there are 11 people there. He arrives today through [employer] ride    He has 3 brothers and 3 sisters. He has not children. He has never been .      Mother and father are alive    One sister is an alcoholic and bulemic    depression is present in the family : mother, sister and 2 brothers are bipolar.      He denies bipolar. He has depression.    He denies recurring thoughts. He has had substance abuse issues. He has had cravings in the past.    He exercises and plays guitar and draws.      He has used meth as well as prescription pain killers.    He has used marijuana when young. Used cocaine in the past.    Has not used iv drugs    He does not drink alcohol.      50% Liberian and is Jordanian, english and Kiswahili and a bit native.     He smokes cigarettes - 1 pack per day.        single. lives in [place]. estela is father       Drug and lactation database from the [place] [employer] of Medicine:  http://toxnet.nlm.nih.gov/cgi-bin/sis/htmlgen?LACT      B/P: Data Unavailable, T: Data Unavailable, P: Data Unavailable, R: Data Unavailable 0 lbs 0 oz  There were no vitals taken for this visit., There is no height or weight on file to calculate BMI.  Medications and Vitals not listed above were documented in the cart and reviewed by me.     Current Outpatient Medications   Medication Sig Dispense Refill     amphetamine-dextroamphetamine (ADDERALL) 10 MG tablet Take 20 mg by mouth       cyclobenzaprine (FLEXERIL) 10 MG tablet Take 10 mg by mouth       diphenhydrAMINE (BENADRYL) 25 MG capsule Take 25-50 mg by mouth       doxepin (SINEQUAN) 10 MG capsule        flurbiprofen (ANSAID) 100 MG tablet Take 100 mg by mouth       ibuprofen (ADVIL/MOTRIN) 600 MG tablet Take 600 mg by mouth       AFLURIA QUADRIVALENT 0.5 ML injection ADM 0.5ML IM UTD  0     amitriptyline (ELAVIL) 10 MG tablet   0     amphetamine-dextroamphetamine (ADDERALL XR) 30 MG 24 hr capsule Take 30 mg by mouth       amphetamine-dextroamphetamine (ADDERALL XR) 30 MG per 24 hr capsule Take 30 mg by mouth daily       amphetamine-dextroamphetamine (ADDERALL) 20 MG tablet   0     clonazePAM (KLONOPIN) 0.5 MG tablet Take 0.5-1 mg by mouth nightly as needed   0     cyclobenzaprine (FLEXERIL) 5 MG tablet   1     doxepin (SINEQUAN) 25 MG capsule TK 1 C PO HS  1     DULoxetine (CYMBALTA) 60 MG EC capsule 2 x 60mg tab by mouth daily 30 capsule 1     HYDROcodone-acetaminophen (NORCO) 5-325 MG tablet Take 1 tablet by mouth 3 times daily as needed for pain (Max 3 times daily. #90 tabs to last 30 days.) Okay to fill and start on/after 1/15/19 90 tablet 0     hydrOXYzine (ATARAX) 25 MG tablet Take 1 tablet (25 mg) by mouth nightly as needed (at HS PRN) 30 tablet 3     methylphenidate (CONCERTA)  36 MG CR tablet   0     methylphenidate (CONCERTA) 54 MG CR tablet   0     naloxone (NARCAN) nasal spray Spray 1 spray (4 mg) into one nostril alternating nostrils as needed for opioid reversal every 2-3 minutes until assistance arrives (Patient not taking: Reported on 1/16/2019) 0.2 mL 0     naproxen (NAPROSYN) 500 MG tablet Take 1 tablet (500 mg) by mouth 2 times daily (with meals) 40 tablet 3     nicotine (NICODERM CQ) 14 MG/24HR 24 hr patch Place 1 patch onto the skin every 24 hours Use this patch first (Patient not taking: Reported on 11/8/2018) 30 patch 0     nicotine (NICODERM CQ) 7 MG/24HR 24 hr patch Place 1 patch onto the skin every 24 hours (Patient not taking: Reported on 11/8/2018) 30 patch 0     orphenadrine ER (NORFLEX) 100 MG 12 hr tablet   0     oxybutynin (DITROPAN-XL) 5 MG 24 hr tablet Take 1 tablet (5 mg) by mouth daily 90 tablet 1     tiZANidine (ZANAFLEX) 4 MG tablet   2     traMADol (ULTRAM) 50 MG tablet   0         Bryant Cao MD

## 2019-02-14 NOTE — Clinical Note
2019       RE: Hoang Kiser  [address]  [facility] [address]     Dear Colleague,    Thank you for referring your patient, Honag Kiser, to the [facility] NEUROLOGY at [facility]. Please see a copy of my visit note below.    iagnosis/Summary/Recommendations:    PATIENT: Hoang Kiser  33 year old male     : 1985    HARSHA: 2019    He is having movement attacks.   He has had scary things that have happened to him.   He has lost strength from the waist down and falling  He ha had        Medications     8am 12pm 11pm   Amitryptyline elavil 10mg Not taking       Amphetamine-dextroamphetamine adderall XR 30mg per 24 hr capsule taking       Amphetamine-dextroamphetamine adderall XR 20mg per 24 hr capsule Not taking       Amphetamine-dextroamphetamine adderall XR 10mg per 24 hr capsule Not taking     Baclofen lioresal 10mg Not taking     Clonazepam klonopin 0.5mg     1   Cyclobenzaprine flexeril 10mg Not taking     Cyclobenzaprine flexeril 5mg Not taking     Diphenhydramine benadryl 25mg Not taking     Doxepin sinequan 10mg   As needed   Doxepin sinequan 25mg Not taking     Duloxetine cymbalta 20mg EC capsule Not taking       Duloxetine cymbalta 60mg EC capsule 2 x 60mg       Flurbiprofen ansaid 100mg Not taking     Gabapentin neurontin 100mg Not taking       Hydrocodone-acetaminophen norco 5-325 Not tkaing       Hydroxyzine atarax 25mg   As needed   Ibuprofen advil/motrin 600mg Not taking     Methylphenidate concerta 54mg Not taking       Methylphenidate concerta 36mg Not taking     Naloxone narcan nasal spray  as needed       Naproxen naprosyn 500mg  not using       Nicotine nicoderm cq 14mg/24 hr 24 hr patch Not using       Nicotine nicoderm cq 7mg/24 hr 24 hr patch Not using       Orphenadrine norflex 100mg 12 hr tablet Not using       Oxybutynin ditropan XL 5mg 24 hr tablet 1     Tizanidine zanaflex 4mg Not using.       Tramadol ultram  50mg Not using     Zolpidem ambien 10mg Not using                 Medications     8am 12pm afternoon 11pm   Amphetamine-dextroamphetamine adderall XR 30mg per 24 hr capsule 1      Clonazepam klonopin 0.5mg      1   Doxepin sinequan 10mg    As needed   Duloxetine cymbalta 60mg EC capsule 2 x 60mg        Hydrocodone-acetaminophen norco 5-325 As needed      Hydroxyzine atarax 25mg    As needed   Naloxone narcan nasal spray  as needed        Naproxen naprosyn 500mg 1  1    Oxybutynin ditropan XL 5mg 24 hr tablet 1            Answers for HPI/ROS submitted by the patient on 2/12/2019   General Symptoms: Yes  Skin Symptoms: No  HENT Symptoms: Yes  EYE SYMPTOMS: No  HEART SYMPTOMS: No  LUNG SYMPTOMS: No  INTESTINAL SYMPTOMS: Yes  URINARY SYMPTOMS: Yes  REPRODUCTIVE SYMPTOMS: No  SKELETAL SYMPTOMS: Yes  BLOOD SYMPTOMS: No  NERVOUS SYSTEM SYMPTOMS: Yes  MENTAL HEALTH SYMPTOMS: Yes  Fever: No  Loss of appetite: Yes  Weight loss: Yes  Weight gain: Yes  Fatigue: Yes  Night sweats: No  Chills: No  Increased stress: Yes  Excessive hunger: No  Excessive thirst: Yes  Feeling hot or cold when others believe the temperature is normal: No  Loss of height: No  Post-operative complications: No  Surgical site pain: Yes  Hallucinations: Yes  Change in or Loss of Energy: Yes  Hyperactivity: Yes  Confusion: Yes  Ear pain: Yes  Ear discharge: No  Hearing loss: No  Tinnitus: Yes  Nosebleeds: No  Congestion: Yes  Sinus pain: No  Trouble swallowing: No   Voice hoarseness: Yes  Mouth sores: No  Sore throat: No  Tooth pain: Yes  Gum tenderness: No  Bleeding gums: No  Change in taste: No  Change in sense of smell: No  Dry mouth: Yes  Hearing aid used: No  Neck lump: No  Heart burn or indigestion: Yes  Nausea: Yes  Vomiting: Yes  Abdominal pain: Yes  Bloating: No  Constipation: No  Diarrhea: Yes  Blood in stool: No  Black stools: No  Rectal or Anal pain: No  Fecal incontinence: No  Yellowing of skin or eyes: No  Vomit with blood: No  Change in  "stools: No  Trouble holding urine or incontinence: Yes  Pain or burning: Yes  Trouble starting or stopping: No  Increased frequency of urination: Yes  Blood in urine: No  Decreased frequency of urination: No  Frequent nighttime urination: Yes  Flank pain: No  Difficulty emptying bladder: Yes  Back pain: Yes  Muscle aches: Yes  Neck pain: Yes  Swollen joints: Yes  Joint pain: Yes  Bone pain: Yes  Muscle cramps: Yes  Muscle weakness: Yes  Joint stiffness: Yes  Bone fracture: No  Trouble with coordination: Yes  Dizziness or trouble with balance: Yes  Fainting or black-out spells: No  Memory loss: Yes  Headache: Yes  Seizures: No  Speech problems: Yes  Tingling: Yes  Tremor: Yes  Weakness: Yes  Difficulty walking: Yes  Paralysis: No  Numbness: Yes  Nervous or Anxious: Yes  Depression: Yes  Trouble sleeping: Yes  Trouble thinking or concentrating: Yes  Mood changes: Yes  Panic attacks: No        History obtained from patient          Medications     8am 12pm 11pm   Clonazepam klonopin 0.5mg     1   Doxepin sinequan 10mg      Duloxetine cymbalta 60mg EC capsule 2 x 60mg       Hydroxyzine atarax 25mg   As needed   Naloxone narcan nasal spray  as needed       Oxybutynin ditropan XL 5mg 24 hr tablet 1           Coding statement:   Duration of  Services: patient care and care coordination was 25 minutes  Greater than 50% of this visit was spent in counseling and coordination of care.     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 There are several videos and the movements are not entirely " stereotyped. He is laying supine watching television with his legs bent at the knee in front of him. Suddenly he bends in half at the waist. In some cases he has a couple hip thrusts. Other cases he turns to the side and curls his legs to his chest and straightens a couple times. During the events he appears to be conscious and participates in highly purposeful activity. For example, during one event he reached for the television remote and turned it off and continued with a couple hip thrusts. He confirmed with me that he is conscious during the events. In the videos, the movements last for a few minutes but he tells me it can go on for up to 10 minutes. Afterwards he gets up and immediately engages in purposeful activity - putting on a coat, grabbing cigarettes, and leaving his apartment.      He has near continuous involuntary movements involving the head and trunk. He has observed that movements cease when he is engaged in concentration. Movement attacks are 2-3 times per week. The character of these are different than previous movement attacks, occurring during times of rest rather than pain or activity.      He has been researching functional movement disorder. He has applied for participation in a research study. Today, he asked for details to help with a plan to be developed by his physical therapist and psychologist. He appears to be very engaged, but is discouraged by the continued movements despite his efforts.         MEDICATIONS:       Outpatient Prescriptions Marked as Taking for the 11/2/18 encounter (Office Visit) with  MOVEMENT DISORDER FELLOW   Medication Sig     AFLURIA QUADRIVALENT 0.5 ML injection ADM 0.5ML IM UTD     amphetamine-dextroamphetamine (ADDERALL XR) 30 MG per 24 hr capsule Take 30 mg by mouth daily     baclofen (LIORESAL) 10 MG tablet Take 0.5-1 tablets (5-10 mg) by mouth 3 times daily as needed for muscle spasms     clonazePAM (KLONOPIN) 0.5 MG tablet Take 0.5-1 mg by mouth  nightly as needed      cyclobenzaprine (FLEXERIL) 5 MG tablet       DULoxetine (CYMBALTA) 20 MG EC capsule       DULoxetine (CYMBALTA) 60 MG EC capsule 2 x 60mg tab by mouth daily     gabapentin (NEURONTIN) 100 MG capsule       HYDROcodone-acetaminophen (NORCO) 5-325 MG per tablet Take 1 tablet by mouth 3 times daily as needed for pain (Max 3 times daily. #75 tabs to last 30 days.) Okay to fill and start on/after 11/18/18     hydrOXYzine (ATARAX) 25 MG tablet Take one to two tablets at HS for insomnia or anxiety     methylphenidate ER (CONCERTA) 36 MG CR tablet       methylphenidate ER (CONCERTA) 54 MG CR tablet TK 1 T PO QAM     naloxone (NARCAN) nasal spray Spray 1 spray (4 mg) into one nostril alternating nostrils as needed for opioid reversal every 2-3 minutes until assistance arrives     naproxen (NAPROSYN) 500 MG tablet Take 1 tablet (500 mg) by mouth 2 times daily (with meals)     nicotine (NICODERM CQ) 14 MG/24HR 24 hr patch Place 1 patch onto the skin every 24 hours Use this patch first     nicotine (NICODERM CQ) 7 MG/24HR 24 hr patch Place 1 patch onto the skin every 24 hours     orphenadrine (NORFLEX) 100 MG 12 hr tablet       tiZANidine (ZANAFLEX) 4 MG tablet       traMADol (ULTRAM) 50 MG tablet       zolpidem (AMBIEN) 10 MG tablet           ALLERGIES:  Adderall; Buspirone hcl; Doxycycline; Elavil [amitriptyline]; Naproxen; Norflex [orphenadrine]; Trazodone; Zanaflex [tizanidine]; Buspirone; and Keflex [cephalexin]        PAST MEDICAL HISTORY:  Medical history reviewed and updated in Epic, no new problems     REVIEW OF SYSTEMS:  12 point review of systems completed. Pertinent positives and negatives above and in HPI     PHYSICAL EXAM:  VITALS: /80 (BP Location: Right arm)  Pulse 64  Wt 77.1 kg (170 lb)  BMI 26.63 kg/m2  GENERAL: Cooperative, no acute distress  HEENT: Normocephalic and nontraumatic, sclera white, moist mucous membranes  CARDIAC: Regular rate and rhythm   RESPIRATORY: Nonlabored  "breathing  EXTREMITIES: Distal pulses intact. No UE or LE edema    PSYCHIATRIC: Poor eye contact. Flat affect. Good fund of knowledge. Fair insight.      NEUROLOGIC:  He was alert and attentive. Speech clear, fluent. Normal rate, tone, volume. EOM full. Facial movements symmetric. No dysarthria. No palatal or tongue tremor. No dysarthria. Frequent, near continuous jerky movements of the head and trunk. No tremor or clear myoclonus. No dystonic posturing. Movements are distractible and stop for a short time while he is explaining the videos to me. Gait is normal.         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 These have a different character than previous attacks - with an appearance similar to nonepileptic events (\"pseudoseizures\"). The length of the attacks, purposeful activity during the attacks, and quick recovery makes them very unlikely to be epileptic. Frontal lobe seizures can have the appearance of bizarre movements - but a patient would not be expected to retain highly coordinated purposeful behavior. It is not unusual for patient with functional movement disorders to develop new movements, rather it is quite characteristic.      He was encouraged to continue with PT and psychology. It would be helpful for him to develop some short term and long term goals to avoid discouragement. Generally, he should try to identify triggers at home, then use these triggers to cause involuntary movements during therapy sessions so he can work on ways of stopping them, ie \"retrain the brain.\"        PLAN:  Agree with continuing PT and psychology cognitive behavior therapy     Follow up as scheduled with Dr. Cao, he may follow up with me prn after that           Alessandra Camargo MD  Movement Disorders Fellow     Total time greater than 45 minutes, over half " in counseling and coordination of care as outlined above     Answers for HPI/ROS submitted by the patient on 10/29/2018   General Symptoms: Yes  Skin Symptoms: No  HENT Symptoms: Yes  EYE SYMPTOMS: No  HEART SYMPTOMS: No  LUNG SYMPTOMS: No  INTESTINAL SYMPTOMS: No  URINARY SYMPTOMS: Yes  REPRODUCTIVE SYMPTOMS: No  SKELETAL SYMPTOMS: Yes  BLOOD SYMPTOMS: No  NERVOUS SYSTEM SYMPTOMS: Yes  MENTAL HEALTH SYMPTOMS: Yes  Fever: No  Loss of appetite: Yes  Weight loss: No  Weight gain: No  Fatigue: Yes  Night sweats: No  Chills: No  Increased stress: Yes  Excessive hunger: No  Excessive thirst: No  Feeling hot or cold when others believe the temperature is normal: No  Loss of height: No  Post-operative complications: No  Surgical site pain: No  Hallucinations: No  Change in or Loss of Energy: Yes  Hyperactivity: No  Confusion: Yes  Ear pain: No  Ear discharge: No  Hearing loss: No  Tinnitus: Yes  Nosebleeds: No  Congestion: No  Sinus pain: No  Trouble swallowing: No   Voice hoarseness: Yes  Mouth sores: No  Sore throat: No  Tooth pain: No  Gum tenderness: No  Bleeding gums: No  Change in taste: No  Change in sense of smell: No  Dry mouth: Yes  Hearing aid used: No  Neck lump: No  Trouble holding urine or incontinence: Yes  Pain or burning: No  Trouble starting or stopping: Yes  Increased frequency of urination: Yes  Blood in urine: No  Decreased frequency of urination: No  Frequent nighttime urination: Yes  Flank pain: No  Difficulty emptying bladder: No  Back pain: Yes  Muscle aches: Yes  Neck pain: Yes  Swollen joints: Yes  Joint pain: Yes  Bone pain: Yes  Muscle cramps: Yes  Muscle weakness: Yes  Joint stiffness: Yes  Bone fracture: No  Trouble with coordination: Yes  Dizziness or trouble with balance: Yes  Fainting or black-out spells: No  Memory loss: Yes  Headache: No  Seizures: Yes  Speech problems: Yes  Tingling: No  Tremor: Yes  Weakness: Yes  Difficulty walking: Yes  Paralysis: No  Numbness: No  Nervous or  Anxious: Yes  Depression: Yes  Trouble sleeping: Yes  Trouble thinking or concentrating: Yes  Mood changes: Yes  Panic attacks: No    IMPRESSION:  Hoang Kiser is a 33 year old man who returned for evaluation of abnormal involuntary movements, which have changed in character and are distractible. 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 He was open to treatment of this disorder, focused on physical therapy.      PLAN:  - Referred for neuro PT   - Will follow up 24-hour urine copper  - Return in 6 months     Alessandra Camargo MD  Movement Disorders Fellow     Discussed family history of epilepsy, etc.      Slightly low ceruloplasmin  Urine copper pending  Other tests were normal.      Extensive discussion about his condition which appears to be a functional movement disorder  [email or web address]  Katherin Lafavre md information on fmd was provided  Discussed physical therapy with a neuromuscular focus     Patient seen and examined by me today August 14, 2018  I agree with details in this note from today August 14, 2018  Bryant bolden md  August 14, 2018        ______________________________________      Patient was asked about 14 Review of systems including changes in vision (dry eyes, double vision), hearing, heart, lungs, musculoskeletal, depression, anxiety, snoring, RBD, insomnia, urinary frequency, urinary urgency, constipation, swallowing problems, hematological, ID, allergies, skin problems: seborrhea, endocrinological: thyroid, diabetes, cholesterol; balance, weight changes, and other neurological problems and these were not significant at this time except for   Patient Active Problem List   Diagnosis     Neuropathy     Moderate major depression (H)     Tobacco abuse     Attention deficit hyperactivity disorder (ADHD), predominantly inattentive type     Primary insomnia     History of MRI of brain and  "brain stem     Panic disorder without agoraphobia     Abnormal involuntary movement     Tic disorder     normal emg 2017     Anxiety     Panic attack     Posttraumatic stress disorder with dissociative symptoms     Chronic left hip pain     Chronic pain of right hip     Degenerative disc disease at L5-S1 level     Functional movement disorder     Family history of Crohn's disease     Spell of behavior change          Allergies   Allergen Reactions     Buspirone Hcl Other (See Comments)     Panic attacks     Doxycycline Diarrhea, Fatigue, GI Disturbance and Nausea     Elavil [Amitriptyline]      Ineffective in reducing spasms/movement, increased fatigue     Trazodone Fatigue     Buspirone Anxiety     Keflex [Cephalexin] Diarrhea     Past Surgical History:   Procedure Laterality Date     BACK SURGERY      injections     COLONOSCOPY  02/15/18    Has not happened yet.     COLONOSCOPY N/A 2/15/2018    Procedure: COMBINED COLONOSCOPY, SINGLE OR MULTIPLE BIOPSY/POLYPECTOMY BY BIOPSY;;  Surgeon: Liam Kincaid MD;  Location: [facility] OR     COLONOSCOPY WITH CO2 INSUFFLATION N/A 2/15/2018    Procedure: COLONOSCOPY WITH CO2 INSUFFLATION;  COLON-FAMILY HX OF COLON CANCER/ SYPURA;  Surgeon: Liam Kincaid MD;  Location: [facility] OR     HC TOOTH EXTRACTION W/FORCEP Bilateral 2003     PE TUBES  1990     Past Medical History:   Diagnosis Date     Abnormal involuntary movement 6/2/2016     Anxiety state, unspecified 04/30/2012     Benign positional vertigo Dec 2016    Started after my groin/back injury. Sitting on Toilet.     Degenerative disc disease at L5-S1 level 2/27/2018     Depressive disorder 2003    I have been on and off medication for depression since this     Dysphonia 2014    Nothing significant.     Epilepsy (H)     as a child. 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 Spicy foods set it off.     Hearing problem " 2015    Theorized Diag: Essential Palatal Myoclonus     History of MRI of brain and brain stem 12/11/2015     MR BRAIN W/O & W CONTRAST, 12/11/2015 3:46 PM.  History: Myoclonus.  Comparison: None.  Technique:  1. MRI of the Brain: Sagittal T1-weighted, axial T2-weighted, axial turboFLAIR, axial susceptibility, and axial diffusion-weighted with ADC map images of the brain were obtained without intravenous contrast. After intravenous administration of gadolinium, axial and sagittal T1-weighted images of th     Hoarseness 2017    Dry mouth, started after taking Tizanidine     Neuralgia, neuritis, and radiculitis, unspecified 04/30/2012     normal emg 2017 8/8/2017    Interpretation: This is a normal study. There is no electrophysiologic evidence of a lumbosacral radiculopathy affecting the right or left lower extremity on the basis of this study.    Rodney Mena MD Department of Neurology       Panic disorder without agoraphobia 04/30/2012     Problem, psychiatric 2016    PTSD     Seizures (H) [id number]    Grew out of it. Absence Seizures.     Tic disorder 6/2/2016     Tinnitus 2015    Had it most of my life. Got worse in 2015     Social History     Socioeconomic History     Marital status: Single     Spouse name: Not on file     Number of children: 0     Years of education: 14     Highest education level: Not on file   Social Needs     Financial resource strain: Not on file     Food insecurity - worry: Not on file     Food insecurity - inability: Not on file     Transportation needs - medical: Not on file     Transportation needs - non-medical: Not on file   Occupational History     Occupation: Assembly/Packaging   Tobacco Use     Smoking status: Current Every Day Smoker     Packs/day: 0.75     Years: 10.00     Pack years: 7.50     Types: Cigarettes     Start date: 1/1/2002     Smokeless tobacco: Never Used     Tobacco comment: Transdermal patches have helped me the most in the past   Substance and Sexual Activity      Alcohol use: No     Alcohol/week: 1.2 - 2.4 oz     Comment: none since 2013     Drug use: No     Comment: hx of meth, none since 2015      Sexual activity: Yes     Partners: Female     Birth control/protection: Condom, Pill     Comment: Infrequent   Other Topics Concern     Parent/sibling w/ CABG, MI or angioplasty before 65F 55M? No      Service Not Asked     Blood Transfusions Not Asked     Caffeine Concern Yes     Comment: Coffee, Engergy Drinks, 3 cups daily     Occupational Exposure Not Asked     Hobby Hazards Not Asked     Sleep Concern Not Asked     Stress Concern Not Asked     Weight Concern Not Asked     Special Diet Not Asked     Back Care Not Asked     Exercise Not Asked     Bike Helmet Not Asked     Seat Belt Not Asked     Self-Exams Not Asked   Social History Narrative    He lives in [facility] in a [facility] [place] [facility] - there are 11 people there. He arrives today through [employer] ride    He has 3 brothers and 3 sisters. He has not children. He has never been .      Mother and father are alive    One sister is an alcoholic and bulemic    depression is present in the family : mother, sister and 2 brothers are bipolar.      He denies bipolar. He has depression.    He denies recurring thoughts. He has had substance abuse issues. He has had cravings in the past.    He exercises and plays guitar and draws.      He has used meth as well as prescription pain killers.    He has used marijuana when young. Used cocaine in the past.    Has not used iv drugs    He does not drink alcohol.      50% Syrian and is Italian, english and Croatian and a bit native.    He smokes cigarettes - 1 pack per day.        single. lives in [place]. estela is father       Drug and lactation database from the [place] [employer] of Medicine:  http://toxnet.nlm.nih.gov/cgi-bin/sis/htmlgen?LACT      B/P: Data Unavailable, T: Data Unavailable, P: Data Unavailable, R: Data Unavailable 0 lbs 0  oz  There were no vitals taken for this visit., There is no height or weight on file to calculate BMI.  Medications and Vitals not listed above were documented in the cart and reviewed by me.     Current Outpatient Medications   Medication Sig Dispense Refill     amphetamine-dextroamphetamine (ADDERALL) 10 MG tablet Take 20 mg by mouth       cyclobenzaprine (FLEXERIL) 10 MG tablet Take 10 mg by mouth       diphenhydrAMINE (BENADRYL) 25 MG capsule Take 25-50 mg by mouth       doxepin (SINEQUAN) 10 MG capsule        flurbiprofen (ANSAID) 100 MG tablet Take 100 mg by mouth       ibuprofen (ADVIL/MOTRIN) 600 MG tablet Take 600 mg by mouth       AFLURIA QUADRIVALENT 0.5 ML injection ADM 0.5ML IM UTD  0     amitriptyline (ELAVIL) 10 MG tablet   0     amphetamine-dextroamphetamine (ADDERALL XR) 30 MG 24 hr capsule Take 30 mg by mouth       amphetamine-dextroamphetamine (ADDERALL XR) 30 MG per 24 hr capsule Take 30 mg by mouth daily       amphetamine-dextroamphetamine (ADDERALL) 20 MG tablet   0     clonazePAM (KLONOPIN) 0.5 MG tablet Take 0.5-1 mg by mouth nightly as needed   0     cyclobenzaprine (FLEXERIL) 5 MG tablet   1     doxepin (SINEQUAN) 25 MG capsule TK 1 C PO HS  1     DULoxetine (CYMBALTA) 60 MG EC capsule 2 x 60mg tab by mouth daily 30 capsule 1     HYDROcodone-acetaminophen (NORCO) 5-325 MG tablet Take 1 tablet by mouth 3 times daily as needed for pain (Max 3 times daily. #90 tabs to last 30 days.) Okay to fill and start on/after 1/15/19 90 tablet 0     hydrOXYzine (ATARAX) 25 MG tablet Take 1 tablet (25 mg) by mouth nightly as needed (at HS PRN) 30 tablet 3     methylphenidate (CONCERTA) 36 MG CR tablet   0     methylphenidate (CONCERTA) 54 MG CR tablet   0     naloxone (NARCAN) nasal spray Spray 1 spray (4 mg) into one nostril alternating nostrils as needed for opioid reversal every 2-3 minutes until assistance arrives (Patient not taking: Reported on 1/16/2019) 0.2 mL 0     naproxen (NAPROSYN) 500 MG  tablet Take 1 tablet (500 mg) by mouth 2 times daily (with meals) 40 tablet 3     nicotine (NICODERM CQ) 14 MG/24HR 24 hr patch Place 1 patch onto the skin every 24 hours Use this patch first (Patient not taking: Reported on 2018) 30 patch 0     nicotine (NICODERM CQ) 7 MG/24HR 24 hr patch Place 1 patch onto the skin every 24 hours (Patient not taking: Reported on 2018) 30 patch 0     orphenadrine ER (NORFLEX) 100 MG 12 hr tablet   0     oxybutynin (DITROPAN-XL) 5 MG 24 hr tablet Take 1 tablet (5 mg) by mouth daily 90 tablet 1     tiZANidine (ZANAFLEX) 4 MG tablet   2     traMADol (ULTRAM) 50 MG tablet   0         Bryant Cao MD    iagnosis/Summary/Recommendations:    PATIENT: Hoang Kiser  33 year old male     : 1985    HARSHA: 2019    He is having movement attacks.   He has had scary things that have happened to him.   He has lost strength from the waist down and falling  He ha had        Medications     8am 12pm 11pm   Amitryptyline elavil 10mg Not taking       Amphetamine-dextroamphetamine adderall XR 30mg per 24 hr capsule taking       Amphetamine-dextroamphetamine adderall XR 20mg per 24 hr capsule Not taking       Amphetamine-dextroamphetamine adderall XR 10mg per 24 hr capsule Not taking     Baclofen lioresal 10mg Not taking     Clonazepam klonopin 0.5mg     1   Cyclobenzaprine flexeril 10mg Not taking     Cyclobenzaprine flexeril 5mg Not taking     Diphenhydramine benadryl 25mg Not taking     Doxepin sinequan 10mg   As needed   Doxepin sinequan 25mg Not taking     Duloxetine cymbalta 20mg EC capsule Not taking       Duloxetine cymbalta 60mg EC capsule 2 x 60mg       Flurbiprofen ansaid 100mg Not taking     Gabapentin neurontin 100mg Not taking       Hydrocodone-acetaminophen norco 5-325 Not tkaing       Hydroxyzine atarax 25mg   As needed   Ibuprofen advil/motrin 600mg Not taking     Methylphenidate concerta 54mg Not taking       Methylphenidate concerta 36mg Not taking      Naloxone narcan nasal spray  as needed       Naproxen naprosyn 500mg  not using       Nicotine nicoderm cq 14mg/24 hr 24 hr patch Not using       Nicotine nicoderm cq 7mg/24 hr 24 hr patch Not using       Orphenadrine norflex 100mg 12 hr tablet Not using       Oxybutynin ditropan XL 5mg 24 hr tablet 1     Tizanidine zanaflex 4mg Not using.       Tramadol ultram 50mg Not using     Zolpidem ambien 10mg Not using            Present medication list    He finished his t herapy [facility] in [place] with Graciela Arnold.   He had 10 visits with Graciela Arnold            Medications     8am 12pm afternoon 11pm   Amphetamine-dextroamphetamine adderall XR 30mg per 24 hr capsule 1      Clonazepam klonopin 0.5mg      1   Doxepin sinequan 10mg    As needed   Duloxetine cymbalta 60mg EC capsule 2 x 60mg        Hydrocodone-acetaminophen norco 5-325 As needed      Hydroxyzine atarax 25mg    As needed   Naloxone narcan nasal spray  as needed        Naproxen naprosyn 500mg 1  1    Oxybutynin ditropan XL 5mg 24 hr tablet 1            Answers for HPI/ROS submitted by the patient on 2/12/2019   General Symptoms: Yes  Skin Symptoms: No  HENT Symptoms: Yes  EYE SYMPTOMS: No  HEART SYMPTOMS: No  LUNG SYMPTOMS: No  INTESTINAL SYMPTOMS: Yes  URINARY SYMPTOMS: Yes  REPRODUCTIVE SYMPTOMS: No  SKELETAL SYMPTOMS: Yes  BLOOD SYMPTOMS: No  NERVOUS SYSTEM SYMPTOMS: Yes  MENTAL HEALTH SYMPTOMS: Yes  Fever: No  Loss of appetite: Yes  Weight loss: Yes  Weight gain: Yes  Fatigue: Yes  Night sweats: No  Chills: No  Increased stress: Yes  Excessive hunger: No  Excessive thirst: Yes  Feeling hot or cold when others believe the temperature is normal: No  Loss of height: No  Post-operative complications: No  Surgical site pain: Yes  Hallucinations: Yes  Change in or Loss of Energy: Yes  Hyperactivity: Yes  Confusion: Yes  Ear pain: Yes  Ear discharge: No  Hearing loss: No  Tinnitus: Yes  Nosebleeds: No  Congestion: Yes  Sinus pain: No  Trouble swallowing:  No   Voice hoarseness: Yes  Mouth sores: No  Sore throat: No  Tooth pain: Yes  Gum tenderness: No  Bleeding gums: No  Change in taste: No  Change in sense of smell: No  Dry mouth: Yes  Hearing aid used: No  Neck lump: No  Heart burn or indigestion: Yes  Nausea: Yes  Vomiting: Yes  Abdominal pain: Yes  Bloating: No  Constipation: No  Diarrhea: Yes  Blood in stool: No  Black stools: No  Rectal or Anal pain: No  Fecal incontinence: No  Yellowing of skin or eyes: No  Vomit with blood: No  Change in stools: No  Trouble holding urine or incontinence: Yes  Pain or burning: Yes  Trouble starting or stopping: No  Increased frequency of urination: Yes  Blood in urine: No  Decreased frequency of urination: No  Frequent nighttime urination: Yes  Flank pain: No  Difficulty emptying bladder: Yes  Back pain: Yes  Muscle aches: Yes  Neck pain: Yes  Swollen joints: Yes  Joint pain: Yes  Bone pain: Yes  Muscle cramps: Yes  Muscle weakness: Yes  Joint stiffness: Yes  Bone fracture: No  Trouble with coordination: Yes  Dizziness or trouble with balance: Yes  Fainting or black-out spells: No  Memory loss: Yes  Headache: Yes  Seizures: No  Speech problems: Yes  Tingling: Yes  Tremor: Yes  Weakness: Yes  Difficulty walking: Yes  Paralysis: No  Numbness: Yes  Nervous or Anxious: Yes  Depression: Yes  Trouble sleeping: Yes  Trouble thinking or concentrating: Yes  Mood changes: Yes  Panic attacks: No        History obtained from patient          Medications     8am 12pm 11pm   Clonazepam klonopin 0.5mg     1   Doxepin sinequan 10mg      Duloxetine cymbalta 60mg EC capsule 2 x 60mg       Hydroxyzine atarax 25mg   As needed   Naloxone narcan nasal spray  as needed       Oxybutynin ditropan XL 5mg 24 hr tablet 1           Coding statement:   Duration of  Services: patient care and care coordination was 25 minutes  Greater than 50% of this visit was spent in counseling and coordination of care.     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 There are several videos and the movements are not entirely stereotyped. He is laying supine watching television with his legs bent at the knee in front of him. Suddenly he bends in half at the waist. In some cases he has a couple hip thrusts. Other cases he turns to the side and curls his legs to his chest and straightens a couple times. During the events he appears to be conscious and participates in highly purposeful activity. For example, during one event he reached for the television remote and turned it off and continued with a couple hip thrusts. He confirmed with me that he is conscious during the events. In the videos, the movements last for a few minutes but he tells me it can go on for up to 10 minutes. Afterwards he gets up and immediately engages in purposeful activity - putting on a coat, grabbing cigarettes, and leaving his apartment.      He has near continuous involuntary movements involving the head and trunk. He has observed that movements cease when he is engaged in concentration. Movement attacks are 2-3 times per week. The character of these are different than previous movement attacks, occurring during times of rest rather than pain or activity.      He has been researching functional movement disorder. He has applied for participation in a research study. Today, he asked for details to help with a plan to be developed by his physical therapist and psychologist. He appears to be very engaged, but is discouraged by the continued movements " despite his efforts.         MEDICATIONS:       Outpatient Prescriptions Marked as Taking for the 11/2/18 encounter (Office Visit) with  MOVEMENT DISORDER FELLOW   Medication Sig     AFLURIA QUADRIVALENT 0.5 ML injection ADM 0.5ML IM UTD     amphetamine-dextroamphetamine (ADDERALL XR) 30 MG per 24 hr capsule Take 30 mg by mouth daily     baclofen (LIORESAL) 10 MG tablet Take 0.5-1 tablets (5-10 mg) by mouth 3 times daily as needed for muscle spasms     clonazePAM (KLONOPIN) 0.5 MG tablet Take 0.5-1 mg by mouth nightly as needed      cyclobenzaprine (FLEXERIL) 5 MG tablet       DULoxetine (CYMBALTA) 20 MG EC capsule       DULoxetine (CYMBALTA) 60 MG EC capsule 2 x 60mg tab by mouth daily     gabapentin (NEURONTIN) 100 MG capsule       HYDROcodone-acetaminophen (NORCO) 5-325 MG per tablet Take 1 tablet by mouth 3 times daily as needed for pain (Max 3 times daily. #75 tabs to last 30 days.) Okay to fill and start on/after 11/18/18     hydrOXYzine (ATARAX) 25 MG tablet Take one to two tablets at HS for insomnia or anxiety     methylphenidate ER (CONCERTA) 36 MG CR tablet       methylphenidate ER (CONCERTA) 54 MG CR tablet TK 1 T PO QAM     naloxone (NARCAN) nasal spray Spray 1 spray (4 mg) into one nostril alternating nostrils as needed for opioid reversal every 2-3 minutes until assistance arrives     naproxen (NAPROSYN) 500 MG tablet Take 1 tablet (500 mg) by mouth 2 times daily (with meals)     nicotine (NICODERM CQ) 14 MG/24HR 24 hr patch Place 1 patch onto the skin every 24 hours Use this patch first     nicotine (NICODERM CQ) 7 MG/24HR 24 hr patch Place 1 patch onto the skin every 24 hours     orphenadrine (NORFLEX) 100 MG 12 hr tablet       tiZANidine (ZANAFLEX) 4 MG tablet       traMADol (ULTRAM) 50 MG tablet       zolpidem (AMBIEN) 10 MG tablet           ALLERGIES:  Adderall; Buspirone hcl; Doxycycline; Elavil [amitriptyline]; Naproxen; Norflex [orphenadrine]; Trazodone; Zanaflex [tizanidine]; Buspirone; and  "Keflex [cephalexin]        PAST MEDICAL HISTORY:  Medical history reviewed and updated in Epic, no new problems     REVIEW OF SYSTEMS:  12 point review of systems completed. Pertinent positives and negatives above and in HPI     PHYSICAL EXAM:  VITALS: /80 (BP Location: Right arm)  Pulse 64  Wt 77.1 kg (170 lb)  BMI 26.63 kg/m2  GENERAL: Cooperative, no acute distress  HEENT: Normocephalic and nontraumatic, sclera white, moist mucous membranes  CARDIAC: Regular rate and rhythm   RESPIRATORY: Nonlabored breathing  EXTREMITIES: Distal pulses intact. No UE or LE edema    PSYCHIATRIC: Poor eye contact. Flat affect. Good fund of knowledge. Fair insight.      NEUROLOGIC:  He was alert and attentive. Speech clear, fluent. Normal rate, tone, volume. EOM full. Facial movements symmetric. No dysarthria. No palatal or tongue tremor. No dysarthria. Frequent, near continuous jerky movements of the head and trunk. No tremor or clear myoclonus. No dystonic posturing. Movements are distractible and stop for a short time while he is explaining the videos to me. Gait is normal.         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 These have a different character than previous attacks - with an appearance similar to nonepileptic events (\"pseudoseizures\"). The length of the attacks, purposeful activity during the attacks, and quick recovery makes them very unlikely to be epileptic. Frontal lobe seizures can have the appearance of bizarre movements - but a patient would not be expected to retain highly coordinated purposeful behavior. It is not unusual for patient with functional movement disorders to develop new movements, rather it is quite characteristic.      He was encouraged to continue with PT and psychology. It would be helpful for him to develop some " "short term and long term goals to avoid discouragement. Generally, he should try to identify triggers at home, then use these triggers to cause involuntary movements during therapy sessions so he can work on ways of stopping them, ie \"retrain the brain.\"        PLAN:  Agree with continuing PT and psychology cognitive behavior therapy     Follow up as scheduled with Dr. Cao, he may follow up with me prn after that           Alessandra Camargo MD  Movement Disorders Fellow     Total time greater than 45 minutes, over half in counseling and coordination of care as outlined above     Answers for HPI/ROS submitted by the patient on 10/29/2018   General Symptoms: Yes  Skin Symptoms: No  HENT Symptoms: Yes  EYE SYMPTOMS: No  HEART SYMPTOMS: No  LUNG SYMPTOMS: No  INTESTINAL SYMPTOMS: No  URINARY SYMPTOMS: Yes  REPRODUCTIVE SYMPTOMS: No  SKELETAL SYMPTOMS: Yes  BLOOD SYMPTOMS: No  NERVOUS SYSTEM SYMPTOMS: Yes  MENTAL HEALTH SYMPTOMS: Yes  Fever: No  Loss of appetite: Yes  Weight loss: No  Weight gain: No  Fatigue: Yes  Night sweats: No  Chills: No  Increased stress: Yes  Excessive hunger: No  Excessive thirst: No  Feeling hot or cold when others believe the temperature is normal: No  Loss of height: No  Post-operative complications: No  Surgical site pain: No  Hallucinations: No  Change in or Loss of Energy: Yes  Hyperactivity: No  Confusion: Yes  Ear pain: No  Ear discharge: No  Hearing loss: No  Tinnitus: Yes  Nosebleeds: No  Congestion: No  Sinus pain: No  Trouble swallowing: No   Voice hoarseness: Yes  Mouth sores: No  Sore throat: No  Tooth pain: No  Gum tenderness: No  Bleeding gums: No  Change in taste: No  Change in sense of smell: No  Dry mouth: Yes  Hearing aid used: No  Neck lump: No  Trouble holding urine or incontinence: Yes  Pain or burning: No  Trouble starting or stopping: Yes  Increased frequency of urination: Yes  Blood in urine: No  Decreased frequency of urination: No  Frequent nighttime urination: " Yes  Flank pain: No  Difficulty emptying bladder: No  Back pain: Yes  Muscle aches: Yes  Neck pain: Yes  Swollen joints: Yes  Joint pain: Yes  Bone pain: Yes  Muscle cramps: Yes  Muscle weakness: Yes  Joint stiffness: Yes  Bone fracture: No  Trouble with coordination: Yes  Dizziness or trouble with balance: Yes  Fainting or black-out spells: No  Memory loss: Yes  Headache: No  Seizures: Yes  Speech problems: Yes  Tingling: No  Tremor: Yes  Weakness: Yes  Difficulty walking: Yes  Paralysis: No  Numbness: No  Nervous or Anxious: Yes  Depression: Yes  Trouble sleeping: Yes  Trouble thinking or concentrating: Yes  Mood changes: Yes  Panic attacks: No    IMPRESSION:  Hoang Kiser is a 33 year old man who returned for evaluation of abnormal involuntary movements, which have changed in character and are distractible. 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 He was open to treatment of this disorder, focused on physical therapy.      PLAN:  - Referred for neuro PT   - Will follow up 24-hour urine copper  - Return in 6 months     Alessandra Camargo MD  Movement Disorders Fellow     Discussed family history of epilepsy, etc.      Slightly low ceruloplasmin  Urine copper pending  Other tests were normal.      Extensive discussion about his condition which appears to be a functional movement disorder  [email or web address]  Katherin Lafavre md information on fmd was provided  Discussed physical therapy with a neuromuscular focus     Patient seen and examined by me today August 14, 2018  I agree with details in this note from today August 14, 2018  Bryant bolden md  August 14, 2018        ______________________________________      Patient was asked about 14 Review of systems including changes in vision (dry eyes, double vision), hearing, heart, lungs, musculoskeletal, depression, anxiety, snoring, RBD, insomnia, urinary  frequency, urinary urgency, constipation, swallowing problems, hematological, ID, allergies, skin problems: seborrhea, endocrinological: thyroid, diabetes, cholesterol; balance, weight changes, and other neurological problems and these were not significant at this time except for   Patient Active Problem List   Diagnosis     Neuropathy     Moderate major depression (H)     Tobacco abuse     Attention deficit hyperactivity disorder (ADHD), predominantly inattentive type     Primary insomnia     History of MRI of brain and brain stem     Panic disorder without agoraphobia     Abnormal involuntary movement     Tic disorder     normal emg 2017     Anxiety     Panic attack     Posttraumatic stress disorder with dissociative symptoms     Chronic left hip pain     Chronic pain of right hip     Degenerative disc disease at L5-S1 level     Functional movement disorder     Family history of Crohn's disease     Spell of behavior change          Allergies   Allergen Reactions     Buspirone Hcl Other (See Comments)     Panic attacks     Doxycycline Diarrhea, Fatigue, GI Disturbance and Nausea     Elavil [Amitriptyline]      Ineffective in reducing spasms/movement, increased fatigue     Trazodone Fatigue     Buspirone Anxiety     Keflex [Cephalexin] Diarrhea     Past Surgical History:   Procedure Laterality Date     BACK SURGERY      injections     COLONOSCOPY  02/15/18    Has not happened yet.     COLONOSCOPY N/A 2/15/2018    Procedure: COMBINED COLONOSCOPY, SINGLE OR MULTIPLE BIOPSY/POLYPECTOMY BY BIOPSY;;  Surgeon: Liam Kincaid MD;  Location: [facility] OR     COLONOSCOPY WITH CO2 INSUFFLATION N/A 2/15/2018    Procedure: COLONOSCOPY WITH CO2 INSUFFLATION;  COLON-FAMILY HX OF COLON CANCER/ SYPURA;  Surgeon: Liam Kincaid MD;  Location: [facility] OR     HC TOOTH EXTRACTION W/FORCEP Bilateral 2003     PE TUBES  1990     Past Medical History:   Diagnosis Date     Abnormal involuntary movement 6/2/2016     Anxiety state,  "unspecified 04/30/2012     Benign positional vertigo Dec 2016    Started after my groin/back injury. Sitting on Toilet.     Degenerative disc disease at L5-S1 level 2/27/2018     Depressive disorder 2003    I have been on and off medication for depression since this     Dysphonia 2014    Nothing significant.     Epilepsy (H)     as a child. 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 Spicy foods set it off.     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  History: Myoclonus.  Comparison: None.  Technique:  1. MRI of the Brain: Sagittal T1-weighted, axial T2-weighted, axial turboFLAIR, axial susceptibility, and axial diffusion-weighted with ADC map images of the brain were obtained without intravenous contrast. After intravenous administration of gadolinium, axial and sagittal T1-weighted images of th     Hoarseness 2017    Dry mouth, started after taking Tizanidine     Neuralgia, neuritis, and radiculitis, unspecified 04/30/2012     normal emg 2017 8/8/2017    Interpretation: This is a normal study. There is no electrophysiologic evidence of a lumbosacral radiculopathy affecting the right or left lower extremity on the basis of this study.    Rodney Mena MD Department of Neurology       Panic disorder without agoraphobia 04/30/2012     Problem, psychiatric 2016    PTSD     Seizures (H) [id number]    Grew out of it. Absence Seizures.     Tic disorder 6/2/2016     Tinnitus 2015    Had it most of my life. Got worse in 2015     Social History     Socioeconomic History     Marital status: Single     Spouse name: Not on file     Number of children: 0     Years of education: 14     Highest education level: Not on file   Social Needs     Financial resource strain: Not on file     Food insecurity - " worry: Not on file     Food insecurity - inability: Not on file     Transportation needs - medical: Not on file     Transportation needs - non-medical: Not on file   Occupational History     Occupation: Assembly/Packaging   Tobacco Use     Smoking status: Current Every Day Smoker     Packs/day: 0.75     Years: 10.00     Pack years: 7.50     Types: Cigarettes     Start date: 1/1/2002     Smokeless tobacco: Never Used     Tobacco comment: Transdermal patches have helped me the most in the past   Substance and Sexual Activity     Alcohol use: No     Alcohol/week: 1.2 - 2.4 oz     Comment: none since 2013     Drug use: No     Comment: hx of meth, none since 2015      Sexual activity: Yes     Partners: Female     Birth control/protection: Condom, Pill     Comment: Infrequent   Other Topics Concern     Parent/sibling w/ CABG, MI or angioplasty before 65F 55M? No      Service Not Asked     Blood Transfusions Not Asked     Caffeine Concern Yes     Comment: Coffee, Engergy Drinks, 3 cups daily     Occupational Exposure Not Asked     Hobby Hazards Not Asked     Sleep Concern Not Asked     Stress Concern Not Asked     Weight Concern Not Asked     Special Diet Not Asked     Back Care Not Asked     Exercise Not Asked     Bike Helmet Not Asked     Seat Belt Not Asked     Self-Exams Not Asked   Social History Narrative    He lives in [facility] in a [facility] - there are 11 people there. He arrives today through [employer] ride    He has 3 brothers and 3 sisters. He has not children. He has never been .      Mother and father are alive    One sister is an alcoholic and bulemic    depression is present in the family : mother, sister and 2 brothers are bipolar.      He denies bipolar. He has depression.    He denies recurring thoughts. He has had substance abuse issues. He has had cravings in the past.    He exercises and plays guitar and draws.      He has used meth as well as prescription  pain killers.    He has used marijuana when young. Used cocaine in the past.    Has not used iv drugs    He does not drink alcohol.      50% Chinese and is [place], english and french and a bit native.    He smokes cigarettes - 1 pack per day.        single. lives in [place]. estela is father       Drug and lactation database from the [place] [employer]:  http://toxnet.nlm.nih.gov/cgi-bin/sis/htmlgen?LACT      B/P: Data Unavailable, T: Data Unavailable, P: Data Unavailable, R: Data Unavailable 0 lbs 0 oz  There were no vitals taken for this visit., There is no height or weight on file to calculate BMI.  Medications and Vitals not listed above were documented in the cart and reviewed by me.     Current Outpatient Medications   Medication Sig Dispense Refill     amphetamine-dextroamphetamine (ADDERALL) 10 MG tablet Take 20 mg by mouth       cyclobenzaprine (FLEXERIL) 10 MG tablet Take 10 mg by mouth       diphenhydrAMINE (BENADRYL) 25 MG capsule Take 25-50 mg by mouth       doxepin (SINEQUAN) 10 MG capsule        flurbiprofen (ANSAID) 100 MG tablet Take 100 mg by mouth       ibuprofen (ADVIL/MOTRIN) 600 MG tablet Take 600 mg by mouth       AFLURIA QUADRIVALENT 0.5 ML injection ADM 0.5ML IM UTD  0     amitriptyline (ELAVIL) 10 MG tablet   0     amphetamine-dextroamphetamine (ADDERALL XR) 30 MG 24 hr capsule Take 30 mg by mouth       amphetamine-dextroamphetamine (ADDERALL XR) 30 MG per 24 hr capsule Take 30 mg by mouth daily       amphetamine-dextroamphetamine (ADDERALL) 20 MG tablet   0     clonazePAM (KLONOPIN) 0.5 MG tablet Take 0.5-1 mg by mouth nightly as needed   0     cyclobenzaprine (FLEXERIL) 5 MG tablet   1     doxepin (SINEQUAN) 25 MG capsule TK 1 C PO HS  1     DULoxetine (CYMBALTA) 60 MG EC capsule 2 x 60mg tab by mouth daily 30 capsule 1     HYDROcodone-acetaminophen (NORCO) 5-325 MG tablet Take 1 tablet by mouth 3 times daily as needed for pain (Max 3 times daily. #90 tabs to  last 30 days.) Okay to fill and start on/after 1/15/19 90 tablet 0     hydrOXYzine (ATARAX) 25 MG tablet Take 1 tablet (25 mg) by mouth nightly as needed (at HS PRN) 30 tablet 3     methylphenidate (CONCERTA) 36 MG CR tablet   0     methylphenidate (CONCERTA) 54 MG CR tablet   0     naloxone (NARCAN) nasal spray Spray 1 spray (4 mg) into one nostril alternating nostrils as needed for opioid reversal every 2-3 minutes until assistance arrives (Patient not taking: Reported on 1/16/2019) 0.2 mL 0     naproxen (NAPROSYN) 500 MG tablet Take 1 tablet (500 mg) by mouth 2 times daily (with meals) 40 tablet 3     nicotine (NICODERM CQ) 14 MG/24HR 24 hr patch Place 1 patch onto the skin every 24 hours Use this patch first (Patient not taking: Reported on 11/8/2018) 30 patch 0     nicotine (NICODERM CQ) 7 MG/24HR 24 hr patch Place 1 patch onto the skin every 24 hours (Patient not taking: Reported on 11/8/2018) 30 patch 0     orphenadrine ER (NORFLEX) 100 MG 12 hr tablet   0     oxybutynin (DITROPAN-XL) 5 MG 24 hr tablet Take 1 tablet (5 mg) by mouth daily 90 tablet 1     tiZANidine (ZANAFLEX) 4 MG tablet   2     traMADol (ULTRAM) 50 MG tablet   0         Bryant Cao MD    Again, thank you for allowing me to participate in the care of your patient.      Sincerely,    Bryant Cao MD

## 2019-02-15 ENCOUNTER — TELEPHONE (OUTPATIENT)
Dept: NEUROLOGY | Facility: CLINIC | Age: 34
End: 2019-02-15

## 2019-02-15 NOTE — TELEPHONE ENCOUNTER
GENETIC COUNSELING-Neurology  At the request of Cali Cao and Mary Jo, I have scheduled Shoaib to see me on 3/1/2019 at 8:00 AM to discuss genetic testing for myoclonus dystonia.    Linwood Vines MS, [facility]  Licensed Genetic Counselor

## 2019-02-16 LAB — PYRUVATE CSF-SCNC: 0.07 MMOL/L (ref 0.03–0.11)

## 2019-02-18 ENCOUNTER — OFFICE VISIT (OUTPATIENT)
Dept: SLEEP MEDICINE | Facility: CLINIC | Age: 34
End: 2019-02-18
Payer: COMMERCIAL

## 2019-02-18 VITALS
SYSTOLIC BLOOD PRESSURE: 111 MMHG | WEIGHT: 168 LBS | OXYGEN SATURATION: 100 % | HEART RATE: 65 BPM | DIASTOLIC BLOOD PRESSURE: 73 MMHG | HEIGHT: 67 IN | BODY MASS INDEX: 26.37 KG/M2

## 2019-02-18 DIAGNOSIS — G47.00 INSOMNIA, UNSPECIFIED TYPE: Primary | ICD-10-CM

## 2019-02-18 PROCEDURE — 95803 ACTIGRAPHY TESTING: CPT | Performed by: INTERNAL MEDICINE

## 2019-02-18 PROCEDURE — 99243 OFF/OP CNSLTJ NEW/EST LOW 30: CPT | Performed by: PHYSICIAN ASSISTANT

## 2019-02-18 ASSESSMENT — MIFFLIN-ST. JEOR: SCORE: 1665.67

## 2019-02-18 NOTE — NURSING NOTE
"Chief Complaint   Patient presents with     Sleep Problem     Insomnia        Initial /73   Pulse 65   Ht 1.702 m (5' 7\")   Wt 76.2 kg (168 lb)   SpO2 100%   BMI 26.31 kg/m   Estimated body mass index is 26.31 kg/m  as calculated from the following:    Height as of this encounter: 1.702 m (5' 7\").    Weight as of this encounter: 76.2 kg (168 lb).    Medication Reconciliation: complete    Neck circumference: 14.5 inches / 36 centimeters.      "

## 2019-02-18 NOTE — PROGRESS NOTES
"  Sleep Consultation:    Date on this visit: 2/18/2019    Hoang Kiser is sent by Phill Floyd for a sleep consultation regarding insomnia.    Primary Physician: Phill Floyd     Chief Complaint   Patient presents with     Sleep Problem     Insomnia      Hoang Kiser is a 33 year old male who presents with history of difficulty falling asleep and staying asleep since he was a child. He states his sleeping difficulty has gotten progressively worse since beginning of 2018 with diagnosis of functional movement disorder. He reports that he was told that he has limited options in treatment for functional movement movement disorder and has been encouraged to continue with  PT and CBT-I through his psychiatrist.  Also EEG sleep deprived and neuro psych testing are planned.     For the insomnia he has tried Ambien 10 mg and was ineffective and increased \"out of body experience\". Trazodone was ineffective. Amitriptyline was ineffective and caused increased fatigue. Lunesta was ineffective.     Currently, he takes doxepin 10 mg and hydroxyzine 25 mg as needed for sleep. He also takes Clonazepam 1 mg at bedtime for anxiety.   He takes Norco every 6 hours for pain including around bedtime.     Hoang goes to sleep between 9 and 11 PM during the week. He wakes up between 5 and 11 AM without an alarm. He falls asleep in 3-45 minutes.  Hoang has difficulty falling asleep.  He wakes up 1-5 times a night for 5-15 minutes before falling back to sleep.  Hoang wakes up to go to the bathroom, external stimuli and \"movement attack\".  On weekends, Hoang goes to sleep between 9 and 11 PM.  He wakes up at between 5 and 11 AM without an alarm. He falls asleep in 3-45 minutes.  Patient gets \"5-6\" hours of sleep per night.     Patient does use electronics in bed, watch TV in bed and read in bed.     Hoang does not do shift work.        Hoang does not snore.  Patient does not have a regular bed partner. There is " not report of snoring.  He does not have witnessed apneas.  Patient sleeps on his side. He denies no morning headaches. Hoang denies any sleep walking, sleep talking, dream enactment, sleep paralysis, cataplexy and hypnogogic/hypnopompic hallucinations. He has bruxism and wears a bite guard.      Hoang denies difficulty breathing through his nose, claustrophobia and reflux at night.      Hoang has gained 0-5 pounds in the last year.  Patient describes themself as a morning person.  He would prefer to go to sleep at 10:30 PM and wake up at 7:00 AM.  Patient's Oglesby Sleepiness score 0/24 inconsistent with excessive  daytime sleepiness.  He has fatigue.     Hoang naps 5-7 times per week for 1 to 3.5 hours. He takes no inadvertant naps.  He denies falling asleep while driving.  Patient was counseled on the importance of driving while alert, to pull over if drowsy, or nap before getting into the vehicle if sleepy.   Allergies:    Allergies   Allergen Reactions     Buspirone Hcl Other (See Comments)     Panic attacks     Doxycycline Diarrhea, Fatigue, GI Disturbance and Nausea     Elavil [Amitriptyline]      Ineffective in reducing spasms/movement, increased fatigue     Trazodone Fatigue     Buspirone Anxiety     Keflex [Cephalexin] Diarrhea       Medications:    Current Outpatient Medications   Medication Sig Dispense Refill     amphetamine-dextroamphetamine (ADDERALL XR) 30 MG per 24 hr capsule Take 30 mg by mouth daily       clonazePAM (KLONOPIN) 0.5 MG tablet Take 0.5-1 mg by mouth nightly as needed   0     doxepin (SINEQUAN) 10 MG capsule 10mg capsule by mouth nightly as needed @ 10pm       DULoxetine (CYMBALTA) 60 MG EC capsule 2 x 60mg tab by mouth daily 30 capsule 1     HYDROcodone-acetaminophen (NORCO) 5-325 MG tablet Take 1 tablet by mouth 3 times daily as needed for pain (Max 3 times daily. #90 tabs to last 30 days.) Okay to fill 211/19 and start on/after 2/14/19 90 tablet 0     hydrOXYzine (ATARAX)  25 MG tablet Take 1 tablet (25 mg) by mouth nightly as needed (at HS PRN) 30 tablet 3     naloxone (NARCAN) 4 MG/0.1ML nasal spray Spray 1 spray (4 mg) into one nostril alternating nostrils as needed for opioid reversal every 2-3 minutes until assistance arrives 0.2 mL 0     naproxen (NAPROSYN) 500 MG tablet Take 1 tablet (500 mg) by mouth 2 times daily (with meals) 40 tablet 3     oxybutynin (DITROPAN-XL) 5 MG 24 hr tablet Take 1 tablet (5 mg) by mouth daily 90 tablet 1       Problem List:  Patient Active Problem List    Diagnosis Date Noted     Chronic pain syndrome 02/11/2019     Priority: High     History of substance abuse 02/11/2019     Priority: Medium     hx of meth, none since 2015        Family history of Crohn's disease 08/23/2018     Priority: Medium     Functional movement disorder 08/14/2018     Priority: Medium     Chronic left hip pain 04/02/2018     Priority: Medium     Chronic pain of right hip 04/02/2018     Priority: Medium     Degenerative disc disease at L5-S1 level 02/27/2018     Priority: Medium     Anxiety 12/06/2016     Priority: Medium     Posttraumatic stress disorder with dissociative symptoms 09/27/2016     Priority: Medium     Abnormal involuntary movement 06/02/2016     Priority: Medium     Tic disorder 06/02/2016     Priority: Medium     Panic disorder without agoraphobia 04/05/2016     Priority: Medium     Primary insomnia 12/10/2015     Priority: Medium     Attention deficit hyperactivity disorder (ADHD), predominantly inattentive type 10/26/2015     Priority: Medium     Patient is followed by ROMEO RODRIGES for ongoing prescription of stimulants.  All refills should be approved by this provider, or covering partner.    Medication(s): adderall XR 25 and adderall XR 10.   Maximum quantity per month: 30 of each  Clinic visit frequency required: Q 6  months     Controlled substance agreement on file: No  Neuropsych evaluation for ADD completed:  Yes, completed 8-5-13, on file and  diagnosis confirmed in letters    Last [facility] website verification:  none   [email or web address]           Tobacco abuse 07/10/2015     Priority: Medium     Moderate major depression (H) 05/05/2015     Priority: Medium     on and off medication for depression since 2003       Neuropathy 03/21/2013     Priority: Medium     Chronic low back pain 02/11/2019     Priority: Low        Past Medical/Surgical History:  Past Medical History:   Diagnosis Date     Benign positional vertigo 12/2016    Started after my groin/back injury. Sitting on Toilet.     Dysphonia 2014    Nothing significant.     Gastroesophageal reflux disease 2004    Occassional. Spicy foods set it off.     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      Hoarseness 2017    Dry mouth, started after taking Tizanidine     Neuralgia, neuritis, and radiculitis, unspecified 04/30/2012     normal emg 2017 8/8/2017    Interpretation: This is a normal study. There is no electrophysiologic evidence of a lumbosacral radiculopathy affecting the right or left lower extremity on the basis of this study.    Rodney Mena MD Department of Neurology       Panic attack 12/6/2016     Seizures (H) 1986    Grew out of it. Absence Seizures. [id number]     Tinnitus 2015    Had it most of my life. Got worse in 2015     Past Surgical History:   Procedure Laterality Date     COLONOSCOPY  02/15/18    Has not happened yet.     COLONOSCOPY N/A 2/15/2018    Procedure: COMBINED COLONOSCOPY, SINGLE OR MULTIPLE BIOPSY/POLYPECTOMY BY BIOPSY;;  Surgeon: Liam Kincaid MD;  Location: [facility] OR     COLONOSCOPY WITH CO2 INSUFFLATION N/A 2/15/2018    Procedure: COLONOSCOPY WITH CO2 INSUFFLATION;  COLON-FAMILY HX OF COLON CANCER/ SYPURA;  Surgeon: Liam Kincaid MD;  Location: [facility] OR     HC TOOTH EXTRACTION  W/FORCEP Bilateral 2003     PE TUBES  1990       Social History:  Social History     Socioeconomic History     Marital status: Single     Spouse name: Not on file     Number of children: 0     Years of education: 14     Highest education level: Not on file   Social Needs     Financial resource strain: Not on file     Food insecurity - worry: Not on file     Food insecurity - inability: Not on file     Transportation needs - medical: Not on file     Transportation needs - non-medical: Not on file   Occupational History     Occupation: Assembly/Packaging   Tobacco Use     Smoking status: Current Every Day Smoker     Packs/day: 0.75     Years: 10.00     Pack years: 7.50     Types: Cigarettes     Start date: 1/1/2002     Smokeless tobacco: Never Used     Tobacco comment: Transdermal patches have helped me the most in the past   Substance and Sexual Activity     Alcohol use: No     Alcohol/week: 1.2 - 2.4 oz     Comment: none since 2013     Drug use: No     Comment: hx of meth, none since 2015      Sexual activity: Yes     Partners: Female     Birth control/protection: Condom, Pill     Comment: Infrequent   Other Topics Concern     Parent/sibling w/ CABG, MI or angioplasty before 65F 55M? No      Service Not Asked     Blood Transfusions Not Asked     Caffeine Concern Yes     Comment: Coffee, Engergy Drinks, 3 cups daily     Occupational Exposure Not Asked     Hobby Hazards Not Asked     Sleep Concern Not Asked     Stress Concern Not Asked     Weight Concern Not Asked     Special Diet Not Asked     Back Care Not Asked     Exercise Not Asked     Bike Helmet Not Asked     Seat Belt Not Asked     Self-Exams Not Asked   Social History Narrative    He lives in [facility] in a [facility] treatment [facility] - there are 11 people there. He arrives today through [employer] ride    He has 3 brothers and 3 sisters. He has not children. He has never been .      Mother and father are alive    One sister is an  alcoholic and bulemic    depression is present in the family : mother, sister and 2 brothers are bipolar.      He denies bipolar. He has depression.    He denies recurring thoughts. He has had substance abuse issues. He has had cravings in the past.    He exercises and plays guitar and draws.      He has used meth as well as prescription pain killers.    He has used marijuana when young. Used cocaine in the past.    Has not used iv drugs    He does not drink alcohol.      50% Namibian and is [place]ese, english and Sao Tomean and a bit native.    He smokes cigarettes - 1 pack per day.        single. lives in [place]. estela is father       Family History:  Family History   Problem Relation Age of Onset     Lipids Father         hyperlipidemia     Hyperlipidemia Father      Obesity Father      Arthritis Mother      Hyperlipidemia Mother      Depression Mother      Anxiety Disorder Mother      Mental Illness Mother      Obesity Mother      Asthma Brother      Asthma Sister      Depression Other      Hearing Loss Other      Psychotic Disorder Other      Obesity Other      Cerebrovascular Disease Paternal Grandfather      Alzheimer Disease Paternal Grandfather      Depression Brother      Mental Illness Brother         Bipolar     Asthma Brother      Depression Sister      Asthma Sister      Substance Abuse Sister         Alcohol     Mental Illness Brother         Bipolar     Asthma Brother      Asthma Sister      Obesity Sister      Asthma Brother      Obesity Brother      Obesity Maternal Grandmother      Genetic Disorder Maternal Grandmother         Epilepsy     Obesity Paternal Grandmother        Review of Systems:  A complete review of systems reviewed by me is negative with the exeption of what has been mentioned in the history of present illness.  CONSTITUTIONAL: NEGATIVE for weight gain/loss, fever, chills, sweats or night sweats, drug allergies.  EYES: NEGATIVE for changes in vision, blind spots, double vision.  ENT:  "NEGATIVE for ear pain, sore throat, sinus pain, post-nasal drip, runny nose, bloody nose  CARDIAC: NEGATIVE for fast heartbeats or fluttering in chest, chest pain or pressure, breathlessness when lying flat, swollen legs or swollen feet.  NEUROLOGIC:  POSITIVE for  headaches and NEGATIVE for  weakness or numbness in the arms or legs  DERMATOLOGIC: NEGATIVE for rashes, new moles or change in mole(s)  PULMONARY: NEGATIVE SOB at rest, SOB with activity, dry cough, productive cough, coughing up blood, wheezing or whistling when breathing.    GASTROINTESTINAL: NEGATIVE for nausea or vomitting, loose or watery stools, fat or grease in stools, constipation, abdominal pain, bowel movements black in color or blood noted.  GENITOURINARY: NEGATIVE for pain during urination, blood in urine, urinating more frequently than usual, irregular menstrual periods.  MUSCULOSKELETAL:  POSITIVE for  muscle pain, bone or joint pain and swollen joints  ENDOCRINE: NEGATIVE for increased thirst or urination, diabetes.  LYMPHATIC: NEGATIVE for swollen lymph nodes, lumps or bumps in the breasts or nipple discharge.    Physical Examination:  Vitals: /73   Pulse 65   Ht 1.702 m (5' 7\")   Wt 76.2 kg (168 lb)   SpO2 100%   BMI 26.31 kg/m    BMI= Body mass index is 26.31 kg/m .    Neck Cir (cm): 36 cm    [place] Total Score 2/18/2019   Total score - [place] 0       GARCIA Total Score: 22 (02/18/19 0700)    GENERAL APPEARANCE: alert, no distress   EYES: Eyes grossly normal to inspection, PERRL and conjunctivae and sclerae normal  HENT: ear canals and TM's normal and nose and mouth without ulcers or lesions  NECK: no asymmetry, masses, or scars  MS: extremities normal- no gross deformities noted  NEURO: frequent jerking of body through this visit.  PSYCH: mentation appears normal  Mallampati Class:   Tonsillar Stage:     Impression/Plan:  Insomnia, sleep onset and sleep maintenance, likely due to a variety of factors including functional " movement disorder, anxiety, depression, PTSD and inadequate sleep hygiene. Today we reviewed the pros and cons of cognitive behavioral treatment versus pharmacological treatment for insomnia. Patient is interested in CBT-I and referral placed. I have asked him to complete an actigrph x2 weeks along a sleep log prior to his first CBT-I appointment.     Patient does not have significant features and risk factors for PAU and as such a sleep study is not warranted at this time.       He will follow up with me as needed.     Subha Ravi PA-C    CC: Phill Floyd

## 2019-02-18 NOTE — PATIENT INSTRUCTIONS
Your BMI is Body mass index is 26.31 kg/m .  Weight management is a personal decision.  If you are interested in exploring weight loss strategies, the following discussion covers the approaches that may be successful. Body mass index (BMI) is one way to tell whether you are at a healthy weight, overweight, or obese. It measures your weight in relation to your height.  A BMI of 18.5 to 24.9 is in the healthy range. A person with a BMI of 25 to 29.9 is considered overweight, and someone with a BMI of 30 or greater is considered obese. More than two-thirds of American adults are considered overweight or obese.  Being overweight or obese increases the risk for further weight gain. Excess weight may lead to heart disease and diabetes.  Creating and following plans for healthy eating and physical activity may help you improve your health.  Weight control is part of healthy lifestyle and includes exercise, emotional health, and healthy eating habits. Careful eating habits lifelong are the mainstay of weight control. Though there are significant health benefits from weight loss, long-term weight loss with diet alone may be very difficult to achieve- studies show long-term success with dietary management in less than 10% of people. Attaining a healthy weight may be especially difficult to achieve in those with severe obesity. In some cases, medications, devices and surgical management might be considered.  What can you do?  If you are overweight or obese and are interested in methods for weight loss, you should discuss this with your provider.     Consider reducing daily calorie intake by 500 calories.     Keep a food journal.     Avoiding skipping meals, consider cutting portions instead.    Diet combined with exercise helps maintain muscle while optimizing fat loss. Strength training is particularly important for building and maintaining muscle mass. Exercise helps reduce stress, increase energy, and improves fitness.  Increasing exercise without diet control, however, may not burn enough calories to loose weight.       Start walking three days a week 10-20 minutes at a time    Work towards walking thirty minutes five days a week     Eventually, increase the speed of your walking for 1-2 minutes at time    In addition, we recommend that you review healthy lifestyles and methods for weight loss available through the National Institutes of Health patient information sites:  http://win.niddk.nih.gov/publications/index.htm    And look into health and wellness programs that may be available through your health insurance provider, employer, local community center, or debbie club.    Weight management plan: Patient was referred to their PCP to discuss a diet and exercise plan.

## 2019-02-19 ENCOUNTER — OFFICE VISIT (OUTPATIENT)
Dept: PALLIATIVE MEDICINE | Facility: CLINIC | Age: 34
End: 2019-02-19
Payer: COMMERCIAL

## 2019-02-19 DIAGNOSIS — G25.9 FUNCTIONAL MOVEMENT DISORDER: Primary | ICD-10-CM

## 2019-02-19 DIAGNOSIS — F41.9 ANXIETY: ICD-10-CM

## 2019-02-19 PROCEDURE — 96152 HC HEALTH AND BEHAVIOR INTERVENTION, INDIVIDUAL, EACH 15 MINUTES: CPT | Performed by: PSYCHOLOGIST

## 2019-02-19 NOTE — PROGRESS NOTES
"                                  [facility]   [facility], [place]  Behavioral Medicine Visit    Patient Name: Hoang Kiser     YOB: 1985   Medical Record Number: [id number]  Date: 2/19/2019                SUBJECTIVE: Patient reports some concern about new symptoms which she has noticed and states he was able to advocate with his neurologist and has or will have an EEG, neuropsych evaluation, and had blood work completed.  He states he is in a 2-week sleep study.  He expressed concern about some brain changes that he has noticed in the last 6 months, specifically stating he mixes up his words, feels he stutters, and misses words.  He acknowledges that when he has his involuntary movement attacks he tends to associate which she states seems to be worse lately as is his mood.  He reports however that he feels that his poor mood is interspersed with \"manic episodes\" where he finds everything funny.  He reports fairly negative self talk.  Explored strategies to challenge this negative self talk as well as work on engaging in being kind to himself.      OBJECTIVE: Patient is here for follow-up.  He reports he continues to meet every other week with his mental health therapist.  He reports his level of pain is about the same.  States his mood is mildly worse as discussed above.  His activity levels been the same.  He reports his stress levels been mildly worse.  Sleep is been the same.  He states he engages in self-care for his pain about 2-3 times a day.  He feels that overall progress is slightly improved.    Length of Visit: 60 minutes      Assessment: Current Emotional / Mental Status    Appearance:   Appropriate   Eye Contact:   Fair   Psychomotor Behavior: Restless  frequently moved involuntarily   Attitude:   Cooperative   Orientation:   All  Speech  Rate / Production:             Normal   Volume:              Soft   Mood:    Anxious  Sad "   Affect:    Appropriate   Thought Content:  Clear   Thought Form:  Coherent  Goal Directed  Logical   Insight:    Fair     ASSESSMENT:   Axis I: Pain Disorder   Anxiety    Progress toward goals: fair.    Pain Status: unchanged and remained stable    Emotional Status: worsened              Medication / chemical use concerns: None    PLAN:   Next Appointment: Hoang Kiser will schedule a follow-up appointment in 2 weeks.  Assignment: Actively challenge negative self talk.  Objectives / interventions for next session: Continue to explore techniques to manage his negative self talk.    Sol Kemp PsyD LP  Outpatient Clinic Therapist  [facility]

## 2019-02-25 ENCOUNTER — MYC MEDICAL ADVICE (OUTPATIENT)
Dept: NEUROLOGY | Facility: CLINIC | Age: 34
End: 2019-02-25

## 2019-03-01 ENCOUNTER — OFFICE VISIT (OUTPATIENT)
Dept: NEUROLOGY | Facility: CLINIC | Age: 34
End: 2019-03-01
Payer: COMMERCIAL

## 2019-03-01 ENCOUNTER — APPOINTMENT (OUTPATIENT)
Dept: LAB | Facility: CLINIC | Age: 34
End: 2019-03-01
Attending: PSYCHIATRY & NEUROLOGY
Payer: COMMERCIAL

## 2019-03-01 DIAGNOSIS — R25.9 ABNORMAL INVOLUNTARY MOVEMENT: ICD-10-CM

## 2019-03-01 DIAGNOSIS — R25.9 ABNORMAL INVOLUNTARY MOVEMENT: Primary | ICD-10-CM

## 2019-03-01 LAB — COPATH REPORT: NORMAL

## 2019-03-01 PROCEDURE — 81406 MOPATH PROCEDURE LEVEL 7: CPT | Performed by: PSYCHIATRY & NEUROLOGY

## 2019-03-01 PROCEDURE — 81479 UNLISTED MOLECULAR PATHOLOGY: CPT | Performed by: PSYCHIATRY & NEUROLOGY

## 2019-03-01 NOTE — LETTER
3/1/2019       RE: Hoang Kiser  [address]  [place] [facility] [address]     Dear Colleague,    Thank you for referring your patient, Hoang Kiser, to the [facility] NEUROLOGY at [facility]. Please see a copy of my visit note below.    GENETIC COUNSELING-Neurology  I met with Shoaib Kiser for 35 minutes in the neurology clinic at [facility]. He was referred by Dr. Bryant cao to review family history and discuss genetic testing for myoclonus dystonia.    MEDICAL HISTORY-  Please see Dr. Cao's clinic notes for a more thorough summary of medical history. Briefly, Shoaib reported to me that he first experienced seizures within the first 15 months of life. He indicates that he has been free of seizures since age ~14.  At some point, he began to develop involuntary myoclonic movements and he has some trouble distinguishing when exactly the seizures ended and the myoclonic movements began.  He indicated that a back injury in 2016 exacerbated his movements.  He also has depression.    FAMILY HISTORY-see scanned pedigree  1. Shoaib has two younger brothers with bipolar disorder.\  2. Shoaib reported that his mother has some cognitive changes at age 58.  3. Shoaib reported that his maternal grandmother had adult onset seizures.  4. Shoaib's maternal grandfather reportedly had multiple sclerosis and cognitive decline.  5. Shoaib's maternal great grandmother had seizures.  6. Shoaib's father has depression.  7. Shoaib's ethnic background is Azerbaijani, English, Bengali, Russian, Gabonese, .    GENETIC COUNSELING  We discussed the genetic and environmental basis of neurologic disease. I explained that in most cases, it results from complex interaction of multiple genetic and environmental factors. In some rare cases, there may be a single gene cause.  I explained that based upon his clinical examination and his family history, Dr. Cao has recommended that we  evaluate for a condition called myoclonus dystonia (essential myoclonus).  This is caused by mutations in one of 3 genes: SGCE, TUBB2B, [id number]. I explained that mutations in these genes are inherited in an autosomal dominant pattern. This means that if we identify a mutation, we would expect a 50% risk to any future children for Shoaib and that his siblings would also be at 50% risk.  The pattern of inheritance for SGCE is complicated by [id number] specific imprinting such that mutations inherited from the father result in clinical symptoms and mutations inherited from the mother generally do not cause myoclonus dystonia.    We discussed the fact that this information is important for the accurate medical management of Hoang. Establishing or excluding this diagnosis would impact Dr. Cao's decision making. In addition, this information has important reproductive implications as these are all autosomal dominant conditions which may impact risks for future children, siblings, and extended family.    All questions were answered. Shoaib provided written informed consent for genetic tesitng for myoclonus dystonia.  We will check on prior authorization.    Linwood Vines MS, [facility]  Licensed Genetic Counselor

## 2019-03-01 NOTE — PROGRESS NOTES
GENETIC COUNSELING-Neurology  I met with Shoaib Kiser for 35 minutes in the neurology clinic at [facility]. He was referred by Dr. Bryant cao to review family history and discuss genetic testing for myoclonus dystonia.    MEDICAL HISTORY-  Please see Dr. Cao's clinic notes for a more thorough summary of medical history. Briefly, Shoaib reported to me that he first experienced seizures within the first 15 months of life. He indicates that he has been free of seizures since age ~14.  At some point, he began to develop involuntary myoclonic movements and he has some trouble distinguishing when exactly the seizures ended and the myoclonic movements began.  He indicated that a back injury in 2016 exacerbated his movements.  He also has depression.    FAMILY HISTORY-see scanned pedigree  1. Shoaib has two younger brothers with bipolar disorder.\  2. Shoaib reported that his mother has some cognitive changes at age 58.  3. Shoaib reported that his maternal grandmother had adult onset seizures.  4. Shoaib's maternal grandfather reportedly had multiple sclerosis and cognitive decline.  5. Shoaib's maternal great grandmother had seizures.  6. Shoaib's father has depression.  7. Shoaib's ethnic background is Chadian, English, [place], [place]ander, Mong[place]ian, .    GENETIC COUNSELING  We discussed the genetic and environmental basis of neurologic disease. I explained that in most cases, it results from complex interaction of multiple genetic and environmental factors. In some rare cases, there may be a single gene cause.  I explained that based upon his clinical examination and his family history, Dr. Cao has recommended that we evaluate for a condition called myoclonus dystonia (essential myoclonus).  This is caused by mutations in one of 3 genes: SGCE, TUBB2B, KCDTD17. I explained that mutations in these genes are inherited in an autosomal dominant pattern. This means that if we identify a mutation, we would  expect a 50% risk to any future children for Shoaib and that his siblings would also be at 50% risk.  The pattern of inheritance for SGCE is complicated by [id number] specific imprinting such that mutations inherited from the father result in clinical symptoms and mutations inherited from the mother generally do not cause myoclonus dystonia.    We discussed the fact that this information is important for the accurate medical management of Hoang. Establishing or excluding this diagnosis would impact Dr. Cao's decision making. In addition, this information has important reproductive implications as these are all autosomal dominant conditions which may impact risks for future children, siblings, and extended family.    All questions were answered. Shoaib provided written informed consent for genetic tesitng for myoclonus dystonia.  We will check on prior authorization.    Linwood Vines MS, [facility]  Licensed Genetic Counselor

## 2019-03-04 ENCOUNTER — APPOINTMENT (OUTPATIENT)
Dept: SLEEP MEDICINE | Facility: CLINIC | Age: 34
End: 2019-03-04
Payer: COMMERCIAL

## 2019-03-04 NOTE — PROCEDURES
PHYSICIAN INTERPRETATION  Home Sleep Schedule-Actigraphy      Patient:  Hoang Kiser  MRN:  [id number]   Ext. ID: [id number]   YOB: 1985  Study Date: 8/18/19    Referring Physician:  Phill Floyd    Dates of recording: from 2/18/19 to 3/4/19                Quality: (good)      Sleep diary:   correlates well most of the time    Sleep onset typically/or range at   12-1 AM (as late as 4 AM and as early as 4 PM)        Sleep onset delay not recorded on actigraph 20-30 minutes on diary    Sleep offset typically/or range at   8 AM to 3 PM         Total sleep time estimated is 7.8 hrs, with shortest sleep period time of 0 and longest sleep period time of 24 (twice).    Sleep periods are consolidated     Light exposure periods are inappropriately timed to sleep at times    Napping is not noted    Interpretation: Sleep wake pattern is consistent with irregular sleep pattern with highly variable sleep times and probably borderline-adequate sleep         Interpreted by:  Yovani Sadler

## 2019-03-05 ENCOUNTER — OFFICE VISIT (OUTPATIENT)
Dept: PALLIATIVE MEDICINE | Facility: CLINIC | Age: 34
End: 2019-03-05
Payer: COMMERCIAL

## 2019-03-05 DIAGNOSIS — G25.9 FUNCTIONAL MOVEMENT DISORDER: Primary | ICD-10-CM

## 2019-03-05 DIAGNOSIS — F41.9 ANXIETY: ICD-10-CM

## 2019-03-05 PROCEDURE — 96152 HC HEALTH AND BEHAVIOR INTERVENTION, INDIVIDUAL, EACH 15 MINUTES: CPT | Performed by: PSYCHOLOGIST

## 2019-03-06 ENCOUNTER — TELEPHONE (OUTPATIENT)
Dept: LAB | Facility: CLINIC | Age: 34
End: 2019-03-06

## 2019-03-06 DIAGNOSIS — R25.9 ABNORMAL INVOLUNTARY MOVEMENT: Primary | ICD-10-CM

## 2019-03-06 NOTE — TELEPHONE ENCOUNTER
Notified Shoaib that we received prior authorization approval for genetic testing. Valid from 03/01/19 to 06/01/19. Authorization number is [id number].     Explained that insurance benefits may still apply. Shoaib expressed understanding and stated that he wants to proceed with testing. We will call when results are available. Shoaib had no further questions.      Sole Garza  Genomics Billing    [facility]   Molecular Diagnostic Lab  [address] [place] [id number]  [phone]
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
## 2019-03-12 ENCOUNTER — OFFICE VISIT (OUTPATIENT)
Dept: PALLIATIVE MEDICINE | Facility: CLINIC | Age: 34
End: 2019-03-12
Payer: COMMERCIAL

## 2019-03-12 VITALS
HEART RATE: 90 BPM | SYSTOLIC BLOOD PRESSURE: 146 MMHG | RESPIRATION RATE: 16 BRPM | BODY MASS INDEX: 26.37 KG/M2 | WEIGHT: 168 LBS | DIASTOLIC BLOOD PRESSURE: 69 MMHG | OXYGEN SATURATION: 98 % | HEIGHT: 67 IN

## 2019-03-12 DIAGNOSIS — M62.838 MUSCLE SPASM: Primary | ICD-10-CM

## 2019-03-12 DIAGNOSIS — M25.551 HIP PAIN, RIGHT: ICD-10-CM

## 2019-03-12 DIAGNOSIS — Z71.6 TOBACCO ABUSE COUNSELING: ICD-10-CM

## 2019-03-12 DIAGNOSIS — Z72.0 TOBACCO ABUSE DISORDER: ICD-10-CM

## 2019-03-12 PROCEDURE — 99214 OFFICE O/P EST MOD 30 MIN: CPT | Performed by: NURSE PRACTITIONER

## 2019-03-12 RX ORDER — HYDROCODONE BITARTRATE AND ACETAMINOPHEN 5; 325 MG/1; MG/1
1 TABLET ORAL 3 TIMES DAILY PRN
Qty: 90 TABLET | Refills: 0 | Status: SHIPPED | OUTPATIENT
Start: 2019-03-12 | End: 2019-04-09

## 2019-03-12 RX ORDER — METHOCARBAMOL 500 MG/1
500-1000 TABLET, FILM COATED ORAL 4 TIMES DAILY PRN
Qty: 240 TABLET | Refills: 1 | Status: SHIPPED | OUTPATIENT
Start: 2019-03-12 | End: 2019-06-06

## 2019-03-12 ASSESSMENT — PAIN SCALES - GENERAL: PAINLEVEL: SEVERE PAIN (6)

## 2019-03-12 ASSESSMENT — MIFFLIN-ST. JEOR: SCORE: 1665.67

## 2019-03-12 NOTE — PATIENT INSTRUCTIONS
After Visit Instructions:     Thank you for coming to [facility] for your care. It is my goal to partner with you to help you reach your optimal state of health.     Continue daily self-care, identifying contributing factors, and monitoring variations in pain level. Continue to integrate self-care into your life.      1. Schedule pain psychology visits   2. Schedule follow-up with CARLOS A Higuera, NPORAL in 4 weeks  3. Let Tamiko know if you want to try medical cannabis or CBD oil.   4. Medication recommendations:   1. Refill of Norco provided.   2. Methocarbamol 500 mg tabs: 1-2 tabs up to 4 times daily as needed.       CARLOS A Bass, NP-C  [facility]  [facility]  Clinic Number:  [phone]

## 2019-03-12 NOTE — PROGRESS NOTES
"[facility]    CHIEF COMPLAINT: \"Involuntary movements, movement attacks, fatigue, unpredictable pain, insomnia\"    INTERVAL HISTORY:  Last seen on 2/12/19.        Recommendations/plan at the last visit included:        1. Schedule follow-up with CARLOS A Higuera NP-C in 4 weeks  2. Medication recommendations:             1. No change to current medication regimen. Refills of Norco and Naproxen ordered.     Since last visit:   - Neurology appt, had blood work which was normal, having EEG tomorrow and has neuropsychology evaluation next week, will be having genetics testing.   - Seeing Sonia in pain psychology and thinks it's going well.   - Continuing outpatient CD/MI treatment, going two time weekly. Learning a lot and is helping him to stay sober.     Pain Information:   Pain quality: Exhausting and Searing    Pain rating: intensity ranges from 3/10 to 8/10, and averages 7/10 on a 0-10 scale.      Annual Controlled Substance Agreement/UDS due date: April 2019      Current Pain Relevant Medications:   Norco 5/325 mg 1 tab BID- TID                        Total opiate dose: 10-15 MME daily  Clonazepam .5 mg 1-2 tab at HS PRN  Duloxetine 20 mg daily  Naproxen 500 mg BID: on hold re: elevated LFTs   Tizanidine 4 mg 1-2 tabs at HS PRN  Ambien 10 mg at HS  Adderall 50 mg daily, combination of long and short acting.       Previous Pain Relevant Medications: (H--helped; HI--Helped initially; SWH--Somewhat helpful; NH--No help; W--worse; SE--side effects; ?--Unsure if helpful)   NOTE: This medication information taken from patient's intake form, not medical records.                         Opiates: Tramadol: H, Hydrocodone: H                        NSAIDS: Ibuprofen:H, naproxen:SWH, Relafen: NH                        Muscle Relaxants: Cyclobenzaprine:H,Med interaction, Tizanidine:H, Baclofen: SWH                        Anti-migraine mediations: Prednisone:H                        Anti-depressants: " Bupropion:SE, Celexa:SE, Duloxetine:H, Trazodone:Too strong, Venlafaxine:too strong, Amitriptyline:SE                         Sleep aids:Anbien: H                        Anxiolytics: Clonazepam:H                        Neuropathics: Tegretol:taken for seizures in childhood, Gabapentin:H                                          Topicals: Lidocaine:H                        Other medications not covered above: Tylenol:NH      Any illicit drug use: 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 Concern for multiple controlled substances including stimulants and opiates.    Medications:  Current Outpatient Medications   Medication Sig Dispense Refill     amphetamine-dextroamphetamine (ADDERALL XR) 30 MG per 24 hr capsule Take 30 mg by mouth daily       clonazePAM (KLONOPIN) 0.5 MG tablet Take 0.5-1 mg by mouth nightly as needed   0     doxepin (SINEQUAN) 10 MG capsule 10mg capsule by mouth nightly as needed @ 10pm       DULoxetine (CYMBALTA) 60 MG EC capsule 2 x 60mg tab by mouth daily 30 capsule 1     HYDROcodone-acetaminophen (NORCO) 5-325 MG tablet Take 1 tablet by mouth 3 times daily as needed for pain (Max 3 times daily. #90 tabs to last 30 days.) Okay to fill 211/19 and start on/after 2/14/19 90 tablet 0     hydrOXYzine (ATARAX) 25 MG tablet Take 1 tablet (25 mg) by mouth nightly as needed (at HS PRN) 30 tablet 3     naloxone (NARCAN) 4 MG/0.1ML nasal spray Spray 1 spray (4 mg) into one nostril alternating nostrils as needed for opioid reversal every 2-3 minutes until assistance arrives 0.2 mL 0     naproxen (NAPROSYN) 500 MG tablet Take 1 tablet (500 mg) by mouth 2 times daily (with meals) 40 tablet 3     oxybutynin  "(DITROPAN-XL) 5 MG 24 hr tablet Take 1 tablet (5 mg) by mouth daily 90 tablet 1       Review of Systems: A 10-point review of systems was negative, with the exception of chronic pain issues.      Social History: Reviewed; unchanged from previous consultation.     Family history: Reviewed; unchanged from previous consultation.     PHYSICAL EXAM    Vitals:    03/12/19 0956   BP: 146/69   BP Location: Left arm   Patient Position: Chair   Cuff Size: Adult Regular   Pulse: 90   Resp: 16   SpO2: 98%   Weight: 76.2 kg (168 lb)   Height: 1.702 m (5' 7\")          Constitutional: healthy, alert, no distress, pain behaviors and somewhat down r/t increased spastic movements   HEENT: Head atraumatic, normocephalic. Eyes without conjunctival injection or jaundice. Neck supple. No obvious neck masses.  Skin: No rash, lesions, or petechiae of exposed skin.   Extremities: No clubbing, cyanosis, or edema to exposed extremities  Psychiatric/mental status: Alert, without lethargy or stupor. Appropriate affect.       Neurologic exam:  CN:  Cranial nerves 2-12 are grossly normal.  Has uncontrolled upper body movement/tremor.           Musculoskeletal exam:  Cervical spine:                       Flex:  20 degrees                       Ext: 20 degrees                       Rotation to right: 20 degrees                       Rotation to left: 20 degrees                       Rotation/ext to right: painful                        Rotation/ext to left: painful                        Tenderness in the cervical spine at midline. No                       Tenderness in the cervical paraspinal muscles. No  Thoracic spine:                        Kyphosis. No                       Tenderness in the thoracic spine at midline. Yes                       Tenderness in the thoracic paraspinal muscles. Yes  Lumbar/Sacral spine:                       Forward Flexion:  90 degrees                       Ext: 20 degrees " "\"tight\"                       Rotation/ext to right: painful                        Rotation/ext to left: painful                        Lordosis. No                       Tenderness in the lumbar spine at midline. Yes                       Tenderness in the lumbar paraspinal muscles.Yes      Psychiatric:  mentation appears normal., affect and mood normal      DIRE Score for ongoing opioid management is calculated as follows:    Diagnosis = 2 pts (slowly progressive; moderate pain/objective findings)    Intractability = 2 pts (most treatments tried; patient not fully engaged/barriers)    Risk        Psych = 2 pts (personality dysfunction/mental illness that moderately interferes with care)         Chem Hlth = 2 pts (use of medications to cope with stress; chemical dependency in remission)       Reliability = 3 pts (highly reliable with meds, appointments, treatments)       Social = 3 pts (supportive family/close relationships; involved in work/school; no isolation)       (Psych + Chem hlth + Reliability + Social) = 14    Efficacy = 2 pts (moderate benefit/function; low med dose; too early/not tried meds)    DIRE Score = 16        7-13: likely NOT suitable candidate for long-term opioid analgesia       14-21: may be a suitable candidate for long-term opioid analgesia          Previous Diagnostic Tests:   Imaging Studies:   MR LUMBAR SPINE W/O CONTRAST 5/2/2018 7:27 PM  History: DDD (degenerative disc disease), lumbar; Lumbar  radiculopathy; Hip pain, right; Groin pain, right.  ICD-10: DDD (degenerative disc disease), lumbar; Lumbar radiculopathy;  Hip pain, right; Groin pain, right  Comparison: Lumbar spine MRI 3/8/2017  Technique: Sagittal STIR, T1-weighted turbo spin-echo, 3-D volumetric  T2-weighted and axial reconstructed T2-weighted images of the lumbar  spine were obtained without intravenous contrast.   Findings: 5 lumbar-type vertebra. The tip of the conus medullaris is  at L1.  The lumbar vertebral column is " normally aligned.   Straightening of lumbar lordosis, unchanged. The intervertebral disc  heights are maintained. Normal marrow signal. At L5-S1, there is a  disc bulge and facet hypertrophy with mild left neural foraminal  narrowing, unchanged. No spinal canal stenosis.  Impression:  1. Lumbar spondylosis with mild left neural foraminal narrowing at  L5-S1.  2. No spinal canal stenosis.      MR right hip without contrast 5/2/2018 6:47 PM  Techniques: Multiplanar multisequence imaging of the right hip was  obtained without  administration of intra-articular or intravenous  contrast using routing protocol.  History: Hip pain.  Comparison: None available.  Findings:  Osseous structures  Osseous structures: No fracture, stress reaction, avascular necrosis,  or focal osseous lesion is seen. There is small focal area of  subchondral cystic changes in the anterior right femoral head neck  junction, most compatible with synovial herniation pit. Findings  suggestive of reduced femoral head neck junction though alpha angle  cannot be assessed owing to no dedicated oblique axial sequence  obtained.  Articular cartilage and labrum  Assessment limited on this arthrographic study due to relative lack of  joint distension.  Articular cartilage: No high grade chondral loss.  Labrum: Labral tearing approximately from 12:00 to 3:00 with 3:00  being anterior and 6:00 being the center of transverse ligament in  this convention with associated subtle area of subchondral edema in  the superior acetabulum.  Ligament teres and transverse ligament of acetabulum: Intact.  Joint or bursal effusion  Joint effusion: A physiologic amount of joint fluid.  Bursal effusion: Minimal nonspecific edema over the greater  trochanter. No substantial iliopsoas or trochanteric bursal effusion.  Muscles and tendons  Muscles and tendons: The rectus femoris, the visualized portion  iliopsoas, proximal hamstrings, and hip abductors are intact.   The  visualized adductor muscles are unremarkable.   Nerves:  The visualized course of the sciatic nerve is unremarkable.   Other Findings:  There is fat-containing right inguinal hernia.  Impression:  1. Approximately 12-3 o'clock labral tearing with synovial herniation  pit and likely reduced femoral head neck junction offset. 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  2. Fat containing right inguinal hernia.          ASSESSMENT:   1.  Lumbar DDD, mild  2.  Lumbar muscle spasm  3.  Labral tear, right hip  4.  Hx: anxiety, chemical dependence in remission.  5. Functional neurologic movement disorder      Shoaib returns for monthly medication management visit.  His involuntary movements seem to be much more present today than they have been in recent visits.  He brings with him today with multiple logs of his activities, pain, self-care, exercise, activity, and medications.  I continue to have concerns that he is hyper focused on his pain and perhaps not able to incorporate pain management techniques because he is so focused on the pain.  He is very proactive at continuing to search for cause and treatment for his involuntary movement and has pain psychology testing the appointment made and is looking into genetic testing as well.     He would like to try medical cannabis however at the sober living house that he lives and will not allow him to have it even if it is medically indicated.  I am willing to write him a letter or speak to a member of the staff if he really wants to try this.  Unfortunately none of the medication options we have tried have been adequately controlling his pain or spasm or movements.  I do think that medical cannabis is worth a try if he can arrange it with his living situation.      Hypertension: Shoaib to follow up with Primary Care provider regarding elevated blood pressure.  Tobacco use: Tobacco cessation addressed at this  visit.    Plan:    Diagnosis reviewed, treatment option addressed, and risk/benifits discussed.  Self-care instructions given.  I am recommending a multidisciplinary treatment plan to help this patient better manage pain.      1. Schedule pain psychology visits   2. Schedule follow-up with CARLOS A Higuera, NP-C in 4 weeks  3. Let Tamiko know if you want to try medical cannabis or CBD oil.   4. Medication recommendations:   1. Refill of Norco provided.   2. Methocarbamol 500 mg tabs: 1-2 tabs up to 4 times daily as needed.     Total time spent face to face was 30 minutes and more than 50% of face to face time was spent in counseling and/or coordination of care regarding the diagnosis and recommendations above.      CARLOS A Bass, NP-C   [facility]    Disclaimer: This note consists of symbols derived from keyboarding, dictation and/or voice recognition software. As a result, there may be errors in the script that have gone undetected. Please consider this when interpreting information found in this chart.

## 2019-03-13 ENCOUNTER — ALLIED HEALTH/NURSE VISIT (OUTPATIENT)
Dept: NEUROLOGY | Facility: CLINIC | Age: 34
End: 2019-03-13
Payer: COMMERCIAL

## 2019-03-13 DIAGNOSIS — R25.9 ABNORMAL INVOLUNTARY MOVEMENT: Primary | ICD-10-CM

## 2019-03-14 ENCOUNTER — TELEPHONE (OUTPATIENT)
Dept: BEHAVIORAL HEALTH | Facility: CLINIC | Age: 34
End: 2019-03-14

## 2019-03-14 NOTE — TELEPHONE ENCOUNTER
[facility]  [facility]        Social Work Care Navigator Note      Patient: Hoang Kiser  Date: March 14, 2019  Preferred Name: Shoaib    Previous PHQ-9:   PHQ-9 SCORE 1/24/2018 2/14/2018 10/1/2018   PHQ-9 Total Score - - -   PHQ-9 Total Score 2 7 13     Previous JIN-7:   JIN-7 SCORE 1/22/2016 8/14/2018 10/1/2018   Total Score - - -   Total Score 3 15 13     MOLLY LEVEL:  MOLLY Score (Last Two) 3/23/2016 10/1/2018   MOLLY Raw Score 52 31   Activation Score 100 59.3   MOLLY Level 4 3       Preferred Contact:  Need for : No  Preferred Contact: Cell      Type of Contact Today: Phone call (not reached/unavailable)      Data: (subjective / Objective):  Attempted to reach patient, but was unsuccessful.  Plan to attempt again.  Andrez Brian        Next 5 appointments (look out 90 days)    Mar 21, 2019  8:00 AM CDT  Return Visit with Sol Kemp  [facility] ([facility]) [address] [facility]  [address]  [facility] [id number] [phone]   Mar 27, 2019  2:00 PM CDT  PHYSICAL with Phill Floyd MD  [facility] ([facility]) [id number] [place] [id number] [phone]   Apr 09, 2019  8:00 AM CDT  Return Visit with CARLOS A Guy CNP  [facility] ([facility]) [address] [place] [id number] [phone]   May 07, 2019 10:00 AM CDT  Return Visit with Prabhjot Braswell MD  [facility] ([facility]) [address] [place] [id number] [phone]

## 2019-03-18 NOTE — PROCEDURES
Procedure Date: 2019      EEG #:  .        DATE OF EE2019.      SOURCE FILE DURATION:  15 minutes.  This EEG did not have a video.      PATIENT INFORMATION:  The patient is a 33-year-old male with irregular movements.  It is not entirely clear the etiology of these movements.  EEG is being done to evaluate for seizures.      MEDICATIONS:  Adderall, doxepin, Cymbalta, Robaxin.     TECHNICAL SUMMARY: This video EEG monitoring procedure was performed with 23 scalp electrodes in 10-20 system placements, and additional scalp, precordial and other surface electrodes used for electrical referencing and artifact detection. Video was reviewed intermittently by EEG technologist and physician for electroclinical seizures.     BACKGROUND ACTIVITY:  During wakefulness, the background activity consists of synchronous and symmetric, well modulated, 9-10 Hz posterior dominant rhythm. The posterior dominant rhythm attenuated with eye opening. During drowsiness, the background activity waxed and waned and there were periods of slowing and attenuation of the posterior alpha rhythm. Stage I sleep and Stage II sleep was recorded in which synchronous and symmetrical vertex waves , K-complexes and sleep spindles  were identified.  No focal abnormalities were observed. Throughout the recording, the patient had a lot of movement artifact and blinking.  That made it difficult to interpret many segments of the EEG.  He was awake for much of the record.  Towards the end, he did become drowsy at which point there was intermittent slowing in the left and right temporal region.  Of note, as the patient became drowsy, the frequency of his movements decreased and this was best seen at 09:39.  In drowsiness, sometimes, for instance at 09:43, he did have large movements followed by upper body movements.      ACTIVATION PROCEDURES:  Photic stimulation and hyperventilation was done with no significant abnormalities.      ICTAL:   The patient did state that photic stimulation aggravated his movement and gave him a feeling of dissociation.      EPILEPTIFORM DISCHARGES:  None.      ICTAL:  None.      IMPRESSION:  This awake and drowsy routine EEG with no video was normal.  The patient had a lot of movements during this EEG and expressed that he had a feeling of disassociation during photic stimulation.  Of note, as patient became more drowsy the frequency of movements decreased.  However,he continued to have these movements in stage 1 sleep. If clinically indicated, may consider further video EEG to understand if the patient has these movements in sleep.  No electrographic seizures, no epileptiform discharges are seen.  Clinical correlation is advised.         HILARIA PATEL MD             D: 03/15/2019   T: 2019   MT: buffy      Name:     VLADISLAV LEUNG   MRN:      [id number]        Account:        [id number]   :      1985           Procedure Date: 2019      Document: [id number]

## 2019-03-19 ENCOUNTER — OFFICE VISIT (OUTPATIENT)
Dept: NEUROPSYCHOLOGY | Facility: CLINIC | Age: 34
End: 2019-03-19
Attending: PSYCHIATRY & NEUROLOGY
Payer: COMMERCIAL

## 2019-03-19 DIAGNOSIS — F09 MENTAL DISORDER DUE TO GENERAL MEDICAL CONDITION: ICD-10-CM

## 2019-03-19 DIAGNOSIS — R25.9 ABNORMAL INVOLUNTARY MOVEMENT: Primary | ICD-10-CM

## 2019-03-19 NOTE — PROGRESS NOTES
The patient was seen for neuropsychological evaluation at the request of Bryant Cao MD for the purposes of diagnostic clarification and treatment planning.  190 minutes of test administration and scoring were provided by this writer.  Please see Dr. Priscila Brown's report for a full interpretation of the findings.    Amalia Liang  Psychometrist

## 2019-03-21 ENCOUNTER — OFFICE VISIT (OUTPATIENT)
Dept: PALLIATIVE MEDICINE | Facility: CLINIC | Age: 34
End: 2019-03-21
Payer: COMMERCIAL

## 2019-03-21 DIAGNOSIS — F41.9 ANXIETY: ICD-10-CM

## 2019-03-21 DIAGNOSIS — M62.838 MUSCLE SPASM: Primary | ICD-10-CM

## 2019-03-21 PROCEDURE — 96152 HC HEALTH AND BEHAVIOR INTERVENTION, INDIVIDUAL, EACH 15 MINUTES: CPT | Performed by: PSYCHOLOGIST

## 2019-03-21 NOTE — PATIENT INSTRUCTIONS
Preventive Health Recommendations  Male Ages 26 - 39    Yearly exam:             See your health care provider every year in order to  o   Review health changes.   o   Discuss preventive care.    o   Review your medicines if your doctor has prescribed any.    You should be tested each year for STDs (sexually transmitted diseases), if you re at risk.     After age 35, talk to your provider about cholesterol testing. If you are at risk for heart disease, have your cholesterol tested at least every 5 years.     If you are at risk for diabetes, you should have a diabetes test (fasting glucose).  Shots: Get a flu shot each year. Get a tetanus shot every 10 years.     Nutrition:    Eat at least 5 servings of fruits and vegetables daily.     Eat whole-grain bread, whole-wheat pasta and brown rice instead of white grains and rice.     Get adequate Calcium and Vitamin D.     Lifestyle    Exercise for at least 150 minutes a week (30 minutes a day, 5 days a week). This will help you control your weight and prevent disease.       No smoking.     Wear sunscreen to prevent skin cancer.     See your dentist every six months for an exam and cleaning.     Patient Education     Kicking the Smoking Habit  If you smoke, quitting is one of the best changes you can make for your heart and your overall health. Your risk of heart attack goes down within one day of putting out that last cigarette. As you go longer without smoking, your risk goes down even more. Quitting isn t easy, but millions of people have done it. You can, too. It s never too late to quit.  Getting started  Boost your chances of success by deciding on your  quit plan.  Your health care provider and cardiac rehab team can help you develop this plan. Even if you ve already quit, it s easy to slip back into smoking.  Your plan can help you avoid and recover from relapse.  In any case, start by setting a date to quit within a month, and do it.    Keys to your quit  plan    Talk to your healthcare provider about prescription medicines and nicotine replacement products that help stop the urge to smoke.     Join a support group or quit smoking program. Talking with others about the challenges of quitting can help you get through them.    Ask other smokers in your household to quit with you.    Look for the cues in your life that you associate with smoking and avoid them.  Track your triggers  What gives you that  C-mtlg-c-cigarette  feeling? List all the situations that make you want a cigarette. Then think of other ways to deal with these situations. Here are some examples:  Situation How I'll handle it   Finishing a meal Get up from the table and take a walk   Having an argument Find a quiet place and breathe deeply   Feeling lonely or bored Call a friend to talk         Tips for quitting successfully    List the benefits of quitting such as reducing heart risks and saving money. Keep this list and review it whenever you feel like smoking.    Get support. Let your friends know you may call them to chat when you have an urge to smoke.    If you ve tried to quit before without success, this time avoid the triggers that may cause the relapse.    Make the most of slip-ups. Try to learn from them, and then get back on track.    Be accountable to your friends and your calendar so that you stay on track.  For family and friends    Be supportive and patient. Quitting smoking can be difficult and stressful.    If you smoke, now s a great time to quit. Even if you don t quit, never smoke around your loved one. Secondhand smoke is dangerous to his or her heart.    The best goals are accomplished in teams. Remember that when your loved one states he or she wants to stop smoking.  Date Last Reviewed: 7/1/2016 2000-2018 [employer] [address] [facility], [place] [address]. All rights reserved. This information is not intended as a substitute for professional medical care.  Always follow your healthcare professional's instructions.           Patient Education     The Benefits of Living Smoke Free  What do you want to gain from quitting? Check off some reasons to quit.  Health benefits  ___  Improve my ability to breathe without coughing or shortness of breath  ___  Reduce my risk of lung cancer, heart disease, chronic lung disease  ___  Have fewer wrinkles and softer skin  ___  Improve my sense of taste and smell  ___  For pregnant women--reduce the risk of having a miscarriage, stillbirth, premature birth, or low-birth-weight baby  Personal benefits  ___  Feel more in control of my life  ___  Have better-smelling hair, breath, clothes, home, and car  ___  Save time by not having to take smoke breaks, buy cigarettes, or hunt for a light  ___  Have whiter teeth  Family benefits  ___  Reduce my children s respiratory tract infections  ___  Set a good example for my children  ___  Reduce my family s cancer risk  Financial benefits  ___  Save hundreds of dollars each year that would be spent on cigarettes  ___  Save money on medical bills  ___  Save on life, health, and car insurance premiums     Those dollars add up!  Cigarettes are expensive, and getting more expensive all the time. Do you realize how much money you are spending on cigarettes per year? What is the average amount you spend on a pack of cigarettes? What is the average number of packs that you smoke per day? Using your answers to these questions, fill in this formula to help you find out:  ($ _____ per pack) ×  ( _____ number of packs per day) × (365 days) =  $ _____ yearly cost of smoking  Besides tobacco, there are other costs, including extra cleaning bills and replacement costs for clothing and furniture; medical expenses for smoking-related illnesses; and higher health, life, and car insurance premiums.  Cigars and pipes count too!  Cigars and pipes are also dangerous. So are smokeless (chewing) tobacco and snuff. All  of these products contain nicotine, a highly addictive substance that has harmful effects on your body. Quitting smoking means giving up all tobacco products.      For more information    https://smokefree.gov/eqjw-df-aa-expert    [facility] Smoking Quitline: 877-44U-QUIT (683-579-1322)   Date Last Reviewed: 2/1/2017 2000-2018 [employer]. [address] [place] [address]. All rights reserved. This information is not intended as a substitute for professional medical care. Always follow your healthcare professional's instructions.           Patient Education     Why Do You Smoke?  The more you know about why you smoke, the easier it will be to quit. You may reach for a cigarette during a stressful commute. Or you may want to smoke when you first wake up in the morning. Learn what your smoking triggers are, and how to handle them.    Common triggers    Frustration    Fatigue    Anger    Stress    Hunger    Boredom or loneliness    Drinking or socializing    Watching others smoke    Smelling cigarette smoke  Track your smoking habits  To learn about your smoking habits, track them for a week. Attach a small notebook or piece of paper to your cigarette pack. With each cigarette you smoke, write down the time, where you are, who you re with, and how you feel.  How to cope with your triggers    Change the habits that lead you to smoke. For instance, if you often smoke at a morning break, go for a walk instead.    Distract yourself from smoking. Keep your hands busy by playing with a paper clip or doodling. Keep your mouth busy by chewing on gum or a carrot stick.    Limit contact with people who are smoking. When you eat out, sit in the nonsmoking section.     For more information    https://smokefree.gov/fpkx-kl-um-expert    [facility] Smoking Quitline: 877-44U-QUIT (418-986-5879)      Date Last Reviewed: 2/1/2017 2000-2018 [employer]. [address] [place] [address]  [place] [address]. All rights reserved. This information is not intended as a substitute for professional medical care. Always follow your healthcare professional's instructions.

## 2019-03-21 NOTE — PROGRESS NOTES
[facility]   [facility] [place]  Behavioral Medicine Visit    Patient Name: Hoang Kiser     YOB: 1985   Medical Record Number: [id number]  Date: 3/21/2019                SUBJECTIVE: Patient presents as fairly subdued today, quieter than usual. Primary concern discussed today is feeling frustrated that much of his life can feel unpredictable. Discussed this at length in terms of refocusing it on what he is able to control, and to accept what he cannot (e.g., muscle spasms, sleep cycle). He continues to report being active in his treatment, however feels hypocritical given that he is prescribed narcotics. Discussed how this is different, given that he does not abuse his medications and is being carefully monitored which he believes is helpful. He is curious if myofacial release might be helpful, as others have suggested it is. Will discuss with Pain PT about this as an adjunct for him. Explored his all or nothing thinking patterns, which negatively impact his mood and thus his experience of his pain and his ability to manage his movement issues.    OBJECTIVE: Patient is here for follow-up appointment. He reports pain is mildly worse. Mood, activity, stress are all the same. Sleep mildly worse. Self-care 2-3 times daily.     Length of Visit: 55 minutes      Assessment: Current Emotional / Mental Status    Appearance:   Appropriate   Eye Contact:   Fair   Psychomotor Behavior: Restless   Attitude:   Cooperative   Orientation:   All  Speech  Rate / Production:             Impoverished  compared to typical   Volume:              Soft   Mood:    Depressed  Sad   Affect:    Blunted  Flat  Subdued   Thought Content:  Clear   Thought Form:  Coherent  Goal Directed  Logical   Insight:    Fair     ASSESSMENT:   DSM-V: Pain Disorder   Anxiety    Progress toward goals: fair.    Pain Status: worsened    Emotional Status:  unchanged and remained stable              Medication / chemical use concerns:  none    PLAN:   Next Appointment: Hoang Kiser will schedule a follow-up appointment in 2 week(s).  Assignment: Remind himself he is doing the best he can/use other positive self-talk.  Objectives / interventions for next session: Continue to explore his all or nothing thinking.    Sol Kemp PsyD LP  Outpatient Clinic Therapist  [facility]

## 2019-03-22 ENCOUNTER — OFFICE VISIT (OUTPATIENT)
Dept: SLEEP MEDICINE | Facility: CLINIC | Age: 34
End: 2019-03-22
Payer: COMMERCIAL

## 2019-03-22 VITALS
DIASTOLIC BLOOD PRESSURE: 80 MMHG | HEIGHT: 67 IN | OXYGEN SATURATION: 100 % | SYSTOLIC BLOOD PRESSURE: 123 MMHG | WEIGHT: 172 LBS | BODY MASS INDEX: 27 KG/M2 | HEART RATE: 92 BPM

## 2019-03-22 DIAGNOSIS — F51.04 CHRONIC INSOMNIA: Primary | ICD-10-CM

## 2019-03-22 DIAGNOSIS — G47.00 INSOMNIA, UNSPECIFIED TYPE: ICD-10-CM

## 2019-03-22 PROCEDURE — 90791 PSYCH DIAGNOSTIC EVALUATION: CPT | Performed by: PSYCHOLOGIST

## 2019-03-22 ASSESSMENT — ANXIETY QUESTIONNAIRES
2. NOT BEING ABLE TO STOP OR CONTROL WORRYING: SEVERAL DAYS
IF YOU CHECKED OFF ANY PROBLEMS ON THIS QUESTIONNAIRE, HOW DIFFICULT HAVE THESE PROBLEMS MADE IT FOR YOU TO DO YOUR WORK, TAKE CARE OF THINGS AT HOME, OR GET ALONG WITH OTHER PEOPLE: VERY DIFFICULT
3. WORRYING TOO MUCH ABOUT DIFFERENT THINGS: SEVERAL DAYS
GAD7 TOTAL SCORE: 12
6. BECOMING EASILY ANNOYED OR IRRITABLE: SEVERAL DAYS
5. BEING SO RESTLESS THAT IT IS HARD TO SIT STILL: NEARLY EVERY DAY
7. FEELING AFRAID AS IF SOMETHING AWFUL MIGHT HAPPEN: SEVERAL DAYS
1. FEELING NERVOUS, ANXIOUS, OR ON EDGE: NEARLY EVERY DAY

## 2019-03-22 ASSESSMENT — PATIENT HEALTH QUESTIONNAIRE - PHQ9
SUM OF ALL RESPONSES TO PHQ QUESTIONS 1-9: 11
5. POOR APPETITE OR OVEREATING: MORE THAN HALF THE DAYS

## 2019-03-22 ASSESSMENT — MIFFLIN-ST. JEOR: SCORE: 1683.82

## 2019-03-22 NOTE — NURSING NOTE
"Chief Complaint   Patient presents with     Sleep Problem     consult       Initial /80   Pulse 92   Ht 1.702 m (5' 7\")   Wt 78 kg (172 lb)   SpO2 100%   BMI 26.94 kg/m   Estimated body mass index is 26.94 kg/m  as calculated from the following:    Height as of this encounter: 1.702 m (5' 7\").    Weight as of this encounter: 78 kg (172 lb).    Medication Reconciliation: complete      "

## 2019-03-22 NOTE — PROGRESS NOTES
SLEEP MEDICINE CONSULTATION  Sleep Psychology    Name: Hoang Kiser MRN# [id number]   Age: 33 year old YOB: 1985     Date of Consultation: Mar 22, 2019  Consultation is requested by: No referring provider defined for this encounter.  Primary care provider: Phill Floyd    Reason for Sleep Consultation     Hoang Kiser is a 33 year old male seen today for a behavioral sleep medicine consultation because of chronic insomnia.      Assessment and Plan     Sleep Diagnoses/Recommendations:        1.. Chronic insomnia  Insomnia appears to be multi factored and associated with significant mental health conditions including major depressive disorder, posttraumatic stress disorder, history of psychogenic and associated episodes, functional neurological impairment, chronic pain.  A review of actigraphy and 2 weeks of sleep data indicate wide variation in timing of sleep, inadequate sleep hygiene and poor stimulus control involving patient spending extended and excessive time in bed, often most of the day.  In this setting he will often inadvertently nap during the day resulting in reduced homeostatic sleep drive and poor sleep consolidation at night.    Patient was open to behavioral sleep training including establishing a consistent sleep schedule between 11 PM-8 AM using alarm.  He will avoid watching the clock in the middle the night and will talk with staff about establishing an alternative reclining chair or space that he can use to manage pain and to rest during the day without napping.  We discussed the basic principles of behavioral treatment including conditioned arousal.  Patient states that he is motivated to work on making these changes.  We will follow-up with this patient in approximately 6 weeks.      See patient instructions for initial treatment recommendations and behavioral sleep plan.    Summary Counseling:      Hoang was provided information about the pathophysiology of  insomnia and psychophysiological factors contributing to the onset and maintenance of insomnia associated with significant health and mental health conditions.  Treatment option were discussed including component of cognitive-behavioral therapy for insomnia. The benefits and potential early side effects of treatment including increased daytime sleepiness were discussed. She was advised to seek medical advise and consultation around use of or changes to prescription sleep medication, Patient was counseled on the importance of avoiding driving if drowsy.        Follow-up: 6 weeks     History of Present Sleep Complaint     Hoang Kiser is a 33 year old year old male who reports a long-standing history of difficulty falling asleep and staying asleep since he was a child.  He reports wide variation and when he goes to bed.  He is sometimes will go to bed between 7-8 PM.  On other nights he will stay up until 2 AM.  Sleep latency varies between a few minutes to several hours.  He wakes up frequently throughout the night and often is in bed reading, managing chronic pain or feeling bored.  He will get out of bed at times early in the morning by 3 AM but other days he will stay in bed claiming to be sleeping until about noon.    He does report that he has a prescription for doxepin for insomnia but states that it makes him feel groggy in the morning.  He is also tried Ambien 10 mg and found it to be ineffective.  Trazodone was also ineffective.    Currently he indicates that he takes hydroxyzine 25 mg as needed for sleep but the timing of this medication varies widely.  He does use clonazepam 1 mg at bedtime for anxiety and uses Norco every 6 hours for pain usually around bedtime.    Average total sleep time is approximately 7 hours with high degree of sleep onset and sleep offset variability.  The sleep is quite fragmented wake after sleep onset appears to be greater than 2 hours.  He does nap during the day.  These  "naps are usually inadvertent when he is lying in bed trying to manage his pain.  He does use electronics in bed he watches TV in bed and reads in bed.    Previous Sleep Studies:    Patient was seen for consultation by my colleague SUKHWINDER Shah.  There was no indication of other intrinsic sleep disorders adjusted by evaluation    Substance Use:    Tobacco: None   Caffeine: 0-1 caffeinated beverage usually in the morning.   Alcohol: None  Recreational Drugs: None      Screening          [place] Sleepiness Scale  Total score - [place]: 10 .    GARCIA Total Score: 23       PHQ-9 SCORE 3/22/2019   PHQ-9 Total Score -   PHQ-9 Total Score 11       JIN-7 SCORE 8/14/2018 10/1/2018 3/22/2019   Total Score - - -   Total Score 15 13 12       Patient Activation Score     MOLLY Score (Last Two) 3/23/2016 10/1/2018   MOLLY Raw Score 52 31   Activation Score 100 59.3   MOLLY Level 4 3         Vitals     /80   Pulse 92   Ht 1.702 m (5' 7\")   Wt 78 kg (172 lb)   SpO2 100%   BMI 26.94 kg/m       Medical History     Past Medical History:   Diagnosis Date     Benign positional vertigo 12/2016    Started after my groin/back injury. Sitting on Toilet.     Dysphonia 2014    Nothing significant.     Gastroesophageal reflux disease 2004    Occassional. Spicy foods set it off.     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      Hoarseness 2017    Dry mouth, started after taking Tizanidine     Neuralgia, neuritis, and radiculitis, unspecified 04/30/2012     normal emg 2017 8/8/2017    Interpretation: This is a normal study. There is no electrophysiologic evidence of a lumbosacral radiculopathy affecting the right or left lower extremity on the basis of this study.    Rodney Mena MD Department of Neurology       Panic attack 12/6/2016     Seizures (H) 1986    Grew out of " it. Absence Seizures. [id number]     Tinnitus 2015    Had it most of my life. Got worse in 2015       Current Outpatient Medications   Medication Sig Dispense Refill     amphetamine-dextroamphetamine (ADDERALL XR) 30 MG per 24 hr capsule Take 30 mg by mouth daily       clonazePAM (KLONOPIN) 0.5 MG tablet Take 0.5-1 mg by mouth nightly as needed   0     doxepin (SINEQUAN) 10 MG capsule 10mg capsule by mouth nightly as needed @ 10pm       DULoxetine (CYMBALTA) 60 MG EC capsule 2 x 60mg tab by mouth daily 30 capsule 1     HYDROcodone-acetaminophen (NORCO) 5-325 MG tablet Take 1 tablet by mouth 3 times daily as needed for pain (Max 3 times daily. #90 tabs to last 30 days.) Okay to fill on 3/12/19 to begin using on 3/14/19 90 tablet 0     hydrOXYzine (ATARAX) 25 MG tablet Take 1 tablet (25 mg) by mouth nightly as needed (at HS PRN) 30 tablet 3     methocarbamol (ROBAXIN) 500 MG tablet Take 1-2 tablets (500-1,000 mg) by mouth 4 times daily as needed for muscle spasms 240 tablet 1     naloxone (NARCAN) 4 MG/0.1ML nasal spray Spray 1 spray (4 mg) into one nostril alternating nostrils as needed for opioid reversal every 2-3 minutes until assistance arrives 0.2 mL 0     naproxen (NAPROSYN) 500 MG tablet Take 1 tablet (500 mg) by mouth 2 times daily (with meals) 40 tablet 3     oxybutynin (DITROPAN-XL) 5 MG 24 hr tablet Take 1 tablet (5 mg) by mouth daily 90 tablet 1       Past Surgical History:   Procedure Laterality Date     COLONOSCOPY  02/15/18    Has not happened yet.     COLONOSCOPY N/A 2/15/2018    Procedure: COMBINED COLONOSCOPY, SINGLE OR MULTIPLE BIOPSY/POLYPECTOMY BY BIOPSY;;  Surgeon: Liam Kincaid MD;  Location: [facility] OR     COLONOSCOPY WITH CO2 INSUFFLATION N/A 2/15/2018    Procedure: COLONOSCOPY WITH CO2 INSUFFLATION;  COLON-FAMILY HX OF COLON CANCER/ SYPURA;  Surgeon: Liam Kincaid MD;  Location: [facility] OR     HC TOOTH EXTRACTION W/FORCEP Bilateral 2003     PE TUBES  1990          Allergies    Allergen Reactions     Buspirone Hcl Other (See Comments)     Panic attacks     Doxycycline Diarrhea, Fatigue, GI Disturbance and Nausea     Elavil [Amitriptyline]      Ineffective in reducing spasms/movement, increased fatigue     Trazodone Fatigue     Buspirone Anxiety     Keflex [Cephalexin] Diarrhea         Psychiatric History     Prior Psychiatric Diagnoses: yes, reported history of major depressive disorder, posttraumatic stress disorder, dissociative disorder   Psychiatric Hospitalizations: none, sees a psychiatrist for ongoing care, no current suicidal ideation intention or plan reported.   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 through [facility]         Family History     Family History   Problem Relation Age of Onset     Lipids Father         hyperlipidemia     Hyperlipidemia Father      Obesity Father      Arthritis Mother      Hyperlipidemia Mother      Depression Mother      Anxiety Disorder Mother      Mental Illness Mother      Obesity Mother      Asthma Brother      Asthma Sister      Depression Other      Hearing Loss Other      Psychotic Disorder Other      Obesity Other      Cerebrovascular Disease Paternal Grandfather      Alzheimer Disease Paternal Grandfather      Depression Brother      Mental Illness Brother         Bipolar     Asthma Brother      Depression Sister      Asthma Sister      Substance Abuse Sister         Alcohol     Mental Illness Brother         Bipolar     Asthma Brother      Asthma Sister      Obesity Sister      Asthma Brother      Obesity Brother      Obesity Maternal Grandmother      Genetic Disorder Maternal Grandmother         Epilepsy     Obesity Paternal Grandmother        Sleep Disorders: None reported    Social History     Social History     Socioeconomic History     Marital status: Single     Spouse name: None     Number of children: 0     Years  of education: 14     Highest education level: None   Occupational History     Occupation: Assembly/Packaging   Social Needs     Financial resource strain: None     Food insecurity:     Worry: None     Inability: None     Transportation needs:     Medical: None     Non-medical: None   Tobacco Use     Smoking status: Current Every Day Smoker     Packs/day: 0.75     Years: 10.00     Pack years: 7.50     Types: Cigarettes     Start date: 1/1/2002     Smokeless tobacco: Never Used     Tobacco comment: Transdermal patches have helped me the most in the past   Substance and Sexual Activity     Alcohol use: No     Alcohol/week: 1.2 - 2.4 oz     Comment: none since 2013     Drug use: No     Comment: hx of meth, none since 2015      Sexual activity: Yes     Partners: Female     Birth control/protection: Condom, Pill     Comment: Infrequent   Lifestyle     Physical activity:     Days per week: None     Minutes per session: None     Stress: None   Relationships     Social connections:     Talks on phone: None     Gets together: None     Attends Worship service: None     Active member of club or organization: None     Attends meetings of clubs or organizations: None     Relationship status: None     Intimate partner violence:     Fear of current or ex partner: None     Emotionally abused: None     Physically abused: None     Forced sexual activity: None   Other Topics Concern     Parent/sibling w/ CABG, MI or angioplasty before 65F 55M? No      Service Not Asked     Blood Transfusions Not Asked     Caffeine Concern Yes     Comment: Coffee, Engergy Drinks, 3 cups daily     Occupational Exposure Not Asked     Hobby Hazards Not Asked     Sleep Concern Not Asked     Stress Concern Not Asked     Weight Concern Not Asked     Special Diet Not Asked     Back Care Not Asked     Exercise Not Asked     Bike Helmet Not Asked     Seat Belt Not Asked     Self-Exams Not Asked   Social History Narrative    [place] in [place] [facility] - there are 11 people there. He arrives today through [employer] ride    He has 3 brothers and 3 sisters. He has not children. He has never been .      Mother and father are alive    One sister is an alcoholic and bulemic    depression is present in the family : mother, sister and 2 brothers are bipolar.      He denies bipolar. He has depression.    He denies recurring thoughts. He has had substance abuse issues. He has had cravings in the past.    He exercises and plays guitar and draws.      He has used meth as well as prescription pain killers.    He has used marijuana when young. Used cocaine in the past.    Has not used iv drugs    He does not drink alcohol.      50% [place]itanian and is Lebanese, english and Wallisian and a bit native.    He smokes cigarettes - 1 pack per day.        single. lives in [place]. estela is father       Single, lives in a group home setting, no children     Mental Status Examination     Hoang presented as appropriately dressed and groomed and was oriented X3 with speech language intact.  The patient was cooperative throughout the evaluation with no signs of hallucinations, delusions, loosening of associations or other thought disturbance.  Mood was stable and within normal limits.  Affect was congruent with mood. Insight and judgement we intact.  Memory was intact for recent and remote elements.  There was no report of suicidal ideation, intention or plan. Attention and concentration were within normal.      Time Spent: 45 stable and within normal limits minutes    Copy:   Phill Floyd               No referring provider defined for this encounter.    Fili Layne PsyD, LP, [facility]  Certified, Behavioral Sleep Medicine  [facility] -  [place] and [place]

## 2019-03-22 NOTE — PATIENT INSTRUCTIONS
The goal of behavioral training for improving sleep involves limiting the associate of the bed with sleep and sex.     Right now you are spending excessive time in bed and using the bed to rest, manage pain, etc.    Good sleep also relies on keeping a consistent sleep schedule, particularly wake time.    I am recommendint a 9 hour sleep window between 11 PM and 8 AM with alarm.    Try to use another chair to rest.  Talk to your staff about getting a dedicating reclining chair to manage pain and rest during the day.    Avoid caffeine after 2 PM.    Get off screens and devices by 10 PM - use of ereader is OK    Avoid napping during the day if possible.    Get out of bed if you can't sleep or at least sit up in bed and then return to bed when you feel sleepy again.    Avoid clock watching in the middle of the night.

## 2019-03-23 ASSESSMENT — ANXIETY QUESTIONNAIRES: GAD7 TOTAL SCORE: 12

## 2019-03-24 ASSESSMENT — ENCOUNTER SYMPTOMS
EYE PAIN: 0
FEVER: 0
DIARRHEA: 0
NERVOUS/ANXIOUS: 0
HEADACHES: 0
SHORTNESS OF BREATH: 0
PALPITATIONS: 0
HEARTBURN: 0
NAUSEA: 0
ARTHRALGIAS: 1
COUGH: 0
ABDOMINAL PAIN: 0
SORE THROAT: 0
JOINT SWELLING: 0
MYALGIAS: 1
DIZZINESS: 0
PARESTHESIAS: 0
HEMATOCHEZIA: 0
FREQUENCY: 1
CONSTIPATION: 0
HEMATURIA: 0
CHILLS: 0
DYSURIA: 0
WEAKNESS: 0

## 2019-03-27 ENCOUNTER — OFFICE VISIT (OUTPATIENT)
Dept: FAMILY MEDICINE | Facility: CLINIC | Age: 34
End: 2019-03-27
Payer: COMMERCIAL

## 2019-03-27 VITALS
RESPIRATION RATE: 20 BRPM | SYSTOLIC BLOOD PRESSURE: 118 MMHG | DIASTOLIC BLOOD PRESSURE: 74 MMHG | TEMPERATURE: 98.1 F | BODY MASS INDEX: 26.78 KG/M2 | WEIGHT: 171 LBS | OXYGEN SATURATION: 100 % | HEART RATE: 81 BPM

## 2019-03-27 DIAGNOSIS — F32.1 MODERATE MAJOR DEPRESSION (H): Chronic | ICD-10-CM

## 2019-03-27 DIAGNOSIS — F19.11 HISTORY OF SUBSTANCE ABUSE (H): ICD-10-CM

## 2019-03-27 DIAGNOSIS — G89.4 CHRONIC PAIN SYNDROME: Chronic | ICD-10-CM

## 2019-03-27 DIAGNOSIS — Z11.4 ENCOUNTER FOR SCREENING FOR HIV: ICD-10-CM

## 2019-03-27 DIAGNOSIS — Z72.0 TOBACCO ABUSE: ICD-10-CM

## 2019-03-27 DIAGNOSIS — Z00.00 ROUTINE GENERAL MEDICAL EXAMINATION AT A HEALTH CARE FACILITY: Primary | ICD-10-CM

## 2019-03-27 LAB
ALBUMIN SERPL-MCNC: 4.5 G/DL (ref 3.4–5)
ALP SERPL-CCNC: 61 U/L (ref 40–150)
ALT SERPL W P-5'-P-CCNC: 36 U/L (ref 0–70)
ANION GAP SERPL CALCULATED.3IONS-SCNC: 7 MMOL/L (ref 3–14)
AST SERPL W P-5'-P-CCNC: 26 U/L (ref 0–45)
BILIRUB SERPL-MCNC: 0.4 MG/DL (ref 0.2–1.3)
BUN SERPL-MCNC: 12 MG/DL (ref 7–30)
CALCIUM SERPL-MCNC: 9 MG/DL (ref 8.5–10.1)
CHLORIDE SERPL-SCNC: 104 MMOL/L (ref 94–109)
CO2 SERPL-SCNC: 27 MMOL/L (ref 20–32)
CREAT SERPL-MCNC: 0.85 MG/DL (ref 0.66–1.25)
GFR SERPL CREATININE-BSD FRML MDRD: >90 ML/MIN/{1.73_M2}
GLUCOSE SERPL-MCNC: 85 MG/DL (ref 70–99)
POTASSIUM SERPL-SCNC: 4 MMOL/L (ref 3.4–5.3)
PROT SERPL-MCNC: 7.3 G/DL (ref 6.8–8.8)
SODIUM SERPL-SCNC: 138 MMOL/L (ref 133–144)

## 2019-03-27 PROCEDURE — 36415 COLL VENOUS BLD VENIPUNCTURE: CPT | Performed by: FAMILY MEDICINE

## 2019-03-27 PROCEDURE — 99395 PREV VISIT EST AGE 18-39: CPT | Performed by: FAMILY MEDICINE

## 2019-03-27 PROCEDURE — 87389 HIV-1 AG W/HIV-1&-2 AB AG IA: CPT | Performed by: FAMILY MEDICINE

## 2019-03-27 PROCEDURE — 80053 COMPREHEN METABOLIC PANEL: CPT | Performed by: FAMILY MEDICINE

## 2019-03-27 ASSESSMENT — ENCOUNTER SYMPTOMS
FEVER: 0
PALPITATIONS: 0
DYSURIA: 0
HEADACHES: 0
HEARTBURN: 0
HEMATOCHEZIA: 0
MYALGIAS: 1
NAUSEA: 0
ABDOMINAL PAIN: 0
CONSTIPATION: 0
DIARRHEA: 0
ARTHRALGIAS: 1
SORE THROAT: 0
DIZZINESS: 0
HEMATURIA: 0
NERVOUS/ANXIOUS: 0
PARESTHESIAS: 0
SHORTNESS OF BREATH: 0
JOINT SWELLING: 0
CHILLS: 0
WEAKNESS: 0
COUGH: 0
FREQUENCY: 1
EYE PAIN: 0

## 2019-03-27 ASSESSMENT — PAIN SCALES - GENERAL: PAINLEVEL: MODERATE PAIN (5)

## 2019-03-27 NOTE — PROGRESS NOTES
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 examination at a health care facility Z00.00 Comprehensive metabolic panel   2. Moderate major depression (H) F32.1 Comprehensive metabolic panel   3. Chronic pain syndrome G89.4 Comprehensive metabolic panel   4. History of substance abuse Z87.898 Comprehensive metabolic panel   5. Tobacco abuse Z72.0 Comprehensive metabolic panel   6. Encounter for screening for HIV Z11.4 HIV Antigen Antibody Combo       Well-Adult Physical Exam.  Health Maintenance Due   Topic Date Due     PREVENTIVE CARE VISIT  01/24/2019     URINE DRUG SCREEN Q1 YR  04/05/2019     Health Maintenance   Topic Date Due     PREVENTIVE CARE VISIT  01/24/2019     URINE DRUG SCREEN Q1 YR  04/05/2019     HIV SCREEN (SYSTEM ASSIGNED)  02/03/2020 (Originally 8/5/2003)     PHQ-9 Q6 MONTHS  09/22/2019     JIN QUESTIONNAIRE 1 YEAR  03/22/2020     COLONOSCOPY Q5 YR  02/15/2023     DTAP/TDAP/TD IMMUNIZATION (9 - Td) 02/04/2029     ZOSTER IMMUNIZATION (1 of 2) 08/05/2035     DEPRESSION ACTION PLAN  Completed     INFLUENZA VACCINE   Completed     IPV IMMUNIZATION  Aged Out     MENINGITIS IMMUNIZATION  Aged Out         HEALTH CARE MAINTENENCE: The recommended screening tests and vaccinatons for this patient have been discussed as above.  The appropriate tests and vaccinations  have been ordered or declined by the patient. Please see the orders in EPIC.The patient specifically declines: n/a     Immunization Status:  up to date and documented    Patient Active Problem List   Diagnosis     Neuropathy     Moderate major depression (H)     Tobacco abuse     Attention deficit hyperactivity disorder (ADHD), predominantly inattentive type     Primary insomnia     Panic disorder without agoraphobia     Abnormal involuntary movement     Tic disorder     Anxiety     Posttraumatic stress disorder with dissociative symptoms     Chronic left hip pain     Chronic pain of right hip     Degenerative disc disease at L5-S1 level     Functional movement disorder     Family history of Crohn's disease     Chronic low back pain     Chronic pain syndrome     History of substance abuse        ATP III Guidelines  ICSI Preventive Guidelines    PLAN:   HIV testing was discussed and ordered  Colonoscopy recommended in 4 years   Testicular self-exam encouraged  Check a fasting glucose  Check his liver function tests as they have been abnormal in the past due to non steroidal anti-inflammatory drug(s) and tylenol use.  Discussed calcium intake, vitamins and supplements. Recommended 1000 mg of calcium daily  Weight loss through diet and exercise was recommended  Sunscreen use was recommended especially in the area of tatoos  Refills on chronic medication given  Recommended dental exams every 6 months  Recommended eye exam every 1-2 years  Follow up in 1 year for the next preventative medical visit        Body mass index is 26.78  kg/m .      --------------------------------------------------------------------------------------------------------------------------------------    SUBJECTIVE:  Hoang Kiser is a 33 year old male who presents to the clinic today to discuss options to help him quit smoking.    He has tried to quit in the past using the following methods:Nicotine patch, Nicotine gum and Nicotine nasal inhaler  He reports that none of these were helpful.  He tried wellbutrin and it messed with his emotions   The patient denies a history of seizures. (both buproprion and varenicline can increase seizure risk)  The patient reports a history of depression or anxiety.  His housemates do smoke.   He reports that there is anyone else who smokes in his home.  The patient reports that his main reason to quit is a desire for better health.    Immunization History   Administered Date(s) Administered     DTAP (<7y) 1985, 1985, 1985, 1985, 02/08/1986, 02/28/1986, 02/28/1986, 04/27/1987, 09/06/1990, 09/06/1995     HEPA 02/11/2010     HepA-Adult 01/24/2018     HepB 10/27/1997, 08/19/1998, 11/20/2000     Influenza Intranasal Vaccine 4 valent 11/01/2017, 10/10/2018     MMR 04/27/1987, 10/27/1997     Pneumococcal 23 valent 02/05/2016     Poliovirus, inactivated (IPV) 1985, 1985, 02/28/1986, 04/27/1987     TDAP Vaccine (Adacel) 02/04/2019     Tdap (Adacel,Boostrix) 01/01/2000, 02/11/2010      He has not had a pneumovax.      OBJECTIVE:  Blood pressure 118/74, pulse 81, temperature 98.1  F (36.7  C), temperature source Oral, resp. rate 20, weight 77.6 kg (171 lb), SpO2 100 %.  General appearance - healthy, alert and no distress    ASSESSMENT  Tobacco Abuse  Smoking cessation consultation     PLAN:  After we discussed the options and their individual efficacy and risks the patient felt that No interventions were a good choice for him at this time and he will take this on in the future.     I did give him patient  education information regarding preparing for smoking cessation

## 2019-03-27 NOTE — NURSING NOTE
"Chief Complaint   Patient presents with     Physical     Health Maintenance       Initial /74   Pulse 81   Temp 98.1  F (36.7  C) (Oral)   Resp 20   Wt 77.6 kg (171 lb)   SpO2 100%   BMI 26.78 kg/m   Estimated body mass index is 26.78 kg/m  as calculated from the following:    Height as of 3/22/19: 1.702 m (5' 7\").    Weight as of this encounter: 77.6 kg (171 lb).  Medication Reconciliation: complete  Tiny Amaral CMA  "

## 2019-03-28 LAB — HIV 1+2 AB+HIV1 P24 AG SERPL QL IA: NONREACTIVE

## 2019-03-31 ENCOUNTER — MYC MEDICAL ADVICE (OUTPATIENT)
Dept: PALLIATIVE MEDICINE | Facility: CLINIC | Age: 34
End: 2019-03-31

## 2019-04-03 ENCOUNTER — OFFICE VISIT (OUTPATIENT)
Dept: PALLIATIVE MEDICINE | Facility: CLINIC | Age: 34
End: 2019-04-03
Payer: COMMERCIAL

## 2019-04-03 ENCOUNTER — TELEPHONE (OUTPATIENT)
Dept: BEHAVIORAL HEALTH | Facility: CLINIC | Age: 34
End: 2019-04-03

## 2019-04-03 DIAGNOSIS — F41.9 ANXIETY: ICD-10-CM

## 2019-04-03 DIAGNOSIS — F32.1 MODERATE MAJOR DEPRESSION (H): ICD-10-CM

## 2019-04-03 DIAGNOSIS — G25.9 FUNCTIONAL MOVEMENT DISORDER: Primary | ICD-10-CM

## 2019-04-03 PROCEDURE — 96152 HC HEALTH AND BEHAVIOR INTERVENTION, INDIVIDUAL, EACH 15 MINUTES: CPT | Performed by: PSYCHOLOGIST
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
## 2019-04-03 NOTE — TELEPHONE ENCOUNTER
"Behavioral Health Home Services  [facility]: [place]        Social Work Care Navigator Note      Patient: Hoang Kiser  Date: April 3, 2019  Preferred Name: Shoaib    Previous PHQ-9:   PHQ-9 SCORE 2/14/2018 10/1/2018 3/22/2019   PHQ-9 Total Score - - -   PHQ-9 Total Score 7 13 11     Previous JIN-7:   JIN-7 SCORE 8/14/2018 10/1/2018 3/22/2019   Total Score - - -   Total Score 15 13 12     MOLLY LEVEL:  MOLLY Score (Last Two) 3/23/2016 10/1/2018   MOLLY Raw Score 52 31   Activation Score 100 59.3   MOLLY Level 4 3       Preferred Contact:  Need for : No  Preferred Contact: Cell      Type of Contact Today: Phone call (patient / identified key support person reached)      Data: (subjective / Objective):  Recent ED/IP Admission or Discharge?   None    Patient Goals:  No Data Recorded      [facility] Core Service Provided:  Health and Wellness Promotion    Current Stressors / Issues / Care Plan Objective Addressed Today:   contacted patient for monthly [facility] follow up. Patient reports that he recently had a \"black out\", which he reports feeling similar to seizures he used to have when he was younger. Patient expressed his fear surrounding this episode, as he had a cough drop in his mouth at the time. Patient stated he eventually came out of this black out episode and focused on walking, as he feels this helps him after an event. He reports that he did not seek medical attention immediately. He \"didn't feel it was necessary\". Patient stated that he did reach out to his neurologist though. He plans to make an appointment with his Neurologist soon. Patient stated that he recently submitted a SMRT application to the [facility]. He completed the required Neuropsych testing at the [facility]. He reports that a  is currently reviewing his file and he is hoping to get on a waiver to obtain services soon. Patient also continues to wait on a court date to be scheduled for disability.Patient is willing to meet with "  to complete a health and wellness assessment. Appt scheduled on April 12th at 10am. No other immediate questions or concerns at this time.     Intervention:  Motivational Interviewing: Expressed Empathy/Understanding, Supported Autonomy, Collaboration, Evocation, Open-ended questions and Reflections: simple and complex   Target Behavior(s): Explored current social supports and reinforced opportunities to increase engagement    Assessment: (Progress on Goals / Homework):   reviewed health action plan goals. See Objectives for more details. Progress: Needs improvement. Next Steps:  will continue to work with patient to support patient in achieving their goals.    Plan: (Homework, other):  Patient was encouraged to continue to seek condition-related information and education.      Scheduled a Clinic follow up appointment with BROWN BURGESS in 1 week     Patient has set self-identified goals and will monitor progress until the next appointment on: 04/12/2019  .     Andrez Brian, Social Work Care Coordinator                 Next 5 appointments (look out 90 days)    Apr 03, 2019  2:00 PM CDT  Return Visit with Sol Kemp  [facility] ([facility]) [address] [place] [address] [address]  [facility] [id number]  [phone]   Apr 09, 2019  8:00 AM CDT  Return Visit with CARLOS A Guy CNP  [facility] ([facility]) [address] [place] [id number]  [phone]   May 07, 2019 10:00 AM CDT  Return Visit with Prabhjot Braswell MD  [facility] ([facility]) [address] [place] [id number] [phone]

## 2019-04-03 NOTE — PROGRESS NOTES
Name: Hoang Kiser MRN: [id number]  : 1985  CAMPBELL: 3/19/2019  Staff: TONY Tech: NN Age: 33  Sex: Male Hand: Right   Educ: 12  Occupation: unemployed    WAIS-IV     Raw SSa     Vocabulary  40 11     Block Design  19 10     Spatial Span  37 12     Digit Span  28 10     Visual Puzzles  19 12    WRAT4   SS %ile Grade Equiv.     Word Reading  105 63 >12.9    WMS-4   Raw    SS     Info & Orientation 14       LM I   24 10     LM II   18 8     LM Recognition  22 17th-25th    CHRISTI-COMPLEX FIGURE TEST      Raw    T %ile     Time to Copy  175      >16     Copy    34     >16     Short Delay Recall 29 62 88     Long Delay Recall 25 53 62     Recognition Total 23 61 86    [place] VERBAL LEARNING TEST - 2     Trial 1 2 3 4 5              4 6 9 11 13      Raw  SD      Trial 1-5 Total   43 44(T)     List A: Trial 1   4 -1.5     List A: Trial 5   13 0     List B: Total   8 0.5     Short Delay Free Recall  10 -0.5     Short Delay Cued Recall  11 -0.5     Long Delay Free Recall  11 0     Long Delay Cued Recall  12 0     Semantic Clustering  -0.3 -0.5     Serial Clustering  0.5 0     % Recall from Primacy  23 -1     % Recall from Middle  35 -1.5     % Recall from Recency  42 2     Total Learning Routt  2.3 1.5     Across-Trial Consistency  77 -0.5     Total Repetitions  5 0.5     Total Intrusions   7 1     Total Recall Discrim  1.9 -0.5     Recognition Hits  15 0     Recognition False Pos  5 1     Recognition Discrim  2.5 -1     Response Bias   -0.3 -1.5    [place] NAMING TEST   Score: 50  T: 40                           [ 50    w/o cues        6   w/phonemic cues]     COWAT (FAS)   Raw: 31       z: -0.89      ANIMAL FLUENCY   Raw: 14   z: -1.38      CLOCK DRAWING     Command   3/3                 TRAIL MAKING TEST    Raw         Err  z    A 27        1  0.31    B 67        0  -0.13     STROOP   Raw + Jose Angel  =   Total          T    Word 62 +     --  = 62 27   Color  61 +     --= 61 37       Color/Word  45 +    --= 45 50      WCST    Raw   T/%ile   Categories: 6 >16   % Persev. Err.: 10 51   Concept. Resp.: 64 51   FTMS:  0    [place] JUDGMENT OF LINE ORIENTATION Form H   Raw: 27   72%ile:    MMPI-2-RF   RCd: 75 VRIN-r:  39   RC1: 84 AMBREEN-r:  65T   RC2: 76 F-r:  92   RC3: 43 Fp-r:  42   RC4: 49 Fs:  99   RC6: 61 FBS-r:  86   RC7: 55 RBS:  84   RC8: 73 L-r:  47   RC9: 43 K-r:  52

## 2019-04-03 NOTE — PROGRESS NOTES
[facility]   [facility], [place]  Behavioral Medicine Visit    Patient Name: Hoang Kiser     YOB: 1985   Medical Record Number: [id number]  Date: 4/3/2019                SUBJECTIVE: Patient reports he had a black out during a movement attack which frightened him. He has a follow-up scheduled with his neurologist to discuss results of recent testing. He seems to have internalized that he 'should' be able to control his involuntary movements, and that something is wrong with him that he cannot. It seems he recognizes he is shaming himself, however seems to struggle with how to react differently. He is feeling quite happy that Roboxin has helped his sleep be more consistent, and that he is averaging 4-9 hours of sleep for the past 2 weeks. 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 Continues to attend his COD treatment and finding it helpful to maintain his recovery.     OBJECTIVE: Patient was not provided with the correct check-in sheet. He reports in general that his pain is consistent, and his mood has fluctuated given different aspects of his life and how his movement disorder has disrupted his life. He was quite concerned about the blackout. He reports his sleep has been more consistent, and he is grateful for this. His stress has been about the same, slightly higher due to black out period.    Length of Visit: 60 minutes      Assessment: Current Emotional / Mental Status    Appearance:   Appropriate   Eye Contact:   Good   Psychomotor Behavior: Hyperactive  Normal  Restless   Attitude:   Cooperative   Orientation:   All  Speech  Rate / Production:             Normal   Volume:              Normal   Mood:    Anxious  Depressed  Normal  Affect:    Appropriate  Blunted   Thought Content:  Clear   Thought Form:  Coherent  Goal Directed  Logical   Insight:    Good     ASSESSMENT:    DSM-V: Pain Disorder   Anxiety   MDD, moderate    Progress toward goals: satisfactory.    Pain Status: unchanged and remained stable    Emotional Status: unchanged and remained stable              Medication / chemical use concerns:  None    PLAN:   Next Appointment: Hoang Kiser will schedule a follow-up appointment in 2 week(s).  Assignment: Practice mindfulness meditative practices that do not involve total movement control (I.e., taking a mindful walk, listening to his podcasts, etc.). Explore use of self-soothing with the 5 senses.  Objectives / interventions for next session: Continue to explore negative self-talk as it relates to things outside his immediate control but which he isn't in control of in reality.    Sol Kemp PsyD LP  Outpatient Clinic Therapist  [facility]

## 2019-04-04 LAB — COPATH REPORT: NORMAL

## 2019-04-05 ENCOUNTER — TELEPHONE (OUTPATIENT)
Dept: NEUROLOGY | Facility: CLINIC | Age: 34
End: 2019-04-05

## 2019-04-05 NOTE — TELEPHONE ENCOUNTER
GENETIC COUNSELING-Neurology  I left a message for Shoaib and sent him a mychart message indicating that his genetic testing for myoclonus dystonia is normal and it did not identify a genetic explanation for his symptoms. I encouraged him to contact me with questions.    Linwood Vines MS, [facility]  Licensed Genetic Counselor

## 2019-04-09 ENCOUNTER — OFFICE VISIT (OUTPATIENT)
Dept: PALLIATIVE MEDICINE | Facility: CLINIC | Age: 34
End: 2019-04-09
Payer: COMMERCIAL

## 2019-04-09 VITALS
SYSTOLIC BLOOD PRESSURE: 128 MMHG | WEIGHT: 175 LBS | HEART RATE: 80 BPM | DIASTOLIC BLOOD PRESSURE: 85 MMHG | BODY MASS INDEX: 27.41 KG/M2

## 2019-04-09 DIAGNOSIS — Z79.891 ENCOUNTER FOR LONG-TERM USE OF OPIATE ANALGESIC: Primary | ICD-10-CM

## 2019-04-09 DIAGNOSIS — Z79.891 LONG TERM (CURRENT) USE OF OPIATE ANALGESIC: ICD-10-CM

## 2019-04-09 DIAGNOSIS — M25.551 HIP PAIN, RIGHT: ICD-10-CM

## 2019-04-09 PROCEDURE — 80307 DRUG TEST PRSMV CHEM ANLYZR: CPT | Mod: 90 | Performed by: NURSE PRACTITIONER

## 2019-04-09 PROCEDURE — 99000 SPECIMEN HANDLING OFFICE-LAB: CPT | Performed by: NURSE PRACTITIONER

## 2019-04-09 PROCEDURE — 99214 OFFICE O/P EST MOD 30 MIN: CPT | Performed by: NURSE PRACTITIONER

## 2019-04-09 RX ORDER — HYDROCODONE BITARTRATE AND ACETAMINOPHEN 5; 325 MG/1; MG/1
1 TABLET ORAL 3 TIMES DAILY PRN
Qty: 90 TABLET | Refills: 0 | Status: SHIPPED | OUTPATIENT
Start: 2019-04-09 | End: 2019-05-09

## 2019-04-09 ASSESSMENT — PAIN SCALES - GENERAL: PAINLEVEL: MODERATE PAIN (5)

## 2019-04-09 NOTE — PATIENT INSTRUCTIONS
After Visit Instructions:     Thank you for coming to [facility] for your care. It is my goal to partner with you to help you reach your optimal state of health.     Continue daily self-care, identifying contributing factors, and monitoring variations in pain level. Continue to integrate self-care into your life.      1. Schedule pain psychology visits   2. Schedule follow-up with CARLOS A Higuera NP-C in 4 weeks  3. Labs: Annual urine drug screen and controlled substance agreement today.   4. Medication recommendations:   1. Refill of Hydrocodone/APAP sent to your pharmacy.     CARLOS A Bass, NP-C  [facility]  [facility]  Clinic Number:  [phone]

## 2019-04-09 NOTE — PROGRESS NOTES
"[facility]    CHIEF COMPLAINT: Pain r/t movement disorder, hip and back pain     INTERVAL HISTORY:  Last seen on 3/12/19.        Recommendations/plan at the last visit included:     1. Schedule pain psychology visits   2. Schedule follow-up with CARLOS A Higuera NP-C in 4 weeks  3. Let Tamiko know if you want to try medical cannabis or CBD oil.   4. Medication recommendations:             1. Refill of Norco provided.   2. Methocarbamol 500 mg tabs: 1-2 tabs up to 4 times daily as needed.       Since last visit:   - Went to [employer] convention last weekend which was fun but physically taxing.   - Methocarbamol helping a good deal with sleeping at night.   - Had a \"black out\" episode during a movement episode. Was doing HEP on the floor and movement attack started. His next memory is being sitting on his bed with the refrigerator open.   - Had genetic testing which showed that he did not have myoclonus dystonia.     Pain Information:   Pain quality: Exhausting and Sharp    Pain rating: intensity ranges from 3/10 to 9/10, and averages 7/10 on a 0-10 scale.      Annual Controlled Substance Agreement/UDS due date: April 2019      Current Pain Relevant Medications:   Norco 5/325 mg 1 tab BID- TID                        Total opiate dose: 10-15 MME daily  Clonazepam .5 mg 1-2 tab at HS PRN  Duloxetine 20 mg daily  Naproxen 500 mg BID: on hold re: elevated LFTs   Tizanidine 4 mg 1-2 tabs at HS PRN  Ambien 10 mg at HS  Adderall 50 mg daily, combination of long and short acting.       Previous Pain Relevant Medications: (H--helped; HI--Helped initially; SWH--Somewhat helpful; NH--No help; W--worse; SE--side effects; ?--Unsure if helpful)   NOTE: This medication information taken from patient's intake form, not medical records.                         Opiates: Tramadol: H, Hydrocodone: H                        NSAIDS: Ibuprofen:H, naproxen:SWH, Relafen: NH                        Muscle Relaxants: " Cyclobenzaprine:H,Med interaction, Tizanidine:H, Baclofen: SWH                        Anti-migraine mediations: Prednisone:H                        Anti-depressants: Bupropion:SE, Celexa:SE, Duloxetine:H, Trazodone:Too strong, Venlafaxine:too strong, Amitriptyline:SE                         Sleep aids:Anbien: H                        Anxiolytics: Clonazepam:H                        Neuropathics: Tegretol:taken for seizures in childhood, Gabapentin:H                                          Topicals: Lidocaine:H                        Other medications not covered above: Tylenol:NH      Any illicit drug use: 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 Concern for multiple controlled substances including stimulants and opiates.          Medications:  Current Outpatient Medications   Medication Sig Dispense Refill     amphetamine-dextroamphetamine (ADDERALL XR) 30 MG per 24 hr capsule Take 30 mg by mouth daily       clonazePAM (KLONOPIN) 0.5 MG tablet Take 0.5-1 mg by mouth nightly as needed   0     doxepin (SINEQUAN) 10 MG capsule 10mg capsule by mouth nightly as needed @ 10pm       DULoxetine (CYMBALTA) 60 MG EC capsule 2 x 60mg tab by mouth daily 30 capsule 1     HYDROcodone-acetaminophen (NORCO) 5-325 MG tablet Take 1 tablet by mouth 3 times daily as needed for pain (Max 3 times daily. #90 tabs to last 30 days.) Okay to fill on 3/12/19 to begin using on 3/14/19 90 tablet 0     hydrOXYzine (ATARAX) 25 MG tablet Take 1 tablet (25 mg) by mouth nightly as needed (at HS PRN) 30 tablet 3     methocarbamol (ROBAXIN) 500 MG tablet Take 1-2 tablets (500-1,000 mg) by mouth 4 times daily as needed for muscle spasms 240 tablet 1      naloxone (NARCAN) 4 MG/0.1ML nasal spray Spray 1 spray (4 mg) into one nostril alternating nostrils as needed for opioid reversal every 2-3 minutes until assistance arrives 0.2 mL 0     naproxen (NAPROSYN) 500 MG tablet Take 1 tablet (500 mg) by mouth 2 times daily (with meals) 40 tablet 3     oxybutynin (DITROPAN-XL) 5 MG 24 hr tablet Take 1 tablet (5 mg) by mouth daily 90 tablet 1       Review of Systems: A 10-point review of systems was negative, with the exception of chronic pain issues.      Social History: Reviewed; unchanged from previous consultation.      Family history: Reviewed; unchanged from previous consultation.     PHYSICAL EXAM    Vitals:    04/09/19 0757   BP: 128/85   Pulse: 80   Weight: 79.4 kg (175 lb)       Constitutional: healthy, alert, no distress, pain behaviors and somewhat down r/t increased spastic movements   HEENT: Head atraumatic, normocephalic. Eyes without conjunctival injection or jaundice. Neck supple. No obvious neck masses.  Skin: No rash, lesions, or petechiae of exposed skin.   Extremities: No clubbing, cyanosis, or edema to exposed extremities  Psychiatric/mental status: Alert, without lethargy or stupor. Appropriate affect.       Neurologic exam:  CN:  Cranial nerves 2-12 are grossly normal.  Has uncontrolled upper body movement/tremor.           Musculoskeletal exam:  Cervical spine:                       Flex:  20 degrees                       Ext: 20 degrees                       Rotation to right: 20 degrees                       Rotation to left: 20 degrees                       Rotation/ext to right: painful                        Rotation/ext to left: painful                        Tenderness in the cervical spine at midline. No                       Tenderness in the cervical paraspinal muscles. No  Thoracic spine:                        Kyphosis. No                       Tenderness in the thoracic spine at midline. Yes                       Tenderness in the  "thoracic paraspinal muscles. Yes  Lumbar/Sacral spine:                       Forward Flexion:  90 degrees                       Ext: 20 degrees \"tight\"                       Rotation/ext to right: painful                        Rotation/ext to left: painful                        Lordosis. No                       Tenderness in the lumbar spine at midline. Yes                       Tenderness in the lumbar paraspinal muscles.Yes      Psychiatric:  mentation appears normal., affect and mood normal      DIRE Score for ongoing opioid management is calculated as follows:    Diagnosis = 2 pts (slowly progressive; moderate pain/objective findings)    Intractability = 2 pts (most treatments tried; patient not fully engaged/barriers)    Risk        Psych = 2 pts (personality dysfunction/mental illness that moderately interferes with care)         Chem Hlth = 2 pts (use of medications to cope with stress; chemical dependency in remission)       Reliability = 3 pts (highly reliable with meds, appointments, treatments)       Social = 3 pts (supportive family/close relationships; involved in work/school; no isolation)       (Psych + Chem hlth + Reliability + Social) = 14    Efficacy = 2 pts (moderate benefit/function; low med dose; too early/not tried meds)    DIRE Score = 16        7-13: likely NOT suitable candidate for long-term opioid analgesia       14-21: may be a suitable candidate for long-term opioid analgesia          Previous Diagnostic Tests:   Imaging Studies:   MR LUMBAR SPINE W/O CONTRAST 5/2/2018 7:27 PM  History: DDD (degenerative disc disease), lumbar; Lumbar  radiculopathy; Hip pain, right; Groin pain, right.  ICD-10: DDD (degenerative disc disease), lumbar; Lumbar radiculopathy;  Hip pain, right; Groin pain, right  Comparison: Lumbar spine MRI 3/8/2017  Technique: Sagittal STIR, T1-weighted turbo spin-echo, 3-D volumetric  T2-weighted and axial reconstructed T2-weighted images of the lumbar  spine were " obtained without intravenous contrast.   Findings: 5 lumbar-type vertebra. The tip of the conus medullaris is  at L1.  The lumbar vertebral column is normally aligned.   Straightening of lumbar lordosis, unchanged. The intervertebral disc  heights are maintained. Normal marrow signal. At L5-S1, there is a  disc bulge and facet hypertrophy with mild left neural foraminal  narrowing, unchanged. No spinal canal stenosis.  Impression:  1. Lumbar spondylosis with mild left neural foraminal narrowing at  L5-S1.  2. No spinal canal stenosis.      MR right hip without contrast 5/2/2018 6:47 PM  Techniques: Multiplanar multisequence imaging of the right hip was  obtained without  administration of intra-articular or intravenous  contrast using routing protocol.  History: Hip pain.  Comparison: None available.  Findings:  Osseous structures  Osseous structures: No fracture, stress reaction, avascular necrosis,  or focal osseous lesion is seen. There is small focal area of  subchondral cystic changes in the anterior right femoral head neck  junction, most compatible with synovial herniation pit. Findings  suggestive of reduced femoral head neck junction though alpha angle  cannot be assessed owing to no dedicated oblique axial sequence  obtained.  Articular cartilage and labrum  Assessment limited on this arthrographic study due to relative lack of  joint distension.  Articular cartilage: No high grade chondral loss.  Labrum: Labral tearing approximately from 12:00 to 3:00 with 3:00  being anterior and 6:00 being the center of transverse ligament in  this convention with associated subtle area of subchondral edema in  the superior acetabulum.  Ligament teres and transverse ligament of acetabulum: Intact.  Joint or bursal effusion  Joint effusion: A physiologic amount of joint fluid.  Bursal effusion: Minimal nonspecific edema over the greater  trochanter. No substantial iliopsoas or trochanteric bursal effusion.  Muscles and  tendons  Muscles and tendons: The rectus femoris, the visualized portion  iliopsoas, proximal hamstrings, and hip abductors are intact.  The  visualized adductor muscles are unremarkable.   Nerves:  The visualized course of the sciatic nerve is unremarkable.   Other Findings:  There is fat-containing right inguinal hernia.  Impression:  1. Approximately 12-3 o'clock labral tearing with synovial herniation  pit and likely reduced femoral head neck junction offset. 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  2. Fat containing right inguinal hernia.          ASSESSMENT:   1.  Lumbar DDD, mild  2.  Lumbar muscle spasm  3.  Labral tear, right hip  4.  Hx: anxiety, chemical dependence in remission.  5. Functional neurologic movement disorder        Shoaib returns for medication management visit.  Throughout the visit his uncontrolled movements are quite evident.  He seems to be having fewer larger movements but more continuous smaller movements.  He has enjoyed pain psychology and feels that it is a beneficial addition to his pain management program as well as his mental health program.  He recently attended [employer] conference for a couple of days and found that this was very taxing for him and he had severe pain afterward.  He is frustrated that this is yet another limitation on his ability to function as his movement disorder has increased.  For the most part it seems like his mood is okay although he does have very down moments.  He brings in his usual graph with his medication regimen, activities, movement episodes and in general how he has been feeling.  We will continue current plan of care        Hypertension: Shoaib to follow up with Primary Care provider regarding elevated blood pressure.  Tobacco use: Tobacco cessation addressed at this visit      Plan:    Diagnosis reviewed, treatment option addressed, and risk/benifits discussed.  Self-care instructions  given.  I am recommending a multidisciplinary treatment plan to help this patient better manage pain.         1. Schedule pain psychology visits   2. Schedule follow-up with CARLOS A Higuera, NPDoloresC in 4 weeks  3. Labs: Annual urine drug screen and controlled substance agreement today.   4. Medication recommendations:   1. Refill of Hydrocodone/APAP sent to your pharmacy.       Total time spent face to face was 30 minutes and more than 50% of face to face time was spent in counseling and/or coordination of care regarding the diagnosis and recommendations above.      CARLOS A Bass, NP-C   [facility]    Disclaimer: This note consists of symbols derived from keyboarding, dictation and/or voice recognition software. As a result, there may be errors in the script that have gone undetected. Please consider this when interpreting information found in this chart.

## 2019-04-09 NOTE — LETTER
[facility]  04/09/19    Patient: Hoang Kiser  YOB: 1985  Medical Record Number: [id number]                                                                  Opioid / Opioid Plus Controlled Substance Agreement    I understand that my care provider has prescribed an opioid (narcotic) controlled substance to help manage my condition(s). I am taking this medicine to help me function or work. I know this is strong medicine, and that it can cause serious side effects. Opioid medicine can be sedating, addicting and may cause a dependency on the drug. They can affect my ability to drive or think, and cause depression. They need to be taken exactly as prescribed. Combining opioids with certain medicines or chemicals (such as cocaine, sedatives and tranquilizers, sleeping pills, meth) can be dangerous or even fatal. Also, if I stop opioids suddenly, I may have severe withdrawal symptoms. Last, I understand that opioids do not work for all types of pain nor for all patients. If not helpful, I may be asked to stop them.    I am also being prescribed a stimulant controlled substance to help manage symptoms of attention deficit / fatigue, etc.    The risks, benefits, and side effects of these medicine(s) were explained to me. I agree that:    1. I will take part in other treatments as advised by my care team. This may be psychiatry or counseling, physical therapy, behavioral therapy, group treatment or a referral to a pain clinic. I will reduce or stop my medicine when my care team tells me to do so.  2. I will take my medicines as prescribed. I will not change the dose or schedule unless my care team tells me to. There will be no refills if I  run out early.   I may be contactedwithout warning and asked to complete a urine drug test or pill count at any time.   3. I will keep all my appointments, and understand this is part of the monitoring of opioids. My care team may require an office  visit for EVERY opioid/controlled substance refill. If I miss appointments or don t follow instructions, my care team may stop my medicine.  4. I will not ask other providers to prescribe controlled substances, and I will not accept controlled substances from other people. If I need another prescribed controlled substance for a new reason, I will tell my care team within 1 business day.  5. I will use one pharmacy to fill all of my controlled substance prescriptions, and it is up to me to make sure that I do not run out of my medicines on weekends or holidays. If my care team is willing to refill my opioid prescription without a visit, I must request refills only during office hours, refills may take up to 3 days to process, and it may take up to 5 to 7 days for my medicine to be mailed and ready at my pharmacy. Prescriptions will not be mailed anywhere except my pharmacy.        [id number]  Rev 12/18         Registration to scan to EHR                             Page 1 of 2               Controlled Substance Agreement Opioid        [facility]  04/09/19  Patient: Hoang Kiser  YOB: 1985  Medical Record Number: [id number]                                                                  6. I am responsible for my prescriptions. If the medicine/prescription is lost or stolen, it will not be replaced. I also agree not to share controlled substance medicines with anyone.  7. I agree to not use ANY illegal or recreational drugs. This includes marijuana, cocaine, bath salts or other drugs. I agree not to use alcohol unless my care team says I may.          I agree to give urine samples whenever asked. If I don t give a urine sample, the care team may stop my medicine.    8. If I enroll in the [place] Medical Marijuana program, I will tell my care team. I will also sign an agreement to share my medical records with my care team.   9. I will bring in my list of medicines (or my medicine  bottles) each time I come to the clinic.   10. I will tell my care team right away if I become pregnant or have a new medical problem treated outside of my regular clinic.  11. I understand that this medicine can affect my thinking and judgment. It may be unsafe for me to drive, use machinery and do dangerous tasks. I will not do any of these things until I know how the medicine affects me. If my dose changes, I will wait to see how it affects me. I will contact my care team if I have concerns about medicine side effects.    I understand that if I do not follow any of the conditions above, my prescriptions or treatment may be stopped.      I agree that my provider, clinic care team, and pharmacy may work with any city, state or federal law enforcement agency that investigates the misuse, sale, or other diversion of my controlled medicine. I will allow my provider to discuss my care with or share a copy of this agreement with any other treating provider, pharmacy or emergency room where I receive care. I agree to give up (waive) any right of privacy or confidentiality with respect to these consents.     I have read this agreement and have asked questions about anything I did not understand.      ________________________________________________________________________  Patient signature - Date/Time -  Hoang Kiser                                      ________________________________________________________________________  Witness signature                                                            ________________________________________________________________________  Provider signature - CARLOS A Guy CNP      [id number]  Rev 12/18         Registration to scan to EHR                         Page 2 of 2                   Controlled Substance Agreement Opioid           Page 1 of 2  Opioid Pain Medicines (also known as Narcotics)  What You Need to Know    What are opioids?   Opioids are pain medicines  that must be prescribed by a doctor.  They are also known as narcotics.    Examples are:     morphine (MS Contin, Ulcretia)    oxycodone (Oxycontin)    oxycodone and acetaminophen (Percocet)    hydrocodone and acetaminophen (Vicodin, Norco)     fentanyl patch (Duragesic)     hydromorphone (Dilaudid)     methadone     What do opioids do well?   Opioids are best for short-term pain after a surgery or injury. They also work well for cancer pain. Unlike other pain medicines, they do not cause liver or kidney failure or ulcers. They may help some people with long-lasting (chronic) pain.     What do opioids NOT do well?   Opioids never get rid of pain entirely, and they do not work well for most patients with chronic pain. Opioids do not reduce swelling, one of the causes of pain. They also don t work well for nerve pain.                           For informational purposes only.  Not to replace the advice of your care provider.  Copyright 201 [facility]. All right reserved. [employer] 057696-[id number] 02/18.      Page 2 of 2    Risks and side effects   Talk to your doctor before you start or decide to keep taking one of these medicines. Side effects include:    Lowering your breathing rate enough to cause death    Overdose, including death, especially if taking higher than prescribed doses    Long-term opioid use    Worse depression symptoms; less pleasure in things you usually enjoy    Feeling tired or sluggish    Slower thoughts or cloudy thinking    Being more sensitive to pain over time; pain is harder to control    Trouble sleeping or restless sleep    Changes in hormone levels (for example, less testosterone)    Changes in sex drive or ability to have sex    Constipation    Unsafe driving    Itching and sweating    Feeling dizzy    Nausea, vomiting and dry mouth    What else should I know about opioids?  When someone takes opioids for too long or too often, they become dependent. This means that if you stop  or reduce the medicine too quickly, you will have withdrawal symptoms.    Dependence is not the same as addiction. Addiction is when people keep using a substance that harms their body, their mind or their relations with others. If you have a history of drug or alcohol abuse, taking opioids can cause a relapse.    Over time, opioids don t work as well. Most people will need higher and higher doses. The higher the dose, the more serious the side effects. We don t know the long-term effects of opioids.      Prescribed opioids aren't the best way to manage chronic pain    Other ways to manage pain include:      Ibuprofen or acetaminophen.  You should always try this first.      Treat health problems that may be causing pain.      acupuncture or massage, deep breathing, meditation, visual imagery, aromatherapy.      Use heat or ice at the pain site      Physical therapy and exercise      Stop smoking      See a counselor or therapist                                                  People who have used opioids for a long time may have a lower quality of life, worse depression, higher levels of pain and more visits to doctors.    Never share your opioids with others. Be sure to store opioids in a secure place, locked if possible.Young children can easily swallow them and overdose.     You can overdose on opioids.  Signs of overdose include decrease or loss of consciousness, slowed breathing, trouble waking and blue lips.  If someone is worried about overdose, they should call 911.    If you are at risk for overdose, you may get naloxone (Narcan, a medicine that reverses the effects of opioids.  If you overdose, a friend or family member can give you Narcan while waiting for the ambulance.  They need to know the signs of overdose and how to give Narcan.    While you're taking opioids:    Don't use alcohol or street drugs. Taking them together can cause death.    Don't take any of these medicines unless your doctor says  its okay.  Taking these with opioids can cause death.    Benzodiazepines (such as lorazepam         or diazepam)    Muscle relaxers (such as cyclobenzaprine)    sleeping pills    other opioids    Safe disposal of opioids  Find your area drug take-back program, your pharmacy mail-back program, buy a special disposal bag (such as Deterra) from your pharmacy or flush them down the toilet.  Use the guidelines at:  www.fda.gov/drugs/resourcesforyou

## 2019-04-10 PROBLEM — Z80.7 FAMILY HISTORY OF MULTIPLE MYELOMA: Status: ACTIVE | Noted: 2019-02-26

## 2019-04-12 ENCOUNTER — OFFICE VISIT (OUTPATIENT)
Dept: BEHAVIORAL HEALTH | Facility: CLINIC | Age: 34
End: 2019-04-12
Payer: COMMERCIAL

## 2019-04-12 DIAGNOSIS — R69 DIAGNOSIS DEFERRED: Primary | ICD-10-CM

## 2019-04-12 LAB — PAIN DRUG SCR UR W RPTD MEDS: NORMAL

## 2019-04-12 NOTE — LETTER
[facility] ([facility]): Health Action Plan  [facility]: [place]    [facility]      Name: Hoang Kiser  Preferred Name: Shoaib  : 1985  MRN: [id number]    How to Contact me  Need for : No  Preferred Contact: Cell    Home Phone [phone]   Mobile [phone]     Name and contact of roa supports (eg. Angella (mom) Phone number): Sister- Stephanie; Brother- Johnathan        My Goals    Goal Areas: Health;Mental Health;Chemical Health;Financial and Social Service Benefits    Patient stated goals: Patient stated that he would like to find a Neurologist that he works well with; Patient would like to continue to work on self-advocacy skills; Patient would eventually like to find part-time work when health conditions are better managed; Patient stated he would like to continue growing his skills with coping and stress management; Patient would like to continue going to [employer] groups through [facility] to ensure his sobriety continues as it has for the last 3 1/2 years    Strengths related to each goal: Patient is motivated; Patient is accepting of change; Patient is very organized; Patient is self-aware of his abilities and strengths; Patient works with his care team frequently; Patient is involved in his care    Services and Supports Needed: Continued [facility] services, Clinical Team, and [facility] services    Activities / Actions of Team to support goal(s): Monthly follow up from [facility]; Provide resources, as needed; Care team will be available for patient, as needed      Activities / Actions of Patient / Parent / Guardian to support goal(s): Patient will continue to be involved in his care; Patient will continue to be involved in [facility] program; Patient will reach out to care team, when needed; Pateint will continue working with Mental Health Providers on coping skills and skills for self-advocacy.         Recommended Referral  Tobacco cessation referrals made?: No  Mental Health / Chemical Dependency  Referrals: No (Pt going to Outpatient [facility] Treatment at [facility])  Substance Use Referrals: Not Applicable  Mental Health Referrals: None      My Team Members and Their Contact Information  Patient Care Team       Relationship Specialty Notifications Start End    Phill Floyd MD PCP - General Family Practice  2/25/16     Phone: [phone] Fax: [phone] [address] [facility] [address]    Phill Floyd MD Assigned PCP   5/17/15     Phone: [phone] Fax: [phone] [address] [facility] [address]    Bryant Cao MD Referring Physician Neurology  11/23/15     Refer Evaluate for palatal tremor - essential vs symptomatic vs functional vs tic related.     Phone: [phone] Fax: [phone]         [address] [facility] [facility] [address]    Jorge Nelson MD MD Otolaryngology  11/23/15     Phone: [phone] Fax: [phone]         [facility] [address] [facility] [address] [facility] [address]    Royce Taylor MD MD Neurology  6/27/17     Phone: [phone] Fax: [phone]         [facility] NEUROLOGY [address] [facility] [address] [facility] [address]    Deidre Hunter, RN Nurse Coordinator Neurology Admissions 8/16/17     Phone: [phone] Pager: [phone] Fax: [phone]       Miley Trivedi MD MD Otolaryngology  10/17/17     Phone: [phone] Fax: [phone]         [address] [facility] [address] [facility] [address]    Melinda Diaz AuD Audiologist Audiology  10/17/17     Phone: [phone] Fax: [phone]         [address] [facility] [address] [facility] [address]    Andrez Brian BSW   Admissions 3/1/19           My Wellness Plan  Safety Concerns: None Reported / Observed  Crisis Plan (emergencies / when urgent support needed): Call 911; Contact Mental Health Crisis Line; Contact care team and Mental Health Clinicians          Hoang Kiser co-developed the Health Action Plan with the [facility] Team and  received a copy of this document.  Date Health Action Plan Completed/Updated: 04/12/19

## 2019-04-12 NOTE — Clinical Note
Dane Dalton!    Here is another HAP for your review. We can discuss changes at our meeting on Tuesday.    Thank you!  Andrez

## 2019-04-12 NOTE — PROGRESS NOTES
[facility]  [facility]: [place]        Social Work Care Navigator Note      Patient: Hoang Kiser  Date: April 12, 2019  Preferred Name: Shoaib    Previous PHQ-9:   PHQ-9 SCORE 2/14/2018 10/1/2018 3/22/2019   PHQ-9 Total Score - - -   PHQ-9 Total Score 7 13 11     Previous JIN-7:   JIN-7 SCORE 8/14/2018 10/1/2018 3/22/2019   Total Score - - -   Total Score 15 13 12     MOLLY LEVEL:  MOLLY Score (Last Two) 3/23/2016 10/1/2018   MOLLY Raw Score 52 31   Activation Score 100 59.3   MOLLY Level 4 3       Preferred Contact:  Need for : No  Preferred Contact: Cell      Type of Contact Today: Face to Face in Clinic      Data: (subjective / Objective):    Patient came in to complete the comprehensive wellness assessment for [facility].  See [facility] Flowsheets for details on the assessment.  See [facility] for a copy of the patient's care plan. New consents were signed for patient's therapist, Kiana, at [facility]. Also signed for [facility] and Psychiatrist, Dr. Brian Duckworth. Patient declined to sign an FIGUEROA for his sister and brother at this time.     Andrez Brian, Social Work Care Coordinator        Next 5 appointments (look out 90 days)    Apr 17, 2019  2:00 PM CDT  Return Visit with Sol Kemp  [facility] ([facility]) [address] [place] [id number] [phone]   May 10, 2019  9:00 AM CDT  Return Visit with Prabhjot Braswell MD  [facility] [id number] [place] [id number] [phone]

## 2019-04-15 NOTE — RESULT ENCOUNTER NOTE
Test results were as expected with no cause for concern. Will discuss further at the next appointment.     CARLOS A Bass, NP-C   [facility] [place]

## 2019-04-17 ENCOUNTER — DOCUMENTATION ONLY (OUTPATIENT)
Dept: FAMILY MEDICINE | Facility: CLINIC | Age: 34
End: 2019-04-17

## 2019-04-17 ENCOUNTER — OFFICE VISIT (OUTPATIENT)
Dept: PALLIATIVE MEDICINE | Facility: CLINIC | Age: 34
End: 2019-04-17
Payer: COMMERCIAL

## 2019-04-17 DIAGNOSIS — F32.1 MODERATE MAJOR DEPRESSION (H): ICD-10-CM

## 2019-04-17 DIAGNOSIS — F41.9 ANXIETY: ICD-10-CM

## 2019-04-17 DIAGNOSIS — G25.9 FUNCTIONAL MOVEMENT DISORDER: Primary | ICD-10-CM

## 2019-04-17 PROCEDURE — 96152 HC HEALTH AND BEHAVIOR INTERVENTION, INDIVIDUAL, EACH 15 MINUTES: CPT | Performed by: PSYCHOLOGIST

## 2019-04-17 NOTE — PROGRESS NOTES
Brief Social Work Note:    HAP letter was mailed to patient on this date.     COMPA Dennis  [facility]   [facility]  Ph: [phone]

## 2019-04-18 ENCOUNTER — OFFICE VISIT (OUTPATIENT)
Dept: URGENT CARE | Facility: URGENT CARE | Age: 34
End: 2019-04-18
Payer: COMMERCIAL

## 2019-04-18 VITALS
OXYGEN SATURATION: 98 % | TEMPERATURE: 98.2 F | HEART RATE: 82 BPM | BODY MASS INDEX: 26.91 KG/M2 | RESPIRATION RATE: 14 BRPM | DIASTOLIC BLOOD PRESSURE: 69 MMHG | SYSTOLIC BLOOD PRESSURE: 124 MMHG | WEIGHT: 171.8 LBS

## 2019-04-18 DIAGNOSIS — J06.9 VIRAL URI WITH COUGH: Primary | ICD-10-CM

## 2019-04-18 PROCEDURE — 99213 OFFICE O/P EST LOW 20 MIN: CPT | Performed by: FAMILY MEDICINE

## 2019-04-18 RX ORDER — ALBUTEROL SULFATE 90 UG/1
1-2 AEROSOL, METERED RESPIRATORY (INHALATION) EVERY 4 HOURS PRN
Qty: 6.7 G | Refills: 1 | Status: SHIPPED | OUTPATIENT
Start: 2019-04-18 | End: 2019-08-13

## 2019-04-18 ASSESSMENT — ENCOUNTER SYMPTOMS
RHINORRHEA: 0
NAUSEA: 0
DIAPHORESIS: 0
SORE THROAT: 1
DIARRHEA: 0
FEVER: 0
SHORTNESS OF BREATH: 0
COUGH: 0
CHILLS: 0
VOMITING: 0

## 2019-04-18 NOTE — PROGRESS NOTES
"SUBJECTIVE:   Hoang Kiser is a 33 year old male presenting with a chief complaint of   Chief Complaint   Patient presents with     URI     congestion in lungs, body aches and cough for a couple days        Cough mild today, worse last evening.  minimally productive and mostly dry x 4 days and \"upper respiratory congestion\" with body aches and \"sore neck muscles\"  Nasal rhinorrhea over the past 4 days. Did have a flu shot Oct, 2018. Denies known flu exposure.     Living in an independent living with some that have had flu.   Dx with functional movement disorder 2 years ago.     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     Review of Systems   Constitutional: Negative for chills, diaphoresis and fever.   HENT: Positive for sore throat (6/10). Negative for congestion, ear pain and rhinorrhea.    Respiratory: Negative for cough and shortness of breath.    Gastrointestinal: Negative for diarrhea, nausea and vomiting.     Patient Active Problem List   Diagnosis     Neuropathy     Moderate major depression (H)     Tobacco abuse     Attention deficit hyperactivity disorder (ADHD), predominantly inattentive type     Primary insomnia     Panic disorder without agoraphobia     Abnormal involuntary movement     Tic disorder     Anxiety     Posttraumatic stress disorder with dissociative symptoms     Chronic left hip pain     Chronic pain of right hip     Degenerative disc disease at L5-S1 level     Functional movement disorder     Family history of Crohn's disease     Chronic low back pain     Chronic pain syndrome     History of substance abuse     Family history of multiple myeloma     History of electroencephalogram      Past Medical History:   Diagnosis Date     Arthritis 2/27/2018     Benign positional vertigo " 2016    Started after my groin/back injury. Sitting on Toilet.     Depressive disorder     I am on 120mg of Duoluxetine.     Dysphonia     Nothing significant.     Gastroesophageal reflux disease 2004    Occassional. Spicy foods set it off.     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      DATE OF EE2019.    SOURCE FILE DURATION:  15 minutes.  This EEG did not have a video.    PATIENT INFORMATION:  The patient is a 33-year-old male with irregular movements.  It is not entirely clear the etiology of these movements.  EEG is being done to evaluate for seizures.    MEDICATIONS:  Adderall, doxepin, Cymbalta, Robaxin.      History of MRI of brain and brain stem 2015    MR BRAIN W/O & W CONTRAST, 2015  Impression: No suspicious intracranial findings.      Hoarseness     Dry mouth, started after taking Tizanidine     Neuralgia, neuritis, and radiculitis, unspecified 2012     normal emg 2017 2017    Interpretation: This is a normal study. There is no electrophysiologic evidence of a lumbosacral radiculopathy affecting the right or left lower extremity on the basis of this study.    Rodney Mena MD Department of Neurology       Panic attack 2016     Seizures (H)     Grew out of it. Absence Seizures. [id number]     Tinnitus     Had it most of my life. Got worse in      Family History   Problem Relation Age of Onset     Lipids Father         hyperlipidemia     Hyperlipidemia Father      Obesity Father      Arthritis Mother      Hyperlipidemia Mother      Depression Mother      Anxiety Disorder Mother      Mental Illness Mother      Obesity Mother      Asthma Brother      Asthma Sister      Depression Other      Hearing Loss Other      Psychotic Disorder Other      Obesity Other      Cerebrovascular Disease Paternal Grandfather       Alzheimer Disease Paternal Grandfather      Depression Brother      Mental Illness Brother         Bipolar     Asthma Brother      Depression Sister      Asthma Sister      Substance Abuse Sister         Alcohol     Mental Illness Brother         Bipolar     Asthma Brother      Asthma Sister      Obesity Sister      Asthma Brother      Obesity Brother      Obesity Maternal Grandmother      Genetic Disorder Maternal Grandmother         Epilepsy     Obesity Paternal Grandmother      Colon Cancer Other      Genetic Disorder Other         Cerebral Palsy     Genetic Disorder Maternal Grandfather         Multiple Sclerosis     Genetic Disorder Other         Epilepsy     Current Outpatient Medications   Medication Sig Dispense Refill     amphetamine-dextroamphetamine (ADDERALL XR) 30 MG per 24 hr capsule Take 30 mg by mouth daily       clonazePAM (KLONOPIN) 0.5 MG tablet Take 0.5-1 mg by mouth nightly as needed   0     DULoxetine (CYMBALTA) 60 MG EC capsule 2 x 60mg tab by mouth daily 30 capsule 1     HYDROcodone-acetaminophen (NORCO) 5-325 MG tablet Take 1 tablet by mouth 3 times daily as needed for pain (Max 3 times daily.) Okay to fill on 4/9/19 to begin using on 4/13/19 90 tablet 0     hydrOXYzine (ATARAX) 25 MG tablet Take 1 tablet (25 mg) by mouth nightly as needed (at HS PRN) 30 tablet 3     methocarbamol (ROBAXIN) 500 MG tablet Take 1-2 tablets (500-1,000 mg) by mouth 4 times daily as needed for muscle spasms 240 tablet 1     naproxen (NAPROSYN) 500 MG tablet Take 1 tablet (500 mg) by mouth 2 times daily (with meals) 40 tablet 3     oxybutynin (DITROPAN-XL) 5 MG 24 hr tablet Take 1 tablet (5 mg) by mouth daily 90 tablet 1     doxepin (SINEQUAN) 10 MG capsule 10mg capsule by mouth nightly as needed @ 10pm       naloxone (NARCAN) 4 MG/0.1ML nasal spray Spray 1 spray (4 mg) into one nostril alternating nostrils as needed for opioid reversal every 2-3 minutes until assistance arrives 0.2 mL 0     SF 5000 PLUS 1.1 %  CREA   2     Social History     Tobacco Use     Smoking status: Current Every Day Smoker     Packs/day: 0.50     Years: 10.00     Pack years: 5.00     Types: Cigarettes     Start date: 1/1/2002     Smokeless tobacco: Never Used     Tobacco comment: Transdermal patches have helped me the most in the past   Substance Use Topics     Alcohol use: No     Alcohol/week: 1.2 - 2.4 oz     Comment: none since 2013       OBJECTIVE  /69   Pulse 82   Temp 98.2  F (36.8  C) (Oral)   Resp 14   Wt 77.9 kg (171 lb 12.8 oz)   SpO2 98%   BMI 26.91 kg/m      Physical Exam   Constitutional: He is oriented to person, place, and time. He appears well-developed.   HENT:   Head: Normocephalic and atraumatic.   Right Ear: External ear normal.   Left Ear: External ear normal.   Nose: Nose normal.   Mouth/Throat: Oropharynx is clear and moist. No oropharyngeal exudate.   Eyes: Pupils are equal, round, and reactive to light. Conjunctivae are normal. Right eye exhibits no discharge. Left eye exhibits no discharge. No scleral icterus.   Neck: Neck supple. No tracheal deviation present. No thyromegaly present.   Cardiovascular: Normal rate, regular rhythm, normal heart sounds and intact distal pulses. Exam reveals no gallop and no friction rub.   No murmur heard.  Pulmonary/Chest: Effort normal and breath sounds normal. No stridor. No respiratory distress. He has no wheezes. He has no rales. He exhibits no tenderness.   Abdominal: Soft. Bowel sounds are normal. He exhibits no distension and no mass. There is no tenderness. There is no rebound and no guarding.   Musculoskeletal: He exhibits no edema, tenderness or deformity.   Lymphadenopathy:     He has no cervical adenopathy.   Neurological: He is alert and oriented to person, place, and time. No cranial nerve deficit. Coordination (noted occasional random purposeless movements accentuated with ambulation. He does indicate this will improve with walking fast. ) abnormal.   Skin: Skin  is warm and dry. No rash noted. No erythema.   Psychiatric: Judgment normal.       ASSESSMENT:    ICD-10-CM    1. Viral URI with cough J06.9 albuterol (PROAIR HFA/PROVENTIL HFA/VENTOLIN HFA) 108 (90 Base) MCG/ACT inhaler    B97.89         PLAN  Continue ongoing cares with neurologist.   Mild cough noted rarely. Lung exam reassuring.   The patient indicates understanding of these issues and agrees with the plan.   Patient educational/instructional material provided including reasons for follow-up   Jann Burciaga MD

## 2019-04-22 DIAGNOSIS — F41.9 ANXIETY: ICD-10-CM

## 2019-04-22 DIAGNOSIS — G47.09 OTHER INSOMNIA: ICD-10-CM

## 2019-04-22 RX ORDER — HYDROXYZINE HYDROCHLORIDE 25 MG/1
25 TABLET, FILM COATED ORAL
Qty: 30 TABLET | Refills: 3 | Status: SHIPPED | OUTPATIENT
Start: 2019-04-22 | End: 2019-06-06

## 2019-04-24 ENCOUNTER — ALLIED HEALTH/NURSE VISIT (OUTPATIENT)
Dept: NEUROLOGY | Facility: CLINIC | Age: 34
End: 2019-04-24
Payer: COMMERCIAL

## 2019-04-24 DIAGNOSIS — R25.9 ABNORMAL INVOLUNTARY MOVEMENT: Primary | ICD-10-CM

## 2019-04-25 NOTE — PROCEDURES
[facility] EEG #[id number] (Out-Patient Video-EEG Monitoring)    Name:     Hoang Kiser   MRN: [id number]   : 1985   Procedure Date: 2019  Duration of Recording:  3 hours, 4 minutes.      CLINICAL SUMMARY:  This diagnostic video-EEG monitoring procedure is performed in evaluation of encephalopathy in Hoang Kiser.  He was reported to be receiving Adderall, duloxetine and doxepin at the time of this recording.      TECHNICAL SUMMARY:  This continuous EEG monitoring procedure was performed with 23 scalp electrodes in 10-20 system placements, and additional scalp, precordial and other surface electrodes used for electrical referencing and artifact detection.  A single channel of EKG was recorded for purposes of analyzing EKG artifacts in the EEG channels.  Video monitoring was utilized and periodically reviewed by EEG technologist and the physician for electroclinical correlation.    INTERICTAL EEG ACTIVITIES:  During maximal waking, there was a symmetric, well-modulated, approximately 10 Hz posterior dominant rhythm, which was attenuated on eye opening.  Lower amplitude faster activities predominated anteriorly.  Drowsiness was manifested by predominance of centrally maximum semirhythmic theta slowing and dropout of the posterior dominant rhythm during deeper drowsiness.  Symmetric sleep spindles were seen during stage II sleep.  There was symmetric bilateral driving in response to photic stimulation.  Hyperventilation did not induce any definite response.     No interictal epileptiform abnormalities were recorded.    ICTAL RECORDINGS:  No electrographic seizures were recorded during the period of monitoring.      A single paroxysmal behavioral event occurred at 09:55, beginning while the patient was in stage II sleep.  There was a rapid electrographic arousal over approximately 2 seconds, then followed by intermittent artifactual obscuration of the EEG recording for approximately 1.5 minutes,  during which time video analysis showed that the patient initially had symmetric generalized extensor posturing rapidly followed by asymmetric writhing and other postural adjustments with superimposed low amplitude diffuse shaking, and variable eye opening and eye closure with atypical facial contractions.  During brief pauses in artifactual obscuration there was baseline normal waking EEG activity.  Behavioral testing during the event revealed maintained responsiveness.      SUMMARY OF VIDEO-EEG MONITORING:    The interictal EEG recording was normal in waking, drowsiness and stage II sleep.  No pathological slowing, no interictal epileptiform abnormalities and no electrographic seizures were recorded.    The patient had a single paroxysmal behavioral event arising from slow wave sleep, with predominantly asynchronous generalized writhing and other postural adjustments with shaking and grimacing.  He was responsive to voice during this time, which was not associated with electrographic seizure activity.    These findings could be consistent with various psychogenic disorders, with simple partial seizures associated with hypermotor activities, and with other diagnoses.  Clinical correlation is recommended.     James Panda M.D., Professor of Neurology       D: 2019   T: 2019   MT: KEYLA      Name:     VLADISLAV LEUNG   MRN:      [id number]        Account:        [id number]   :      1985           Procedure Date: 2019      Document: [id number]

## 2019-05-01 ENCOUNTER — MYC MEDICAL ADVICE (OUTPATIENT)
Dept: NEUROLOGY | Facility: CLINIC | Age: 34
End: 2019-05-01

## 2019-05-02 DIAGNOSIS — R25.9 ABNORMAL INVOLUNTARY MOVEMENT: Primary | ICD-10-CM

## 2019-05-02 DIAGNOSIS — G25.9 FUNCTIONAL MOVEMENT DISORDER: ICD-10-CM

## 2019-05-07 ENCOUNTER — OFFICE VISIT (OUTPATIENT)
Dept: URGENT CARE | Facility: URGENT CARE | Age: 34
End: 2019-05-07
Payer: COMMERCIAL

## 2019-05-07 VITALS
SYSTOLIC BLOOD PRESSURE: 120 MMHG | TEMPERATURE: 98.6 F | OXYGEN SATURATION: 98 % | DIASTOLIC BLOOD PRESSURE: 81 MMHG | BODY MASS INDEX: 27.41 KG/M2 | HEART RATE: 94 BPM | RESPIRATION RATE: 16 BRPM | WEIGHT: 175 LBS

## 2019-05-07 DIAGNOSIS — A08.4 VIRAL GASTROENTERITIS: ICD-10-CM

## 2019-05-07 DIAGNOSIS — R07.0 THROAT PAIN: Primary | ICD-10-CM

## 2019-05-07 DIAGNOSIS — J30.89 SEASONAL ALLERGIC RHINITIS DUE TO OTHER ALLERGIC TRIGGER: ICD-10-CM

## 2019-05-07 LAB
DEPRECATED S PYO AG THROAT QL EIA: NORMAL
FLUAV+FLUBV AG SPEC QL: NEGATIVE
FLUAV+FLUBV AG SPEC QL: NEGATIVE
SPECIMEN SOURCE: NORMAL
SPECIMEN SOURCE: NORMAL

## 2019-05-07 PROCEDURE — 87880 STREP A ASSAY W/OPTIC: CPT | Performed by: NURSE PRACTITIONER

## 2019-05-07 PROCEDURE — 99214 OFFICE O/P EST MOD 30 MIN: CPT | Performed by: NURSE PRACTITIONER

## 2019-05-07 PROCEDURE — 87804 INFLUENZA ASSAY W/OPTIC: CPT | Performed by: NURSE PRACTITIONER

## 2019-05-07 PROCEDURE — 87081 CULTURE SCREEN ONLY: CPT | Performed by: NURSE PRACTITIONER

## 2019-05-07 RX ORDER — FLUTICASONE PROPIONATE 50 MCG
1-2 SPRAY, SUSPENSION (ML) NASAL DAILY
Qty: 9.9 ML | Refills: 0 | Status: SHIPPED | OUTPATIENT
Start: 2019-05-07 | End: 2019-08-20

## 2019-05-07 RX ORDER — CETIRIZINE HYDROCHLORIDE 10 MG/1
10 TABLET ORAL EVERY EVENING
Qty: 14 TABLET | Refills: 0 | Status: SHIPPED | OUTPATIENT
Start: 2019-05-07 | End: 2019-05-23

## 2019-05-07 ASSESSMENT — ENCOUNTER SYMPTOMS
COUGH: 1
NAUSEA: 0
DIAPHORESIS: 0
DIARRHEA: 1
VOMITING: 0
SORE THROAT: 1
SHORTNESS OF BREATH: 1
CHILLS: 0
RHINORRHEA: 1
FEVER: 0

## 2019-05-07 NOTE — PATIENT INSTRUCTIONS
Patient Education     Allergic Rhinitis  Allergic rhinitis is an allergic reaction that affects the nose, and often the eyes. It s often known as nasal allergies. Nasal allergies are often due to things in the environment that are breathed in. Depending what you are sensitive to, nasal allergies may occur only during certain seasons. Or they may occur year round. Common indoor allergens include house dust mites, mold, cockroaches, and pet dander. Outdoor allergens include pollen from trees, grasses, and weeds.   Symptoms include a drippy, stuffy, and itchy nose. They also include sneezing and red and itchy eyes. You may feel tired more often. Severe allergies may also affect your breathing and trigger a condition called asthma.   Tests can be done to see what allergens are affecting you. You may be referred to an allergy specialist for testing and further evaluation.  Home care  Your healthcare provider may prescribe medicines to help relieve allergy symptoms. These may include oral medicines, nasal sprays, or eye drops.  Ask your provider for advice on how to avoid substances that you are allergic to. Below are a few tips for each type of allergen.  Pet dander:    Do not have pets with fur and feathers.    If you can't avoid having a pet, keep it out of your bedroom and off upholstered furniture.  Pollen:    When pollen counts are high, keep windows of your car and home closed. If possible, use an air conditioner instead.    Wear a filter mask when mowing or doing yard work.  House dust mites:    Wash bedding every week in warm water and detergent and dry on a hot setting.    Cover the mattress, box spring, and pillows with allergy covers.     If possible, sleep in a room with no carpet, curtains, or upholstered furniture.  Cockroaches:    Store food in sealed containers.    Remove garbage from the home promptly.    Fix water leaks  Mold:    Keep humidity low by using a dehumidifier or air conditioner. Keep the  "dehumidifier and air conditioner clean and free of mold.    Clean moldy areas with bleach and water.  In general:    Vacuum once or twice a week. If possible, use a vacuum with a high-efficiency particulate air (HEPA) filter.    Do not smoke. Avoid cigarette smoke. Cigarette smoke is an irritant that can make symptoms worse.  Follow-up care  Follow up as advised by the healthcare provider or our staff. If you were referred to an allergy specialist, make this appointment promptly.  When to seek medical advice  Call your healthcare provider right away if the following occur:    Coughing or wheezing    Fever of 100.4 F (38 C) or higher, or as directed by your healthcare provider    Raised red bumps (hives)    Continuing symptoms, new symptoms, or worsening symptoms  Call 911 if you have:    Trouble breathing    Severe swelling of the face or severe itching of the eyes or mouth  Date Last Reviewed: 3/1/2017    0489-9443 [employer]. [address] [place] [address]. All rights reserved. This information is not intended as a substitute for professional medical care. Always follow your healthcare professional's instructions.           Patient Education     Viral Gastroenteritis (Adult)    Gastroenteritis is commonly called the \"stomach flu,\" although it has nothing to do with influenza. It is most often caused by a virus that affects the stomach and intestinal tract and usually lasts from 2 to 7 days. Common viruses causing gastroenteritis include norovirus, rotavirus, and hepatitis A. Non-viral causes of gastroenteritis include bacteria, parasites, and toxins.  The danger from repeated vomiting or diarrhea is dehydration. This is the loss of too much fluid from the body. When this occurs, body fluids must be replaced. Antibiotics don't help with this illness because it is usually viral. Simple home treatment will be helpful.  Symptoms of viral gastroenteritis may include:    Watery, loose stools    " Stomach pain or abdominal cramps    Fever and chills    Nausea and vomiting    Loss of bowel control    Headache  Home care  Gastroenteritis is transmitted by contact with the stool or vomit of an infected person. This can occur from person to person or from contact with a contaminated surface.  Follow these guidelines when caring for yourself at home:    If symptoms are severe, rest at home for the next 24 hours or until you are feeling better.    Wash your hands with soap and water or use alcohol-based  to prevent the spread of infection. Wash your hands after touching anyone who is sick.    Wash your hands or use alcohol-based  after using the toilet and before meals. Clean the toilet after each use.  Remember these tips when preparing food:    People with diarrhea should not prepare or serve food to others. When preparing foods, wash your hands before and after.    Wash your hands after using cutting boards, countertops, knives, or utensils that have been in contact with raw food.    Dry your hands with a single use towel.    Keep uncooked meats away from cooked and ready-to-eat foods.  Medicine  You may use acetaminophen or NSAID medicines like ibuprofen or naproxen to control fever unless another medicine was given. If you have chronic liver or kidney disease, talk with your healthcare provider before using these medicines. Also talk with your provider if you've had a stomach ulcer or gastrointestinal bleeding. Don't give aspirin to anyone under 18 years of age who is ill with a fever. It may cause severe liver damage. Don't use NSAIDS is you are already taking one for another condition (like arthritis) or are on aspirin (such as for heart disease or after a stroke).  If medicine for vomiting or diarrhea are prescribed, take these only as directed. Nausea and diarrhea medicines are generally OK unless you have bleeding, fever, or severe abdominal pain.  Diet  Follow these guidelines for food:     Water and liquids are important so you don't get dehydrated. Drink a small amount at a time or suck on ice chips if you are vomiting.    If you eat, avoid fatty, greasy, spicy, or fried foods.    Don't eat dairy if you have diarrhea. This can make diarrhea worse.    Avoid tobacco, alcohol, and caffeine which may worsen symptoms.  During the first 24 hours (the first full day), follow the diet below:    Beverages. Sports drinks, soft drinks without caffeine, ginger ale, mineral water (plain or flavored), decaffeinated tea and coffee. If you are very dehydrated, sports drinks aren't a good choice. They have too much sugar and not enough electrolytes. In this case, commercially available products called oral rehydration solutions, are best.    Soups. Eat clear broth, consommé, and bouillon.    Desserts. Eat gelatin, ice pops, and fruit juice bars.  During the next 24 hours (the second day), you may add the following to the above:    Hot cereal, plain toast, bread, rolls, and crackers    Plain noodles, rice, mashed potatoes, chicken noodle or rice soup    Unsweetened canned fruit (avoid pineapple), bananas    Limit fat intake to less than 15 grams per day. Do this by avoiding margarine, butter, oils, mayonnaise, sauces, gravies, fried foods, peanut butter, meat, poultry, and fish.    Limit fiber and avoid raw or cooked vegetables, fresh fruits (except bananas), and bran cereals.    Limit caffeine and chocolate. Don't use spices or seasonings other than salt.    Limit dairy products.    Avoid alcohol.  During the next 24 hours:    Gradually resume a normal diet as you feel better and your symptoms improve.    If at any time it starts getting worse again, go back to clear liquids until you feel better.  Follow-up care  Follow up with your healthcare provider, or as advised. Call your provider if you don't get better within 24 hours or if diarrhea lasts more than a week. Also follow up if you are unable to keep down  liquids and get dehydrated. If a stool (diarrhea) sample was taken, call as directed for the results.  Call 911  Call 911 if any of these occur:    Trouble breathing    Chest pain    Confused    Severe drowsiness or trouble awakening    Fainting or loss of consciousness    Rapid heart rate    Seizure    Stiff neck  When to seek medical advice  Call your healthcare provider right away if any of these occur:    Abdominal pain that gets worse    Continued vomiting (unable to keep liquids down)    Frequent diarrhea (more than 5 times a day)    Blood in vomit or stool (black or red color)    Dark urine, reduced urine output, or extreme thirst    Weakness or dizziness    Drowsiness    Fever of 100.4 F (38 C) or higher, or as directed by your healthcare provider    New rash  Date Last Reviewed: 6/1/2018 2000-2018 [employer]. [address] [place] [address], [place] [address]. All rights reserved. This information is not intended as a substitute for professional medical care. Always follow your healthcare professional's instructions.

## 2019-05-07 NOTE — PROGRESS NOTES
SUBJECTIVE:   Hoang Kiser is a 33 year old male presenting with a chief complaint of   Chief Complaint   Patient presents with     URI     Hard to breathe, body aches, diarrhea, runny nose, sore throat, coughing x3 days        He is an established patient of [facility].    URI Adult    Onset of symptoms was 3 day(s) ago.  Course of illness is worsening.    Severity moderate  Current and Associated symptoms: runny nose, stuffy nose, cough - productive, shortness of breath, sore throat and sneezing  Treatment measures tried include inhaler  Predisposing factors include None.    Diarrhea  Duration: 1 day  Course of illness: resolving, no diarrhea today  Treatment measures tried include inhaler  Predisposing factors include None.        Review of Systems   Constitutional: Negative for chills, diaphoresis and fever.   HENT: Positive for congestion, rhinorrhea, sneezing and sore throat. Negative for ear pain.    Respiratory: Positive for cough and shortness of breath.    Gastrointestinal: Positive for diarrhea. Negative for nausea and vomiting.   All other systems reviewed and are negative.      Past Medical History:   Diagnosis Date     Arthritis 2018     Benign positional vertigo 2016    Started after my groin/back injury. Sitting on Toilet.     Depressive disorder     I am on 120mg of Duoluxetine.     Dysphonia     Nothing significant.     Gastroesophageal reflux disease 2004    Occassional. Spicy foods set it off.     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      DATE OF EE2019.    SOURCE FILE DURATION:  15 minutes.  This EEG did not have a video.    PATIENT INFORMATION:  The patient is a 33-year-old male with irregular movements.  It is not entirely clear the etiology of these movements.  EEG is being done to evaluate for seizures.    MEDICATIONS:  Adderall,  doxepin, Cymbalta, Robaxin.      History of MRI of brain and brain stem 12/11/2015    MR BRAIN W/O & W CONTRAST, 12/11/2015  Impression: No suspicious intracranial findings.      Hoarseness 2017    Dry mouth, started after taking Tizanidine     Neuralgia, neuritis, and radiculitis, unspecified 04/30/2012     normal emg 2017 8/8/2017    Interpretation: This is a normal study. There is no electrophysiologic evidence of a lumbosacral radiculopathy affecting the right or left lower extremity on the basis of this study.    Rodney Mena MD Department of Neurology       Panic attack 12/6/2016     Seizures (H) 1986    Grew out of it. Absence Seizures. [id number]     Tinnitus 2015    Had it most of my life. Got worse in 2015     Family History   Problem Relation Age of Onset     Lipids Father         hyperlipidemia     Hyperlipidemia Father      Obesity Father      Arthritis Mother      Hyperlipidemia Mother      Depression Mother      Anxiety Disorder Mother      Mental Illness Mother      Obesity Mother      Asthma Brother      Asthma Sister      Depression Other      Hearing Loss Other      Psychotic Disorder Other      Obesity Other      Cerebrovascular Disease Paternal Grandfather      Alzheimer Disease Paternal Grandfather      Depression Brother      Mental Illness Brother         Bipolar     Asthma Brother      Depression Sister      Asthma Sister      Substance Abuse Sister         Alcohol     Mental Illness Brother         Bipolar     Asthma Brother      Asthma Sister      Obesity Sister      Asthma Brother      Obesity Brother      Obesity Maternal Grandmother      Genetic Disorder Maternal Grandmother         Epilepsy     Obesity Paternal Grandmother      Colon Cancer Other      Genetic Disorder Other         Cerebral Palsy     Genetic Disorder Maternal Grandfather         Multiple Sclerosis     Genetic Disorder Other         Epilepsy     Current Outpatient Medications   Medication Sig Dispense Refill      albuterol (PROAIR HFA/PROVENTIL HFA/VENTOLIN HFA) 108 (90 Base) MCG/ACT inhaler Inhale 1-2 puffs into the lungs every 4 hours as needed for shortness of breath / dyspnea or wheezing (cough or wheeze only if necessary) 6.7 g 1     amphetamine-dextroamphetamine (ADDERALL XR) 30 MG per 24 hr capsule Take 30 mg by mouth daily       cetirizine (ZYRTEC) 10 MG tablet Take 1 tablet (10 mg) by mouth every evening for 14 days 14 tablet 0     clonazePAM (KLONOPIN) 0.5 MG tablet Take 0.5-1 mg by mouth nightly as needed   0     doxepin (SINEQUAN) 10 MG capsule 10mg capsule by mouth nightly as needed @ 10pm       DULoxetine (CYMBALTA) 60 MG EC capsule 2 x 60mg tab by mouth daily 30 capsule 1     fluticasone (FLONASE) 50 MCG/ACT nasal spray Spray 1-2 sprays into both nostrils daily for 7 days 9.9 mL 0     HYDROcodone-acetaminophen (NORCO) 5-325 MG tablet Take 1 tablet by mouth 3 times daily as needed for pain (Max 3 times daily.) Okay to fill on 4/9/19 to begin using on 4/13/19 90 tablet 0     hydrOXYzine (ATARAX) 25 MG tablet Take 1 tablet (25 mg) by mouth nightly as needed (at HS PRN) 30 tablet 3     methocarbamol (ROBAXIN) 500 MG tablet Take 1-2 tablets (500-1,000 mg) by mouth 4 times daily as needed for muscle spasms 240 tablet 1     naproxen (NAPROSYN) 500 MG tablet Take 1 tablet (500 mg) by mouth 2 times daily (with meals) 40 tablet 3     oxybutynin (DITROPAN-XL) 5 MG 24 hr tablet Take 1 tablet (5 mg) by mouth daily 90 tablet 1     SF 5000 PLUS 1.1 % CREA   2     naloxone (NARCAN) 4 MG/0.1ML nasal spray Spray 1 spray (4 mg) into one nostril alternating nostrils as needed for opioid reversal every 2-3 minutes until assistance arrives 0.2 mL 0     Social History     Tobacco Use     Smoking status: Current Every Day Smoker     Packs/day: 0.50     Years: 10.00     Pack years: 5.00     Types: Cigarettes     Start date: 1/1/2002     Smokeless tobacco: Never Used     Tobacco comment: Transdermal patches have helped me the most in  the past   Substance Use Topics     Alcohol use: No     Alcohol/week: 1.2 - 2.4 oz     Comment: none since 2013       OBJECTIVE  /81   Pulse 94   Temp 98.6  F (37  C) (Oral)   Resp 16   Wt 79.4 kg (175 lb)   SpO2 98%   BMI 27.41 kg/m      Physical Exam   Constitutional: No distress.   HENT:   Head: Normocephalic and atraumatic.   Right Ear: Tympanic membrane and external ear normal.   Left Ear: Tympanic membrane and external ear normal.   Nose: Mucosal edema and rhinorrhea present.   Mouth/Throat: Oropharynx is clear and moist.   Eyes: Pupils are equal, round, and reactive to light. EOM are normal.   Neck: Normal range of motion. Neck supple.   Pulmonary/Chest: Effort normal and breath sounds normal. No respiratory distress.   Abdominal: Soft. Normal appearance and bowel sounds are normal.   Lymphadenopathy:     He has no cervical adenopathy.   Neurological: He is alert. No cranial nerve deficit.   non purposeful reflex movements on whole body (always there per patient) patient has history of nonfunctional body movements   Skin: Skin is warm and dry. He is not diaphoretic.   Psychiatric: He has a normal mood and affect.   Nursing note and vitals reviewed.      Labs:  Results for orders placed or performed in visit on 05/07/19 (from the past 24 hour(s))   Strep, Rapid Screen   Result Value Ref Range    Specimen Description Throat     Rapid Strep A Screen       NEGATIVE: No Group A streptococcal antigen detected by immunoassay, await culture report.   Influenza A/B antigen   Result Value Ref Range    Influenza A/B Agn Specimen Nasal     Influenza A Negative NEG^Negative    Influenza B Negative NEG^Negative           ASSESSMENT:      ICD-10-CM    1. Throat pain R07.0 Strep, Rapid Screen     Influenza A/B antigen     Beta strep group A culture   2. Seasonal allergic rhinitis due to other allergic trigger J30.89 fluticasone (FLONASE) 50 MCG/ACT nasal spray     cetirizine (ZYRTEC) 10 MG tablet   3. Viral  gastroenteritis A08.4         PLAN:  The benefits, risks and potential side effects of medications discussed. Black box warning discussed as relevant.  All questions are answered.  Instructions were given  to follow up immediately if any adverse reactions or worsening symptoms develop.   Understanding of plan of care was verbalized by patient      Patient Instructions     Patient Education     Allergic Rhinitis  Allergic rhinitis is an allergic reaction that affects the nose, and often the eyes. It s often known as nasal allergies. Nasal allergies are often due to things in the environment that are breathed in. Depending what you are sensitive to, nasal allergies may occur only during certain seasons. Or they may occur year round. Common indoor allergens include house dust mites, mold, cockroaches, and pet dander. Outdoor allergens include pollen from trees, grasses, and weeds.   Symptoms include a drippy, stuffy, and itchy nose. They also include sneezing and red and itchy eyes. You may feel tired more often. Severe allergies may also affect your breathing and trigger a condition called asthma.   Tests can be done to see what allergens are affecting you. You may be referred to an allergy specialist for testing and further evaluation.  Home care  Your healthcare provider may prescribe medicines to help relieve allergy symptoms. These may include oral medicines, nasal sprays, or eye drops.  Ask your provider for advice on how to avoid substances that you are allergic to. Below are a few tips for each type of allergen.  Pet dander:    Do not have pets with fur and feathers.    If you can't avoid having a pet, keep it out of your bedroom and off upholstered furniture.  Pollen:    When pollen counts are high, keep windows of your car and home closed. If possible, use an air conditioner instead.    Wear a filter mask when mowing or doing yard work.  House dust mites:    Wash bedding every week in warm water and detergent  "and dry on a hot setting.    Cover the mattress, box spring, and pillows with allergy covers.     If possible, sleep in a room with no carpet, curtains, or upholstered furniture.  Cockroaches:    Store food in sealed containers.    Remove garbage from the home promptly.    Fix water leaks  Mold:    Keep humidity low by using a dehumidifier or air conditioner. Keep the dehumidifier and air conditioner clean and free of mold.    Clean moldy areas with bleach and water.  In general:    Vacuum once or twice a week. If possible, use a vacuum with a high-efficiency particulate air (HEPA) filter.    Do not smoke. Avoid cigarette smoke. Cigarette smoke is an irritant that can make symptoms worse.  Follow-up care  Follow up as advised by the healthcare provider or our staff. If you were referred to an allergy specialist, make this appointment promptly.  When to seek medical advice  Call your healthcare provider right away if the following occur:    Coughing or wheezing    Fever of 100.4 F (38 C) or higher, or as directed by your healthcare provider    Raised red bumps (hives)    Continuing symptoms, new symptoms, or worsening symptoms  Call 911 if you have:    Trouble breathing    Severe swelling of the face or severe itching of the eyes or mouth  Date Last Reviewed: 3/1/2017    0471-2692 [employer]. [address] [place] [address]. All rights reserved. This information is not intended as a substitute for professional medical care. Always follow your healthcare professional's instructions.           Patient Education     Viral Gastroenteritis (Adult)    Gastroenteritis is commonly called the \"stomach flu,\" although it has nothing to do with influenza. It is most often caused by a virus that affects the stomach and intestinal tract and usually lasts from 2 to 7 days. Common viruses causing gastroenteritis include norovirus, rotavirus, and hepatitis A. Non-viral causes of gastroenteritis include " bacteria, parasites, and toxins.  The danger from repeated vomiting or diarrhea is dehydration. This is the loss of too much fluid from the body. When this occurs, body fluids must be replaced. Antibiotics don't help with this illness because it is usually viral. Simple home treatment will be helpful.  Symptoms of viral gastroenteritis may include:    Watery, loose stools    Stomach pain or abdominal cramps    Fever and chills    Nausea and vomiting    Loss of bowel control    Headache  Home care  Gastroenteritis is transmitted by contact with the stool or vomit of an infected person. This can occur from person to person or from contact with a contaminated surface.  Follow these guidelines when caring for yourself at home:    If symptoms are severe, rest at home for the next 24 hours or until you are feeling better.    Wash your hands with soap and water or use alcohol-based  to prevent the spread of infection. Wash your hands after touching anyone who is sick.    Wash your hands or use alcohol-based  after using the toilet and before meals. Clean the toilet after each use.  Remember these tips when preparing food:    People with diarrhea should not prepare or serve food to others. When preparing foods, wash your hands before and after.    Wash your hands after using cutting boards, countertops, knives, or utensils that have been in contact with raw food.    Dry your hands with a single use towel.    Keep uncooked meats away from cooked and ready-to-eat foods.  Medicine  You may use acetaminophen or NSAID medicines like ibuprofen or naproxen to control fever unless another medicine was given. If you have chronic liver or kidney disease, talk with your healthcare provider before using these medicines. Also talk with your provider if you've had a stomach ulcer or gastrointestinal bleeding. Don't give aspirin to anyone under 18 years of age who is ill with a fever. It may cause severe liver damage.  Don't use NSAIDS is you are already taking one for another condition (like arthritis) or are on aspirin (such as for heart disease or after a stroke).  If medicine for vomiting or diarrhea are prescribed, take these only as directed. Nausea and diarrhea medicines are generally OK unless you have bleeding, fever, or severe abdominal pain.  Diet  Follow these guidelines for food:    Water and liquids are important so you don't get dehydrated. Drink a small amount at a time or suck on ice chips if you are vomiting.    If you eat, avoid fatty, greasy, spicy, or fried foods.    Don't eat dairy if you have diarrhea. This can make diarrhea worse.    Avoid tobacco, alcohol, and caffeine which may worsen symptoms.  During the first 24 hours (the first full day), follow the diet below:    Beverages. Sports drinks, soft drinks without caffeine, ginger ale, mineral water (plain or flavored), decaffeinated tea and coffee. If you are very dehydrated, sports drinks aren't a good choice. They have too much sugar and not enough electrolytes. In this case, commercially available products called oral rehydration solutions, are best.    Soups. Eat clear broth, consommé, and bouillon.    Desserts. Eat gelatin, ice pops, and fruit juice bars.  During the next 24 hours (the second day), you may add the following to the above:    Hot cereal, plain toast, bread, rolls, and crackers    Plain noodles, rice, mashed potatoes, chicken noodle or rice soup    Unsweetened canned fruit (avoid pineapple), bananas    Limit fat intake to less than 15 grams per day. Do this by avoiding margarine, butter, oils, mayonnaise, sauces, gravies, fried foods, peanut butter, meat, poultry, and fish.    Limit fiber and avoid raw or cooked vegetables, fresh fruits (except bananas), and bran cereals.    Limit caffeine and chocolate. Don't use spices or seasonings other than salt.    Limit dairy products.    Avoid alcohol.  During the next 24 hours:    Gradually  resume a normal diet as you feel better and your symptoms improve.    If at any time it starts getting worse again, go back to clear liquids until you feel better.  Follow-up care  Follow up with your healthcare provider, or as advised. Call your provider if you don't get better within 24 hours or if diarrhea lasts more than a week. Also follow up if you are unable to keep down liquids and get dehydrated. If a stool (diarrhea) sample was taken, call as directed for the results.  Call 911  Call 911 if any of these occur:    Trouble breathing    Chest pain    Confused    Severe drowsiness or trouble awakening    Fainting or loss of consciousness    Rapid heart rate    Seizure    Stiff neck  When to seek medical advice  Call your healthcare provider right away if any of these occur:    Abdominal pain that gets worse    Continued vomiting (unable to keep liquids down)    Frequent diarrhea (more than 5 times a day)    Blood in vomit or stool (black or red color)    Dark urine, reduced urine output, or extreme thirst    Weakness or dizziness    Drowsiness    Fever of 100.4 F (38 C) or higher, or as directed by your healthcare provider    New rash  Date Last Reviewed: 6/1/2018 2000-2018 [employer]. [address] [place] [address], [place] [address]. All rights reserved. This information is not intended as a substitute for professional medical care. Always follow your healthcare professional's instructions.

## 2019-05-08 ENCOUNTER — OFFICE VISIT (OUTPATIENT)
Dept: PALLIATIVE MEDICINE | Facility: CLINIC | Age: 34
End: 2019-05-08
Payer: COMMERCIAL

## 2019-05-08 DIAGNOSIS — M25.551 HIP PAIN, RIGHT: ICD-10-CM

## 2019-05-08 DIAGNOSIS — M62.838 MUSCLE SPASM: ICD-10-CM

## 2019-05-08 DIAGNOSIS — G25.9 FUNCTIONAL MOVEMENT DISORDER: Primary | ICD-10-CM

## 2019-05-08 LAB
BACTERIA SPEC CULT: NORMAL
SPECIMEN SOURCE: NORMAL

## 2019-05-08 PROCEDURE — 96152 HC HEALTH AND BEHAVIOR INTERVENTION, INDIVIDUAL, EACH 15 MINUTES: CPT | Performed by: PSYCHOLOGIST

## 2019-05-08 NOTE — PROGRESS NOTES
[facility]   [facility], [place]  Behavioral Medicine Visit    Patient Name: Hoang Kiser     YOB: 1985   Medical Record Number: [id number]  Date: 5/8/2019                SUBJECTIVE: Received notification his [facility]RT application was approved.  He reports he is feeling slightly ill with a cold, somewhat unfocused and flat today as a result. Feels his  at his housing was accusing him of not being honest on with her about things. He asserted himself with her, as he was upset she didn't believe that he did not relapse - states she believed he must have since he was approved for outpatient treatment. He reports some increased dissociation both with others and alone - this is stressful for him. Discussed this and ways to manage it. Encouraged self-care and to work on focusing on himself, asserting his truth and focusing on his program.    OBJECTIVE: Patient reports his pain is mildly worse, mood is the same, activity level is the same, stress the same, sleep mildly worse, engages in self-care 2-3 times daily.    Length of Visit: 60 minutes      Assessment: Current Emotional / Mental Status    Appearance:   Appropriate   Eye Contact:   Fair   Psychomotor Behavior: Normal   Attitude:   Cooperative   Orientation:   All  Speech  Rate / Production:             Normal   Volume:              Soft   Mood:    Depressed  Normal  Affect:    Appropriate  Flat   Thought Content:  Clear   Thought Form:  Coherent  Logical   Insight:    Good     ASSESSMENT:   Progress toward goals: satisfactory.    Pain Status: worsened    Emotional Status: unchanged and remained stable              Medication / chemical use concerns:  None    PLAN:   Next Appointment: Hoang Kiser will schedule a follow-up appointment in 2 week(s).  Assignment: Continue to assert himself as needed.  Objectives / interventions for next session:  Continue to work on paying attention to his own self-care, work on mindfulness/present-focus.    Sol Kemp PsyD LP  Outpatient Clinic Therapist  [facility]

## 2019-05-09 ENCOUNTER — OFFICE VISIT (OUTPATIENT)
Dept: PALLIATIVE MEDICINE | Facility: CLINIC | Age: 34
End: 2019-05-09
Payer: COMMERCIAL

## 2019-05-09 VITALS
SYSTOLIC BLOOD PRESSURE: 121 MMHG | WEIGHT: 178 LBS | BODY MASS INDEX: 27.88 KG/M2 | HEART RATE: 87 BPM | DIASTOLIC BLOOD PRESSURE: 75 MMHG

## 2019-05-09 DIAGNOSIS — M25.551 HIP PAIN, RIGHT: ICD-10-CM

## 2019-05-09 DIAGNOSIS — G89.29 CHRONIC MYOFASCIAL PAIN: ICD-10-CM

## 2019-05-09 DIAGNOSIS — G62.9 NEUROPATHY: ICD-10-CM

## 2019-05-09 DIAGNOSIS — M79.18 CHRONIC MYOFASCIAL PAIN: ICD-10-CM

## 2019-05-09 PROCEDURE — 99214 OFFICE O/P EST MOD 30 MIN: CPT | Performed by: NURSE PRACTITIONER

## 2019-05-09 RX ORDER — HYDROCODONE BITARTRATE AND ACETAMINOPHEN 5; 325 MG/1; MG/1
1 TABLET ORAL 3 TIMES DAILY PRN
Qty: 90 TABLET | Refills: 0 | Status: SHIPPED | OUTPATIENT
Start: 2019-05-09 | End: 2019-06-06

## 2019-05-09 RX ORDER — NAPROXEN 500 MG/1
500 TABLET ORAL 2 TIMES DAILY WITH MEALS
Qty: 40 TABLET | Refills: 3 | Status: SHIPPED | OUTPATIENT
Start: 2019-05-09 | End: 2019-09-03

## 2019-05-09 NOTE — PATIENT INSTRUCTIONS
After Visit Instructions:     Thank you for coming to [facility] for your care. It is my goal to partner with you to help you reach your optimal state of health.     Continue daily self-care, identifying contributing factors, and monitoring variations in pain level. Continue to integrate self-care into your life.      1. Schedule pain psychology visit   2. Schedule follow-up with CARLOS A Higuera NP-C in 4 weeks  3. Medication recommendations:   1. Refill of Norco and Naproxen sent to your pharmacy.    CARLOS A Bass, NP-C  [facility]  Clinic Number:  [phone]

## 2019-05-09 NOTE — PROGRESS NOTES
"[facility]    CHIEF COMPLAINT: Pain    INTERVAL HISTORY:  Last seen on 4/9/19.        Recommendations/plan at the last visit included:     1.         Schedule pain psychology visits   2.         Schedule follow-up with CARLOS A Higuera NP-C in 4 weeks  3.         Labs: Annual urine drug screen and controlled substance agreement today.   4.         Medication recommendations:             1. Refill of Hydrocodone/APAP sent to your pharmacy.       Since last visit:   - Had 3 hr EEG, no s/s of epilepsy.   - Continues to have movement attacks daily. Has developed new movement in fingers of left hand \"playing piano\" movements.     Pain Information:   Pain quality: Exhausting and Penetrating    Pain rating: intensity ranges from 3/10 to 9/10, and averages 7/10 on a 0-10 scale.      Annual Controlled Substance Agreement/UDS due date: 4/2020    Current Pain Relevant Medications:   Norco 5/325 mg 1 tab BID- TID                        Total opiate dose: 10-15 MME daily  Clonazepam .5 mg 1-2 tab at HS PRN  Duloxetine 20 mg daily  Naproxen 500 mg BID: on hold re: elevated LFTs   Tizanidine 4 mg 1-2 tabs at HS PRN  Ambien 10 mg at HS  Adderall 50 mg daily, combination of long and short acting.       Previous Pain Relevant Medications: (H--helped; HI--Helped initially; SWH--Somewhat helpful; NH--No help; W--worse; SE--side effects; ?--Unsure if helpful)   NOTE: This medication information taken from patient's intake form, not medical records.                         Opiates: Tramadol: H, Hydrocodone: H                        NSAIDS: Ibuprofen:H, naproxen:SWH, Relafen: NH                        Muscle Relaxants: Cyclobenzaprine:H,Med interaction, Tizanidine:H, Baclofen: [facility]                        Anti-migraine mediations: Prednisone:H                        Anti-depressants: Bupropion:SE, Celexa:SE, Duloxetine:H, Trazodone:Too strong, Venlafaxine:too strong, Amitriptyline:SE                         Sleep " aids:Anbien: H                        Anxiolytics: Clonazepam:H                        Neuropathics: Tegretol:taken for seizures in childhood, Gabapentin:H                                          Topicals: Lidocaine:H                        Other medications not covered above: Tylenol:NH      Any illicit drug use: 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 Concern for multiple controlled substances including stimulants and opiates.            Medications:  Current Outpatient Medications   Medication Sig Dispense Refill     albuterol (PROAIR HFA/PROVENTIL HFA/VENTOLIN HFA) 108 (90 Base) MCG/ACT inhaler Inhale 1-2 puffs into the lungs every 4 hours as needed for shortness of breath / dyspnea or wheezing (cough or wheeze only if necessary) 6.7 g 1     amphetamine-dextroamphetamine (ADDERALL XR) 30 MG per 24 hr capsule Take 30 mg by mouth daily       cetirizine (ZYRTEC) 10 MG tablet Take 1 tablet (10 mg) by mouth every evening for 14 days 14 tablet 0     clonazePAM (KLONOPIN) 0.5 MG tablet Take 0.5-1 mg by mouth nightly as needed   0     doxepin (SINEQUAN) 10 MG capsule 10mg capsule by mouth nightly as needed @ 10pm       DULoxetine (CYMBALTA) 60 MG EC capsule 2 x 60mg tab by mouth daily 30 capsule 1     fluticasone (FLONASE) 50 MCG/ACT nasal spray Spray 1-2 sprays into both nostrils daily for 7 days 9.9 mL 0     HYDROcodone-acetaminophen (NORCO) 5-325 MG tablet Take 1 tablet by mouth 3 times daily as needed for pain (Max 3 times daily.) Okay to fill on 4/9/19 to begin using on 4/13/19 90 tablet 0     hydrOXYzine (ATARAX) 25 MG tablet Take 1 tablet (25 mg) by mouth nightly as needed (at HS PRN) 30 tablet 3      methocarbamol (ROBAXIN) 500 MG tablet Take 1-2 tablets (500-1,000 mg) by mouth 4 times daily as needed for muscle spasms 240 tablet 1     naloxone (NARCAN) 4 MG/0.1ML nasal spray Spray 1 spray (4 mg) into one nostril alternating nostrils as needed for opioid reversal every 2-3 minutes until assistance arrives 0.2 mL 0     naproxen (NAPROSYN) 500 MG tablet Take 1 tablet (500 mg) by mouth 2 times daily (with meals) 40 tablet 3     SF 5000 PLUS 1.1 % CREA   2       Review of Systems: A 10-point review of systems was negative, with the exception of chronic pain issues.      Social History: Reviewed; unchanged from previous consultation.      Family history: Reviewed; unchanged from previous consultation.     PHYSICAL EXAM    There were no vitals filed for this visit.    Constitutional: healthy, alert, no distress, pain behaviors and somewhat down r/t increased spastic movements   HEENT: Head atraumatic, normocephalic. Eyes without conjunctival injection or jaundice. Neck supple. No obvious neck masses.  Skin: No rash, lesions, or petechiae of exposed skin.   Extremities: No clubbing, cyanosis, or edema to exposed extremities  Psychiatric/mental status: Alert, without lethargy or stupor. Appropriate affect.       Neurologic exam:  CN:  Cranial nerves 2-12 are grossly normal.  Has uncontrolled upper body movement/tremor.           Musculoskeletal exam:  Cervical spine:                       Flex:  20 degrees                       Ext: 20 degrees                       Rotation to right: 20 degrees                       Rotation to left: 20 degrees                       Rotation/ext to right: painful                        Rotation/ext to left: painful                        Tenderness in the cervical spine at midline. No                       Tenderness in the cervical paraspinal muscles. No  Thoracic spine:                        Kyphosis. No                       Tenderness in the thoracic spine at midline.  "Yes                       Tenderness in the thoracic paraspinal muscles. Yes  Lumbar/Sacral spine:                       Forward Flexion:  90 degrees                       Ext: 20 degrees \"tight\"                       Rotation/ext to right: painful                        Rotation/ext to left: painful                        Lordosis. No                       Tenderness in the lumbar spine at midline. Yes                       Tenderness in the lumbar paraspinal muscles.Yes      Psychiatric:  mentation appears normal., affect and mood normal      DIRE Score for ongoing opioid management is calculated as follows:    Diagnosis = 2 pts (slowly progressive; moderate pain/objective findings)    Intractability = 2 pts (most treatments tried; patient not fully engaged/barriers)    Risk        Psych = 2 pts (personality dysfunction/mental illness that moderately interferes with care)         Chem Hlth = 2 pts (use of medications to cope with stress; chemical dependency in remission)       Reliability = 3 pts (highly reliable with meds, appointments, treatments)       Social = 3 pts (supportive family/close relationships; involved in work/school; no isolation)       (Psych + Chem hlth + Reliability + Social) = 14    Efficacy = 2 pts (moderate benefit/function; low med dose; too early/not tried meds)    DIRE Score = 16        7-13: likely NOT suitable candidate for long-term opioid analgesia       14-21: may be a suitable candidate for long-term opioid analgesia          Previous Diagnostic Tests:   Imaging Studies:   MR LUMBAR SPINE W/O CONTRAST 5/2/2018 7:27 PM  History: DDD (degenerative disc disease), lumbar; Lumbar  radiculopathy; Hip pain, right; Groin pain, right.  ICD-10: DDD (degenerative disc disease), lumbar; Lumbar radiculopathy;  Hip pain, right; Groin pain, right  Comparison: Lumbar spine MRI 3/8/2017  Technique: Sagittal STIR, T1-weighted turbo spin-echo, 3-D volumetric  T2-weighted and axial reconstructed " T2-weighted images of the lumbar  spine were obtained without intravenous contrast.   Findings: 5 lumbar-type vertebra. The tip of the conus medullaris is  at L1.  The lumbar vertebral column is normally aligned.   Straightening of lumbar lordosis, unchanged. The intervertebral disc  heights are maintained. Normal marrow signal. At L5-S1, there is a  disc bulge and facet hypertrophy with mild left neural foraminal  narrowing, unchanged. No spinal canal stenosis.  Impression:  1. Lumbar spondylosis with mild left neural foraminal narrowing at  L5-S1.  2. No spinal canal stenosis.      MR right hip without contrast 5/2/2018 6:47 PM  Techniques: Multiplanar multisequence imaging of the right hip was  obtained without  administration of intra-articular or intravenous  contrast using routing protocol.  History: Hip pain.  Comparison: None available.  Findings:  Osseous structures  Osseous structures: No fracture, stress reaction, avascular necrosis,  or focal osseous lesion is seen. There is small focal area of  subchondral cystic changes in the anterior right femoral head neck  junction, most compatible with synovial herniation pit. Findings  suggestive of reduced femoral head neck junction though alpha angle  cannot be assessed owing to no dedicated oblique axial sequence  obtained.  Articular cartilage and labrum  Assessment limited on this arthrographic study due to relative lack of  joint distension.  Articular cartilage: No high grade chondral loss.  Labrum: Labral tearing approximately from 12:00 to 3:00 with 3:00  being anterior and 6:00 being the center of transverse ligament in  this convention with associated subtle area of subchondral edema in  the superior acetabulum.  Ligament teres and transverse ligament of acetabulum: Intact.  Joint or bursal effusion  Joint effusion: A physiologic amount of joint fluid.  Bursal effusion: Minimal nonspecific edema over the greater  trochanter. No substantial iliopsoas or  trochanteric bursal effusion.  Muscles and tendons  Muscles and tendons: The rectus femoris, the visualized portion  iliopsoas, proximal hamstrings, and hip abductors are intact.  The  visualized adductor muscles are unremarkable.   Nerves:  The visualized course of the sciatic nerve is unremarkable.   Other Findings:  There is fat-containing right inguinal hernia.  Impression:  1. Approximately 12-3 o'clock labral tearing with synovial herniation  pit and likely reduced femoral head neck junction offset. 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  2. Fat containing right inguinal hernia.          ASSESSMENT:   1.  Lumbar DDD, mild  2.  Lumbar muscle spasm  3.  Labral tear, right hip  4.  Hx: anxiety, chemical dependence in remission.  5. Functional neurologic movement disorder    Shoaib returns for medication management visit. Has appealed to sober  and his counselor and he is not allowed to use CBD if living in the house. Notes that two women have moved into the house and he is aware that dating women from the house has caused problems for him in the past. No change to current plan of care.       Tobacco use: Addressed at this visit     Plan:    Diagnosis reviewed, treatment option addressed, and risk/benifits discussed.  Self-care instructions given.  I am recommending a multidisciplinary treatment plan to help this patient better manage pain.        1. Schedule pain psychology visit   2. Schedule follow-up with CARLOS A Higuera NP-C in 4 weeks  3. Medication recommendations:   1. Refill of Norco and Naproxen sent to your pharmacy.      Total time spent face to face was 30 minutes and more than 50% of face to face time was spent in counseling and/or coordination of care regarding the diagnosis and recommendations above.      CARLOS A Bass, NP-C   [facility]    Disclaimer: This note consists of symbols derived from  keyboarding, dictation and/or voice recognition software. As a result, there may be errors in the script that have gone undetected. Please consider this when interpreting information found in this chart.

## 2019-05-10 ENCOUNTER — OFFICE VISIT (OUTPATIENT)
Dept: UROLOGY | Facility: CLINIC | Age: 34
End: 2019-05-10
Payer: COMMERCIAL

## 2019-05-10 VITALS
HEIGHT: 67 IN | BODY MASS INDEX: 27.94 KG/M2 | HEART RATE: 86 BPM | WEIGHT: 178 LBS | SYSTOLIC BLOOD PRESSURE: 127 MMHG | DIASTOLIC BLOOD PRESSURE: 82 MMHG | OXYGEN SATURATION: 96 %

## 2019-05-10 DIAGNOSIS — N39.41 URGE INCONTINENCE: ICD-10-CM

## 2019-05-10 DIAGNOSIS — R39.15 URINARY URGENCY: Primary | ICD-10-CM

## 2019-05-10 PROCEDURE — 99213 OFFICE O/P EST LOW 20 MIN: CPT | Performed by: UROLOGY

## 2019-05-10 RX ORDER — OXYBUTYNIN CHLORIDE 5 MG/1
10 TABLET, EXTENDED RELEASE ORAL DAILY
Qty: 180 TABLET | Refills: 3 | Status: SHIPPED | OUTPATIENT
Start: 2019-05-10 | End: 2019-08-20

## 2019-05-10 ASSESSMENT — PAIN SCALES - GENERAL: PAINLEVEL: MODERATE PAIN (5)

## 2019-05-10 ASSESSMENT — MIFFLIN-ST. JEOR: SCORE: 1711.03

## 2019-05-10 NOTE — NURSING NOTE
"Hoang Kiser's goals for this visit include:   Chief Complaint   Patient presents with     RECHECK     Urinary urgency 6 month follow up       He requests these members of his care team be copied on today's visit information: yes    PCP: Phill Floyd    Referring Provider:  No referring provider defined for this encounter.    /82   Pulse 86   Ht 1.702 m (5' 7\")   Wt 80.7 kg (178 lb)   SpO2 96%   BMI 27.88 kg/m      Do you need any medication refills at today's visit? No    Bladimir Aldana CMA      "

## 2019-05-10 NOTE — Clinical Note
Dane Floyd,I saw gave back today for follow-up of his urinary urgency and urge incontinence.  He reports doing better with Ditropan 5 mg a day, however continues to have urgency and frequency and we discussed increasing the dose to 10 mg daily.  I will plan to see him back in 1 year.Please let me know if you have any questions or concerns.Thanks, Prabhjot Braswell M.D.Cell: [phone]

## 2019-05-10 NOTE — PROGRESS NOTES
"[facility]  CHIEF COMPLAINT   It was my pleasure to see Hoang Kiser who is a 33 year old male for follow-up of urinary urgency and urge incontinence.      HPI   Hoang Kiser is a very pleasant 33 year old male who presents with a history of abnormal movement disorder of unknown etiology and urinary urgency and urge incontinence.    He was initially seen by me on 11/8/18:    \"His main issue is urgency and urge incontinence which has been stable for the past year. Has daily issues with small volume urine leakage on the way to the bathroom. Required change of cloths 2-3 x /month.   No dysuria, hematuria, no UTIs. Nocturia 1-2 x / night.  Bowel function is normal. Has daily soft bowel movements.      AUASS: 3-5-1-5-1-1-5 = 21  QOL = 5  THEODORA: 3-4-3-4-4 = 18\"    He was started on oxybutynin 5mg XL (Ditropan XL) and returns for follow up. He continues to have involuntary movements of unknown etiology and is being referred to [facility] for a program there. He notes improvement of urinary urgency and no further episodes of incontinence on the ditropan. He denies noting any side effects and is wondering about increasing the dose.     AUASS: 0-1-8-4-0-1-1 = 14  QOL = 3    PHYSICAL EXAM  Patient is a 33 year old  male   Vitals: There were no vitals taken for this visit.  General Appearance Adult: There is no height or weight on file to calculate BMI.  Alert, no acute distress, oriented  HENT: throat/mouth:normal, good dentition  Lungs: no respiratory distress, or pursed lip breathing  Heart: No obvious jugular venous distension present  Abdomen: soft, nontender, no organomegaly or masses  Musculoskeltal: extremities normal, no peripheral edema  Skin: no suspicious lesions or rashes  Neuro: Alert, oriented, speech and mentation normal. Abnormal body movements  Psych: affect and mood normal    UA RESULTS:  Recent Labs   Lab Test 10/08/18  1215   COLOR Yellow   APPEARANCE Clear   URINEGLC Negative   URINEBILI Negative "   URINEKETONE Negative   SG 1.010   UBLD Negative   URINEPH 5.5   PROTEIN Negative   UROBILINOGEN 0.2   NITRITE Negative   LEUKEST Negative   RBCU O - 2   WBCU 0 - 5          ASSESSMENT and PLAN   34 y/o man with functional movement disorder and also urinary urgency and urge incontinence.      Urinary urgency with history of urge incontinence  -We reviewed that his symptoms are consistent with OAB (over-active bladder) which may be related to his overall movement disorder.  -His symptoms are improved with oxybutynin 5mg XL (Ditropan XL), however he still notes urinary frequency and urgency.  We discussed the side effects of the triptan, and will try increasing the dose to 10 mg daily.  -Prescription refilled with 3 refills  -Follow-up in 1 year    I spent over 15 minutes with the patient.  Over half this time was spent on counseling regarding the above.    Prabhjot Braswell   Urology  [facility]  Clinic Phone [phone]

## 2019-05-11 ENCOUNTER — MYC MEDICAL ADVICE (OUTPATIENT)
Dept: FAMILY MEDICINE | Facility: CLINIC | Age: 34
End: 2019-05-11

## 2019-05-11 DIAGNOSIS — J30.2 SEASONAL ALLERGIC RHINITIS: Primary | ICD-10-CM

## 2019-05-11 DIAGNOSIS — J30.2 SEASONAL ALLERGIC RHINITIS, UNSPECIFIED TRIGGER: ICD-10-CM

## 2019-05-13 ENCOUNTER — TELEPHONE (OUTPATIENT)
Dept: FAMILY MEDICINE | Facility: CLINIC | Age: 34
End: 2019-05-13

## 2019-05-13 NOTE — TELEPHONE ENCOUNTER
[facility]  [facility]: [place]        Social Work Care Navigator Note      Patient: Hoang Kiser  Date: May 13, 2019  Preferred Name: Shoaib    Previous PHQ-9:   PHQ-9 SCORE 2/14/2018 10/1/2018 3/22/2019   PHQ-9 Total Score - - -   PHQ-9 Total Score 7 13 11     Previous JIN-7:   JIN-7 SCORE 8/14/2018 10/1/2018 3/22/2019   Total Score - - -   Total Score 15 13 12     MOLLY LEVEL:  MOLLY Score (Last Two) 3/23/2016 10/1/2018   MOLLY Raw Score 52 31   Activation Score 100 59.3   MOLLY Level 4 3       Preferred Contact:  Need for : No  Preferred Contact: Cell      Type of Contact Today: Phone call (patient / identified key support person reached)      Data: (subjective / Objective):  Recent ED/IP Admission or Discharge?   None    Patient Goals:  Goal Areas: Health;Mental Health;Chemical Health;Financial and Social Service Benefits  Patient stated goals: Patient stated that he would like to find a Neurologist that he works well with; Patient would like to continue to work on self-advocacy skills; Patient would eventually like to find part-time work when health conditions are better managed; Patient stated he would like to continue growing his skills with coping and stress management; Patient would like to continue going to [email or web address] groups through [facility] to ensure his sobriety continues as it has for the last 3 1/2 years        [facility] Core Service Provided:  Health and Wellness Promotion    Current Stressors / Issues / Care Plan Objective Addressed Today:   contacted patient for monthly [facility] follow up. Patient stated that he recently had an EEG, which was negative. He reports that his Neurologist didn't have any more information to give to patient regarding it, so patient is choosing to get a second opinion. Patient stated that he was recently SMRT'd . He requested some information on programs he may qualify for.  will review this and send him information, as able.      Intervention:  Motivational Interviewing: Expressed Empathy/Understanding, Supported Autonomy, Collaboration, Evocation, Permission to raise concern or advise, Open-ended questions and Reflections: simple and complex   Target Behavior(s): Explored current social supports and reinforced opportunities to increase engagement    Assessment: (Progress on Goals / Homework):   reviewed health action plan goals. See Objectives for more details. Progress: Needs improvement. Next Steps:  will continue to work with patient to support patient in achieving their goals.    Plan: (Homework, other):  Patient was encouraged to continue to seek condition-related information and education.      Scheduled a Phone follow up appointment with BROWN BURGESS in 4 weeks     Patient has set self-identified goals and will monitor progress until the next appointment on: INDER Brian, Social Work Care Coordinator               Next 5 appointments (look out 90 days)    May 22, 2019  8:00 AM CDT  Return Visit with Sol Kemp  [facility] ([facility] [place]) [address]  [address]  [facility] [address]  [phone]   Jun 06, 2019  8:30 AM CDT  Return Visit with CARLOS A Guy CNP  [facility] ([facility]) [address] [place], [place] [address]  [facility] [address]  [facility] [id number] [phone]

## 2019-05-15 ENCOUNTER — MYC MEDICAL ADVICE (OUTPATIENT)
Dept: PALLIATIVE MEDICINE | Facility: CLINIC | Age: 34
End: 2019-05-15

## 2019-05-15 DIAGNOSIS — M47.819 FACET ARTHROPATHY: Primary | ICD-10-CM

## 2019-05-15 NOTE — TELEPHONE ENCOUNTER
1/16/19 Procedure performed: bilateral L4/5 and L5/S1 facet joint injections    Order pended for provider to review request for repeat injection.    KATERINA CardenasN, RN  Care Coordinator  [facility] [place]

## 2019-05-17 ENCOUNTER — TELEPHONE (OUTPATIENT)
Dept: PALLIATIVE MEDICINE | Facility: CLINIC | Age: 34
End: 2019-05-17

## 2019-05-17 NOTE — TELEPHONE ENCOUNTER
LM on  to schedule bilateral L4/5 and L5/S1 facet joint injections.      Kurt MINAYA    [facility] [place]

## 2019-05-17 NOTE — TELEPHONE ENCOUNTER
Pre-screening Questions for Radiology Injections:    Injection to be done at which interventional clinic site? [facility]    Instruct patient to arrive as directed prior to the scheduled appointment time:    [place] minutes before      [place]: 30 minutes before; if IV needed 1 hour before     Procedure ordered by Hilda    Procedure ordered? bilateral L4/5 and L5/S1 facet joint injections    What insurance would patient like us to bill for this procedure? BC      Worker's comp or MVA (motor vehicle accident) -Any injection DO NOT SCHEDULE and route to Dominique King.      [employer] insurance - For SI joint injections, DO NOT SCHEDULE and route Dominique King.       [employer] - Any injection besides hip/shoulder/knee joint DO NOT SCHEDULE and route to Dominique King. She will obtain PA and call pt back to schedule procedure or notify pt of denial.        [employer]-Route to Dominique for review        IF SCHEDULING IN [place] AND NEEDS A PA, IT IS OKAY TO SCHEDULE. [place] HANDLES THEIR OWN PA'S AFTER THE PATIENT IS SCHEDULED. PLEASE SCHEDULE AT LEAST 1 WEEK OUT SO A PA CAN BE OBTAINED.      Any chance of pregnancy? NO   If YES, do NOT schedule and route to RN pool    Is an  needed? No     Patient has a drive home? (mandatory) YES: ok    Is patient taking any blood thinners (plavix, coumadin, jantoven, warfarin, heparin, pradaxa or dabigatran )? No   If hold needed, do NOT schedule, route to RN pool     Is patient taking any aspirin products (includes Excedrin and Fiorinal)? No     If more than 325mg/day do NOT schedule; route to RN pool     For CERVICAL procedures, hold all aspirin products for 6 days.     Tell pt that if aspirin product is not held for 6 days, the procedure WILL BE cancelled.      Does the patient have a bleeding or clotting disorder? No     If YES, okay to schedule AND route to RN nurse pool    For any patients with platelet count <100, must be forwarded to provider    Is  patient diabetic?  NO  If YES, have them bring their glucometer.    Does patient have an active infection or treated for one within the past week? No     Is patient currently taking any antibiotics?  No     For patients on chronic, preventative, or prophylactic antibiotics, procedures may be scheduled.     For patients on antibiotics for active or recent infection:antibiotic course must have been completed for 4 days    Is patient currently taking any steroid medications? (i.e. Prednisone, Medrol)  No     For patients on steroid medications, course must have been completed for 4 days    Reviewed with patient:  If you are started on any steroids or antibiotics between now and your appointment, you must contact us because the procedure may need to be cancelled.  Yes    Is patient actively being treated for cancer or immunocompromised? No  If YES, do NOT schedule and route to RN pool     Are you able to get on and off an exam table with minimal or no assistance? Yes  If NO, do NOT schedule and route to RN pool    Are you able to roll over and lay on your stomach with minimal or no assistance? Yes  If NO, do NOT schedule and route to RN pool     Any allergies to contrast dye, iodine, shellfish, or numbing and steroid medications? No  If YES, route to RN pool AND add allergy information to appointment notes    Allergies: Buspirone hcl; Doxycycline; Elavil [amitriptyline]; Trazodone; Buspirone; and Keflex [cephalexin]      Has the patient had a flu shot or any other vaccinations within 7 days before or after the procedure.  No     Does patient have an MRI/CT?  YES: MRI  (SI joint, hip injections, lumbar sympathetic blocks, and stellate ganglion blocks do not require an MRI)    Was the MRI done w/in the last 3 years?  Yes    Was MRI done at [place]? Yes      If not, where was it done? N/A       If MRI was not done at [facility], [facility] or [facility] do NOT schedule and route to nursing.  If pt has an imaging disc, the  injection may be scheduled but pt has to bring disc to appt. If they show up w/out disc the injection cannot be done    Reminders (please tell patient if applicable):       Instructed pt to arrive 30 minutes early for IV start if this is for a cervical procedure, ALL sympathetic (stellate ganglion, hypogastric, or lumbar sympathetic block) and all sedation procedures (RFA, spinal cord stimulation trials).  Not Applicable   -IVs are not routinely placed for Dr. Trujillo cervical cases   -Dr. Sahu: IVs for cervical ESIs and cervical TBDs (not CMBBs/facet inj)      If NPO for sedation, informed patient that it is okay to take medications with sips of water (except if they are to hold blood thinners).  Not Applicable   *DO take blood pressure medication if it is prescribed*      If this is for a cervical ESTER, informed patient that aspirin needs to be held for 6 days.   Not Applicable      For all patients not having spinal cord stimulator (SCS) trials or radiofrequency ablations (RFAs), informed patient:    IV sedation is not provided for this procedure.  If you feel that an oral anti-anxiety medication is needed, you can discuss this further with your referring provider or primary care provider.  The Pain Clinic provider will discuss specifics of what the procedure includes at your appointment.  Most procedures last 10-20 minutes.  We use numbing medications to help with any discomfort during the procedure.  Not Applicable      Do not schedule procedures requiring IV placement in the first appointment of the day or first appointment after lunch. Do NOT schedule at 0745, 0815 or 1245.       For patients 85 or older we recommend having an adult stay w/ them for the remainder of the day.       Does the patient have any questions?  NO  Cathryn Alvarez  [facility]

## 2019-05-20 ENCOUNTER — TELEPHONE (OUTPATIENT)
Dept: FAMILY MEDICINE | Facility: CLINIC | Age: 34
End: 2019-05-20

## 2019-05-20 NOTE — TELEPHONE ENCOUNTER
Behavioral Health Home Services  [facility]: [place]        Social Work Care Navigator Note      Patient: Hoang Kiser  Date: May 20, 2019  Preferred Name: Shoaib    Previous PHQ-9:   PHQ-9 SCORE 2/14/2018 10/1/2018 3/22/2019   PHQ-9 Total Score - - -   PHQ-9 Total Score 7 13 11     Previous JIN-7:   JIN-7 SCORE 8/14/2018 10/1/2018 3/22/2019   Total Score - - -   Total Score 15 13 12     MOLLY LEVEL:  MOLLY Score (Last Two) 3/23/2016 10/1/2018   MOLLY Raw Score 52 31   Activation Score 100 59.3   MOLLY Level 4 3       Preferred Contact:  Need for : No  Preferred Contact: Cell      Type of Contact Today: Phone call (not reached/unavailable)      Data: (subjective / Objective):  Attempted to reach patient, but was unsuccessful.  Plan to attempt again.  Andrez Brian        Next 5 appointments (look out 90 days)    May 22, 2019  8:00 AM CDT  Return Visit with Sol Kemp  [facility] ([facility]) [address]  [address]  [facility] [address] [phone]   Jun 06, 2019  8:30 AM CDT  Return Visit with CARLOS A Guy CNP  [facility] ([facility]) [address] [place] [address]  [facility] [id number] [phone]

## 2019-05-20 NOTE — PROGRESS NOTES
[facility] - Procedure Note    Date of Visit: 5/22/2019    Pre procedure Diagnosis: facet arthropathy   Post procedure Diagnosis: Same  Procedure performed: Bilateral L5-S1 facet joint injections  Anesthesia: none  Complications: none  Operators: Brynn Trujillo MD & Shoaib Ambrocio DO (pain fellow)     Indications:   Hoang Kiser is a 33 year old male was sent by Tamiko Stevens CNP for lumbar facet joint injections.  They have a history of central axial low back pain.  Exam shows pain with extension and rotation and they have tried conservative treatment including PT and medications.    This is a repeat injection.  Previous bilateral L4/5 and L5-S1 facet joint injections done by Dr. Boss on 1/16/2019 provided good pain relief for a period of 2 months. Previous bilateral L5-S1 facet joint injections were done by myself on 9/12/2018 did provide about 3-4 months benefit.      Options/alternatives, benefits and risks were discussed with the patient including bleeding, infection, flared pain, tissue trauma, exposure to radiation, reaction to medications including seizure, spinal cord injury, paralysis, weakness, numbness and headache.    Questions were answered to his satisfaction and he agrees to proceed. Voluntary informed consent was obtained and signed.     Vitals were reviewed: Yes  Allergies were reviewed:  Yes   Medications were reviewed:  Yes   Pre-procedure pain score: 4/10    Imaging: MRI lumbar spine 5/02/2018  Findings: 5 lumbar-type vertebra. The tip of the conus medullaris is  at L1.  The lumbar vertebral column is normally aligned.   Straightening of lumbar lordosis, unchanged. The intervertebral disc  heights are maintained. Normal marrow signal. At L5-S1, there is a  disc bulge and facet hypertrophy with mild left neural foraminal  narrowing, unchanged. No spinal canal stenosis.       Impression:  1. Lumbar spondylosis with mild left neural foraminal narrowing at  L5-S1.  2. No  spinal canal stenosis.       Procedure:  After getting informed consent, patient was brought into the procedure suite and was placed in a prone position on the procedure table.   A Pause for the Cause was performed.  Patient was prepped and draped in sterile fashion.     Under AP fluoroscopic guidance the L5-S1 facet joints on the right and left sides were identified, and the C-arm was rotated obliquely to the affected side to open the joint space. A total of 3 ml of 1% lidocaine was injected at the needle entry point and needle tract. Then a 22 gauge 3.5 inch quincke type spinal needle was inserted and advanced under fluoroscopic guidance targeting the superior articular pillar of each joint. Once the needle made a contact with SAP, it was rotated and was then advanced into the joint.    AP fluoroscopic views were obtained to confirm the needle placement. Then,  Omnipaque 300 contrast dye was injected after negative aspiration for heme and CSF in each joint, confirming appropriate placement.  A total of 1mL of Omnipaque was used and 9mL was wasted.    The injection was then accomplished using a solution containing 1ml of 0.25% bupivacaine mixed with 1ml of 1% Lidocaine and 80mg of kenolog, divided between the four joints. The needles were removed..     Hemostasis was achieved, the area was cleaned, and bandaids were placed when appropriate.  The patient tolerated the procedure well, and was taken to the recovery room.    Images were saved to PACS.    Post-procedure pain score: 2/10  Follow-up includes:   -f/u phone call in one week  -f/u with the referring provider      Brynn Trujillo MD   [facility] [place]

## 2019-05-22 ENCOUNTER — ANCILLARY PROCEDURE (OUTPATIENT)
Dept: GENERAL RADIOLOGY | Facility: CLINIC | Age: 34
End: 2019-05-22
Attending: ANESTHESIOLOGY
Payer: COMMERCIAL

## 2019-05-22 ENCOUNTER — OFFICE VISIT (OUTPATIENT)
Dept: PALLIATIVE MEDICINE | Facility: CLINIC | Age: 34
End: 2019-05-22
Payer: COMMERCIAL

## 2019-05-22 ENCOUNTER — RADIOLOGY INJECTION OFFICE VISIT (OUTPATIENT)
Dept: PALLIATIVE MEDICINE | Facility: CLINIC | Age: 34
End: 2019-05-22
Attending: NURSE PRACTITIONER
Payer: COMMERCIAL

## 2019-05-22 VITALS — SYSTOLIC BLOOD PRESSURE: 106 MMHG | DIASTOLIC BLOOD PRESSURE: 82 MMHG | OXYGEN SATURATION: 100 % | HEART RATE: 80 BPM

## 2019-05-22 DIAGNOSIS — G25.9 FUNCTIONAL MOVEMENT DISORDER: Primary | ICD-10-CM

## 2019-05-22 DIAGNOSIS — M47.816 FACET ARTHROPATHY, LUMBAR: ICD-10-CM

## 2019-05-22 DIAGNOSIS — M25.551 HIP PAIN, RIGHT: ICD-10-CM

## 2019-05-22 DIAGNOSIS — M47.816 LUMBAR FACET ARTHROPATHY: Primary | ICD-10-CM

## 2019-05-22 DIAGNOSIS — G89.4 CHRONIC PAIN SYNDROME: ICD-10-CM

## 2019-05-22 PROCEDURE — 96152 HC HEALTH AND BEHAVIOR INTERVENTION, INDIVIDUAL, EACH 15 MINUTES: CPT | Performed by: PSYCHOLOGIST

## 2019-05-22 PROCEDURE — 64493 INJ PARAVERT F JNT L/S 1 LEV: CPT | Mod: 50 | Performed by: ANESTHESIOLOGY

## 2019-05-22 NOTE — PATIENT INSTRUCTIONS
[facility] Procedure Discharge Instructions    Today you saw:   Dr. Brynn Trujillo    Your procedure: Facet joint injections    Medications used:Lidocaine (anesthetic) Bupivacaine (anesthetic) Kenalog (steroid)  Omnipaque (contrast)       If you were holding your blood thinning medication, please restart taking it: N/A          Be cautious when walking as numbness and/or weakness in the legs may occur up to 6-8 hours after the procedure due to effect of the local anesthetic    Do not drive for 6 hours. The effect of the local anesthetic could slow your reflexes.     Avoid strenuous activity for the first 24 hours. You may resume your regular activities after that.     You may shower, however avoid swimming, tub baths or hot tubs for 24 hours following your procedure    You may have a mild to moderate increase in pain for several days following the injection.      You may use ice packs for 10-15 minutes, 3 to 4 times a day at the injection site for comfort    Do not use heat to painful areas for 6 to 8 hours. This will give the local anesthetic time to wear off and prevent you from accidentally burning your skin.    Unless you have been directed to avoid the use of anti-inflammatory medications (NSAIDS-ibuprofen, Aleve, Motrin), you may use these medications or Tylenol for pain control if needed.     With diabetes, check your blood sugar more frequently than usual as your blood sugar may be higher than normal for 10-14 days following a steroid injection. Contact your doctor who manages your diabetes if your blood sugar is higher than usual    Possible side effects of steroids that you may experience include flushing, elevated blood pressure, increased appetite, mild headaches and restlessness.  All of these symptoms will get better with time.    It may take up to 14 days for the steroid medication to start working although you may feel the effect as early as a few days after the procedure.     Follow up with  your referring provider in 2-3 weeks      If you experience any of the following, call the pain center line during work hours at [phone] or on-call physician after hours at [phone]:  -Fever over 100 degree F  -Swelling, bleeding, redness, drainage, warmth at the injection site  -Progressive weakness or numbness in your legs   -Loss of bowel or bladder function  -Unusual headache that is not relieved by Tylenol or your regular headache medication  -Unusual new onset of pain that is not improving

## 2019-05-22 NOTE — PROGRESS NOTES
"                                  [facility]   [facility] [place]  Behavioral Medicine Visit    Patient Name: Hoang Kiser     YOB: 1985   Medical Record Number: [id number]  Date: 5/22/2019                SUBJECTIVE: Patient reports that he discovered that his youngest brother was hospitalized for psychiatric reasons.  He feels frustrated that he was not told immediately feels this is indicative of a longer standing family communication issues.  Explored some family of origin as it relates to asking for help for mental health and for medical reasons.  He states that he felt he was tasked with being the \"strong one\" and this continues to negatively affect his experience of his pain and his functional movement disorder.  He discussed feeling guilt that he failed his brother by \"not being there.\"  Worked on challenging his negative beliefs, and exploring how this negatively impacts his experience of his pain.    OBJECTIVE: Patient was not provided with the correct check in form.  He identifies that his pain is moderately worse and he is having injections today he believes will help alleviate this he identifies feelings of guilt, depression, anxiety, stress, and mood swings.    Length of Visit: 55 minutes     Pain Diagnoses per pain provider:   Functional movement disorder, facet arthropathy lumbar, hip pain right, chronic pain syndrome     Assessment: Current Emotional / Mental Status    Appearance:   Appropriate   Eye Contact:   Fair   Psychomotor Behavior: Normal  Restless   Attitude:   Cooperative   Orientation:   All  Speech  Rate / Production:             Normal   Volume:              Normal   Mood:    Depressed   Affect:    Flat   Thought Content:  Clear   Thought Form:  Coherent  Logical   Insight:    Fair     ASSESSMENT:   Progress toward goals: satisfactory.    Pain Status: remained stable    Emotional Status: worsened              " Medication / chemical use concerns: None    PLAN:   Next Appointment: Hoang Kiser will schedule a follow-up appointment in 3 week(s).  Assignment: Challenge automatic negative and replaced with more reality based ones.  Objectives / interventions for next session:  Continue to explore negative cognitions that negatively interfere with his experience of his pain and his mood.    Sol Kemp PsyD LP  Outpatient Clinic Therapist  [facility]

## 2019-05-22 NOTE — NURSING NOTE
Discharge Information    IV Discontiued Time:  NA    Amount of Fluid Infused:  NA    Discharge Criteria = When patient returns to baseline or as per MD order    Consciousness:  Pt is fully awake    Circulation:  BP +/- 20% of pre-procedure level    Respiration:  Patient is able to breathe deeply    O2 Sat:  Patient is able to maintain O2 Sat >92% on room air    Activity:  Moves 4 extremities on command    Ambulation:  Patient is able to stand and walk or stand and pivot into wheelchair    Dressing:  Clean/dry or No Dressing    Notes:   Discharge instructions and AVS given to patient    Patient meets criteria for discharge?  YES    Admitted to PCU?  No    Responsible adult present to accompany patient home?  Yes    Signature/Title:    Shaniqua Cravajal RN Care Coordinator  [facility] [place]

## 2019-05-23 ENCOUNTER — OFFICE VISIT (OUTPATIENT)
Dept: SLEEP MEDICINE | Facility: CLINIC | Age: 34
End: 2019-05-23
Payer: COMMERCIAL

## 2019-05-23 VITALS
WEIGHT: 176 LBS | HEIGHT: 67 IN | SYSTOLIC BLOOD PRESSURE: 148 MMHG | BODY MASS INDEX: 27.62 KG/M2 | HEART RATE: 71 BPM | DIASTOLIC BLOOD PRESSURE: 66 MMHG | OXYGEN SATURATION: 100 %

## 2019-05-23 DIAGNOSIS — F51.04 CHRONIC INSOMNIA: Primary | ICD-10-CM

## 2019-05-23 PROCEDURE — 90834 PSYTX W PT 45 MINUTES: CPT | Performed by: PSYCHOLOGIST

## 2019-05-23 ASSESSMENT — MIFFLIN-ST. JEOR: SCORE: 1701.96

## 2019-05-23 NOTE — PATIENT INSTRUCTIONS
Reviewing your sleep diaries you have made considerable changes and imprvements which has improved your sleep.    Goal for you is to continue to reduce the variability in your wake time.  On days you have an episode late and night affecting your time to fall asleep, try to not sleep later than 10 AM.  Have a second alarm to use on the days you have a late night episode.  We reached this because you indicate that taking a nap in the afternoon to compensate is not realistic for you.        Try to set up a separate chair in your room to watch TV instead of the bed.

## 2019-05-23 NOTE — PROGRESS NOTES
"SLEEP MEDICINE PROGRESS NOTE  Sleep Psychology    Patient Name: Hoang Kiser  MRN:  [id number]  Date of Service: May 23, 2019       Subjective Report     Hoang Kiser returns to the sleep clinic today to discuss progress in implementing bbehavioral strategies for the management of chronic insomnia associated with dysfunctional neurological disorder.  Hoang reports and sleep diaries confirm significant reduction in sleep offset variation.  He reports improved sleep consult with patient and less variability in night to night total sleep time.  One challenge is that when he has a functional movement disorder attack, it may last several hours and is accompanied by sympathetic activation and anxiety.  This may delay sleep onset until early morning hours.  Patient then will stay in bed to try to compensate for loss of sleep.  This is now occurring much less.    Discussed continued further reduction in sleep offset variability.  Patient agreed to avoid sleeping in until later than 10 AM on nights he has 1 of his attacks.  On other days he will maintain his initially prescribed sleep offset time of 8 AM..     .     Sleep Data:     Source of Data: Sleep diary    Sleep Latency: 37 min.  Times Awakened: 3  Wake Time After Sleep Onset: 41 min.  Total Sleep Time: 7.1 hours  Sleep Efficiency: 80 %    Total score - [place]: 8 .    GARCIA Total Score: 15       Interventions     Strategies and recommendations including stimulus control, focusing on reduction in sleep offset variability were discussed today.       Vital Signs     /66   Pulse 71   Ht 1.702 m (5' 7\")   Wt 79.8 kg (176 lb)   SpO2 100%   BMI 27.57 kg/m       Mental Status     Appearance:  Well kept  Orientation:  X3  Mood:  better  Affect:  Congruent with mood  Speech/Language:  Normal  Thought Process: Intact  Associations:  Normal  Thought Content: Normal    Diagnostic Impressions and Plan       1. Chronic insomnia  Improvement in insomnia " reported with reduced sleep absent variability, improved sleep efficiency and reduced wake time after sleep onset and sleep latency.  Overall insomnia severity also be reduced.  Patient will focus on trying to reduce the variability of sleep offset, particularly on days he has nighttime functional motor attack.  He will continue with his currently prescribed a sleep window with stimulus control.        Follow-up: 8 weeks    Time Spent: 42 minutes      Fili Layne PsyD, ANDRES, CBSM  Certified, Behavioral Sleep Medicine  [facility]

## 2019-05-23 NOTE — NURSING NOTE
"Chief Complaint   Patient presents with     RECHECK     f/U       Initial /66   Pulse 71   Ht 1.702 m (5' 7\")   Wt 79.8 kg (176 lb)   SpO2 100%   BMI 27.57 kg/m   Estimated body mass index is 27.57 kg/m  as calculated from the following:    Height as of this encounter: 1.702 m (5' 7\").    Weight as of this encounter: 79.8 kg (176 lb).    Medication Reconciliation: complete      "

## 2019-05-29 ENCOUNTER — TELEPHONE (OUTPATIENT)
Dept: FAMILY MEDICINE | Facility: CLINIC | Age: 34
End: 2019-05-29

## 2019-05-29 NOTE — TELEPHONE ENCOUNTER
Behavioral Health Home Services  [facility]: [place]        Social Work Care Navigator Note      Patient: Hoang Kiser  Date: May 29, 2019  Preferred Name: Shoaib    Previous PHQ-9:   PHQ-9 SCORE 2/14/2018 10/1/2018 3/22/2019   PHQ-9 Total Score - - -   PHQ-9 Total Score 7 13 11     Previous JIN-7:   JIN-7 SCORE 8/14/2018 10/1/2018 3/22/2019   Total Score - - -   Total Score 15 13 12     MOLLY LEVEL:  MOLLY Score (Last Two) 3/23/2016 10/1/2018   MOLLY Raw Score 52 31   Activation Score 100 59.3   MOLLY Level 4 3       Preferred Contact:  Need for : No  Preferred Contact: Cell      Type of Contact Today: Phone call (patient / identified key support person reached)      Data: (subjective / Objective):  Recent ED/IP Admission or Discharge?   None    Patient Goals:  Goal Areas: Health;Mental Health;Chemical Health;Financial and Social Service Benefits  Patient stated goals: Patient stated that he would like to find a Neurologist that he works well with; Patient would like to continue to work on self-advocacy skills; Patient would eventually like to find part-time work when health conditions are better managed; Patient stated he would like to continue growing his skills with coping and stress management; Patient would like to continue going to [employer] groups through [facility] to ensure his sobriety continues as it has for the last 3 1/2 years        [facility] Core Service Provided:  Health and Wellness Promotion    Current Stressors / Issues / Care Plan Objective Addressed Today:  Patient contacted .  had called him on 5/20 to follow up on SMRT information. Patient stated that he has the paperwork to complete the process of getting on a CADI waiver now that he has been SMRT'd. Patient asked if he could get a gym membership once on CADI.  stated that patient will have to complete the process for the CADI waiver, then discuss that with the assigned CADI CM.  offered to send him  a list of services covered under the CADI waiver if he should happen to be approved for it. Patient confirmed he would like that list. No further questions at this time.     mailed patient the list of CADI waiver services found on the DHS website.     Intervention:  Motivational Interviewing: Supported Autonomy, Collaboration, Evocation, Permission to raise concern or advise and Open-ended questions   Target Behavior(s): Explored current social supports and reinforced opportunities to increase engagement    Assessment: (Progress on Goals / Homework):   reviewed health action plan goals. See Objectives for more details. Progress: Needs improvement. Next Steps:  will continue to work with patient to support patient in achieving their goals.    Plan: (Homework, other):  Patient was encouraged to continue to seek condition-related information and education.      Scheduled a Phone follow up appointment with BROWN BURGESS in 4 weeks     Patient has set self-identified goals and will monitor progress until the next appointment on: TBD.     Andrez Brian, Social Work Care Coordinator               Next 5 appointments (look out 90 days)    Jun 06, 2019  8:30 AM CDT  Return Visit with CARLOS A Guy CNP  [facility] ([facility]) [address] [place] [address] [address]  [facility] [address]  [phone]   Jun 12, 2019  8:00 AM CDT  Return Visit with Sol Kemp  [facility] ([facility]) [address]  [facility] [address]  [phone]   Jun 13, 2019 11:20 AM CDT  Return Visit with Faustino Feldman MD  [facility] ([facility]) [address] [place] [address]  [phone]

## 2019-06-06 ENCOUNTER — OFFICE VISIT (OUTPATIENT)
Dept: PALLIATIVE MEDICINE | Facility: CLINIC | Age: 34
End: 2019-06-06
Payer: COMMERCIAL

## 2019-06-06 ENCOUNTER — TELEPHONE (OUTPATIENT)
Dept: PALLIATIVE MEDICINE | Facility: CLINIC | Age: 34
End: 2019-06-06

## 2019-06-06 VITALS
OXYGEN SATURATION: 97 % | SYSTOLIC BLOOD PRESSURE: 134 MMHG | HEART RATE: 110 BPM | DIASTOLIC BLOOD PRESSURE: 88 MMHG | WEIGHT: 178.9 LBS | BODY MASS INDEX: 28.02 KG/M2

## 2019-06-06 DIAGNOSIS — M25.551 HIP PAIN, RIGHT: ICD-10-CM

## 2019-06-06 DIAGNOSIS — F41.9 ANXIETY: ICD-10-CM

## 2019-06-06 DIAGNOSIS — M47.816 LUMBAR FACET ARTHROPATHY: Primary | ICD-10-CM

## 2019-06-06 DIAGNOSIS — M62.838 MUSCLE SPASM: ICD-10-CM

## 2019-06-06 DIAGNOSIS — Z71.6 TOBACCO ABUSE COUNSELING: ICD-10-CM

## 2019-06-06 PROCEDURE — 99213 OFFICE O/P EST LOW 20 MIN: CPT | Performed by: NURSE PRACTITIONER

## 2019-06-06 RX ORDER — HYDROXYZINE HYDROCHLORIDE 25 MG/1
25 TABLET, FILM COATED ORAL
Qty: 45 TABLET | Refills: 3 | Status: SHIPPED | OUTPATIENT
Start: 2019-06-06 | End: 2019-12-07

## 2019-06-06 RX ORDER — HYDROCODONE BITARTRATE AND ACETAMINOPHEN 5; 325 MG/1; MG/1
1 TABLET ORAL 3 TIMES DAILY PRN
Qty: 90 TABLET | Refills: 0 | Status: SHIPPED | OUTPATIENT
Start: 2019-06-06 | End: 2019-07-02

## 2019-06-06 RX ORDER — METHOCARBAMOL 500 MG/1
500-1000 TABLET, FILM COATED ORAL 4 TIMES DAILY PRN
Qty: 240 TABLET | Refills: 1 | Status: SHIPPED | OUTPATIENT
Start: 2019-06-06 | End: 2019-10-07

## 2019-06-06 ASSESSMENT — PAIN SCALES - GENERAL: PAINLEVEL: SEVERE PAIN (6)

## 2019-06-06 NOTE — PATIENT INSTRUCTIONS
After Visit Instructions:     Thank you for coming to [facility] for your care. It is my goal to partner with you to help you reach your optimal state of health.     Continue daily self-care, identifying contributing factors, and monitoring variations in pain level. Continue to integrate self-care into your life.      1. Pain Psychology visits    2. Schedule follow-up with CARLOS A Higuera NP-C in 4 weeks  3. Procedures recommended: Medial branch blocks/radio frequency ablation    4. Medication recommendations:   1. Refills of Hydroxyzine, Norco and methocarbamol sent to your pharmacy.    CARLOS A Bass NP-C  [facility]    Clinic Number:  [phone]   Call this number with any questions about your care and for scheduling assistance. Calls are returned Monday through Friday between 8 AM and 4:30 PM. We usually get back to you within 2 business days depending on the issue/request.       Medication refills:    For non-opioid medications, call your pharmacy directly to request a refill. The pharmacy will contact the [facility] for authorization. Please allow 3-4 days for these refills to be processed.     For opioid refills, call the clinic number or send a Zmanda message. Please contact us 7-10 days before your refill is due. The message MUST include the name of the specific medication(s) requested and how you would like to receive the prescription(s). The options are as follows:    Pain Clinic staff can mail the prescription to your pharmacy. Please tell us the name of the pharmacy.    You may pick the prescription up at the Pain Clinic (tell us the location) or during a clinic visit with your pain provider    Pain Clinic staff can deliver the prescription to the [facility] in the clinic building. Please tell us the location.      We believe regular attendance is key to your success in our program.    Any time  you are unable to keep your appointment we ask that you call us at least 24 hours in advance to let us know. This will allow us to offer the appointment time to another patient.

## 2019-06-06 NOTE — TELEPHONE ENCOUNTER
Patient canceled his appointment due to his transportation not being there yet, he is wanting his medications refilled possibly today.     methocarbamol (ROBAXIN) 500 MG tablet    HYDROcodone-acetaminophen (NORCO) 5-325 MG tablet    naproxen (NAPROSYN) 500 MG tablet    hydrOXYzine (ATARAX) 25 MG tablet- he would like to know if he could fill this as well so he can pick them all up at the same time      Patient uses [facility] pharmacy      Please call when ready so he can coordinate a michael TRINH    [facility]

## 2019-06-06 NOTE — TELEPHONE ENCOUNTER
Patient was seen today. Closing.    KATERINA CardenasN, RN  Care Coordinator  [facility]

## 2019-06-06 NOTE — PROGRESS NOTES
[facility]    CHIEF COMPLAINT: low back, pain from movement disorder.     INTERVAL HISTORY:  Last seen on 5/9/19.        Recommendations/plan at the last visit included:     1. Schedule pain psychology visit   2. Schedule follow-up with CARLOS A Higuera NP-C in 4 weeks  3. Medication recommendations:             1. Refill of Norco and Naproxen sent to your pharmacy.     Since last visit:   - Bilateral L5-S1 facet joint injections on 5/22/19. Helped for about 1 week. Previously helped for about 3-4 months.     Pain Information:   Pain quality: Penetrating and Throbbing    Pain rating: intensity ranges from 3/10 to 9/10, and averages 7/10 on a 0-10 scale.      Annual Controlled Substance Agreement/UDS due date: 4/2020     Current Pain Relevant Medications:   Norco 5/325 mg 1 tab BID- TID                        Total opiate dose: 10-15 MME daily  Clonazepam .5 mg 1-2 tab at HS PRN  Duloxetine 20 mg daily  Naproxen 500 mg BID: on hold re: elevated LFTs   Tizanidine 4 mg 1-2 tabs at HS PRN  Ambien 10 mg at HS  Adderall 50 mg daily, combination of long and short acting.       Previous Pain Relevant Medications: (H--helped; HI--Helped initially; SWH--Somewhat helpful; NH--No help; W--worse; SE--side effects; ?--Unsure if helpful)   NOTE: This medication information taken from patient's intake form, not medical records.                         Opiates: Tramadol: H, Hydrocodone: H                        NSAIDS: Ibuprofen:H, naproxen:SWH, Relafen: NH                        Muscle Relaxants: Cyclobenzaprine:H,Med interaction, Tizanidine:H, Baclofen: SWH                        Anti-migraine mediations: Prednisone:H                        Anti-depressants: Bupropion:SE, Celexa:SE, Duloxetine:H, Trazodone:Too strong, Venlafaxine:too strong, Amitriptyline:SE                         Sleep aids:Anbien: H                        Anxiolytics: Clonazepam:H                        Neuropathics: Tegretol:taken for  seizures in childhood, Gabapentin:H                                          Topicals: Lidocaine:H                        Other medications not covered above: Tylenol:NH      Any illicit drug use: 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 Concern for multiple controlled substances including stimulants and opiates.      Medications:  Current Outpatient Medications   Medication Sig Dispense Refill     amphetamine-dextroamphetamine (ADDERALL XR) 30 MG per 24 hr capsule Take 30 mg by mouth daily       clonazePAM (KLONOPIN) 0.5 MG tablet Take 0.5-1 mg by mouth nightly as needed   0     DULoxetine (CYMBALTA) 60 MG EC capsule 2 x 60mg tab by mouth daily 30 capsule 1     HYDROcodone-acetaminophen (NORCO) 5-325 MG tablet Take 1 tablet by mouth 3 times daily as needed for pain (Max 3 times daily.) Okay to fill on 6/6/19 to begin using on 6/12/19 90 tablet 0     hydrOXYzine (ATARAX) 25 MG tablet Take 1 tablet (25 mg) by mouth nightly as needed (at HS PRN) 45 tablet 3     methocarbamol (ROBAXIN) 500 MG tablet Take 1-2 tablets (500-1,000 mg) by mouth 4 times daily as needed for muscle spasms 240 tablet 1     naloxone (NARCAN) 4 MG/0.1ML nasal spray Spray 1 spray (4 mg) into one nostril alternating nostrils as needed for opioid reversal every 2-3 minutes until assistance arrives 0.2 mL 0     naproxen (NAPROSYN) 500 MG tablet Take 1 tablet (500 mg) by mouth 2 times daily (with meals) 40 tablet 3     oxybutynin ER (DITROPAN-XL) 5 MG 24 hr tablet Take 2 tablets (10 mg) by mouth daily 180 tablet 3     SF 5000 PLUS 1.1 % CREA   2     albuterol (PROAIR HFA/PROVENTIL HFA/VENTOLIN HFA) 108 (90 Base) MCG/ACT inhaler Inhale 1-2 puffs into  "the lungs every 4 hours as needed for shortness of breath / dyspnea or wheezing (cough or wheeze only if necessary) (Patient not taking: Reported on 6/6/2019) 6.7 g 1       Review of Systems: A 10-point review of systems was negative, with the exception of chronic pain issues.      Social History: Reviewed; unchanged from previous consultation.      Family history: Reviewed; unchanged from previous consultation.     PHYSICAL EXAM    Vitals:    06/06/19 0918   BP: 134/88   Pulse: 110   SpO2: 97%   Weight: 81.1 kg (178 lb 14.4 oz)       Constitutional: healthy, alert, no distress, pain behaviors and somewhat down r/t increased spastic movements   HEENT: Head atraumatic, normocephalic. Eyes without conjunctival injection or jaundice. Neck supple. No obvious neck masses.  Skin: No rash, lesions, or petechiae of exposed skin.   Extremities: No clubbing, cyanosis, or edema to exposed extremities  Psychiatric/mental status: Alert, without lethargy or stupor. Appropriate affect.       Neurologic exam:  CN:  Cranial nerves 2-12 are grossly normal.  Has uncontrolled upper body movement/tremor.           Musculoskeletal exam:  Cervical spine:                       Flex:  20 degrees                       Ext: 20 degrees                       Rotation to right: 20 degrees                       Rotation to left: 20 degrees                       Rotation/ext to right: painful                        Rotation/ext to left: painful                        Tenderness in the cervical spine at midline. No                       Tenderness in the cervical paraspinal muscles. No  Thoracic spine:                        Kyphosis. No                       Tenderness in the thoracic spine at midline. Yes                       Tenderness in the thoracic paraspinal muscles. Yes  Lumbar/Sacral spine:                       Forward Flexion:  90 degrees                       Ext: 20 degrees \"tight\"                       Rotation/ext to right: painful "                        Rotation/ext to left: painful                        Lordosis. No                       Tenderness in the lumbar spine at midline. Yes                       Tenderness in the lumbar paraspinal muscles.Yes      Psychiatric:  mentation appears normal., affect and mood normal      DIRE Score for ongoing opioid management is calculated as follows:    Diagnosis = 2 pts (slowly progressive; moderate pain/objective findings)    Intractability = 2 pts (most treatments tried; patient not fully engaged/barriers)    Risk        Psych = 2 pts (personality dysfunction/mental illness that moderately interferes with care)         Chem Hlth = 2 pts (use of medications to cope with stress; chemical dependency in remission)       Reliability = 3 pts (highly reliable with meds, appointments, treatments)       Social = 3 pts (supportive family/close relationships; involved in work/school; no isolation)       (Psych + Chem hlth + Reliability + Social) = 14    Efficacy = 2 pts (moderate benefit/function; low med dose; too early/not tried meds)    DIRE Score = 16        7-13: likely NOT suitable candidate for long-term opioid analgesia       14-21: may be a suitable candidate for long-term opioid analgesia          Previous Diagnostic Tests:   Imaging Studies:   MR LUMBAR SPINE W/O CONTRAST 5/2/2018 7:27 PM  History: DDD (degenerative disc disease), lumbar; Lumbar  radiculopathy; Hip pain, right; Groin pain, right.  ICD-10: DDD (degenerative disc disease), lumbar; Lumbar radiculopathy;  Hip pain, right; Groin pain, right  Comparison: Lumbar spine MRI 3/8/2017  Technique: Sagittal STIR, T1-weighted turbo spin-echo, 3-D volumetric  T2-weighted and axial reconstructed T2-weighted images of the lumbar  spine were obtained without intravenous contrast.   Findings: 5 lumbar-type vertebra. The tip of the conus medullaris is  at L1.  The lumbar vertebral column is normally aligned.   Straightening of lumbar lordosis,  unchanged. The intervertebral disc  heights are maintained. Normal marrow signal. At L5-S1, there is a  disc bulge and facet hypertrophy with mild left neural foraminal  narrowing, unchanged. No spinal canal stenosis.  Impression:  1. Lumbar spondylosis with mild left neural foraminal narrowing at  L5-S1.  2. No spinal canal stenosis.      MR right hip without contrast 5/2/2018 6:47 PM  Techniques: Multiplanar multisequence imaging of the right hip was  obtained without  administration of intra-articular or intravenous  contrast using routing protocol.  History: Hip pain.  Comparison: None available.  Findings:  Osseous structures  Osseous structures: No fracture, stress reaction, avascular necrosis,  or focal osseous lesion is seen. There is small focal area of  subchondral cystic changes in the anterior right femoral head neck  junction, most compatible with synovial herniation pit. Findings  suggestive of reduced femoral head neck junction though alpha angle  cannot be assessed owing to no dedicated oblique axial sequence  obtained.  Articular cartilage and labrum  Assessment limited on this arthrographic study due to relative lack of  joint distension.  Articular cartilage: No high grade chondral loss.  Labrum: Labral tearing approximately from 12:00 to 3:00 with 3:00  being anterior and 6:00 being the center of transverse ligament in  this convention with associated subtle area of subchondral edema in  the superior acetabulum.  Ligament teres and transverse ligament of acetabulum: Intact.  Joint or bursal effusion  Joint effusion: A physiologic amount of joint fluid.  Bursal effusion: Minimal nonspecific edema over the greater  trochanter. No substantial iliopsoas or trochanteric bursal effusion.  Muscles and tendons  Muscles and tendons: The rectus femoris, the visualized portion  iliopsoas, proximal hamstrings, and hip abductors are intact.  The  visualized adductor muscles are unremarkable.   Nerves:  The  visualized course of the sciatic nerve is unremarkable.   Other Findings:  There is fat-containing right inguinal hernia.  Impression:  1. Approximately 12-3 o'clock labral tearing with synovial herniation  pit and likely reduced femoral head neck junction offset. 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  2. Fat containing right inguinal hernia.          ASSESSMENT:   1.  Lumbar DDD, mild  2.  Lumbar muscle spasm  3.  Labral tear, right hip  4.  Hx: anxiety, chemical dependence in remission.  5. Functional neurologic movement disorder    Shoaib returns following lumbar facet injections. He did not get as good relief this time.  He would like to look into additional procedures.  I have ordered lumbar medial branch blocks progressing to RFA if successful.  He will continue with current plan of care including pain psychology.  We discussed briefly that he has become somewhat romantically involved with a woman who moved into his sober living house.  This is ended badly for him in the past and I expressed my concerns.  He is trying to take things very slowly because he is also concerned about repercussions.  No change to current medication management.      Tobacco use: addressed at this visit.     Plan:    Diagnosis reviewed, treatment option addressed, and risk/benifits discussed.  Self-care instructions given.  I am recommending a multidisciplinary treatment plan to help this patient better manage pain.        1. Pain Psychology visits    2. Schedule follow-up with CARLOS A Higuera, NP-C in 4 weeks  3. Procedures recommended: Medial branch blocks/radio frequency ablation    4. Medication recommendations:   1. Refills of Hydroxyzine, Norco and methocarbamol sent to your pharmacy.    Total time spent face to face was 15 minutes and more than 50% of face to face time was spent in counseling and/or coordination of care regarding the diagnosis and recommendations  above.      CARLOS A Bass, NP-C   [facility]    Disclaimer: This note consists of symbols derived from keyboarding, dictation and/or voice recognition software. As a result, there may be errors in the script that have gone undetected. Please consider this when interpreting information found in this chart.

## 2019-06-07 ENCOUNTER — TELEPHONE (OUTPATIENT)
Dept: PALLIATIVE MEDICINE | Facility: CLINIC | Age: 34
End: 2019-06-07

## 2019-06-07 ENCOUNTER — MYC MEDICAL ADVICE (OUTPATIENT)
Dept: PALLIATIVE MEDICINE | Facility: CLINIC | Age: 34
End: 2019-06-07

## 2019-06-07 NOTE — TELEPHONE ENCOUNTER
SERA for patient to schedule Lumbar MBB/RFA.    Reanna Reagan    [facility]

## 2019-06-10 ENCOUNTER — MYC MEDICAL ADVICE (OUTPATIENT)
Dept: NEUROLOGY | Facility: CLINIC | Age: 34
End: 2019-06-10

## 2019-06-10 NOTE — TELEPHONE ENCOUNTER
Pre-screening questions for MBB Injections:    Injection to be done at which interventional clinic site? [facility]     Instruct patient to arrive as directed prior to the scheduled appointment time:    Wyjose alberto and Diana: 30 minutes before        Procedure ordered by Dr. Tamiko Stevens    Procedure ordered? Lumbar Medial Branch Block    What insurance would patient like us to bill for this procedure? [employer]      Worker's comp- Any injection DO NOT SCHEDULE and route to Dominique King.      [employer] insurance - If scheduling an SI joint injection DO NOT SCHEDULE and route to Dominique King.          MBB's must be scheduled at LEAST two weeks apart for insurance purposes       [employer] - Any injection besides hip/shoulder/knee joint DO NOT SCHEDULE and route to Dominique King. She will obtain PA and call pt back to schedule procedure or notify pt of denial.       [employer]- PA required for all MBB's    Any chance of pregnancy? NO   If YES, do NOT schedule and route to RN pool    Is an  needed? No     Patient has a drive home? (mandatory) No     Is patient taking any blood thinners (plavix, coumadin, jantoven, warfarin, heparin, pradaxa or dabigatran )? No    If hold needed, do NOT schedule, route to RN pool     Is patient taking any aspirin products? No     If more than 325mg/day do NOT schedule; route to RN pool     For CERVICAL procedures, hold all aspirin products for 6 days.      Does the patient have a bleeding or clotting disorder? No    If YES, okay to schedule AND route to RN nurse pool    **For any patients with platelet count <100, must be forwarded to provider**    Is patient diabetic? NO If YES, have them bring their glucometer.    Does patient have an active infection or treated for one within the past week? No    Is patient currently taking any antibiotics?  No    For patients on chronic, preventative, or prophylacti antibiotics, procedures can be scheduled.     For patients on  antibiotics for active or recent infection:    Gabrielle Cadena [place], [place], [place]-antibiotic course must have been completed for 4 days     Is patient currently taking any steroid medications? (i.e. Prednisone, Medrol)  No     For patients on steroid medications:    Karyn Cadena [place], [place]itzer-steroid course must have been completed for 4 days    Review with patient:  If you are started on any steroids or antibiotics between now and your appointment, you must contact us because it may affect our ability to perform your procedure INFORMED    Is patient actively being treated for cancer or immunocompromised? No   If YES, do NOT schedule and route to RN    Are you able to get on and off an exam table with minimal or no assistance? Yes   If NO, do NOT schedule and route to RN    Are you able to roll over and lay on your stomach with minimal or no assistance? Yes   If NO, do NOT schedule and route to RN    Any allergies to contrast dye, iodine, shellfish, or numbing and steroid medications? No  (If so, inform nursing and note in scheduling comments.)    Allergies: Buspirone hcl; Doxycycline; Elavil [amitriptyline]; Trazodone; Buspirone; and Keflex [cephalexin]     Has the patient had a flu shot or any other vaccinations within 7 days before or after the procedure.  No     Does patient have an MRI/CT?  YES: MRI  (SI joint, hip injections, lumbar sympathetic blocks, and stellate ganglion blocks do not require an MRI)    Was the MRI done w/in the last 3 years?  Yes    Was MRI done at [place]? Yes      If not, where was it done? N/A       If MRI was not done at [place], [facility] or [facility] do NOT schedule and route to nursing.  If pt has an imaging disc, the injection may be scheduled but pt has to bring disc to appt. If they show up w/out disc the injection cannot be done    **Must be scheduled with elapsed time interval of at least 2 weeks and not more than 6 months between the First MBB  and the Second MBB**       Medial Branch Block Pre-Procedure Instructions    It is okay to take long acting pain medications (if you are on them) the day of the procedure but try not to take any short acting medications unless absolutely necessary. INFORMED        Long acting meds would include: Gabapentin (Neurontin), MS Contin, Oxycontin        Short acting meds would include:  Percocet, Oxycodone, Vicodin, Ibuprofen     The day of the procedure, you should try to do things that provoke your pain, since the injection is being done to see if it will relieve your pain . INFORMED    If your pain level is a 4 out of 10 or less on the day of the procedure, please call [phone] to reschedule.  INFORMED  Reminders (please tell patient if applicable):      Instructed pt to arrive 30 minutes early for IV start if this is for a cervical procedure, ALL sympathetic (stellate ganglion, hypogastric, or lumbar sympathetic block) and all sedation procedures (RFA, spinal cord stimulation trials). N/A        -IVs are not routinely placed for Dr. Trujillo cervical cases        -Dr. Sahu: no IV needed for CMBB       If NPO for sedation, it is okay to take medications with sips of water (except if they are to hold blood thinners). N/A   *DO take blood pressure medication if it is prescribed*      If this is for a cervical MBB aspirin needs to be held for 6 days.  N/A         Do not schedule procedures requiring IV placement in the first appointment of the day or first appointment after lunch. Do NOT schedule at 0745, 0815 or 1245.  N/A          For patients 85 or older we recommend having an adult stay w/ them for the remainder of the day.      Does the patient have any questions? NO        Ana Pemberton    [facility]

## 2019-06-11 NOTE — TELEPHONE ENCOUNTER
Last office visit note and recommendation for [facility] faxed to the [facility].

## 2019-06-13 ENCOUNTER — OFFICE VISIT (OUTPATIENT)
Dept: ORTHOPEDICS | Facility: CLINIC | Age: 34
End: 2019-06-13
Payer: COMMERCIAL

## 2019-06-13 VITALS — WEIGHT: 178 LBS | HEIGHT: 67 IN | BODY MASS INDEX: 27.94 KG/M2

## 2019-06-13 DIAGNOSIS — M50.30 DDD (DEGENERATIVE DISC DISEASE), CERVICAL: Primary | ICD-10-CM

## 2019-06-13 PROCEDURE — 99213 OFFICE O/P EST LOW 20 MIN: CPT | Performed by: PREVENTIVE MEDICINE

## 2019-06-13 RX ORDER — METHYLPREDNISOLONE 4 MG
TABLET, DOSE PACK ORAL
Qty: 21 TABLET | Refills: 0 | Status: SHIPPED | OUTPATIENT
Start: 2019-06-13 | End: 2019-07-29

## 2019-06-13 ASSESSMENT — MIFFLIN-ST. JEOR: SCORE: 1711.03

## 2019-06-13 NOTE — PROGRESS NOTES
"      [facility] FOLLOW-UP VISIT 6/13/2019    Hoang Kiser's chief complaint for this visit includes:  Chief Complaint   Patient presents with     Wrist Pain     Right wrist pain and weakness      PCP: Phill Floyd    Referring Provider:  Referred Self, MD  No address on file    Ht 1.702 m (5' 7\")   Wt 80.7 kg (178 lb)   BMI 27.88 kg/m    Data Unavailable       Interval History:     Follow up reason: Follow up for right wrist pain and weakness     Date of injury: no injury     Date last seen: 8-29-18    Following Therapeutic Plan: Yes     Pain: Worsening    Function: Worsening    Interval History: Has been seen in pain clinic for low back issues    Medical History:    Any recent changes to your medical history? No    Any new medication prescribed since last visit? No    Review of Systems:    Do you have fever, chills, weight loss? No    Do you have any vision problems? No    Do you have any chest pain or edema? No    Do you have any shortness of breath or wheezing?  No    Do you have stomach problems? No    Do you have any numbness or focal weakness? No    Do you have diabetes? No    Do you have problems with bleeding or clotting? No    Do you have an rashes or other skin lesions? No    "

## 2019-06-13 NOTE — LETTER
"    6/13/2019         RE: Hoang Kiser  [address]  [facility] [address]        Dear Colleague,    Thank you for referring your patient, Hoang Kiser, to the [facility]. Please see a copy of my visit note below.          [facility] FOLLOW-UP VISIT 6/13/2019    Hoang Kiser's chief complaint for this visit includes:  Chief Complaint   Patient presents with     Wrist Pain     Right wrist pain and weakness      PCP: Phill Floyd    Referring Provider:  Referred Self, MD  No address on file    Ht 1.702 m (5' 7\")   Wt 80.7 kg (178 lb)   BMI 27.88 kg/m     Data Unavailable       Interval History:     Follow up reason: Follow up for right wrist pain and weakness     Date of injury: no injury     Date last seen: 8-29-18    Following Therapeutic Plan: Yes     Pain: Worsening    Function: Worsening    Interval History: Has been seen in pain clinic for low back issues    Medical History:    Any recent changes to your medical history? No    Any new medication prescribed since last visit? No    Review of Systems:    Do you have fever, chills, weight loss? No    Do you have any vision problems? No    Do you have any chest pain or edema? No    Do you have any shortness of breath or wheezing?  No    Do you have stomach problems? No    Do you have any numbness or focal weakness? No    Do you have diabetes? No    Do you have problems with bleeding or clotting? No    Do you have an rashes or other skin lesions? No      HISTORY OF PRESENT ILLNESS  Mr. Kiser is a pleasant 33 year old year old male who presents to clinic today for followup for chronic neck and bilateral arm and hand pain with some numbness and tingling that has started again over the past 2 months  Had improvement after his last injection in his neck  Wants to consider cervical MRI    MEDICAL HISTORY  Patient Active Problem List   Diagnosis     Neuropathy     Moderate major depression (H)     " Tobacco abuse     Attention deficit hyperactivity disorder (ADHD), predominantly inattentive type     Primary insomnia     Panic disorder without agoraphobia     Abnormal involuntary movement     Tic disorder     Anxiety     Posttraumatic stress disorder with dissociative symptoms     Chronic left hip pain     Chronic pain of right hip     Degenerative disc disease at L5-S1 level     Functional movement disorder     Family history of Crohn's disease     Chronic low back pain     Chronic pain syndrome     History of substance abuse     Family history of multiple myeloma     History of electroencephalogram       Current Outpatient Medications   Medication Sig Dispense Refill     albuterol (PROAIR HFA/PROVENTIL HFA/VENTOLIN HFA) 108 (90 Base) MCG/ACT inhaler Inhale 1-2 puffs into the lungs every 4 hours as needed for shortness of breath / dyspnea or wheezing (cough or wheeze only if necessary) 6.7 g 1     amphetamine-dextroamphetamine (ADDERALL XR) 30 MG per 24 hr capsule Take 30 mg by mouth daily       clonazePAM (KLONOPIN) 0.5 MG tablet Take 0.5-1 mg by mouth nightly as needed   0     DULoxetine (CYMBALTA) 60 MG EC capsule 2 x 60mg tab by mouth daily 30 capsule 1     HYDROcodone-acetaminophen (NORCO) 5-325 MG tablet Take 1 tablet by mouth 3 times daily as needed for pain (Max 3 times daily.) Okay to fill on 6/6/19 to begin using on 6/12/19 90 tablet 0     hydrOXYzine (ATARAX) 25 MG tablet Take 1 tablet (25 mg) by mouth nightly as needed (at HS PRN) 45 tablet 3     methocarbamol (ROBAXIN) 500 MG tablet Take 1-2 tablets (500-1,000 mg) by mouth 4 times daily as needed for muscle spasms 240 tablet 1     naloxone (NARCAN) 4 MG/0.1ML nasal spray Spray 1 spray (4 mg) into one nostril alternating nostrils as needed for opioid reversal every 2-3 minutes until assistance arrives 0.2 mL 0     naproxen (NAPROSYN) 500 MG tablet Take 1 tablet (500 mg) by mouth 2 times daily (with meals) 40 tablet 3     oxybutynin ER (DITROPAN-XL)  5 MG 24 hr tablet Take 2 tablets (10 mg) by mouth daily 180 tablet 3     SF 5000 PLUS 1.1 % CREA   2       Allergies   Allergen Reactions     Buspirone Hcl Other (See Comments)     Panic attacks     Doxycycline Diarrhea, Fatigue, GI Disturbance and Nausea     Elavil [Amitriptyline]      Ineffective in reducing spasms/movement, increased fatigue     Trazodone Fatigue     Buspirone Anxiety     Keflex [Cephalexin] Diarrhea       Family History   Problem Relation Age of Onset     Lipids Father         hyperlipidemia     Hyperlipidemia Father      Obesity Father      Arthritis Mother      Hyperlipidemia Mother      Depression Mother      Anxiety Disorder Mother      Mental Illness Mother      Obesity Mother      Asthma Brother      Asthma Sister      Depression Other      Hearing Loss Other      Psychotic Disorder Other      Obesity Other      Cerebrovascular Disease Paternal Grandfather      Alzheimer Disease Paternal Grandfather      Depression Brother      Mental Illness Brother         Bipolar     Asthma Brother      Depression Sister      Asthma Sister      Substance Abuse Sister         Alcohol     Mental Illness Brother         Bipolar     Asthma Brother      Asthma Sister      Obesity Sister      Asthma Brother      Obesity Brother      Obesity Maternal Grandmother      Genetic Disorder Maternal Grandmother         Epilepsy     Obesity Paternal Grandmother      Colon Cancer Other      Genetic Disorder Other         Cerebral Palsy     Genetic Disorder Maternal Grandfather         Multiple Sclerosis     Genetic Disorder Other         Epilepsy       Additional medical/Social/Surgical histories reviewed in [facility] and updated as appropriate.     REVIEW OF SYSTEMS (6/13/2019)  10 point ROS of systems including Constitutional, Eyes, Respiratory, Cardiovascular, Gastroenterology, Genitourinary, Integumentary, Musculoskeletal, Psychiatric were all negative except for pertinent positives noted in my HPI.     PHYSICAL  "EXAM  Vitals:    06/13/19 1138   Weight: 80.7 kg (178 lb)   Height: 1.702 m (5' 7\")     Vital Signs: Ht 1.702 m (5' 7\")   Wt 80.7 kg (178 lb)   BMI 27.88 kg/m    Patient declined being weighed. Body mass index is 27.88 kg/m .    General  - normal appearance, in no obvious distress  CV  - normal radial pulse  Pulm  - normal respiratory pattern, non-labored  Musculoskeletal -bilateral wrist  - inspection: normal joint alignment, no obvious deformity, no swelling  - palpation: no bony or soft tissue tenderness, no tenderness at the anatomical snuffbox  - ROM:  90 deg flexion   70 deg extension   25 deg abduction   65 deg adduction  - strength: 5/5  strength, 5/5 flexion, extension, pronation, supination, adduction, abduction  - special tests:  (+) Tinel's mild positive bilaterally  (-) Finkelstein  (-) Phalen  (-) Villa click test  (-) ulnar impaction  Neuro  - no sensory or motor deficit, grossly normal coordination, normal muscle tone  Skin  - no ecchymosis, erythema, warmth, or induration, no obvious rash  Psych  - interactive, appropriate, normal mood and affect  Neck: has some pain with extension with neck, has some radiation into arms  ASSESSMENT & PLAN  32 yo male with neck pain due to chronic disc herniations, radicular pain, symptoms  Last MRI was over a year ago  Ordered a new MRI  Consider ESTER v. Nerve treatments  Medrol cristi Feldman MD, [facility]    Again, thank you for allowing me to participate in the care of your patient.        Sincerely,        Faustino Feldman MD    "

## 2019-06-13 NOTE — PROGRESS NOTES
HISTORY OF PRESENT ILLNESS  Mr. Kiser is a pleasant 33 year old year old male who presents to clinic today for followup for chronic neck and bilateral arm and hand pain with some numbness and tingling that has started again over the past 2 months  Had improvement after his last injection in his neck  Wants to consider cervical MRI    MEDICAL HISTORY  Patient Active Problem List   Diagnosis     Neuropathy     Moderate major depression (H)     Tobacco abuse     Attention deficit hyperactivity disorder (ADHD), predominantly inattentive type     Primary insomnia     Panic disorder without agoraphobia     Abnormal involuntary movement     Tic disorder     Anxiety     Posttraumatic stress disorder with dissociative symptoms     Chronic left hip pain     Chronic pain of right hip     Degenerative disc disease at L5-S1 level     Functional movement disorder     Family history of Crohn's disease     Chronic low back pain     Chronic pain syndrome     History of substance abuse     Family history of multiple myeloma     History of electroencephalogram       Current Outpatient Medications   Medication Sig Dispense Refill     albuterol (PROAIR HFA/PROVENTIL HFA/VENTOLIN HFA) 108 (90 Base) MCG/ACT inhaler Inhale 1-2 puffs into the lungs every 4 hours as needed for shortness of breath / dyspnea or wheezing (cough or wheeze only if necessary) 6.7 g 1     amphetamine-dextroamphetamine (ADDERALL XR) 30 MG per 24 hr capsule Take 30 mg by mouth daily       clonazePAM (KLONOPIN) 0.5 MG tablet Take 0.5-1 mg by mouth nightly as needed   0     DULoxetine (CYMBALTA) 60 MG EC capsule 2 x 60mg tab by mouth daily 30 capsule 1     HYDROcodone-acetaminophen (NORCO) 5-325 MG tablet Take 1 tablet by mouth 3 times daily as needed for pain (Max 3 times daily.) Okay to fill on 6/6/19 to begin using on 6/12/19 90 tablet 0     hydrOXYzine (ATARAX) 25 MG tablet Take 1 tablet (25 mg) by mouth nightly as needed (at HS PRN) 45 tablet 3      methocarbamol (ROBAXIN) 500 MG tablet Take 1-2 tablets (500-1,000 mg) by mouth 4 times daily as needed for muscle spasms 240 tablet 1     naloxone (NARCAN) 4 MG/0.1ML nasal spray Spray 1 spray (4 mg) into one nostril alternating nostrils as needed for opioid reversal every 2-3 minutes until assistance arrives 0.2 mL 0     naproxen (NAPROSYN) 500 MG tablet Take 1 tablet (500 mg) by mouth 2 times daily (with meals) 40 tablet 3     oxybutynin ER (DITROPAN-XL) 5 MG 24 hr tablet Take 2 tablets (10 mg) by mouth daily 180 tablet 3     SF 5000 PLUS 1.1 % CREA   2       Allergies   Allergen Reactions     Buspirone Hcl Other (See Comments)     Panic attacks     Doxycycline Diarrhea, Fatigue, GI Disturbance and Nausea     Elavil [Amitriptyline]      Ineffective in reducing spasms/movement, increased fatigue     Trazodone Fatigue     Buspirone Anxiety     Keflex [Cephalexin] Diarrhea       Family History   Problem Relation Age of Onset     Lipids Father         hyperlipidemia     Hyperlipidemia Father      Obesity Father      Arthritis Mother      Hyperlipidemia Mother      Depression Mother      Anxiety Disorder Mother      Mental Illness Mother      Obesity Mother      Asthma Brother      Asthma Sister      Depression Other      Hearing Loss Other      Psychotic Disorder Other      Obesity Other      Cerebrovascular Disease Paternal Grandfather      Alzheimer Disease Paternal Grandfather      Depression Brother      Mental Illness Brother         Bipolar     Asthma Brother      Depression Sister      Asthma Sister      Substance Abuse Sister         Alcohol     Mental Illness Brother         Bipolar     Asthma Brother      Asthma Sister      Obesity Sister      Asthma Brother      Obesity Brother      Obesity Maternal Grandmother      Genetic Disorder Maternal Grandmother         Epilepsy     Obesity Paternal Grandmother      Colon Cancer Other      Genetic Disorder Other         Cerebral Palsy     Genetic Disorder Maternal  "Grandfather         Multiple Sclerosis     Genetic Disorder Other         Epilepsy       Additional medical/Social/Surgical histories reviewed in [facility] and updated as appropriate.     REVIEW OF SYSTEMS (6/13/2019)  10 point ROS of systems including Constitutional, Eyes, Respiratory, Cardiovascular, Gastroenterology, Genitourinary, Integumentary, Musculoskeletal, Psychiatric were all negative except for pertinent positives noted in my HPI.     PHYSICAL EXAM  Vitals:    06/13/19 1138   Weight: 80.7 kg (178 lb)   Height: 1.702 m (5' 7\")     Vital Signs: Ht 1.702 m (5' 7\")   Wt 80.7 kg (178 lb)   BMI 27.88 kg/m   Patient declined being weighed. Body mass index is 27.88 kg/m .    General  - normal appearance, in no obvious distress  CV  - normal radial pulse  Pulm  - normal respiratory pattern, non-labored  Musculoskeletal -bilateral wrist  - inspection: normal joint alignment, no obvious deformity, no swelling  - palpation: no bony or soft tissue tenderness, no tenderness at the anatomical snuffbox  - ROM:  90 deg flexion   70 deg extension   25 deg abduction   65 deg adduction  - strength: 5/5  strength, 5/5 flexion, extension, pronation, supination, adduction, abduction  - special tests:  (+) Tinel's mild positive bilaterally  (-) Finkelstein  (-) Phalen  (-) Villa click test  (-) ulnar impaction  Neuro  - no sensory or motor deficit, grossly normal coordination, normal muscle tone  Skin  - no ecchymosis, erythema, warmth, or induration, no obvious rash  Psych  - interactive, appropriate, normal mood and affect  Neck: has some pain with extension with neck, has some radiation into arms  ASSESSMENT & PLAN  34 yo male with neck pain due to chronic disc herniations, radicular pain, symptoms  Last MRI was over a year ago  Ordered a new MRI  Consider ESTER v. Nerve treatments  Medrol pack    Faustino Feldman MD, CAQSM  "

## 2019-06-13 NOTE — PROGRESS NOTES
[facility] - Procedure Note    Date of Service: 6/14/2019    Procedure performed: Bilateral L3,4,5 medial branch blocks  Diagnosis: Lumbar spondylosis; Lumbar facet arthropathy  : Brynn Trujillo MD    Indications: Hoang Kiser is a 33 year old male who is seen at the request of Tamiko Stevens CNP for lumbar medial branch blocks. The patient describes pain with extension/rotation. The patient reports minimal improvement with conservative treatment, including previous facet joint injections which were helpful for about 3 weeks, PT and medications.    MRI of the LUMBAR SPINE was done on 5/2/2019 which showed   Findings: 5 lumbar-type vertebra. The tip of the conus medullaris is  at L1.  The lumbar vertebral column is normally aligned.   Straightening of lumbar lordosis, unchanged. The intervertebral disc  heights are maintained. Normal marrow signal. At L5-S1, there is a  disc bulge and facet hypertrophy with mild left neural foraminal  narrowing, unchanged. No spinal canal stenosis.                                                                    Impression:  1. Lumbar spondylosis with mild left neural foraminal narrowing at  L5-S1.  2. No spinal canal stenosis.       Allergies:      Allergies   Allergen Reactions     Buspirone Hcl Other (See Comments)     Panic attacks     Doxycycline Diarrhea, Fatigue, GI Disturbance and Nausea     Elavil [Amitriptyline]      Ineffective in reducing spasms/movement, increased fatigue     Trazodone Fatigue     Buspirone Anxiety     Keflex [Cephalexin] Diarrhea        Vitals:  BP (!) 133/92 (BP Location: Left arm, Patient Position: Sitting, Cuff Size: Adult Regular)   Pulse 94   SpO2 97%     Review of Systems: The patient denies recent fever, chills, illness, use of antibiotics or anticoagulants. All other 10-point review of systems negative.     Procedure:   Options/alternatives, benefits and risks were discussed with the patient including  bleeding, infection, tissue trauma, exposure to radiation, reaction to medications, spinal cord injury, weakness, numbness and paralysis.  Questions were answered to his satisfaction and he agrees to proceed. Voluntary informed consent was obtained and signed.     After getting informed consent, patient was brought into the procedure suite and was placed in a prone position on the procedure table.   A Pause for the Cause was performed.  Patient was prepped and draped in sterile fashion.     Under AP fluoroscopic guidance the L3, L4, L5 vertebral bodies were identified. The C-arm was rotated to the oblique view to afford optimal visualization the pedicles.  Lidocaine 1% was used to anesthetize the skin at each level.  Under intermittent fluoroscopy, 25 gauge 3.5 inch Quinke spinal needles were positioned inferior and lateral to the intersection of the transverse process and pedicle at the Bilateral L4 & L5 levels, as well as the corresponding sacral alar notch. The needle positions were verified and optimized from the AP and lateral views.    The anatomic targets for the L3 & L4 medial nerve and L5 dorsal ramus (which functionally incorporates the medial branch) were the  L4 & L5 transverse processes and sacral alar notch, with laterality as described above, resulting in blockade of the L4/5 and L5/S1 facet joints.    After negative aspiration, 1cc's of Omnipaque 300 was injected to rule out intravascular injection.  9cc's of omnipaque 300 was wasted.  Bupivacaine 0.5% 0.5 ml was injected at each location. The needles were removed. Hemostasis was achieved, the area was cleaned, and bandaids were placed when appropriate. The patient tolerated the procedure well, and was taken to the recovery room. Images were saved to PACS.    Assessment/Plan: Hoang Kiser is a 33 year old male s/p Bilateral L3,4,5 medial branch block today for lumbar spondylosis, facet arthropathy.     Pre procecedure pain score: 8/10   Post  procedure pain score: 3/10.   The patient will continue to monitor progress, and they were given a pain diary to complete at home.  They will either fax or mail this back to us or bring it to their next appointment. We will determine the treatment plan after we review the diary.      1. The patient was advised to contact the [facility] for any of the following:   Fever, chills, or night sweats   New onset of pain, numbness, or weakness   Any questions/concerns regarding the procedure  If unable to contact the Pain Center, the patient was instructed to go to a local Emergency Room for any complications.   2. The patient will receive a follow-up call in 1 week.  3. We will await the pain diary to determent next step of the treatment plan and call patient to schedule.      Brynn Trujillo MD   [facility]

## 2019-06-13 NOTE — PATIENT INSTRUCTIONS
Thanks for coming today.  Ortho/Sports Medicine Clinic  [address] [place] [address]    To schedule future appointments in Ortho Clinic, you may call [phone].    To schedule ordered imaging by your provider:   Call Central Imaging [phone]    To schedule an injection ordered by your provider:  Call Central Imaging Injection scheduling line: [phone]  [employer] [email or web address] available online at:  [email or web address]    Please call if any further questions or concerns ([phone]).  Clinic hours 8 am to 5 pm.    Return to clinic (call) if symptoms worsen or fail to improve.

## 2019-06-14 ENCOUNTER — RADIOLOGY INJECTION OFFICE VISIT (OUTPATIENT)
Dept: PALLIATIVE MEDICINE | Facility: CLINIC | Age: 34
End: 2019-06-14
Payer: COMMERCIAL

## 2019-06-14 ENCOUNTER — ANCILLARY PROCEDURE (OUTPATIENT)
Dept: GENERAL RADIOLOGY | Facility: CLINIC | Age: 34
End: 2019-06-14
Attending: ANESTHESIOLOGY
Payer: COMMERCIAL

## 2019-06-14 VITALS — HEART RATE: 94 BPM | SYSTOLIC BLOOD PRESSURE: 133 MMHG | DIASTOLIC BLOOD PRESSURE: 92 MMHG | OXYGEN SATURATION: 97 %

## 2019-06-14 DIAGNOSIS — M47.816 FACET ARTHROPATHY, LUMBAR: ICD-10-CM

## 2019-06-14 DIAGNOSIS — M47.819 FACET ARTHROPATHY: Primary | ICD-10-CM

## 2019-06-14 PROCEDURE — 64493 INJ PARAVERT F JNT L/S 1 LEV: CPT | Mod: 50 | Performed by: ANESTHESIOLOGY

## 2019-06-14 PROCEDURE — 64494 INJ PARAVERT F JNT L/S 2 LEV: CPT | Mod: 50 | Performed by: ANESTHESIOLOGY

## 2019-06-14 NOTE — PATIENT INSTRUCTIONS
[facility]   Medial Branch Block Discharge Instructions      Your procedure was performed by:  Dr. Brynn Trujillo     Medications used:bupivacaine    You will need to complete the Pain Scale Log form and return it to us as soon as possible.  Once we have received the form, we will review it and call you to determine the next steps.     The form can be faxed to [phone] or mailed to:   [facility]    [address], [address] [id number]      You may resume your regular activity after the injection.    Be cautious with walking since you may have numbness and/or weakness in the lower extremities for up to 6-8 hours after the procedure due to the effects of the local anesthetic.    Avoid driving for 6 hours. The local anesthetic could slow your reflexes    You may shower, however no swimming or tub baths or hot tubs for 24 hours following your procedure.    Your pain will return after the numbing medications have worn off.  You may use your current pain medications as needed.    You may use anti-inflammatory medications (such as ibuprofen, aleve, or advil) or Tylenol for pain control if necessary.  Some people find it helpful to alternate ibuprofen and Tylenol every 3 hours for a couple of days.    You may use ice packs 10-15 minutes three to four times a day at the injection site for comfort.     Do not use heat to painful areas for 6 to 8 hours. This will give the local anesthetic time to wear off and prevent you from accidentally burning your skin.     If you experience any of the following, call the Pain Clinic during work hours at [phone] or the Provider Line after hours at [phone]:  -Fever over 100 degree F  -Swelling, bleeding, redness, drainage, warmth at the injection site  -Progressive weakness or numbness on your legs or arms  -If lumbar, call if you have a loss of bowel or bladder function  -If cervical,  call if you have any unusual headache that is not relieved by Tylenol  -Unusual new onset of pain that is not improving

## 2019-06-14 NOTE — NURSING NOTE
Discharge Information    IV Discontiued Time:  NA    Amount of Fluid Infused:  NA    Discharge Criteria = When patient returns to baseline or as per MD order    Consciousness:  Pt is fully awake    Circulation:  BP +/- 20% of pre-procedure level    Respiration:  Patient is able to breathe deeply    O2 Sat:  Patient is able to maintain O2 Sat >92% on room air    Activity:  Moves 4 extremities on command    Ambulation:  Patient is able to stand and walk or stand and pivot into wheelchair    Dressing:  Clean/dry or No Dressing    Notes:   Discharge instructions and AVS given to patient    Patient meets criteria for discharge?  YES    Admitted to PCU?  No    Responsible adult present to accompany patient home?  Yes    Signature/Title:    Pilar Quintanilla  RN Care Coordinator  [facility]

## 2019-06-17 ENCOUNTER — ANCILLARY PROCEDURE (OUTPATIENT)
Dept: MRI IMAGING | Facility: CLINIC | Age: 34
End: 2019-06-17
Attending: PREVENTIVE MEDICINE
Payer: COMMERCIAL

## 2019-06-17 DIAGNOSIS — M50.30 DDD (DEGENERATIVE DISC DISEASE), CERVICAL: ICD-10-CM

## 2019-06-17 PROCEDURE — 72141 MRI NECK SPINE W/O DYE: CPT | Performed by: RADIOLOGY

## 2019-06-18 ENCOUNTER — OFFICE VISIT (OUTPATIENT)
Dept: ALLERGY | Facility: CLINIC | Age: 34
End: 2019-06-18
Attending: FAMILY MEDICINE
Payer: COMMERCIAL

## 2019-06-18 VITALS
DIASTOLIC BLOOD PRESSURE: 83 MMHG | WEIGHT: 171.8 LBS | HEART RATE: 83 BPM | SYSTOLIC BLOOD PRESSURE: 126 MMHG | OXYGEN SATURATION: 97 % | BODY MASS INDEX: 26.91 KG/M2

## 2019-06-18 DIAGNOSIS — R09.81 NASAL CONGESTION: ICD-10-CM

## 2019-06-18 DIAGNOSIS — J34.89 RHINORRHEA: Primary | ICD-10-CM

## 2019-06-18 DIAGNOSIS — R05.9 COUGH: ICD-10-CM

## 2019-06-18 PROCEDURE — 86003 ALLG SPEC IGE CRUDE XTRC EA: CPT | Performed by: ALLERGY & IMMUNOLOGY

## 2019-06-18 PROCEDURE — 86003 ALLG SPEC IGE CRUDE XTRC EA: CPT | Mod: 91 | Performed by: ALLERGY & IMMUNOLOGY

## 2019-06-18 PROCEDURE — 99243 OFF/OP CNSLTJ NEW/EST LOW 30: CPT | Performed by: ALLERGY & IMMUNOLOGY

## 2019-06-18 PROCEDURE — 36415 COLL VENOUS BLD VENIPUNCTURE: CPT | Performed by: ALLERGY & IMMUNOLOGY

## 2019-06-18 PROCEDURE — 99000 SPECIMEN HANDLING OFFICE-LAB: CPT | Performed by: ALLERGY & IMMUNOLOGY

## 2019-06-18 NOTE — PROGRESS NOTES
Dear Phill Floyd MD,    Thank you for referring your patient Hoang Kiser to the Allergy/Immunology Clinic. Hoang Kiser was seen in the Allergy Clinic at [facility]. The following are my recommendations regarding his Cough, Nasal Congestion and Rhinorrhea    1. Will check specific IgE to seasonal and perennial aeroallergens  2. Recommend second generation H1 antihistamine such as cetirizine, fexofenadine, or loratadine once to twice daily as needed  3. Recommend fluticasone nasal spray, 2 sprays in each nostril daily  4. May try sinus irrigation rinse once to twice daily as needed for rhinitis symptoms  5. Follow-up in the allergy clinic pending lab results      Hoang Kiser is a 33 year old White male being seen today at the request of Dr. Floyd in consultation for allergies.  He reports that approximately 6 weeks ago he was having rhinitis symptoms and is not sure if this is due to a respiratory infection or allergies.  His symptoms consisted of rhinorrhea, postnasal drainage, nasal congestion, cough, wheezing, chest tightness, sore throat, and chills.  He presented to urgent care for evaluation and was prescribed albuterol, cetirizine, and fluticasone nasal spray.  Shoaib reports that his symptoms lasted for approximately 3 weeks and then resolved.  He has not used the albuterol inhaler for the last couple of weeks and has only used the fluticasone nasal spray once in the past 7 days.  He previously had discontinued the antihistamines.  Over the last several years Shoaib reports that he has felt that he has spring and fall allergy symptoms.  He does not have a history of allergies as a child.    Shoaib does not regularly take antihistamines to manage his allergy symptoms however he does take hydroxyzine 3 to 4 times per week to help with sleep. He last took this medication last night.      Past Medical History:   Diagnosis Date     Arthritis 2/27/2018     Benign positional  vertigo 2016    Started after my groin/back injury. Sitting on Toilet.     Depressive disorder     I am on 120mg of Duoluxetine.     Dysphonia     Nothing significant.     Gastroesophageal reflux disease 2004    Occassional. Spicy foods set it off.     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      DATE OF EE2019.    SOURCE FILE DURATION:  15 minutes.  This EEG did not have a video.    PATIENT INFORMATION:  The patient is a 33-year-old male with irregular movements.  It is not entirely clear the etiology of these movements.  EEG is being done to evaluate for seizures.    MEDICATIONS:  Adderall, doxepin, Cymbalta, Robaxin.      History of MRI of brain and brain stem 2015    MR BRAIN W/O & W CONTRAST, 2015  Impression: No suspicious intracranial findings.      Hoarseness     Dry mouth, started after taking Tizanidine     Neuralgia, neuritis, and radiculitis, unspecified 2012     normal emg 2017 2017    Interpretation: This is a normal study. There is no electrophysiologic evidence of a lumbosacral radiculopathy affecting the right or left lower extremity on the basis of this study.    Rodney Mena MD Department of Neurology       Panic attack 2016     Seizures (H)     Grew out of it. Absence Seizures. [id number]     Tinnitus     Had it most of my life. Got worse in      Family History   Problem Relation Age of Onset     Lipids Father         hyperlipidemia     Hyperlipidemia Father      Obesity Father      Arthritis Mother      Hyperlipidemia Mother      Depression Mother      Anxiety Disorder Mother      Mental Illness Mother      Obesity Mother      Asthma Brother      Asthma Sister      Depression Other      Hearing Loss Other      Psychotic Disorder Other      Obesity Other      Cerebrovascular Disease Paternal Grandfather       Alzheimer Disease Paternal Grandfather      Depression Brother      Mental Illness Brother         Bipolar     Asthma Brother      Depression Sister      Asthma Sister      Substance Abuse Sister         Alcohol     Mental Illness Brother         Bipolar     Asthma Brother      Asthma Sister      Obesity Sister      Asthma Brother      Obesity Brother      Obesity Maternal Grandmother      Genetic Disorder Maternal Grandmother         Epilepsy     Obesity Paternal Grandmother      Colon Cancer Other      Genetic Disorder Other         Cerebral Palsy     Genetic Disorder Maternal Grandfather         Multiple Sclerosis     Genetic Disorder Other         Epilepsy     Past Surgical History:   Procedure Laterality Date     COLONOSCOPY  02/15/18    Has not happened yet.     COLONOSCOPY N/A 2/15/2018    Procedure: COMBINED COLONOSCOPY, SINGLE OR MULTIPLE BIOPSY/POLYPECTOMY BY BIOPSY;;  Surgeon: Liam Kincaid MD;  Location: [facility] OR     COLONOSCOPY WITH CO2 INSUFFLATION N/A 2/15/2018    Procedure: COLONOSCOPY WITH CO2 INSUFFLATION;  COLON-FAMILY HX OF COLON CANCER/ SYPURA;  Surgeon: Liam Kincaid MD;  Location: [facility] OR     HC TOOTH EXTRACTION W/FORCEP Bilateral 2003     PE TUBES  1990       ENVIRONMENTAL HISTORY: [place] The home is heated with a forced air and gas furnace. They do have central air conditioning. The patient's bedroom is furnished with carpeting in bedroom and fabric window coverings.  Pets inside the house include None. There is no history of cockroach or mice infestation. He smoke 3/4ppd and there are several smokers in the house; lives in an independent living home, and there are other smokers living in the building.  The house does not have a basement.     SOCIAL HISTORY:   Hoang is not working. He lives in an independent living facility.     REVIEW OF SYSTEMS:  General: negative for weight gain. negative for weight loss. positive  for  changes in sleep.   Eyes: positive  for itching. positive  for redness. positive  for tearing/watering. negative for vision changes  Ears: negative for fullness. negative for hearing loss. negative for dizziness.   Nose: negative for snoring.negative for changes in smell. negative for drainage. Positive for congestion.  Throat: negative for hoarseness. negative for sore throat. negative for trouble swallowing.   Lungs: negative for cough. negative for shortness of breath.negative for wheezing. negative for sputum production.   Cardiovascular: negative for chest pain. negative for swelling of ankles. negative for fast or irregular heartbeat.   Gastrointestinal: negative for nausea. positive  for heartburn. positive  for acid reflux.   Musculoskeletal: negative for joint pain. negative for joint stiffness. negative for joint swelling.   Neurologic: negative for seizures. negative for fainting. negative for weakness.   Psychiatric: negative for changes in mood. negative for anxiety.   Endocrine: negative for cold intolerance. positive  for heat intolerance. positive  for tremors.   Hematologic: negative for easy bruising. negative for easy bleeding.  Integumentary: negative for rash. negative for scaling. negative for nail changes.       Current Outpatient Medications:      albuterol (PROAIR HFA/PROVENTIL HFA/VENTOLIN HFA) 108 (90 Base) MCG/ACT inhaler, Inhale 1-2 puffs into the lungs every 4 hours as needed for shortness of breath / dyspnea or wheezing (cough or wheeze only if necessary), Disp: 6.7 g, Rfl: 1     amphetamine-dextroamphetamine (ADDERALL XR) 30 MG per 24 hr capsule, Take 30 mg by mouth daily, Disp: , Rfl:      DULoxetine (CYMBALTA) 60 MG EC capsule, 2 x 60mg tab by mouth daily, Disp: 30 capsule, Rfl: 1     HYDROcodone-acetaminophen (NORCO) 5-325 MG tablet, Take 1 tablet by mouth 3 times daily as needed for pain (Max 3 times daily.) Okay to fill on 6/6/19 to begin using on 6/12/19, Disp: 90 tablet, Rfl:  0     hydrOXYzine (ATARAX) 25 MG tablet, Take 1 tablet (25 mg) by mouth nightly as needed (at HS PRN), Disp: 45 tablet, Rfl: 3     methocarbamol (ROBAXIN) 500 MG tablet, Take 1-2 tablets (500-1,000 mg) by mouth 4 times daily as needed for muscle spasms, Disp: 240 tablet, Rfl: 1     methylPREDNISolone (MEDROL DOSEPAK) 4 MG tablet therapy pack, Follow Package Directions, Disp: 21 tablet, Rfl: 0     naproxen (NAPROSYN) 500 MG tablet, Take 1 tablet (500 mg) by mouth 2 times daily (with meals), Disp: 40 tablet, Rfl: 3     oxybutynin ER (DITROPAN-XL) 5 MG 24 hr tablet, Take 2 tablets (10 mg) by mouth daily, Disp: 180 tablet, Rfl: 3     clonazePAM (KLONOPIN) 0.5 MG tablet, Take 0.5-1 mg by mouth nightly as needed , Disp: , Rfl: 0     naloxone (NARCAN) 4 MG/0.1ML nasal spray, Spray 1 spray (4 mg) into one nostril alternating nostrils as needed for opioid reversal every 2-3 minutes until assistance arrives, Disp: 0.2 mL, Rfl: 0     SF 5000 PLUS 1.1 % CREA, , Disp: , Rfl: 2  Immunization History   Administered Date(s) Administered     DTAP (<7y) 1985, 1985, 1985, 1985, 02/08/1986, 02/28/1986, 02/28/1986, 04/27/1987, 09/06/1990, 09/06/1995     HEPA 02/11/2010     HepA-Adult 01/24/2018     HepB 10/27/1997, 08/19/1998, 11/20/2000     Influenza Intranasal Vaccine 4 valent 11/01/2017, 10/10/2018     MMR 04/27/1987, 10/27/1997     Pneumococcal 23 valent 02/05/2016     Poliovirus, inactivated (IPV) 1985, 1985, 02/28/1986, 04/27/1987     TDAP Vaccine (Adacel) 02/04/2019     Tdap (Adacel,Boostrix) 01/01/2000, 02/11/2010     Allergies   Allergen Reactions     Buspirone Hcl Other (See Comments)     Panic attacks     Doxycycline Diarrhea, Fatigue, GI Disturbance and Nausea     Elavil [Amitriptyline]      Ineffective in reducing spasms/movement, increased fatigue     Trazodone Fatigue     Buspirone Anxiety     Keflex [Cephalexin] Diarrhea         EXAM:   /83 (BP Location: Left arm, Patient  Position: Sitting, Cuff Size: Adult Large)   Pulse 83   Wt 77.9 kg (171 lb 12.8 oz)   SpO2 97%   BMI 26.91 kg/m    GENERAL APPEARANCE: alert, cooperative and not in distress  SKIN: no rashes, no lesions  HEAD: atraumatic, normocephalic  EYES: lids and lashes normal, conjunctivae and sclerae clear, pupils equal, round, reactive to light, EOM full and intact  ENT: no scars or lesions, nasal exam showed no discharge, swelling or lesions noted, otoscopy showed external auditory canals clear, tympanic membranes normal, tongue midline and normal, soft palate, uvula, and tonsils normal  NECK: no asymmetry, masses, or scars, supple without significant adenopathy  LUNGS: unlabored respirations, no intercostal retractions or accessory muscle use, clear to auscultation without rales or wheezes  HEART: regular rate and rhythm without murmurs and normal S1 and S2  MUSCULOSKELETAL: no musculoskeletal defects are noted  NEURO: frequent tics/jerking movements  PSYCH: does not appear depressed or anxious    WORKUP: None    ASSESSMENT/PLAN:  Hoang Kiser is a 33 year old male here for evaluation of allergies. He reports having seasonal rhinitis symptoms in the spring and fall and recently recovered after having symptoms for 6 weeks. He is not sure if these symptoms are due to illness or allergies. Skin testing was deferred today due to recent antihistamine use. We discussed options for allergy testing including returning off of all antihistamines for skin testing vs specific IgE testing. We also reviewed medication management for these symptoms including long-acting antihistamine, nasal steroids, and sinus irrigation.    1. Will check specific IgE to seasonal and perennial aeroallergens  2. Recommend second generation H1 antihistamine such as cetirizine, fexofenadine, or loratadine once to twice daily as needed  3. Recommend fluticasone nasal spray, 2 sprays in each nostril daily  4. May try sinus irrigation rinse once to  twice daily as needed for rhinitis symptoms  5. Follow-up in the allergy clinic pending lab results      Niki Kelly MD  Allergy/Immunology  [facility] and [place], [place]      Chart documentation done in part with Dragon Voice Recognition Software. Although reviewed after completion, some word and grammatical errors may remain.

## 2019-06-18 NOTE — Clinical Note
6/18/2019         RE: Hoang Kiser  [address]  [facility] [address]        Dear Colleague,    Thank you for [facility] your [facility] Haong Kiser[facility]. Please see a copy of my visit note below.    Dear Phill Floyd MD,    Thank you for referring your patient Hoang Kiser to the Allergy/Immunology Clinic. Hoang Kiser was seen in the Allergy Clinic at [facility]. The following are my recommendations regarding his {Allergydiagnoses:[id number]}    Hoang Kiser is a 33 year old White male being seen today at the request of Dr. Floyd in consultation for allergies.  He reports that approximately 6 weeks ago he was having rhinitis symptoms and is not sure if this is due to a respiratory infection or allergies.  His symptoms consisted of rhinorrhea, postnasal drainage, nasal congestion, cough, wheezing, chest tightness, sore throat, and chills.  He presented to urgent care for evaluation and was prescribed albuterol, cetirizine, and fluticasone nasal spray.  Shoaib reports that his symptoms lasted for approximately 3 weeks and then resolved.  He has not used the albuterol inhaler for the last couple of weeks and has only used the fluticasone nasal spray once in the past 7 days.  He previously had discontinued the antihistamines.  Over the last several years Shoaib reports that he has felt that he has spring and fall allergy symptoms.  He does not have a history of allergies as a child.    Shoaib does not regularly take antihistamines to manage his allergy symptoms however he does take hydroxyzine 3 to 4 times per week to help with sleep. He last took this medication last night.      Past Medical History:   Diagnosis Date     Arthritis 2/27/2018     Benign positional vertigo 12/2016    Started after my groin/back injury. Sitting on Toilet.     Depressive disorder 2003    I am on 120mg of Duoluxetine.     Dysphonia 2014    Nothing  significant.     Gastroesophageal reflux disease 2004    Occassional. Spicy foods set it off.     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      DATE OF EE2019.    SOURCE FILE DURATION:  15 minutes.  This EEG did not have a video.    PATIENT INFORMATION:  The patient is a 33-year-old male with irregular movements.  It is not entirely clear the etiology of these movements.  EEG is being done to evaluate for seizures.    MEDICATIONS:  Adderall, doxepin, Cymbalta, Robaxin.      History of MRI of brain and brain stem 2015    MR BRAIN W/O & W CONTRAST, 2015  Impression: No suspicious intracranial findings.      Hoarseness     Dry mouth, started after taking Tizanidine     Neuralgia, neuritis, and radiculitis, unspecified 2012     normal emg 2017 2017    Interpretation: This is a normal study. There is no electrophysiologic evidence of a lumbosacral radiculopathy affecting the right or left lower extremity on the basis of this study.    Rodney Mena MD Department of Neurology       Panic attack 2016     Seizures (H)     Grew out of it. Absence Seizures. [id number]     Tinnitus     Had it most of my life. Got worse in      Family History   Problem Relation Age of Onset     Lipids Father         hyperlipidemia     Hyperlipidemia Father      Obesity Father      Arthritis Mother      Hyperlipidemia Mother      Depression Mother      Anxiety Disorder Mother      Mental Illness Mother      Obesity Mother      Asthma Brother      Asthma Sister      Depression Other      Hearing Loss Other      Psychotic Disorder Other      Obesity Other      Cerebrovascular Disease Paternal Grandfather      Alzheimer Disease Paternal Grandfather      Depression Brother      Mental Illness Brother         Bipolar     Asthma Brother      Depression Sister      Asthma  Sister      Substance Abuse Sister         Alcohol     Mental Illness Brother         Bipolar     Asthma Brother      Asthma Sister      Obesity Sister      Asthma Brother      Obesity Brother      Obesity Maternal Grandmother      Genetic Disorder Maternal Grandmother         Epilepsy     Obesity Paternal Grandmother      Colon Cancer Other      Genetic Disorder Other         Cerebral Palsy     Genetic Disorder Maternal Grandfather         Multiple Sclerosis     Genetic Disorder Other         Epilepsy     Past Surgical History:   Procedure Laterality Date     COLONOSCOPY  02/15/18    Has not happened yet.     COLONOSCOPY N/A 2/15/2018    Procedure: COMBINED COLONOSCOPY, SINGLE OR MULTIPLE BIOPSY/POLYPECTOMY BY BIOPSY;;  Surgeon: Liam Kincaid MD;  Location: [facility] OR     COLONOSCOPY WITH CO2 INSUFFLATION N/A 2/15/2018    Procedure: COLONOSCOPY WITH CO2 INSUFFLATION;  COLON-FAMILY HX OF COLON CANCER/ SYPURA;  Surgeon: Liam Kincaid MD;  Location: [facility] OR     HC TOOTH EXTRACTION W/FORCEP Bilateral 2003     PE TUBES  1990       ENVIRONMENTAL HISTORY: [place] The home is heated with a forced air and gas furnace. They do have central air conditioning. The patient's bedroom is furnished with carpeting in bedroom and fabric window coverings.  Pets inside the house include None. There is no history of cockroach or mice infestation. He smoke 3/4ppd and there are several smokers in the house; lives in an independent living home, and there are other smokers living in the building.  The house does not have a basement.     SOCIAL HISTORY:   Hoang is not working. He lives in an independent living facility.     REVIEW OF SYSTEMS:  General: negative for weight gain. negative for weight loss. positive  for changes in sleep.   Eyes: positive  for itching. positive  for redness. positive  for tearing/watering. negative for vision changes  Ears: negative for fullness.  negative for hearing loss. negative for dizziness.   Nose: negative for snoring.negative for changes in smell. negative for drainage. Positive for congestion.  Throat: negative for hoarseness. negative for sore throat. negative for trouble swallowing.   Lungs: negative for cough. negative for shortness of breath.negative for wheezing. negative for sputum production.   Cardiovascular: negative for chest pain. negative for swelling of ankles. negative for fast or irregular heartbeat.   Gastrointestinal: negative for nausea. positive  for heartburn. positive  for acid reflux.   Musculoskeletal: negative for joint pain. negative for joint stiffness. negative for joint swelling.   Neurologic: negative for seizures. negative for fainting. negative for weakness.   Psychiatric: negative for changes in mood. negative for anxiety.   Endocrine: negative for cold intolerance. positive  for heat intolerance. positive  for tremors.   Hematologic: negative for easy bruising. negative for easy bleeding.  Integumentary: negative for rash. negative for scaling. negative for nail changes.       Current Outpatient Medications:      albuterol (PROAIR HFA/PROVENTIL HFA/VENTOLIN HFA) 108 (90 Base) MCG/ACT inhaler, Inhale 1-2 puffs into the lungs every 4 hours as needed for shortness of breath / dyspnea or wheezing (cough or wheeze only if necessary), Disp: 6.7 g, Rfl: 1     amphetamine-dextroamphetamine (ADDERALL XR) 30 MG per 24 hr capsule, Take 30 mg by mouth daily, Disp: , Rfl:      DULoxetine (CYMBALTA) 60 MG EC capsule, 2 x 60mg tab by mouth daily, Disp: 30 capsule, Rfl: 1     HYDROcodone-acetaminophen (NORCO) 5-325 MG tablet, Take 1 tablet by mouth 3 times daily as needed for pain (Max 3 times daily.) Okay to fill on 6/6/19 to begin using on 6/12/19, Disp: 90 tablet, Rfl: 0     hydrOXYzine (ATARAX) 25 MG tablet, Take 1 tablet (25 mg) by mouth nightly as needed (at HS PRN), Disp: 45 tablet, Rfl: 3     methocarbamol (ROBAXIN) 500 MG  tablet, Take 1-2 tablets (500-1,000 mg) by mouth 4 times daily as needed for muscle spasms, Disp: 240 tablet, Rfl: 1     methylPREDNISolone (MEDROL DOSEPAK) 4 MG tablet therapy pack, Follow Package Directions, Disp: 21 tablet, Rfl: 0     naproxen (NAPROSYN) 500 MG tablet, Take 1 tablet (500 mg) by mouth 2 times daily (with meals), Disp: 40 tablet, Rfl: 3     oxybutynin ER (DITROPAN-XL) 5 MG 24 hr tablet, Take 2 tablets (10 mg) by mouth daily, Disp: 180 tablet, Rfl: 3     clonazePAM (KLONOPIN) 0.5 MG tablet, Take 0.5-1 mg by mouth nightly as needed , Disp: , Rfl: 0     naloxone (NARCAN) 4 MG/0.1ML nasal spray, Spray 1 spray (4 mg) into one nostril alternating nostrils as needed for opioid reversal every 2-3 minutes until assistance arrives, Disp: 0.2 mL, Rfl: 0     SF 5000 PLUS 1.1 % CREA, , Disp: , Rfl: 2  Immunization History   Administered Date(s) Administered     DTAP (<7y) 1985, 1985, 1985, 1985, 02/08/1986, 02/28/1986, 02/28/1986, 04/27/1987, 09/06/1990, 09/06/1995     HEPA 02/11/2010     HepA-Adult 01/24/2018     HepB 10/27/1997, 08/19/1998, 11/20/2000     Influenza Intranasal Vaccine 4 valent 11/01/2017, 10/10/2018     MMR 04/27/1987, 10/27/1997     Pneumococcal 23 valent 02/05/2016     Poliovirus, inactivated (IPV) 1985, 1985, 02/28/1986, 04/27/1987     TDAP Vaccine (Adacel) 02/04/2019     Tdap (Adacel,Boostrix) 01/01/2000, 02/11/2010     Allergies   Allergen Reactions     Buspirone Hcl Other (See Comments)     Panic attacks     Doxycycline Diarrhea, Fatigue, GI Disturbance and Nausea     Elavil [Amitriptyline]      Ineffective in reducing spasms/movement, increased fatigue     Trazodone Fatigue     Buspirone Anxiety     Keflex [Cephalexin] Diarrhea         EXAM:   /83 (BP Location: Left arm, Patient Position: Sitting, Cuff Size: Adult Large)   Pulse 83   Wt 77.9 kg (171 lb 12.8 oz)   SpO2 97%   BMI 26.91 kg/m     GENERAL APPEARANCE: alert, cooperative and not in  distress  SKIN: no rashes, no lesions  HEAD: atraumatic, normocephalic  EYES: lids and lashes normal, conjunctivae and sclerae clear, pupils equal, round, reactive to light, EOM full and intact  ENT: no scars or lesions, nasal exam showed no discharge, swelling or lesions noted, otoscopy showed external auditory canals clear, tympanic membranes normal, tongue midline and normal, soft palate, uvula, and tonsils normal  NECK: no asymmetry, masses, or scars, supple without significant adenopathy  LUNGS: unlabored respirations, no intercostal retractions or accessory muscle use, clear to auscultation without rales or wheezes  HEART: regular rate and rhythm without murmurs and normal S1 and S2  MUSCULOSKELETAL: no musculoskeletal defects are noted, ***  NEURO: ***  PSYCH: does not appear depressed or anxious    WORKUP: None    ASSESSMENT/PLAN:  Hoang Kiser is a 33 year old male    ***      Niki Kelly MD  Allergy/Immunology  [facility] and [place], [place]      Chart documentation done in part with Dragon Voice Recognition Software. Although reviewed after completion, some word and grammatical errors may remain.    Again, thank you for allowing me to participate in the care of your patient.        Sincerely,        Niki Kelly MD

## 2019-06-20 LAB
A ALTERNATA IGE QN: <0.1 KU(A)/L
A FUMIGATUS IGE QN: <0.1 KU(A)/L
C HERBARUM IGE QN: <0.1 KU(A)/L
CAT DANDER IGG QN: <0.1 KU(A)/L
CEDAR IGE QN: <0.1 KU(A)/L
COCKSFOOT IGE QN: <0.1 KU(A)/L
COMMON RAGWEED IGE QN: <0.1 KU(A)/L
COTTONWOOD IGE QN: <0.1 KU(A)/L
D FARINAE IGE QN: <0.1 KU(A)/L
D PTERONYSS IGE QN: <0.1 KU(A)/L
DOG DANDER+EPITH IGE QN: <0.1 KU(A)/L
E PURPURASCENS IGE QN: <0.1 KU(A)/L
EAST WHITE PINE IGE QN: <0.1 KU(A)/L
ENGL PLANTAIN IGE QN: <0.1 KU(A)/L
FIREBUSH IGE QN: <0.1 KU(A)/L
GIANT RAGWEED IGE QN: <0.1 KU(A)/L
GOOSEFOOT IGE QN: <0.1 KU(A)/L
JOHNSON GRASS IGE QN: <0.1 KU(A)/L
MAPLE IGE QN: <0.1 KU(A)/L
MUGWORT IGE QN: <0.1 KU(A)/L
NETTLE IGE QN: <0.1 KU(A)/L
P NOTATUM IGE QN: <0.1 KU(A)/L
RED MULBERRY IGE QN: <0.1 KU(A)/L
SALTWORT IGE QN: <0.1 KU(A)/L
SHEEP SORREL IGE QN: <0.1 KU(A)/L
SILVER BIRCH IGE QN: <0.1 KU(A)/L
TIMOTHY IGE QN: <0.1 KU(A)/L
WHITE ASH IGE QN: <0.1 KU(A)/L
WHITE ELM IGE QN: <0.1 KU(A)/L
WHITE MULBERRY IGE QN: <0.1 KU(A)/L
WHITE OAK IGE QN: <0.1 KU(A)/L
WORMWOOD IGE QN: <0.1 KU(A)/L

## 2019-06-21 ENCOUNTER — TELEPHONE (OUTPATIENT)
Dept: PALLIATIVE MEDICINE | Facility: CLINIC | Age: 34
End: 2019-06-21

## 2019-06-21 LAB
CALIF WALNUT IGE QN: <0.1 KU/L
DEPRECATED MISC ALLERGEN IGE RAST QL: NORMAL

## 2019-06-21 NOTE — TELEPHONE ENCOUNTER
Patient had a Bilateral L3,4,5 medial branch blocks  # 1 on 6/17/19.   Called patient for an update. Pt reported the following details:  Blocks were helpful     Has patient sent back the post injection form?YES    If this is medial branch block #1 does patient want to schedule medial branch block #2? YES  If this is medial branch block #2 does patient want to proceed w/ the radiofrequency ablation procedure? NA.

## 2019-06-27 NOTE — TELEPHONE ENCOUNTER
Lumbar MBB#1 pain data reviewed. Max relief obtained:     At rest:                    63%  Left facet load:        25%  Right facet load:      44%  Normal activity:       63%     MBB to RFA order verified:  Yes  Insurance coverage verified with GINO King: OK to proceed to MBB or RFA: No    Do MBB scores fall within criteria to proceed per insurance?   If yes, route to (delete what does not apply):     to schedule MBB#2    OR    begin PA processing for RFA by GINO BORGESN-RN Care Coordinator  [facility]

## 2019-06-27 NOTE — TELEPHONE ENCOUNTER
BCBS RFA REQUIREMENTS- ALL REQUIRE PA        BCBS MA-   o 6 months failed conservative treatment (meds, muscle relaxants, etc.);  o  4 weeks of PT within the last 6 months (or an unfavorable evaluation);   o No spinal fusion of level being treated; non-radicular pain documented in radiographic evaluation within last 12 months;   o 2 successful workups resulting in >50% pain relief.        Okay to schedule MBB #2      Dominique TRINH    [facility]

## 2019-06-28 ENCOUNTER — TELEPHONE (OUTPATIENT)
Dept: FAMILY MEDICINE | Facility: CLINIC | Age: 34
End: 2019-06-28

## 2019-06-28 NOTE — TELEPHONE ENCOUNTER
[facility]  [facility]: [place]        Social Work Care Navigator Note      Patient: Hoang Kiser  Date: June 28, 2019  Preferred Name: Shoaib    Previous PHQ-9:   PHQ-9 SCORE 2/14/2018 10/1/2018 3/22/2019   PHQ-9 Total Score - - -   PHQ-9 Total Score 7 13 11     Previous JIN-7:   JIN-7 SCORE 8/14/2018 10/1/2018 3/22/2019   Total Score - - -   Total Score 15 13 12     MOLLY LEVEL:  MOLLY Score (Last Two) 3/23/2016 10/1/2018   MOLLY Raw Score 52 31   Activation Score 100 59.3   MOLLY Level 4 3       Preferred Contact:  Need for : No  Preferred Contact: Cell      Type of Contact Today: Phone call (patient / identified key support person reached)      Data: (subjective / Objective):  Recent ED/IP Admission or Discharge?   None    Patient Goals:  Goal Areas: Health;Mental Health;Chemical Health;Financial and Social Service Benefits  Patient stated goals: Patient stated that he would like to find a Neurologist that he works well with; Patient would like to continue to work on self-advocacy skills; Patient would eventually like to find part-time work when health conditions are better managed; Patient stated he would like to continue growing his skills with coping and stress management; Patient would like to continue going to [employer] groups through [facility] to ensure his sobriety continues as it has for the last 3 1/2 years        [facility] Core Service Provided:  Health and Wellness Promotion    Current Stressors / Issues / Care Plan Objective Addressed Today:   contacted patient for monthly follow up. Patient stated that he was approved for SMRT and submitted information to be put on the CADI waiver. Patient will call [facility] to get [place], which he hopes to do by Monday. Patient has an appt in September for the Behavioral Shaping Therapy at [facility]. He will be graduating outpatient CD treatment on Monday at [facility]. Patient will continue to live in the sober housing  through [facility]. No further questions/concerns    Intervention:  Motivational Interviewing: Supported Autonomy, Collaboration, Evocation and Open-ended questions   Target Behavior(s): Explored current social supports and reinforced opportunities to increase engagement    Assessment: (Progress on Goals / Homework):   reviewed health action plan goals. See Objectives for more details. Progress: Needs improvement. Next Steps:  will continue to work with patient to support patient in achieving their goals.    Plan: (Homework, other):  Patient was encouraged to continue to seek condition-related information and education.      Scheduled a Phone follow up appointment with BROWN BURGESS in 4 weeks     Patient has set self-identified goals and will monitor progress until the next appointment on: TBD.     Andrez Brian, Social Work Care Coordinator               Next 5 appointments (look out 90 days)    Jul 02, 2019  9:00 AM CDT  Return Visit with CARLOS A Guy CNP  [facility] ([facility]) [address] [place] [id number]  [phone]   Jul 03, 2019  9:40 AM CDT  Return Visit with Faustino Feldman MD  [facility] [address] [place] [id number] [phone]   Jul 09, 2019  8:20 AM CDT  Return Visit with Niki Kelly MD  [facility] ([facility]) [address] [place] [id number]  [phone]

## 2019-06-28 NOTE — TELEPHONE ENCOUNTER
Pre-screening questions for MBB Injections:    Injection to be done at which interventional clinic site? [facility]     Instruct patient to arrive as directed prior to the scheduled appointment time:    Wyjose alberto and Diana: 30 minutes before        Procedure ordered by Dr. Trujillo    Procedure ordered? Lumbar Medial Branch Block    What insurance would patient like us to bill for this procedure? BC      Worker's comp- Any injection DO NOT SCHEDULE and route to Dominique King.      [employer] insurance - If scheduling an SI joint injection DO NOT SCHEDULE and route to Dominique King.          MBB's must be scheduled at LEAST two weeks apart for insurance purposes       [employer] - Any injection besides hip/shoulder/knee joint DO NOT SCHEDULE and route to Dominique King. She will obtain PA and call pt back to schedule procedure or notify pt of denial.       [employer]- PA required for all MBB's    Any chance of pregnancy? NO   If YES, do NOT schedule and route to RN pool    Is an  needed? No     Patient has a drive home? (mandatory) Yes     Is patient taking any blood thinners (plavix, coumadin, jantoven, warfarin, heparin, pradaxa or dabigatran )? No    If hold needed, do NOT schedule, route to RN pool     Is patient taking any aspirin products? No     If more than 325mg/day do NOT schedule; route to RN pool     For CERVICAL procedures, hold all aspirin products for 6 days.      Does the patient have a bleeding or clotting disorder? No    If YES, okay to schedule AND route to RN nurse pool    **For any patients with platelet count <100, must be forwarded to provider**    Is patient diabetic? NO If YES, have them bring their glucometer.    Does patient have an active infection or treated for one within the past week? No    Is patient currently taking any antibiotics?  No    For patients on chronic, preventative, or prophylacti antibiotics, procedures can be scheduled.     For patients on antibiotics  for active or recent infection:    Gabrielle Cadena [place], [place], Snitzer-antibiotic course must have been completed for 4 days     Is patient currently taking any steroid medications? (i.e. Prednisone, Medrol)  No     For patients on steroid medications:    Karyn Cadena [place], Snitzer-steroid course must have been completed for 4 days    Review with patient:  If you are started on any steroids or antibiotics between now and your appointment, you must contact us because it may affect our ability to perform your procedure YES    Is patient actively being treated for cancer or immunocompromised? No   If YES, do NOT schedule and route to RN    Are you able to get on and off an exam table with minimal or no assistance? Yes   If NO, do NOT schedule and route to RN    Are you able to roll over and lay on your stomach with minimal or no assistance? Yes   If NO, do NOT schedule and route to RN    Any allergies to contrast dye, iodine, shellfish, or numbing and steroid medications? No  (If so, inform nursing and note in scheduling comments.)    Allergies: Buspirone hcl; Doxycycline; Elavil [amitriptyline]; Trazodone; Buspirone; and Keflex [cephalexin]     Has the patient had a flu shot or any other vaccinations within 7 days before or after the procedure.  No     Does patient have an MRI/CT?  Not Applicable  (SI joint, hip injections, lumbar sympathetic blocks, and stellate ganglion blocks do not require an MRI)    Was the MRI done w/in the last 3 years?  NA    Was MRI done at [facility]? No      If not, where was it done? N/A       If MRI was not done at [facility], [facility] or [facility] do NOT schedule and route to nursing.  If pt has an imaging disc, the injection may be scheduled but pt has to bring disc to appt. If they show up w/out disc the injection cannot be done    **Must be scheduled with elapsed time interval of at least 2 weeks and not more than 6 months between the First MBB and the Second  MBB**       Medial Branch Block Pre-Procedure Instructions    It is okay to take long acting pain medications (if you are on them) the day of the procedure but try not to take any short acting medications unless absolutely necessary. ok        Long acting meds would include: Gabapentin (Neurontin), MS Contin, Oxycontin        Short acting meds would include:  Percocet, Oxycodone, Vicodin, Ibuprofen     The day of the procedure, you should try to do things that provoke your pain, since the injection is being done to see if it will relieve your pain . ok    If your pain level is a 4 out of 10 or less on the day of the procedure, please call [phone] to reschedule.  ok  Reminders (please tell patient if applicable):      Instructed pt to arrive 30 minutes early for IV start if this is for a cervical procedure, ALL sympathetic (stellate ganglion, hypogastric, or lumbar sympathetic block) and all sedation procedures (RFA, spinal cord stimulation trials).         -IVs are not routinely placed for Dr. Trujillo cervical cases        -Dr. Sahu: no IV needed for CMBB       If NPO for sedation, it is okay to take medications with sips of water (except if they are to hold blood thinners).    *DO take blood pressure medication if it is prescribed*      If this is for a cervical MBB aspirin needs to be held for 6 days.           Do not schedule procedures requiring IV placement in the first appointment of the day or first appointment after lunch. Do NOT schedule at 0745, 0815 or 1245.            For patients 85 or older we recommend having an adult stay w/ them for the remainder of the day.      Does the patient have any questions? no

## 2019-06-30 ENCOUNTER — MYC MEDICAL ADVICE (OUTPATIENT)
Dept: PALLIATIVE MEDICINE | Facility: CLINIC | Age: 34
End: 2019-06-30

## 2019-07-01 NOTE — PROGRESS NOTES
"[facility]    CHIEF COMPLAINT: body pain, pain spasm     INTERVAL HISTORY:  Last seen on 6/6/19.        Recommendations/plan at the last visit included:     1. Pain Psychology visits    2. Schedule follow-up with CARLOS A Higuera NP-C in 4 weeks  3. Procedures recommended: Medial branch blocks/radio frequency ablation    4. Medication recommendations:             1. Refills of Hydroxyzine, Norco and methocarbamol sent to your pharmacy.    Since last visit:   - Completed outpatient CD treatment. Was not using prior to this treatment but was afraid of relapse r/t increased health issues.   -First MBB was \"traumatic\" due to spasms, movements. Thought he was doing harm to himself when needles were in.   - Personal life going well with new girlfriend     Pain Information:   Pain quality: Miserable and Unbearable    Pain rating: intensity ranges from 3/10 to 9/10, and averages 8/10 on a 0-10 scale.      Annual Controlled Substance Agreement/UDS due date: 4/2020     Current Pain Relevant Medications:   Norco 5/325 mg 1 tab BID- TID                        Total opiate dose: 10-15 MME daily  Clonazepam .5 mg 1-2 tab at HS PRN  Duloxetine 20 mg daily  Naproxen 500 mg BID: on hold re: elevated LFTs   Tizanidine 4 mg 1-2 tabs at HS PRN  Ambien 10 mg at HS  Adderall 50 mg daily, combination of long and short acting.       Previous Pain Relevant Medications: (H--helped; HI--Helped initially; SWH--Somewhat helpful; NH--No help; W--worse; SE--side effects; ?--Unsure if helpful)   NOTE: This medication information taken from patient's intake form, not medical records.                         Opiates: Tramadol: H, Hydrocodone: H, Morphine: H                        NSAIDS: Ibuprofen:H, naproxen:SWH, Relafen: NH                        Muscle Relaxants: Cyclobenzaprine:H,Med interaction, Tizanidine:H, Baclofen: SWH                        Anti-migraine mediations: " Prednisone:H                        Anti-depressants: Bupropion:SE, Celexa:SE, Duloxetine:H, Trazodone:Too strong, Venlafaxine:too strong, Amitriptyline:SE                         Sleep aids:Anbien: H                        Anxiolytics: Clonazepam:H                        Neuropathics: Tegretol:taken for seizures in childhood, Gabapentin:H                                          Topicals: Lidocaine:H                        Other medications not covered above: Tylenol:NH      Any illicit drug use: 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 Concern for multiple controlled substances including stimulants and opiates.      Medications:  Current Outpatient Medications   Medication Sig Dispense Refill     albuterol (PROAIR HFA/PROVENTIL HFA/VENTOLIN HFA) 108 (90 Base) MCG/ACT inhaler Inhale 1-2 puffs into the lungs every 4 hours as needed for shortness of breath / dyspnea or wheezing (cough or wheeze only if necessary) 6.7 g 1     amphetamine-dextroamphetamine (ADDERALL XR) 30 MG per 24 hr capsule Take 30 mg by mouth daily       clonazePAM (KLONOPIN) 0.5 MG tablet Take 0.5-1 mg by mouth nightly as needed   0     DULoxetine (CYMBALTA) 60 MG EC capsule 2 x 60mg tab by mouth daily 30 capsule 1     HYDROcodone-acetaminophen (NORCO) 5-325 MG tablet Take 1 tablet by mouth 3 times daily as needed for pain (Max 3 times daily.) Okay to fill on 6/6/19 to begin using on 6/12/19 90 tablet 0     hydrOXYzine (ATARAX) 25 MG tablet Take 1 tablet (25 mg) by mouth nightly as needed (at HS PRN) 45 tablet 3     methocarbamol (ROBAXIN) 500 MG tablet Take 1-2 tablets (500-1,000 mg) by mouth 4 times daily as needed for muscle spasms 240 tablet 1      methylPREDNISolone (MEDROL DOSEPAK) 4 MG tablet therapy pack Follow Package Directions 21 tablet 0     naloxone (NARCAN) 4 MG/0.1ML nasal spray Spray 1 spray (4 mg) into one nostril alternating nostrils as needed for opioid reversal every 2-3 minutes until assistance arrives 0.2 mL 0     naproxen (NAPROSYN) 500 MG tablet Take 1 tablet (500 mg) by mouth 2 times daily (with meals) 40 tablet 3     oxybutynin ER (DITROPAN-XL) 5 MG 24 hr tablet Take 2 tablets (10 mg) by mouth daily 180 tablet 3     SF 5000 PLUS 1.1 % CREA   2       Review of Systems: A 10-point review of systems was negative, with the exception of chronic pain issues.      Social History: Reviewed; unchanged from previous consultation.      Family history: Reviewed; unchanged from previous consultation.     PHYSICAL EXAM    There were no vitals filed for this visit.    Constitutional: healthy, alert, no distress, pain behaviors and somewhat down r/t increased spastic movements   HEENT: Head atraumatic, normocephalic. Eyes without conjunctival injection or jaundice. Neck supple. No obvious neck masses.  Skin: No rash, lesions, or petechiae of exposed skin.   Extremities: No clubbing, cyanosis, or edema to exposed extremities  Psychiatric/mental status: Alert, without lethargy or stupor. Appropriate affect.       Neurologic exam:  CN:  Cranial nerves 2-12 are grossly normal.  Has uncontrolled upper body movement/tremor.           Musculoskeletal exam:  Cervical spine:                       Flex:  20 degrees                       Ext: 20 degrees                       Rotation to right: 20 degrees                       Rotation to left: 20 degrees                       Rotation/ext to right: painful                        Rotation/ext to left: painful                        Tenderness in the cervical spine at midline. No                       Tenderness in the cervical paraspinal muscles. No  Thoracic spine:                        Kyphosis.  "No                       Tenderness in the thoracic spine at midline. Yes                       Tenderness in the thoracic paraspinal muscles. Yes  Lumbar/Sacral spine:                       Forward Flexion:  90 degrees                       Ext: 20 degrees \"tight\"                       Rotation/ext to right: painful                        Rotation/ext to left: painful                        Lordosis. No                       Tenderness in the lumbar spine at midline. Yes                       Tenderness in the lumbar paraspinal muscles.Yes      Psychiatric:  mentation appears normal., affect and mood normal      DIRE Score for ongoing opioid management is calculated as follows:    Diagnosis = 2 pts (slowly progressive; moderate pain/objective findings)    Intractability = 2 pts (most treatments tried; patient not fully engaged/barriers)    Risk        Psych = 2 pts (personality dysfunction/mental illness that moderately interferes with care)         Chem Hlth = 2 pts (use of medications to cope with stress; chemical dependency in remission)       Reliability = 3 pts (highly reliable with meds, appointments, treatments)       Social = 3 pts (supportive family/close relationships; involved in work/school; no isolation)       (Psych + Chem hlth + Reliability + Social) = 14    Efficacy = 2 pts (moderate benefit/function; low med dose; too early/not tried meds)    DIRE Score = 16        7-13: likely NOT suitable candidate for long-term opioid analgesia       14-21: may be a suitable candidate for long-term opioid analgesia          Previous Diagnostic Tests:   Imaging Studies:   MR LUMBAR SPINE W/O CONTRAST 5/2/2018 7:27 PM  History: DDD (degenerative disc disease), lumbar; Lumbar  radiculopathy; Hip pain, right; Groin pain, right.  ICD-10: DDD (degenerative disc disease), lumbar; Lumbar radiculopathy;  Hip pain, right; Groin pain, right  Comparison: Lumbar spine MRI 3/8/2017  Technique: Sagittal STIR, T1-weighted turbo " spin-echo, 3-D volumetric  T2-weighted and axial reconstructed T2-weighted images of the lumbar  spine were obtained without intravenous contrast.   Findings: 5 lumbar-type vertebra. The tip of the conus medullaris is  at L1.  The lumbar vertebral column is normally aligned.   Straightening of lumbar lordosis, unchanged. The intervertebral disc  heights are maintained. Normal marrow signal. At L5-S1, there is a  disc bulge and facet hypertrophy with mild left neural foraminal  narrowing, unchanged. No spinal canal stenosis.  Impression:  1. Lumbar spondylosis with mild left neural foraminal narrowing at  L5-S1.  2. No spinal canal stenosis.      MR right hip without contrast 5/2/2018 6:47 PM  Techniques: Multiplanar multisequence imaging of the right hip was  obtained without  administration of intra-articular or intravenous  contrast using routing protocol.  History: Hip pain.  Comparison: None available.  Findings:  Osseous structures  Osseous structures: No fracture, stress reaction, avascular necrosis,  or focal osseous lesion is seen. There is small focal area of  subchondral cystic changes in the anterior right femoral head neck  junction, most compatible with synovial herniation pit. Findings  suggestive of reduced femoral head neck junction though alpha angle  cannot be assessed owing to no dedicated oblique axial sequence  obtained.  Articular cartilage and labrum  Assessment limited on this arthrographic study due to relative lack of  joint distension.  Articular cartilage: No high grade chondral loss.  Labrum: Labral tearing approximately from 12:00 to 3:00 with 3:00  being anterior and 6:00 being the center of transverse ligament in  this convention with associated subtle area of subchondral edema in  the superior acetabulum.  Ligament teres and transverse ligament of acetabulum: Intact.  Joint or bursal effusion  Joint effusion: A physiologic amount of joint fluid.  Bursal effusion: Minimal nonspecific  edema over the greater  trochanter. No substantial iliopsoas or trochanteric bursal effusion.  Muscles and tendons  Muscles and tendons: The rectus femoris, the visualized portion  iliopsoas, proximal hamstrings, and hip abductors are intact.  The  visualized adductor muscles are unremarkable.   Nerves:  The visualized course of the sciatic nerve is unremarkable.   Other Findings:  There is fat-containing right inguinal hernia.  Impression:  1. Approximately 12-3 o'clock labral tearing with synovial herniation  pit and likely reduced femoral head neck junction offset. 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  2. Fat containing right inguinal hernia.          ASSESSMENT:   1.  Lumbar DDD, mild  2.  Lumbar muscle spasm  3.  Labral tear, right hip  4.  Hx: anxiety, chemical dependence in remission.  5. Functional neurologic movement disorder    Shoaib presents for monthly medication management appointment.  He is having both left and right hip pain and his involuntary movements seem to be more dramatic today than they have in the past.  He states that he has pain that wraps from the right side of his back around to the right hip and the left side of his back around the hip and down near the left testicle.  He notes some tingling in both legs.  He did have a good result from the initial medial branch block and we will proceed with the second 1.  He is aware that this provides only axial pain relief and does not address any radiculopathy.  He is concerned about his uncontrolled movements during the procedure causing injury to him with a needle.  I will review with Dr. Trujillo however I assured him that if Dr. Trujillo feels that anytime it is unsafe to proceed she will stop the procedure.    We may consider additional interventions if the tingling he is feeling continues.  This may also be something we need to refer back to neurology.    His pain has been consistently less  well controlled.  While I do not want to increase his total opiate dose by much we will try OxyContin 10 mg once daily and reduce the Norco 5/325 mg to 1-2 tabs daily to see if he can get better baseline control.    Tobacco use: Addressed at this visit    Plan:    Diagnosis reviewed, treatment option addressed, and risk/benifits discussed.  Self-care instructions given.  I am recommending a multidisciplinary treatment plan to help this patient better manage pain.      1. Schedule pain psychology visits   2. Schedule follow-up with CARLOS A Higuera, NPDoloresC in 4 weeks  3. Procedures recommended: Continue medial branch blocks   4. Medication recommendations:   1. Start MS Contin (long acting morphine) 15 mg once daily.  2. Norco 5/325 mg 1-2 tabs per day as needed. #45 tabs to last 30 days.     Total time spent face to face was 30 minutes and more than 50% of face to face time was spent in counseling and/or coordination of care regarding the diagnosis and recommendations above.      CARLOS A Bass, NP-C   [facility]    Disclaimer: This note consists of symbols derived from keyboarding, dictation and/or voice recognition software. As a result, there may be errors in the script that have gone undetected. Please consider this when interpreting information found in this chart.

## 2019-07-02 ENCOUNTER — OFFICE VISIT (OUTPATIENT)
Dept: PALLIATIVE MEDICINE | Facility: CLINIC | Age: 34
End: 2019-07-02
Payer: COMMERCIAL

## 2019-07-02 VITALS
DIASTOLIC BLOOD PRESSURE: 86 MMHG | BODY MASS INDEX: 27.41 KG/M2 | SYSTOLIC BLOOD PRESSURE: 146 MMHG | OXYGEN SATURATION: 99 % | HEART RATE: 80 BPM | WEIGHT: 175 LBS

## 2019-07-02 DIAGNOSIS — Z71.6 TOBACCO ABUSE COUNSELING: ICD-10-CM

## 2019-07-02 DIAGNOSIS — M25.551 HIP PAIN, RIGHT: ICD-10-CM

## 2019-07-02 DIAGNOSIS — M47.816 FACET ARTHROPATHY, LUMBAR: ICD-10-CM

## 2019-07-02 DIAGNOSIS — G25.9 FUNCTIONAL MOVEMENT DISORDER: Primary | ICD-10-CM

## 2019-07-02 PROCEDURE — 99214 OFFICE O/P EST MOD 30 MIN: CPT | Performed by: NURSE PRACTITIONER

## 2019-07-02 RX ORDER — MORPHINE SULFATE 15 MG/1
15 TABLET, FILM COATED, EXTENDED RELEASE ORAL EVERY 12 HOURS
Qty: 30 TABLET | Refills: 0 | Status: SHIPPED | OUTPATIENT
Start: 2019-07-02 | End: 2019-07-15

## 2019-07-02 RX ORDER — HYDROCODONE BITARTRATE AND ACETAMINOPHEN 5; 325 MG/1; MG/1
1 TABLET ORAL 2 TIMES DAILY PRN
Qty: 45 TABLET | Refills: 0 | Status: SHIPPED | OUTPATIENT
Start: 2019-07-02 | End: 2019-07-31

## 2019-07-02 ASSESSMENT — PAIN SCALES - GENERAL: PAINLEVEL: SEVERE PAIN (7)

## 2019-07-02 NOTE — PATIENT INSTRUCTIONS
After Visit Instructions:     Thank you for coming to [facility] for your care. It is my goal to partner with you to help you reach your optimal state of health.     Continue daily self-care, identifying contributing factors, and monitoring variations in pain level. Continue to integrate self-care into your life.      1. Schedule pain psychology visits   2. Schedule follow-up with CARLOS A Higuera NP-C in 4 weeks  3. Procedures recommended: Continue medial branch blocks   4. Medication recommendations:   1. Start MS Contin (long acting morphine) 15 mg once daily.  2. Norco 5/325 mg 1-2 tabs per day as needed. #45 tabs to last 30 days.     CARLOS A Bass NP-C  [facility]  [facility]    Clinic Number:  [phone]     Call with any questions about your care and for scheduling assistance.     Calls are returned Monday through Friday between 8 AM and 4:30 PM. We usually get back to you within 2 business days depending on the issue/request.    If we are prescribing your medications:    For opioid medication refills, call the clinic or send a [employer] message 7 days in advance.  Please include:    Name of requested medication    Name of the pharmacy.    For non-opioid medications, call your pharmacy directly to request a refill. Please allow 3-4 days to be processed.     Per [place] State Law:    All controlled substance prescriptions must be filled within 30 days of being written.      For those controlled substances allowing refills, pickup must occur within 30 days of last fill.      We believe regular attendance is key to your success in our program!      Any time you are unable to keep your appointment we ask that you call us at least 24 hours in advance to cancel.This will allow us to offer the appointment time to another patient.   Multiple missed appointments may lead to dismissal from the clinic.

## 2019-07-03 ENCOUNTER — OFFICE VISIT (OUTPATIENT)
Dept: ORTHOPEDICS | Facility: CLINIC | Age: 34
End: 2019-07-03
Payer: COMMERCIAL

## 2019-07-03 VITALS — SYSTOLIC BLOOD PRESSURE: 120 MMHG | OXYGEN SATURATION: 97 % | DIASTOLIC BLOOD PRESSURE: 81 MMHG | HEART RATE: 78 BPM

## 2019-07-03 DIAGNOSIS — M50.20 CERVICAL DISC HERNIATION: ICD-10-CM

## 2019-07-03 DIAGNOSIS — M50.30 DDD (DEGENERATIVE DISC DISEASE), CERVICAL: Primary | ICD-10-CM

## 2019-07-03 PROCEDURE — 99213 OFFICE O/P EST LOW 20 MIN: CPT | Performed by: PREVENTIVE MEDICINE

## 2019-07-03 ASSESSMENT — PAIN SCALES - GENERAL: PAINLEVEL: MILD PAIN (2)

## 2019-07-03 NOTE — PROGRESS NOTES
HISTORY OF PRESENT ILLNESS  Mr. Kiser is a pleasant 33 year old year old male who presents to clinic today here for followup for cervical MRI  Medrol pack has improved some of the discomfort and tingling in his hands  Wants to discuss possible ESIs    MEDICAL HISTORY  Patient Active Problem List   Diagnosis     Neuropathy     Moderate major depression (H)     Tobacco abuse     Attention deficit hyperactivity disorder (ADHD), predominantly inattentive type     Primary insomnia     Panic disorder without agoraphobia     Abnormal involuntary movement     Tic disorder     Anxiety     Posttraumatic stress disorder with dissociative symptoms     Chronic left hip pain     Chronic pain of right hip     Degenerative disc disease at L5-S1 level     Functional movement disorder     Family history of Crohn's disease     Chronic low back pain     Chronic pain syndrome     History of substance abuse     Family history of multiple myeloma     History of electroencephalogram       Current Outpatient Medications   Medication Sig Dispense Refill     albuterol (PROAIR HFA/PROVENTIL HFA/VENTOLIN HFA) 108 (90 Base) MCG/ACT inhaler Inhale 1-2 puffs into the lungs every 4 hours as needed for shortness of breath / dyspnea or wheezing (cough or wheeze only if necessary) 6.7 g 1     amphetamine-dextroamphetamine (ADDERALL XR) 30 MG per 24 hr capsule Take 30 mg by mouth daily       clonazePAM (KLONOPIN) 0.5 MG tablet Take 0.5-1 mg by mouth nightly as needed   0     DULoxetine (CYMBALTA) 60 MG EC capsule 2 x 60mg tab by mouth daily 30 capsule 1     HYDROcodone-acetaminophen (NORCO) 5-325 MG tablet Take 1 tablet by mouth 2 times daily as needed for pain Okay to fill on 7/8/19 to begin using on 7/12/19 45 tablet 0     hydrOXYzine (ATARAX) 25 MG tablet Take 1 tablet (25 mg) by mouth nightly as needed (at HS PRN) 45 tablet 3     methocarbamol (ROBAXIN) 500 MG tablet Take 1-2 tablets (500-1,000 mg) by mouth 4 times daily as needed for muscle  spasms 240 tablet 1     morphine (MS CONTIN) 15 MG CR tablet Take 1 tablet (15 mg) by mouth every 12 hours 30 tablet 0     naloxone (NARCAN) 4 MG/0.1ML nasal spray Spray 1 spray (4 mg) into one nostril alternating nostrils as needed for opioid reversal every 2-3 minutes until assistance arrives 0.2 mL 0     naproxen (NAPROSYN) 500 MG tablet Take 1 tablet (500 mg) by mouth 2 times daily (with meals) 40 tablet 3     oxybutynin ER (DITROPAN-XL) 5 MG 24 hr tablet Take 2 tablets (10 mg) by mouth daily 180 tablet 3     SF 5000 PLUS 1.1 % CREA   2     methylPREDNISolone (MEDROL DOSEPAK) 4 MG tablet therapy pack Follow Package Directions (Patient not taking: Reported on 7/3/2019) 21 tablet 0       Allergies   Allergen Reactions     Buspirone Hcl Other (See Comments)     Panic attacks     Doxycycline Diarrhea, Fatigue, GI Disturbance and Nausea     Elavil [Amitriptyline]      Ineffective in reducing spasms/movement, increased fatigue     Trazodone Fatigue     Buspirone Anxiety     Keflex [Cephalexin] Diarrhea       Family History   Problem Relation Age of Onset     Lipids Father         hyperlipidemia     Hyperlipidemia Father      Obesity Father      Arthritis Mother      Hyperlipidemia Mother      Depression Mother      Anxiety Disorder Mother      Mental Illness Mother      Obesity Mother      Asthma Brother      Asthma Sister      Depression Other      Hearing Loss Other      Psychotic Disorder Other      Obesity Other      Cerebrovascular Disease Paternal Grandfather      Alzheimer Disease Paternal Grandfather      Depression Brother      Mental Illness Brother         Bipolar     Asthma Brother      Depression Sister      Asthma Sister      Substance Abuse Sister         Alcohol     Mental Illness Brother         Bipolar     Asthma Brother      Asthma Sister      Obesity Sister      Asthma Brother      Obesity Brother      Obesity Maternal Grandmother      Genetic Disorder Maternal Grandmother         Epilepsy      Obesity Paternal Grandmother      Colon Cancer Other      Genetic Disorder Other         Cerebral Palsy     Genetic Disorder Maternal Grandfather         Multiple Sclerosis     Genetic Disorder Other         Epilepsy       Additional medical/Social/Surgical histories reviewed in [facility] and updated as appropriate.     REVIEW OF SYSTEMS (7/3/2019)  10 point ROS of systems including Constitutional, Eyes, Respiratory, Cardiovascular, Gastroenterology, Genitourinary, Integumentary, Musculoskeletal, Psychiatric were all negative except for pertinent positives noted in my HPI.     PHYSICAL EXAM  Vitals:    07/03/19 0930   BP: 120/81   BP Location: Left arm   Patient Position: Sitting   Cuff Size: Adult Regular   Pulse: 78   SpO2: 97%     Vital Signs: /81 (BP Location: Left arm, Patient Position: Sitting, Cuff Size: Adult Regular)   Pulse 78   SpO2 97%  Patient declined being weighed. There is no height or weight on file to calculate BMI.    General  - normal appearance, in no obvious distress  CV  - normal radial pulse  Pulm  - normal respiratory pattern, non-labored  Musculoskeletal - neck  - inspection: normal bone and joint alignment, no obvious deformity, no scapular winging, no AC step-off  - palpation: no bony or soft tissue tenderness, normal clavicle, non-tender AC  - ROM:  Full with some discomfort at base of neck  - strength: 5/5  strength, 5/5 in all shoulder planes    Neuro  - no sensory or motor deficit, grossly normal coordination, normal muscle tone  Skin  - no ecchymosis, erythema, warmth, or induration, no obvious rash  Psych  - interactive, appropriate, normal mood and affect        ASSESSMENT & PLAN  34 yo male with cervical disc herniation, radicular pain to wrists  Cervical MRI shows worse C6 disc herniation  Ordered ESTER for pain clinic to perform  F/u after    Faustino Feldman MD, CAQSM

## 2019-07-03 NOTE — NURSING NOTE
[facility] FOLLOW-UP VISIT 7/3/2019    Hoang Kiser's chief complaint for this visit includes:  Chief Complaint   Patient presents with     Right Wrist - Pain     weakness     Neck - Pain     MRI 6/17/19     PCP: Phill Floyd    Referring Provider:  Referred Self, MD  No address on file    /81 (BP Location: Left arm, Patient Position: Sitting, Cuff Size: Adult Regular)   Pulse 78   SpO2 97%   Mild Pain (2)       Interval History:     Follow up reason: left wrist pain and review cervical MRI    Date last seen: 06/13/19    Following Therapeutic Plan: n/a     Pain: Unchanged    Function: Unchanged      Medical History:    Any recent changes to your medical history? No    Any new medication prescribed since last visit? Yes, MS Contin started    Review of Systems:    Do you have fever, chills, weight loss? No    Do you have any vision problems? No    Do you have any chest pain or edema? No    Do you have any shortness of breath or wheezing?  No    Do you have stomach problems? No    Do you have any numbness or focal weakness? No    Do you have diabetes? No    Do you have problems with bleeding or clotting? No    Do you have an rashes or other skin lesions? No    Isi Diaz CMA

## 2019-07-03 NOTE — PROGRESS NOTES
[facility] - Procedure Note    Date of Service: 7/05/2019    Procedure performed: Bilateral L3,4,5 medial branch block #2 (1st done on 6/14/2019)  Diagnosis: Lumbar spondylosis; Lumbar facet arthropathy  : Brynn Trujillo MD    Indications: Hoang Kiser is a 33 year old male who is seen at the request of Tamiko Stevens CNP for lumbar medial branch blocks. The patient describes pain with extension/rotation. The patient reports minimal improvement with conservative treatment, including previous facet joint injections which were helpful for about 3 weeks, PT and medications.    MRI of the LUMBAR SPINE was done on 5/2/2019 which showed   Findings: 5 lumbar-type vertebra. The tip of the conus medullaris is  at L1.  The lumbar vertebral column is normally aligned.   Straightening of lumbar lordosis, unchanged. The intervertebral disc  heights are maintained. Normal marrow signal. At L5-S1, there is a  disc bulge and facet hypertrophy with mild left neural foraminal  narrowing, unchanged. No spinal canal stenosis.                                                                    Impression:  1. Lumbar spondylosis with mild left neural foraminal narrowing at  L5-S1.  2. No spinal canal stenosis.       Allergies:      Allergies   Allergen Reactions     Buspirone Hcl Other (See Comments)     Panic attacks     Doxycycline Diarrhea, Fatigue, GI Disturbance and Nausea     Elavil [Amitriptyline]      Ineffective in reducing spasms/movement, increased fatigue     Trazodone Fatigue     Buspirone Anxiety     Keflex [Cephalexin] Diarrhea        Vitals:  /89   Pulse 67   SpO2 97%     Review of Systems: The patient denies recent fever, chills, illness, use of antibiotics or anticoagulants. All other 10-point review of systems negative.     Procedure:   Options/alternatives, benefits and risks were discussed with the patient including bleeding, infection, tissue trauma, exposure to radiation,  reaction to medications, spinal cord injury, weakness, numbness and paralysis.  Questions were answered to his satisfaction and he agrees to proceed. Voluntary informed consent was obtained and signed.     After getting informed consent, patient was brought into the procedure suite and was placed in a prone position on the procedure table.   A Pause for the Cause was performed.  Patient was prepped and draped in sterile fashion.     Under AP fluoroscopic guidance the L3, L4, L5 vertebral bodies were identified. The C-arm was rotated to the oblique view to afford optimal visualization the pedicles.  Lidocaine 1% was used to anesthetize the skin at each level.  Under intermittent fluoroscopy, 25 gauge 3.5 inch Quinke spinal needles were positioned inferior and lateral to the intersection of the transverse process and pedicle at the Bilateral L4 & L5 levels, as well as the corresponding sacral alar notch. The needle positions were verified and optimized from the AP and lateral views.    The anatomic targets for the L3 & L4 medial nerve and L5 dorsal ramus (which functionally incorporates the medial branch) were the  L4 & L5 transverse processes and sacral alar notch, with laterality as described above, resulting in blockade of the L4/5 and L5/S1 facet joints.    After negative aspiration, 1cc's of Omnipaque 300 was injected to rule out intravascular injection.  9cc's of omnipaque 300 was wasted.  Lidocaine 2% 0.5 ml was injected at each location. The needles were removed. Hemostasis was achieved, the area was cleaned, and bandaids were placed when appropriate. The patient tolerated the procedure well, and was taken to the recovery room. Images were saved to PACS.    Assessment/Plan: Hoang Moniquetrentonbarbara is a 33 year old male s/p Bilateral L3,4,5 medial branch block today for lumbar spondylosis, facet arthropathy.     Pre procecedure pain score: 8/10   Post procedure pain score: 3/10.   The patient will continue to  monitor progress, and they were given a pain diary to complete at home.  They will either fax or mail this back to us or bring it to their next appointment. We will determine the treatment plan after we review the diary.      1. The patient was advised to contact the [facility] for any of the following:   Fever, chills, or night sweats   New onset of pain, numbness, or weakness   Any questions/concerns regarding the procedure  If unable to contact the Pain Center, the patient was instructed to go to a local Emergency Room for any complications.   2. The patient will receive a follow-up call in 1 week.  3. We will await the pain diary to determent next step of the treatment plan and call patient to schedule.      Brynn Trujillo MD   [facility]

## 2019-07-03 NOTE — PATIENT INSTRUCTIONS
Thanks for coming today.  Ortho/Sports Medicine Clinic  [address] [place] [address]    To schedule future appointments in Ortho Clinic, you may call [phone].    To schedule ordered imaging by your provider:   Call Central Imaging [phone]    To schedule an injection ordered by your provider:  Call [facility] Imaging Injection scheduling line: [phone]  [employer] available online at:  [email or web address]    Please call if any further questions or concerns ([phone]).  Clinic hours 8 am to 5 pm.    Return to clinic (call) if symptoms worsen or fail to improve.

## 2019-07-03 NOTE — LETTER
7/3/2019         RE: Hoang Kiser  [address]  [facility] [place] [address]        Dear Colleague,    Thank you for referring your patient, Hoang Kiser, to the [facility]. Please see a copy of my visit note below.    HISTORY OF PRESENT ILLNESS  Mr. Kiser is a pleasant 33 year old year old male who presents to clinic today here for followup for cervical MRI  Medrol pack has improved some of the discomfort and tingling in his hands  Wants to discuss possible ESIs    MEDICAL HISTORY  Patient Active Problem List   Diagnosis     Neuropathy     Moderate major depression (H)     Tobacco abuse     Attention deficit hyperactivity disorder (ADHD), predominantly inattentive type     Primary insomnia     Panic disorder without agoraphobia     Abnormal involuntary movement     Tic disorder     Anxiety     Posttraumatic stress disorder with dissociative symptoms     Chronic left hip pain     Chronic pain of right hip     Degenerative disc disease at L5-S1 level     Functional movement disorder     Family history of Crohn's disease     Chronic low back pain     Chronic pain syndrome     History of substance abuse     Family history of multiple myeloma     History of electroencephalogram       Current Outpatient Medications   Medication Sig Dispense Refill     albuterol (PROAIR HFA/PROVENTIL HFA/VENTOLIN HFA) 108 (90 Base) MCG/ACT inhaler Inhale 1-2 puffs into the lungs every 4 hours as needed for shortness of breath / dyspnea or wheezing (cough or wheeze only if necessary) 6.7 g 1     amphetamine-dextroamphetamine (ADDERALL XR) 30 MG per 24 hr capsule Take 30 mg by mouth daily       clonazePAM (KLONOPIN) 0.5 MG tablet Take 0.5-1 mg by mouth nightly as needed   0     DULoxetine (CYMBALTA) 60 MG EC capsule 2 x 60mg tab by mouth daily 30 capsule 1     HYDROcodone-acetaminophen (NORCO) 5-325 MG tablet Take 1 tablet by mouth 2 times daily as needed for pain Okay to fill on 7/8/19 to begin  using on 7/12/19 45 tablet 0     hydrOXYzine (ATARAX) 25 MG tablet Take 1 tablet (25 mg) by mouth nightly as needed (at HS PRN) 45 tablet 3     methocarbamol (ROBAXIN) 500 MG tablet Take 1-2 tablets (500-1,000 mg) by mouth 4 times daily as needed for muscle spasms 240 tablet 1     morphine (MS CONTIN) 15 MG CR tablet Take 1 tablet (15 mg) by mouth every 12 hours 30 tablet 0     naloxone (NARCAN) 4 MG/0.1ML nasal spray Spray 1 spray (4 mg) into one nostril alternating nostrils as needed for opioid reversal every 2-3 minutes until assistance arrives 0.2 mL 0     naproxen (NAPROSYN) 500 MG tablet Take 1 tablet (500 mg) by mouth 2 times daily (with meals) 40 tablet 3     oxybutynin ER (DITROPAN-XL) 5 MG 24 hr tablet Take 2 tablets (10 mg) by mouth daily 180 tablet 3     SF 5000 PLUS 1.1 % CREA   2     methylPREDNISolone (MEDROL DOSEPAK) 4 MG tablet therapy pack Follow Package Directions (Patient not taking: Reported on 7/3/2019) 21 tablet 0       Allergies   Allergen Reactions     Buspirone Hcl Other (See Comments)     Panic attacks     Doxycycline Diarrhea, Fatigue, GI Disturbance and Nausea     Elavil [Amitriptyline]      Ineffective in reducing spasms/movement, increased fatigue     Trazodone Fatigue     Buspirone Anxiety     Keflex [Cephalexin] Diarrhea       Family History   Problem Relation Age of Onset     Lipids Father         hyperlipidemia     Hyperlipidemia Father      Obesity Father      Arthritis Mother      Hyperlipidemia Mother      Depression Mother      Anxiety Disorder Mother      Mental Illness Mother      Obesity Mother      Asthma Brother      Asthma Sister      Depression Other      Hearing Loss Other      Psychotic Disorder Other      Obesity Other      Cerebrovascular Disease Paternal Grandfather      Alzheimer Disease Paternal Grandfather      Depression Brother      Mental Illness Brother         Bipolar     Asthma Brother      Depression Sister      Asthma Sister      Substance Abuse Sister          Alcohol     Mental Illness Brother         Bipolar     Asthma Brother      Asthma Sister      Obesity Sister      Asthma Brother      Obesity Brother      Obesity Maternal Grandmother      Genetic Disorder Maternal Grandmother         Epilepsy     Obesity Paternal Grandmother      Colon Cancer Other      Genetic Disorder Other         Cerebral Palsy     Genetic Disorder Maternal Grandfather         Multiple Sclerosis     Genetic Disorder Other         Epilepsy       Additional medical/Social/Surgical histories reviewed in [facility] and updated as appropriate.     REVIEW OF SYSTEMS (7/3/2019)  10 point ROS of systems including Constitutional, Eyes, Respiratory, Cardiovascular, Gastroenterology, Genitourinary, Integumentary, Musculoskeletal, Psychiatric were all negative except for pertinent positives noted in my HPI.     PHYSICAL EXAM  Vitals:    07/03/19 0930   BP: 120/81   BP Location: Left arm   Patient Position: Sitting   Cuff Size: Adult Regular   Pulse: 78   SpO2: 97%     Vital Signs: /81 (BP Location: Left arm, Patient Position: Sitting, Cuff Size: Adult Regular)   Pulse 78   SpO2 97%  Patient declined being weighed. There is no height or weight on file to calculate BMI.    General  - normal appearance, in no obvious distress  CV  - normal radial pulse  Pulm  - normal respiratory pattern, non-labored  Musculoskeletal - neck  - inspection: normal bone and joint alignment, no obvious deformity, no scapular winging, no AC step-off  - palpation: no bony or soft tissue tenderness, normal clavicle, non-tender AC  - ROM:  Full with some discomfort at base of neck  - strength: 5/5  strength, 5/5 in all shoulder planes    Neuro  - no sensory or motor deficit, grossly normal coordination, normal muscle tone  Skin  - no ecchymosis, erythema, warmth, or induration, no obvious rash  Psych  - interactive, appropriate, normal mood and affect        ASSESSMENT & PLAN  32 yo male with cervical disc herniation,  radicular pain to wrists  Cervical MRI shows worse C6 disc herniation  Ordered ESTER for pain clinic to perform  F/u after    Faustino Feldman MD, CAQSM    Again, thank you for allowing me to participate in the care of your patient.        Sincerely,        Faustino Feldman MD

## 2019-07-05 ENCOUNTER — ANCILLARY PROCEDURE (OUTPATIENT)
Dept: GENERAL RADIOLOGY | Facility: CLINIC | Age: 34
End: 2019-07-05
Attending: ANESTHESIOLOGY
Payer: COMMERCIAL

## 2019-07-05 ENCOUNTER — RADIOLOGY INJECTION OFFICE VISIT (OUTPATIENT)
Dept: PALLIATIVE MEDICINE | Facility: CLINIC | Age: 34
End: 2019-07-05
Payer: COMMERCIAL

## 2019-07-05 VITALS — OXYGEN SATURATION: 97 % | SYSTOLIC BLOOD PRESSURE: 137 MMHG | DIASTOLIC BLOOD PRESSURE: 89 MMHG | HEART RATE: 67 BPM

## 2019-07-05 DIAGNOSIS — M47.819 FACET ARTHROPATHY: Primary | ICD-10-CM

## 2019-07-05 DIAGNOSIS — M47.816 FACET ARTHROPATHY, LUMBAR: ICD-10-CM

## 2019-07-05 PROCEDURE — 64494 INJ PARAVERT F JNT L/S 2 LEV: CPT | Mod: 50 | Performed by: ANESTHESIOLOGY

## 2019-07-05 PROCEDURE — 64493 INJ PARAVERT F JNT L/S 1 LEV: CPT | Mod: 50 | Performed by: ANESTHESIOLOGY

## 2019-07-05 NOTE — PATIENT INSTRUCTIONS
[facility]   Medial Branch Block Discharge Instructions      Your procedure was performed by:  Dr. Brynn Trujillo     Medications used: lidocaine    You will need to complete the Pain Scale Log form and return it to us as soon as possible.  Once we have received the form, we will review it and call you to determine the next steps.     The form can be faxed to [phone] or mailed to:   [facility] - [facility]    [address], [address] [id number]      You may resume your regular activity after the injection.    Be cautious with walking since you may have numbness and/or weakness in the lower extremities for up to 6-8 hours after the procedure due to the effects of the local anesthetic.    Avoid driving for 6 hours. The local anesthetic could slow your reflexes    You may shower, however no swimming or tub baths or hot tubs for 24 hours following your procedure.    Your pain will return after the numbing medications have worn off.  You may use your current pain medications as needed.    Unless you have been directed to avoid the use of anti-inflammatory medications (NSAIDS), you may use medications such as ibuprofen, Aleve or Tylenol for pain control if needed. Some people find it helpful to alternate ibuprofen and Tylenol every 3 hours for a couple of days.    You may use ice packs 10-15 minutes three to four times a day at the injection site for comfort.     Do not use heat to painful areas for 6 to 8 hours. This will give the local anesthetic time to wear off and prevent you from accidentally burning your skin.     If you experience any of the following, call the Pain Clinic during work hours at [phone] or the Provider Line after hours at [phone]:  -Fever over 100 degree F  -Swelling, bleeding, redness, drainage, warmth at the injection site  -Progressive weakness or numbness on your legs or arms  -If lumbar, call if you  have a loss of bowel or bladder function  -If cervical, call if you have any unusual headache that is not relieved by Tylenol  -Unusual new onset of pain that is not improving

## 2019-07-05 NOTE — NURSING NOTE
Discharge Information    IV Discontiued Time:  NA    Amount of Fluid Infused:  NA    Discharge Criteria = When patient returns to baseline or as per MD order    Consciousness:  Pt is fully awake    Circulation:  BP +/- 20% of pre-procedure level    Respiration:  Patient is able to breathe deeply    O2 Sat:  Patient is able to maintain O2 Sat >92% on room air    Activity:  Moves 4 extremities on command    Ambulation:  Patient is able to stand and walk or stand and pivot into wheelchair    Dressing:  Clean/dry or No Dressing    Notes:   Discharge instructions and AVS given to patient    Patient meets criteria for discharge?  YES    Admitted to PCU?  No    Responsible adult present to accompany patient home?  Yes    Signature/Title:    Pilar Quintanilla  RN Care Coordinator  [facility]

## 2019-07-08 ENCOUNTER — TELEPHONE (OUTPATIENT)
Dept: PALLIATIVE MEDICINE | Facility: CLINIC | Age: 34
End: 2019-07-08

## 2019-07-08 NOTE — TELEPHONE ENCOUNTER
Pre-screening Questions for Radiology Injections:    Injection to be done at which interventional clinic site? [facility]    Instruct patient to arrive as directed prior to the scheduled appointment time:    [place] minutes before      [place]: 30 minutes before; if IV needed 1 hour before     Procedure ordered by [place]    Procedure ordered? RENUKA         Transforaminal Cervical ESTER - Dr. Annabelle Ferguson ONLY    What insurance would patient like us to bill for this procedure? BC      Worker's comp or MVA (motor vehicle accident) -Any injection DO NOT SCHEDULE and route to Dominique King.      [email or web address] insurance - For SI joint injections, DO NOT SCHEDULE and route Dominique King.       [employer] - Any injection besides hip/shoulder/knee joint DO NOT SCHEDULE and route to Dominique King. She will obtain PA and call pt back to schedule procedure or notify pt of denial.       [employer]-Route to Dominique for review      IF SCHEDULING IN [place] AND NEEDS A PA, IT IS OKAY TO SCHEDULE. [place] HANDLES THEIR OWN PA'S AFTER THE PATIENT IS SCHEDULED. PLEASE SCHEDULE AT LEAST 1 WEEK OUT SO A PA CAN BE OBTAINED.      Any chance of pregnancy? NO   If YES, do NOT schedule and route to RN pool    Is an  needed? No     Patient has a drive home? (mandatory) YES: ok    Is patient taking any blood thinners (plavix, coumadin, jantoven, warfarin, heparin, pradaxa or dabigatran )? No   If hold needed, do NOT schedule, route to RN pool     Is patient taking any aspirin products (includes Excedrin and Fiorinal)? No     If more than 325mg/day do NOT schedule; route to RN pool     For CERVICAL procedures, hold all aspirin products for 6 days.     Tell pt that if aspirin product is not held for 6 days, the procedure WILL BE cancelled.      Does the patient have a bleeding or clotting disorder? No     If YES, okay to schedule AND route to RN nurse pool    For any patients with platelet count <100, must be forwarded to  provider    Is patient diabetic?  No  If YES, have them bring their glucometer.    Does patient have an active infection or treated for one within the past week? No     Is patient currently taking any antibiotics?  No     For patients on chronic, preventative, or prophylactic antibiotics, procedures may be scheduled.     For patients on antibiotics for active or recent infection:antibiotic course must have been completed for 4 days    Is patient currently taking any steroid medications? (i.e. Prednisone, Medrol)  No     For patients on steroid medications, course must have been completed for 4 days    Reviewed with patient:  If you are started on any steroids or antibiotics between now and your appointment, you must contact us because the procedure may need to be cancelled.  Yes    Is patient actively being treated for cancer or immunocompromised? No  If YES, do NOT schedule and route to RN pool     Are you able to get on and off an exam table with minimal or no assistance? Yes  If NO, do NOT schedule and route to RN pool    Are you able to roll over and lay on your stomach with minimal or no assistance? Yes  If NO, do NOT schedule and route to RN pool     Any allergies to contrast dye, iodine, shellfish, or numbing and steroid medications? No  If YES, route to RN pool AND add allergy information to appointment notes    Allergies: Buspirone hcl; Doxycycline; Elavil [amitriptyline]; Trazodone; Buspirone; and Keflex [cephalexin]      Has the patient had a flu shot or any other vaccinations within 7 days before or after the procedure.  No     Does patient have an MRI/CT?  YES: MRI  (SI joint, hip injections, lumbar sympathetic blocks, and stellate ganglion blocks do not require an MRI)    Was the MRI done w/in the last 3 years?  Yes    Was MRI done at [facility]? Yes      If not, where was it done? N/A       If MRI was not done at [facility], [facility] or [facility] do NOT schedule and route to nursing.  If pt has an  imaging disc, the injection may be scheduled but pt has to bring disc to appt. If they show up w/out disc the injection cannot be done    Reminders (please tell patient if applicable):       Instructed pt to arrive 30 minutes early for IV start if this is for a cervical procedure, ALL sympathetic (stellate ganglion, hypogastric, or lumbar sympathetic block) and all sedation procedures (RFA, spinal cord stimulation trials).  ok   -IVs are not routinely placed for Dr. Trujillo cervical cases   -Dr. Sahu: IVs for cervical ESIs and cervical TBDs (not CMBBs/facet inj)      If NPO for sedation, informed patient that it is okay to take medications with sips of water (except if they are to hold blood thinners).  NO   *DO take blood pressure medication if it is prescribed*      If this is for a cervical ESTER, informed patient that aspirin needs to be held for 6 days.   NO      For all patients not having spinal cord stimulator (SCS) trials or radiofrequency ablations (RFAs), informed patient:    IV sedation is not provided for this procedure.  If you feel that an oral anti-anxiety medication is needed, you can discuss this further with your referring provider or primary care provider.  The Pain Clinic provider will discuss specifics of what the procedure includes at your appointment.  Most procedures last 10-20 minutes.  We use numbing medications to help with any discomfort during the procedure.  Not Applicable      Do not schedule procedures requiring IV placement in the first appointment of the day or first appointment after lunch. Do NOT schedule at 0745, 0815 or 1245.       For patients 85 or older we recommend having an adult stay w/ them for the remainder of the day.       Does the patient have any questions?  NO  Cathryn Alvarez  [facility]

## 2019-07-08 NOTE — TELEPHONE ENCOUNTER
Patient is calling to let Tamiko know that the morphine is working out well and taking Norco as needed.      Dominique TRINH    [facility]

## 2019-07-09 NOTE — TELEPHONE ENCOUNTER
[employer] RFA REQUIREMENTS- ALL REQUIRE PA      [employer] MA-   o 6 months failed conservative treatment (meds, muscle relaxants, etc.);  o  4 weeks of PT within the last 6 months (or an unfavorable evaluation);   o No spinal fusion of level being treated; non-radicular pain documented in radiographic evaluation within last 12 months;   o 2 successful workups resulting in >50% pain relief.  I do not see any PT within the last 6 months in chart, patients last MRI was 5/2/2018 and I do not see anything more recent.     Routing to review        Dominique TRINH    [facility]

## 2019-07-09 NOTE — TELEPHONE ENCOUNTER
Lumbar MBB #2 pain data reviewed. Max relief obtained:     At rest:                    58%  Left facet load:        56%  Right facet load:      56%  Normal activity:       38%     MBB to RFA order verified:  Yes  Insurance coverage verified with GINO King: OK to proceed to MBB or RFA: Yes    Do MBB scores fall within criteria to proceed per insurance? Yes  If yes, route to (delete what does not apply):     to schedule MBB#2    OR    begin PA processing for RFA by GINO King  Meets criteria for RFA. Routing to CANDY King for PA process.     Shaniqua BORGESN-RN Care Coordinator  [facility]

## 2019-07-09 NOTE — TELEPHONE ENCOUNTER
Patient attended PT through [facility] from 7/18/18-2/7/19 and was discharged having plateaued in his improvement.     Called patient and informed him that we will need records from [facility], he requested that an FIGUEROA be emailed to him. HE will complete and send back        FIGUEROA has been emailed to patient.       Dominique TRINH    [facility]

## 2019-07-09 NOTE — TELEPHONE ENCOUNTER
Dominique:  Patient has frequent visits at [facility] for PT, are these considered in meeting this criteria?    Tamiko:please review for order for imaging needed, insurance appears to require it is within 12 months.     Pilar BORGESN, RN Care Coordinator  [facility]

## 2019-07-11 NOTE — TELEPHONE ENCOUNTER
PA approved.  Effective date: 7/9/19-9/8/19  PA reference #: [id number]  Pt. notified:   Please schedule KATIA TRINH    [facility]

## 2019-07-12 DIAGNOSIS — G25.9 FUNCTIONAL MOVEMENT DISORDER: ICD-10-CM

## 2019-07-12 DIAGNOSIS — M47.816 FACET ARTHROPATHY, LUMBAR: ICD-10-CM

## 2019-07-12 DIAGNOSIS — M25.551 HIP PAIN, RIGHT: ICD-10-CM

## 2019-07-12 NOTE — TELEPHONE ENCOUNTER
Please process a refill of MS Contin 15 MG  to Lalitha Collado.    KATERINA CardenasN, RN  Care Coordinator  [facility]

## 2019-07-12 NOTE — TELEPHONE ENCOUNTER
Called patient and answered questions below. All questions answered.     Shaniqua Carvajal   BSN-RN Care Coordinator  [facility]

## 2019-07-12 NOTE — TELEPHONE ENCOUNTER
Pre-screening Questions for RFA Procedure      Procedure ordered? Lumbar RFA    What insurance are we billing for this procedure?  [employer]    Has patient had this injection before? No  Any chance of pregnancy? Not Applicable   If YES, do NOT schedule and route to RN pool    Is  Needed?: No  Will patient have a ?  Yes   If pt is given sedation meds, no driving for 24 hours.  Is pt taking a cab or transportation service? YES: May be using metro mobility - will have adult with (family)       If so will need to be accompanied by an adult too (friend/family member) in order for IV sedation to be given.      Per [facility] Policy:  Outpatients are to have responsible adult or family member to accompany them at discharge and drive them home. A service providing medically trained drivers or attendants would be acceptable. Public transportation would not be acceptable unless the patient is accompanied by a responsible adult or family member.    Is patient taking any blood thinners (plavix, coumadin, jantoven, warfarin, heparin, pradaxa or dabigatran )? No   If YES, do NOT schedule, and route to RN pool    Is patient taking any aspirin products? No     If more than 325mg/day do NOT schedule and route to RN pool     For CERVICAL procedures, hold all aspirin products for 6 days.     Does the patient have a bleeding or clotting disorder? No     If YES, it it OKAY to schedule AND route to RN pool    **For any patients with platelet count <100, must be forwarded to provider**    Does patient have an active infection or treated for one within the past week? No   If YES, do NOT schedule and route to RN nurse pool     Is patient currently taking any antibiotics?  No    For patients on chronic, preventative, or prophylactic antibiotics, procedures may be scheduled.     For patients on antibiotics for active or recent infection:    Cali Ferguson, Ronnie Boss Snitzer-antibiotic course must have been completed for  4 days    Is patient currently taking any steroid medications? (i.e. Prednisone, Medrol)  No     For patients on steroid medications:    Cali Ferguson, Karyn, Adelina Trujillo-steroid course must have been completed for 4 days    Reviewed with patient:  If you are started on any steroids or antibiotics between now and your appointment, you must contact us because it may affect our ability to perform your procedure.  Yes    Is patient actively being treated for cancer or immunocompromised, including the spleen having been removed? No  If YES, do NOT schedule and route to RN pool     Any history of complications with sedation medications?  NO   If YES, OK to schedule AND route to RN pool     Any history of sleep apnea?  NO   If YES, OK to schedule AND route to RN pool     Any cardiac history?  NO   If YES, OK to schedule AND route to RN pool     Do you have an implanted pacemaker, ICD (implanted cardiac device) or AICD (automatic implanted cardiac device)?  NO    If YES, do NOT schedule AND route to RN pool.     Obtain name of device : N/A      Obtain name of cardiologist: N/A      Do you have an implanted stimulator?  NO    If YES, OK to schedule AND route to nursing.     Instruct patient to bring in the remote to the appointment and it will need to be turned off.  reviewed and not discussed      Does patient have an allergy to contrast dye, iodine or shellfish?  No   If YES, OK to schedule. Route to RN pool AND add allergy information to appointment notes    Are you able to get on and off an exam table with minimal or no assistance? Yes   If NO, do NOT schedule and route to RN pool    Are you able to roll over and lay on your stomach with minimal or no assistance? Yes   If NO, do NOT schedule and route to RN pool    Informed patient that s/he needs to be NPO for 6 hours before procedure?  YES   Informed patient that it is OK to take normal medications with sips of water, especially blood pressure  medications, before the procedure and must hold blood thinners as instructed.  Yes -- Patient is wondering if it's ok to still take his pain medications prior to procedure  Informed patient to arrive 30 minutes before procedure time to have an IV inserted.  reviewed   Do NOT schedule at 0745, 0815 or 1245.  reviewed   All radiofrequency ablations are in a 90 minute time slot except genicular radiofrequency ablation those are 60 minute time slot.  Reviewed      Ana Pemberton    [facility]

## 2019-07-12 NOTE — TELEPHONE ENCOUNTER
Received call from patient who has questions in regards to medication changes that were recently made. Patient states he is flustered with the changes and would like to speak to Tamiko. Phone #[phone]      Ana Pemberton    [facility]

## 2019-07-15 ENCOUNTER — OFFICE VISIT (OUTPATIENT)
Dept: ALLERGY | Facility: CLINIC | Age: 34
End: 2019-07-15
Payer: COMMERCIAL

## 2019-07-15 ENCOUNTER — TELEPHONE (OUTPATIENT)
Dept: FAMILY MEDICINE | Facility: CLINIC | Age: 34
End: 2019-07-15

## 2019-07-15 VITALS — SYSTOLIC BLOOD PRESSURE: 127 MMHG | OXYGEN SATURATION: 97 % | DIASTOLIC BLOOD PRESSURE: 81 MMHG | HEART RATE: 78 BPM

## 2019-07-15 DIAGNOSIS — J31.0 NONALLERGIC RHINITIS: Primary | ICD-10-CM

## 2019-07-15 PROCEDURE — 99207 ZZC DROP WITH A PROCEDURE: CPT | Performed by: ALLERGY & IMMUNOLOGY

## 2019-07-15 PROCEDURE — 95004 PERQ TESTS W/ALRGNC XTRCS: CPT | Performed by: ALLERGY & IMMUNOLOGY

## 2019-07-15 RX ORDER — MORPHINE SULFATE 15 MG/1
15 TABLET, FILM COATED, EXTENDED RELEASE ORAL EVERY 12 HOURS
Qty: 60 TABLET | Refills: 0 | Status: SHIPPED | OUTPATIENT
Start: 2019-07-15 | End: 2019-08-02

## 2019-07-15 NOTE — Clinical Note
7/15/2019         RE: Hoang Kiser  [address]  [place] [address]        Dear Colleague,    Thank you for referring your patient, Hoang Kiser, to the [facility]. Please see a copy of my visit note below.    Hoang Kiser was seen in the Allergy Clinic at [facility]. The following are my recommendations regarding his {Allergydiagnoses:829801}      Hoang Kiser is a 33 year old American male who is seen today for follow-up of {ROS ALLERGY/IMMUNOLOGY LIST:378058}. Hoang's {Relatives:486060} states that the symptoms began approximately {NUMBERS TO TEN:494499} {duration:653544} ago, have been occuring {ASSOCIATED SYMPTOM FREQUENCIES:010001} since then and seem to be triggered by {ASTHMA PRECIPITATING FACTORS:616460}.    Past Medical History:   Diagnosis Date     Arthritis 2018     Benign positional vertigo 2016    Started after my groin/back injury. Sitting on Toilet.     Depressive disorder     I am on 120mg of Duoluxetine.     Dysphonia     Nothing significant.     Gastroesophageal reflux disease 2004    Occassional. Spicy foods set it off.     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      DATE OF EE2019.    SOURCE FILE DURATION:  15 minutes.  This EEG did not have a video.    PATIENT INFORMATION:  The patient is a 33-year-old male with irregular movements.  It is not entirely clear the etiology of these movements.  EEG is being done to evaluate for seizures.    MEDICATIONS:  Adderall, doxepin, Cymbalta, Robaxin.      History of MRI of brain and brain stem 2015    MR BRAIN W/O & W CONTRAST, 2015  Impression: No suspicious intracranial findings.      Hoarseness 2017    Dry mouth, started after taking Tizanidine     Neuralgia, neuritis, and radiculitis, unspecified 2012      normal emg 2017 8/8/2017    Interpretation: This is a normal study. There is no electrophysiologic evidence of a lumbosacral radiculopathy affecting the right or left lower extremity on the basis of this study.    Rodney Mena MD Department of Neurology       Panic attack 12/6/2016     Seizures (H) 1986    Grew out of it. Absence Seizures. [id number]     Tinnitus 2015    Had it most of my life. Got worse in 2015     Family History   Problem Relation Age of Onset     Lipids Father         hyperlipidemia     Hyperlipidemia Father      Obesity Father      Arthritis Mother      Hyperlipidemia Mother      Depression Mother      Anxiety Disorder Mother      Mental Illness Mother      Obesity Mother      Asthma Brother      Asthma Sister      Depression Other      Hearing Loss Other      Psychotic Disorder Other      Obesity Other      Cerebrovascular Disease Paternal Grandfather      Alzheimer Disease Paternal Grandfather      Depression Brother      Mental Illness Brother         Bipolar     Asthma Brother      Depression Sister      Asthma Sister      Substance Abuse Sister         Alcohol     Mental Illness Brother         Bipolar     Asthma Brother      Asthma Sister      Obesity Sister      Asthma Brother      Obesity Brother      Obesity Maternal Grandmother      Genetic Disorder Maternal Grandmother         Epilepsy     Obesity Paternal Grandmother      Colon Cancer Other      Genetic Disorder Other         Cerebral Palsy     Genetic Disorder Maternal Grandfather         Multiple Sclerosis     Genetic Disorder Other         Epilepsy     Social History     Tobacco Use     Smoking status: Current Every Day Smoker     Packs/day: 0.50     Years: 10.00     Pack years: 5.00     Types: Cigarettes     Start date: 1/1/2002     Smokeless tobacco: Never Used     Tobacco comment: Transdermal patches have helped me the most in the past   Substance Use Topics     Alcohol use: No     Alcohol/week: 1.2 - 2.4 oz     Comment: none  since 2013     Drug use: No     Comment: hx of meth, none since 2015        Past medical, family, and social history were reviewed.    REVIEW OF SYSTEMS:  General: negative for weight gain. negative for weight loss. negative for changes in sleep.   Eyes: negative for itching. negative for redness. negative for tearing/watering. negative for vision changes  Ears: negative for fullness. negative for hearing loss. negative for dizziness.   Nose: negative for snoring.negative for changes in smell. negative for drainage.   Throat: negative for hoarseness. negative for sore throat. negative for trouble swallowing.   Lungs: negative for cough. negative for shortness of breath.negative for wheezing. negative for sputum production.   Cardiovascular: negative for chest pain. negative for swelling of ankles. negative for fast or irregular heartbeat.   Gastrointestinal: negative for nausea. negative for heartburn. negative for acid reflux.   Musculoskeletal: negative for joint pain. negative for joint stiffness. negative for joint swelling.   Neurologic: negative for seizures. negative for fainting. negative for weakness.   Psychiatric: negative for changes in mood. negative for anxiety.   Endocrine: negative for cold intolerance. negative for heat intolerance. negative for tremors.   Hematologic: negative for easy bruising. negative for easy bleeding.  Integumentary: negative for rash. negative for scaling. negative for nail changes.       Current Outpatient Medications:      albuterol (PROAIR HFA/PROVENTIL HFA/VENTOLIN HFA) 108 (90 Base) MCG/ACT inhaler, Inhale 1-2 puffs into the lungs every 4 hours as needed for shortness of breath / dyspnea or wheezing (cough or wheeze only if necessary), Disp: 6.7 g, Rfl: 1     amphetamine-dextroamphetamine (ADDERALL XR) 30 MG per 24 hr capsule, Take 30 mg by mouth daily, Disp: , Rfl:      clonazePAM (KLONOPIN) 0.5 MG tablet, Take 0.5-1 mg by mouth nightly as needed , Disp: , Rfl: 0      DULoxetine (CYMBALTA) 60 MG EC capsule, 2 x 60mg tab by mouth daily, Disp: 30 capsule, Rfl: 1     HYDROcodone-acetaminophen (NORCO) 5-325 MG tablet, Take 1 tablet by mouth 2 times daily as needed for pain Okay to fill on 7/8/19 to begin using on 7/12/19, Disp: 45 tablet, Rfl: 0     hydrOXYzine (ATARAX) 25 MG tablet, Take 1 tablet (25 mg) by mouth nightly as needed (at HS PRN), Disp: 45 tablet, Rfl: 3     methocarbamol (ROBAXIN) 500 MG tablet, Take 1-2 tablets (500-1,000 mg) by mouth 4 times daily as needed for muscle spasms, Disp: 240 tablet, Rfl: 1     morphine (MS CONTIN) 15 MG CR tablet, Take 1 tablet (15 mg) by mouth every 12 hours, Disp: 30 tablet, Rfl: 0     naproxen (NAPROSYN) 500 MG tablet, Take 1 tablet (500 mg) by mouth 2 times daily (with meals), Disp: 40 tablet, Rfl: 3     oxybutynin ER (DITROPAN-XL) 5 MG 24 hr tablet, Take 2 tablets (10 mg) by mouth daily, Disp: 180 tablet, Rfl: 3     SF 5000 PLUS 1.1 % CREA, , Disp: , Rfl: 2     methylPREDNISolone (MEDROL DOSEPAK) 4 MG tablet therapy pack, Follow Package Directions (Patient not taking: Reported on 7/3/2019), Disp: 21 tablet, Rfl: 0     naloxone (NARCAN) 4 MG/0.1ML nasal spray, Spray 1 spray (4 mg) into one nostril alternating nostrils as needed for opioid reversal every 2-3 minutes until assistance arrives, Disp: 0.2 mL, Rfl: 0    EXAM:   /81 (BP Location: Left arm, Patient Position: Sitting, Cuff Size: Adult Regular)   Pulse 78   SpO2 97%   GENERAL APPEARANCE: { :750663}  SKIN: {SKIN:825356}  HEAD: {EXAM NCC CONST HEAD:166730}  EYES: { :737725}  ENT: { :448391}  NECK: { :250508}  LUNGS: { :859186}  HEART: { :394239}  ABDOMEN: { :589645}  MUSCULOSKELETAL: { :347319}  NEURO: { :647069}  PSYCH: { :003185}      WORKUP:  {ALLERGYWORKUP:596934}    ASSESSMENT/PLAN:  Hoang Kiser is a 33 year old male    ***      Thank you for allowing me to participate in the care of Hoang Kiser.      Niki Kelly MD  Allergy/Immunology  [place], [place]      Chart documentation done in part with Dragon Voice Recognition Software. Although reviewed after completion, some word and grammatical errors may remain.    Again, thank you for allowing me to participate in the care of your patient.        Sincerely,        Niki Kelly MD

## 2019-07-15 NOTE — TELEPHONE ENCOUNTER
Spoke with patient. He actually has 3 tabs left and will  the next prescription to start on Wed as planned. No need to call. He is aware that the script was sent in.     CARLOS A Bass, NP-C  [facility]

## 2019-07-15 NOTE — TELEPHONE ENCOUNTER
Patient is calling, he states that he followed his medication instructions which state 1 tab every 12 hours. He also states that he only has until 4 pm today a ride to get his medication.       Please call        Dominique TRINH    [facility]

## 2019-07-15 NOTE — TELEPHONE ENCOUNTER
"[facility]al [facility]  [facility]: May        Social Work Care Navigator Note      Patient: Hoang Kiser  Date: July 15, 2019  Preferred Name: Shoaib    Previous PHQ-9:   PHQ-9 SCORE 2/14/2018 10/1/2018 3/22/2019   PHQ-9 Total Score - - -   PHQ-9 Total Score 7 13 11     Previous JIN-7:   JIN-7 SCORE 8/14/2018 10/1/2018 3/22/2019   Total Score - - -   Total Score 15 13 12     MOLLY LEVEL:  MOLLY Score (Last Two) 3/23/2016 10/1/2018   MOLLY Raw Score 52 31   Activation Score 100 59.3   MOLLY Level 4 3       Preferred Contact:  Need for : No  Preferred Contact: Cell      Type of Contact Today: Phone call (patient / identified key support person reached)      Data: (subjective / Objective):  Recent ED/IP Admission or Discharge?   None    Patient Goals:  Goal Areas: Health;Mental Health;Chemical Health;Financial and Social Service Benefits  Patient stated goals: Patient stated that he would like to find a Neurologist that he works well with; Patient would like to continue to work on self-advocacy skills; Patient would eventually like to find part-time work when health conditions are better managed; Patient stated he would like to continue growing his skills with coping and stress management; Patient would like to continue going to [employer] groups through [facility] to ensure his sobriety continues as it has for the last 3 1/2 years        [facility] Core Service Provided:  Health and Wellness Promotion    Current Stressors / Issues / Care Plan Objective Addressed Today:   received a call from patient. He stated that he has a court date scheduled for his disability hearing on October 2nd. He stated that he is looking for support for the decision to go on SSDI.  asked for clarification on what he is looking for specifically. After further discussion, patient stated that he \"thinks he needs a form signed by the physician that confirms he is appropriate for disability\". He stated he has not " brought any of this up to his PCP yet and doesn't have the form in hand right now.  asked for more details on the form, as it is unclear what the form is or what purpose it serves for his SSDI hearing. Patient stated that he will contact his  back and see if he can get more details on this form and will call  back.    Intervention:  Motivational Interviewing: Supported Autonomy, Collaboration, Evocation and Open-ended questions   Target Behavior(s): Explored current social supports and reinforced opportunities to increase engagement    Assessment: (Progress on Goals / Homework):   reviewed health action plan goals. See Objectives for more details. Progress: Needs improvement. Next Steps:  will continue to work with patient to support patient in achieving their goals.    Plan: (Homework, other):  Patient will contact  back to provide more details on the form he discussed.     Andrez Brian, Social Work Care Coordinator               Next 5 appointments (look out 90 days)    Jul 30, 2019  8:00 AM CDT  Return Visit with CARLOS A Guy CNP  [facility] ([facility]) [address] [place], [place] [address]  [facility] [address]  [facility] [id number] [phone]   Jul 31, 2019  3:00 PM CDT  Return Visit with Sol Kemp  [facility] ([facility] [place]) [address]  [address]  [facility] [id number] [phone]

## 2019-07-15 NOTE — TELEPHONE ENCOUNTER
Spoke to patient again this morning and he is reporting he would like to pick this prescription up as soon as possible today. He has 1 tab left and a small window to get to the pharmacy.     15 day supply was dispensed on 7/2/19. Patient reports he has 1 tab left     30 day supply is prepped.   Will rout to Tamiko Stevens to review.     KATERINA CardenasN, RN  Care Coordinator  [facility]

## 2019-07-15 NOTE — PROGRESS NOTES
Hoang Kiser was seen in the Allergy Clinic at [facility]. The following are my recommendations regarding his Nonallergic Rhinitis    1. Fluticasone nasal spray, 2 sprays in each nostril daily as needed  2. Recommend sinus irrigation rinse once to twice daily as needed  3. Follow-up in the allergy clinic as needed      Hoang Kiser is a 33 year old American male who is seen today for allergy skin testing. He reports that he has been feeling well and denies recent symptoms of fever, cough, shortness of breath, or wheezing. He has not taken any antihistamines in the past 7 days.    Specific IgE testing to seasonal and perennial aeroallergens obtained on 19 was negative for allergic sensitization.      Past Medical History:   Diagnosis Date     Arthritis 2018     Benign positional vertigo 2016    Started after my groin/back injury. Sitting on Toilet.     Depressive disorder     I am on 120mg of Duoluxetine.     Dysphonia     Nothing significant.     Gastroesophageal reflux disease 2004    Occassional. Spicy foods set it off.     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      DATE OF EE2019.    SOURCE FILE DURATION:  15 minutes.  This EEG did not have a video.    PATIENT INFORMATION:  The patient is a 33-year-old male with irregular movements.  It is not entirely clear the etiology of these movements.  EEG is being done to evaluate for seizures.    MEDICATIONS:  Adderall, doxepin, Cymbalta, Robaxin.      History of MRI of brain and brain stem 2015    MR BRAIN W/O & W CONTRAST, 2015  Impression: No suspicious intracranial findings.      Hoarseness 2017    Dry mouth, started after taking Tizanidine     Neuralgia, neuritis, and radiculitis, unspecified 2012     normal emg 2017 2017    Interpretation: This is a normal  study. There is no electrophysiologic evidence of a lumbosacral radiculopathy affecting the right or left lower extremity on the basis of this study.    Rodney Mena MD Department of Neurology       Panic attack 12/6/2016     Seizures (H) 1986    Grew out of it. Absence Seizures. [id number]     Tinnitus 2015    Had it most of my life. Got worse in 2015     Family History   Problem Relation Age of Onset     Lipids Father         hyperlipidemia     Hyperlipidemia Father      Obesity Father      Arthritis Mother      Hyperlipidemia Mother      Depression Mother      Anxiety Disorder Mother      Mental Illness Mother      Obesity Mother      Asthma Brother      Asthma Sister      Depression Other      Hearing Loss Other      Psychotic Disorder Other      Obesity Other      Cerebrovascular Disease Paternal Grandfather      Alzheimer Disease Paternal Grandfather      Depression Brother      Mental Illness Brother         Bipolar     Asthma Brother      Depression Sister      Asthma Sister      Substance Abuse Sister         Alcohol     Mental Illness Brother         Bipolar     Asthma Brother      Asthma Sister      Obesity Sister      Asthma Brother      Obesity Brother      Obesity Maternal Grandmother      Genetic Disorder Maternal Grandmother         Epilepsy     Obesity Paternal Grandmother      Colon Cancer Other      Genetic Disorder Other         Cerebral Palsy     Genetic Disorder Maternal Grandfather         Multiple Sclerosis     Genetic Disorder Other         Epilepsy     Social History     Tobacco Use     Smoking status: Current Every Day Smoker     Packs/day: 0.50     Years: 10.00     Pack years: 5.00     Types: Cigarettes     Start date: 1/1/2002     Smokeless tobacco: Never Used     Tobacco comment: Transdermal patches have helped me the most in the past   Substance Use Topics     Alcohol use: No     Alcohol/week: 1.2 - 2.4 oz     Comment: none since 2013     Drug use: No     Comment: hx of meth, none  since 2015        Past medical, family, and social history were reviewed.    REVIEW OF SYSTEMS:  General: negative for weight gain. negative for weight loss. negative for changes in sleep.   Eyes: negative for itching. negative for redness. negative for tearing/watering. negative for vision changes  Ears: negative for fullness. negative for hearing loss. negative for dizziness.   Nose: negative for snoring.negative for changes in smell. negative for drainage.   Throat: negative for hoarseness. negative for sore throat. negative for trouble swallowing.   Lungs: negative for cough. negative for shortness of breath.negative for wheezing. negative for sputum production.   Cardiovascular: negative for chest pain. negative for swelling of ankles. negative for fast or irregular heartbeat.   Gastrointestinal: negative for nausea. negative for heartburn. negative for acid reflux.   Musculoskeletal: negative for joint pain. negative for joint stiffness. negative for joint swelling.   Neurologic: negative for seizures. negative for fainting. negative for weakness.   Psychiatric: negative for changes in mood. negative for anxiety.   Endocrine: negative for cold intolerance. negative for heat intolerance. negative for tremors.   Hematologic: negative for easy bruising. negative for easy bleeding.  Integumentary: negative for rash. negative for scaling. negative for nail changes.       Current Outpatient Medications:      albuterol (PROAIR HFA/PROVENTIL HFA/VENTOLIN HFA) 108 (90 Base) MCG/ACT inhaler, Inhale 1-2 puffs into the lungs every 4 hours as needed for shortness of breath / dyspnea or wheezing (cough or wheeze only if necessary), Disp: 6.7 g, Rfl: 1     amphetamine-dextroamphetamine (ADDERALL XR) 30 MG per 24 hr capsule, Take 30 mg by mouth daily, Disp: , Rfl:      clonazePAM (KLONOPIN) 0.5 MG tablet, Take 0.5-1 mg by mouth nightly as needed , Disp: , Rfl: 0     DULoxetine (CYMBALTA) 60 MG EC capsule, 2 x 60mg tab by  mouth daily, Disp: 30 capsule, Rfl: 1     HYDROcodone-acetaminophen (NORCO) 5-325 MG tablet, Take 1 tablet by mouth 2 times daily as needed for pain Okay to fill on 7/8/19 to begin using on 7/12/19, Disp: 45 tablet, Rfl: 0     hydrOXYzine (ATARAX) 25 MG tablet, Take 1 tablet (25 mg) by mouth nightly as needed (at HS PRN), Disp: 45 tablet, Rfl: 3     methocarbamol (ROBAXIN) 500 MG tablet, Take 1-2 tablets (500-1,000 mg) by mouth 4 times daily as needed for muscle spasms, Disp: 240 tablet, Rfl: 1     morphine (MS CONTIN) 15 MG CR tablet, Take 1 tablet (15 mg) by mouth every 12 hours, Disp: 30 tablet, Rfl: 0     naproxen (NAPROSYN) 500 MG tablet, Take 1 tablet (500 mg) by mouth 2 times daily (with meals), Disp: 40 tablet, Rfl: 3     oxybutynin ER (DITROPAN-XL) 5 MG 24 hr tablet, Take 2 tablets (10 mg) by mouth daily, Disp: 180 tablet, Rfl: 3     SF 5000 PLUS 1.1 % CREA, , Disp: , Rfl: 2     methylPREDNISolone (MEDROL DOSEPAK) 4 MG tablet therapy pack, Follow Package Directions (Patient not taking: Reported on 7/3/2019), Disp: 21 tablet, Rfl: 0     naloxone (NARCAN) 4 MG/0.1ML nasal spray, Spray 1 spray (4 mg) into one nostril alternating nostrils as needed for opioid reversal every 2-3 minutes until assistance arrives, Disp: 0.2 mL, Rfl: 0    EXAM:   /81 (BP Location: Left arm, Patient Position: Sitting, Cuff Size: Adult Regular)   Pulse 78   SpO2 97%   GENERAL APPEARANCE: alert, cooperative and not in distress  SKIN: no rashes, no lesions  HEAD: atraumatic, normocephalic  MUSCULOSKELETAL: no musculoskeletal defects are noted  NEURO: frequent tics/jerking movements  PSYCH: does not appear depressed or anxious      WORKUP:  Skin testing    ENVIRONMENTAL PERCUTANEOUS SKIN TESTING: ADULT  [facility] 7/15/2019   Consent Y   Ordering Physician Dr. Kelly   Interpreting Physician  Dr. Kelly    Testing Technician Deidre GAGE RN   Location Back   Time start: 10:00 AM   Time End: 10:15 AM   Positive Control:  Histatrol*ALK 1 mg/ml 0   Negative Control: 50% Glycerin 0   Cat Hair*ALK (10,000 BAU/ml) 0   AP Dog Hair/Dander (1:100 w/v) 0   Dust Mite p. 30,000 AU/ml 0   Dust Mite f. (30,000 AU/ml) 0   Jae (W/F in millimeters) 0   Griffin Grass (100,000 BAU/mL) 0   Red Cedar (W/F in millimeters) 0   Maple/Schley (W/F in millimeters) 0   Hackberry (W/F in millimeters) 0   Clearwater (W/F in millimeters) 0   Dairy *ALK (W/F in millimeters) 0   American Elm (W/F in millimeters) 0   [place] (W/F in millimeters) 0   Black [place] (W/F in millimeters) 0   Birch Mix (W/F in millimeters) 0   White Lake (W/F in millimeters) 0   Oak (W/F in millimeters) 0   Cocklebur (W/F in millimeters) 0   Jefferson (W/F in millimeters) 0   White Aaron (W/F in millimeters) 0   Careless (W/F in millimeters) 0   Nettle (W/F in millimeters) 0   English Plantain (W/F in millimeters) 0   Kochia (W/F in millimeters) 0   Lamb's Quarter (W/F in millimeters) 0   Marshelder (W/F in millimeters) 0   Ragweed Mix* ALK (W/F in millimeters) 0   Russian Thistle (W/F in millimeters) 0   Sagebrush/Mugwort (W/F in millimeters) 0   Sheep Sorrel (W/F in millimeters) 0   Feather Mix* ALK (W/F in millimeters) 0   Penicillium Mix (1:10 w/v) 0   Curvularia spicifera (1:10 w/v) 0   Epicoccum (1:10 w/v) 0   Aspergillus fumigatus (1:10 w/v): 0   Alternaria tenius (1:10 w/v) 0   H. Cladosporium (1:10 w/v) 0   Phoma herbarum (1:10 w/v) 0      Testing could not be interpreted due to blunted histamine control    ASSESSMENT/PLAN:  Hoang Kiser is a 33 year old male here for allergy testing. Skin testing today could not be interpreted due to absent histamine response. Recent specific IgE testing was negative for allergic sensitization. We discussed potential causes for nonallergic rhinitis including infection and irritants.    1. Fluticasone nasal spray, 2 sprays in each nostril daily as needed  2. Recommend sinus irrigation rinse once to twice daily as needed  3. Follow-up  in the allergy clinic as needed      Thank you for allowing me to participate in the care of Hoang Kiser.      Niki Kelly MD  Allergy/Immunology  [facility] and [place], [place]      Chart documentation done in part with Dragon Voice Recognition Software. Although reviewed after completion, some word and grammatical errors may remain.

## 2019-07-15 NOTE — NURSING NOTE
Per provider verbal order, placed Adult Environmental Panel scratch test.  Consent was obtained prior to procedure.  Once panels were placed, patient was monitored for 15 minutes in clinic.  Provider read test after 15 minutes..  Pt tolerated procedure well.  All questions and concerns were addressed at office visit.     Deidre Ralph RN

## 2019-07-15 NOTE — PROGRESS NOTES
[facility] - Procedure Note    Date of Visit: 7/19/2019    Procedure performed: C7-T1 interlaminar epidural steroid injection with fluoroscopic guidance  Diagnosis: Cervical spondylosis; Cervical radiculitis/radiculopathy  : Brynn Trujillo MD   Anesthesia: none    Indications: Hoang Kiser is a 33 year old male who is seen at the request of Dr. Feldman for cervical epidural steroid injection. The patient describes neck pain radiating to the bilateral hands. The patient has been exhibiting symptoms consistent with cervical intraspinal inflammation and radiculopathy. Symptoms have been persistent, disabling, and intermittently severe. The patient reports minimal improvement with conservative treatment, including previous R[facility] in [place], medications and PT.    Cervical MRI was done on 6/17/2019 which showed   Findings:  The cervical vertebrae are normally aligned.  There is no disc height  narrowing at any level.  There is normal signal within and normal  contour of the cervical spinal cord.  The findings on a level by level  basis are as follows:     C2-3:  Mild facet arthropathy bilaterally. Mild left neural foraminal  stenosis. Right neural foramen and spinal canal are patent.     C3-4:  Small left eccentric posterior disc bulge. Facet arthropathy  bilaterally. Mild left neural foraminal stenosis. Neural foramen and  spinal canal are patent.     C4-5:  Facet arthropathy bilaterally. No spinal canal or neural  foraminal stenosis.     C5-6:  Small posterior disc bulge. Facet arthropathy bilaterally.  Right central annular fissure. No spinal canal or neural foraminal  stenosis.     C6-7:  Right central/subarticular disc protrusion. No spinal canal or  neuroforaminal stenosis.     C7-T1:  No spinal canal or neuroforaminal stenosis.     No abnormality of the paraspinous soft tissues.                                                                    Impression:   Mild multilevel  cervical spondylosis with mild left neural foraminal  stenosis at C2-3 and C3-4.       Allergies:      Allergies   Allergen Reactions     Buspirone Hcl Other (See Comments)     Panic attacks     Doxycycline Diarrhea, Fatigue, GI Disturbance and Nausea     Elavil [Amitriptyline]      Ineffective in reducing spasms/movement, increased fatigue     Trazodone Fatigue     Buspirone Anxiety     Keflex [Cephalexin] Diarrhea        Vitals:  /76   Pulse 72   SpO2 97%     Review of Systems: The patient denies recent fever, chills, illness, use of antibiotics or anticoagulants. All other 10-point review of systems negative.     Procedure: The procedure and risks were explained, and informed written consent was obtained from the patient. Risks include but are not limited to: infection, bleeding, increased pain, and damage to soft tissue, nerve, muscle, and vasculature structures. After getting informed consent, patient was brought into the procedure suite and was placed in a prone position on the procedure table. A Pause for the Cause was performed. Patient was prepped and draped in sterile fashion.     The C7-T1 interspace was identified with use of fluoroscopy in AP view. A 25-gauge, 1.5 inch needle was used to anesthetize the skin and subcutaneous tissue entry site with a total of 2 ml of 1% lidocaine. Under fluoroscopic visualization, a 22-gauge, 3.5 inch Tuohy epidural needle was slowly advanced towards the epidural space a few millimeters left of midline. The latter part of the needle advancement was guided with fluoroscopy in the lateral view. The epidural space was identified using loss of resistance technique. After negative aspiration for heme and cerebrospinal fluid, a total of 1 mL of non-ionic contrast was injected to confirm needle placement. 9 mL of contrast was wasted. Epidurogram confirmed spread within the posterior epidural space. 2 ml of 10mg/ml of dexamethasone and 1 ml of preservative free 1%  lidocaine was injected. The needle was removed.  Images were saved to PACS.    The patient tolerated the procedure well, and there was no evidence of procedural complications. No new sensory or motor deficits were noted following the procedure. The patient was stable and able to ambulate on discharge home. Post-procedure instructions were provided.     Pre-procedure pain score: 0/10 in the neck, 7/10 in the arm  Post-procedure pain score: 0/10 in the neck, 2/10 in the arm    Assessment/Plan: Hoang Kiser is a 33 year old male s/p cervical interlaminar epidural steroid injection today for cervical spondylosis and radiculitis/radiculopathy.     1. Following today's procedure, the patient was advised to contact the [facility] for any of the following:   Fever, chills, or night sweats   New onset of pain, numbness, or weakness   Any questions/concerns regarding the procedure  If unable to contact the Pain Center, the patient was instructed to go to a local Emergency Room for any complications.   2. The patient will receive a follow-up call in 1 week.   3. Follow-up with the referring provider in 2 weeks for post-procedure evaluation.      Brynn Trujillo MD   [facility]

## 2019-07-19 ENCOUNTER — RADIOLOGY INJECTION OFFICE VISIT (OUTPATIENT)
Dept: PALLIATIVE MEDICINE | Facility: CLINIC | Age: 34
End: 2019-07-19
Attending: PREVENTIVE MEDICINE
Payer: COMMERCIAL

## 2019-07-19 ENCOUNTER — ANCILLARY PROCEDURE (OUTPATIENT)
Dept: GENERAL RADIOLOGY | Facility: CLINIC | Age: 34
End: 2019-07-19
Attending: ANESTHESIOLOGY
Payer: COMMERCIAL

## 2019-07-19 VITALS — DIASTOLIC BLOOD PRESSURE: 76 MMHG | HEART RATE: 72 BPM | SYSTOLIC BLOOD PRESSURE: 127 MMHG | OXYGEN SATURATION: 97 %

## 2019-07-19 DIAGNOSIS — M54.12 CERVICAL RADICULOPATHY: Primary | ICD-10-CM

## 2019-07-19 DIAGNOSIS — M54.12 CERVICAL RADICULOPATHY: ICD-10-CM

## 2019-07-19 PROCEDURE — 62321 NJX INTERLAMINAR CRV/THRC: CPT | Performed by: ANESTHESIOLOGY

## 2019-07-19 NOTE — NURSING NOTE
Discharge Information    IV Discontiued Time:  NA    Amount of Fluid Infused:  NA    Discharge Criteria = When patient returns to baseline or as per MD order    Consciousness:  Pt is fully awake    Circulation:  BP +/- 20% of pre-procedure level    Respiration:  Patient is able to breathe deeply    O2 Sat:  Patient is able to maintain O2 Sat >92% on room air    Activity:  Moves 4 extremities on command    Ambulation:  Patient is able to stand and walk or stand and pivot into wheelchair    Dressing:  Clean/dry or No Dressing    Notes:   Discharge instructions and AVS given to patient    Patient meets criteria for discharge?  YES    Admitted to PCU?  No    Responsible adult present to accompany patient home?  Yes    Signature/Title:    Shaniqua Carvajal RN Care Coordinator  [facility]

## 2019-07-19 NOTE — PATIENT INSTRUCTIONS
[facility] Procedure Discharge Instructions    Today you saw:   Dr. Brynn Trujillo    Your procedure: Cervical Epidural steroid injection    Medications used: Lidocaine (anesthetic) Dexamethasone (steroid) Omnipaque (contrast)        If you were holding your blood thinning medication, please restart taking it: N/A          Be cautious when walking as numbness and/or weakness in the legs may occur up to 6-8 hours after the procedure due to effect of the local anesthetic    Do not drive for 6 hours. The effect of the local anesthetic could slow your reflexes.     Avoid strenuous activity for the first 24 hours. You may resume your regular activities after that.     You may shower, however avoid swimming, tub baths or hot tubs for 24 hours following your procedure    You may have a mild to moderate increase in pain for several days following the injection.      You may use ice packs for 10-15 minutes, 3 to 4 times a day at the injection site for comfort    Do not use heat to painful areas for 6 to 8 hours. This will give the local anesthetic time to wear off and prevent you from accidentally burning your skin.    Unless you have been directed to avoid the use of anti-inflammatory medications (NSAIDS-ibuprofen, Aleve, Motrin), you may use these medications or Tylenol for pain control if needed.     With diabetes, check your blood sugar more frequently than usual as your blood sugar may be higher than normal for 10-14 days following a steroid injection. Contact your doctor who manages your diabetes if your blood sugar is higher than usual    Possible side effects of steroids that you may experience include flushing, elevated blood pressure, increased appetite, mild headaches and restlessness.  All of these symptoms will get better with time.    It may take up to 14 days for the steroid medication to start working although you may feel the effect as early as a few days after the procedure.     Follow up with your  referring provider in 2-3 weeks      If you experience any of the following, call the pain center line during work hours at [phone] or on-call physician after hours at [phone]:  -Fever over 100 degree F  -Swelling, bleeding, redness, drainage, warmth at the injection site  -Progressive weakness or numbness in your arms  -Loss of bowel or bladder function  -Unusual headache that is not relieved by Tylenol or your regular headache medication  -Unusual new onset of pain that is not improving

## 2019-07-25 NOTE — PROGRESS NOTES
[facility] - Procedure Note    Date of Visit: 7/26/2019    Pre procedure Diagnosis: facet arthropathy   Post procedure Diagnosis: Same  Procedure performed: Bilateral L3,4,5 radiofrequency ablation   Anesthesia: moderate sedation with 2mg versed & 100mcg fentanyl  Complications: NONE  Operators: Brynn Trujillo MD     Indications:   Hoang Kiser is a 33 year old male was sent by Tamiko Stevens CNP for lumbar RFA.  They have a history of central low back pain that does not radiate.  Exam shows increased discomfort with extension and rotation and they have tried conservative treatment including physical therapy and medications.    Hoang Kiser had medial branch blocks on 6/14/2019 and 7/5/2019 showing appropriate pain relief, therefore radiofrequency ablation will be done.     MRI of the LUMBAR SPINE was done on 5/2/2019 which showed   Findings: 5 lumbar-type vertebra. The tip of the conus medullaris is  at L1.  The lumbar vertebral column is normally aligned.   Straightening of lumbar lordosis, unchanged. The intervertebral disc  heights are maintained. Normal marrow signal. At L5-S1, there is a  disc bulge and facet hypertrophy with mild left neural foraminal  narrowing, unchanged. No spinal canal stenosis.      Impression:  1. Lumbar spondylosis with mild left neural foraminal narrowing at  L5-S1.  2. No spinal canal stenosis    Allergies:      Allergies   Allergen Reactions     Buspirone Hcl Other (See Comments)     Panic attacks     Doxycycline Diarrhea, Fatigue, GI Disturbance and Nausea     Elavil [Amitriptyline]      Ineffective in reducing spasms/movement, increased fatigue     Trazodone Fatigue     Buspirone Anxiety     Keflex [Cephalexin] Diarrhea        Vitals:  BP (!) 145/92   Pulse 77   Resp 13   SpO2 97%     Review of Systems: The patient denies recent fever, chills, illness, use of antibiotics or anticoagulants. All other 10-point review of systems negative.      Physical Exam:   Resp: CTA bilaterally  CV: RRR no murmurs, rubs or gallops  Airway:  Mallampati Class I      Options/alternatives, benefits and risks were discussed with the patient including bleeding, infection, no pain relief, tissue trauma, exposure to radiation, reaction to medications including seizure, spinal cord injury,increased pain after the procedure, weakness, numbness or sensory changes and headache.   We also discussed risks of sedation, including reaction to medications and cardiovascular collapse.    Questions were answered to his satisfaction and he agrees to proceed. Voluntary informed consent was obtained and signed.     Medications were reviewed:  Yes   Pre-procedure pain score: 6/10    Procedure:  After getting informed consent, patient was brought into the procedure suite and was placed in a prone position on the procedure table.   A Pause for the Cause was performed.  Patient was prepped and draped in sterile fashion.     Hoang Kiser had an IV line placed prior the procedure.  The C-arm was positioned in the right oblique view to afford optimal view of the L4-L5 vertebral bodies. Lidocaine 1% was used to anesthetize the skin at each level.  At each level, a 120mm, 20G curved radiofrequency cannula with a 10mm active tip was positioned, overlying the intersection of the transverse process and pedicle at L4 & L5, and was advanced under intermittent fluoroscopy until it contacted the transverse process and notch, and the tip slightly overran that process, just lateral to the mamillary process.  The position of each cannula was verified and optimized in the oblique view and AP views.    In the AP view, another cannula was placed at the sacral alar notch.      Each position was tested for motor and sensory stimulation, and was positioned so that stimulation was negative for stimuli outside the immediate area of the desired lesion.  Sensory stimulation was completed at 50 Hz, with max  stimulation up to 0.8V.  Motor stimulation was completed at 2Hz, up to 2.5V.  Lidocaine 1% 0.5 ml was injected at each level, and a 90 second, 80 degree Centigrade lesion was generated.    The needles were then rotated within the pathway of the medial branch, and locations were evaluated with repeat imaging.  Motor testing was again completed, and showed appropriate stimulation.  A second lesion was then generated at each location.    The procedure was then repeated on the left side, using the same technique as described above.    The needles were withdrawn. Hemostasis was achieved, the area was cleaned, and bandaids were placed when appropriate.  The patient tolerated the procedure well, and was taken to the recovery room.    Images were saved to PACS.    Start sedation time: 1445  End sedation time: 1531    Post-procedure pain score: 6/10  Follow-up includes:   -f/u phone call in one week  -post-procedure pain medications: NONE  -f/u with Dr. Trujillo in 2 weeks

## 2019-07-26 ENCOUNTER — ANCILLARY PROCEDURE (OUTPATIENT)
Dept: GENERAL RADIOLOGY | Facility: CLINIC | Age: 34
End: 2019-07-26
Attending: ANESTHESIOLOGY
Payer: COMMERCIAL

## 2019-07-26 ENCOUNTER — RADIOLOGY INJECTION OFFICE VISIT (OUTPATIENT)
Dept: PALLIATIVE MEDICINE | Facility: CLINIC | Age: 34
End: 2019-07-26
Payer: COMMERCIAL

## 2019-07-26 VITALS
SYSTOLIC BLOOD PRESSURE: 145 MMHG | DIASTOLIC BLOOD PRESSURE: 92 MMHG | HEART RATE: 77 BPM | OXYGEN SATURATION: 97 % | RESPIRATION RATE: 13 BRPM

## 2019-07-26 DIAGNOSIS — M47.819 FACET ARTHROPATHY: Primary | ICD-10-CM

## 2019-07-26 DIAGNOSIS — M47.816 FACET ARTHROPATHY, LUMBAR: ICD-10-CM

## 2019-07-26 PROCEDURE — 99152 MOD SED SAME PHYS/QHP 5/>YRS: CPT | Performed by: ANESTHESIOLOGY

## 2019-07-26 PROCEDURE — 99153 MOD SED SAME PHYS/QHP EA: CPT | Performed by: ANESTHESIOLOGY

## 2019-07-26 PROCEDURE — 64635 DESTROY LUMB/SAC FACET JNT: CPT | Mod: 50 | Performed by: ANESTHESIOLOGY

## 2019-07-26 PROCEDURE — 64636 DESTROY L/S FACET JNT ADDL: CPT | Mod: 50 | Performed by: ANESTHESIOLOGY

## 2019-07-26 RX ORDER — FENTANYL CITRATE 50 UG/ML
50 INJECTION, SOLUTION INTRAMUSCULAR; INTRAVENOUS ONCE
Status: COMPLETED | OUTPATIENT
Start: 2019-07-26 | End: 2019-07-26

## 2019-07-26 RX ADMIN — FENTANYL CITRATE 50 MCG: 50 INJECTION, SOLUTION INTRAMUSCULAR; INTRAVENOUS at 14:23

## 2019-07-26 NOTE — NURSING NOTE
Pre-procedure Intake    Have you been fasting? Yes    If yes, for how long? 6 hrs    Are you taking a prescribed blood thinner such as coumadin, Plavix, Xarelto?    No    If yes, when did you take your last dose?     Do you take aspirin?  No    If cervical procedure, have you held aspirin for 6 days?   NA    Do you have any allergies to contrast dye, iodine, steroid and/or numbing medications?  NO    Are you currently taking antibiotics or have an active infection?  NO    Have you had a fever/elevated temperature within the past week? NO    Are you currently taking oral steroids? NO    Do you have a ? Yes       Are you pregnant or breastfeeding?  Not Applicable    Are the vital signs normal?  Yes

## 2019-07-26 NOTE — NURSING NOTE
22g PIV placed to left hand, 1st attempt    Pilar BORGESN, RN Care Coordinator  [facility]

## 2019-07-26 NOTE — PATIENT INSTRUCTIONS
[facility] Procedure Discharge Instructions       Today you saw:   Dr. Brynn Trujillo       Your procedure:  Radiofrequency Nerve Ablation     Procedural Medications:  Lidocaine (anesthetic)      Sedation medications:  Fentanyl - pain.     Versed - relaxation      You have received sedation during your procedure; for the next 24 hours you should not:   -Drive   -Operate machinery   -Drink alcohol   -Sign any legal documents   You may resume your normal diet and medications.   Avoid strenuous activity for the first 24 hours and resume regular activities after that.   Be cautious with walking as numbness and/or weakness in the lower extremities up to 6-8 hours may occur due to effect of local anesthetic   You may shower, however no swimming or tub baths or hot tubs for 24 hours following your procedure   Anticipate pain for up to 2 weeks after this procedure.  You may use ice packs 10-15 minutes three to four times a day at the injection site for comfort   You may use anti-inflammatory medications (such as Ibuprofen or Aleve or Advil) or Tylenol for pain control if necessary           It may take up to 8 weeks to receive relief from the RFA    If you experience any of the following, call the pain center line during work hours at [phone] or on call physician after hours at [phone]:  -Fever over 100 degree F   -Swelling, bleeding, redness, drainage, warmth at the injection site   -Progressive weakness or numbness in your legs or arms   -Loss of bowel or bladder function   -Unusual headache that is not relieved by Tylenol   -Unusual new onset of pain that is not improving

## 2019-07-26 NOTE — NURSING NOTE
MD Time IN: 1442   Sedation start time:  1445  MD Time OUT:  1531    Medications given: fentanyl 100 mcg IV; versed 2 mg IV  Intravenous fluids were administered, normal saline 100mL cc's.  Sedation Level Achieved:  Minimal sedation    Midazolam: NDC 6008-5969-34  Given:2mg  Wasted:3mg    Fentanyl: NDC: 4384-1427-87  Given:100mcg  Wasted:0      Discharge Information    IV Discontiued Time:  1550    Amount of Fluid Infused: 100mL    Discharge Criteria = When patient returns to baseline or as per MD order    Consciousness:  Pt is fully awake    Circulation:  BP +/- 20% of pre-procedure level    Respiration:  Patient is able to breathe deeply    O2 Sat:  Patient is able to maintain O2 Sat >92% on room air    Activity:  Moves 4 extremities on command    Ambulation:  Patient is able to stand and walk or stand and pivot into wheelchair    Dressing:  Clean/dry or No Dressing    Notes:   Discharge instructions and AVS given to patient    Patient meets criteria for discharge?  YES    Admitted to PCU?  No    Responsible adult present to accompany patient home?  Yes    Signature/Title:    Pilar Quintanilla RN Care Coordinator  [facility]

## 2019-07-27 ENCOUNTER — MYC MEDICAL ADVICE (OUTPATIENT)
Dept: PALLIATIVE MEDICINE | Facility: CLINIC | Age: 34
End: 2019-07-27

## 2019-07-27 ENCOUNTER — TELEPHONE (OUTPATIENT)
Dept: PALLIATIVE MEDICINE | Facility: CLINIC | Age: 34
End: 2019-07-27

## 2019-07-27 DIAGNOSIS — M79.2 NEURITIS: ICD-10-CM

## 2019-07-27 DIAGNOSIS — G89.18 ACUTE POST-OPERATIVE PAIN: Primary | ICD-10-CM

## 2019-07-27 RX ORDER — HYDROCODONE BITARTRATE AND ACETAMINOPHEN 5; 325 MG/1; MG/1
1-2 TABLET ORAL 3 TIMES DAILY PRN
Qty: 15 TABLET | Refills: 0 | Status: SHIPPED | OUTPATIENT
Start: 2019-07-27 | End: 2019-08-20

## 2019-07-27 RX ORDER — GABAPENTIN 100 MG/1
100 CAPSULE ORAL 3 TIMES DAILY
Qty: 45 CAPSULE | Refills: 0 | Status: SHIPPED | OUTPATIENT
Start: 2019-07-27 | End: 2019-08-08

## 2019-07-27 NOTE — TELEPHONE ENCOUNTER
Shoaib,  I called the pharmacy, and I think there should be no problem with the hydrocodone.    As a summary of our call:  1) use non-medication treatments like ice, heat, soaking, massage.  2) use NSAIDS like ibuprofen (with food) if able.  The max daily dose of acetaminophen (tylenol) per day is 3000mg.  Remember there is 325mg of acetaminophen (tylenol) in your Norco (hydrocodone).  You can also try lidocaine patches.  3. You can use hydrocodone, up to 2 tabs per dose.  You are allowed up to 5 tabs/day.  I provided a script for 15 tabs.  4. Try the gabapentin at 100mg dose.  If tolerating, you can use 100mg 3 times daily.  5. Update Tamiko Stevens NP on Monday.    Danae Boss MD  [facility]

## 2019-07-27 NOTE — TELEPHONE ENCOUNTER
Patient has significant pain after bilateral radiofrequency ablation yesterday  He is on his baseline MS contin and hydrocodone x 2/day.  He is also using NSAIDs.  Unable to sleep.  Movement disorder worsened by lack of sleep.    Reviewed chart and history.  No post-procedure meds given.  History of CD- patient in group home. He acknowledges this history and knows this is short term treatment plan and that he needs help with this pain.    Spoke with Brynn Trujillo MD about this.  She felt it was ok to provide him with small script.    Discussed safety with patient, including not using benzo at night if able.  We also discussed non medication treatments, and over the counter medications.      Signed Prescriptions:                        Disp   Refills    gabapentin (NEURONTIN) 100 MG capsule      45 cap*0        Sig: Take 1 capsule (100 mg) by mouth 3 times daily  Authorizing Provider: GRAZYNA HARGROVE    HYDROcodone-acetaminophen (NORCO) 5-325 MG*15 tab*0        Sig: Take 1-2 tablets by mouth 3 times daily as needed for           pain . Max of 5 tabs/day. Put 4-6 hours between           doses.  Authorizing Provider: GRAZYNA HARGROVE,  I called the pharmacy, and I think there should be no problem with the hydrocodone.    As a summary of our call:  1) use non-medication treatments like ice, heat, soaking, massage.  2) use NSAIDS like ibuprofen (with food) if able.  The max daily dose of acetaminophen (tylenol) per day is 3000mg.  Remember there is 325mg of acetaminophen (tylenol) in your Norco (hydrocodone).  You can also try lidocaine patches.  3. You can use hydrocodone, up to 2 tabs per dose.  You are allowed up to 5 tabs/day.  I provided a script for 15 tabs.  4. Try the gabapentin at 100mg dose.  If tolerating, you can use 100mg 3 times daily.  5. Update Tamiko Stevens NP on Monday.    Danae Hargrove MD  [facility]     Danae Hargrove MD  [facility]

## 2019-07-29 ENCOUNTER — TELEPHONE (OUTPATIENT)
Dept: FAMILY MEDICINE | Facility: CLINIC | Age: 34
End: 2019-07-29

## 2019-07-29 NOTE — TELEPHONE ENCOUNTER
"States that he had a reaction to versed. He says that he \"came to\" and had found crushed Adderall and hydrocodone under his bed. He is unsure if he actually overused. He states that two of his housemates can vouch for him behaving oddly. He will come in to review further on Wed at 8 am.     CARLOS A Bass, NP-C  [facility]        "

## 2019-07-29 NOTE — TELEPHONE ENCOUNTER
Pt scheduled for a return visit with Tamiko Stevens CNP on Tuesday 7/30 at 0800.    Will route to Tamiko to review pt's message.     DEANNA Mckeon, RN-BC  Patient Care Supervisor/Care Coordinator  [facility]

## 2019-07-29 NOTE — TELEPHONE ENCOUNTER
"[facility]al [facility] Home Services  [facility]: [place]        Social Work Care Navigator Note      Patient: Hoang Kiser  Date: July 29, 2019  Preferred Name: Shoaib    Previous PHQ-9:   PHQ-9 SCORE 2/14/2018 10/1/2018 3/22/2019   PHQ-9 Total Score - - -   PHQ-9 Total Score 7 13 11     Previous JIN-7:   JIN-7 SCORE 8/14/2018 10/1/2018 3/22/2019   Total Score - - -   Total Score 15 13 12     MOLLY LEVEL:  MOLLY Score (Last Two) 3/23/2016 10/1/2018   MOLLY Raw Score 52 31   Activation Score 100 59.3   MOLLY Level 4 3       Preferred Contact:  Need for : No  Preferred Contact: Cell      Type of Contact Today: Phone call (patient / identified key support person reached)      Data: (subjective / Objective):  Patient Goals  Goal Areas: Health;Mental Health;Chemical Health;Financial and Social Service Benefits  Patient stated goals: Patient stated that he would like to find a Neurologist that he works well with; Patient would like to continue to work on self-advocacy skills; Patient would eventually like to find part-time work when health conditions are better managed; Patient stated he would like to continue growing his skills with coping and stress management; Patient would like to continue going to [employer] groups through [facility] to ensure his sobriety continues as it has for the last 3 1/2 years      [facility] Service Provided:  Individual and Family Support: aimed to help clients reduce barriers to achieving goals, increase health literacy and knowledge about chronic condition(s), increase self-efficacy skills, and improve health outcomes     Data:  Patient contacted  to follow up on his previous phone call on 7/15. He reports that the form is \"similar to a residual functional capacity form\", but it is only four pages. He said he still isn't sure what the form is called.  consulted with colleagues.  will request a copy of the form be sent to , as more details are needed prior to " sending it to PCP.     contacted patient back and requested that a copy of the form be sent to SW for further consultation from colleagues. Patient will notify the law firm to have it sent to SW's email.     Plan:   will wait for the form patient is requesting to have filled out and review it with colleagues    Andrez Brian, Social Work Care Coordinator               Next 5 appointments (look out 90 days)    Jul 31, 2019  8:00 AM CDT  Return Visit with CARLOS A Guy CNP  [facility] ([facility]) [address] [place] [id number]  [phone]   Jul 31, 2019  3:00 PM CDT  Return Visit with Sol Kemp  [facility] ([facility]) [address] [place] [address] [address]  [facility] [id number] [phone]   Aug 01, 2019  8:00 AM CDT  Return Visit with Faustino Feldman MD  [facility] ([facility]) [id number] [place] [id number]  [phone]

## 2019-07-29 NOTE — TELEPHONE ENCOUNTER
Patient scheduled to see me tomorrow.     CARLOS A Bass, NP-C  [facility]

## 2019-07-30 ENCOUNTER — MYC MEDICAL ADVICE (OUTPATIENT)
Dept: PALLIATIVE MEDICINE | Facility: CLINIC | Age: 34
End: 2019-07-30

## 2019-07-31 ENCOUNTER — OFFICE VISIT (OUTPATIENT)
Dept: PALLIATIVE MEDICINE | Facility: CLINIC | Age: 34
End: 2019-07-31
Payer: COMMERCIAL

## 2019-07-31 VITALS
DIASTOLIC BLOOD PRESSURE: 91 MMHG | WEIGHT: 176 LBS | OXYGEN SATURATION: 100 % | BODY MASS INDEX: 27.57 KG/M2 | HEART RATE: 83 BPM | SYSTOLIC BLOOD PRESSURE: 141 MMHG

## 2019-07-31 DIAGNOSIS — F19.90 MISUSE OF DRUGS: Primary | ICD-10-CM

## 2019-07-31 DIAGNOSIS — M25.551 HIP PAIN, RIGHT: ICD-10-CM

## 2019-07-31 DIAGNOSIS — G25.9 FUNCTIONAL MOVEMENT DISORDER: ICD-10-CM

## 2019-07-31 DIAGNOSIS — Z72.0 TOBACCO ABUSE DISORDER: ICD-10-CM

## 2019-07-31 DIAGNOSIS — M47.816 FACET ARTHROPATHY, LUMBAR: ICD-10-CM

## 2019-07-31 PROCEDURE — 99214 OFFICE O/P EST MOD 30 MIN: CPT | Performed by: NURSE PRACTITIONER

## 2019-07-31 RX ORDER — HYDROCODONE BITARTRATE AND ACETAMINOPHEN 5; 325 MG/1; MG/1
1 TABLET ORAL 2 TIMES DAILY PRN
Qty: 26 TABLET | Refills: 0 | Status: SHIPPED | OUTPATIENT
Start: 2019-07-31 | End: 2019-08-13

## 2019-07-31 ASSESSMENT — PAIN SCALES - GENERAL: PAINLEVEL: SEVERE PAIN (6)

## 2019-07-31 NOTE — PATIENT INSTRUCTIONS
After Visit Instructions:     Thank you for coming to [facility] for your care. It is my goal to partner with you to help you reach your optimal state of health.     Continue daily self-care, identifying contributing factors, and monitoring variations in pain level. Continue to integrate self-care into your life.      1. Schedule pain psychology visit   2. Schedule follow-up with CARLOS A Higuera NP-C on Aug 12-13  3. Medication recommendations:   1. No change to opiate pain medication .   2. STOP clonazepam. Bring remaining tablets to next appt.   3. Bring all opiate pain medications to next appt.     CARLOS A Bass NP-C  [facility]    Clinic Number:  [phone]     Call with any questions about your care and for scheduling assistance.     Calls are returned Monday through Friday between 8 AM and 4:30 PM. We usually get back to you within 2 business days depending on the issue/request.    If we are prescribing your medications:    For opioid medication refills, call the clinic or send a Sepior message 7 days in advance.  Please include:    Name of requested medication    Name of the pharmacy.    For non-opioid medications, call your pharmacy directly to request a refill. Please allow 3-4 days to be processed.     Per [place] State Law:    All controlled substance prescriptions must be filled within 30 days of being written.      For those controlled substances allowing refills, pickup must occur within 30 days of last fill.      We believe regular attendance is key to your success in our program!      Any time you are unable to keep your appointment we ask that you call us at least 24 hours in advance to cancel.This will allow us to offer the appointment time to another patient.   Multiple missed appointments may lead to dismissal from the clinic.

## 2019-07-31 NOTE — PROGRESS NOTES
[facility]    CHIEF COMPLAINT: pain     INTERVAL HISTORY:  Last seen on 7/2/19.        Recommendations/plan at the last visit included:  1. Schedule pain psychology visits   2. Schedule follow-up with CARLOS A Higuera NP-C in 4 weeks  3. Procedures recommended: Continue medial branch blocks   4. Medication recommendations:             1. Start MS Contin (long acting morphine) 15 mg once daily.  2. Norco 5/325 mg 1-2 tabs per day as needed. #45 tabs to last 30 days.     Since last visit:   - RFA on 7/26/19: Had significant pain that evening. He states that he believes that he had a reaction to versed which caused a black out and aberrant behavior. He states that he laid down when he got home and recalls getting up around 8 pm to use the bathroom and take pain medication. He took his usually dose of MS Contin, Naproxen and Hydrocodone. He then recalls waking up at about noon the next day with a lot pain and noticed that his daily medication box where he keeps his PRN meds was empty. He didn't remember taking additional medications. He states that he did not look at the medications stored in his lock box. EMR shows that he called the on call provider, Dr Boss, at about 10:55 am the next day and he does recall that phone call. He had reported severe pain and asked for additional medication to cover post procedural pain. He did not disclose that he had missing medications that were unaccounted for. He states that after the call to Dr Boss and picking up the medication she prescribed from the pharmacy, he discovered a pile of crushed medications under his bed. He has a photo of this today. He checked his lock olman and noted MS Contin and Adderall missing. He states that he has no recollection regarding how these medications ended up crushed and under his bed. He states that he was around roommates during this time and they stated that he was acting oddly. He states that he has contacted his  sponsor because he is concerned about having had a slip but is unsure what exactly occurred.   - Shoaib notes increased frequency and severity of movement attacks.     Pain Information:   Pain quality: Penetrating and Throbbing    Pain rating: intensity ranges from 3/10 to 9/10, and averages 7/10 on a 0-10 scale.      Annual Controlled Substance Agreement/UDS due date: 4/2020     Current Pain Relevant Medications:  MS Contin 15 mg BID = 30 MME  Norco 5/325 mg 1 tab BID #45 tabs per month                        Total opiate dose: 35-40 MME daily  Clonazepam .5 mg 1-2 tab at HS PRN  Duloxetine 20 mg daily  Naproxen 500 mg BID: on hold re: elevated LFTs   Tizanidine 4 mg 1-2 tabs at HS PRN  Ambien 10 mg at HS  Adderall 30 mg daily, combination of long and short acting.       Previous Pain Relevant Medications: (H--helped; HI--Helped initially; S[facility]--Somewhat helpful; NH--No help; W--worse; SE--side effects; ?--Unsure if helpful)   NOTE: This medication information taken from patient's intake form, not medical records.                         Opiates: Tramadol: H, Hydrocodone: H, Morphine: H                        NSAIDS: Ibuprofen:H, naproxen:[facility], Relafen: NH                        Muscle Relaxants: Cyclobenzaprine:H,Med interaction, Tizanidine:H, Baclofen: [facility]                        Anti-migraine mediations: Prednisone:H                        Anti-depressants: Bupropion:SE, Celexa:SE, Duloxetine:H, Trazodone:Too strong, Venlafaxine:too strong, Amitriptyline:SE                         Sleep aids:Anbien: H                        Anxiolytics: Clonazepam:H                        Neuropathics: Tegretol:taken for seizures in childhood, Gabapentin:H                                          Topicals: Lidocaine:H                        Other medications not covered above: Tylenol:NH      Any illicit drug use: 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 Concern for multiple controlled substances including stimulants and opiates.  7/27/19: Concern for misuse of opiates and stimulants as noted in office visit on this date.       Medications:  Current Outpatient Medications   Medication Sig Dispense Refill     albuterol (PROAIR HFA/PROVENTIL HFA/VENTOLIN HFA) 108 (90 Base) MCG/ACT inhaler Inhale 1-2 puffs into the lungs every 4 hours as needed for shortness of breath / dyspnea or wheezing (cough or wheeze only if necessary) 6.7 g 1     amphetamine-dextroamphetamine (ADDERALL XR) 30 MG per 24 hr capsule Take 30 mg by mouth daily       clonazePAM (KLONOPIN) 0.5 MG tablet Take 0.5-1 mg by mouth nightly as needed   0     DULoxetine (CYMBALTA) 60 MG EC capsule 2 x 60mg tab by mouth daily 30 capsule 1     gabapentin (NEURONTIN) 100 MG capsule Take 1 capsule (100 mg) by mouth 3 times daily 45 capsule 0     HYDROcodone-acetaminophen (NORCO) 5-325 MG tablet Take 1 tablet by mouth 2 times daily as needed for pain Okay to fill on 7/31/19 26 tablet 0     methocarbamol (ROBAXIN) 500 MG tablet Take 1-2 tablets (500-1,000 mg) by mouth 4 times daily as needed for muscle spasms 240 tablet 1     naloxone (NARCAN) 4 MG/0.1ML nasal spray Spray 1 spray (4 mg) into one nostril alternating nostrils as needed for opioid reversal every 2-3 minutes until assistance arrives 0.2 mL 0     naproxen (NAPROSYN) 500 MG tablet Take 1 tablet (500 mg) by mouth 2 times daily (with meals) 40 tablet 3     oxybutynin ER (DITROPAN-XL) 10 MG 24 hr tablet TK 1 T PO D  3     oxybutynin ER (DITROPAN-XL) 5 MG 24 hr tablet Take 2 tablets (10 mg) by mouth daily 180 tablet 3     SF 5000 PLUS 1.1 % CREA   2     hydrOXYzine (ATARAX) 25 MG tablet Take 1 tablet (25 mg) by mouth nightly as needed  (at HS PRN) (Patient not taking: Reported on 7/15/2019) 45 tablet 3     morphine (MS CONTIN) 15 MG CR tablet Take 1 tablet (15 mg) by mouth every 12 hours Ok at fill on 8/10/19 60 tablet 0       Review of Systems: A 10-point review of systems was negative, with the exception of chronic pain issues.      Social History: Reviewed; unchanged from previous consultation.      Family history: Reviewed; unchanged from previous consultation.     PHYSICAL EXAM    Vitals:    07/31/19 0759   BP: (!) 141/91   Pulse: 83   SpO2: 100%   Weight: 79.8 kg (176 lb)       Constitutional: healthy, alert, no distress, pain behaviors and somewhat down r/t increased spastic movements   HEENT: Head atraumatic, normocephalic. Eyes without conjunctival injection or jaundice. Neck supple. No obvious neck masses.  Skin: No rash, lesions, or petechiae of exposed skin.   Extremities: No clubbing, cyanosis, or edema to exposed extremities  Psychiatric/mental status: Alert, without lethargy or stupor. Appropriate affect. Anxious regarding controlled medication misuse/destruction as noted above.        Neurologic exam:  CN:  Cranial nerves 2-12 are grossly normal.  Has uncontrolled upper body movement/tremor.       Musculoskeletal exam:  Cervical spine:                       Flex:  20 degrees                       Ext: 20 degrees                       Rotation to right: 20 degrees                       Rotation to left: 20 degrees                       Rotation/ext to right: painful                        Rotation/ext to left: painful                        Tenderness in the cervical spine at midline. No                       Tenderness in the cervical paraspinal muscles. No  Thoracic spine:                        Kyphosis. No                       Tenderness in the thoracic spine at midline. Yes                       Tenderness in the thoracic paraspinal muscles. Yes  Lumbar/Sacral spine:                       Forward Flexion:  " 90 degrees                       Ext: 20 degrees \"tight\"                       Rotation/ext to right: painful                        Rotation/ext to left: painful                        Lordosis. No                       Tenderness in the lumbar spine at midline. Yes                       Tenderness in the lumbar paraspinal muscles.Yes      Psychiatric:  mentation appears normal., affect and mood normal      DIRE Score for ongoing opioid management is calculated as follows:    Diagnosis = 2 pts (slowly progressive; moderate pain/objective findings)    Intractability = 2 pts (most treatments tried; patient not fully engaged/barriers)    Risk        Psych = 2 pts (personality dysfunction/mental illness that moderately interferes with care)         Chem Hlth = 2 pts (use of medications to cope with stress; chemical dependency in remission)       Reliability = 3 pts (highly reliable with meds, appointments, treatments)       Social = 3 pts (supportive family/close relationships; involved in work/school; no isolation)       (Psych + Chem hlth + Reliability + Social) = 14    Efficacy = 2 pts (moderate benefit/function; low med dose; too early/not tried meds)    DIRE Score = 16        7-13: likely NOT suitable candidate for long-term opioid analgesia       14-21: may be a suitable candidate for long-term opioid analgesia          Previous Diagnostic Tests:   Imaging Studies:   MR LUMBAR SPINE W/O CONTRAST 5/2/2018 7:27 PM  History: DDD (degenerative disc disease), lumbar; Lumbar  radiculopathy; Hip pain, right; Groin pain, right.  ICD-10: DDD (degenerative disc disease), lumbar; Lumbar radiculopathy;  Hip pain, right; Groin pain, right  Comparison: Lumbar spine MRI 3/8/2017  Technique: Sagittal STIR, T1-weighted turbo spin-echo, 3-D volumetric  T2-weighted and axial reconstructed T2-weighted images of the lumbar  spine were obtained without intravenous contrast.   Findings: 5 lumbar-type vertebra. The tip of the conus " medullaris is  at L1.  The lumbar vertebral column is normally aligned.   Straightening of lumbar lordosis, unchanged. The intervertebral disc  heights are maintained. Normal marrow signal. At L5-S1, there is a  disc bulge and facet hypertrophy with mild left neural foraminal  narrowing, unchanged. No spinal canal stenosis.  Impression:  1. Lumbar spondylosis with mild left neural foraminal narrowing at  L5-S1.  2. No spinal canal stenosis.      MR right hip without contrast 5/2/2018 6:47 PM  Techniques: Multiplanar multisequence imaging of the right hip was  obtained without  administration of intra-articular or intravenous  contrast using routing protocol.  History: Hip pain.  Comparison: None available.  Findings:  Osseous structures  Osseous structures: No fracture, stress reaction, avascular necrosis,  or focal osseous lesion is seen. There is small focal area of  subchondral cystic changes in the anterior right femoral head neck  junction, most compatible with synovial herniation pit. Findings  suggestive of reduced femoral head neck junction though alpha angle  cannot be assessed owing to no dedicated oblique axial sequence  obtained.  Articular cartilage and labrum  Assessment limited on this arthrographic study due to relative lack of  joint distension.  Articular cartilage: No high grade chondral loss.  Labrum: Labral tearing approximately from 12:00 to 3:00 with 3:00  being anterior and 6:00 being the center of transverse ligament in  this convention with associated subtle area of subchondral edema in  the superior acetabulum.  Ligament teres and transverse ligament of acetabulum: Intact.  Joint or bursal effusion  Joint effusion: A physiologic amount of joint fluid.  Bursal effusion: Minimal nonspecific edema over the greater  trochanter. No substantial iliopsoas or trochanteric bursal effusion.  Muscles and tendons  Muscles and tendons: The rectus femoris, the visualized portion  iliopsoas, proximal  hamstrings, and hip abductors are intact.  The  visualized adductor muscles are unremarkable.   Nerves:  The visualized course of the sciatic nerve is unremarkable.   Other Findings:  There is fat-containing right inguinal hernia.  Impression:  1. Approximately 12-3 o'clock labral tearing with synovial herniation  pit and likely reduced femoral head neck junction offset. 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  2. Fat containing right inguinal hernia.          ASSESSMENT:   1.  Lumbar DDD, mild  2.  Lumbar muscle spasm  3.  Labral tear, right hip  4.  Hx: anxiety, chemical dependence in remission.  5. Functional neurologic movement disorder    Shoaib presents for medication management appt. See above note re: medication misuse. He is very upset and anxious about this episode. Reviewed that he was not honest with Dr Boss when he called requesting additional medication for procedure pain in that he did not disclose that there was medication unaccounted for even prior to finding the crushed medication under his med. He states that he is struggling with a lot of shame associated with this incident although insists that he recalls nothing related to meds being destroyed to taken improperly. Given that his pain level was still very high, I suspect he likely did not overuse by much if at all. He is now required to have a home health nurse review his medications at he sober living house. This is not related to this current episode. He was upset and somewhat insulted by this requirement however we discussed that in light of this recent issue, additional oversight of his medication handling is appropriate even if this is not what prompted the change.     Reviewed med changes: He is currently on a stimulant, benzodiazepines and opiates. He agrees to stop the benzo which he takes intermittently. No concern for benzo withdrawal due to infrequent dosing. Refill allowed to get to  regular fill date and I will see him at that time to reassess.       Plan:    Diagnosis reviewed, treatment option addressed, and risk/benifits discussed.  Self-care instructions given.  I am recommending a multidisciplinary treatment plan to help this patient better manage pain.        1. Schedule pain psychology visit   2. Schedule follow-up with CARLOS A Higuera, NP-C on Aug 12-13  3. Medication recommendations:   1. No change to opiate pain medication .   2. STOP clonazepam. Bring remaining tablets to next appt.   3. Bring all opiate pain medications to next appt.     A total of 25 minutes was spent on the patient today, greater than 50% of that time was spent on face to face counseling and care coordination regarding diagnoses and treatment options as mentioned above including the multidisciplinary pain management program and the benefits of physical therapy, pain psychology and medication options.        CARLOS A Bass, NP-C   [facility]    Disclaimer: This note consists of symbols derived from keyboarding, dictation and/or voice recognition software. As a result, there may be errors in the script that have gone undetected. Please consider this when interpreting information found in this chart.

## 2019-08-01 PROBLEM — J31.0 NONALLERGIC RHINITIS: Status: ACTIVE | Noted: 2019-08-01

## 2019-08-02 ENCOUNTER — MYC MEDICAL ADVICE (OUTPATIENT)
Dept: PALLIATIVE MEDICINE | Facility: CLINIC | Age: 34
End: 2019-08-02

## 2019-08-02 DIAGNOSIS — M25.551 HIP PAIN, RIGHT: ICD-10-CM

## 2019-08-02 DIAGNOSIS — M47.816 FACET ARTHROPATHY, LUMBAR: ICD-10-CM

## 2019-08-02 DIAGNOSIS — G25.9 FUNCTIONAL MOVEMENT DISORDER: ICD-10-CM

## 2019-08-02 RX ORDER — MORPHINE SULFATE 15 MG/1
15 TABLET, FILM COATED, EXTENDED RELEASE ORAL EVERY 12 HOURS
Qty: 60 TABLET | Refills: 0 | Status: SHIPPED | OUTPATIENT
Start: 2019-08-02 | End: 2019-08-13

## 2019-08-08 DIAGNOSIS — M79.2 NEURITIS: ICD-10-CM

## 2019-08-08 DIAGNOSIS — G89.18 ACUTE POST-OPERATIVE PAIN: ICD-10-CM

## 2019-08-08 RX ORDER — GABAPENTIN 100 MG/1
100 CAPSULE ORAL 3 TIMES DAILY
Qty: 45 CAPSULE | Refills: 0 | Status: SHIPPED | OUTPATIENT
Start: 2019-08-08 | End: 2019-08-21

## 2019-08-08 NOTE — TELEPHONE ENCOUNTER
Received fax request from [facility] pharmacy requesting refill(s) for gabapentin (NEURONTIN) 100 MG capsule    Last refilled on 07/27/2019    Pt last seen on 07/31/2019  Next appt scheduled for 08/13/2019    Will facilitate refill.      Ana Pemberton    [facility]

## 2019-08-11 ENCOUNTER — MYC MEDICAL ADVICE (OUTPATIENT)
Dept: PALLIATIVE MEDICINE | Facility: CLINIC | Age: 34
End: 2019-08-11

## 2019-08-12 ENCOUNTER — OFFICE VISIT (OUTPATIENT)
Dept: ORTHOPEDICS | Facility: CLINIC | Age: 34
End: 2019-08-12
Payer: COMMERCIAL

## 2019-08-12 DIAGNOSIS — M50.20 CERVICAL DISC HERNIATION: Primary | ICD-10-CM

## 2019-08-12 PROCEDURE — 99213 OFFICE O/P EST LOW 20 MIN: CPT | Performed by: PREVENTIVE MEDICINE

## 2019-08-12 NOTE — PROGRESS NOTES
Date of Service: 2019    Chief Complaint   Patient presents with     Right Foot - Pain     Left Foot - Pain          HPI: Hoang is a 34 year old male who presents today for further evaluation of BL foot pain.  Nature: dull    Location: BL heels    Duration: months    Onset: no inciting trauma. Started after his gait became more unsteady with increasing severity of his movement disorder.    Course: Worsening    Aggravating/alleviating factors: None.     Previous Treatments: None.   He would like josefa know if there is anything that he should be doing in order to keep his feet healthy.       Review of Systems: No n/v/d/f/c/ns/sob/co    PMH:   Past Medical History:   Diagnosis Date     Arthritis 2018     Benign positional vertigo 2016    Started after my groin/back injury. Sitting on Toilet.     Depressive disorder     I am on 120mg of Duoluxetine.     Dysphonia     Nothing significant.     Gastroesophageal reflux disease 2004    Occassional. Spicy foods set it off.     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      DATE OF EE2019.    SOURCE FILE DURATION:  15 minutes.  This EEG did not have a video.    PATIENT INFORMATION:  The patient is a 33-year-old male with irregular movements.  It is not entirely clear the etiology of these movements.  EEG is being done to evaluate for seizures.    MEDICATIONS:  Adderall, doxepin, Cymbalta, Robaxin.      History of MRI of brain and brain stem 2015    MR BRAIN W/O & W CONTRAST, 2015  Impression: No suspicious intracranial findings.      Hoarseness     Dry mouth, started after taking Tizanidine     Neuralgia, neuritis, and radiculitis, unspecified 2012     normal emg 2017 2017    Interpretation: This is a normal study. There is no electrophysiologic evidence of a lumbosacral radiculopathy  affecting the right or left lower extremity on the basis of this study.    Rodney Mena MD Department of Neurology       Panic attack 12/6/2016     Seizures (H) 1986    Grew out of it. Absence Seizures. [id number]     Tinnitus 2015    Had it most of my life. Got worse in 2015       PSxH:   Past Surgical History:   Procedure Laterality Date     COLONOSCOPY  02/15/18    Has not happened yet.     COLONOSCOPY N/A 2/15/2018    Procedure: COMBINED COLONOSCOPY, SINGLE OR MULTIPLE BIOPSY/POLYPECTOMY BY BIOPSY;;  Surgeon: Liam Kincaid MD;  Location: [facility] OR     COLONOSCOPY WITH CO2 INSUFFLATION N/A 2/15/2018    Procedure: COLONOSCOPY WITH CO2 INSUFFLATION;  COLON-FAMILY HX OF COLON CANCER/ SYPURA;  Surgeon: Liam Kincaid MD;  Location: [facility] OR     HC TOOTH EXTRACTION W/FORCEP Bilateral 2003     PE TUBES  1990       Allergies: Buspirone hcl; Doxycycline; Elavil [amitriptyline]; Trazodone; Buspirone; and Keflex [cephalexin]    SH:   Social History     Socioeconomic History     Marital status: Single     Spouse name: Not on file     Number of children: 0     Years of education: 14     Highest education level: Not on file   Occupational History     Occupation: Assembly/Packaging   Social Needs     Financial resource strain: Not on file     Food insecurity:     Worry: Not on file     Inability: Not on file     Transportation needs:     Medical: Not on file     Non-medical: Not on file   Tobacco Use     Smoking status: Current Every Day Smoker     Packs/day: 0.50     Years: 10.00     Pack years: 5.00     Types: Cigarettes     Start date: 1/1/2002     Smokeless tobacco: Never Used     Tobacco comment: Transdermal patches have helped me the most in the past   Substance and Sexual Activity     Alcohol use: No     Alcohol/week: 1.2 - 2.4 oz     Comment: none since 2013     Drug use: No     Comment: hx of meth, none since 2015      Sexual activity: Yes     Partners: Female     Birth control/protection: Condom, Pill      Comment: Not currently active (last 4 months)   Lifestyle     Physical activity:     Days per week: Not on file     Minutes per session: Not on file     Stress: Not on file   Relationships     Social connections:     Talks on phone: Not on file     Gets together: Not on file     Attends Faith service: Not on file     Active member of club or organization: Not on file     Attends meetings of clubs or organizations: Not on file     Relationship status: Not on file     Intimate partner violence:     Fear of current or ex partner: Not on file     Emotionally abused: Not on file     Physically abused: Not on file     Forced sexual activity: Not on file   Other Topics Concern     Parent/sibling w/ CABG, MI or angioplasty before 65F 55M? No      Service Not Asked     Blood Transfusions Not Asked     Caffeine Concern Yes     Comment: Coffee, Engergy Drinks, 3 cups daily     Occupational Exposure Not Asked     Hobby Hazards Not Asked     Sleep Concern Not Asked     Stress Concern Not Asked     Weight Concern Not Asked     Special Diet Not Asked     Back Care Not Asked     Exercise Not Asked     Bike Helmet Not Asked     Seat Belt Not Asked     Self-Exams Not Asked   Social History Narrative    He lives in [facility] in a chemical treatment center - there are 11 people there. He arrives today through [employer] ride    He has 3 brothers and 3 sisters. He has not children. He has never been .      Mother and father are alive    One sister is an alcoholic and bulemic    depression is present in the family : mother, sister and 2 brothers are bipolar.      He denies bipolar. He has depression.    He denies recurring thoughts. He has had substance abuse issues. He has had cravings in the past.    He exercises and plays guitar and draws.      He has used meth as well as prescription pain killers.    He has used marijuana when young. Used cocaine in the past.    Has not used iv drugs    He does not drink  alcohol.      50% Samoan and is Togolese, english and Nicaraguan and a bit native.    He smokes cigarettes - 1 pack per day.        single. lives in [place]. estela is father       FH:   Family History   Problem Relation Age of Onset     Lipids Father         hyperlipidemia     Hyperlipidemia Father      Obesity Father      Arthritis Mother      Hyperlipidemia Mother      Depression Mother      Anxiety Disorder Mother      Mental Illness Mother      Obesity Mother      Asthma Brother      Asthma Sister      Depression Other      Hearing Loss Other      Psychotic Disorder Other      Obesity Other      Cerebrovascular Disease Paternal Grandfather      Alzheimer Disease Paternal Grandfather      Depression Brother      Mental Illness Brother         Bipolar     Asthma Brother      Depression Sister      Asthma Sister      Substance Abuse Sister         Alcohol     Mental Illness Brother         Bipolar     Asthma Brother      Asthma Sister      Obesity Sister      Asthma Brother      Obesity Brother      Obesity Maternal Grandmother      Genetic Disorder Maternal Grandmother         Epilepsy     Obesity Paternal Grandmother      Colon Cancer Other      Genetic Disorder Other         Cerebral Palsy     Genetic Disorder Maternal Grandfather         Multiple Sclerosis     Genetic Disorder Other         Epilepsy       Objective:  Data Unavailable Data Unavailable Data Unavailable Data Unavailable Data Unavailable 0 lbs 0 oz    PT and DP pulses are 2/4 bilaterally. CRT is instant. Robust pedal hair.   Gross sensation is intact bilaterally. Protective sensation is intact as demonstrated with a 5.07G monofilament. +clonus. Normal ankle and knee reflexes.   Equinus is not noted bilaterally. No pain with active or passive ROM of the ankle, MTJ, 1st ray, or halluces bilaterally,. No pain along the courses of the PT, peroneal, or Achilles tendons BL. When walking, he has an irregular gait with variable foot strike pattern.   Nails  normal bilaterally. No open lesions are noted. Hyperkeratoses to the distal 2nd digits.     bilateral foot xrays indicated in 6 weightbearing views.    No fractures. Normal alignment. No soft tissue abnormality.      Assessment: Abnormal movement disorder and gait abnormality.    Plan:  - Pt seen and evaluated  - XRs taken and discussed with him.   - Because of the spacticity of his gait, I would be hesitant to put him in a rigid orthotic. I think it will lock his foot in too much and cause significant pain.   - No structural abnormality noted.   - Cont with neuro regarding movement disorder.  - See again PRN.

## 2019-08-12 NOTE — NURSING NOTE
Hoang Kiser's chief complaint for this visit includes:  Chief Complaint   Patient presents with     Lower Back - Cervical/Thoracic, Follow Up     PCP: Phill Floyd    Referring Provider:  No referring provider defined for this encounter.    There were no vitals taken for this visit.  Data Unavailable     Do you need any medication refills at today's visit? No    Stefany Adkins LPN

## 2019-08-12 NOTE — LETTER
8/12/2019         RE: Hoang Kiser  [address]  [facility] [address]        Dear Colleague,    Thank you for referring your patient, Hoang Kiser, to the [facility]. Please see a copy of my visit note below.    HISTORY OF PRESENT ILLNESS  Mr. Kiser is a pleasant 34 year old year old male who presents to clinic today   For followup for cervical injection  Has improvement overall in his neck and radiation into arms/hands    MEDICAL HISTORY  Patient Active Problem List   Diagnosis     Neuropathy     Moderate major depression (H)     Tobacco abuse     Attention deficit hyperactivity disorder (ADHD), predominantly inattentive type     Primary insomnia     Panic disorder without agoraphobia     Abnormal involuntary movement     Tic disorder     Anxiety     Posttraumatic stress disorder with dissociative symptoms     Chronic left hip pain     Chronic pain of right hip     Degenerative disc disease at L5-S1 level     Functional movement disorder     Family history of Crohn's disease     Chronic low back pain     Chronic pain syndrome     History of substance abuse     Family history of multiple myeloma     History of electroencephalogram     Nonallergic rhinitis       Current Outpatient Medications   Medication Sig Dispense Refill     albuterol (PROAIR HFA/PROVENTIL HFA/VENTOLIN HFA) 108 (90 Base) MCG/ACT inhaler Inhale 1-2 puffs into the lungs every 4 hours as needed for shortness of breath / dyspnea or wheezing (cough or wheeze only if necessary) 6.7 g 1     amphetamine-dextroamphetamine (ADDERALL XR) 30 MG per 24 hr capsule Take 30 mg by mouth daily       clonazePAM (KLONOPIN) 0.5 MG tablet TK 1 T PO QD  1     DULoxetine (CYMBALTA) 60 MG EC capsule 2 x 60mg tab by mouth daily 30 capsule 1     gabapentin (NEURONTIN) 100 MG capsule Take 1 capsule (100 mg) by mouth 3 times daily 45 capsule 0     HYDROcodone-acetaminophen (NORCO) 5-325 MG tablet Take 1 tablet by mouth 2 times  daily as needed for pain Okay to fill on 7/31/19 26 tablet 0     hydrOXYzine (ATARAX) 25 MG tablet Take 1 tablet (25 mg) by mouth nightly as needed (at HS PRN) (Patient not taking: Reported on 7/15/2019) 45 tablet 3     methocarbamol (ROBAXIN) 500 MG tablet Take 1-2 tablets (500-1,000 mg) by mouth 4 times daily as needed for muscle spasms 240 tablet 1     morphine (MS CONTIN) 15 MG CR tablet Take 1 tablet (15 mg) by mouth every 12 hours Ok at fill on 8/10/19 60 tablet 0     naloxone (NARCAN) 4 MG/0.1ML nasal spray Spray 1 spray (4 mg) into one nostril alternating nostrils as needed for opioid reversal every 2-3 minutes until assistance arrives 0.2 mL 0     naproxen (NAPROSYN) 500 MG tablet Take 1 tablet (500 mg) by mouth 2 times daily (with meals) 40 tablet 3     oxybutynin ER (DITROPAN-XL) 10 MG 24 hr tablet TK 1 T PO D  3     oxybutynin ER (DITROPAN-XL) 5 MG 24 hr tablet Take 2 tablets (10 mg) by mouth daily 180 tablet 3     SF 5000 PLUS 1.1 % CREA   2       Allergies   Allergen Reactions     Buspirone Hcl Other (See Comments)     Panic attacks     Doxycycline Diarrhea, Fatigue, GI Disturbance and Nausea     Elavil [Amitriptyline]      Ineffective in reducing spasms/movement, increased fatigue     Trazodone Fatigue     Buspirone Anxiety     Keflex [Cephalexin] Diarrhea       Family History   Problem Relation Age of Onset     Lipids Father         hyperlipidemia     Hyperlipidemia Father      Obesity Father      Arthritis Mother      Hyperlipidemia Mother      Depression Mother      Anxiety Disorder Mother      Mental Illness Mother      Obesity Mother      Asthma Brother      Asthma Sister      Depression Other      Hearing Loss Other      Psychotic Disorder Other      Obesity Other      Cerebrovascular Disease Paternal Grandfather      Alzheimer Disease Paternal Grandfather      Depression Brother      Mental Illness Brother         Bipolar     Asthma Brother      Depression Sister      Asthma Sister       Substance Abuse Sister         Alcohol     Mental Illness Brother         Bipolar     Asthma Brother      Asthma Sister      Obesity Sister      Asthma Brother      Obesity Brother      Obesity Maternal Grandmother      Genetic Disorder Maternal Grandmother         Epilepsy     Obesity Paternal Grandmother      Colon Cancer Other      Genetic Disorder Other         Cerebral Palsy     Genetic Disorder Maternal Grandfather         Multiple Sclerosis     Genetic Disorder Other         Epilepsy       Additional medical/Social/Surgical histories reviewed in [facility] and updated as appropriate.     REVIEW OF SYSTEMS (8/12/2019)  10 point ROS of systems including Constitutional, Eyes, Respiratory, Cardiovascular, Gastroenterology, Genitourinary, Integumentary, Musculoskeletal, Psychiatric were all negative except for pertinent positives noted in my HPI.     PHYSICAL EXAM  VSS  Vital Signs: There were no vitals taken for this visit. Patient declined being weighed. There is no height or weight on file to calculate BMI.    General  - normal appearance, in no obvious distress  CV  - normal radial pulse  Pulm  - normal respiratory pattern, non-labored  Musculoskeletal - neck  - inspection: normal bone and joint alignment, no obvious deformity, no scapular winging, no AC step-off  - palpation: no bony or soft tissue tenderness, normal clavicle, non-tender AC  - ROM:  Full rom of neck, pain with flexion and extension of neck  - strength: 5/5  strength, 5/5 in all shoulder planes    Neuro  - no sensory or motor deficit, grossly normal coordination, except for occasional movments that are tic-like, normal muscle tone  Skin  - no ecchymosis, erythema, warmth, or induration, no obvious rash  Psych  - interactive, appropriate, normal mood and affect      ASSESSMENT & PLAN  35 yo male with cervical ddd, disc herniation, radicular pain, improved  Reviewed cervical MRI and injection  Can consider another in 1-2 months  Cont. Gabapentin  and followup with his neurologist and pain clinic  F/u 1-2 months      Faustino Feldman MD, [facility]    Again, thank you for allowing me to participate in the care of your patient.        Sincerely,        Faustino Feldman MD

## 2019-08-12 NOTE — PATIENT INSTRUCTIONS
Thanks for coming today.  [facility]  [address] [place] [address]    To schedule future appointments in Ortho Clinic, you may call [phone].    To schedule ordered imaging by your provider:   Call [facility] [phone]    To schedule an injection ordered by your provider:  Call [facility] Injection scheduling line: [phone]  [facility] available online at:  [email or web address]    Please call if any further questions or concerns ([phone]).  Clinic hours 8 am to 5 pm.    Return to clinic (call) if symptoms worsen or fail to improve.

## 2019-08-13 ENCOUNTER — MYC MEDICAL ADVICE (OUTPATIENT)
Dept: FAMILY MEDICINE | Facility: CLINIC | Age: 34
End: 2019-08-13

## 2019-08-13 ENCOUNTER — OFFICE VISIT (OUTPATIENT)
Dept: PALLIATIVE MEDICINE | Facility: CLINIC | Age: 34
End: 2019-08-13
Payer: COMMERCIAL

## 2019-08-13 ENCOUNTER — TELEPHONE (OUTPATIENT)
Dept: FAMILY MEDICINE | Facility: CLINIC | Age: 34
End: 2019-08-13

## 2019-08-13 VITALS
HEART RATE: 89 BPM | BODY MASS INDEX: 27.5 KG/M2 | OXYGEN SATURATION: 97 % | SYSTOLIC BLOOD PRESSURE: 123 MMHG | DIASTOLIC BLOOD PRESSURE: 86 MMHG | WEIGHT: 175.6 LBS

## 2019-08-13 DIAGNOSIS — J30.2 SEASONAL ALLERGIC RHINITIS, UNSPECIFIED TRIGGER: ICD-10-CM

## 2019-08-13 DIAGNOSIS — J06.9 VIRAL URI WITH COUGH: ICD-10-CM

## 2019-08-13 DIAGNOSIS — G89.18 ACUTE POST-OPERATIVE PAIN: ICD-10-CM

## 2019-08-13 DIAGNOSIS — G25.9 FUNCTIONAL MOVEMENT DISORDER: Primary | ICD-10-CM

## 2019-08-13 DIAGNOSIS — M47.816 FACET ARTHROPATHY, LUMBAR: ICD-10-CM

## 2019-08-13 DIAGNOSIS — M79.2 NEURITIS: ICD-10-CM

## 2019-08-13 DIAGNOSIS — M25.551 HIP PAIN, RIGHT: ICD-10-CM

## 2019-08-13 PROCEDURE — 99214 OFFICE O/P EST MOD 30 MIN: CPT | Performed by: NURSE PRACTITIONER

## 2019-08-13 RX ORDER — HYDROCODONE BITARTRATE AND ACETAMINOPHEN 5; 325 MG/1; MG/1
1 TABLET ORAL 2 TIMES DAILY PRN
Qty: 45 TABLET | Refills: 0 | Status: SHIPPED | OUTPATIENT
Start: 2019-08-13 | End: 2019-10-07

## 2019-08-13 RX ORDER — HYDROCODONE BITARTRATE AND ACETAMINOPHEN 5; 325 MG/1; MG/1
1 TABLET ORAL 2 TIMES DAILY PRN
Qty: 30 TABLET | Refills: 0 | Status: SHIPPED | OUTPATIENT
Start: 2019-08-13 | End: 2019-08-13

## 2019-08-13 RX ORDER — ALBUTEROL SULFATE 90 UG/1
1-2 AEROSOL, METERED RESPIRATORY (INHALATION) EVERY 4 HOURS PRN
Qty: 6.7 G | Refills: 1 | Status: SHIPPED | OUTPATIENT
Start: 2019-08-13 | End: 2019-09-10

## 2019-08-13 RX ORDER — MORPHINE SULFATE 15 MG/1
15 TABLET, FILM COATED, EXTENDED RELEASE ORAL EVERY 12 HOURS
Qty: 60 TABLET | Refills: 0 | Status: SHIPPED | OUTPATIENT
Start: 2019-08-13 | End: 2019-10-07

## 2019-08-13 RX ORDER — LORATADINE 10 MG/1
10 TABLET ORAL DAILY
Qty: 30 TABLET | Refills: 0 | Status: SHIPPED | OUTPATIENT
Start: 2019-08-13 | End: 2019-10-22

## 2019-08-13 ASSESSMENT — PAIN SCALES - GENERAL: PAINLEVEL: SEVERE PAIN (7)

## 2019-08-13 NOTE — TELEPHONE ENCOUNTER
Prescription was sent to pharmacy earlier today.   Patient is informed of this.  Patient will follow up pharmacy.  Verbalized good understanding.   Dayanara Boyer RN

## 2019-08-13 NOTE — TELEPHONE ENCOUNTER
Reason for Call:  Medication or medication refill:    Do you use a [facility]?  Name of the pharmacy and phone number for the current request:  [facility]    Name of the medication requested: albuterol (PROAIR HFA/PROVENTIL HFA/VENTOLIN HFA) 108 (90 Base) MCG/ACT inhaler    Other request:     Can we leave a detailed message on this number? YES    Phone number patient can be reached at: Other phone number:  [phone]    Best Time: any    Call taken on 8/13/2019 at 4:52 PM by Merry Zazueta

## 2019-08-13 NOTE — PROGRESS NOTES
[facility]    CHIEF COMPLAINT: pain    INTERVAL HISTORY:  Last seen on 7/31/19.        Recommendations/plan at the last visit included:     1. Schedule pain psychology visit   2. Schedule follow-up with CARLOS A Higuera NP-C on Aug 12-13  3. Medication recommendations:             1. No change to opiate pain medication .   2. STOP clonazepam. Bring remaining tablets to next appt.   3. Bring all opiate pain medications to next appt.     Since last visit:   -Has been having significant increase in jaw and ear pain which he relates to history of TMJ.  He does wear an appliance at night.  He notes that his insurance will be changing September 1 and his current TMJ clinic at the [facility] does not accept medical assistance.    Pain Information:   Pain quality: Miserable and Throbbing    Pain rating: intensity ranges from 3/10 to 9/10, and averages 7/10 on a 0-10 scale.      Annual Controlled Substance Agreement/UDS due date: 4/2020     Current Pain Relevant Medications:  MS Contin 15 mg BID = 30 MME  Norco 5/325 mg 1 tab BID #45 tabs per month                        Total opiate dose: 35-40 MME daily  Clonazepam .5 mg 1-2 tab at HS PRN  Duloxetine 20 mg daily  Naproxen 500 mg BID: on hold re: elevated LFTs   Tizanidine 4 mg 1-2 tabs at HS PRN  Ambien 10 mg at HS  Adderall 30 mg daily, combination of long and short acting.       Previous Pain Relevant Medications: (H--helped; HI--Helped initially; SWH--Somewhat helpful; NH--No help; W--worse; SE--side effects; ?--Unsure if helpful)   NOTE: This medication information taken from patient's intake form, not medical records.                         Opiates: Tramadol: H, Hydrocodone: H, Morphine: H                        NSAIDS: Ibuprofen:H, naproxen:SWH, Relafen: NH                        Muscle Relaxants: Cyclobenzaprine:H,Med interaction, Tizanidine:H, Baclofen: SWH                        Anti-migraine mediations:  Prednisone:H                        Anti-depressants: Bupropion:SE, Celexa:SE, Duloxetine:H, Trazodone:Too strong, Venlafaxine:too strong, Amitriptyline:SE                         Sleep aids:Anbien: H                        Anxiolytics: Clonazepam:H                        Neuropathics: Tegretol:taken for seizures in childhood, Gabapentin:H                                          Topicals: Lidocaine:H                        Other medications not covered above: Tylenol:NH      Any illicit drug use: 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 Concern for multiple controlled substances including stimulants and opiates.  7/27/19: Concern for misuse of opiates and stimulants as noted in office visit on this date.     Medications:  Current Outpatient Medications   Medication Sig Dispense Refill     albuterol (PROAIR HFA/PROVENTIL HFA/VENTOLIN HFA) 108 (90 Base) MCG/ACT inhaler Inhale 1-2 puffs into the lungs every 4 hours as needed for shortness of breath / dyspnea or wheezing (cough or wheeze only if necessary) 6.7 g 1     amphetamine-dextroamphetamine (ADDERALL XR) 30 MG per 24 hr capsule Take 30 mg by mouth daily       clonazePAM (KLONOPIN) 0.5 MG tablet TK 1 T PO QD  1     DULoxetine (CYMBALTA) 60 MG EC capsule 2 x 60mg tab by mouth daily 30 capsule 1     gabapentin (NEURONTIN) 100 MG capsule Take 1 capsule (100 mg) by mouth 3 times daily 45 capsule 0     HYDROcodone-acetaminophen (NORCO) 5-325 MG tablet Take 1 tablet by mouth 2 times daily as needed for pain Okay to fill on 9/9/19 45 tablet 0     loratadine (CLARITIN) 10 MG tablet Take 1 tablet (10 mg) by mouth daily 30 tablet 0     methocarbamol (ROBAXIN) 500 MG tablet  Take 1-2 tablets (500-1,000 mg) by mouth 4 times daily as needed for muscle spasms 240 tablet 1     morphine (MS CONTIN) 15 MG CR tablet Take 1 tablet (15 mg) by mouth every 12 hours Ok at fill on 9/9/19 60 tablet 0     naloxone (NARCAN) 4 MG/0.1ML nasal spray Spray 1 spray (4 mg) into one nostril alternating nostrils as needed for opioid reversal every 2-3 minutes until assistance arrives 0.2 mL 0     naproxen (NAPROSYN) 500 MG tablet Take 1 tablet (500 mg) by mouth 2 times daily (with meals) 40 tablet 3     oxybutynin ER (DITROPAN-XL) 10 MG 24 hr tablet TK 1 T PO D  3     oxybutynin ER (DITROPAN-XL) 5 MG 24 hr tablet Take 2 tablets (10 mg) by mouth daily 180 tablet 3     SF 5000 PLUS 1.1 % CREA   2     hydrOXYzine (ATARAX) 25 MG tablet Take 1 tablet (25 mg) by mouth nightly as needed (at HS PRN) (Patient not taking: Reported on 7/15/2019) 45 tablet 3       Review of Systems: A 10-point review of systems was negative, with the exception of chronic pain issues.      Social History: Reviewed; unchanged from previous consultation.      Family history: Reviewed; unchanged from previous consultation.     PHYSICAL EXAM    Vitals:    08/13/19 1014   BP: 123/86   Pulse: 89   SpO2: 97%   Weight: 79.7 kg (175 lb 9.6 oz)       Constitutional: healthy, alert, no distress, pain behaviors and somewhat down r/t increased spastic movements   HEENT: Head atraumatic, normocephalic. Eyes without conjunctival injection or jaundice. Neck supple. No obvious neck masses.  Skin: No rash, lesions, or petechiae of exposed skin.   Extremities: No clubbing, cyanosis, or edema to exposed extremities  Psychiatric/mental status: Alert, without lethargy or stupor. Appropriate affect. Anxious regarding controlled medication misuse/destruction as noted above.        Neurologic exam:  CN:  Cranial nerves 2-12 are grossly normal.  Has uncontrolled upper body movement/tremor.       Musculoskeletal exam:  Cervical spine:                       Flex:  " 20 degrees                       Ext: 20 degrees                       Rotation to right: 20 degrees                       Rotation to left: 20 degrees                       Rotation/ext to right: painful                        Rotation/ext to left: painful                        Tenderness in the cervical spine at midline. No                       Tenderness in the cervical paraspinal muscles. No  Thoracic spine:                        Kyphosis. No                       Tenderness in the thoracic spine at midline. Yes                       Tenderness in the thoracic paraspinal muscles. Yes  Lumbar/Sacral spine:                       Forward Flexion:  90 degrees                       Ext: 20 degrees \"tight\"                       Rotation/ext to right: painful                        Rotation/ext to left: painful                        Lordosis. No                       Tenderness in the lumbar spine at midline. Yes                       Tenderness in the lumbar paraspinal muscles.Yes      Psychiatric:  mentation appears normal., affect and mood normal      DIRE Score for ongoing opioid management is calculated as follows:    Diagnosis = 2 pts (slowly progressive; moderate pain/objective findings)    Intractability = 2 pts (most treatments tried; patient not fully engaged/barriers)    Risk        Psych = 2 pts (personality dysfunction/mental illness that moderately interferes with care)         Chem Hlth = 2 pts (use of medications to cope with stress; chemical dependency in remission)       Reliability = 3 pts (highly reliable with meds, appointments, treatments)       Social = 3 pts (supportive family/close relationships; involved in work/school; no isolation)       (Psych + Chem hlth + Reliability + Social) = 14    Efficacy = 2 pts (moderate benefit/function; low med dose; too early/not tried meds)    DIRE Score = 16        7-13: likely NOT suitable candidate for long-term opioid analgesia       14-21: may be a " suitable candidate for long-term opioid analgesia          Previous Diagnostic Tests:   Imaging Studies:   MR LUMBAR SPINE W/O CONTRAST 5/2/2018 7:27 PM  History: DDD (degenerative disc disease), lumbar; Lumbar  radiculopathy; Hip pain, right; Groin pain, right.  ICD-10: DDD (degenerative disc disease), lumbar; Lumbar radiculopathy;  Hip pain, right; Groin pain, right  Comparison: Lumbar spine MRI 3/8/2017  Technique: Sagittal STIR, T1-weighted turbo spin-echo, 3-D volumetric  T2-weighted and axial reconstructed T2-weighted images of the lumbar  spine were obtained without intravenous contrast.   Findings: 5 lumbar-type vertebra. The tip of the conus medullaris is  at L1.  The lumbar vertebral column is normally aligned.   Straightening of lumbar lordosis, unchanged. The intervertebral disc  heights are maintained. Normal marrow signal. At L5-S1, there is a  disc bulge and facet hypertrophy with mild left neural foraminal  narrowing, unchanged. No spinal canal stenosis.  Impression:  1. Lumbar spondylosis with mild left neural foraminal narrowing at  L5-S1.  2. No spinal canal stenosis.      MR right hip without contrast 5/2/2018 6:47 PM  Techniques: Multiplanar multisequence imaging of the right hip was  obtained without  administration of intra-articular or intravenous  contrast using routing protocol.  History: Hip pain.  Comparison: None available.  Findings:  Osseous structures  Osseous structures: No fracture, stress reaction, avascular necrosis,  or focal osseous lesion is seen. There is small focal area of  subchondral cystic changes in the anterior right femoral head neck  junction, most compatible with synovial herniation pit. Findings  suggestive of reduced femoral head neck junction though alpha angle  cannot be assessed owing to no dedicated oblique axial sequence  obtained.  Articular cartilage and labrum  Assessment limited on this arthrographic study due to relative lack of  joint  distension.  Articular cartilage: No high grade chondral loss.  Labrum: Labral tearing approximately from 12:00 to 3:00 with 3:00  being anterior and 6:00 being the center of transverse ligament in  this convention with associated subtle area of subchondral edema in  the superior acetabulum.  Ligament teres and transverse ligament of acetabulum: Intact.  Joint or bursal effusion  Joint effusion: A physiologic amount of joint fluid.  Bursal effusion: Minimal nonspecific edema over the greater  trochanter. No substantial iliopsoas or trochanteric bursal effusion.  Muscles and tendons  Muscles and tendons: The rectus femoris, the visualized portion  iliopsoas, proximal hamstrings, and hip abductors are intact.  The  visualized adductor muscles are unremarkable.   Nerves:  The visualized course of the sciatic nerve is unremarkable.   Other Findings:  There is fat-containing right inguinal hernia.  Impression:  1. Approximately 12-3 o'clock labral tearing with synovial herniation  pit and likely reduced femoral head neck junction offset. 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  2. Fat containing right inguinal hernia.          ASSESSMENT:   1.  Lumbar DDD, mild  2.  Lumbar muscle spasm  3.  Labral tear, right hip  4.  Hx: anxiety, chemical dependence in remission.  5. Functional neurologic movement disorder    Shoaib returns for medication management appointment.  Medication options including buprenorphine discussed.  When he is having difficulty not using clonazepam as this is what helps his TMJ pain the most.  He is going to try and get in with his TMJ specialist before his insurance changes at the first of the month.  I have asked that he please speak with him about other options for pain control.  He has not had Botox injections for TMJ in the past and could discuss that however with the insurance change that may not be time to get that approved.  I have referred him  to [place] [facility] for follow-up of TMJ to see if they see patients with medical assistance.  We will keep his medication regimen as is for now.  He says that he has Narcan readily available in his room and that his house supervisor and 1 of his roommates is aware of where this is stored.  He notes fairly significant ear pain along with the TMJ pain.  I checked both years and he does have fluid behind both eardrums although no signs of an infection.  I have ordered Claritin for him to take once daily to see if this helps with the pain and pressure.  If this is beneficial he should order from his primary care provider in the future.    Tobacco use: Discussed at this visit.    Plan:    Diagnosis reviewed, treatment option addressed, and risk/benifits discussed.  Self-care instructions given.  I am recommending a multidisciplinary treatment plan to help this patient better manage pain.        1. Pain psychology visit as scheduled   2. Schedule follow-up with CARLOS A Higuera NP-C on 9/9/19  3. Medication recommendations:   1. Refill of Norco provided today.   2. Refills of Norco and MS Contin to be filled on 9/9/19 sent to [facility].   3. Claritin (Antihistamine) sent to your pharmacy. Take 1 tab daily.     A total of 30 minutes was spent on the patient today, greater than 50% of that time was spent on face to face counseling and care coordination regarding diagnoses and treatment options as mentioned above including the multidisciplinary pain management program and the benefits of physical therapy, pain psychology and medication options.        CARLOS A Bass, NP-C   [facility]    Disclaimer: This note consists of symbols derived from keyboarding, dictation and/or voice recognition software. As a result, there may be errors in the script that have gone undetected. Please consider this when interpreting information found in this chart.

## 2019-08-13 NOTE — TELEPHONE ENCOUNTER
To provider to advise  Patient prescribed Albuterol in urgent care 4/18 for cough/wheezing    Pilar BORGESN, RN, CPN

## 2019-08-13 NOTE — PATIENT INSTRUCTIONS
After Visit Instructions:     Thank you for coming to [facility] for your care. It is my goal to partner with you to help you reach your optimal state of health.     Continue daily self-care, identifying contributing factors, and monitoring variations in pain level. Continue to integrate self-care into your life.      1. Pain psychology visit as scheduled   2. Schedule follow-up with CARLOS A Higuera NP-C on 9/9/19  3. Medication recommendations:   1. Refill of Norco provided today.   2. Refills of Norco and MS Contin to be filled on 9/9/19 sent to [facility].   3. Claritin (Antihistamine) sent to your pharmacy. Take 1 tab daily.       CARLOS A Bass NP-C  [facility]    Clinic Number:  [phone]     Call with any questions about your care and for scheduling assistance.     Calls are returned Monday through Friday between 8 AM and 4:30 PM. We usually get back to you within 2 business days depending on the issue/request.    If we are prescribing your medications:    For opioid medication refills, call the clinic or send a [email or web address] message 7 days in advance.  Please include:    Name of requested medication    Name of the pharmacy.    For non-opioid medications, call your pharmacy directly to request a refill. Please allow 3-4 days to be processed.     Per [place] State Law:    All controlled substance prescriptions must be filled within 30 days of being written.      For those controlled substances allowing refills, pickup must occur within 30 days of last fill.      We believe regular attendance is key to your success in our program!      Any time you are unable to keep your appointment we ask that you call us at least 24 hours in advance to cancel.This will allow us to offer the appointment time to another patient.   Multiple missed appointments may lead to dismissal from the clinic.

## 2019-08-14 ENCOUNTER — OFFICE VISIT (OUTPATIENT)
Dept: PODIATRY | Facility: CLINIC | Age: 34
End: 2019-08-14
Attending: FAMILY MEDICINE
Payer: COMMERCIAL

## 2019-08-14 ENCOUNTER — ANCILLARY PROCEDURE (OUTPATIENT)
Dept: GENERAL RADIOLOGY | Facility: CLINIC | Age: 34
End: 2019-08-14
Attending: PODIATRIST
Payer: COMMERCIAL

## 2019-08-14 DIAGNOSIS — M79.671 PAIN IN BOTH FEET: ICD-10-CM

## 2019-08-14 DIAGNOSIS — M79.671 PAIN IN BOTH FEET: Primary | ICD-10-CM

## 2019-08-14 DIAGNOSIS — R25.9 ABNORMAL INVOLUNTARY MOVEMENT: Chronic | ICD-10-CM

## 2019-08-14 DIAGNOSIS — M79.672 PAIN IN BOTH FEET: Primary | ICD-10-CM

## 2019-08-14 DIAGNOSIS — M79.672 PAIN IN BOTH FEET: ICD-10-CM

## 2019-08-14 PROCEDURE — 73630 X-RAY EXAM OF FOOT: CPT | Mod: RT | Performed by: RADIOLOGY

## 2019-08-14 PROCEDURE — 99203 OFFICE O/P NEW LOW 30 MIN: CPT | Performed by: PODIATRIST

## 2019-08-14 ASSESSMENT — ENCOUNTER SYMPTOMS
RECTAL PAIN: 0
SINUS CONGESTION: 1
CHILLS: 0
VOMITING: 0
HEMATURIA: 0
SINUS PAIN: 1
DYSURIA: 1
TASTE DISTURBANCE: 0
CONSTIPATION: 1
TROUBLE SWALLOWING: 0
NUMBNESS: 1
NECK PAIN: 1
NIGHT SWEATS: 0
DIFFICULTY URINATING: 1
DIZZINESS: 1
SEIZURES: 1
STIFFNESS: 1
MEMORY LOSS: 1
SMELL DISTURBANCE: 0
BACK PAIN: 1
FATIGUE: 1
HEARTBURN: 1
INCREASED ENERGY: 1
DISTURBANCES IN COORDINATION: 1
BLOATING: 0
TINGLING: 0
HOARSE VOICE: 1
BLOOD IN STOOL: 0
LOSS OF CONSCIOUSNESS: 0
NECK MASS: 0
PARALYSIS: 0
ABDOMINAL PAIN: 0
JOINT SWELLING: 1
HALLUCINATIONS: 1
HEADACHES: 1
NAUSEA: 0
DECREASED APPETITE: 1
POLYDIPSIA: 1
WEAKNESS: 1
WEIGHT GAIN: 0
TREMORS: 1
ALTERED TEMPERATURE REGULATION: 0
FLANK PAIN: 0
MYALGIAS: 1
JAUNDICE: 0
MUSCLE WEAKNESS: 1
DIARRHEA: 1
WEIGHT LOSS: 0
ARTHRALGIAS: 1
BOWEL INCONTINENCE: 0
SPEECH CHANGE: 1
SORE THROAT: 1
MUSCLE CRAMPS: 1
POLYPHAGIA: 0
FEVER: 0

## 2019-08-14 NOTE — LETTER
2019         RE: Hoang Kiser  [address]  [facility] [address]        Dear Colleague,    Thank you for referring your patient, Hoang Kiser, to the [facility]. Please see a copy of my visit note below.    Date of Service: 2019    Chief Complaint   Patient presents with     Right Foot - Pain     Left Foot - Pain          HPI: Hoang is a 34 year old male who presents today for further evaluation of BL foot pain.  Nature: dull    Location: BL heels    Duration: months    Onset: no inciting trauma. Started after his gait became more unsteady with increasing severity of his movement disorder.    Course: Worsening    Aggravating/alleviating factors: None.     Previous Treatments: None.   He would like josefa know if there is anything that he should be doing in order to keep his feet healthy.       Review of Systems: No n/v/d/f/c/ns/sob/co    PMH:   Past Medical History:   Diagnosis Date     Arthritis 2018     Benign positional vertigo 2016    Started after my groin/back injury. Sitting on Toilet.     Depressive disorder     I am on 120mg of Duoluxetine.     Dysphonia     Nothing significant.     Gastroesophageal reflux disease 2004    Occassional. Spicy foods set it off.     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      DATE OF EE2019.    SOURCE FILE DURATION:  15 minutes.  This EEG did not have a video.    PATIENT INFORMATION:  The patient is a 33-year-old male with irregular movements.  It is not entirely clear the etiology of these movements.  EEG is being done to evaluate for seizures.    MEDICATIONS:  Adderall, doxepin, Cymbalta, Robaxin.      History of MRI of brain and brain stem 2015    MR BRAIN W/O & W CONTRAST, 2015  Impression: No suspicious intracranial findings.      Hoarseness 2017     Dry mouth, started after taking Tizanidine     Neuralgia, neuritis, and radiculitis, unspecified 04/30/2012     normal emg 2017 8/8/2017    Interpretation: This is a normal study. There is no electrophysiologic evidence of a lumbosacral radiculopathy affecting the right or left lower extremity on the basis of this study.    Rodney Mena MD Department of Neurology       Panic attack 12/6/2016     Seizures (H) 1986    Grew out of it. Absence Seizures. [id number]     Tinnitus 2015    Had it most of my life. Got worse in 2015       PSxH:   Past Surgical History:   Procedure Laterality Date     COLONOSCOPY  02/15/18    Has not happened yet.     COLONOSCOPY N/A 2/15/2018    Procedure: COMBINED COLONOSCOPY, SINGLE OR MULTIPLE BIOPSY/POLYPECTOMY BY BIOPSY;;  Surgeon: Liam Kincaid MD;  Location: [facility] OR     COLONOSCOPY WITH CO2 INSUFFLATION N/A 2/15/2018    Procedure: COLONOSCOPY WITH CO2 INSUFFLATION;  COLON-FAMILY HX OF COLON CANCER/ SYPURA;  Surgeon: Liam Kincaid MD;  Location: [facility] OR     HC TOOTH EXTRACTION W/FORCEP Bilateral 2003     PE TUBES  1990       Allergies: Buspirone hcl; Doxycycline; Elavil [amitriptyline]; Trazodone; Buspirone; and Keflex [cephalexin]    SH:   Social History     Socioeconomic History     Marital status: Single     Spouse name: Not on file     Number of children: 0     Years of education: 14     Highest education level: Not on file   Occupational History     Occupation: Assembly/Packaging   Social Needs     Financial resource strain: Not on file     Food insecurity:     Worry: Not on file     Inability: Not on file     Transportation needs:     Medical: Not on file     Non-medical: Not on file   Tobacco Use     Smoking status: Current Every Day Smoker     Packs/day: 0.50     Years: 10.00     Pack years: 5.00     Types: Cigarettes     Start date: 1/1/2002     Smokeless tobacco: Never Used     Tobacco comment: Transdermal patches have helped me the most in the past   Substance  and Sexual Activity     Alcohol use: No     Alcohol/week: 1.2 - 2.4 oz     Comment: none since 2013     Drug use: No     Comment: hx of meth, none since 2015      Sexual activity: Yes     Partners: Female     Birth control/protection: Condom, Pill     Comment: Not currently active (last 4 months)   Lifestyle     Physical activity:     Days per week: Not on file     Minutes per session: Not on file     Stress: Not on file   Relationships     Social connections:     Talks on phone: Not on file     Gets together: Not on file     Attends Christianity service: Not on file     Active member of club or organization: Not on file     Attends meetings of clubs or organizations: Not on file     Relationship status: Not on file     Intimate partner violence:     Fear of current or ex partner: Not on file     Emotionally abused: Not on file     Physically abused: Not on file     Forced sexual activity: Not on file   Other Topics Concern     Parent/sibling w/ CABG, MI or angioplasty before 65F 55M? No      Service Not Asked     Blood Transfusions Not Asked     Caffeine Concern Yes     Comment: Coffee, Engergy Drinks, 3 cups daily     Occupational Exposure Not Asked     Hobby Hazards Not Asked     Sleep Concern Not Asked     Stress Concern Not Asked     Weight Concern Not Asked     Special Diet Not Asked     Back Care Not Asked     Exercise Not Asked     Bike Helmet Not Asked     Seat Belt Not Asked     Self-Exams Not Asked   Social History Narrative    He lives in [facility] in a [facility] treatment [facility] - there are 11 people there. He arrives today through [employer] ride    He has 3 brothers and 3 sisters. He has not children. He has never been .      Mother and father are alive    One sister is an alcoholic and bulemic    depression is present in the family : mother, sister and 2 brothers are bipolar.      He denies bipolar. He has depression.    He denies recurring thoughts. He has had substance abuse  issues. He has had cravings in the past.    He exercises and plays guitar and draws.      He has used meth as well as prescription pain killers.    He has used marijuana when young. Used cocaine in the past.    Has not used iv drugs    He does not drink alcohol.      50% Eritrean and is Andorran, english and Portuguese and a bit native.    He smokes cigarettes - 1 pack per day.        single. lives in [place]. estela is father       FH:   Family History   Problem Relation Age of Onset     Lipids Father         hyperlipidemia     Hyperlipidemia Father      Obesity Father      Arthritis Mother      Hyperlipidemia Mother      Depression Mother      Anxiety Disorder Mother      Mental Illness Mother      Obesity Mother      Asthma Brother      Asthma Sister      Depression Other      Hearing Loss Other      Psychotic Disorder Other      Obesity Other      Cerebrovascular Disease Paternal Grandfather      Alzheimer Disease Paternal Grandfather      Depression Brother      Mental Illness Brother         Bipolar     Asthma Brother      Depression Sister      Asthma Sister      Substance Abuse Sister         Alcohol     Mental Illness Brother         Bipolar     Asthma Brother      Asthma Sister      Obesity Sister      Asthma Brother      Obesity Brother      Obesity Maternal Grandmother      Genetic Disorder Maternal Grandmother         Epilepsy     Obesity Paternal Grandmother      Colon Cancer Other      Genetic Disorder Other         Cerebral Palsy     Genetic Disorder Maternal Grandfather         Multiple Sclerosis     Genetic Disorder Other         Epilepsy       Objective:  Data Unavailable Data Unavailable Data Unavailable Data Unavailable Data Unavailable 0 lbs 0 oz    PT and DP pulses are 2/4 bilaterally. CRT is instant. Robust pedal hair.   Gross sensation is intact bilaterally. Protective sensation is intact as demonstrated with a 5.07G monofilament. +clonus. Normal ankle and knee reflexes.   Equinus is not noted  bilaterally. No pain with active or passive ROM of the ankle, MTJ, 1st ray, or halluces bilaterally,. No pain along the courses of the PT, peroneal, or Achilles tendons BL. When walking, he has an irregular gait with variable foot strike pattern.   Nails normal bilaterally. No open lesions are noted. Hyperkeratoses to the distal 2nd digits.     bilateral foot xrays indicated in 6 weightbearing views.    No fractures. Normal alignment. No soft tissue abnormality.      Assessment: Abnormal movement disorder and gait abnormality.    Plan:  - Pt seen and evaluated  - XRs taken and discussed with him.   - Because of the spacticity of his gait, I would be hesitant to put him in a rigid orthotic. I think it will lock his foot in too much and cause significant pain.   - No structural abnormality noted.   - Cont with neuro regarding movement disorder.  - See again PRN.          Again, thank you for allowing me to participate in the care of your patient.        Sincerely,        Bandar Reedre DPM

## 2019-08-14 NOTE — PATIENT INSTRUCTIONS
Thanks for coming today.  [facility]  [address] [place] [address]    To schedule future appointments in Ortho Clinic, you may call [phone].    To schedule ordered imaging by your provider:   Call Central Imaging [phone]    To schedule an injection ordered by your provider:  Call Central Imaging Injection scheduling line: [phone]  [facility] available online at:  [email or web address]    Please call if any further questions or concerns ([phone]).  Clinic hours 8 am to 5 pm.    Return to clinic (call) if symptoms worsen or fail to improve.

## 2019-08-14 NOTE — NURSING NOTE
Hoang Kiser's chief complaint for this visit includes:  Chief Complaint   Patient presents with     Right Foot - Pain     Left Foot - Pain     PCP: Phill Floyd    Referring Provider:  Phill Floyd MD  [address] [place] [address]    There were no vitals taken for this visit.  Data Unavailable     Do you need any medication refills at today's visit? No    Stefany Adkins LPN

## 2019-08-15 ENCOUNTER — TELEPHONE (OUTPATIENT)
Dept: FAMILY MEDICINE | Facility: CLINIC | Age: 34
End: 2019-08-15

## 2019-08-15 NOTE — TELEPHONE ENCOUNTER
"[facility]  [facility]: [place]        Social Work Care Navigator Note      Patient: Hoang Kiser  Date: August 15, 2019  Preferred Name: Shoaib    Previous PHQ-9:   PHQ-9 SCORE 2/14/2018 10/1/2018 3/22/2019   PHQ-9 Total Score - - -   PHQ-9 Total Score 7 13 11     Previous JIN-7:   JIN-7 SCORE 8/14/2018 10/1/2018 3/22/2019   Total Score - - -   Total Score 15 13 12     MOLLY LEVEL:  MOLLY Score (Last Two) 3/23/2016 10/1/2018   MLOLY Raw Score 52 31   Activation Score 100 59.3   MOLLY Level 4 3       Preferred Contact:  Need for : No  Preferred Contact: Cell      Type of Contact Today: Phone call (patient / identified key support person reached)      Data: (subjective / Objective):  Recent ED/IP Admission or Discharge?   None    Patient Goals:  Goal Areas: Health;Mental Health;Chemical Health;Financial and Social Service Benefits  Patient stated goals: Patient stated that he would like to find a Neurologist that he works well with; Patient would like to continue to work on self-advocacy skills; Patient would eventually like to find part-time work when health conditions are better managed; Patient stated he would like to continue growing his skills with coping and stress management; Patient would like to continue going to NA groups through [facility] to ensure his sobriety continues as it has for the last 3 1/2 years        [facility] Core Service Provided:  Health and Wellness Promotion    Current Stressors / Issues / Care Plan Objective Addressed Today:  [facility] contacted patient for monthly [facility] follow up. Patient stated that he is doing well from an emotional perspective. He discussed the ending of a relationship he was in, but reports that he has been doing a lot of \"self-reflection\" and it has been helping him get through it. patient stated that his CADI waiver will start September 1st, which also means that his insurance will change from [employer] to [employer]. He stated that his current " dental provider does not accept straight MA and he will need to find a new provider.  will mail out a list of dental clinics that accept MA.  also notified patient that she did not receive any paperwork from his  yet. Patient stated that he will follow up with him and see what he needs. Fax number provided in the event it needs to be faxed. No further questions or concerns.    [facility] sent MA dental clinics to patient in the mail.    Intervention:  Motivational Interviewing: Expressed Empathy/Understanding, Supported Autonomy, Collaboration, Evocation and Open-ended questions   Target Behavior(s): Explored current social supports and reinforced opportunities to increase engagement    Assessment: (Progress on Goals / Homework):   reviewed health action plan goals. See Objectives for more details. Progress: Needs improvement. Next Steps:  will continue to work with patient to support patient in achieving their goals.    Plan: (Homework, other):  Patient was encouraged to continue to seek condition-related information and education.      Scheduled a Phone follow up appointment with BROWN  in 4 weeks     Patient has set self-identified goals and will monitor progress until the next appointment on: TBD.     Andrez Brian, Social Work Care Coordinator               Next 5 appointments (look out 90 days)    Aug 21, 2019  3:00 PM CDT  Return Visit with Sol Kemp  [facility] ([facility]) [address]  [address]  [facility] [address] [phone]   Sep 09, 2019 10:30 AM CDT  Return Visit with CARLOS A Guy CNP  [facility] ([facility]) [address] [place] [address] [address]  [facility] [address] [phone]

## 2019-08-16 NOTE — PROGRESS NOTES
Answers for HPI/ROS submitted by the patient on 8/14/2019   General Symptoms: Yes  Skin Symptoms: No  EYE SYMPTOMS: No  HEART SYMPTOMS: No  LUNG SYMPTOMS: No  INTESTINAL SYMPTOMS: Yes  URINARY SYMPTOMS: Yes  REPRODUCTIVE SYMPTOMS: YesAnswers for HPI/ROS submitted by the patient on 8/14/2019   General Symptoms: Yes  Skin Symptoms: No  EYE SYMPTOMS: No  HEART SYMPTOMS: No  LUNG SYMPTOMS: No  INTESTINAL SYMPTOMS: Yes  URINARY SYMPTOMS: Yes  REPRODUCTIVE SYMPTOMS: Yes  SKELETAL SYMPTOMS: Yes  BLOOD SYMPTOMS: No  NERVOUS SYSTEM SYMPTOMS: Yes  MENTAL HEALTH SYMPTOMS: No  Fever: No  Loss of appetite: Yes  Weight loss: No  Weight gain: No  Fatigue: Yes  Night sweats: No  Chills: No  Increased stress: Yes  Excessive hunger: No  Excessive thirst: Yes  Feeling hot or cold when others believe the temperature is normal: No  Loss of height: No  Post-operative complications: No  Surgical site pain: Yes  Hallucinations: Yes  Change in or Loss of Energy: Yes  Hyperactivity: No  Confusion: Yes  Heart burn or indigestion: Yes  Nausea: No  Vomiting: No  Abdominal pain: No  Bloating: No  Constipation: Yes  Diarrhea: Yes  Blood in stool: No  Black stools: No  Rectal or Anal pain: No  Fecal incontinence: No  Yellowing of skin or eyes: No  Vomit with blood: No  Change in stools: No  Trouble holding urine or incontinence: Yes  Pain or burning: Yes  Trouble starting or stopping: No  Increased frequency of urination: No  Blood in urine: No  Decreased frequency of urination: No  Frequent nighttime urination: No  Flank pain: No  Difficulty emptying bladder: Yes  Back pain: Yes  Muscle aches: Yes  Neck pain: Yes  Swollen joints: Yes  Joint pain: Yes  Bone pain: Yes  Muscle cramps: Yes  Muscle weakness: Yes  Joint stiffness: Yes  Bone fracture: No  Trouble with coordination: Yes  Dizziness or trouble with balance: Yes  Fainting or black-out spells: No  Memory loss: Yes  Headache: Yes  Seizures: Yes  Speech problems: Yes  Tingling: No  Tremor:  Yes  Weakness: Yes  Difficulty walking: Yes  Paralysis: No  Numbness: Yes  Scrotal pain or swelling: No  Erectile dysfunction: Yes  Penile discharge: No  Genital ulcers: No  Reduced libido: Yes  Ear pain: Yes  Ear discharge: No  Hearing loss: No  Tinnitus: Yes  Nosebleeds: No  Congestion: Yes  Sinus pain: Yes  Trouble swallowing: No   Voice hoarseness: Yes  Mouth sores: No  Sore throat: Yes  Tooth pain: Yes  Gum tenderness: Yes  Bleeding gums: No  Change in taste: No  Change in sense of smell: No  Dry mouth: Yes  Hearing aid used: No  Neck lump: No    Conflicting answers have been found for some questions. Please document the patient's answers manually.   SKELETAL SYMPTOMS: Yes  BLOOD SYMPTOMS: No  NERVOUS SYSTEM SYMPTOMS: YesAnswers for HPI/ROS submitted by the patient on 8/14/2019   General Symptoms: Yes  Skin Symptoms: No  EYE SYMPTOMS: No  HEART SYMPTOMS: No  LUNG SYMPTOMS: No  INTESTINAL SYMPTOMS: Yes  URINARY SYMPTOMS: Yes  REPRODUCTIVE SYMPTOMS: Yes  SKELETAL SYMPTOMS: Yes  BLOOD SYMPTOMS: No  NERVOUS SYSTEM SYMPTOMS: Yes  MENTAL HEALTH SYMPTOMS: No  Fever: No  Loss of appetite: Yes  Weight loss: No  Weight gain: No  Fatigue: Yes  Night sweats: No  Chills: No  Increased stress: Yes  Excessive hunger: No  Excessive thirst: Yes  Feeling hot or cold when others believe the temperature is normal: No  Loss of height: No  Post-operative complications: No  Surgical site pain: Yes  Hallucinations: Yes  Change in or Loss of Energy: Yes  Hyperactivity: No  Confusion: Yes  Heart burn or indigestion: Yes  Nausea: No  Vomiting: No  Abdominal pain: No  Bloating: No  Constipation: Yes  Diarrhea: Yes  Blood in stool: No  Black stools: No  Rectal or Anal pain: No  Fecal incontinence: No  Yellowing of skin or eyes: No  Vomit with blood: No  Change in stools: No  Trouble holding urine or incontinence: Yes  Pain or burning: Yes  Trouble starting or stopping: No  Increased frequency of urination: No  Blood in urine: No  Decreased  frequency of urination: No  Frequent nighttime urination: No  Flank pain: No  Difficulty emptying bladder: Yes  Back pain: Yes  Muscle aches: Yes  Neck pain: Yes  Swollen joints: Yes  Joint pain: Yes  Bone pain: Yes  Muscle cramps: Yes  Muscle weakness: Yes  Joint stiffness: Yes  Bone fracture: No  Trouble with coordination: Yes  Dizziness or trouble with balance: Yes  Fainting or black-out spells: No  Memory loss: Yes  Headache: Yes  Seizures: Yes  Speech problems: Yes  Tingling: No  Tremor: Yes  Weakness: Yes  Difficulty walking: Yes  Paralysis: No  Numbness: Yes  Scrotal pain or swelling: No  Erectile dysfunction: Yes  Penile discharge: No  Genital ulcers: No  Reduced libido: Yes  Ear pain: Yes  Ear discharge: No  Hearing loss: No  Tinnitus: Yes  Nosebleeds: No  Congestion: Yes  Sinus pain: Yes  Trouble swallowing: No   Voice hoarseness: Yes  Mouth sores: No  Sore throat: Yes  Tooth pain: Yes  Gum tenderness: Yes  Bleeding gums: No  Change in taste: No  Change in sense of smell: No  Dry mouth: Yes  Hearing aid used: No  Neck lump: No    Conflicting answers have been found for some questions. Please document the patient's answers manually.   MENTAL HEALTH SYMPTOMS: No  Fever: No  Loss of appetite: Yes  Weight loss: No  Weight gain: No  Fatigue: Yes  Night sweats: No  Chills: No  Increased stress: Yes  Excessive hunger: No  Excessive thirst: Yes  Feeling hot or cold when others believe the temperature is normal: No  Loss of height: No  Post-operative complications: No  Surgical site pain: Yes  Hallucinations: Yes  Change in or Loss of Energy: Yes  Hyperactivity: No  Confusion: Yes  Heart burn or indigestion: Yes  Nausea: No  Vomiting: No  Abdominal pain: No  Bloating: No  Constipation: Yes  Diarrhea: Yes  Blood in stool: No  Black stools: No  Rectal or Anal pain: No  Fecal incontinence: No  Yellowing of skin or eyes: No  Vomit with blood: No  Change in stools: No  Trouble holding urine or incontinence: Yes  Pain or  burning: Yes  Trouble starting or stopping: No  Increased frequency of urination: No  Blood in urine: No  Decreased frequency of urination: No  Frequent nighttime urination: No  Flank pain: No  Difficulty emptying bladder: Yes  Back pain: Yes  Muscle aches: Yes  Neck pain: Yes  Swollen joints: Yes  Joint pain: Yes  Bone pain: Yes  Muscle cramps: Yes  Muscle weakness: Yes  Joint stiffness: Yes  Bone fracture: No  Trouble with coordination: Yes  Dizziness or trouble with balance: Yes  Fainting or black-out spells: No  Memory loss: Yes  Headache: Yes  Seizures: Yes  Speech problems: Yes  Tingling: No  Tremor: Yes  Weakness: Yes  Difficulty walking: Yes  Paralysis: No  Numbness: Yes  Scrotal pain or swelling: No  Erectile dysfunction: Yes  Penile discharge: No  Genital ulcers: No  Reduced libido: Yes  Ear pain: Yes  Ear discharge: No  Hearing loss: No  Tinnitus: Yes  Nosebleeds: No  Congestion: Yes  Sinus pain: Yes  Trouble swallowing: No   Voice hoarseness: Yes  Mouth sores: No  Sore throat: Yes  Tooth pain: Yes  Gum tenderness: Yes  Bleeding gums: No  Change in taste: No  Change in sense of smell: No  Dry mouth: Yes  Hearing aid used: No  Neck lump: No    Diagnosis/Summary/Recommendations:    PATIENT: Hoang Kiser  34 year old male     : 1985    HARSHA: 2019    SUMMARY OF VIDEO-EEG MONITORING:    The interictal EEG recording was normal in waking, drowsiness and stage II sleep.  No pathological slowing, no interictal epileptiform abnormalities and no electrographic seizures were recorded.    The patient had a single paroxysmal behavioral event arising from slow wave sleep, with predominantly asynchronous generalized writhing and other postural adjustments with shaking and grimacing.  He was responsive to voice during this time, which was not associated with electrographic seizure activity.    These findings could be consistent with various psychogenic disorders, with simple partial seizures  associated with hypermotor activities, and with other diagnoses.  Clinical correlation is recommended.     James Panda M.D., Professor of Neurology       He had seen Ade Arnold for therapy [facility] in [place] with Graciela Arnold.   Dr Phill Floyd is his pcp  Tamiko Stevens (?CRISTY Sanchezxgarry)  he is seeing at the [facility]  Brynn Trujillo has been doing the injections and ablations.   Brian Duckworth is his psychiatrist.   Seeing Fili Layne  Seeing Sol Kemp for psychotherapy.  1 in September. 2 in October and 1 in November at the [facility]  Will be Dr. ANIL Watts in psychiatry  Kiana Kirkland - for regular therapy [facility]    neuropsych evaluation done by Dr. Saleh 3/2019    Current results indicate performance that falls within normal limits across cognitive domains, including learning and memory, executive functioning, attention, visual processing, and language abilities. Personality assessment is suggestive of somatization, raising the question of somatic delusions, as well as depressed mood and anxiety.     This pattern of performance does not reflect dementia at this time, and is not abnormal. As noted, personality assessment is suggestive of somatization, including a tendency to magnify somatic complaints and to experience increases in physical symptoms during times of stress. There is likely a psychological component to his somatic complaints, and he reports unusual thought and perceptual processes. 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 This does not preclude the possibility of underlying neurologic involvement, but neurocognitive findings are not suggestive of focal or lateralized cerebral involvement. He reportedly has a strong mental health support system in place, including a psychiatrist, psychotherapist, and an [employer] worker.     In terms of daily functioning, Mr. Kiser is not experiencing  cognitive difficulties might interfere with his ability to actively participate in treatment or to manage his instrumental activities of daily living independently. He is bothered by a subjective sense of cognitive decline, including difficulty with memory, word finding, and attention, although performance falls entirely within normal limits. Given the subjective sense of cognitive changes, he may benefit from the use of written reminders or checklists. When working on tasks, he may find it helpful to work in environments a relatively free from noises or other interruptions. Results may serve as a baseline of his neurocognitive functioning, and the evaluation may be repeated in the future for comparison should a change in mental status occur.         Medications     8am 12pm afternoon 11pm   Albuterol proair hfa/proventil hfa/ventolin hfa 108 (90 base) mcg/act inhaler As needed          Amphetamine-dextroamphetamine adderall XR 30mg per 24 hr capsule 1         Clonazepam klonopin 0.5mg       1   Doxepin sinequan 10mg       stopped   Duloxetine cymbalta 60mg EC capsule 1 x 60mg         Fluticasone flonase 50mcg/act nasal spray As needed      Gabapentin neurontin 100mg  1    2   Hydrocodone-acetaminophen norco 5-325 As needed         Hydroxyzine atarax 25mg       As needed   Loratadine claritin 10mg 1      Methocarbamol robaxin 500mg 1       2   Methylphenidate concerta 36mg Not taking          Morphine MS CONTIN 15mg CR tablet 1   1   Naloxone narcan nasal spray  as needed         Naproxen naprosyn 500mg As needed       Oxybutynin ditropan XL  10mg 24 hr tablet 1         SF 5000 plus 1.1 % cream           Tizanidine zanaflex 4mg As needed        History obtained from patient    PLAN  See details above.   Getting [facility] Intake for [facility] Program  Encouraged him to simplify and reduce his medication regimen.   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   Next  generation - negative.  He may wean off gabapentin and zanaflex slowly.     Return to be determined. Possibly 6 months.      Coding statement:   Duration of  Services: patient care and care coordination was 25 minutes  Greater than 50% of this visit was spent in counseling and coordination of care.     Bryant Cao MD     ______________________________________    Last visit date and details:     HARSHA: February 14, 2019     He is having movement attacks.   He has had scary things that have happened to him.   He has lost strength from the waist down and falling  He ha had         Medications     8am 12pm 11pm   Amitryptyline elavil 10mg Not taking       Amphetamine-dextroamphetamine adderall XR 30mg per 24 hr capsule taking       Amphetamine-dextroamphetamine adderall XR 20mg per 24 hr capsule Not taking       Amphetamine-dextroamphetamine adderall XR 10mg per 24 hr capsule Not taking       Baclofen lioresal 10mg Not taking       Clonazepam klonopin 0.5mg     1   Cyclobenzaprine flexeril 10mg Not taking       Cyclobenzaprine flexeril 5mg Not taking       Diphenhydramine benadryl 25mg Not taking       Doxepin sinequan 10mg     As needed   Doxepin sinequan 25mg Not taking       Duloxetine cymbalta 20mg EC capsule Not taking       Duloxetine cymbalta 60mg EC capsule 2 x 60mg       Flurbiprofen ansaid 100mg Not taking       Gabapentin neurontin 100mg Not taking       Hydrocodone-acetaminophen norco 5-325 Not tkaing       Hydroxyzine atarax 25mg     As needed   Ibuprofen advil/motrin 600mg Not taking       Methylphenidate concerta 54mg Not taking       Methylphenidate concerta 36mg Not taking       Naloxone narcan nasal spray  as needed       Naproxen naprosyn 500mg  not using       Nicotine nicoderm cq 14mg/24 hr 24 hr patch Not using       Nicotine nicoderm cq 7mg/24 hr 24 hr patch Not using       Orphenadrine norflex 100mg 12 hr tablet Not using       Oxybutynin ditropan XL 5mg 24 hr tablet 1       Tizanidine zanaflex 4mg  Not using.       Tramadol ultram 50mg Not using       Zolpidem ambien 10mg Not using          History obtained from patient         Present medication list     He finished his t [facility]rapy [facility] in [place] with Graciela Arnold.   He had 10 visits with Graciela Floyd is his pcp  [facility] Stevens [facility]  K Karyn Duckworth is his psychiatrist.         Medications     8am 12pm afternoon 11pm   Amphetamine-dextroamphetamine adderall XR 30mg per 24 hr capsule 1         Clonazepam klonopin 0.5mg       1   Doxepin sinequan 10mg       As needed   Duloxetine cymbalta 60mg EC capsule 2 x 60mg         Hydrocodone-acetaminophen norco 5-325 As needed         Hydroxyzine atarax 25mg       As needed   Naloxone narcan nasal spray  as needed         Naproxen naprosyn 500mg 1   1     Oxybutynin ditropan XL 5mg 24 hr tablet 1            Coding statement:   Duration of  Services: patient care and care coordination was 25 minutes  Greater than 50% of this visit was spent in counseling and coordination of care.     SUMMARY/PLAN  1. He has seen Aarti Mcgraw in the past and had been to [facility] for therapy  And continues to have movement issues  2. He has not had a neuropsychological evaluation at the . He had a MMPI when he was a teenager but not recently. He has been diagnosed with ADHD - inattentive.  3. 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   4. Consider visit with rios moore to look at his family history; unlikely he has myoclonus dystonia.   5. Blood work for lactic acid and pyruvate.   6. Return as needed.                 ______________________________________      Patient was asked about 14 Review of systems including changes in vision (dry eyes, double vision), hearing, heart, lungs, musculoskeletal, depression, anxiety, snoring, RBD, insomnia, urinary frequency, urinary urgency, constipation, swallowing problems,  hematological, ID, allergies, skin problems: seborrhea, endocrinological: thyroid, diabetes, cholesterol; balance, weight changes, and other neurological problems and these were not significant at this time except for   Patient Active Problem List   Diagnosis     Neuropathy     Moderate major depression (H)     Tobacco abuse     Attention deficit hyperactivity disorder (ADHD), predominantly inattentive type     Primary insomnia     Panic disorder without agoraphobia     Abnormal involuntary movement     Tic disorder     Anxiety     Posttraumatic stress disorder with dissociative symptoms     Chronic left hip pain     Chronic pain of right hip     Degenerative disc disease at L5-S1 level     Functional movement disorder     Family history of Crohn's disease     Chronic low back pain     Chronic pain syndrome     History of substance abuse     Family history of multiple myeloma     History of electroencephalogram     Nonallergic rhinitis          Allergies   Allergen Reactions     Buspirone Hcl Other (See Comments)     Panic attacks     Doxycycline Diarrhea, Fatigue, GI Disturbance and Nausea     Elavil [Amitriptyline]      Ineffective in reducing spasms/movement, increased fatigue     Trazodone Fatigue     Buspirone Anxiety     Keflex [Cephalexin] Diarrhea     Past Surgical History:   Procedure Laterality Date     COLONOSCOPY  02/15/18    Has not happened yet.     COLONOSCOPY N/A 2/15/2018    Procedure: COMBINED COLONOSCOPY, SINGLE OR MULTIPLE BIOPSY/POLYPECTOMY BY BIOPSY;;  Surgeon: Liam Kincaid MD;  Location: [facility] OR     COLONOSCOPY WITH CO2 INSUFFLATION N/A 2/15/2018    Procedure: COLONOSCOPY WITH CO2 INSUFFLATION;  COLON-FAMILY HX OF COLON CANCER/ SYPURA;  Surgeon: Liam Kincaid MD;  Location: [facility] OR     HC TOOTH EXTRACTION W/FORCEP Bilateral 2003     PE TUBES  1990     Past Medical History:   Diagnosis Date     Arthritis 2/27/2018     Benign positional vertigo 12/2016    Started after my  groin/back injury. Sitting on Toilet.     Depressive disorder     I am on 120mg of Duoluxetine.     Dysphonia     Nothing significant.     Gastroesophageal reflux disease 2004    Occassional. Spicy foods set it off.     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      DATE OF EE2019.    SOURCE FILE DURATION:  15 minutes.  This EEG did not have a video.    PATIENT INFORMATION:  The patient is a 33-year-old male with irregular movements.  It is not entirely clear the etiology of these movements.  EEG is being done to evaluate for seizures.    MEDICATIONS:  Adderall, doxepin, Cymbalta, Robaxin.      History of MRI of brain and brain stem 2015    MR BRAIN W/O & W CONTRAST, 2015  Impression: No suspicious intracranial findings.      Hoarseness     Dry mouth, started after taking Tizanidine     Neuralgia, neuritis, and radiculitis, unspecified 2012     normal emg 2017 2017    Interpretation: This is a normal study. There is no electrophysiologic evidence of a lumbosacral radiculopathy affecting the right or left lower extremity on the basis of this study.    Rodney Mena MD Department of Neurology       Panic attack 2016     Seizures (H)     Grew out of it. Absence Seizures. [id number]     Tinnitus 2015    Had it most of my life. Got worse in 2015     Social History     Socioeconomic History     Marital status: Single     Spouse name: Not on file     Number of children: 0     Years of education: 14     Highest education level: Not on file   Occupational History     Occupation: Assembly/Packaging   Social Needs     Financial resource strain: Not on file     Food insecurity:     Worry: Not on file     Inability: Not on file     Transportation needs:     Medical: Not on file     Non-medical: Not on file   Tobacco Use     Smoking status: Current  Every Day Smoker     Packs/day: 0.50     Years: 10.00     Pack years: 5.00     Types: Cigarettes     Start date: 1/1/2002     Smokeless tobacco: Never Used     Tobacco comment: Transdermal patches have helped me the most in the past   Substance and Sexual Activity     Alcohol use: No     Alcohol/week: 1.2 - 2.4 oz     Comment: none since 2013     Drug use: No     Comment: hx of meth, none since 2015      Sexual activity: Yes     Partners: Female     Birth control/protection: Condom, Pill     Comment: Not currently active (last 4 months)   Lifestyle     Physical activity:     Days per week: Not on file     Minutes per session: Not on file     Stress: Not on file   Relationships     Social connections:     Talks on phone: Not on file     Gets together: Not on file     Attends Confucianist service: Not on file     Active member of club or organization: Not on file     Attends meetings of clubs or organizations: Not on file     Relationship status: Not on file     Intimate partner violence:     Fear of current or ex partner: Not on file     Emotionally abused: Not on file     Physically abused: Not on file     Forced sexual activity: Not on file   Other Topics Concern     Parent/sibling w/ CABG, MI or angioplasty before 65F 55M? No      Service Not Asked     Blood Transfusions Not Asked     Caffeine Concern Yes     Comment: Coffee, Engergy Drinks, 3 cups daily     Occupational Exposure Not Asked     Hobby Hazards Not Asked     Sleep Concern Not Asked     Stress Concern Not Asked     Weight Concern Not Asked     Special Diet Not Asked     Back Care Not Asked     Exercise Not Asked     Bike Helmet Not Asked     Seat Belt Not Asked     Self-Exams Not Asked   Social History Narrative    He lives in [facility] [facility] in a [facility] - there are 11 people there. He arrives today through [employer]    He has 3 brothers and 3 sisters. He has not children. He has never been .      Mother and  father are alive    One sister is an alcoholic and bulemic    depression is present in the family : mother, sister and 2 brothers are bipolar.      He denies bipolar. He has depression.    He denies recurring thoughts. He has had substance abuse issues. He has had cravings in the past.    He exercises and plays guitar and draws.      He has used meth as well as prescription pain killers.    He has used marijuana when young. Used cocaine in the past.    Has not used iv drugs    He does not drink alcohol.      50% Israeli and is Swedish, english and french and a bit native.    He smokes cigarettes - 1 pack per day.        single. lives in [place]. estela is father       Drug and lactation database from the [place] [employer] of [employer]:  http://toxnet.nlm.nih.gov/cgi-bin/sis/htmlgen?LACT      B/P: Data Unavailable, T: Data Unavailable, P: Data Unavailable, R: Data Unavailable 0 lbs 0 oz  There were no vitals taken for this visit., There is no height or weight on file to calculate BMI.  Medications and Vitals not listed above were documented in the cart and reviewed by me.     Current Outpatient Medications   Medication Sig Dispense Refill     albuterol (PROAIR HFA/PROVENTIL HFA/VENTOLIN HFA) 108 (90 Base) MCG/ACT inhaler Inhale 1-2 puffs into the lungs every 4 hours as needed for shortness of breath / dyspnea or wheezing (cough or wheeze only if necessary) 6.7 g 1     amphetamine-dextroamphetamine (ADDERALL XR) 30 MG per 24 hr capsule Take 30 mg by mouth daily       clonazePAM (KLONOPIN) 0.5 MG tablet TK 1 T PO QD  1     DULoxetine (CYMBALTA) 60 MG EC capsule 2 x 60mg tab by mouth daily 30 capsule 1     gabapentin (NEURONTIN) 100 MG capsule Take 1 capsule (100 mg) by mouth 3 times daily 45 capsule 0     HYDROcodone-acetaminophen (NORCO) 5-325 MG tablet Take 1 tablet by mouth 2 times daily as needed for pain Okay to fill on 9/9/19 45 tablet 0     hydrOXYzine (ATARAX) 25 MG tablet Take 1 tablet (25 mg) by  mouth nightly as needed (at HS PRN) (Patient not taking: Reported on 7/15/2019) 45 tablet 3     loratadine (CLARITIN) 10 MG tablet Take 1 tablet (10 mg) by mouth daily 30 tablet 0     methocarbamol (ROBAXIN) 500 MG tablet Take 1-2 tablets (500-1,000 mg) by mouth 4 times daily as needed for muscle spasms 240 tablet 1     morphine (MS CONTIN) 15 MG CR tablet Take 1 tablet (15 mg) by mouth every 12 hours Ok at fill on 9/9/19 60 tablet 0     naloxone (NARCAN) 4 MG/0.1ML nasal spray Spray 1 spray (4 mg) into one nostril alternating nostrils as needed for opioid reversal every 2-3 minutes until assistance arrives 0.2 mL 0     naproxen (NAPROSYN) 500 MG tablet Take 1 tablet (500 mg) by mouth 2 times daily (with meals) 40 tablet 3     oxybutynin ER (DITROPAN-XL) 10 MG 24 hr tablet TK 1 T PO D  3     oxybutynin ER (DITROPAN-XL) 5 MG 24 hr tablet Take 2 tablets (10 mg) by mouth daily 180 tablet 3     SF 5000 PLUS 1.1 % CREA   2         Bryant Cao MD      Conflicting answers have been found for some questions. Please document the patient's answers manually. -

## 2019-08-20 ENCOUNTER — OFFICE VISIT (OUTPATIENT)
Dept: NEUROLOGY | Facility: CLINIC | Age: 34
End: 2019-08-20
Payer: COMMERCIAL

## 2019-08-20 VITALS
HEIGHT: 67 IN | OXYGEN SATURATION: 96 % | DIASTOLIC BLOOD PRESSURE: 77 MMHG | BODY MASS INDEX: 27.44 KG/M2 | WEIGHT: 174.8 LBS | RESPIRATION RATE: 16 BRPM | HEART RATE: 77 BPM | TEMPERATURE: 98.1 F | SYSTOLIC BLOOD PRESSURE: 119 MMHG

## 2019-08-20 DIAGNOSIS — R25.9 ABNORMAL INVOLUNTARY MOVEMENT: Primary | ICD-10-CM

## 2019-08-20 RX ORDER — FLUTICASONE PROPIONATE 50 MCG
1 SPRAY, SUSPENSION (ML) NASAL DAILY PRN
Refills: 0 | COMMUNITY
Start: 2019-05-07 | End: 2019-10-22

## 2019-08-20 RX ORDER — DEXTROAMPHETAMINE SACCHARATE, AMPHETAMINE ASPARTATE MONOHYDRATE, DEXTROAMPHETAMINE SULFATE AND AMPHETAMINE SULFATE 7.5; 7.5; 7.5; 7.5 MG/1; MG/1; MG/1; MG/1
CAPSULE, EXTENDED RELEASE ORAL
Refills: 0 | COMMUNITY
Start: 2019-08-05 | End: 2020-04-14

## 2019-08-20 RX ORDER — OXYBUTYNIN CHLORIDE 5 MG/1
TABLET, EXTENDED RELEASE ORAL
Refills: 1 | COMMUNITY
Start: 2019-03-26 | End: 2019-08-20

## 2019-08-20 ASSESSMENT — MIFFLIN-ST. JEOR: SCORE: 1691.52

## 2019-08-20 ASSESSMENT — PAIN SCALES - GENERAL: PAINLEVEL: MODERATE PAIN (5)

## 2019-08-20 NOTE — LETTER
2019       RE: Hoang Kiser  [address]  [facility] [address]     Dear Colleague,    Thank you for referring your patient, Hoang Kiser, to the [facility] NEUROLOGY at [facility]. Please see a copy of my visit note below.      Diagnosis/Summary/Recommendations:    PATIENT: Hoang Kiser  34 year old male     : 1985    HARSHA: 2019    SUMMARY OF VIDEO-EEG MONITORING:    The interictal EEG recording was normal in waking, drowsiness and stage II sleep.  No pathological slowing, no interictal epileptiform abnormalities and no electrographic seizures were recorded.    The patient had a single paroxysmal behavioral event arising from slow wave sleep, with predominantly asynchronous generalized writhing and other postural adjustments with shaking and grimacing.  He was responsive to voice during this time, which was not associated with electrographic seizure activity.    These findings could be consistent with various psychogenic disorders, with simple partial seizures associated with hypermotor activities, and with other diagnoses.  Clinical correlation is recommended.     James Panda M.D., Professor of Neurology       He had seen Ade Arnold for therapy [facility] in [place] with Graciela Arnold.   Dr Phill Floyd is his pcp  Tamiko Stevens (?CRISTY Boss)  he is seeing at the [facility]  Brynn Trujillo has been doing the injections and ablations.   Brian Duckworth is his psychiatrist.   Seeing Fili Layne  Seeing Sol Kemp for psychotherapy.  1 in September. 2 in October and 1 in November at the [place]  Will be Dr. ANIL Watts in psychiatry  K[facility] Kirkland - [facility]    neuropsych evaluation done by Dr. Saleh 3/2019    Current results indicate performance that falls within normal limits across cognitive domains, including learning and memory, executive functioning,  attention, visual processing, and language abilities. Personality assessment is suggestive of somatization, raising the question of somatic delusions, as well as depressed mood and anxiety.     This pattern of performance does not reflect dementia at this time, and is not abnormal. As noted, personality assessment is suggestive of somatization, including a tendency to magnify somatic complaints and to experience increases in physical symptoms during times of stress. There is likely a psychological component to his somatic complaints, and he reports unusual thought and perceptual processes. 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 This does not preclude the possibility of underlying neurologic involvement, but neurocognitive findings are not suggestive of focal or lateralized cerebral involvement. He reportedly has a strong mental health support system in place, including a psychiatrist, psychotherapist, and an [employer] worker.     In terms of daily functioning, Mr. Kiser is not experiencing cognitive difficulties might interfere with his ability to actively participate in treatment or to manage his instrumental activities of daily living independently. He is bothered by a subjective sense of cognitive decline, including difficulty with memory, word finding, and attention, although performance falls entirely within normal limits. Given the subjective sense of cognitive changes, he may benefit from the use of written reminders or checklists. When working on tasks, he may find it helpful to work in environments a relatively free from noises or other interruptions. Results may serve as a baseline of his neurocognitive functioning, and the evaluation may be repeated in the future for comparison should a change in mental status occur.         Medications     8am 12pm afternoon 11pm   Albuterol proair hfa/proventil hfa/ventolin hfa 108 (90 base) mcg/act inhaler As  needed          Amphetamine-dextroamphetamine adderall XR 30mg per 24 hr capsule 1         Clonazepam klonopin 0.5mg       1   Doxepin sinequan 10mg       stopped   Duloxetine cymbalta 60mg EC capsule 1 x 60mg         Fluticasone flonase 50mcg/act nasal spray As needed      Gabapentin neurontin 100mg  1    2   Hydrocodone-acetaminophen norco 5-325 As needed         Hydroxyzine atarax 25mg       As needed   Loratadine claritin 10mg 1      Methocarbamol robaxin 500mg 1       2   Methylphenidate concerta 36mg Not taking          Morphine MS CONTIN 15mg CR tablet 1   1   Naloxone narcan nasal spray  as needed         Naproxen naprosyn 500mg As needed       Oxybutynin ditropan XL  10mg 24 hr tablet 1         SF 5000 plus 1.1 % cream           Tizanidine zanaflex 4mg As needed        History obtained from patient    PLAN  See details above.   Getting [facility] Intake for BeST Program  Encouraged him to simplify and reduce his medication regimen.   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   Next generation - negative.  He may wean off gabapentin and zanaflex slowly.     Return to be determined. Possibly 6 months.      Coding statement:   Duration of  Services: patient care and care coordination was 25 minutes  Greater than 50% of this visit was spent in counseling and coordination of care.     Bryant Cao MD     ______________________________________    Last visit date and details:     HARSHA: February 14, 2019     He is having movement attacks.   He has had scary things that have happened to him.   He has lost strength from the waist down and falling  He ha had         Medications     8am 12pm 11pm   Amitryptyline elavil 10mg Not taking       Amphetamine-dextroamphetamine adderall XR 30mg per 24 hr capsule taking       Amphetamine-dextroamphetamine adderall XR 20mg per 24 hr capsule Not taking       Amphetamine-dextroamphetamine adderall XR 10mg per 24 hr capsule Not taking        Baclofen lioresal 10mg Not taking       Clonazepam klonopin 0.5mg     1   Cyclobenzaprine flexeril 10mg Not taking       Cyclobenzaprine flexeril 5mg Not taking       Diphenhydramine benadryl 25mg Not taking       Doxepin sinequan 10mg     As needed   Doxepin sinequan 25mg Not taking       Duloxetine cymbalta 20mg EC capsule Not taking       Duloxetine cymbalta 60mg EC capsule 2 x 60mg       Flurbiprofen ansaid 100mg Not taking       Gabapentin neurontin 100mg Not taking       Hydrocodone-acetaminophen norco 5-325 Not tkaing       Hydroxyzine atarax 25mg     As needed   Ibuprofen advil/motrin 600mg Not taking       Methylphenidate concerta 54mg Not taking       Methylphenidate concerta 36mg Not taking       Naloxone narcan nasal spray  as needed       Naproxen naprosyn 500mg  not using       Nicotine nicoderm cq 14mg/24 hr 24 hr patch Not using       Nicotine nicoderm cq 7mg/24 hr 24 hr patch Not using       Orphenadrine norflex 100mg 12 hr tablet Not using       Oxybutynin ditropan XL 5mg 24 hr tablet 1       Tizanidine zanaflex 4mg Not using.       Tramadol ultram 50mg Not using       Zolpidem ambien 10mg Not using          History obtained from patient         Present medication list     He finished his t herapy [facility] in [place] with Graciela Arnold.   He had 10 visits with Graciela Floyd is his pcp  [facility] Duck[facility] is his psychiatrist.         Medications     8am 12pm afternoon 11pm   Amphetamine-dextroamphetamine adderall XR 30mg per 24 hr capsule 1         Clonazepam klonopin 0.5mg       1   Doxepin sinequan 10mg       As needed   Duloxetine cymbalta 60mg EC capsule 2 x 60mg         Hydrocodone-acetaminophen norco 5-325 As needed         Hydroxyzine atarax 25mg       As needed   Naloxone narcan nasal spray  as needed         Naproxen naprosyn 500mg 1   1     Oxybutynin ditropan XL 5mg 24 hr tablet 1            Coding  statement:   Duration of  Services: patient care and care coordination was 25 minutes  Greater than 50% of this visit was spent in counseling and coordination of care.     SUMMARY/PLAN  1. He has seen Aarti Mcgraw in the past and had been to [facility] for therapy  And continues to have movement issues  2. He has not had a neuropsychological evaluation at the . He had a MMPI when he was a teenager but not recently. He has been diagnosed with ADHD - inattentive.  3. 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   4. Consider visit with rios moore to look at his family history; unlikely he has myoclonus dystonia.   5. Blood work for lactic acid and pyruvate.   6. Return as needed.                 ______________________________________      Patient was asked about 14 Review of systems including changes in vision (dry eyes, double vision), hearing, heart, lungs, musculoskeletal, depression, anxiety, snoring, RBD, insomnia, urinary frequency, urinary urgency, constipation, swallowing problems, hematological, ID, allergies, skin problems: seborrhea, endocrinological: thyroid, diabetes, cholesterol; balance, weight changes, and other neurological problems and these were not significant at this time except for   Patient Active Problem List   Diagnosis     Neuropathy     Moderate major depression (H)     Tobacco abuse     Attention deficit hyperactivity disorder (ADHD), predominantly inattentive type     Primary insomnia     Panic disorder without agoraphobia     Abnormal involuntary movement     Tic disorder     Anxiety     Posttraumatic stress disorder with dissociative symptoms     Chronic left hip pain     Chronic pain of right hip     Degenerative disc disease at L5-S1 level     Functional movement disorder     Family history of Crohn's disease     Chronic low back pain     Chronic pain syndrome     History of substance abuse     Family history of multiple myeloma      History of electroencephalogram     Nonallergic rhinitis          Allergies   Allergen Reactions     Buspirone Hcl Other (See Comments)     Panic attacks     Doxycycline Diarrhea, Fatigue, GI Disturbance and Nausea     Elavil [Amitriptyline]      Ineffective in reducing spasms/movement, increased fatigue     Trazodone Fatigue     Buspirone Anxiety     Keflex [Cephalexin] Diarrhea     Past Surgical History:   Procedure Laterality Date     COLONOSCOPY  02/15/18    Has not happened yet.     COLONOSCOPY N/A 2/15/2018    Procedure: COMBINED COLONOSCOPY, SINGLE OR MULTIPLE BIOPSY/POLYPECTOMY BY BIOPSY;;  Surgeon: Liam Kincaid MD;  Location: [facility] OR     COLONOSCOPY WITH CO2 INSUFFLATION N/A 2/15/2018    Procedure: COLONOSCOPY WITH CO2 INSUFFLATION;  COLON-FAMILY HX OF COLON CANCER/ SYPURA;  Surgeon: Liam Kincaid MD;  Location: [facility] OR     HC TOOTH EXTRACTION W/FORCEP Bilateral 2003     PE TUBES       Past Medical History:   Diagnosis Date     Arthritis 2018     Benign positional vertigo 2016    Started after my groin/back injury. Sitting on Toilet.     Depressive disorder     I am on 120mg of Duoluxetine.     Dysphonia     Nothing significant.     Gastroesophageal reflux disease 2004    Occassional. Spicy foods set it off.     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      DATE OF EE2019.    SOURCE FILE DURATION:  15 minutes.  This EEG did not have a video.    PATIENT INFORMATION:  The patient is a 33-year-old male with irregular movements.  It is not entirely clear the etiology of these movements.  EEG is being done to evaluate for seizures.    MEDICATIONS:  Adderall, doxepin, Cymbalta, Robaxin.      History of MRI of brain and brain stem 2015    MR BRAIN W/O & W CONTRAST, 2015  Impression: No suspicious intracranial findings.       Hoarseness 2017    Dry mouth, started after taking Tizanidine     Neuralgia, neuritis, and radiculitis, unspecified 04/30/2012     normal emg 2017 8/8/2017    Interpretation: This is a normal study. There is no electrophysiologic evidence of a lumbosacral radiculopathy affecting the right or left lower extremity on the basis of this study.    Rodney Mena MD Department of Neurology       Panic attack 12/6/2016     Seizures (H) 1986    Grew out of it. Absence Seizures. [id number]     Tinnitus 2015    Had it most of my life. Got worse in 2015     Social History     Socioeconomic History     Marital status: Single     Spouse name: Not on file     Number of children: 0     Years of education: 14     Highest education level: Not on file   Occupational History     Occupation: Assembly/Packaging   Social Needs     Financial resource strain: Not on file     Food insecurity:     Worry: Not on file     Inability: Not on file     Transportation needs:     Medical: Not on file     Non-medical: Not on file   Tobacco Use     Smoking status: Current Every Day Smoker     Packs/day: 0.50     Years: 10.00     Pack years: 5.00     Types: Cigarettes     Start date: 1/1/2002     Smokeless tobacco: Never Used     Tobacco comment: Transdermal patches have helped me the most in the past   Substance and Sexual Activity     Alcohol use: No     Alcohol/week: 1.2 - 2.4 oz     Comment: none since 2013     Drug use: No     Comment: hx of meth, none since 2015      Sexual activity: Yes     Partners: Female     Birth control/protection: Condom, Pill     Comment: Not currently active (last 4 months)   Lifestyle     Physical activity:     Days per week: Not on file     Minutes per session: Not on file     Stress: Not on file   Relationships     Social connections:     Talks on phone: Not on file     Gets together: Not on file     Attends Anglican service: Not on file     Active member of club or organization: Not on file     Attends meetings of  clubs or organizations: Not on file     Relationship status: Not on file     Intimate partner violence:     Fear of current or ex partner: Not on file     Emotionally abused: Not on file     Physically abused: Not on file     Forced sexual activity: Not on file   Other Topics Concern     Parent/sibling w/ CABG, MI or angioplasty before 65F 55M? No      Service Not Asked     Blood Transfusions Not Asked     Caffeine Concern Yes     Comment: Coffee, Engergy Drinks, 3 cups daily     Occupational Exposure Not Asked     Hobby Hazards Not Asked     Sleep Concern Not Asked     Stress Concern Not Asked     Weight Concern Not Asked     Special Diet Not Asked     Back Care Not Asked     Exercise Not Asked     Bike Helmet Not Asked     Seat Belt Not Asked     Self-Exams Not Asked   Social History Narrative    He lives in [facility] in a [facility] treatment [facility] - there are 11 people there. He arrives today through [employer] [address]    He has 3 brothers and 3 sisters. He has not children. He has never been .      Mother and father are alive    One sister is an alcoholic and bulemic    depression is present in the family : mother, sister and 2 brothers are bipolar.      He denies bipolar. He has depression.    He denies recurring thoughts. He has had substance abuse issues. He has had cravings in the past.    He exercises and plays guitar and draws.      He has used meth as well as prescription pain killers.    He has used marijuana when young. Used cocaine in the past.    Has not used iv drugs    He does not drink alcohol.      50% [place] and is South African, english and Georgian and a bit native.    He smokes cigarettes - 1 pack per day.        single. lives in [place]. estela is father       Drug and lactation database from the [place] National Library of Medicine:  http://toxnet.nlm.nih.gov/cgi-bin/sis/htmlgen?LACT      B/P: Data Unavailable, T: Data Unavailable, P: Data Unavailable, R: Data  Unavailable 0 lbs 0 oz  There were no vitals taken for this visit., There is no height or weight on file to calculate BMI.  Medications and Vitals not listed above were documented in the cart and reviewed by me.     Current Outpatient Medications   Medication Sig Dispense Refill     albuterol (PROAIR HFA/PROVENTIL HFA/VENTOLIN HFA) 108 (90 Base) MCG/ACT inhaler Inhale 1-2 puffs into the lungs every 4 hours as needed for shortness of breath / dyspnea or wheezing (cough or wheeze only if necessary) 6.7 g 1     amphetamine-dextroamphetamine (ADDERALL XR) 30 MG per 24 hr capsule Take 30 mg by mouth daily       clonazePAM (KLONOPIN) 0.5 MG tablet TK 1 T PO QD  1     DULoxetine (CYMBALTA) 60 MG EC capsule 2 x 60mg tab by mouth daily 30 capsule 1     gabapentin (NEURONTIN) 100 MG capsule Take 1 capsule (100 mg) by mouth 3 times daily 45 capsule 0     HYDROcodone-acetaminophen (NORCO) 5-325 MG tablet Take 1 tablet by mouth 2 times daily as needed for pain Okay to fill on 9/9/19 45 tablet 0     hydrOXYzine (ATARAX) 25 MG tablet Take 1 tablet (25 mg) by mouth nightly as needed (at HS PRN) (Patient not taking: Reported on 7/15/2019) 45 tablet 3     loratadine (CLARITIN) 10 MG tablet Take 1 tablet (10 mg) by mouth daily 30 tablet 0     methocarbamol (ROBAXIN) 500 MG tablet Take 1-2 tablets (500-1,000 mg) by mouth 4 times daily as needed for muscle spasms 240 tablet 1     morphine (MS CONTIN) 15 MG CR tablet Take 1 tablet (15 mg) by mouth every 12 hours Ok at fill on 9/9/19 60 tablet 0     naloxone (NARCAN) 4 MG/0.1ML nasal spray Spray 1 spray (4 mg) into one nostril alternating nostrils as needed for opioid reversal every 2-3 minutes until assistance arrives 0.2 mL 0     naproxen (NAPROSYN) 500 MG tablet Take 1 tablet (500 mg) by mouth 2 times daily (with meals) 40 tablet 3     oxybutynin ER (DITROPAN-XL) 10 MG 24 hr tablet TK 1 T PO D  3     oxybutynin ER (DITROPAN-XL) 5 MG 24 hr tablet Take 2 tablets (10 mg) by mouth daily  180 tablet 3     SF 5000 PLUS 1.1 % CREA   2         Bryant Cao MD      Conflicting answers have been found for some questions. Please document the patient's answers manually. -    Again, thank you for allowing me to participate in the care of your patient.      Sincerely,    Bryant Cao MD

## 2019-08-20 NOTE — PATIENT INSTRUCTIONS
Medications     8am 12pm afternoon 11pm   Albuterol proair hfa/proventil hfa/ventolin hfa 108 (90 base) mcg/act inhaler As needed          Amphetamine-dextroamphetamine adderall XR 30mg per 24 hr capsule 1         Clonazepam klonopin 0.5mg       1   Doxepin sinequan 10mg       stopped   Duloxetine cymbalta 60mg EC capsule 1 x 60mg         Fluticasone flonase 50mcg/act nasal spray As needed      Gabapentin neurontin 100mg  1    2   Hydrocodone-acetaminophen norco 5-325 As needed         Hydroxyzine atarax 25mg       As needed   Loratadine claritin 10mg 1      Methocarbamol robaxin 500mg 1       2   Methylphenidate concerta 36mg Not taking          Morphine MS CONTIN 15mg CR tablet 1   1   Naloxone narcan nasal spray  as needed         Naproxen naprosyn 500mg As needed       Oxybutynin ditropan XL  10mg 24 hr tablet 1         SF 5000 plus 1.1 % cream           Tizanidine zanaflex 4mg As needed        History obtained from patient    PLAN  See details above.   Getting [facility] Intake for BeST Program  Encouraged him to simplify and reduce his medication regimen.   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    Next generation - negative.  He may wean off gabapentin and zanaflex slowly.

## 2019-08-21 ENCOUNTER — OFFICE VISIT (OUTPATIENT)
Dept: PALLIATIVE MEDICINE | Facility: CLINIC | Age: 34
End: 2019-08-21
Payer: COMMERCIAL

## 2019-08-21 DIAGNOSIS — M47.816 FACET ARTHROPATHY, LUMBAR: Primary | ICD-10-CM

## 2019-08-21 DIAGNOSIS — G89.4 CHRONIC PAIN SYNDROME: ICD-10-CM

## 2019-08-21 DIAGNOSIS — G25.9 FUNCTIONAL MOVEMENT DISORDER: ICD-10-CM

## 2019-08-21 PROCEDURE — 96152 HC HEALTH AND BEHAVIOR INTERVENTION, INDIVIDUAL, EACH 15 MINUTES: CPT | Performed by: PSYCHOLOGIST

## 2019-08-22 NOTE — PROGRESS NOTES
"                                  [facility]   [facility]  Behavioral Medicine Visit    Patient Name: Hoang Kiser     YOB: 1985   Medical Record Number: [id number]  Date: 8/22/2019                SUBJECTIVE: Patient is here following a 3-month absence from sessions.  Patient shared that following an RFA in July he \"dissociated\" and woke up in the morning to discover 6-10 of each medication had been crushed.  Patient denies use, states that he is not worried about this other than the dissociative states that he experienced.  He reports that he has since put his medications in a lock box in his closet, however he has the key as does his housing comanager.  Patient reports feeling that he is \"under a microscope,\" however, he does not identify who he believes has him under a microscope.  Discussed that providers including myself of course are concerned about this incident.  He was encouraged to continue to focus on working on his sobriety even if he reports he has not had any urges and has not used his medications inappropriately.  Discussed the importance of continuing to show up for appointments with me despite urges to cancel.  Challenged his rather reticent, guarded and vague presentation.  He was able to admit this is related to shame about the incident.  Patient reports that his level of pain is mildly worse.  His mood is mildly improved which she attributes to a female resident whom he is attracted to despite the fact that she is not interested at this time from a relationship.  Reports his activity levels mildly increased.  His stress levels mildly worse.  Sleep is a same.  He engages in self-care for his pain 2-3 times per day.  He reports anxiety related to an upcoming hearing in October.  Discussed that given the recent issue with his medications that Suboxone might not be a bad option to discuss with CARLOS A Higuera, CNP; he " "responded by stating that \"she does not think that be a good idea with me hearing in October.\"      OBJECTIVE: Patient presents as vague and somewhat evasive.  It is clear that he has internalized shame related to this event with his medications, however presents as somewhat resentful about needing to work his program such as going through the 12 steps of AA again.  Will check in with CARLOS A Higuera CNP regarding her thoughts on Suboxone as an option, as it appeared this was discussed with patient and encouraged at his last visit with her.      Length of Visit: 60 minutes     Pain Diagnoses per pain provider:   Facet arthropathy lumbar, functional movement disorder, chronic pain syndrome     Assessment: Current Emotional / Mental Status    Appearance:   Appropriate   Eye Contact:   Poor  Psychomotor Behavior: Agitated  Normal  Restless   Attitude:   Cooperative  Guarded   Orientation:   All  Speech  Rate / Production:             Normal   Volume:              Soft   Mood:    Anxious  Irritable   Affect:    Flat  Restricted  Subdued   Thought Content:  Clear   Thought Form:  Coherent  Logical   Insight:    Fair     ASSESSMENT:   Progress toward goals: fair.    Pain Status: worsened    Emotional Status: improved              Medication / chemical use concerns: Patient reports that the end of July following RFA procedure, he will awoke to discover 6-10 of each of his medications was crushed under his bed.  He denies any use.  Does state that he believes he was in a \"dissociative state.\"  Patient further states he has put it behind him.    PLAN:   Next Appointment: Hoang Kiser will schedule a follow-up appointment in 2 week(s).  Assignment: Continue to focus on his sobriety.  Objectives / interventions for next session: Continue to discuss methods of self-care, working his program for his sobriety.    Sol Kemp PsyD LP  Outpatient Clinic Therapist  [facility]  "

## 2019-08-30 ENCOUNTER — OFFICE VISIT (OUTPATIENT)
Dept: SLEEP MEDICINE | Facility: CLINIC | Age: 34
End: 2019-08-30
Payer: COMMERCIAL

## 2019-08-30 VITALS
HEART RATE: 78 BPM | HEIGHT: 67 IN | DIASTOLIC BLOOD PRESSURE: 74 MMHG | WEIGHT: 177 LBS | BODY MASS INDEX: 27.78 KG/M2 | OXYGEN SATURATION: 95 % | SYSTOLIC BLOOD PRESSURE: 116 MMHG

## 2019-08-30 DIAGNOSIS — F51.04 CHRONIC INSOMNIA: Primary | ICD-10-CM

## 2019-08-30 PROCEDURE — 90832 PSYTX W PT 30 MINUTES: CPT | Performed by: PSYCHOLOGIST

## 2019-08-30 ASSESSMENT — MIFFLIN-ST. JEOR: SCORE: 1701.5

## 2019-08-30 NOTE — PATIENT INSTRUCTIONS
Continue with your goal of a 9 hour sleep window at 11 PM to 8 AM.  The most important focus of your work is to target a consistent wake time.    Continue to work with your pain physician about management of your morphine and clonazepam.

## 2019-08-30 NOTE — NURSING NOTE
"Chief Complaint   Patient presents with     RECHECK       Initial /74   Pulse 78   Ht 1.702 m (5' 7\")   Wt 80.3 kg (177 lb)   SpO2 95%   BMI 27.72 kg/m   Estimated body mass index is 27.72 kg/m  as calculated from the following:    Height as of this encounter: 1.702 m (5' 7\").    Weight as of this encounter: 80.3 kg (177 lb).    Medication Reconciliation: complete        "

## 2019-08-30 NOTE — PROGRESS NOTES
"SLEEP MEDICINE PROGRESS NOTE  Sleep Psychology    Patient Name: Hoang Kiser  MRN:  [id number]  Date of Service: Aug 30, 2019       Subjective Report     Hoang Kiser returns to the sleep clinic today to discuss progress in implementing behavioral strategies for the management of chronic insomnia.  Hoang reports completion of 1 month of sleep diaries.  Results indicate gradual improvement in sleep behavior with reduced sleep offset variability.  Patient has reduced wake time variability from 8 hours to approximately 3 hours.  He reports a better sleep consolidation.  Average wake time is 9:15 AM.  His goal wake time was 8 AM.  Reviewed strategies to help improve and reduce sleep offset variability.  Time to bed is approximately 11 PM on average.     .     Sleep Data:     Source of Data: Written sleep diaries    Sleep Latency: 30 min.  Times Awakened: 2-3  Wake Time After Sleep Onset: 75 min.  Total Sleep Time: 7 hours 45 min.  Sleep Efficiency: Approaching 80 %    Total score - [place]: 7 .    GARCIA Total Score: 15       Interventions     Strategies and recommendations including stimulus control, sleep hygiene were discussed today.       Vital Signs     /74   Pulse 78   Ht 1.702 m (5' 7\")   Wt 80.3 kg (177 lb)   SpO2 95%   BMI 27.72 kg/m       Mental Status     Appearance:  Well kept  Orientation:  X3  Mood:  better  Affect:  Congruent with mood  Speech/Language:  Normal  Thought Process: Intact  Associations:  Normal  Thought Content: Normal    Diagnostic Impressions and Plan       1. Chronic insomnia  Improvement in sleep consolidation, reduced sleep absent variability, reduced total time in bed.  Overall sleep hygiene and stimulus control continues to be suboptimal but improved.  Patient feels he has the knowledge to further work on his goal of maintaining a consistent sleep window between 11 PM-8 AM.        Follow-up:   as needed with patient stating he has the tools to work on behaviors " on his own but will contact or reach out by my chart or schedule appointment as needed    Time Spent: 24 minutes      Fili Layne PsyD, LP, DBSM  Diplomate, Behavioral Sleep Medicine  [facility] - [place] and [place]

## 2019-09-03 DIAGNOSIS — M79.18 CHRONIC MYOFASCIAL PAIN: ICD-10-CM

## 2019-09-03 DIAGNOSIS — G62.9 NEUROPATHY: ICD-10-CM

## 2019-09-03 DIAGNOSIS — G89.29 CHRONIC MYOFASCIAL PAIN: ICD-10-CM

## 2019-09-03 RX ORDER — NAPROXEN 500 MG/1
500 TABLET ORAL 2 TIMES DAILY WITH MEALS
Qty: 40 TABLET | Refills: 3 | Status: SHIPPED | OUTPATIENT
Start: 2019-09-03 | End: 2019-12-13

## 2019-09-03 NOTE — TELEPHONE ENCOUNTER
Received fax request from [facility]'s pharmacy requesting refill(s) for naproxen (NAPROSYN) 500 MG tablet    Last refilled on 08/14/19    Pt last seen on 08/13/19  Next appt scheduled for 09/09/19    Will facilitate refill.        Ana Pemberton    [facility]

## 2019-09-09 ENCOUNTER — OFFICE VISIT (OUTPATIENT)
Dept: PALLIATIVE MEDICINE | Facility: CLINIC | Age: 34
End: 2019-09-09
Payer: MEDICAID

## 2019-09-09 VITALS
BODY MASS INDEX: 27.72 KG/M2 | WEIGHT: 177 LBS | DIASTOLIC BLOOD PRESSURE: 76 MMHG | SYSTOLIC BLOOD PRESSURE: 129 MMHG | OXYGEN SATURATION: 100 % | HEART RATE: 67 BPM

## 2019-09-09 DIAGNOSIS — M25.551 HIP PAIN, RIGHT: ICD-10-CM

## 2019-09-09 PROCEDURE — 99214 OFFICE O/P EST MOD 30 MIN: CPT | Performed by: NURSE PRACTITIONER

## 2019-09-09 ASSESSMENT — PAIN SCALES - GENERAL: PAINLEVEL: SEVERE PAIN (6)

## 2019-09-09 NOTE — PROGRESS NOTES
[facility]    CHIEF COMPLAINT: neck, back pain, foot pain    INTERVAL HISTORY:  Last seen on 8/13/19.        Recommendations/plan at the last visit included:     1. Pain psychology visit as scheduled   2. Schedule follow-up with CARLOS A Higuera NP-C on 9/9/19  3. Medication recommendations:             1. Refill of Norco provided today.   2. Refills of Norco and MS Contin to be filled on 9/9/19 sent to [facility].   3. Claritin (Antihistamine) sent to your pharmacy. Take 1 tab daily.     Since last visit:   - 9/16/19 will be going to [place] for psychiatry and PM&R consultations.     Pain Information:   Pain quality: Exhausting and Penetrating    Pain rating: intensity ranges from 2/10 to 9/10, and averages 6/10 on a 0-10 scale.      Annual Controlled Substance Agreement/UDS due date: 4/2020     Current Pain Relevant Medications:  MS Contin 15 mg BID = 30 MME  Norco 5/325 mg 1 tab BID #45 tabs per month                        Total opiate dose: 35-40 MME daily  Clonazepam .5 mg 1-2 tab at HS PRN  Duloxetine 20 mg daily  Naproxen 500 mg BID: on hold re: elevated LFTs   Tizanidine 4 mg 1-2 tabs at HS PRN  Adderall 30 mg daily, combination of long and short acting.       Previous Pain Relevant Medications: (H--helped; HI--Helped initially; SWH--Somewhat helpful; NH--No help; W--worse; SE--side effects; ?--Unsure if helpful)   NOTE: This medication information taken from patient's intake form, not medical records.                         Opiates: Tramadol: H, Hydrocodone: H, Morphine: H                        NSAIDS: Ibuprofen:H, naproxen:SWH, Relafen: NH                        Muscle Relaxants: Cyclobenzaprine:H,Med interaction, Tizanidine:H, Baclofen: SWH                        Anti-migraine mediations: Prednisone:H                        Anti-depressants: Bupropion:SE, Celexa:SE, Duloxetine:H, Trazodone:Too strong, Venlafaxine:too strong, Amitriptyline:SE                         Sleep  aids:Anbien: H                        Anxiolytics: Clonazepam:H                        Neuropathics: Tegretol:taken for seizures in childhood, Gabapentin:H                                          Topicals: Lidocaine:H                        Other medications not covered above: Tylenol:NH      Any illicit drug use: 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 Concern for multiple controlled substances including stimulants and opiates.  7/27/19: Concern for misuse of opiates and stimulants as noted in office visit on this date.     Medications:  Current Outpatient Medications   Medication Sig Dispense Refill     amphetamine-dextroamphetamine (ADDERALL XR) 30 MG 24 hr capsule TK 1 C PO QD  0     clonazePAM (KLONOPIN) 0.5 MG tablet 0.5mg tab by mouth nightly as needed  1     DULoxetine (CYMBALTA) 60 MG EC capsule Taking only 1 x 60mg tab by mouth daily 30 capsule 1     HYDROcodone-acetaminophen (NORCO) 5-325 MG tablet Take 1 tablet by mouth 2 times daily as needed for pain Okay to fill on 9/9/19 45 tablet 0     hydrOXYzine (ATARAX) 25 MG tablet Take 1 tablet (25 mg) by mouth nightly as needed (at HS PRN) 45 tablet 3     loratadine (CLARITIN) 10 MG tablet Take 1 tablet (10 mg) by mouth daily 30 tablet 0     methocarbamol (ROBAXIN) 500 MG tablet Take 1-2 tablets (500-1,000 mg) by mouth 4 times daily as needed for muscle spasms (Patient taking differently: Take 500-1,000 mg by mouth 4 times daily as needed for muscle spasms 1 in the morning and 2 at night) 240 tablet 1     morphine (MS CONTIN) 15 MG CR tablet Take 1 tablet (15 mg) by mouth every 12 hours Ok at fill on 9/9/19 60 tablet 0     naloxone (NARCAN) 4 MG/0.1ML nasal  spray Spray 1 spray (4 mg) into one nostril alternating nostrils as needed for opioid reversal every 2-3 minutes until assistance arrives 0.2 mL 0     naproxen (NAPROSYN) 500 MG tablet Take 1 tablet (500 mg) by mouth 2 times daily (with meals) 40 tablet 3     oxybutynin ER (DITROPAN-XL) 10 MG 24 hr tablet TK 1 T PO D  3     SF 5000 PLUS 1.1 % CREA   2     tiZANidine (ZANAFLEX) 4 MG tablet Take 4 mg by mouth as needed   0     albuterol (PROAIR HFA/PROVENTIL HFA/VENTOLIN HFA) 108 (90 Base) MCG/ACT inhaler INHALE ONE TO TWO PUFFS INTO THE LUNGS EVERY FOUR HOURS AS NEEDED FOR SHORTNESS OF BREATH/ DYSPNEA OR WHEEZING 18 g 0     fluticasone (FLONASE) 50 MCG/ACT nasal spray Spray 1 spray into both nostrils daily as needed   0       Review of Systems: A 10-point review of systems was negative, with the exception of chronic pain issues.      Social History: Reviewed; unchanged from previous consultation.      Family history: Reviewed; unchanged from previous consultation.     PHYSICAL EXAM    Vitals:    09/09/19 1024   BP: 129/76   Pulse: 67   SpO2: 100%   Weight: 80.3 kg (177 lb)       Constitutional: healthy, alert, no distress, pain behaviors and somewhat down r/t increased spastic movements   HEENT: Head atraumatic, normocephalic. Eyes without conjunctival injection or jaundice. Neck supple. No obvious neck masses.  Skin: No rash, lesions, or petechiae of exposed skin.   Extremities: No clubbing, cyanosis, or edema to exposed extremities  Psychiatric/mental status: Alert, without lethargy or stupor. Appropriate affect. Anxious regarding controlled medication misuse/destruction as noted above.        Neurologic exam:  CN:  Cranial nerves 2-12 are grossly normal.  Has uncontrolled upper body movement/tremor.         Psychiatric:  mentation appears normal., affect and mood normal      DIRE Score for ongoing opioid management is calculated as follows:    Diagnosis = 2 pts (slowly progressive; moderate pain/objective  findings)    Intractability = 2 pts (most treatments tried; patient not fully engaged/barriers)    Risk        Psych = 2 pts (personality dysfunction/mental illness that moderately interferes with care)         Chem Hlth = 2 pts (use of medications to cope with stress; chemical dependency in remission)       Reliability = 3 pts (highly reliable with meds, appointments, treatments)       Social = 3 pts (supportive family/close relationships; involved in work/school; no isolation)       (Psych + Chem hlth + Reliability + Social) = 14    Efficacy = 2 pts (moderate benefit/function; low med dose; too early/not tried meds)    DIRE Score = 16        7-13: likely NOT suitable candidate for long-term opioid analgesia       14-21: may be a suitable candidate for long-term opioid analgesia          Previous Diagnostic Tests:   Imaging Studies:   MR LUMBAR SPINE W/O CONTRAST 5/2/2018 7:27 PM  History: DDD (degenerative disc disease), lumbar; Lumbar  radiculopathy; Hip pain, right; Groin pain, right.  ICD-10: DDD (degenerative disc disease), lumbar; Lumbar radiculopathy;  Hip pain, right; Groin pain, right  Comparison: Lumbar spine MRI 3/8/2017  Technique: Sagittal STIR, T1-weighted turbo spin-echo, 3-D volumetric  T2-weighted and axial reconstructed T2-weighted images of the lumbar  spine were obtained without intravenous contrast.   Findings: 5 lumbar-type vertebra. The tip of the conus medullaris is  at L1.  The lumbar vertebral column is normally aligned.   Straightening of lumbar lordosis, unchanged. The intervertebral disc  heights are maintained. Normal marrow signal. At L5-S1, there is a  disc bulge and facet hypertrophy with mild left neural foraminal  narrowing, unchanged. No spinal canal stenosis.  Impression:  1. Lumbar spondylosis with mild left neural foraminal narrowing at  L5-S1.  2. No spinal canal stenosis.      MR right hip without contrast 5/2/2018 6:47 PM  Techniques: Multiplanar multisequence imaging of  the right hip was  obtained without  administration of intra-articular or intravenous  contrast using routing protocol.  History: Hip pain.  Comparison: None available.  Findings:  Osseous structures  Osseous structures: No fracture, stress reaction, avascular necrosis,  or focal osseous lesion is seen. There is small focal area of  subchondral cystic changes in the anterior right femoral head neck  junction, most compatible with synovial herniation pit. Findings  suggestive of reduced femoral head neck junction though alpha angle  cannot be assessed owing to no dedicated oblique axial sequence  obtained.  Articular cartilage and labrum  Assessment limited on this arthrographic study due to relative lack of  joint distension.  Articular cartilage: No high grade chondral loss.  Labrum: Labral tearing approximately from 12:00 to 3:00 with 3:00  being anterior and 6:00 being the center of transverse ligament in  this convention with associated subtle area of subchondral edema in  the superior acetabulum.  Ligament teres and transverse ligament of acetabulum: Intact.  Joint or bursal effusion  Joint effusion: A physiologic amount of joint fluid.  Bursal effusion: Minimal nonspecific edema over the greater  trochanter. No substantial iliopsoas or trochanteric bursal effusion.  Muscles and tendons  Muscles and tendons: The rectus femoris, the visualized portion  iliopsoas, proximal hamstrings, and hip abductors are intact.  The  visualized adductor muscles are unremarkable.   Nerves:  The visualized course of the sciatic nerve is unremarkable.   Other Findings:  There is fat-containing right inguinal hernia.  Impression:  1. Approximately 12-3 o'clock labral tearing with synovial herniation  pit and likely reduced femoral head neck junction offset. 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  2. Fat containing right inguinal hernia.          ASSESSMENT:   1.  Lumbar DDD,  mild  2.  Lumbar muscle spasm  3.  Labral tear, right hip  4.  Hx: anxiety, chemical dependence in remission.  5. Functional neurologic movement disorder    Shoaib returns for medication management appt. Reviewed options for reducing changing opiate pain medications. He is very concerned about changing meds. He states that he feels like he finally has a regimen that is working to manage his pain. He mentions that he had concerns about pain psychologist asking about episode in July when he destroyed and possibly overused medications. I reiterated that we have strong concerns about his ability to manage his medications and we will continue to monitor very closely and question his medication use as we feel is appropriate for his safety. These are the only conditions under which I will prescribe for him and he is aware if this and his responsibility in this situation.       Tobacco use: advised to stop smoking     Plan:    Diagnosis reviewed, treatment option addressed, and risk/benifits discussed.  Self-care instructions given.  I am recommending a multidisciplinary treatment plan to help this patient better manage pain.        1. Schedule pain psychology visits   2. Schedule follow-up with CARLOS A Higuera NP-C in 4 weeks  3. Medication recommendations:   1. No change to current opiate pain medications.   2. Try reducing/eliminating Methocarbamol. If you do not notice any increase in pain, stop taking it.     A total of 30 minutes was spent on the patient today, greater than 50% of that time was spent on face to face counseling and care coordination regarding diagnoses and treatment options as mentioned above including the multidisciplinary pain management program and the benefits of physical therapy, pain psychology and medication options.        CARLOS A Bass, NP-C   [facility]    Disclaimer: This note consists of symbols derived from keyboarding, dictation and/or voice recognition  software. As a result, there may be errors in the script that have gone undetected. Please consider this when interpreting information found in this chart.

## 2019-09-09 NOTE — PATIENT INSTRUCTIONS
After Visit Instructions:     Thank you for coming to [facility] for your care. It is my goal to partner with you to help you reach your optimal state of health.     Continue daily self-care, identifying contributing factors, and monitoring variations in pain level. Continue to integrate self-care into your life.      1. Schedule pain psychology visits   2. Schedule follow-up with CARLOS A Higuera NP-C in 4 weeks  3. Medication recommendations:   1. No change to current opiate pain medications.   2. Try reducing/eliminating Methocarbamol. If you do not notice any increase in pain, stop taking it.       CARLOS A Bass NP-C  [facility]  [facility]    Clinic Number:  [phone]     Call with any questions about your care and for scheduling assistance.     Calls are returned Monday through Friday between 8 AM and 4:30 PM. We usually get back to you within 2 business days depending on the issue/request.    If we are prescribing your medications:    For opioid medication refills, call the clinic or send a Talkbits message 7 days in advance.  Please include:    Name of requested medication    Name of the pharmacy.    For non-opioid medications, call your pharmacy directly to request a refill. Please allow 3-4 days to be processed.     Per [place] State Law:    All controlled substance prescriptions must be filled within 30 days of being written.      For those controlled substances allowing refills, pickup must occur within 30 days of last fill.      We believe regular attendance is key to your success in our program!      Any time you are unable to keep your appointment we ask that you call us at least 24 hours in advance to cancel.This will allow us to offer the appointment time to another patient.   Multiple missed appointments may lead to dismissal from the clinic.

## 2019-09-10 ENCOUNTER — MYC MEDICAL ADVICE (OUTPATIENT)
Dept: PALLIATIVE MEDICINE | Facility: CLINIC | Age: 34
End: 2019-09-10

## 2019-09-10 DIAGNOSIS — J06.9 VIRAL URI WITH COUGH: ICD-10-CM

## 2019-09-11 ENCOUNTER — OFFICE VISIT (OUTPATIENT)
Dept: PALLIATIVE MEDICINE | Facility: CLINIC | Age: 34
End: 2019-09-11
Payer: MEDICAID

## 2019-09-11 DIAGNOSIS — G62.9 NEUROPATHY: ICD-10-CM

## 2019-09-11 DIAGNOSIS — M79.18 CHRONIC MYOFASCIAL PAIN: ICD-10-CM

## 2019-09-11 DIAGNOSIS — G89.4 CHRONIC PAIN SYNDROME: ICD-10-CM

## 2019-09-11 DIAGNOSIS — M25.551 HIP PAIN, RIGHT: Primary | ICD-10-CM

## 2019-09-11 DIAGNOSIS — G89.29 CHRONIC MYOFASCIAL PAIN: ICD-10-CM

## 2019-09-11 PROCEDURE — 96152 HC HEALTH AND BEHAVIOR INTERVENTION, INDIVIDUAL, EACH 15 MINUTES: CPT | Performed by: PSYCHOLOGIST

## 2019-09-11 RX ORDER — ALBUTEROL SULFATE 90 UG/1
AEROSOL, METERED RESPIRATORY (INHALATION)
Qty: 18 G | Refills: 0 | Status: SHIPPED | OUTPATIENT
Start: 2019-09-11 | End: 2020-07-30

## 2019-09-11 NOTE — TELEPHONE ENCOUNTER
To provider to advise  Patient prescribed Albuterol in urgent care 4/18 for cough/wheezing  Dayanara Boyer RN

## 2019-09-11 NOTE — PROGRESS NOTES
[facility]   [facility], [place]  Behavioral Medicine Visit    Patient Name: Hoang Kiser     YOB: 1985   Medical Record Number: [id number]  Date: 9/11/2019                SUBJECTIVE: Patient was not provided check in sheet. Patient reports his pain is overall improved since starting on morphine. Fewer movement attacks as well. States his mood has fluctuated. Describes quite a bit of conflict with a female housemate where he reports he ends up apologizing for his emotions when they have gotten into a disagreement. States he does so out of a sense of wanting to 'keep the peace.' Discussed importance of continuing individual therapy and talking often with his sponsor. Discussed concept of wise mind and radical acceptance. Has upcoming hearing in a few weeks for SSDI, he stated he will attempt to schedule an appointment around this hearing. Stress overall has increased with this conflict, upcoming SSDI hearing, and learning his brother may have recurrent cancer.     OBJECTIVE: Patient presents as more subdued than usual, emotionally flat.    Length of Visit: 55 minutes     Pain Diagnoses per pain provider:   Hip pain right, neuropathy, chronic myofascial pain, chronic pain syndrome     Assessment: Current Emotional / Mental Status    Appearance:   Appropriate   Eye Contact:   Poor  Psychomotor Behavior: Normal   Attitude:   Cooperative   Orientation:   All  Speech  Rate / Production:             Monotone   Volume:              Normal   Mood:    Depressed  Sad   Affect:    Flat   Thought Content:  Clear   Thought Form:  Coherent  Logical   Insight:    Fair     ASSESSMENT:   Progress toward goals: as expected.    Pain Status: improved    Emotional Status: had fluctuating course              Medication / chemical use concerns:  None    PLAN:   Next Appointment: Hoang Kiser will schedule a follow-up appointment in  2-3 week(s).  Assignment: Continue to utilize radical acceptance, assertiveness skills as needed.  Objectives / interventions for next session: Patient will have SSDI hearing in a few weeks - will explore cope ahead skill.    Sol Kemp PsyD LP  Outpatient Clinic Therapist  [facility]

## 2019-09-16 ENCOUNTER — TELEPHONE (OUTPATIENT)
Dept: FAMILY MEDICINE | Facility: CLINIC | Age: 34
End: 2019-09-16

## 2019-09-16 NOTE — TELEPHONE ENCOUNTER
Behavioral Health Home Services  [facility]: [place]        Social Work Care Navigator Note      Patient: Hoang Kiser  Date: September 16, 2019  Preferred Name: Shoaib    Previous PHQ-9:   PHQ-9 SCORE 2/14/2018 10/1/2018 3/22/2019   PHQ-9 Total Score - - -   PHQ-9 Total Score 7 13 11     Previous JIN-7:   JIN-7 SCORE 8/14/2018 10/1/2018 3/22/2019   Total Score - - -   Total Score 15 13 12     MOLLY LEVEL:  MOLLY Score (Last Two) 3/23/2016 10/1/2018   MOLLY Raw Score 52 31   Activation Score 100 59.3   MOLLY Level 4 3       Preferred Contact:  Need for : No  Preferred Contact: Cell      Type of Contact Today: Phone call (patient / identified key support person reached)      Data: (subjective / Objective):  Recent ED/IP Admission or Discharge?   None    Patient Goals:  Goal Areas: Health;Mental Health;Chemical Health;Financial and Social Service Benefits  Patient stated goals: Patient stated that he would like to find a Neurologist that he works well with; Patient would like to continue to work on self-advocacy skills; Patient would eventually like to find part-time work when health conditions are better managed; Patient stated he would like to continue growing his skills with coping and stress management; Patient would like to continue going to [place] groups through [facility] to ensure his sobriety continues as it has for the last 3 1/2 years      [facility] Core Service Provided:  Health and Wellness Promotion    Current Stressors / Issues / Care Plan Objective Addressed Today:   contacted patient for monthly follow up. Patient stated that he has a meeting with his  next week on Thursday to finalize CADI waiver assessment for services. Patient did receive list of dental providers. No further questions or concerns    Intervention:  Motivational Interviewing: Open-ended questions   Target Behavior(s): Explored current social supports and reinforced opportunities to increase  engagement    Assessment: (Progress on Goals / Homework):   reviewed health action plan goals. See Objectives for more details. Progress: Needs improvement. Next Steps:  will continue to work with patient to support patient in achieving their goals.    Plan: (Homework, other):  Patient was encouraged to continue to seek condition-related information and education.      Scheduled a Phone follow up appointment with BROWN BURGESS in 4 weeks     Patient has set self-identified goals and will monitor progress until the next appointment on: TBD.     LAKSHMI Dennis, Social Work Care Coordinator               Next 5 appointments (look out 90 days)    Oct 07, 2019 10:00 AM CDT  Return Visit with CARLOS A Guy CNP  [facility] ([facility]) [address] [place], [place] [address]  [facility] [address]  [facility] [address] [phone]   Oct 09, 2019  9:00 AM CDT  Return Visit with Sol Kemp  [facility] ([facility]) [address] [facility]  [address]  [facility] [address] [phone]

## 2019-09-26 ENCOUNTER — DOCUMENTATION ONLY (OUTPATIENT)
Dept: NEUROLOGY | Facility: CLINIC | Age: 34
End: 2019-09-26

## 2019-09-26 NOTE — PROGRESS NOTES
Received form from [facility], form was placed I provider folder for signature.    Received forms back signed by provider, forms were mailed to patient home, fax to [phone],sent to be scanned

## 2019-10-04 ENCOUNTER — TELEPHONE (OUTPATIENT)
Dept: NEUROLOGY | Facility: CLINIC | Age: 34
End: 2019-10-04

## 2019-10-04 NOTE — TELEPHONE ENCOUNTER
[facility] Call Center    Phone Message    May a detailed message be left on voicemail: yes    Reason for Call: Other:    Shoaib is calling to see if the Distance Verification Form can be uploaded into his [employer] as he has not received his copy in the mail yet.     If not possible, Please call him back.     Action Taken: Message routed to:  [facility] ([facility] Neurology

## 2019-10-07 ENCOUNTER — OFFICE VISIT (OUTPATIENT)
Dept: PALLIATIVE MEDICINE | Facility: CLINIC | Age: 34
End: 2019-10-07
Payer: MEDICAID

## 2019-10-07 VITALS
WEIGHT: 180.7 LBS | DIASTOLIC BLOOD PRESSURE: 88 MMHG | SYSTOLIC BLOOD PRESSURE: 144 MMHG | HEART RATE: 94 BPM | OXYGEN SATURATION: 99 % | BODY MASS INDEX: 28.3 KG/M2

## 2019-10-07 DIAGNOSIS — R26.9 ABNORMAL GAIT: ICD-10-CM

## 2019-10-07 DIAGNOSIS — G25.9 FUNCTIONAL MOVEMENT DISORDER: ICD-10-CM

## 2019-10-07 DIAGNOSIS — M47.816 FACET ARTHROPATHY, LUMBAR: ICD-10-CM

## 2019-10-07 DIAGNOSIS — G89.4 CHRONIC PAIN SYNDROME: Primary | ICD-10-CM

## 2019-10-07 DIAGNOSIS — M25.551 HIP PAIN, RIGHT: ICD-10-CM

## 2019-10-07 PROCEDURE — 99214 OFFICE O/P EST MOD 30 MIN: CPT | Performed by: NURSE PRACTITIONER

## 2019-10-07 RX ORDER — MORPHINE SULFATE 15 MG/1
15 TABLET, FILM COATED, EXTENDED RELEASE ORAL EVERY 12 HOURS
Qty: 60 TABLET | Refills: 0 | Status: SHIPPED | OUTPATIENT
Start: 2019-10-07 | End: 2019-11-05

## 2019-10-07 RX ORDER — HYDROCODONE BITARTRATE AND ACETAMINOPHEN 5; 325 MG/1; MG/1
1 TABLET ORAL 2 TIMES DAILY PRN
Qty: 45 TABLET | Refills: 0 | Status: SHIPPED | OUTPATIENT
Start: 2019-10-07 | End: 2019-10-31

## 2019-10-07 ASSESSMENT — PAIN SCALES - GENERAL: PAINLEVEL: SEVERE PAIN (6)

## 2019-10-07 NOTE — PATIENT INSTRUCTIONS
After Visit Instructions:     Thank you for coming to [facility] for your care. It is my goal to partner with you to help you reach your optimal state of health.     Continue daily self-care, identifying contributing factors, and monitoring variations in pain level. Continue to integrate self-care into your life.      1. Schedule pain psychology visits per Sol's recommendations   2. Schedule physical therapy assessment for gait   3. Schedule follow-up with CARLOS A Higuera NP-C in 4 weeks  4. Medication recommendations:   1. Refill of MS Contin and Norco sent to your pharmacy to be filled on Wed.     CARLOS A Bass NP-C  [facility]  [facility]    Clinic Number:  [phone]     Call with any questions about your care and for scheduling assistance.     Calls are returned Monday through Friday between 8 AM and 4:30 PM. We usually get back to you within 2 business days depending on the issue/request.    If we are prescribing your medications:    For opioid medication refills, call the clinic or send a [facility] message 7 days in advance.  Please include:    Name of requested medication    Name of the pharmacy.    For non-opioid medications, call your pharmacy directly to request a refill. Please allow 3-4 days to be processed.     Per [place] State Law:    All controlled substance prescriptions must be filled within 30 days of being written.      For those controlled substances allowing refills, pickup must occur within 30 days of last fill.      We believe regular attendance is key to your success in our program!      Any time you are unable to keep your appointment we ask that you call us at least 24 hours in advance to cancel.This will allow us to offer the appointment time to another patient.   Multiple missed appointments may lead to dismissal from the clinic.

## 2019-10-07 NOTE — PROGRESS NOTES
[facility]    CHIEF COMPLAINT: pain     INTERVAL HISTORY:  Last seen on 9/9/19.        Recommendations/plan at the last visit included:     1. Schedule pain psychology visits   2. Schedule follow-up with CARLOS A Higuera, NP-C in 4 weeks  3. Medication recommendations:             1. No change to current opiate pain medications.   2. Try reducing/eliminating Methocarbamol. If you do not notice any increase in pain, stop taking it.       Since last visit:   - Met with BRANDEE ro . Now has ILS worker, meals on wheels, specialty nurse visits, [facility] mobility services. Stress because sister lost custody of her kids. Broke up with woman he was seeing who lives in the same group home. Mold issues in the house, Shoaib has to switch rooms. Sees therapist for broad based therapy every other week. Rescheduled [facility] due to transportation issues. Will be going in December. Arm and neck movements worse with stress and all the movements stop or reduce if he is angry. Working on co-dependency issues. Did Step One with his sponsor.   - 8 weeks from [facility]. Pain has come back a little but overall thinks it has helped a lot.     Pain Information:   Pain quality: Burning and Penetrating    Pain rating: intensity ranges from 2/10 to 9/10, and averages 5/10 on a 0-10 scale.      Annual Controlled Substance Agreement/UDS due date: 4/2020     Current Pain Relevant Medications:  MS Contin 15 mg BID = 30 MME  Norco 5/325 mg 1 tab BID #45 tabs per month                        Total opiate dose: 35-40 MME daily  Clonazepam .5 mg 1-2 tab at HS PRN (takes about 3x weekly)   Duloxetine 20 mg daily  Naproxen 500 mg BID: on hold re: elevated LFTs   Tizanidine 4 mg 1-2 tabs at HS PRN  Adderall 30 mg daily, combination of long and short acting.       Previous Pain Relevant Medications: (H--helped; HI--Helped initially; SWH--Somewhat helpful; NH--No help; W--worse; SE--side effects; ?--Unsure if helpful)   NOTE: This  medication information taken from patient's intake form, not medical records.                         Opiates: Tramadol: H, Hydrocodone: H, Morphine: H                        NSAIDS: Ibuprofen:H, naproxen:SWH, Relafen: NH                        Muscle Relaxants: Cyclobenzaprine:H,Med interaction, Tizanidine:H, Baclofen: SWH                        Anti-migraine mediations: Prednisone:H                        Anti-depressants: Bupropion:SE, Celexa:SE, Duloxetine:H, Trazodone:Too strong, Venlafaxine:too strong, Amitriptyline:SE                         Sleep aids:Anbien: H                        Anxiolytics: Clonazepam:H                        Neuropathics: Tegretol:taken for seizures in childhood, Gabapentin:H                                          Topicals: Lidocaine:H                        Other medications not covered above: Tylenol:NH      Any illicit drug use: 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 Concern for multiple controlled substances including stimulants and opiates.  7/27/19: Concern for misuse of opiates and stimulants as noted in office visit on this date.         Medications:  Current Outpatient Medications   Medication Sig Dispense Refill     albuterol (PROAIR HFA/PROVENTIL HFA/VENTOLIN HFA) 108 (90 Base) MCG/ACT inhaler INHALE ONE TO TWO PUFFS INTO THE LUNGS EVERY FOUR HOURS AS NEEDED FOR SHORTNESS OF BREATH/ DYSPNEA OR WHEEZING 18 g 0     amphetamine-dextroamphetamine (ADDERALL XR) 30 MG 24 hr capsule TK 1 C PO QD  0     clonazePAM (KLONOPIN) 0.5 MG tablet 0.5mg tab by mouth nightly as needed  1     DULoxetine (CYMBALTA) 60 MG EC capsule Taking only 1 x 60mg tab by mouth daily 30 capsule 1      HYDROcodone-acetaminophen (NORCO) 5-325 MG tablet Take 1 tablet by mouth 2 times daily as needed for pain Okay to fill on 10/9/19 45 tablet 0     hydrOXYzine (ATARAX) 25 MG tablet Take 1 tablet (25 mg) by mouth nightly as needed (at HS PRN) 45 tablet 3     morphine (MS CONTIN) 15 MG CR tablet Take 1 tablet (15 mg) by mouth every 12 hours Ok at fill on 10/9/19 60 tablet 0     naloxone (NARCAN) 4 MG/0.1ML nasal spray Spray 1 spray (4 mg) into one nostril alternating nostrils as needed for opioid reversal every 2-3 minutes until assistance arrives 0.2 mL 0     naproxen (NAPROSYN) 500 MG tablet Take 1 tablet (500 mg) by mouth 2 times daily (with meals) 40 tablet 3     oxybutynin ER (DITROPAN-XL) 10 MG 24 hr tablet TK 1 T PO D  3     SF 5000 PLUS 1.1 % CREA   2     tiZANidine (ZANAFLEX) 4 MG tablet Take 4 mg by mouth as needed   0     fluticasone (FLONASE) 50 MCG/ACT nasal spray Spray 1 spray into both nostrils daily as needed   0     loratadine (CLARITIN) 10 MG tablet Take 1 tablet (10 mg) by mouth daily (Patient not taking: Reported on 10/7/2019) 30 tablet 0       Review of Systems: A 10-point review of systems was negative, with the exception of chronic pain issues.      Social History: Reviewed; unchanged from previous consultation.      Family history: Reviewed; unchanged from previous consultation.     PHYSICAL EXAM    Vitals:    10/07/19 1008   BP: (!) 144/88   Pulse: 94   SpO2: 99%   Weight: 82 kg (180 lb 11.2 oz)       Constitutional: healthy, alert, no distress, pain behaviors and somewhat down r/t increased spastic movements   HEENT: Head atraumatic, normocephalic. Eyes without conjunctival injection or jaundice. Neck supple. No obvious neck masses.  Skin: No rash, lesions, or petechiae of exposed skin.   Extremities: No clubbing, cyanosis, or edema to exposed extremities  Psychiatric/mental status: Alert, without lethargy or stupor. Appropriate affect. Anxious regarding controlled medication  misuse/destruction as noted above.        Neurologic exam:  CN:  Cranial nerves 2-12 are grossly normal.  Has uncontrolled upper body movement/tremor.          Psychiatric:  mentation appears normal., affect and mood normal      DIRE Score for ongoing opioid management is calculated as follows:    Diagnosis = 2 pts (slowly progressive; moderate pain/objective findings)    Intractability = 2 pts (most treatments tried; patient not fully engaged/barriers)    Risk        Psych = 2 pts (personality dysfunction/mental illness that moderately interferes with care)         Chem Hlth = 2 pts (use of medications to cope with stress; chemical dependency in remission)       Reliability = 3 pts (highly reliable with meds, appointments, treatments)       Social = 3 pts (supportive family/close relationships; involved in work/school; no isolation)       (Psych + Chem hlth + Reliability + Social) = 14    Efficacy = 2 pts (moderate benefit/function; low med dose; too early/not tried meds)    DIRE Score = 16        7-13: likely NOT suitable candidate for long-term opioid analgesia       14-21: may be a suitable candidate for long-term opioid analgesia          Previous Diagnostic Tests:   Imaging Studies:   MR LUMBAR SPINE W/O CONTRAST 5/2/2018 7:27 PM  History: DDD (degenerative disc disease), lumbar; Lumbar  radiculopathy; Hip pain, right; Groin pain, right.  ICD-10: DDD (degenerative disc disease), lumbar; Lumbar radiculopathy;  Hip pain, right; Groin pain, right  Comparison: Lumbar spine MRI 3/8/2017  Technique: Sagittal STIR, T1-weighted turbo spin-echo, 3-D volumetric  T2-weighted and axial reconstructed T2-weighted images of the lumbar  spine were obtained without intravenous contrast.   Findings: 5 lumbar-type vertebra. The tip of the conus medullaris is  at L1.  The lumbar vertebral column is normally aligned.   Straightening of lumbar lordosis, unchanged. The intervertebral disc  heights are maintained. Normal marrow  signal. At L5-S1, there is a  disc bulge and facet hypertrophy with mild left neural foraminal  narrowing, unchanged. No spinal canal stenosis.  Impression:  1. Lumbar spondylosis with mild left neural foraminal narrowing at  L5-S1.  2. No spinal canal stenosis.      MR right hip without contrast 5/2/2018 6:47 PM  Techniques: Multiplanar multisequence imaging of the right hip was  obtained without  administration of intra-articular or intravenous  contrast using routing protocol.  History: Hip pain.  Comparison: None available.  Findings:  Osseous structures  Osseous structures: No fracture, stress reaction, avascular necrosis,  or focal osseous lesion is seen. There is small focal area of  subchondral cystic changes in the anterior right femoral head neck  junction, most compatible with synovial herniation pit. Findings  suggestive of reduced femoral head neck junction though alpha angle  cannot be assessed owing to no dedicated oblique axial sequence  obtained.  Articular cartilage and labrum  Assessment limited on this arthrographic study due to relative lack of  joint distension.  Articular cartilage: No high grade chondral loss.  Labrum: Labral tearing approximately from 12:00 to 3:00 with 3:00  being anterior and 6:00 being the center of transverse ligament in  this convention with associated subtle area of subchondral edema in  the superior acetabulum.  Ligament teres and transverse ligament of acetabulum: Intact.  Joint or bursal effusion  Joint effusion: A physiologic amount of joint fluid.  Bursal effusion: Minimal nonspecific edema over the greater  trochanter. No substantial iliopsoas or trochanteric bursal effusion.  Muscles and tendons  Muscles and tendons: The rectus femoris, the visualized portion  iliopsoas, proximal hamstrings, and hip abductors are intact.  The  visualized adductor muscles are unremarkable.   Nerves:  The visualized course of the sciatic nerve is unremarkable.   Other  Findings:  There is fat-containing right inguinal hernia.  Impression:  1. Approximately 12-3 o'clock labral tearing with synovial herniation  pit and likely reduced femoral head neck junction offset. 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  2. Fat containing right inguinal hernia.          ASSESSMENT:   1.  Lumbar DDD, mild  2.  Lumbar muscle spasm  3.  Labral tear, right hip  4.  Hx: anxiety, chemical dependence in remission.  5. Functional neurologic movement disorder       Shoaib presents for medication management appt. He notes increase in frequency of movement attacks. Pain scores are similar to previous months. Broke up with girlfriend who still lives in the same group home. States mood is about the same but notes excessive worrying about medication responses. Willing to consider switching to Suboxone but would like to awhile. We would need to wean down significantly on opiates for that change. Referred to MARIBELL for concerns about gait abnormality and falls.       Hypertension: Shoaib to follow up with Primary Care provider regarding elevated blood pressure.  Tobacco use: Advised to stop smoking     Plan:    Diagnosis reviewed, treatment option addressed, and risk/benifits discussed.  Self-care instructions given.  I am recommending a multidisciplinary treatment plan to help this patient better manage pain.      1. Schedule pain psychology visits per Sol's recommendations   2. Schedule physical therapy assessment for gait   3. Schedule follow-up with CARLOS A Higuera NP-SULY in 4 weeks  4. Medication recommendations: Refill of MS Contin and Norco sent to your pharmacy to be filled on Wed.     A total of 30 minutes was spent on the patient today, greater than 50% of that time was spent on face to face counseling and care coordination regarding diagnoses and treatment options as mentioned above including the multidisciplinary pain management program and the  benefits of physical therapy, pain psychology and medication options.        CARLOS A Bass, NP-C   [facility]    Disclaimer: This note consists of symbols derived from keyboarding, dictation and/or voice recognition software. As a result, there may be errors in the script that have gone undetected. Please consider this when interpreting information found in this chart.

## 2019-10-14 ENCOUNTER — TELEPHONE (OUTPATIENT)
Dept: FAMILY MEDICINE | Facility: CLINIC | Age: 34
End: 2019-10-14

## 2019-10-16 ENCOUNTER — MYC MEDICAL ADVICE (OUTPATIENT)
Dept: PALLIATIVE MEDICINE | Facility: CLINIC | Age: 34
End: 2019-10-16

## 2019-10-17 NOTE — TELEPHONE ENCOUNTER
Rosanne message from patient on 10/16 at 1857:    Tamiko,     I've noticed that I'm starting to develop varicose veins on my left ankle and foot. This is normal aging or indicative of something related to my back or hips?     On a side note the last couple of days I've felt a 'pressure' type pain on the right side of my lumbar.     Looking for your guidance and wisdom,     Shoaib   -------------  Will route to provider for review.     Felicita Woody, KATERINAN, RN-BC  Patient Care Supervisor/Care Coordinator  [facility]

## 2019-10-19 ENCOUNTER — HOSPITAL ENCOUNTER (OUTPATIENT)
Dept: PHYSICAL THERAPY | Facility: CLINIC | Age: 34
Setting detail: THERAPIES SERIES
End: 2019-10-19
Attending: NURSE PRACTITIONER
Payer: MEDICAID

## 2019-10-19 DIAGNOSIS — G25.9 FUNCTIONAL MOVEMENT DISORDER: ICD-10-CM

## 2019-10-19 DIAGNOSIS — R26.9 ABNORMAL GAIT: ICD-10-CM

## 2019-10-19 PROCEDURE — 97116 GAIT TRAINING THERAPY: CPT | Mod: GP | Performed by: PHYSICAL THERAPIST

## 2019-10-19 PROCEDURE — 97161 PT EVAL LOW COMPLEX 20 MIN: CPT | Mod: GP | Performed by: PHYSICAL THERAPIST

## 2019-10-21 NOTE — PROGRESS NOTES
10/19/19 1056   Quick Adds   Type of Visit Initial OP PT Evaluation       Present No  (English)   General Information   Start of Care Date 10/19/19   Referring Physician CARLOS A Higuera CNP   Orders Evaluate and Treat as Indicated   Additional Orders evaluate gait and need for assistive device, cane or walker   Order Date 10/07/19   Medical Diagnosis functional movement disorder   Onset of illness/injury or Date of Surgery 10/07/19   Surgical/Medical history reviewed Yes   Pertinent history of current problem (include personal factors and/or comorbidities that impact the POC) Patient reports that he struggles to balance his hip & back pain with his need to walk to assist with managing his functional movement disorder. States that he is having difficulty with his volunteer commitments as he often has to walk, but then stand for prolonged periods of time. PMHx: lumbar DDD with muscle spasms; labral tear right hip; h/o anxiety; h/o depression; ADHD; PTSD; h/o substance abuse; functional movement disorder   Previous/Current Treatment Physical Therapy  (aquatic therapy)   Improvement after PT Moderate   Current Community Support Transportation service;Other/Comments  (meals on wheels; specialized nursing visits are planned)   Patient role/Employment history Other/comments  (working toward disability)   Living environment Group home  (independent living lodge)   Home/Community Accessibility Comments no concerns   ADL Devices Shower/Tub Chair;Shower/Tub Grab Bar;Wall Grab Bar   Patient/Family Goals Statement Trial of assistive devices to improve movement symptoms & assist with pain managememt   Fall Risk Screen   Fall screen completed by PT   Have you fallen 2 or more times in the past year? Yes   Have you fallen and had an injury in the past year? No   Timed Up and Go score (seconds) 8.10   Is patient a fall risk? Yes;Department fall risk interventions implemented   Abuse Screen (yes response  referral indicated)   Feels Unsafe at Home or Work/School no   Feels Threatened by Someone no   Does Anyone Try to Keep You From Having Contact with Others or Doing Things Outside Your Home? no   Physical Signs of Abuse Present no   Pain   Patient currently in pain Yes   Pain location low back   Pain rating 2/10 at best; /10 at worst   Additional pain locations? Pain location 2;Pain location 3   Pain location 2 hips   Pain rating 2 0/10 at best, 9/10 at worst   Pain location 3 cervical spine   Pain rating 3 2/10 at best, 9/10 at worst   Vitals Signs   Heart Rate 89   SpO2 95   Blood Pressure 115/78   Cognitive Status Examination   Orientation orientation to person, place and time   Level of Consciousness alert   Follows Commands and Answers Questions 100% of the time   Personal Safety and Judgment intact   Memory intact   Range of Motion (ROM)   ROM Quick Adds no deficits were identified   Strength   Manual Muscle Testing Quick Adds No deficits were identified   Strength Comments bilateral L/Es 5/5   Bed Mobility   Bed Mobility Comments independent   Transfer Skills   Transfer Comments independent   Gait   Gait Comments indpendent without device; exhibits varying extraneous movement patterns   Gait Special Tests   Gait Special Tests 25 FOOT TIMED WALK;FUNCTIONAL GAIT ASSESSMENT   Gait Special Tests 25 Foot Timed Walk   Seconds 8.79   Steps 13 Steps   Gait Special Tests Functional Gait Assessment Score out of 30   Score out of 30 23   Comments variable increase in his extraneous movement patterns with testing   Planned Therapy Interventions   Planned Therapy Interventions gait training   Clinical Impression   Criteria for Skilled Therapeutic Interventions Met yes, treatment indicated   PT Diagnosis gait abnormality   Influenced by the following impairments functional movement disorder; pain   Functional limitations due to impairments limited activity tolerance related to variable extraneous movements & pain    Clinical Presentation Stable/Uncomplicated   Clinical Presentation Rationale Based upon subjective information provided; objective exam findings (TUG, 25 ft timed walk, FGA), medical history & clinical judgement   Clinical Decision Making (Complexity) Low complexity   Therapy Frequency other (see comments)  (1 session only )   Predicted Duration of Therapy Intervention (days/wks) 1 session only    Risk & Benefits of therapy have been explained Yes   Patient, Family & other staff in agreement with plan of care Yes   Education Assessment   Preferred Learning Style Demonstration;Pictures/video   Barriers to Learning No barriers   GOALS   PT Eval Goals 1   Goal 1   Goal Identifier Gait with 4 wheeled walker   Goal Description Patient will demonstrate independence with use of 4WW on level & inclines to assist with managing his movement disorder, pain issues & limited activity tolerance   Target Date 10/19/19   Date Met 10/19/19   Total Evaluation Time   PT Eval, Low Complexity Minutes (69145) 50

## 2019-10-21 NOTE — PROGRESS NOTES
[facility]      Outpatient Physical Therapy Evaluation  PLAN OF TREATMENT FOR OUTPATIENT REHABILITATION  (COMPLETE FOR INITIAL CLAIMS ONLY)  Patient's Last Name, First Name, M.I.  YOB: 1985  Hoang Kiser                        Provider's Name  [facility] Medical Record No.  [id number]                               Onset Date:  10/7/19   Start of Care Date: 10/19/19     Type: Physical Therapy Medical Diagnosis: functional movement disorder                        Therapy Diagnosis:  Gait abnormality    Visits from SOC:  1   _________________________________________________________________________________  Plan of Treatment:      Frequency/Duration: PT x 1 session only    Goals:   Patient will demonstrate safe, modified independent ambulation with use of 4WW on level & inclines  _________________________________________________________________________________    I CERTIFY THE NEED FOR THESE SERVICES FURNISHED UNDER        THIS PLAN OF TREATMENT AND WHILE UNDER MY CARE     (Physician co-signature of this document indicates review and certification of the therapy plan).                Certification date from: 10/19/19  Certification date to: 10/31/19    Referring Provider: CARLOS A Higuera, CNP

## 2019-10-22 ENCOUNTER — OFFICE VISIT (OUTPATIENT)
Dept: FAMILY MEDICINE | Facility: CLINIC | Age: 34
End: 2019-10-22
Payer: MEDICAID

## 2019-10-22 ENCOUNTER — OFFICE VISIT (OUTPATIENT)
Dept: BEHAVIORAL HEALTH | Facility: CLINIC | Age: 34
End: 2019-10-22
Payer: MEDICAID

## 2019-10-22 VITALS
HEART RATE: 96 BPM | DIASTOLIC BLOOD PRESSURE: 70 MMHG | RESPIRATION RATE: 16 BRPM | HEIGHT: 67 IN | WEIGHT: 181 LBS | BODY MASS INDEX: 28.41 KG/M2 | TEMPERATURE: 97.5 F | SYSTOLIC BLOOD PRESSURE: 116 MMHG

## 2019-10-22 DIAGNOSIS — R69 DIAGNOSIS DEFERRED: Primary | ICD-10-CM

## 2019-10-22 DIAGNOSIS — B07.8 OTHER VIRAL WARTS: Primary | ICD-10-CM

## 2019-10-22 PROCEDURE — 17110 DESTRUCTION B9 LES UP TO 14: CPT | Performed by: FAMILY MEDICINE

## 2019-10-22 PROCEDURE — 99207 ZZC DROP WITH A PROCEDURE: CPT | Mod: 25 | Performed by: FAMILY MEDICINE

## 2019-10-22 ASSESSMENT — ANXIETY QUESTIONNAIRES
6. BECOMING EASILY ANNOYED OR IRRITABLE: SEVERAL DAYS
7. FEELING AFRAID AS IF SOMETHING AWFUL MIGHT HAPPEN: MORE THAN HALF THE DAYS
1. FEELING NERVOUS, ANXIOUS, OR ON EDGE: MORE THAN HALF THE DAYS
2. NOT BEING ABLE TO STOP OR CONTROL WORRYING: MORE THAN HALF THE DAYS
3. WORRYING TOO MUCH ABOUT DIFFERENT THINGS: MORE THAN HALF THE DAYS
GAD7 TOTAL SCORE: 14
5. BEING SO RESTLESS THAT IT IS HARD TO SIT STILL: NEARLY EVERY DAY

## 2019-10-22 ASSESSMENT — PATIENT HEALTH QUESTIONNAIRE - PHQ9
5. POOR APPETITE OR OVEREATING: MORE THAN HALF THE DAYS
SUM OF ALL RESPONSES TO PHQ QUESTIONS 1-9: 9

## 2019-10-22 ASSESSMENT — MIFFLIN-ST. JEOR: SCORE: 1719.64

## 2019-10-22 NOTE — LETTER
Behavioral Health Home ([facility]): Health Action Plan  [facility] Clinic: [place]    [facility]      Name: Hoang Kiser  Preferred Name: Shoaib  : 1985  MRN: [id number]    How to Contact me  Need for : No  Preferred Contact: Cell    Home Phone [phone]   Mobile [phone]     Name and contact of key supports: Sister- Stephanie; Brother- Johnathan        My Goals    Goal Areas: Health;Mental Health;Chemical Health;Financial and Social Service Benefits    Patient goals: Patient stated that he would like to obtain an ILS worker through his CADI waiver; Patient would like to find some free temporary housing options near [place] in [place] for his Behavioral Shaping Therapy Program he is hoping to get in to; Patient would like to continue to work on self-advocacy skills; Patient would like to look for very part time work or 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 court hearing comes in; Patient stated he would like to continue to grow his skills with coping and stress management    Strengths related to each goal: Patient is motivated; Patient is accepting of change; Patient is very organized; Patient is self-aware of his abilities and strengths; Patient works with his care team frequently; Patient is involved in his care    Services and Supports Needed: Assistance in researching potential free temporary housing options near [place]    Activities / Actions of Team to support goal(s): Monthly follow up from [facility]; Provide resources, as needed; Care team will be available for patient, as needed      Activities / Actions of Patient / Parent / Guardian to support goal(s): Patient will continue to be involved in his care; Patient will continue to be involved in [facility] program; Patient will reach out to care team, when needed; Pateint will continue working with Mental Health Providers on coping skills and skills for self-advocacy.         Recommended Referral  Tobacco  cessation referrals made?: No  Mental Health / Chemical Dependency Referrals: No (Pt going to Outpatient CD Treatment at [facility])  Substance Use Referrals: Not Applicable  Mental Health Referrals: None        My Team Members and Their Contact Information  Patient Care Team       Relationship Specialty Notifications Start End    Phill Floyd MD PCP - General Family Practice  2/25/16     Phone: [phone] Fax: [phone] [address] [facility] [address]    Phill Floyd MD Assigned PCP   5/17/15     Phone: [phone] Fax: [phone] [address] [facility] [address]    Bryant Cao MD Referring Physician Neurology  11/23/15     Refer Evaluate for palatal tremor - essential vs symptomatic vs functional vs tic related.     Phone: [phone] Fax: [phone]         [address] [place] [address]    Jorge Nelson MD MD Otolaryngology  11/23/15     Phone: [phone] Fax: [phone]         [facility] [address] [facility] [address]    Royce Taylor MD MD Neurology  6/27/17     Phone: [phone] Fax: [phone]         [facility] NEUROLOGY [address] [facility] [address] [facility] [address]    Miley Trivedi MD MD Otolaryngology  10/17/17     Phone: [phone] Fax: [phone]         [address] [facility] [address] [facility] [address]    Melinda Diaz AuD Audiologist Audiology  10/17/17     Phone: [phone] Fax: [phone]         [address] [facility] [address] [facility] [address]    Andrez Brian, KATERINAW   Admissions 3/1/19     Faustino Feldman MD MD Family Medicine - Sports Medicine  5/28/19     Phone: [phone] Fax: [phone]         [address] [place] [address] [address] [facility] [address]          My Wellness Plan  Safety Concerns: None Reported / Observed  Recommendations / Plan for safety concerns: Call 911, Contact [facility] (Ph: [phone]); Contact Care Team; Follow safety plan  created by Mental Health Team, if applicable    A safety and risk management plan has not been developed at this time, however patient was encouraged to call [facility] / [facility] or their [facility] Team/Mental Health Providers should there be a change in any of these risk factors.    Hoang Kiser co-developed the Health Action Plan with the [facility] Team and received a copy of this document.  Date Health Action Plan Completed/Updated: 10/22/19

## 2019-10-22 NOTE — NURSING NOTE
"Chief Complaint   Patient presents with     [facility]     PHQ9       Initial /70   Pulse 96   Temp 97.5  F (36.4  C) (Oral)   Resp 16   Ht 1.702 m (5' 7\")   Wt 82.1 kg (181 lb)   BMI 28.35 kg/m   Estimated body mass index is 28.35 kg/m  as calculated from the following:    Height as of this encounter: 1.702 m (5' 7\").    Weight as of this encounter: 82.1 kg (181 lb).    Silvia Galvan, [facility]      "

## 2019-10-22 NOTE — PROGRESS NOTES
Behavioral Health Home Services  [facility]: [place]        Social Work Care Navigator Note      Patient: Hoang Kiser  Date: October 22, 2019  Preferred Name: Shoaib    Previous PHQ-9:   PHQ-9 SCORE 10/1/2018 3/22/2019 10/22/2019   PHQ-9 Total Score - - -   PHQ-9 Total Score 13 11 9     Previous JIN-7:   JIN-7 SCORE 10/1/2018 3/22/2019 10/22/2019   Total Score - - -   Total Score 13 12 14     MOLLY LEVEL:  MOLLY Score (Last Two) 3/23/2016 10/1/2018   MOLLY Raw Score 52 31   Activation Score 100 59.3   MOLLY Level 4 3       Preferred Contact:  Need for : No  Preferred Contact: Cell      Type of Contact Today: Face to Face in Clinic      Data: (subjective / Objective):  Recent ED/IP Admission or Discharge?   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 Completed 6 month HAP review. HAP letter sent to [facility] for approval. Patient has an appointment at [facility] on December 9th and 10th. He reports that these two appointments are to determine his eligibility for the Behavioral Shaping Therapy Program. If eligible, patient will have intensive outpatient therapy for 1 1/2 weeks to 2 weeks straight.  Patient stated that he will need to look for free temporary housing, as he was told his CADI waiver will not cover a hotel stay for him while receiving the treatment. [facility] will look in to potential options.     Intervention:  Motivational Interviewing: Co-Developed Goal: See HAP, Expressed Empathy/Understanding, Supported Autonomy, Collaboration, Evocation and Open-ended questions   Target Behavior(s): Explored current social supports and reinforced opportunities to increase engagement    Assessment: (Progress on Goals / Homework):   reviewed health action plan goals. See Objectives for more details. Progress: Needs improvement. Next Steps:  will continue to work with patient to support patient in achieving their goals.    Plan: (Homework, other):  Patient was encouraged to continue to seek condition-related information and education.      Scheduled a Phone follow up appointment with [facility] in 4 weeks     Patient has set self-identified goals and will monitor progress until the next appointment on: TBD.     LAKSHMI Dennis, Social Work Care Coordinator               Next 5 appointments (look out 90 days)    Oct 23, 2019  3:00 PM CDT  Return Visit with Sol Kemp  [facility] ([facility]) [address] [facility] [address]  [facility] [address]  [phone]   Nov 05, 2019  9:00 AM CST  Return Visit with CARLOS A Guy CNP  [facility] ([facility]) [address] [place] [address] [address]  [facility] [address]  [phone]

## 2019-10-22 NOTE — Clinical Note
"Dane Saunders!     Here is a 6 month HAP renewal for you to review. I added a new blurb in the \"recommdations/plan for safety\" spot. I added \"Follow safety plan created by Mental Health Team, if applicable\". What do you think about me adding that to every HAP to make sure I'm not missing anything? This patient has no SI/Sucidal attempts noted in his reviewed DA from [facility].     Thanks,  Andrez"

## 2019-10-22 NOTE — PROGRESS NOTES
SUBJECTIVE: Hoang Kiser is 34 year old male who is complains of skin lesions on his  Right thumb .   The lesion(s) has(have) been present for 6 month(s).    He has tried the home cold spray twice   It got less prominent but has increased in diameter           OBJECTIVE: verrucous raised lesion(s) noted on the distal aspect of the thumb bordering the base of the nail.    ASSESSMENT: Warts (Verruca Vulgaris)    PLAN: The viral etiology and natural history of warts were discussed.   We discussed the various treatment methods, the side effects and the need for subsequent visits and treatments. A choice of liquid nitrogen was made. The potential pain and blistering was discussed.   The lesion(s) was/were pared down with a scalpel blade until the patient could no longer tolerate it.   Liquid nitrogen was applied to the wart(s) using our cryo gun.    Any bleeding was controlled with pressure and a dressing.    The patient will return in approximately 2 weeks for evaluation and repeat treatment if needed.

## 2019-10-23 ENCOUNTER — OFFICE VISIT (OUTPATIENT)
Dept: PALLIATIVE MEDICINE | Facility: CLINIC | Age: 34
End: 2019-10-23
Payer: MEDICAID

## 2019-10-23 DIAGNOSIS — G89.29 CHRONIC MYOFASCIAL PAIN: ICD-10-CM

## 2019-10-23 DIAGNOSIS — M25.551 HIP PAIN, RIGHT: Primary | ICD-10-CM

## 2019-10-23 DIAGNOSIS — M79.18 CHRONIC MYOFASCIAL PAIN: ICD-10-CM

## 2019-10-23 DIAGNOSIS — G89.4 CHRONIC PAIN SYNDROME: ICD-10-CM

## 2019-10-23 DIAGNOSIS — M47.816 FACET ARTHROPATHY, LUMBAR: ICD-10-CM

## 2019-10-23 PROCEDURE — 96152 HC HEALTH AND BEHAVIOR INTERVENTION, INDIVIDUAL, EACH 15 MINUTES: CPT | Performed by: PSYCHOLOGIST

## 2019-10-23 ASSESSMENT — ANXIETY QUESTIONNAIRES: GAD7 TOTAL SCORE: 14

## 2019-10-23 NOTE — PROGRESS NOTES
[facility]   [facility] [place]  Behavioral Medicine Visit    Patient Name: Hoang Kiser     YOB: 1985   Medical Record Number: [id number]  Date: 10/23/2019                SUBJECTIVE: Reports pain is mildly improved, however has fluctuated some since last visit. Mood mildly worse - identifies conflict with a housemate whom he was also romantically attached to led to some miscommunication and drama. He has self-identified new boundaries to hold, and recognizes how his care-taking behaviors interfere with his own care, or distract but also increase his pain. Activity level mildly decreased. Stress mildly worse to mildly improved. Sleep mildly improved. Engages in self-care for his pain 2-3 times daily. Feels confident about SSDI  determining in his favor, however won't have a decision for quite some time yet. Discussed boundaries he has identified as important so that he can focus on his own self-care, mental health, chemical health and pain management.    OBJECTIVE: Patient seems to recognize that he has not been as attentive to his emotional health which has resulted in increased pain and added undue stress to his life.    Length of Visit: 60 minutes     Pain Diagnoses per pain provider:   Hip pain right, neuropathy, chronic myofascial pain, chronic pain syndrome     Assessment: Current Emotional / Mental Status    Appearance:   Appropriate   Eye Contact:   Fair   Psychomotor Behavior: Normal  Restless   Attitude:   Cooperative   Orientation:   All  Speech  Rate / Production:             Normal   Volume:              Normal   Mood:    Depressed   Affect:    Flat   Thought Content:  Clear   Thought Form:  Coherent  Logical   Insight:    Fair     ASSESSMENT:   Progress toward goals: as expected.    Pain Status: improved    Emotional Status: worsened              Medication / chemical use concerns:   none    PLAN:   Next Appointment: Hoang Kiser will schedule a follow-up appointment in 2-3 week(s).  Assignment: Focus on himself and his own boundaries.  Objectives / interventions for next session: INDER Kemp PsyD LP  Outpatient Clinic Therapist  [facility] [place]

## 2019-10-24 ENCOUNTER — TELEPHONE (OUTPATIENT)
Dept: FAMILY MEDICINE | Facility: CLINIC | Age: 34
End: 2019-10-24

## 2019-10-24 ENCOUNTER — TELEPHONE (OUTPATIENT)
Dept: NEUROLOGY | Facility: CLINIC | Age: 34
End: 2019-10-24

## 2019-10-24 NOTE — TELEPHONE ENCOUNTER
Reason for Call:  Other     Detailed comments: [facility] is needing a copy of patients med list    Fax:[phone]    Phone Number Patient can be reached at: Other phone number:  [facility] - [phone]    Best Time: Any    Can we leave a detailed message on this number? YES    Call taken on 10/24/2019 at 4:20 PM by Jasmin Colbert

## 2019-10-24 NOTE — TELEPHONE ENCOUNTER
[facility] Call Center    Phone Message    May a detailed message be left on voicemail: yes    Reason for Call: Form or Letter   Type or form/letter needing completion: Distance Verification Form  Provider: Nash  Date form needed: 10/24/2019  Once completed: Fax form to: Please fax form to January at [facility]: [phone].      Action Taken: Message routed to:  [facility]

## 2019-10-29 ENCOUNTER — MYC MEDICAL ADVICE (OUTPATIENT)
Dept: PALLIATIVE MEDICINE | Facility: CLINIC | Age: 34
End: 2019-10-29

## 2019-10-29 ENCOUNTER — TELEPHONE (OUTPATIENT)
Dept: FAMILY MEDICINE | Facility: CLINIC | Age: 34
End: 2019-10-29

## 2019-10-29 DIAGNOSIS — M25.551 HIP PAIN, RIGHT: ICD-10-CM

## 2019-10-29 NOTE — TELEPHONE ENCOUNTER
This RN reconciled medication list from [facility] form against our [facility] medication list and there are no discrepancies.      Form put in PCP's box to sign.  Please route this message back to TC along with form.     Diane BORGESN, RN

## 2019-10-29 NOTE — TELEPHONE ENCOUNTER
Reason for Call:  Form, our goal is to have forms completed with 72 hours, however, some forms may require a visit or additional information.    Type of letter, form or note:  Home Health Certification    Who is the form from?: Home care    Where did the form come from: form was faxed in    What clinic location was the form placed at?: [place]    Where the form was placed: Given to MA/RN    What number is listed as a contact on the form?: [phone]       Additional comments: Placed in [facility] RN Basket    Call taken on 10/29/2019 at 8:09 AM by Reva Clarke

## 2019-10-30 DIAGNOSIS — Z53.9 DIAGNOSIS NOT YET DEFINED: Primary | ICD-10-CM

## 2019-10-30 PROCEDURE — G0180 MD CERTIFICATION HHA PATIENT: HCPCS | Performed by: FAMILY MEDICINE

## 2019-10-30 NOTE — TELEPHONE ENCOUNTER
Received call from patient who is requesting to speak to a nurse. He states he'd like to clarify some things with his [email or web address] message. Phone #[phone]      Ana Pemberton    [facility]

## 2019-10-31 RX ORDER — HYDROCODONE BITARTRATE AND ACETAMINOPHEN 5; 325 MG/1; MG/1
1 TABLET ORAL 2 TIMES DAILY PRN
Qty: 45 TABLET | Refills: 0 | Status: SHIPPED | OUTPATIENT
Start: 2019-10-31 | End: 2019-12-05

## 2019-10-31 NOTE — TELEPHONE ENCOUNTER
I faxed the completed and signed [facility] forms to [facility] @[phone].  Dahlia Vera,

## 2019-10-31 NOTE — TELEPHONE ENCOUNTER
Routing to provider to see [facility] refill request and additional information below.    Norco 5-325 mg #45 filled on 10/9/19. Sig reads take 1 tablet by mouth 2 times daily as needed for pain. If 30 day supply due 11/8/19.     Sarina Polanco, KATERINAN, RN  Care Coordinator  [facility] [place]

## 2019-11-04 NOTE — PROGRESS NOTES
"[facility]    CHIEF COMPLAINT: Multiple pain complaints, pain related to movement attacks    INTERVAL HISTORY:  Last seen on 10/7/19.        Recommendations/plan at the last visit included:     1. Schedule pain psychology visits per Sol's recommendations   2. Schedule physical therapy assessment for gait   3. Schedule follow-up with CARLOS A Higuera NP-C in 4 weeks  4. Medication recommendations:           Refill of MS Contin and Norco sent to your pharmacy to be filled on Wed.    Since last visit:   - Having more movement attacks, \"Every month there is something new and different about the movements.\" Having more lumbar pain. Had to move to a new room due to the carpets being changed was very taxing physically and emotionally. Was afraid that he would be kicked out of his house if he didn't move. Notes that changing his enviroment has a positive impact on the frequency of movements. Sister is struggling with addiction, now committed and has lost custody of her kids. Hearing to determine if she can see her kids in Feb.   - Had to \"fire\" [facility] nurse because he was talking about patient's medical information (medications) in a public area of the group house. Having a meeting to get an ILS worker.     Pain Information:   Pain quality: Penetrating and Sharp    Pain rating: intensity ranges from 2/10 to 9/10, and averages 5/10 on a 0-10 scale.    Annual Controlled Substance Agreement/UDS due date: 4/2020     Current Pain Relevant Medications:  MS Contin 15 mg BID = 30 MME  Norco 5/325 mg 1 tab BID #45 tabs per month                        Total opiate dose: 35-40 MME daily  Clonazepam .5 mg 1-2 tab at HS PRN (takes about 3x weekly)   Duloxetine 20 mg daily  Naproxen 500 mg BID: on hold re: elevated LFTs   Tizanidine 4 mg 1-2 tabs at HS PRN  Adderall 30 mg daily, combination of long and short acting.       Previous Pain Relevant Medications: (H--helped; HI--Helped initially; SWH--Somewhat " helpful; NH--No help; W--worse; SE--side effects; ?--Unsure if helpful)   NOTE: This medication information taken from patient's intake form, not medical records.                         Opiates: Tramadol: H, Hydrocodone: H, Morphine: H                        NSAIDS: Ibuprofen:H, naproxen:H, Relafen: NH                        Muscle Relaxants: Cyclobenzaprine:H,Med interaction, Tizanidine:H, Baclofen: SWH                        Anti-migraine mediations: Prednisone:H                        Anti-depressants: Bupropion:SE, Celexa:SE, Duloxetine:H, Trazodone:Too strong, Venlafaxine:too strong, Amitriptyline:SE                         Sleep aids:Anbien: H                        Anxiolytics: Clonazepam:H                        Neuropathics: Tegretol:taken for seizures in childhood, Gabapentin:H                                          Topicals: Lidocaine:H                        Other medications not covered above: Tylenol:NH      Any illicit drug use: 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 Concern for multiple controlled substances including stimulants and opiates.  7/27/19: Concern for misuse of opiates and stimulants as noted in office visit on this date.   10/29/19: Requesting early refill due to overuse of opiate medication. #45 tabs to last 30 days. Refill declined.           Medications:  Current Outpatient Medications   Medication Sig Dispense Refill     albuterol (PROAIR HFA/PROVENTIL HFA/VENTOLIN HFA) 108 (90 Base) MCG/ACT inhaler INHALE ONE TO TWO PUFFS INTO THE LUNGS EVERY FOUR HOURS AS NEEDED FOR SHORTNESS OF BREATH/ DYSPNEA OR WHEEZING 18 g 0     amphetamine-dextroamphetamine (ADDERALL XR) 30 MG 24 hr  capsule TK 1 C PO QD  0     clonazePAM (KLONOPIN) 0.5 MG tablet 0.5mg tab by mouth nightly as needed  1     DULoxetine (CYMBALTA) 60 MG EC capsule Taking only 1 x 60mg tab by mouth daily 30 capsule 1     HYDROcodone-acetaminophen (NORCO) 5-325 MG tablet Take 1 tablet by mouth 2 times daily as needed for pain Okay to fill on 11/6/19. #45 tabs must last 30 days. 45 tablet 0     hydrOXYzine (ATARAX) 25 MG tablet Take 1 tablet (25 mg) by mouth nightly as needed (at HS PRN) 45 tablet 3     LORazepam (ATIVAN) 1 MG tablet Take one tablet 30 min prior to MRI 1 tablet 0     morphine (MS CONTIN) 15 MG CR tablet Take 1 tablet (15 mg) by mouth every 12 hours Ok at fill on 11/6/19 60 tablet 0     naloxone (NARCAN) 4 MG/0.1ML nasal spray Spray 1 spray (4 mg) into one nostril alternating nostrils as needed for opioid reversal every 2-3 minutes until assistance arrives 0.2 mL 0     naproxen (NAPROSYN) 500 MG tablet Take 1 tablet (500 mg) by mouth 2 times daily (with meals) 40 tablet 3     order for DME Equipment being ordered: 4 wheeled walker with bench seat. 1 Units 0     oxybutynin ER (DITROPAN-XL) 10 MG 24 hr tablet TK 1 T PO D  3     SF 5000 PLUS 1.1 % CREA   2     tiZANidine (ZANAFLEX) 4 MG tablet Take 4 mg by mouth as needed   0       Review of Systems: A 10-point review of systems was negative, with the exception of chronic pain issues.      Social History: Reviewed; unchanged from previous consultation.     Family history: Reviewed; unchanged from previous consultation.     PHYSICAL EXAM    Vitals:    11/05/19 0905   BP: 120/78   Pulse: 91   SpO2: 99%   Weight: 82.1 kg (181 lb)       Constitutional: healthy, alert, no distress, pain behaviors  HEENT: Head atraumatic, normocephalic. Eyes without conjunctival injection or jaundice. Neck supple. No obvious neck masses.  Skin: No rash, lesions, or petechiae of exposed skin.   Extremities: No clubbing, cyanosis, or edema to exposed extremities  Psychiatric/mental status: Alert,  without lethargy or stupor. Appropriate affect. Anxious regarding controlled medication misuse/destruction as noted above.        Neurologic exam:  CN:  Cranial nerves 2-12 are grossly normal.  Has uncontrolled upper body movement/tremor.          Psychiatric:  mentation appears normal., affect and mood normal      DIRE Score for ongoing opioid management is calculated as follows:    Diagnosis = 2 pts (slowly progressive; moderate pain/objective findings)    Intractability = 2 pts (most treatments tried; patient not fully engaged/barriers)    Risk        Psych = 2 pts (personality dysfunction/mental illness that moderately interferes with care)         Chem Hlth = 2 pts (use of medications to cope with stress; chemical dependency in remission)       Reliability = 3 pts (highly reliable with meds, appointments, treatments)       Social = 3 pts (supportive family/close relationships; involved in work/school; no isolation)       (Psych + Chem hlth + Reliability + Social) = 14    Efficacy = 2 pts (moderate benefit/function; low med dose; too early/not tried meds)    DIRE Score = 16        7-13: likely NOT suitable candidate for long-term opioid analgesia       14-21: may be a suitable candidate for long-term opioid analgesia          Previous Diagnostic Tests:   Imaging Studies:   MR LUMBAR SPINE W/O CONTRAST 5/2/2018 7:27 PM  History: DDD (degenerative disc disease), lumbar; Lumbar  radiculopathy; Hip pain, right; Groin pain, right.  ICD-10: DDD (degenerative disc disease), lumbar; Lumbar radiculopathy;  Hip pain, right; Groin pain, right  Comparison: Lumbar spine MRI 3/8/2017  Technique: Sagittal STIR, T1-weighted turbo spin-echo, 3-D volumetric  T2-weighted and axial reconstructed T2-weighted images of the lumbar  spine were obtained without intravenous contrast.   Findings: 5 lumbar-type vertebra. The tip of the conus medullaris is  at L1.  The lumbar vertebral column is normally aligned.   Straightening of lumbar  lordosis, unchanged. The intervertebral disc  heights are maintained. Normal marrow signal. At L5-S1, there is a  disc bulge and facet hypertrophy with mild left neural foraminal  narrowing, unchanged. No spinal canal stenosis.  Impression:  1. Lumbar spondylosis with mild left neural foraminal narrowing at  L5-S1.  2. No spinal canal stenosis.      MR right hip without contrast 5/2/2018 6:47 PM  Techniques: Multiplanar multisequence imaging of the right hip was  obtained without  administration of intra-articular or intravenous  contrast using routing protocol.  History: Hip pain.  Comparison: None available.  Findings:  Osseous structures  Osseous structures: No fracture, stress reaction, avascular necrosis,  or focal osseous lesion is seen. There is small focal area of  subchondral cystic changes in the anterior right femoral head neck  junction, most compatible with synovial herniation pit. Findings  suggestive of reduced femoral head neck junction though alpha angle  cannot be assessed owing to no dedicated oblique axial sequence  obtained.  Articular cartilage and labrum  Assessment limited on this arthrographic study due to relative lack of  joint distension.  Articular cartilage: No high grade chondral loss.  Labrum: Labral tearing approximately from 12:00 to 3:00 with 3:00  being anterior and 6:00 being the center of transverse ligament in  this convention with associated subtle area of subchondral edema in  the superior acetabulum.  Ligament teres and transverse ligament of acetabulum: Intact.  Joint or bursal effusion  Joint effusion: A physiologic amount of joint fluid.  Bursal effusion: Minimal nonspecific edema over the greater  trochanter. No substantial iliopsoas or trochanteric bursal effusion.  Muscles and tendons  Muscles and tendons: The rectus femoris, the visualized portion  iliopsoas, proximal hamstrings, and hip abductors are intact.  The  visualized adductor muscles are unremarkable.    Nerves:  The visualized course of the sciatic nerve is unremarkable.   Other Findings:  There is fat-containing right inguinal hernia.  Impression:  1. Approximately 12-3 o'clock labral tearing with synovial herniation  pit and likely reduced femoral head neck junction offset. 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  2. Fat containing right inguinal hernia.          ASSESSMENT:   1.  Lumbar DDD, mild  2.  Lumbar muscle spasm  3.  Labral tear, right hip  4.  Hx: anxiety, chemical dependence in remission.  5. Functional neurologic movement disorder  6.  Somatization disorder (?)    Shoaib presents for monthly medication management appointment.  When I entered the exam room he was having significant movement attacks.  As we began to talk throughout the appointment the movements almost ceased completely.  He does note the correlation between stress and the frequency and intensity of her movement attacks.  As noted above when he had to change rooms and rearranged his living space he noticed that the intensity of his attacks as well as the frequency went down.  Since our last visit he has had one appointment with pain psychology.  I will discuss starting physical therapy with him at the next office visit.      Tobacco use: Advised complete tobacco cessation    Plan:    Diagnosis reviewed, treatment option addressed, and risk/benifits discussed.  Self-care instructions given.  I am recommending a multidisciplinary treatment plan to help this patient better manage pain.      1. Schedule pain psychology visits  2. Consider starting physical therapy.  Will discuss at next office visit.  3. Schedule follow-up with CARLOS A Higuera, NP-C in 4 weeks  4. MRI of bilateral hips.   [facility]- Please call to schedule- [phone]  5. Look into ACOA, Big Red Book, Yellow Workbook and Laundry list.   6. Medication recommendations:   1. Refills of MS Contin and Norco  available to  tomorrow.     A total of 30 minutes was spent on the patient today, greater than 50% of that time was spent on face to face counseling and care coordination regarding diagnoses and treatment options as mentioned above including the multidisciplinary pain management program and the benefits of physical therapy, pain psychology and medication options.      CARLOS A Bass, NP-C  [facility]    Disclaimer: This note consists of symbols derived from keyboarding, dictation and/or voice recognition software. As a result, there may be errors in the script that have gone undetected. Please consider this when interpreting information found in this chart.

## 2019-11-05 ENCOUNTER — OFFICE VISIT (OUTPATIENT)
Dept: PALLIATIVE MEDICINE | Facility: CLINIC | Age: 34
End: 2019-11-05
Payer: COMMERCIAL

## 2019-11-05 ENCOUNTER — TELEPHONE (OUTPATIENT)
Dept: PALLIATIVE MEDICINE | Facility: CLINIC | Age: 34
End: 2019-11-05

## 2019-11-05 VITALS
BODY MASS INDEX: 28.35 KG/M2 | OXYGEN SATURATION: 99 % | WEIGHT: 181 LBS | HEART RATE: 91 BPM | DIASTOLIC BLOOD PRESSURE: 78 MMHG | SYSTOLIC BLOOD PRESSURE: 120 MMHG

## 2019-11-05 DIAGNOSIS — Z91.81 AT MODERATE RISK FOR FALL: ICD-10-CM

## 2019-11-05 DIAGNOSIS — G25.9 FUNCTIONAL MOVEMENT DISORDER: ICD-10-CM

## 2019-11-05 DIAGNOSIS — M25.551 HIP PAIN, RIGHT: ICD-10-CM

## 2019-11-05 DIAGNOSIS — S73.191S TEAR OF RIGHT ACETABULAR LABRUM, SEQUELA: ICD-10-CM

## 2019-11-05 DIAGNOSIS — M47.816 FACET ARTHROPATHY, LUMBAR: ICD-10-CM

## 2019-11-05 DIAGNOSIS — R26.9 ABNORMAL GAIT: Primary | ICD-10-CM

## 2019-11-05 DIAGNOSIS — M25.552 HIP PAIN, LEFT: ICD-10-CM

## 2019-11-05 PROCEDURE — 99214 OFFICE O/P EST MOD 30 MIN: CPT | Performed by: NURSE PRACTITIONER

## 2019-11-05 RX ORDER — MORPHINE SULFATE 15 MG/1
15 TABLET, FILM COATED, EXTENDED RELEASE ORAL EVERY 12 HOURS
Qty: 60 TABLET | Refills: 0 | Status: SHIPPED | OUTPATIENT
Start: 2019-11-05 | End: 2019-12-05

## 2019-11-05 RX ORDER — LORAZEPAM 1 MG/1
TABLET ORAL
Qty: 1 TABLET | Refills: 0 | Status: SHIPPED | OUTPATIENT
Start: 2019-11-05 | End: 2019-12-06

## 2019-11-05 ASSESSMENT — PAIN SCALES - GENERAL: PAINLEVEL: SEVERE PAIN (6)

## 2019-11-05 NOTE — PATIENT INSTRUCTIONS
After Visit Instructions:     Thank you for coming to [facility] for your care. It is my goal to partner with you to help you reach your optimal state of health.     Continue daily self-care, identifying contributing factors, and monitoring variations in pain level. Continue to integrate self-care into your life.      1. Schedule pain psychology visits   2. Schedule follow-up with CARLOS A Higuera NP-C in 4 weeks  3. MRI of bilateral hips.   [facility]- Please call to schedule- [phone]  4. Look into ACOA, Big Red Book, Yellow Workbook and Laundry list.   5. Medication recommendations:   1. Refills of MS Contin and Norco available to  tomorrow.       CARLOS A Bass NP-C  [facility]    Clinic Number:  [phone]     Call with any questions about your care and for scheduling assistance.     Calls are returned Monday through Friday between 8 AM and 4:30 PM. We usually get back to you within 2 business days depending on the issue/request.    If we are prescribing your medications:    For opioid medication refills, call the clinic or send a Phoneplus message 7 days in advance.  Please include:    Name of requested medication    Name of the pharmacy.    For non-opioid medications, call your pharmacy directly to request a refill. Please allow 3-4 days to be processed.     Per [place] State Law:    All controlled substance prescriptions must be filled within 30 days of being written.      For those controlled substances allowing refills, pickup must occur within 30 days of last fill.      We believe regular attendance is key to your success in our program!      Any time you are unable to keep your appointment we ask that you call us at least 24 hours in advance to cancel.This will allow us to offer the appointment time to another patient.   Multiple missed appointments may lead to dismissal from the clinic.

## 2019-11-05 NOTE — TELEPHONE ENCOUNTER
Prior Authorization Retail Medication Request    Medication/Dose: morphine (MS CONTIN) 15 MG CR tablet  ICD code (if different than what is on RX):    Previously Tried and Failed:    Rationale:      Insurance Name:  [facility]/Medicaid  Insurance ID:  [id number]      Pharmacy Information (if different than what is on RX)    [id number] - [place], [place] - [address] [place] [address] AVE [address] AT SEC OF [facility]  [address]  [facility] [address]  Phone: [phone] Fax: [phone]    Dawson:[id number]    Will route to PA team Antony Jolley [facility]  [place]

## 2019-11-06 ENCOUNTER — MYC MEDICAL ADVICE (OUTPATIENT)
Dept: PALLIATIVE MEDICINE | Facility: CLINIC | Age: 34
End: 2019-11-06

## 2019-11-06 NOTE — TELEPHONE ENCOUNTER
Central Prior Authorization Team   Phone: [phone]    Prior Authorization Approval    Authorization Effective Date: 10/7/2019  Authorization Expiration Date: 11/5/2020  Medication: morphine (MS CONTIN) 15 MG CR tablet  Approved Dose/Quantity:   Reference #: [id number]   Insurance Company: [employer] - Phone [phone] Fax [phone]  Expected CoPay:       CoPay Card Available:      Foundation Assistance Needed:    Which Pharmacy is filling the prescription (Not needed for infusion/clinic administered): [id number] [address] AT SEC OF [facility]  Pharmacy Notified: Yes  Patient Notified: Yes  **Instructed pharmacy to notify patient when script is ready to /ship.**    Approved today  Case Id: [id number]; Status: Approved; Review Type: Prior Auth; Coverage Start Date: 10/07/2019; Coverage End Date: 11/05/2020;

## 2019-11-06 NOTE — TELEPHONE ENCOUNTER
Patient is calling, he is completely out and is needing this ASAP.    Please call    Patient would like to know what he should do in the mean time? He didn't get any pain medications       Patient  States he is out of everything       Dominique TRINH    [facility]

## 2019-11-06 NOTE — TELEPHONE ENCOUNTER
Patient notified. Closing.        Oswald Moreno, [facility]  November 6, 2019

## 2019-11-08 ENCOUNTER — DOCUMENTATION ONLY (OUTPATIENT)
Dept: BEHAVIORAL HEALTH | Facility: CLINIC | Age: 34
End: 2019-11-08

## 2019-11-12 DIAGNOSIS — M54.16 LUMBAR RADICULAR PAIN: Primary | ICD-10-CM

## 2019-11-12 DIAGNOSIS — R26.9 GAIT ABNORMALITY: ICD-10-CM

## 2019-11-13 ENCOUNTER — TELEPHONE (OUTPATIENT)
Dept: BEHAVIORAL HEALTH | Facility: CLINIC | Age: 34
End: 2019-11-13

## 2019-11-13 ENCOUNTER — OFFICE VISIT (OUTPATIENT)
Dept: PALLIATIVE MEDICINE | Facility: CLINIC | Age: 34
End: 2019-11-13
Payer: COMMERCIAL

## 2019-11-13 ENCOUNTER — TELEPHONE (OUTPATIENT)
Dept: PALLIATIVE MEDICINE | Facility: CLINIC | Age: 34
End: 2019-11-13

## 2019-11-13 DIAGNOSIS — G89.29 CHRONIC LEFT HIP PAIN: ICD-10-CM

## 2019-11-13 DIAGNOSIS — G89.29 CHRONIC HIP PAIN, BILATERAL: Primary | ICD-10-CM

## 2019-11-13 DIAGNOSIS — M47.816 FACET ARTHROPATHY, LUMBAR: Primary | ICD-10-CM

## 2019-11-13 DIAGNOSIS — M25.552 CHRONIC LEFT HIP PAIN: ICD-10-CM

## 2019-11-13 DIAGNOSIS — G89.4 CHRONIC PAIN SYNDROME: ICD-10-CM

## 2019-11-13 DIAGNOSIS — G89.29 CHRONIC RIGHT HIP PAIN: ICD-10-CM

## 2019-11-13 DIAGNOSIS — M25.551 CHRONIC RIGHT HIP PAIN: ICD-10-CM

## 2019-11-13 DIAGNOSIS — M25.551 CHRONIC HIP PAIN, BILATERAL: Primary | ICD-10-CM

## 2019-11-13 DIAGNOSIS — M25.551 HIP PAIN, RIGHT: ICD-10-CM

## 2019-11-13 DIAGNOSIS — M25.552 CHRONIC HIP PAIN, BILATERAL: Primary | ICD-10-CM

## 2019-11-13 PROCEDURE — 96152 HC HEALTH AND BEHAVIOR INTERVENTION, INDIVIDUAL, EACH 15 MINUTES: CPT | Performed by: PSYCHOLOGIST

## 2019-11-13 NOTE — TELEPHONE ENCOUNTER
Routing to provider to review and place order for MRI of  hips separately.     KATERINA CardenasN, RN  Care Coordinator  [facility] [place]

## 2019-11-13 NOTE — TELEPHONE ENCOUNTER
Behavioral Health Home Services  [facility]: [place]        Social Work Care Navigator Note      Patient: Hoang Kiser  Date: November 13, 2019  Preferred Name: Shoaib    Previous PHQ-9:   PHQ-9 SCORE 10/1/2018 3/22/2019 10/22/2019   PHQ-9 Total Score - - -   PHQ-9 Total Score 13 11 9     Previous JIN-7:   JIN-7 SCORE 10/1/2018 3/22/2019 10/22/2019   Total Score - - -   Total Score 13 12 14     MOLLY LEVEL:  MOLLY Score (Last Two) 3/23/2016 10/1/2018   MOLLY Raw Score 52 31   Activation Score 100 59.3   MOLLY Level 4 3       Preferred Contact:  Need for : No  Preferred Contact: Cell      Type of Contact Today: Phone call (patient / identified key support person reached)      Data: (subjective / Objective):  Recent ED/IP Admission or Discharge?   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 Patient stated that things are going well. He had no concerns at the moment     Intervention:  Motivational Interviewing: Open-ended questions   Target Behavior(s): Explored current social supports and reinforced opportunities to increase engagement    Assessment: (Progress on Goals / Homework):  Social  worker reviewed health action plan goals. See Objectives for more details. Progress: Needs improvement. Next Steps:  will continue to work with patient to support patient in achieving their goals.    Plan: (Homework, other):  Patient was encouraged to continue to seek condition-related information and education.      Scheduled a Phone follow up appointment with BROWN BURGESS in 4 weeks     Patient has set self-identified goals and will monitor progress until the next appointment on: TBD.     LAKSHMI Dennis, Social Work Care Coordinator               Next 5 appointments (look out 90 days)    Dec 05, 2019  9:00 AM CST  Return Visit with CARLOS A Guy CNP  [facility] ([facility]) [address]  [address]  [facility] [address] [phone]

## 2019-11-13 NOTE — TELEPHONE ENCOUNTER
Order placed. Patient notified by My Chart.     CARLOS A Bass, NP-C  [facility] [place]

## 2019-11-13 NOTE — PROGRESS NOTES
[facility]   [facility] [place]  Behavioral Medicine Visit    Patient Name: Hoang Kiser     YOB: 1985   Medical Record Number: [id number]  Date: 11/13/2019                SUBJECTIVE: Reports his pain and mood are both mildly worse. Cites continued issues with boundaries with his , weather and being denied disability are all contributing to his worse pain and mood. States his activity is about the same. Continues to report using walking to manage 'movement attacks.' Stress mildly worse due to disability denial. Sleep the same. Engages in self-care for his pain 2-3 times daily. Will do a full evaluation through [facility] next month. Encouraged him to set boundaries with his  and continue to process this with his individual therapist as needed.    OBJECTIVE: Continues to engage easily, utilize session appropriately.    Length of Visit: 60 minutes     Pain Diagnoses per pain provider:   Facet arthropathy lumbar, hip pain right, chronic pain syndrome     Assessment: Current Emotional / Mental Status    Appearance:   Appropriate   Eye Contact:   Good   Psychomotor Behavior: Normal   Attitude:   Cooperative   Orientation:   All  Speech  Rate / Production:             Normal   Volume:              Normal   Mood:    Normal  Affect:    Appropriate  Flat   Thought Content:  Clear   Thought Form:  Coherent  Logical   Insight:    Good     ASSESSMENT:   Progress toward goals: good.    Pain Status: worsened    Emotional Status: worsened              Medication / chemical use concerns:  None    PLAN:   Next Appointment: Hoang Kiser will schedule a follow-up appointment in 2-3 week(s).  Assignment: Continue with self-care daily for his pain. Explore setting limits with .  Objectives / interventions for next session: INDER Kemp PsyD LP  Outpatient Clinic Therapist  REI Reynoso  [facility]    Disclaimer: This note consists of symbols derived from keyboarding, dictation and/or voice recognition software. As a result, there may be errors in the script that have gone undetected. Please consider this when interpreting information found in this chart.

## 2019-11-13 NOTE — TELEPHONE ENCOUNTER
Imaging called and stated the second order is incorrect.    There has to be two separate MRI's.    1: Needs to be Right Hip with or without contrast    2: Needs to be Left Hip with or without contrast      Reanna Reagan    [facility]

## 2019-11-13 NOTE — TELEPHONE ENCOUNTER
Imaging called stating the MRI needs to be placed separate for this to be done.  Any questions please call: [phone]      Reanna Reagan    [facility]

## 2019-11-13 NOTE — TELEPHONE ENCOUNTER
Patient called wondering when his MRI will be placed correctly      Reanna Reagan    [facility]

## 2019-11-15 NOTE — TELEPHONE ENCOUNTER
MRI orders updated.     CARLOS A Bass, NP-C  [facility] [place]

## 2019-11-21 ENCOUNTER — ANCILLARY PROCEDURE (OUTPATIENT)
Dept: MRI IMAGING | Facility: CLINIC | Age: 34
End: 2019-11-21
Attending: NURSE PRACTITIONER
Payer: COMMERCIAL

## 2019-11-21 DIAGNOSIS — G89.29 CHRONIC RIGHT HIP PAIN: ICD-10-CM

## 2019-11-21 DIAGNOSIS — M25.551 CHRONIC RIGHT HIP PAIN: ICD-10-CM

## 2019-11-21 DIAGNOSIS — M25.552 CHRONIC LEFT HIP PAIN: ICD-10-CM

## 2019-11-21 DIAGNOSIS — G89.29 CHRONIC LEFT HIP PAIN: ICD-10-CM

## 2019-11-21 PROCEDURE — 73721 MRI JNT OF LWR EXTRE W/O DYE: CPT | Mod: LT | Performed by: RADIOLOGY

## 2019-11-21 PROCEDURE — 73721 MRI JNT OF LWR EXTRE W/O DYE: CPT | Mod: RT | Performed by: RADIOLOGY

## 2019-12-02 NOTE — RESULT ENCOUNTER NOTE
Test results were as expected with no cause for concern. Will discuss further at the next appointment.     CARLOS A Bass, NP-C   [facility] [place]

## 2019-12-04 ENCOUNTER — OFFICE VISIT (OUTPATIENT)
Dept: PALLIATIVE MEDICINE | Facility: CLINIC | Age: 34
End: 2019-12-04
Payer: COMMERCIAL

## 2019-12-04 ENCOUNTER — TELEPHONE (OUTPATIENT)
Dept: BEHAVIORAL HEALTH | Facility: CLINIC | Age: 34
End: 2019-12-04

## 2019-12-04 DIAGNOSIS — G89.4 CHRONIC PAIN SYNDROME: Primary | ICD-10-CM

## 2019-12-04 DIAGNOSIS — M47.816 FACET ARTHROPATHY, LUMBAR: ICD-10-CM

## 2019-12-04 DIAGNOSIS — G25.9 FUNCTIONAL MOVEMENT DISORDER: ICD-10-CM

## 2019-12-04 DIAGNOSIS — F32.1 MODERATE MAJOR DEPRESSION (H): ICD-10-CM

## 2019-12-04 DIAGNOSIS — M25.551 HIP PAIN, RIGHT: ICD-10-CM

## 2019-12-04 PROCEDURE — 96152 HC HEALTH AND BEHAVIOR INTERVENTION, INDIVIDUAL, EACH 15 MINUTES: CPT | Performed by: PSYCHOLOGIST

## 2019-12-04 NOTE — PROGRESS NOTES
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        Recommendations/plan at the last visit included:     1. Schedule pain psychology visits  2. Consider starting physical therapy.  Will discuss at next office visit.  3. Schedule follow-up with CARLOS A Higuera, NP-C in 4 weeks  4. MRI of bilateral hips.   1. [facility]- Please call to schedule- [phone]  5. Look into ACOA, Big Red Book, Yellow Workbook and Laundry list.   6. Medication recommendations:             1. Refills of MS Contin and Norco available to  tomorrow.     Since last visit:   - Pain Psychology X1 since last office visit. Feels like he is having a better connection/communication with Dr Kemp and finds the sessions helpful. Seeing other therapist every other week as well.    - States  tried to get him to drop SSDI case. Disability denied due to lack of work credits which he was previously told that he had. Very depressed about this. Trying to find another . Continues to have stress related to housing situation. Continues to have severe movement attacks every other day. States that he had an episode where he felt paralyzed for about a hour.   - Has [place] pain evaluation next week.     Pain Information:   Pain quality: Burning, Penetrating and Tiring    Pain rating: intensity ranges from 2/10 to 9/10, and averages 6/10 on a 0-10 scale.    Annual Controlled Substance Agreement/UDS due date: 4/2020     Current Pain Relevant Medications:  MS Contin 15 mg BID = 30 MME  Norco 5/325 mg 1 tab BID-TID  #60 tabs per month                        Total opiate dose: 35-40 MME daily  Clonazepam .5 mg 1-2 tab at HS PRN (takes about 3x weekly)   Duloxetine 20 mg daily  Naproxen 500 mg BID: on hold re: elevated LFTs   Tizanidine 4 mg 1-2 tabs at HS PRN  Adderall 30 mg daily, combination of long and short acting.        Previous Pain Relevant Medications: (H--helped; HI--Helped initially; SWH--Somewhat helpful; NH--No help; W--worse; SE--side effects; ?--Unsure if helpful)   NOTE: This medication information taken from patient's intake form, not medical records.                         Opiates: Tramadol: H, Hydrocodone: H, Morphine: H                        NSAIDS: Ibuprofen:H, naproxen:[facility], Relafen: NH                        Muscle Relaxants: Cyclobenzaprine:H,Med interaction, Tizanidine:H, Baclofen: [facility]                        Anti-migraine mediations: Prednisone:H                        Anti-depressants: Bupropion:SE, Celexa:SE, Duloxetine:H, Trazodone:Too strong, Venlafaxine:too strong, Amitriptyline:SE                         Sleep aids:Anbien: H                        Anxiolytics: Clonazepam:H                        Neuropathics: Tegretol:taken for seizures in childhood, Gabapentin:H                                          Topicals: Lidocaine:H                        Other medications not covered above: Tylenol:NH      Any illicit drug use: 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 Concern for multiple controlled substances including stimulants and opiates.  7/27/19: Concern for misuse of opiates and stimulants as noted in office visit on this date.   10/29/19: Requesting early refill due to overuse of opiate medication. #45 tabs to last 30 days. Refill declined.         Medications:  Current Outpatient Medications   Medication Sig Dispense Refill     HYDROcodone-acetaminophen (NORCO) 5-325 MG tablet Take 1 tablet by mouth 3 times daily as needed for pain Okay to fill on 12/5/19 to start on 12/6/19. #60 tabs must last 30  days. 60 tablet 0     morphine (MS CONTIN) 15 MG CR tablet Take 1 tablet (15 mg) by mouth every 12 hours Ok to fill on 12/5/19 to start on 12/6/19 60 tablet 0     albuterol (PROAIR HFA/PROVENTIL HFA/VENTOLIN HFA) 108 (90 Base) MCG/ACT inhaler INHALE ONE TO TWO PUFFS INTO THE LUNGS EVERY FOUR HOURS AS NEEDED FOR SHORTNESS OF BREATH/ DYSPNEA OR WHEEZING 18 g 0     amphetamine-dextroamphetamine (ADDERALL XR) 30 MG 24 hr capsule TK 1 C PO QD  0     clonazePAM (KLONOPIN) 0.5 MG tablet 0.5mg tab by mouth nightly as needed  1     DULoxetine (CYMBALTA) 60 MG EC capsule Taking only 1 x 60mg tab by mouth daily 30 capsule 1     hydrOXYzine (ATARAX) 25 MG tablet Take 1 tablet (25 mg) by mouth nightly as needed (at HS PRN) 45 tablet 3     LORazepam (ATIVAN) 1 MG tablet Take one tablet 30 min prior to MRI 1 tablet 0     naloxone (NARCAN) 4 MG/0.1ML nasal spray Spray 1 spray (4 mg) into one nostril alternating nostrils as needed for opioid reversal every 2-3 minutes until assistance arrives 0.2 mL 0     naproxen (NAPROSYN) 500 MG tablet Take 1 tablet (500 mg) by mouth 2 times daily (with meals) 40 tablet 3     order for DME Equipment being ordered: 4 wheeled walker with bench seat. 1 Units 0     oxybutynin ER (DITROPAN-XL) 10 MG 24 hr tablet TK 1 T PO D  3     SF 5000 PLUS 1.1 % CREA   2     tiZANidine (ZANAFLEX) 4 MG tablet Take 4 mg by mouth as needed   0       Review of Systems: A 10-point review of systems was negative, with the exception of chronic pain issues.      PHYSICAL EXAM    Vitals:    12/05/19 0947   BP: (!) 143/83   Pulse: 82   SpO2: 98%   Weight: 84.4 kg (186 lb)       Constitutional: healthy, alert, no distress, pain behaviors  HEENT: Head atraumatic, normocephalic. Eyes without conjunctival injection or jaundice. Neck supple. No obvious neck masses.  Skin: No rash, lesions, or petechiae of exposed skin.   Extremities: No clubbing, cyanosis, or edema to exposed extremities  Psychiatric/mental status: Alert,  without lethargy or stupor. Appropriate affect. Anxious regarding controlled medication misuse/destruction as noted above.        Neurologic exam:  CN:  Cranial nerves 2-12 are grossly normal.  Has uncontrolled upper body movement/tremor.          Psychiatric:  mentation appears normal., affect and mood normal      DIRE Score for ongoing opioid management is calculated as follows:    Diagnosis = 2 pts (slowly progressive; moderate pain/objective findings)    Intractability = 2 pts (most treatments tried; patient not fully engaged/barriers)    Risk        Psych = 2 pts (personality dysfunction/mental illness that moderately interferes with care)         Chem Hlth = 2 pts (use of medications to cope with stress; chemical dependency in remission)       Reliability = 3 pts (highly reliable with meds, appointments, treatments)       Social = 3 pts (supportive family/close relationships; involved in work/school; no isolation)       (Psych + Chem hlth + Reliability + Social) = 14    Efficacy = 2 pts (moderate benefit/function; low med dose; too early/not tried meds)    DIRE Score = 16        7-13: likely NOT suitable candidate for long-term opioid analgesia       14-21: may be a suitable candidate for long-term opioid analgesia          Previous Diagnostic Tests:   Imaging Studies:   MR LUMBAR SPINE W/O CONTRAST 5/2/2018 7:27 PM  History: DDD (degenerative disc disease), lumbar; Lumbar  radiculopathy; Hip pain, right; Groin pain, right.  ICD-10: DDD (degenerative disc disease), lumbar; Lumbar radiculopathy;  Hip pain, right; Groin pain, right  Comparison: Lumbar spine MRI 3/8/2017  Technique: Sagittal STIR, T1-weighted turbo spin-echo, 3-D volumetric  T2-weighted and axial reconstructed T2-weighted images of the lumbar  spine were obtained without intravenous contrast.   Findings: 5 lumbar-type vertebra. The tip of the conus medullaris is  at L1.  The lumbar vertebral column is normally aligned.   Straightening of lumbar  lordosis, unchanged. The intervertebral disc  heights are maintained. Normal marrow signal. At L5-S1, there is a  disc bulge and facet hypertrophy with mild left neural foraminal  narrowing, unchanged. No spinal canal stenosis.  Impression:  1. Lumbar spondylosis with mild left neural foraminal narrowing at  L5-S1.  2. No spinal canal stenosis.      MR right hip without contrast 11/22/2019 8:28 AM  Techniques: Multiplanar multisequence imaging of the right  hip was  obtained without  administration of intra-articular or intravenous  contrast using routing protocol.  History: Hip pain, chronic, labral tear suspected, neg xray or  equivocal; ; Progressive pain; Chronic right hip pain; Chronic right  hip pain    Comparison: MRI hip 5/2/2018  Findings:  Osseous structures  Osseous structures: Bone marrow edema like signal intensity along the  anterior head neck junction less pronounced than prior MRI. No  fracture or avascular necrosis. Osseous prominence at the femoral  neck, which may be seen with cam-type femoral acetabular impingement.  Articular cartilage and labrum  Assessment limited on this arthrographic study due to relative lack of  joint distension.  Articular cartilage: No high grade chondral loss.  Labrum: Increased signal intensity in the anterosuperior labrum from  12:00 to 3:00 position suggestive of labral tear (3:00 anterior  relative to the transverse ligament).  Ligament teres and transverse ligament of acetabulum: Intact.  Joint or bursal effusion  Joint effusion: A physiologic amount of joint fluid.  Bursal effusion: Minimal nonspecific edema over the greater  trochanter. No substantial iliopsoas or trochanteric bursal effusion.  Muscles and tendons  Muscles and tendons: The rectus femoris, iliopsoas, proximal  hamstrings, and hip abductors are intact.  The visualized adductor  muscles are unremarkable.   Nerves:  The visualized course of the sciatic nerve is unremarkable.  Other  Findings:  Prominent fat in the right inguinal canal.. Few subcentimeter right  inguinal lymph nodes.  Impression:  1. Redemonstration of labral tear extending from 12:00 to 3:00  position  2. Nonspecific, but likely degenerative, bone marrow edema like signal  intensity along femoral head and junction, less conspicuous compared  to prior MRI dated 5/20/2018. No fracture or avascular necrosis.     MR left hip without contrast 11/22/2019 8:52 AM  Techniques: Multiplanar multisequence imaging of the left hip was  obtained without  administration of intra-articular or intravenous  contrast using routing protocol.  History: Progressive pain; Chronic left hip pain; Chronic left hip  pain    Comparison: None  Findings:  Osseous structures  Osseous structures: Bone marrow edema like signal intensity in the  anteromedial head neck junction, possibly degenerative. No fracture,  stress reaction or avascular necrosis.   Articular cartilage and labrum  Assessment limited on this arthrographic study due to relative lack of  joint distension.  Articular cartilage: No high grade chondral loss.  Labrum: Altered signal intensity in the labrum extending from 2:00 to  4:00 position (3:00 anterior relative to transverse ligament)  Ligament teres and transverse ligament of acetabulum: Intact.  Joint or bursal effusion  Joint effusion: A physiologic amount of joint fluid.  Bursal effusion: Minimal nonspecific edema over the greater  trochanter. No substantial iliopsoas or trochanteric bursal effusion.  Muscles and tendons  Muscles and tendons: The rectus femoris, iliopsoas, proximal  hamstrings, and hip abductors are intact.  The visualized adductor  muscles are unremarkable.   Nerves:  The visualized course of the sciatic nerve is unremarkable.  Other Findings:  Prominent fat in the left inguinal canal. Few subcentimeter inguinal  lymph nodes.  Impression:  1. Tearing of the anterior superior labrum extending from 2:00 to 4:00  position  (3:00 anterior relative to transverse ligament).  2. Nonspecific bone marrow edema like signal intensity along the  anteromedial femoral head neck junction, likely degenerative.          ASSESSMENT:   1.  Lumbar DDD, mild  2.  Lumbar muscle spasm  3.  Labral tear, right hip  4.  Hx: anxiety, chemical dependence in remission.  5. Functional neurologic movement disorder  6.  Somatization disorder (?)    As noted, Shoaib is struggling emotionally with roadblocks to getting disability and feels like his  were not honest with him. Working through this in pain psychology and outside therapist. Looking forwarfd to [place] evaluation. We will consider ortho eval for hips and PT after [facility]    Hypertension: Shoaib to follow up with Primary Care provider regarding elevated blood pressure.  Tobacco use: Advised complete tobacco cessation.     Plan:    Diagnosis reviewed, treatment option addressed, and risk/benifits discussed.  Self-care instructions given.  I am recommending a multidisciplinary treatment plan to help this patient better manage pain.      1. Schedule pain psychology visits per Sol's recommendations  2. We can discuss physical therapy after [place] visits.   3. Will consider Ortho eval for bilateral labral tears after [place] evaluation.   4. Schedule follow-up with CARLOS A Higuera, NP-C in 4 weeks  5. Medication recommendations:   1. Refills of MS Contin and [place] sent to pharmacy. Allowing temporary increase of Norco to #60 tabs per month.     A total of 30 minutes was spent on the patient today, greater than 50% of that time was spent on face to face counseling and care coordination regarding diagnoses and treatment options as mentioned above including the multidisciplinary pain management program and the benefits of physical therapy, pain psychology and medication options.      CARLOS A Bass, NP-C  [facility]    Disclaimer: This note consists of symbols derived from  keyboarding, dictation and/or voice recognition software. As a result, there may be errors in the script that have gone undetected. Please consider this when interpreting information found in this chart.

## 2019-12-04 NOTE — TELEPHONE ENCOUNTER
Behavioral Health Home Services  [facility]: [place]        Social Work Care Navigator Note      Patient: Hoang Kiser  Date: December 4, 2019  Preferred Name: Shoaib    Previous PHQ-9:   PHQ-9 SCORE 10/1/2018 3/22/2019 10/22/2019   PHQ-9 Total Score - - -   PHQ-9 Total Score 13 11 9     Previous JIN-7:   JIN-7 SCORE 10/1/2018 3/22/2019 10/22/2019   Total Score - - -   Total Score 13 12 14     MOLLY LEVEL:  MOLLY Score (Last Two) 3/23/2016 10/1/2018   MOLLY Raw Score 52 31   Activation Score 100 59.3   MOLLY Level 4 3       Preferred Contact:  Need for : No  Preferred Contact: Cell      Type of Contact Today: Phone call (not reached/unavailable)      Data: (subjective / Objective):  Attempted to reach patient, but was unsuccessful.  Plan to attempt again.      [facility] sent patient a letter in the mail notifying him of [facility]'s departure from the [facility].    [facility] received a call back from patient. Patient reports that he feels he has had a lot of things going on lately. He stated that he was denied for SSDI. He expressed his frustration with this process. Patient also stated that he is losing trust with his  over a situation. He did not get in to specifics. He mentioned that he had to move his stuff to another floor and another room while his room is being worked on. [facility] notified patient of [facility]'s departure from [facility]. Patient had no further questions. [facility] let him know that he will be receiving a letter with this information.     KATERINA DennisW        Next 5 appointments (look out 90 days)    Dec 04, 2019  3:00 PM CST  Return Visit with Sol Kemp  [facility] ([facility]) [address] [facility]  [address]  [place] [address] [phone]   Dec 05, 2019  9:00 AM CST  Return Visit with CARLOS A Guy CNP  [facility] ([facility]) [address]  [address]  [facility] [id number] [phone]   Dec 06,  2019  9:40 AM CST  Office Visit with Ricardo Alejandro PA-C  [facility] ([facility] [address] [facility] [address] [phone]

## 2019-12-04 NOTE — LETTER
[facility]  [address] [facility] [address]  Phone: [phone]      12/4/2019    Hoang Kiser  [address]  [place] [facility] [address]      Dear Hoang,     I am writing to let you know that effective 12/23/2019, I will no longer be the Behavioral Health Home Social Work Care Coordinator for the [facility]. Thank you for allowing me to be a part of your care team. It has been a privilege to work with you. The [facility] team is dedicated to continuing to provide excellent patient care through this transition. There will be [facility] Social Workers who will be available to assist you by phone during business hours. Please continue to call my voicemail at [phone] and a  from the [facility] team will call you back. Thank you for giving me the opportunity to work with you.         Kind Regards,       Andrez Brian, [facility]  Behavioral [facility]  Social Work Care Coordinator

## 2019-12-05 ENCOUNTER — OFFICE VISIT (OUTPATIENT)
Dept: PALLIATIVE MEDICINE | Facility: CLINIC | Age: 34
End: 2019-12-05
Payer: COMMERCIAL

## 2019-12-05 VITALS
SYSTOLIC BLOOD PRESSURE: 143 MMHG | DIASTOLIC BLOOD PRESSURE: 83 MMHG | WEIGHT: 186 LBS | HEART RATE: 82 BPM | BODY MASS INDEX: 29.13 KG/M2 | OXYGEN SATURATION: 98 %

## 2019-12-05 DIAGNOSIS — G89.4 CHRONIC PAIN SYNDROME: Primary | ICD-10-CM

## 2019-12-05 DIAGNOSIS — M47.816 FACET ARTHROPATHY, LUMBAR: ICD-10-CM

## 2019-12-05 DIAGNOSIS — G25.9 FUNCTIONAL MOVEMENT DISORDER: ICD-10-CM

## 2019-12-05 DIAGNOSIS — G89.29 CHRONIC HIP PAIN, BILATERAL: ICD-10-CM

## 2019-12-05 DIAGNOSIS — M25.551 CHRONIC HIP PAIN, BILATERAL: ICD-10-CM

## 2019-12-05 DIAGNOSIS — M25.552 CHRONIC HIP PAIN, BILATERAL: ICD-10-CM

## 2019-12-05 PROCEDURE — 99214 OFFICE O/P EST MOD 30 MIN: CPT | Performed by: NURSE PRACTITIONER

## 2019-12-05 RX ORDER — HYDROCODONE BITARTRATE AND ACETAMINOPHEN 5; 325 MG/1; MG/1
1 TABLET ORAL 3 TIMES DAILY PRN
Qty: 60 TABLET | Refills: 0 | Status: SHIPPED | OUTPATIENT
Start: 2019-12-05 | End: 2020-01-02

## 2019-12-05 RX ORDER — MORPHINE SULFATE 15 MG/1
15 TABLET, FILM COATED, EXTENDED RELEASE ORAL EVERY 12 HOURS
Qty: 60 TABLET | Refills: 0 | Status: SHIPPED | OUTPATIENT
Start: 2019-12-05 | End: 2020-01-02

## 2019-12-05 NOTE — PATIENT INSTRUCTIONS
After Visit Instructions:     Thank you for coming to [facility] for your care. It is my goal to partner with you to help you reach your optimal state of health.     Continue daily self-care, identifying contributing factors, and monitoring variations in pain level. Continue to integrate self-care into your life.      1. Schedule pain psychology visits per Sol's recommendations  2. We can discuss physical therapy after [place] visits.   3. Schedule follow-up with CARLOS A Higuera NP-C in 4 weeks  4. Medication recommendations:   1. Refills of MS Contin and [place] sent to your pharmacy. Allowing temporary increase of Norco to #60 tabs per month.     CARLOS A Bass NP-C  [facility]  [facility]    Clinic Number:  [phone]     Call with any questions about your care and for scheduling assistance.     Calls are returned Monday through Friday between 8 AM and 4:30 PM. We usually get back to you within 2 business days depending on the issue/request.    If we are prescribing your medications:    For opioid medication refills, call the clinic or send a [employer] message 7 days in advance.  Please include:    Name of requested medication    Name of the pharmacy.    For non-opioid medications, call your pharmacy directly to request a refill. Please allow 3-4 days to be processed.     Per [place] State Law:    All controlled substance prescriptions must be filled within 30 days of being written.      For those controlled substances allowing refills, pickup must occur within 30 days of last fill.      We believe regular attendance is key to your success in our program!      Any time you are unable to keep your appointment we ask that you call us at least 24 hours in advance to cancel.This will allow us to offer the appointment time to another patient.   Multiple missed appointments may lead to dismissal from the clinic.

## 2019-12-05 NOTE — PROGRESS NOTES
"                                  [facility]   [facility] [place]  Behavioral Medicine Visit    Patient Name: Hoang Kiser     YOB: 1985   Medical Record Number: [id number]  Date: 12/5/2019                SUBJECTIVE: Patient reports his pain is moderately worse.  He states this is due to a labrum tear on the left side which he believes occurred when he was required to move his belongings into another room during repairs and then back again.  Reports his mood is mildly worst.  He is working on trying to digest the Social Security disability denial, and explore next steps.  He reports that his  has \"insisted\" that he attend a trauma group-he is not able to recall which program he was referred to and was provided information on [facility]'s Trauma Skills [facility], as they may now have a men's group.  He requested referral for psychiatry, which was discussed at his last visit with CARLOS A Higuera CNP so that all of his care is within 1 system.  He does report that he will be starting consultations next week with the [facility] functional movement disorder therapy program.  His activity is moderately decreased.  His stress is moderately worse.  His sleep is mildly worse.  He continues to report engaging in self-care 2-3 times per day.  Patient seems to continue to try to focus on others perhaps in an effort to avoid full engagement in his own self-care, he seems to have some awareness of how this negatively impacts his own pain.    OBJECTIVE: Patient seems to have  minimal to some awareness that focus on others and their issues distracts from full engagement in his own care.    Length of Visit: 60 minutes      Pain Diagnoses per pain provider:  Chronic pain syndrome      Facet arthropathy, lumbar      Functional movement disorder      Hip pain, right      Assessment: Current Emotional / Mental " Status    Appearance:   Appropriate   Eye Contact:   Fair   Psychomotor Behavior: Normal  Restless   Attitude:   Cooperative  Guarded   Orientation:   All  Speech  Rate / Production:             Normal   Volume:              Normal   Mood:    Anxious  Depressed   Affect:    Flat  Restricted  Subdued   Thought Content:  Clear   Thought Form:  Coherent  Logical   Insight:    Fair     ASSESSMENT:   Progress toward goals: fair.    Pain Status: worsened    Emotional Status: worsened              Medication / chemical use concerns: None    PLAN:   Next Appointment: Hoang Kiser will schedule a follow-up appointment in 2 week(s).  Assignment: Focused on present moment and his own self-care.  Objectives / interventions for next session: Explore any barriers to engagement in self-care.    Sol Kemp PsyD LP  Outpatient Clinic Therapist  [facility]    Disclaimer: This note consists of symbols derived from keyboarding, dictation and/or voice recognition software. As a result, there may be errors in the script that have gone undetected. Please consider this when interpreting information found in this chart.

## 2019-12-06 ENCOUNTER — OFFICE VISIT (OUTPATIENT)
Dept: FAMILY MEDICINE | Facility: CLINIC | Age: 34
End: 2019-12-06
Payer: COMMERCIAL

## 2019-12-06 VITALS
DIASTOLIC BLOOD PRESSURE: 84 MMHG | SYSTOLIC BLOOD PRESSURE: 125 MMHG | BODY MASS INDEX: 29.13 KG/M2 | OXYGEN SATURATION: 100 % | WEIGHT: 186 LBS | HEART RATE: 75 BPM

## 2019-12-06 DIAGNOSIS — F41.9 ANXIETY: ICD-10-CM

## 2019-12-06 DIAGNOSIS — B07.8 OTHER VIRAL WARTS: Primary | ICD-10-CM

## 2019-12-06 PROCEDURE — 17110 DESTRUCTION B9 LES UP TO 14: CPT | Performed by: PHYSICIAN ASSISTANT

## 2019-12-06 PROCEDURE — 99207 ZZC DROP WITH A PROCEDURE: CPT | Performed by: PHYSICIAN ASSISTANT

## 2019-12-06 NOTE — TELEPHONE ENCOUNTER
Fax received from: [facility] Refill authorization requested    Drug: hydrOXYzine (ATARAX) 25 MG tablet  Qty: 150  Last filled 11/15/19  Last seen: 12/05/19  Next appointment: 1/2/20    Everton Li MA

## 2019-12-06 NOTE — PROGRESS NOTES
SUBJECTIVE:  Hoang is here for follow up of warts which were treated with liquid nitrogen    OBJECTIVE:   /84   Pulse 75   Wt 84.4 kg (186 lb)   SpO2 100%   BMI 29.13 kg/m    Exam discloses verrucae in the same areas as last visit decreased in size and thickness. Right thumb    ASSESSMENT:  Warts- improved, but not yet resolved.    PLAN:   The patient/parents elect to proceed with liquid nitrogen (3 cycles with full thaw between) after paring down the lesions with a sterile scalpel blade.    The patient was warned regarding pain, skin necrosis, bullae, and need for re-treatment.  Follow up  2 weeks    Ricardo Alejandro PA-C

## 2019-12-07 RX ORDER — HYDROXYZINE HYDROCHLORIDE 25 MG/1
25 TABLET, FILM COATED ORAL
Qty: 45 TABLET | Refills: 3 | Status: SHIPPED | OUTPATIENT
Start: 2019-12-07 | End: 2020-05-26

## 2019-12-13 DIAGNOSIS — M79.18 CHRONIC MYOFASCIAL PAIN: ICD-10-CM

## 2019-12-13 DIAGNOSIS — G89.29 CHRONIC MYOFASCIAL PAIN: ICD-10-CM

## 2019-12-13 DIAGNOSIS — G62.9 NEUROPATHY: ICD-10-CM

## 2019-12-13 RX ORDER — NAPROXEN 500 MG/1
500 TABLET ORAL 2 TIMES DAILY WITH MEALS
Qty: 40 TABLET | Refills: 3 | Status: SHIPPED | OUTPATIENT
Start: 2019-12-13 | End: 2020-02-26

## 2019-12-13 NOTE — TELEPHONE ENCOUNTER
Received  request from patient requesting refill(s) for naproxen (NAPROSYN) 500 MG tablet    Last refilled on 11/15/2019    Pt last seen on 12/5/2019  Next appt scheduled for 1/2/2020    Will facilitate refill.

## 2019-12-13 NOTE — TELEPHONE ENCOUNTER
Reason for call:  Medication   If this is a refill request, has the caller requested the refill from the pharmacy already? Yes  Will the patient be using a [facility]? No  Name of the pharmacy and phone number for the current request: [facility]    Name of the medication requested: naproxen (NAPROSYN) 500 MG tablet    Other request: N/A    Phone number to reach patient:  Home number on file [phone] (home)    Best Time:  N/A    Can we leave a detailed message on this number?  YES     Ana CHAMBERLAIN    [facility]

## 2019-12-18 ENCOUNTER — TELEPHONE (OUTPATIENT)
Dept: PSYCHIATRY | Facility: CLINIC | Age: 34
End: 2019-12-18

## 2019-12-18 ENCOUNTER — OFFICE VISIT (OUTPATIENT)
Dept: PALLIATIVE MEDICINE | Facility: CLINIC | Age: 34
End: 2019-12-18
Payer: COMMERCIAL

## 2019-12-18 DIAGNOSIS — G89.4 CHRONIC PAIN SYNDROME: ICD-10-CM

## 2019-12-18 DIAGNOSIS — G89.29 CHRONIC MYOFASCIAL PAIN: ICD-10-CM

## 2019-12-18 DIAGNOSIS — G62.9 NEUROPATHY: Primary | ICD-10-CM

## 2019-12-18 DIAGNOSIS — G25.9 FUNCTIONAL MOVEMENT DISORDER: ICD-10-CM

## 2019-12-18 DIAGNOSIS — M79.18 CHRONIC MYOFASCIAL PAIN: ICD-10-CM

## 2019-12-18 PROCEDURE — 96152 HC HEALTH AND BEHAVIOR INTERVENTION, INDIVIDUAL, EACH 15 MINUTES: CPT | Performed by: PSYCHOLOGIST
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
## 2019-12-20 NOTE — PROGRESS NOTES
[facility]   [facility] [place]  Behavioral Medicine Visit    Patient Name: Hoang Kiser     YOB: 1985   Medical Record Number: [id number]  Date: 12/20/2019                SUBJECTIVE: Patient reports his pain is a same.  His mood is mildly worse.  Discussed current stressors including prognosis for his functional movement disorder, some anxiety about visiting family over her sinus, and some concern about dissociation and fugue states per self-report and discussion with his regular mental health therapist.  Reports his activity level is mildly decreased.  Stress level is moderately worse given the aforementioned current stressors as well as continued difficulty with a relationship with 1 of the housemates.  Sleep is mildly improved.  Engages in self-care for his pain 2-3 times per day.  Discussed the importance of focusing on his goals for himself, continue to engage in self-care as he has been, will continue to practice the breathing technique he learned while being assessed at [facility].  Discussed limit setting and boundary setting others, as well as doing regular self checks regarding caretaking behaviors.    OBJECTIVE: Patient seems to understand how his increased distress  impacts his mood which can trigger some of his movement attacks.  He is learning about how all of this is interconnected and recognizing that he has a role to play in managing this distress and setting limits with others when he is not responsible for the stress or distress    Length of Visit: 60 minutes     Pain Diagnoses per pain provider:   Neuropathy, chronic myofascial pain, functional movement disorder, chronic pain syndrome     Assessment: Current Emotional / Mental Status    Appearance:   Appropriate   Eye Contact:   Good   Psychomotor Behavior: Normal   Attitude:   Cooperative   Orientation:   All  Speech  Rate / Production:              Normal   Volume:              Normal   Mood:    Sad   Affect:    Appropriate   Thought Content:  Clear   Thought Form:  Coherent  Logical   Insight:    Good     ASSESSMENT:   Progress toward goals: good.    Pain Status: unchanged    Emotional Status: worsened              Medication / chemical use concerns: None    PLAN:   Next Appointment: Hoang Kiser will schedule a follow-up appointment in 3 week(s).  Assignment: Continue to hold and maintain boundaries and limits with others, continue to engage in daily self-care.  Objectives / interventions for next session: Continue to explore interconnection between stress, mood and pain/movement attacks.    Sol Kemp PsyD LP  Outpatient Clinic Therapist  [facility]    Disclaimer: This note consists of symbols derived from keyboarding, dictation and/or voice recognition software. As a result, there may be errors in the script that have gone undetected. Please consider this when interpreting information found in this chart.

## 2019-12-30 ENCOUNTER — TELEPHONE (OUTPATIENT)
Dept: FAMILY MEDICINE | Facility: CLINIC | Age: 34
End: 2019-12-30

## 2019-12-30 ENCOUNTER — MYC MEDICAL ADVICE (OUTPATIENT)
Dept: PALLIATIVE MEDICINE | Facility: CLINIC | Age: 34
End: 2019-12-30

## 2019-12-30 NOTE — TELEPHONE ENCOUNTER
"Returned call to patient.     He states he fell on the ice outside 2 days ago.  He was walking on ice and suddenly he \"fell straight backwards and landed flat on my back\".  He did hit his head slightly with the fall. No LOC, laceration, or bleeding occurred. He said he did not hit his head hard.     Patient now had achiness in his shoulders and neck from the fall.  He is concerned that his head was hit and he has a \"neurological disorder\" and wants to make sure he is ok.   He is asymptomatic at this time and feels well other than the achiness.     I offered appointment with PCP at 1:30 pm today but he declined stating he needs to line up transportation. Advised he needs to be seen today and he agrees. Patient said he can come into urgent care this evening that opens at 5pm and [facility]. He can arrange a ride by then.     Diane BORGESN, RN        "

## 2019-12-30 NOTE — TELEPHONE ENCOUNTER
Patient calling states he fell on ice on Saturday, hit back of his head states shoulder, neck sore as well as back of head. Wants to send a message to primary declined appointment at this time, would like nurse to call to discuss.

## 2019-12-31 ENCOUNTER — MYC MEDICAL ADVICE (OUTPATIENT)
Dept: PALLIATIVE MEDICINE | Facility: CLINIC | Age: 34
End: 2019-12-31

## 2020-01-02 ENCOUNTER — OFFICE VISIT (OUTPATIENT)
Dept: PALLIATIVE MEDICINE | Facility: CLINIC | Age: 35
End: 2020-01-02
Payer: COMMERCIAL

## 2020-01-02 VITALS
DIASTOLIC BLOOD PRESSURE: 82 MMHG | HEART RATE: 96 BPM | OXYGEN SATURATION: 100 % | BODY MASS INDEX: 29.13 KG/M2 | SYSTOLIC BLOOD PRESSURE: 119 MMHG | WEIGHT: 186 LBS

## 2020-01-02 DIAGNOSIS — M47.816 FACET ARTHROPATHY, LUMBAR: ICD-10-CM

## 2020-01-02 DIAGNOSIS — G25.9 FUNCTIONAL MOVEMENT DISORDER: ICD-10-CM

## 2020-01-02 DIAGNOSIS — M54.2 CERVICALGIA: Primary | ICD-10-CM

## 2020-01-02 PROCEDURE — 99214 OFFICE O/P EST MOD 30 MIN: CPT | Performed by: NURSE PRACTITIONER

## 2020-01-02 RX ORDER — MORPHINE SULFATE 15 MG/1
15 TABLET, FILM COATED, EXTENDED RELEASE ORAL EVERY 12 HOURS
Qty: 60 TABLET | Refills: 0 | Status: SHIPPED | OUTPATIENT
Start: 2020-01-02 | End: 2020-01-30

## 2020-01-02 RX ORDER — HYDROCODONE BITARTRATE AND ACETAMINOPHEN 5; 325 MG/1; MG/1
1 TABLET ORAL 3 TIMES DAILY PRN
Qty: 60 TABLET | Refills: 0 | Status: SHIPPED | OUTPATIENT
Start: 2020-01-02 | End: 2020-01-30

## 2020-01-02 ASSESSMENT — PAIN SCALES - GENERAL: PAINLEVEL: SEVERE PAIN (6)

## 2020-01-02 NOTE — PATIENT INSTRUCTIONS
After Visit Instructions:     Thank you for coming to [facility] for your care. It is my goal to partner with you to help you reach your optimal state of health.     Continue daily self-care, identifying contributing factors, and monitoring variations in pain level. Continue to integrate self-care into your life.      1. Schedule pain psychology visit   2. Schedule pool physical therapy assessment   3. Schedule follow-up with CARLOS A Higuera NP-C in 4 weeks  4. Medication recommendations:   1. Refills of Morphine XR and Friant sent to pharmacy to be filled today     CARLOS A Bass NP-C  [facility]  [facility]    Clinic Number:  [phone]     Call with any questions about your care and for scheduling assistance.     Calls are returned Monday through Friday between 8 AM and 4:30 PM. We usually get back to you within 2 business days depending on the issue/request.    If we are prescribing your medications:    For opioid medication refills, call the clinic or send a [employer] message 7 days in advance.  Please include:    Name of requested medication    Name of the pharmacy.    For non-opioid medications, call your pharmacy directly to request a refill. Please allow 3-4 days to be processed.     Per [place] State Law:    All controlled substance prescriptions must be filled within 30 days of being written.      For those controlled substances allowing refills, pickup must occur within 30 days of last fill.      We believe regular attendance is key to your success in our program!      Any time you are unable to keep your appointment we ask that you call us at least 24 hours in advance to cancel.This will allow us to offer the appointment time to another patient.   Multiple missed appointments may lead to dismissal from the clinic.

## 2020-01-02 NOTE — TELEPHONE ENCOUNTER
Patient was seen in clinic today.    KATERINA CardenasN, RN  Care Coordinator  [facility]

## 2020-01-02 NOTE — PROGRESS NOTES
[facility]    CHIEF COMPLAINT: acute neck pain, chronic pain     INTERVAL HISTORY:  Last seen on 12/5/19.        Recommendations/plan at the last visit included:     1. Schedule pain psychology visits per Sol's recommendations  2. We can discuss physical therapy after Macy visits.   3. Will consider Ortho eval for bilateral labral tears after Macy evaluation.   4. Schedule follow-up with CARLOS A Higuera NP-C in 4 weeks  5. Medication recommendations:             1. Refills of MS Contin and [place] sent to pharmacy. Allowing temporary increase of Norco to #60 tabs per month.     Since last visit:   - Fell on ice and hit back of head. Was seen in UC, had x-ray and CT. Both negative. Possible congenital issue at C1. (I am not able to see CT results.)     Pain Information:   Pain quality: Penetrating    Pain rating: intensity ranges from 2/10 to 9/10, and averages 6/10 on a 0-10 scale.    Annual Controlled Substance Agreement/UDS due date: 4/2020     Current Pain Relevant Medications:  MS Contin 15 mg BID = 30 MME  Norco 5/325 mg 1 tab BID-TID  #60 tabs per month                        Total opiate dose: 35-40 MME daily  Clonazepam .5 mg 1-2 tab at HS PRN (takes about 3x weekly)   Duloxetine 20 mg daily  Naproxen 500 mg BID: on hold re: elevated LFTs   Tizanidine 4 mg 1-2 tabs at HS PRN  Adderall 30 mg daily, combination of long and short acting.       Previous Pain Relevant Medications: (H--helped; HI--Helped initially; SWH--Somewhat helpful; NH--No help; W--worse; SE--side effects; ?--Unsure if helpful)   NOTE: This medication information taken from patient's intake form, not medical records.                         Opiates: Tramadol: H, Hydrocodone: H, Morphine: H                        NSAIDS: Ibuprofen:H, naproxen:SWH, Relafen: NH                        Muscle Relaxants: Cyclobenzaprine:H,Med interaction, Tizanidine:H, Baclofen: SW                        Anti-migraine mediations:  Prednisone:H                        Anti-depressants: Bupropion:SE, Celexa:SE, Duloxetine:H, Trazodone:Too strong, Venlafaxine:too strong, Amitriptyline:SE                         Sleep aids:Anbien: H                        Anxiolytics: Clonazepam:H                        Neuropathics: Tegretol:taken for seizures in childhood, Gabapentin:H                                          Topicals: Lidocaine:H                        Other medications not covered above: Tylenol:NH      Any illicit drug use: 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 Concern for multiple controlled substances including stimulants and opiates.  7/27/19: Concern for misuse of opiates and stimulants as noted in office visit on this date.   10/29/19: Requesting early refill due to overuse of opiate medication. #45 tabs to last 30 days. Refill declined.     Medications:  Current Outpatient Medications   Medication Sig Dispense Refill     albuterol (PROAIR HFA/PROVENTIL HFA/VENTOLIN HFA) 108 (90 Base) MCG/ACT inhaler INHALE ONE TO TWO PUFFS INTO THE LUNGS EVERY FOUR HOURS AS NEEDED FOR SHORTNESS OF BREATH/ DYSPNEA OR WHEEZING 18 g 0     amphetamine-dextroamphetamine (ADDERALL XR) 30 MG 24 hr capsule TK 1 C PO QD  0     clonazePAM (KLONOPIN) 0.5 MG tablet 0.5mg tab by mouth nightly as needed  1     DULoxetine (CYMBALTA) 60 MG EC capsule Taking only 1 x 60mg tab by mouth daily 30 capsule 1     HYDROcodone-acetaminophen (NORCO) 5-325 MG tablet Take 1 tablet by mouth 3 times daily as needed for pain Okay to fill on 12/5/19 to start on 12/6/19. #60 tabs must last 30 days. 60 tablet 0     hydrOXYzine (ATARAX) 25 MG tablet Take 1 tablet (25 mg) by mouth  nightly as needed (at HS PRN) 45 tablet 3     morphine (MS CONTIN) 15 MG CR tablet Take 1 tablet (15 mg) by mouth every 12 hours Ok to fill on 12/5/19 to start on 12/6/19 60 tablet 0     naloxone (NARCAN) 4 MG/0.1ML nasal spray Spray 1 spray (4 mg) into one nostril alternating nostrils as needed for opioid reversal every 2-3 minutes until assistance arrives 0.2 mL 0     naproxen (NAPROSYN) 500 MG tablet Take 1 tablet (500 mg) by mouth 2 times daily (with meals) 40 tablet 3     order for DME Equipment being ordered: 4 wheeled walker with bench seat. 1 Units 0     oxybutynin ER (DITROPAN-XL) 10 MG 24 hr tablet TK 1 T PO D  3     SF 5000 PLUS 1.1 % CREA   2     tiZANidine (ZANAFLEX) 4 MG tablet Take 4 mg by mouth as needed   0       Review of Systems: A 10-point review of systems was negative, with the exception of chronic pain issues.      PHYSICAL EXAM    Vitals:    01/02/20 0844   BP: 119/82   Pulse: 96   SpO2: 100%   Weight: 84.4 kg (186 lb)       Constitutional: healthy, alert, no distress, pain behaviors  HEENT: Head atraumatic, normocephalic. Eyes without conjunctival injection or jaundice. Neck supple. No obvious neck masses.  Skin: No rash, lesions, or petechiae of exposed skin.   Extremities: No clubbing, cyanosis, or edema to exposed extremities  Psychiatric/mental status: Alert, without lethargy or stupor. Appropriate affect. Anxious regarding controlled medication misuse/destruction as noted above.        Neurologic exam:  CN:  Cranial nerves 2-12 are grossly normal.  Has uncontrolled upper body movement/tremor.          Psychiatric:  mentation appears normal., affect and mood normal      DIRE Score for ongoing opioid management is calculated as follows:    Diagnosis = 2 pts (slowly progressive; moderate pain/objective findings)    Intractability = 2 pts (most treatments tried; patient not fully engaged/barriers)    Risk        Psych = 2 pts (personality dysfunction/mental illness that moderately  interferes with care)         Chem Hlth = 2 pts (use of medications to cope with stress; chemical dependency in remission)       Reliability = 3 pts (highly reliable with meds, appointments, treatments)       Social = 3 pts (supportive family/close relationships; involved in work/school; no isolation)       (Psych + Chem hlth + Reliability + Social) = 14    Efficacy = 2 pts (moderate benefit/function; low med dose; too early/not tried meds)    DIRE Score = 16        7-13: likely NOT suitable candidate for long-term opioid analgesia       14-21: may be a suitable candidate for long-term opioid analgesia          Previous Diagnostic Tests:   Imaging Studies:   MR LUMBAR SPINE W/O CONTRAST 5/2/2018 7:27 PM  History: DDD (degenerative disc disease), lumbar; Lumbar  radiculopathy; Hip pain, right; Groin pain, right.  ICD-10: DDD (degenerative disc disease), lumbar; Lumbar radiculopathy;  Hip pain, right; Groin pain, right  Comparison: Lumbar spine MRI 3/8/2017  Technique: Sagittal STIR, T1-weighted turbo spin-echo, 3-D volumetric  T2-weighted and axial reconstructed T2-weighted images of the lumbar  spine were obtained without intravenous contrast.   Findings: 5 lumbar-type vertebra. The tip of the conus medullaris is  at L1.  The lumbar vertebral column is normally aligned.   Straightening of lumbar lordosis, unchanged. The intervertebral disc  heights are maintained. Normal marrow signal. At L5-S1, there is a  disc bulge and facet hypertrophy with mild left neural foraminal  narrowing, unchanged. No spinal canal stenosis.  Impression:  1. Lumbar spondylosis with mild left neural foraminal narrowing at  L5-S1.  2. No spinal canal stenosis.      MR right hip without contrast 11/22/2019 8:28 AM  Techniques: Multiplanar multisequence imaging of the right  hip was  obtained without  administration of intra-articular or intravenous  contrast using routing protocol.  History: Hip pain, chronic, labral tear suspected, neg xray  or  equivocal; ; Progressive pain; Chronic right hip pain; Chronic right  hip pain    Comparison: MRI hip 5/2/2018  Findings:  Osseous structures  Osseous structures: Bone marrow edema like signal intensity along the  anterior head neck junction less pronounced than prior MRI. No  fracture or avascular necrosis. Osseous prominence at the femoral  neck, which may be seen with cam-type femoral acetabular impingement.  Articular cartilage and labrum  Assessment limited on this arthrographic study due to relative lack of  joint distension.  Articular cartilage: No high grade chondral loss.  Labrum: Increased signal intensity in the anterosuperior labrum from  12:00 to 3:00 position suggestive of labral tear (3:00 anterior  relative to the transverse ligament).  Ligament teres and transverse ligament of acetabulum: Intact.  Joint or bursal effusion  Joint effusion: A physiologic amount of joint fluid.  Bursal effusion: Minimal nonspecific edema over the greater  trochanter. No substantial iliopsoas or trochanteric bursal effusion.  Muscles and tendons  Muscles and tendons: The rectus femoris, iliopsoas, proximal  hamstrings, and hip abductors are intact.  The visualized adductor  muscles are unremarkable.   Nerves:  The visualized course of the sciatic nerve is unremarkable.  Other Findings:  Prominent fat in the right inguinal canal.. Few subcentimeter right  inguinal lymph nodes.  Impression:  1. Redemonstration of labral tear extending from 12:00 to 3:00  position  2. Nonspecific, but likely degenerative, bone marrow edema like signal  intensity along femoral head and junction, less conspicuous compared  to prior MRI dated 5/20/2018. No fracture or avascular necrosis.     MR left hip without contrast 11/22/2019 8:52 AM  Techniques: Multiplanar multisequence imaging of the left hip was  obtained without  administration of intra-articular or intravenous  contrast using routing protocol.  History: Progressive pain;  Chronic left hip pain; Chronic left hip  pain    Comparison: None  Findings:  Osseous structures  Osseous structures: Bone marrow edema like signal intensity in the  anteromedial head neck junction, possibly degenerative. No fracture,  stress reaction or avascular necrosis.   Articular cartilage and labrum  Assessment limited on this arthrographic study due to relative lack of  joint distension.  Articular cartilage: No high grade chondral loss.  Labrum: Altered signal intensity in the labrum extending from 2:00 to  4:00 position (3:00 anterior relative to transverse ligament)  Ligament teres and transverse ligament of acetabulum: Intact.  Joint or bursal effusion  Joint effusion: A physiologic amount of joint fluid.  Bursal effusion: Minimal nonspecific edema over the greater  trochanter. No substantial iliopsoas or trochanteric bursal effusion.  Muscles and tendons  Muscles and tendons: The rectus femoris, iliopsoas, proximal  hamstrings, and hip abductors are intact.  The visualized adductor  muscles are unremarkable.   Nerves:  The visualized course of the sciatic nerve is unremarkable.  Other Findings:  Prominent fat in the left inguinal canal. Few subcentimeter inguinal  lymph nodes.  Impression:  1. Tearing of the anterior superior labrum extending from 2:00 to 4:00  position (3:00 anterior relative to transverse ligament).  2. Nonspecific bone marrow edema like signal intensity along the  anteromedial femoral head neck junction, likely degenerative.          ASSESSMENT:   1.  Lumbar DDD, mild  2.  Lumbar muscle spasm  3.  Labral tear, right hip  4.  Hx: anxiety, chemical dependence in remission.  5.  Functional neurologic movement disorder  6.  Somatization disorder (?)       Shoaib presents for monthly medication management visit.  As noted above he took a spill on the ice and hit the back of his head.  This was evaluated and there was a brief concern that he may have a C1 stress fracture however this was  found to be negative.  He has been very stressed about this as well as drama going in his sober living home.  Pain control is improving although he still having headaches.  No change to current treatment plan.  Follow-up in 1 month.  Continue current therapies.    Tobacco use: Advised complete tobacco cessation.    Plan:    Diagnosis reviewed, treatment option addressed, and risk/benifits discussed. Self-care instructions given.  I am recommending a multidisciplinary treatment plan to help this patient better manage pain.      1. Schedule pain psychology visit   2. Schedule pool physical therapy assessment   3. Schedule follow-up with CARLOS A Higuera, NP-C in 4 weeks  4. Medication recommendations:   1. Refills of Morphine XR and [place] sent to pharmacy to be filled today     A total of 30 minutes was spent on the patient today, greater than 50% of that time was spent on face to face counseling and care coordination regarding diagnoses and treatment options as mentioned above including the multidisciplinary pain management program and the benefits of physical therapy, pain psychology and medication options.      CARLOS A Bass, NP-C  [facility]    Disclaimer: This note consists of symbols derived from keyboarding, dictation and/or voice recognition software. As a result, there may be errors in the script that have gone undetected. Please consider this when interpreting information found in this chart.

## 2020-01-02 NOTE — TELEPHONE ENCOUNTER
Patient was seen in clinic today.    KATERINA CardenasN, RN  Care Coordinator  [facility] [place]

## 2020-01-02 NOTE — TELEPHONE ENCOUNTER
Patient was seen in clinic today.     KATERINA CardenasN, RN  Care Coordinator  [facility] [place]

## 2020-01-08 ENCOUNTER — TELEPHONE (OUTPATIENT)
Dept: BEHAVIORAL HEALTH | Facility: CLINIC | Age: 35
End: 2020-01-08

## 2020-01-08 NOTE — TELEPHONE ENCOUNTER
"Behavioral Health Home Services  [facility]: [place]        Social Work Care Navigator Note      Patient: Hoang Kiser  Date: January 8, 2020  Preferred Name: Shoaib    Previous PHQ-9:   PHQ-9 SCORE 10/1/2018 3/22/2019 10/22/2019   PHQ-9 Total Score - - -   PHQ-9 Total Score 13 11 9     Previous JIN-7:   JIN-7 SCORE 10/1/2018 3/22/2019 10/22/2019   Total Score - - -   Total Score 13 12 14     MOLLY LEVEL:  MOLLY Score (Last Two) 3/23/2016 10/1/2018   MOLLY Raw Score 52 31   Activation Score 100 59.3   MOLLY Level 4 3       Preferred Contact:  Need for : No  Preferred Contact: Cell      Type of Contact Today: Phone call (patient / identified key support person reached)      Data: (subjective / Objective):  Recent ED/IP Admission or Discharge?   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 He states that he recently fell and had a \"Neck injury scare\" - went to the urgent care. No concussion. Chronic pain was worsened due to the fall. Pain is mostly in his lower back, hips, and occasionally on the sides of his hips.     Is working with an  for his disability claim but was recently denied. He " is not feeling fully supported by his  at this time but states he is locked in until appeal, which can be up to another 18 months but he estimates it being completed within the next 9 months.  The court wasn't going to accept more medical evidence even though he was evaluated at [facility] for chronic movement disorder which has been progressive over the last 2 years. [facility] informed patient that if he were interested in the future, this worker can suggest additional law firms/agencies who can assist with his disability claim.    Patient states he had ordered a walker with seat, brakes, 4 wheels, is a Nitro. He does not know the status of this at this time. [facility] will call [employer] to check on this and get back to patient.    [facility] called [employer] ([phone]) and spoke to SiennaStevie Caldera Nitro Rollator - Quote was reported to be sent to patient's CADI : Bonnie Strange.  CADI  is listed below:   [email or web address] (Bonnie Johnson): Phone: [phone]  [facility] added Bonnie to Care Team.    [facility] called Bonnie but she will be out of the office until 1/13/20. On call  can be reached: [phone]. [facility] called On-Call Case Management line and left a message requesting a call back if they received the quote that was sent for the walker and if movement has been made on authorizing this piece of equipment.    Message received back from [facility]. They are not sure if CADI  received the quote for the walker and she won't be back in the office until 1/13/20. Coverage  sent an email to Bonnie to follow-up on the walker referral if the quote was received and to follow-up with this worker.    [facility] called patient back and informed him of this. He had no further questions but thanked this worker for the call back.    Intervention:  Motivational Interviewing: Expressed Empathy/Understanding, Supported  Autonomy, Collaboration, Evocation, Permission to raise concern or advise and Open-ended questions   Target Behavior(s): Explored current social supports and reinforced opportunities to increase engagement    Assessment: (Progress on Goals / Homework):  Patient would benefit from continued coordination in reaching their goals set for the Behavioral Health Home ([facility]) program. [facility] reviewed Health Action Plan goals and will continue to monitor progress and work with patient and their care team.    Plan: (Homework, other):  Patient was encouraged to continue to seek condition-related information and education.      COMPA Villalta  Behavioral Health Home ([facility])   [facility]  [phone]                Next 5 appointments (look out 90 days)    Sandeep 15, 2020  2:00 PM CST  Return Visit with Sol Kemp  [facility] ([facility] [place]) [address]  [address]  [facility] [id number] [phone]   Jan 30, 2020  9:00 AM CST  Return Visit with CARLOS A Guy CNP  [facility] ([facility]) [address]  [facility] [address]  [facility] [id number] [phone]

## 2020-01-14 ENCOUNTER — TELEPHONE (OUTPATIENT)
Dept: BEHAVIORAL HEALTH | Facility: CLINIC | Age: 35
End: 2020-01-14

## 2020-01-14 NOTE — TELEPHONE ENCOUNTER
Behavioral Health Home Services  [facility]: [place]        Social Work Care Navigator Note      Patient: Hoang Kiser  Date: January 14, 2020  Preferred Name: Shoaib    Previous PHQ-9:   PHQ-9 SCORE 10/1/2018 3/22/2019 10/22/2019   PHQ-9 Total Score - - -   PHQ-9 Total Score 13 11 9     Previous JIN-7:   JIN-7 SCORE 10/1/2018 3/22/2019 10/22/2019   Total Score - - -   Total Score 13 12 14     MOLLY LEVEL:  MOLLY Score (Last Two) 3/23/2016 10/1/2018   MOLLY Raw Score 52 31   Activation Score 100 59.3   MOLLY Level 4 3       Preferred Contact:  Need for : No  Preferred Contact: Cell      Type of Contact Today: Phone call (patient / identified key support person reached)      Data: (subjective / Objective):  Patient Goals  Goal Areas: Health;Mental Health;Chemical Health;Financial and Social Service Benefits  Patient stated goals: Patient stated that he would like to obtain an ILS worker through his CADI waiver; Patient would like to find some free temporary housing options near [facility] for his Behavioral Shaping Therapy Program he is hoping to get in to; Patient would like to continue to work on self-advocacy skills; Patient would like to look for very part time work or 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 Bonnie states the walker has been an ongoing issue and would like to speak to this worker more about it.    [facility] called Bonnie back and she confirmed that the walker quote was received  from [facility]. However, she needed to confirm that they had initially billed Medical Assistance first as a denial from [place] is required prior to billing through patient's CADI Waiver.    [facility] called [facility] ([phone]) and spoke to Sienna. She states there isn't any record of them trying to bill Medical Assistance for the walker. [facility] requested that before trying to bill CADI Waiver, they would need to try and bill Medical Assistance first. This worker had spoken to a different Sienna last week and she will be calling this worker to speak with this worker further on next steps.    Message received back from Sienna at [facility]. Prescription was initially brought into the [place] location but they weren't informed that Medical Assistance was to be billed first. They will send it to their Insurance Verification team. Once it is returned, they can process the portion that will be covered by the insurance vs what the Upgrade fee for the 4 wheeled walker will be. That will need be covered by the patient, or CADI Waiver. They will verify that patient is eligible for a new piece of mobility equipment covered by his insurance. They will call this worker back with coverage information once known.    Plan:  [facility], patient, and team will continue to work towards progress on reaching goals set for the Behavioral Health Home ([facility]) program.     COMPA Villalta  Behavioral Health Home ([facility])   [facility]  [phone]                Next 5 appointments (look out 90 days)    Sandeep 15, 2020  2:00 PM CST  Return Visit with Sol Kemp  [facility] ([facility]) [address]  [facility] [address]  [phone]   Jan 30, 2020  9:00 AM CST  Return Visit with CARLOS A Guy CNP  [facility] ([facility]) [address] [place], [place] [address]  [facility] [address]  [facility]  [id number] [phone]

## 2020-01-15 ENCOUNTER — OFFICE VISIT (OUTPATIENT)
Dept: PALLIATIVE MEDICINE | Facility: CLINIC | Age: 35
End: 2020-01-15
Payer: COMMERCIAL

## 2020-01-15 DIAGNOSIS — G25.9 FUNCTIONAL MOVEMENT DISORDER: ICD-10-CM

## 2020-01-15 DIAGNOSIS — M47.816 FACET ARTHROPATHY, LUMBAR: Primary | ICD-10-CM

## 2020-01-15 DIAGNOSIS — G89.4 CHRONIC PAIN SYNDROME: ICD-10-CM

## 2020-01-15 PROCEDURE — 96159 HLTH BHV IVNTJ INDIV EA ADDL: CPT | Performed by: PSYCHOLOGIST

## 2020-01-15 PROCEDURE — 96158 HLTH BHV IVNTJ INDIV 1ST 30: CPT | Performed by: PSYCHOLOGIST

## 2020-01-15 NOTE — PROGRESS NOTES
[facility]   [facility], [place]  Behavioral Medicine Visit    Patient Name: Hoang Kiser     YOB: 1985   Medical Record Number: [id number]  Date: 1/15/2020                SUBJECTIVE: Pain is the same. Mood mildly decreased. Activity level mildly decreased. Stress mildly improved. Sleep mildly improved, although notes he has been sleeping on average 14 hours per day, working on acceptance of this. Engages in self-care 2-3 times per day. Discussed a fall on the ice a few weeks ago - this caused significant anxiety as they believed he may have had a cervical fracture, however did not. Has been working on setting healthier boundaries with housemates and , feels proud of this. With increased sleep has noticed fewer movement attacks. Does not like how his current sleep pattern interferes with regular medication dosing. Continues to recognize how disengaging from other people's chaos lessens his stress level.     OBJECTIVE: Continues to work on connecting with others while appropriately asserting boundaries.    Length of Visit: 60 minutes     Pain Diagnoses per pain provider:   Facet arthropathy lumbar, functional movement disorder, chronic pain disorder     Assessment: Current Emotional / Mental Status    Appearance:   Appropriate   Eye Contact:   Good   Psychomotor Behavior: Normal  Restless   Attitude:   Cooperative   Orientation:   All  Speech  Rate / Production:             Normal   Volume:              Normal   Mood:    Normal Sad   Affect:    Appropriate  Flat  Subdued   Thought Content:  Clear   Thought Form:  Coherent  Goal Directed  Logical   Insight:    Good     ASSESSMENT:   Progress toward goals: good.    Pain Status: remained stable    Emotional Status: worsened              Medication / chemical use concerns:  none    PLAN:   Next Appointment: Hoang Kiser will schedule a follow-up  appointment in 2 week(s).  Assignment: Continue to assert boundaries with others, focus on his self-care.  Objectives / interventions for next session: Continue to process benefits of appropriate boundaries and staying out of the chaos in his current living situation.     Sol Kemp PsyD LP  Outpatient Clinic Therapist  [facility]    Disclaimer: This note consists of symbols derived from keyboarding, dictation and/or voice recognition software. As a result, there may be errors in the script that have gone undetected. Please consider this when interpreting information found in this chart.

## 2020-01-16 ENCOUNTER — TELEPHONE (OUTPATIENT)
Dept: FAMILY MEDICINE | Facility: CLINIC | Age: 35
End: 2020-01-16

## 2020-01-16 ENCOUNTER — TELEPHONE (OUTPATIENT)
Dept: BEHAVIORAL HEALTH | Facility: CLINIC | Age: 35
End: 2020-01-16

## 2020-01-16 NOTE — TELEPHONE ENCOUNTER
Behavioral Health Home Services  [facility]: [place]        Social Work Care Navigator Note      Patient: Hoang Kiser  Date: January 16, 2020  Preferred Name: Shoaib    Previous PHQ-9:   PHQ-9 SCORE 10/1/2018 3/22/2019 10/22/2019   PHQ-9 Total Score - - -   PHQ-9 Total Score 13 11 9     Previous JIN-7:   JIN-7 SCORE 10/1/2018 3/22/2019 10/22/2019   Total Score - - -   Total Score 13 12 14     MOLLY LEVEL:  MOLLY Score (Last Two) 3/23/2016 10/1/2018   MOLLY Raw Score 52 31   Activation Score 100 59.3   MOLLY Level 4 3       Preferred Contact:  Need for : No  Preferred Contact: Cell      Type of Contact Today: Phone call (patient / identified key support person reached)      Data: (subjective / Objective):  Patient Goals  Goal Areas: Health;Mental Health;Chemical Health;Financial and Social Service Benefits  Patient stated goals: Patient stated that he would like to obtain an ILS worker through his CADI waiver; Patient would like to find some free temporary housing options near [facility] for his Behavioral [facility] Therapy Program he is hoping to get in to; Patient would like to continue to work on self-advocacy skills; Patient would like to look for very part time work or 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  Ensured that patient's care is integrated across all settings and services.   Health and Wellness Promotion     Data:  Message received back from Sienna at [facility] ([phone] ext [phone]) regarding patient's walker. Cost of the walker is covered but the cost of the wheels and walker seat  attachment is an upgrade fee of  $180.87. She states they don't submit to Medical Assistance to get the requested denial since it for an upgrade    [facility] called Jamesmichael Elizabeth RIVIN  and informed her of this. [facility] provided her with Sienna's contact information for her to coordinate with Sienna regarding billing through patient's CADI Waiver. Bonnie states she is unsure if the upgrade items are going to be approved by the [facility] but she will work on this. No further action from this worker is needed.    Plan:  [facility], patient, and team will continue to work towards progress on reaching goals set for the [facility] Home [facility] program.     COMPA [place]  [facility]al Health Home ([facility])   [facility]  [phone]      Next 5 appointments (look out 90 days)    Jan 29, 2020  2:00 PM CST  Return Visit with Sol Kemp  [facility] ([facility] [place]) [address]  [address]  [facility] [id number] [phone]   Jan 30, 2020  9:00 AM CST  Return Visit with CARLOS A Guy CNP  [facility] ([facility]) [address]  [address]  [facility] [address] [phone]

## 2020-01-16 NOTE — TELEPHONE ENCOUNTER
Reason for Call:  Other call back    Detailed comments: [place] form for medical necessity for nurse visits was sent over, per patient PCP declined to sign it. Per patient he will lose housing with out approval for these visits. Please call to discuss.     Phone Number Patient can be reached at: Home number on file [phone] (home)    Best Time: ASAP    Can we leave a detailed message on this number? YES    Call taken on 1/16/2020 at 1:21 PM by Jasmin Colbert

## 2020-01-17 NOTE — TELEPHONE ENCOUNTER
I do not remember seeing this form.   Can we please clarify what the nurses visit would be pertaining to?  Phill Floyd MD

## 2020-01-20 NOTE — TELEPHONE ENCOUNTER
Patient called back, Form was from [facility]. It is for Nurse visits as they come and take his vitals and fill his medication. 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 Patient gave phone number of [phone] this is his nurse by the name of Cristina (prenounced Ty-jim). He stated she may have a copy of form needed to be filled out.  MOLLY Arvizu

## 2020-01-20 NOTE — TELEPHONE ENCOUNTER
Spoke with Nurse Evans, she stated that form was sent stating PCP Dr Phill Floyd does not follow patient. 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 Gave fax number of [phone]  MOLLY Arvizu

## 2020-01-21 NOTE — TELEPHONE ENCOUNTER
I placed the HHC/plan of care forms in your folder for review.  Is this something you are willing to sign if the patient makes an appointment?  Or should it go to another provider?    Please read the following notes from this encounter:  1/16/20 note by the patient:   MA form for medical necessity for nurse visits was sent over, per patient PCP declined to sign it.  1/20/20 note by the nurse:  Spoke with Nurse Cristina, she stated that form was sent stating PCP Dr Phill Floyd does not follow patient.  Please advise.  Dahlia Vera,

## 2020-01-21 NOTE — TELEPHONE ENCOUNTER
"I review(ed) the paperwork and it appears to be a \"Home Health Certification and Plan of Care\"   I think it would be appropriate for me to sign.  Phill Floyd MD   "

## 2020-01-22 NOTE — TELEPHONE ENCOUNTER
I faxed the completed and signed C forms to [facility] nursing, Attn: Nurse Cristina @[phone].  I called and spoke to the patient and I let him know that this has been done.  Dahlia Vera,

## 2020-01-22 NOTE — TELEPHONE ENCOUNTER
This RN reconciled medication list from [facility] form against our [facility] medication list and there are no discrepancies.         Diane Mcgraw BSN, RN

## 2020-01-22 NOTE — TELEPHONE ENCOUNTER
Reason for Call:  Form, our goal is to have forms completed with 72 hours, however, some forms may require a visit or additional information.    Type of letter, form or note:  Home Health Certification    Who is the form from?: Home care    Where did the form come from: form was faxed in    What clinic location was the form placed at?: [facility]    Where the form was placed: Given to MA/RN    What number is listed as a contact on the form?: [facility] [phone]       Additional comments: I placed the [facility] forms in the [facility]'s orange folder for the RN to review    Call taken on 1/22/2020 at 10:14 AM by Dahlia Vera

## 2020-01-27 DIAGNOSIS — Z53.9 DIAGNOSIS NOT YET DEFINED: Primary | ICD-10-CM

## 2020-01-27 PROCEDURE — G0179 MD RECERTIFICATION HHA PT: HCPCS | Performed by: FAMILY MEDICINE

## 2020-01-30 ENCOUNTER — OFFICE VISIT (OUTPATIENT)
Dept: PALLIATIVE MEDICINE | Facility: CLINIC | Age: 35
End: 2020-01-30
Payer: COMMERCIAL

## 2020-01-30 VITALS
HEART RATE: 84 BPM | WEIGHT: 191 LBS | DIASTOLIC BLOOD PRESSURE: 84 MMHG | OXYGEN SATURATION: 99 % | BODY MASS INDEX: 29.91 KG/M2 | SYSTOLIC BLOOD PRESSURE: 130 MMHG

## 2020-01-30 DIAGNOSIS — Z79.899 LONG TERM USE OF DRUG: ICD-10-CM

## 2020-01-30 DIAGNOSIS — M47.816 FACET ARTHROPATHY, LUMBAR: ICD-10-CM

## 2020-01-30 DIAGNOSIS — G25.9 FUNCTIONAL MOVEMENT DISORDER: Primary | ICD-10-CM

## 2020-01-30 PROCEDURE — 80053 COMPREHEN METABOLIC PANEL: CPT | Performed by: NURSE PRACTITIONER

## 2020-01-30 PROCEDURE — 99214 OFFICE O/P EST MOD 30 MIN: CPT | Performed by: NURSE PRACTITIONER

## 2020-01-30 PROCEDURE — 36415 COLL VENOUS BLD VENIPUNCTURE: CPT | Performed by: NURSE PRACTITIONER

## 2020-01-30 RX ORDER — HYDROCODONE BITARTRATE AND ACETAMINOPHEN 5; 325 MG/1; MG/1
1 TABLET ORAL 3 TIMES DAILY PRN
Qty: 60 TABLET | Refills: 0 | Status: SHIPPED | OUTPATIENT
Start: 2020-01-30 | End: 2020-03-02

## 2020-01-30 RX ORDER — MORPHINE SULFATE 15 MG/1
15 TABLET, FILM COATED, EXTENDED RELEASE ORAL EVERY 12 HOURS
Qty: 60 TABLET | Refills: 0 | Status: SHIPPED | OUTPATIENT
Start: 2020-01-30 | End: 2020-03-02

## 2020-01-30 ASSESSMENT — PAIN SCALES - GENERAL: PAINLEVEL: SEVERE PAIN (6)

## 2020-01-30 NOTE — PROGRESS NOTES
[facility]    CHIEF COMPLAINT: Multiple pain issues    INTERVAL HISTORY:  Last seen on 1/2/20.        Recommendations/plan at the last visit included:  1. Schedule pain psychology visit   2. Schedule pool physical therapy assessment   3. Schedule follow-up with CARLOS A Higuera NP-C in 4 weeks  4. Medication recommendations:             1. Refills of Morphine XR and [place] sent to pharmacy to be filled today     Since last visit:   - Had Pain PT and Pain Psych x1 each. Has fallen x 3 more on the ice. Some from movement/balance attacks, some from slipping on the ice.  Sleeping more, up to 11-12 hr in 12 hr period. Also, occasionally has episodes when he will be unable to sleep for 2+ days.     Pain Information:   Pain quality: Stabbing    Pain rating: intensity ranges from 2/10 to 9/10, and averages 6/10 on a 0-10 scale.    Annual Controlled Substance Agreement/UDS due date: 4/2020     Current Pain Relevant Medications:  MS Contin 15 mg BID = 30 MME  Norco 5/325 mg 1 tab BID-TID  #60 tabs per month                        Total opiate dose: 35-40 MME daily  Clonazepam .5 mg 1-2 tab at HS PRN (takes about 3-4x weekly)   Duloxetine 20 mg daily  Naproxen 500 mg BID: on hold re: elevated LFTs   Tizanidine 4 mg 1-2 tabs at HS PRN  Adderall 30 mg daily, combination of long and short acting.       Previous Pain Relevant Medications: (H--helped; HI--Helped initially; SWH--Somewhat helpful; NH--No help; W--worse; SE--side effects; ?--Unsure if helpful)   NOTE: This medication information taken from patient's intake form, not medical records.                         Opiates: Tramadol: H, Hydrocodone: H, Morphine: H                        NSAIDS: Ibuprofen:H, naproxen:SWH, Relafen: NH                        Muscle Relaxants: Cyclobenzaprine:H,Med interaction, Tizanidine:H, Baclofen: SWH                        Anti-migraine mediations: Prednisone:H                        Anti-depressants: Bupropion:SE,  Celexa:SE, Duloxetine:H, Trazodone:Too strong, Venlafaxine:too strong, Amitriptyline:SE                         Sleep aids:Anbien: H                        Anxiolytics: Clonazepam:H                        Neuropathics: Tegretol:taken for seizures in childhood, Gabapentin:H                                          Topicals: Lidocaine:H                        Other medications not covered above: Tylenol:NH      Any illicit drug use: 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 Concern for multiple controlled substances including stimulants and opiates.  7/27/19: Concern for misuse of opiates and stimulants as noted in office visit on this date.   10/29/19: Requesting early refill due to overuse of opiate medication. #45 tabs to last 30 days. Refill declined.     Medications:  Current Outpatient Medications   Medication Sig Dispense Refill     albuterol (PROAIR HFA/PROVENTIL HFA/VENTOLIN HFA) 108 (90 Base) MCG/ACT inhaler INHALE ONE TO TWO PUFFS INTO THE LUNGS EVERY FOUR HOURS AS NEEDED FOR SHORTNESS OF BREATH/ DYSPNEA OR WHEEZING 18 g 0     amphetamine-dextroamphetamine (ADDERALL XR) 30 MG 24 hr capsule TK 1 C PO QD  0     clonazePAM (KLONOPIN) 0.5 MG tablet 0.5mg tab by mouth nightly as needed  1     DULoxetine (CYMBALTA) 60 MG EC capsule Taking only 1 x 60mg tab by mouth daily 30 capsule 1     HYDROcodone-acetaminophen (NORCO) 5-325 MG tablet Take 1 tablet by mouth 3 times daily as needed for pain Ok to fill on 2/1/20 to start on 2/4/20 60 tablet 0     hydrOXYzine (ATARAX) 25 MG tablet Take 1 tablet (25 mg) by mouth nightly as needed (at HS PRN) 45 tablet 3     morphine (MS CONTIN) 15 MG CR tablet Take 1 tablet (15 mg)  by mouth every 12 hours Ok to fill on 2/1/20 to start on 2/4/20 60 tablet 0     naloxone (NARCAN) 4 MG/0.1ML nasal spray Spray 1 spray (4 mg) into one nostril alternating nostrils as needed for opioid reversal every 2-3 minutes until assistance arrives 0.2 mL 0     naproxen (NAPROSYN) 500 MG tablet Take 1 tablet (500 mg) by mouth 2 times daily (with meals) 40 tablet 3     order for DME Equipment being ordered: 4 wheeled walker with bench seat. 1 Units 0     oxybutynin ER (DITROPAN-XL) 10 MG 24 hr tablet TK 1 T PO D  3     tiZANidine (ZANAFLEX) 4 MG tablet Take 4 mg by mouth as needed   0       Review of Systems: A 10-point review of systems was negative, with the exception of chronic pain issues.      PHYSICAL EXAM    Vitals:    01/30/20 0900   BP: 130/84   Pulse: 84   SpO2: 99%   Weight: 86.6 kg (191 lb)       Constitutional: healthy, alert, no distress, pain behaviors  HEENT: Head atraumatic, normocephalic. Eyes without conjunctival injection or jaundice. Neck supple. No obvious neck masses.  Skin: No rash, lesions, or petechiae of exposed skin.   Extremities: No clubbing, cyanosis, or edema to exposed extremities  Psychiatric/mental status: Alert, without lethargy or stupor. Appropriate affect.appears down/depressed.       Neurologic exam:  CN:  Cranial nerves 2-12 are grossly normal.  Has uncontrolled upper body movement/tremor.          Psychiatric:  mentation appears normal., affect and mood normal      DIRE Score for ongoing opioid management is calculated as follows:    Diagnosis = 2 pts (slowly progressive; moderate pain/objective findings)    Intractability = 2 pts (most treatments tried; patient not fully engaged/barriers)    Risk        Psych = 2 pts (personality dysfunction/mental illness that moderately interferes with care)         Chem Hlth = 2 pts (use of medications to cope with stress; chemical dependency in remission)       Reliability = 3 pts (highly reliable with meds, appointments,  treatments)       Social = 3 pts (supportive family/close relationships; involved in work/school; no isolation)       (Psych + Chem hlth + Reliability + Social) = 14    Efficacy = 2 pts (moderate benefit/function; low med dose; too early/not tried meds)    DIRE Score = 16        7-13: likely NOT suitable candidate for long-term opioid analgesia       14-21: may be a suitable candidate for long-term opioid analgesia          Previous Diagnostic Tests:   Imaging Studies:   MR LUMBAR SPINE W/O CONTRAST 5/2/2018 7:27 PM  History: DDD (degenerative disc disease), lumbar; Lumbar  radiculopathy; Hip pain, right; Groin pain, right.  ICD-10: DDD (degenerative disc disease), lumbar; Lumbar radiculopathy;  Hip pain, right; Groin pain, right  Comparison: Lumbar spine MRI 3/8/2017  Technique: Sagittal STIR, T1-weighted turbo spin-echo, 3-D volumetric  T2-weighted and axial reconstructed T2-weighted images of the lumbar  spine were obtained without intravenous contrast.   Findings: 5 lumbar-type vertebra. The tip of the conus medullaris is  at L1.  The lumbar vertebral column is normally aligned.   Straightening of lumbar lordosis, unchanged. The intervertebral disc  heights are maintained. Normal marrow signal. At L5-S1, there is a  disc bulge and facet hypertrophy with mild left neural foraminal  narrowing, unchanged. No spinal canal stenosis.  Impression:  1. Lumbar spondylosis with mild left neural foraminal narrowing at  L5-S1.  2. No spinal canal stenosis.      MR right hip without contrast 11/22/2019 8:28 AM  Techniques: Multiplanar multisequence imaging of the right  hip was  obtained without  administration of intra-articular or intravenous  contrast using routing protocol.  History: Hip pain, chronic, labral tear suspected, neg xray or  equivocal; ; Progressive pain; Chronic right hip pain; Chronic right  hip pain    Comparison: MRI hip 5/2/2018  Findings:  Osseous structures  Osseous structures: Bone marrow edema like  signal intensity along the  anterior head neck junction less pronounced than prior MRI. No  fracture or avascular necrosis. Osseous prominence at the femoral  neck, which may be seen with cam-type femoral acetabular impingement.  Articular cartilage and labrum  Assessment limited on this arthrographic study due to relative lack of  joint distension.  Articular cartilage: No high grade chondral loss.  Labrum: Increased signal intensity in the anterosuperior labrum from  12:00 to 3:00 position suggestive of labral tear (3:00 anterior  relative to the transverse ligament).  Ligament teres and transverse ligament of acetabulum: Intact.  Joint or bursal effusion  Joint effusion: A physiologic amount of joint fluid.  Bursal effusion: Minimal nonspecific edema over the greater  trochanter. No substantial iliopsoas or trochanteric bursal effusion.  Muscles and tendons  Muscles and tendons: The rectus femoris, iliopsoas, proximal  hamstrings, and hip abductors are intact.  The visualized adductor  muscles are unremarkable.   Nerves:  The visualized course of the sciatic nerve is unremarkable.  Other Findings:  Prominent fat in the right inguinal canal.. Few subcentimeter right  inguinal lymph nodes.  Impression:  1. Redemonstration of labral tear extending from 12:00 to 3:00  position  2. Nonspecific, but likely degenerative, bone marrow edema like signal  intensity along femoral head and junction, less conspicuous compared  to prior MRI dated 5/20/2018. No fracture or avascular necrosis.     MR left hip without contrast 11/22/2019 8:52 AM  Techniques: Multiplanar multisequence imaging of the left hip was  obtained without  administration of intra-articular or intravenous  contrast using routing protocol.  History: Progressive pain; Chronic left hip pain; Chronic left hip  pain    Comparison: None  Findings:  Osseous structures  Osseous structures: Bone marrow edema like signal intensity in the  anteromedial head neck  "junction, possibly degenerative. No fracture,  stress reaction or avascular necrosis.   Articular cartilage and labrum  Assessment limited on this arthrographic study due to relative lack of  joint distension.  Articular cartilage: No high grade chondral loss.  Labrum: Altered signal intensity in the labrum extending from 2:00 to  4:00 position (3:00 anterior relative to transverse ligament)  Ligament teres and transverse ligament of acetabulum: Intact.  Joint or bursal effusion  Joint effusion: A physiologic amount of joint fluid.  Bursal effusion: Minimal nonspecific edema over the greater  trochanter. No substantial iliopsoas or trochanteric bursal effusion.  Muscles and tendons  Muscles and tendons: The rectus femoris, iliopsoas, proximal  hamstrings, and hip abductors are intact.  The visualized adductor  muscles are unremarkable.   Nerves:  The visualized course of the sciatic nerve is unremarkable.  Other Findings:  Prominent fat in the left inguinal canal. Few subcentimeter inguinal  lymph nodes.  Impression:  1. Tearing of the anterior superior labrum extending from 2:00 to 4:00  position (3:00 anterior relative to transverse ligament).  2. Nonspecific bone marrow edema like signal intensity along the  anteromedial femoral head neck junction, likely degenerative.          ASSESSMENT:   1.  Lumbar DDD, mild  2.  Lumbar muscle spasm  3.  Labral tear, right hip  4.  Hx: anxiety, chemical dependence in remission.  5.  Functional neurologic movement disorder  6.  Somatization disorder (?)    Shoaib presents for monthly medication management visit.  As noted above he has had a few slips on the ice since his last visit with me.  He notes this flares of his pain however he has been able to write himself and does not complain of any unusual pain or of hitting his head again.  He is concerned about how much he is sleeping but then he also describes \"manic\" periods where he is unable to sleep for 2 days.  Certainly his " medications could interfere with his sleep.  If this becomes an ongoing problem we may consider sleep study.  I would like him to also consider psychiatry check in.    Tobacco use: Advised complete tobacco cessation.     Plan:    Diagnosis reviewed, treatment option addressed, and risk/benifits discussed.  Self-care instructions given.  I am recommending a multidisciplinary treatment plan to help this patient better manage pain.      1. Schedule pain psychology visits  2. Schedule physical therapy visits   3. Schedule follow-up with CARLOS A Higuera, NP-C in 4 weeks  4. Labs: Comprehensive metabolic panel   5. Medication recommendations:   1. Refills of MS Contin and Norco sent to your pharmacy for  on Saturday.     A total of 30 minutes was spent on the patient today, greater than 50% of that time was spent on face to face counseling and care coordination regarding diagnoses and treatment options as mentioned above including the multidisciplinary pain management program and the benefits of physical therapy, pain psychology and medication options.      CARLOS A Bass, NP-C  [facility]

## 2020-01-30 NOTE — PATIENT INSTRUCTIONS
After Visit Instructions:     Thank you for coming to [facility] for your care. It is my goal to partner with you to help you reach your optimal state of health.     Continue daily self-care, identifying contributing factors, and monitoring variations in pain level. Continue to integrate self-care into your life.      1. Schedule pain psychology visits  2. Schedule physical therapy visits   3. Schedule follow-up with CARLOS A Higuera NP-C in 4 weeks  4. Labs: Comprehensive metabolic panel   5. Medication recommendations:   1. Refills of MS Contin and Norco sent to your pharmacy for  on Saturday.     CARLOS A Bass NP-C  [facility]  [facility]    Clinic Number:  [phone]     Call with any questions about your care and for scheduling assistance.     Calls are returned Monday through Friday between 8 AM and 4:30 PM. We usually get back to you within 2 business days depending on the issue/request.    If we are prescribing your medications:    For opioid medication refills, call the clinic or send a [employer] message 7 days in advance.  Please include:    Name of requested medication    Name of the pharmacy.    For non-opioid medications, call your pharmacy directly to request a refill. Please allow 3-4 days to be processed.     Per [place] State Law:    All controlled substance prescriptions must be filled within 30 days of being written.      For those controlled substances allowing refills, pickup must occur within 30 days of last fill.      We believe regular attendance is key to your success in our program!      Any time you are unable to keep your appointment we ask that you call us at least 24 hours in advance to cancel.This will allow us to offer the appointment time to another patient.   Multiple missed appointments may lead to dismissal from the clinic.

## 2020-01-31 LAB
ALBUMIN SERPL-MCNC: 3.9 G/DL (ref 3.4–5)
ALP SERPL-CCNC: 60 U/L (ref 40–150)
ALT SERPL W P-5'-P-CCNC: 31 U/L (ref 0–70)
ANION GAP SERPL CALCULATED.3IONS-SCNC: 4 MMOL/L (ref 3–14)
AST SERPL W P-5'-P-CCNC: 19 U/L (ref 0–45)
BILIRUB SERPL-MCNC: 0.4 MG/DL (ref 0.2–1.3)
BUN SERPL-MCNC: 16 MG/DL (ref 7–30)
CALCIUM SERPL-MCNC: 9 MG/DL (ref 8.5–10.1)
CHLORIDE SERPL-SCNC: 108 MMOL/L (ref 94–109)
CO2 SERPL-SCNC: 26 MMOL/L (ref 20–32)
CREAT SERPL-MCNC: 1.01 MG/DL (ref 0.66–1.25)
GFR SERPL CREATININE-BSD FRML MDRD: >90 ML/MIN/{1.73_M2}
GLUCOSE SERPL-MCNC: 132 MG/DL (ref 70–99)
POTASSIUM SERPL-SCNC: 4.4 MMOL/L (ref 3.4–5.3)
PROT SERPL-MCNC: 7.1 G/DL (ref 6.8–8.8)
SODIUM SERPL-SCNC: 140 MMOL/L (ref 133–144)

## 2020-01-31 NOTE — RESULT ENCOUNTER NOTE
Test results were as expected with no cause for concern. Will discuss further at the next appointment.     CARLOS A Bass, NP-C   [facility]

## 2020-02-12 ENCOUNTER — TELEPHONE (OUTPATIENT)
Dept: PALLIATIVE MEDICINE | Facility: CLINIC | Age: 35
End: 2020-02-12

## 2020-02-12 ENCOUNTER — TELEPHONE (OUTPATIENT)
Dept: BEHAVIORAL HEALTH | Facility: CLINIC | Age: 35
End: 2020-02-12

## 2020-02-12 NOTE — TELEPHONE ENCOUNTER
Behavioral Health Home Services  [facility]: [place]        Social Work Care Navigator Note      Patient: Hoang Kiser  Date: February 12, 2020  Preferred Name: Shoaib    Previous PHQ-9:   PHQ-9 SCORE 10/1/2018 3/22/2019 10/22/2019   PHQ-9 Total Score - - -   PHQ-9 Total Score 13 11 9     Previous JIN-7:   JIN-7 SCORE 10/1/2018 3/22/2019 10/22/2019   Total Score - - -   Total Score 13 12 14     MOLLY LEVEL:  MOLLY Score (Last Two) 3/23/2016 10/1/2018   MOLLY Raw Score 52 31   Activation Score 100 59.3   MOLLY Level 4 3       Preferred Contact:  Need for : No  Preferred Contact: Cell      Type of Contact Today: Phone call (not reached/unavailable)      Data: (subjective / Objective):  [facility] called Bonnie IRIVN  ([phone]) and left a message requesting an update on patient's walker we were working on from [facility].    COMPA Villalta  Behavioral Health Home ([facility])   [facility]  [phone]          Next 5 appointments (look out 90 days)    Feb 26, 2020  4:00 PM CST  Return Visit with Sol Kemp  [facility] ([facility] [place]) [address]  [address]  [facility] [address] [phone]   Mar 02, 2020  9:00 AM CST  Return Visit with CARLOS A Guy CNP  [facility] ([facility]) [address] [place], [place] [address]  [facility] [address]  [facility] [address] [phone]

## 2020-02-12 NOTE — TELEPHONE ENCOUNTER
Spoke to Shoaib and per conversation with Tamiko Stevens about the below request asked that he not increase his medication for an acute injury but use non pharmaceutical measures for pain relief as well as to be sure he is seen to have it evaluated if it should require further intervention.     Sarina Polanco, KATERINAN, RN  Care Coordinator  [facility]

## 2020-02-12 NOTE — TELEPHONE ENCOUNTER
Pt called in requesting temporary increase in his medication HYDROcodone-acetaminophen (NORCO) 5-325 MG tablet . He stated he fell and has some swelling and inflammation in his right hip and muscles.       Kurt MINAYA    [facility]

## 2020-02-26 ENCOUNTER — OFFICE VISIT (OUTPATIENT)
Dept: PALLIATIVE MEDICINE | Facility: CLINIC | Age: 35
End: 2020-02-26
Payer: COMMERCIAL

## 2020-02-26 DIAGNOSIS — M47.816 FACET ARTHROPATHY, LUMBAR: ICD-10-CM

## 2020-02-26 DIAGNOSIS — G89.29 CHRONIC MYOFASCIAL PAIN: ICD-10-CM

## 2020-02-26 DIAGNOSIS — M79.18 CHRONIC MYOFASCIAL PAIN: ICD-10-CM

## 2020-02-26 DIAGNOSIS — G25.9 FUNCTIONAL MOVEMENT DISORDER: Primary | ICD-10-CM

## 2020-02-26 DIAGNOSIS — G62.9 NEUROPATHY: ICD-10-CM

## 2020-02-26 DIAGNOSIS — G89.4 CHRONIC PAIN SYNDROME: ICD-10-CM

## 2020-02-26 PROCEDURE — 96158 HLTH BHV IVNTJ INDIV 1ST 30: CPT | Performed by: PSYCHOLOGIST

## 2020-02-26 PROCEDURE — 96159 HLTH BHV IVNTJ INDIV EA ADDL: CPT | Performed by: PSYCHOLOGIST

## 2020-02-26 RX ORDER — NAPROXEN 500 MG/1
500 TABLET ORAL 2 TIMES DAILY WITH MEALS
Qty: 40 TABLET | Refills: 3 | Status: SHIPPED | OUTPATIENT
Start: 2020-02-26 | End: 2020-05-26

## 2020-02-26 NOTE — PROGRESS NOTES
[facility]   [facility] [place]  Behavioral Medicine Visit    Patient Name: Hoagn Kiser     YOB: 1985   Medical Record Number: [id number]  Date: 2/26/2020                SUBJECTIVE: Patient was not provided check in form - completed in session. Patient reports his pain is mildly worse - reports he has fallen a total of 8 times this winter. Uses walking stick now to ambulate outside. Patient's mood is mildly improved - states he has 'had some insights' into his roommate and their relationship. Is working on setting up and holding boundaries with her. Activity level is moderately decreased. Stress level is mildly worse - learned his therapist is starting her own practice. Discussed that if he is in need of increased visits temporarily while he obtains a new provider, he is welcome to increase visits with me until such a time as he obtains a new regular individual psychotherapist. Sleep hygiene is mildly improved. Patient reports engaging in self-care for his pain 2-3 times per day.    OBJECTIVE: Easily engaged and readily utilizes skills discussed in sessions.    Length of Visit: 60 minutes      Pain Diagnoses per pain provider:   Functional movement disorder      Long term use of drug      Facet arthropathy, lumbar     Chronic pain syndrome     Assessment: Current Emotional / Mental Status    Appearance:   Appropriate   Eye Contact:   Good   Psychomotor Behavior: Normal  Restless   Attitude:   Cooperative   Orientation:   All  Speech  Rate / Production:             Normal   Volume:              Soft   Mood:    Normal  Affect:    Appropriate  Bright  Subdued  fluctuated   Thought Content:  Clear   Thought Form:  Coherent  Logical   Insight:    Good     ASSESSMENT:   Progress toward goals: as expected.    Pain Status: worsened    Emotional Status: improved              Medication / chemical use concerns:   none    PLAN:   Next Appointment: Hoang Kiser will schedule a follow-up appointment in 2 week(s).  Assignment: Continue to work on setting and holding limits with others as appropriate.  Objectives / interventions for next session: Check in on barriers to holding limits.    Sol Kemp PsyD LP  Outpatient Clinic Therapist  [facility]    Disclaimer: This note consists of symbols derived from keyboarding, dictation and/or voice recognition software. As a result, there may be errors in the script that have gone undetected. Please consider this when interpreting information found in this chart.

## 2020-02-26 NOTE — TELEPHONE ENCOUNTER
Received fax request from [facility] pharmacy requesting refill(s) for naproxen (NAPROSYN) 500 MG tablet    Last refilled on 02/07/20    Pt last seen on 01/30/20  Next appt scheduled for 03/02/20    Will facilitate refill.

## 2020-03-02 ENCOUNTER — OFFICE VISIT (OUTPATIENT)
Dept: PALLIATIVE MEDICINE | Facility: CLINIC | Age: 35
End: 2020-03-02
Payer: COMMERCIAL

## 2020-03-02 VITALS
SYSTOLIC BLOOD PRESSURE: 146 MMHG | DIASTOLIC BLOOD PRESSURE: 82 MMHG | WEIGHT: 186 LBS | HEART RATE: 101 BPM | BODY MASS INDEX: 29.13 KG/M2

## 2020-03-02 DIAGNOSIS — Z79.891 ENCOUNTER FOR LONG-TERM USE OF OPIATE ANALGESIC: ICD-10-CM

## 2020-03-02 DIAGNOSIS — M47.816 FACET ARTHROPATHY, LUMBAR: ICD-10-CM

## 2020-03-02 DIAGNOSIS — M62.838 MUSCLE SPASM: ICD-10-CM

## 2020-03-02 DIAGNOSIS — G25.9 FUNCTIONAL MOVEMENT DISORDER: Primary | ICD-10-CM

## 2020-03-02 DIAGNOSIS — Z91.81 AT HIGH RISK FOR FALLS: ICD-10-CM

## 2020-03-02 PROCEDURE — 99214 OFFICE O/P EST MOD 30 MIN: CPT | Performed by: NURSE PRACTITIONER

## 2020-03-02 PROCEDURE — 80307 DRUG TEST PRSMV CHEM ANLYZR: CPT | Performed by: NURSE PRACTITIONER

## 2020-03-02 RX ORDER — HYDROCODONE BITARTRATE AND ACETAMINOPHEN 5; 325 MG/1; MG/1
1 TABLET ORAL 3 TIMES DAILY PRN
Qty: 60 TABLET | Refills: 0 | Status: SHIPPED | OUTPATIENT
Start: 2020-03-02 | End: 2020-03-30

## 2020-03-02 RX ORDER — MORPHINE SULFATE 15 MG/1
15 TABLET, FILM COATED, EXTENDED RELEASE ORAL EVERY 12 HOURS
Qty: 60 TABLET | Refills: 0 | Status: SHIPPED | OUTPATIENT
Start: 2020-03-02 | End: 2020-03-30

## 2020-03-02 ASSESSMENT — PAIN SCALES - GENERAL: PAINLEVEL: MODERATE PAIN (5)

## 2020-03-02 NOTE — PROGRESS NOTES
[facility]    CHIEF COMPLAINT: Multiple pain complaints, neck pain, low back pain, spasm    INTERVAL HISTORY:  Last seen on 1/30/2020.        Recommendations/plan at the last visit included:  1. Schedule pain psychology visits  2. Schedule physical therapy visits   3. Schedule follow-up with CARLOS A Higuera NP-C in 4 weeks  4. Labs: Comprehensive metabolic panel   5. Medication recommendations:             1. Refills of MS Contin and Norco sent to your pharmacy for  on Saturday.     Since last visit:   - Trying to minimize use of clonazepam. Psychiatrist won't split doses so he doesn't have to take so much at a time. Take 30 Mg, but often not until he wakes at 4 pm. Would like to have 20 mg and 10 mg at a later time if needed.  Trying to find a new psychiatrist.    Pain Information:   Pain quality: Miserable and Penetrating    Pain rating: intensity ranges from 2/10 to 9/10, and averages 6/10 on a 0-10 scale.      Annual Controlled Substance Agreement/UDS due date: 4/2020     Current Pain Relevant Medications:  MS Contin 15 mg BID = 30 MME  Norco 5/325 mg 1 tab BID-TID  #60 tabs per month                        Total opiate dose: 35-40 MME daily  Clonazepam .5 mg 1-2 tab at HS PRN (takes about 2-3x weekly)   Duloxetine 20 mg daily  Naproxen 500 mg BID: on hold re: elevated LFTs   Tizanidine 4 mg 1-2 tabs at HS PRN  Adderall 30 mg daily, combination of long and short acting.       Previous Pain Relevant Medications: (H--helped; HI--Helped initially; [facility]--Somewhat helpful; NH--No help; W--worse; SE--side effects; ?--Unsure if helpful)   NOTE: This medication information taken from patient's intake form, not medical records.                         Opiates: Tramadol: H, Hydrocodone: H, Morphine: H                        NSAIDS: Ibuprofen:H, naproxen:[facility], Relafen: [facility]                        Muscle Relaxants: Cyclobenzaprine:H,Med interaction, Tizanidine:H, Baclofen:  [facility]                        Anti-migraine mediations: Prednisone:H                        Anti-depressants: Bupropion:SE, Celexa:SE, Duloxetine:H, Trazodone:Too strong, Venlafaxine:too strong, Amitriptyline:SE                         Sleep aids:Anbien: H                        Anxiolytics: Clonazepam:H                        Neuropathics: Tegretol:taken for seizures in childhood, Gabapentin:H                                          Topicals: Lidocaine:H                        Other medications not covered above: Tylenol:NH, Adderall: H      Any illicit drug use: Sober date 8/27/2015, lives in a sober house.   EtOH use: last use 5 years ago  Caffeine use: 2-3 per day  Nicotine use: 3/4 pack per day  Any use of prescriptions other than how they were prescribed:taina      [place] Board of Pharmacy Data Base Reviewed:    YES; As expected, no concern for misuse/abuse of controlled medications based on this report.   Concern for multiple controlled substances including stimulants and opiates.  7/27/19: Concern for misuse of opiates and stimulants as noted in office visit on this date.   10/29/19: Requesting early refill due to overuse of opiate medication. #45 tabs to last 30 days. Refill declined.       Medications:  Current Outpatient Medications   Medication Sig Dispense Refill     albuterol (PROAIR HFA/PROVENTIL HFA/VENTOLIN HFA) 108 (90 Base) MCG/ACT inhaler INHALE ONE TO TWO PUFFS INTO THE LUNGS EVERY FOUR HOURS AS NEEDED FOR SHORTNESS OF BREATH/ DYSPNEA OR WHEEZING 18 g 0     amphetamine-dextroamphetamine (ADDERALL XR) 30 MG 24 hr capsule TK 1 C PO QD  0     clonazePAM (KLONOPIN) 0.5 MG tablet 0.5mg tab by mouth nightly as needed  1     DULoxetine (CYMBALTA) 60 MG EC capsule Taking only 1 x 60mg tab by mouth daily 30 capsule 1     HYDROcodone-acetaminophen (NORCO) 5-325 MG tablet Take 1 tablet by mouth 3 times daily as needed for pain Ok to fill on 3/5/20 to start on 3/6/20 60 tablet 0     hydrOXYzine (ATARAX)  25 MG tablet Take 1 tablet (25 mg) by mouth nightly as needed (at HS PRN) 45 tablet 3     morphine (MS CONTIN) 15 MG CR tablet Take 1 tablet (15 mg) by mouth every 12 hours Ok to fill on 3/5/20 to start on 3/6/20 60 tablet 0     naloxone (NARCAN) 4 MG/0.1ML nasal spray Spray 1 spray (4 mg) into one nostril alternating nostrils as needed for opioid reversal every 2-3 minutes until assistance arrives 0.2 mL 0     naproxen (NAPROSYN) 500 MG tablet Take 1 tablet (500 mg) by mouth 2 times daily (with meals) 40 tablet 3     order for DME Equipment being ordered: 4 wheeled walker with bench seat. 1 Units 0     oxybutynin ER (DITROPAN-XL) 10 MG 24 hr tablet TK 1 T PO D  3     tiZANidine (ZANAFLEX) 4 MG tablet Take 4 mg by mouth as needed   0       Review of Systems: A 10-point review of systems was negative, with the exception of chronic pain issues.      PHYSICAL EXAM    Vitals:    03/02/20 0848   BP: (!) 146/82   Pulse: 101   Weight: 84.4 kg (186 lb)       Constitutional: healthy, alert, no distress, pain behaviors  HEENT: Head atraumatic, normocephalic. Eyes without conjunctival injection or jaundice. Neck supple. No obvious neck masses.  Skin: No rash, lesions, or petechiae of exposed skin.   Extremities: No clubbing, cyanosis, or edema to exposed extremities  Psychiatric/mental status: Alert, without lethargy or stupor. Appropriate affect.appears down/depressed.       Neurologic exam:  CN:  Cranial nerves 2-12 are grossly normal.  Has uncontrolled upper body movement/tremor.          Psychiatric:  mentation appears normal., affect and mood normal      DIRE Score for ongoing opioid management is calculated as follows:    Diagnosis = 2 pts (slowly progressive; moderate pain/objective findings)    Intractability = 2 pts (most treatments tried; patient not fully engaged/barriers)    Risk        Psych = 2 pts (personality dysfunction/mental illness that moderately interferes with care)         Chem Hlth = 2 pts (use of  medications to cope with stress; chemical dependency in remission)       Reliability = 3 pts (highly reliable with meds, appointments, treatments)       Social = 3 pts (supportive family/close relationships; involved in work/school; no isolation)       (Psych + Chem hlth + Reliability + Social) = 14    Efficacy = 2 pts (moderate benefit/function; low med dose; too early/not tried meds)    DIRE Score = 16        7-13: likely NOT suitable candidate for long-term opioid analgesia       14-21: may be a suitable candidate for long-term opioid analgesia          Previous Diagnostic Tests:   Imaging Studies:   MR LUMBAR SPINE W/O CONTRAST 5/2/2018 7:27 PM  History: DDD (degenerative disc disease), lumbar; Lumbar  radiculopathy; Hip pain, right; Groin pain, right.  ICD-10: DDD (degenerative disc disease), lumbar; Lumbar radiculopathy;  Hip pain, right; Groin pain, right  Comparison: Lumbar spine MRI 3/8/2017  Technique: Sagittal STIR, T1-weighted turbo spin-echo, 3-D volumetric  T2-weighted and axial reconstructed T2-weighted images of the lumbar  spine were obtained without intravenous contrast.   Findings: 5 lumbar-type vertebra. The tip of the conus medullaris is  at L1.  The lumbar vertebral column is normally aligned.   Straightening of lumbar lordosis, unchanged. The intervertebral disc  heights are maintained. Normal marrow signal. At L5-S1, there is a  disc bulge and facet hypertrophy with mild left neural foraminal  narrowing, unchanged. No spinal canal stenosis.  Impression:  1. Lumbar spondylosis with mild left neural foraminal narrowing at  L5-S1.  2. No spinal canal stenosis.      MR right hip without contrast 11/22/2019 8:28 AM  Techniques: Multiplanar multisequence imaging of the right  hip was  obtained without  administration of intra-articular or intravenous  contrast using routing protocol.  History: Hip pain, chronic, labral tear suspected, neg xray or  equivocal; ; Progressive pain; Chronic right hip  pain; Chronic right  hip pain    Comparison: MRI hip 5/2/2018  Findings:  Osseous structures  Osseous structures: Bone marrow edema like signal intensity along the  anterior head neck junction less pronounced than prior MRI. No  fracture or avascular necrosis. Osseous prominence at the femoral  neck, which may be seen with cam-type femoral acetabular impingement.  Articular cartilage and labrum  Assessment limited on this arthrographic study due to relative lack of  joint distension.  Articular cartilage: No high grade chondral loss.  Labrum: Increased signal intensity in the anterosuperior labrum from  12:00 to 3:00 position suggestive of labral tear (3:00 anterior  relative to the transverse ligament).  Ligament teres and transverse ligament of acetabulum: Intact.  Joint or bursal effusion  Joint effusion: A physiologic amount of joint fluid.  Bursal effusion: Minimal nonspecific edema over the greater  trochanter. No substantial iliopsoas or trochanteric bursal effusion.  Muscles and tendons  Muscles and tendons: The rectus femoris, iliopsoas, proximal  hamstrings, and hip abductors are intact.  The visualized adductor  muscles are unremarkable.   Nerves:  The visualized course of the sciatic nerve is unremarkable.  Other Findings:  Prominent fat in the right inguinal canal.. Few subcentimeter right  inguinal lymph nodes.  Impression:  1. Redemonstration of labral tear extending from 12:00 to 3:00  position  2. Nonspecific, but likely degenerative, bone marrow edema like signal  intensity along femoral head and junction, less conspicuous compared  to prior MRI dated 5/20/2018. No fracture or avascular necrosis.     MR left hip without contrast 11/22/2019 8:52 AM  Techniques: Multiplanar multisequence imaging of the left hip was  obtained without  administration of intra-articular or intravenous  contrast using routing protocol.  History: Progressive pain; Chronic left hip pain; Chronic left hip  pain     Comparison: None  Findings:  Osseous structures  Osseous structures: Bone marrow edema like signal intensity in the  anteromedial head neck junction, possibly degenerative. No fracture,  stress reaction or avascular necrosis.   Articular cartilage and labrum  Assessment limited on this arthrographic study due to relative lack of  joint distension.  Articular cartilage: No high grade chondral loss.  Labrum: Altered signal intensity in the labrum extending from 2:00 to  4:00 position (3:00 anterior relative to transverse ligament)  Ligament teres and transverse ligament of acetabulum: Intact.  Joint or bursal effusion  Joint effusion: A physiologic amount of joint fluid.  Bursal effusion: Minimal nonspecific edema over the greater  trochanter. No substantial iliopsoas or trochanteric bursal effusion.  Muscles and tendons  Muscles and tendons: The rectus femoris, iliopsoas, proximal  hamstrings, and hip abductors are intact.  The visualized adductor  muscles are unremarkable.   Nerves:  The visualized course of the sciatic nerve is unremarkable.  Other Findings:  Prominent fat in the left inguinal canal. Few subcentimeter inguinal  lymph nodes.  Impression:  1. Tearing of the anterior superior labrum extending from 2:00 to 4:00  position (3:00 anterior relative to transverse ligament).  2. Nonspecific bone marrow edema like signal intensity along the  anteromedial femoral head neck junction, likely degenerative.          ASSESSMENT:   1.  Lumbar DDD, mild  2.  Lumbar muscle spasm  3.  Labral tear, right hip  4.  Hx: anxiety, chemical dependence in remission.  5.  Functional neurologic movement disorder  6.  Somatization disorder (?)    Shoaib presents with increasing uncontrolled movements noted today and continue to have frequent falls.  He has been provided with an order for a four-wheel walker however he is having difficulty getting it paid for with his government assistance.  I have let him know that if he needs  any further documentation from any of the necessity for this item I am happy to provide it.  We collected an annual drug screen today.  We will review at his next appointment.  He is aware that if there are any concerns in the result that this may alter his medication plan.  He is currently without a mental health therapist and would like to see pain psychologist, Dr. Kemp more often.  She has agreed to see him somewhat more frequently while he is looking for a new therapist.  Have advised him to make appointments every other week unless Dr. Kemp states otherwise.  He is also looking for a new psychiatrist for medication management.      Hypertension: Shoaib to follow up with Primary Care provider regarding elevated blood pressure.   Tobacco use: Advised complete tobacco cessation.     Plan:    Diagnosis reviewed, treatment option addressed, and risk/benifits discussed.  Self-care instructions given.  I am recommending a multidisciplinary treatment plan to help this patient better manage pain.      1. Schedule pain psychology visits every 2 weeks   2. Schedule follow-up with CARLOS A Higuera, NPORAL in 4 weeks  3. Labs: Annual urine drug   4. Medication recommendations:   1. Refill of MS Contin to be filled on Th 3/5/2020  2. Refill of Hydrocodone/APAP to be filled today.     A total of 30 minutes was spent on the patient today, greater than 50% of that time was spent on face to face counseling and care coordination regarding diagnoses and treatment options as mentioned above including the multidisciplinary pain management program and the benefits of physical therapy, pain psychology and medication options.      CARLOS A Bass, NP-C  [facility]

## 2020-03-02 NOTE — LETTER
[facility]  03/02/20    Patient: Hoang Kiser  YOB: 1985  Medical Record Number: [id number]                                                                  Opioid / Opioid Plus Controlled Substance Agreement    I understand that my care provider has prescribed an opioid (narcotic) controlled substance to help manage my condition(s). I am taking this medicine to help me function or work. I know this is strong medicine, and that it can cause serious side effects. Opioid medicine can be sedating, addicting and may cause a dependency on the drug. They can affect my ability to drive or think, and cause depression. They need to be taken exactly as prescribed. Combining opioids with certain medicines or chemicals (such as cocaine, sedatives and tranquilizers, sleeping pills, meth) can be dangerous or even fatal. Also, if I stop opioids suddenly, I may have severe withdrawal symptoms. Last, I understand that opioids do not work for all types of pain nor for all patients. If not helpful, I may be asked to stop them.      The risks, benefits, and side effects of these medicine(s) were explained to me. I agree that:    1. I will take part in other treatments as advised by my care team. This may be psychiatry or counseling, physical therapy, behavioral therapy, group treatment or a referral to a pain clinic. I will reduce or stop my medicine when my care team tells me to do so.  2. I will take my medicines as prescribed. I will not change the dose or schedule unless my care team tells me to. There will be no refills if I  run out early.   I may be contactedwithout warning and asked to complete a urine drug test or pill count at any time.   3. I will keep all my appointments, and understand this is part of the monitoring of opioids. My care team may require an office visit for EVERY opioid/controlled substance refill. If I miss appointments or don t follow instructions, my care team may  stop my medicine.  4. I will not ask other providers to prescribe controlled substances, and I will not accept controlled substances from other people. If I need another prescribed controlled substance for a new reason, I will tell my care team within 1 business day.  5. I will use one pharmacy to fill all of my controlled substance prescriptions, and it is up to me to make sure that I do not run out of my medicines on weekends or holidays. If my care team is willing to refill my opioid prescription without a visit, I must request refills only during office hours, refills may take up to 3 days to process, and it may take up to 5 to 7 days for my medicine to be mailed and ready at my pharmacy. Prescriptions will not be mailed anywhere except my pharmacy.        [id number]  Rev 12/18         Registration to scan to EHR                             Page 1 of 2               Controlled Substance Agreement Opioid        [facility]  03/02/20  Patient: Hoang Kiser  YOB: 1985  Medical Record Number: [id number]                                                                  6. I am responsible for my prescriptions. If the medicine/prescription is lost or stolen, it will not be replaced. I also agree not to share controlled substance medicines with anyone.  7. I agree to not use ANY illegal or recreational drugs. This includes marijuana, cocaine, bath salts or other drugs. I agree not to use alcohol unless my care team says I may.          I agree to give urine samples whenever asked. If I don t give a urine sample, the care team may stop my medicine.    8. If I enroll in the [place] Medical Marijuana program, I will tell my care team. I will also sign an agreement to share my medical records with my care team.   9. I will bring in my list of medicines (or my medicine bottles) each time I come to the clinic.   10. I will tell my care team right away if I become pregnant or have a new  medical problem treated outside of my regular clinic.  11. I understand that this medicine can affect my thinking and judgment. It may be unsafe for me to drive, use machinery and do dangerous tasks. I will not do any of these things until I know how the medicine affects me. If my dose changes, I will wait to see how it affects me. I will contact my care team if I have concerns about medicine side effects.    I understand that if I do not follow any of the conditions above, my prescriptions or treatment may be stopped.      I agree that my provider, clinic care team, and pharmacy may work with any city, state or federal law enforcement agency that investigates the misuse, sale, or other diversion of my controlled medicine. I will allow my provider to discuss my care with or share a copy of this agreement with any other treating provider, pharmacy or emergency room where I receive care. I agree to give up (waive) any right of privacy or confidentiality with respect to these consents.     I have read this agreement and have asked questions about anything I did not understand.      ________________________________________________________________________  Patient signature - Date/Time -  Hoang Kiser                                      ________________________________________________________________________  Witness signature                                                            ________________________________________________________________________  Provider signature - CARLOS A Guy CNP      [id number]  Rev 12/18         Registration to scan to EHR                         Page 2 of 2                   Controlled Substance Agreement Opioid           Page 1 of 2  Opioid Pain Medicines (also known as Narcotics)  What You Need to Know    What are opioids?   Opioids are pain medicines that must be prescribed by a doctor.  They are also known as narcotics.    Examples are:     morphine (MS Contin,  Lucretia)    oxycodone (Oxycontin)    oxycodone and acetaminophen (Percocet)    hydrocodone and acetaminophen (Vicodin, Norco)     fentanyl patch (Duragesic)     hydromorphone (Dilaudid)     methadone     What do opioids do well?   Opioids are best for short-term pain after a surgery or injury. They also work well for cancer pain. Unlike other pain medicines, they do not cause liver or kidney failure or ulcers. They may help some people with long-lasting (chronic) pain.     What do opioids NOT do well?   Opioids never get rid of pain entirely, and they do not work well for most patients with chronic pain. Opioids do not reduce swelling, one of the causes of pain. They also don t work well for nerve pain.                           For informational purposes only.  Not to replace the advice of your care provider.  Copyright 201 [facility]. All right reserved. Positionly 681132-Fzt 02/18.      Page 2 of 2    Risks and side effects   Talk to your doctor before you start or decide to keep taking one of these medicines. Side effects include:    Lowering your breathing rate enough to cause death    Overdose, including death, especially if taking higher than prescribed doses    Long-term opioid use    Worse depression symptoms; less pleasure in things you usually enjoy    Feeling tired or sluggish    Slower thoughts or cloudy thinking    Being more sensitive to pain over time; pain is harder to control    Trouble sleeping or restless sleep    Changes in hormone levels (for example, less testosterone)    Changes in sex drive or ability to have sex    Constipation    Unsafe driving    Itching and sweating    Feeling dizzy    Nausea, vomiting and dry mouth    What else should I know about opioids?  When someone takes opioids for too long or too often, they become dependent. This means that if you stop or reduce the medicine too quickly, you will have withdrawal symptoms.    Dependence is not the same as addiction.  Addiction is when people keep using a substance that harms their body, their mind or their relations with others. If you have a history of drug or alcohol abuse, taking opioids can cause a relapse.    Over time, opioids don t work as well. Most people will need higher and higher doses. The higher the dose, the more serious the side effects. We don t know the long-term effects of opioids.      Prescribed opioids aren't the best way to manage chronic pain    Other ways to manage pain include:      Ibuprofen or acetaminophen.  You should always try this first.      Treat health problems that may be causing pain.      acupuncture or massage, deep breathing, meditation, visual imagery, aromatherapy.      Use heat or ice at the pain site      Physical therapy and exercise      Stop smoking      See a counselor or therapist                                                  People who have used opioids for a long time may have a lower quality of life, worse depression, higher levels of pain and more visits to doctors.    Never share your opioids with others. Be sure to store opioids in a secure place, locked if possible.Young children can easily swallow them and overdose.     You can overdose on opioids.  Signs of overdose include decrease or loss of consciousness, slowed breathing, trouble waking and blue lips.  If someone is worried about overdose, they should call 911.    If you are at risk for overdose, you may get naloxone (Narcan, a medicine that reverses the effects of opioids.  If you overdose, a friend or family member can give you Narcan while waiting for the ambulance.  They need to know the signs of overdose and how to give Narcan.    While you're taking opioids:    Don't use alcohol or street drugs. Taking them together can cause death.    Don't take any of these medicines unless your doctor says its okay.  Taking these with opioids can cause death.    Benzodiazepines (such as lorazepam         or  diazepam)    Muscle relaxers (such as cyclobenzaprine)    sleeping pills    other opioids    Safe disposal of opioids  Find your area drug take-back program, your pharmacy mail-back program, buy a special disposal bag (such as Deterra) from your pharmacy or flush them down the toilet.  Use the guidelines at:  www.fda.gov/drugs/resourcesforyou

## 2020-03-02 NOTE — PATIENT INSTRUCTIONS
After Visit Instructions:     Thank you for coming to [facility] for your care. It is my goal to partner with you to help you reach your optimal state of health.     Continue daily self-care, identifying contributing factors, and monitoring variations in pain level. Continue to integrate self-care into your life.      1. Schedule pain psychology visits every 2 weeks   2. Schedule follow-up with CARLOS A Higuera NP-C in 4 weeks  3. Labs: Annual urine drug   4. Medication recommendations:   1. Refill of MS Contin to be filled on Th 3/5/2020  2. Refill of Hydrocodone/APAP to be filled today.     CARLOS A Bass NP-C  [facility]    Clinic Number:  [phone]     Call with any questions about your care and for scheduling assistance.     Calls are returned Monday through Friday between 8 AM and 4:30 PM. We usually get back to you within 2 business days depending on the issue/request.    If we are prescribing your medications:    For opioid medication refills, call the clinic or send a [employer] message 7 days in advance.  Please include:    Name of requested medication    Name of the pharmacy.    For non-opioid medications, call your pharmacy directly to request a refill. Please allow 3-4 days to be processed.     Per [place] State Law:    All controlled substance prescriptions must be filled within 30 days of being written.      For those controlled substances allowing refills, pickup must occur within 30 days of last fill.      We believe regular attendance is key to your success in our program!      Any time you are unable to keep your appointment we ask that you call us at least 24 hours in advance to cancel.This will allow us to offer the appointment time to another patient.   Multiple missed appointments may lead to dismissal from the clinic.

## 2020-03-05 LAB — PAIN DRUG SCR UR W RPTD MEDS: NORMAL

## 2020-03-06 ENCOUNTER — MYC MEDICAL ADVICE (OUTPATIENT)
Dept: PALLIATIVE MEDICINE | Facility: CLINIC | Age: 35
End: 2020-03-06

## 2020-03-12 ENCOUNTER — TELEPHONE (OUTPATIENT)
Dept: FAMILY MEDICINE | Facility: CLINIC | Age: 35
End: 2020-03-12

## 2020-03-12 ENCOUNTER — TELEPHONE (OUTPATIENT)
Dept: BEHAVIORAL HEALTH | Facility: CLINIC | Age: 35
End: 2020-03-12

## 2020-03-12 NOTE — TELEPHONE ENCOUNTER
Reason for Call:  Form, our goal is to have forms completed with 72 hours, however, some forms may require a visit or additional information.    Type of letter, form or note:  Home Health Certification    Who is the form from?: Home care    Where did the form come from: form was faxed in    What clinic location was the form placed at?: [place]    Where the form was placed: Given to MA/RN    What number is listed as a contact on the form?: [facility] [phone]       Additional comments: I placed the [facility] forms in the [facility] orange folder for the RN to review.    Call taken on 3/12/2020 at 12:33 PM by Dahlia Vera

## 2020-03-12 NOTE — TELEPHONE ENCOUNTER
Behavioral Health Home Services  [facility]: [place]        Social Work Care Navigator Note      Patient: Hoang Kiser  Date: March 12, 2020  Preferred Name: Shoaib    Previous PHQ-9:   PHQ-9 SCORE 10/1/2018 3/22/2019 10/22/2019   PHQ-9 Total Score - - -   PHQ-9 Total Score 13 11 9     Previous JIN-7:   JIN-7 SCORE 10/1/2018 3/22/2019 10/22/2019   Total Score - - -   Total Score 13 12 14     MOLLY LEVEL:  MOLLY Score (Last Two) 3/23/2016 10/1/2018   MOLLY Raw Score 52 31   Activation Score 100 59.3   MOLLY Level 4 3       Preferred Contact:  Need for : No  Preferred Contact: Cell      Type of Contact Today: Phone call (not reached/unavailable)      Data: (subjective / Objective):  [facility] attempted to reach patient for monthly check in. Left voicemail asking to be called back.    Janet Ng BROWN  Behavioral Health Home ([facility])   [facility]  [phone]        Next 5 appointments (look out 90 days)    Mar 18, 2020  4:00 PM CDT  Return Visit with Sol Kemp  [facility] ([facility] [place]) [address] [facility]  [address]  [facility] [id number] [phone]   Mar 30, 2020 10:00 AM CDT  Return Visit with CARLOS A Guy CNP  [facility] ([facility]) [address] [place], [place] [address]  [facility] [address]  [facility] [id number] [phone]

## 2020-03-13 ENCOUNTER — TELEPHONE (OUTPATIENT)
Dept: BEHAVIORAL HEALTH | Facility: CLINIC | Age: 35
End: 2020-03-13

## 2020-03-13 NOTE — TELEPHONE ENCOUNTER
Behavioral Health Home Services  [facility]: [place]        Social Work Care Navigator Note      Patient: Hoang Kiser  Date: March 13, 2020  Preferred Name: Shoaib    Previous PHQ-9:   PHQ-9 SCORE 10/1/2018 3/22/2019 10/22/2019   PHQ-9 Total Score - - -   PHQ-9 Total Score 13 11 9     Previous JIN-7:   JIN-7 SCORE 10/1/2018 3/22/2019 10/22/2019   Total Score - - -   Total Score 13 12 14     MOLLY LEVEL:  MOLLY Score (Last Two) 3/23/2016 10/1/2018   MOLLY Raw Score 52 31   Activation Score 100 59.3   MOLLY Level 4 3       Preferred Contact:  Need for : No  Preferred Contact: Cell      Type of Contact Today: Phone call (not reached/unavailable)      Data: (subjective / Objective):  [facility] received voicemail from patient returning call from previous day. [facility] left voicemail for patient introducing self as new [facility]  and asked to be called back to do a monthly check in.     NURYS Henderson  Behavioral Health Home ([facility])   [facility]  [phone]        Next 5 appointments (look out 90 days)    Mar 18, 2020  4:00 PM CDT  Return Visit with Sol Kemp  [facility] ([facility] [place]) [address] [facility]  [address]  [facility] [id number] [phone]   Mar 30, 2020 10:00 AM CDT  Return Visit with CARLOS A Guy CNP  [facility] ([facility]) [address]  [facility] [address]  [facility] [id number] [phone]

## 2020-03-17 ENCOUNTER — TELEPHONE (OUTPATIENT)
Dept: PALLIATIVE MEDICINE | Facility: CLINIC | Age: 35
End: 2020-03-17

## 2020-03-17 NOTE — TELEPHONE ENCOUNTER
SERA for patient regarding follow up appointment 3/18 at 4 p.m. to see if we could change this to a telephone visit. Provided phone number for [facility] schedulers.     Sol Kemp PsyD LP  Outpatient Clinical Therapist

## 2020-03-23 DIAGNOSIS — Z53.9 DIAGNOSIS NOT YET DEFINED: Primary | ICD-10-CM

## 2020-03-23 PROCEDURE — G0179 MD RECERTIFICATION HHA PT: HCPCS | Performed by: FAMILY MEDICINE

## 2020-03-23 NOTE — TELEPHONE ENCOUNTER
Completed [facility] faxed to [phone] and sent to STAT scanning.   Ruth ArnoldTC

## 2020-03-23 NOTE — TELEPHONE ENCOUNTER
This RN reconciled medication list from [facility] form against our [facility] medication list and there are no discrepancies.   Form put in PCP's box to sign.  Please route this message back to TC along with form.     Diane BORGESN, RN

## 2020-03-24 ENCOUNTER — TELEPHONE (OUTPATIENT)
Dept: BEHAVIORAL HEALTH | Facility: CLINIC | Age: 35
End: 2020-03-24

## 2020-03-24 NOTE — TELEPHONE ENCOUNTER
Behavioral Health Home Services  [facility]: [place]        Social Work Care Navigator Note      Patient: Hoang Kiser  Date: March 24, 2020  Preferred Name: Shoaib    Previous PHQ-9:   PHQ-9 SCORE 10/1/2018 3/22/2019 10/22/2019   PHQ-9 Total Score - - -   PHQ-9 Total Score 13 11 9     Previous JIN-7:   JIN-7 SCORE 10/1/2018 3/22/2019 10/22/2019   Total Score - - -   Total Score 13 12 14     MOLLY LEVEL:  MOLLY Score (Last Two) 3/23/2016 10/1/2018   MOLLY Raw Score 52 31   Activation Score 100 59.3   MOLLY Level 4 3       Preferred Contact:  Need for : No  Preferred Contact: Cell      Type of Contact Today: Phone call (patient / identified key support person reached)      Data: (subjective / Objective):  Recent ED/IP Admission or Discharge?   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   [facility] inquired how patient is doing with the recent activity with COVID 19. Patient stated that they are currently at [employer] getting supplies to stock up on but for the most part has been able to stay home or go to his sister's home who is close by to him. [facility]  mentioned that a previous chart note mentioned that he was having some concerns with his roommate. Patient stated that his roommate is not currently residing with him at this time so this has been better. [facility] asked how patient has been doing both physically and emotionally with the changes occurring from the virus. Patient states that he is doing ok but would like to be able to do more exercise outside. Patient mentioned that exercise is also difficult for him due to his pain. [facility] stated that if he needs and resources or has questions that he can reach out to [facility].     [facility] asked if HAP review could be scheduled. Patient scheduled HAP review for Aril 2nd at 2pm.     Intervention:  Motivational Interviewing: Expressed Empathy/Understanding, Supported Autonomy, Collaboration, Evocation and Open-ended questions   Target Behavior(s): Explored and resolved challenges to attending appointments as scheduled and Explored patient's knowledge of the impact of exercise on mood    Assessment: (Progress on Goals / Homework):  Patient would benefit from continued coordination in reaching their goals set for the Behavioral Health Home ([facility]) program. SW[facility] reviewed Health Action Plan goals and will continue to monitor progress and work with patient and their care team.    Plan: (Homework, other):  Patient was encouraged to continue to seek condition-related information and education.SWCC, patient, and team will continue to work towards progress on reaching goals set for the Behavioral Health Home ([facility]) program.      Scheduled a Phone follow up appointment with SW CC in 1 week     Patient has set self-identified goals and will monitor progress until the next appointment on: 04/02/2020.        NURYS Henderson  [facility] Health Home ([facility])   [facility]  [phone]              Next 5 appointments (look out 90 days)    Mar 30, 2020 10:00 AM CDT  Telephone Visit with Tamiko Stevens  APRN CNP  [facility] ([facility]) [address]  [address]  [facility] [address] [phone]   Apr 01, 2020  4:00 PM CDT  Telephone Visit with [facility] ([facility] [place]) [address]  [address]  [facility] [address]  [phone]

## 2020-03-30 ENCOUNTER — VIRTUAL VISIT (OUTPATIENT)
Dept: PALLIATIVE MEDICINE | Facility: CLINIC | Age: 35
End: 2020-03-30
Payer: COMMERCIAL

## 2020-03-30 ENCOUNTER — TELEPHONE (OUTPATIENT)
Dept: BEHAVIORAL HEALTH | Facility: CLINIC | Age: 35
End: 2020-03-30

## 2020-03-30 DIAGNOSIS — M47.816 FACET ARTHROPATHY, LUMBAR: ICD-10-CM

## 2020-03-30 DIAGNOSIS — G25.9 FUNCTIONAL MOVEMENT DISORDER: ICD-10-CM

## 2020-03-30 PROCEDURE — 99442 ZZC PHYSICIAN TELEPHONE EVALUATION 11-20 MIN: CPT | Performed by: NURSE PRACTITIONER

## 2020-03-30 RX ORDER — MORPHINE SULFATE 15 MG/1
15 TABLET, FILM COATED, EXTENDED RELEASE ORAL EVERY 12 HOURS
Qty: 60 TABLET | Refills: 0 | Status: SHIPPED | OUTPATIENT
Start: 2020-03-30 | End: 2020-05-06

## 2020-03-30 RX ORDER — HYDROCODONE BITARTRATE AND ACETAMINOPHEN 5; 325 MG/1; MG/1
1 TABLET ORAL 3 TIMES DAILY PRN
Qty: 60 TABLET | Refills: 0 | Status: SHIPPED | OUTPATIENT
Start: 2020-03-30 | End: 2020-05-06

## 2020-03-30 ASSESSMENT — PAIN SCALES - GENERAL: PAINLEVEL: MODERATE PAIN (5)

## 2020-03-30 NOTE — PROGRESS NOTES
The patient has been notified of following:     This telephone visit will be conducted via a call between you and your provider. We have found that certain health care needs can be provided without the need for a physical exam.  This service lets us provide the care you need with a phone conversation.  If a prescription is necessary we can send it directly to your pharmacy.  If lab work is needed we can place an order for that and you can then stop by our lab to have the test done at a later time. This is a billable service but we do not know the cost at this time.     Clara Rueda on 3/30/2020 at 9:46 AM

## 2020-03-30 NOTE — TELEPHONE ENCOUNTER
Behavioral Health Home Services  [facility]: [place]        Social Work Care Navigator Note      Patient: Hoang Kiser  Date: March 30, 2020  Preferred Name: Shoaib    Previous PHQ-9:   PHQ-9 SCORE 10/1/2018 3/22/2019 10/22/2019   PHQ-9 Total Score - - -   PHQ-9 Total Score 13 11 9     Previous JIN-7:   JIN-7 SCORE 10/1/2018 3/22/2019 10/22/2019   Total Score - - -   Total Score 13 12 14     MOLLY LEVEL:  MOLLY Score (Last Two) 3/23/2016 10/1/2018   MOLLY Raw Score 52 31   Activation Score 100 59.3   MOLLY Level 4 3       Preferred Contact:  Need for : No  Preferred Contact: Cell      Type of Contact Today: Phone call (patient / identified key support person reached)      Data: (subjective / Objective):  Recent ED/IP Admission or Discharge?   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 Patient reports that they can complete telemedicine visit. Patient prefers e-mail notification for visit.     Intervention:  Motivational Interviewing: Supported Autonomy, Collaboration, Evocation   Target Behavior(s): Explored and resolved  challenges to attending appointments as scheduled    Assessment: (Progress on Goals / Homework):  Patient would benefit from continued coordination in reaching their goals set for the Behavioral Health Home [facility] program. SWCC reviewed Health Action Plan goals and will continue to monitor progress and work with patient and their care team.    Plan: (Homework, other):  Patient was encouraged to continue to seek condition-related information and education.      Scheduled a Phone follow up appointment with SW CC in 1 week     Patient has set self-identified goals and will monitor progress until the next appointment on: 04/02/2020.      NURYS Henderson  [facility]al [facility] Home [facility]   [facility]  [phone]              Next 5 appointments (look out 90 days)    Apr 01, 2020  4:00 PM CDT  Telephone Visit with Sol Kemp  [facility] ([facility]) [address]  [address]  [facility] [address]  [phone]   Apr 02, 2020  2:00 PM CDT  [facility]phone [facility] LAKSHMI Lennon  [facility] [address] [facility] [address] [phone]

## 2020-03-30 NOTE — PROGRESS NOTES
"  [facility]    Hoang Kiser is a 34 year old male who is being evaluated via a billable telephone visit.         CHIEF COMPLAINT: Chronic pain     INTERVAL HISTORY:  Last seen on 3/2/20.        Recommendations/plan at the last visit included:  1. Schedule pain psychology visits every 2 weeks   2. Schedule follow-up with CARLOS A Higuera NP-C in 4 weeks  3. Labs: Annual urine drug   4. Medication recommendations:             1. Refill of MS Contin to be filled on Th 3/5/2020  2. Refill of Hydrocodone/APAP to be filled today.     Since last visit:   - \"Doing ok, you know, with everything going on.\" States that he needs to step outside because there are cameras in his group home and he doesn't want to be recorded. States that he has to have ILS workers meet in his room. Notes stress from being recorded all the time is not too bad. \"Since I'm not living a double life, like when I was using, so I don't worry too much\"  - Notes some increase in pain with worrying about COVID but trying to relax.      Pain Information:   Pain quality: Miserable    Pain rating: intensity ranges from 2/10 to 9/10, and averages 5/10 on a 0-10 scale.      Annual Controlled Substance Agreement/UDS due date: 4/2020     Current Pain Relevant Medications:  MS Contin 15 mg BID = 30 MME  Norco 5/325 mg 1 tab BID-TID  #60 tabs per month                        Total opiate dose: 35-40 MME daily  Clonazepam .5 mg 1-2 tab at HS PRN (takes about 2-3x weekly)   Duloxetine 20 mg daily  Naproxen 500 mg BID: on hold re: elevated LFTs   Tizanidine 4 mg 1-2 tabs at HS PRN  Adderall 30 mg daily, combination of long and short acting.       Previous Pain Relevant Medications: (H--helped; HI--Helped initially; SWH--Somewhat helpful; NH--No help; W--worse; SE--side effects; ?--Unsure if helpful)   NOTE: This medication information taken from patient's intake form, not medical records.                         Opiates: Tramadol: H, " Hydrocodone: H, Morphine: H                        NSAIDS: Ibuprofen:H, naproxen:[facility], Relafen: NH                        Muscle Relaxants: Cyclobenzaprine:H,Med interaction, Tizanidine:H, Baclofen: [facility]                        Anti-migraine mediations: Prednisone:H                        Anti-depressants: Bupropion:SE, Celexa:SE, Duloxetine:H, Trazodone:Too strong, Venlafaxine:too strong, Amitriptyline:SE                         Sleep aids:Anbien: H                        Anxiolytics: Clonazepam:H                        Neuropathics: Tegretol:taken for seizures in childhood, Gabapentin:H                                          Topicals: Lidocaine:H                        Other medications not covered above: Tylenol:NH, Adderall: H      Any illicit drug use: Sober date 8/27/2015, lives in a sober house.   EtOH use: last use 5 years ago  Caffeine use: 2-3 per day  Nicotine use: 3/4 pack per day  Any use of prescriptions other than how they were prescribed:taina      [place] Board of Pharmacy Data Base Reviewed:    YES; As expected, no concern for misuse/abuse of controlled medications based on this report.   Concern for multiple controlled substances including stimulants and opiates.  7/27/19: Concern for misuse of opiates and stimulants as noted in office visit on this date.   10/29/19: Requesting early refill due to overuse of opiate medication. #45 tabs to last 30 days. Refill declined.     Is Narcan prescribed for opiate use >50 MME daily or concurrent use of opiates and benzodiazepines? YES    Medications:  Current Outpatient Medications   Medication Sig Dispense Refill     albuterol (PROAIR HFA/PROVENTIL HFA/VENTOLIN HFA) 108 (90 Base) MCG/ACT inhaler INHALE ONE TO TWO PUFFS INTO THE LUNGS EVERY FOUR HOURS AS NEEDED FOR SHORTNESS OF BREATH/ DYSPNEA OR WHEEZING 18 g 0     amphetamine-dextroamphetamine (ADDERALL XR) 30 MG 24 hr capsule TK 1 C PO QD  0     clonazePAM (KLONOPIN) 0.5 MG tablet 0.5mg tab by mouth  nightly as needed  1     DULoxetine (CYMBALTA) 60 MG EC capsule Taking only 1 x 60mg tab by mouth daily 30 capsule 1     HYDROcodone-acetaminophen (NORCO) 5-325 MG tablet Take 1 tablet by mouth 3 times daily as needed for pain Ok to fill on 3/5/20 to start on 3/6/20 60 tablet 0     hydrOXYzine (ATARAX) 25 MG tablet Take 1 tablet (25 mg) by mouth nightly as needed (at HS PRN) 45 tablet 3     morphine (MS CONTIN) 15 MG CR tablet Take 1 tablet (15 mg) by mouth every 12 hours Ok to fill on 3/5/20 to start on 3/6/20 60 tablet 0     naloxone (NARCAN) 4 MG/0.1ML nasal spray Spray 1 spray (4 mg) into one nostril alternating nostrils as needed for opioid reversal every 2-3 minutes until assistance arrives 0.2 mL 0     naproxen (NAPROSYN) 500 MG tablet Take 1 tablet (500 mg) by mouth 2 times daily (with meals) 40 tablet 3     order for DME Equipment being ordered: 4 wheeled walker with bench seat. 1 Units 0     oxybutynin ER (DITROPAN-XL) 10 MG 24 hr tablet TK 1 T PO D  3     tiZANidine (ZANAFLEX) 4 MG tablet Take 4 mg by mouth as needed   0         DIRE Score for ongoing opioid management is calculated as follows:    Diagnosis = 2 pts (slowly progressive; moderate pain/objective findings)    Intractability = 2 pts (most treatments tried; patient not fully engaged/barriers)    Risk        Psych = 2 pts (personality dysfunction/mental illness that moderately interferes with care)         Chem Hlth = 2 pts (use of medications to cope with stress; chemical dependency in remission)       Reliability = 3 pts (highly reliable with meds, appointments, treatments)       Social = 3 pts (supportive family/close relationships; involved in work/school; no isolation)       (Psych + Chem hlth + Reliability + Social) = 14    Efficacy = 2 pts (moderate benefit/function; low med dose; too early/not tried meds)    DIRE Score = 16        7-13: likely NOT suitable candidate for long-term opioid analgesia       14-21: may be a suitable candidate  for long-term opioid analgesia          Previous Diagnostic Tests:   Imaging Studies:   MR LUMBAR SPINE W/O CONTRAST 5/2/2018 7:27 PM  History: DDD (degenerative disc disease), lumbar; Lumbar  radiculopathy; Hip pain, right; Groin pain, right.  ICD-10: DDD (degenerative disc disease), lumbar; Lumbar radiculopathy;  Hip pain, right; Groin pain, right  Comparison: Lumbar spine MRI 3/8/2017  Technique: Sagittal STIR, T1-weighted turbo spin-echo, 3-D volumetric  T2-weighted and axial reconstructed T2-weighted images of the lumbar  spine were obtained without intravenous contrast.   Findings: 5 lumbar-type vertebra. The tip of the conus medullaris is  at L1.  The lumbar vertebral column is normally aligned.   Straightening of lumbar lordosis, unchanged. The intervertebral disc  heights are maintained. Normal marrow signal. At L5-S1, there is a  disc bulge and facet hypertrophy with mild left neural foraminal  narrowing, unchanged. No spinal canal stenosis.  Impression:  1. Lumbar spondylosis with mild left neural foraminal narrowing at  L5-S1.  2. No spinal canal stenosis.      MR right hip without contrast 11/22/2019 8:28 AM  Techniques: Multiplanar multisequence imaging of the right  hip was  obtained without  administration of intra-articular or intravenous  contrast using routing protocol.  History: Hip pain, chronic, labral tear suspected, neg xray or  equivocal; ; Progressive pain; Chronic right hip pain; Chronic right  hip pain    Comparison: MRI hip 5/2/2018  Findings:  Osseous structures  Osseous structures: Bone marrow edema like signal intensity along the  anterior head neck junction less pronounced than prior MRI. No  fracture or avascular necrosis. Osseous prominence at the femoral  neck, which may be seen with cam-type femoral acetabular impingement.  Articular cartilage and labrum  Assessment limited on this arthrographic study due to relative lack of  joint distension.  Articular cartilage: No high grade  chondral loss.  Labrum: Increased signal intensity in the anterosuperior labrum from  12:00 to 3:00 position suggestive of labral tear (3:00 anterior  relative to the transverse ligament).  Ligament teres and transverse ligament of acetabulum: Intact.  Joint or bursal effusion  Joint effusion: A physiologic amount of joint fluid.  Bursal effusion: Minimal nonspecific edema over the greater  trochanter. No substantial iliopsoas or trochanteric bursal effusion.  Muscles and tendons  Muscles and tendons: The rectus femoris, iliopsoas, proximal  hamstrings, and hip abductors are intact.  The visualized adductor  muscles are unremarkable.   Nerves:  The visualized course of the sciatic nerve is unremarkable.  Other Findings:  Prominent fat in the right inguinal canal.. Few subcentimeter right  inguinal lymph nodes.  Impression:  1. Redemonstration of labral tear extending from 12:00 to 3:00  position  2. Nonspecific, but likely degenerative, bone marrow edema like signal  intensity along femoral head and junction, less conspicuous compared  to prior MRI dated 5/20/2018. No fracture or avascular necrosis.     MR left hip without contrast 11/22/2019 8:52 AM  Techniques: Multiplanar multisequence imaging of the left hip was  obtained without  administration of intra-articular or intravenous  contrast using routing protocol.  History: Progressive pain; Chronic left hip pain; Chronic left hip  pain    Comparison: None  Findings:  Osseous structures  Osseous structures: Bone marrow edema like signal intensity in the  anteromedial head neck junction, possibly degenerative. No fracture,  stress reaction or avascular necrosis.   Articular cartilage and labrum  Assessment limited on this arthrographic study due to relative lack of  joint distension.  Articular cartilage: No high grade chondral loss.  Labrum: Altered signal intensity in the labrum extending from 2:00 to  4:00 position (3:00 anterior relative to transverse  ligament)  Ligament teres and transverse ligament of acetabulum: Intact.  Joint or bursal effusion  Joint effusion: A physiologic amount of joint fluid.  Bursal effusion: Minimal nonspecific edema over the greater  trochanter. No substantial iliopsoas or trochanteric bursal effusion.  Muscles and tendons  Muscles and tendons: The rectus femoris, iliopsoas, proximal  hamstrings, and hip abductors are intact.  The visualized adductor  muscles are unremarkable.   Nerves:  The visualized course of the sciatic nerve is unremarkable.  Other Findings:  Prominent fat in the left inguinal canal. Few subcentimeter inguinal  lymph nodes.  Impression:  1. Tearing of the anterior superior labrum extending from 2:00 to 4:00  position (3:00 anterior relative to transverse ligament).  2. Nonspecific bone marrow edema like signal intensity along the  anteromedial femoral head neck junction, likely degenerative.          ASSESSMENT:   1.  Lumbar DDD, mild  2.  Lumbar muscle spasm  3.  Labral tear, right hip  4.  Hx: anxiety, chemical dependence in remission.  5.  Functional neurologic movement disorder  6.  Somatization disorder (?)    Shoaib continues to struggle with social interactions within his group home facility.  Briefly discussed whether this typeable living arrangement is beneficial to him.  He describes having to step outside so that his phone call is not recorded.  At first this struck me as paranoid and unlikely that he was being recorded but then he further explained that there had been episodes of meth house which led the house managers to have cameras in the common areas.  I am unsure if this information is accurate.  He is working with our pain psychologist and does have a psychiatrist as well.  Ultimately I would like to start getting him off his current opiate regimen and onto buprenorphine.  I do not want attempts this when I am not able to see him in person.  We will address this as soon as coronavirus restrictions  are lifted.    Tobacco use: Advised complete tobacco cessation.     Plan:    Diagnosis reviewed, treatment option addressed, and risk/benifits discussed.  Self-care instructions given.  I am recommending a multidisciplinary treatment plan to help this patient better manage pain.      1. Schedule pain psychology when COVID-19 restrictions are lifted or by virtual visit  2. Discussed physical therapy options at next appointment  3. Schedule follow-up with CARLOS A Higuera NPROAL in 4 weeks  4. Labs: Collect random UDS at next in person visit  5. Medication recommendations: No change to current medication regimen.    I have reviewed the note as documented above.  This accurately captures the substance of my conversation with the patient.      Phone call contact time  Call Started at 1001  Call Ended at 1014  Phone call duration: 12 minutes    CARLOS A Bass, NP-C  [facility]

## 2020-04-01 ENCOUNTER — VIRTUAL VISIT (OUTPATIENT)
Dept: PALLIATIVE MEDICINE | Facility: CLINIC | Age: 35
End: 2020-04-01
Payer: COMMERCIAL

## 2020-04-01 DIAGNOSIS — G25.9 FUNCTIONAL MOVEMENT DISORDER: ICD-10-CM

## 2020-04-01 DIAGNOSIS — M47.816 FACET ARTHROPATHY, LUMBAR: Primary | ICD-10-CM

## 2020-04-01 DIAGNOSIS — M79.18 CHRONIC MYOFASCIAL PAIN: ICD-10-CM

## 2020-04-01 DIAGNOSIS — G89.29 CHRONIC MYOFASCIAL PAIN: ICD-10-CM

## 2020-04-01 PROCEDURE — 98968 PH1 ASSMT&MGMT NQHP 21-30: CPT | Performed by: PSYCHOLOGIST

## 2020-04-01 NOTE — PROGRESS NOTES
"Hoang Kiser is a 34 year old male who is being evaluated via a billable telephone visit.      The patient has been notified of following:     \"This telephone visit will be conducted via a call between you and Sol Kemp PsyD LP. We have found that certain health care needs can be provided without the need for an in-person session.  This service lets us provide the care you need with a phone conversation.       If during the course of the call I feel a telephone visit is not appropriate, you will not be charged for this service.\"      Reviewed that patient is in a quiet private place, no recording without permission, all apps and notifications of any devices are turned off. Began the session by talking about the risks and benefits of telehealth, what to do if there is a break in the connection, reviewed the safety protocol for during and after sessions. The purpose of using telehealth for this session was due to the COVID-19 pandemic and was to reduce the spread of the disease and protect both the clinician and client.  Due to COVID-19, clinician's location was in a private space with a closed door in clincian's home; patient was in a private space in their home.             Phone call contact time  Call Started at 4:00 pm  Call Ended at 4:53 pm  Total 53 minutes    Pain Diagnoses per pain provider:   Facet arthropathy lumbar, functional movement disorder, chronic myofascial pain    DATA: Patient reports his pain is flared when he has movement attacks - worries more about what other people might think rather than that he's having the pain from the attack. Patient's mood is 'easily apathetic' - agrees this is a coping mechanism to deal with his depression and anxiety. [place] two of his peers in NA overdosed and  in past two weeks. Using Zoom to 'attend' NA meetings. Discussed how to maintain his sobriety during physical distancing and stay at home. Activity level is greatly reduced overall due to " covid-19. Stress level is greatly increased due to covid-19 and housing situation. Sleep hygiene is sporadic but inconsistent. Increased movement attacks have led to increased worry about movement, leaving him bedridden out of fear of another attack. States he will only get up to use the restroom or have a cigarette.  Patient reports engaging in self-care for his pain 2-3 times per day. Reconnecting with former friends over Facebook, feels this I positive for him overall.     ASSESSMENT: Easily engaged and readily utilizes skills discussed in sessions. Seems to recognize how his anxiety is fueled by catastrophic thinking.   Progress toward goals: adequate.    Pain Status: had fluctuating course    Emotional Status: worsened              Medication / chemical use concerns:  Reports slight increase in using thoughts - reports he is able to use skills and reach out for sober support. Reports he is not concerned about relapse.    PLAN:   Next Appointment: Hoang Kiser's next appointment is scheduled for 4/7 to 4:00 pm.  Assignment/Objectives /interventions for next session: Continue to challenge negative self-talk. Create a comfort board of calming words, phrases, and images.    I have reviewed the note as documented above.  This accurately captures the substance of my conversation with the patient.    Sol Kemp PsyD LP  Licensed Psychologist  Outpatient Clinic Therapist  [facility]    Disclaimer: This note consists of symbols derived from keyboarding, dictation and/or voice recognition software. As a result, there may be errors in the script that have gone undetected. Please consider this when interpreting information found in this chart.

## 2020-04-02 ENCOUNTER — VIRTUAL VISIT (OUTPATIENT)
Dept: BEHAVIORAL HEALTH | Facility: CLINIC | Age: 35
End: 2020-04-02
Payer: COMMERCIAL

## 2020-04-02 DIAGNOSIS — R69 DIAGNOSIS DEFERRED: Primary | ICD-10-CM

## 2020-04-02 NOTE — LETTER
"                                                         April 17, 2020    [facility]  [address] [place] [address]    Dear Shoaib,    It was great meeting with you 4/2/2020 I have enclosed the Health Action Plan (HAP) that we completed together that includes your goals for Behavioral Health Home ([facility]) Services. Now that you are enrolled into Behavioral Health Home ([facility]) services, I wanted to give you some information so you know what to expect moving forward.    What to Expect on a Monthly Basis: It is a requirement that I make contact with you on a monthly basis (by phone or meeting with you in clinic) to check in on how things are going and if you need any assistance. Please feel free to reach out to your [facility] team between these contacts if you need assistance or have any questions.    What to Expect every Six Months: Every six months, we need to complete a Health Action Plan (HAP) review. During this in clinic appointment, we will monitor our progress towards existing goals and set new goals for the next 6 month time period.    What kinds of support can [facility] offer now that I am enrolled?   - Housing Coordination  - Transportation Resources  - Financial Resources  - Coordination with the [facility] for Benefits (MA, SNAP benefits, etc)  - Disability Eligibility and Benefits  - Medical Appointments and Medication Costs  - Employment and Education Coordination  - Disability Related Information and Education Resources  - Referrals for mental health services, chemical dependency assessment/treatment, etc     If you or someone you know is experiencing a mental health crisis and you need help, the following crisis hotlines are available to help.    If you are in immediate danger, call 911.  Suicide Prevention Lifeline: 9-792-945-TALK (6716)  Crisis Text Line Service: Text \"[place]\" to 772364.    [facility] - [facility] " [place]  [address] [address]  [phone]  [phone]    [facility] Crisis Response Services (Adults & Children)  [phone]    [facility] - Acute Psychiatric Services (APS)  Assessment & Referral: [phone]  Suicide Hotline: 302.285.2473    [facility] (24/7)  [phone] TDD    Please let me know if you have any questions or if there is anything that we can assist with. I can be reached by phone, [employer] message, or by email. I look forward to working with you!    Sincerely,          Janet Ng [facility]  Behavioral Health Home ([facility])   [phone]  [email or web address]

## 2020-04-02 NOTE — PROGRESS NOTES
Telemedicine Visit: The patient's condition can be safely assessed and treated via synchronous audio and visual telemedicine encounter.      Reason for Telemedicine Visit: Patient unable to travel    Originating Site (Patient Location): Patient's home    Distant Site (Provider Location): Provider Remote Setting    Consent:  The patient/guardian has verbally consented to: the potential risks and benefits of telemedicine (video visit) versus in person care.    Mode of Communication:  Video Conference via VNG    As the provider I attest to compliance with applicable laws and regulations related to telemedicine.    Behavioral Health Home Services  [facility]: [facility] Work Care Navigator Note      Patient: Hoang Kiser  Date: April 2, 2020  Preferred Name: Shoaib    Previous PHQ-9:   PHQ-9 SCORE 10/1/2018 3/22/2019 10/22/2019   PHQ-9 Total Score - - -   PHQ-9 Total Score 13 11 9     Previous JIN-7:   JIN-7 SCORE 10/1/2018 3/22/2019 10/22/2019   Total Score - - -   Total Score 13 12 14     MOLLY LEVEL:  MOLLY Score (Last Two) 3/23/2016 10/1/2018   MOLLY Raw Score 52 31   Activation Score 100 59.3   MOLLY Level 4 3       Preferred Contact:  Need for : No  Preferred Contact: Cell      Type of Contact Today: Face to Face in Clinic  Disclaimer: This visit was completed via telemedicine video visit. The Risk I/O build was completed pre-COVID-19 and did not allow for the selection of a telemedicine video visit.    Data: (subjective / Objective):  Recent ED/IP Admission or Discharge?   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 SWCC inquired about patient's current stressors. Patient reported that he is feeling stressed with COVID 19 and has been trying to get out and go for walks to help manage his pain and his stress. SWCC asked about what other coping mechanisms he uses to manage stress. Patient reports that he plays guitar and has a new guitar being delivered to him.     SWCC reviewed patient goals. First goal is to get an ILS worker through his CADI waiver. Patient stated that he worked with 2 different ILS workers the past 6 months. The first ILS worker could not meet with him the hours that he was available. Patient did not have a good relationship with the second ILS worker. Patient's  is aware that he needs a new ILS worker and this service should resume once able too after COVID-19. Patient's 2nd goal is too find housing in [place] once able to start a Behavioral Shaping Therapy Program at [place]. Patient reports that he is still interested in doing this therapy program but it is recommended by his pain doctor that he complete pain and rehab program first. CC mentioned that resources can be sent for low cost housing in [place] if he does end up being admitted to this program. 3rd goal is too continue to work on self advocacy. Patient states that he feels he has been working on this. Patient works closely with his care team and support staff to meet his health and safety needs. Patient has maintained sobriety for  5 years this coming August and this is important to him. Patient will continue to work on this goal. 4th goal is to volunteer with [employer]. Patient reports that he has been volunteering for 2.5 years. He is currenlty not volunteering due to COVID 19 but does plan to resume. Patient's last goal is to work on coping skills and stress management. Patient mentioned that he is currently working on a comfort board where he is designing a board of positive quotes and words of affirmation. This along with walking and guitar continue to be his coping skills.     Patient is currently working on getting approved for SSDI. He was denied in October and is now in the appeal process. Patient states that if denied again he would like to work with a different legal resource. [facility] advised patient to contact her if he is needing additional resources for this.     [facility] updated HAP and routed to [facility] for approval.     Intervention:  Motivational Interviewing: Expressed Empathy/Understanding, Supported Autonomy, Collaboration, Evocation, Open-ended questions and Reflections: simple and complex   Target Behavior(s): Explored and resolved challenges to attending appointments as scheduled, Explored patient's knowledge of the impact of exercise on mood, Explored current exercise activities and thoughts about how this influences mood and Explored current social supports and reinforced opportunities to increase engagement    Assessment: (Progress on Goals / Homework):  Patient would benefit from continued coordination in reaching their goals set for the Behavioral Health Home ([facility]) program. [facility] reviewed Health Action Plan goals and will continue to monitor progress and work with patient and their care team.    Plan: (Homework, other):  Patient was encouraged to continue to seek condition-related information and education. [facility], patient, and team will continue to work towards progress on reaching goals set for the Behavioral  Health Home ([facility]) program.     N[facility] [place]  Behavioral Health Home ([facility])   [facility]  [phone]              Next 5 appointments (look out 90 days)    Apr 07, 2020  4:00 PM CDT  Telephone Visit with [facility] ([facility]) [address] [address] [address]  [phone]

## 2020-04-02 NOTE — LETTER
Behavioral Health Home ([facility]): Health Action Plan  [facility] Clinic: [place]    [facility]      Name: Hoang Kiser  Preferred Name: Shoaib  : 1985  MRN: [id number]          My Goals  Goal Areas: Health;Mental Health;Chemical Health;Employment / Volunteer;Financial and Social Service Benefits    Patient stated goals: Patient stated that he would like to obtain an ILS worker through his CADI waiver. Patient has worked with 2 different ILS workers and due to COVID 19 is not seeing an ILS worker at this time. Patient would like to resume this service once able too Patient has communicated with his  about this; Patient would like to look into intensive therapy program at [facility] in [place]. If patient is able to get into this program he would like to look into housing options available in the [place] area.; Patient would like to continue to work on self-advocacy skills. Patient is able to communicate his health needs. It is important to the patient to continue to work with his support team in order to continue to maintain his health and sobriety; Patient would like to continue to volunteer with the [employer]. This will resume once COVID 19 has cleared; Patient continues to work towards getting approved for SSDI. He is currently in the appeal process and will seek additional legal resources if denied again. Patient stated he would like to continue to grow his skills with coping and stress management. He continues to work on this by going for walks, playing guitar and developing a comfort board.    Strengths related to each goal: Patient is motivated; Patient is accepting of change; Patient is very organized; Patient is self-aware of his abilities and strengths; Patient works with his care team frequently; Patient is involved in his care    Services and Supports Needed: Patient may need additional legal resources for SSDI if denied again. Patient will need resources for affordable housing  in [place] if admitted to therapy program at [place].    Activities / Actions of Team to support goal(s): SWCC will contact patient monthly for follow up; SWCC will provide resources for requested services. Care Team will remain available to patient for questions/ concerns/ resources.    Activities / Actions of Patient / Parent / Guardian to support goal(s): It is a requirement that patient's primary care physician is through the [facility] system and that they are on Medical Assistance/Medicaid. If either of these were to change or if patient needs any type of assistance, they are to reach out to their Behavioral Health Home ([facility]) team.        Recommended Referral  Tobacco cessation referrals made?: No  Mental Health / Chemical Dependency Referrals: No (Pt going to Outpatient CD Treatment at [facility])  Substance Use Referrals: Not Applicable  Mental Health Referrals: None  Dental        My Team Members and Their Contact Information  Patient Care Team       Relationship Specialty Notifications Start End    Phill Floyd MD PCP - General Family Practice  2/25/16     Phone: [phone] Fax: [phone] [address] [facility] [address]    Phill Floyd MD Assigned PCP   5/17/15     Phone: [phone] Fax: [phone] [address] [facility] [address]    Bryant Cao MD Referring Physician Neurology  11/23/15     Refer Evaluate for palatal tremor - essential vs symptomatic vs functional vs tic related.     Phone: [phone] Fax: [phone]         [address] [facility] [address]    Jorge Nelson MD MD Otolaryngology  11/23/15     Phone: [phone] Fax: [phone]         [facility]  [address] [facility] [address]    Royce Taylor MD MD Neurology  6/27/17     Phone: [phone] Fax: [phone]         [facility] NEUROLOGY [address] [address]    Miley Trivedi MD MD  Otolaryngology  10/17/17     Phone: [phone] Fax: [phone]         [address] [facility] [address] [facility] [address]    Melinda Diaz AuD Audiologist Audiology  10/17/17     Phone: [phone] Fax: [phone]         [address]  [facility] [address]    Faustino Feldman MD MD Family Medicine - Sports Medicine  5/28/19     Phone: [phone] Fax: [phone]         [address] [facility] [address]    Bonnie Johnson CADI worker   1/8/20     CADI Dmitriy  - [facility]    Phone: [phone]         Janet Ng, BSW  Clinic Admissions 3/11/20     [facility] [phone]          My Wellness Plan  Safety Concerns: None Reported / Observed  Recommendations / Plan for safety concerns: Call 911, Contact Mental [facility] Crisis Line (Ph: [phone]); Contact Care Team; Follow safety plan created by Mental Health Team, if applicable  Crisis Plan (emergencies / when urgent support needed): Call 911; Contact Mental Health Crisis Line; Contact care team and Mental Health Clinicians          Hoang Kiser co-developed the Health Action Plan with the [facility] Team and received a copy of this document.  Date Health Action Plan Completed/Updated: 04/02/20

## 2020-04-07 ENCOUNTER — VIRTUAL VISIT (OUTPATIENT)
Dept: PALLIATIVE MEDICINE | Facility: CLINIC | Age: 35
End: 2020-04-07
Payer: COMMERCIAL

## 2020-04-07 DIAGNOSIS — G25.9 FUNCTIONAL MOVEMENT DISORDER: ICD-10-CM

## 2020-04-07 DIAGNOSIS — M47.816 FACET ARTHROPATHY, LUMBAR: Primary | ICD-10-CM

## 2020-04-07 PROCEDURE — 98968 PH1 ASSMT&MGMT NQHP 21-30: CPT | Performed by: PSYCHOLOGIST

## 2020-04-07 NOTE — PROGRESS NOTES
"Hoang Kiser is a 34 year old male who is being evaluated via a billable telephone visit.      The patient has been notified of following:     \"This telephone visit will be conducted via a call between you and Sol Kemp PsyD LP. We have found that certain health care needs can be provided without the need for an in-person session.  This service lets us provide the care you need with a phone conversation.       If during the course of the call I feel a telephone visit is not appropriate, you will not be charged for this service.\"      Reviewed that patient is in a quiet private place, no recording without permission, all apps and notifications of any devices are turned off. Began the session by talking about the risks and benefits of telehealth, what to do if there is a break in the connection, reviewed the safety protocol for during and after sessions. The purpose of using telehealth for this session was due to the COVID-19 pandemic and was to reduce the spread of the disease and protect both the clinician and client.  Due to COVID-19, clinician's location was in a private space with a closed door in clincian's home; patient was in a private space in their home.             Phone call contact time  Call Started at 4:00 pm  Call Ended at 4:53 pm  Total 53 minutes    Pain Diagnoses per pain provider:   Facet arthropathy lumbar, functional movement disorder        DATA: Patient reports his pain is moderately worse - states he has been experiencing 'odd paralyzing pain, like someone stuck a needle in my foot.' Also reports head pain when he tilts his head a certain way. Discussed strategies to address this pain. Patient's mood is moderately improved. States he has been continuing to struggle with negative self-talk that takes the form of other people saying negative things to him - states this has been more intense but as a result, less believable. Able to use reality testing to check out the validity of the " negative self-talk. Activity level is about the same as last session. Stress level has varied since his last visit. Sleep hygiene is variable - is working on getting back to his regular routine. Patient reports engaging in self-care for his pain 0-2 times daily.    ASSESSMENT: Easily engaged and readily utilizes skills discussed in sessions. Somewhat distracted today.     Progress toward goals: fair.    Pain Status: worsened    Emotional Status: improved              Medication / chemical use concerns:  none    PLAN:   Next Appointment: Hoang Kiser's next appointment is scheduled for 4/21 4:00 pm.  Assignment/Objectives /interventions for next session: Continue to work on increasing self-care to 1-2 times per day.    I have reviewed the note as documented above. This accurately captures the substance of my conversation with the patient.    Sol Kemp PsyD LP  Licensed Psychologist  Outpatient Clinic Therapist  [facility]    Disclaimer: This note consists of symbols derived from keyboarding, dictation and/or voice recognition software. As a result, there may be errors in the script that have gone undetected. Please consider this when interpreting information found in this chart.

## 2020-04-14 ENCOUNTER — VIRTUAL VISIT (OUTPATIENT)
Dept: PSYCHIATRY | Facility: CLINIC | Age: 35
End: 2020-04-14
Attending: PSYCHOLOGIST
Payer: COMMERCIAL

## 2020-04-14 DIAGNOSIS — F15.21 AMPHETAMINE USE DISORDER, SEVERE, IN SUSTAINED REMISSION (H): ICD-10-CM

## 2020-04-14 DIAGNOSIS — F33.0 MILD EPISODE OF RECURRENT MAJOR DEPRESSIVE DISORDER (H): ICD-10-CM

## 2020-04-14 DIAGNOSIS — F11.21 OPIOID USE DISORDER, SEVERE, IN SUSTAINED REMISSION (H): ICD-10-CM

## 2020-04-14 DIAGNOSIS — F32.89 OTHER DEPRESSION: Primary | ICD-10-CM

## 2020-04-14 DIAGNOSIS — F98.8 ATTENTION DEFICIT DISORDER, UNSPECIFIED HYPERACTIVITY PRESENCE: ICD-10-CM

## 2020-04-14 RX ORDER — DEXTROAMPHETAMINE SACCHARATE, AMPHETAMINE ASPARTATE, DEXTROAMPHETAMINE SULFATE AND AMPHETAMINE SULFATE 2.5; 2.5; 2.5; 2.5 MG/1; MG/1; MG/1; MG/1
10 TABLET ORAL 2 TIMES DAILY
Qty: 60 TABLET | Refills: 0 | Status: SHIPPED | OUTPATIENT
Start: 2020-04-14 | End: 2020-05-15

## 2020-04-14 ASSESSMENT — PAIN SCALES - GENERAL: PAINLEVEL: SEVERE PAIN (6)
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
## 2020-04-14 NOTE — PATIENT INSTRUCTIONS
-Stop taking Adderall XR  -Start Addrall IR 10 mg 2 times a day, but don't take it on the days when you wake up 6pm.  Latest you can take this medication is 3pm.  Monitor your blood pressure 2 times a week and send the record to Meagan via [employer] in 2 weeks  -Continue all other medications for now.

## 2020-04-15 DIAGNOSIS — R39.15 URINARY URGENCY: Primary | ICD-10-CM

## 2020-04-15 DIAGNOSIS — N39.41 URGE INCONTINENCE: ICD-10-CM

## 2020-04-15 RX ORDER — OXYBUTYNIN CHLORIDE 10 MG/1
TABLET, EXTENDED RELEASE ORAL
Qty: 90 TABLET | Refills: 0 | Status: SHIPPED | OUTPATIENT
Start: 2020-04-15 | End: 2020-07-23

## 2020-04-15 NOTE — TELEPHONE ENCOUNTER
oxybutynin ER (DITROPAN-XL) 5 MG 24 hr tablet      Last Written Prescription Date:  5/10/19  Last Fill Quantity: 180,   # refills: 3  Last Office Visit : 5/10/2019  Future Office visit:  none    Routing refill request to provider for review/approval because:  Refill need review.  - last filled Rx'd as 5 mg tabs take 2 (10 mg) QD  - requested 10 mg tab QD  *FYI- Rx ordered by Urology was taken off med list by other clinic-- not stopped by provider    *Pended Rx as requested.

## 2020-04-21 ENCOUNTER — VIRTUAL VISIT (OUTPATIENT)
Dept: PALLIATIVE MEDICINE | Facility: CLINIC | Age: 35
End: 2020-04-21
Payer: COMMERCIAL

## 2020-04-21 DIAGNOSIS — G25.9 FUNCTIONAL MOVEMENT DISORDER: ICD-10-CM

## 2020-04-21 DIAGNOSIS — M47.816 FACET ARTHROPATHY, LUMBAR: Primary | ICD-10-CM

## 2020-04-21 PROCEDURE — 96159 HLTH BHV IVNTJ INDIV EA ADDL: CPT | Mod: 95 | Performed by: PSYCHOLOGIST

## 2020-04-21 PROCEDURE — 96158 HLTH BHV IVNTJ INDIV 1ST 30: CPT | Mod: 95 | Performed by: PSYCHOLOGIST

## 2020-04-21 NOTE — PROGRESS NOTES
"Hoang Kiser is a 34 year old male who is being evaluated via a billable telephone visit.      The patient has been notified of following:     \"This telephone visit will be conducted via a call between you and Sol Kemp PsyD LP. We have found that certain health care needs can be provided without the need for an in-person session.  This service lets us provide the care you need with a phone conversation.       If during the course of the call I feel a telephone visit is not appropriate, you will not be charged for this service.\"      Reviewed that patient is in a quiet private place, no recording without permission, all apps and notifications of any devices are turned off. Began the session by talking about the risks and benefits of telehealth, what to do if there is a break in the connection, reviewed the safety protocol for during and after sessions. The purpose of using telehealth for this session was due to the COVID-19 pandemic and was to reduce the spread of the disease and protect both the clinician and client.  Due to COVID-19, clinician's location was in a private space with a closed door in clincian's home; patient was in a private space in their home.            Patient has given verbal consent for telephone visit? YES    Phone call contact time  Call Started at 4:05 - patient was not ready for appointment at 4:00 when call was initially placed.  Call Ended at 4:53 p.m.  Total 48 minutes     Pain Diagnoses per pain provider:   Facet arthropathy, lumbar      Functional movement disorder         DATA: Patient reports his pain is variable - has pain flares coupled with periods of less pain. Continues to c/o feeling like someone has stuck a needle in foot and pinched nerve sensation in his neck when he turns his neck a certain way. Patient's mood is overall less anxious - attributes this to 'new norm' related to covid-19. Continues to report struggling with auditory hallucinations related to " negative self-talk. Continues to use reality testing to determine these are originating within himself. Finds this is helpful. Activity level is about the same - some days this is more of a struggle if he is over-sleeping. Stress level is variable - about 'medium, not over the top its normal to be on the edge for me.' Engaging in core workouts, guided meditation, walking, drawing, writing. Sleep hygiene is better - attributes this to being switched to Adderall IR from Adderall XR. Patient reports engaging in self-care for his pain on average 1-3 times per day.     ASSESSMENT: Patient is somewhat subdued in affect today.   Progress toward goals: as expected.    Pain Status: had fluctuating course    Emotional Status: improved                          Medication / chemical use concerns:  none    PLAN:   Next Appointment: Hoang Kiser's next appointment is scheduled for 5/1 at 3:30.  Assignment/Objectives /interventions for next session: Continue to engage in reality-testing for auditory hallucinations/negative self-talk, continue to engage in daily self-care for pain. Schedule follow up visit with CARLOS A Higuera CNP next week.    I have reviewed the note as documented above.  This accurately captures the substance of my conversation with the patient.    Sol Kemp PsyD LP  Licensed Psychologist  Outpatient Clinic Therapist  [facility]    Disclaimer: This note consists of symbols derived from keyboarding, dictation and/or voice recognition software. As a result, there may be errors in the script that have gone undetected. Please consider this when interpreting information found in this chart.

## 2020-04-30 ENCOUNTER — VIRTUAL VISIT (OUTPATIENT)
Dept: BEHAVIORAL HEALTH | Facility: CLINIC | Age: 35
End: 2020-04-30
Payer: COMMERCIAL

## 2020-04-30 ENCOUNTER — TELEPHONE (OUTPATIENT)
Dept: FAMILY MEDICINE | Facility: CLINIC | Age: 35
End: 2020-04-30

## 2020-04-30 ENCOUNTER — DOCUMENTATION ONLY (OUTPATIENT)
Dept: OTHER | Facility: CLINIC | Age: 35
End: 2020-04-30

## 2020-04-30 DIAGNOSIS — R69 DIAGNOSIS DEFERRED: Primary | ICD-10-CM

## 2020-04-30 DIAGNOSIS — Z53.9 DIAGNOSIS NOT YET DEFINED: Primary | ICD-10-CM

## 2020-04-30 PROCEDURE — G0179 MD RECERTIFICATION HHA PT: HCPCS | Performed by: FAMILY MEDICINE

## 2020-04-30 NOTE — TELEPHONE ENCOUNTER
This RN made the requested change.  Form put in PCP's box to sign.  Please route this message back to TC along with form.     Diane BORGESN, RN

## 2020-04-30 NOTE — PROGRESS NOTES
Behavioral Health Home Services  [facility]: [place]        Social Work Care Navigator Note      Patient: Hoang Kiser  Date: April 30, 2020  Preferred Name: Shoaib    Previous PHQ-9:   PHQ-9 SCORE 10/1/2018 3/22/2019 10/22/2019   PHQ-9 Total Score - - -   PHQ-9 Total Score 13 11 9     Previous JIN-7:   JIN-7 SCORE 10/1/2018 3/22/2019 10/22/2019   Total Score - - -   Total Score 13 12 14     MOLLY LEVEL:  MOLLY Score (Last Two) 3/23/2016 10/1/2018   MOLLY Raw Score 52 31   Activation Score 100 59.3   MOLLY Level 4 3       Preferred Contact:  Need for : No  Preferred Contact: Cell      Type of Contact Today: Phone call (patient / identified key support person reached)      Data: (subjective / Objective):  Recent ED/IP Admission or Discharge?   None    Patient Goals:  Goal Areas: Health;Mental Health;Chemical Health;Employment / Volunteer;Financial and Social Service Benefits  Patient stated goals: Patient stated that he would like to obtain an ILS worker through his CADI waiver. Patient has worked with 2 different ILS workers and due to COVID 19 is not seeing an ILS worker at this time. Patient would like to resume this service once able too Patient has communicated with his  about this; Patient would like to look into intensive therapy program at [facility] in [place]. If patient is able to get into this program he would like to look into housing options available in the [place] area.; Patient would like to continue to work on self-advocacy skills. Patient is able to communicate his health needs. It is important to the patient to continue to work with his support team in order to continue to maintain his health and sobriety; Patient would like to continue to volunteer with the [employer]. This will resume once COVID 19 has cleared; Patient continues to work towards getting approved for SSDI. He is currently in the appeal process and will seek additional legal resources if denied again.  Patient stated he would like to continue to grow his skills with coping and stress management. He continues to work on this by going for walks, playing guitar and developing a comfort board.        [facility] Core Service Provided:  Care Coordination: provided care management services/referrals necessary to ensure patient and their identified supports have access to medical, behavioral health, pharmacology and recovery support services.  Ensured that patient's care is integrated across all settings and services.     Current Stressors / Issues / Care Plan Objective Addressed Today:  Patient contacted [facility] stating that they wanted support with completing a health care directive. [facility] provided patient with the resource for honoring choices (Phone # :  [phone]). Patient expressed that he is frustrated with the procedures that his current home has in place for COVID-19. Walking is normally a coping skill for patient and his home has put some rules in place in regards to walking in the community. Patient reports that his house is also being extra cautious because patient has Asthma. Patient is hoping that a health care directive will give him some documentation to allow him to do the activities that he wants to do. [facility] asked patient how his mental health is since he appeared to be frustrated on the phone. Patient stated that he is frustrated and wants to move. [facility] asked if he has mental health support. Patient stated that he has a virtual appointment with his therapist tomorrow. He is going to go for a walk this afternoon to help cope. Patient stated that he wants to move but is unable to right now because his CADI  cannot get an ILS worker in place due to COVID-19. [facility] encouraged patient to talk with his therapist about coping skills that can be used to get him through this time until he can get an ILS worker and start working towards moving. Patient agreed with this plan.     Intervention:  Motivational  Interviewing: Expressed Empathy/Understanding and Open-ended questions   Target Behavior(s): Explored thoughts and readiness to participate in individual therapy and Explored patient's knowledge of the impact of exercise on mood    Assessment: (Progress on Goals / Homework):  Patient would benefit from continued coordination in reaching their goals set for the Behavioral Health Home ([facility]) program. CC reviewed Health Action Plan goals and will continue to monitor progress and work with patient and their care team.    Plan: (Homework, other):  Patient was encouraged to continue to seek condition-related information and education. SW[facility], patient, and team will continue to work towards progress on reaching goals set for the Behavioral Health Home ([facility]) program.     Janet Ng [facility]  Behavioral Health [place] ([facility])   [facility]  [phone]

## 2020-04-30 NOTE — TELEPHONE ENCOUNTER
Reason for Call:  Form, our goal is to have forms completed with 72 hours, however, some forms may require a visit or additional information.    Type of letter, form or note:  Home Health Certification    Who is the form from?: Home care    Where did the form come from: form was faxed in    What clinic location was the form placed at?: [place]    Where the form was placed: Given to MA/RN    What number is listed as a contact on the form?: [facility] [phone]       Additional comments: I placed the [facility] forms in the [facility] orange folder for RN review    Call taken on 4/30/2020 at 9:50 AM by Dahlia Vera

## 2020-04-30 NOTE — TELEPHONE ENCOUNTER
"This RN reconciled medication list from [facility] form against our [facility] medication list and there is 1 discrepancy.         1.  [facility] form had Adderall XR 30 mg daily on list but Epic is updated with Adderall 10 mg twice daily.  Per 4/14/20 virtual visit with patient's psychiatrist, patient was informed to stop taking Adderall XR and start Adderall 10 mg daily:    Impression/Assessment       Shoaib Kiser is a 34 year old adult  who presents for diagnostic assessment and establishment of care.  Pt appears somewhat depressed but not anxious, denies SI, SIB or HI.  Pt reports \"ok\" mood, but has significant pain, fatigue and sleep difficulties due to FMD.  Pt could not tolerate higher dose of Cymbalta due to suicidal ideation, but does not have SI with 60 mg daily.  Pt also is concerned with emotional numbness with Cymbalta.  Per pt's report, depression started prior to FMD diagnosis, but definitely exacerbated after FMD.  Pt also was tangential, difficult to track at times, but also noted that he has not slept 2 days due to pain.  It is difficult to assess his mental status with lack of sleep, but will continue to monitor if his mental status is simply due to lack of sleep or tangential at baseline. Pt is having difficulties with sleep as he has patterns of not sleeping for 2 days and sleeping 15+ hours and still taking Adderall XR.  Discussed possibility of switching to short acting Adderall to control difficulties sleeping and also instruct pt not to take any Adderall on the days he does not sleep at all.  Pt was instructed to discontinue Adderall XR and start  Addrall IR 10 mg 2 times a day, but don't take it on the days when he wakes up at 6pm.  Pt was also instructed to monitor his BP x2/week as he had few elevated BP in the past. Pt did not appear to have hypomanic symptoms, but pt has positive family hx of BPAD and SCAD in 2 sibings.  Need to continue to monitor.  Pt's PTSD symptoms are fairly well " controlled at this time.          Would you like me to update [facility] form to reflect above order?    Diane BORGESN, RN

## 2020-05-01 NOTE — TELEPHONE ENCOUNTER
I faxed the completed and signed C forms to [facility] fax #[phone].  Dahlia Vera,

## 2020-05-06 ENCOUNTER — VIRTUAL VISIT (OUTPATIENT)
Dept: PALLIATIVE MEDICINE | Facility: CLINIC | Age: 35
End: 2020-05-06
Payer: COMMERCIAL

## 2020-05-06 DIAGNOSIS — M62.838 MUSCLE SPASM: ICD-10-CM

## 2020-05-06 DIAGNOSIS — M47.816 FACET ARTHROPATHY, LUMBAR: ICD-10-CM

## 2020-05-06 DIAGNOSIS — G25.9 FUNCTIONAL MOVEMENT DISORDER: ICD-10-CM

## 2020-05-06 DIAGNOSIS — G89.4 CHRONIC PAIN SYNDROME: Primary | ICD-10-CM

## 2020-05-06 PROCEDURE — 99214 OFFICE O/P EST MOD 30 MIN: CPT | Mod: 95 | Performed by: NURSE PRACTITIONER

## 2020-05-06 RX ORDER — HYDROCODONE BITARTRATE AND ACETAMINOPHEN 5; 325 MG/1; MG/1
1 TABLET ORAL 3 TIMES DAILY PRN
Qty: 60 TABLET | Refills: 0 | Status: SHIPPED | OUTPATIENT
Start: 2020-05-06 | End: 2020-06-03

## 2020-05-06 RX ORDER — MORPHINE SULFATE 15 MG/1
15 TABLET, FILM COATED, EXTENDED RELEASE ORAL EVERY 12 HOURS
Qty: 60 TABLET | Refills: 0 | Status: SHIPPED | OUTPATIENT
Start: 2020-05-06 | End: 2020-06-03

## 2020-05-06 ASSESSMENT — PAIN SCALES - GENERAL: PAINLEVEL: SEVERE PAIN (6)

## 2020-05-06 NOTE — PROGRESS NOTES
"The patient has been notified of following:     This telephone visit will be conducted via a call between you and your provider. We have found that certain health care needs can be provided without the need for a physical exam.  This service lets us provide the care you need with a phone conversation.  If a prescription is necessary we can send it directly to your pharmacy.  If lab work is needed we can place an order for that and you can then stop by our lab to have the test done at a later time. This is a billable service but we do not know the cost at this time.     Patient has given verbal consent for Telephone visit?  Yes    Patient would like their AVS sent to their Mychart.    Imelda Jolley [facility]  [facility]    Hoang Kiser is a 34 year old male who is being evaluated via a billable telephone visit.         CHIEF COMPLAINT: Chronic pain, movement disorder    INTERVAL HISTORY:  Last seen on 3/30/20.        Recommendations/plan at the last visit included:  1. Schedule pain psychology when COVID-19 restrictions are lifted or by virtual visit  2. Discussed physical therapy options at next appointment  3. Schedule follow-up with CARLOS A Higuera NP-C in 4 weeks  4. Labs: Collect random UDS at next in person visit  5. Medication recommendations:           No change to current medication regimen.    Since last visit:   - 3 cancelled & 2 NS pain psych appts since January 1st. Patient aware of NS policy  - Starting the process of moving into a facility with 24 hr staff due to an episode of \"disassociation\" while walking. \"It was pretty bad\". Happened last weekend. Had movement episode and was walking after. \"Woke up\" 15 minutes later in a different place and was unable to remember how he got there. States that one of the only things that slows movements is to walk for a while. Wondering if he should get a life alert for this " "situations. Tries to \"Keep myself accountable for my sobriety, either my brother or sponsor. I let someone know if I'm going for a walk\". When asked about setting a parameter to stay in his yard when he walks at night for safety, he is unable to make that commitment. Worries that no one in his current living situation checks on him if he sleeps all day.   - Was evaluated at the [facility] for a pain rehab to reduce opiate medications but on hold re: COVID and insurance issues.   - Noting SI joint pain, tried heat, rest, naproxen,tiger balm without relief.     Pain Information:   Pain quality: Aching and Sharp    Pain rating: intensity ranges from 1/10 to 7/10, and averages 5/10 on a 0-10 scale.    Annual Controlled Substance Agreement/UDS due date: 4/2020     Current Pain Relevant Medications:  MS Contin 15 mg BID = 30 MME  Norco 5/325 mg 1 tab BID-TID  #60 tabs per month                        Total opiate dose: 35-40 MME daily  Clonazepam .5 mg 1-2 tab at HS PRN (takes about 2-3x weekly)   Duloxetine 20 mg daily  Naproxen 500 mg BID: on hold re: elevated LFTs   Tizanidine 4 mg 1-2 tabs at HS PRN  Adderall 30 mg daily, combination of long and short acting.       Previous Pain Relevant Medications: (H--helped; HI--Helped initially; SWH--Somewhat helpful; NH--No help; W--worse; SE--side effects; ?--Unsure if helpful)   NOTE: This medication information taken from patient's intake form, not medical records.                         Opiates: Tramadol: H, Hydrocodone: H, Morphine: H                        NSAIDS: Ibuprofen:H, naproxen:SWH, Relafen: NH                        Muscle Relaxants: Cyclobenzaprine:H,Med interaction, Tizanidine:H, Baclofen: SWH                        Anti-migraine mediations: Prednisone:H                        Anti-depressants: Bupropion:SE, Celexa:SE, Duloxetine:H, Trazodone:Too strong, Venlafaxine:too strong, Amitriptyline:SE                         Sleep aids:Anbien: " H                        Anxiolytics: Clonazepam:H                        Neuropathics: Tegretol:taken for seizures in childhood, Gabapentin:H                                          Topicals: Lidocaine:H                        Other medications not covered above: Tylenol:NH, Adderall: H      Any illicit drug use: Sober date 8/27/2015, lives in a sober house.   EtOH use: last use 5 years ago  Caffeine use: 2-3 per day  Nicotine use: 3/4 pack per day  Any use of prescriptions other than how they were prescribed:taina      [place] Board of Pharmacy Data Base Reviewed:    YES; As expected, no concern for misuse/abuse of controlled medications based on this report.   Concern for multiple controlled substances including stimulants and opiates.  7/27/19: Concern for misuse of opiates and stimulants as noted in office visit on this date.   10/29/19: Requesting early refill due to overuse of opiate medication. #45 tabs to last 30 days. Refill declined.      Is Narcan prescribed for opiate use >50 MME daily or concurrent use of opiates and benzodiazepines? YES    Medications:  Current Outpatient Medications   Medication Sig Dispense Refill     albuterol (PROAIR HFA/PROVENTIL HFA/VENTOLIN HFA) 108 (90 Base) MCG/ACT inhaler INHALE ONE TO TWO PUFFS INTO THE LUNGS EVERY FOUR HOURS AS NEEDED FOR SHORTNESS OF BREATH/ DYSPNEA OR WHEEZING 18 g 0     amphetamine-dextroamphetamine (ADDERALL) 10 MG tablet Take 1 tablet (10 mg) by mouth 2 times daily 60 tablet 0     clonazePAM (KLONOPIN) 0.5 MG tablet 0.5mg tab by mouth nightly as needed  1     DULoxetine (CYMBALTA) 60 MG EC capsule Taking only 1 x 60mg tab by mouth daily 30 capsule 1     HYDROcodone-acetaminophen (NORCO) 5-325 MG tablet Take 1 tablet by mouth 3 times daily as needed for pain Ok to fill on 4/4/20 to start on 4/5/20 60 tablet 0     hydrOXYzine (ATARAX) 25 MG tablet Take 1 tablet (25 mg) by mouth nightly as needed (at HS PRN) 45 tablet 3     morphine (MS CONTIN) 15 MG  CR tablet Take 1 tablet (15 mg) by mouth every 12 hours Ok to fill on 4/4/20 to start on 4/5/20 60 tablet 0     naloxone (NARCAN) 4 MG/0.1ML nasal spray Spray 1 spray (4 mg) into one nostril alternating nostrils as needed for opioid reversal every 2-3 minutes until assistance arrives 0.2 mL 0     naproxen (NAPROSYN) 500 MG tablet Take 1 tablet (500 mg) by mouth 2 times daily (with meals) 40 tablet 3     order for DME Equipment being ordered: 4 wheeled walker with bench seat. 1 Units 0     oxybutynin ER (DITROPAN-XL) 10 MG 24 hr tablet Take 1 tablet by mouth daily. 90 tablet 0     tiZANidine (ZANAFLEX) 4 MG tablet Take 4 mg by mouth as needed   0         DIRE Score for ongoing opioid management is calculated as follows:    Diagnosis = 2 pts (slowly progressive; moderate pain/objective findings)    Intractability = 2 pts (most treatments tried; patient not fully engaged/barriers)    Risk        Psych = 2 pts (personality dysfunction/mental illness that moderately interferes with care)         Chem Hlth = 2 pts (use of medications to cope with stress; chemical dependency in remission)       Reliability = 3 pts (highly reliable with meds, appointments, treatments)       Social = 3 pts (supportive family/close relationships; involved in work/school; no isolation)       (Psych + Chem hlth + Reliability + Social) = 14    Efficacy = 2 pts (moderate benefit/function; low med dose; too early/not tried meds)    DIRE Score = 16        7-13: likely NOT suitable candidate for long-term opioid analgesia       14-21: may be a suitable candidate for long-term opioid analgesia          Previous Diagnostic Tests:   Imaging Studies:   MR LUMBAR SPINE W/O CONTRAST 5/2/2018 7:27 PM  History: DDD (degenerative disc disease), lumbar; Lumbar  radiculopathy; Hip pain, right; Groin pain, right.  ICD-10: DDD (degenerative disc disease), lumbar; Lumbar radiculopathy;  Hip pain, right; Groin pain, right  Comparison: Lumbar spine MRI  3/8/2017  Technique: Sagittal STIR, T1-weighted turbo spin-echo, 3-D volumetric  T2-weighted and axial reconstructed T2-weighted images of the lumbar  spine were obtained without intravenous contrast.   Findings: 5 lumbar-type vertebra. The tip of the conus medullaris is  at L1.  The lumbar vertebral column is normally aligned.   Straightening of lumbar lordosis, unchanged. The intervertebral disc  heights are maintained. Normal marrow signal. At L5-S1, there is a  disc bulge and facet hypertrophy with mild left neural foraminal  narrowing, unchanged. No spinal canal stenosis.  Impression:  1. Lumbar spondylosis with mild left neural foraminal narrowing at  L5-S1.  2. No spinal canal stenosis.      MR right hip without contrast 11/22/2019 8:28 AM  Techniques: Multiplanar multisequence imaging of the right  hip was  obtained without  administration of intra-articular or intravenous  contrast using routing protocol.  History: Hip pain, chronic, labral tear suspected, neg xray or  equivocal; ; Progressive pain; Chronic right hip pain; Chronic right  hip pain    Comparison: MRI hip 5/2/2018  Findings:  Osseous structures  Osseous structures: Bone marrow edema like signal intensity along the  anterior head neck junction less pronounced than prior MRI. No  fracture or avascular necrosis. Osseous prominence at the femoral  neck, which may be seen with cam-type femoral acetabular impingement.  Articular cartilage and labrum  Assessment limited on this arthrographic study due to relative lack of  joint distension.  Articular cartilage: No high grade chondral loss.  Labrum: Increased signal intensity in the anterosuperior labrum from  12:00 to 3:00 position suggestive of labral tear (3:00 anterior  relative to the transverse ligament).  Ligament teres and transverse ligament of acetabulum: Intact.  Joint or bursal effusion  Joint effusion: A physiologic amount of joint fluid.  Bursal effusion: Minimal nonspecific edema over  the greater  trochanter. No substantial iliopsoas or trochanteric bursal effusion.  Muscles and tendons  Muscles and tendons: The rectus femoris, iliopsoas, proximal  hamstrings, and hip abductors are intact.  The visualized adductor  muscles are unremarkable.   Nerves:  The visualized course of the sciatic nerve is unremarkable.  Other Findings:  Prominent fat in the right inguinal canal.. Few subcentimeter right  inguinal lymph nodes.  Impression:  1. Redemonstration of labral tear extending from 12:00 to 3:00  position  2. Nonspecific, but likely degenerative, bone marrow edema like signal  intensity along femoral head and junction, less conspicuous compared  to prior MRI dated 5/20/2018. No fracture or avascular necrosis.     MR left hip without contrast 11/22/2019 8:52 AM  Techniques: Multiplanar multisequence imaging of the left hip was  obtained without  administration of intra-articular or intravenous  contrast using routing protocol.  History: Progressive pain; Chronic left hip pain; Chronic left hip  pain    Comparison: None  Findings:  Osseous structures  Osseous structures: Bone marrow edema like signal intensity in the  anteromedial head neck junction, possibly degenerative. No fracture,  stress reaction or avascular necrosis.   Articular cartilage and labrum  Assessment limited on this arthrographic study due to relative lack of  joint distension.  Articular cartilage: No high grade chondral loss.  Labrum: Altered signal intensity in the labrum extending from 2:00 to  4:00 position (3:00 anterior relative to transverse ligament)  Ligament teres and transverse ligament of acetabulum: Intact.  Joint or bursal effusion  Joint effusion: A physiologic amount of joint fluid.  Bursal effusion: Minimal nonspecific edema over the greater  trochanter. No substantial iliopsoas or trochanteric bursal effusion.  Muscles and tendons  Muscles and tendons: The rectus femoris, iliopsoas, proximal  hamstrings, and hip  "abductors are intact.  The visualized adductor  muscles are unremarkable.   Nerves:  The visualized course of the sciatic nerve is unremarkable.  Other Findings:  Prominent fat in the left inguinal canal. Few subcentimeter inguinal  lymph nodes.  Impression:  1. Tearing of the anterior superior labrum extending from 2:00 to 4:00  position (3:00 anterior relative to transverse ligament).  2. Nonspecific bone marrow edema like signal intensity along the  anteromedial femoral head neck junction, likely degenerative.          ASSESSMENT:   1.  Lumbar DDD, mild  2.  Lumbar muscle spasm  3.  Labral tear, right hip  4.  Hx: anxiety, chemical dependence in remission.  5.  Functional neurologic movement disorder  6.  Somatization disorder (?)    Shoaib continues to up and down days. As noted, hoping to move to housing with 24 hrs staffing due to \"dissociative\" episodes as described above. He would like to start Pain PT again. Order placed. Continue current POC. Conitinue F/U monthly .     Tobacco use: Advised complete tobacco cessation.     Plan:    Diagnosis reviewed, treatment option addressed, and risk/benifits discussed.  Self-care instructions given.  I am recommending a multidisciplinary treatment plan to help this patient better manage pain.      1. Schedule pain psychology visits per Sol's recommendations. No more NS appts.    2. Schedule physical therapy assessment, virtual visit  3. Schedule follow-up with CARLOS A Higuera NP-C in 4 weeks  4. Medication recommendations: Continue current POC, Refills sent to pharmacy.     I have reviewed the note as documented above.  This accurately captures the substance of my conversation with the patient      Phone call contact time  Call Started at [phone]  Call Ended at 1100  Phone call duration: 27 minutes    CARLOS A Bass NP-C  [facility]        "

## 2020-05-07 ENCOUNTER — TELEPHONE (OUTPATIENT)
Dept: PALLIATIVE MEDICINE | Facility: CLINIC | Age: 35
End: 2020-05-07

## 2020-05-07 NOTE — TELEPHONE ENCOUNTER
LVM to schedule New PT Evaluation and Treat: movement disorder, chronic pain. Virtual visit with Benjamin CHAMBERLAIN    [facility]

## 2020-05-12 ENCOUNTER — DOCUMENTATION ONLY (OUTPATIENT)
Dept: PSYCHIATRY | Facility: CLINIC | Age: 35
End: 2020-05-12

## 2020-05-12 ENCOUNTER — VIRTUAL VISIT (OUTPATIENT)
Dept: BEHAVIORAL HEALTH | Facility: CLINIC | Age: 35
End: 2020-05-12
Payer: COMMERCIAL

## 2020-05-12 DIAGNOSIS — R69 DIAGNOSIS DEFERRED: Primary | ICD-10-CM

## 2020-05-12 NOTE — PROGRESS NOTES
Pt did not show up on [facility] until 3:22pm and was instructed to reschedule.  Pt is now scheduled on 5/15 at 1:30pm. Meagan Bowman, JACINTO, 5/12/2020

## 2020-05-12 NOTE — PROGRESS NOTES
Behavioral Health Home Services  [facility]: [place]        Social Work Care Navigator Note      Patient: Hoang Kiser  Date: May 12, 2020  Preferred Name: Shoaib    Previous PHQ-9:   PHQ-9 SCORE 10/1/2018 3/22/2019 10/22/2019   PHQ-9 Total Score - - -   PHQ-9 Total Score 13 11 9     Previous JIN-7:   JIN-7 SCORE 10/1/2018 3/22/2019 10/22/2019   Total Score - - -   Total Score 13 12 14     MOLLY LEVEL:  MOLLY Score (Last Two) 3/23/2016 10/1/2018   MOLLY Raw Score 52 31   Activation Score 100 59.3   MOLLY Level 4 3       Preferred Contact:  Need for : No  Preferred Contact: Cell      Type of Contact Today: Phone call (patient / identified key support person reached)      Data: (subjective / Objective):  Recent ED/IP Admission or Discharge?   None    Patient Goals:  Goal Areas: Health;Mental Health;Chemical Health;Employment / Volunteer;Financial and Social Service Benefits  Patient stated goals: Patient stated that he would like to obtain an ILS worker through his CADI waiver. Patient has worked with 2 different ILS workers and due to COVID 19 is not seeing an ILS worker at this time. Patient would like to resume this service once able too Patient has communicated with his  about this; Patient would like to look into intensive therapy program at [facility] in [place]. If patient is able to get into this program he would like to look into housing options available in the [place] area.; Patient would like to continue to work on self-advocacy skills. Patient is able to communicate his health needs. It is important to the patient to continue to work with his support team in order to continue to maintain his health and sobriety; Patient would like to continue to volunteer with the [employer]. This will resume once COVID 19 has cleared; Patient continues to work towards getting approved for SSDI. He is currently in the appeal process and will seek additional legal resources if denied again.  Patient stated he would like to continue to grow his skills with coping and stress management. He continues to work on this by going for walks, playing guitar and developing a comfort board.        [facility] Core Service Provided:  Health and Wellness Promotion    Current Stressors / Issues / Care Plan Objective Addressed Today:  [facility] contacted patient for monthly check in. Patient reports that he is havingsome problems with his stimulus check. Patient does not have a bank account so he gave his check to a friend to cash and give him the money. Patient reports that the bank is not releasing the money. [facility] asked if patient knows this friend well and if he is sure the money was not stolen from him. Patient reports that it is a very close friend of his and is confident that his friend did not steal the money. He is going to try and work out the issue with the bank. [facility] asked if patient follow up on resource for health care directive. Patient stated that he talked with his home and a health care directive is not going to help him with being able to have the right to go on walks outside. Patient reports that things are a little bit better with his house right now. [facility] advised that if he feels he is not being allowed his rights at home that he can contact the [facility]. Patient is going to try and work things out with the house staff first before doing this. Patient had no other concerns to discuss and needed to leave to go to the bank. He will call [facility] if other concerns come up before next monthly check in.     Intervention:  Motivational Interviewing: Expressed Empathy/Understanding, Supported Autonomy, Collaboration, Evocation and Permission to raise concern or advise   Target Behavior(s): Explored current social supports and reinforced opportunities to increase engagement    Assessment: (Progress on Goals / Homework):  Patient would benefit from continued coordination in reaching their goals set for the Behavioral  Health Home (H) program. [facility] reviewed Health Action Plan goals and will continue to monitor progress and work with patient and their care team.    Plan: (Homework, other):  Patient was encouraged to continue to seek condition-related information and education.CC, patient, and team will continue to work towards progress on reaching goals set for the Behavioral Health Home ([facility]) program.     Janet Ng [facility]  Behavioral Health Home ([facility])   [facility]  [phone]

## 2020-05-14 NOTE — TELEPHONE ENCOUNTER
Pt scheduled 5/19 for new PT mikayla.    Ana CHAMBERLAIN    [facility]

## 2020-05-15 ENCOUNTER — VIRTUAL VISIT (OUTPATIENT)
Dept: PSYCHIATRY | Facility: CLINIC | Age: 35
End: 2020-05-15
Attending: NURSE PRACTITIONER
Payer: COMMERCIAL

## 2020-05-15 DIAGNOSIS — F11.21 OPIOID USE DISORDER, SEVERE, IN SUSTAINED REMISSION (H): ICD-10-CM

## 2020-05-15 DIAGNOSIS — Z79.899 MEDICATION MANAGEMENT: ICD-10-CM

## 2020-05-15 DIAGNOSIS — F98.8 ATTENTION DEFICIT DISORDER, UNSPECIFIED HYPERACTIVITY PRESENCE: ICD-10-CM

## 2020-05-15 DIAGNOSIS — F33.0 MILD EPISODE OF RECURRENT MAJOR DEPRESSIVE DISORDER (H): Primary | ICD-10-CM

## 2020-05-15 DIAGNOSIS — F15.21 AMPHETAMINE USE DISORDER, SEVERE, IN SUSTAINED REMISSION (H): ICD-10-CM

## 2020-05-15 RX ORDER — DEXTROAMPHETAMINE SACCHARATE, AMPHETAMINE ASPARTATE, DEXTROAMPHETAMINE SULFATE AND AMPHETAMINE SULFATE 2.5; 2.5; 2.5; 2.5 MG/1; MG/1; MG/1; MG/1
10 TABLET ORAL 2 TIMES DAILY
Qty: 60 TABLET | Refills: 0 | Status: SHIPPED | OUTPATIENT
Start: 2020-05-15 | End: 2020-06-16

## 2020-05-15 ASSESSMENT — PAIN SCALES - GENERAL: PAINLEVEL: MODERATE PAIN (5)

## 2020-05-15 NOTE — PATIENT INSTRUCTIONS
-Continue Adderall 10 mg 2 times a day  -Monitor blood pressure at least x2/week    Thank you for coming to the PSYCHIATRY CLINIC.    Lab Testing:  If you had lab testing today and your results are reassuring or normal they will be mailed to you or sent through [employer] within 7 days.   If the lab tests need quick action we will call you with the results.  The phone number we will call with results is # [phone] (home) . If this is not the best number please call our clinic and change the number.    Medication Refills:  If you need any refills please call your pharmacy and they will contact us. Our fax number for refills is [phone]. Please allow three business for refill processing.   If you need to  your refill at a new pharmacy, please contact the new pharmacy directly. The new pharmacy will help you get your medications transferred.     Scheduling:  If you have any concerns about today's visit or wish to schedule another appointment please call our office during normal business hours [phone] (8-5:00 M-F)    Contact Us:  Please call [phone] during business hours (8-5:00 M-F).  If after clinic hours, or on the weekend, please call [phone].    Financial Assistance: [phone]  [phone]  [place] Billing [facility], [facility]: [phone]  [phone]  Medical Records: [phone]    MENTAL HEALTH CRISIS NUMBERS:  [facility]:   [facility]: [phone]  Crisis Residence [facility]: [phone]   Walk-In Counseling Center [facility]: [phone]   COPE  [place] Mobile Team: [phone] (adults) [phone] (child)     [facility]:   [facility]: [phone]   Walk-in counseling [facility]: [phone]   Walk-in counseling [facility]: [phone]   Crisis Residence [facility]: [phone]   Urgent Care Adult Mental Health:  [phone] Mobile team AND 24/7 crisis line    Other Crisis Numbers:   National Suicide Prevention Lifeline: 831-176-XTEV (800-370-5696)  CRISIS TEXT LINE: Text to 394089 for any crisis 24/7; OR www.crisistextline.org   Poison Control Center: 7-381-419-4669  CHILD: [facility] needs assessment team: [phone]   Trans Lifeline: 1-277.365.5131  [place] [facility] Lifeline: 1-414.777.3939    For a medical emergency please call 911 or go to the nearest ER.         _____________________________________________    Again thank you for choosing PSYCHIATRY CLINIC and please let us know how we can best partner with you to improve you and your family's health.    You may be receiving a survey regarding this appointment. We would love to have your feedback, both positive and negative. The survey is done by an external company, so your answers are anonymous.

## 2020-05-15 NOTE — PROGRESS NOTES
"VIDEO VISIT  Hoang Kiser is a 34 year old patient who is being evaluated via a billable video visit.      The patient has been notified of following:   \"This video visit will be conducted via a call between you and your physician/provider. We have found that certain health care needs can be provided without the need for an in-person physical exam. This service lets us provide the care you need with a video conversation. If a prescription is necessary we can send it directly to your pharmacy. If lab work is needed we can place an order for that and you can then stop by our lab to have the test done at a later time. Insurers are generally covering virtual visits as they would in-office visits so billing should not be different than normal.  If for some reason you do get billed incorrectly, you should contact the billing office to correct it and that number is in the AVS .    Patient has given verbal consent for video visit?:  Yes     How would you like to obtain your AVS?:  [employer]    Video invitation for patient:  DOXY provider, invite not needed      AVS SmartPhrase [PsychAVS] has been placed in 'Patient Instructions':  Yes     Start Time:  1330         End Time: 1358    Telemedicine Visit: The patient's condition can be safely assessed and treated via synchronous audio and visual telemedicine encounter.      Reason for Telemedicine Visit: Due to COVID 19 pandemic, clinic switching all appointments to telemedicine     Originating Site (Patient Location): Patient's home    Distant Site (Provider Location): Provider Remote Setting    Consent:  The patient/guardian has verbally consented to: the potential risks and benefits of telemedicine (video visit) versus in person care; bill my insurance or make self-payment for services provided; and responsibility for payment of non-covered services.     Mode of Communication:  Video Conference via D[employer].    As the provider I attest to compliance with applicable laws and " regulations related to telemedicine.    Psychiatry Clinic Progress Note                                                                  Patient Name: Hoang Kiser  YOB: 1985  MRN: [id number]  Date of Service:  5/15/2020  Last Seen:4/14/2020    Hoang Kiser is a 34 year old male who uses the name Shoaib and pronoun lilia.        Shoaib Kiser is a 34 year old year old adult who presents for ongoing psychiatric care.  Sohaib Kiser was last seen on 4/14/2020.     At that time,    Medication Ordered/Consults/Labs/tests Ordered:      Medication:   -Stop taking Adderall XR  -Start Addrall IR 10 mg 2 times a day, but don't take it on the days when you wake up 6pm.  Latest you can take this medication is 3pm.  Monitor your blood pressure 2 times a week and send the record to [employer] via [employer] in 2 weeks  -Continue all other medications for now.  OTC Recommendations: none  Lab Orders:  none  Referrals: none  Release of Information: Brian Duckworth in face to face visit  Future Treatment Considerations: per symptoms. Taper off Klonopin?  Cross taper Cymbalta to Fetzima?   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  Psych critical item history includes mutiple psychotropic trials, SUBSTANCE USE: amphetamines and opiates and substance use treatment .   Medical complication includes functional movement disorder    Previous Psychiatric Meds: Celexa, Wellbutrin, Trazodone, Effexor, Amitriptyline, Gabapentin, Tegretol, Zoloft, reports all not effective, Ritalin (movement worse)      Interim History                                                                                                        4, 4     Since the last visit, pt has not a chance to get blood pressure read, but has been monitoring pulse and  reports it's been 70's resting pulse.  Notes since Adderall has been changed, his sleep has improved.  On a days he does not have FMD, he goes to bed around 10/11pm, then falls asleep easily and wake up 8/9am while when he has multiple FMD flare up which is about x4/week.  Goes to bed around the same time, has difficulties to fall asleep for 2.5-3 hours, then sleep if there's only single FMD episode and wakes up about the same time.  But if he has multiple FMD episodes, he may not go back to sleep and continues to stay awake over 24 hours, but he has not had a time where he has not slept at all for 2 days since last seen.  Taking Adderall x2/day mostly.  Notes Klonopin use is rare for FMD and takes Vistaril x3/week for sleep now.  Reports mood is not best, but fairly managed and in the future, wants to taper off Cymbalta to try natural supplement as he is concerned for his liver function.  Has COVID related anxiety, but this feels situational and managed sufficiently.  Notes it will be 5 years since he has used any substance in 8/2020.    Denies any symptoms suggestive of hypomania or psychosis.    Current Suicidality/Hx of Suicide Attempts: Denies both  CoCominent Medical concerns: FMD flare ups    Medication Side Effects: The patient denies all medication side effects.      Medical Review of Systems     Apart from the symptoms mentioned int he HPI, the 14 point review of systems, including constitutional, HEENT, cardiovascular, respiratory, gastrointestinal, genitourinary, musculoskeletal, integumentary, endocrine, neurological, hematologic and allergic is entirely negative except FMD flare ups.        Substance Use   Pt has been staying substance free since last seen. Denies any substance use currently x 5 years. Active in NA.      Medical / Surgical History                                                                                                                  Patient Active Problem List   Diagnosis      Neuropathy     Moderate major depression (H)     Tobacco abuse     Attention deficit hyperactivity disorder (ADHD), predominantly inattentive type     Primary insomnia     Panic disorder without agoraphobia     Abnormal involuntary movement     Tic disorder     Anxiety     Posttraumatic stress disorder with dissociative symptoms     Chronic left hip pain     Chronic pain of right hip     Degenerative disc disease at L5-S1 level     Functional movement disorder     Family history of Crohn's disease     Chronic low back pain     Chronic pain syndrome     History of substance abuse (H)     Family history of multiple myeloma     History of electroencephalogram     Nonallergic rhinitis       Past Surgical History:   Procedure Laterality Date     COLONOSCOPY  02/15/18    Has not happened yet.     COLONOSCOPY N/A 2/15/2018    Procedure: COMBINED COLONOSCOPY, SINGLE OR MULTIPLE BIOPSY/POLYPECTOMY BY BIOPSY;;  Surgeon: Liam Kincaid MD;  Location: [facility] OR     COLONOSCOPY WITH CO2 INSUFFLATION N/A 2/15/2018    Procedure: COLONOSCOPY WITH CO2 INSUFFLATION;  COLON-FAMILY HX OF COLON CANCER/ SYPURA;  Surgeon: Liam Kincaid MD;  Location: [facility] OR     HC TOOTH EXTRACTION W/FORCEP Bilateral 2003     PE TUBES  1990        Social/ Family History                                  [per patient report]                                 1ea,1ea     Living arrangements: lives in group home, has a private room and feels safe  Social Support: NA, group home leader/members.  Access to gun: none    Allergy                                Buspirone hcl; Doxycycline; Elavil [amitriptyline]; Trazodone; Buspirone; and Keflex [cephalexin]    Current Medications                                                                                                       Current Outpatient Medications   Medication Sig Dispense Refill     albuterol (PROAIR HFA/PROVENTIL HFA/VENTOLIN HFA) 108 (90 Base) MCG/ACT inhaler INHALE ONE TO TWO PUFFS INTO  "THE LUNGS EVERY FOUR HOURS AS NEEDED FOR SHORTNESS OF BREATH/ DYSPNEA OR WHEEZING 18 g 0     amphetamine-dextroamphetamine (ADDERALL) 10 MG tablet Take 1 tablet (10 mg) by mouth 2 times daily 60 tablet 0     clonazePAM (KLONOPIN) 0.5 MG tablet 0.5mg tab by mouth nightly as needed  1     DULoxetine (CYMBALTA) 60 MG EC capsule Taking only 1 x 60mg tab by mouth daily 30 capsule 1     HYDROcodone-acetaminophen (NORCO) 5-325 MG tablet Take 1 tablet by mouth 3 times daily as needed for pain Ok to fill/start on 5/6/2020 60 tablet 0     hydrOXYzine (ATARAX) 25 MG tablet Take 1 tablet (25 mg) by mouth nightly as needed (at HS PRN) 45 tablet 3     morphine (MS CONTIN) 15 MG CR tablet Take 1 tablet (15 mg) by mouth every 12 hours Ok to fill/start on 5/6/2020 60 tablet 0     naloxone (NARCAN) 4 MG/0.1ML nasal spray Spray 1 spray (4 mg) into one nostril alternating nostrils as needed for opioid reversal every 2-3 minutes until assistance arrives 0.2 mL 0     naproxen (NAPROSYN) 500 MG tablet Take 1 tablet (500 mg) by mouth 2 times daily (with meals) 40 tablet 3     oxybutynin ER (DITROPAN-XL) 10 MG 24 hr tablet Take 1 tablet by mouth daily. 90 tablet 0     order for DME Equipment being ordered: 4 wheeled walker with bench seat. 1 Units 0     tiZANidine (ZANAFLEX) 4 MG tablet Take 4 mg by mouth as needed   0          Mental Status Exam                                                                                   9, 14 cog        Alertness: alert  and oriented  Appearance:  Casually dressed and Adequately groomed  Behavior/Demeanor: cooperative, pleasant and calm, with fair  eye contact   Speech: regular rate and rhythm  Mood :  \"okay\"  Affect: full range, appropriate and with slight subdued; was congruent to mood; was congruent to content  Thought Process (Associations):  Linear and Goal directed  Thought process (Rate):  Normal  Thought content:  no overt psychosis, denies suicidal ideation, intent or thoughts and patient " does not appear to be responding to internal stimuli  Perception:  Reports none;  Denies depersonalization and derealization  Attention/Concentration:  Fair  Memory:  Immediate recall intact and Short-term memory intact  Language: intact  Fund of Knowledge/Intelligence:  Average  Abstraction:  Normal  Insight:  Adequate and Fair  Judgment:  Fair and Adequate for safety  Cognition: (6) does  appear grossly intact; formal cognitive testing was not done    Physical Exam     Motor activity/EPS:  Normal  Gait:  Normal  Psychomotor: normal or unremarkable    Labs and Results      Pertinent findings on review include: Review of records with relevant information reported in the HPI.  Reviewed pt's past medical record and obtained collateral information.      [place] PRESCRIPTION MONITORING PROGRAM [] was checked today:  indicates norco and morohine 5/6/2020 and Adderall 4/14/2020.    PHQ9 Today:  N/A  PHQ 10/1/2018 3/22/2019 10/22/2019   PHQ-9 Total Score 13 11 9   Q9: Thoughts of better off dead/self-harm past 2 weeks Not at all Not at all Not at all       JIN 7 Today: N/A  JIN-7 SCORE 10/1/2018 3/22/2019 10/22/2019   Total Score - - -   Total Score 13 12 14       Recent Labs   Lab Test 01/30/20  0942 03/27/19  1438 06/15/18  0853   CR 1.01 0.85 0.92   GFRESTIMATED >90 >90 >90     Recent Labs   Lab Test 01/30/20  0942 03/27/19  1438   AST 19 26   ALT 31 36   ALKPHOS 60 61     PSYCHOTROPIC DRUG INTERACTIONS:    Norco---Vistaril: Concurrent use of HYDROCODONE and ANTICHOLINERGIC CNS DEPRESSANTS may result in increased risk of paralytic ileus; increased risk of respiratory and CNS depression.   Adderall---Cymbalta: Concurrent use of AMPHETAMINES and SEROTONERGIC AGENTS THAT INHIBIT CYP2D6 may result in increased amphetamine exposure and increased risk of serotonin syndrome.   Morphine---Adderall: Concurrent use of MORPHINE and SEROTONERGIC AGENTS may result in increased risk of serotonin syndrome.   Norco---Adderall: Concurrent  use of HYDROCODONE and SEROTONERGIC AGENTS may result in increased risk of serotonin syndrome.   Klonopin---Morphine: Concurrent use of MORPHINE and CNS DEPRESSANTS may result in increased risk of respiratory and CNS depression.   Klonopin---Norco: Concurrent use of HYDROCODONE and CNS DEPRESSANTS may result in increased risk of respiratory and CNS depression.   Morphine---Norco: Concurrent use of MORPHINE and SEROTONERGIC CNS DEPRESSANTS may result in increased risk of respiratory and CNS depression; increased risk of serotonin syndrome.   Morphine---Norco: Concurrent use of MORPHINE and ANTICHOLINERGIC CNS DEPRESSANTS may result in increased risk of paralytic ileus; increased risk of respiratory and CNS depression.      MANAGEMENT:  Monitoring for adverse effects, routine vitals and patient is aware of risks      Impression/Assessment      Shoaib Kiser is a 34 year old adult  who presents for med management follow up.  Pt appears mostly stable in his mood and anxiety, denies SI, SIB or HI.  Pt appears much more coherent since last seen, without being interruptive and rambling today.  Continues to have some sleep difficulties due to FMD, but appears to have improved somewhat since changed Adderall XR to IR as he sleeps in.  Pt has not gotten blood pressure read, thus unable to determine if this is safe medication regimen for BP control.  Until he can get his BP read, will continue on current dosing though pt wants to try higher dose of Adderall at this time as we do not have any BP base.  Pt requested DME order of blood pressure cuff to be sent to his pharmacy.    Discussion with pain management provider on 4/17/2020 indicated that she is concerned pt's behavior around controlled medication is suspicious at times while acknowledging significant pain. For example, he had and incident where he claims to have blacked out and found his controlled meds crushed, on the floor under the bed. He has overused as well and  when confronted with this behavior has behaved as though my concerns and those of the pain psychologist are overblown or treating him like a child. He is also inconsistent with attendance to therapies. Given his extensive CD history, I'm planning for the potential need to transition to a safer opiate such as Suboxone.  She also agrees with tapering off Klonopin is good idea. However, he is very chemically reliant and although he says he would like to take less, he gets very anxious when we talk about changing anything. He's been through huge changes in his ability to function due to this movement disorder and the accompanying depression from living his new reality. He struggles in the sober living facilities where he has lived with control issues and inappropriate personal relationships. I'm not sure if that is the best environment for him.  She is reluctant to push him too quickly on changing meds. I'm happy to coordinate with you so that we can change one thing at a time. I think stabilizing his mental health meds first will make switching pain meds an easier transition for him.    Discussed this writer will not prescribe Klonopin as this is used for FMD and he should request further refill for pain management.  Pt notes he has life time supply for Cymbalta and Vistaril and does not need refills today.    Discussed possible trial of Fetima vs Cymbalta, but pt declined as he wants to come off of Cymbalta to try natural supplement in the future.  Discussed the need to consult with naturopath to do supplement and when he decides to taper off Cymbalta to consult this writer to avoid withdrawal.      Diagnosis                                                                   Depression (MDD vs Depression due to another medical condition)  Hx dx of ADD and PTSD  Amphetamine use disorder, severe, in sustained remission  Opioid use disorder, severe, in sustained remission    Treatment Recommendation & Plan        Medication Ordered/Consults/Labs/tests Ordered:     Medication: continue on current medication regimen  OTC Recommendations: none  Lab Orders:  none  Referrals: DME order for BP machine, if this does not get covered, pt was instructed to contact his CM  Release of Information: none  Future Treatment Considerations: Per symptoms.   Return for Follow Up: in 1 month    -Discussed safety plan for suicidal thoughts  -Discussed plan for suicidality  -Discussed available emergency services  -Patient agrees with the treatment plan  -Encouraged to continue outpatient therapy to gain more coping mechanism for stress.    Treatment Risk Statement: Discussed with the patient my impressions, as well as recommended studies. I educated patient on the differential diagnosis and prognosis. I discussed with the patient the risks and benefits of medications versus no interventions, including efficacy, dose, possible side effects and length of treatment and the importance of medication compliance.  The patient understands the risks, benefits, adverse effects and alternatives. Agrees to treatment with the capacity to do so. No medical contraindications to treatment. The patient also understands the risks of using street drugs or alcohol.     CRISIS NUMBERS:   Provided routinely in AVS.    Meagan Bowman CNP,  5/15/2020

## 2020-05-18 ENCOUNTER — VIRTUAL VISIT (OUTPATIENT)
Dept: PALLIATIVE MEDICINE | Facility: CLINIC | Age: 35
End: 2020-05-18
Payer: COMMERCIAL

## 2020-05-18 ENCOUNTER — MYC MEDICAL ADVICE (OUTPATIENT)
Dept: PALLIATIVE MEDICINE | Facility: CLINIC | Age: 35
End: 2020-05-18

## 2020-05-18 DIAGNOSIS — M47.816 FACET ARTHROPATHY, LUMBAR: Primary | ICD-10-CM

## 2020-05-18 DIAGNOSIS — G25.9 FUNCTIONAL MOVEMENT DISORDER: ICD-10-CM

## 2020-05-18 PROCEDURE — 96158 HLTH BHV IVNTJ INDIV 1ST 30: CPT | Mod: 95 | Performed by: PSYCHOLOGIST

## 2020-05-18 NOTE — PROGRESS NOTES
"Hoang Kiser is a 34 year old male who is being evaluated via a billable video visit.      The patient has been notified of following:     \"This video visit will be conducted via a call between you and your physician/provider. We have found that certain health care needs can be provided without the need for an in-person physical exam.  This service lets us provide the care you need with a video conversation.  If a prescription is necessary we can send it directly to your pharmacy.  If lab work is needed we can place an order for that and you can then stop by our lab to have the test done at a later time.    Video visits are billed at different rates depending on your insurance coverage.  Please reach out to your insurance provider with any questions.    If during the course of the call the physician/provider feels a video visit is not appropriate, you will not be charged for this service.\"    Patient has given verbal consent for Video visit? Yes    Patient would like the video invitation sent by: Text to cell phone: [phone]        Video Start Time: 3:00 PM    Additional provider notes:      Pain Diagnoses per pain provider:   Facet arthropathy, lumbar       Functional movement disorder         DATA: Patient reports he slept through his last appointment. 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 Patient reports his pain is stable, mildly improved - using hydrocodone about 3-4 times per week which is less than in the past. Patient's mood is stable other than heightened anxiety related to social distancing issues/covid. Recognizes some of this is situational, using positive self-talk to manage. Activity level is about the same. Stress level is mildly increased due to covid still, recognizes this is on an unconscious level in terms of he often becomes triggered by social interactions in the store especially. Recognizes he is doing what he can, practicing good social " distancing and hygiene issues whenever he goes out - notices this increases his movements especially when he is in public. Overall housing related stress has reduced greatly since roommate left. Sleep hygiene is variable. States he has days where he will sleep all day about 1-2 times per week, chalks this up to his movement disorder  - discussed his anxiety related to social interactions outside the house and how this contributes to his fatigue level. States he cycles between 12-16 hours on these days, then struggles to fall asleep for 24 hours following, and that this pattern has been worsening. Patient reports engaging in self-care for his pain 1-3 times per day. States in general things have been 'boring' and is grateful there is reduced drama in his household. Does acknowledge this has been a trigger in the past - feels his sobriety is not at risk currently though.     ASSESSMENT: Easily engaged and readily utilizes skills discussed in sessions. Patient reports things have greatly settled since a roommate left, and is also able to acknowledge loss of this friendship despite some of the issues within the relationship.   Progress toward goals: good.    Pain Status: improved    Emotional Status: had fluctuating course              Medication / chemical use concerns:  None    PLAN:   Next Appointment: Hoang Kiser's next appointment is scheduled for 6/4 at 3:00 pm.  Assignment/Objectives /interventions for next session: Work on actively challenging negative self-talk, focus on methods of anxiety reduction and self-soothing.    Video-Visit Details    Type of service:  Video Visit    Video End Time (time video stopped): 3:38 PM      Originating Location (pt. Location): Other [facility]    Distant Location (provider location):  [facility]     Mode of Communication:  Video Conference via Liyah Kemp PsyD LP  Licensed Psychologist  Outpatient Clinic Therapist  [facility]

## 2020-05-19 NOTE — TELEPHONE ENCOUNTER
Rosanne message from patient on 5/18 at 1932:    Tamiko,     I had to cancel my first appointment with the PT specialist. My chore for the house this week is to go grocery shopping. We leave at 10:30 and I can't guarantee he would be back by 11:30. Could you please let me know how I can reschedule this? Would I do this through the pain management scheduling team? Thank you so much for your help in advance!     Shoaib Arrintgon   ----------------  Message sent to pt:    Dane Cramer,     To reschedule the PT video visit, please call our main clinic number at [phone]. I will make sure Tamiko Stevens CNP sees this message.     Take care,     Felicita Woody, KATERINAN, RN-BC  Patient Care Supervisor  [facility]

## 2020-05-26 DIAGNOSIS — F41.9 ANXIETY: ICD-10-CM

## 2020-05-26 DIAGNOSIS — M79.18 CHRONIC MYOFASCIAL PAIN: ICD-10-CM

## 2020-05-26 DIAGNOSIS — G62.9 NEUROPATHY: ICD-10-CM

## 2020-05-26 DIAGNOSIS — G89.29 CHRONIC MYOFASCIAL PAIN: ICD-10-CM

## 2020-05-26 RX ORDER — NAPROXEN 500 MG/1
500 TABLET ORAL 2 TIMES DAILY WITH MEALS
Qty: 40 TABLET | Refills: 3 | Status: SHIPPED | OUTPATIENT
Start: 2020-05-26 | End: 2020-07-31

## 2020-05-26 RX ORDER — HYDROXYZINE HYDROCHLORIDE 25 MG/1
25 TABLET, FILM COATED ORAL
Qty: 45 TABLET | Refills: 3 | Status: SHIPPED | OUTPATIENT
Start: 2020-05-26 | End: 2020-07-31

## 2020-05-26 NOTE — TELEPHONE ENCOUNTER
Pending Prescriptions:                       Disp   Refills    hydrOXYzine (ATARAX) 25 MG tablet         45 tab*3            Sig: Take 1 tablet (25 mg) by mouth nightly as needed           (at HS PRN)    Last refill 04/24/2020  Last office visit 5/18/2020  Next appointment No future appointment.    Everton Li MA

## 2020-05-26 NOTE — TELEPHONE ENCOUNTER
Received fax request from [facility] pharmacy requesting refill(s) for naproxen    Last refilled on 4/29/2020

## 2020-06-03 ENCOUNTER — TELEPHONE (OUTPATIENT)
Dept: PALLIATIVE MEDICINE | Facility: CLINIC | Age: 35
End: 2020-06-03

## 2020-06-03 ENCOUNTER — VIRTUAL VISIT (OUTPATIENT)
Dept: PALLIATIVE MEDICINE | Facility: CLINIC | Age: 35
End: 2020-06-03
Payer: COMMERCIAL

## 2020-06-03 DIAGNOSIS — G25.9 FUNCTIONAL MOVEMENT DISORDER: ICD-10-CM

## 2020-06-03 DIAGNOSIS — M47.816 FACET ARTHROPATHY, LUMBAR: ICD-10-CM

## 2020-06-03 DIAGNOSIS — M62.838 MUSCLE SPASM: ICD-10-CM

## 2020-06-03 DIAGNOSIS — G43.001 MIGRAINE WITHOUT AURA AND WITH STATUS MIGRAINOSUS, NOT INTRACTABLE: ICD-10-CM

## 2020-06-03 DIAGNOSIS — G89.29 CHRONIC MYOFASCIAL PAIN: Primary | ICD-10-CM

## 2020-06-03 DIAGNOSIS — M79.18 CHRONIC MYOFASCIAL PAIN: Primary | ICD-10-CM

## 2020-06-03 PROCEDURE — 99214 OFFICE O/P EST MOD 30 MIN: CPT | Mod: 95 | Performed by: NURSE PRACTITIONER

## 2020-06-03 RX ORDER — HYDROCODONE BITARTRATE AND ACETAMINOPHEN 5; 325 MG/1; MG/1
1 TABLET ORAL 3 TIMES DAILY PRN
Qty: 60 TABLET | Refills: 0 | Status: SHIPPED | OUTPATIENT
Start: 2020-06-03 | End: 2020-07-01

## 2020-06-03 RX ORDER — MORPHINE SULFATE 15 MG/1
15 TABLET, FILM COATED, EXTENDED RELEASE ORAL EVERY 12 HOURS
Qty: 60 TABLET | Refills: 0 | Status: SHIPPED | OUTPATIENT
Start: 2020-06-03 | End: 2020-07-01

## 2020-06-03 RX ORDER — SUMATRIPTAN 25 MG/1
25 TABLET, FILM COATED ORAL
Qty: 8 TABLET | Refills: 0 | Status: SHIPPED | OUTPATIENT
Start: 2020-06-03 | End: 2020-07-14

## 2020-06-03 ASSESSMENT — PAIN SCALES - GENERAL: PAINLEVEL: SEVERE PAIN (6)

## 2020-06-03 NOTE — TELEPHONE ENCOUNTER
Script Eprescribed to pharmacy    Will send this to MA team to notify patient.  Prescriptions sent through visit encounter. Please let Shoaib know.     CARLOS A Bass, NP-C  [facility] [place]

## 2020-06-03 NOTE — TELEPHONE ENCOUNTER
Patient had virtual visit today. Just requesting that medications be sent to pharmacy today (06/03/20). Routed to provider.
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
## 2020-06-03 NOTE — TELEPHONE ENCOUNTER
Patient is calling to see if his refills will be sent in today or not, he is due for refills 6/5     ZANAFLEX, IMITREX, NORCO AND MORPHINE.     Lalitha in [place] on [address]    [facility]

## 2020-06-04 ENCOUNTER — VIRTUAL VISIT (OUTPATIENT)
Dept: PALLIATIVE MEDICINE | Facility: CLINIC | Age: 35
End: 2020-06-04
Payer: COMMERCIAL

## 2020-06-04 DIAGNOSIS — M47.816 FACET ARTHROPATHY, LUMBAR: Primary | ICD-10-CM

## 2020-06-04 DIAGNOSIS — G25.9 FUNCTIONAL MOVEMENT DISORDER: ICD-10-CM

## 2020-06-04 PROCEDURE — 96159 HLTH BHV IVNTJ INDIV EA ADDL: CPT | Mod: 95 | Performed by: PSYCHOLOGIST

## 2020-06-04 PROCEDURE — 96158 HLTH BHV IVNTJ INDIV 1ST 30: CPT | Mod: 95 | Performed by: PSYCHOLOGIST
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
## 2020-06-09 NOTE — PATIENT INSTRUCTIONS
After Visit Instructions:     Thank you for coming to [facility] for your care. It is my goal to partner with you to help you reach your optimal state of health.     Continue daily self-care, identifying contributing factors, and monitoring variations in pain level. Continue to integrate self-care into your life.        1. Schedule follow-up with CARLOS A Higuera NP-C in 4 weeks  2. Medication recommendations: Continue current medication regimen  1. Norco, MS Contin, tizanidine refilled  2. Start sumatriptan 50 mg 1 tab as needed for migraine headache may repeat in 2 hours if needed      CARLOS A Bass NP-C  [facility]  [facility]    Clinic Number:  [phone]     Call with any questions about your care and for scheduling assistance.     Calls are returned Monday through Friday between 8 AM and 4:30 PM. We usually get back to you within 2 business days depending on the issue/request.    If we are prescribing your medications:    For opioid medication refills, call the clinic or send a [employer] message 7 days in advance.  Please include:    Name of requested medication    Name of the pharmacy.    For non-opioid medications, call your pharmacy directly to request a refill. Please allow 3-4 days to be processed.     Per [place] State Law:    All controlled substance prescriptions must be filled within 30 days of being written.      For those controlled substances allowing refills, pickup must occur within 30 days of last fill.      We believe regular attendance is key to your success in our program!      Any time you are unable to keep your appointment we ask that you call us at least 24 hours in advance to cancel.This will allow us to offer the appointment time to another patient.   Multiple missed appointments may lead to dismissal from the clinic.

## 2020-06-10 ENCOUNTER — TELEPHONE (OUTPATIENT)
Dept: BEHAVIORAL HEALTH | Facility: CLINIC | Age: 35
End: 2020-06-10

## 2020-06-10 NOTE — TELEPHONE ENCOUNTER
[facility]  [facility] [place]        Social Work Care Navigator Note      Patient: Hoang Kiser  Date: Hannah 10, 2020  Preferred Name: Shoaib    Previous PHQ-9:   PHQ-9 SCORE 10/1/2018 3/22/2019 10/22/2019   PHQ-9 Total Score - - -   PHQ-9 Total Score 13 11 9     Previous JIN-7:   JIN-7 SCORE 10/1/2018 3/22/2019 10/22/2019   Total Score - - -   Total Score 13 12 14     MOLLY LEVEL:  MOLLY Score (Last Two) 3/23/2016 10/1/2018   MOLLY Raw Score 52 31   Activation Score 100 59.3   MOLLY Level 4 3       Preferred Contact:  Need for : No  Preferred Contact: Cell      Type of Contact Today: Phone call (not reached/unavailable)      Data: (subjective / Objective):  Attempted to reach patient for monthly check in. Left voicemail requesting to be called back.    NURYS Henderson  [facility] ([facility])   [facility]  [phone]

## 2020-06-16 ENCOUNTER — VIRTUAL VISIT (OUTPATIENT)
Dept: PSYCHIATRY | Facility: CLINIC | Age: 35
End: 2020-06-16
Attending: NURSE PRACTITIONER
Payer: COMMERCIAL

## 2020-06-16 DIAGNOSIS — F11.21 OPIOID USE DISORDER, SEVERE, IN SUSTAINED REMISSION (H): ICD-10-CM

## 2020-06-16 DIAGNOSIS — F33.0 MILD EPISODE OF RECURRENT MAJOR DEPRESSIVE DISORDER (H): Primary | ICD-10-CM

## 2020-06-16 DIAGNOSIS — F15.21 AMPHETAMINE USE DISORDER, SEVERE, IN SUSTAINED REMISSION (H): ICD-10-CM

## 2020-06-16 DIAGNOSIS — F98.8 ATTENTION DEFICIT DISORDER, UNSPECIFIED HYPERACTIVITY PRESENCE: ICD-10-CM

## 2020-06-16 RX ORDER — DEXTROAMPHETAMINE SACCHARATE, AMPHETAMINE ASPARTATE, DEXTROAMPHETAMINE SULFATE AND AMPHETAMINE SULFATE 2.5; 2.5; 2.5; 2.5 MG/1; MG/1; MG/1; MG/1
10 TABLET ORAL 2 TIMES DAILY
Qty: 60 TABLET | Refills: 0 | Status: SHIPPED | OUTPATIENT
Start: 2020-07-16 | End: 2020-07-31

## 2020-06-16 RX ORDER — DEXTROAMPHETAMINE SACCHARATE, AMPHETAMINE ASPARTATE, DEXTROAMPHETAMINE SULFATE AND AMPHETAMINE SULFATE 2.5; 2.5; 2.5; 2.5 MG/1; MG/1; MG/1; MG/1
10 TABLET ORAL 2 TIMES DAILY
Qty: 60 TABLET | Refills: 0 | Status: SHIPPED | OUTPATIENT
Start: 2020-06-16 | End: 2020-07-14

## 2020-06-16 ASSESSMENT — PAIN SCALES - GENERAL: PAINLEVEL: EXTREME PAIN (8)

## 2020-06-16 NOTE — PROGRESS NOTES
"VIDEO VISIT  Hoang Kiser is a 34 year old patient who is being evaluated via a billable video visit.      The patient has been notified of following:   \"This video visit will be conducted via a call between you and your physician/provider. We have found that certain health care needs can be provided without the need for an in-person physical exam. This service lets us provide the care you need with a video conversation. If a prescription is necessary we can send it directly to your pharmacy. If lab work is needed we can place an order for that and you can then stop by our lab to have the test done at a later time. Insurers are generally covering virtual visits as they would in-office visits so billing should not be different than normal.  If for some reason you do get billed incorrectly, you should contact the billing office to correct it and that number is in the AVS .    Patient has given verbal consent for video visit?:  Yes     How would you like to obtain your AVS?:  Indiewalls    Video invitation for patient:  DOXY provider, invite not needed      AVS SmartPhrase [PsychAVS] has been placed in 'Patient Instructions':  Yes     Start Time:  1631         End Time: 1705    Telemedicine Visit: The patient's condition can be safely assessed and treated via synchronous audio and visual telemedicine encounter.      Reason for Telemedicine Visit: Due to COVID 19 pandemic, clinic switching all appointments to telemedicine     Originating Site (Patient Location): Patient's home    Distant Site (Provider Location): Provider Remote Setting    Consent:  The patient/guardian has verbally consented to: the potential risks and benefits of telemedicine (video visit) versus in person care; bill my insurance or make self-payment for services provided; and responsibility for payment of non-covered services.     Mode of Communication:  Video Conference via Doxy.    As the provider I attest to compliance with applicable laws and " regulations related to telemedicine.    Psychiatry Clinic Progress Note                                                                  Patient Name: Hoang Kiser  YOB: 1985  MRN: [id number]  Date of Service:  6/16/2020  Last Seen:5/15/2020    Hoang Kiser is a 34 year old male who uses the name Shoaib and pronoun lilia.        Shoaib Kiser is a 34 year old year old adult who presents for ongoing psychiatric care.  Shoaib Kiser was last seen on 5/15/2020.     At that time,     Medication Ordered/Consults/Labs/tests Ordered:      Medication: continue on current medication regimen  OTC Recommendations: none  Lab Orders:  none  Referrals: DME order for BP machine, if this does not get covered, pt was instructed to contact his CM  Release of Information: none  Future Treatment Considerations: Per symptoms.   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  Psych critical item history includes mutiple psychotropic trials, SUBSTANCE USE: amphetamines and opiates and substance use treatment .   Medical complication includes functional movement disorder     Previous Psychiatric Meds: Celexa, Wellbutrin, Trazodone, Effexor, Amitriptyline, Gabapentin, Tegretol, Zoloft, reports all not effective, Ritalin (movement worse)      Interim History                                                                                                        4, 4     Since the last visit, pt reports he has not been able to get BP cuff that was ordered last seen.  Has been having difficulties managing pain and multiple FMD episode.  Taking Vistaril x2-4/week.  Pt continues to ask for increase in Adderall in its dose or frequency to help to stay up.  Pt wants to continue on Cymbalta at this time, but plans to taper off in 6  months as he is concerned for his liver function.  Pt had somewhat worse depression and anxiety due to uprising, but he does not live in the area directly affected by uprising.  This has improved since 1 week ago.  Denies SI, SIB or HI.    Denies any symptoms suggestive of hypomania or psychosis.    Current Suicidality/Hx of Suicide Attempts: Denies both  CoCominent Medical concerns: Chronic pain and FMD flare ups    Medication Side Effects: The patient denies all medication side effects.      Medical Review of Systems     Apart from the symptoms mentioned int he HPI, the 14 point review of systems, including constitutional, HEENT, cardiovascular, respiratory, gastrointestinal, genitourinary, musculoskeletal, integumentary, endocrine, neurological, hematologic and allergic is entirely negative except chronic pain and FMD flare ups.      Substance Use   Pt has been staying substance free since last seen.  Denies any substance use currently x 5 years. Active in NA.      Medical / Surgical History                                                                                                                  Patient Active Problem List   Diagnosis     Neuropathy     Moderate major depression (H)     Tobacco abuse     Attention deficit hyperactivity disorder (ADHD), predominantly inattentive type     Primary insomnia     Panic disorder without agoraphobia     Abnormal involuntary movement     Tic disorder     Anxiety     Posttraumatic stress disorder with dissociative symptoms     Chronic left hip pain     Chronic pain of right hip     Degenerative disc disease at L5-S1 level     Functional movement disorder     Family history of Crohn's disease     Chronic low back pain     Chronic pain syndrome     History of substance abuse (H)     Family history of multiple myeloma     History of electroencephalogram     Nonallergic rhinitis       Past Surgical History:   Procedure Laterality Date     COLONOSCOPY  02/15/18    Has  not happened yet.     COLONOSCOPY N/A 2/15/2018    Procedure: COMBINED COLONOSCOPY, SINGLE OR MULTIPLE BIOPSY/POLYPECTOMY BY BIOPSY;;  Surgeon: Liam Kincaid MD;  Location: [facility] OR     COLONOSCOPY WITH CO2 INSUFFLATION N/A 2/15/2018    Procedure: COLONOSCOPY WITH CO2 INSUFFLATION;  COLON-FAMILY HX OF COLON CANCER/ SYPURA;  Surgeon: Liam Kincaid MD;  Location: [facility] OR     HC TOOTH EXTRACTION W/FORCEP Bilateral 2003     PE TUBES  1990        Social/ Family History                                  [per patient report]                                 1ea,1ea   Living arrangements: lives in group home, has a private room and feels safe  Social Support: NA, group home leader/members.  Access to gun: none    Allergy                                Buspirone hcl; Doxycycline; Elavil [amitriptyline]; Trazodone; Buspirone; and Keflex [cephalexin]    Current Medications                                                                                                       Current Outpatient Medications   Medication Sig Dispense Refill     albuterol (PROAIR HFA/PROVENTIL HFA/VENTOLIN HFA) 108 (90 Base) MCG/ACT inhaler INHALE ONE TO TWO PUFFS INTO THE LUNGS EVERY FOUR HOURS AS NEEDED FOR SHORTNESS OF BREATH/ DYSPNEA OR WHEEZING 18 g 0     amphetamine-dextroamphetamine (ADDERALL) 10 MG tablet Take 1 tablet (10 mg) by mouth 2 times daily 60 tablet 0     clonazePAM (KLONOPIN) 0.5 MG tablet 0.5mg tab by mouth nightly as needed  1     DULoxetine (CYMBALTA) 60 MG EC capsule Taking only 1 x 60mg tab by mouth daily 30 capsule 1     HYDROcodone-acetaminophen (NORCO) 5-325 MG tablet Take 1 tablet by mouth 3 times daily as needed for pain Ok to fill on 6/3/2020 to start 6/5/20 60 tablet 0     hydrOXYzine (ATARAX) 25 MG tablet Take 1 tablet (25 mg) by mouth nightly as needed (at HS PRN) 45 tablet 3     morphine (MS CONTIN) 15 MG CR tablet Take 1 tablet (15 mg) by mouth every 12 hours Ok to fill on 6/3/2020 to start 6/5/20 60  "tablet 0     naloxone (NARCAN) 4 MG/0.1ML nasal spray Spray 1 spray (4 mg) into one nostril alternating nostrils as needed for opioid reversal every 2-3 minutes until assistance arrives 0.2 mL 0     naproxen (NAPROSYN) 500 MG tablet Take 1 tablet (500 mg) by mouth 2 times daily (with meals) 40 tablet 3     order for DME Equipment being ordered: 4 wheeled walker with bench seat. 1 Units 0     oxybutynin ER (DITROPAN-XL) 10 MG 24 hr tablet Take 1 tablet by mouth daily. 90 tablet 0     SUMAtriptan (IMITREX) 25 MG tablet Take 1 tablet (25 mg) by mouth at onset of headache for migraine (ok to repeat in 2 hrs if needed.) 8 tablet 0     tiZANidine (ZANAFLEX) 4 MG tablet Take 1 tablet (4 mg) by mouth 3 times daily as needed for muscle spasms 30 tablet 1        Mental Status Exam                                                                                   9, 14 cog        Alertness: alert  and oriented  Appearance:  Casually dressed and Adequately groomed  Behavior/Demeanor: cooperative and calm, with fair  eye contact   Speech: regular rate and rhythm  Mood :  \"okay\"  Affect: full range and appropriate; was congruent to mood; was congruent to content  Thought Process (Associations):  Linear and Goal directed  Thought process (Rate):  Normal  Thought content:  no overt psychosis, denies suicidal ideation, intent or thoughts and patient does not appear to be responding to internal stimuli  Perception:  Reports none;  Denies depersonalization and derealization  Attention/Concentration:  Fair  Memory:  Immediate recall intact and Short-term memory intact  Language: intact  Fund of Knowledge/Intelligence:  Average  Abstraction:  Normal  Insight:  Adequate and Fair  Judgment:  Fair and Adequate for safety  Cognition: (6) does  appear grossly intact; formal cognitive testing was not done    Physical Exam     Motor activity/EPS:  Normal  Gait:  Normal  Psychomotor: normal or unremarkable    Labs and Results      Pertinent " findings on review include: Review of records with relevant information reported in the HPI.  Reviewed pt's past medical record and obtained collateral information.      [place] PRESCRIPTION MONITORING PROGRAM [] was checked today:  indicates Norco 6/4/2020, Morphine 6/3/2020 and Adderall 5/15/2020.    PHQ9 Today:  N/A  PHQ 10/1/2018 3/22/2019 10/22/2019   PHQ-9 Total Score 13 11 9   Q9: Thoughts of better off dead/self-harm past 2 weeks Not at all Not at all Not at all       JIN 7 Today: N/A  JIN-7 SCORE 10/1/2018 3/22/2019 10/22/2019   Total Score - - -   Total Score 13 12 14       Recent Labs   Lab Test 01/30/20  0942 03/27/19  1438 06/15/18  0853   CR 1.01 0.85 0.92   GFRESTIMATED >90 >90 >90     Recent Labs   Lab Test 01/30/20  0942 03/27/19  1438   AST 19 26   ALT 31 36   ALKPHOS 60 61     PSYCHOTROPIC DRUG INTERACTIONS:    Norco---Vistaril: Concurrent use of HYDROCODONE and ANTICHOLINERGIC CNS DEPRESSANTS may result in increased risk of paralytic ileus; increased risk of respiratory and CNS depression.   Adderall---Cymbalta: Concurrent use of AMPHETAMINES and SEROTONERGIC AGENTS THAT INHIBIT CYP2D6 may result in increased amphetamine exposure and increased risk of serotonin syndrome.   Morphine---Adderall: Concurrent use of MORPHINE and SEROTONERGIC AGENTS may result in increased risk of serotonin syndrome.   Norco---Adderall: Concurrent use of HYDROCODONE and SEROTONERGIC AGENTS may result in increased risk of serotonin syndrome.   Klonopin---Morphine: Concurrent use of MORPHINE and CNS DEPRESSANTS may result in increased risk of respiratory and CNS depression.   Klonopin---Norco: Concurrent use of HYDROCODONE and CNS DEPRESSANTS may result in increased risk of respiratory and CNS depression.   Morphine---Norco: Concurrent use of MORPHINE and SEROTONERGIC CNS DEPRESSANTS may result in increased risk of respiratory and CNS depression; increased risk of serotonin syndrome.   Morphine---Norco: Concurrent use of  MORPHINE and ANTICHOLINERGIC CNS DEPRESSANTS may result in increased risk of paralytic ileus; increased risk of respiratory and CNS depression.      MANAGEMENT:  Monitoring for adverse effects, routine vitals and patient is aware of risks      Impression/Assessment      Hoang Kiser is a 34 year old adult  who presents for med management follow up.  Pt appears mostly stable in his mood and anxiety, denies SI, SIB or HI.  Pt noted some exacerbation of anxiety and depression that were resolved since uprising.  Pt has not been able to obtain BP cuff.  Encouraged pt to follow up with insurance and see if this needs to be ordered by PCP.  Reiterated since his BP was elevated, will not increase Adderall frequency or dose without BP monitoring.    Also reiterated concern for multiple opioid and benzodiazapine use which are all managed by pain due to FMD.  Also discussed interaction between Cymbalta and Adderall and risk for increased serotonin syndrome possibly.  Pt wants to continue Cymbalta at this time, but plans to taper off within 6 months time as he does not think this is effective for pain.  Also pls referred to 5/15/2020 note about conversation with pain management provider on her concern for behavior around controlled medications.    Will continue on current medication regimen until pt can obtain BP monitoring closely.  Pt declined to have refills of Cymbalta and Vistaril as he has sufficient refills.      Diagnosis                                                                   Depression (MDD vs Depression due to another medical condition)  Hx dx of ADD and PTSD  Amphetamine use disorder, severe, in sustained remission  Opioid use disorder, severe, in sustained remission    Treatment Recommendation & Plan       Medication Ordered/Consults/Labs/tests Ordered:     Medication: Continue on current medication regimen for now  OTC Recommendations: none  Lab Orders:  none  Referrals: none  Release of  Information: none  Future Treatment Considerations: Per symptoms.   Return for Follow Up: in 1.5 months per pt's request    -Discussed safety plan for suicidal thoughts  -Discussed plan for suicidality  -Discussed available emergency services  -Patient agrees with the treatment plan  -Encouraged to continue outpatient therapy to gain more coping mechanism for stress.      Treatment Risk Statement: Discussed with the patient my impressions, as well as recommended studies. I educated patient on the differential diagnosis and prognosis. I discussed with the patient the risks and benefits of medications versus no interventions, including efficacy, dose, possible side effects and length of treatment and the importance of medication compliance.  The patient understands the risks, benefits, adverse effects and alternatives. Agrees to treatment with the capacity to do so. No medical contraindications to treatment. The patient also understands the risks of using street drugs or alcohol.   CRISIS NUMBERS:   Provided routinely in AVS.      Meagan Bowman CNP,  6/16/2020

## 2020-06-16 NOTE — PATIENT INSTRUCTIONS
-Continue on current medication regimen for now    Your next appointment is scheduled on 7/31 (Fri) at 1:30pm and 9/18 (Fri) at 1:30pm.    To access your telemedicine visit:     Open a web browser, like [employer], and type [email or web address]     You will see a box asking you to check in to let Meagan Bowman know that you are here.     Type in your name and press Check In. That will let Meagan see you in the virtual waiting room. At your scheduled appointment time, your provider will initiate the visit and connect you.     When your visit is done, you can simply close the browser window.        Please Note:  Ideally, you will connect from a desktop, laptop, or tablet with a WiFi connection. Your computer/tablet must have a camera and microphone. You can use a cell phone, if it has a camera, and if you can connect to WiFi. However, if you connect your phone over a cellular network, it is of lower quality and less reliable      Thank you for coming to the PSYCHIATRY CLINIC.    Lab Testing:  If you had lab testing today and your results are reassuring or normal they will be mailed to you or sent through Sape within 7 days. If the lab tests need quick action we will call you with the results. The phone number we will call with results is # [phone] (home) . If this is not the best number please call our clinic and change the number.    Medication Refills:  If you need any refills please call your pharmacy and they will contact us. Our fax number for refills is [phone]. Please allow three business for refill processing. If you need to  your refill at a new pharmacy, please contact the new pharmacy directly. The new pharmacy will help you get your medications transferred.     Scheduling:  If you have any concerns about today's visit or wish to schedule another appointment please call our office during normal business hours [phone] (8-5:00 M-F)    Contact Us:  Please call [phone] during  business hours (8-5:00 M-F).  If after clinic hours, or on the weekend, please call  [phone].    Financial Assistance [phone]  [facility] Billing [phone]  [place] Billing Office, [facility]: [phone]  [place] Billing [phone]  Medical Records [phone]      MENTAL HEALTH CRISIS NUMBERS:  For a medical emergency please call  911 or go to the nearest ER.     [facility]:   [facility] -[phone]   Crisis Residence [facility] -[phone]   Walk-In Counseling Center [facility] -[phone]   COPE 24/7 [place] Mobile Team -[phone] (adults)/[phone] (child)  CHILD: [facility] needs assessment team - [phone]      [facility]:   [facility] - [phone]   Walk-in counseling [facility] - [phone]   Walk-in counseling [facility] - [phone]   Crisis Residence [facility] - [phone]  Urgent Care Adult Mental [facility]-[phone] mobile unit/ 24/7 crisis line    National Crisis Numbers:   National Suicide Prevention Lifeline: 5-789-852-TALK (185-235-4193)  Poison Control Center - 1-592.248.4439  [employer] Education/resources for a list of additional resources (SOS)  Trans Lifeline a hotline for transgender people 1-891.666.5126  [employer] a hotline for LGBT youth 1-124.125.1635  Crisis Text Line: For any crisis 24/7   To: 267118  see www.crisistextline.org  - IF MAKING A CALL FEELS TOO HARD, send a text!         Again thank you for choosing PSYCHIATRY CLINIC and please let us know how we can best partner with you to improve you and your family's health.    You may be receiving a survey regarding this appointment. We would love to have your feedback, both positive and negative. The survey is done by an external company, so your answers are anonymous.

## 2020-06-18 ENCOUNTER — VIRTUAL VISIT (OUTPATIENT)
Dept: PALLIATIVE MEDICINE | Facility: CLINIC | Age: 35
End: 2020-06-18
Payer: COMMERCIAL

## 2020-06-18 DIAGNOSIS — M47.816 FACET ARTHROPATHY, LUMBAR: Primary | ICD-10-CM

## 2020-06-18 DIAGNOSIS — G25.9 FUNCTIONAL MOVEMENT DISORDER: ICD-10-CM

## 2020-06-18 PROCEDURE — 96158 HLTH BHV IVNTJ INDIV 1ST 30: CPT | Mod: 95 | Performed by: PSYCHOLOGIST

## 2020-06-18 PROCEDURE — 96159 HLTH BHV IVNTJ INDIV EA ADDL: CPT | Mod: 95 | Performed by: PSYCHOLOGIST

## 2020-06-18 NOTE — PROGRESS NOTES
"Hoang Kiser is a 34 year old male who is being evaluated via a billable video visit.      The patient has been notified of following:     \"This video visit will be conducted via a call between you and your physician/provider. We have found that certain health care needs can be provided without the need for an in-person physical exam.  This service lets us provide the care you need with a video conversation.  If a prescription is necessary we can send it directly to your pharmacy.  If lab work is needed we can place an order for that and you can then stop by our lab to have the test done at a later time.    Video visits are billed at different rates depending on your insurance coverage.  Please reach out to your insurance provider with any questions.    If during the course of the call the physician/provider feels a video visit is not appropriate, you will not be charged for this service.\"    Patient has given verbal consent for Video visit? Yes    Patient would like the video invitation sent by: Text to cell phone: [phone]      Video Start Time: 4:02 PM    Additional provider notes:      Pain Diagnoses per pain provider:   Facet arthropathy, lumbar       Functional movement disorder         DATA: Patient reports his pain is primarily stable, however notes that his back has been more problematic with the shift in weather. Discussed strategies he is using to manage his pain - resting, walking daily as able, pacing his activity, [place]. Patient's mood is moderately worse. Activity level is about the same. Stress level is mildly increased with news about housing. Sleep hygiene is still variable - still struggling with insomnia and hypersomnia. Patient presents as quite tired, down, at times closing his eyes and struggling to stay awake fully - he acknowledges this is occurring and is working on acceptance of this. States at times this sleep wake cycle interferes with his chores, which leads to " 'conversations' with . Patient reports engaging in self-care for his pain 2-3 times per day. Patient learned his  wants him to move into different housing such as housing with 24/7 staffing, such as assisted living facility. He is struggling with some self-loathing related to this, and struggling to accept this while also recognizing this is likely a better fit from him overall. Has not been able to socialize as he'd like due to Covid-19, and states others haven't been responding to text messages and he feels lonely; discussed grieving loss of friendship. Discussed replacing negative thoughts with more reality based thinking, radical acceptance.    ASSESSMENT: Patient presents as quite tired, as he reports he continues to struggle with variable sleep hygiene issues. He is subdued emotionally as well, which likely reflects his sense of loneliness.  Progress toward goals: good.    Pain Status: remained stable and however does report increased back pain which he attributes to weather changes in the past week    Emotional Status: worsened              Medication / chemical use concerns:  none    PLAN:   Next Appointment: Hoang Kiser's next appointment is scheduled for 7/9 at 1:00 PM.  Assignment/Objectives /interventions for next session: Use radical acceptance, grieve loss of friendship, replace negative self-talk with more reality based positive self-talk.    Video-Visit Details    Type of service:  Video Visit    Video End Time (time video stopped): 4:57 PM      Originating Location (pt. Location): Home    Distant Location (provider location):  [facility]     Mode of Communication:  Video Conference via Liyah Kemp PsyD LP  Licensed Psychologist  Outpatient Clinic Therapist  [facility]

## 2020-06-23 DIAGNOSIS — M62.838 MUSCLE SPASM: ICD-10-CM

## 2020-06-23 NOTE — TELEPHONE ENCOUNTER
Received fax request from   [id number] - [place], [place] - [address] AT [facility]  [address]  [facility] [address]  Phone: [phone] Fax: [phone]    pharmacy requesting refill(s) for tiZANidine (ZANAFLEX) 4 MG tablet     Last refilled on 06/10/20    Pt last seen on 06/03/20    Next appt scheduled for 07/01/20    Will facilitate refill.    Imelda Jolley [facility]  [place]

## 2020-06-24 ENCOUNTER — TELEPHONE (OUTPATIENT)
Dept: BEHAVIORAL HEALTH | Facility: CLINIC | Age: 35
End: 2020-06-24

## 2020-06-24 NOTE — TELEPHONE ENCOUNTER
[facility]  [facility]: [place]        Social Work Care Navigator Note      Patient: Hoang Kiser  Date: June 24, 2020  Preferred Name: Shoaib    Previous PHQ-9:   PHQ-9 SCORE 10/1/2018 3/22/2019 10/22/2019   PHQ-9 Total Score - - -   PHQ-9 Total Score 13 11 9     Previous JIN-7:   JIN-7 SCORE 10/1/2018 3/22/2019 10/22/2019   Total Score - - -   Total Score 13 12 14     MOLLY LEVEL:  MOLLY Score (Last Two) 3/23/2016 10/1/2018   MOLLY Raw Score 52 31   Activation Score 100 59.3   MOLLY Level 4 3       Preferred Contact:  Need for : No  Preferred Contact: Cell      Type of Contact Today: Phone call (not reached/unavailable)      Data: (subjective / Objective):  Attempted to reach patient, but was unsuccessful.  Left message requesting call back.    NURYS Henderson  [facility] ([facility])   [facility]  [phone]

## 2020-06-25 ENCOUNTER — TELEPHONE (OUTPATIENT)
Dept: FAMILY MEDICINE | Facility: CLINIC | Age: 35
End: 2020-06-25

## 2020-06-25 NOTE — TELEPHONE ENCOUNTER
Reason for Call:  Other prescription    Detailed comments: Brian is calling to check the status on request for BP Unit to be sent to patient, nothing in chart from [facility]. Brian will fax over another script to pcp    Phone Number Patient can be reached at: Home number on file [phone] (home)    Best Time: any    Can we leave a detailed message on this number? YES    Call taken on 6/25/2020 at 9:12 AM by Felicitas Lisa

## 2020-06-25 NOTE — TELEPHONE ENCOUNTER
I spoke to Brian at [facility] and he said that this request was faxed to Dr. Bowman's office.  He called our clinic in error.  Dr. Floyd doesn't need to address this so I will close the encounter.  Dahlia Vera,

## 2020-06-29 ENCOUNTER — VIRTUAL VISIT (OUTPATIENT)
Dept: PALLIATIVE MEDICINE | Facility: CLINIC | Age: 35
End: 2020-06-29
Payer: COMMERCIAL

## 2020-06-29 DIAGNOSIS — G25.9 FUNCTIONAL MOVEMENT DISORDER: ICD-10-CM

## 2020-06-29 DIAGNOSIS — G89.29 CHRONIC LOW BACK PAIN, UNSPECIFIED BACK PAIN LATERALITY, UNSPECIFIED WHETHER SCIATICA PRESENT: ICD-10-CM

## 2020-06-29 DIAGNOSIS — G89.4 CHRONIC PAIN SYNDROME: Primary | ICD-10-CM

## 2020-06-29 DIAGNOSIS — M54.50 CHRONIC LOW BACK PAIN, UNSPECIFIED BACK PAIN LATERALITY, UNSPECIFIED WHETHER SCIATICA PRESENT: ICD-10-CM

## 2020-06-29 DIAGNOSIS — R53.83 FATIGUE, UNSPECIFIED TYPE: ICD-10-CM

## 2020-06-29 PROCEDURE — 97162 PT EVAL MOD COMPLEX 30 MIN: CPT | Mod: 95 | Performed by: PHYSICAL THERAPIST
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
## 2020-06-30 ENCOUNTER — TELEPHONE (OUTPATIENT)
Dept: PALLIATIVE MEDICINE | Facility: CLINIC | Age: 35
End: 2020-06-30

## 2020-06-30 NOTE — TELEPHONE ENCOUNTER
Per pt he no longer needs any refills on tiZANidine (ZANAFLEX) 4 MG tablet. Per pt he has enough to last him for a while.      Kurt MINAYA    [facility] [place]

## 2020-06-30 NOTE — TELEPHONE ENCOUNTER
Information put in a Med Note for tizanidine on patient's medication list.     DEANNA Mckeon, RN-BC  Patient Care Supervisor  [facility] [place]

## 2020-07-01 ENCOUNTER — VIRTUAL VISIT (OUTPATIENT)
Dept: PALLIATIVE MEDICINE | Facility: CLINIC | Age: 35
End: 2020-07-01
Payer: COMMERCIAL

## 2020-07-01 DIAGNOSIS — G25.9 FUNCTIONAL MOVEMENT DISORDER: ICD-10-CM

## 2020-07-01 DIAGNOSIS — G89.4 CHRONIC PAIN SYNDROME: Primary | ICD-10-CM

## 2020-07-01 DIAGNOSIS — M47.816 FACET ARTHROPATHY, LUMBAR: ICD-10-CM

## 2020-07-01 DIAGNOSIS — F44.9 DISSOCIATIVE EPISODES: ICD-10-CM

## 2020-07-01 PROCEDURE — 99214 OFFICE O/P EST MOD 30 MIN: CPT | Mod: 95 | Performed by: NURSE PRACTITIONER

## 2020-07-01 RX ORDER — MORPHINE SULFATE 15 MG/1
15 TABLET, FILM COATED, EXTENDED RELEASE ORAL EVERY 12 HOURS
Qty: 60 TABLET | Refills: 0 | Status: SHIPPED | OUTPATIENT
Start: 2020-07-01 | End: 2020-08-05

## 2020-07-01 RX ORDER — HYDROCODONE BITARTRATE AND ACETAMINOPHEN 5; 325 MG/1; MG/1
1 TABLET ORAL 3 TIMES DAILY PRN
Qty: 60 TABLET | Refills: 0 | Status: SHIPPED | OUTPATIENT
Start: 2020-07-01 | End: 2020-08-05

## 2020-07-01 ASSESSMENT — PAIN SCALES - GENERAL: PAINLEVEL: SEVERE PAIN (7)

## 2020-07-01 NOTE — PROGRESS NOTES
"  [facility]      The patient has been notified of following:     \"This telephone visit will be conducted via a call between you and your physician/provider. We have found that certain health care needs can be provided without the need for a physical exam.  This service lets us provide the care you need with a short phone conversation.  If a prescription is necessary we can send it directly to your pharmacy.  If lab work is needed we can place an order for that and you can then stop by our lab to have the test done at a later time.    Telephone visits are billed at different rates depending on your insurance coverage. During this emergency period, for some insurers they may be billed the same as an in-person visit.  Please reach out to your insurance provider with any questions.    If during the course of the call the physician/provider feels a telephone visit is not appropriate, you will not be charged for this service.\"    Patient has given verbal consent for Telephone visit?  Yes    What phone number would you like to be contacted at? [phone]    How would you like to obtain your AVS? [facility] Segura [facility]  Ricky Kiser is a 34 year old male who is being evaluated via a billable telephone visit.         CHIEF COMPLAINT: Multiple pain complaints    INTERVAL HISTORY:  Last seen on 6/3/20.        Recommendations/plan at the last visit included:     1. Schedule follow-up with CARLOS A Higuera NP-C in 4 weeks  2. Medication recommendations: Continue current medication regimen  1. Norco, MS Contin, tizanidine refilled  2. Start sumatriptan 50 mg 1 tab as needed for migraine headache may repeat in 2 hours if needed    Since last visit:   - States last month has been challenging. States that he \"needs to move as soon as possible\". He states that he has \"dissociative or fugue\" states where he wanders around unaware. He states " this has happened five times where it occurred. Has needed to move rooms or houses multiple times during the time I've know him. He is hoping to have his own place. Thinks that he is safe to be on his own if there is staff that can be called if he needs help. Has not talked with his psychiatrist or neurologist about these episodes. Brother was diagnosed with two types of skin cancer, Melanoma stage 3, he is also a colon cancer survivor. Brother is also bipolar and cannot take meds due to chemo. Shoaib is worried about him and his mental state in part due to chemo.   - States that he wants to be held accountable for managing his medications and health needs.   - Thought that PT assessment was helpful. Has follow ups with PT and pain psychology visits.     Pain Information:              Pain quality: Aching and Sharp               Pain rating: intensity ranges from 1/10 to 7/10, and averages 5/10 on a 0-10 scale.    Annual Controlled Substance Agreement/UDS due date: 4/2020     Current Pain Relevant Medications:  MS Contin 15 mg BID = 30 MME  Norco 5/325 mg 1 tab BID-TID  #60 tabs per month                        Total opiate dose: 35-40 MME daily  Clonazepam .5 mg 1-2 tab at HS PRN (takes about 2-3x weekly)   Duloxetine 20 mg daily  Naproxen 500 mg BID: on hold re: elevated LFTs   Tizanidine 4 mg 1-2 tabs at HS PRN  Adderall 30 mg daily, combination of long and short acting.       Previous Pain Relevant Medications: (H--helped; HI--Helped initially; SWH--Somewhat helpful; NH--No help; W--worse; SE--side effects; ?--Unsure if helpful)   NOTE: This medication information taken from patient's intake form, not medical records.                         Opiates: Tramadol: H, Hydrocodone: H, Morphine: H                        NSAIDS: Ibuprofen:H, naproxen:SWH, Relafen: NH                        Muscle Relaxants: Cyclobenzaprine:H,Med interaction, Tizanidine:H, Baclofen: SW                        Anti-migraine mediations:  Prednisone:H                        Anti-depressants: Bupropion:SE, Celexa:SE, Duloxetine:H, Trazodone:Too strong, Venlafaxine:too strong, Amitriptyline:SE                         Sleep aids:Anbien: H                        Anxiolytics: Clonazepam:H                        Neuropathics: Tegretol:taken for seizures in childhood, Gabapentin:H                                          Topicals: Lidocaine:H                        Other medications not covered above: Tylenol:NH, Adderall: H      Any illicit drug use: Sober date 8/27/2015, lives in a sober house.   EtOH use: last use 5 years ago  Caffeine use: 2-3 per day  Nicotine use: 3/4 pack per day  Any use of prescriptions other than how they were prescribed:taina      [place] Board of Pharmacy Data Base Reviewed:    YES; As expected, no concern for misuse/abuse of controlled medications based on this report.   Concern for multiple controlled substances including stimulants and opiates.  7/27/19: Concern for misuse of opiates and stimulants as noted in office visit on this date.   10/29/19: Requesting early refill due to overuse of opiate medication. #45 tabs to last 30 days. Refill declined.      Is Narcan prescribed for opiate use >50 MME daily or concurrent use of opiates and benzodiazepines? YES    Medications:  Current Outpatient Medications   Medication Sig Dispense Refill     albuterol (PROAIR HFA/PROVENTIL HFA/VENTOLIN HFA) 108 (90 Base) MCG/ACT inhaler INHALE ONE TO TWO PUFFS INTO THE LUNGS EVERY FOUR HOURS AS NEEDED FOR SHORTNESS OF BREATH/ DYSPNEA OR WHEEZING 18 g 0     amphetamine-dextroamphetamine (ADDERALL) 10 MG tablet Take 1 tablet (10 mg) by mouth 2 times daily 60 tablet 0     [START ON 7/16/2020] amphetamine-dextroamphetamine (ADDERALL) 10 MG tablet Take 1 tablet (10 mg) by mouth 2 times daily 60 tablet 0     clonazePAM (KLONOPIN) 0.5 MG tablet 0.5mg tab by mouth nightly as needed  1     DULoxetine (CYMBALTA) 60 MG EC capsule Taking only 1 x 60mg  tab by mouth daily 30 capsule 1     HYDROcodone-acetaminophen (NORCO) 5-325 MG tablet Take 1 tablet by mouth 3 times daily as needed for pain Ok to fill on 6/3/2020 to start 6/5/20 60 tablet 0     hydrOXYzine (ATARAX) 25 MG tablet Take 1 tablet (25 mg) by mouth nightly as needed (at HS PRN) 45 tablet 3     morphine (MS CONTIN) 15 MG CR tablet Take 1 tablet (15 mg) by mouth every 12 hours Ok to fill on 6/3/2020 to start 6/5/20 60 tablet 0     naloxone (NARCAN) 4 MG/0.1ML nasal spray Spray 1 spray (4 mg) into one nostril alternating nostrils as needed for opioid reversal every 2-3 minutes until assistance arrives 0.2 mL 0     naproxen (NAPROSYN) 500 MG tablet Take 1 tablet (500 mg) by mouth 2 times daily (with meals) 40 tablet 3     order for DME Equipment being ordered: 4 wheeled walker with bench seat. 1 Units 0     oxybutynin ER (DITROPAN-XL) 10 MG 24 hr tablet Take 1 tablet by mouth daily. 90 tablet 0     SUMAtriptan (IMITREX) 25 MG tablet Take 1 tablet (25 mg) by mouth at onset of headache for migraine (ok to repeat in 2 hrs if needed.) 8 tablet 0     tiZANidine (ZANAFLEX) 4 MG tablet Take 1 tablet (4 mg) by mouth 3 times daily as needed for muscle spasms 30 tablet 1         DIRE Score for ongoing opioid management is calculated as follows:    Diagnosis = 2 pts (slowly progressive; moderate pain/objective findings)    Intractability = 2 pts (most treatments tried; patient not fully engaged/barriers)    Risk        Psych = 2 pts (personality dysfunction/mental illness that moderately interferes with care)         Chem Hlth = 2 pts (use of medications to cope with stress; chemical dependency in remission)       Reliability = 3 pts (highly reliable with meds, appointments, treatments)       Social = 3 pts (supportive family/close relationships; involved in work/school; no isolation)       (Psych + Chem hlth + Reliability + Social) = 14    Efficacy = 2 pts (moderate benefit/function; low med dose; too early/not tried  meds)    DIRE Score = 16        7-13: likely NOT suitable candidate for long-term opioid analgesia       14-21: may be a suitable candidate for long-term opioid analgesia          Previous Diagnostic Tests:   Imaging Studies:   MR LUMBAR SPINE W/O CONTRAST 5/2/2018 7:27 PM  History: DDD (degenerative disc disease), lumbar; Lumbar  radiculopathy; Hip pain, right; Groin pain, right.  ICD-10: DDD (degenerative disc disease), lumbar; Lumbar radiculopathy;  Hip pain, right; Groin pain, right  Comparison: Lumbar spine MRI 3/8/2017  Technique: Sagittal STIR, T1-weighted turbo spin-echo, 3-D volumetric  T2-weighted and axial reconstructed T2-weighted images of the lumbar  spine were obtained without intravenous contrast.   Findings: 5 lumbar-type vertebra. The tip of the conus medullaris is  at L1.  The lumbar vertebral column is normally aligned.   Straightening of lumbar lordosis, unchanged. The intervertebral disc  heights are maintained. Normal marrow signal. At L5-S1, there is a  disc bulge and facet hypertrophy with mild left neural foraminal  narrowing, unchanged. No spinal canal stenosis.  Impression:  1. Lumbar spondylosis with mild left neural foraminal narrowing at  L5-S1.  2. No spinal canal stenosis.      MR right hip without contrast 11/22/2019 8:28 AM  Techniques: Multiplanar multisequence imaging of the right  hip was  obtained without  administration of intra-articular or intravenous  contrast using routing protocol.  History: Hip pain, chronic, labral tear suspected, neg xray or  equivocal; ; Progressive pain; Chronic right hip pain; Chronic right  hip pain    Comparison: MRI hip 5/2/2018  Findings:  Osseous structures  Osseous structures: Bone marrow edema like signal intensity along the  anterior head neck junction less pronounced than prior MRI. No  fracture or avascular necrosis. Osseous prominence at the femoral  neck, which may be seen with cam-type femoral acetabular impingement.  Articular  cartilage and labrum  Assessment limited on this arthrographic study due to relative lack of  joint distension.  Articular cartilage: No high grade chondral loss.  Labrum: Increased signal intensity in the anterosuperior labrum from  12:00 to 3:00 position suggestive of labral tear (3:00 anterior  relative to the transverse ligament).  Ligament teres and transverse ligament of acetabulum: Intact.  Joint or bursal effusion  Joint effusion: A physiologic amount of joint fluid.  Bursal effusion: Minimal nonspecific edema over the greater  trochanter. No substantial iliopsoas or trochanteric bursal effusion.  Muscles and tendons  Muscles and tendons: The rectus femoris, iliopsoas, proximal  hamstrings, and hip abductors are intact.  The visualized adductor  muscles are unremarkable.   Nerves:  The visualized course of the sciatic nerve is unremarkable.  Other Findings:  Prominent fat in the right inguinal canal.. Few subcentimeter right  inguinal lymph nodes.  Impression:  1. Redemonstration of labral tear extending from 12:00 to 3:00  position  2. Nonspecific, but likely degenerative, bone marrow edema like signal  intensity along femoral head and junction, less conspicuous compared  to prior MRI dated 5/20/2018. No fracture or avascular necrosis.     MR left hip without contrast 11/22/2019 8:52 AM  Techniques: Multiplanar multisequence imaging of the left hip was  obtained without  administration of intra-articular or intravenous  contrast using routing protocol.  History: Progressive pain; Chronic left hip pain; Chronic left hip  pain    Comparison: None  Findings:  Osseous structures  Osseous structures: Bone marrow edema like signal intensity in the  anteromedial head neck junction, possibly degenerative. No fracture,  stress reaction or avascular necrosis.   Articular cartilage and labrum  Assessment limited on this arthrographic study due to relative lack of  joint distension.  Articular cartilage: No high grade  "chondral loss.  Labrum: Altered signal intensity in the labrum extending from 2:00 to  4:00 position (3:00 anterior relative to transverse ligament)  Ligament teres and transverse ligament of acetabulum: Intact.  Joint or bursal effusion  Joint effusion: A physiologic amount of joint fluid.  Bursal effusion: Minimal nonspecific edema over the greater  trochanter. No substantial iliopsoas or trochanteric bursal effusion.  Muscles and tendons  Muscles and tendons: The rectus femoris, iliopsoas, proximal  hamstrings, and hip abductors are intact.  The visualized adductor  muscles are unremarkable.   Nerves:  The visualized course of the sciatic nerve is unremarkable.  Other Findings:  Prominent fat in the left inguinal canal. Few subcentimeter inguinal  lymph nodes.  Impression:  1. Tearing of the anterior superior labrum extending from 2:00 to 4:00  position (3:00 anterior relative to transverse ligament).  2. Nonspecific bone marrow edema like signal intensity along the  anteromedial femoral head neck junction, likely degenerative.          ASSESSMENT:   1.  Lumbar DDD, mild  2.  Lumbar muscle spasm  3.  Labral tear, right hip  4.  Hx: anxiety, chemical dependence in remission.  5.  Functional neurologic movement disorder  6.  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  He states that he is not safe in his current living situation and that he is considered to be a legal liability because of his dissociative states.  There is no known etiology or really any documentation regarding him having these dissociative or \"fugue\" states.  Barney discussion regarding my concern that he is talking about having dissociative states and using those as an excuse for misusing medication at one time.  This is regarding the episode where medication was found crushed under his bed and he was short of 2 different controlled substances.  Certainly his opiate pain medications could be " contributing to confusion or poor memory.  I have told him that I need for him to discuss these episodes with both his neurologist and his psychiatrist in detail to get their opinions on what these episodes may be causing these episodes.  As noted he states there were 5 episodes where he cannot recall anything however I reviewed that he brings these episodes up constantly and says that he has them frequently.  He then differentiates between episodes where he is completely unaware of what is going on and episodes where he feels for lack of a better phrase 'out of it'. Agrees to make appt with both neurology and psychiatry to review the dissociative episodes.     He was seen at [facility] and is interested in returning there for an intense outpatient program for functional movement disorder.  He is going to look into this as well.  Reviewed the expectation that he will attend therapies as recommended and as part of his controlled substance agreement with this clinic.  Reviewed that his attendance to therapies, particularly pain psychology has been inconsistent and this is outside of his controlled substance agreement.    Tobacco use: Advised complete tobacco cessation.     Plan:    Diagnosis reviewed, treatment option addressed, and risk/benifits discussed.  Self-care instructions given.  I am recommending a multidisciplinary treatment plan to help this patient better manage pain.      1. Schedule pain psychology VIDEO appts per Sonia's recommendations   2. Schedule physical therapy VIDEO appts per Diana's recommendations.   3. Schedule VIDEO follow-up with CARLOS A Higuera NP-C in 4 weeks  4. Schedule appts with psychiatry and neurology to discuss dissociative episodes.   5. Medication recommendations:   1. MS Contin and Norco sent to pharmacy.     I have reviewed the note as documented above.  This accurately captures the substance of my conversation with the patient.    Phone call contact time  Call  Started at 1030  Call Ended at 1056  Phone call duration: 26 minutes    CARLOS A Bass, NP-C  [facility] [place]

## 2020-07-02 PROBLEM — R53.83 FATIGUE, UNSPECIFIED TYPE: Status: ACTIVE | Noted: 2020-07-02

## 2020-07-02 NOTE — PATIENT INSTRUCTIONS
After Visit Instructions:     Thank you for coming to [facility] for your care. It is my goal to partner with you to help you reach your optimal state of health.     Continue daily self-care, identifying contributing factors, and monitoring variations in pain level. Continue to integrate self-care into your life.      1. Schedule pain psychology VIDEO appts per Sonia's recommendations   2. Schedule physical therapy VIDEO appts per Diana's recommendations.   3. Schedule VIDEO follow-up with CARLOS A Higuera NP-C in 4 weeks  4. Schedule appts with psychiatry and neurology to discuss dissociative episodes.   5. Medication recommendations:   1. MS Contin and Norco sent to pharmacy.       CARLOS A Bass, NP-C  [facility]    Clinic Number:  [phone]     Call with any questions about your care and for scheduling assistance.     Calls are returned Monday through Friday between 8 AM and 4:30 PM. We usually get back to you within 2 business days depending on the issue/request.    If we are prescribing your medications:    For opioid medication refills, call the clinic or send a Amer[employer] message 7 days in advance.  Please include:    Name of requested medication    Name of the pharmacy.    For non-opioid medications, call your pharmacy directly to request a refill. Please allow 3-4 days to be processed.     Per [place] State Law:    All controlled substance prescriptions must be filled within 30 days of being written.      For those controlled substances allowing refills, pickup must occur within 30 days of last fill.      We believe regular attendance is key to your success in our program!      Any time you are unable to keep your appointment we ask that you call us at least 24 hours in advance to cancel.This will allow us to offer the appointment time to another patient.   Multiple missed appointments may lead to dismissal from the  clinic.

## 2020-07-08 ENCOUNTER — VIRTUAL VISIT (OUTPATIENT)
Dept: BEHAVIORAL HEALTH | Facility: CLINIC | Age: 35
End: 2020-07-08
Payer: COMMERCIAL

## 2020-07-08 DIAGNOSIS — R69 DIAGNOSIS DEFERRED: Primary | ICD-10-CM

## 2020-07-08 PROBLEM — G25.9 MOVEMENT DISORDER: Status: ACTIVE | Noted: 2020-07-08

## 2020-07-08 PROBLEM — F40.11 GENERALIZED SOCIAL PHOBIA: Status: ACTIVE | Noted: 2019-12-17

## 2020-07-08 PROBLEM — F43.12 CHRONIC POST-TRAUMATIC STRESS DISORDER (PTSD): Status: ACTIVE | Noted: 2019-12-17

## 2020-07-08 PROBLEM — F15.20 STIMULANT DEPENDENCE (H): Status: ACTIVE | Noted: 2019-12-17

## 2020-07-08 PROBLEM — F32.1 MAJOR DEPRESSIVE DISORDER, SINGLE EPISODE, MODERATE (H): Status: ACTIVE | Noted: 2019-12-17

## 2020-07-08 RX ORDER — DEXTROAMPHETAMINE SACCHARATE, AMPHETAMINE ASPARTATE MONOHYDRATE, DEXTROAMPHETAMINE SULFATE AND AMPHETAMINE SULFATE 7.5; 7.5; 7.5; 7.5 MG/1; MG/1; MG/1; MG/1
30 CAPSULE, EXTENDED RELEASE ORAL
COMMUNITY
Start: 2019-11-17 | End: 2020-07-14

## 2020-07-08 RX ORDER — 1.1% SODIUM FLUORIDE PRESCRIPTION DENTAL CREAM 5 MG/G
CREAM DENTAL
COMMUNITY
Start: 2019-09-16 | End: 2021-03-25

## 2020-07-08 RX ORDER — HYDROXYZINE HYDROCHLORIDE 10 MG/1
10 TABLET, FILM COATED ORAL DAILY PRN
COMMUNITY
Start: 2018-09-06 | End: 2020-07-14

## 2020-07-08 RX ORDER — GABAPENTIN 100 MG/1
CAPSULE ORAL
COMMUNITY
Start: 2019-09-06 | End: 2020-07-14

## 2020-07-08 RX ORDER — MORPHINE SULFATE 15 MG/1
15 TABLET ORAL EVERY 12 HOURS
COMMUNITY
End: 2020-07-14

## 2020-07-08 RX ORDER — LORATADINE 10 MG/1
TABLET ORAL
Refills: 0 | COMMUNITY
Start: 2019-08-13 | End: 2020-07-14

## 2020-07-08 NOTE — PROGRESS NOTES
Behavioral Health Home Services  [facility] [place]        Social Work Care Navigator Note      Patient: Hoang Kiser  Date: July 8, 2020  Preferred Name: Shoaib    Previous PHQ-9:   PHQ-9 SCORE 10/1/2018 3/22/2019 10/22/2019   PHQ-9 Total Score - - -   PHQ-9 Total Score 13 11 9     Previous JIN-7:   JIN-7 SCORE 10/1/2018 3/22/2019 10/22/2019   Total Score - - -   Total Score 13 12 14     MOLLY LEVEL:  MOLLY Score (Last Two) 3/23/2016 10/1/2018   MOLLY Raw Score 52 31   Activation Score 100 59.3   MOLLY Level 4 3       Preferred Contact:  Need for : No  Preferred Contact: Cell      Type of Contact Today: Phone call (patient / identified key support person reached)      Data: (subjective / Objective):  Recent ED/IP Admission or Discharge?   None    Patient Goals:  Goal Areas: Health;Mental Health;Chemical Health;Employment / Volunteer;Financial and Social Service Benefits  Patient stated goals: Patient stated that he would like to obtain an ILS worker through his CADI waiver. Patient has worked with 2 different ILS workers and due to COVID 19 is not seeing an ILS worker at this time. Patient would like to resume this service once able too Patient has communicated with his  about this; Patient would like to look into intensive therapy program at [facility] in [place]. If patient is able to get into this program he would like to look into housing options available in the [place] area.; Patient would like to continue to work on self-advocacy skills. Patient is able to communicate his health needs. It is important to the patient to continue to work with his support team in order to continue to maintain his health and sobriety; Patient would like to continue to volunteer with the [employer]. This will resume once COVID 19 has cleared; Patient continues to work towards getting approved for SSDI. He is currently in the appeal process and will seek additional legal resources if denied again.  Patient stated he would like to continue to grow his skills with coping and stress management. He continues to work on this by going for walks, playing guitar and developing a comfort board.        [facility] Core Service Provided:  Care Coordination: provided care management services/referrals necessary to ensure patient and their identified supports have access to medical, behavioral health, pharmacology and recovery support services.  Ensured that patient's care is integrated across all settings and services.     Current Stressors / Issues / Care Plan Objective Addressed Today:  [facility] contacted patient for monthly check in. Patient states that he is currently trying to move and his  and ILS worker are supporting him with this. Finding housing is taking longer then usual due to COVID 19. Patient reports that he would like to be in his own apartment and stay int [facility] area. Patient feels stressed at his current house and does not like being in a group home setting.   Patient reports that he has had some dissociative episodes recently where he has blacked out for a couple minutes. He is working with his neurologist to address this.  [facility] asked patient if he ever got his stimulus check since he had not received it last time [facility] called. Patient stated that he did get this check.   Patient discussed that his brother was recently diagnosed with skin cancer and is going through chemotherapy. [facility] asked how patient is doing with this news. Patient stated that it is concerning but he has the mental health support that he needs to work through it.   Patient's psychiatrist ordered him a blood pressure machine but he still has not received it. The [employer] that it was ordered through is having delays.   Patient reports that he has a new ILS worker named Minor. So far he has had a good working relationship with his ILS worker. Patient also has a new  through [place] named Glenn.    Patient has been spending a lot of time volunteering and he really enjoys this.     Intervention:  Motivational Interviewing: Expressed Empathy/Understanding and Open-ended questions   Target Behavior(s): Explored thoughts and readiness to participate in individual therapy, Explored current social supports and reinforced opportunities to increase engagement and Explored current sleep hygeine and patient's perception and knowledge of relationship to mood    Assessment: (Progress on Goals / Homework):  Patient would benefit from continued coordination in reaching their goals set for the Behavioral Health Home ([facility]) program. SWCC reviewed Health Action Plan goals and will continue to monitor progress and work with patient and their care team.    Plan: (Homework, other):  Patient was encouraged to continue to seek condition-related information and education. SWCC, patient, and team will continue to work towards progress on reaching goals set for the Behavioral Health Home ([facility]) program.     Janet Ng, [facility]  Behavioral Health Home ([facility])   [facility]  [phone]

## 2020-07-08 NOTE — PROGRESS NOTES
VIDEO VISIT  - used [email or web address]    Date of Visit: July 14, 2020  Name: Hoang Kiser  Date of Birth 1985  [address] [place] [id number] [phone] (M)  [email or web address]  Has mychart  No proxy    Stephanie Kiser sister  Chandler Kiser father       He had seen Ade Arnold for therapy [facility] in [place] with Graciela Arnold.   Dr Phill Floyd is his pcp  Tamiko Stevens (?K Karyn)  he is seeing at the pain cl[facility]  Brynn Trujillo has been doing the injections and ablations.   Brian Duckworth is his psychiatrist.   Seeing Fili Layne  Seeing Sol Kemp for psychotherapy.  1 in September. 2 in October and 1 in November at the [facility]  Will be Dr. ANIL Watts in psychiatry  Kiana Kirkland - [facility]  Bailee Ng, BSW  Meagan Bowman psychiatry  Ana Dugan - palliative/pain  Prabhjot Braswell urology    See details above.   Getting [facility] for [facility]  Encouraged him to simplify and reduce his medication regimen.   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   Next generation - negative.  He may wean off gabapentin and zanaflex slowly.    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 Working with manager - [employer] to help him from housing    Sleep issues.    Using clonazepam 1/2 of his week; not mixing with opiates    Reviewed an updated medications - see below.     Brother has skin cancer and lymphoma and had  colon cancer.     Medications     8am 12pm afternoon 11pm   Albuterol proair hfa/proventil hfa/ventolin hfa 108 (90 base) mcg/act inhaler As needed          Amphetamine-dexroamphetamine adderall 10mg 1 1     Amphetamine-dextroamphetamine adderall XR 30mg per 24 hr capsule Not taking         Clonazepam klonopin 0.5mg       1   Doxepin sinequan 10mg stopped      stopped   Duloxetine cymbalta 60mg EC capsule 1         Fluticasone flonase 50mcg/act nasal spray Not using         Gabapentin neurontin 100mg stopped      Hydrocodone-acetaminophen norco 5-325 As needed         Hydroxyzine atarax 10mg Not using      Hydroxyzine atarax 25mg       As needed   Loratadine claritin 10mg Not taking         Methocarbamol robaxin 500mg Not taking      Methylphenidate concerta 36mg Not taking          Morphine MSIR 15mg IR Not taking      Morphine MS CONTIN 15mg CR tablet 1     1   Naloxone narcan nasal spray  as needed         Naproxen naprosyn 500mg As needed         Oxybutynin ditropan XL  10mg 24 hr tablet 1         SF 5000 plus 1.1 % cream As needed          Tizanidine zanaflex 4mg As needed           Plan:  Reviewed and updated medications - symbols below relate to who is prescribing them:   Tamiko Stevens &  Gracie Rx *   Sypura (not clear if Rx medications other than possibly the albuterol)  [facility] #    He granted proxy access to his records to his sister Tony Valerio (Stephanie)    Discussed that based on the EEG 3/2019 and video EEG 4/2019 that it is unlikely he has epilepsy as the cause of his dissociative episode. Offered a visit to see epilepsy specialists if he wanted to pursue this further but again he acknowledges that there are stress factors with past dissociative episodes without loss of consciousness in the past as well as during the video EEG.     Medications     8am 12pm afternoon 11pm   Albuterol proair hfa/proventil hfa/ventolin hfa 108 (90 base) mcg/act inhaler As needed         "  Amphetamine-dextroamphetamine adderall 10mg * 1 1     Clonazepam klonopin 0.5mg*       1   Duloxetine cymbalta 60mg EC capsule* 1         Hydrocodone-acetaminophen norco 5-325 & As needed         Hydroxyzine atarax 25mg  as needed     As needed   Morphine MS CONTIN 15mg CR tablet & 1     1   Naloxone narcan nasal spray &  as needed         Naproxen naprosyn 500mg As needed         Oxybutynin ditropan XL  10mg 24 hr tablet # 1         SF 5000 plus 1.1 % cream  as needed         Tizanidine zanaflex 4mg As needed           He has had 5 or so spells over the past few years.     I have reviewed the note as documented above.  This accurately captures the substance of my conversation with the patient.  Patient contact time  25  minutes. Over 50% of this visit was spent in patient care and care coordination.     Time of visit  230pm - 3 pm    Bryant Cao MD      ------------------------------------------------------------------------------------------------------------------------------------------------------------------------    Video-Visit Details    The patient has been notified of following:     \"After a review of the patient s situation, this visit was changed from an in-person visit to a video visit to reduce the risk of COVID-19 exposure.   The patient is being evaluated via a billable video visit.\"    \"This video visit will be conducted via a call between you and your physician/provider. We have found that certain health care needs can be provided without the need for an in-person physical exam.  This service lets us provide the care you need with a video conversation.  If a prescription is necessary we can send it directly to your pharmacy.  If lab work is needed we can place an order for that and you can then stop by our lab to have the test done at a later time.    If during the course of the call the physician/provider feels a video visit is not appropriate, you will not be charged for this service.\" "     Patient has given verbal consent for Video visit? Yes    Patient would like the video invitation sent by:     Type of service:  Video Visit    Video Start Time:     Video End Time (time video stopped):     Duration:  minutes - see above    Originating Location (pt. Location):     Distant Location (provider location):  [facility] NEUROLOGY     Mode of Communication:  Video Conference via Conject (and if not possible then doximity)      Bryant Cao MD      --------------------------------------------------------------------------------------------------------------    Hoang Kiser is a 34 year old male who is being evaluated via a billable video visit.      Charts reviewed  Consult from  Images reviewed        I have reviewed and updated the patient's Past Medical History, Social History, Family History and Medication List.    ALLERGIES  Buspirone hcl; Doxycycline; Elavil [amitriptyline]; Trazodone; Buspirone; and Keflex [cephalexin]    Lasts visit details if there was a last visit:         Medications                                                                                                                                                                                                              14 Review of systems  are negative except for   Patient Active Problem List   Diagnosis     Neuropathy     Moderate major depression (H)     Tobacco abuse     Attention deficit hyperactivity disorder (ADHD), predominantly inattentive type     Primary insomnia     Panic disorder without agoraphobia     Abnormal involuntary movement     Tic disorder     Anxiety     Posttraumatic stress disorder with dissociative symptoms     Chronic left hip pain     Chronic pain of right hip     Degenerative disc disease at L5-S1 level     Functional movement disorder     Family history of Crohn's disease     Chronic low back pain     Chronic pain syndrome     History of substance abuse (H)     Family history of multiple  myeloma     History of electroencephalogram     Nonallergic rhinitis     Fatigue, unspecified type        Allergies   Allergen Reactions     Buspirone Hcl Other (See Comments)     Panic attacks     Doxycycline Diarrhea, Fatigue, GI Disturbance and Nausea     Elavil [Amitriptyline]      Ineffective in reducing spasms/movement, increased fatigue     Trazodone Fatigue     Buspirone Anxiety     Keflex [Cephalexin] Diarrhea     Past Surgical History:   Procedure Laterality Date     COLONOSCOPY  02/15/18    Has not happened yet.     COLONOSCOPY N/A 2/15/2018    Procedure: COMBINED COLONOSCOPY, SINGLE OR MULTIPLE BIOPSY/POLYPECTOMY BY BIOPSY;;  Surgeon: Liam Kincaid MD;  Location: [facility] OR     COLONOSCOPY WITH CO2 INSUFFLATION N/A 2/15/2018    Procedure: COLONOSCOPY WITH CO2 INSUFFLATION;  COLON-FAMILY HX OF COLON CANCER/ SYPURA;  Surgeon: Liam Kincaid MD;  Location: [facility] OR     HC TOOTH EXTRACTION W/FORCEP Bilateral      PE TUBES       Past Medical History:   Diagnosis Date     Arthritis 2018     Benign positional vertigo 2016    Started after my groin/back injury. Sitting on Toilet.     Depressive disorder     I am on 120mg of Duoluxetine.     Dysphonia     Nothing significant.     Gastroesophageal reflux disease 2004    Occassional. Spicy foods set it off.     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      DATE OF EE2019.    SOURCE FILE DURATION:  15 minutes.  This EEG did not have a video.    PATIENT INFORMATION:  The patient is a 33-year-old male with irregular movements.  It is not entirely clear the etiology of these movements.  EEG is being done to evaluate for seizures.    MEDICATIONS:  Adderall, doxepin, Cymbalta, Robaxin.      History of MRI of brain and brain stem 2015    MR BRAIN W/O & W CONTRAST, 2015  Impression: No  suspicious intracranial findings.      Hoarseness 2017    Dry mouth, started after taking Tizanidine     Neuralgia, neuritis, and radiculitis, unspecified 04/30/2012     normal emg 2017 8/8/2017    Interpretation: This is a normal study. There is no electrophysiologic evidence of a lumbosacral radiculopathy affecting the right or left lower extremity on the basis of this study.    Rodney Mena MD Department of Neurology       Panic attack 12/6/2016     Seizures (H) 1986    Grew out of it. Absence Seizures. [id number]     Tinnitus 2015    Had it most of my life. Got worse in 2015     Social History     Socioeconomic History     Marital status: Single     Spouse name: Not on file     Number of children: 0     Years of education: 14     Highest education level: Not on file   Occupational History     Occupation: Assembly/Packaging   Social Needs     Financial resource strain: Not on file     Food insecurity     Worry: Not on file     Inability: Not on file     Transportation needs     Medical: Not on file     Non-medical: Not on file   Tobacco Use     Smoking status: Current Every Day Smoker     Packs/day: 0.50     Years: 10.00     Pack years: 5.00     Types: Cigarettes     Start date: 1/1/2002     Smokeless tobacco: Never Used     Tobacco comment: Transdermal patches have helped me the most in the past   Substance and Sexual Activity     Alcohol use: No     Alcohol/week: 2.0 - 4.0 standard drinks     Comment: none since 2013     Drug use: No     Comment: hx of meth, none since 2015      Sexual activity: Yes     Partners: Female     Birth control/protection: Condom, Pill     Comment: Not currently active (last 4 months)   Lifestyle     Physical activity     Days per week: Not on file     Minutes per session: Not on file     Stress: Not on file   Relationships     Social connections     Talks on phone: Not on file     Gets together: Not on file     Attends Bahai service: Not on file     Active member of club or  organization: Not on file     Attends meetings of clubs or organizations: Not on file     Relationship status: Not on file     Intimate partner violence     Fear of current or ex partner: Not on file     Emotionally abused: Not on file     Physically abused: Not on file     Forced sexual activity: Not on file   Other Topics Concern     Parent/sibling w/ CABG, MI or angioplasty before 65F 55M? No      Service Not Asked     Blood Transfusions Not Asked     Caffeine Concern Yes     Comment: Coffee, Engergy Drinks, 3 cups daily     Occupational Exposure Not Asked     Hobby Hazards Not Asked     Sleep Concern Not Asked     Stress Concern Not Asked     Weight Concern Not Asked     Special Diet Not Asked     Back Care Not Asked     Exercise Not Asked     Bike Helmet Not Asked     Seat Belt Not Asked     Self-Exams Not Asked   Social History Narrative    He lives in [facility] in a [facility] treatment center - there are 11 people there. He arrives today through [email or web address] ride    He has 3 brothers and 3 sisters. He has not children. He has never been .      Mother and father are alive    One sister is an alcoholic and bulemic    depression is present in the family : mother, sister and 2 brothers are bipolar.      He denies bipolar. He has depression.    He denies recurring thoughts. He has had substance abuse issues. He has had cravings in the past.    He exercises and plays guitar and draws.      He has used meth as well as prescription pain killers.    He has used marijuana when young. Used cocaine in the past.    Has not used iv drugs    He does not drink alcohol.      50% Prydeinig and is Nicaraguan, english and Burmese and a bit native.    He smokes cigarettes - 1 pack per day.        single. lives in [place]. estela is father     Family History   Problem Relation Age of Onset     Lipids Father         hyperlipidemia     Hyperlipidemia Father      Obesity Father      Arthritis Mother       Hyperlipidemia Mother      Depression Mother      Anxiety Disorder Mother      Mental Illness Mother      Obesity Mother      Asthma Brother      Asthma Sister      Depression Other      Hearing Loss Other      Psychotic Disorder Other      Obesity Other      Cerebrovascular Disease Paternal Grandfather      Alzheimer Disease Paternal Grandfather      Depression Brother      Mental Illness Brother         Bipolar     Asthma Brother      Colitis Brother      Depression Sister      Asthma Sister      Substance Abuse Sister         Alcohol     Mental Illness Brother         Bipolar     Asthma Brother      Colitis Brother      Asthma Sister      Obesity Sister      Asthma Brother      Obesity Brother      Obesity Maternal Grandmother      Genetic Disorder Maternal Grandmother         Epilepsy     Obesity Paternal Grandmother      Colon Cancer Other      Genetic Disorder Other         Cerebral Palsy     Genetic Disorder Maternal Grandfather         Multiple Sclerosis     Genetic Disorder Other         Epilepsy     Current Outpatient Medications   Medication Sig Dispense Refill     albuterol (PROAIR HFA/PROVENTIL HFA/VENTOLIN HFA) 108 (90 Base) MCG/ACT inhaler INHALE ONE TO TWO PUFFS INTO THE LUNGS EVERY FOUR HOURS AS NEEDED FOR SHORTNESS OF BREATH/ DYSPNEA OR WHEEZING 18 g 0     amphetamine-dextroamphetamine (ADDERALL) 10 MG tablet Take 1 tablet (10 mg) by mouth 2 times daily 60 tablet 0     [START ON 7/16/2020] amphetamine-dextroamphetamine (ADDERALL) 10 MG tablet Take 1 tablet (10 mg) by mouth 2 times daily 60 tablet 0     clonazePAM (KLONOPIN) 0.5 MG tablet 0.5mg tab by mouth nightly as needed  1     DULoxetine (CYMBALTA) 60 MG EC capsule Taking only 1 x 60mg tab by mouth daily 30 capsule 1     HYDROcodone-acetaminophen (NORCO) 5-325 MG tablet Take 1 tablet by mouth 3 times daily as needed for pain Ok to fill on 7/3/2020 to start 7/5/20 60 tablet 0     hydrOXYzine (ATARAX) 25 MG tablet Take 1 tablet (25 mg) by mouth  nightly as needed (at HS PRN) 45 tablet 3     morphine (MS CONTIN) 15 MG CR tablet Take 1 tablet (15 mg) by mouth every 12 hours Ok to fill on 7/3/2020 to start 7/5/20 60 tablet 0     naloxone (NARCAN) 4 MG/0.1ML nasal spray Spray 1 spray (4 mg) into one nostril alternating nostrils as needed for opioid reversal every 2-3 minutes until assistance arrives 0.2 mL 0     naproxen (NAPROSYN) 500 MG tablet Take 1 tablet (500 mg) by mouth 2 times daily (with meals) 40 tablet 3     order for DME Equipment being ordered: 4 wheeled walker with bench seat. 1 Units 0     oxybutynin ER (DITROPAN-XL) 10 MG 24 hr tablet Take 1 tablet by mouth daily. 90 tablet 0     SUMAtriptan (IMITREX) 25 MG tablet Take 1 tablet (25 mg) by mouth at onset of headache for migraine (ok to repeat in 2 hrs if needed.) 8 tablet 0     tiZANidine (ZANAFLEX) 4 MG tablet Take 1 tablet (4 mg) by mouth 3 times daily as needed for muscle spasms 30 tablet 1

## 2020-07-09 ENCOUNTER — TELEPHONE (OUTPATIENT)
Dept: PALLIATIVE MEDICINE | Facility: CLINIC | Age: 35
End: 2020-07-09

## 2020-07-09 NOTE — TELEPHONE ENCOUNTER
Pt calling after missed appointment. Pt states he apologizes and that he really made an effort for his appointment, but the group home he lives in was having issues with internet. Pt requested a message be sent to provider as an FYI.    Ana CHAMBERLAIN    [facility]ending [facility] to [facility]

## 2020-07-14 ENCOUNTER — VIRTUAL VISIT (OUTPATIENT)
Dept: NEUROLOGY | Facility: CLINIC | Age: 35
End: 2020-07-14
Payer: COMMERCIAL

## 2020-07-14 ENCOUNTER — TELEPHONE (OUTPATIENT)
Dept: FAMILY MEDICINE | Facility: CLINIC | Age: 35
End: 2020-07-14

## 2020-07-14 ENCOUNTER — TELEPHONE (OUTPATIENT)
Dept: PSYCHIATRY | Facility: CLINIC | Age: 35
End: 2020-07-14

## 2020-07-14 DIAGNOSIS — Z13.6 SCREENING FOR HYPERTENSION: Primary | ICD-10-CM

## 2020-07-14 DIAGNOSIS — M62.838 MUSCLE SPASM: ICD-10-CM

## 2020-07-14 DIAGNOSIS — G25.9 FUNCTIONAL MOVEMENT DISORDER: Primary | ICD-10-CM

## 2020-07-14 NOTE — TELEPHONE ENCOUNTER
Received fax request from [facility] pharmacy requesting refill(s) for tiZANidine (ZANAFLEX) 4 MG tablet    Last refilled on 07/05/20    Pt last seen on 07/01/20-virtual visit  Next appt scheduled for 08/03/20    Will facilitate refill.

## 2020-07-14 NOTE — TELEPHONE ENCOUNTER
Patient calling to give fax number for where he needs his DME to be faxed to wants order faxed to [facility] fax [phone]. Patient returning call from care team.

## 2020-07-14 NOTE — TELEPHONE ENCOUNTER
RN pended DME order for blood pressure monitor.   Routing to covering providers to print/ sign order and give to TC to fax to patient's preferred location.     Diane BORGESN, RN

## 2020-07-14 NOTE — LETTER
7/14/2020       RE: Hoang Kiser  [address] [place] [address]     Dear Colleague,    Thank you for referring your patient, Hoang Kiser, to the [facility] at [facility]. Please see a copy of my visit note below.    VIDEO VISIT  - used [employer]    Date of Visit: July 14, 2020  Name: Hoang Kiser  Date of Birth 1985  [address] [place] [id number] [phone] ()  [email or web address]  Has mychart  No proxy    Stephanie Valeriobarbara sister  Chandler Kiser father  [phone]     He had seen Ade Arnold for therapy [facility] in [place] with Graciela Arnold.   Dr Phill Floyd is his pcp  Tamiko Stevens (?CRISTY Sanchezxgarry)  he is seeing at the pain [facility]  Brynn Trujillo has been doing the injections and ablations.   Brian Duckworth is his psychiatrist.   Seeing Fili Layne  Seeing Sol Kemp for psychotherapy.  1 in September. 2 in October and 1 in November at the [facility]  Will be Dr. ANIL Watts in psychiatry  Kiana Kirkland - for regular therapy - [facility] in [facility]  Bailee Ng, KATER[place]AW  Meagan oBwman psychiatry  Ana Dugan - palliative/pain  Prabhjot Braswell urology    See details above.   Getting [facility] Intake for [facility]  Encouraged him to simplify and reduce his medication regimen.   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   Next generation - negative.  He may wean off gabapentin and zanaflex slowly.    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 Working with manager - basim ro to help him from housing    Sleep issues.    Using clonazepam 1/2 of his week; not mixing with opiates    Reviewed an updated medications - see below.     Brother has skin cancer and lymphoma and had colon cancer.     Medications     8am 12pm afternoon 11pm   Albuterol proair hfa/proventil hfa/ventolin hfa 108 (90 base) mcg/act inhaler As needed          Amphetamine-dexroamphetamine adderall 10mg 1 1     Amphetamine-dextroamphetamine adderall XR 30mg per 24 hr capsule Not taking         Clonazepam klonopin 0.5mg       1   Doxepin sinequan 10mg stopped      stopped   Duloxetine cymbalta 60mg EC capsule 1         Fluticasone flonase 50mcg/act nasal spray Not using         Gabapentin neurontin 100mg stopped      Hydrocodone-acetaminophen norco 5-325 As needed         Hydroxyzine atarax 10mg Not using      Hydroxyzine atarax 25mg       As needed   Loratadine claritin 10mg Not taking         Methocarbamol robaxin 500mg Not taking      Methylphenidate concerta 36mg Not taking          Morphine MSIR 15mg IR Not taking      Morphine MS CONTIN 15mg CR tablet 1     1   Naloxone narcan nasal spray  as needed         Naproxen naprosyn 500mg As needed         Oxybutynin ditropan XL  10mg 24 hr tablet 1         SF 5000 plus 1.1 % cream As needed          Tizanidine zanaflex 4mg As needed           Plan:  Reviewed and updated medications - symbols below relate to who is prescribing them:   Tamiko Stevens &  Gracie Rx *   Sypura (not clear if Rx medications other than possibly the albuterol)  [facility] #    He granted proxy access to his records to his sister Tony Valerio ([facility])    Discussed that based on the EEG 3/2019 and video EEG 4/2019 that it is unlikely he has epilepsy as the cause of his dissociative episode. Offered a visit to see epilepsy specialists if he wanted to pursue this further but again he acknowledges that there are  "stress factors with past dissociative episodes without loss of consciousness in the past as well as during the video EEG.     Medications     8am 12pm afternoon 11pm   Albuterol proair hfa/proventil hfa/ventolin hfa 108 (90 base) mcg/act inhaler As needed          Amphetamine-dextroamphetamine adderall 10mg * 1 1     Clonazepam klonopin 0.5mg*       1   Duloxetine cymbalta 60mg EC capsule* 1         Hydrocodone-acetaminophen norco 5-325 & As needed         Hydroxyzine atarax 25mg  as needed     As needed   Morphine MS CONTIN 15mg CR tablet & 1     1   Naloxone narcan nasal spray &  as needed         Naproxen naprosyn 500mg As needed         Oxybutynin ditropan XL  10mg 24 hr tablet # 1         SF 5000 plus 1.1 % cream  as needed         Tizanidine zanaflex 4mg As needed           He has had 5 or so spells over the past few years.     I have reviewed the note as documented above.  This accurately captures the substance of my conversation with the patient.  Patient contact time  25  minutes. Over 50% of this visit was spent in patient care and care coordination.     Time of visit  230pm - 3 pm    Bryant Cao MD      ------------------------------------------------------------------------------------------------------------------------------------------------------------------------    Video-Visit Details    The patient has been notified of following:     \"After a review of the patient s situation, this visit was changed from an in-person visit to a video visit to reduce the risk of COVID-19 exposure.   The patient is being evaluated via a billable video visit.\"    \"This video visit will be conducted via a call between you and your physician/provider. We have found that certain health care needs can be provided without the need for an in-person physical exam.  This service lets us provide the care you need with a video conversation.  If a prescription is necessary we can send it directly to your pharmacy.  If lab work " "is needed we can place an order for that and you can then stop by our lab to have the test done at a later time.    If during the course of the call the physician/provider feels a video visit is not appropriate, you will not be charged for this service.\"     Patient has given verbal consent for Video visit? Yes    Patient would like the video invitation sent by:     Type of service:  Video Visit    Video Start Time:     Video End Time (time video stopped):     Duration:  minutes - see above    Originating Location (pt. Location):     Distant Location (provider location):  [facility] NEUROLOGY     Mode of Communication:  Video Conference via [employer] (and if not possible then doximity)      Bryant Cao MD      --------------------------------------------------------------------------------------------------------------    Hoang Kiser is a 34 year old male who is being evaluated via a billable video visit.      Charts reviewed  Consult from  Images reviewed        I have reviewed and updated the patient's Past Medical History, Social History, Family History and Medication List.    ALLERGIES  Buspirone hcl; Doxycycline; Elavil [amitriptyline]; Trazodone; Buspirone; and Keflex [cephalexin]    Lasts visit details if there was a last visit:         Medications                                                                                                                                                                                                              14 Review of systems  are negative except for   Patient Active Problem List   Diagnosis     Neuropathy     Moderate major depression (H)     Tobacco abuse     Attention deficit hyperactivity disorder (ADHD), predominantly inattentive type     Primary insomnia     Panic disorder without agoraphobia     Abnormal involuntary movement     Tic disorder     Anxiety     Posttraumatic stress disorder with dissociative symptoms     Chronic left hip pain     Chronic " pain of right hip     Degenerative disc disease at L5-S1 level     Functional movement disorder     Family history of Crohn's disease     Chronic low back pain     Chronic pain syndrome     History of substance abuse (H)     Family history of multiple myeloma     History of electroencephalogram     Nonallergic rhinitis     Fatigue, unspecified type        Allergies   Allergen Reactions     Buspirone Hcl Other (See Comments)     Panic attacks     Doxycycline Diarrhea, Fatigue, GI Disturbance and Nausea     Elavil [Amitriptyline]      Ineffective in reducing spasms/movement, increased fatigue     Trazodone Fatigue     Buspirone Anxiety     Keflex [Cephalexin] Diarrhea     Past Surgical History:   Procedure Laterality Date     COLONOSCOPY  02/15/18    Has not happened yet.     COLONOSCOPY N/A 2/15/2018    Procedure: COMBINED COLONOSCOPY, SINGLE OR MULTIPLE BIOPSY/POLYPECTOMY BY BIOPSY;;  Surgeon: Liam Kincaid MD;  Location: [facility] OR     COLONOSCOPY WITH CO2 INSUFFLATION N/A 2/15/2018    Procedure: COLONOSCOPY WITH CO2 INSUFFLATION;  COLON-FAMILY HX OF COLON CANCER/ SYPURA;  Surgeon: Liam Kincaid MD;  Location: [facility] OR     HC TOOTH EXTRACTION W/FORCEP Bilateral 2003     PE TUBES       Past Medical History:   Diagnosis Date     Arthritis 2018     Benign positional vertigo 2016    Started after my groin/back injury. Sitting on Toilet.     Depressive disorder     I am on 120mg of Duoluxetine.     Dysphonia     Nothing significant.     Gastroesophageal reflux disease 2004    Occassional. Spicy foods set it off.     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      DATE OF EE2019.    SOURCE FILE DURATION:  15 minutes.  This EEG did not have a video.    PATIENT INFORMATION:  The patient is a 33-year-old male with irregular movements.  It is not entirely  clear the etiology of these movements.  EEG is being done to evaluate for seizures.    MEDICATIONS:  Adderall, doxepin, Cymbalta, Robaxin.      History of MRI of brain and brain stem 12/11/2015    MR BRAIN W/O & W CONTRAST, 12/11/2015  Impression: No suspicious intracranial findings.      Hoarseness 2017    Dry mouth, started after taking Tizanidine     Neuralgia, neuritis, and radiculitis, unspecified 04/30/2012     normal emg 2017 8/8/2017    Interpretation: This is a normal study. There is no electrophysiologic evidence of a lumbosacral radiculopathy affecting the right or left lower extremity on the basis of this study.    Rodney Mena MD Department of Neurology       Panic attack 12/6/2016     Seizures (H) 1986    Grew out of it. Absence Seizures. [id number]     Tinnitus 2015    Had it most of my life. Got worse in 2015     Social History     Socioeconomic History     Marital status: Single     Spouse name: Not on file     Number of children: 0     Years of education: 14     Highest education level: Not on file   Occupational History     Occupation: Assembly/Packaging   Social Needs     Financial resource strain: Not on file     Food insecurity     Worry: Not on file     Inability: Not on file     Transportation needs     Medical: Not on file     Non-medical: Not on file   Tobacco Use     Smoking status: Current Every Day Smoker     Packs/day: 0.50     Years: 10.00     Pack years: 5.00     Types: Cigarettes     Start date: 1/1/2002     Smokeless tobacco: Never Used     Tobacco comment: Transdermal patches have helped me the most in the past   Substance and Sexual Activity     Alcohol use: No     Alcohol/week: 2.0 - 4.0 standard drinks     Comment: none since 2013     Drug use: No     Comment: hx of meth, none since 2015      Sexual activity: Yes     Partners: Female     Birth control/protection: Condom, Pill     Comment: Not currently active (last 4 months)   Lifestyle     Physical activity     Days per week:  Not on file     Minutes per session: Not on file     Stress: Not on file   Relationships     Social connections     Talks on phone: Not on file     Gets together: Not on file     Attends [employer] service: Not on file     Active member of club or organization: Not on file     Attends meetings of clubs or organizations: Not on file     Relationship status: Not on file     Intimate partner violence     Fear of current or ex partner: Not on file     Emotionally abused: Not on file     Physically abused: Not on file     Forced sexual activity: Not on file   Other Topics Concern     Parent/sibling w/ CABG, MI or angioplasty before 65F 55M? No      Service Not Asked     Blood Transfusions Not Asked     Caffeine Concern Yes     Comment: Coffee, Engergy Drinks, 3 cups daily     Occupational Exposure Not Asked     Hobby Hazards Not Asked     Sleep Concern Not Asked     Stress Concern Not Asked     Weight Concern Not Asked     Special Diet Not Asked     Back Care Not Asked     Exercise Not Asked     Bike Helmet Not Asked     Seat Belt Not Asked     Self-Exams Not Asked   Social History Narrative    He lives in [facility] in [place] - there are 11 people there. He arrives today through [employer] ride    He has 3 brothers and 3 sisters. He has not children. He has never been .      Mother and father are alive    One sister is an alcoholic and bulemic    depression is present in the family : mother, sister and 2 brothers are bipolar.      He denies bipolar. He has depression.    He denies recurring thoughts. He has had substance abuse issues. He has had cravings in the past.    He exercises and plays guitar and draws.      He has used meth as well as prescription pain killers.    He has used marijuana when young. Used cocaine in the past.    Has not used iv drugs    He does not drink alcohol.      50% Maldivian and is Guyanese, english and Cape Verdean and a bit native.    He smokes  cigarettes - 1 pack per day.        single. lives in [place]. estela is father     Family History   Problem Relation Age of Onset     Lipids Father         hyperlipidemia     Hyperlipidemia Father      Obesity Father      Arthritis Mother      Hyperlipidemia Mother      Depression Mother      Anxiety Disorder Mother      Mental Illness Mother      Obesity Mother      Asthma Brother      Asthma Sister      Depression Other      Hearing Loss Other      Psychotic Disorder Other      Obesity Other      Cerebrovascular Disease Paternal Grandfather      Alzheimer Disease Paternal Grandfather      Depression Brother      Mental Illness Brother         Bipolar     Asthma Brother      Colitis Brother      Depression Sister      Asthma Sister      Substance Abuse Sister         Alcohol     Mental Illness Brother         Bipolar     Asthma Brother      Colitis Brother      Asthma Sister      Obesity Sister      Asthma Brother      Obesity Brother      Obesity Maternal Grandmother      Genetic Disorder Maternal Grandmother         Epilepsy     Obesity Paternal Grandmother      Colon Cancer Other      Genetic Disorder Other         Cerebral Palsy     Genetic Disorder Maternal Grandfather         Multiple Sclerosis     Genetic Disorder Other         Epilepsy     Current Outpatient Medications   Medication Sig Dispense Refill     albuterol (PROAIR HFA/PROVENTIL HFA/VENTOLIN HFA) 108 (90 Base) MCG/ACT inhaler INHALE ONE TO TWO PUFFS INTO THE LUNGS EVERY FOUR HOURS AS NEEDED FOR SHORTNESS OF BREATH/ DYSPNEA OR WHEEZING 18 g 0     amphetamine-dextroamphetamine (ADDERALL) 10 MG tablet Take 1 tablet (10 mg) by mouth 2 times daily 60 tablet 0     [START ON 7/16/2020] amphetamine-dextroamphetamine (ADDERALL) 10 MG tablet Take 1 tablet (10 mg) by mouth 2 times daily 60 tablet 0     clonazePAM (KLONOPIN) 0.5 MG tablet 0.5mg tab by mouth nightly as needed  1     DULoxetine (CYMBALTA) 60 MG EC capsule Taking only 1 x 60mg tab by mouth daily  30 capsule 1     HYDROcodone-acetaminophen (NORCO) 5-325 MG tablet Take 1 tablet by mouth 3 times daily as needed for pain Ok to fill on 7/3/2020 to start 7/5/20 60 tablet 0     hydrOXYzine (ATARAX) 25 MG tablet Take 1 tablet (25 mg) by mouth nightly as needed (at HS PRN) 45 tablet 3     morphine (MS CONTIN) 15 MG CR tablet Take 1 tablet (15 mg) by mouth every 12 hours Ok to fill on 7/3/2020 to start 7/5/20 60 tablet 0     naloxone (NARCAN) 4 MG/0.1ML nasal spray Spray 1 spray (4 mg) into one nostril alternating nostrils as needed for opioid reversal every 2-3 minutes until assistance arrives 0.2 mL 0     naproxen (NAPROSYN) 500 MG tablet Take 1 tablet (500 mg) by mouth 2 times daily (with meals) 40 tablet 3     order for DME Equipment being ordered: 4 wheeled walker with bench seat. 1 Units 0     oxybutynin ER (DITROPAN-XL) 10 MG 24 hr tablet Take 1 tablet by mouth daily. 90 tablet 0     SUMAtriptan (IMITREX) 25 MG tablet Take 1 tablet (25 mg) by mouth at onset of headache for migraine (ok to repeat in 2 hrs if needed.) 8 tablet 0     tiZANidine (ZANAFLEX) 4 MG tablet Take 1 tablet (4 mg) by mouth 3 times daily as needed for muscle spasms 30 tablet 1                       Hoang Kiser is a 34 year old male who is being evaluated via a billable video visit.            Video-Visit Details    Type of service:  Video Visit    Video Start Time: 0  Video End Time: 0    Originating Location (pt. Location): Home    Distant Location (provider location):  [place] NEUROLOGY     Platform used for Video Visit: Other: lewis Chan [employer]          Again, thank you for allowing me to participate in the care of your patient.      Sincerely,    Bryant Cao MD

## 2020-07-14 NOTE — TELEPHONE ENCOUNTER
The patient called back and he wants his DME order faxed to [facility].  I faxed the DME order to [facility] @ fax #[phone].  I spoke to the patient and let him know. See 7/14/20 duplicate telephone encounter.  Dahlia Vera,

## 2020-07-14 NOTE — LETTER
"[facility]  [facility] NEUROLOGY  [address] [address] [phone]            2020          Dear Sherrell Proxy Patient,    We received a request to activate you as a proxy for another patient of [facility] or [facility]. In order to do so, we need to activate your [employer] account as well.    Your access code is: [unfilled]       Please access the [employer] website:  -  [place]: [email or web address]  -  [employer] [email or web address].    Below the ID and password fields, select the \"Sign Up Now\" as New User.  You will be prompted to enter the access code listed above as well as additional personal information.  Please follow the directions carefully when creating your username and password.    Once your account is activated, you can access the proxy accounts under \"Shared Medical Records\".    If you allow your access code to , or if you have any questions please call [employer] support at [phone] during normal clinic hours.     Sincerely,        [employer] Customer Service    "

## 2020-07-14 NOTE — TELEPHONE ENCOUNTER
[facility] Call Center    Phone Message    May a detailed message be left on voicemail: yes     Reason for Call: Other:   Patient was prescribed a blood pressure pump but the medical company, [employer], is needing a signed order from the provider. The medical documentation fax number for [employer] is [phone].      Action Taken: Message routed to:  Other: Nursing pool    Travel Screening: Not Applicable

## 2020-07-14 NOTE — TELEPHONE ENCOUNTER
Reason for Call:  Other call back    Detailed comments: psychiatrist ordered that he takes his bp daily due to meds.  Pt thinks needs an auth for the bp monitor.  Please call pt and discuss    Phone Number Patient can be reached at: Cell number on file:    Telephone Information:   Mobile [phone]       Best Time: any    Can we leave a detailed message on this number? YES    Call taken on 7/14/2020 at 9:45 AM by Barbara Tubbs

## 2020-07-14 NOTE — TELEPHONE ENCOUNTER
I faxed the DME order to [facility] @ fax #[phone].  I spoke to the patient and let him know.  Dahlia Vera,

## 2020-07-14 NOTE — PATIENT INSTRUCTIONS
Plan:  Reviewed and updated medications - symbols below relate to who is prescribing them:   Tamiko Stevens &  Gracie Rx *   Sypura (not clear if Rx medications other than possibly the albuterol)  [facility] urology #    He granted proxy access to his records to his sister Tony Valerio ([facility])    Discussed that based on the EEG 3/2019 and video EEG 4/2019 that it is unlikely he has epilepsy as the cause of his dissociative episode. Offered a visit to see epilepsy specialists if he wanted to pursue this further but again he acknowledges that there are stress factors with past dissociative episodes without loss of consciousness in the past as well as during the video EEG.     Medications     8am 12pm afternoon 11pm   Albuterol proair hfa/proventil hfa/ventolin hfa 108 (90 base) mcg/act inhaler As needed          Amphetamine-dextroamphetamine adderall 10mg * 1 1     Clonazepam klonopin 0.5mg*       1   Duloxetine cymbalta 60mg EC capsule* 1         Hydrocodone-acetaminophen norco 5-325 & As needed         Hydroxyzine atarax 25mg  as needed     As needed   Morphine MS CONTIN 15mg CR tablet & 1     1   Naloxone narcan nasal spray &  as needed         Naproxen naprosyn 500mg As needed         Oxybutynin ditropan XL  10mg 24 hr tablet # 1         SF 5000 plus 1.1 % cream  as needed         Tizanidine zanaflex 4mg As needed             [email or web address]

## 2020-07-14 NOTE — PROGRESS NOTES
"Hoang Kiser is a 34 year old male who is being evaluated via a billable video visit.      The patient has been notified of following:     \"This video visit will be conducted via a call between you and your physician/provider. We have found that certain health care needs can be provided without the need for an in-person physical exam.  This service lets us provide the care you need with a video conversation.  If a prescription is necessary we can send it directly to your pharmacy.  If lab work is needed we can place an order for that and you can then stop by our lab to have the test done at a later time.    Video visits are billed at different rates depending on your insurance coverage.  Please reach out to your insurance provider with any questions.    If during the course of the call the physician/provider feels a video visit is not appropriate, you will not be charged for this service.\"    Patient has given verbal consent for Video visit? Yes  How would you like to obtain your AVS? [employer]  Patient would like the video invitation sent by:[employer]  Will anyone else be joining your video visit? no        Video-Visit Details    Type of service:  Video Visit    Video Start Time: 0  Video End Time: 0    Originating Location (pt. Location): Home    Distant Location (provider location):   [facility]UROLOG[facility]     Platform used for Video Visit: Other: EDIN Kebede        "

## 2020-07-14 NOTE — TELEPHONE ENCOUNTER
Rx printed and signed in PCPs absence.  This is was given to TC to fax to patient's preferred location.  Hailey Green, CNP

## 2020-07-14 NOTE — TELEPHONE ENCOUNTER
[facility] [phone], to call back to notify where patient would like blood pressure kit DME order sent. Gave [phone]  MOLLY Arvizu

## 2020-07-15 NOTE — TELEPHONE ENCOUNTER
Meagan Bowman APRN CNP Snyder, David J RN    Caller: Unspecified (Yesterday, 10:05 AM)               I'm ok with ordering since I want him to monitor BP with Cymbalta and Adderall.  If it's easier to have this approved by PCP, absolutely pls ask PCP, but if it doesn't matter, I would be happy to.  I thought I ordered them, but I think pt said he was going to get it at the store without order.        Resolved in 7/15/20 telephone encounter with Dr. Floyd.

## 2020-07-16 ENCOUNTER — MYC MEDICAL ADVICE (OUTPATIENT)
Dept: PALLIATIVE MEDICINE | Facility: CLINIC | Age: 35
End: 2020-07-16

## 2020-07-16 DIAGNOSIS — Z79.899 HIGH RISK MEDICATION USE: ICD-10-CM

## 2020-07-16 NOTE — TELEPHONE ENCOUNTER
Requested Prescriptions   Pending Prescriptions Disp Refills     naloxone (NARCAN) 4 MG/0.1ML nasal spray 0.2 mL 0     Sig: Spray 1 spray (4 mg) into one nostril alternating nostrils as needed for opioid reversal every 2-3 minutes until assistance arrives       There is no refill protocol information for this order        Routing to provider to review refill request for  Narcan nasal spray.    Sarina Polanco RN on 2020 at 1:36 PM

## 2020-07-21 DIAGNOSIS — R39.15 URINARY URGENCY: ICD-10-CM

## 2020-07-21 DIAGNOSIS — N39.41 URGE INCONTINENCE: ICD-10-CM

## 2020-07-23 ENCOUNTER — VIRTUAL VISIT (OUTPATIENT)
Dept: PALLIATIVE MEDICINE | Facility: CLINIC | Age: 35
End: 2020-07-23
Payer: COMMERCIAL

## 2020-07-23 DIAGNOSIS — G25.9 FUNCTIONAL MOVEMENT DISORDER: ICD-10-CM

## 2020-07-23 DIAGNOSIS — M47.816 FACET ARTHROPATHY, LUMBAR: Primary | ICD-10-CM

## 2020-07-23 PROCEDURE — 96158 HLTH BHV IVNTJ INDIV 1ST 30: CPT | Mod: 95 | Performed by: PSYCHOLOGIST

## 2020-07-23 PROCEDURE — 96159 HLTH BHV IVNTJ INDIV EA ADDL: CPT | Mod: 95 | Performed by: PSYCHOLOGIST

## 2020-07-23 RX ORDER — OXYBUTYNIN CHLORIDE 10 MG/1
10 TABLET, EXTENDED RELEASE ORAL DAILY
Qty: 30 TABLET | Refills: 0 | Status: SHIPPED | OUTPATIENT
Start: 2020-07-23 | End: 2020-07-31

## 2020-07-23 NOTE — TELEPHONE ENCOUNTER
oxybutynin ER (DITROPAN-XL) 10 MG 24 hr tablet   Last Written Prescription Date:  4/15/2020  Last Fill Quantity: 90,   # refills: 0  Last Office Visit : 5/10/2019  Future Office visit:  7/31/2020  30 Tabs sent to pharm until office visit 7/31/2020    Deidre Welch RN  Central Triage Red Flags/Med Refills

## 2020-07-23 NOTE — PROGRESS NOTES
"Hoang Kiser is a 34 year old male who is being evaluated via a billable video visit.      The patient has been notified of following:     \"This video visit will be conducted via a call between you and your physician/provider. We have found that certain health care needs can be provided without the need for an in-person physical exam.  This service lets us provide the care you need with a video conversation.  If a prescription is necessary we can send it directly to your pharmacy.  If lab work is needed we can place an order for that and you can then stop by our lab to have the test done at a later time.    Video visits are billed at different rates depending on your insurance coverage.  Please reach out to your insurance provider with any questions.    If during the course of the call the physician/provider feels a video visit is not appropriate, you will not be charged for this service.\"    Patient has given verbal consent for Video visit? Yes    Patient would like the video invitation sent by: Text/return phone call tab in [employer] Start Time: Patient was not able to connect well to [employer], so ended up transitioning to telephone at 3:10 pm - initial attempts to connect made at 3:00 and 3:04 pm    Additional provider notes:      Pain Diagnoses per pain provider:   Facet arthropathy, lumbar      Functional movement disorder      DATA: Patient reports his pain is mildly improved with increased physical activity and movement in other areas of his life. Patient's mood is improved with thought of moving and getting himself ready to move. Activity level is mildly increased as he has been packing. 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 Sleep hygiene is quite variable. Patient reports engaging in self-care for his pain 2-3 times per day. Patient reports he has a 'soft' move out date of August 1st for his current living " situation. He has been working with his ILS worker on housing leads. He is fairly hopeful about one lead in particular that has 24 hour staff but would have his own apartment. Reports he is struggling with acceptance of others' viewpoints of his disorder. Discussed radical acceptance of his current situation.     ASSESSMENT: Somewhat distracted today, reports there was a lot of construction traffic that was distracting. He also has significantly more stressors that are likely contributing to being more distractible.   Progress toward goals: fair.    Pain Status: improved    Emotional Status: improved              Medication / chemical use concerns:  None     PLAN:   Next Appointment: Hoang Kiser's next appointment is scheduled for 8/10 at 3:00 PM.  Assignment/Objectives /interventions for next session: Continue to focus on present moment.     Video-Visit Details    Type of service:  Video Visit    Video End Time (time video stopped): 3:55 PM    Originating Location (pt. Location): Home    Distant Location (provider location):  [facility]     Mode of Communication:  Video Conference via [employer] - transitioned to telephone due to connectivity issues      Sol Kemp PsyD LP  Licensed Psychologist  Outpatient Clinic Therapist   [facility]

## 2020-07-27 ENCOUNTER — VIRTUAL VISIT (OUTPATIENT)
Dept: PALLIATIVE MEDICINE | Facility: CLINIC | Age: 35
End: 2020-07-27
Payer: COMMERCIAL

## 2020-07-27 DIAGNOSIS — G25.9 FUNCTIONAL MOVEMENT DISORDER: ICD-10-CM

## 2020-07-27 DIAGNOSIS — R53.83 FATIGUE, UNSPECIFIED TYPE: ICD-10-CM

## 2020-07-27 DIAGNOSIS — M54.50 CHRONIC LOW BACK PAIN, UNSPECIFIED BACK PAIN LATERALITY, UNSPECIFIED WHETHER SCIATICA PRESENT: ICD-10-CM

## 2020-07-27 DIAGNOSIS — G89.4 CHRONIC PAIN SYNDROME: Primary | ICD-10-CM

## 2020-07-27 DIAGNOSIS — G89.29 CHRONIC LOW BACK PAIN, UNSPECIFIED BACK PAIN LATERALITY, UNSPECIFIED WHETHER SCIATICA PRESENT: ICD-10-CM

## 2020-07-27 PROCEDURE — 99207 ZZC NON-BILLABLE SERV PER CHARTING: CPT | Performed by: PHYSICAL THERAPIST
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
## 2020-07-29 ENCOUNTER — TELEPHONE (OUTPATIENT)
Dept: FAMILY MEDICINE | Facility: CLINIC | Age: 35
End: 2020-07-29

## 2020-07-29 DIAGNOSIS — M79.18 CHRONIC MYOFASCIAL PAIN: ICD-10-CM

## 2020-07-29 DIAGNOSIS — G89.29 CHRONIC MYOFASCIAL PAIN: ICD-10-CM

## 2020-07-29 DIAGNOSIS — F41.9 ANXIETY: ICD-10-CM

## 2020-07-29 DIAGNOSIS — M62.838 MUSCLE SPASM: ICD-10-CM

## 2020-07-29 DIAGNOSIS — R39.15 URINARY URGENCY: ICD-10-CM

## 2020-07-29 DIAGNOSIS — N39.41 URGE INCONTINENCE: ICD-10-CM

## 2020-07-29 DIAGNOSIS — Z79.899 HIGH RISK MEDICATION USE: ICD-10-CM

## 2020-07-29 DIAGNOSIS — G62.9 NEUROPATHY: ICD-10-CM

## 2020-07-29 DIAGNOSIS — J06.9 VIRAL URI WITH COUGH: ICD-10-CM

## 2020-07-29 NOTE — TELEPHONE ENCOUNTER
Patient is going to be moving into a new assisted living center they are requesting his medication orders and client history. Please fax to [phone]

## 2020-07-30 ENCOUNTER — TELEPHONE (OUTPATIENT)
Dept: FAMILY MEDICINE | Facility: CLINIC | Age: 35
End: 2020-07-30

## 2020-07-30 DIAGNOSIS — Z53.9 DIAGNOSIS NOT YET DEFINED: Primary | ICD-10-CM

## 2020-07-30 PROCEDURE — G0179 MD RECERTIFICATION HHA PT: HCPCS | Performed by: FAMILY MEDICINE

## 2020-07-30 NOTE — TELEPHONE ENCOUNTER
I spoke to Dinorah [phone] at [facility].  I faxed the requested records to them @ fax #[phone].      RN: Dinorah is requesting medication orders. This patient is moving into their Assisted Living facility this Saturday, 8/1/20, but Dinorah wants to know if we can send the orders today.  She states the patient is changing his pharmacy to [facility] in [address] #[phone] Fax #[phone].  Thank you.  Dahlia Vera,

## 2020-07-30 NOTE — TELEPHONE ENCOUNTER
Reason for Call:  Form, our goal is to have forms completed with 72 hours, however, some forms may require a visit or additional information.    Type of letter, form or note:  Home Health Certification    Who is the form from?: Home care    Where did the form come from: form was faxed in    What clinic location was the form placed at?: [place]    Where the form was placed: Given to MA/RN    What number is listed as a contact on the form?: [facility] [phone]       Additional comments: I placed the [facility] forms in the [facility] orange folder for RN review    Call taken on 7/30/2020 at 11:16 AM by Dahlia Vera

## 2020-07-30 NOTE — TELEPHONE ENCOUNTER
RN pended all medications to go to new pharmacy since patient is moving to a new assisted living.   Routing to provider to sign refills.     Diane BORGESN, RN

## 2020-07-30 NOTE — TELEPHONE ENCOUNTER
This RN reconciled medication list from [facility] form against our [facility] medication list and there is 1 discrepancy.        1.  [facility] form states patient takes Adderall XR 30 mg 1 daily but Epic states on 7/16/20 Adderall was ordered by psychiatry for 10 mg twice daily.   I put a note on [facility] that psychiatry refills this medication for patient and that 10 mg BID was last order.   FYI to Dr. Phill Floyd that I wrote this note to home care RN       Form put in PCP's box to sign.  Please route this message back to TC along with form.     Diane Mcgraw BSN, RN

## 2020-07-30 NOTE — TELEPHONE ENCOUNTER
No answer at [facility] [phone].  I will call again later.  Dahlia Vera,

## 2020-07-31 ENCOUNTER — VIRTUAL VISIT (OUTPATIENT)
Dept: PSYCHIATRY | Facility: CLINIC | Age: 35
End: 2020-07-31
Attending: NURSE PRACTITIONER
Payer: COMMERCIAL

## 2020-07-31 ENCOUNTER — TELEPHONE (OUTPATIENT)
Dept: FAMILY MEDICINE | Facility: CLINIC | Age: 35
End: 2020-07-31

## 2020-07-31 ENCOUNTER — TELEPHONE (OUTPATIENT)
Dept: PALLIATIVE MEDICINE | Facility: CLINIC | Age: 35
End: 2020-07-31

## 2020-07-31 DIAGNOSIS — F98.8 ATTENTION DEFICIT DISORDER, UNSPECIFIED HYPERACTIVITY PRESENCE: ICD-10-CM

## 2020-07-31 DIAGNOSIS — F15.21 AMPHETAMINE-TYPE SUBSTANCE USE DISORDER, SEVERE, IN SUSTAINED REMISSION (H): ICD-10-CM

## 2020-07-31 DIAGNOSIS — M62.838 MUSCLE SPASM: ICD-10-CM

## 2020-07-31 DIAGNOSIS — F32.1 MAJOR DEPRESSIVE DISORDER, SINGLE EPISODE, MODERATE (H): ICD-10-CM

## 2020-07-31 DIAGNOSIS — F11.21 OPIOID USE DISORDER, SEVERE, IN SUSTAINED REMISSION (H): ICD-10-CM

## 2020-07-31 DIAGNOSIS — F41.9 ANXIETY: Primary | ICD-10-CM

## 2020-07-31 RX ORDER — CLONAZEPAM 0.5 MG/1
0.25 TABLET ORAL DAILY PRN
Refills: 1 | Status: SHIPPED | DISCHARGE
Start: 2020-07-31 | End: 2020-08-28

## 2020-07-31 RX ORDER — DULOXETIN HYDROCHLORIDE 60 MG/1
60 CAPSULE, DELAYED RELEASE ORAL DAILY
COMMUNITY
Start: 2020-04-24 | End: 2020-09-18

## 2020-07-31 RX ORDER — OXYBUTYNIN CHLORIDE 10 MG/1
10 TABLET, EXTENDED RELEASE ORAL DAILY
Qty: 30 TABLET | Refills: 0 | Status: SHIPPED | OUTPATIENT
Start: 2020-07-31 | End: 2020-09-14

## 2020-07-31 RX ORDER — DEXTROAMPHETAMINE SACCHARATE, AMPHETAMINE ASPARTATE, DEXTROAMPHETAMINE SULFATE AND AMPHETAMINE SULFATE 2.5; 2.5; 2.5; 2.5 MG/1; MG/1; MG/1; MG/1
10 TABLET ORAL 2 TIMES DAILY
Qty: 60 TABLET | Refills: 0 | Status: SHIPPED | OUTPATIENT
Start: 2020-08-15 | End: 2020-07-31

## 2020-07-31 RX ORDER — NAPROXEN 500 MG/1
500 TABLET ORAL 2 TIMES DAILY WITH MEALS
Qty: 40 TABLET | Refills: 3 | Status: SHIPPED | OUTPATIENT
Start: 2020-07-31 | End: 2021-02-10

## 2020-07-31 RX ORDER — ALBUTEROL SULFATE 90 UG/1
AEROSOL, METERED RESPIRATORY (INHALATION)
Qty: 18 G | Refills: 1 | Status: SHIPPED | OUTPATIENT
Start: 2020-07-31 | End: 2021-08-13

## 2020-07-31 RX ORDER — HYDROXYZINE HYDROCHLORIDE 25 MG/1
25 TABLET, FILM COATED ORAL
Qty: 45 TABLET | Refills: 3 | Status: SHIPPED | OUTPATIENT
Start: 2020-07-31 | End: 2020-11-17

## 2020-07-31 NOTE — TELEPHONE ENCOUNTER
I faxed the completed and signed C forms to [facility] @ fax #[phone].  Dahlia Vera,

## 2020-07-31 NOTE — TELEPHONE ENCOUNTER
Lalitha is calling to verify that the medication tizanidine dose has went down to 1 tab daily. Patient was taking 3 times daily.    Please call [phone]        Dominique TRINH    [facility]

## 2020-07-31 NOTE — TELEPHONE ENCOUNTER
Received fax request from [facility] pharmacy requesting refill(s) for tiZANidine (ZANAFLEX) 4 MG tablet    Last refilled on 07/23/20    Pt last seen on 07/01/20-virtual visit  Next appt scheduled for 08/03/20    Will facilitate refill.
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
## 2020-07-31 NOTE — TELEPHONE ENCOUNTER
The prescription is correct as is. He reports he uses an average of 1 tablet daily with days he does not need it at all. Called pharmacy. No answer. They should fill as is if they call back. Patient is aware.    DEANNA Cardenas, RN  Care Coordinator  [facility] [place]

## 2020-07-31 NOTE — TELEPHONE ENCOUNTER
Reason for Call:  Other call back    Detailed comments: pharmacy is calling stating has never serviced patient before and looking for more information such as was this meant to be sent to pharmacy and what group home will patient be moving into. Please call to discuss. Thank you.    Phone Number Patient can be reached at: [phone]    Best Time:     Can we leave a detailed message on this number? YES    Call taken on 7/31/2020 at 2:05 PM by Alyssa Magaña

## 2020-07-31 NOTE — Clinical Note
"Pt agreed to taper down/off Klonopin.  Pt also stated that he knows that he is on \"speed ball on layman's term\" by taking opioid and Adderall.  He mentioned his sleep pattern is due to FMD, I don't know much about FMD, but uptodate didn't list as that.  Is this true?"

## 2020-07-31 NOTE — TELEPHONE ENCOUNTER
Left information about facility from 7/29/20 message on Natty's vm at [facility].  Advised to call back if questions, if no questions does not need to call back.  Silvia Real BSN, RN

## 2020-07-31 NOTE — PATIENT INSTRUCTIONS
-Decrease Klonopin to 0.25 mg daily as needed for anxiety  -Continue all other medications for now  -Monitor blood pressure daily around the same time for now and report back in next visit.  If not daily, at least 2 times a week    Your next appointment is scheduled on 8/28 (Fri) at 11am and it will be done via [facility].    Thank you for coming to the PSYCHIATRY CLINIC.    Lab Testing:  If you had lab testing today and your results are reassuring or normal they will be mailed to you or sent through [facility] within 7 days. If the lab tests need quick action we will call you with the results. The phone number we will call with results is # [phone] (home) . If this is not the best number please call our clinic and change the number.    Medication Refills:  If you need any refills please call your pharmacy and they will contact us. Our fax number for refills is [phone]. Please allow three business for refill processing. If you need to  your refill at a new pharmacy, please contact the new pharmacy directly. The new pharmacy will help you get your medications transferred.     Scheduling:  If you have any concerns about today's visit or wish to schedule another appointment please call our office during normal business hours [phone] (8-5:00 M-F)    Contact Us:  Please call [phone] during business hours (8-5:00 M-F).  If after clinic hours, or on the weekend, please call  [phone].    Financial Assistance [phone]  [facility] Billing [phone]  Central Billing Office, [facility]: [phone]  [place] Billing [phone]  Medical Records [phone]      MENTAL HEALTH CRISIS NUMBERS:  For a medical emergency please call  911 or go to the nearest ER.     [facility]:   [facility] -[phone]   Crisis Residence [facility] -[phone]   Walk-In Counseling Center [facility] -[phone]   COPE 24/7 [place] Mobile Team -[phone] (adults)/[phone]  (child)  CHILD: [facility] needs assessment team - [phone]      [facility]:   [facility] - [phone]   Walk-in counseling [facility] - [phone]   Walk-in counseling [facility] - [phone]   Crisis Residence [facility] - [phone]  Urgent Care Adult Mental Ljnspo-[phone] mobile unit/ 24/7 crisis line    National Crisis Numbers:   National Suicide Prevention Lifeline: 4-876-567-TALK (139-025-7631)  Poison Control Center - 0-297-839-5622  [employer]/resources for a list of additional resources (SOS)  Trans Lifeline a hotline for transgender people 2-961-827-4555  The [employer] a hotline for LGBT youth 0-647-645-1254  Crisis Text Line: For any crisis 24/7   To: 349038  see www.crisistextline.org  - IF MAKING A CALL FEELS TOO HARD, send a text!         Again thank you for choosing PSYCHIATRY CLINIC and please let us know how we can best partner with you to improve you and your family's health.    You may be receiving a survey regarding this appointment. We would love to have your feedback, both positive and negative. The survey is done by an external company, so your answers are anonymous.

## 2020-08-03 ENCOUNTER — TELEPHONE (OUTPATIENT)
Dept: PALLIATIVE MEDICINE | Facility: CLINIC | Age: 35
End: 2020-08-03

## 2020-08-03 NOTE — TELEPHONE ENCOUNTER
Patient had 3rd NS visit since 5/1/2020. Adherence to all aspects of the controlled substance agreement and the attendance policies have been discussed with him numerous times.     Please contact him to remind him of NS policy and that any further NS appts with me or therapies will result is discontinuation of care and prescribing through [facility] [place].     CARLOS A Bass, NP-C  [facility]

## 2020-08-04 ENCOUNTER — MYC MEDICAL ADVICE (OUTPATIENT)
Dept: PALLIATIVE MEDICINE | Facility: CLINIC | Age: 35
End: 2020-08-04

## 2020-08-04 ENCOUNTER — TELEPHONE (OUTPATIENT)
Dept: FAMILY MEDICINE | Facility: CLINIC | Age: 35
End: 2020-08-04

## 2020-08-04 DIAGNOSIS — F98.8 ATTENTION DEFICIT DISORDER, UNSPECIFIED HYPERACTIVITY PRESENCE: Primary | ICD-10-CM

## 2020-08-04 RX ORDER — DEXTROAMPHETAMINE SACCHARATE, AMPHETAMINE ASPARTATE, DEXTROAMPHETAMINE SULFATE AND AMPHETAMINE SULFATE 2.5; 2.5; 2.5; 2.5 MG/1; MG/1; MG/1; MG/1
10 TABLET ORAL 2 TIMES DAILY
Qty: 60 TABLET | Refills: 0 | Status: SHIPPED | OUTPATIENT
Start: 2020-08-15 | End: 2020-08-28

## 2020-08-04 NOTE — TELEPHONE ENCOUNTER
Reason for Call:  Other med list/orders    Detailed comments: Dinorah is still waiting for the med list and orders for patient who has not been admitted stated she left message last week for this information   Please fax to [phone]  Thank you       Best Time: any    Can we leave a detailed message on this number? YES    Call taken on 8/4/2020 at 3:08 PM by Felicitas Lisa

## 2020-08-04 NOTE — TELEPHONE ENCOUNTER
: See 7/29/20 and 7/30/20 telephone encounters.   Please call Dinorah and confirm whether or not she received the faxes sent to her as documented in 7/29/20 and 7/30/20 encounters. Please refax if not received, or clarify what additional information she is requesting.    Sarina Urbano, KATERINAN, RN

## 2020-08-04 NOTE — TELEPHONE ENCOUNTER
This is being managed in a My Chart message dated 8/4/20.     CARLOS A Bass, NP-C  [facility] [place]

## 2020-08-05 ENCOUNTER — VIRTUAL VISIT (OUTPATIENT)
Dept: PALLIATIVE MEDICINE | Facility: CLINIC | Age: 35
End: 2020-08-05
Payer: COMMERCIAL

## 2020-08-05 ENCOUNTER — TELEPHONE (OUTPATIENT)
Dept: PALLIATIVE MEDICINE | Facility: CLINIC | Age: 35
End: 2020-08-05

## 2020-08-05 DIAGNOSIS — M54.50 CHRONIC LOW BACK PAIN, UNSPECIFIED BACK PAIN LATERALITY, UNSPECIFIED WHETHER SCIATICA PRESENT: Primary | ICD-10-CM

## 2020-08-05 DIAGNOSIS — G89.29 CHRONIC LOW BACK PAIN, UNSPECIFIED BACK PAIN LATERALITY, UNSPECIFIED WHETHER SCIATICA PRESENT: Primary | ICD-10-CM

## 2020-08-05 DIAGNOSIS — Z79.899 HIGH RISK MEDICATION USE: ICD-10-CM

## 2020-08-05 DIAGNOSIS — G25.9 FUNCTIONAL MOVEMENT DISORDER: ICD-10-CM

## 2020-08-05 DIAGNOSIS — M47.816 FACET ARTHROPATHY, LUMBAR: ICD-10-CM

## 2020-08-05 DIAGNOSIS — G89.4 CHRONIC PAIN SYNDROME: ICD-10-CM

## 2020-08-05 PROCEDURE — 99214 OFFICE O/P EST MOD 30 MIN: CPT | Mod: 95 | Performed by: NURSE PRACTITIONER

## 2020-08-05 RX ORDER — MORPHINE SULFATE 15 MG/1
15 TABLET, FILM COATED, EXTENDED RELEASE ORAL EVERY 12 HOURS
Qty: 60 TABLET | Refills: 0 | Status: SHIPPED | OUTPATIENT
Start: 2020-08-05 | End: 2020-10-21

## 2020-08-05 RX ORDER — HYDROCODONE BITARTRATE AND ACETAMINOPHEN 5; 325 MG/1; MG/1
1 TABLET ORAL 3 TIMES DAILY PRN
Qty: 60 TABLET | Refills: 0 | Status: SHIPPED | OUTPATIENT
Start: 2020-08-05 | End: 2020-10-21

## 2020-08-05 ASSESSMENT — PAIN SCALES - GENERAL: PAINLEVEL: MODERATE PAIN (5)

## 2020-08-05 NOTE — PROGRESS NOTES
"Hoang Kiser is a 35 year old male who is being evaluated via a billable video visit.      The patient has been notified of following:     \"This video visit will be conducted via a call between you and your physician/provider. We have found that certain health care needs can be provided without the need for an in-person physical exam.  This service lets us provide the care you need with a video conversation.  If a prescription is necessary we can send it directly to your pharmacy.  If lab work is needed we can place an order for that and you can then stop by our lab to have the test done at a later time.    Video visits are billed at different rates depending on your insurance coverage.  Please reach out to your insurance provider with any questions.    If during the course of the call the physician/provider feels a video visit is not appropriate, you will not be charged for this service.\"  YesPatient has given verbal consent for Video visit? Yes  How would you like to obtain your AVS? MyChart  If you are dropped from the video visit, the video invite should be resent to: [facility] waiting room  Will anyone else be joining your video visit? No        Video-Visit Details    Type of service:  Video Visit    Video Start Time: 1:32 PM  Video End Time: 2:07 PM    Originating Location (pt. Location): Home    Distant Location (provider location):  [facility]     Platform used for Video Visit: Anil Fernandez CMA       CHIEF COMPLAINT: Chronic pain     INTERVAL HISTORY:  Last seen on 7/1/20.        Recommendations/plan at the last visit included:  1. Schedule pain psychology VIDEO appts per Sonia's recommendations   2. Schedule physical therapy VIDEO appts per Diana's recommendations.   3. Schedule VIDEO follow-up with CARLOS A Higuera NP-C in 4 weeks  4. Schedule appts with psychiatry and neurology to discuss dissociative episodes.   5. Medication recommendations:             1. MS " "Contin and [place] sent to pharmacy.     Since last visit:   - Moved into new group home. Full 24 hr staff and they manage his medications. He is required to sign in and out. [facility] [place]. Has room for 5 residents, different disabilities MH and physical.   - Pain psych: NS x1, appt x1. Pain PT: appt x1  - States he is weaning off of Klonopin slowly.    Pain Information:              Pain quality: Aching and Sharp               Pain rating: intensity ranges from 1/10 to 7/10, and averages 5/10 on a 0-10 scale.      Annual Controlled Substance Agreement/UDS due date: 4/2020     Current Pain Relevant Medications:  MS Contin 15 mg BID = 30 MME  Norco 5/325 mg 1 tab BID-TID  #60 tabs per month                        Total opiate dose: 35-40 MME daily  Clonazepam .5 mg 1-2 tab at HS PRN (takes about 2-3x weekly)   Duloxetine 20 mg daily  Naproxen 500 mg BID: on hold re: elevated LFTs   Tizanidine 4 mg 1-2 tabs at HS PRN  Adderall 30 mg daily, combination of long and short acting.       Previous Pain Relevant Medications: (H--helped; HI--Helped initially; SWH--Somewhat helpful; NH--No help; W--worse; SE--side effects; ?--Unsure if helpful)   NOTE: This medication information taken from patient's intake form, not medical records.                         Opiates: Tramadol: H, Hydrocodone: H, Morphine: H                        NSAIDS: Ibuprofen:H, naproxen:SWH, Relafen: NH                        Muscle Relaxants: Cyclobenzaprine:H,Med interaction, Tizanidine:H, Baclofen: SWH                        Anti-migraine mediations: Prednisone:H                        Anti-depressants: Bupropion:SE, Celexa:SE, Duloxetine:H, Trazodone:Too strong, Venlafaxine:too strong, Amitriptyline:SE                         Sleep aids:Anbien: H                        Anxiolytics: Clonazepam:H                        Neuropathics: Tegretol:taken for seizures in childhood, Gabapentin:H                                          Topicals: " Lidocaine:H                        Other medications not covered above: Tylenol:NH, Adderall: H      Any illicit drug use: Sober date 8/27/2015, lives in a sober house.   EtOH use: last use 5 years ago  Caffeine use: 2-3 per day  Nicotine use: 3/4 pack per day  Any use of prescriptions other than how they were prescribed:taina      [place] Board of Pharmacy Data Base Reviewed:    YES; As expected, no concern for misuse/abuse of controlled medications based on this report.   Concern for multiple controlled substances including stimulants and opiates.  7/27/19: Concern for misuse of opiates and stimulants as noted in office visit on this date.   10/29/19: Requesting early refill due to overuse of opiate medication. #45 tabs to last 30 days. Refill declined.      Is Narcan prescribed for opiate use >50 MME daily or concurrent use of opiates and benzodiazepines? YES    Medications:  Current Outpatient Medications   Medication Sig Dispense Refill     albuterol (PROAIR HFA/PROVENTIL HFA/VENTOLIN HFA) 108 (90 Base) MCG/ACT inhaler INHALE ONE TO TWO PUFFS INTO THE LUNGS EVERY FOUR HOURS AS NEEDED FOR SHORTNESS OF BREATH/ DYSPNEA OR WHEEZING 18 g 1     [START ON 8/15/2020] amphetamine-dextroamphetamine (ADDERALL) 10 MG tablet Take 1 tablet (10 mg) by mouth 2 times daily 60 tablet 0     clonazePAM (KLONOPIN) 0.5 MG tablet Take 0.5 tablets (0.25 mg) by mouth daily as needed for anxiety  1     DULoxetine (CYMBALTA) 60 MG capsule Take 60 mg by mouth daily       HYDROcodone-acetaminophen (NORCO) 5-325 MG tablet Take 1 tablet by mouth 3 times daily as needed for pain Ok to fill on 7/3/2020 to start 7/5/20 60 tablet 0     hydrOXYzine (ATARAX) 25 MG tablet Take 1 tablet (25 mg) by mouth nightly as needed (at HS PRN) 45 tablet 3     morphine (MS CONTIN) 15 MG CR tablet Take 1 tablet (15 mg) by mouth every 12 hours Ok to fill on 7/3/2020 to start 7/5/20 60 tablet 0     naloxone (NARCAN) 4 MG/0.1ML nasal spray Spray 1 spray (4 mg)  into one nostril alternating nostrils as needed for opioid reversal every 2-3 minutes until assistance arrives 0.2 mL 0     naproxen (NAPROSYN) 500 MG tablet Take 1 tablet (500 mg) by mouth 2 times daily (with meals) 40 tablet 3     order for DME Equipment being ordered: Digital home blood pressure monitor kit 1 each 0     order for DME Equipment being ordered: 4 wheeled walker with bench seat. 1 Units 0     oxybutynin ER (DITROPAN-XL) 10 MG 24 hr tablet Take 1 tablet (10 mg) by mouth daily *Please keep visit for 7/31/2020 for further refills*  Thank you 30 tablet 0     SF 5000 PLUS 1.1 % CREA        tiZANidine (ZANAFLEX) 4 MG tablet 4mg tab by mouth daily as needed 30 tablet 1     tiZANidine (ZANAFLEX) 4 MG tablet 4mg tab by mouth daily as needed 30 tablet 1       DIRE Score for ongoing opioid management is calculated as follows:    Diagnosis = 2 pts (slowly progressive; moderate pain/objective findings)    Intractability = 2 pts (most treatments tried; patient not fully engaged/barriers)    Risk        Psych = 2 pts (personality dysfunction/mental illness that moderately interferes with care)         Chem Hlth = 2 pts (use of medications to cope with stress; chemical dependency in remission)       Reliability = 3 pts (highly reliable with meds, appointments, treatments)       Social = 3 pts (supportive family/close relationships; involved in work/school; no isolation)       (Psych + Chem hlth + Reliability + Social) = 14    Efficacy = 2 pts (moderate benefit/function; low med dose; too early/not tried meds)    DIRE Score = 16        7-13: likely NOT suitable candidate for long-term opioid analgesia       14-21: may be a suitable candidate for long-term opioid analgesia          Previous Diagnostic Tests:   Imaging Studies:   MR LUMBAR SPINE W/O CONTRAST 5/2/2018 7:27 PM  History: DDD (degenerative disc disease), lumbar; Lumbar  radiculopathy; Hip pain, right; Groin pain, right.  ICD-10: DDD (degenerative disc  disease), lumbar; Lumbar radiculopathy;  Hip pain, right; Groin pain, right  Comparison: Lumbar spine MRI 3/8/2017  Technique: Sagittal STIR, T1-weighted turbo spin-echo, 3-D volumetric  T2-weighted and axial reconstructed T2-weighted images of the lumbar  spine were obtained without intravenous contrast.   Findings: 5 lumbar-type vertebra. The tip of the conus medullaris is  at L1.  The lumbar vertebral column is normally aligned.   Straightening of lumbar lordosis, unchanged. The intervertebral disc  heights are maintained. Normal marrow signal. At L5-S1, there is a  disc bulge and facet hypertrophy with mild left neural foraminal  narrowing, unchanged. No spinal canal stenosis.  Impression:  1. Lumbar spondylosis with mild left neural foraminal narrowing at  L5-S1.  2. No spinal canal stenosis.      MR right hip without contrast 11/22/2019 8:28 AM  Techniques: Multiplanar multisequence imaging of the right  hip was  obtained without  administration of intra-articular or intravenous  contrast using routing protocol.  History: Hip pain, chronic, labral tear suspected, neg xray or  equivocal; ; Progressive pain; Chronic right hip pain; Chronic right  hip pain    Comparison: MRI hip 5/2/2018  Findings:  Osseous structures  Osseous structures: Bone marrow edema like signal intensity along the  anterior head neck junction less pronounced than prior MRI. No  fracture or avascular necrosis. Osseous prominence at the femoral  neck, which may be seen with cam-type femoral acetabular impingement.  Articular cartilage and labrum  Assessment limited on this arthrographic study due to relative lack of  joint distension.  Articular cartilage: No high grade chondral loss.  Labrum: Increased signal intensity in the anterosuperior labrum from  12:00 to 3:00 position suggestive of labral tear (3:00 anterior  relative to the transverse ligament).  Ligament teres and transverse ligament of acetabulum: Intact.  Joint or bursal  effusion  Joint effusion: A physiologic amount of joint fluid.  Bursal effusion: Minimal nonspecific edema over the greater  trochanter. No substantial iliopsoas or trochanteric bursal effusion.  Muscles and tendons  Muscles and tendons: The rectus femoris, iliopsoas, proximal  hamstrings, and hip abductors are intact.  The visualized adductor  muscles are unremarkable.   Nerves:  The visualized course of the sciatic nerve is unremarkable.  Other Findings:  Prominent fat in the right inguinal canal.. Few subcentimeter right  inguinal lymph nodes.  Impression:  1. Redemonstration of labral tear extending from 12:00 to 3:00  position  2. Nonspecific, but likely degenerative, bone marrow edema like signal  intensity along femoral head and junction, less conspicuous compared  to prior MRI dated 5/20/2018. No fracture or avascular necrosis.     MR left hip without contrast 11/22/2019 8:52 AM  Techniques: Multiplanar multisequence imaging of the left hip was  obtained without  administration of intra-articular or intravenous  contrast using routing protocol.  History: Progressive pain; Chronic left hip pain; Chronic left hip  pain    Comparison: None  Findings:  Osseous structures  Osseous structures: Bone marrow edema like signal intensity in the  anteromedial head neck junction, possibly degenerative. No fracture,  stress reaction or avascular necrosis.   Articular cartilage and labrum  Assessment limited on this arthrographic study due to relative lack of  joint distension.  Articular cartilage: No high grade chondral loss.  Labrum: Altered signal intensity in the labrum extending from 2:00 to  4:00 position (3:00 anterior relative to transverse ligament)  Ligament teres and transverse ligament of acetabulum: Intact.  Joint or bursal effusion  Joint effusion: A physiologic amount of joint fluid.  Bursal effusion: Minimal nonspecific edema over the greater  trochanter. No substantial iliopsoas or trochanteric bursal  effusion.  Muscles and tendons  Muscles and tendons: The rectus femoris, iliopsoas, proximal  hamstrings, and hip abductors are intact.  The visualized adductor  muscles are unremarkable.   Nerves:  The visualized course of the sciatic nerve is unremarkable.  Other Findings:  Prominent fat in the left inguinal canal. Few subcentimeter inguinal  lymph nodes.  Impression:  1. Tearing of the anterior superior labrum extending from 2:00 to 4:00  position (3:00 anterior relative to transverse ligament).  2. Nonspecific bone marrow edema like signal intensity along the  anteromedial femoral head neck junction, likely degenerative.          ASSESSMENT:   1.  Lumbar DDD, mild  2.  Lumbar muscle spasm  3.  Labral tear, right hip  4.  Hx: anxiety, chemical dependence in remission.  5.  Functional neurologic movement disorder  6.  Concern for somatization disorder     Discussed concerns about pattern of no-show and late appointments.  Reviewed with Hoang that he needs to take responsibility for his appointments and that excuses such as losing his phone and oversleeping will no longer be accepted.  He has moved into his new group home where there is 24-hour staff who will remind him of appointments.  They are managing all his medications as well.  Again expressed my concern that he is not taking responsibility for his own actions.  He is aware that any further no-show appointments may result in a 30-day notice of discharge from the clinic.    He had requested a refill clonazepam from his psychiatrist.  We have previously said that he was not to be taking this medication he said he is taking it 2-3 times per week.  I received a call from the pharmacy being concerned about interactions.  He does have Narcan on hand.  After discussing with pharmacy we will give him a call tomorrow and expressed that he is not to be taking Klonopin anymore.    Tobacco use: Advised complete tobacco cessation. Not interested in tobacco cessation at  this time.     Plan:    Diagnosis reviewed, treatment option addressed, and risk/benifits discussed.  Self-care instructions given.  I am recommending a multidisciplinary treatment plan to help this patient better manage pain.      1. Attend all pain psychology visits as scheduled per Dr. Kemp's recommendations.  2. Attend all pain PT visits per Diana Luong's recommendations.  Schedule VIDEO pain PT video visits.  3. Schedule VIDEO follow-up with CARLOS A Higuera NP-C in 4 weeks  4. Medication recommendations:   1. Refills of MS Contin and hydrocodone sent to pharmacy.    I have reviewed the note as documented above.  This accurately captures the substance of my conversation with the patient.      CARLOS A Bass, NP-C  [facility] [place]

## 2020-08-05 NOTE — TELEPHONE ENCOUNTER
Informed Dinorah, Dr. Phill Floyd does not handle patient's controlled substance medications. Adderall and Clonazepam are prescribed by psych and pain meds prescribed by pain doctor. She will contact them for prescription orders      Pilar HUERTA, RN, CPN

## 2020-08-05 NOTE — TELEPHONE ENCOUNTER
[facility] calling with questions on the medication HYDROcodone-acetaminophen (NORCO) 5-325 MG tablet  And morphine (MS CONTIN) 15 MG CR tablet Can be reached at [phone]. Pharmacy calling to get any chronic pain information.      Kurt MINAYA    [facility] [place]

## 2020-08-05 NOTE — TELEPHONE ENCOUNTER
I previously faxed the patients last OV notes on 10/22/19, medication list and problem list to Dinorah on 7/30/20.  She must not have received my fax so I refaxed everything today 8/5/20 to [facility] @fax #[phone], attn: Dinorah.  I spoke to Dinorah and informed her that I refaxed everything today.  My direct line given [phone] in case she doesn't receive it.      RN:  Dinorah states that we sent the patients medications to his new [facility] pharmacy, but they did not receive his controlled substance medications.  Please read the 7/29/20 telephone encounter notes for reference.  Dinorah would like a nurse to call her back and discuss sending these orders to her.  Please call Dinorah @ [phone].  Thank you.  Dahlia Vera,

## 2020-08-06 ENCOUNTER — TELEPHONE (OUTPATIENT)
Dept: BEHAVIORAL HEALTH | Facility: CLINIC | Age: 35
End: 2020-08-06

## 2020-08-06 NOTE — TELEPHONE ENCOUNTER
Spoke with pharmacist. Concern about polypharmacy. Reviewed concerns and that patient has Narcan on hand.     CARLOS A Bass, NP-C  [facility] [place]

## 2020-08-06 NOTE — TELEPHONE ENCOUNTER
Below information was relayed to the patient.     KATERINA CardenasN, RN  Care Coordinator  [facility] [place]

## 2020-08-06 NOTE — TELEPHONE ENCOUNTER
[facility]  [facility]: [place]        Social Work Care Navigator Note      Patient: Hoang Kiser  Date: August 6, 2020  Preferred Name: Shoaib    Previous PHQ-9:   PHQ-9 SCORE 10/1/2018 3/22/2019 10/22/2019   PHQ-9 Total Score - - -   PHQ-9 Total Score 13 11 9     Previous JIN-7:   JIN-7 SCORE 10/1/2018 3/22/2019 10/22/2019   Total Score - - -   Total Score 13 12 14     MOLLY LEVEL:  MOLLY Score (Last Two) 3/23/2016 10/1/2018   MOLLY Raw Score 52 31   Activation Score 100 59.3   MOLLY Level 4 3       Preferred Contact:  Need for : No  Preferred Contact: Cell      Type of Contact Today: Phone call (not reached/unavailable)      Data: (subjective / Objective):  Attempted to reach patient, but was unsuccessful.  Left message requesting call back.    NURYS Henderson  [facility] ([facility])   [facility]  [phone]

## 2020-08-06 NOTE — TELEPHONE ENCOUNTER
Please call Shoaib and let him know that I do not want him taking Klonopin anymore at all.      Received a call from pharmacy with concerns about drug interactions and that the insurance company may not cover his medications if he continues to use opiates, benzodiazepines and stimulants.      This was thoroughly discussed during his appointment today.  We have discussed the weaning process of getting off of benzodiazepines however he is taking it only a few times a week and should have no problem stopping it.  If he has any symptoms he can reduce it to twice a week for a couple of weeks and then stop.    CARLOS A Bass, NP-C  [facility] [place]

## 2020-08-06 NOTE — PATIENT INSTRUCTIONS
After Visit Instructions:     Thank you for coming to [facility] [place] for your care. It is my goal to partner with you to help you reach your optimal state of health.     Continue daily self-care, identifying contributing factors, and monitoring variations in pain level. Continue to integrate self-care into your life.      1. Attend all pain psychology visits as scheduled per Dr. Kemp's recommendations.  2. Attend all pain PT visits per Diana Luong's recommendations.  Schedule VIDEO pain PT video visits.  3. Schedule VIDEO follow-up with CARLOS A Higuera NP-C in 4 weeks  4. Medication recommendations:   1. Refills of MS Contin and hydrocodone sent to pharmacy.  Stop all use of Klonopin.  If any withdrawal symptoms occur, reduce to 1 tablet 2 times weekly for 2 weeks and then stop.    CARLOS A Bass NP-C  [facility]  [facility]    Clinic Number:  [phone]     Call with any questions about your care and for scheduling assistance.     Calls are returned Monday through Friday between 8 AM and 4:30 PM. We usually get back to you within 2 business days depending on the issue/request.    If we are prescribing your medications:    For opioid medication refills, call the clinic or send a [employer] message 7 days in advance.  Please include:    Name of requested medication    Name of the pharmacy.    For non-opioid medications, call your pharmacy directly to request a refill. Please allow 3-4 days to be processed.     Per [place] State Law:    All controlled substance prescriptions must be filled within 30 days of being written.      For those controlled substances allowing refills, pickup must occur within 30 days of last fill.      We believe regular attendance is key to your success in our program!      Any time you are unable to keep your appointment we ask that you call us at least 24 hours in advance to cancel.This will allow us to offer the  appointment time to another patient.   Multiple missed appointments may lead to dismissal from the clinic.

## 2020-08-19 ENCOUNTER — TELEPHONE (OUTPATIENT)
Dept: BEHAVIORAL HEALTH | Facility: CLINIC | Age: 35
End: 2020-08-19

## 2020-08-19 NOTE — TELEPHONE ENCOUNTER
Behavioral Health Home Services  [facility]: [place]        Social Work Care Navigator Note      Patient: Hoang Kiser  Date: August 19, 2020  Preferred Name: Shoaib    Previous PHQ-9:   PHQ-9 SCORE 10/1/2018 3/22/2019 10/22/2019   PHQ-9 Total Score - - -   PHQ-9 Total Score 13 11 9     Previous JIN-7:   JIN-7 SCORE 10/1/2018 3/22/2019 10/22/2019   Total Score - - -   Total Score 13 12 14     MOLLY LEVEL:  MOLLY Score (Last Two) 3/23/2016 10/1/2018   MOLLY Raw Score 52 31   Activation Score 100 59.3   MOLLY Level 4 3       Preferred Contact:  Need for : No  Preferred Contact: Cell      Type of Contact Today: Phone call (not reached/unavailable)      Data: (subjective / Objective):  Attempted to reach patient for monthly check in. Patient was busy with his ILS worker. Scheduled phone appointment for Thursday 8/20/20 at 11am.     NURYS Henderson  [facility] ([facility])   [facility]  [phone]

## 2020-08-20 ENCOUNTER — E-VISIT (OUTPATIENT)
Dept: FAMILY MEDICINE | Facility: CLINIC | Age: 35
End: 2020-08-20
Payer: COMMERCIAL

## 2020-08-20 ENCOUNTER — VIRTUAL VISIT (OUTPATIENT)
Dept: BEHAVIORAL HEALTH | Facility: CLINIC | Age: 35
End: 2020-08-20

## 2020-08-20 ENCOUNTER — MYC MEDICAL ADVICE (OUTPATIENT)
Dept: UROLOGY | Facility: CLINIC | Age: 35
End: 2020-08-20

## 2020-08-20 DIAGNOSIS — R69 DIAGNOSIS DEFERRED: Primary | ICD-10-CM

## 2020-08-20 DIAGNOSIS — Z20.822 EXPOSURE TO COVID-19 VIRUS: Primary | ICD-10-CM

## 2020-08-20 PROCEDURE — 99421 OL DIG E/M SVC 5-10 MIN: CPT | Performed by: FAMILY MEDICINE

## 2020-08-20 ASSESSMENT — ANXIETY QUESTIONNAIRES
4. TROUBLE RELAXING: MORE THAN HALF THE DAYS
3. WORRYING TOO MUCH ABOUT DIFFERENT THINGS: SEVERAL DAYS
1. FEELING NERVOUS, ANXIOUS, OR ON EDGE: MORE THAN HALF THE DAYS
5. BEING SO RESTLESS THAT IT IS HARD TO SIT STILL: SEVERAL DAYS
IF YOU CHECKED OFF ANY PROBLEMS ON THIS QUESTIONNAIRE, HOW DIFFICULT HAVE THESE PROBLEMS MADE IT FOR YOU TO DO YOUR WORK, TAKE CARE OF THINGS AT HOME, OR GET ALONG WITH OTHER PEOPLE: SOMEWHAT DIFFICULT
6. BECOMING EASILY ANNOYED OR IRRITABLE: NOT AT ALL
2. NOT BEING ABLE TO STOP OR CONTROL WORRYING: SEVERAL DAYS
GAD7 TOTAL SCORE: 8
7. FEELING AFRAID AS IF SOMETHING AWFUL MIGHT HAPPEN: SEVERAL DAYS

## 2020-08-20 ASSESSMENT — PATIENT HEALTH QUESTIONNAIRE - PHQ9: SUM OF ALL RESPONSES TO PHQ QUESTIONS 1-9: 17

## 2020-08-20 NOTE — PROGRESS NOTES
Behavioral Health Home Services  [facility]: [place]        Social Work Care Navigator Note      Patient: Hoang Kiser  Date: August 20, 2020  Preferred Name: Shoaib    Previous PHQ-9:   PHQ-9 SCORE 10/1/2018 3/22/2019 10/22/2019   PHQ-9 Total Score - - -   PHQ-9 Total Score 13 11 9     Previous JIN-7:   JIN-7 SCORE 10/1/2018 3/22/2019 10/22/2019   Total Score - - -   Total Score 13 12 14     MOLLY LEVEL:  MOLLY Score (Last Two) 3/23/2016 10/1/2018   MOLLY Raw Score 52 31   Activation Score 100 59.3   MOLLY Level 4 3       Preferred Contact:  Need for : No  Preferred Contact: Cell      Type of Contact Today: Phone call (patient / identified key support person reached)      Data: (subjective / Objective):  Recent ED/IP Admission or Discharge?   None    Patient Goals:  Goal Areas: Health;Mental Health;Chemical Health;Employment / Volunteer;Financial and Social Service Benefits  Patient stated goals: Patient stated that he would like to obtain an ILS worker through his CADI waiver. Patient has worked with 2 different ILS workers and due to COVID 19 is not seeing an ILS worker at this time. Patient would like to resume this service once able too Patient has communicated with his  about this; Patient would like to look into intensive therapy program at [facility] in [place]. If patient is able to get into this program he would like to look into housing options available in the [place] area.; Patient would like to continue to work on self-advocacy skills. Patient is able to communicate his health needs. It is important to the patient to continue to work with his support team in order to continue to maintain his health and sobriety; Patient would like to continue to volunteer with the [employer]. This will resume once COVID 19 has cleared; Patient continues to work towards getting approved for SSDI. He is currently in the appeal process and will seek additional legal resources if denied again.  Patient stated he would like to continue to grow his skills with coping and stress management. He continues to work on this by going for walks, playing guitar and developing a comfort board.        [facility] Core Service Provided:  Health and Wellness Promotion    Current Stressors / Issues / Care Plan Objective Addressed Today:  [facility] contacted patient for monthly check in. Patient recently moved into a new group on 8/10/2020 and it is less independent then his last home. Patient states that he had to move because of his disassociation and his previous residence did not think they could offer him enough support. His old  found this new group home for him. Patient wants to move somewhere that is more independent. Patient states that his new  is very good at communicating and he appreciates this. Patient is still working on getting adjusted to this new home. He does feel safer at this new home.   Patient is currently working with a new ILS worker since his old one is still on medical leave. He had his first visit with her yesterday.   Patient had a scheduled appointment with his pain psychotherapist but due to moving he lost his phone and missed this appointment. He is going to work on getting this appointment rescheduled. [facility] suggested that when patient makes a new appointment that he tell his house staff about it so that they can help remind him of this appointment.   [facility] updated PHQ-9. Patient reports increase in depression. Pateint reports one incident of having thoughts of self harm in the last 6 months but nothing very recently. [facility] confirmed that patient has a psychiatry appointment on 8/28/2020. He is going to talk with his psychiatrist about his depression. [facility] asked if patient is interested in [facility] worker. Patient stated that he is interested in this but was told by his CADI  that he cannot have [facility] and [facility] at the same time. Patient gave [facility] permission to  "reach out to his [facility]  about this. [facility] offered to set up appointment with [facility]. Patient stated that he would only like to work with a female [facility]. If a female [facility] is available he would set up and appointment with them. [facility] will check into this as well.     [facility] sent following email to CAD   \"I am a  at [facility] that works with Shoaib Kiser. I was just talking with him and it sounds like he is needing some additional mental health support. I asked him about [facility] services and he stated that he cannot get [facility] services because he already has ILS and this would be a billing conflict. I just wanted to confirm if this was correct? \"     [facility] received a response from  stating that it is not a conflict to have [facility] and ILS at the same time and agreed that an [facility] referral would be beneficial for patient.    [facility] called patient back to let him know he can get [facility] services. [facility] advised that she can make this referral with patient's verbal consent. Patient consented to [facility] making [facility] referral. CC advised that she will make a referral to [facility] since they are located close to his home.  Patient requested a female [facility] worker.    [facility] completed referral for [facility] worker online.     Intervention:  Motivational Interviewing: Expressed Empathy/Understanding, Supported Autonomy, Collaboration, Evocation, Permission to raise concern or advise and Open-ended questions   Target Behavior(s): Explored thoughts about taking an anti-depressant, Explored thoughts and readiness to participate in individual therapy, Explored and resolved challenges to attending appointments as scheduled and Explored current social supports and reinforced opportunities to increase engagement    Assessment: (Progress on Goals / Homework):  Patient would benefit from continued coordination in reaching their goals set for the Behavioral Health Home (BHH) program. [facility] reviewed " Health Action Plan goals and will continue to monitor progress and work with patient and their care team.    Plan: (Homework, other):  Patient was encouraged to continue to seek condition-related information and education. [facility], patient, and team will continue to work towards progress on reaching goals set for the Behavioral Health Home ([facility]) program.     Janet Ng [facility]  Behavioral Health Home ([facility])   [facility]  [phone]

## 2020-08-21 ASSESSMENT — ANXIETY QUESTIONNAIRES: GAD7 TOTAL SCORE: 8

## 2020-08-24 ENCOUNTER — VIRTUAL VISIT (OUTPATIENT)
Dept: PALLIATIVE MEDICINE | Facility: CLINIC | Age: 35
End: 2020-08-24
Payer: COMMERCIAL

## 2020-08-24 DIAGNOSIS — Z20.822 EXPOSURE TO COVID-19 VIRUS: ICD-10-CM

## 2020-08-24 DIAGNOSIS — M47.816 FACET ARTHROPATHY, LUMBAR: Primary | ICD-10-CM

## 2020-08-24 DIAGNOSIS — G25.9 FUNCTIONAL MOVEMENT DISORDER: ICD-10-CM

## 2020-08-24 PROCEDURE — 96158 HLTH BHV IVNTJ INDIV 1ST 30: CPT | Performed by: PSYCHOLOGIST

## 2020-08-24 PROCEDURE — 96159 HLTH BHV IVNTJ INDIV EA ADDL: CPT | Performed by: PSYCHOLOGIST

## 2020-08-24 PROCEDURE — U0003 INFECTIOUS AGENT DETECTION BY NUCLEIC ACID (DNA OR RNA); SEVERE ACUTE RESPIRATORY SYNDROME CORONAVIRUS 2 (SARS-COV-2) (CORONAVIRUS DISEASE [COVID-19]), AMPLIFIED PROBE TECHNIQUE, MAKING USE OF HIGH THROUGHPUT TECHNOLOGIES AS DESCRIBED BY CMS-2020-01-R: HCPCS | Performed by: FAMILY MEDICINE

## 2020-08-24 NOTE — PROGRESS NOTES
"Hoang Kiser is a 35 year old male who is being evaluated via a billable video visit.      The patient has been notified of following:     \"This video visit will be conducted via a call between you and your physician/provider. We have found that certain health care needs can be provided without the need for an in-person physical exam.  This service lets us provide the care you need with a video conversation.  If a prescription is necessary we can send it directly to your pharmacy.  If lab work is needed we can place an order for that and you can then stop by our lab to have the test done at a later time.    Video visits are billed at different rates depending on your insurance coverage.  Please reach out to your insurance provider with any questions.    If during the course of the call the physician/provider feels a video visit is not appropriate, you will not be charged for this service.\"    Patient has given verbal consent for Video visit? Yes    Patient would like the video invitation sent by: Text to cell phone: .[phone]}    Video Start Time: 3:01 PM    Additional provider notes:      Pain Diagnoses per pain provider:    Facet arthropathy, lumbar       Functional movement disorder         DATA: Patient reports his pain is mildly improved - states he has been using Norco less often. Patient's mood is variable. Some stressful interactions with some long-term friends, which has been difficult to process. Processed incident at coffee shop with another customer who judged his movements as being a result of being high - he was too shocked to respond. Processed how others misjudge and have prejudice. Activity level is mildly increased as he is exploring his new neighborhood. Stress level is mildly increased due to adjusting to new housing and new neighborhood. Sleep hygiene is about the same. States he has only had one very disruptive movement attack since his move. Patient reports engaging in self-care for his pain " 2-3 times per day. Discussed missed appointment - he lost his cell phone during his move which happened the same day of his last appointment. He is looking into a standing desk. He is getting used to his new housing, which includes having his medications managed by staff. Feels he is doing well with connecting with his surrounding neighborhood and businesses. Patient requested increasing visits to weekly.    ASSESSMENT: Easily engaged. Focusing on getting settled into his new place. Still working on how to manage other's negative judgments of his movements.   Progress toward goals: good.    Pain Status: improved    Emotional Status: had fluctuating course              Medication / chemical use concerns:  None.     PLAN:   Next Appointment: Hoang Arnoldalfredobarbara's next appointment is scheduled for 9/14 at 3:00 PM.  Assignment/Objectives /interventions for next session: Continue to focus on settling into his new housing, work on radical acceptance.    Video-Visit Details    Type of service:  Video Visit    Video End Time (time video stopped): 4:00 PM    Originating Location (pt. Location): Long term Care    Distant Location (provider location):  [facility]     Mode of Communication:  Video Conference via Liyah Kemp PsyD LP  Licensed Psychologist  Outpatient Clinic Therapist  [facility]

## 2020-08-25 LAB
SARS-COV-2 RNA SPEC QL NAA+PROBE: NOT DETECTED
SPECIMEN SOURCE: NORMAL

## 2020-08-25 NOTE — RESULT ENCOUNTER NOTE
Hoang,  I have reviewed the results of the laboratory tests that we recently ordered. All of the lab work performed was normal or considered normal for you.  Sincerely,   Phill Floyd MD

## 2020-08-28 ENCOUNTER — VIRTUAL VISIT (OUTPATIENT)
Dept: PSYCHIATRY | Facility: CLINIC | Age: 35
End: 2020-08-28
Attending: NURSE PRACTITIONER
Payer: COMMERCIAL

## 2020-08-28 ENCOUNTER — TELEPHONE (OUTPATIENT)
Dept: PSYCHIATRY | Facility: CLINIC | Age: 35
End: 2020-08-28

## 2020-08-28 DIAGNOSIS — F32.89 OTHER DEPRESSION: ICD-10-CM

## 2020-08-28 DIAGNOSIS — F41.9 ANXIETY: Primary | ICD-10-CM

## 2020-08-28 DIAGNOSIS — F98.8 ATTENTION DEFICIT DISORDER, UNSPECIFIED HYPERACTIVITY PRESENCE: ICD-10-CM

## 2020-08-28 RX ORDER — DEXTROAMPHETAMINE SACCHARATE, AMPHETAMINE ASPARTATE, DEXTROAMPHETAMINE SULFATE AND AMPHETAMINE SULFATE 2.5; 2.5; 2.5; 2.5 MG/1; MG/1; MG/1; MG/1
10 TABLET ORAL 2 TIMES DAILY
Qty: 60 TABLET | Refills: 0 | Status: SHIPPED | OUTPATIENT
Start: 2020-09-16 | End: 2020-10-21

## 2020-08-28 NOTE — PATIENT INSTRUCTIONS
-Discontinue Klonopin  -Continue Adderall 10 mg 2 times a day and Cymbalta 60 mg daily for now    Your next appointment is scheduled on 9/24 (Thu) at 11:30am.    Thank you for coming to the PSYCHIATRY CLINIC.    Lab Testing:  If you had lab testing today and your results are reassuring or normal they will be mailed to you or sent through Check-Cap within 7 days. If the lab tests need quick action we will call you with the results. The phone number we will call with results is # [phone] (home) . If this is not the best number please call our clinic and change the number.    Medication Refills:  If you need any refills please call your pharmacy and they will contact us. Our fax number for refills is [phone]. Please allow three business for refill processing. If you need to  your refill at a new pharmacy, please contact the new pharmacy directly. The new pharmacy will help you get your medications transferred.     Scheduling:  If you have any concerns about today's visit or wish to schedule another appointment please call our office during normal business hours [phone] (8-5:00 M-F)    Contact Us:  Please call [phone] during business hours (8-5:00 M-F).  If after clinic hours, or on the weekend, please call  [phone].    Financial Assistance [phone]  [email or web address] Billing [phone]  [place] Billing Office, ealth: [phone]  [facility] Billing [phone]  Medical Records [phone]      MENTAL HEALTH CRISIS NUMBERS:  For a medical emergency please call  091 or go to the nearest ER.     [facility]:   [facility] -[phone]   Crisis Residence [facility]idence -[phone]   Walk-In Counseling [facility] -[phone]   COPE 24/7 [place] Mobile Team -[phone] (adults)/[phone] (child)  [facility] - [phone]      [facility]:   [facility] - [phone]   Walk-in counseling [facility]  [place] - [phone]   Walk-in counseling [facility] - [facility] - [phone]   Crisis Residence [facility] Residence - [phone]  [facility]gent Care Adult Mental [facility]-[phone] mobile unit/ 24/7 crisis line    National Crisis Numbers:   National Suicide Prevention Lifeline: 0-534-300-TALK (436-711-1641)  Poison Control Center - 1-883.468.2447  [employer]/resources for a list of additional resources (SOS)  Trans Lifeline a hotline for transgender people 8-908-878-6991  The [employer] a hotline for LGBT youth 1-336.371.6059  Crisis Text Line: For any crisis 24/7   To: 482826  see www.crisistextline.org  - IF MAKING A CALL FEELS TOO HARD, send a text!         Again thank you for choosing PSYCHIATRY CLINIC and please let us know how we can best partner with you to improve you and your family's health.    You may be receiving a survey regarding this appointment. We would love to have your feedback, both positive and negative. The survey is done by an external company, so your answers are anonymous.

## 2020-08-28 NOTE — TELEPHONE ENCOUNTER
On 8/28/2020, at 1021, writer called patient at mobile to confirm Virtual Visit. Writer unable to make contact with patient. Writer left detailed voice message for callback. [phone], left as call back number. AMELIA Julian, EMT

## 2020-08-28 NOTE — PROGRESS NOTES
Start Time:  1115        End Time: 1130    Telemedicine Visit: The patient's condition can be safely assessed and treated via synchronous audio and visual telemedicine encounter.      Reason for Telemedicine Visit: Due to COVID 19 pandemic, clinic switching all appointments to telemedicine     Originating Site (Patient Location): Patient's home    Distant Site (Provider Location): Provider Remote Setting    Consent:  The patient/guardian has verbally consented to: the potential risks and benefits of telemedicine (video visit) versus in person care; bill my insurance or make self-payment for services provided; and responsibility for payment of non-covered services.     Mode of Communication:  telephone only visit as pt could not get into [facility].    As the provider I attest to compliance with applicable laws and regulations related to telemedicine.    Psychiatry Clinic Progress Note                                                                  Patient Name: Hoang Kiser  YOB: 1985  MRN: [id number]  Date of Service:  8/28/2020  Last Seen:7/31/2020    Hoang Kiser is a 35 year old male who uses the name Shoaib and pronoun lilia.        Shoaib Kiser is a 35 year old year old adult who presents for ongoing psychiatric care.  Shoaib Kiser was last seen on 7/31/2020.     At that time,     Medication Ordered/Consults/Labs/tests Ordered:      Medication:   -Decrease Klonopin to 0.25 mg daily as needed for anxiety  -Continue all other medications for now  -Monitor blood pressure daily around the same time for now and report back in next visit.  If not daily, at least 2 times a week  OTC Recommendations: none  Lab Orders:  none  Referrals: none  Release of Information: none  Future Treatment Considerations: Per symptoms. 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  Psych critical item history includes mutiple psychotropic trials, SUBSTANCE USE: amphetamines and opiates and substance use treatment .   Medical complication includes functional movement disorder     Previous Psychiatric Meds: Celexa, Wellbutrin, Trazodone, Effexor, Amitriptyline, Gabapentin, Tegretol, Zoloft, reports all not effective, Ritalin (movement worse)    Interim History                                                                                                        4, 4     Since the last visit, notes he has been taking Klonopin 0.25 mg x1-2/week.  Pt states \"to be honest, I forgot that I was not needing to take Klonopin.\"  Pt notes his anxiety has significantly improved since moved to new [facility] home. Thinks probably OK to just to discontinue Klonopin as his anxiety is well managed currently. Notes his BP has been monitored daily and reports it's been 110-130's/ 60-90's.  Also reports mood has been stable, denies SI, SIB or HI.  Continues to have fluctuating sleep due to pain.    Denies any symptoms suggestive of hypomania or psychosis.    Current Suicidality/Hx of Suicide Attempts: Denies both   CoCominent Medical concerns: Chronic pain and FMD flare ups       Medication Side Effects: The patient denies all medication side effects.      Medical Review of Systems     Apart from the symptoms mentioned int he HPI, the 14 point review of systems, including constitutional, HEENT, cardiovascular, respiratory, gastrointestinal, genitourinary, musculoskeletal, integumentary, endocrine, neurological, hematologic and allergic is entirely negative except chronic pain and FMD flare ups.      Substance Use     Pt has been staying substance free since last seen.  Denies any substance use currently x 5 years. Active in NA.    Medical / Surgical History                                                                                "                                   Patient Active Problem List   Diagnosis     Neuropathy     Moderate major depression (H)     Tobacco abuse     Attention deficit hyperactivity disorder (ADHD), predominantly inattentive type     Primary insomnia     Panic disorder without agoraphobia     Abnormal involuntary movement     Tic disorder     Anxiety     Posttraumatic stress disorder with dissociative symptoms     Chronic left hip pain     Chronic pain of right hip     Degenerative disc disease at L5-S1 level     Functional movement disorder     Family history of Crohn's disease     Chronic low back pain     Chronic pain syndrome     History of substance abuse (H)     Family history of multiple myeloma     History of electroencephalogram     Nonallergic rhinitis     Fatigue, unspecified type     Generalized social phobia     Stimulant dependence (H)     Major depressive disorder, single episode, moderate (H)     Chronic post-traumatic stress disorder (PTSD)     Movement disorder       Past Surgical History:   Procedure Laterality Date     COLONOSCOPY  02/15/18    Has not happened yet.     COLONOSCOPY N/A 2/15/2018    Procedure: COMBINED COLONOSCOPY, SINGLE OR MULTIPLE BIOPSY/POLYPECTOMY BY BIOPSY;;  Surgeon: Liam Kincaid MD;  Location: [facility] OR     COLONOSCOPY WITH CO2 INSUFFLATION N/A 2/15/2018    Procedure: COLONOSCOPY WITH CO2 INSUFFLATION;  COLON-FAMILY HX OF COLON CANCER/ SYPURA;  Surgeon: Liam Kincaid MD;  Location: [facility] OR     HC TOOTH EXTRACTION W/FORCEP Bilateral 2003     PE TUBES  1990        Social/ Family History                                  [per patient report]                                 1ea,1ea     Living arrangements: lives in group home, feels safe.  Social Support: NA, group home leader/members.  Access to gun: none      Allergy                                Amitriptyline; Buspirone hcl; Doxycycline; Trazodone; Buspirone; and Cephalexin    Current Medications                                                                                                        Current Outpatient Medications   Medication Sig Dispense Refill     albuterol (PROAIR HFA/PROVENTIL HFA/VENTOLIN HFA) 108 (90 Base) MCG/ACT inhaler INHALE ONE TO TWO PUFFS INTO THE LUNGS EVERY FOUR HOURS AS NEEDED FOR SHORTNESS OF BREATH/ DYSPNEA OR WHEEZING 18 g 1     amphetamine-dextroamphetamine (ADDERALL) 10 MG tablet Take 1 tablet (10 mg) by mouth 2 times daily 60 tablet 0     clonazePAM (KLONOPIN) 0.5 MG tablet Take 0.5 tablets (0.25 mg) by mouth daily as needed for anxiety  1     DULoxetine (CYMBALTA) 60 MG capsule Take 60 mg by mouth daily       HYDROcodone-acetaminophen (NORCO) 5-325 MG tablet Take 1 tablet by mouth 3 times daily as needed for pain Ok to fill/start 8/5/20 60 tablet 0     hydrOXYzine (ATARAX) 25 MG tablet Take 1 tablet (25 mg) by mouth nightly as needed (at HS PRN) 45 tablet 3     morphine (MS CONTIN) 15 MG CR tablet Take 1 tablet (15 mg) by mouth every 12 hours Ok to fill/start on 8/5/20 60 tablet 0     naloxone (NARCAN) 4 MG/0.1ML nasal spray Spray 1 spray (4 mg) into one nostril alternating nostrils as needed for opioid reversal every 2-3 minutes until assistance arrives 0.2 mL 0     naproxen (NAPROSYN) 500 MG tablet Take 1 tablet (500 mg) by mouth 2 times daily (with meals) 40 tablet 3     order for DME Equipment being ordered: Digital home blood pressure monitor kit 1 each 0     order for DME Equipment being ordered: 4 wheeled walker with bench seat. 1 Units 0     oxybutynin ER (DITROPAN-XL) 10 MG 24 hr tablet Take 1 tablet (10 mg) by mouth daily *Please keep visit for 7/31/2020 for further refills*  Thank you 30 tablet 0     SF 5000 PLUS 1.1 % CREA        tiZANidine (ZANAFLEX) 4 MG tablet 4mg tab by mouth daily as needed 30 tablet 1     tiZANidine (ZANAFLEX) 4 MG tablet 4mg tab by mouth daily as needed 30 tablet 1        Mental Status Exam                                                                    "                9, 14 cog        Alertness: alert  and occasionally slow to respond  Behavior/Demeanor: cooperative, pleasant and calm  Speech: mostly regular rate and rhythm, occasionally slow  Mood :  \"okay\"  Affect: full range; was congruent to mood; was congruent to content  Thought Process (Associations): logical, Linear and Goal directed  Thought process (Rate):  Mostly Normal, occasionally slow  Thought content:  no overt psychosis, denies suicidal ideation, intent or thoughts and patient does not appear to be responding to internal stimuli  Perception:  Reports none;  Denies depersonalization and derealization  Attention/Concentration:  Fair  Memory:  Immediate recall intact and Short-term memory intact  Language: intact  Fund of Knowledge/Intelligence:  Average  Abstraction:  Normal  Insight:  Good and Fair  Judgment:  Good and Fair      Labs and Results      Pertinent findings on review include: Review of records with relevant information reported in the HPI.  Reviewed pt's past medical record and obtained collateral information.      [place] PRESCRIPTION MONITORING PROGRAM [] was checked today:  indicates Adderall 8/17, Norco 8/7, Morphine 8/7.    PHQ9 Today:  N/A  PHQ 3/22/2019 10/22/2019 8/20/2020   PHQ-9 Total Score 11 9 17   Q9: Thoughts of better off dead/self-harm past 2 weeks Not at all Not at all Not at all       JIN 7 Today: N/A  JIN-7 SCORE 3/22/2019 10/22/2019 8/20/2020   Total Score - - -   Total Score 12 14 8       Recent Labs   Lab Test 01/30/20  0942 03/27/19  1438 06/15/18  0853   CR 1.01 0.85 0.92   GFRESTIMATED >90 >90 >90     Recent Labs   Lab Test 01/30/20  0942 03/27/19  1438   AST 19 26   ALT 31 36   ALKPHOS 60 61     PSYCHOTROPIC DRUG INTERACTIONS:    Norco---Vistaril: Concurrent use of HYDROCODONE and ANTICHOLINERGIC CNS DEPRESSANTS may result in increased risk of paralytic ileus; increased risk of respiratory and CNS depression.   Adderall---Cymbalta: Concurrent use of AMPHETAMINES " and SEROTONERGIC AGENTS THAT INHIBIT CYP2D6 may result in increased amphetamine exposure and increased risk of serotonin syndrome.   Morphine---Adderall: Concurrent use of MORPHINE and SEROTONERGIC AGENTS may result in increased risk of serotonin syndrome.   Norco---Adderall: Concurrent use of HYDROCODONE and SEROTONERGIC AGENTS may result in increased risk of serotonin syndrome.   Klonopin---Morphine: Concurrent use of MORPHINE and CNS DEPRESSANTS may result in increased risk of respiratory and CNS depression.   Klonopin---Norco: Concurrent use of HYDROCODONE and CNS DEPRESSANTS may result in increased risk of respiratory and CNS depression.   Morphine---Norco: Concurrent use of MORPHINE and SEROTONERGIC CNS DEPRESSANTS may result in increased risk of respiratory and CNS depression; increased risk of serotonin syndrome.   Morphine---Norco: Concurrent use of MORPHINE and ANTICHOLINERGIC CNS DEPRESSANTS may result in increased risk of paralytic ileus; increased risk of respiratory and CNS depression.      MANAGEMENT:  Monitoring for adverse effects, routine vitals and patient is aware of risks    Impression/Assessment      Shoaib Kiser is a 35 year old adult  who presents for med management follow up.  Pt sounds stable in his mood, not anxious, denies SI, SIB or HI.  Pt has occasional slowing of the speech and thought.  Pt noted significant improvement of anxiety and has been taking Klonopin 0.25 mg x1-2/week only and feels ok to discontinue Klonopin at this time.  Will discontinue the order today.  Discussed possibility of increasing Cymbalta if anxiety increases as his BP seemed to be OK though occasionally has slightly elevated BP.  However, pt wants to continue on current medication regimen.  Pt reports he would have his  send BP log.  Instructed to continue BP monitoring at least x2/week in consistent time of the day.  Will continue all other medications for now.  Also will inform his pain mgmt team that  Klonopin is now discontinued.      Diagnosis                                                                    Depression (MDD vs Depression due to another medical condition)  Hx dx of ADD and PTSD  Amphetamine use disorder, severe, in sustained remission  Opioid use disorder, severe, in sustained remission    Treatment Recommendation & Plan       Medication Ordered/Consults/Labs/tests Ordered:     Medication:   -Discontinue Klonopin  -Continue Adderall 10 mg 2 times a day and Cymbalta 60 mg daily for now  OTC Recommendations: none  Lab Orders:  none  Referrals: none  Release of Information: none  Future Treatment Considerations: Per symptoms.   Return for Follow Up: in 1 month    -Discussed safety plan for suicidal thoughts  -Discussed plan for suicidality  -Discussed available emergency services  -Patient agrees with the treatment plan  -Encouraged to continue outpatient therapy to gain more coping mechanism for stress.    Treatment Risk Statement: Discussed with the patient my impressions, as well as recommended studies. I educated patient on the differential diagnosis and prognosis. I discussed with the patient the risks and benefits of medications versus no interventions, including efficacy, dose, possible side effects and length of treatment and the importance of medication compliance.  The patient understands the risks, benefits, adverse effects and alternatives. Agrees to treatment with the capacity to do so. No medical contraindications to treatment. The patient also understands the risks of using street drugs or alcohol.     CRISIS NUMBERS:   Provided routinely in AVS.      Meagan Bowman CNP,  8/28/2020

## 2020-08-31 DIAGNOSIS — M47.816 FACET ARTHROPATHY, LUMBAR: ICD-10-CM

## 2020-08-31 DIAGNOSIS — G25.9 FUNCTIONAL MOVEMENT DISORDER: ICD-10-CM

## 2020-08-31 RX ORDER — MORPHINE SULFATE 15 MG/1
15 TABLET, FILM COATED, EXTENDED RELEASE ORAL EVERY 12 HOURS
Qty: 60 TABLET | Refills: 0 | Status: CANCELLED | OUTPATIENT
Start: 2020-08-31

## 2020-08-31 NOTE — TELEPHONE ENCOUNTER
Medication refill information reviewed.     Due date for morphine (MS CONTIN) 15 MG CR tablet  is 9/6/20     Prescriptions prepped for review.     Will route to provider.

## 2020-08-31 NOTE — TELEPHONE ENCOUNTER
Received call from patient requesting refill(s) of morphine (MS CONTIN) 15 MG CR tablet      Last picked up from pharmacy on 08/07/20    Pt last seen by prescribing provider on 08/05/20    No future appointments scheduled at this time     checked in the past 6 months? Yes If no, print current report and give to RN    Last urine drug screen date 03/02/20  Current opioid agreement on file (completed within the last year) Yes Date of opioid agreement: 03/02/20    Processing (pick one and delete the others):      [facility]     [facility] [place] - [place], [place] - [address]  [address]  [facility] [address]  Phone: [phone] Fax: [phone]    Will route to nursing pool for review and preparation of prescription(s).    Imelda Jolley [facility]  [place]

## 2020-09-01 NOTE — TELEPHONE ENCOUNTER
Refill declined. He is due to be seen. I have a couple of openings on Thursday and Friday. I am off next week so he needs to schedule this week.     CARLOS A Bass, NP-C  [facility] [place]

## 2020-09-04 ENCOUNTER — TELEPHONE (OUTPATIENT)
Dept: PALLIATIVE MEDICINE | Facility: CLINIC | Age: 35
End: 2020-09-04

## 2020-09-04 NOTE — TELEPHONE ENCOUNTER
Patient has had his 6th NO SHOW today        Routing to nursing to go over NO SHOW policy           Reanna Reagan    [facility]

## 2020-09-07 NOTE — TELEPHONE ENCOUNTER
If Shoaib requests refills of his pain medication while I am out of the office please only allow enough to get to a follow-up appointment with me.  Please let him know if he no-shows or reschedules the appointment with me he will no longer receive any controlled substances from this clinic.  Given that this is his sixth no-show in about 2 months.  We will be reevaluating his care plan.    CARLOS A Bass, NP-C  [facility] [place]

## 2020-09-09 NOTE — TELEPHONE ENCOUNTER
Outreach X1. Left a  requesting call back. Provided call back number. He needs to schedule an appointment with Tamiko Stevens.     KATERINA CardenasN, RN  Care Coordinator  [facility] [place]

## 2020-09-10 ENCOUNTER — TELEPHONE (OUTPATIENT)
Dept: PALLIATIVE MEDICINE | Facility: CLINIC | Age: 35
End: 2020-09-10

## 2020-09-10 DIAGNOSIS — N39.41 URGE INCONTINENCE: ICD-10-CM

## 2020-09-10 DIAGNOSIS — R39.15 URINARY URGENCY: ICD-10-CM

## 2020-09-10 DIAGNOSIS — F11.93 OPIATE WITHDRAWAL (H): Primary | ICD-10-CM

## 2020-09-10 NOTE — TELEPHONE ENCOUNTER
Received call from patient requesting refill(s) of  morphine (MS CONTIN) 15 MG CR tablet    Last dispensed from pharmacy on 08/07/20    Pt last seen by prescribing provider on 08/05/20-virtual (has had 2 No Shows since)  Next appt scheduled for : none     checked in the past 6 months? Yes If no, print current report and give to RN    Last urine drug screen date 03/02/20  Current opioid agreement on file (completed within the last year) Yes Date of opioid agreement: 03/02/20    [facility] [facility] - [place], [place] - [address]     Will route to nursing pool for review and preparation of prescription(s).

## 2020-09-10 NOTE — TELEPHONE ENCOUNTER
Patient needs an appointment with Tamiko Stevens scheduled prior to any refills being processed. Once the appointment is scheduled we can send in enough medication to get to that appointment. He has had too many no showed appointments.     Additionally if he calls to schedule we need to clarify what medications he needs. He was using both MS Contin and Norco but those have not been filled since 8/5/20.    This may have been a faxed request from the pharmacy rather than a call from the patient. I have called him and left a VM previously, documented in a separate encounter, and have not heard back from him nor has he scheduled.    Outreach X2. Left a VM requesting call back. Provided call back number.      KATERINA CardenasN, RN  Care Coordinator  [facility] [place]

## 2020-09-11 DIAGNOSIS — N39.41 URGE INCONTINENCE: ICD-10-CM

## 2020-09-11 DIAGNOSIS — R39.15 URINARY URGENCY: ICD-10-CM

## 2020-09-11 RX ORDER — OXYBUTYNIN CHLORIDE 10 MG/1
10 TABLET, EXTENDED RELEASE ORAL DAILY
Qty: 30 TABLET | Refills: 0 | Status: CANCELLED | OUTPATIENT
Start: 2020-09-11

## 2020-09-11 NOTE — TELEPHONE ENCOUNTER
Faxed refill request from: [facility]  Medication request: Oxybutynin ER 10 mg  Sig: Take 1 tablet by mouth daily  Last filled: 7/23/2020  Last Qty: 30  Pt's last office visit: 5/10/2019  Next scheduled office visit: None    Rx pended and routed to RNCC for review and approval if appropriate.    LPN left voicemail for patient requesting a return call to assist with scheduling a follow up virtual visit with Dr. Braswell.     Florecita Pedroza LPN

## 2020-09-14 RX ORDER — OXYBUTYNIN CHLORIDE 10 MG/1
TABLET, EXTENDED RELEASE ORAL
Qty: 90 TABLET | Refills: 1 | Status: SHIPPED | OUTPATIENT
Start: 2020-09-14 | End: 2021-02-17

## 2020-09-14 NOTE — TELEPHONE ENCOUNTER
Outreach X3. Left a  requesting call back. Provided call back number.    KATERINA CardenasN, RN  Care Coordinator  [facility] [place]

## 2020-09-14 NOTE — TELEPHONE ENCOUNTER
No refills without a visit. My next opening is a week from tomorrow. If he needs assistance to manage withdrawal symptoms, please let me know.     CARLOS A Bass, NP-C  [facility] [place]

## 2020-09-14 NOTE — TELEPHONE ENCOUNTER
Called requesting pharmacy     [facility] - [place], [place] - [address]  [address]  [facility] [address]  Phone: [phone] Fax: [phone]    Spoke with pharmacist and he reports [facility] is residing at [facility] ([facility] in [place]). Called and spoke to Director, Dinorah  [facility], ph: [phone], fax: [phone].    Let her know we are requesting he schedule a virtual visit with Tamiko Stevens and patient has not responded to VM requests. I cant facilitate the refill without the appointment per provider instruction.     Routing to Tamiko Stevens as an FYI.    Sarina Polanco, KATERINAN, RN  Care Coordinator  [facility] [place]

## 2020-09-14 NOTE — TELEPHONE ENCOUNTER
Oxybutynin refill  To provider to advise on refills please  Last OV with pcp: 10/22/19 for warts.   Multipl;e appts pain clinic and psych.

## 2020-09-15 NOTE — TELEPHONE ENCOUNTER
No call back received and appointment not yet made. Per protocol, refill request denied.     Pilar Baez RN, BSN

## 2020-09-15 NOTE — TELEPHONE ENCOUNTER
"Gloria (home care nurse)[phone] calling to follow up on refill of morphine. Please call    The group home number is [phone], patients cell phone not working      Patient is scheduled for September 28, 2020, she would like to know if a refill can be sent over as the patient is in a lot of pain and is acting \"weird\"    Dominique TRINH    [facility]    "

## 2020-09-15 NOTE — TELEPHONE ENCOUNTER
Attempted to reach patient by phone, but no answer. Left generic message with request to return call to clinic. When return call received, will inform patient that a yearly follow up visit with Dr. Braswell is needed before any refills can be sent.    Pilar Baez RN, BSN     (2) more than 100 beats/min

## 2020-09-15 NOTE — TELEPHONE ENCOUNTER
Called Gloria.  She thinks he is having withdrawal symptoms but has not spoken to him since yesterday.  Not eating, complaints pain to his hips.  Gloria wants me to call the group home.    Calling the Group Home.  Need to speak with Leo [phone].      Spoke with Leo.  Pt was very emotional last night, crying.  Chills, sweating profusely, fever, Pain to his hips, not eating.  She is not sure he can make it till the appointment. Advised there is not a sooner appointment at this time for Tamiko. We can put him on a cancel list.  No refills until he is seen but Tamiko is willing to manage the withdrawal symptoms.  Leo said that would be great if we could manage those symptoms.    Future contact numbers to get a hold of Shoaib are:  House number [phone] or [phone]    Routing to provider to review and advise    Yelitza Albright RN  Care Coordinator  [facility]

## 2020-09-15 NOTE — TELEPHONE ENCOUNTER
Pt returning phone call to nursing. Pt does not have their own phone number right now and will try calling back.    Ana CHAMBERLAIN    [facility]

## 2020-09-16 ENCOUNTER — TELEPHONE (OUTPATIENT)
Dept: PALLIATIVE MEDICINE | Facility: CLINIC | Age: 35
End: 2020-09-16

## 2020-09-16 DIAGNOSIS — G25.9 FUNCTIONAL MOVEMENT DISORDER: ICD-10-CM

## 2020-09-16 DIAGNOSIS — M47.816 FACET ARTHROPATHY, LUMBAR: ICD-10-CM

## 2020-09-16 RX ORDER — CLONIDINE HYDROCHLORIDE 0.1 MG/1
0.1 TABLET ORAL 4 TIMES DAILY PRN
Qty: 16 TABLET | Refills: 0 | Status: SHIPPED | OUTPATIENT
Start: 2020-09-16 | End: 2020-10-21

## 2020-09-16 NOTE — TELEPHONE ENCOUNTER
Spoke to Dinorah, The name and number for nursing is Gloria  Ph. [phone]. Relayed the information pertaining to the clonidine prescription and going to the ED.    Called Gloria to relay the information below and she is requesting frequency information for administering clonidine.     cloNIDine (CATAPRES) 0.1 MG tablet  16 tablet  0  9/16/2020   --    Sig - Route: Take 1 tablet (0.1 mg) by mouth 4 times daily as needed (opiate withdrawsl symptoms) - [facility]. - [place], [place] - [address]     Last MS Contin was administered on 9/13/20.     Sarina Polanco, KATERINAN, RN  Care Coordinator  [facility] [place]

## 2020-09-16 NOTE — TELEPHONE ENCOUNTER
Patients group home called stating that patient is having severe withdrawal symptoms. She would like to speak to a nurse    [phone]    Reanna Reagan    [facility]

## 2020-09-16 NOTE — TELEPHONE ENCOUNTER
Received fax from pharmacy requesting refill(s) of morphine (MS CONTIN) 15 MG CR tablet       Last sent from pharmacy on 08/07/20    Pt last seen by prescribing provider on 08/05/20     Next appt scheduled for 09/28/20     checked in the past 6 months? Yes If no, print current report and give to RN    Last urine drug screen date 03/02/20  Current opioid agreement on file (completed within the last year) Yes Date of opioid agreement: 03/02/20    Processing (pick one and delete the others):      E-prescribe to     [facility] [employer] - [place], [place] - [address]  [address]  [facility] [address]  Phone: [phone] Fax: [phone]    Will route to nursing pool for review and preparation of prescription(s).    Imelda Jolley [facility]  [place]

## 2020-09-16 NOTE — TELEPHONE ENCOUNTER
Pt checking the status of his refill/Medication. Pt stated he has an appt and it was the soonest he can get. He is requesting a call back from nursing.      Kurt MINAYA    [facility] [place]

## 2020-09-16 NOTE — TELEPHONE ENCOUNTER
This is being managed in a separate encounter.     KATERINA CardenasN, RN  Care Coordinator  [facility] [place]

## 2020-09-16 NOTE — TELEPHONE ENCOUNTER
I'm ordered Clonidine 0.1 mg QID PRN to manage withdrawal symptoms. If this is ineffective, he sdould to to the ED. Please get a direct number for the nurse at the group home so I can call her if I have a break.     CARLOS A Bass, NP-C  [facility] [place]

## 2020-09-17 ENCOUNTER — TELEPHONE (OUTPATIENT)
Dept: PALLIATIVE MEDICINE | Facility: CLINIC | Age: 35
End: 2020-09-17

## 2020-09-17 NOTE — TELEPHONE ENCOUNTER
Pt asking for a call back from Tamiko Stevens to discuss his medications.    Kurt MINAYA    [facility] [place]

## 2020-09-17 NOTE — TELEPHONE ENCOUNTER
Called placed to nurse was not answered. I do not see that Shoaib went to the ER last night. If they call back. Clonidine is every 6 hrs.     CARLOS A Bass, NP-C  [facility] [place]

## 2020-09-18 ENCOUNTER — TELEPHONE (OUTPATIENT)
Dept: PSYCHIATRY | Facility: CLINIC | Age: 35
End: 2020-09-18

## 2020-09-18 ENCOUNTER — VIRTUAL VISIT (OUTPATIENT)
Dept: PSYCHIATRY | Facility: CLINIC | Age: 35
End: 2020-09-18
Attending: NURSE PRACTITIONER
Payer: COMMERCIAL

## 2020-09-18 DIAGNOSIS — F11.21 OPIOID USE DISORDER, SEVERE, IN SUSTAINED REMISSION (H): ICD-10-CM

## 2020-09-18 DIAGNOSIS — F15.21 AMPHETAMINE-TYPE SUBSTANCE USE DISORDER, SEVERE, IN SUSTAINED REMISSION (H): ICD-10-CM

## 2020-09-18 DIAGNOSIS — F98.8 ATTENTION DEFICIT DISORDER, UNSPECIFIED HYPERACTIVITY PRESENCE: ICD-10-CM

## 2020-09-18 DIAGNOSIS — F32.89 OTHER DEPRESSION: Primary | ICD-10-CM

## 2020-09-18 RX ORDER — DULOXETIN HYDROCHLORIDE 60 MG/1
60 CAPSULE, DELAYED RELEASE ORAL DAILY
Qty: 30 CAPSULE | Refills: 1 | Status: SHIPPED | OUTPATIENT
Start: 2020-09-18 | End: 2020-10-21

## 2020-09-18 NOTE — TELEPHONE ENCOUNTER
On 9/18/2020, at 1251, writer called patient at mobile to confirm Virtual Visit. Writer unable to make contact with patient. Writer left detailed voice message for callback. [phone], left as call back number. AMELIA Julian, EMT

## 2020-09-18 NOTE — PATIENT INSTRUCTIONS
-Continue on current medication regimen for now.  However, after next week's appointment with pain management, if you need 1)increase in Cymbalta for anxiety and mood, 2) change Adderall to extended release Adderall or 3) indicate you should not be given Adderall afternoon dose after 2pm, please update Meagan.  If you continue on current mediation regimen, follow up in 3 months.    Thank you for coming to the PSYCHIATRY CLINIC.    Lab Testing:  If you had lab testing today and your results are reassuring or normal they will be mailed to you or sent through [employer] within 7 days. If the lab tests need quick action we will call you with the results. The phone number we will call with results is # [phone] (home) . If this is not the best number please call our clinic and change the number.    Medication Refills:  If you need any refills please call your pharmacy and they will contact us. Our fax number for refills is [phone]. Please allow three business for refill processing. If you need to  your refill at a new pharmacy, please contact the new pharmacy directly. The new pharmacy will help you get your medications transferred.     Scheduling:  If you have any concerns about today's visit or wish to schedule another appointment please call our office during normal business hours [phone] (8-5:00 M-F)    Contact Us:  Please call [phone] during business hours (8-5:00 M-F).  If after clinic hours, or on the weekend, please call  [phone].    Financial Assistance [phone]  [facility] Billing [phone]  Central Billing Office, [facility]: [phone]  [place] Billing [phone]  Medical Records [phone]      MENTAL HEALTH CRISIS NUMBERS:  For a medical emergency please call  [phone] or go to the nearest ER.     [facility]:   [facility] -[phone]   Crisis Residence [facility] -[phone]   Walk-In Counseling Center [facility] -[phone]   COPE 24/7  [facility] Mobile Team -[phone] (adults)/[phone] (child)  CHILD: [facility] needs assessment team - [phone]      [facility] - [phone]   Walk-in counseling [facility] - [phone]   Walk-in counseling [facility] - [phone]   Crisis Residence [facility] - [phone]  Urgent Care Adult Mental [facility]-[phone] mobile unit/ 24/7 crisis line    National Crisis Numbers:   National Suicide Prevention Lifeline: 7-315-440-TALK (176-880-1130)  Poison Control Center - 1-736.650.6645  [employer]/resources for a list of additional resources (SOS)  Trans Lifeline a hotline for transgender people 6-794-964-6873  [employer] a hotline for LGBT youth 1-552.221.6317  Crisis Text Line: For any crisis 24/7   To: 816528  see www.crisistextline.org  - IF MAKING A CALL FEELS TOO HARD, send a text!         Again thank you for choosing PSYCHIATRY CLINIC and please let us know how we can best partner with you to improve you and your family's health.    You may be receiving a survey regarding this appointment. We would love to have your feedback, both positive and negative. The survey is done by an external company, so your answers are anonymous.

## 2020-09-18 NOTE — PROGRESS NOTES
Start Time:  1330      End Time: 1355    Telemedicine Visit: The patient's condition can be safely assessed and treated via synchronous audio and visual telemedicine encounter.      Reason for Telemedicine Visit: Due to COVID 19 pandemic, clinic switching all appointments to telemedicine     Originating Site (Patient Location): Patient's home    Distant Site (Provider Location): Provider Remote Setting    Consent:  The patient/guardian has verbally consented to: the potential risks and benefits of telemedicine (video visit) versus in person care; bill my insurance or make self-payment for services provided; and responsibility for payment of non-covered services.     Mode of Communication:  Video Conference via Doxy.    As the provider I attest to compliance with applicable laws and regulations related to telemedicine.    Psychiatry Clinic Progress Note                                                                  Patient Name: Hoang Kiser  YOB: 1985  MRN: [id number]  Date of Service:  9/18/2020  Last Seen:8/28/2020    Hoang Kiser is a 35 year old person assigned male at birth, identifies as cisgender male who uses the name Shoaib and pronoun lilia.      Shoaib Kiser is a 35 year old year old adult who presents for ongoing psychiatric care.  Shoaib Kiser was last seen on 8/28/2020.     At that time,     Medication Ordered/Consults/Labs/tests Ordered:      Medication:   -Discontinue Klonopin  -Continue Adderall 10 mg 2 times a day and Cymbalta 60 mg daily for now  OTC Recommendations: none  Lab Orders:  none  Referrals: none  Release of Information: none  Future Treatment Considerations: Per symptoms.   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  Psych critical item history includes mutiple  "psychotropic trials, SUBSTANCE USE: amphetamines and opiates and substance use treatment .   Medical complication includes functional movement disorder     Previous Psychiatric Meds: Celexa, Wellbutrin, Trazodone, Effexor, Amitriptyline, Gabapentin, Tegretol, Zoloft, reports all not effective, Ritalin (movement worse), Klonopin (tapered off 8/28/2020)     Interim History                                                                                                        4, 4     Since the last visit, pt notes his mood and anxiety have been all over, but anxiety is fairly well managed. Denies SI, SIB or HI. Describes mood as \"flipply, bipolar.\"  Pt thinks this is likely very contextual; pt lost access to his phone x 1 week, has been out of Morphine x 1.5 weeks and because of this, has been on Clonidine PRN.  Pt feels he can use more, but because it is written as QID PRN and group home divides that within 24 hr period of time, only taking x1-2/day.  But has an appointment with pain management next week (appears pt had few no show with pain management).  Continues to have fluctuating sleep depending on pain level, sleeping all day vs not sleeping for new days.  Wondering if Adderall can be adjusted as PRN or increase dose as he notes some days when he needs to sleep, he can't refuse afternoon dose, but some other days, he needs more energy to help going.    Pt submitted BP log of last 2 weeks and BP ranges from 109//78.      Denies any symptoms suggestive of hypomania or psychosis.    Current Suicidality/Hx of Suicide Attempts: Denies both   CoCominent Medical concerns: Chronic pain and FMD flare ups     Medication Side Effects: The patient denies all medication side effects.      Medical Review of Systems     Apart from the symptoms mentioned int he HPI, the 14 point review of systems, including constitutional, HEENT, cardiovascular, respiratory, gastrointestinal, genitourinary, musculoskeletal, integumentary, " endocrine, neurological, hematologic and allergic is entirely negative except Chronic pain and FMD flare ups.      Substance Use   Pt has been staying substance free since last seen.  Denies any substance use currently x 5 years. Active in NA.      Medical / Surgical History                                                                                                                  Patient Active Problem List   Diagnosis     Neuropathy     Moderate major depression (H)     Tobacco abuse     Attention deficit hyperactivity disorder (ADHD), predominantly inattentive type     Primary insomnia     Panic disorder without agoraphobia     Abnormal involuntary movement     Tic disorder     Anxiety     Posttraumatic stress disorder with dissociative symptoms     Chronic left hip pain     Chronic pain of right hip     Degenerative disc disease at L5-S1 level     Functional movement disorder     Family history of Crohn's disease     Chronic low back pain     Chronic pain syndrome     History of substance abuse (H)     Family history of multiple myeloma     History of electroencephalogram     Nonallergic rhinitis     Fatigue, unspecified type     Generalized social phobia     Stimulant dependence (H)     Major depressive disorder, single episode, moderate (H)     Chronic post-traumatic stress disorder (PTSD)     Movement disorder       Past Surgical History:   Procedure Laterality Date     COLONOSCOPY  02/15/18    Has not happened yet.     COLONOSCOPY N/A 2/15/2018    Procedure: COMBINED COLONOSCOPY, SINGLE OR MULTIPLE BIOPSY/POLYPECTOMY BY BIOPSY;;  Surgeon: Liam Kincaid MD;  Location: [facility] OR     COLONOSCOPY WITH CO2 INSUFFLATION N/A 2/15/2018    Procedure: COLONOSCOPY WITH CO2 INSUFFLATION;  COLON-FAMILY HX OF COLON CANCER/ SYPURA;  Surgeon: Liam Kincaid MD;  Location: [facility] OR     HC TOOTH EXTRACTION W/FORCEP Bilateral 2003     PE TUBES  1990        Social/ Family History                                   [per patient report]                                 1ea,1ea   Living arrangements: lives in group home, feels safe.  Social Support: NA, group home leader/members.  Access to gun: none    Allergy                                Amitriptyline; Buspirone hcl; Doxycycline; Trazodone; Buspirone; and Cephalexin    Current Medications                                                                                                       Current Outpatient Medications   Medication Sig Dispense Refill     albuterol (PROAIR HFA/PROVENTIL HFA/VENTOLIN HFA) 108 (90 Base) MCG/ACT inhaler INHALE ONE TO TWO PUFFS INTO THE LUNGS EVERY FOUR HOURS AS NEEDED FOR SHORTNESS OF BREATH/ DYSPNEA OR WHEEZING (Patient not taking: Reported on 9/3/2020) 18 g 1     amphetamine-dextroamphetamine (ADDERALL) 10 MG tablet Take 1 tablet (10 mg) by mouth 2 times daily 60 tablet 0     cloNIDine (CATAPRES) 0.1 MG tablet Take 1 tablet (0.1 mg) by mouth 4 times daily as needed (opiate withdrawsl symptoms) 16 tablet 0     DULoxetine (CYMBALTA) 60 MG capsule Take 60 mg by mouth daily       HYDROcodone-acetaminophen (NORCO) 5-325 MG tablet Take 1 tablet by mouth 3 times daily as needed for pain Ok to fill/start 8/5/20 60 tablet 0     hydrOXYzine (ATARAX) 25 MG tablet Take 1 tablet (25 mg) by mouth nightly as needed (at HS PRN) 45 tablet 3     morphine (MS CONTIN) 15 MG CR tablet Take 1 tablet (15 mg) by mouth every 12 hours Ok to fill/start on 8/5/20 60 tablet 0     naloxone (NARCAN) 4 MG/0.1ML nasal spray Spray 1 spray (4 mg) into one nostril alternating nostrils as needed for opioid reversal every 2-3 minutes until assistance arrives (Patient not taking: Reported on 9/3/2020) 0.2 mL 0     naproxen (NAPROSYN) 500 MG tablet Take 1 tablet (500 mg) by mouth 2 times daily (with meals) 40 tablet 3     order for DME Equipment being ordered: Digital home blood pressure monitor kit 1 each 0     order for DME Equipment being ordered: 4 wheeled walker with bench  "seat. 1 Units 0     oxybutynin ER (DITROPAN-XL) 10 MG 24 hr tablet TAKE 1 TABLET BY MOUTH ONCE DAILY *1 TOTAL FILL* **MUST KEEP APPOINTMENT ON 7/31/20 FOR MORE REFILLS* 90 tablet 1     SF 5000 PLUS 1.1 % CREA        tiZANidine (ZANAFLEX) 4 MG tablet 4mg tab by mouth daily as needed 30 tablet 1     tiZANidine (ZANAFLEX) 4 MG tablet 4mg tab by mouth daily as needed 30 tablet 1          Mental Status Exam                                                                                   9, 14 cog        Alertness: alert  and oriented  Appearance:  Casually dressed and Adequately groomed  Behavior/Demeanor: cooperative, pleasant and calm, with fair  eye contact   Speech: regular rate and rhythm  Mood :  \"all over\"  Affect: almost full range; was not congruent to mood; was not congruent to content  Thought Process (Associations):  Linear and Goal directed  Thought process (Rate):  Normal  Thought content:  no overt psychosis, denies suicidal ideation, intent or thoughts and patient does not appear to be responding to internal stimuli  Perception:  Reports none;  Denies auditory hallucinations and visual hallucinations  Attention/Concentration:  Fair  Memory:  Immediate recall intact and Short-term memory intact  Language: intact  Fund of Knowledge/Intelligence:  Average  Abstraction:  Normal  Insight:  Adequate and Fair  Judgment:  Fair and Adequate for safety  Cognition: (6) does  appear grossly intact; formal cognitive testing was not done    Physical Exam     Motor activity/EPS:  Normal  Gait:  Normal  Psychomotor: normal or unremarkable    Labs and Results      Pertinent findings on review include: Review of records with relevant information reported in the HPI.  Reviewed pt's past medical record and obtained collateral information.      [place] PRESCRIPTION MONITORING PROGRAM [] was checked today:  indicates Adderall 9/16.    PHQ9 Today:  N/A  PHQ 3/22/2019 10/22/2019 8/20/2020   PHQ-9 Total Score 11 9 17   Q9: " "Thoughts of better off dead/self-harm past 2 weeks Not at all Not at all Not at all       JIN 7 Today: N/A  JIN-7 SCORE 3/22/2019 10/22/2019 8/20/2020   Total Score - - -   Total Score 12 14 8       Recent Labs   Lab Test 01/30/20  0942 03/27/19  1438 06/15/18  0853   CR 1.01 0.85 0.92   GFRESTIMATED >90 >90 >90     Recent Labs   Lab Test 01/30/20  0942 03/27/19  1438   AST 19 26   ALT 31 36   ALKPHOS 60 61     PSYCHOTROPIC DRUG INTERACTIONS:    Norco---Vistaril: Concurrent use of HYDROCODONE and ANTICHOLINERGIC CNS DEPRESSANTS may result in increased risk of paralytic ileus; increased risk of respiratory and CNS depression.   Adderall---Cymbalta: Concurrent use of AMPHETAMINES and SEROTONERGIC AGENTS THAT INHIBIT CYP2D6 may result in increased amphetamine exposure and increased risk of serotonin syndrome.   Morphine---Adderall: Concurrent use of MORPHINE and SEROTONERGIC AGENTS may result in increased risk of serotonin syndrome.   Norco---Adderall: Concurrent use of HYDROCODONE and SEROTONERGIC AGENTS may result in increased risk of serotonin syndrome.   Morphine---Norco: Concurrent use of MORPHINE and SEROTONERGIC CNS DEPRESSANTS may result in increased risk of respiratory and CNS depression; increased risk of serotonin syndrome.   Morphine---Norco: Concurrent use of MORPHINE and ANTICHOLINERGIC CNS DEPRESSANTS may result in increased risk of paralytic ileus; increased risk of respiratory and CNS depression.      MANAGEMENT:  Monitoring for adverse effects, routine vitals and patient is aware of risks      Impression/Assessment      Shoaib Kiser is a 35 year old adult  who presents for med management follow up. Pt appears mostly stable in his mood and anxiety despite his report of being \"all over.\"  Denies SI, SIB or HI.  Pt notes this is likely due to not having Morphine x 1.5 weeks and pt is taking Clonidine 0.1 mg x1-2/day for opioid withdrawal.  Pt has an appt with pain management next week, but appears " having numerous no show.  Pt continues to report fluctuating sleep patterns from hypersomnia to not sleeping at all due to pain level.  Pt is wondering if Adderall can be changed as PRN and/or dose increase.  Discussed Adderall is not typically used as PRN, but if he has difficulties staying awake for afternoon dose, may change sig as not to take afternoon dose if he is sleep.  However, discussed Adderall is not used as PRN or varied dose unless not needing second dose due to not needing extended concentration.  Discussed possibility of switching to extended release to help with anxiety and energy level.  Pt has hx of ADD, but if pt would continue trying to adjust Adderall, may need updated neuropsych testing.  It appears pt is using stimulant to help with energy rather than ADHD.  Also discussed possible increase in Cymbalta to help with mood and sleep since his BP is stable.  Pt decided to stay on current mediation regimen for now until he sees pain management next week.  If he needs adjustment of medication (Cymbalta increase or change to Adderall XR or making 2nd dose of Adderall IR not to give after 2pm), pt would contact this writer and follow up accordingly.  If he does not need any mediation adjustment, will follow up in 3 months.  Pt only needed refill of Cymbalta today as he just refilled Adderall XR.      Diagnosis                                                                   Depression (MDD vs Depression due to another medical condition)  Hx dx of ADD and PTSD  Amphetamine use disorder, severe, in sustained remission  Opioid use disorder, severe, in sustained remission    Treatment Recommendation & Plan       Medication Ordered/Consults/Labs/tests Ordered:     Medication:   Continue on current medication regimen for now.  However, after next week's appointment with pain management, if you need 1)increase in Cymbalta for anxiety and mood, 2) change Adderall to extended release Adderall or 3) indicate  you should not be given Adderall afternoon dose after 2pm, please update Meagan.    OTC Recommendations: none  Lab Orders:  none  Referrals: none  Release of Information: none  Future Treatment Considerations: Per symptoms.   Return for Follow Up: if no change, follow up in 3 months    -Discussed safety plan for suicidal thoughts  -Discussed plan for suicidality  -Discussed available emergency services  -Patient agrees with the treatment plan  -Encouraged to continue outpatient therapy to gain more coping mechanism for stress.    Treatment Risk Statement: Discussed with the patient my impressions, as well as recommended studies. I educated patient on the differential diagnosis and prognosis. I discussed with the patient the risks and benefits of medications versus no interventions, including efficacy, dose, possible side effects and length of treatment and the importance of medication compliance.  The patient understands the risks, benefits, adverse effects and alternatives. Agrees to treatment with the capacity to do so. No medical contraindications to treatment. The patient also understands the risks of using street drugs or alcohol.     CRISIS NUMBERS:   Provided routinely in AVS.      Meagan Bowman CNP,  9/18/2020

## 2020-09-21 ENCOUNTER — VIRTUAL VISIT (OUTPATIENT)
Dept: PALLIATIVE MEDICINE | Facility: CLINIC | Age: 35
End: 2020-09-21
Payer: COMMERCIAL

## 2020-09-21 DIAGNOSIS — M47.816 FACET ARTHROPATHY, LUMBAR: Primary | ICD-10-CM

## 2020-09-21 DIAGNOSIS — G25.9 FUNCTIONAL MOVEMENT DISORDER: ICD-10-CM

## 2020-09-21 PROCEDURE — 96158 HLTH BHV IVNTJ INDIV 1ST 30: CPT | Mod: 95 | Performed by: PSYCHOLOGIST

## 2020-09-21 PROCEDURE — 96159 HLTH BHV IVNTJ INDIV EA ADDL: CPT | Mod: 95 | Performed by: PSYCHOLOGIST
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
## 2020-09-21 NOTE — TELEPHONE ENCOUNTER
Spoke with Gloria and let her know that the clonidine is every 6 hours PRN for withdrawal symptoms. She reports he is no longer having withdrawal so they will hold the clonidine.     KATERINA CardenasN, RN  Care Coordinator  [facility] [place]

## 2020-09-22 ENCOUNTER — TELEPHONE (OUTPATIENT)
Dept: BEHAVIORAL HEALTH | Facility: CLINIC | Age: 35
End: 2020-09-22

## 2020-09-22 NOTE — TELEPHONE ENCOUNTER
Behavioral Health Home Services  [facility]: [place]        Social Work Care Navigator Note      Patient: Hoang Kiser  Date: September 22, 2020  Preferred Name: Shoaib    Previous PHQ-9:   PHQ-9 SCORE 3/22/2019 10/22/2019 8/20/2020   PHQ-9 Total Score - - -   PHQ-9 Total Score 11 9 17     Previous JIN-7:   JIN-7 SCORE 3/22/2019 10/22/2019 8/20/2020   Total Score - - -   Total Score 12 14 8     MOLLY LEVEL:  MOLLY Score (Last Two) 10/1/2018 8/20/2020   MOLLY Raw Score 31 35   Activation Score 59.3 72.1   MOLLY Level 3 3       Preferred Contact:  Need for : No  Preferred Contact: Cell      Type of Contact Today: Phone call (not reached/unavailable)      Data: (subjective / Objective):  Attempted to reach patient, but was unsuccessful.  Plan to attempt again.  LAKSHMI Kebede        Next 5 appointments (look out 90 days)    Sep 29, 2020 10:30 AM CDT  Return Visit with [place] PAIN NURSE  [facility] ([facility]) [address]  [address] [id number] [phone]

## 2020-09-24 ENCOUNTER — DOCUMENTATION ONLY (OUTPATIENT)
Dept: PSYCHIATRY | Facility: CLINIC | Age: 35
End: 2020-09-24

## 2020-09-24 ENCOUNTER — TELEPHONE (OUTPATIENT)
Dept: PSYCHIATRY | Facility: CLINIC | Age: 35
End: 2020-09-24

## 2020-09-24 NOTE — TELEPHONE ENCOUNTER
On 09/24/2020, at 1051, writer called patient at [phone] to confirm Virtual Visit. Writer unable to make contact with patient. Writer unable to leave detailed voice mail message due to voice mail jim full. Zohreh Mendoza MA

## 2020-09-24 NOTE — PROGRESS NOTES
Pt was no show for Doxy and Edy at 1145.  Called x1 at 1135, but mailbox was full, unable to leave VM. Pt needs to reschedule. Meagan Bowman, CNP, 9/24/2020

## 2020-09-28 ENCOUNTER — TELEPHONE (OUTPATIENT)
Dept: BEHAVIORAL HEALTH | Facility: CLINIC | Age: 35
End: 2020-09-28

## 2020-09-28 NOTE — TELEPHONE ENCOUNTER
Behavioral Health Home Services  [facility]: [place]        Social Work Care Navigator Note      Patient: Hoang Kiser  Date: September 28, 2020  Preferred Name: Shoaib    Previous PHQ-9:   PHQ-9 SCORE 3/22/2019 10/22/2019 8/20/2020   PHQ-9 Total Score - - -   PHQ-9 Total Score 11 9 17     Previous JIN-7:   JIN-7 SCORE 3/22/2019 10/22/2019 8/20/2020   Total Score - - -   Total Score 12 14 8     MOLLY LEVEL:  MOLLY Score (Last Two) 10/1/2018 8/20/2020   MOLLY Raw Score 31 35   Activation Score 59.3 72.1   MOLLY Level 3 3       Preferred Contact:  Need for : No  Preferred Contact: Cell      Type of Contact Today: Phone call (not reached/unavailable)      Data: (subjective / Objective):  Attempted to reach patient, but was unsuccessful. Voicemail box was full and unable too leave a message.    NURYS Henderson  Behavioral Health Home ([facility])   [facility]  [phone]        Next 5 appointments (look out 90 days)    Sep 29, 2020 10:30 AM CDT  Return Visit with [place] PAIN NURSE  [facility] ([facility]) [address]  [address] [address] [phone]

## 2020-09-29 ENCOUNTER — OFFICE VISIT (OUTPATIENT)
Dept: PALLIATIVE MEDICINE | Facility: CLINIC | Age: 35
End: 2020-09-29
Payer: COMMERCIAL

## 2020-09-29 DIAGNOSIS — Z79.891 ENCOUNTER FOR LONG-TERM USE OF OPIATE ANALGESIC: Primary | ICD-10-CM

## 2020-09-29 NOTE — PROGRESS NOTES
Obtained urine specimen.  Medication list completed YES:  Specimen sent to the lab     Imelda Jolley [facility]

## 2020-10-03 LAB — PAIN DRUG SCR UR W RPTD MEDS: NORMAL

## 2020-10-05 ENCOUNTER — TELEPHONE (OUTPATIENT)
Dept: BEHAVIORAL HEALTH | Facility: CLINIC | Age: 35
End: 2020-10-05

## 2020-10-05 NOTE — NURSING NOTE
"Chief Complaint   Patient presents with     Pain       Initial /80 (BP Location: Right arm, Patient Position: Chair, Cuff Size: Adult Small)  Pulse 77  Resp 16  Wt 76.2 kg (168 lb)  SpO2 99%  BMI 25.92 kg/m2 Estimated body mass index is 25.92 kg/(m^2) as calculated from the following:    Height as of 2/1/18: 1.715 m (5' 7.5\").    Weight as of this encounter: 76.2 kg (168 lb).  Medication Reconciliation: complete       Campbell Kemp MA  [facility]      " I called the mother with results.  Some hair loss on face, axillae is likely iatrogenic: Spironolactone has  anti-androgenic effects and can explain the clinical presentation (loss of hair and, in some degree, gynecomastia) and the lower level of testosterone.  Prolactin is mildly elevated, likely iatrogenic (the effect of Risperidone), also the stress of blood drawn could increase prolactin to this range (40).                                                                    Plan:  - Continue same treatment to supplement Calcium, Vit D3  - Maintain calcium-containing foods in his diet  - Will discuss with the other specialists who are treating Brock if Spironolactone and Risperidone can be replaced with similar drugs which lack the above side effects    Mother verbalized understanding.

## 2020-10-05 NOTE — TELEPHONE ENCOUNTER
Behavioral Health Home Services  [facility]: [place]        Social Work Care Navigator Note      Patient: Hoang Kiser  Date: October 5, 2020  Preferred Name: Shoaib    Previous PHQ-9:   PHQ-9 SCORE 3/22/2019 10/22/2019 8/20/2020   PHQ-9 Total Score - - -   PHQ-9 Total Score 11 9 17     Previous JIN-7:   JIN-7 SCORE 3/22/2019 10/22/2019 8/20/2020   Total Score - - -   Total Score 12 14 8     MOLLY LEVEL:  MOLLY Score (Last Two) 10/1/2018 8/20/2020   MOLLY Raw Score 31 35   Activation Score 59.3 72.1   MOLLY Level 3 3       Preferred Contact:  Need for : No  Preferred Contact: Cell      Type of Contact Today: Phone call (not reached/unavailable)      Data: (subjective / Objective):  Attempted to reach patient, but was unsuccessful.  Plan to attempt again.  KATERINA KebedeW

## 2020-10-06 ENCOUNTER — VIRTUAL VISIT (OUTPATIENT)
Dept: BEHAVIORAL HEALTH | Facility: CLINIC | Age: 35
End: 2020-10-06
Payer: COMMERCIAL

## 2020-10-06 DIAGNOSIS — R69 DIAGNOSIS DEFERRED: Primary | ICD-10-CM

## 2020-10-06 NOTE — PROGRESS NOTES
Behavioral Health Home Services  [facility]: [place]        Social Work Care Navigator Note      Patient: Hoang Kiser  Date: October 6, 2020  Preferred Name: Shoaib    Previous PHQ-9:   PHQ-9 SCORE 3/22/2019 10/22/2019 8/20/2020   PHQ-9 Total Score - - -   PHQ-9 Total Score 11 9 17     Previous JIN-7:   JIN-7 SCORE 3/22/2019 10/22/2019 8/20/2020   Total Score - - -   Total Score 12 14 8     MOLLY LEVEL:  MOLLY Score (Last Two) 10/1/2018 8/20/2020   MOLLY Raw Score 31 35   Activation Score 59.3 72.1   MOLLY Level 3 3       Preferred Contact:  Need for : No  Preferred Contact: Cell      Type of Contact Today: Phone call (patient / identified key support person reached)      Data: (subjective / Objective):  Recent ED/IP Admission or Discharge?   None    Patient Goals:  Goal Areas: Health;Mental Health;Chemical Health;Employment / Volunteer;Financial and Social Service Benefits  Patient stated goals: Patient stated that he would like to obtain an ILS worker through his CADI waiver. Patient has worked with 2 different ILS workers and due to COVID 19 is not seeing an ILS worker at this time. Patient would like to resume this service once able too Patient has communicated with his  about this; Patient would like to look into intensive therapy program at [facility] in [place]. If patient is able to get into this program he would like to look into housing options available in the [place] area.; Patient would like to continue to work on self-advocacy skills. Patient is able to communicate his health needs. It is important to the patient to continue to work with his support team in order to continue to maintain his health and sobriety; Patient would like to continue to volunteer with the [employer]. This will resume once COVID 19 has cleared; Patient continues to work towards getting approved for SSDI. He is currently in the appeal process and will seek additional legal resources if denied again.  "Patient stated he would like to continue to grow his skills with coping and stress management. He continues to work on this by going for walks, playing guitar and developing a comfort board.        [facility] Core Service Provided:  Care Coordination: provided care management services/referrals necessary to ensure patient and their identified supports have access to medical, behavioral health, pharmacology and recovery support services.  Ensured that patient's care is integrated across all settings and services.     Current Stressors / Issues / Care Plan Objective Addressed Today:  Patient returned CC's phone call from previous day. Patient and CC scheduled HAP review Friday 10/9/20 at 11am. Patient stated that he has a \"Residual Functional Capacity\" form that needs to be filled out by his PCP in order for him to move forward with this SSDI application. CC asked patient to send the form to her and she can talk to PCP about this. CC advised that PCP will likely want patient to schedule a face to face appointment for this form to be completed. CC will help connect patient with appointment line if this is the case. Patient reports that he is also locked out of his Mychart. CC will check to see who he needs to contact to get this resolved.     Intervention:  Motivational Interviewing: Expressed Empathy/Understanding   Target Behavior(s): Explored and resolved challenges to attending appointments as scheduled    Assessment: (Progress on Goals / Homework):  Patient would benefit from continued coordination in reaching their goals set for the Behavioral Health Home ([facility]) program. CC reviewed Health Action Plan goals and will continue to monitor progress and work with patient and their care team.    Plan: (Homework, other):  Patient was encouraged to continue to seek condition-related information and education. SWCC, patient, and team will continue to work towards progress on reaching goals set for the Behavioral " Health Home ([facility]) program.       Scheduled a Phone follow up appointment with BROWN BURGESS in 1 week     Patient has set self-identified goals and will monitor progress until the next appointment on: 10/09/20.     NURYS Henderson  Behavioral Health Home ([facility])   [facility]  [phone]

## 2020-10-06 NOTE — Clinical Note
Benjamin Floyd,    I just got off the phone with this patient and he is in the process of applying for SSDI. He needs a Residual Functional Capacity form completed by his PCP to move forward with this application. He is going to send me the form today. I advised that he will need to scheduled an appt with you to have this filled out. Would he need an in person appt or would you be able to do a virtual appt? I will support him with getting this scheduled.   Thank you!  Janet Ng [facility]  Behavioral Health [place] ([facility])   [facility]  [phone]

## 2020-10-08 ENCOUNTER — VIRTUAL VISIT (OUTPATIENT)
Dept: PALLIATIVE MEDICINE | Facility: CLINIC | Age: 35
End: 2020-10-08
Payer: COMMERCIAL

## 2020-10-08 DIAGNOSIS — G89.4 CHRONIC PAIN SYNDROME: ICD-10-CM

## 2020-10-08 DIAGNOSIS — F15.20 METHAMPHETAMINE USE DISORDER, SEVERE (H): Primary | ICD-10-CM

## 2020-10-08 DIAGNOSIS — G25.9 FUNCTIONAL MOVEMENT DISORDER: ICD-10-CM

## 2020-10-08 PROCEDURE — 99214 OFFICE O/P EST MOD 30 MIN: CPT | Mod: 95 | Performed by: NURSE PRACTITIONER

## 2020-10-08 ASSESSMENT — PAIN SCALES - GENERAL: PAINLEVEL: SEVERE PAIN (7)

## 2020-10-08 NOTE — Clinical Note
Please see my notes about current methamphetamine use. All controlled substances are discontinued from Pain Center.   CARLOS A Bass, NP-C  [facility] Pain Management [place]

## 2020-10-08 NOTE — PROGRESS NOTES
"Hoang Kiser is a 35 year old male who is being evaluated via a billable video visit.      The patient has been notified of following:   \"This video visit will be conducted via a call between you and your physician/provider. We have found that certain health care needs can be provided without the need for an in-person physical exam.  This service lets us provide the care you need with a video conversation.  If a prescription is necessary we can send it directly to your pharmacy.  If lab work is needed we can place an order for that and you can then stop by our lab to have the test done at a later time.  Video visits are billed at different rates depending on your insurance coverage.  Please reach out to your insurance provider with any questions.  If during the course of the call the physician/provider feels a video visit is not appropriate, you will not be charged for this service.\"  Patient has given verbal consent for Video visit? Yes  How would you like to obtain your AVS? MyChart  If you are dropped from the video visit, the video invite should be resent to: Text to cell phone: [phone]  Will anyone else be joining your video visit? No    Clara Rueda CMA (AAMA)      Video-Visit Details    Type of service:  Video Visit    Video Start Time: 3:00 PM  Video End Time: 3:28 PM  Originating Location (pt. Location): Other walking outside  Distant Location (provider location):  [facility]   Platform used for Video Visit: CARLOS A Frtiz NP-C  [facility]        CHIEF COMPLAINT: chronic pain    INTERVAL HISTORY:  Last seen on 8/5/20        Recommendations/plan at the last visit included:  1. Attend all pain psychology visits as scheduled per Dr. Kemp's recommendations.  2. Attend all pain PT visits per Diana Luong's recommendations.  Schedule VIDEO pain PT video visits.  3. Schedule VIDEO follow-up with CARLOS A iHguera NPORAL in " 4 weeks  4. Medication recommendations:             1. Refills of MS Contin and hydrocodone sent to pharmacy.    Since last visit:   - Shoaib admits that he started using methamphetamine again in early 2020.        Pain Information: Not provided at this appt.     Annual Controlled Substance Agreement/UDS due date: N/A, controlled substance agreement is void due to illegal drug use.     Current Pain Relevant Medications: (prior to this appt.)  Norco 5/325 mg 1 tab BID-TID  #60 tabs per month                        Total opiate dose: 15 MME daily  Duloxetine 20 mg daily  Naproxen 500 mg BID: on hold re: elevated LFTs   Tizanidine 4 mg 1-2 tabs at HS PRN  Adderall 30 mg daily, combination of long and short acting.       Previous Pain Relevant Medications: (H--helped; HI--Helped initially; SWH--Somewhat helpful; NH--No help; W--worse; SE--side effects; ?--Unsure if helpful)   NOTE: This medication information taken from patient's intake form, not medical records.                         Opiates: Tramadol: H, Hydrocodone: H, Morphine: H                        NSAIDS: Ibuprofen:H, naproxen:H, Relafen: NH                        Muscle Relaxants: Cyclobenzaprine:H,Med interaction, Tizanidine:H, Baclofen: SWH                        Anti-migraine mediations: Prednisone:H                        Anti-depressants: Bupropion:SE, Celexa:SE, Duloxetine:H, Trazodone:Too strong, Venlafaxine:too strong, Amitriptyline:SE                         Sleep aids:Anbien: H                        Anxiolytics: Clonazepam:H                        Neuropathics: Tegretol:taken for seizures in childhood, Gabapentin:H                                          Topicals: Lidocaine:H                        Other medications not covered above: Tylenol:NH, Adderall: H      Any illicit drug use: Sober date 8/27/2015, lives in a sober house.   EtOH use: last use 5 years ago  Caffeine use: 2-3 per day  Nicotine use: 3/4 pack per day  Any use of prescriptions  other than how they were prescribed:taina      [place] Board of Pharmacy Data Base Reviewed:    YES; As expected, no concern for misuse/abuse of controlled medications based on this report.   Concern for multiple controlled substances including stimulants and opiates.  7/27/19: Concern for misuse of opiates and stimulants as noted in office visit on this date.   10/29/19: Requesting early refill due to overuse of opiate medication. #45 tabs to last 30 days. Refill declined.  8/31/20: Due to multiple no showed appts, refills of opiates are declined. Opiates are discontinued until has video appt.   October 8, 2020: Shoaib admits that he has been using methamphetamine since early 2020.      Is Narcan prescribed for opiate use >50 MME daily or concurrent use of opiates and benzodiazepines? YES    Medications:  Current Outpatient Medications   Medication Sig Dispense Refill     albuterol (PROAIR HFA/PROVENTIL HFA/VENTOLIN HFA) 108 (90 Base) MCG/ACT inhaler INHALE ONE TO TWO PUFFS INTO THE LUNGS EVERY FOUR HOURS AS NEEDED FOR SHORTNESS OF BREATH/ DYSPNEA OR WHEEZING (Patient not taking: Reported on 9/3/2020) 18 g 1     amphetamine-dextroamphetamine (ADDERALL) 10 MG tablet Take 1 tablet (10 mg) by mouth 2 times daily 60 tablet 0     cloNIDine (CATAPRES) 0.1 MG tablet Take 1 tablet (0.1 mg) by mouth 4 times daily as needed (opiate withdrawsl symptoms) 16 tablet 0     DULoxetine (CYMBALTA) 60 MG capsule Take 1 capsule (60 mg) by mouth daily 30 capsule 1     HYDROcodone-acetaminophen (NORCO) 5-325 MG tablet Take 1 tablet by mouth 3 times daily as needed for pain Ok to fill/start 8/5/20 60 tablet 0     hydrOXYzine (ATARAX) 25 MG tablet Take 1 tablet (25 mg) by mouth nightly as needed (at HS PRN) 45 tablet 3     morphine (MS CONTIN) 15 MG CR tablet Take 1 tablet (15 mg) by mouth every 12 hours Ok to fill/start on 8/5/20 60 tablet 0     naloxone (NARCAN) 4 MG/0.1ML nasal spray Spray 1 spray (4 mg) into one nostril alternating  nostrils as needed for opioid reversal every 2-3 minutes until assistance arrives (Patient not taking: Reported on 9/3/2020) 0.2 mL 0     naproxen (NAPROSYN) 500 MG tablet Take 1 tablet (500 mg) by mouth 2 times daily (with meals) 40 tablet 3     order for DME Equipment being ordered: Digital home blood pressure monitor kit 1 each 0     order for DME Equipment being ordered: 4 wheeled walker with bench seat. 1 Units 0     oxybutynin ER (DITROPAN-XL) 10 MG 24 hr tablet TAKE 1 TABLET BY MOUTH ONCE DAILY *1 TOTAL FILL* **MUST KEEP APPOINTMENT ON 7/31/20 FOR MORE REFILLS* 90 tablet 1     SF 5000 PLUS 1.1 % CREA        tiZANidine (ZANAFLEX) 4 MG tablet 4mg tab by mouth daily as needed 30 tablet 1     tiZANidine (ZANAFLEX) 4 MG tablet 4mg tab by mouth daily as needed 30 tablet 1       DIRE Score for ongoing opioid management is calculated as follows:    Diagnosis = 2 pts (slowly progressive; moderate pain/objective findings)    Intractability = 2 pts (most treatments tried; patient not fully engaged/barriers)    Risk        Psych = 2 pts (personality dysfunction/mental illness that moderately interferes with care)         Chem Hlth = 1 pt (active or very recent use of illicit drugs; excessive alcohol/drug abuse)       Reliability = 1 pt (medication misuse; missed appointments; rarely follows through)       Social = 1 pt (life in chaos; little family support/close relationships; loss normal life rolls)       (Psych + Chem hlth + Reliability + Social) = 9    Efficacy = 1 pt (poor function; minimal pain relief despite mod/high med dose)      DIRE Score = 10        7-13: likely NOT suitable candidate for long-term opioid analgesia       14-21: may be a suitable candidate for long-term opioid analgesia      DIAGNOSTIC TESTS:  SEE EMR    ASSESSMENT:   1.  Lumbar DDD, mild  2.  Lumbar muscle spasm  3.  Labral tear, right hip  4.  Hx: anxiety, chemical dependence in remission.  5.  Functional neurologic movement  "disorder  6.  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  Active use of methamphetamine.     Shoaib left a urine drug screen on 9/29/20 which was positive was methamphetamine. When told this he readily admits that he has been using since \"early 2020\". Due to COVID he had not had a urine drug screen for several months.     States that he wants outpatient treatment. States that he started and continued using meth because his movement attacks are 80-90 % improved with meth in his system. Says he's tried off and on experimenting using meth to control movements and feels better with meth in his system. \"I need help but I need to know why it [meth] works\" before he goes into treatment. States that if he goes into treatment he will lose his current housing. He wants to move because he can't be gone for 24 hrs, can't away go overnight. States he is doing some \"soul searching\" today and thinks he took on [employer] volunteerism with sobriety work and didn't focus on staying sober. \"Honestly attending outpatient therapy, even if it's on a computer would be good for me. I know I can't function without my ADD medication which is screwed up as well with all of this.\" States that he plans to tell psychiatric NP who prescribes Adderall that he using meth regularly. States last use of meth was 4-5 days ago. Throughout this conversation, he remains focused on keeping ADHD medications. I did let him know that I would forward this office note to Meagan STRAUSS who is his psychiatric NP and prescribes Adderall. Shoaib states that he has been taking Norco left from previous prescription filled on 8/5/20. I had ordered all opiates to be discontinued on 8/31/20 however they only discontinued MS Contin. I also informed him that I would be calling his group home to let them know that Norco was to have been discontinued and that he can wean off with remaining tabs by taking one per day until supply is gone. Meds are controlled and dispensed by " "group home staff. He has approx three tabs left. He is instructed to go to ED is withdrawal symptoms are too difficult. When I spoke with Gloria, the group home nurse, she told me that staff had speculated about his behavior and his saying that he was hearing voices and wondering about drug use. This was not reported to me or any other medical providers to my knowledge.     The following email was sent securely and encrypted to Shoaib's group home. Orders were previously given verbally by phone to Gloria group home nurse. Her cell number is [phone]    \"Shoaib Kiser   As of 10/8/20 hydrocodone/APAP 5/325 mg will be weaned with remaining tabs. Please allow 1 tab daily until remaining supply is gone. Medication will not be refilled. If Shoaib experiences opiate withdrawal symptoms, he has been advised to present to the emergency room. Please call with any questions or concerns.   CARLOS A Higuera NP-C   [facility]   [phone]\"      Plan:    Diagnosis reviewed, treatment option addressed, and risk/benifits discussed.  Self-care instructions given.  I am recommending a multidisciplinary treatment plan to help this patient better manage pain.      1. We will call after speaking to the Addiction Medicine team.   2. Schedule follow-up with CARLOS A Higuera NP-C in 4 weeks or sooner as needed   3. Therapies with pain center are on hold until addiction issues are addressed  4. Medication recommendations:   1. Norco 5/325 mg is discontinued. Group home staff are informed and are to dispense 1 tab per day until current supply of 3 tabs is gone. Patient is instructed to ED if needed for opiate withdrawal.     I have reviewed the note as documented above.  This accurately captures the substance of my conversation with the patient.    CARLOS A Bass, NP-C  [facility]        "

## 2020-10-09 ENCOUNTER — VIRTUAL VISIT (OUTPATIENT)
Dept: BEHAVIORAL HEALTH | Facility: CLINIC | Age: 35
End: 2020-10-09
Payer: COMMERCIAL

## 2020-10-09 ENCOUNTER — TELEPHONE (OUTPATIENT)
Dept: PALLIATIVE MEDICINE | Facility: CLINIC | Age: 35
End: 2020-10-09

## 2020-10-09 DIAGNOSIS — F15.20 METHAMPHETAMINE USE DISORDER, SEVERE (H): Primary | ICD-10-CM

## 2020-10-09 DIAGNOSIS — R69 DIAGNOSIS DEFERRED: Primary | ICD-10-CM

## 2020-10-09 NOTE — PROGRESS NOTES
Behavioral Health Home Services  [facility]: [place]        Social Work Care Navigator Note      Patient: Hoang Kiser  Date: October 9, 2020  Preferred Name: Shoaib    Previous PHQ-9:   PHQ-9 SCORE 3/22/2019 10/22/2019 8/20/2020   PHQ-9 Total Score - - -   PHQ-9 Total Score 11 9 17     Previous JIN-7:   JIN-7 SCORE 3/22/2019 10/22/2019 8/20/2020   Total Score - - -   Total Score 12 14 8     MOLLY LEVEL:  MOLLY Score (Last Two) 10/1/2018 8/20/2020   MOLLY Raw Score 31 35   Activation Score 59.3 72.1   MOLLY Level 3 3       Preferred Contact:  Need for : No  Preferred Contact: Cell      Type of Contact Today: Phone call (patient / identified key support person reached)      Data: (subjective / Objective):  Recent ED/IP Admission or Discharge?   None    Patient Goals:  Goal Areas: Health;Mental Health;Chemical Health;Employment / Volunteer;Financial and Social Service Benefits  Patient stated goals: Patient would like to resume ILS services. He was working with an [employer] worker and is in the process of trying to find a new one. Patient would like to do outpatient treatment at a facility that is not associated [facility]. Patient wants to continue to work on his sobriety and health. At this time he has gone 6 days without using. Patient continues to try and work on self-advocacy skills. He was seeing a therapist but reports that his therapist was discontinued because he had a UA that showed substance use. Patient is going to try and contact an old therapist he used to see and try to set up an appointment. Patient wants to take a break from volunteering at this time and focus on his health. Patient continues to work on his coping and stress management skills by taking walks, playing guitar and making comfort boards.         [facility] Core Service Provided:  Health and Wellness Promotion    Current Stressors / Issues / Care Plan Objective Addressed Today:  [facility] contacted patient to update Health Action Plan. See [facility] for  updates.    Patient discussed that he had an appointment recently with his pain management provider and she is denying him medications because something showed up in his UA. Patient would not state what showed up in his UA. Patient stated that what was in his UA is closely related to another prescribed drug that he takes and that he wants to re-test. Patient did later state that he was using a non prescribed substance but he has not used in the last 6 days. Patient reports that using helps his medical conditions. [facility] asked about his thoughts on outpatient treatment. Patient stated that he was willing to do outpatient treatment but does not want to do a treatment that is associated with [facility]. [facility] will check with supervisor on what this process would look like. Patient states that he is also needing a new therapist. [facility] offered to set up an appointment with [facility] but patient stated that he did not want a therapist through [facility]. He is going to try and reach out to a previous therapist he used to see. Patient stated that he may be changing providers and leaving [facility]. [facility] advised that if does [facility] will need to discharge him. Patient stated that he is aware of this.     Intervention:  Motivational Interviewing: Expressed Empathy/Understanding, Permission to raise concern or advise, Open-ended questions and Rolled with resistance: Simple reflection   Target Behavior(s): Explored thoughts and readiness to participate in individual therapy, Explored and resolved challenges to attending appointments as scheduled, Explored current social supports and reinforced opportunities to increase engagement and Explored patient's perception of how alcohol and / or drugs influences mood    Assessment: (Progress on Goals / Homework):  Patient would benefit from continued coordination in reaching their goals set for the Behavioral Health Home ([facility]) program. [facility] reviewed Health Action Plan goals and will continue to monitor  progress and work with patient and their care team.    Plan: (Homework, other):  Patient was encouraged to continue to seek condition-related information and education. [facility], patient, and team will continue to work towards progress on reaching goals set for the Behavioral Health Home ([facility]) program.     Janet Ng [facility]  Behavioral Health Home ([facility])   [facility]  [phone]

## 2020-10-09 NOTE — Clinical Note
Dane Torre- Routing to you so that I remember to touch base with you on this when you return. Pt is working with a couple different providers who are trying to support him but I am looking for clarification on where/how I can be of support.

## 2020-10-09 NOTE — PATIENT INSTRUCTIONS
After Visit Instructions:     Thank you for coming to [facility] for your care. It is my goal to partner with you to help you reach your optimal state of health.     Continue daily self-care, identifying contributing factors, and monitoring variations in pain level. Continue to integrate self-care into your life.      1. We will call after speaking to the Addiction Medicine team.   2. Schedule follow-up with CARLOS A Higuera NP-C in 4 weeks or sooner as needed.    3. Therapies with pain center are on hold until addiction issues are addressed.   4. Medication recommendations:   1. Norco 5/325 mg is discontinued. Group home staff are informed and are to dispense 1 tab per day until current supply of 3 tabs is gone. You have been instructed to ED if needed for opiate withdrawal.      CARLOS A Bass NP-C  [facility]    Clinic Number:  [phone]     Call with any questions about your care and for scheduling assistance.     Calls are returned Monday through Friday between 8 AM and 4:30 PM. We usually get back to you within 2 business days depending on the issue/request.    If we are prescribing your medications:    For opioid medication refills, call the clinic or send a [employer] message 7 days in advance.  Please include:    Name of requested medication    Name of the pharmacy.    For non-opioid medications, call your pharmacy directly to request a refill. Please allow 3-4 days to be processed.     Per [place] State Law:    All controlled substance prescriptions must be filled within 30 days of being written.      For those controlled substances allowing refills, pickup must occur within 30 days of last fill.      We believe regular attendance is key to your success in our program!      Any time you are unable to keep your appointment we ask that you call us at least 24 hours in advance to cancel.This will allow us to offer the appointment  time to another patient.   Multiple missed appointments may lead to dismissal from the clinic.

## 2020-10-09 NOTE — LETTER
Behavioral Health Home (Merged with [facility]): Health Action Plan  Merged with [facility] Clinic: [place]    [facility]      Name: Hoang Kiser  Preferred Name: Shoaib  : 1985  MRN: [id number]        My Goals  Goal Areas: Health;Mental Health;Chemical Health;Employment / Volunteer;Financial and Social Service Benefits    Patient stated goals: Patient would like to resume ILS services. He was working with an ILS worker and is in the process of trying to find a new one. Patient would like to do outpatient treatment at a facility that is not associated with [place]. Patient wants to continue to work on his sobriety and health. At this time he has gone 6 days without using. Patient continues to try and work on self-advocacy skills. He was seeing a therapist but reports that his therapist was discontinued because he had a UA that showed substance use. Patient is going to try and contact an old therapist he used to see and try to set up an appointment. Patient wants to take a break from volunteering at this time and focus on his health. Patient continues to work on his coping and stress management skills by taking walks, playing guitar and making comfort boards.     Strengths related to each goal: Patient is motivated; Patient is accepting of change; Patient is very organized; Patient is self-aware of his abilities and strengths; Patient works with his care team frequently; Patient is involved in his care    Services and Supports Needed: Patient may need additional legal resources for SSDI if denied again. Possible referral for outpatient treatment.     Activities / Actions of Team to support goal(s): [facility] will contact patient monthly for follow up; [facility] will provide resources for requested services. Care Team will remain available to patient for questions/ concerns/ resources.     Activities / Actions of Patient / Parent / Guardian to support goal(s): It is a requirement that patient's primary care physician is through the [facility]  [facility] system and that they are on Medical Assistance/Medicaid. If either of these were to change or if patient needs any type of assistance, they are to reach out to their Behavioral Health Home (H) team.        Recommended Referral  Tobacco cessation referrals made?: No  Mental Health / Chemical Dependency Referrals: Yes (Pt wants outpatient treatments outside [place])  Substance Use Referrals: Not Applicable  Mental Health Referrals: None  Dental         My Team Members and Their Contact Information  Patient Care Team       Relationship Specialty Notifications Start End    Phill Floyd MD PCP - General Family Practice  2/25/16     Phone: [phone] Fax: [phone] [address] [facility] [address]    Phill Floyd MD Assigned PCP   5/17/15     Phone: [phone] Fax: [phone] [address] [facility] [address]    Bryant Cao MD Referring Physician Neurology  11/23/15     Refer Evaluate for palatal tremor - essential vs symptomatic vs functional vs tic related.     Phone: [phone] Fax: [phone]         [address] [facility] [address]    Jorge Nelson MD MD Otolaryngology  11/23/15     Phone: [phone] Fax: [phone]         [facility] [address] [facility] [address]    Miley Trivedi MD MD Otolaryngology  10/17/17     Phone: [phone] Fax: [phone]         [address] [facility] [address] [facility] [address]    Melinda Diaz AuD Audiologist Audiology  10/17/17     Phone: [phone] Fax: [phone]         [address] [facility] [address] [facility] [address]    Faustino Feldman MD MD Family Medicine - Sports Medicine  5/28/19     Phone: [phone] Fax: [phone]         [address] [facility] [address]    William Burr worker   1/8/20     BRANDEE Izaguirre  - [facility]    Phone: [phone]         Janet Ng, BSW  Clinic  Admissions 3/11/20     [facility] [phone]          My Wellness Plan  Safety Concerns: None Reported / Observed  Recommendations / Plan for safety concerns: Call 911, Contact Mental [facility] Crisis Line (Ph: [phone]); Contact Care Team; Follow safety plan created by Mental Health Team, if applicable  Crisis Plan (emergencies / when urgent support needed): Patient lives in a Adult Foster Care home. Safety risks are monitored by staff at the home.       Hoang Kiser co-developed the Health Action Plan with the [facility] Team and received a copy of this document.  Date Health Action Plan Completed/Updated: 10/09/20

## 2020-10-09 NOTE — LETTER
"                                                       October 30, 2020    [facility]  [address] [place] [address]    Dear Hoang Kiser,    It was great talking with you on 10/9/2020. I have enclosed the Health Action Plan (HAP) that we completed together that includes your goals for Behavioral Health Home ([facility]) Services. Now that you are enrolled into Behavioral Health Home ([facility]) services, I wanted to give you some information so you know what to expect moving forward.    What to Expect on a Monthly Basis: It is a requirement that I make contact with you on a monthly basis (by phone or meeting with you in clinic) to check in on how things are going and if you need any assistance. Please feel free to reach out to your [facility] team between these contacts if you need assistance or have any questions.    What to Expect every Six Months: Every six months, we need to complete a Health Action Plan (HAP) review. During this in clinic appointment, we will monitor our progress towards existing goals and set new goals for the next 6 month time period.    What kinds of support can [facility] offer now that I am enrolled?   - Housing Coordination  - Transportation Resources  - Financial Resources  - Coordination with the [facility] for Benefits (MA, SNAP benefits, etc)  - Disability Eligibility and Benefits  - Medical Appointments and Medication Costs  - Employment and Education Coordination  - Disability Related Information and Education Resources  - Referrals for mental health services, chemical dependency assessment/treatment, etc     If you or someone you know is experiencing a mental health crisis and you need help, the following crisis hotlines are available to help.    If you are in immediate danger, call 911.  Suicide Prevention Lifeline: 5-124-011-TALK (9274)  Crisis Text Line Service: Text \"[place]\" to 419570.    [facility] Emergency Department - Behavioral " Emergency Center  [address] [address]  [phone]  [phone]    [facility] [place]  [facility] Crisis Response Services (Adults & Children)  [phone]    [facility] - Acute Psychiatric Services (APS)  Assessment & Referral: [phone]  Suicide Hotline: 957.792.7525    [facility]  Community Outreach for Psychiatric Emergencies  [phone]    [facility] Center (24/7)  [phone] TDD    Please let me know if you have any questions or if there is anything that we can assist with. I can be reached by phone, [employer] message, or by email. I look forward to working with you!    Sincerely,    Janet Ng BROWN  Behavioral Health [place] ([facility])   [phone]  [email or web address]

## 2020-10-09 NOTE — TELEPHONE ENCOUNTER
"Please call Shoaib to let him know that I spoke with Dr iMlligan in the Addiction Med clinic. I have placed a referral for addiction med and he will be called to schedule. Dr Milligan also gave me the following information to pass on to Shoaib.     \"He can call the assessment line at [phone]. They can do a Rule 25/chemical assessment and potentially get him setup with IOP, which is virtual and does not involve boarding at a treatment center. With his co-morbid diagnoses he would be a great fit for dual diagnosis programming.\"    CARLOS A Bass, NP-C  [facility]        "

## 2020-10-12 ENCOUNTER — TELEPHONE (OUTPATIENT)
Dept: ADDICTION MEDICINE | Facility: CLINIC | Age: 35
End: 2020-10-12

## 2020-10-12 NOTE — TELEPHONE ENCOUNTER
"Please schedule appointment for patient with Addiction Medicine Provider   For stimulant use disorder       Referring Provider: Please feel free to reply with any specific patient care needs.   I can help address concerns and/or relay the information to the provider who will be scheduled.     No need to reply if consult is sufficient.      Our scheduling staff will offer the following information:   Please invite our patient to call [facility] assessment line - [phone]. They can ask for a \"substance use assessment\" or \"rule 25\". This will allow them to discuss possible psychosocial treatment options including individual therapy or any group options including outpatient, intensive outpatient, or residential (aka inpatient) treatment.     Rashard Milligan MD    "

## 2020-10-12 NOTE — TELEPHONE ENCOUNTER
Please review referral. Please route back to writer.     Thank you,    Diana Parks    [facility] [place] [facility]

## 2020-10-12 NOTE — TELEPHONE ENCOUNTER
Outreach X1. Mailbox is full. No option for VM.    KATERINA CardenasN, RN  Care Coordinator  [facility] [place]

## 2020-10-13 ENCOUNTER — MYC MEDICAL ADVICE (OUTPATIENT)
Dept: PSYCHIATRY | Facility: CLINIC | Age: 35
End: 2020-10-13

## 2020-10-13 NOTE — TELEPHONE ENCOUNTER
Writer attempted to reach pt; no answer. Unable to LVM requesting a call back for an appt. - Busy signal Two more attempts will be made.     Diana Parks    [facility]

## 2020-10-13 NOTE — TELEPHONE ENCOUNTER
Spoke to Shoaib and relayed the information below.    Sarina Polanco, KATERINAN, RN  Care Coordinator  [facility] [place]

## 2020-10-19 DIAGNOSIS — F98.8 ATTENTION DEFICIT DISORDER, UNSPECIFIED HYPERACTIVITY PRESENCE: ICD-10-CM

## 2020-10-20 ENCOUNTER — MYC MEDICAL ADVICE (OUTPATIENT)
Dept: PALLIATIVE MEDICINE | Facility: CLINIC | Age: 35
End: 2020-10-20

## 2020-10-20 NOTE — TELEPHONE ENCOUNTER
Medication Refill (Adderall )    Meagan Bowman, CARLOS A CNP  You 1 hour ago (12:04 PM)     Yes, I don't expect to continue Addearll, so we will discuss this tomorrow, thank you.    Message text

## 2020-10-20 NOTE — TELEPHONE ENCOUNTER
Last seen: 9/18  RTC: 3 months   Cancel: none   No-show: 9/24  Next appt: 10/21    Incoming refill from pharmacy via Rx Auth     Medication requested: amphetamine-dextroamphetamine (ADDERALL) 10 MG tablet  Directions: Take 1 tablet (10 mg) by mouth 2 times daily - Oral  Qty: 60  Last refilled: 9/16 #60, 8/17 #60, 7/16 #60    Will route to provider for approval

## 2020-10-21 ENCOUNTER — VIRTUAL VISIT (OUTPATIENT)
Dept: PSYCHIATRY | Facility: CLINIC | Age: 35
End: 2020-10-21
Attending: NURSE PRACTITIONER
Payer: COMMERCIAL

## 2020-10-21 DIAGNOSIS — F98.8 ATTENTION DEFICIT DISORDER, UNSPECIFIED HYPERACTIVITY PRESENCE: ICD-10-CM

## 2020-10-21 DIAGNOSIS — F32.89 OTHER DEPRESSION: ICD-10-CM

## 2020-10-21 DIAGNOSIS — F11.21 OPIOID USE DISORDER, SEVERE, IN SUSTAINED REMISSION (H): ICD-10-CM

## 2020-10-21 DIAGNOSIS — F15.21 AMPHETAMINE USE DISORDER, MODERATE, IN EARLY REMISSION (H): Primary | ICD-10-CM

## 2020-10-21 PROCEDURE — 99214 OFFICE O/P EST MOD 30 MIN: CPT | Mod: 95 | Performed by: NURSE PRACTITIONER

## 2020-10-21 RX ORDER — DULOXETIN HYDROCHLORIDE 60 MG/1
60 CAPSULE, DELAYED RELEASE ORAL DAILY
Qty: 30 CAPSULE | Refills: 1 | Status: SHIPPED | OUTPATIENT
Start: 2020-10-21 | End: 2020-11-17

## 2020-10-21 RX ORDER — ATOMOXETINE 40 MG/1
40 CAPSULE ORAL DAILY
Qty: 30 CAPSULE | Refills: 1 | Status: SHIPPED | OUTPATIENT
Start: 2020-10-21 | End: 2020-11-17 | Stop reason: DRUGHIGH

## 2020-10-21 ASSESSMENT — PAIN SCALES - GENERAL: PAINLEVEL: NO PAIN (0)

## 2020-10-21 NOTE — Clinical Note
So pt came to the appointment.  He said he is doing pretty well, has been out of Adderall for 2 days already as he has ran out of the medication.  He has not followed up with Addiction medicine referral, but I gave him number to make an appointment again.  His mood and anxiety were fairly stable.  So I discontinued Adderall and started Strattera for ADD, but I told him I would have to have him complete ADHD testing once when he has been off of substance at least 3 months and also depending on Addiction consult, may consider restarting stimulant if they recommend.  He said he will get in touch with you about pain management as well.

## 2020-10-21 NOTE — PROGRESS NOTES
"VIDEO VISIT  Hoang Kiser is a 35 year old patient who is being evaluated via a billable video visit.      The patient has been notified of following:   \"This video visit will be conducted via a call between you and your physician/provider. We have found that certain health care needs can be provided without the need for an in-person physical exam. This service lets us provide the care you need with a video conversation. If a prescription is necessary we can send it directly to your pharmacy. If lab work is needed we can place an order for that and you can then stop by our lab to have the test done at a later time. Insurers are generally covering virtual visits as they would in-office visits so billing should not be different than normal.  If for some reason you do get billed incorrectly, you should contact the billing office to correct it and that number is in the AVS .    Video Conference to be completed via:  [email or web address]    Patient has given verbal consent for video visit?:  Yes    Patient would prefer that any video invitations be sent by: Text to cell phone: [phone]      How would patient like to obtain AVS?:  Playmatics    AVS SmartPhrase [PsychAVS] has been placed in 'Patient Instructions':  Yes     Start Time:  1600         End Time: 1622    Telemedicine Visit: The patient's condition can be safely assessed and treated via synchronous audio and visual telemedicine encounter.      Reason for Telemedicine Visit: Due to COVID 19 pandemic, clinic switching all appointments to telemedicine     Originating Site (Patient Location): Patient's home    Distant Site (Provider Location): Provider Remote Setting    Consent:  The patient/guardian has verbally consented to: the potential risks and benefits of telemedicine (video visit) versus in person care; bill my insurance or make self-payment for services provided; and responsibility for payment of non-covered services.     Mode of Communication:  Video Conference " via [email or web address]    As the provider I attest to compliance with applicable laws and regulations related to telemedicine.    Psychiatry Clinic Progress Note                                                                  Patient Name: Hoang Kiser  YOB: 1985  MRN: [id number]  Date of Service:  10/21/2020  Last Seen:9/18/2020    Hoang Kiser is a 35 year old person assigned male at birth, identifies as cisgender male who uses the name Shoaib and pronoun lilia.      Shoaib Kiser is a 35 year old year old adult who presents for ongoing psychiatric care.  Shoaib Kiser was last seen on 9/18/2020.     At that time,     Medication Ordered/Consults/Labs/tests Ordered:      Medication:   Continue on current medication regimen for now.  However, after next week's appointment with pain management, if you need 1)increase in Cymbalta for anxiety and mood, 2) change Adderall to extended release Adderall or 3) indicate you should not be given Adderall afternoon dose after 2pm, please update Meagan.    OTC Recommendations: none  Lab Orders:  none  Referrals: none  Release of Information: none  Future Treatment Considerations: Per symptoms.   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  Psych critical item history includes mutiple psychotropic trials, SUBSTANCE USE: amphetamines and opiates and substance use treatment .   Medical complication includes functional movement disorder     Previous Psychiatric Meds: Celexa, Wellbutrin, Trazodone, Effexor, Amitriptyline, Gabapentin, Tegretol, Zoloft, reports all not effective, Ritalin (movement worse), Klonopin (tapered off 8/28/2020)    Interim History                                                                                                     "    4, 4     On 10/8/2020, pt had pain management visit and admitted he has been using methamphetamine since early 2020.  In that visit note, it indicated that he had UTOX on 9/29/2020 that was positive for methamphetamine.    Since the last visit, pt noted he is doing \"pretty well.\"  Has not used any street methamphetamine for 14 days.  Noted he started smoking methamphetamine he obtained from streets on and off since march 2020 and was smoking about 1g/weekly and notes this was only weekly use.  Denies any IV use.  Pt reports his adderall ran out 2 days ago and hoping to continue on stimulant of some sort for ADD.  But has not noted significant changes in mood or energy level.  Pt has not followed up with recommendation of addiction medicine consult yet this time, but willing to have a consult.  Notes anxiety and mood have been fairly stable, denies Si, SIB or HI.  BP has been 120's/80's.    Denies any symptoms suggestive of hypomania or psychosis.    Current Suicidality/Hx of Suicide Attempts: Denies both  CoCominent Medical concerns: Chronic pain and FMD flare ups     Medication Side Effects: The patient denies all medication side effects.      Medical Review of Systems     Apart from the symptoms mentioned int he HPI, the 14 point review of systems, including constitutional, HEENT, cardiovascular, respiratory, gastrointestinal, genitourinary, musculoskeletal, integumentary, endocrine, neurological, hematologic and allergic is entirely negative except Chronic pain and FMD flare ups.      Substance Use   Pt has been staying substance free for 14 days ago.  See HPI for methamphetamine use.  Pt denies any other substance.      Medical / Surgical History                                                                                                                  Patient Active Problem List   Diagnosis     Neuropathy     Moderate major depression (H)     Tobacco abuse     Attention deficit hyperactivity disorder " (ADHD), predominantly inattentive type     Primary insomnia     Panic disorder without agoraphobia     Abnormal involuntary movement     Tic disorder     Anxiety     Posttraumatic stress disorder with dissociative symptoms     Chronic left hip pain     Chronic pain of right hip     Degenerative disc disease at L5-S1 level     Functional movement disorder     Family history of Crohn's disease     Chronic low back pain     Chronic pain syndrome     History of substance abuse (H)     Family history of multiple myeloma     History of electroencephalogram     Nonallergic rhinitis     Fatigue, unspecified type     Generalized social phobia     Stimulant dependence (H)     Major depressive disorder, single episode, moderate (H)     Chronic post-traumatic stress disorder (PTSD)     Movement disorder       Past Surgical History:   Procedure Laterality Date     COLONOSCOPY  02/15/18    Has not happened yet.     COLONOSCOPY N/A 2/15/2018    Procedure: COMBINED COLONOSCOPY, SINGLE OR MULTIPLE BIOPSY/POLYPECTOMY BY BIOPSY;;  Surgeon: Liam Kincaid MD;  Location: [facility] OR     COLONOSCOPY WITH CO2 INSUFFLATION N/A 2/15/2018    Procedure: COLONOSCOPY WITH CO2 INSUFFLATION;  COLON-FAMILY HX OF COLON CANCER/ SYPURA;  Surgeon: Liam Kincaid MD;  Location: [facility] OR     HC TOOTH EXTRACTION W/FORCEP Bilateral 2003     PE TUBES  1990        Social/ Family History                                  [per patient report]                                 1ea,1ea   Living arrangements: lives in group home, feels safe.  Social Support: NA, group home leader/members.  Access to gun: none    Allergy                                Amitriptyline, Buspirone hcl, Doxycycline, Trazodone, Buspirone, and Cephalexin    Current Medications                                                                                                       Current Outpatient Medications   Medication Sig Dispense Refill     amphetamine-dextroamphetamine  (ADDERALL) 10 MG tablet Take 1 tablet (10 mg) by mouth 2 times daily 60 tablet 0     DULoxetine (CYMBALTA) 60 MG capsule Take 1 capsule (60 mg) by mouth daily 30 capsule 1     HYDROcodone-acetaminophen (NORCO) 5-325 MG tablet Take 1 tablet by mouth 3 times daily as needed for pain Ok to fill/start 8/5/20 60 tablet 0     hydrOXYzine (ATARAX) 25 MG tablet Take 1 tablet (25 mg) by mouth nightly as needed (at HS PRN) 45 tablet 3     naproxen (NAPROSYN) 500 MG tablet Take 1 tablet (500 mg) by mouth 2 times daily (with meals) 40 tablet 3     order for DME Equipment being ordered: Digital home blood pressure monitor kit 1 each 0     order for DME Equipment being ordered: 4 wheeled walker with bench seat. 1 Units 0     oxybutynin ER (DITROPAN-XL) 10 MG 24 hr tablet TAKE 1 TABLET BY MOUTH ONCE DAILY *1 TOTAL FILL* **MUST KEEP APPOINTMENT ON 7/31/20 FOR MORE REFILLS* 90 tablet 1     SF 5000 PLUS 1.1 % CREA        tiZANidine (ZANAFLEX) 4 MG tablet 4mg tab by mouth daily as needed 30 tablet 1     tiZANidine (ZANAFLEX) 4 MG tablet 4mg tab by mouth daily as needed 30 tablet 1     albuterol (PROAIR HFA/PROVENTIL HFA/VENTOLIN HFA) 108 (90 Base) MCG/ACT inhaler INHALE ONE TO TWO PUFFS INTO THE LUNGS EVERY FOUR HOURS AS NEEDED FOR SHORTNESS OF BREATH/ DYSPNEA OR WHEEZING (Patient not taking: Reported on 9/3/2020) 18 g 1     cloNIDine (CATAPRES) 0.1 MG tablet Take 1 tablet (0.1 mg) by mouth 4 times daily as needed (opiate withdrawsl symptoms) (Patient not taking: Reported on 10/8/2020) 16 tablet 0     morphine (MS CONTIN) 15 MG CR tablet Take 1 tablet (15 mg) by mouth every 12 hours Ok to fill/start on 8/5/20 (Patient not taking: Reported on 10/8/2020) 60 tablet 0     naloxone (NARCAN) 4 MG/0.1ML nasal spray Spray 1 spray (4 mg) into one nostril alternating nostrils as needed for opioid reversal every 2-3 minutes until assistance arrives (Patient not taking: Reported on 9/3/2020) 0.2 mL 0          Mental Status Exam                     "                                                               9, 14 cog        Alertness: alert  and oriented  Appearance:  Casually dressed and Adequately groomed  Behavior/Demeanor: cooperative, pleasant and calm, with fair  eye contact   Speech: regular rate and rhythm  Mood :  \"pretty good\"  Affect: almost full range, appropriate; was congruent to mood; was congruent to content  Thought Process (Associations):  Linear and Goal directed  Thought process (Rate):  Normal  Thought content:  no overt psychosis, denies suicidal ideation, intent or thoughts and patient does not appear to be responding to internal stimuli  Perception:  Reports none;  Denies depersonalization and derealization  Attention/Concentration:  Normal  Memory:  Immediate recall intact, Short-term memory intact and Long-term memory intact  Language: intact  Fund of Knowledge/Intelligence:  Average  Abstraction:  Normal  Insight:  Adequate and Fair  Judgment:  Fair and Adequate for safety  Cognition: (6) does  appear grossly intact; formal cognitive testing was not done    Physical Exam     Motor activity/EPS:  Normal  Gait:  Normal  Psychomotor: normal or unremarkable    Labs and Results      Pertinent findings on review include: Review of records with relevant information reported in the HPI.  Reviewed pt's past medical record and obtained collateral information.      [place] PRESCRIPTION MONITORING PROGRAM [] was checked today:  indicates Addera;; 9/16, 8/17, Norco 8/7.    PHQ9 Today:  N/A  PHQ 3/22/2019 10/22/2019 8/20/2020   PHQ-9 Total Score 11 9 17   Q9: Thoughts of better off dead/self-harm past 2 weeks Not at all Not at all Not at all       JIN 7 Today: N/A  JIN-7 SCORE 3/22/2019 10/22/2019 8/20/2020   Total Score - - -   Total Score 12 14 8       Recent Labs   Lab Test 01/30/20  0942 03/27/19  1438 06/15/18  0853   CR 1.01 0.85 0.92   GFRESTIMATED >90 >90 >90     Recent Labs   Lab Test 01/30/20  0942 03/27/19  1438   AST 19 26   ALT 31 36 "   ALKPHOS 60 61     PSYCHOTROPIC DRUG INTERACTIONS:      Adderall---Cymbalta: Concurrent use of AMPHETAMINES and SEROTONERGIC AGENTS THAT INHIBIT CYP2D6 may result in increased amphetamine exposure and increased risk of serotonin syndrome.     MANAGEMENT:  Monitoring for adverse effects, routine vitals and patient is aware of risks      Impression/Assessment      Shoaib Kiser is a 35 year old adult  who presents for med management follow up.  Pt appears mostly stable in his mood and anxiety, denies SI, SIB or HI.  Pt notes he stopped using weekly methamphetamine 14 days ago and also has been out of prescribed Adderall 2 days ago.      Pt has significant substance use history for many years that likely change the way he brain functions.  Difficult to comment on current psychiatric symptoms given close proximity to substance use. Diagnostic clarification will require a period of sobriety in addition to longitudinal follow-up and reassessment.  Discussed that pt has to be honest about substance use as it is difficult to determine his condition with substance use and interactions with psychiatric medications.  At this time,  will discontinue Adderall during wash out period. Discussed pt should follow up with Addiction Medicine referral and if Addiction medicine feels appropriate, will consider restart of stimulant, but discussed there's mixed evidence about stimulant medication use with stimulant use disorder, will defer this to Addiction Medicine.  I don't think its unreasonable to consider restarting a stimulant if the diagnosis of ADHD is made through a formal evaluation in the future. This was discussed with the  Patient.  Recommend Neuropsychological Evaluation after 90 days of sobriety.        For the meantime, discussed possibility of non stimulant use for ADD.  However, also discussed that pt previously noted that he is using stimulant to help with sleep from FMD, thus reiterated importance of ADHD testing,  but since he was using substance until recently, likely the testing result will not be accurate, thus recommended testing in 3-6 months after being on substance free.  For the meantime, discussed possible restart of Wellbutrin, or trial of other non stimulant medication for ADD, pt declined Wellbutrin as he had increased anxiety and irritability.  Discussed possible trial of Strattera to help with ADD and pt opted to start Strattera 40 mg daily.  Recommended to continue BP check at least x2/week.    Also discussed pain management provider's message that pt is welcome to return to the clinic while they may not use opioid for pain management.  Pt understood.      Diagnosis                                                                    Depression (MDD vs Depression due to another medical condition)  Hx dx of ADD and PTSD  Amphetamine use disorder, moderate in early remission  Opioid use disorder, severe, in sustained remission    Treatment Recommendation & Plan       Medication Ordered/Consults/Labs/tests Ordered:     Medication:   -Start Strattera 40 mg daily for ADD.  Monitor your blood pressure at least 2 times/week and report it in next visit  -Continue Cymbalta 60 mg daily  -Adderall is discontinued due to methamphetamine use, but depending on Addiction medicine consult, may be restarted, but discontinued at this time.  OTC Recommendations: none  Lab Orders:  none  Referrals: Addiction Medicine referral already ordered by pain management clinic  Release of Information: none  Future Treatment Considerations: Per symptoms.   Return for Follow Up: in 1 month    -Discussed safety plan for suicidal thoughts  -Discussed plan for suicidality  -Discussed available emergency services  -Patient agrees with the treatment plan  -Encouraged to continue outpatient therapy to gain more coping mechanism for stress.      Treatment Risk Statement: Discussed with the patient my impressions, as well as recommended studies. I  educated patient on the differential diagnosis and prognosis. I discussed with the patient the risks and benefits of medications versus no interventions, including efficacy, dose, possible side effects and length of treatment and the importance of medication compliance.  The patient understands the risks, benefits, adverse effects and alternatives. Agrees to treatment with the capacity to do so. No medical contraindications to treatment. The patient also understands the risks of using street drugs or alcohol.    CRISIS NUMBERS:   Provided routinely in AVS.      Meagan Bowman CNP,  10/21/2020

## 2020-10-21 NOTE — TELEPHONE ENCOUNTER
From: Lissette Ragsdale   Sent: 10/20/2020   2:25 PM CDT   To: [facility]   Subject: Gracie's pt                                     Rosa called from [facility] regarding pt's refill request.     He said the pt called yesterday 10/19 to put in a refill request, and pt is now completely out of medication. They would like a call back regarding refill request.     Phone: [phone]           =========================================      Writer called pharmacy to clarify which medication this request was for. The responder reported that she believed it was for Adderall and transferred to [facility].  Writer left voice mail message for [facility] identifying that the Adderall request would be handled at this day's 4PM appointment.

## 2020-10-21 NOTE — PATIENT INSTRUCTIONS
-Start Strattera 40 mg daily for ADD.  Monitor your blood pressure at least 2 times/week and report it in next visit  -Continue Cymbalta 60 mg daily  -Adderall is discontinued due to methamphetamine use, but depending on Addiction medicine consult, may be restarted, but discontinued at this time.    -Make sure to follow up with addiction medicine consult that Tamiko recommended.  [phone] is their number    Your next appointment is scheduled on 11/17 (Tue) at 3pm.    To access your telemedicine visit:     Open a web browser, like Oxatis, and type [email or web address]     You will see a box asking you to check in to let Meagan Gracie know that you are here.     Type in your name and press Check In. That will let Meagan see you in the virtual waiting room. At your scheduled appointment time, your provider will initiate the visit and connect you.     When your visit is done, you can simply close the browser window.        Please Note:  Ideally, you will connect from a desktop, laptop, or tablet with a WiFi connection. Your computer/tablet must have a camera and microphone. You can use a cell phone, if it has a camera, and if you can connect to WiFi. However, if you connect your phone over a cellular network, it is of lower quality and less reliable      Thank you for coming to the [facility].    Lab Testing:  If you had lab testing today and your results are reassuring or normal they will be mailed to you or sent through [employer] within 7 days. If the lab tests need quick action we will call you with the results. The phone number we will call with results is # [phone] (home) . If this is not the best number please call our clinic and change the number.    Medication Refills:  If you need any refills please call your pharmacy and they will contact us. Our fax number for refills is [phone]. Please allow three business for refill processing. If you need  to  your refill at a new pharmacy, please contact the new pharmacy directly. The new pharmacy will help you get your medications transferred.     Scheduling:  If you have any concerns about today's visit or wish to schedule another appointment please call our office during normal business hours [phone] (8-5:00 M-F)    Contact Us:  Please call [phone] during business hours (8-5:00 M-F).  If after clinic hours, or on the weekend, please call  [phone].    Financial Assistance [phone]  [facility] Billing [phone]  Central Billing Office, [facility]: [phone]  [place] Billing [phone]  Medical Records [phone]      MENTAL HEALTH CRISIS NUMBERS:  For a medical emergency please call  911 or go to the nearest ER.     [facility]:   [facility] -[phone]   Crisis Residence [facility] -[phone]   Walk-In Counseling [facility] -[phone]   COPE 24/7 [place] Mobile Team -[phone] (adults)/[phone] (child)  CHILD: [facility] needs assessment team - [phone]      [facility]:   [facility] - [phone]   Walk-in counseling [facility] - [phone]   Walk-in counseling [facility] - [phone]   Crisis Residence [facility] - [phone]  Urgent Care Adult Mental [facility]-[phone] mobile unit/ 24/7 crisis line    National Crisis Numbers:   National Suicide Prevention Lifeline: 4-892-517-TALK (195-539-3565)  Poison Control Center - 1-432.438.3098  [employer]/resources for a list of additional resources (SOS)  Trans Lifeline a hotline for transgender people 3-279-137-3925  [employer] a hotline for LGBT youth 1-599.866.3524  Crisis Text Line: For any crisis 24/7   To: 159966  see www.crisistextline.org  - IF MAKING A CALL FEELS TOO HARD, send a text!         Again thank you for choosing [facility] [place]  [facility] and please let us know how we can best partner with you to improve you and your family's health.    You may be receiving a survey regarding this appointment. We would love to have your feedback, both positive and negative. The survey is done by an external company, so your answers are anonymous.

## 2020-10-26 NOTE — TELEPHONE ENCOUNTER
Writer attempted to reach pt; no answer. LVM requesting a call back for an appt. One more attempt will be made.     Diana Parks    [facility]

## 2020-10-28 NOTE — TELEPHONE ENCOUNTER
Pt is scheduled with Dr. Nicholson on 11/12 @ 3pm @ [facility] . Closing encounter as no further follow up is needed.

## 2020-11-03 ENCOUNTER — ALLIED HEALTH/NURSE VISIT (OUTPATIENT)
Dept: BEHAVIORAL HEALTH | Facility: CLINIC | Age: 35
End: 2020-11-03
Payer: COMMERCIAL

## 2020-11-03 DIAGNOSIS — R69 DIAGNOSIS DEFERRED: Primary | ICD-10-CM

## 2020-11-03 NOTE — PROGRESS NOTES
Behavioral Health Home Services  [facility]: [place]        Social Work Care Navigator Note      Patient: Hoang Kiser  Date: November 3, 2020  Preferred Name: Shoaib    Previous PHQ-9:   PHQ-9 SCORE 3/22/2019 10/22/2019 8/20/2020   PHQ-9 Total Score - - -   PHQ-9 Total Score 11 9 17     Previous JIN-7:   JIN-7 SCORE 3/22/2019 10/22/2019 8/20/2020   Total Score - - -   Total Score 12 14 8     MOLLY LEVEL:  MOLLY Score (Last Two) 10/1/2018 8/20/2020   MOLLY Raw Score 31 35   Activation Score 59.3 72.1   MOLLY Level 3 3       Preferred Contact:  Need for : No  Preferred Contact: Cell      Type of Contact Today: Phone call (patient / identified key support person reached)      Data: (subjective / Objective):  Recent ED/IP Admission or Discharge?   None    Patient Goals:  Goal Areas: Health;Mental Health;Chemical Health;Employment / Volunteer;Financial and Social Service Benefits  Patient stated goals: Patient would like to resume ILS services. He was working with an ILS worker and is in the process of trying to find a new one. Patient would like to do outpatient treatment at a facility that is not associated with [place]. Patient wants to continue to work on his sobriety and health. At this time he has gone 6 days without using. Patient continues to try and work on self-advocacy skills. He was seeing a therapist but reports that his therapist was discontinued because he had a UA that showed substance use. Patient is going to try and contact an old therapist he used to see and try to set up an appointment. Patient wants to take a break from volunteering at this time and focus on his health. Patient continues to work on his coping and stress management skills by taking walks, playing guitar and making comfort boards.         [facility] Core Service Provided:  Care Coordination: provided care management services/referrals necessary to ensure patient and their identified supports have access to medical, behavioral health,  pharmacology and recovery support services.  Ensured that patient's care is integrated across all settings and services.     Current Stressors / Issues / Care Plan Objective Addressed Today:  [facility] contacted patient for monthly check in. Patient states that he is doing well. SWCC asked him how his appointments with psychiatry was going. Patient states that they are going well so far. He is working on being an advocate in his care. SWCC asked how patient is feeling about being in recovery. Patient was unclear with his answer. He asked if he could take a UA to prove that he is not using. SWCC advised that he would have to make this request with his addiction medicine provider. Patient stated that there are ups and downs to his recovery program. SWCC asked patient if he is comfortable communicating what isn't going well with his provider. He stated that he could. Patient requested to have a therapist outside of [place]. CC will check into a referral for this. Patient had no other concerns that he wanted to discuss.     Intervention:  Motivational Interviewing: Expressed Empathy/Understanding, Permission to raise concern or advise and Open-ended questions   Target Behavior(s): Explored thoughts and readiness to participate in individual therapy and Explored patient's perception of how alcohol and / or drugs influences mood    Assessment: (Progress on Goals / Homework):  Patient would benefit from continued coordination in reaching their goals set for the Behavioral Health Home ([facility]) program. [facility] reviewed Health Action Plan goals and will continue to monitor progress and work with patient and their care team.    Plan: (Homework, other):  Patient was encouraged to continue to seek condition-related information and education. SWCC, patient, and team will continue to work towards progress on reaching goals set for the Behavioral Health Home ([facility]) program.     Janet Ng, LGSW Behavioral Health Home ([facility]) Social  Worker  [facility]  [phone]

## 2020-11-03 NOTE — Clinical Note
Dane Saunders,    This patient wants to see a therapist outside of [facility]. Sounds like I can place an order for this but need your approval first since patient is billed under you. Please let me know if you have any questions.    Thanks!

## 2020-11-12 ENCOUNTER — OFFICE VISIT (OUTPATIENT)
Dept: ADDICTION MEDICINE | Facility: CLINIC | Age: 35
End: 2020-11-12
Payer: COMMERCIAL

## 2020-11-12 VITALS
HEART RATE: 81 BPM | SYSTOLIC BLOOD PRESSURE: 110 MMHG | DIASTOLIC BLOOD PRESSURE: 70 MMHG | WEIGHT: 180 LBS | BODY MASS INDEX: 28.19 KG/M2 | TEMPERATURE: 98 F | OXYGEN SATURATION: 99 %

## 2020-11-12 DIAGNOSIS — F90.8 ATTENTION DEFICIT HYPERACTIVITY DISORDER (ADHD), OTHER TYPE: ICD-10-CM

## 2020-11-12 DIAGNOSIS — G25.9 FUNCTIONAL MOVEMENT DISORDER: ICD-10-CM

## 2020-11-12 DIAGNOSIS — M54.40 CHRONIC LOW BACK PAIN WITH SCIATICA, SCIATICA LATERALITY UNSPECIFIED, UNSPECIFIED BACK PAIN LATERALITY: ICD-10-CM

## 2020-11-12 DIAGNOSIS — F32.0 CURRENT MILD EPISODE OF MAJOR DEPRESSIVE DISORDER, UNSPECIFIED WHETHER RECURRENT (H): ICD-10-CM

## 2020-11-12 DIAGNOSIS — F15.90 STIMULANT USE DISORDER: Primary | ICD-10-CM

## 2020-11-12 DIAGNOSIS — G89.29 CHRONIC LOW BACK PAIN WITH SCIATICA, SCIATICA LATERALITY UNSPECIFIED, UNSPECIFIED BACK PAIN LATERALITY: ICD-10-CM

## 2020-11-12 PROCEDURE — 99204 OFFICE O/P NEW MOD 45 MIN: CPT | Performed by: FAMILY MEDICINE

## 2020-11-13 NOTE — PROGRESS NOTES
"  SUBJECTIVE:                                                    ADDICTION MEDICINE NOTE    Hoang Kiser is a 35 year old male who presents to clinic today for Addiction Medicine consult         [place] Board of Pharmacy Data Base Reviewed:    Yes ;     No Adderall since 9/16/20; no Opioids since 8/17/20      Brief History:    Here on request of Pain provider, Tamiko Stevens    Being treated for Lumbar DDD, Functional neurologic movement disorder    Also being treated by  [facility] for Depression, Attention Deficit Disorder    Referred to Addiction medicine due to positive UDS for methamphetamine    Patient admits to history of Addiction    Was treated in an MI/CD program in [place] in 2015, [facility] in 2016 for 13 months, and [facility] in 2019    Now says he's \"burnt out on treatment \"    Was involved with recovery support groups in past    Says he has had up to 4 years sobriety in the past    Past drug use includes prescription opioids, cocaine    Has been prescribed benzodiazepines in the past    Started using Methamphetamine early 2020    Says he didn't really use it to get high nor does he crave it but instead used it to self medicate his ADD    Says he has trouble with focusing and methamphetamine helped    Has been prescribed Adderall but felt dose insufficient at times    Now using Naprosyn for pain    Realizes he cannot use methamphetamine and doesn't see a problem stopping    Last use 1 week ago    Weaning off left over hydrocodone; no withdrawal but feels pain not controlled    Was recommended to seek treatment but has procrastinated    Advised [facility] out-patient MI/CD program may be helpful and a good fit    Not requesting any MAT as has no craving    Lives in a group home where medications are controlled and dispensed      HPI:    Discussed disease of Addiction, recovery, treatment     Advised treatment, support would be helpful    No need for MAT at this time        Social " History     Social History Narrative    He lives in [facility] in a chemical treatment center - there are 11 people there. He arrives today through [facility] med ride    He has 3 brothers and 3 sisters. He has not children. He has never been .      Mother and father are alive    One sister is an alcoholic and bulemic    depression is present in the family : mother, sister and 2 brothers are bipolar.      He denies bipolar. He has depression.    He denies recurring thoughts. He has had substance abuse issues. He has had cravings in the past.    He exercises and plays guitar and draws.      He has used meth as well as prescription pain killers.    He has used marijuana when young. Used cocaine in the past.    Has not used iv drugs    He does not drink alcohol.      50% Panamanian and is Guyanese, english and french and a bit native.    He smokes cigarettes - 1 pack per day.        single. lives in [place]. estela is father           Problem list and histories reviewed & adjusted, as indicated.  Additional history: as documented           albuterol (PROAIR HFA/PROVENTIL HFA/VENTOLIN HFA) 108 (90 Base) MCG/ACT inhaler, INHALE ONE TO TWO PUFFS INTO THE LUNGS EVERY FOUR HOURS AS NEEDED FOR SHORTNESS OF BREATH/ DYSPNEA OR WHEEZING (Patient not taking: Reported on 9/3/2020)       atomoxetine (STRATTERA) 40 MG capsule, Take 1 capsule (40 mg) by mouth daily       DULoxetine (CYMBALTA) 60 MG capsule, Take 1 capsule (60 mg) by mouth daily       hydrOXYzine (ATARAX) 25 MG tablet, Take 1 tablet (25 mg) by mouth nightly as needed (at HS PRN)       naloxone (NARCAN) 4 MG/0.1ML nasal spray, Spray 1 spray (4 mg) into one nostril alternating nostrils as needed for opioid reversal every 2-3 minutes until assistance arrives (Patient not taking: Reported on 9/3/2020)       naproxen (NAPROSYN) 500 MG tablet, Take 1 tablet (500 mg) by mouth 2 times daily (with meals)       order for DME, Equipment being ordered: Digital home  blood pressure monitor kit       order for DME, Equipment being ordered: 4 wheeled walker with bench seat.       oxybutynin ER (DITROPAN-XL) 10 MG 24 hr tablet, TAKE 1 TABLET BY MOUTH ONCE DAILY *1 TOTAL FILL* **MUST KEEP APPOINTMENT ON 7/31/20 FOR MORE REFILLS*       SF 5000 PLUS 1.1 % CREA,        tiZANidine (ZANAFLEX) 4 MG tablet, 4mg tab by mouth daily as needed       tiZANidine (ZANAFLEX) 4 MG tablet, 4mg tab by mouth daily as needed    No current facility-administered medications on file prior to visit.       Allergies   Allergen Reactions     Amitriptyline      Ineffective in reducing spasms/movement, increased fatigue  Other reaction(s): Other (see comments)     Buspirone Hcl Other (See Comments)     Panic attacks     Doxycycline Diarrhea, Fatigue, GI Disturbance and Nausea     Other reaction(s): GI intolerance     Trazodone Fatigue     Other reaction(s): Other (see comments)     Buspirone Anxiety     Cephalexin Diarrhea     Other reaction(s): GI intolerance           REVIEW OF SYSTEMS:  General:  No acute withdrawal symptoms.  No recent infections or fever  Eyes:  No vision concerns.  No double vision.    Resp: No coughing, wheezing or shortness of breath  CV: No chest pains or palpitations  GI: No nausea, vomiting, abdominal pain, diarrhea.  No constipation  : No urinary frequency or dysuria    Musculoskeletal: No significant muscle or joint pains other than as above.  No edema  Neurologic: No numbness, tingling, weakness, problems with balance or coordination  Psychiatric: No acute concerns other than as above.   Skin: No rashes or areas of acute infection    OBJECTIVE:    PHYSICAL EXAM:  /70   Pulse 81   Temp 98  F (36.7  C) (Temporal)   Wt 81.6 kg (180 lb)   SpO2 99%   BMI 28.19 kg/m      GENERAL: Healthy, alert and no distress  EYES: Eyes grossly normal to inspection.  No discharge or erythema, or obvious scleral/conjunctival abnormalities.  RESP: No audible wheeze, cough, or visible  cyanosis.  No visible retractions or increased work of breathing.    SKIN: Visible skin clear. No significant rash, abnormal pigmentation or lesions.  NEURO: Cranial nerves grossly intact.  Mentation and speech appropriate for age.  PSYCH: Mentation appears normal, affect normal/bright, judgement and insight intact, normal speech and appearance well-groomed.    No results found for any visits on 11/12/20.        ASSESSMENT:    Stimulant use disorder  Chronic back pain  Attention Deficit disorder  Depression  Functional neurologic movement disorder    PLAN:    Addiction Medicine recommendations:    1. Proceed with out-patient treatment , possibly [place] MI/CD    2. Stop methamphetamine completely    3. If only reason for stopping treatment of pain with opioids was his methamphetamine use - OK to resume if pain cannot be adequately controlled otherwise.  He is low risk for abuse of opioids, his medications are dispensed,  [place]  should be checked regularly, and he must have regular urine drug screens    4. May resume stimulant treatment of ADD if Psychiatry thinks indicated; this may actually lower his risk of using methamphetamine.  Medications should be dispensed, monitored though [place] , and patient should have regular urine drug screens        Peng Nicholson MD  [facility] Addiction [facility]  [phone]

## 2020-11-14 ASSESSMENT — ANXIETY QUESTIONNAIRES
7. FEELING AFRAID AS IF SOMETHING AWFUL MIGHT HAPPEN: NOT AT ALL
5. BEING SO RESTLESS THAT IT IS HARD TO SIT STILL: NEARLY EVERY DAY
GAD7 TOTAL SCORE: 10
4. TROUBLE RELAXING: MORE THAN HALF THE DAYS
3. WORRYING TOO MUCH ABOUT DIFFERENT THINGS: NEARLY EVERY DAY
1. FEELING NERVOUS, ANXIOUS, OR ON EDGE: NOT AT ALL
6. BECOMING EASILY ANNOYED OR IRRITABLE: SEVERAL DAYS
2. NOT BEING ABLE TO STOP OR CONTROL WORRYING: SEVERAL DAYS

## 2020-11-14 ASSESSMENT — PATIENT HEALTH QUESTIONNAIRE - PHQ9: SUM OF ALL RESPONSES TO PHQ QUESTIONS 1-9: 10

## 2020-11-17 ENCOUNTER — MYC MEDICAL ADVICE (OUTPATIENT)
Dept: PALLIATIVE MEDICINE | Facility: CLINIC | Age: 35
End: 2020-11-17

## 2020-11-17 ENCOUNTER — VIRTUAL VISIT (OUTPATIENT)
Dept: PSYCHIATRY | Facility: CLINIC | Age: 35
End: 2020-11-17
Attending: NURSE PRACTITIONER
Payer: COMMERCIAL

## 2020-11-17 DIAGNOSIS — F11.21 OPIOID USE DISORDER, SEVERE, IN SUSTAINED REMISSION (H): ICD-10-CM

## 2020-11-17 DIAGNOSIS — F41.9 ANXIETY: ICD-10-CM

## 2020-11-17 DIAGNOSIS — F15.21 AMPHETAMINE USE DISORDER, MODERATE, IN EARLY REMISSION (H): ICD-10-CM

## 2020-11-17 DIAGNOSIS — F32.89 OTHER DEPRESSION: ICD-10-CM

## 2020-11-17 DIAGNOSIS — F98.8 ATTENTION DEFICIT DISORDER, UNSPECIFIED HYPERACTIVITY PRESENCE: Primary | ICD-10-CM

## 2020-11-17 PROCEDURE — 99214 OFFICE O/P EST MOD 30 MIN: CPT | Mod: 95 | Performed by: NURSE PRACTITIONER

## 2020-11-17 RX ORDER — CLONIDINE HYDROCHLORIDE 0.1 MG/1
0.1 TABLET ORAL
Qty: 30 TABLET | Refills: 1 | Status: SHIPPED | OUTPATIENT
Start: 2020-11-17 | End: 2020-11-25

## 2020-11-17 RX ORDER — DULOXETIN HYDROCHLORIDE 60 MG/1
60 CAPSULE, DELAYED RELEASE ORAL DAILY
Qty: 30 CAPSULE | Refills: 1 | Status: SHIPPED | OUTPATIENT
Start: 2020-11-17 | End: 2021-01-12 | Stop reason: DRUGHIGH

## 2020-11-17 RX ORDER — ATOMOXETINE 80 MG/1
80 CAPSULE ORAL DAILY
Qty: 30 CAPSULE | Refills: 1 | Status: SHIPPED | OUTPATIENT
Start: 2020-11-17 | End: 2020-12-15

## 2020-11-17 RX ORDER — HYDROXYZINE HYDROCHLORIDE 25 MG/1
25 TABLET, FILM COATED ORAL
Qty: 45 TABLET | Refills: 3 | Status: SHIPPED | OUTPATIENT
Start: 2020-11-17 | End: 2021-12-16

## 2020-11-17 ASSESSMENT — PAIN SCALES - GENERAL: PAINLEVEL: MILD PAIN (3)

## 2020-11-17 ASSESSMENT — ANXIETY QUESTIONNAIRES: GAD7 TOTAL SCORE: 10

## 2020-11-17 ASSESSMENT — PATIENT HEALTH QUESTIONNAIRE - PHQ9: SUM OF ALL RESPONSES TO PHQ QUESTIONS 1-9: 10

## 2020-11-17 NOTE — PATIENT INSTRUCTIONS
-Increase Strattera to 80 mg daily for ADHD.  Monitor anxiety, agitation and inability to regulate body temperature.  If this occurs, seek medication attention immediately  -May take Clonidine 0.1 mg at bedtime as needed for sleep.  Continue to monitor blood pressure  -Continue all other medications for now    -Make an appointment for Rule 25.  If you don't hear from [place] Rule 25, call [phone]    -ADHD testing (after 90 days of substance free period)  [facility] [email or web address]  [facility] [email or web address]  [facility]: [email or web address]  Psychology Consultation [facility] [email or web address]      Your next appointment is scheduled on 12/15 (TUe) at 3pm.      Thank you for coming to the [facility] MENTAL HEALTH & ADDICTION [place] [facility].    Lab Testing:  If you had lab testing today and your results are reassuring or normal they will be mailed to you or sent through [facility] within 7 days. If the lab tests need quick action we will call you with the results. The phone number we will call with results is # [phone] (home) . If this is not the best number please call our clinic and change the number.    Medication Refills:  If you need any refills please call your pharmacy and they will contact us. Our fax number for refills is [phone]. Please allow three business for refill processing. If you need to  your refill at a new pharmacy, please contact the new pharmacy directly. The new pharmacy will help you get your medications transferred.     Scheduling:  If you have any concerns about today's visit or wish to schedule another appointment please call our office during normal business hours [phone] (8-5:00 M-F)    Contact Us:  Please call [phone] during business hours (8-5:00 M-F).  If after clinic hours, or on the weekend, please call  [phone].    Financial Assistance  [phone]  [facility] Billing [phone]  Central Billing Office, [facility]: [phone]  [facility] Billing [phone]  Medical Records [phone]  [facility] Patient Bill of Rights [email or web address]       MENTAL HEALTH CRISIS NUMBERS:  For a medical emergency please call  911 or go to the nearest ER.     [facility]:   [facility] -[phone]   Crisis Residence [facility] -[phone]   Walk-In Counseling Center [facility] -[phone]   COPE 24/7 [facility] Mobile Team -[phone] (adults)/[phone] (child)  CHILD: [facility] needs assessment team - [phone]      [facility]:   [facility] - [phone]   Walk-in counseling [facility] - [phone]   Walk-in counseling [facility] - [phone]   Crisis Residence [facility] - [phone]  Urgent Care Adult Mental Fkzbzt-[phone] mobile unit/ 24/7 crisis line    National Crisis Numbers:   National Suicide Prevention Lifeline: 7-880-390-TALK (426-716-1377)  Poison Control Center - 1-766.812.2283  [facility]/resources for a list of additional resources (SOS)  Trans Lifeline a hotline for transgender people 1-667.892.3331  The [employer] a hotline for LGBT youth 1-605.539.6388  Crisis Text Line: For any crisis 24/7   To: 919052  see www.crisistextline.org  - IF MAKING A CALL FEELS TOO HARD, send a text!         Again thank you for choosing [facility] MENTAL HEALTH & ADDICTION [facility] and please let us know how we can best partner with you to improve you and your family's health.    You may be receiving a survey regarding this appointment. We would love to have your feedback, both positive and negative. The survey is done by an external company, so your answers are anonymous.

## 2020-11-17 NOTE — PROGRESS NOTES
"VIDEO VISIT  Hoang Kiser is a 35 year old patient who is being evaluated via a billable video visit.      The patient has been notified of following:   \"This video visit will be conducted via a call between you and your physician/provider. We have found that certain health care needs can be provided without the need for an in-person physical exam. This service lets us provide the care you need with a video conversation. If a prescription is necessary we can send it directly to your pharmacy. If lab work is needed we can place an order for that and you can then stop by our lab to have the test done at a later time. Insurers are generally covering virtual visits as they would in-office visits so billing should not be different than normal.  If for some reason you do get billed incorrectly, you should contact the billing office to correct it and that number is in the AVS .    Video Conference to be completed via:  [email or web address]    Patient has given verbal consent for video visit?:  Yes    Patient would prefer that any video invitations be sent by: Send to e-mail at: [email or web address]      How would patient like to obtain AVS?:  X[employer]    AVS SmartPhrase [PsychAVS] has been placed in 'Patient Instructions':  Yes     Start Time:  1500         End Time: 1528    Telemedicine Visit: The patient's condition can be safely assessed and treated via synchronous audio and visual telemedicine encounter.      Reason for Telemedicine Visit: Due to COVID 19 pandemic, clinic switching all appointments to telemedicine     Originating Site (Patient Location): Patient's home    Distant Site (Provider Location): Provider Remote Setting    Consent:  The patient/guardian has verbally consented to: the potential risks and benefits of telemedicine (video visit) versus in person care; bill my insurance or make self-payment for services provided; and responsibility for payment of non-covered services.     Mode of Communication:  Video " Conference via [email or web address]    As the provider I attest to compliance with applicable laws and regulations related to telemedicine.    Psychiatry Clinic Progress Note                                                                  Patient Name: Hoang Kiser  YOB: 1985  MRN: [id number]  Date of Service:  11/17/2020  Last Seen:10/21/2020    Hoang Kiser is a 35 year old person assigned male at birth, identifies as cisgender male who uses the name Shoaib and pronoun lilia.       Shoaib Kiser is a 35 year old year old adult who presents for ongoing psychiatric care.  Shoaib Kiser was last seen on 10/21/2020.      At that time,     Medication Ordered/Consults/Labs/tests Ordered:      Medication:   -Start Strattera 40 mg daily for ADD.  Monitor your blood pressure at least 2 times/week and report it in next visit  -Continue Cymbalta 60 mg daily  -Adderall is discontinued due to methamphetamine use, but depending on Addiction medicine consult, may be restarted, but discontinued at this time.  OTC Recommendations: none  Lab Orders:  none  Referrals: Addiction Medicine referral already ordered by pain management clinic  Release of Information: none  Future Treatment Considerations: Per symptoms.   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  Psych critical item history includes mutiple psychotropic trials, SUBSTANCE USE: amphetamines and opiates and substance use treatment .   Medical complication includes functional movement disorder     Previous Psychiatric Meds: Celexa, Wellbutrin, Trazodone, Effexor, Amitriptyline, Gabapentin, Tegretol, Zoloft, reports all not effective, Ritalin (movement worse), Klonopin (tapered off 8/28/2020), Adderall (stopped due to methamphetamine use concurrently,  "effective)    Interim History                                                                                                        4, 4     On 11/12/2020, pt had addiction medicine consult and recommended MICD outpt and stop methamphetamine.  Also noted pt may resume stimulant treatment of ADD if psychiatry thinks indicated as this may also actually lower pt's risk of using methamphetamine, but pt was encouraged to have regular UTOX.     Since the last visit, notes \"pretty good.\"  Anxiety is present, but manageable level, mood is also fairly table, denies SI, SIB or HI.  However, noting difficulties with sleep and has been taking old Clonidine 0.1 mg x2-3/week with Hydroxyzine 25 mg HS.  Strattera is not as helpful as Adderall, but feels it's helping ADHD somewhat, but open to increasing dose.   Notes BP has been 120's/70-80's.    Has not used methamphetamine since last seen.  Has not started MICD outpt treatment, is open to Rule 25, but unsure if he wants to do outpt tx at [facility] or elsewhere.  Also has not made an appointment for formal ADHD evaluation.    Denies any symptoms suggestive of hypomania or psychosis.    Current Suicidality/Hx of Suicide Attempts: Denies both  CoCominent Medical concerns: Chronic pain and FMD flare ups       Medication Side Effects: The patient denies all medication side effects.      Medical Review of Systems     Apart from the symptoms mentioned int he HPI, the 14 point review of systems, including constitutional, HEENT, cardiovascular, respiratory, gastrointestinal, genitourinary, musculoskeletal, integumentary, endocrine, neurological, hematologic and allergic is entirely negative except chronic pain and occasional FMD flare up.      Substance Use   Pt has been staying substance free since last seen.      Medical / Surgical History                                                                                                                  Patient Active Problem List "   Diagnosis     Neuropathy     Moderate major depression (H)     Tobacco abuse     Attention deficit hyperactivity disorder (ADHD), predominantly inattentive type     Primary insomnia     Panic disorder without agoraphobia     Abnormal involuntary movement     Tic disorder     Anxiety     Posttraumatic stress disorder with dissociative symptoms     Chronic left hip pain     Chronic pain of right hip     Degenerative disc disease at L5-S1 level     Functional movement disorder     Family history of Crohn's disease     Chronic low back pain     Chronic pain syndrome     History of substance abuse (H)     Family history of multiple myeloma     History of electroencephalogram     Nonallergic rhinitis     Fatigue, unspecified type     Generalized social phobia     Stimulant dependence (H)     Major depressive disorder, single episode, moderate (H)     Chronic post-traumatic stress disorder (PTSD)     Movement disorder       Past Surgical History:   Procedure Laterality Date     COLONOSCOPY  02/15/18    Has not happened yet.     COLONOSCOPY N/A 2/15/2018    Procedure: COMBINED COLONOSCOPY, SINGLE OR MULTIPLE BIOPSY/POLYPECTOMY BY BIOPSY;;  Surgeon: Liam Kincaid MD;  Location: [facility] OR     COLONOSCOPY WITH CO2 INSUFFLATION N/A 2/15/2018    Procedure: COLONOSCOPY WITH CO2 INSUFFLATION;  COLON-FAMILY HX OF COLON CANCER/ SYPURA;  Surgeon: Liam Kincaid MD;  Location: [facility] OR     HC TOOTH EXTRACTION W/FORCEP Bilateral 2003     PE TUBES  1990        Social/ Family History                                  [per patient report]                                 1ea,1ea   Living arrangements: lives in group home, feels safe.  Social Support: NA, group home leader/members.  Access to gun: none    Allergy                                Amitriptyline, Buspirone hcl, Doxycycline, Trazodone, Buspirone, and Cephalexin    Current Medications                                                                                       "                 Current Outpatient Medications   Medication Sig Dispense Refill     atomoxetine (STRATTERA) 40 MG capsule Take 1 capsule (40 mg) by mouth daily 30 capsule 1     DULoxetine (CYMBALTA) 60 MG capsule Take 1 capsule (60 mg) by mouth daily 30 capsule 1     hydrOXYzine (ATARAX) 25 MG tablet Take 1 tablet (25 mg) by mouth nightly as needed (at HS PRN) 45 tablet 3     naproxen (NAPROSYN) 500 MG tablet Take 1 tablet (500 mg) by mouth 2 times daily (with meals) 40 tablet 3     order for DME Equipment being ordered: Digital home blood pressure monitor kit 1 each 0     order for DME Equipment being ordered: 4 wheeled walker with bench seat. 1 Units 0     oxybutynin ER (DITROPAN-XL) 10 MG 24 hr tablet TAKE 1 TABLET BY MOUTH ONCE DAILY *1 TOTAL FILL* **MUST KEEP APPOINTMENT ON 7/31/20 FOR MORE REFILLS* 90 tablet 1     SF 5000 PLUS 1.1 % CREA        tiZANidine (ZANAFLEX) 4 MG tablet 4mg tab by mouth daily as needed 30 tablet 1     tiZANidine (ZANAFLEX) 4 MG tablet 4mg tab by mouth daily as needed 30 tablet 1     albuterol (PROAIR HFA/PROVENTIL HFA/VENTOLIN HFA) 108 (90 Base) MCG/ACT inhaler INHALE ONE TO TWO PUFFS INTO THE LUNGS EVERY FOUR HOURS AS NEEDED FOR SHORTNESS OF BREATH/ DYSPNEA OR WHEEZING (Patient not taking: Reported on 9/3/2020) 18 g 1     naloxone (NARCAN) 4 MG/0.1ML nasal spray Spray 1 spray (4 mg) into one nostril alternating nostrils as needed for opioid reversal every 2-3 minutes until assistance arrives (Patient not taking: Reported on 9/3/2020) 0.2 mL 0        Mental Status Exam                                                                                   9, 14 cog        Alertness: alert  and oriented  Appearance:  Casually dressed and Well groomed  Behavior/Demeanor: cooperative, pleasant and calm, with good  eye contact   Speech: regular rate and rhythm  Mood :  \"pretty good\"  Affect: full range and appropriate; was congruent to mood; was congruent to content  Thought Process " (Associations):  Logical, Linear and Goal directed  Thought process (Rate):  Normal  Thought content:  no overt psychosis, denies suicidal ideation, intent or thoughts and patient does not appear to be responding to internal stimuli  Perception:  Reports none;  Denies auditory hallucinations and visual hallucinations  Attention/Concentration:  Normal  Memory:  Immediate recall intact and Short-term memory intact  Language: intact  Fund of Knowledge/Intelligence:  Average  Abstraction:  Normal  Insight:  Fair  Judgment:  Fair  Cognition: (6) does  appear grossly intact; formal cognitive testing was not done    Physical Exam     Motor activity/EPS:  Normal  Psychomotor: normal or unremarkable    Labs and Results      Pertinent findings on review include: Review of records with relevant information reported in the HPI.  Reviewed pt's past medical record and obtained collateral information.      [place] PRESCRIPTION MONITORING PROGRAM [] was checked today:  indicates no refills since last seen.    PHQ9 Today:  N/A  PHQ 8/20/2020 11/14/2020 11/14/2020   PHQ-9 Total Score 17 10 10   Q9: Thoughts of better off dead/self-harm past 2 weeks Not at all Not at all Not at all       JIN 7 Today: N/A  JIN-7 SCORE 8/20/2020 11/14/2020 11/14/2020   Total Score - - -   Total Score - 10 (moderate anxiety) 10 (moderate anxiety)   Total Score 8 10 10       Recent Labs   Lab Test 01/30/20  0942 03/27/19  1438 06/15/18  0853   CR 1.01 0.85 0.92   GFRESTIMATED >90 >90 >90     Recent Labs   Lab Test 01/30/20  0942 03/27/19  1438   AST 19 26   ALT 31 36   ALKPHOS 60 61       PSYCHOTROPIC DRUG INTERACTIONS:    no.  MANAGEMENT:  N/A    Impression/Assessment      Shoaib Kiser is a 35 year old adult  who presents for med management follow up.  Pt appears mostly stable in his mood and anxiety, denies SI, SIB or HI.  Denies any substance use since last seen, tolerating Strattera OK though the medication does not feels like Adderall.  Discussed as  long as he would continue BP monitoring, may increase Strattera to 80 mg daily.  Will add Clonidine 0.1 mg HS PRN for sleep which he was taking before when he had opioid withdrawal. Will continue all other medications for now.    Pt has significant substance use history for many years that likely change the way he brain functions.  Difficult to comment on current psychiatric symptoms given close proximity to substance use. Diagnostic clarification will require a period of sobriety in addition to longitudinal follow-up and reassessment.   I don't think its unreasonable to consider restarting a stimulant if the diagnosis of ADHD is made through a formal evaluation in the future. This was discussed with the  Patient.  Recommend Neuropsychological Evaluation after 90 days of sobriety.     Pt has not set up MICD outpatient treatment recommended per Addiction medicine.  Pt thinks he needs Rule 25, ordered Rule 25 at [facility].  After recommendation, pt may receive outpt care at his choice.      Diagnosis                                                                     Depression (MDD vs Depression due to another medical condition)  Hx dx of ADD and PTSD  Amphetamine use disorder, moderate in early remission  Opioid use disorder, severe, in sustained remission    Treatment Recommendation & Plan       Medication Ordered/Consults/Labs/tests Ordered:     Medication:   -Increase Strattera to 80 mg daily for ADHD.  Monitor anxiety, agitation and inability to regulate body temperature.  If this occurs, seek medication attention immediately  -May take Clonidine 0.1 mg at bedtime as needed for sleep.  Continue to monitor blood pressure  -Continue all other medications for now  OTC Recommendations: none  Lab Orders:  none  Referrals:   -Rule 25 at [facility] (ordered)  -ADHD testing (after 90 days of substance free period)  [facility] [email or web address]  [facility]  [email or web address]  [facility]: [email or web address]  [facility] [email or web address]  Release of Information: none  Future Treatment Considerations: Per symptoms.   Return for Follow Up: in 4 weeks    -Discussed safety plan for suicidal thoughts  -Discussed plan for suicidality  -Discussed available emergency services  -Patient agrees with the treatment plan  -Encouraged to continue outpatient therapy to gain more coping mechanism for stress.      Treatment Risk Statement: Discussed with the patient my impressions, as well as recommended studies. I educated patient on the differential diagnosis and prognosis. I discussed with the patient the risks and benefits of medications versus no interventions, including efficacy, dose, possible side effects and length of treatment and the importance of medication compliance.  The patient understands the risks, benefits, adverse effects and alternatives. Agrees to treatment with the capacity to do so. No medical contraindications to treatment. The patient also understands the risks of using street drugs or alcohol.    CRISIS NUMBERS:   Provided routinely in S.           Meagan Bowman CNP,  11/17/2020

## 2020-11-18 NOTE — TELEPHONE ENCOUNTER
Rosanne message from patient on 11/17 at 1605:    Dear Tamiko,     I feel like my neck needs another injection. I am kindly requesting a referral.     Sincerely,     Shoaib   --------------  Reviewed chart. There is no mention of injections as part of the plan and it does not appear that the patient has received injections from our clinic.     Message sent to pt:    Tamiko Armijo, JACINTO is out of the office today but will be back tomorrow. The following information will be helpful for Tamiko when reviewing your request:    Where exactly are you having pain and what does it feel like (I.e. burning, stabbing, achy etc)?   Does the pain go into your arms?   How would you rate the pain on a 1 to 10 scale?   How long has it been since you have had an injection and do you recall the name of it?   Was it done with our clinic or elsewhere?     Thank you,     DEANNA Mckeon, RN-BC  Patient Care Supervisor  [facility]

## 2020-11-19 ENCOUNTER — HOSPITAL ENCOUNTER (OUTPATIENT)
Dept: BEHAVIORAL HEALTH | Facility: CLINIC | Age: 35
Discharge: HOME | End: 2020-11-19
Attending: FAMILY MEDICINE | Admitting: FAMILY MEDICINE
Payer: COMMERCIAL

## 2020-11-19 ENCOUNTER — TELEPHONE (OUTPATIENT)
Dept: BEHAVIORAL HEALTH | Facility: CLINIC | Age: 35
End: 2020-11-19

## 2020-11-19 VITALS — BODY MASS INDEX: 28.25 KG/M2 | HEIGHT: 67 IN | WEIGHT: 180 LBS

## 2020-11-19 PROCEDURE — H0001 ALCOHOL AND/OR DRUG ASSESS: HCPCS | Mod: 95 | Performed by: COUNSELOR

## 2020-11-19 ASSESSMENT — COLUMBIA-SUICIDE SEVERITY RATING SCALE - C-SSRS
6. HAVE YOU EVER DONE ANYTHING, STARTED TO DO ANYTHING, OR PREPARED TO DO ANYTHING TO END YOUR LIFE?: NO
2. HAVE YOU ACTUALLY HAD ANY THOUGHTS OF KILLING YOURSELF?: NO
4. HAVE YOU HAD THESE THOUGHTS AND HAD SOME INTENTION OF ACTING ON THEM?: YES
3. HAVE YOU BEEN THINKING ABOUT HOW YOU MIGHT KILL YOURSELF?: YES
1. IN THE PAST MONTH, HAVE YOU WISHED YOU WERE DEAD OR WISHED YOU COULD GO TO SLEEP AND NOT WAKE UP?: NO
5. HAVE YOU STARTED TO WORK OUT OR WORKED OUT THE DETAILS OF HOW TO KILL YOURSELF? DO YOU INTEND TO CARRY OUT THIS PLAN?: NO
4. HAVE YOU HAD THESE THOUGHTS AND HAD SOME INTENTION OF ACTING ON THEM?: NO
ATTEMPT PAST THREE MONTHS: NO
2. HAVE YOU ACTUALLY HAD ANY THOUGHTS OF KILLING YOURSELF LIFETIME?: YES
TOTAL  NUMBER OF ABORTED OR SELF INTERRUPTED ATTEMPTS PAST 3 MONTHS: NO
TOTAL  NUMBER OF ABORTED OR SELF INTERRUPTED ATTEMPTS LIFETIME: 1
1. IN THE PAST MONTH, HAVE YOU WISHED YOU WERE DEAD OR WISHED YOU COULD GO TO SLEEP AND NOT WAKE UP?: YES
MOST RECENT DATE: 59613
TOTAL  NUMBER OF INTERRUPTED ATTEMPTS LIFETIME: NO
ATTEMPT LIFETIME: YES
5. HAVE YOU STARTED TO WORK OUT OR WORKED OUT THE DETAILS OF HOW TO KILL YOURSELF? DO YOU INTEND TO CARRY OUT THIS PLAN?: YES
REASONS FOR IDEATION LIFETIME: COMPLETELY TO END OR STOP THE PAIN (YOU COULDN'T GO ON LIVING WITH THE PAIN OR HOW YOU WERE FEELING)
3. HAVE YOU BEEN THINKING ABOUT HOW YOU MIGHT KILL YOURSELF?: NO DETAILS
TOTAL  NUMBER OF ACTUAL ATTEMPTS LIFETIME: 1
TOTAL  NUMBER OF ABORTED OR SELF INTERRUPTED ATTEMPTS PAST LIFETIME: YES
6. HAVE YOU EVER DONE ANYTHING, STARTED TO DO ANYTHING, OR PREPARED TO DO ANYTHING TO END YOUR LIFE?: NO
TOTAL  NUMBER OF INTERRUPTED ATTEMPTS PAST 3 MONTHS: NO

## 2020-11-19 ASSESSMENT — PAIN SCALES - GENERAL: PAINLEVEL: MODERATE PAIN (5)

## 2020-11-19 ASSESSMENT — ANXIETY QUESTIONNAIRES
GAD7 TOTAL SCORE: 12
7. FEELING AFRAID AS IF SOMETHING AWFUL MIGHT HAPPEN: NOT AT ALL
4. TROUBLE RELAXING: NEARLY EVERY DAY
6. BECOMING EASILY ANNOYED OR IRRITABLE: NOT AT ALL
2. NOT BEING ABLE TO STOP OR CONTROL WORRYING: NOT AT ALL
1. FEELING NERVOUS, ANXIOUS, OR ON EDGE: NEARLY EVERY DAY
5. BEING SO RESTLESS THAT IT IS HARD TO SIT STILL: NEARLY EVERY DAY
3. WORRYING TOO MUCH ABOUT DIFFERENT THINGS: NEARLY EVERY DAY

## 2020-11-19 ASSESSMENT — PATIENT HEALTH QUESTIONNAIRE - PHQ9: SUM OF ALL RESPONSES TO PHQ QUESTIONS 1-9: 17

## 2020-11-19 ASSESSMENT — MIFFLIN-ST. JEOR: SCORE: 1710.1

## 2020-11-19 NOTE — TELEPHONE ENCOUNTER
11/19/2020  Pt completed a Comprehensive DARREN Assessment today and is beng referred to the [facility] IOP with Mariano and Yulissa. He was told to call Yulissa today to get started.    SBAR  Name:   Hoang Kiser   YOB: 1985 Age:  35 year old Gender:  male   Referral Source: O psychiatrist    Referral FIGUEROA: N/A   Insurance: The [place] [facility] Department is responsible for obtaining ALL authorizations for treatment services at the EOP, DOP and LP levels of care.    Financial Screening: Financial approval from [place]'s business office is NOT NEEDED.     Precipitating Event: Treatment due to own awareness of need for help and to remain in compliance with psychiatry.      DOC: Meth/Amphetamines    Additional abused substances: Nicotine     Medical:   Patient Active Problem List   Diagnosis     Neuropathy     Moderate major depression (H)     Tobacco abuse     Attention deficit hyperactivity disorder (ADHD), predominantly inattentive type     Primary insomnia     Panic disorder without agoraphobia     Abnormal involuntary movement     Tic disorder     Anxiety     Posttraumatic stress disorder with dissociative symptoms     Chronic left hip pain     Chronic pain of right hip     Degenerative disc disease at L5-S1 level     Functional movement disorder     Family history of Crohn's disease     Chronic low back pain     Chronic pain syndrome     History of substance abuse (H)     Family history of multiple myeloma     History of electroencephalogram     Nonallergic rhinitis     Fatigue, unspecified type     Generalized social phobia     Stimulant dependence (H)     Major depressive disorder, single episode, moderate (H)     Chronic post-traumatic stress disorder (PTSD)     Movement disorder      Mental Health: Depression, Anxiety, PTSD and ADHD     Prior Detox admissions: No prior IP detoxification admission(s).    Prior CD treatments: 1 prior CD treatment(s).     Psychosocial history:   Single, in no  "serious relationship    No children    Stable housing and no concerns    Good support network, History of legal issues and Unemployed     [place] Suicide Risk Status:  Past month: 0. - Very Low Risk:  Evaluation Counselors:  Document in Epic / SBAR to counselor \"Very Low Risk\".      Treatment Counselors:  Reassess upon admission as applicable, assess weekly in progress notes under [facility] and summarize in Discharge / Treatment summary under [facility].    Past 24 hours: 0. - Very Low Risk:  Evaluation Counselors:  Document in Epic / SBAR to counselor \"Very Low Risk\".      Treatment Counselors:  Reassess upon admission as applicable, assess weekly in progress notes under [facility] and summarize in Discharge / Treatment summary under [facility].     Additional Info as needed: There was no additional information to provide at this time.         "

## 2020-11-19 NOTE — PROGRESS NOTES
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 Disorder The patient does not meet the criteria for an Opioid use disorder.   Sedative, Hypnotic, or Anxiolytic Use Disorder The patient does not meet the criteria for a Sedative/Hypnotic use disorder.   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       Collateral Contact Summary   Number of contacts made: 1    Contact with referring person:  No    If court related records were reviewed, summarize here: No court records had been reviewed at the time of this documentation.    Information from collateral contacts supported/largely agreed with information from the client and associated risk ratings.      Rule 25 Assessment Summary and Plan   's Recommendation    1)  Complete an Intensive Outpatient CD Treatment Program, such as the Day Co-Occurring IOP group with Rom[facility] at [facility].  2)  Abstain from all mood-altering chemicals unless  prescribed by a licensed provider.   3)  Attend, at minimum, 2 weekly support group meetings, such as 12 step based (AA/NA), SMART Recovery, Health Realizations, and/or Refuge Recovery meetings.     4)  Actively work with a male mentor/sponsor on a weekly basis.   5)  Follow all the recommendations of your treatment/medical providers.  6)  Continue to attend your scheduled psychiatrist appointments.        Collateral Contacts     Name:    [facility]   Relationship:    EMR   Phone Number:    NA Releases:    NA     The patient's medical record at [facility] was reviewed and the information contained in the medical record supported the patient's account of his chemical use history and chemical use consequences.

## 2020-11-19 NOTE — TELEPHONE ENCOUNTER
Patient has had injections for both neck and low back with us. Last RENUKA was Date of Visit: 7/19/2019. Procedure performed: C7-T1 interlaminar epidural steroid injection with fluoroscopic guidance.    At his last follow up on 10/8/20 he was asked to schedule a video visit for 4 weeks or sooner. If he calls back he should schedule that video visit with Tamiko Stevens to discuss repeating the RENUKA and to get orders placed if appropriate.     Outreach X1. No option for VM. I also sent the request for him to schedule a video visit in a [employer] message.     Sarina Polanco, KATERINAN, RN  Care Coordinator  [facility] [place]

## 2020-11-19 NOTE — TELEPHONE ENCOUNTER
Called and spoke to client about starting the program. Scheduled clients service initiation for Monday at 12-Noon. Client prefers a SMS (Text) invite to 1:1 and group sessions at [phone]. Client reports having a safe, sober and supportive housing. Client denied having any SIB's, SI's, and denies having HI's at this time.     Mariano Donis, Darci, LMFT, LADC, CGTP-[place]  Licensed Psychotherapist   [place] [facility]   P: [phone] I F: [phone]

## 2020-11-19 NOTE — TELEPHONE ENCOUNTER
Just spoke to Shoaib and I will be doing his SI session on Monday at 12-Noon.  Thank you for the referral.

## 2020-11-19 NOTE — TELEPHONE ENCOUNTER
----- Message from ENRIKE Gaviria LADC sent at 11/19/2020  2:23 PM CST -----  Regarding: New start  Patient Name: Hoang Kiser  Location of programming: [facility]  Start Date: 11/23/20 for SI at 12PM and then start group on 11/24 at 9AM  Group: [id number] (180-Mins) on Mondays, Tuesdays, and Wednesdays from 9A-12P.  Provider(s): Mariano Donis and Yulissa Jonas  Number of visits to be scheduled:   1. Please schedule 24 sessions of [id number] (first group on 11/24) and then   2. Please schedule 1-60-min 1:1 session for service initiation me ([id number]) for Monday at 12PM.   Length/Duration of Appointment in minutes: 180-Mins for group sessions and 60-min for individual sessions.   Visit Type (VIDEO/TELEPHONE/IN-PERSON): All video visits.   Additional notes:     Thank you,      Darci Zepeda, LMFT, SANDOVAL, OMERTP-[place]  Licensed Psychotherapist   [place] [facility]   P: [phone] I F: [phone]

## 2020-11-20 ASSESSMENT — ANXIETY QUESTIONNAIRES: GAD7 TOTAL SCORE: 12

## 2020-11-24 ENCOUNTER — MEDICAL CORRESPONDENCE (OUTPATIENT)
Dept: HEALTH INFORMATION MANAGEMENT | Facility: CLINIC | Age: 35
End: 2020-11-24

## 2020-11-24 ENCOUNTER — MYC MEDICAL ADVICE (OUTPATIENT)
Dept: FAMILY MEDICINE | Facility: CLINIC | Age: 35
End: 2020-11-24

## 2020-11-24 RX ORDER — DEXTROAMPHETAMINE SACCHARATE, AMPHETAMINE ASPARTATE, DEXTROAMPHETAMINE SULFATE AND AMPHETAMINE SULFATE 2.5; 2.5; 2.5; 2.5 MG/1; MG/1; MG/1; MG/1
TABLET ORAL
Qty: 60 TABLET | Refills: 0 | OUTPATIENT
Start: 2020-11-24

## 2020-11-24 NOTE — TELEPHONE ENCOUNTER
To Dr. Phill Floyd to advise on his request for ambien.  Chart reflects sees addiction med, behavioral health, pain management.

## 2020-11-24 NOTE — TELEPHONE ENCOUNTER
----- Message from ENRIKE Gaviria, SANDOVAL sent at 11/24/2020  8:33 AM CST -----  Regarding: Please remove form schedule as client never showed for his SI.  Client never showed for his SI yesterday so please remove from [id number].     Thank you and Mariano jasso, Darci, LMFT, SANODVAL, [facility]-[place]  Licensed Psychotherapist   [place] [facility]ring Day IOP   P: [phone] I F: [phone]

## 2020-11-25 DIAGNOSIS — F41.9 ANXIETY: ICD-10-CM

## 2020-11-25 RX ORDER — CLONIDINE HYDROCHLORIDE 0.1 MG/1
.1-.2 TABLET ORAL
Qty: 60 TABLET | Refills: 1 | Status: SHIPPED | OUTPATIENT
Start: 2020-11-25 | End: 2020-12-15

## 2020-11-28 ENCOUNTER — MYC MEDICAL ADVICE (OUTPATIENT)
Dept: PALLIATIVE MEDICINE | Facility: CLINIC | Age: 35
End: 2020-11-28

## 2020-12-02 NOTE — TELEPHONE ENCOUNTER
Attempted to contact client on December 1st and 2nd.  Was unable to connect with him.  Left message to call back.    Yulissa Jonas MA, TONEYC

## 2020-12-03 ENCOUNTER — VIRTUAL VISIT (OUTPATIENT)
Dept: PALLIATIVE MEDICINE | Facility: CLINIC | Age: 35
End: 2020-12-03
Payer: COMMERCIAL

## 2020-12-03 DIAGNOSIS — F15.20 METHAMPHETAMINE USE DISORDER, SEVERE (H): ICD-10-CM

## 2020-12-03 DIAGNOSIS — M54.16 LUMBAR RADICULOPATHY: ICD-10-CM

## 2020-12-03 DIAGNOSIS — F11.19: Primary | ICD-10-CM

## 2020-12-03 DIAGNOSIS — M51.369 DDD (DEGENERATIVE DISC DISEASE), LUMBAR: ICD-10-CM

## 2020-12-03 DIAGNOSIS — M54.12 CERVICAL RADICULOPATHY: ICD-10-CM

## 2020-12-03 DIAGNOSIS — M47.816 FACET ARTHROPATHY, LUMBAR: ICD-10-CM

## 2020-12-03 DIAGNOSIS — F40.240 CLAUSTROPHOBIA: ICD-10-CM

## 2020-12-03 PROCEDURE — 99214 OFFICE O/P EST MOD 30 MIN: CPT | Mod: 95 | Performed by: NURSE PRACTITIONER

## 2020-12-03 RX ORDER — DIAZEPAM 2 MG
TABLET ORAL
Qty: 1 TABLET | Refills: 0 | Status: SHIPPED | OUTPATIENT
Start: 2020-12-03 | End: 2021-02-02

## 2020-12-03 NOTE — PATIENT INSTRUCTIONS
After Visit Instructions:     Thank you for coming to [facility] for your care. It is my goal to partner with you to help you reach your optimal state of health.     Continue daily self-care, identifying contributing factors, and monitoring variations in pain level. Continue to integrate self-care into your life.      1. Imaging: MRIs of neck and low back. We will follow up with you when results are available   2. Procedures recommended: Will discuss after MRIs. [facility] imaging- Please call to schedule: [phone]    3. Medication recommendations: No change to current medications.    CARLOS A Bass, NP-C  [facility]  [facility]    Clinic Number:  [phone]     Call with any questions about your care and for scheduling assistance.     Calls are returned Monday through Friday between 8 AM and 4:30 PM. We usually get back to you within 2 business days depending on the issue/request.    If we are prescribing your medications:    For opioid medication refills, call the clinic or send a [employer] message 7 days in advance.  Please include:    Name of requested medication    Name of the pharmacy.    For non-opioid medications, call your pharmacy directly to request a refill. Please allow 3-4 days to be processed.     Per [place] State Law:    All controlled substance prescriptions must be filled within 30 days of being written.      For those controlled substances allowing refills, pickup must occur within 30 days of last fill.      We believe regular attendance is key to your success in our program!      Any time you are unable to keep your appointment we ask that you call us at least 24 hours in advance to cancel.This will allow us to offer the appointment time to another patient.   Multiple missed appointments may lead to dismissal from the clinic.

## 2020-12-03 NOTE — PROGRESS NOTES
"  [facility]      Hoang Kiser is a 35 year old male who is being evaluated via a billable virtual visit.      VIDEO VISIT   The patient has been notified of following:   \"This video visit will be conducted via a call between you and your physician/provider. We have found that certain health care needs can be provided without the need for an in-person physical exam.  This service lets us provide the care you need with a video conversation.  If a prescription is necessary we can send it directly to your pharmacy.  If lab work is needed we can place an order for that and you can then stop by our lab to have the test done at a later time.  Video visits are billed at different rates depending on your insurance coverage.  Please reach out to your insurance provider with any questions.  If during the course of the call the physician/provider feels a video visit is not appropriate, you will not be charged for this service.\"    Patient has given verbal consent for Video visit? Yes  How would you like to obtain your AVS? MyChart  If you are dropped from the video visit, the video invite should be resent to: Text to cell phone: [phone]  Will anyone else be joining your video visit? No  If patient encounters technical issues they should call [phone]    Everton Li MA      Video-Visit Details  Type of service:  Video Visit  Video Start Time: 9:07 AM  Video End Time: 9:30 AM  Originating Location (pt. Location): Home  Distant Location (provider location):  [facility]   Platform used for Video Visit: Anil      CHIEF COMPLAINT: Chronic pain     INTERVAL HISTORY:  Last seen on 10/8/20.        Recommendations/plan at the last visit included:  1. We will call after speaking to the Addiction Medicine team.   2. Schedule follow-up with CARLOS A Higuera, NP-C in 4 weeks or sooner as needed   3. Therapies with pain center are on hold until addiction issues are " "addressed  4. Medication recommendations:             1. Norco 5/325 mg is discontinued. Group home staff are informed and are to dispense 1 tab per day until current supply of 3 tabs is gone. Patient is instructed to ED if needed for opiate withdrawal.     Since last visit:   - \"I've been alright. My symptoms are been weird, back and pain and wrist pain from the neck stuff\". Bilateral wrist pain (L>R) seems to be connected to neck position. Low back pain has been worse lately as well, possible radicular pain.   - Had addiction med consult. Did a rule 25 with addiction med but has been told that his treatment has been cancelled. Thinks that treatment was ordered by psych. Wants to be able to on an extended release Adderall and still has Norco, possibly 1 tab left. Being managed by group home staff.   - Movement disorder has improved since being off of meth, Adderall and opiates. Has not had an attack for over a month.      Pain Information:   Pain quality: Aching and Shooting    Pain rating: intensity ranges from 1/10 to 7/10, and averages 2/10 on a 0-10 scale.    Annual Requirements last collected:  N/A, no longer prescribed any controlled substances due to meth use/controlled substance agreement violation.     Current Pain Relevant Medications:  Duloxetine 60 mg daily.   Hydroxyzine 25 mg at HS PRN  Naproxen 500 mg BID PRN      Previous Pain Relevant Medications: (H--helped; HI--Helped initially; SWH--Somewhat helpful; NH--No help; W--worse; SE--side effects; ?--Unsure if helpful)   NOTE: This medication information taken from patient's intake form, not medical records.                         Opiates: Tramadol: H, Hydrocodone: H, Morphine: H                        NSAIDS: Ibuprofen:H, naproxen:[facility], Relafen: NH                        Muscle Relaxants: Cyclobenzaprine:H,Med interaction, Tizanidine:H, Baclofen: [facility]                        Anti-migraine mediations: Prednisone:H                        Anti-depressants: " Bupropion:SE, Celexa:SE, Duloxetine:H, Trazodone:Too strong, Venlafaxine:too strong, Amitriptyline:SE                         Sleep aids:Anbien: H                        Anxiolytics: Clonazepam:H                        Neuropathics: Tegretol:taken for seizures in childhood, Gabapentin:H                                          Topicals: Lidocaine:H                        Other medications not covered above: Tylenol:NH, Adderall: H      Any illicit drug use: Sober date 8/27/2015, lives in a sober house.   EtOH use: last use 5 years ago  Caffeine use: 2-3 per day  Nicotine use: 3/4 pack per day  Any use of prescriptions other than how they were prescribed:taina      [place] Board of Pharmacy Data Base Reviewed:    YES; As expected, no concern for misuse/abuse of controlled medications based on this report.   Concern for multiple controlled substances including stimulants and opiates.  7/27/19: Concern for misuse of opiates and stimulants as noted in office visit on this date.   10/29/19: Requesting early refill due to overuse of opiate medication. #45 tabs to last 30 days. Refill declined.  8/31/20: Due to multiple no showed appts, refills of opiates are declined. Opiates are discontinued until has video appt.   October 8, 2020: Shoaib admits that he has been using methamphetamine since early 2020.      Is Narcan prescribed for opiate use >50 MME daily or concurrent use of opiates and benzodiazepines? YES    _______________________________________________      Physical Exam unable to be completed due to virtual visit. There were no vitals taken for this visit or reported by patient.     Review of Systems: A 10-point review of systems was negative, with the exception of any concerns as noted and chronic pain issues.      General: Verbal, alert and no distress   Psychiatric:  affect and mood normal, mentation appears normal    DIRE Score for ongoing opioid management is calculated as follows:    Diagnosis = 2 pts  (slowly progressive; moderate pain/objective findings)    Intractability = 2 pts (most treatments tried; patient not fully engaged/barriers)    Risk        Psych = 1 pt (serious personality dysfunction/mental illness that significantly interferes with care)         Chem Hlth = 1 pt (active or very recent use of illicit drugs; excessive alcohol/drug abuse)       Reliability = 1 pt (medication misuse; missed appointments; rarely follows through)       Social = 1 pt (life in chaos; little family support/close relationships; loss normal life rolls)       (Psych + Chem hlth + Reliability + Social) = 8    Efficacy = 1 pt (poor function; minimal pain relief despite mod/high med dose)    DIRE Score = 9        7-13: likely NOT suitable candidate for long-term opioid analgesia       14-21: may be a suitable candidate for long-term opioid analgesia        DIAGNOSTIC TESTS:  SEE EMR     ASSESSMENT:   1.  Lumbar DDD, mild  2.  Lumbar muscle spasm  3.  Labral tear, right hip  4.  Hx: anxiety, chemical dependence in remission.  5.  Functional neurologic movement disorder  6.  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  Active use of methamphetamine.     Shoaib reports similar pain scores to before discontinuation of opiates. Since stopping Adderall and opiates his movement disorder has virtually subsided. No attacks in the last month per his report. He states that he has not used methamphetamine in two months. He has a rule 25 but is having issues getting into treatment. I will send a note to his psychiatric NP to let her know. It does not seem that Shoaib is willing to acknowledge that Adderall may have contributed to his movement disorder and that meth use was not an appropriate choice. He still believes his condition started to improve when he started using meth. I reminded him that throughout the entire time he was using and I was not aware of the use, he was reporting the movements were worse and more frequent. Unsure if he was lying  about the movements and pain worsening to keep his opiate medications at that time or if he is truly not recalling how he was feeling at that time. Reviewed that I am not at this time, nor will I in the future, be willing to prescribe controlled substances to him. The remainder of the multidisciplinary pain management program remains available. I'd like him to put his focus on treating addiction at this time so we will hold on pain related therapies for now.     He is reporting possible cervical radiculopathy to bilateral (L>R) wrists.  Also having increased lumbar pain with some radiculopathy as well. Previous imaging does not show a cause so we will update cervical and lumbar MRIs. Shoaib is aware that I am out of the office until 12/14 so he may not get results until that week.     Tobacco use: Previously advised complete tobacco cessation.       Plan:    Diagnosis reviewed, treatment option addressed, and risk/benifits discussed.  Self-care instructions given.  I am recommending a multidisciplinary treatment plan to help this patient better manage pain.      1. Imaging: MRIs of neck and low back. We will follow up with you when results are available   2. Procedures recommended: Will discuss after MRIs . [facility] imaging- Please call to schedule: [phone]  3. Medication recommendations: No change to current medications. NO CONTROLLED SUBSTANCES to be prescribed from this clinic.     I have reviewed the note as documented above.  This accurately captures the substance of my conversation with the patient.    CARLOS A Bass, NP-C  [facility]

## 2020-12-03 NOTE — Clinical Note
Shoaib Rhodes is having a hard time getting his outpatient treatment set up. Can you help him with this? Also, wanted you to be aware that his movement disorder has stopped since being off of adderall and opiates. Last attack was over a month ago. The attacks predated opiates and I wonder if there is a correlation with the stimulants? He reports that he is not using meth for the last two months.     Thanks, Tamiko

## 2020-12-06 ENCOUNTER — HEALTH MAINTENANCE LETTER (OUTPATIENT)
Age: 35
End: 2020-12-06

## 2020-12-06 ENCOUNTER — MYC MEDICAL ADVICE (OUTPATIENT)
Dept: FAMILY MEDICINE | Facility: CLINIC | Age: 35
End: 2020-12-06

## 2020-12-09 ENCOUNTER — ALLIED HEALTH/NURSE VISIT (OUTPATIENT)
Dept: BEHAVIORAL HEALTH | Facility: CLINIC | Age: 35
End: 2020-12-09
Payer: COMMERCIAL

## 2020-12-09 DIAGNOSIS — R69 DIAGNOSIS DEFERRED: Primary | ICD-10-CM

## 2020-12-09 NOTE — Clinical Note
Hello,    I just got off the phone with this patient and he mentioned to me that he was supposed to be doing a treatment program but was dropped from it. He was unsure why this was and stated that he is wanting to do treatment per recommendation of his psychiatrist. Do you know why he was dropped or if he will be able to attend treatment elsewhere?    Thank you,    Janet Ng, LGSW Behavioral Health Home ([facility])   [facility]  [phone]

## 2020-12-09 NOTE — PROGRESS NOTES
Behavioral Health Home Services  [facility] [place]        Social Work Care Navigator Note      Patient: Hoang Kiser  Date: December 9, 2020  Preferred Name: Shoaib    Previous PHQ-9:   PHQ-9 SCORE 8/20/2020 11/14/2020 11/14/2020   PHQ-9 Total Score - - -   PHQ-9 Total Score MyChart - 10 (Moderate depression) 10 (Moderate depression)   PHQ-9 Total Score 17 10 10   Some encounter information is confidential and restricted. Go to Review Flowsheets activity to see all data.     Previous JIN-7:   JIN-7 SCORE 8/20/2020 11/14/2020 11/14/2020   Total Score - - -   Total Score - 10 (moderate anxiety) 10 (moderate anxiety)   Total Score 8 10 10   Some encounter information is confidential and restricted. Go to Review Flowsheets activity to see all data.     MOLLY LEVEL:  MOLLY Score (Last Two) 11/14/2020 11/14/2020   MOLLY Raw Score 31 31   Activation Score 59.3 59.3   MOLLY Level 3 3       Preferred Contact:  Need for : No  Preferred Contact: Cell      Type of Contact Today: Phone call (patient / identified key support person reached)      Data: (subjective / Objective):  Recent ED/IP Admission or Discharge?   None    Patient Goals:  Goal Areas: Health;Mental Health;Chemical Health;Employment / Volunteer;Financial and Social Service Benefits  Patient stated goals: Patient would like to resume ILS services. He was working with an ILS worker and is in the process of trying to find a new one. Patient would like to do outpatient treatment at a facility that is not associated with [facility]. Patient wants to continue to work on his sobriety and health. At this time he has gone 6 days without using. Patient continues to try and work on self-advocacy skills. He was seeing a therapist but reports that his therapist was discontinued because he had a UA that showed substance use. Patient is going to try and contact an old therapist he used to see and try to set up an appointment. Patient wants to take a break from  volunteering at this time and focus on his health. Patient continues to work on his coping and stress management skills by taking walks, playing guitar and making comfort boards.         [facility] Core Service Provided:  Comprehensive Care Management: utilized the electronic medical record / patient registry to identify and support patient's health conditions / needs more effectively   Care Coordination: provided care management services/referrals necessary to ensure patient and their identified supports have access to medical, behavioral health, pharmacology and recovery support services.  Ensured that patient's care is integrated across all settings and services.   Health and Wellness Promotion    Current Stressors / Issues / Care Plan Objective Addressed Today:  [facility] contacted patient for monthly check. Patient reported to [facility] that he was supposed to be enrolled in a treatment program but was dropped and he is not sure why he was dropped from this program. Patient reports that he is wanting to do treatment per recommendation of his psychiatrist. [facility] will check with Addiction Medicine Provider on this. Patient informed [facility] that he had a Rule 25 completed on 11/19/2020.   Patient reports that he is content in his new home and is getting along with his roommates. [facility] asked how he has been sleeping. Patient reports that he wake up around 2-3 times a night but is sleeping around 9-10 hours so he feels like he has been adequate sleep this week. Weeks past have not been as good for sleep but his PCP recently increased his medication to help with sleep.   Patient has a physical scheduled for tomorrow. Imagining is scheduled for next week for his neck and back.   Patient is not currently working with an [employer] worker. He is putting this service on hold until it is safer to meet with people in person. Patient reports that he has had 5-6 ILS workers in the last couple months and has not built a good relationship with any of  them.   [facility] asked patient scheduled next phone call for 2 weeks from today.     Intervention:  Motivational Interviewing: Expressed Empathy/Understanding, Supported Autonomy, Collaboration, Evocation and Open-ended questions   Target Behavior(s): Explored thoughts and readiness to participate in individual therapy and Explored current social supports and reinforced opportunities to increase engagement    Assessment: (Progress on Goals / Homework):  Patient would benefit from continued coordination in reaching their goals set for the Behavioral Health Home ([facility]) program. [facility] reviewed Health Action Plan goals and will continue to monitor progress and work with patient and their care team.    Plan: (Homework, other):  Patient was encouraged to continue to seek condition-related information and education. SWCC, patient, and team will continue to work towards progress on reaching goals set for the Behavioral Health Home ([facility]) program.       Scheduled a Phone follow up appointment with SW CC in 2 weeks     Patient has set self-identified goals and will monitor progress until the next appointment on: 12/23/2020.     LAKSHMI Kebede, Social Work Care Coordinator               Next 5 appointments (look out 90 days)    Dec 10, 2020  3:50 PM  (Arrive by 3:25 PM)  Adult Preventative Visit with Valencia Turner MD  [facility] ([facility])  Arrive at:  FAMILY [address] [address] [phone]

## 2020-12-10 ENCOUNTER — OFFICE VISIT (OUTPATIENT)
Dept: FAMILY MEDICINE | Facility: CLINIC | Age: 35
End: 2020-12-10
Payer: COMMERCIAL

## 2020-12-10 VITALS
WEIGHT: 190.8 LBS | BODY MASS INDEX: 29.95 KG/M2 | HEIGHT: 67 IN | TEMPERATURE: 98 F | HEART RATE: 102 BPM | OXYGEN SATURATION: 98 % | SYSTOLIC BLOOD PRESSURE: 118 MMHG | DIASTOLIC BLOOD PRESSURE: 77 MMHG

## 2020-12-10 DIAGNOSIS — B07.8 COMMON WART: ICD-10-CM

## 2020-12-10 DIAGNOSIS — Z23 NEED FOR PROPHYLACTIC VACCINATION AND INOCULATION AGAINST INFLUENZA: ICD-10-CM

## 2020-12-10 DIAGNOSIS — Z00.00 ROUTINE GENERAL MEDICAL EXAMINATION AT A HEALTH CARE FACILITY: Primary | ICD-10-CM

## 2020-12-10 PROCEDURE — 90471 IMMUNIZATION ADMIN: CPT | Performed by: FAMILY MEDICINE

## 2020-12-10 PROCEDURE — 90686 IIV4 VACC NO PRSV 0.5 ML IM: CPT | Performed by: FAMILY MEDICINE

## 2020-12-10 PROCEDURE — 99395 PREV VISIT EST AGE 18-39: CPT | Mod: 25 | Performed by: FAMILY MEDICINE

## 2020-12-10 PROCEDURE — 17110 DESTRUCTION B9 LES UP TO 14: CPT | Performed by: FAMILY MEDICINE

## 2020-12-10 ASSESSMENT — ANXIETY QUESTIONNAIRES
7. FEELING AFRAID AS IF SOMETHING AWFUL MIGHT HAPPEN: NOT AT ALL
1. FEELING NERVOUS, ANXIOUS, OR ON EDGE: NOT AT ALL
GAD7 TOTAL SCORE: 10
6. BECOMING EASILY ANNOYED OR IRRITABLE: SEVERAL DAYS
3. WORRYING TOO MUCH ABOUT DIFFERENT THINGS: NEARLY EVERY DAY
GAD7 TOTAL SCORE: 10
7. FEELING AFRAID AS IF SOMETHING AWFUL MIGHT HAPPEN: NOT AT ALL
4. TROUBLE RELAXING: MORE THAN HALF THE DAYS
5. BEING SO RESTLESS THAT IT IS HARD TO SIT STILL: NEARLY EVERY DAY
2. NOT BEING ABLE TO STOP OR CONTROL WORRYING: SEVERAL DAYS
GAD7 TOTAL SCORE: 10

## 2020-12-10 ASSESSMENT — PATIENT HEALTH QUESTIONNAIRE - PHQ9
SUM OF ALL RESPONSES TO PHQ QUESTIONS 1-9: 10
10. IF YOU CHECKED OFF ANY PROBLEMS, HOW DIFFICULT HAVE THESE PROBLEMS MADE IT FOR YOU TO DO YOUR WORK, TAKE CARE OF THINGS AT HOME, OR GET ALONG WITH OTHER PEOPLE: EXTREMELY DIFFICULT
SUM OF ALL RESPONSES TO PHQ QUESTIONS 1-9: 10

## 2020-12-10 ASSESSMENT — MIFFLIN-ST. JEOR: SCORE: 1762.96

## 2020-12-10 NOTE — PROGRESS NOTES
SUBJECTIVE:   CC: Hoang Kiser is an 35 year old male who presents for preventative health visit.     1.  Needs a physical.    2.  Wart on right thumb, would like to have wart frozen off.  Has had cryo in the past.  Wart present for 2-3 weeks, not painful, somewhat bothersome due to location.    3.  Needs flu shot.    4.  Of note, History of substance abuse, recent relapse, will be going to outpatient treatment soon.  Has been sober from substance for 2.5 months.  Does admit to social alcohol use.    5.  Notes that recently has had panic attacks, has a psychiatrist, would like to talk to them.    Patient has been advised of split billing requirements and indicates understanding: Yes  Healthy Habits:     Taking medications regularly:  0    PHQ-2 Total Score: 2  History of Present Illness       He eats 2-3 servings of fruits and vegetables daily.He consumes 2 sweetened beverage(s) daily.He exercises with enough effort to increase his heart rate 30 to 60 minutes per day.  He exercises with enough effort to increase his heart rate 6 days per week.   He is taking medications regularly.        Today's PHQ-2 Score:   PHQ-2 ( 1999 [employer]) 11/14/2020   Q1: Little interest or pleasure in doing things 1   Q2: Feeling down, depressed or hopeless 1   PHQ-2 Score 2   Q1: Little interest or pleasure in doing things Several days   Q2: Feeling down, depressed or hopeless Several days   PHQ-2 Score 2     PHQ 11/14/2020 11/14/2020 12/10/2020   PHQ-9 Total Score 10 10 10   Q9: Thoughts of better off dead/self-harm past 2 weeks Not at all Not at all Not at all   Some encounter information is confidential and restricted. Go to Review Flowsheets activity to see all data.       JIN-7 SCORE 11/14/2020 11/14/2020 12/10/2020   Total Score - - -   Total Score - 10 (moderate anxiety) 10 (moderate anxiety)   Total Score 10 10 10   Some encounter information is confidential and restricted. Go to Review Flowsheets activity to see all data.      Reviewed screening scores, patient will contact his psychiatrist.      Abuse: Current or Past(Physical, Sexual or Emotional)- No  Do you feel safe in your environment? Yes        Social History     Tobacco Use     Smoking status: Current Every Day Smoker     Packs/day: 0.50     Years: 10.00     Pack years: 5.00     Types: Cigarettes     Start date: 1/1/2002     Smokeless tobacco: Never Used     Tobacco comment: Transdermal patches have helped me the most in the past. 12-10-20: smoking 1/2 ppd   Substance Use Topics     Alcohol use: No     Alcohol/week: 2.0 - 4.0 standard drinks     Comment: none since 2013     If you drink alcohol do you typically have >3 drinks per day or >7 drinks per week? No    No flowsheet data found.    Last PSA: No results found for: PSA    Reviewed orders with patient. Reviewed health maintenance and updated orders accordingly - Yes  Lab work is in process  BP Readings from Last 3 Encounters:   12/10/20 118/77   11/12/20 110/70   03/02/20 (!) 146/82    Wt Readings from Last 3 Encounters:   12/10/20 86.5 kg (190 lb 12.8 oz)   11/12/20 81.6 kg (180 lb)   03/02/20 84.4 kg (186 lb)                  Patient Active Problem List   Diagnosis     Neuropathy     Moderate major depression (H)     Tobacco abuse     Attention deficit hyperactivity disorder (ADHD), predominantly inattentive type     Primary insomnia     Panic disorder without agoraphobia     Abnormal involuntary movement     Tic disorder     Anxiety     Posttraumatic stress disorder with dissociative symptoms     Chronic left hip pain     Chronic pain of right hip     Degenerative disc disease at L5-S1 level     Functional movement disorder     Family history of Crohn's disease     Chronic low back pain     Chronic pain syndrome     History of substance abuse (H)     Family history of multiple myeloma     History of electroencephalogram     Nonallergic rhinitis     Fatigue, unspecified type     Generalized social phobia     Stimulant  dependence (H)     Major depressive disorder, single episode, moderate (H)     Chronic post-traumatic stress disorder (PTSD)     Movement disorder     Past Surgical History:   Procedure Laterality Date     COLONOSCOPY  02/15/18    Has not happened yet.     COLONOSCOPY N/A 2/15/2018    Procedure: COMBINED COLONOSCOPY, SINGLE OR MULTIPLE BIOPSY/POLYPECTOMY BY BIOPSY;;  Surgeon: Liam Kincaid MD;  Location: [facility] OR     COLONOSCOPY WITH CO2 INSUFFLATION N/A 2/15/2018    Procedure: COLONOSCOPY WITH CO2 INSUFFLATION;  COLON-FAMILY HX OF COLON CANCER/ SYPURA;  Surgeon: Liam Kincaid MD;  Location: [facility] OR     HC TOOTH EXTRACTION W/FORCEP Bilateral 2003     PE TUBES  1990       Social History     Tobacco Use     Smoking status: Current Every Day Smoker     Packs/day: 0.50     Years: 10.00     Pack years: 5.00     Types: Cigarettes     Start date: 1/1/2002     Smokeless tobacco: Never Used     Tobacco comment: Transdermal patches have helped me the most in the past. 12-10-20: smoking 1/2 ppd   Substance Use Topics     Alcohol use: No     Alcohol/week: 2.0 - 4.0 standard drinks     Comment: none since 2013     Family History   Problem Relation Age of Onset     Lipids Father         hyperlipidemia     Hyperlipidemia Father      Obesity Father      Arthritis Mother      Hyperlipidemia Mother      Depression Mother      Anxiety Disorder Mother      Mental Illness Mother      Obesity Mother      Asthma Brother      Asthma Sister      Depression Other      Hearing Loss Other      Psychotic Disorder Other      Obesity Other      Cerebrovascular Disease Paternal Grandfather      Alzheimer Disease Paternal Grandfather      Depression Brother      Mental Illness Brother         Bipolar     Asthma Brother      Colitis Brother      Skin Cancer Brother      Depression Sister      Asthma Sister      Substance Abuse Sister         Alcohol     Mental Illness Brother         Bipolar     Asthma Brother      Colitis Brother       Asthma Sister      Obesity Sister      Asthma Brother      Obesity Brother      Obesity Maternal Grandmother      Genetic Disorder Maternal Grandmother         Epilepsy     Obesity Paternal Grandmother      Colon Cancer Other      Genetic Disorder Other         Cerebral Palsy     Genetic Disorder Maternal Grandfather         Multiple Sclerosis     Genetic Disorder Other         Epilepsy         Current Outpatient Medications   Medication Sig Dispense Refill     albuterol (PROAIR HFA/PROVENTIL HFA/VENTOLIN HFA) 108 (90 Base) MCG/ACT inhaler INHALE ONE TO TWO PUFFS INTO THE LUNGS EVERY FOUR HOURS AS NEEDED FOR SHORTNESS OF BREATH/ DYSPNEA OR WHEEZING 18 g 1     atomoxetine (STRATTERA) 80 MG capsule Take 1 capsule (80 mg) by mouth daily 30 capsule 1     cloNIDine (CATAPRES) 0.1 MG tablet Take 1-2 tablets (0.1-0.2 mg) by mouth nightly as needed (sleep) 60 tablet 1     DULoxetine (CYMBALTA) 60 MG capsule Take 1 capsule (60 mg) by mouth daily 30 capsule 1     hydrOXYzine (ATARAX) 25 MG tablet Take 1 tablet (25 mg) by mouth nightly as needed (at HS PRN) 45 tablet 3     naproxen (NAPROSYN) 500 MG tablet Take 1 tablet (500 mg) by mouth 2 times daily (with meals) 40 tablet 3     oxybutynin ER (DITROPAN-XL) 10 MG 24 hr tablet TAKE 1 TABLET BY MOUTH ONCE DAILY *1 TOTAL FILL* **MUST KEEP APPOINTMENT ON 7/31/20 FOR MORE REFILLS* 90 tablet 1     SF 5000 PLUS 1.1 % CREA        diazepam (VALIUM) 2 MG tablet Take one tab 30-45 minutes prior to MRIs. 1 tablet 0     naloxone (NARCAN) 4 MG/0.1ML nasal spray Spray 1 spray (4 mg) into one nostril alternating nostrils as needed for opioid reversal every 2-3 minutes until assistance arrives 0.2 mL 0     order for DME Equipment being ordered: Digital home blood pressure monitor kit 1 each 0     order for DME Equipment being ordered: 4 wheeled walker with bench seat. 1 Units 0     tiZANidine (ZANAFLEX) 4 MG tablet        Allergies   Allergen Reactions     Amitriptyline      Ineffective in  reducing spasms/movement, increased fatigue  Other reaction(s): Other (see comments)     Buspirone Hcl Other (See Comments)     Panic attacks     Doxycycline Diarrhea, Fatigue, GI Disturbance and Nausea     Other reaction(s): GI intolerance     Trazodone Fatigue     Other reaction(s): Other (see comments)     Cephalexin Diarrhea     Other reaction(s): GI intolerance       Reviewed and updated as needed this visit by clinical staff  Tobacco  Allergies  Meds  Problems  Med Hx  Surg Hx  Fam Hx  Soc Hx          Reviewed and updated as needed this visit by Provider  Tobacco  Allergies  Meds  Problems  Med Hx  Surg Hx  Fam Hx  Soc Hx         Past Medical History:   Diagnosis Date     Arthritis 2018     Benign positional vertigo 2016    Started after my groin/back injury. Sitting on Toilet.     Depressive disorder     I am on 120mg of Duoluxetine.     Dysphonia     Nothing significant.     Gastroesophageal reflux disease     Occassional. Spicy foods set it off.     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      DATE OF EE2019.    SOURCE FILE DURATION:  15 minutes.  This EEG did not have a video.    PATIENT INFORMATION:  The patient is a 33-year-old male with irregular movements.  It is not entirely clear the etiology of these movements.  EEG is being done to evaluate for seizures.    MEDICATIONS:  Adderall, doxepin, Cymbalta, Robaxin.      History of MRI of brain and brain stem 2015    MR BRAIN W/O & W CONTRAST, 2015  Impression: No suspicious intracranial findings.      Hoarseness     Dry mouth, started after taking Tizanidine     Neuralgia, neuritis, and radiculitis, unspecified 2012     normal emg 2017 2017    Interpretation: This is a normal study. There is no electrophysiologic evidence of a lumbosacral radiculopathy  "affecting the right or left lower extremity on the basis of this study.    Rodney Mena MD Department of Neurology       Panic attack 12/6/2016     Seizures (H) 1986    Grew out of it. Absence Seizures. [id number]     Tinnitus 2015    Had it most of my life. Got worse in 2015      Past Surgical History:   Procedure Laterality Date     COLONOSCOPY  02/15/18    Has not happened yet.     COLONOSCOPY N/A 2/15/2018    Procedure: COMBINED COLONOSCOPY, SINGLE OR MULTIPLE BIOPSY/POLYPECTOMY BY BIOPSY;;  Surgeon: Liam Kincaid MD;  Location: [facility] OR     COLONOSCOPY WITH CO2 INSUFFLATION N/A 2/15/2018    Procedure: COLONOSCOPY WITH CO2 INSUFFLATION;  COLON-FAMILY HX OF COLON CANCER/ SYPURA;  Surgeon: Liam Kincaid MD;  Location: [facility] OR     HC TOOTH EXTRACTION W/FORCEP Bilateral 2003     PE TUBES  1990       Review of Systems  CONSTITUTIONAL: NEGATIVE for fever, chills, change in weight  INTEGUMENTARY/SKIN: NEGATIVE for worrisome rashes, moles or lesions  EYES: NEGATIVE for vision changes or irritation  ENT: NEGATIVE for ear, mouth and throat problems  RESP: NEGATIVE for significant cough or SOB  CV: NEGATIVE for chest pain, palpitations or peripheral edema  GI: NEGATIVE for nausea, abdominal pain, heartburn, or change in bowel habits   male: negative for dysuria, hematuria, decreased urinary stream, erectile dysfunction, urethral discharge  MUSCULOSKELETAL: NEGATIVE for significant arthralgias or myalgia  NEURO: NEGATIVE for weakness, dizziness or paresthesias  PSYCHIATRIC: NEGATIVE for changes in mood or affect    OBJECTIVE:   /77 (BP Location: Right arm, Patient Position: Chair, Cuff Size: Adult Large)   Pulse 102   Temp 98  F (36.7  C) (Oral)   Ht 1.708 m (5' 7.24\")   Wt 86.5 kg (190 lb 12.8 oz)   SpO2 98%   BMI 29.67 kg/m      Physical Exam  GENERAL: healthy, alert and no distress  EYES: Eyes grossly normal to inspection, PERRL and conjunctivae and sclerae normal  HENT: ear canals and TM's " "normal, nose and mouth without ulcers or lesions  NECK: no adenopathy, no asymmetry, masses, or scars and thyroid normal to palpation  RESP: lungs clear to auscultation - no rales, rhonchi or wheezes  CV: regular rate and rhythm, normal S1 S2, no S3 or S4, no murmur, click or rub, no peripheral edema and peripheral pulses strong  ABDOMEN: soft, nontender, no hepatosplenomegaly, no masses and bowel sounds normal  MS: no gross musculoskeletal defects noted, no edema  SKIN: no suspicious lesions or rashes. 2 mm common wart on right thumb, dorsal aspect overlying joint  NEURO: Normal strength and tone, mentation intact and speech normal  PSYCH: mentation appears normal, affect normal/bright    Diagnostic Test Results:  Labs reviewed in Epic    ASSESSMENT/PLAN:   1. Routine general medical examination at a health care facility  Exam completed today, health maintenance items updated as needed.  No labs needed today.  Follow up one year or sooner as needed.    2. Common wart  Cryotherapy completed today.  Verbal consent obtained.  Patient placed in seated position with right hand in neutral position.  Liquid nitrogen applied for 3-5 seconds until skin turned white, then allowed to thaw completely.  Freeze-thaw cycle completed two more times for total of three freeze-thaw cycles.  Patient tolerated procedure well.  Reviewed aftercare.  Follow up in 3 weeks if repeat treatment needed.  - DESTRUCT BENIGN LESION, UP TO 14    3. Need for prophylactic vaccination and inoculation against influenza  - INFLUENZA VACCINE IM > 6 MONTHS VALENT IIV4 [05784]    Patient has been advised of split billing requirements and indicates understanding: Yes  COUNSELING:   Reviewed preventive health counseling, as reflected in patient instructions    Estimated body mass index is 29.67 kg/m  as calculated from the following:    Height as of this encounter: 1.708 m (5' 7.24\").    Weight as of this encounter: 86.5 kg (190 lb 12.8 oz).     Weight " management plan: Discussed healthy diet and exercise guidelines    He reports that he has been smoking cigarettes. He started smoking about 18 years ago. He has a 5.00 pack-year smoking history. He has never used smokeless tobacco.  Tobacco Cessation Action Plan:   Information offered: Patient not interested at this time      Counseling Resources:  ATP IV Guidelines  Pooled Cohorts Equation Calculator  FRAX Risk Assessment  ICSI Preventive Guidelines  Dietary Guidelines for Americans, 2010  i[employer] - Dein Lernprogramm im [employer]'s MyPlate  ASA Prophylaxis  Lung CA Screening    April JStevie Turner MD  [facility]  Answers for HPI/ROS submitted by the patient on 12/10/2020   Chronic problems general questions HPI Form  If you checked off any problems, how difficult have these problems made it for you to do your work, take care of things at home, or get along with other people?: Extremely difficult  PHQ9 TOTAL SCORE: 10  JIN 7 TOTAL SCORE: 10

## 2020-12-11 ENCOUNTER — MYC MEDICAL ADVICE (OUTPATIENT)
Dept: PALLIATIVE MEDICINE | Facility: CLINIC | Age: 35
End: 2020-12-11

## 2020-12-15 ENCOUNTER — TELEPHONE (OUTPATIENT)
Dept: PSYCHIATRY | Facility: CLINIC | Age: 35
End: 2020-12-15

## 2020-12-15 ENCOUNTER — VIRTUAL VISIT (OUTPATIENT)
Dept: PSYCHIATRY | Facility: CLINIC | Age: 35
End: 2020-12-15
Attending: NURSE PRACTITIONER
Payer: COMMERCIAL

## 2020-12-15 DIAGNOSIS — F32.89 OTHER DEPRESSION: ICD-10-CM

## 2020-12-15 DIAGNOSIS — F15.21 AMPHETAMINE USE DISORDER, MODERATE, IN EARLY REMISSION (H): ICD-10-CM

## 2020-12-15 DIAGNOSIS — F98.8 ATTENTION DEFICIT DISORDER, UNSPECIFIED HYPERACTIVITY PRESENCE: ICD-10-CM

## 2020-12-15 DIAGNOSIS — F11.21 OPIOID USE DISORDER, SEVERE, IN SUSTAINED REMISSION (H): ICD-10-CM

## 2020-12-15 DIAGNOSIS — F41.9 ANXIETY: Primary | ICD-10-CM

## 2020-12-15 PROCEDURE — 99215 OFFICE O/P EST HI 40 MIN: CPT | Mod: 95 | Performed by: NURSE PRACTITIONER

## 2020-12-15 RX ORDER — DULOXETIN HYDROCHLORIDE 30 MG/1
CAPSULE, DELAYED RELEASE ORAL
Qty: 30 CAPSULE | Refills: 0 | Status: SHIPPED | OUTPATIENT
Start: 2020-12-15 | End: 2021-01-12

## 2020-12-15 RX ORDER — VILAZODONE HYDROCHLORIDE 10 MG/1
TABLET ORAL
Qty: 60 TABLET | Refills: 0 | Status: SHIPPED | OUTPATIENT
Start: 2020-12-15 | End: 2021-01-12 | Stop reason: DRUGHIGH

## 2020-12-15 RX ORDER — ATOMOXETINE 80 MG/1
80 CAPSULE ORAL DAILY
Qty: 30 CAPSULE | Refills: 1 | Status: SHIPPED | OUTPATIENT
Start: 2020-12-15 | End: 2021-01-12

## 2020-12-15 RX ORDER — CLONIDINE HYDROCHLORIDE 0.1 MG/1
.1-.2 TABLET ORAL
Qty: 60 TABLET | Refills: 1 | Status: SHIPPED | OUTPATIENT
Start: 2020-12-15 | End: 2021-01-12

## 2020-12-15 ASSESSMENT — PAIN SCALES - GENERAL: PAINLEVEL: NO PAIN (0)

## 2020-12-15 NOTE — TELEPHONE ENCOUNTER
On December 15, 2020, at 1:22 PM, writer called patient at [phone] to confirm Virtual Visit. Writer unable to make contact with patient. Writer left detailed voice message for call back. [phone] left as call back number. Zohreh Mendoza, [facility]

## 2020-12-15 NOTE — PATIENT INSTRUCTIONS
-Start Viibryd 10 mg daily x 7 days, thereafter, increase it to 20 mg daily for your mood and anxiety.    -Once you have been on Viibryd 20 mg daily for 2 weeks, decrease Cymbalta to 30 mg daily.  Until you receive Viibryd, please continue Cymbalta 60 mg daily  -Continue all other medications for now    -Complete Genesight testing after you receive the kit and mail it back  -Contact Rule [address] , Trisha Marte ([phone]) to start MICD program.    Your next appointment is scheduled on 1/12/2021 (Tue) at 3:30pm.      Thank you for coming to the [facility] [place] [facility].    Lab Testing:  If you had lab testing today and your results are reassuring or normal they will be mailed to you or sent through [employer] App within 7 days. If the lab tests need quick action we will call you with the results. The phone number we will call with results is # [phone] (home) . If this is not the best number please call our clinic and change the number.    Medication Refills:  If you need any refills please call your pharmacy and they will contact us. Our fax number for refills is [phone]. Please allow three business for refill processing. If you need to  your refill at a new pharmacy, please contact the new pharmacy directly. The new pharmacy will help you get your medications transferred.     Scheduling:  If you have any concerns about today's visit or wish to schedule another appointment please call our office during normal business hours [phone] (8-5:00 M-F)    Contact Us:  Please call [phone] during business hours (8-5:00 M-F).  If after clinic hours, or on the weekend, please call  [phone].    Financial Assistance [phone]  [facility] Billing [phone]  Central Billing Office, [facility]: [phone]  [facility] Billing [phone]  Medical Records [phone]  [facility] Patient Bill of Rights  [email or web address]       MENTAL HEALTH CRISIS NUMBERS:  For a medical emergency please call  911 or go to the nearest ER.     [facility]:   [facility] -[phone]   Crisis Residence hospitals [facility] -[phone]   Walk-In Counseling Center hospitals -[phone]   COPE 24/7 [place] Mobile Team -[phone] (adults)/[phone] (child)  CHILD: [facility] needs assessment team - [phone]      [facility]:   [facility] - [phone]   Walk-in counseling [facility] - [phone]   Walk-in counseling [facility] - [phone]   Crisis Residence [facility] - [phone]  Urgent Care Adult Mental [facility]-[phone] mobile unit/ 24/7 crisis line    National Crisis Numbers:   National Suicide Prevention Lifeline: 0-122-112-TALK (981-567-4656)  Poison Control Center - 1-230.172.3780  Econotherm/resources for a list of additional resources (SOS)  Trans Lifeline a hotline for transgender people 6-019-689-8955  The [employer] a hotline for LGBT youth 8-012-163-3051  Crisis Text Line: For any crisis 24/7   To: 408868  see www.crisistextline.org  - IF MAKING A CALL FEELS TOO HARD, send a text!         Again thank you for choosing [facility]DI[facility] and please let us know how we can best partner with you to improve you and your family's health.    You may be receiving a survey regarding this appointment. We would love to have your feedback, both positive and negative. The survey is done by an external company, so your answers are anonymous.

## 2020-12-15 NOTE — PROGRESS NOTES
"The author of this note documented a reason for not sharing it with the patient.      VIDEO VISIT  Hoang Kiser is a 35 year old patient who is being evaluated via a billable video visit.      The patient has been notified of following:   \"This video visit will be conducted via a call between you and your physician/provider. We have found that certain health care needs can be provided without the need for an in-person physical exam. This service lets us provide the care you need with a video conversation. If a prescription is necessary we can send it directly to your pharmacy. If lab work is needed we can place an order for that and you can then stop by our lab to have the test done at a later time. Insurers are generally covering virtual visits as they would in-office visits so billing should not be different than normal.  If for some reason you do get billed incorrectly, you should contact the billing office to correct it and that number is in the AVS .    Video Conference to be completed via:  Edy    Patient has given verbal consent for video visit?:  Yes    Patient would prefer that any video invitations be sent by: Send to e-mail at: [email or web address]      How would patient like to obtain AVS?:  Te[employer]Success    AVS SmartPhrase [PsychAVS] has been placed in 'Patient Instructions':  Yes     Start Time:  1503         End Time: 1528    Telemedicine Visit: The patient's condition can be safely assessed and treated via synchronous audio and visual telemedicine encounter.      Reason for Telemedicine Visit: Due to COVID 19 pandemic, clinic switching all appointments to telemedicine     Originating Site (Patient Location): Patient's home    Distant Site (Provider Location): Provider Remote Setting    Consent:  The patient/guardian has verbally consented to: the potential risks and benefits of telemedicine (video visit) versus in person care; bill my insurance or make self-payment for services provided; and " responsibility for payment of non-covered services.     Mode of Communication:  Video Conference via AmWell    As the provider I attest to compliance with applicable laws and regulations related to telemedicine.    Psychiatry Clinic Progress Note                                                                  Patient Name: Hoang Kiser  YOB: 1985  MRN: [id number]  Date of Service:  12/15/2020  Last Seen:11/17/2020    Hoang Kiser is a 35 year old person assigned male at birth, identifies as cisgender male who uses the name Shoaib and pronoun lilia.       Shoaib Kiser is a 35 year old year old adult who presents for ongoing psychiatric care.  Shoaib Kiser was last seen on 11/17/2020.    At that time,     Medication Ordered/Consults/Labs/tests Ordered:      Medication:   -Increase Strattera to 80 mg daily for ADHD.  Monitor anxiety, agitation and inability to regulate body temperature.  If this occurs, seek medication attention immediately  -May take Clonidine 0.1 mg at bedtime as needed for sleep.  Continue to monitor blood pressure  -Continue all other medications for now  OTC Recommendations: none  Lab Orders:  none  Referrals:   -Rule 25 at [place] (ordered)  -ADHD testing (after 90 days of substance free period)  [facility] [email or web address]  [facility] [email or web address]  [facility]: [email or web address]  [facility] [email or web address]  Release of Information: none  Future Treatment Considerations: Per symptoms.   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  Psych critical item history includes mutiple psychotropic trials, SUBSTANCE USE: amphetamines and opiates and  substance use treatment .   Medical complication includes functional movement disorder     Previous Psychiatric Meds: Celexa, Wellbutrin, Trazodone, Effexor, Amitriptyline, Gabapentin, Tegretol, Zoloft, reports all not effective, Ritalin (movement worse), Klonopin (tapered off 8/28/2020), Adderall (stopped due to methamphetamine use concurrently, effective), abilify (sleep talking, walking, cognitive slowness), Cymbalta (higher than 90 mg caused SI)    Interim History                                                                                                        4, 4     On 11/24/2020, pt contacted via [employer] noting continued sleep difficulties with Clonidine and Hydroxyzine.  Clonidine was increased to 0.1-0.2 mg HS PRN for sleep while monitoring for BP and pulse closely.    Pt's pain provider noted no recurrence of FMD after discontinuing opioid and stimulants.  Also pain provider and care coordinator noted that pt was not sure why he was kicked out of MICD program and wants to restart.  Looks like pt had Rule 25, but was no show for MICD program at [place].    Since the last visit, pt noted he was dropped out of MICD program at [place] for unknown reason.  Notes he has left numerous messages, but has not heard from anyone.  Has not used any substance since last seen.    Notes sleep somewhat improved with Clonidine 0.2 mg, but only took it once last week.  Mostly taking 0.1 mg and feels this is helpful for pain. Denies any dizziness.  BP has been 138/78. Notes wakes up x2-3/night with racing thoughts.  Taking Vistaril 25 mg x3/week and this feels somewhat helpful.  Notes baseline anxiety increased since last seen, feels anxiety mostly occurs at HS.  Tolerating Strattera, but only notice some improvement for ADHD.    Denies any symptoms suggestive of hypomania or psychosis.    Current Suicidality/Hx of Suicide Attempts: Denies both  CoCominent Medical concerns: Chronic pain    Medication Side Effects: The  patient denies all medication side effects.      Medical Review of Systems     Apart from the symptoms mentioned int he HPI, the 14 point review of systems, including constitutional, HEENT, cardiovascular, respiratory, gastrointestinal, genitourinary, musculoskeletal, integumentary, endocrine, neurological, hematologic and allergic is entirely negative except chronic pain.    Substance Use   Pt has been staying substance free since last seen.      Medical / Surgical History                                                                                                                  Patient Active Problem List   Diagnosis     Neuropathy     Moderate major depression (H)     Tobacco abuse     Attention deficit hyperactivity disorder (ADHD), predominantly inattentive type     Primary insomnia     Panic disorder without agoraphobia     Abnormal involuntary movement     Tic disorder     Anxiety     Posttraumatic stress disorder with dissociative symptoms     Chronic left hip pain     Chronic pain of right hip     Degenerative disc disease at L5-S1 level     Functional movement disorder     Family history of Crohn's disease     Chronic low back pain     Chronic pain syndrome     History of substance abuse (H)     Family history of multiple myeloma     History of electroencephalogram     Nonallergic rhinitis     Fatigue, unspecified type     Generalized social phobia     Stimulant dependence (H)     Major depressive disorder, single episode, moderate (H)     Chronic post-traumatic stress disorder (PTSD)     Movement disorder       Past Surgical History:   Procedure Laterality Date     COLONOSCOPY  02/15/18    Has not happened yet.     COLONOSCOPY N/A 2/15/2018    Procedure: COMBINED COLONOSCOPY, SINGLE OR MULTIPLE BIOPSY/POLYPECTOMY BY BIOPSY;;  Surgeon: Liam Kincaid MD;  Location: [facility] OR     COLONOSCOPY WITH CO2 INSUFFLATION N/A 2/15/2018    Procedure: COLONOSCOPY WITH CO2 INSUFFLATION;  COLON-FAMILY HX OF  COLON CANCER/ SYPURA;  Surgeon: Liam Kincaid MD;  Location: [facility] OR     HC TOOTH EXTRACTION W/FORCEP Bilateral 2003     PE TUBES  1990        Social/ Family History                                  [per patient report]                                 1ea,1ea   Living arrangements: lives in group home, feels safe.  Social Support: NA, group home leader/members.  Access to gun: none    Allergy                                Amitriptyline, Buspirone hcl, Doxycycline, Trazodone, and Cephalexin    Current Medications                                                                                                       Current Outpatient Medications   Medication Sig Dispense Refill     albuterol (PROAIR HFA/PROVENTIL HFA/VENTOLIN HFA) 108 (90 Base) MCG/ACT inhaler INHALE ONE TO TWO PUFFS INTO THE LUNGS EVERY FOUR HOURS AS NEEDED FOR SHORTNESS OF BREATH/ DYSPNEA OR WHEEZING 18 g 1     atomoxetine (STRATTERA) 80 MG capsule Take 1 capsule (80 mg) by mouth daily 30 capsule 1     cloNIDine (CATAPRES) 0.1 MG tablet Take 1-2 tablets (0.1-0.2 mg) by mouth nightly as needed (sleep) 60 tablet 1     diazepam (VALIUM) 2 MG tablet Take one tab 30-45 minutes prior to MRIs. 1 tablet 0     DULoxetine (CYMBALTA) 60 MG capsule Take 1 capsule (60 mg) by mouth daily 30 capsule 1     hydrOXYzine (ATARAX) 25 MG tablet Take 1 tablet (25 mg) by mouth nightly as needed (at HS PRN) 45 tablet 3     naloxone (NARCAN) 4 MG/0.1ML nasal spray Spray 1 spray (4 mg) into one nostril alternating nostrils as needed for opioid reversal every 2-3 minutes until assistance arrives 0.2 mL 0     naproxen (NAPROSYN) 500 MG tablet Take 1 tablet (500 mg) by mouth 2 times daily (with meals) 40 tablet 3     order for DME Equipment being ordered: Digital home blood pressure monitor kit 1 each 0     order for DME Equipment being ordered: 4 wheeled walker with bench seat. 1 Units 0     oxybutynin ER (DITROPAN-XL) 10 MG 24 hr tablet TAKE 1 TABLET BY MOUTH ONCE  "DAILY *1 TOTAL FILL* **MUST KEEP APPOINTMENT ON 7/31/20 FOR MORE REFILLS* 90 tablet 1     SF 5000 PLUS 1.1 % CREA        tiZANidine (ZANAFLEX) 4 MG tablet           Mental Status Exam                                                                                   9, 14 cog        Alertness: alert  and oriented  Appearance:  Casually dressed and Adequately groomed  Behavior/Demeanor: cooperative and calm, with fair  eye contact   Speech: regular rate and rhythm  Mood :  \"more anxious\"  Affect: somewhat restricted; was congruent to mood; was congruent to content  Thought Process (Associations):  Linear and Goal directed  Thought process (Rate):  Normal  Thought content:  no overt psychosis, denies suicidal ideation, intent or thoughts and patient does not appear to be responding to internal stimuli  Perception:  Reports none;  Denies depersonalization and derealization  Attention/Concentration:  Easily distracted  Memory:  Immediate recall intact and Short-term memory intact  Language: intact  Fund of Knowledge/Intelligence:  Average  Abstraction:  Normal  Insight:  Adequate and Fair  Judgment:  Fair and Adequate for safety  Cognition: (6) does  appear grossly intact; formal cognitive testing was not done    Physical Exam     Motor activity/EPS:  Normal  Gait:  Normal  Psychomotor: normal or unremarkable    Labs and Results      Pertinent findings on review include: Review of records with relevant information reported in the HPI.  Reviewed pt's past medical record and obtained collateral information.      [place] PRESCRIPTION MONITORING PROGRAM [] was checked today:  indicates Valium 12/3.    PHQ9 Today:  N/A  PHQ 11/14/2020 11/19/2020 12/10/2020   PHQ-9 Total Score 10 17 10   Q9: Thoughts of better off dead/self-harm past 2 weeks Not at all Not at all Not at all       JIN 7 Today: N/A  JIN-7 SCORE 11/14/2020 11/19/2020 12/10/2020   Total Score - - -   Total Score 10 (moderate anxiety) - 10 (moderate anxiety)   Total " Score 10 12 10       Recent Labs   Lab Test 01/30/20  0942 03/27/19  1438 06/15/18  0853   CR 1.01 0.85 0.92   GFRESTIMATED >90 >90 >90     Recent Labs   Lab Test 01/30/20  0942 03/27/19  1438   AST 19 26   ALT 31 36   ALKPHOS 60 61       PSYCHOTROPIC DRUG INTERACTIONS:    Viibryd---Cymbalta: Concurrent use of VILAZODONE and SEROTONERGIC AGENTS may result in increased risk for serotonin syndrome (hypertension, hyperthermia, myoclonus, mental status changes).   MANAGEMENT:  Monitoring for adverse effects, routine vitals, tapering off of [Cymbalta] and patient is aware of risks    Impression/Assessment      Shoaib Kiser is a 35 year old adult  who presents for med management follow up.  Pt remains somewhat restricted, does not appear anxious, denies SI, SIB or HI.   This presentation was similar to while pt was using amphetamine but without disclosing the use.  Pt no longer is on opioid or stimulant at this time.  Pt noted he was dropped out of MICD program, however, with chart review, pt completed Rule 25 on 11/19/2020, had an appointment to start MICD group on 11/23, but pt was no show.  Appears MICD group therapist contacted pt, but pt did not call back nor showed up, and if he waits for long time, he needs to follow up with Rule 25  again.  Discussed this, pt was strongly encouraged to follow up with Rule 25  to start MICD program.  Pt may benefit from UTOX in the future.  Pt has significant substance use history for many years that likely change the way he brain functions.  Difficult to comment on current psychiatric symptoms given close proximity to substance use. Diagnostic clarification will require a period of sobriety in addition to longitudinal follow-up and reassessment.      Pt's BP remains somewhat elevated.  Pt also wants to change Cymbalta to different medication as he does not think Cymbalta worked well for anxiety and depression and he can't tolerate higher dose.  Since pt tried  numerous medications in the past while some of them may be while using substance, discussed possible benefit of completing Genesight testing.  Pt wants Genesight testing, but before the results, wants to start trial of new medication while starting to decrease on Cymbalta.  Reviewed previous medication and considering somewhat elevated BP, discussed possible cross taper of Cymbalta to Viibryd.  Pt was instructed to start Viibryd 10 mg daily x 7 days, thereafter, increase it to 20 mg daily.  Also instructed to decrease Cymbalta to 30 mg daily after being on Viibryd 20 mg daily x 2 weeks.  Will continue all other medications for now.    It was noted by pain management that his FMD appear to be much better control without opioid and stimulant.  May consider non-stimulant pharmacotherapy while managing ADHD in the future.      Diagnosis                                                                   Depression (MDD vs Depression due to another medical condition)  Hx dx of ADD and PTSD  Amphetamine use disorder, moderate in early remission  Opioid use disorder, severe, in sustained remission    Treatment Recommendation & Plan       Medication Ordered/Consults/Labs/tests Ordered:     Medication:   -Start Viibryd 10 mg daily x 7 days, thereafter, increase it to 20 mg daily for your mood and anxiety.    -Once you have been on Viibryd 20 mg daily for 2 weeks, decrease Cymbalta to 30 mg daily.  Until you receive Viibryd, please continue Cymbalta 60 mg daily  -Continue all other medications for now  OTC Recommendations: Genesight  Lab Orders:  none  Referrals: Contact Rule 25 , Trisha Marte ([phone]) to start MICD program  Release of Information: none  Future Treatment Considerations: Per symptoms.   Return for Follow Up: in 1 month    -Discussed safety plan for suicidal thoughts  -Discussed plan for suicidality  -Discussed available emergency services  -Patient agrees with the treatment plan  -Encouraged to  continue outpatient therapy to gain more coping mechanism for stress.    Treatment Risk Statement: Discussed with the patient my impressions, as well as recommended studies. I educated patient on the differential diagnosis and prognosis. I discussed with the patient the risks and benefits of medications versus no interventions, including efficacy, dose, possible side effects and length of treatment and the importance of medication compliance.  The patient understands the risks, benefits, adverse effects and alternatives. Agrees to treatment with the capacity to do so. No medical contraindications to treatment. The patient also understands the risks of using street drugs or alcohol.    CRISIS NUMBERS:   Provided routinely in AVS.      Meagan Bowman CNP,  12/15/2020

## 2020-12-16 ENCOUNTER — TELEPHONE (OUTPATIENT)
Dept: PSYCHIATRY | Facility: CLINIC | Age: 35
End: 2020-12-16

## 2020-12-16 NOTE — TELEPHONE ENCOUNTER
12/16/2020  Since pt completed a [facility] Comprehensive Assessment on 11/19/2020, he does not need to complete an updated one at this time. Pt is being referred back to [facility]. This was staffed with my supervisor, Kindra Quiroz.     I spoke with pt and told him to contact [facility] to get started.

## 2020-12-17 ENCOUNTER — HOSPITAL ENCOUNTER (OUTPATIENT)
Dept: MRI IMAGING | Facility: CLINIC | Age: 35
End: 2020-12-17
Attending: NURSE PRACTITIONER
Payer: COMMERCIAL

## 2020-12-17 DIAGNOSIS — M54.16 LUMBAR RADICULOPATHY: ICD-10-CM

## 2020-12-17 DIAGNOSIS — M51.369 DDD (DEGENERATIVE DISC DISEASE), LUMBAR: ICD-10-CM

## 2020-12-17 DIAGNOSIS — M47.816 FACET ARTHROPATHY, LUMBAR: ICD-10-CM

## 2020-12-17 DIAGNOSIS — M54.12 CERVICAL RADICULOPATHY: ICD-10-CM

## 2020-12-17 PROCEDURE — 72148 MRI LUMBAR SPINE W/O DYE: CPT

## 2020-12-17 PROCEDURE — 72141 MRI NECK SPINE W/O DYE: CPT

## 2020-12-18 ENCOUNTER — TELEPHONE (OUTPATIENT)
Dept: PSYCHIATRY | Facility: CLINIC | Age: 35
End: 2020-12-18

## 2020-12-19 ENCOUNTER — MYC MEDICAL ADVICE (OUTPATIENT)
Dept: PALLIATIVE MEDICINE | Facility: CLINIC | Age: 35
End: 2020-12-19

## 2020-12-19 DIAGNOSIS — M62.838 MUSCLE SPASM: Primary | ICD-10-CM

## 2020-12-21 ENCOUNTER — TELEPHONE (OUTPATIENT)
Dept: PSYCHIATRY | Facility: CLINIC | Age: 35
End: 2020-12-21

## 2020-12-21 NOTE — TELEPHONE ENCOUNTER
Central Prior Authorization Team   Phone: [phone]      PA Initiation    Medication: Viibryd 10 MG tablets  Insurance Company: [employer] - Phone [phone] Fax [phone]  Pharmacy Filling the Rx: [facility], [facility]. - [place], [place] - [address]  Filling Pharmacy Phone: [phone]  Filling Pharmacy Fax:    Start Date: 12/21/2020

## 2020-12-21 NOTE — TELEPHONE ENCOUNTER
Prior Authorization Approval    Authorization Effective Date: 12/21/2020  Authorization Expiration Date: 12/21/2021  Medication: vilazodone (VIIBRYD) 20 MG TABS tablet-APPROVED  Approved Dose/Quantity:   Reference #:     Insurance Company: [employer] - Phone [phone] Fax [phone]  Expected CoPay:       CoPay Card Available:      Foundation Assistance Needed:    Which Pharmacy is filling the prescription (Not needed for infusion/clinic administered): [facility], [place] - [place], [place] - [address] [address]  Pharmacy Notified: Yes  Patient Notified: No

## 2020-12-21 NOTE — TELEPHONE ENCOUNTER
A GeneSight test was ordered through [email or web address]. A test kit will be sent to pt via [facility] with instructions and a postage paid return FedEx envelope. Provider will review results with pt.Sayra Finley LPN

## 2020-12-21 NOTE — TELEPHONE ENCOUNTER
Writer saw MyChart message from patient regarding GeneSight order and insurance saying he will have an out of pocket cost. Writer called [facility] and spoke with  Dave who informed writer that all MA patients do not have an Out-of-Pocket cost to them, zero cost to patient.

## 2020-12-21 NOTE — TELEPHONE ENCOUNTER
Prior Authorization Approval    Authorization Effective Date: 11/21/2020  Authorization Expiration Date: 12/21/2021  Medication: Viibryd 10 MG tablets- APPROVED  Approved Dose/Quantity:   Reference #:     Insurance Company: [employer] - Phone [phone] Fax [phone]  Expected CoPay:       CoPay Card Available:      Foundation Assistance Needed:    Which Pharmacy is filling the prescription (Not needed for infusion/clinic administered): [facility] - [place], [place] - [address] [address]  Pharmacy Notified: Yes  Patient Notified: No

## 2020-12-21 NOTE — TELEPHONE ENCOUNTER
Prior Authorization Retail Medication Request (separate encounter created for documenting purposes) see encounter 12/18/20 for original request.      Medication/Dose: vilazodone (VIIBRYD) 20 MG TABS tablet  ICD code (if different than what is on RX):    Previously Tried and Failed:  Rationale:      Insurance Name:  [employer]  Insurance ID:  [id number]      Pharmacy Information (if different than what is on RX)  Name:  [facility]. - [facility] [address]  Phone:  [phone]

## 2020-12-21 NOTE — TELEPHONE ENCOUNTER
Central Prior Authorization Team   Phone: [phone]      PA Initiation    Medication: vilazodone (VIIBRYD) 20 MG TABS tablet  Insurance Company: [employer] - Phone [phone] Fax [phone]  [facility] the Rx[email or web address], [facility]. - [place], [place] - [address]  Filling Pharmacy Phone: [phone]  Filling Pharmacy Fax:    Start Date: 12/21/2020           Elevated today in clinic; /110. Will increase Losartan 50 MG to 100 MG. He is to continue on other current medication regimen, including Metoprolol 50 MG BID.

## 2020-12-22 ENCOUNTER — MYC MEDICAL ADVICE (OUTPATIENT)
Dept: PALLIATIVE MEDICINE | Facility: CLINIC | Age: 35
End: 2020-12-22

## 2020-12-22 RX ORDER — CYCLOBENZAPRINE HCL 10 MG
5-10 TABLET ORAL 3 TIMES DAILY PRN
Qty: 60 TABLET | Refills: 1 | Status: SHIPPED | OUTPATIENT
Start: 2020-12-22 | End: 2021-01-25

## 2020-12-22 NOTE — TELEPHONE ENCOUNTER
Attempted to reach client. Left a voicemail with number for client to return call to schedule SI appointment.

## 2021-01-11 ENCOUNTER — OFFICE VISIT (OUTPATIENT)
Dept: ADDICTION MEDICINE | Facility: CLINIC | Age: 36
End: 2021-01-11
Payer: COMMERCIAL

## 2021-01-11 VITALS
OXYGEN SATURATION: 99 % | HEART RATE: 82 BPM | WEIGHT: 200 LBS | TEMPERATURE: 98 F | DIASTOLIC BLOOD PRESSURE: 78 MMHG | BODY MASS INDEX: 31.1 KG/M2 | SYSTOLIC BLOOD PRESSURE: 120 MMHG

## 2021-01-11 DIAGNOSIS — F15.90 STIMULANT USE DISORDER: ICD-10-CM

## 2021-01-11 DIAGNOSIS — G25.9 FUNCTIONAL MOVEMENT DISORDER: ICD-10-CM

## 2021-01-11 DIAGNOSIS — F90.8 ATTENTION DEFICIT HYPERACTIVITY DISORDER (ADHD), OTHER TYPE: ICD-10-CM

## 2021-01-11 DIAGNOSIS — M54.40 CHRONIC LOW BACK PAIN WITH SCIATICA, SCIATICA LATERALITY UNSPECIFIED, UNSPECIFIED BACK PAIN LATERALITY: ICD-10-CM

## 2021-01-11 DIAGNOSIS — G89.29 CHRONIC LOW BACK PAIN WITH SCIATICA, SCIATICA LATERALITY UNSPECIFIED, UNSPECIFIED BACK PAIN LATERALITY: ICD-10-CM

## 2021-01-11 DIAGNOSIS — G89.29 CHRONIC LOW BACK PAIN WITH SCIATICA, SCIATICA LATERALITY UNSPECIFIED, UNSPECIFIED BACK PAIN LATERALITY: Primary | ICD-10-CM

## 2021-01-11 DIAGNOSIS — M54.40 CHRONIC LOW BACK PAIN WITH SCIATICA, SCIATICA LATERALITY UNSPECIFIED, UNSPECIFIED BACK PAIN LATERALITY: Primary | ICD-10-CM

## 2021-01-11 LAB
AMPHETAMINES UR QL: ABNORMAL NG/ML
BARBITURATES UR QL SCN: NOT DETECTED NG/ML
BENZODIAZ UR QL SCN: NOT DETECTED NG/ML
BUPRENORPHINE UR QL: NOT DETECTED NG/ML
CANNABINOIDS UR QL: NOT DETECTED NG/ML
COCAINE UR QL SCN: NOT DETECTED NG/ML
D-METHAMPHET UR QL: ABNORMAL NG/ML
METHADONE UR QL SCN: NOT DETECTED NG/ML
OPIATES UR QL SCN: NOT DETECTED NG/ML
OXYCODONE UR QL SCN: NOT DETECTED NG/ML
PCP UR QL SCN: NOT DETECTED NG/ML
PROPOXYPH UR QL: NOT DETECTED NG/ML
TRICYCLICS UR QL SCN: NOT DETECTED NG/ML

## 2021-01-11 PROCEDURE — 99000 SPECIMEN HANDLING OFFICE-LAB: CPT | Performed by: FAMILY MEDICINE

## 2021-01-11 PROCEDURE — 80359 METHYLENEDIOXYAMPHETAMINES: CPT | Mod: 90 | Performed by: FAMILY MEDICINE

## 2021-01-11 PROCEDURE — 99214 OFFICE O/P EST MOD 30 MIN: CPT | Performed by: FAMILY MEDICINE

## 2021-01-11 PROCEDURE — 80306 DRUG TEST PRSMV INSTRMNT: CPT | Performed by: FAMILY MEDICINE

## 2021-01-11 RX ORDER — BUPRENORPHINE AND NALOXONE 2; .5 MG/1; MG/1
1 FILM, SOLUBLE BUCCAL; SUBLINGUAL 2 TIMES DAILY
Qty: 14 FILM | Refills: 0 | Status: SHIPPED | OUTPATIENT
Start: 2021-01-11 | End: 2021-01-18

## 2021-01-11 NOTE — TELEPHONE ENCOUNTER
1/11/2021  I spoke with pt who is not very interested in attending a DARREN tx group. He is interested in looking into one of the [facility] programs. He wants to take with his psychiatrist about this before he commits to any group. He will call me tomorrow after his appointment with his psychiatrist.    Trisha Marte MA [facility]  [phone]

## 2021-01-12 ENCOUNTER — TRANSFERRED RECORDS (OUTPATIENT)
Dept: HEALTH INFORMATION MANAGEMENT | Facility: CLINIC | Age: 36
End: 2021-01-12

## 2021-01-12 ENCOUNTER — VIRTUAL VISIT (OUTPATIENT)
Dept: PSYCHIATRY | Facility: CLINIC | Age: 36
End: 2021-01-12
Attending: NURSE PRACTITIONER
Payer: COMMERCIAL

## 2021-01-12 DIAGNOSIS — F15.21 AMPHETAMINE USE DISORDER, MODERATE, IN EARLY REMISSION (H): Primary | ICD-10-CM

## 2021-01-12 DIAGNOSIS — F41.9 ANXIETY: ICD-10-CM

## 2021-01-12 DIAGNOSIS — F98.8 ATTENTION DEFICIT DISORDER, UNSPECIFIED HYPERACTIVITY PRESENCE: ICD-10-CM

## 2021-01-12 DIAGNOSIS — F11.21 OPIOID USE DISORDER, SEVERE, IN SUSTAINED REMISSION (H): ICD-10-CM

## 2021-01-12 DIAGNOSIS — F32.89 OTHER DEPRESSION: ICD-10-CM

## 2021-01-12 PROCEDURE — 99215 OFFICE O/P EST HI 40 MIN: CPT | Mod: 95 | Performed by: NURSE PRACTITIONER

## 2021-01-12 RX ORDER — CLONIDINE HYDROCHLORIDE 0.1 MG/1
.1-.2 TABLET ORAL
Qty: 60 TABLET | Refills: 1 | Status: SHIPPED | OUTPATIENT
Start: 2021-01-12 | End: 2021-02-02

## 2021-01-12 RX ORDER — ATOMOXETINE 80 MG/1
80 CAPSULE ORAL DAILY
Qty: 30 CAPSULE | Refills: 1 | Status: SHIPPED | OUTPATIENT
Start: 2021-01-12 | End: 2021-02-02

## 2021-01-12 RX ORDER — VILAZODONE HYDROCHLORIDE 20 MG/1
20 TABLET ORAL DAILY
Qty: 30 TABLET | Refills: 1 | Status: SHIPPED | OUTPATIENT
Start: 2021-01-12 | End: 2021-02-02

## 2021-01-12 RX ORDER — DULOXETIN HYDROCHLORIDE 30 MG/1
30 CAPSULE, DELAYED RELEASE ORAL DAILY
Qty: 30 CAPSULE | Refills: 0 | Status: SHIPPED | OUTPATIENT
Start: 2021-01-12 | End: 2021-02-02

## 2021-01-12 ASSESSMENT — PAIN SCALES - GENERAL: PAINLEVEL: MILD PAIN (3)

## 2021-01-12 NOTE — PATIENT INSTRUCTIONS
-Continue on current medication regimen.  However, you may take Viibryd 20 mg at bedtime instead of AM    Your next appointment is scheduled on 2/23/2021 (Tue) at 4pm.     Individual therapist    Shyla Mari [email or web address]  Alice Reid [email or web address]  Jaida Chan [email or web address]  Tom Padilla [email or web address]    Thank you for coming to the [facility] M[facility] & [facility] [place] [facility].    Lab Testing:  If you had lab testing today and your results are reassuring or normal they will be mailed to you or sent through [employer] within 7 days. If the lab tests need quick action we will call you with the results. The phone number we will call with results is # [phone] (home) . If this is not the best number please call our clinic and change the number.    Medication Refills:  If you need any refills please call your pharmacy and they will contact us. Our fax number for refills is [phone]. Please allow three business for refill processing. If you need to  your refill at a new pharmacy, please contact the new pharmacy directly. The new pharmacy will help you get your medications transferred.     Scheduling:  If you have any concerns about today's visit or wish to schedule another appointment please call our office during normal business hours [phone] (8-5:00 M-F)    Contact Us:  Please call [phone] during business hours (8-5:00 M-F).  If after clinic hours, or on the weekend, please call  [phone].    Financial Assistance [phone]  [facility] Billing [phone]  Central Billing Office, [facility]: [phone]  [facility] Billing [phone]  Medical Records [phone]  [facility] Patient Bill of Rights [email or web address]       MENTAL HEALTH CRISIS NUMBERS:  For a  medical emergency please call  911 or go to the nearest ER.     [facility]:   [facility] -[phone]   Crisis Residence [facility] -[phone]   Walk-In Counseling Center [facility] -[phone]   COPE 24/7 Driver Mobile Team -[phone] (adults)/[phone] (child)  CHILD: [facility] needs assessment team - [phone]      [facility]:   [facility] - [phone]   Walk-in counseling [facility] - [phone]   Walk-in counseling [facility] - [phone]   Crisis Residence [facility] - [phone]  Urgent Care Adult Mental Vsxgys-[phone] mobile unit/ 24/7 crisis line    National Crisis Numbers:   National Suicide Prevention Lifeline: 3-313-102-TALK (288-639-5597)  Poison Control Center - 6-416-463-9579  [employer]/resources for a list of additional resources (SOS)  Trans Lifeline a hotline for transgender people 1-600.281.1216  The [employer] a hotline for LGBT youth 1-744.719.5432  Crisis Text Line: For any crisis 24/7   To: 165118  see www.crisistextline.org  - IF MAKING A CALL FEELS TOO HARD, send a text!         Again thank you for choosing [facility] MENTAL HEALTH & ADDICTION [facility] and please let us know how we can best partner with you to improve you and your family's health.    You may be receiving a survey regarding this appointment. We would love to have your feedback, both positive and negative. The survey is done by an external company, so your answers are anonymous.

## 2021-01-12 NOTE — PROGRESS NOTES
"VIDEO VISIT  Hoang Kiser is a 35 year old patient who is being evaluated via a billable video visit.      The patient has been notified of following:   \"This video visit will be conducted via a call between you and your physician/provider. We have found that certain health care needs can be provided without the need for an in-person physical exam. This service lets us provide the care you need with a video conversation. If a prescription is necessary we can send it directly to your pharmacy. If lab work is needed we can place an order for that and you can then stop by our lab to have the test done at a later time. Insurers are generally covering virtual visits as they would in-office visits so billing should not be different than normal.  If for some reason you do get billed incorrectly, you should contact the billing office to correct it and that number is in the AVS .    Video Conference to be completed via:  Edy    Patient has given verbal consent for video visit?:  Yes    Patient would prefer that any video invitations be sent by: Send to e-mail at: [email or web address]      How would patient like to obtain AVS?:  [employer] - [employer]    AVS SmartPhrase [PsychAVS] has been placed in 'Patient Instructions':  Yes     Start Time:  1530         End Time:  1555    Telemedicine Visit: The patient's condition can be safely assessed and treated via synchronous audio and visual telemedicine encounter.      Reason for Telemedicine Visit: Due to COVID 19 pandemic, clinic switching all appointments to telemedicine     Originating Site (Patient Location): Patient's home    Distant Site (Provider Location): Provider Remote Setting    Consent:  The patient/guardian has verbally consented to: the potential risks and benefits of telemedicine (video visit) versus in person care; bill my insurance or make self-payment for services provided; and responsibility for payment of non-covered services.     Mode of Communication:  Video " Conference via Anil    As the provider I attest to compliance with applicable laws and regulations related to telemedicine.    Psychiatry Clinic Progress Note                                                                  Patient Name: Hoang Kiser  YOB: 1985  MRN: [id number]  Date of Service:  1/12/2021  Last Seen:12/15/2020    Hoang Kiser is a 35 year old person assigned male at birth, identifies as cisgender male who uses the name Shoaib and pronoun lilia.       Shoaib Kiser is a 35 year old year old adult who presents for ongoing psychiatric care.  Shoaib Kiser was last seen on 12/15/2020.      At that time,        Medication Ordered/Consults/Labs/tests Ordered:      Medication:   -Start Viibryd 10 mg daily x 7 days, thereafter, increase it to 20 mg daily for your mood and anxiety.    -Once you have been on Viibryd 20 mg daily for 2 weeks, decrease Cymbalta to 30 mg daily.  Until you receive Viibryd, please continue Cymbalta 60 mg daily  -Continue all other medications for now  OTC Recommendations: [email or web address]  Lab Orders:  none  Referrals: Contact [address] [facility] , Trisha Marte ([phone]) to start MICD program  Release of Information: none  Future Treatment Considerations: Per symptoms.   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  Psych critical item history includes mutiple psychotropic trials, SUBSTANCE USE: amphetamines and opiates and substance use treatment .   Medical complication includes functional movement disorder     Previous Psychiatric Meds: Celexa, Wellbutrin, Trazodone, Effexor, Amitriptyline, Gabapentin, Tegretol, Zoloft, reports all not effective, Ritalin (movement worse), Klonopin (tapered off 8/28/2020), Adderall (stopped due to methamphetamine use concurrently,  "effective), abilify (sleep talking, walking, cognitive slowness), Cymbalta (higher than 90 mg caused SI)       Interim History                                                                                                        4, 4     On 12/20/2020, pt sent a message via [employer] noting that he will not do Genesight due to lack of insurance coverage.  Pt is requesting SSDI application based on ADD.  Replied to pt that he has instructed to complete ADHD testing or neuropsych testing to receive ADD diagnosis especially he has been using substance. Until he will get diagnosis via testing which will not be valid result until has 6 months substance free period, will not be able to support SSDI claim based on ADHD.    Since the last visit, notes Viibryd is causing oversedation while he takes the medication in AM.  Currently on Viibryd 20 mg daily and just started Cymbalta 30 mg daily today.  Mood and anxiety are OK, but notes his FMD is worse and this affects his mood, denies SI, SIB or HI. Also notes with FMD, depersonalization occasionally occurs. Continued difficulties with concentration and reports he will need to go back on Adderall eventually.  Pt tried Wellbutrin, is on Clonidine and Strattera and they do not work well for him. Notes he has not used any substance since Oct.  Also reports he does not think he needs MICD treatment as he has sufficient support, but he is interested in IOP as he does not have individual therapist currently. Pt is also open to having individual therapist referral. Notes just sent back [facility] [employer] kit.    Per addiction medicine's note on 1/11/2021, pt reported use of old adderall 1 week ago that he dropped in his room and they still recommend MICD treatment.  When discussed this, pt reported that he used old Adderall he found in his room and took it \"not to get high, but I needed to get things done.\"      Denies any symptoms suggestive of hypomania or psychosis.    Current " Suicidality/Hx of Suicide Attempts: Denies both  CoCominent Medical concerns: Chronic pain    Medication Side Effects: The patient denies all medication side effects.      Medical Review of Systems     Apart from the symptoms mentioned int he HPI, the 14 point review of systems, including constitutional, HEENT, cardiovascular, respiratory, gastrointestinal, genitourinary, musculoskeletal, integumentary, endocrine, neurological, hematologic and allergic is entirely negative except his chronic pain.    Substance Use   See HPI    Social/ Family History                                  [per patient report]                                 1ea,1ea   Living arrangements: lives in group home, feels safe.  Social Support: NA, group home leader/members.  Access to gun: none    Allergy                                Amitriptyline, Buspirone hcl, Doxycycline, Trazodone, and Cephalexin    Current Medications                                                                                                       Current Outpatient Medications   Medication Sig Dispense Refill     albuterol (PROAIR HFA/PROVENTIL HFA/VENTOLIN HFA) 108 (90 Base) MCG/ACT inhaler INHALE ONE TO TWO PUFFS INTO THE LUNGS EVERY FOUR HOURS AS NEEDED FOR SHORTNESS OF BREATH/ DYSPNEA OR WHEEZING 18 g 1     atomoxetine (STRATTERA) 80 MG capsule Take 1 capsule (80 mg) by mouth daily 30 capsule 1     buprenorphine HCl-naloxone HCl (SUBOXONE) 2-0.5 MG per film Place 1 Film under the tongue 2 times daily 14 Film 0     cloNIDine (CATAPRES) 0.1 MG tablet Take 1-2 tablets (0.1-0.2 mg) by mouth nightly as needed (sleep) 60 tablet 1     cyclobenzaprine (FLEXERIL) 10 MG tablet Take 0.5-1 tablets (5-10 mg) by mouth 3 times daily as needed for muscle spasms Stop Tizanidine. 60 tablet 1     diazepam (VALIUM) 2 MG tablet Take one tab 30-45 minutes prior to MRIs. 1 tablet 0     DULoxetine (CYMBALTA) 30 MG capsule Take 1 cap daily 2 weeks after being on Viibryd 20 mg daily 30  "capsule 0     DULoxetine (CYMBALTA) 60 MG capsule Take 1 capsule (60 mg) by mouth daily 30 capsule 1     hydrOXYzine (ATARAX) 25 MG tablet Take 1 tablet (25 mg) by mouth nightly as needed (at HS PRN) 45 tablet 3     naloxone (NARCAN) 4 MG/0.1ML nasal spray Spray 1 spray (4 mg) into one nostril alternating nostrils as needed for opioid reversal every 2-3 minutes until assistance arrives 0.2 mL 0     naproxen (NAPROSYN) 500 MG tablet Take 1 tablet (500 mg) by mouth 2 times daily (with meals) 40 tablet 3     order for DME Equipment being ordered: Digital home blood pressure monitor kit 1 each 0     order for DME Equipment being ordered: 4 wheeled walker with bench seat. 1 Units 0     oxybutynin ER (DITROPAN-XL) 10 MG 24 hr tablet TAKE 1 TABLET BY MOUTH ONCE DAILY *1 TOTAL FILL* **MUST KEEP APPOINTMENT ON 7/31/20 FOR MORE REFILLS* 90 tablet 1     SF 5000 PLUS 1.1 % CREA        vilazodone (VIIBRYD) 10 MG TABS tablet Take 1 tab daily for 60 tablet 0        Mental Status Exam                                                                                   9, 14 cog        Alertness: alert  and oriented  Appearance:  Casually dressed and Adequately groomed  Behavior/Demeanor: cooperative and calm, with fair  eye contact   Speech: regular rate and rhythm  Mood :  \"okay\"  Affect: slightly subdued; was congruent to mood; was congruent to content  Thought Process (Associations):  Perseverative on going back on Adderall  Thought process (Rate):  Normal  Thought content:  no overt psychosis, denies suicidal ideation, intent or thoughts and patient does not appear to be responding to internal stimuli  Perception:  Reports depersonalization;  Denies derealization  Attention/Concentration:  Fair  Memory:  Immediate recall intact and Short-term memory intact  Language: intact  Fund of Knowledge/Intelligence:  Average  Abstraction:  [place]  Insight:  Limited  Judgment:  Limited and Adequate for safety  Cognition: (6) does  appear " grossly intact; formal cognitive testing was not done    Physical Exam     Motor activity/EPS:  Normal  Gait:  Normal  Psychomotor: normal or unremarkable    Labs and Results      Pertinent findings on review include: Review of records with relevant information reported in the HPI.  Reviewed pt's past medical record and obtained collateral information.      [place] PRESCRIPTION MONITORING PROGRAM [] was checked today:  indicates Valium 12/3.    PHQ9 Today:  N/A  PHQ 11/14/2020 11/19/2020 12/10/2020   PHQ-9 Total Score 10 17 10   Q9: Thoughts of better off dead/self-harm past 2 weeks Not at all Not at all Not at all       JIN 7 Today: N/A  JIN-7 SCORE 11/14/2020 11/19/2020 12/10/2020   Total Score - - -   Total Score 10 (moderate anxiety) - 10 (moderate anxiety)   Total Score 10 12 10       Recent Labs   Lab Test 01/30/20  0942 03/27/19  1438 06/15/18  0853   CR 1.01 0.85 0.92   GFRESTIMATED >90 >90 >90     Recent Labs   Lab Test 01/30/20  0942 03/27/19  1438   AST 19 26   ALT 31 36   ALKPHOS 60 61     PSYCHOTROPIC DRUG INTERACTIONS:    Viibryd---Cymbalta: Concurrent use of VILAZODONE and SEROTONERGIC AGENTS may result in increased risk for serotonin syndrome (hypertension, hyperthermia, myoclonus, mental status changes).   MANAGEMENT:  Monitoring for adverse effects, routine vitals, tapering off of [Cymbalta] and patient is aware of risks    Impression/Assessment      Shoaib Kiser is a 35 year old adult  who presents for med management follow up.  Pt appears slightly depressed, but not anxious, denies SI, SIB or HI.  Pt initially denied any substance use, however, when discussing report from addiction medicine note yesterday, pt endorsed old Adderall use 1 week ago once.  Pt did not appear to have insight on he should not be on any street substance nor not currently prescribed medication.  Discussed in detail that if pt is taking control substance not as prescribed, pt will be discharged from the clinic and will be  asked to establish care elsewhere.  Pt also has not restarted MICD program, requesting referral to MI only program as he feels he knows how to stay substance free.  Discussed in depth with pt that this is still addiction medicine's recommendation as of yesterday and this writer will strongly recommend to follow up with addiction medicine on their recommendation.  It is difficult to determine if pt needs IOP on MI only program as his depression and anxiety do not appear to be significant at this time.  However, pt would definitely benefit from individual therapist, thus given referral information on individual therapists.  Instructed pt to contact addiction medicine to see if they would recommend MI IOP rather than MICD program.  If this is the case, will order referral for IOP.  Also send an Epic message to his addiction medicine provider on this matter.    Reiterated that until neuropsych or ADHD testing can diagnose pt with ADHD, will not consider going back to Adderall at this time.  Pt is perseverated on returning to Adderall as he has difficulties with concentration.  Addiction medicine has commented previously that stimulant will help with amphetamine use disorder while pain management also noted that when he came off of Adderall and opioid, his FMD appeared to have significantly less flare ups.  With COVID pandemic, it is difficult for pt to have random UTOX.  At this time, since he just used old Adderall and does not seem to have insight on how this impacts his substance use, will not restart Adderall.  Also discussed non stimulant pharmacology, but pt did not want to retry Wellbutrin and does not think Clonidine or Strattera have been helpful in the past.   Pt has significant substance use history for many years that likely change the way he brain functions.  Difficult to comment on current psychiatric symptoms given close proximity to substance use. Diagnostic clarification will require a period of  sobriety in addition to longitudinal follow-up and reassessment.      Since pt just submitted Genesight, discussed possibility of continuing on current medication regimen until Genesight results return.  However, since pt is noting oversedation with Viibryd AM administration, discussed pt may take Viibryd at HS while continuing all other medications for now.  Nursing staff was instructed to fax AVS to his [facility].      Diagnosis                                                                    Depression (MDD vs Depression due to another medical condition vs substance induced depression)  Hx dx of ADD and PTSD  Amphetamine use disorder, moderate in early remission  Opioid use disorder, severe, in sustained remission    Treatment Recommendation & Plan       Medication Ordered/Consults/Labs/tests Ordered:     Medication: Continue on current medication regimen.  However, you may take Viibryd 20 mg at bedtime instead of AM  OTC Recommendations: none  Lab Orders:  Genesight pending result  Referrals:   Individual therapist    Shyla Mari [email or web address]  Alice Reid [email or web address]  Jaida Chan [email or web address]  Tom Padilla [email or web address]    Release of Information: none  Future Treatment Considerations: Per symptoms.   Return for Follow Up: in 3 weeks    -Discussed safety plan for suicidal thoughts  -Discussed plan for suicidality  -Discussed available emergency services  -Patient agrees with the treatment plan  -Encouraged to continue outpatient therapy to gain more coping mechanism for stress.      Treatment Risk Statement: Discussed with the patient my impressions, as well as recommended studies. I educated patient on the differential diagnosis and prognosis. I discussed with the patient the risks and benefits of medications versus no interventions, including efficacy, dose,  possible side effects and length of treatment and the importance of medication compliance.  The patient understands the risks, benefits, adverse effects and alternatives. Agrees to treatment with the capacity to do so. No medical contraindications to treatment. The patient also understands the risks of using street drugs or alcohol.    CRISIS NUMBERS:   Provided routinely in AVS.    40 minutes spent on the date of the encounter doing chart review, history and exam, documentation and further activities as noted above      Meagan Bowman CNP,  1/12/2021

## 2021-01-13 ENCOUNTER — TELEPHONE (OUTPATIENT)
Dept: PSYCHIATRY | Facility: CLINIC | Age: 36
End: 2021-01-13

## 2021-01-13 NOTE — TELEPHONE ENCOUNTER
Writer coordinated with in-clinic staff to fax AVS.        Marleni Walls, Faustino Smith RN; Zohreh Mendoza CMA             Sent

## 2021-01-13 NOTE — TELEPHONE ENCOUNTER
----- Message from CARLOS A Lamb CNP sent at 1/12/2021  3:58 PM CST -----  Pls fax AVS to Pt's group home: [facility], [facility], voice [phone], fax [phone].    Also FYI: pt was specifically instructed by GH needing to use [employer].    Thank you!

## 2021-01-15 ENCOUNTER — TELEPHONE (OUTPATIENT)
Dept: PSYCHIATRY | Facility: CLINIC | Age: 36
End: 2021-01-15

## 2021-01-15 ENCOUNTER — VIRTUAL VISIT (OUTPATIENT)
Dept: PALLIATIVE MEDICINE | Facility: CLINIC | Age: 36
End: 2021-01-15
Payer: COMMERCIAL

## 2021-01-15 DIAGNOSIS — M51.369 DDD (DEGENERATIVE DISC DISEASE), LUMBAR: ICD-10-CM

## 2021-01-15 DIAGNOSIS — M54.12 CERVICAL RADICULOPATHY: Primary | ICD-10-CM

## 2021-01-15 DIAGNOSIS — M54.16 LUMBAR RADICULOPATHY: ICD-10-CM

## 2021-01-15 DIAGNOSIS — R10.31 BILATERAL GROIN PAIN: ICD-10-CM

## 2021-01-15 DIAGNOSIS — M47.816 FACET ARTHROPATHY, LUMBAR: ICD-10-CM

## 2021-01-15 DIAGNOSIS — M50.30 DDD (DEGENERATIVE DISC DISEASE), CERVICAL: ICD-10-CM

## 2021-01-15 DIAGNOSIS — R10.32 BILATERAL GROIN PAIN: ICD-10-CM

## 2021-01-15 PROCEDURE — 99213 OFFICE O/P EST LOW 20 MIN: CPT | Mod: 95 | Performed by: NURSE PRACTITIONER

## 2021-01-15 ASSESSMENT — PAIN SCALES - GENERAL: PAINLEVEL: MODERATE PAIN (5)

## 2021-01-15 NOTE — TELEPHONE ENCOUNTER
12 pages of [employer] test results were received. The original copies were sent to scanning. A copy will be mailed to patient at listed address. Meagan Bowman will review results. A message was routed to the provider.Sayra Finley LPN

## 2021-01-15 NOTE — PROGRESS NOTES
[facility]      Hoang Kiser is a 35 year old male who is being evaluated via a billable virtual visit.      VIDEO VISIT   How would you like to obtain your AVS? MyChart  If you are dropped from the video visit, the video invite should be resent to: Text to cell phone: [phone]  Will anyone else be joining your video visit? No  If patient encounters technical issues they should call [phone]    Imelda Jolley CMA  [facility]  [place]      Video-Visit Details  Type of service:  Video Visit  Video Start Time: 1:30 PM  Video End Time: 1:53 PM  Originating Location (pt. Location): Home  Distant Location (provider location):  [facility]   Platform used for Video Visit: Anil    CHIEF COMPLAINT: chronic neck, back, groin pain     INTERVAL HISTORY:  Last seen on 12/3/20.        Recommendations/plan at the last visit included:  1. Imaging: MRIs of neck and low back. We will follow up with you when results are available   2. Procedures recommended: Will discuss after MRIs . [facility] imaging- Please call to schedule: [phone]  3. Medication recommendations: No change to current medications. NO CONTROLLED SUBSTANCES to be prescribed from this clinic.    Since last visit:   - Chronic pain has come back. Seeing Dr Nicholson is Addiction Med. Somewhat helpful, would like to ask about increasing his dose. Has trouble sleeping due to residents in the place he lives. Noting groin pain returning. Suboxone has reduced sharp pains.   - radiating pain into right arm to elbow, has a lot pain pain when he writes or plays guitar. Pain can go to hands bilaterally     Pain Information:   Pain quality: Sharp    Pain rating: intensity ranges from 1/10 to 8/10, and averages 4/10 on a 0-10 scale.    Annual Requirements last collected:  N/A, no longer prescribed any controlled substances due to meth use/controlled substance agreement  violation.      Current Pain Relevant Medications:  Duloxetine 60 mg daily.   Hydroxyzine 25 mg at HS PRN  Naproxen 500 mg BID PRN      Previous Pain Relevant Medications: (H--helped; HI--Helped initially; SWH--Somewhat helpful; NH--No help; W--worse; SE--side effects; ?--Unsure if helpful)   NOTE: This medication information taken from patient's intake form, not medical records.                         Opiates: Tramadol: H, Hydrocodone: H, Morphine: H                        NSAIDS: Ibuprofen:H, naproxen:SWH, Relafen: NH                        Muscle Relaxants: Cyclobenzaprine:H,Med interaction, Tizanidine:H, Baclofen: [facility]WH                        Anti-migraine mediations: Prednisone:H                        Anti-depressants: Bupropion:SE, Celexa:SE, Duloxetine:H, Trazodone:Too strong, Venlafaxine:too strong, Amitriptyline:SE                         Sleep aids:Anbien: H                        Anxiolytics: Clonazepam:H                        Neuropathics: Tegretol:taken for seizures in childhood, Gabapentin:H                                          Topicals: Lidocaine:H                        Other medications not covered above: Tylenol:NH, Adderall: H      Any illicit drug use: Sober date 8/27/2015, lives in a sober house.   EtOH use: last use 5 years ago  Caffeine use: 2-3 per day  Nicotine use: 3/4 pack per day  Any use of prescriptions other than how they were prescribed:taina      [place] Board of Pharmacy Data Base Reviewed:    YES; As expected, no concern for misuse/abuse of controlled medications based on this report.   Concern for multiple controlled substances including stimulants and opiates.  7/27/19: Concern for misuse of opiates and stimulants as noted in office visit on this date.   10/29/19: Requesting early refill due to overuse of opiate medication. #45 tabs to last 30 days. Refill declined.  8/31/20: Due to multiple no showed appts, refills of opiates are declined. Opiates are discontinued until  has video appt.   10/8/20: Shoaib admits that he has been using methamphetamine since early 2020.      Is Narcan prescribed for opiate use >50 MME daily or concurrent use of opiates and benzodiazepines? YES    _______________________________________________      Physical Exam unable to be completed due to virtual visit. There were no vitals taken for this visit or reported by patient.     Review of Systems: A 10-point review of systems was negative, with the exception of any concerns as noted and chronic pain issues.      General: Verbal, alert and no distress   Psychiatric:  affect and mood normal, mentation appears normal    DIRE Score for ongoing opioid management is calculated as follows:    Diagnosis = 2 pts (slowly progressive; moderate pain/objective findings)    Intractability = 2 pts (most treatments tried; patient not fully engaged/barriers)    Risk        Psych = 1 pt (serious personality dysfunction/mental illness that significantly interferes with care)         Chem Hlth = 1 pt (active or very recent use of illicit drugs; excessive alcohol/drug abuse)       Reliability = 1 pt (medication misuse; missed appointments; rarely follows through)       Social = 1 pt (life in chaos; little family support/close relationships; loss normal life rolls)       (Psych + Chem hlth + Reliability + Social) = 8    Efficacy = 1 pt (poor function; minimal pain relief despite mod/high med dose)    DIRE Score = 9        7-13: likely NOT suitable candidate for long-term opioid analgesia       14-21: may be a suitable candidate for long-term opioid analgesia        DIAGNOSTIC TESTS:  MRI CERVICAL SPINE WITHOUT CONTRAST 12/17/2020 t.   COMPARISON: Scan dated 6/17/2019.  FINDINGS: The cervical spinal cord and visualized portions of the  thoracic spinal cord appear normal.  The visualized portions of the  brainstem and cerebellum appear normal.  Alignment is normal.  The  paraspinal soft tissues appear normal. The bone marrow has  normal  signal intensity. Craniocervical Junction and C1-C2:  Normal.  C2-C3:  Mild degenerative change in the facet joints. The central  canal and neural foramina are patent.  C3-C4:  Mild annular disc bulge. Mild degenerative change in the facet  joints. Mild-moderate left foraminal stenosis due to an uncinate spur.  C4-C5:  The disc appears normal. The central canal is normal. There  are mild degenerative changes in the facet joints. The neural foramina  are patent.  C5-C6:  There is a new area small right central disc protrusion  causing slight mass effect on the dural sac. The central canal is  mildly narrowed due to this disc protrusion. The neural foramina are  patent. Mild degenerative change in the facet joints.  C6-C7:  There is a broad-based disc protrusion extending to the right  and left of midline. This extends slightly greater to the right of  midline and extends laterally into the region of the right C6-C7  neural foramen. This is increased in size when compared to the prior  scan. The central canal is mildly narrowed. Moderate right and mild  left foraminal stenosis.  C7-T1: Normal disc, facet joints, spinal canal and neural foramina.  T1-T2:  Normal disc, facet joints, spinal canal and neural foramina.   IMPRESSION:    1. Multilevel degenerative change. The degenerative changes have  increased when compared to 6/17/2019.  2. There is a new small right central disc protrusion at C5-C6.  3. Broad-based C6-C7 disc protrusion extending greater to the right of  midline. This extends laterally into the region of the right C6-C7  neural foramen. This could affect the right C7 nerve root.    MRI LUMBAR SPINE WITHOUT CONTRAST   12/17/2020   COMPARISON: Scan dated 5/2/2018  FINDINGS: The report is dictated assuming five lumbar-type vertebral  bodies, and radiographic correlation may be necessary.  The distal  spinal cord and cauda equina appear normal.  The tip of the conus is  at the L1.  The bone marrow  appears normal.  The paraspinal soft  tissues appear normal.  T12-L1:   Normal disc, facet joints, spinal canal and neural foramina.  L1-L2:   Normal disc, facet joints, spinal canal and neural foramina. L2-L3:   Normal disc, facet joints, spinal canal and neural foramina. L3-L4:   Normal disc, facet joints, spinal canal and neural foramina.   L4-L5:  Mild annular disc bulge. Central canal is normal. Mild  degenerative change in the facet joints. The neural foramen are patent.  L5-S1:  Mild annular disc bulge. Mild degenerative change in the facet  joints. The central canal is normal. The right neural foramen is  patent. There is mild left foraminal stenosis due to the bulging disc.  This is unchanged.  IMPRESSION:  Mild degenerative changes. When compared to the previous study of 5/2/2018, there has not been any significant change     ASSESSMENT:   1.  Lumbar DDD, mild  2.  Lumbar muscle spasm  3.  Labral tear, right hip  4.  Hx: anxiety, chemical dependence in remission.  5.  Functional neurologic movement disorder  6.  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  Polysubstance abuse, questionable sobriety.      Reviewed imaging and we will start with RENUKA C5-6, C6-7 for both axial and intermittent bilateral radiculopathy to bilateral hands.  Also starting PT through [facility].     Plan:    Diagnosis reviewed, treatment option addressed, and risk/benifits discussed.  Self-care instructions given.  I am recommending a multidisciplinary treatment plan to help this patient better manage pain.      1. Schedule physical therapy assessment with [facility]. They wiill call you to schedule.   2. Schedule VIDEO follow-up with CARLOS A Higuera, NP-C in 1-2 months   3. Procedures recommended: Cervical epidural injection. We will call you to schedule.   4. Medication recommendations:   1. No medication changes at this time.     I have reviewed the note as documented above.  This accurately captures the substance of my conversation with the  patient.    BILLING TIME DOCUMENTATION:   The total TIME spent on this patient on the day of the appointment was 26 minutes.   TOTAL TIME includes:   Time spent preparing to see the patient: 3 minutes (reviewing records and tests)  Time spend face to face with the patient: 23 minutes  Time spent ordering tests, medications, procedures and referrals: 0 minutes  Time spent Referring and communicating with other healthcare professionals: 0 minutes  Documenting clinical information in Epic: 0 minutes    CARLOS A Bass, NP-C  [facility] [place]

## 2021-01-17 LAB
AMPHET UR CFM-MCNC: 173 NG/ML
AMPHET UR QL CFM: NORMAL
D-METHAMPHET UR CFM-MCNC: >85 %
L-METHAMPHET UR CFM-MCNC: <15 %
METHAMPHET UR CFM-MCNC: 530 NG/ML

## 2021-01-18 ENCOUNTER — VIRTUAL VISIT (OUTPATIENT)
Dept: ADDICTION MEDICINE | Facility: CLINIC | Age: 36
End: 2021-01-18
Payer: COMMERCIAL

## 2021-01-18 ENCOUNTER — MYC MEDICAL ADVICE (OUTPATIENT)
Dept: ADDICTION MEDICINE | Facility: CLINIC | Age: 36
End: 2021-01-18

## 2021-01-18 ENCOUNTER — ALLIED HEALTH/NURSE VISIT (OUTPATIENT)
Dept: BEHAVIORAL HEALTH | Facility: CLINIC | Age: 36
End: 2021-01-18
Payer: COMMERCIAL

## 2021-01-18 DIAGNOSIS — G89.29 CHRONIC LOW BACK PAIN WITH SCIATICA, SCIATICA LATERALITY UNSPECIFIED, UNSPECIFIED BACK PAIN LATERALITY: ICD-10-CM

## 2021-01-18 DIAGNOSIS — F32.0 CURRENT MILD EPISODE OF MAJOR DEPRESSIVE DISORDER, UNSPECIFIED WHETHER RECURRENT (H): ICD-10-CM

## 2021-01-18 DIAGNOSIS — F90.8 ATTENTION DEFICIT HYPERACTIVITY DISORDER (ADHD), OTHER TYPE: ICD-10-CM

## 2021-01-18 DIAGNOSIS — R69 DIAGNOSIS DEFERRED: Primary | ICD-10-CM

## 2021-01-18 DIAGNOSIS — G25.9 FUNCTIONAL MOVEMENT DISORDER: ICD-10-CM

## 2021-01-18 DIAGNOSIS — M54.40 CHRONIC LOW BACK PAIN WITH SCIATICA, SCIATICA LATERALITY UNSPECIFIED, UNSPECIFIED BACK PAIN LATERALITY: ICD-10-CM

## 2021-01-18 DIAGNOSIS — F15.90 STIMULANT USE DISORDER: Primary | ICD-10-CM

## 2021-01-18 PROCEDURE — 99214 OFFICE O/P EST MOD 30 MIN: CPT | Mod: 95 | Performed by: FAMILY MEDICINE

## 2021-01-18 RX ORDER — BUPRENORPHINE AND NALOXONE 2; .5 MG/1; MG/1
1 FILM, SOLUBLE BUCCAL; SUBLINGUAL 3 TIMES DAILY
Qty: 42 FILM | Refills: 0 | Status: SHIPPED | OUTPATIENT
Start: 2021-01-18 | End: 2021-01-29

## 2021-01-18 NOTE — PROGRESS NOTES
Behavioral Health Home Services  [facility]: [place]        Social Work Care Navigator Note      Patient: Hoang Kiser  Date: January 18, 2021  Preferred Name: Shoaib    Previous PHQ-9:   PHQ-9 SCORE 11/14/2020 11/14/2020 12/10/2020   PHQ-9 Total Score - - -   PHQ-9 Total Score MyChart - 10 (Moderate depression) 10 (Moderate depression)   PHQ-9 Total Score 10 10 10   Some encounter information is confidential and restricted. Go to Review Flowsheets activity to see all data.     Previous JIN-7:   JIN-7 SCORE 11/14/2020 11/14/2020 12/10/2020   Total Score - - -   Total Score - 10 (moderate anxiety) 10 (moderate anxiety)   Total Score 10 10 10   Some encounter information is confidential and restricted. Go to Review Flowsheets activity to see all data.     MOLLY LEVEL:  MOLLY Score (Last Two) 11/14/2020 12/10/2020   MOLLY Raw Score 31 31   Activation Score 59.3 59.3   MOLLY Level 3 3       Preferred Contact:  Need for : No  Preferred Contact: Cell      Type of Contact Today: Phone call (patient / identified key support person reached)      Data: (subjective / Objective):  Recent ED/IP Admission or Discharge?   None    Patient Goals:  Goal Areas: Health;Mental Health;Chemical Health;Employment / Volunteer;Financial and Social Service Benefits  Patient stated goals: Patient would like to resume ILS services. He was working with an ILS worker and is in the process of trying to find a new one. Patient would like to do outpatient treatment at a facility that is not associated with [facility]. Patient wants to continue to work on his sobriety and health. At this time he has gone 6 days without using. Patient continues to try and work on self-advocacy skills. He was seeing a therapist but reports that his therapist was discontinued because he had a UA that showed substance use. Patient is going to try and contact an old therapist he used to see and try to set up an appointment. Patient wants to take a break from  volunteering at this time and focus on his health. Patient continues to work on his coping and stress management skills by taking walks, playing guitar and making comfort boards.         [facility] Core Service Provided:  Care Coordination: provided care management services/referrals necessary to ensure patient and their identified supports have access to medical, behavioral health, pharmacology and recovery support services.  Ensured that patient's care is integrated across all settings and services.     Current Stressors / Issues / Care Plan Objective Addressed Today:  [facility] returned message from patient. Patient reports that he is working on applying for SSDI and needs a form filled out called a Residual Functional Capacity form. He said that he asked his PCP about this and was directed back to [facility]. Patient asked if he could e-mail the form to [facility] to look at. [facility] reviewed Authorization for Electronic Communication form with patient and received verbal consent for signature. [facility] advised that writer will take a look at the form and communicate with patient's team about who would be most appropriate to complete this. [facility] asked patient if he is working with an  or advocate to help with the SSDI process. Patient confirmed that he is working with an agency called [employer]. Patient states that he will e-mail the form over shortly.     Intervention:  Motivational Interviewing: Expressed Empathy/Understanding and Permission to raise concern or advise   Target Behavior(s): Explored current social supports and reinforced opportunities to increase engagement    Assessment: (Progress on Goals / Homework):  Patient would benefit from continued coordination in reaching their goals set for the Behavioral Health Home ([facility]) program. [facility] reviewed Health Action Plan goals and will continue to monitor progress and work with patient and their care team.    Plan: (Homework, other):  Patient was encouraged to continue to seek  condition-related information and education. [facility], patient, and team will continue to work towards progress on reaching goals set for the Behavioral Health Home ([facility]) program.     Janet Ng [facility]  Behavioral Health Home ([facility])   [facility]  [phone]

## 2021-01-18 NOTE — PROGRESS NOTES
"  SUBJECTIVE:                                                    ADDICTION MEDICINE NOTE    Hoang Kiser is a 35 year old male who presents by phone today for Addiction Medicine consult         [place] Board [place] Pharmacy Data Base Reviewed:    Yes ;           Brief History:    Here on request of Pain provider, Tamiko Stevens    Being treated for Lumbar DDD, Functional neurologic movement disorder    Also being treated by  [facility] for Depression, Attention Deficit Disorder    Referred to Addiction medicine due to positive UDS for methamphetamine    Patient admits to history of Addiction    Was treated in an MI/CD program in [place] in 2015, [facility] in 2016 for 13 months, and [facility] in 2019    Now says he's \"burnt out on treatment \"    Was involved with recovery support groups in past    Says he has had up to 4 years sobriety in the past    Past drug use includes prescription opioids, cocaine    Has been prescribed benzodiazepines in the past    Started using Methamphetamine early 2020    Says he didn't really use it to get high nor does he crave it but instead used it to self medicate his ADD    Says he has trouble with focusing and methamphetamine helped    Has been prescribed Adderall but felt dose insufficient at times    Now using Naprosyn for pain    Realizes he cannot use methamphetamine and doesn't see a problem stopping    Last use 1 week ago    Weaning off left over hydrocodone; no withdrawal but feels pain not controlled    Was recommended to seek treatment but has procrastinated    Advised [facility] out-patient MI/CD program may be helpful and a good fit    Not requesting any MAT as has no craving    Lives in a group home where medications are controlled and dispensed      HPI:    In clinic visit 1/11/21    Here requesting help with pain management using Suboxone     Referred by Pain provider    Says pain less manageable lately, interfering with function    Describes pain as in " "front of hips, low back, neck    Level is 7-8 frequently    Says it is like \"pins and needles anf grinding\"    Had been on Morphine, Hydrocodone in the past; no desire to resume full agonist    UDS positive for amphetamine; will check confirmation; patient says he recently used an old Adderall    Not working    Walks daily    Lives in group home    Discussed pros and cons, risks and benefits of Suboxone     Denies any opioid use recently    Will begin Suboxone 2 mg BID    Questions answered    Follow up video visit in 1 week    TELEPHONE VISIT 1/18/21    Suboxone helps pain    Will increase to 2 mg TID for more relief    Re-check 2 weeks    Denies methamphetamine use despite positive confirmation test on UDS    Will follow closely    Social History     Social History Narrative    He lives in [facility] in a chemical treatment center - there are 11 people there. He arrives today through [employer] ride    He has 3 brothers and 3 sisters. He has not children. He has never been .      Mother and father are alive    One sister is an alcoholic and bulemic    depression is present in the family : mother, sister and 2 brothers are bipolar.      He denies bipolar. He has depression.    He denies recurring thoughts. He has had substance abuse issues. He has had cravings in the past.    He exercises and plays guitar and draws.      He has used meth as well as prescription pain killers.    He has used marijuana when young. Used cocaine in the past.    Has not used iv drugs    He does not drink alcohol.      50% [place] and is Micronesian, english and Palestinian and a bit native.    He smokes cigarettes - 1 pack per day.        single. lives in [place]. estela is father           Problem list and histories reviewed & adjusted, as indicated.  Additional history: as documented           albuterol (PROAIR HFA/PROVENTIL HFA/VENTOLIN HFA) 108 (90 Base) MCG/ACT inhaler, INHALE ONE TO TWO PUFFS INTO THE LUNGS EVERY FOUR HOURS " AS NEEDED FOR SHORTNESS OF BREATH/ DYSPNEA OR WHEEZING       atomoxetine (STRATTERA) 80 MG capsule, Take 1 capsule (80 mg) by mouth daily       cloNIDine (CATAPRES) 0.1 MG tablet, Take 1-2 tablets (0.1-0.2 mg) by mouth nightly as needed (sleep)       cyclobenzaprine (FLEXERIL) 10 MG tablet, Take 0.5-1 tablets (5-10 mg) by mouth 3 times daily as needed for muscle spasms Stop Tizanidine.       diazepam (VALIUM) 2 MG tablet, Take one tab 30-45 minutes prior to MRIs.       DULoxetine (CYMBALTA) 30 MG capsule, Take 1 capsule (30 mg) by mouth daily       hydrOXYzine (ATARAX) 25 MG tablet, Take 1 tablet (25 mg) by mouth nightly as needed (at HS PRN)       naloxone (NARCAN) 4 MG/0.1ML nasal spray, Spray 1 spray (4 mg) into one nostril alternating nostrils as needed for opioid reversal every 2-3 minutes until assistance arrives       naproxen (NAPROSYN) 500 MG tablet, Take 1 tablet (500 mg) by mouth 2 times daily (with meals)       order for DME, Equipment being ordered: Digital home blood pressure monitor kit       order for DME, Equipment being ordered: 4 wheeled walker with bench seat.       oxybutynin ER (DITROPAN-XL) 10 MG 24 hr tablet, TAKE 1 TABLET BY MOUTH ONCE DAILY *1 TOTAL FILL* **MUST KEEP APPOINTMENT ON 7/31/20 FOR MORE REFILLS*       SF 5000 PLUS 1.1 % CREA,        vilazodone (VIIBRYD) 20 MG TABS tablet, Take 1 tablet (20 mg) by mouth daily    No current facility-administered medications on file prior to visit.       Allergies   Allergen Reactions     Amitriptyline      Ineffective in reducing spasms/movement, increased fatigue  Other reaction(s): Other (see comments)     Buspirone Hcl Other (See Comments)     Panic attacks     Doxycycline Diarrhea, Fatigue, GI Disturbance and Nausea     Other reaction(s): GI intolerance     Trazodone Fatigue     Other reaction(s): Other (see comments)     Cephalexin Diarrhea     Other reaction(s): GI intolerance           REVIEW OF SYSTEMS:  General:  No acute withdrawal  symptoms.  No recent infections or fever  Eyes:  No vision concerns.  No double vision.    Resp: No coughing, wheezing or shortness of breath  CV: No chest pains or palpitations  GI: No nausea, vomiting, abdominal pain, diarrhea.  No constipation  : No urinary frequency or dysuria    Musculoskeletal: No significant muscle or joint pains other than as above.  No edema  Neurologic: No numbness, tingling, weakness, problems with balance or coordination  Psychiatric: No acute concerns other than as above.   Skin: No rashes or areas of acute infection    OBJECTIVE:  GENERAL: Healthy, alert and no distress  PSYCH: Mentation appears normal, affect normal/bright, judgement and insight intact, normal speech     ASSESSMENT:    Stimulant use disorder  Chronic back pain  Attention Deficit disorder  Depression  Functional neurologic movement disorder    PLAN:    Addiction Medicine recommendations:    Suboxone 2 mg TID    Follow up 2/1/21    Peng Nicholson MD  [facility] Medicine  [phone]    LENGTH OF VISIT :  12 MINUTES

## 2021-01-20 ENCOUNTER — MYC MEDICAL ADVICE (OUTPATIENT)
Dept: PSYCHIATRY | Facility: CLINIC | Age: 36
End: 2021-01-20

## 2021-01-21 ENCOUNTER — TRANSFERRED RECORDS (OUTPATIENT)
Dept: HEALTH INFORMATION MANAGEMENT | Facility: CLINIC | Age: 36
End: 2021-01-21

## 2021-01-21 ENCOUNTER — TELEPHONE (OUTPATIENT)
Dept: BEHAVIORAL HEALTH | Facility: CLINIC | Age: 36
End: 2021-01-21

## 2021-01-21 NOTE — TELEPHONE ENCOUNTER
Behavioral Health Home Services  [facility]: [place]        Social Work Care Navigator Note      Patient: Hoang Kiser  Date: January 21, 2021  Preferred Name: Shoaib    Previous PHQ-9:   PHQ-9 SCORE 11/14/2020 11/14/2020 12/10/2020   PHQ-9 Total Score - - -   PHQ-9 Total Score MyChart - 10 (Moderate depression) 10 (Moderate depression)   PHQ-9 Total Score 10 10 10   Some encounter information is confidential and restricted. Go to Review Flowsheets activity to see all data.     Previous JIN-7:   JIN-7 SCORE 11/14/2020 11/14/2020 12/10/2020   Total Score - - -   Total Score - 10 (moderate anxiety) 10 (moderate anxiety)   Total Score 10 10 10   Some encounter information is confidential and restricted. Go to Review Flowsheets activity to see all data.     MOLLY LEVEL:  MOLLY Score (Last Two) 11/14/2020 12/10/2020   MOLLY Raw Score 31 31   Activation Score 59.3 59.3   MOLLY Level 3 3       Preferred Contact:  Need for : No  Preferred Contact: Cell      Type of Contact Today: Phone call (not reached/unavailable)      Data: (subjective / Objective):  Patient left [facility] a message asking for an update on his Residual Functional Capacity Form.   [facility] left message for patient letting him know he can call back for update.    NURYS Henderson  Behavioral Health Home ([facility])   [facility]  [phone]        Next 5 appointments (look out 90 days)    Feb 01, 2021 11:00 AM  Return Visit with Peng Nicholson MD  [facility] ([facility]) [address] [place] [address]  [address]  [facility] [id number] [phone]

## 2021-01-22 ENCOUNTER — ALLIED HEALTH/NURSE VISIT (OUTPATIENT)
Dept: BEHAVIORAL HEALTH | Facility: CLINIC | Age: 36
End: 2021-01-22
Payer: COMMERCIAL

## 2021-01-22 DIAGNOSIS — R69 DIAGNOSIS DEFERRED: Primary | ICD-10-CM

## 2021-01-22 NOTE — PROGRESS NOTES
Behavioral Health Home Services  [facility] Clinic: [place]        Social Work Care Navigator Note      Patient: Hoang Kiser  Date: January 22, 2021  Preferred Name: Shoaib    Previous PHQ-9:   PHQ-9 SCORE 11/14/2020 11/14/2020 12/10/2020   PHQ-9 Total Score - - -   PHQ-9 Total Score MyChart - 10 (Moderate depression) 10 (Moderate depression)   PHQ-9 Total Score 10 10 10   Some encounter information is confidential and restricted. Go to Review Flowsheets activity to see all data.     Previous JIN-7:   JIN-7 SCORE 11/14/2020 11/14/2020 12/10/2020   Total Score - - -   Total Score - 10 (moderate anxiety) 10 (moderate anxiety)   Total Score 10 10 10   Some encounter information is confidential and restricted. Go to Review Flowsheets activity to see all data.     MOLLY LEVEL:  MOLLY Score (Last Two) 11/14/2020 12/10/2020   MOLLY Raw Score 31 31   Activation Score 59.3 59.3   MOLLY Level 3 3       Preferred Contact:  Need for : No  Preferred Contact: Cell      Type of Contact Today: Phone call (patient / identified key support person reached)      Data: (subjective / Objective):  Recent ED/IP Admission or Discharge?   None    Patient Goals:  Goal Areas: Health;Mental Health;Chemical Health;Employment / Volunteer;Financial and Social Service Benefits  Patient stated goals: Patient would like to resume ILS services. He was working with an ILS worker and is in the process of trying to find a new one. Patient would like to do outpatient treatment at a facility that is not associated with [place]. Patient wants to continue to work on his sobriety and health. At this time he has gone 6 days without using. Patient continues to try and work on self-advocacy skills. He was seeing a therapist but reports that his therapist was discontinued because he had a UA that showed substance use. Patient is going to try and contact an old therapist he used to see and try to set up an appointment. Patient wants to take a break from  volunteering at this time and focus on his health. Patient continues to work on his coping and stress management skills by taking walks, playing guitar and making comfort boards.         [facility] Core Service Provided:  Care Coordination: provided care management services/referrals necessary to ensure patient and their identified supports have access to medical, behavioral health, pharmacology and recovery support services.  Ensured that patient's care is integrated across all settings and services.     Current Stressors / Issues / Care Plan Objective Addressed Today:  [facility] returned patient's voicemail. Patient wanted to check on the status of his Residual Functional Capacity form that he needs completed. [facility] updated patient that the form was sent to both Dr. Floyd and Tamiko Stevens. [facility] updated that Dr. Brooks messaged back and advised that he would not be the most appropriate person to complete the form since he has not seen patient for a physical in 22 months. [facility] updated that writer is still waiting to hear back from Tamiko on whether or not she can complete the form. Patient mentioned that he had a physical with another [facility] provider in December. [facility] looked this up in patients chart and found that patient had an appointment with Dr. Leone. [facility] advised that if Tamiko cannot complete the form that writer will reach out to this provider.     Intervention:  Motivational Interviewing: Supported Autonomy, Collaboration, Evocation   Target Behavior(s): Explored current social supports and reinforced opportunities to increase engagement    Assessment: (Progress on Goals / Homework):  Patient would benefit from continued coordination in reaching their goals set for the Behavioral Health Home ([facility]) program. [facility] reviewed Health Action Plan goals and will continue to monitor progress and work with patient and their care team.    Plan: (Homework, other):  Patient was encouraged to continue to seek condition-related  information and education.      NURYS Henderson  Behavioral Health Home ([facility])   [facility]  [phone]            Next 5 appointments (look out 90 days)    Feb 01, 2021 11:00 AM  Return Visit with Peng Nicholson MD  [facility] ([facility] Integrated Primary Care) [address] [place] [address]  [address]  [facility] [address] [phone]

## 2021-01-23 ENCOUNTER — MYC MEDICAL ADVICE (OUTPATIENT)
Dept: FAMILY MEDICINE | Facility: CLINIC | Age: 36
End: 2021-01-23

## 2021-01-23 ENCOUNTER — MYC MEDICAL ADVICE (OUTPATIENT)
Dept: PALLIATIVE MEDICINE | Facility: CLINIC | Age: 36
End: 2021-01-23

## 2021-01-23 DIAGNOSIS — M62.838 MUSCLE SPASM: ICD-10-CM

## 2021-01-25 RX ORDER — CYCLOBENZAPRINE HCL 5 MG
5-10 TABLET ORAL 3 TIMES DAILY PRN
Qty: 120 TABLET | Refills: 1 | Status: SHIPPED | OUTPATIENT
Start: 2021-01-25 | End: 2021-02-26

## 2021-01-25 NOTE — TELEPHONE ENCOUNTER
Flexeril was last filled at [facility] pharmacy on 12/22/20 for 5 mg tablets  #120 instead of 10 MG tablets #60.  Prepped a new prescription to reflect how it was filled. Please review.     Last appointment was 1/15/21  No future appointments scheduled.     Sarina Polanco, KATERINAN, RN  Care Coordinator  [facility] [place]

## 2021-01-26 DIAGNOSIS — G47.00 INSOMNIA, UNSPECIFIED TYPE: Primary | ICD-10-CM

## 2021-01-26 RX ORDER — ZOLPIDEM TARTRATE 10 MG/1
10 TABLET ORAL
Qty: 30 TABLET | Refills: 1 | Status: SHIPPED | OUTPATIENT
Start: 2021-01-26 | End: 2021-06-04

## 2021-01-28 DIAGNOSIS — F32.89 OTHER DEPRESSION: ICD-10-CM

## 2021-01-28 DIAGNOSIS — G89.29 CHRONIC LOW BACK PAIN WITH SCIATICA, SCIATICA LATERALITY UNSPECIFIED, UNSPECIFIED BACK PAIN LATERALITY: ICD-10-CM

## 2021-01-28 DIAGNOSIS — M54.40 CHRONIC LOW BACK PAIN WITH SCIATICA, SCIATICA LATERALITY UNSPECIFIED, UNSPECIFIED BACK PAIN LATERALITY: ICD-10-CM

## 2021-01-29 ENCOUNTER — ALLIED HEALTH/NURSE VISIT (OUTPATIENT)
Dept: BEHAVIORAL HEALTH | Facility: CLINIC | Age: 36
End: 2021-01-29
Payer: COMMERCIAL

## 2021-01-29 DIAGNOSIS — G89.29 CHRONIC LOW BACK PAIN WITH SCIATICA, SCIATICA LATERALITY UNSPECIFIED, UNSPECIFIED BACK PAIN LATERALITY: ICD-10-CM

## 2021-01-29 DIAGNOSIS — R69 DIAGNOSIS DEFERRED: Primary | ICD-10-CM

## 2021-01-29 DIAGNOSIS — M54.40 CHRONIC LOW BACK PAIN WITH SCIATICA, SCIATICA LATERALITY UNSPECIFIED, UNSPECIFIED BACK PAIN LATERALITY: ICD-10-CM

## 2021-01-29 RX ORDER — BUPRENORPHINE HYDROCHLORIDE, NALOXONE HYDROCHLORIDE 2; .5 MG/1; MG/1
FILM, SOLUBLE BUCCAL; SUBLINGUAL
Qty: 3 FILM | Refills: 0 | Status: SHIPPED | OUTPATIENT
Start: 2021-01-29 | End: 2021-02-01

## 2021-01-29 RX ORDER — BUPRENORPHINE HYDROCHLORIDE, NALOXONE HYDROCHLORIDE 2; .5 MG/1; MG/1
FILM, SOLUBLE BUCCAL; SUBLINGUAL
Refills: 0 | OUTPATIENT
Start: 2021-01-29

## 2021-01-29 NOTE — PROGRESS NOTES
Behavioral Health Home Services  [facility] Clinic: [place]        Social Work Care Navigator Note      Patient: Hoang Kiser  Date: January 29, 2021  Preferred Name: Shoaib    Previous PHQ-9:   PHQ-9 SCORE 11/14/2020 11/14/2020 12/10/2020   PHQ-9 Total Score - - -   PHQ-9 Total Score MyChart - 10 (Moderate depression) 10 (Moderate depression)   PHQ-9 Total Score 10 10 10   Some encounter information is confidential and restricted. Go to Review Flowsheets activity to see all data.     Previous JIN-7:   JIN-7 SCORE 11/14/2020 11/14/2020 12/10/2020   Total Score - - -   Total Score - 10 (moderate anxiety) 10 (moderate anxiety)   Total Score 10 10 10   Some encounter information is confidential and restricted. Go to Review Flowsheets activity to see all data.     MOLLY LEVEL:  MOLLY Score (Last Two) 11/14/2020 12/10/2020   MOLLY Raw Score 31 31   Activation Score 59.3 59.3   MOLLY Level 3 3       Preferred Contact:  Need for : No  Preferred Contact: Cell      Type of Contact Today: Phone call (patient / identified key support person reached)      Data: (subjective / Objective):  Recent ED/IP Admission or Discharge?   None    Patient Goals:  Goal Areas: Health;Mental Health;Chemical Health;Employment / Volunteer;Financial and Social Service Benefits  Patient stated goals: Patient would like to resume ILS services. He was working with an ILS worker and is in the process of trying to find a new one. Patient would like to do outpatient treatment at a facility that is not associated with [place]. Patient wants to continue to work on his sobriety and health. At this time he has gone 6 days without using. Patient continues to try and work on self-advocacy skills. He was seeing a therapist but reports that his therapist was discontinued because he had a UA that showed substance use. Patient is going to try and contact an old therapist he used to see and try to set up an appointment. Patient wants to take a break from  volunteering at this time and focus on his health. Patient continues to work on his coping and stress management skills by taking walks, playing guitar and making comfort boards.         [facility] Core Service Provided:  Care Coordination: provided care management services/referrals necessary to ensure patient and their identified supports have access to medical, behavioral health, pharmacology and recovery support services.  Ensured that patient's care is integrated across all settings and services.     Current Stressors / Issues / Care Plan Objective Addressed Today:  Patient called [facility] asking if [facility] had heard on who can assist him with completing his Residual Functional Capacity Form. CC advised that she has not heard back from [facility] yet on this request. Patient stated that he can also send a message to [facility] and ask her about this. Patient stated that he saw a provider in December that he completed a physical with and wanted to know if this provider would be able to complete the form. CC advised that writer can route a note and ask about this but advised that he may need to be seen for an appointment.     Intervention:  Motivational Interviewing: Supported Autonomy, Collaboration, Evocation   Target Behavior(s): Explored current social supports and reinforced opportunities to increase engagement    Assessment: (Progress on Goals / Homework):  Patient would benefit from continued coordination in reaching their goals set for the Behavioral Health Home ([facility]) program. [facility] reviewed Health Action Plan goals and will continue to monitor progress and work with patient and their care team.    Plan: (Homework, other):  Patient was encouraged to continue to seek condition-related information and education. [facility], patient, and team will continue to work towards progress on reaching goals set for the Behavioral Health Home ([facility]) program.             Next 5 appointments (look out 90 days)    Feb 01, 2021 11:00 AM  Return Visit  with Peng Nicholson MD  [facility] ([facility] Integrated Primary Care) [address]  [address]  [facility] [address] [phone]

## 2021-01-29 NOTE — Clinical Note
"Hello,    This patient is working on trying to get a \"Residual Functional Capacity\" form completed for his social security applications. I originally sent this to his PCP on file but his PCP messaged me back stating that he had not seen this patient for a general examine in close to 2 years. Patient mentioned that he saw you this past December. I am wondering if this patient would be able to complete this form with you? I would direct him to schedule an appointment but I wanted to check and see if this is something you are able to do first. I have a copy of the form if you would like it e-mailed to you.  Let me know if you have any questions.    Thanks,  Janet Ng, LGSW Behavioral Health Home ([facility])   [facility]  [phone]   "

## 2021-02-01 ENCOUNTER — OFFICE VISIT (OUTPATIENT)
Dept: ADDICTION MEDICINE | Facility: CLINIC | Age: 36
End: 2021-02-01
Payer: COMMERCIAL

## 2021-02-01 VITALS
BODY MASS INDEX: 32.04 KG/M2 | WEIGHT: 204.13 LBS | DIASTOLIC BLOOD PRESSURE: 76 MMHG | HEIGHT: 67 IN | OXYGEN SATURATION: 97 % | TEMPERATURE: 98.3 F | HEART RATE: 82 BPM | RESPIRATION RATE: 16 BRPM | SYSTOLIC BLOOD PRESSURE: 106 MMHG

## 2021-02-01 DIAGNOSIS — M54.40 CHRONIC LOW BACK PAIN WITH SCIATICA, SCIATICA LATERALITY UNSPECIFIED, UNSPECIFIED BACK PAIN LATERALITY: ICD-10-CM

## 2021-02-01 DIAGNOSIS — F90.8 ATTENTION DEFICIT HYPERACTIVITY DISORDER (ADHD), OTHER TYPE: ICD-10-CM

## 2021-02-01 DIAGNOSIS — G89.29 CHRONIC LOW BACK PAIN WITH SCIATICA, SCIATICA LATERALITY UNSPECIFIED, UNSPECIFIED BACK PAIN LATERALITY: ICD-10-CM

## 2021-02-01 DIAGNOSIS — F15.90 STIMULANT USE DISORDER: Primary | ICD-10-CM

## 2021-02-01 DIAGNOSIS — F32.0 CURRENT MILD EPISODE OF MAJOR DEPRESSIVE DISORDER, UNSPECIFIED WHETHER RECURRENT (H): ICD-10-CM

## 2021-02-01 LAB
AMPHETAMINES UR QL: NOT DETECTED NG/ML
BARBITURATES UR QL SCN: NOT DETECTED NG/ML
BENZODIAZ UR QL SCN: NOT DETECTED NG/ML
BUPRENORPHINE UR QL: ABNORMAL NG/ML
CANNABINOIDS UR QL: NOT DETECTED NG/ML
COCAINE UR QL SCN: NOT DETECTED NG/ML
D-METHAMPHET UR QL: NOT DETECTED NG/ML
METHADONE UR QL SCN: NOT DETECTED NG/ML
OPIATES UR QL SCN: NOT DETECTED NG/ML
OXYCODONE UR QL SCN: NOT DETECTED NG/ML
PCP UR QL SCN: NOT DETECTED NG/ML
PROPOXYPH UR QL: NOT DETECTED NG/ML
TRICYCLICS UR QL SCN: ABNORMAL NG/ML

## 2021-02-01 PROCEDURE — 80306 DRUG TEST PRSMV INSTRMNT: CPT | Performed by: FAMILY MEDICINE

## 2021-02-01 PROCEDURE — 99214 OFFICE O/P EST MOD 30 MIN: CPT | Performed by: FAMILY MEDICINE

## 2021-02-01 RX ORDER — BUPRENORPHINE AND NALOXONE 2; .5 MG/1; MG/1
FILM, SOLUBLE BUCCAL; SUBLINGUAL
Qty: 84 FILM | Refills: 0 | Status: SHIPPED | OUTPATIENT
Start: 2021-02-01 | End: 2021-03-02

## 2021-02-01 RX ORDER — DULOXETIN HYDROCHLORIDE 30 MG/1
CAPSULE, DELAYED RELEASE ORAL
Qty: 30 CAPSULE | Refills: 11 | OUTPATIENT
Start: 2021-02-01

## 2021-02-01 ASSESSMENT — MIFFLIN-ST. JEOR: SCORE: 1823.4

## 2021-02-01 NOTE — PROGRESS NOTES
"  SUBJECTIVE:                                                    ADDICTION MEDICINE NOTE    Hoang Kiser is a 35 year old male who presents to clinic today for Addiction Medicine consult         [place] Board of Pharmacy Data Base Reviewed:    Yes ;          Brief History:    Here on request of Pain provider, Tamiko Stevens    Being treated for Lumbar DDD, Functional neurologic movement disorder    Also being treated by  [facility] for Depression, Attention Deficit Disorder    Referred to Addiction medicine due to positive UDS for methamphetamine    Patient admits to history of Addiction    Was treated in an MI/CD program in [place] in 2015, [facility] in 2016 for 13 months, and [facility] in 2019    Now says he's \"burnt out on treatment \"    Was involved with recovery support groups in past    Says he has had up to 4 years sobriety in the past    Past drug use includes prescription opioids, cocaine    Has been prescribed benzodiazepines in the past    Started using Methamphetamine early 2020    Says he didn't really use it to get high nor does he crave it but instead used it to self medicate his ADD    Says he has trouble with focusing and methamphetamine helped    Has been prescribed Adderall but felt dose insufficient at times    Now using Naprosyn for pain    Realizes he cannot use methamphetamine and doesn't see a problem stopping    Last use 1 week ago    Weaning off left over hydrocodone; no withdrawal but feels pain not controlled    Was recommended to seek treatment but has procrastinated    Advised [facility] out-patient MI/CD program may be helpful and a good fit    Not requesting any MAT as has no craving    Lives in a group home where medications are controlled and dispensed      HPI:    In clinic visit 2/1/21    Pain more manageable with Suboxone 2 mg TID    Has urinary hesitancy and inability to have orgasm secondary to Suboxone     Discussed at length    Does not want to increase " dose due to side effects but does not want to decrease dose due to pain    Will continue Suboxone 2 mg TID    UDS good; no amphetamine or opioids    Re-check 1 month    Social History     Social History Narrative    He lives in [place] in a chemical treatment center - there are 11 people there. He arrives today through [email or web address] ride    He has 3 brothers and 3 sisters. He has not children. He has never been .      Mother and father are alive    One sister is an alcoholic and bulemic    depression is present in the family : mother, sister and 2 brothers are bipolar.      He denies bipolar. He has depression.    He denies recurring thoughts. He has had substance abuse issues. He has had cravings in the past.    He exercises and plays guitar and draws.      He has used meth as well as prescription pain killers.    He has used marijuana when young. Used cocaine in the past.    Has not used iv drugs    He does not drink alcohol.      50% Kitt[place] and is Romanian, english and french and a bit native.    He smokes cigarettes - 1 pack per day.        single. lives in [place]. estela is father           Problem list and histories reviewed & adjusted, as indicated.  Additional history: as documented           albuterol (PROAIR HFA/PROVENTIL HFA/VENTOLIN HFA) 108 (90 Base) MCG/ACT inhaler, INHALE ONE TO TWO PUFFS INTO THE LUNGS EVERY FOUR HOURS AS NEEDED FOR SHORTNESS OF BREATH/ DYSPNEA OR WHEEZING       atomoxetine (STRATTERA) 80 MG capsule, Take 1 capsule (80 mg) by mouth daily       cloNIDine (CATAPRES) 0.1 MG tablet, Take 1-2 tablets (0.1-0.2 mg) by mouth nightly as needed (sleep)       cyclobenzaprine (FLEXERIL) 5 MG tablet, Take 1-2 tablets (5-10 mg) by mouth 3 times daily as needed for muscle spasms       diazepam (VALIUM) 2 MG tablet, Take one tab 30-45 minutes prior to MRIs.       DULoxetine (CYMBALTA) 30 MG capsule, Take 1 capsule (30 mg) by mouth daily       hydrOXYzine (ATARAX) 25 MG tablet, Take  1 tablet (25 mg) by mouth nightly as needed (at HS PRN)       naloxone (NARCAN) 4 MG/0.1ML nasal spray, Spray 1 spray (4 mg) into one nostril alternating nostrils as needed for opioid reversal every 2-3 minutes until assistance arrives       naproxen (NAPROSYN) 500 MG tablet, Take 1 tablet (500 mg) by mouth 2 times daily (with meals)       order for DME, Equipment being ordered: Digital home blood pressure monitor kit       order for DME, Equipment being ordered: 4 wheeled walker with bench seat.       oxybutynin ER (DITROPAN-XL) 10 MG 24 hr tablet, TAKE 1 TABLET BY MOUTH ONCE DAILY *1 TOTAL FILL* **MUST KEEP APPOINTMENT ON 7/31/20 FOR MORE REFILLS*       SF 5000 PLUS 1.1 % CREA,        vilazodone (VIIBRYD) 20 MG TABS tablet, Take 1 tablet (20 mg) by mouth daily       zolpidem (AMBIEN) 10 MG tablet, Take 1 tablet (10 mg) by mouth nightly as needed for sleep    No current facility-administered medications on file prior to visit.       Allergies   Allergen Reactions     Amitriptyline      Ineffective in reducing spasms/movement, increased fatigue  Other reaction(s): Other (see comments)     Buspirone Hcl Other (See Comments)     Panic attacks     Doxycycline Diarrhea, Fatigue, GI Disturbance and Nausea     Other reaction(s): GI intolerance     Trazodone Fatigue     Other reaction(s): Other (see comments)     Cephalexin Diarrhea     Other reaction(s): GI intolerance           REVIEW OF SYSTEMS:  General:  No acute withdrawal symptoms.  No recent infections or fever  Eyes:  No vision concerns.  No double vision.    Resp: No coughing, wheezing or shortness of breath  CV: No chest pains or palpitations  GI: No nausea, vomiting, abdominal pain, diarrhea.  No constipation  : No urinary frequency or dysuria    Musculoskeletal: No significant muscle or joint pains other than as above.  No edema  Neurologic: No numbness, tingling, weakness, problems with balance or coordination  Psychiatric: No acute concerns other than as  "above.   Skin: No rashes or areas of acute infection    OBJECTIVE:    PHYSICAL EXAM:  /76   Pulse 82   Temp 98.3  F (36.8  C) (Temporal)   Resp 16   Ht 1.708 m (5' 7.24\")   Wt 92.6 kg (204 lb 2 oz)   SpO2 97%   BMI 31.74 kg/m      GENERAL: Healthy, alert and no distress  EYES: Eyes grossly normal to inspection.  No discharge or erythema, or obvious scleral/conjunctival abnormalities.  RESP: No audible wheeze, cough, or visible cyanosis.  No visible retractions or increased work of breathing.    SKIN: Visible skin clear. No significant rash, abnormal pigmentation or lesions.  NEURO: Cranial nerves grossly intact.  Mentation and speech appropriate for age.  PSYCH: Mentation appears normal, affect normal/bright, judgement and insight intact, normal speech and appearance well-groomed.    No results found for any visits on 02/01/21.        ASSESSMENT:    Stimulant use disorder  Chronic back pain  Attention Deficit disorder  Depression  Functional neurologic movement disorder    PLAN:    Addiction Medicine recommendations:    Suboxone 2 mg TID    Follow up 4 weeks    Peng Nicholson MD  [facility] Addiction Medicine  [phone]      "

## 2021-02-02 ENCOUNTER — VIRTUAL VISIT (OUTPATIENT)
Dept: PSYCHIATRY | Facility: CLINIC | Age: 36
End: 2021-02-02
Attending: NURSE PRACTITIONER
Payer: COMMERCIAL

## 2021-02-02 ENCOUNTER — TELEPHONE (OUTPATIENT)
Dept: PSYCHIATRY | Facility: CLINIC | Age: 36
End: 2021-02-02

## 2021-02-02 DIAGNOSIS — F98.8 ATTENTION DEFICIT DISORDER, UNSPECIFIED HYPERACTIVITY PRESENCE: ICD-10-CM

## 2021-02-02 DIAGNOSIS — F32.89 OTHER DEPRESSION: Primary | ICD-10-CM

## 2021-02-02 DIAGNOSIS — F15.21 AMPHETAMINE USE DISORDER, MODERATE, IN EARLY REMISSION (H): ICD-10-CM

## 2021-02-02 DIAGNOSIS — F11.21 OPIOID USE DISORDER, SEVERE, IN SUSTAINED REMISSION (H): ICD-10-CM

## 2021-02-02 DIAGNOSIS — F41.9 ANXIETY: ICD-10-CM

## 2021-02-02 PROCEDURE — 99443 PR PHYSICIAN TELEPHONE EVALUATION 21-30 MIN: CPT | Mod: 95 | Performed by: NURSE PRACTITIONER

## 2021-02-02 RX ORDER — DULOXETIN HYDROCHLORIDE 30 MG/1
CAPSULE, DELAYED RELEASE ORAL
Qty: 14 CAPSULE | Refills: 0 | Status: SHIPPED | OUTPATIENT
Start: 2021-02-02 | End: 2021-02-19

## 2021-02-02 RX ORDER — CLONIDINE HYDROCHLORIDE 0.1 MG/1
.1-.2 TABLET ORAL
Qty: 60 TABLET | Refills: 1 | Status: SHIPPED | OUTPATIENT
Start: 2021-02-02 | End: 2021-12-16

## 2021-02-02 RX ORDER — ATOMOXETINE 80 MG/1
80 CAPSULE ORAL DAILY
Qty: 30 CAPSULE | Refills: 1 | Status: SHIPPED | OUTPATIENT
Start: 2021-02-02 | End: 2021-02-11

## 2021-02-02 RX ORDER — VILAZODONE HYDROCHLORIDE 10 MG/1
30 TABLET ORAL AT BEDTIME
Qty: 90 TABLET | Refills: 1 | Status: SHIPPED | OUTPATIENT
Start: 2021-02-02 | End: 2021-03-25

## 2021-02-02 ASSESSMENT — PAIN SCALES - GENERAL: PAINLEVEL: MODERATE PAIN (4)

## 2021-02-02 NOTE — TELEPHONE ENCOUNTER
----- Message from CARLOS A Lamb CNP sent at 2/2/2021  4:20 PM CST -----  Regarding: would you pls fax AVS to his ?   Would you pls fax AVS to [facility], [facility], [facility], voice [phone], fax [phone]?    Thank you!          =========================================      Writer coordinated with in-clinic staff to fax AVS.

## 2021-02-02 NOTE — PATIENT INSTRUCTIONS
-Increase Viibryd to 30 mg daily at bedtime for your mood and anxiety.  -After being on Viibryd 30 mg daily dose for 2 weeks, discontinue Cymbalta  -Continue all other medications for now    -Recommend 5MTHF(5-methyltetrahydrofolate) supplement 15 mg daily    -Follow up with individual therapy referral to set up an appointment.    Your next appointment is scheduled on 3/2/2021 (Tue) at 3:30pm       **For crisis resources, please see the information at the end of this document**     Patient Education      Thank you for coming to the [facility] MENTAL HEALTH & ADDICTION [place] [facility].    Lab Testing:  If you had lab testing today and your results are reassuring or normal they will be mailed to you or sent through [employer] within 7 days. If the lab tests need quick action we will call you with the results. The phone number we will call with results is # [phone] (home) . If this is not the best number please call our clinic and change the number.    Medication Refills:  If you need any refills please call your pharmacy and they will contact us. Our fax number for refills is [phone]. Please allow three business for refill processing. If you need to  your refill at a new pharmacy, please contact the new pharmacy directly. The new pharmacy will help you get your medications transferred.     Scheduling:  If you have any concerns about today's visit or wish to schedule another appointment please call our office during normal business hours [phone] (8-5:00 M-F)    Contact Us:  Please call [phone] during business hours (8-5:00 M-F).  If after clinic hours, or on the weekend, please call  [phone].    Financial Assistance [phone]  [facility] Billing [phone]  Central Billing Office, [facility]: [phone]  [facility] Billing [phone]  Medical Records [phone]  [facility] Patient Bill of Rights  [email or web address]       MENTAL HEALTH CRISIS NUMBERS:  For a medical emergency please call  911 or go to the nearest ER.     [facility]:   [facility] -[phone]   Crisis Residence [facility] -[phone]   Walk-In Counseling Center [facility] -[phone]   COPE 24/7 [place] Mobile Team -[phone] (adults)/[phone] (child)  CHILD: [facility] needs assessment team - [phone]      [facility]:   [facility] - [phone]   Walk-in counseling [facility] - [phone]   Walk-in counseling [facility] - [phone]   Crisis Residence [facility] - [phone]  Urgent Care Adult Mental Klbmgp-[phone] mobile unit/ 24/7 crisis line    National Crisis Numbers:   National Suicide Prevention Lifeline: 2-022-254-TALK (734-681-4453)  Poison Control Center - 1-243.473.8700  "[employer]/resources for a list of additional resources (SOS)  Trans Lifeline a hotline for transgender people 9-788-267-4499  The [employer] a hotline for LGBT youth 6-713-519-4437  Crisis Text Line: For any crisis 24/7   To: 485028  see www.crisistextline.org  - IF MAKING A CALL FEELS TOO HARD, send a text!         Again thank you for choosing [facility] MENTAL HEALTH & ADDICTION [facility] and please let us know how we can best partner with you to improve you and your family's health.    You may be receiving a survey regarding this appointment. We would love to have your feedback, both positive and negative. The survey is done by an external company, so your answers are anonymous.

## 2021-02-02 NOTE — PROGRESS NOTES
"VIDEO VISIT  Hoang Kiser is a 35 year old patient who is being evaluated via a billable video visit.      The patient has been notified of following:   \"This video visit will be conducted via a call between you and your physician/provider. We have found that certain health care needs can be provided without the need for an in-person physical exam. This service lets us provide the care you need with a video conversation. If a prescription is necessary we can send it directly to your pharmacy. If lab work is needed we can place an order for that and you can then stop by our lab to have the test done at a later time. Insurers are generally covering virtual visits as they would in-office visits so billing should not be different than normal.  If for some reason you do get billed incorrectly, you should contact the billing office to correct it and that number is in the AVS .    Video Conference to be completed via:  Edy    Patient has given verbal consent for video visit?:  Yes    Patient would prefer that any video invitations be sent by: Send to e-mail at: [email or web address]      How would patient like to obtain AVS?:  GripeO    AVS SmartPhrase [PsychAVS] has been placed in 'Patient Instructions':  Yes     Start Time:  1602         End Time: 1624    Telemedicine Visit: The patient's condition can be safely assessed and treated via synchronous audio and visual telemedicine encounter.      Reason for Telemedicine Visit: Due to COVID 19 pandemic, clinic switching all appointments to telemedicine     Originating Site (Patient Location): Patient's home    Distant Site (Provider Location): Provider Remote Setting    Consent:  The patient/guardian has verbally consented to: the potential risks and benefits of telemedicine (video visit) versus in person care; bill my insurance or make self-payment for services provided; and responsibility for payment of non-covered services.     Mode of Communication:  Video " Conference via Unable to complete video visit, thus visit was turned into phone only appt.    As the provider I attest to compliance with applicable laws and regulations related to telemedicine.    Psychiatry Clinic Progress Note                                                                  Patient Name: Hoang Kiser  YOB: 1985  MRN: [id number]  Date of Service:  2/2/2021  Last Seen:1/12/2021    Hoang Kiser is a 35 year old person assigned male at birth, identifies as cisgender male who uses the name Shoaib and pronoun lilia.       Shoaib Kiser is a 35 year old year old adult who presents for ongoing psychiatric care.  Shoaib Kiser was last seen on 1/12/2021.    At that time,     Medication Ordered/Consults/Labs/tests Ordered:      Medication: Continue on current medication regimen.  However, you may take Viibryd 20 mg at bedtime instead of AM  OTC Recommendations: none  Lab Orders:  Genesight pending result  Referrals:   Individual therapist     Shyla Mari [email or web address]  Alice Reid [email or web address]  Jaida Chan [email or web address]  Tom Padilla [email or web address]     Release of Information: none  Future Treatment Considerations: Per symptoms.   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  Psych critical item history includes mutiple psychotropic trials, SUBSTANCE USE: amphetamines and opiates and substance use treatment .   Medical complication includes functional movement disorder     Previous Psychiatric Meds: Celexa, Wellbutrin, Trazodone, Effexor, Amitriptyline, Gabapentin, Tegretol, Zoloft, reports all not effective, Ritalin (movement worse),  Klonopin (tapered off 8/28/2020), Adderall (stopped due to methamphetamine use concurrently, effective), abilify (sleep talking, walking, cognitive slowness), Cymbalta (higher than 90 mg caused SI)    Interim History                                                                                                        4, 4     On 1/13/2021, messaged [employer] message as his addiction medine provider was ok not to do MICD treatment, but to do individual therapy.    On 1/20/2021, pt requested restart of Ambien as he has difficulties with sleep.  Addiction medicine ok'd use of Ambien.  Ambien 10 mg HS PRN restarted.    Since the last visit,  -Taking Ambien 10 mg daily, with Ambien, sleeping well.  Waking up x1/night to use bathroom, but able to go back to sleep easily.  -Has not changed Viibryd administration from AM to HS, continues to feel oversedated in taking it in AM.  Feels Viibryd has not helped with any mood regulation.  Denies SI, SIB or HI.  -Called individual therapist referral, but has not heard back and needs to follow up    Denies any symptoms suggestive of hypomania or psychosis.    Current Suicidality/Hx of Suicide Attempts: Denies both  CoCominent Medical concerns: Chronic pain    Medication Side Effects: The patient denies all medication side effects.      Medical Review of Systems     Apart from the symptoms mentioned int he HPI, the 14 point review of systems, including constitutional, HEENT, cardiovascular, respiratory, gastrointestinal, genitourinary, musculoskeletal, integumentary, endocrine, neurological, hematologic and allergic is entirely negative except chronic pain.      Substance Use   Pt has been staying substance free since last seen.      Social/ Family History                                  [per patient report]                                 1ea,1ea   Living arrangements: lives in group home, feels safe.  Social Support: NA, group home leader/members.  Access to gun: none    Allergy                                 Amitriptyline, Buspirone hcl, Doxycycline, Trazodone, and Cephalexin    Current Medications                                                                                                       Current Outpatient Medications   Medication Sig Dispense Refill     albuterol (PROAIR HFA/PROVENTIL HFA/VENTOLIN HFA) 108 (90 Base) MCG/ACT inhaler INHALE ONE TO TWO PUFFS INTO THE LUNGS EVERY FOUR HOURS AS NEEDED FOR SHORTNESS OF BREATH/ DYSPNEA OR WHEEZING 18 g 1     atomoxetine (STRATTERA) 80 MG capsule Take 1 capsule (80 mg) by mouth daily 30 capsule 1     buprenorphine HCl-naloxone HCl (SUBOXONE) 2-0.5 MG per film PLACE 1 FILM UNDER THE TONGUE THREE TIMES DAILY 84 Film 0     cloNIDine (CATAPRES) 0.1 MG tablet Take 1-2 tablets (0.1-0.2 mg) by mouth nightly as needed (sleep) 60 tablet 1     cyclobenzaprine (FLEXERIL) 5 MG tablet Take 1-2 tablets (5-10 mg) by mouth 3 times daily as needed for muscle spasms 120 tablet 1     diazepam (VALIUM) 2 MG tablet Take one tab 30-45 minutes prior to MRIs. 1 tablet 0     DULoxetine (CYMBALTA) 30 MG capsule Take 1 capsule (30 mg) by mouth daily 30 capsule 0     hydrOXYzine (ATARAX) 25 MG tablet Take 1 tablet (25 mg) by mouth nightly as needed (at HS PRN) 45 tablet 3     naloxone (NARCAN) 4 MG/0.1ML nasal spray Spray 1 spray (4 mg) into one nostril alternating nostrils as needed for opioid reversal every 2-3 minutes until assistance arrives 0.2 mL 0     naproxen (NAPROSYN) 500 MG tablet Take 1 tablet (500 mg) by mouth 2 times daily (with meals) 40 tablet 3     order for DME Equipment being ordered: Digital home blood pressure monitor kit 1 each 0     order for DME Equipment being ordered: 4 wheeled walker with bench seat. 1 Units 0     oxybutynin ER (DITROPAN-XL) 10 MG 24 hr tablet TAKE 1 TABLET BY MOUTH ONCE DAILY *1 TOTAL FILL* **MUST KEEP APPOINTMENT ON 7/31/20 FOR MORE REFILLS* 90 tablet 1     SF 5000 PLUS 1.1 % CREA        vilazodone (VIIBRYD) 20 MG TABS  "tablet Take 1 tablet (20 mg) by mouth daily 30 tablet 1     zolpidem (AMBIEN) 10 MG tablet Take 1 tablet (10 mg) by mouth nightly as needed for sleep 30 tablet 1          Mental Status Exam                                                                                   9, 14 cog        Alertness: alert  and oriented  Behavior/Demeanor: cooperative and calm  Speech: regular rate and rhythm  Mood :  \"okay\"  Affect: slightly subdued; was congruent to mood; was congruent to content  Thought Process (Associations):  Linear and Goal directed  Thought process (Rate):  Normal  Thought content:  no overt psychosis, denies suicidal ideation, intent or thoughts and patient does not appear to be responding to internal stimuli  Perception:  Reports none;  Denies auditory hallucinations and visual hallucinations  Attention/Concentration:  Fair  Memory:  Immediate recall intact and Short-term memory intact  Language: intact  Fund of Knowledge/Intelligence:  Average  Abstraction:  Dewey  Insight:  Adequate  Judgment:  Adequate for safety  Cognition: (6) does  appear grossly intact; formal cognitive testing was not done      Labs and Results      Pertinent findings on review include: Review of records with relevant information reported in the HPI.  Reviewed pt's past medical record and obtained collateral information.      [place] PRESCRIPTION MONITORING PROGRAM [] was checked today:  indicates Ambien 1/26, Subxoxone 1/18, 2/1.    PHQ9 Today:  N/A  PHQ 11/14/2020 11/19/2020 12/10/2020   PHQ-9 Total Score 10 17 10   Q9: Thoughts of better off dead/self-harm past 2 weeks Not at all Not at all Not at all       JIN 7 Today: N/A  JIN-7 SCORE 11/14/2020 11/19/2020 12/10/2020   Total Score - - -   Total Score 10 (moderate anxiety) - 10 (moderate anxiety)   Total Score 10 12 10       Recent Labs   Lab Test 01/30/20  0942 03/27/19  1438 06/15/18  0853   CR 1.01 0.85 0.92   GFRESTIMATED >90 >90 >90     Recent Labs   Lab Test 01/30/20  0942 " 03/27/19  1438   AST 19 26   ALT 31 36   ALKPHOS 60 61     PSYCHOTROPIC DRUG INTERACTIONS:    Viibryd---Cymbalta: Concurrent use of VILAZODONE and SEROTONERGIC AGENTS may result in increased risk for serotonin syndrome (hypertension, hyperthermia, myoclonus, mental status changes).   MANAGEMENT:  Monitoring for adverse effects, routine vitals, tapering off of [Cymbalta] and patient is aware of risks    Impression/Assessment      Shoaib Kiser is a 35 year old adult  who presents for med management follow up.  Pt sounds slightly subdued, but not anxious, denies SI, SIB or HI. Pt noted resolution of sleep difficulties while taking Ambien 10 mg HS PRN daily.  Reviewed Genesight results, noted pt may not have noted improvement in his mood as VIibryd is still not maximized.  Discussed further taper of Viibryd to 30 mg daily and after being on 30 mg daily to discontinue Cymbalta to prevent polypharmacy.  Since pt noted Viibryd with oversedation, pt may take the medication at HS instead of AM.  This was also specified in rx so that  can provide medication this way.  Will continue all other medications at this time.    Also recommended 5MTHF supplementation as pt has reduced folic acid conversion per Genesight.  Pt was also encouraged to follow up with individual therapist referral.      Diagnosis                                                                    Depression (MDD vs Depression due to another medical condition vs substance induced depression)  Hx dx of ADD and PTSD  Amphetamine use disorder, moderate in early remission  Opioid use disorder, severe, in sustained remission    Treatment Recommendation & Plan       Medication Ordered/Consults/Labs/tests Ordered:     Medication:   -Increase Viibryd to 30 mg daily at bedtime for your mood and anxiety.  -After being on Viibryd 30 mg daily dose for 2 weeks, discontinue Cymbalta  -Continue all other medications for now  OTC Recommendations: Recommend 5MTHF  (5-methyltetrahydrofolate) supplement 15 mg daily  Lab Orders:  none  Referrals: Follow up with individual therapy referral to set up an appointment.  Release of Information: none  Future Treatment Considerations: Per symptoms.   Return for Follow Up: in 1 month    -Discussed safety plan for suicidal thoughts  -Discussed plan for suicidality  -Discussed available emergency services  -Patient agrees with the treatment plan  -Encouraged to continue outpatient therapy to gain more coping mechanism for stress.    Treatment Risk Statement: Discussed with the patient my impressions, as well as recommended studies. I educated patient on the differential diagnosis and prognosis. I discussed with the patient the risks and benefits of medications versus no interventions, including efficacy, dose, possible side effects and length of treatment and the importance of medication compliance.  The patient understands the risks, benefits, adverse effects and alternatives. Agrees to treatment with the capacity to do so. No medical contraindications to treatment. The patient also understands the risks of using street drugs or alcohol.     CRISIS NUMBERS:   Provided routinely in AVS.      30 minutes spent on the date of the encounter doing chart review, history and exam, documentation and further activities as noted above      Meagan Bowman CNP,  2/2/2021

## 2021-02-09 ENCOUNTER — TELEPHONE (OUTPATIENT)
Dept: PALLIATIVE MEDICINE | Facility: CLINIC | Age: 36
End: 2021-02-09

## 2021-02-09 DIAGNOSIS — F41.9 ANXIETY: Primary | ICD-10-CM

## 2021-02-09 RX ORDER — DIAZEPAM 2 MG
TABLET ORAL
Qty: 1 TABLET | Refills: 0 | Status: SHIPPED | OUTPATIENT
Start: 2021-02-09 | End: 2021-02-19

## 2021-02-09 NOTE — TELEPHONE ENCOUNTER
Scheduled 2/12/21 for RENUKA at [place]. Routing to Tamiko Stevens to review patients request for a medication to relax him for the procedure.     KATERINA CardenasN, RN  Care Coordinator  [facility] [place]

## 2021-02-09 NOTE — TELEPHONE ENCOUNTER
Screening Questions for Radiology Injections:    Injection to be done at which interventional clinic site? [facility]    If [facility] location, tell patient that this procedure requires a COVID-19 lab test be done within 4 days of the procedure. Would you still like to move forward with scheduling the procedure?  Not Applicable   If YES, let patient know that someone will call them to schedule the COVID-19 test and that they will only receive a call back if the result is positive. Route to nursing to enter order.     Instruct patient to arrive as directed prior to the scheduled appointment time:    [place] minutes before      [place]: 30 minutes before; if IV needed 1 hour before     Procedure ordered by Tamiko JADE    Procedure ordered? RENUKA C5-6, C6-7      Transforaminal Cervical ESTER - no pain provider currently performing    As a reminder, receiving steroids can decrease your body's ability to fight infection.   Would you still like to move forward with scheduling the injection?  Yes    What insurance would patient like us to bill for this procedure? [employer]       Worker's comp or MVA (motor vehicle accident) -Any injection DO NOT SCHEDULE and route to Dominique King.      [employer] insurance - For SI joint injections, DO NOT SCHEDULE and route Dominique King.       ALL BCBS, [employer] and [employer]-Route to Dominique for review DO NOT SCHEDULE      IF SCHEDULING IN [place] AND NEEDS A PA, IT IS OKAY TO SCHEDULE. [place] HANDLES THEIR OWN PA'S AFTER THE PATIENT IS SCHEDULED. PLEASE SCHEDULE AT LEAST 1 WEEK OUT SO A PA CAN BE OBTAINED.    Any chance of pregnancy? NO   If YES, do NOT schedule and route to RN pool    Is an  needed? No     Patient has a drive home? (mandatory) YES: informed     Is patient taking any blood thinners (i.e. plavix, coumadin, jantoven, warfarin, heparin, pradaxa or dabigatran, etc)? No   If hold needed, do NOT schedule, route to RN pool     Is patient taking  any aspirin products (includes Excedrin and Fiorinal)? No     If more than 325mg/day, OK to schedule; Instruct pt to decrease to less than 325 mg for 7 days AND route to RN pool    For CERVICAL procedures, hold all aspirin products for 6 days.     Tell pt that if aspirin product is not held for 6 days, the procedure WILL BE cancelled.      Does the patient have a bleeding or clotting disorder? No     If YES, okay to schedule AND route to RN nurse pool    For any patients with platelet count <100, must be forwarded to provider    Is patient diabetic?  No  If YES, instruct them to bring their glucometer.    Does patient have an active infection or treated for one within the past week? No     Is patient currently taking any antibiotics?  No     For patients on chronic, preventative, or prophylactic antibiotics, procedures may be scheduled.     For patients on antibiotics for active or recent infection:antibiotic course must have been completed for 4 days    Is patient currently taking any steroid medications? (i.e. Prednisone, Medrol)  No     For patients on steroid medications, course must have been completed for 4 days    Is patient actively being treated for cancer or immunocompromised? No  If YES, do NOT schedule and route to RN pool     Are you able to get on and off an exam table with minimal or no assistance? Yes  If NO, do NOT schedule and route to RN pool    Are you able to roll over and lay on your stomach with minimal or no assistance? Yes  If NO, do NOT schedule and route to RN pool     Any allergies to contrast dye, iodine, shellfish, or numbing and steroid medications? No  If YES, route to RN pool AND add allergy information to appointment notes    Allergies: Amitriptyline, Buspirone hcl, Doxycycline, Trazodone, and Cephalexin      Has the patient had a flu shot or any other vaccinations within 7 days before or after the procedure.  No     Does patient have an MRI/CT?  YES: 2020  Check Procedure  Scheduling Grid to see if required.      Was the MRI done within the last 3 years?  Yes    If yes, where was the MRI done i.e.[facility], [facility], [place], [facility] etc?[place]      If no, do not schedule and route to RN pool    If MRI was not done at [place], [facility] or [facility] do NOT schedule and route to RN pool.      If pt has an imaging disc, the injection MAY be scheduled but pt has to bring disc to appt.     If they show up without the disc the injection cannot be done    Procedure Specific Instructions:      If celiac plexus block, informed patient NPO for 6 hours and that it is okay to take medications with sips of water, especially blood pressure medications  Not Applicable         If this is for a cervical procedure, informed patient that aspirin needs to be held for 6 days.   Not Applicable      If IV needed:    Do not schedule procedures requiring IV placement in the first appointment of the day or first appointment after lunch. Do NOT schedule at 0745, 0815 or 1245.     Instructed pt to arrive 30 minutes early for IV start if required. (Check Procedure Scheduling Grid)  Not Applicable    Reminders:      If you are started on any steroids or antibiotics between now and your appointment, you must contact us because the procedure may need to be cancelled.  No      For all procedures except radiofrequency ablations (RFAs) and spinal cord stimulator (SCS) trials, informed patient:    IV sedation is not provided for this procedure.  If you feel that an oral anti-anxiety medication is needed, you can discuss this further with your referring provider or primary care provider.  The Pain Clinic provider will discuss specifics of what the procedure includes at your appointment.  Most procedures last 10-20 minutes.  We use numbing medications to help with any discomfort during the procedure.  Not Applicable      For patients 85 or older we recommend having an adult stay w/ them for the remainder of  the day.       Does the patient have any questions?  NO  Reanna Reagan  [facility] Lead

## 2021-02-09 NOTE — TELEPHONE ENCOUNTER
Patient wondering if Tamiko would be able to prescribe him something for during his procedure for anxiety         Reanna Reagan    [facility]

## 2021-02-10 ENCOUNTER — TELEPHONE (OUTPATIENT)
Dept: PALLIATIVE MEDICINE | Facility: CLINIC | Age: 36
End: 2021-02-10

## 2021-02-10 ENCOUNTER — NURSE TRIAGE (OUTPATIENT)
Dept: FAMILY MEDICINE | Facility: CLINIC | Age: 36
End: 2021-02-10

## 2021-02-10 ENCOUNTER — NURSE TRIAGE (OUTPATIENT)
Dept: NURSING | Facility: CLINIC | Age: 36
End: 2021-02-10

## 2021-02-10 ENCOUNTER — OFFICE VISIT (OUTPATIENT)
Dept: URGENT CARE | Facility: URGENT CARE | Age: 36
End: 2021-02-10
Payer: COMMERCIAL

## 2021-02-10 VITALS
HEART RATE: 82 BPM | DIASTOLIC BLOOD PRESSURE: 67 MMHG | OXYGEN SATURATION: 96 % | WEIGHT: 207 LBS | TEMPERATURE: 97.1 F | SYSTOLIC BLOOD PRESSURE: 115 MMHG | BODY MASS INDEX: 32.19 KG/M2

## 2021-02-10 DIAGNOSIS — M79.18 CHRONIC MYOFASCIAL PAIN: ICD-10-CM

## 2021-02-10 DIAGNOSIS — G62.9 NEUROPATHY: ICD-10-CM

## 2021-02-10 DIAGNOSIS — R60.9 2+ PITTING EDEMA: Primary | ICD-10-CM

## 2021-02-10 DIAGNOSIS — G89.29 CHRONIC MYOFASCIAL PAIN: ICD-10-CM

## 2021-02-10 LAB
ALBUMIN UR-MCNC: NORMAL MG/DL
ANION GAP SERPL CALCULATED.3IONS-SCNC: 2 MMOL/L (ref 3–14)
APPEARANCE UR: NORMAL
BASOPHILS # BLD AUTO: 0 10E9/L (ref 0–0.2)
BASOPHILS NFR BLD AUTO: 1 %
BILIRUB UR QL STRIP: NORMAL
BUN SERPL-MCNC: 17 MG/DL (ref 7–30)
CALCIUM SERPL-MCNC: 8.9 MG/DL (ref 8.5–10.1)
CHLORIDE SERPL-SCNC: 107 MMOL/L (ref 94–109)
CO2 SERPL-SCNC: 31 MMOL/L (ref 20–32)
COLOR UR AUTO: NORMAL
CREAT SERPL-MCNC: 1.01 MG/DL (ref 0.66–1.25)
DIFFERENTIAL METHOD BLD: ABNORMAL
EOSINOPHIL # BLD AUTO: 0.3 10E9/L (ref 0–0.7)
EOSINOPHIL NFR BLD AUTO: 7.8 %
ERYTHROCYTE [DISTWIDTH] IN BLOOD BY AUTOMATED COUNT: 12.8 % (ref 10–15)
GFR SERPL CREATININE-BSD FRML MDRD: >90 ML/MIN/{1.73_M2}
GLUCOSE SERPL-MCNC: 101 MG/DL (ref 70–99)
GLUCOSE UR STRIP-MCNC: NORMAL MG/DL
HCT VFR BLD AUTO: 38.9 % (ref 40–53)
HGB BLD-MCNC: 13 G/DL (ref 13.3–17.7)
HGB UR QL STRIP: NORMAL
KETONES UR STRIP-MCNC: NORMAL MG/DL
LEUKOCYTE ESTERASE UR QL STRIP: NORMAL
LYMPHOCYTES # BLD AUTO: 1.9 10E9/L (ref 0.8–5.3)
LYMPHOCYTES NFR BLD AUTO: 48 %
MCH RBC QN AUTO: 30.4 PG (ref 26.5–33)
MCHC RBC AUTO-ENTMCNC: 33.4 G/DL (ref 31.5–36.5)
MCV RBC AUTO: 91 FL (ref 78–100)
MONOCYTES # BLD AUTO: 0.6 10E9/L (ref 0–1.3)
MONOCYTES NFR BLD AUTO: 15.8 %
NEUTROPHILS # BLD AUTO: 1.1 10E9/L (ref 1.6–8.3)
NEUTROPHILS NFR BLD AUTO: 27.4 %
NITRATE UR QL: NORMAL
PH UR STRIP: NORMAL PH (ref 5–7)
PLATELET # BLD AUTO: 253 10E9/L (ref 150–450)
POTASSIUM SERPL-SCNC: 4.5 MMOL/L (ref 3.4–5.3)
RBC # BLD AUTO: 4.27 10E12/L (ref 4.4–5.9)
SODIUM SERPL-SCNC: 140 MMOL/L (ref 133–144)
SOURCE: NORMAL
SP GR UR STRIP: NORMAL (ref 1–1.03)
UROBILINOGEN UR STRIP-ACNC: NORMAL EU/DL (ref 0.2–1)
WBC # BLD AUTO: 4 10E9/L (ref 4–11)

## 2021-02-10 PROCEDURE — 85025 COMPLETE CBC W/AUTO DIFF WBC: CPT | Performed by: PHYSICIAN ASSISTANT

## 2021-02-10 PROCEDURE — 99213 OFFICE O/P EST LOW 20 MIN: CPT | Performed by: PHYSICIAN ASSISTANT

## 2021-02-10 PROCEDURE — 80048 BASIC METABOLIC PNL TOTAL CA: CPT | Performed by: PHYSICIAN ASSISTANT

## 2021-02-10 PROCEDURE — 36415 COLL VENOUS BLD VENIPUNCTURE: CPT | Performed by: PHYSICIAN ASSISTANT

## 2021-02-10 RX ORDER — DEXTROAMPHETAMINE SULFATE, DEXTROAMPHETAMINE SACCHARATE, AMPHETAMINE SULFATE AND AMPHETAMINE ASPARTATE 7.5; 7.5; 7.5; 7.5 MG/1; MG/1; MG/1; MG/1
30 CAPSULE, EXTENDED RELEASE ORAL DAILY
COMMUNITY
Start: 2021-02-03 | End: 2021-06-04

## 2021-02-10 RX ORDER — NAPROXEN 500 MG/1
TABLET ORAL
Qty: 62 TABLET | Refills: 11 | Status: SHIPPED | OUTPATIENT
Start: 2021-02-10 | End: 2021-03-03

## 2021-02-10 NOTE — TELEPHONE ENCOUNTER
Pharmacy notified, ok for valium 2 mg x1 tab.     CARLOS A Bass, NP-C  [facility] [place]

## 2021-02-10 NOTE — PROGRESS NOTES
Left msg to call back to clinic.Cheyenne Vega MA  [facility].

## 2021-02-10 NOTE — TELEPHONE ENCOUNTER
Routing refill request to provider for review/approval because:  Labs not current:    Lab Results   Component Value Date    ALT 31 01/30/2020     Creatinine   Date Value Ref Range Status   01/30/2020 1.01 0.66 - 1.25 mg/dL Final     No results found for: WBC  No results found for: RBC  No results found for: HGB  No results found for: HCT  No results found for: MCV  No results found for: MCH  No results found for: MCHC  No results found for: RDW  No results found for: PLT    Dayanara Boyer RN

## 2021-02-10 NOTE — TELEPHONE ENCOUNTER
Patient has had fluid rentention in both ankles and feet and wanted to know if provider will be willing to prescribe a diuretic for patient.  Please call patient to discuss and advise.  Ok to  Speak with patient's nurse (bebeto)Eddy Nj,  For 1st Floor Primary Care

## 2021-02-10 NOTE — TELEPHONE ENCOUNTER
Script Eprescribed to pharmacy    Will send this to MA team to notify patient.    Signed Prescriptions:                        Disp   Refills    diazepam (VALIUM) 2 MG tablet              1 tabl*0        Sig: Take 1 tab 45 minutes prior to procedure if needed           for anxiety.  Authorizing Provider: KANDACE VILLAREAL APRN, NP-C  [facility] [place]

## 2021-02-10 NOTE — PROGRESS NOTES
SUBJECTIVE:   Hoang Kiser is a 35 year old male presenting with a chief complaint of   Chief Complaint   Patient presents with     Foot Pain     Bilateral foot/ankle swelling and pain x 2 days ago. No know injury. Pt also complains of Rt wrist pain and Lt hip pain more than usual.        He is an established patient of [facility].  Patient presents with complaints of bilateral ankle swelling up to mid shin.  Painful.  Patient states that he eats well and denies a salty meal.  No SOB, no CP.      Review of Systems   Cardiovascular: Positive for leg swelling.   All other systems reviewed and are negative.      Past Medical History:   Diagnosis Date     Arthritis 2018     Benign positional vertigo 2016    Started after my groin/back injury. Sitting on Toilet.     Depressive disorder     I am on 120mg of Duoluxetine.     Dysphonia     Nothing significant.     Gastroesophageal reflux disease     Occassional. Spicy foods set it off.     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      DATE OF EE2019.    SOURCE FILE DURATION:  15 minutes.  This EEG did not have a video.    PATIENT INFORMATION:  The patient is a 33-year-old male with irregular movements.  It is not entirely clear the etiology of these movements.  EEG is being done to evaluate for seizures.    MEDICATIONS:  Adderall, doxepin, Cymbalta, Robaxin.      History of MRI of brain and brain stem 2015    MR BRAIN W/O & W CONTRAST, 2015  Impression: No suspicious intracranial findings.      Hoarseness     Dry mouth, started after taking Tizanidine     Neuralgia, neuritis, and radiculitis, unspecified 2012     normal emg 2017 2017    Interpretation: This is a normal study. There is no electrophysiologic evidence of a lumbosacral radiculopathy affecting the right or left lower  extremity on the basis of this study.    Rodney Mena MD Department of Neurology       Panic attack 12/6/2016     Seizures (H) 1986    Grew out of it. Absence Seizures. [id number]     Tinnitus 2015    Had it most of my life. Got worse in 2015     Family History   Problem Relation Age of Onset     Lipids Father         hyperlipidemia     Hyperlipidemia Father      Obesity Father      Arthritis Mother      Hyperlipidemia Mother      Depression Mother      Anxiety Disorder Mother      Mental Illness Mother      Obesity Mother      Asthma Brother      Asthma Sister      Depression Other      Hearing Loss Other      Psychotic Disorder Other      Obesity Other      Cerebrovascular Disease Paternal Grandfather      Alzheimer Disease Paternal Grandfather      Depression Brother      Mental Illness Brother         Bipolar     Asthma Brother      Colitis Brother      Skin Cancer Brother      Depression Sister      Asthma Sister      Substance Abuse Sister         Alcohol     Mental Illness Brother         Bipolar     Asthma Brother      Colitis Brother      Asthma Sister      Obesity Sister      Asthma Brother      Obesity Brother      Obesity Maternal Grandmother      Genetic Disorder Maternal Grandmother         Epilepsy     Obesity Paternal Grandmother      Colon Cancer Other      Genetic Disorder Other         Cerebral Palsy     Genetic Disorder Maternal Grandfather         Multiple Sclerosis     Genetic Disorder Other         Epilepsy     Current Outpatient Medications   Medication Sig Dispense Refill     ADDERALL XR 30 MG 24 hr capsule        buprenorphine HCl-naloxone HCl (SUBOXONE) 2-0.5 MG per film PLACE 1 FILM UNDER THE TONGUE THREE TIMES DAILY 84 Film 0     cyclobenzaprine (FLEXERIL) 5 MG tablet Take 1-2 tablets (5-10 mg) by mouth 3 times daily as needed for muscle spasms 120 tablet 1     DULoxetine (CYMBALTA) 30 MG capsule Take 1 cap daily until 2 weeks of Viibryd 30 mg daily and discontinue 14 capsule 0      hydrOXYzine (ATARAX) 25 MG tablet Take 1 tablet (25 mg) by mouth nightly as needed (at HS PRN) 45 tablet 3     naloxone (NARCAN) 4 MG/0.1ML nasal spray Spray 1 spray (4 mg) into one nostril alternating nostrils as needed for opioid reversal every 2-3 minutes until assistance arrives 0.2 mL 0     naproxen (NAPROSYN) 500 MG tablet Take 1 tablet (500 mg) by mouth 2 times daily (with meals) 40 tablet 3     oxybutynin ER (DITROPAN-XL) 10 MG 24 hr tablet TAKE 1 TABLET BY MOUTH ONCE DAILY *1 TOTAL FILL* **MUST KEEP APPOINTMENT ON 7/31/20 FOR MORE REFILLS* 90 tablet 1     vilazodone (VIIBRYD) 10 MG TABS tablet Take 3 tablets (30 mg) by mouth At Bedtime 90 tablet 1     zolpidem (AMBIEN) 10 MG tablet Take 1 tablet (10 mg) by mouth nightly as needed for sleep 30 tablet 1     albuterol (PROAIR HFA/PROVENTIL HFA/VENTOLIN HFA) 108 (90 Base) MCG/ACT inhaler INHALE ONE TO TWO PUFFS INTO THE LUNGS EVERY FOUR HOURS AS NEEDED FOR SHORTNESS OF BREATH/ DYSPNEA OR WHEEZING (Patient not taking: Reported on 2/10/2021) 18 g 1     atomoxetine (STRATTERA) 80 MG capsule Take 1 capsule (80 mg) by mouth daily (Patient not taking: Reported on 2/10/2021) 30 capsule 1     cloNIDine (CATAPRES) 0.1 MG tablet Take 1-2 tablets (0.1-0.2 mg) by mouth nightly as needed (sleep) (Patient not taking: Reported on 2/10/2021) 60 tablet 1     diazepam (VALIUM) 2 MG tablet Take 1 tab 45 minutes prior to procedure if needed for anxiety. 1 tablet 0     order for DME Equipment being ordered: Digital home blood pressure monitor kit 1 each 0     order for DME Equipment being ordered: 4 wheeled walker with bench seat. 1 Units 0     SF 5000 PLUS 1.1 % CREA        Social History     Tobacco Use     Smoking status: Current Every Day Smoker     Packs/day: 0.50     Years: 10.00     Pack years: 5.00     Types: Cigarettes     Start date: 1/1/2002     Smokeless tobacco: Never Used     Tobacco comment: Transdermal patches have helped me the most in the past. 12-10-20: smoking  1/2 ppd   Substance Use Topics     Alcohol use: No     Alcohol/week: 2.0 - 4.0 standard drinks     Comment: none since 2013       OBJECTIVE  /67   Pulse 82   Temp 97.1  F (36.2  C) (Tympanic)   Wt 93.9 kg (207 lb)   SpO2 96%   BMI 32.19 kg/m      Physical Exam  Vitals signs and nursing note reviewed.   Constitutional:       Appearance: Normal appearance.   Cardiovascular:      Rate and Rhythm: Normal rate and regular rhythm.      Pulses: Normal pulses.      Heart sounds: Normal heart sounds. No murmur.      Comments: Bilateral +2 pitting edema.  Negative belia bilaterally.  Pulmonary:      Effort: Pulmonary effort is normal.      Breath sounds: Normal breath sounds.   Skin:     Capillary Refill: Capillary refill takes less than 2 seconds.   Neurological:      General: No focal deficit present.      Mental Status: He is alert.   Psychiatric:         Mood and Affect: Mood normal.         Labs:  Results for orders placed or performed in visit on 02/10/21 (from the past 24 hour(s))   CBC with platelets and differential   Result Value Ref Range    WBC 4.0 4.0 - 11.0 10e9/L    RBC Count 4.27 (L) 4.4 - 5.9 10e12/L    Hemoglobin 13.0 (L) 13.3 - 17.7 g/dL    Hematocrit 38.9 (L) 40.0 - 53.0 %    MCV 91 78 - 100 fl    MCH 30.4 26.5 - 33.0 pg    MCHC 33.4 31.5 - 36.5 g/dL    RDW 12.8 10.0 - 15.0 %    Platelet Count 253 150 - 450 10e9/L    % Neutrophils 27.4 %    % Lymphocytes 48.0 %    % Monocytes 15.8 %    % Eosinophils 7.8 %    % Basophils 1.0 %    Absolute Neutrophil 1.1 (L) 1.6 - 8.3 10e9/L    Absolute Lymphocytes 1.9 0.8 - 5.3 10e9/L    Absolute Monocytes 0.6 0.0 - 1.3 10e9/L    Absolute Eosinophils 0.3 0.0 - 0.7 10e9/L    Absolute Basophils 0.0 0.0 - 0.2 10e9/L    Diff Method Automated Method    Basic metabolic panel  (Ca, Cl, CO2, Creat, Gluc, K, Na, BUN)   Result Value Ref Range    Sodium 140 133 - 144 mmol/L    Potassium 4.5 3.4 - 5.3 mmol/L    Chloride 107 94 - 109 mmol/L    Carbon Dioxide 31 20 - 32 mmol/L     Anion Gap 2 (L) 3 - 14 mmol/L    Glucose 101 (H) 70 - 99 mg/dL    Urea Nitrogen 17 7 - 30 mg/dL    Creatinine 1.01 0.66 - 1.25 mg/dL    GFR Estimate >90 >60 mL/min/[1.73_m2]    GFR Estimate If Black >90 >60 mL/min/[1.73_m2]    Calcium 8.9 8.5 - 10.1 mg/dL       X-Ray was not done.    ASSESSMENT:      ICD-10-CM    1. 2+ pitting edema  R60.9 CBC with platelets and differential     Basic metabolic panel  (Ca, Cl, CO2, Creat, Gluc, K, Na, BUN)     *UA reflex to Microscopic and Culture ([facility] (except [place] and [place])        Medical Decision Making:    Differential Diagnosis:  Pitting edema, kidney dysfunction,     Serious Comorbid Conditions:  Adult:  as above    PLAN:    6:07  Patient left to go outside and smoke.  Recommended f/u with PCP.  OTC compression. stockings.  UA was not labeled properly and was not sent.      Followup:    If not improving or if condition worsens, follow up with your Primary Care Provider    Patient Instructions     Patient Education     Coping with Edema  What is edema?  Edema is the build-up of fluid in the body, which causes swelling. Swelling most commonly occurs in the feet, ankles, lower legs or hands.  Swelling can occur in the belly or chest may be a sign of a more severe problem.  Certain medicines or conditions can make the swelling worse.  Symptoms include:    Feet and lower legs get larger when you sit or walk.    Hands feel tight when you make a fist.    When you push on the skin, skin stays dented.    Shiny, tight skin.    Fast weight gain.  How is it treated?  Your care team may give you a medicine to reduce the swelling.  They may also suggest that you meet with a dietitian. He or she can help with food choices to reduce the swelling.  What can I do about the swelling?    Place your feet above your heart 3 times a day: Sit with your feet up on a stool with a pillow. Sit on the bed or couch with two pillows under your feet.    Do not stand for  long periods of time.    Wear loose-fitting clothes.    Do not cross your legs.    Reduce the salt in your diet. These foods are high in salt:  ? Chips, soup  ? Frozen meals, TV dinners  ? Elliott, lunch meat, ham  ? Sauces (soy, canned spaghetti sauce)    Walk or do other exercise.    Wear compression stockings.    Drink water as normal.    Weigh yourself every day at the same time to keep track of weight gain.  When should I call my care team?  Call your care team if:    You have a hard time breathing.    You gained 5 pounds or more in 1 week.    Your hands or feet feel cold when you touch them.    You are peeing very little or not at all.    Swelling is moving up your arms or legs.    Your tongue is swelling.    You cannot eat for more than a day  If you have any side effects, call us. We can help you manage these problems.  For more information, see:  www.chemocare.com  www.cancer.org/treatment/treatmentsandsideeffects/physicalsideeffects/dealingwithsymptomsathome  www.cancer.gov/cancertopics/coping/chemotherapy-and-you  Comments:  __________________________________________  __________________________________________  __________________________________________  __________________________________________  __________________________________________  __________________________________________  __________________________________________  For informational purposes only. Not to replace the advice of your health care provider.  Copyright   2014 [employer]. All rights reserved. SMARTworks 313002 - REV 03/16.

## 2021-02-10 NOTE — TELEPHONE ENCOUNTER
Patient reports that he noticed the sides of his feet and his ankles are swollen. This started yesterday. His feet and are not blue in color or cool to touch. They are painful (5/6-10) he has been laying and keeping then elevated and that seems to help.  Feet seen whiter than normal and his calf are also swollen.     Nursing advice: Per protocol patient to be seen in clinic today.  Due to the time of day and his need to arrange a ride he will utilize urgent care.  Patient was given signs and symptoms to go to the E.R. or call 911.  Patient verbalizes good understanding, agrees with plan and states she needs no further support. Tuyet Wong R.N.    Additional Information    Negative: Chest pain    Negative: Small area of swelling and followed an insect bite to the area    Negative: Followed a knee injury    Negative: Ankle or foot injury    Negative: Pregnant with leg swelling or edema    Negative: Difficulty breathing at rest    Negative: Entire foot is cool or blue in comparison to other side    Negative: SEVERE swelling (e.g., swelling extends above knee, entire leg is swollen, weeping fluid)    Negative: Thigh or calf pain and only 1 side and present > 1 hour    Negative: Thigh, calf, or ankle swelling in only one leg    Negative: Thigh, calf, or ankle swelling in both legs, but one side is definitely more swollen    Negative: Cast on leg or ankle and has increasing pain    Negative: Can't walk or can barely stand (new onset)    Negative: Fever and red area (or area very tender to touch)    Negative: Patient sounds very sick or weak to the triager    Negative: Swelling of face, arm or hands (Exception: slight puffiness of fingers during hot weather)    Negative: Pregnant > 20 weeks and sudden weight gain (i.e., > 2 lbs, 1 kg in one week)    MODERATE swelling of both ankles (e.g., swelling extends up to the knees) AND new onset or worsening    Protocols used: LEG SWELLING AND EDEMA-A-OH

## 2021-02-10 NOTE — TELEPHONE ENCOUNTER
Received call from [facility] pharmacy regarding the valium prescription that was sent for pre-procedure relaxation. Natty, pharmacist, states that they need verification from the provider that she is aware that that patient is taking opioids. Suboxone being prescribed by Dr. Nicholson. Natty stated that she needs confirmation by the provider in order to put the codes in for insurance. Let her know that I would have Tamiko Stevens CNP review and will call her back, [phone].    Procedure is scheduled for Friday 2/12.     Will route to Tamiko Stevens CNP for review.     DEANNA Mckeon, RN-BC  Patient Care Supervisor  [facility] [place]

## 2021-02-11 ENCOUNTER — TELEPHONE (OUTPATIENT)
Dept: FAMILY MEDICINE | Facility: CLINIC | Age: 36
End: 2021-02-11

## 2021-02-11 ENCOUNTER — OFFICE VISIT (OUTPATIENT)
Dept: FAMILY MEDICINE | Facility: CLINIC | Age: 36
End: 2021-02-11
Payer: COMMERCIAL

## 2021-02-11 ENCOUNTER — MYC MEDICAL ADVICE (OUTPATIENT)
Dept: FAMILY MEDICINE | Facility: CLINIC | Age: 36
End: 2021-02-11

## 2021-02-11 VITALS
OXYGEN SATURATION: 100 % | DIASTOLIC BLOOD PRESSURE: 75 MMHG | SYSTOLIC BLOOD PRESSURE: 120 MMHG | WEIGHT: 205 LBS | HEART RATE: 94 BPM | TEMPERATURE: 97 F | BODY MASS INDEX: 31.87 KG/M2

## 2021-02-11 DIAGNOSIS — R60.0 PEDAL EDEMA: Primary | ICD-10-CM

## 2021-02-11 LAB
ALBUMIN SERPL-MCNC: 3.6 G/DL (ref 3.4–5)
ALP SERPL-CCNC: 72 U/L (ref 40–150)
ALT SERPL W P-5'-P-CCNC: 60 U/L (ref 0–70)
ANION GAP SERPL CALCULATED.3IONS-SCNC: 4 MMOL/L (ref 3–14)
AST SERPL W P-5'-P-CCNC: 43 U/L (ref 0–45)
BILIRUB SERPL-MCNC: 0.2 MG/DL (ref 0.2–1.3)
BUN SERPL-MCNC: 17 MG/DL (ref 7–30)
CALCIUM SERPL-MCNC: 8.6 MG/DL (ref 8.5–10.1)
CHLORIDE SERPL-SCNC: 106 MMOL/L (ref 94–109)
CO2 SERPL-SCNC: 31 MMOL/L (ref 20–32)
CREAT SERPL-MCNC: 0.84 MG/DL (ref 0.66–1.25)
GFR SERPL CREATININE-BSD FRML MDRD: >90 ML/MIN/{1.73_M2}
GLUCOSE SERPL-MCNC: 95 MG/DL (ref 70–99)
NT-PROBNP SERPL-MCNC: 99 PG/ML (ref 0–125)
POTASSIUM SERPL-SCNC: 4.1 MMOL/L (ref 3.4–5.3)
PROT SERPL-MCNC: 6.6 G/DL (ref 6.8–8.8)
SODIUM SERPL-SCNC: 141 MMOL/L (ref 133–144)
TSH SERPL DL<=0.005 MIU/L-ACNC: 1.39 MU/L (ref 0.4–4)

## 2021-02-11 PROCEDURE — 93000 ELECTROCARDIOGRAM COMPLETE: CPT | Performed by: FAMILY MEDICINE

## 2021-02-11 PROCEDURE — 80053 COMPREHEN METABOLIC PANEL: CPT | Performed by: FAMILY MEDICINE

## 2021-02-11 PROCEDURE — 84443 ASSAY THYROID STIM HORMONE: CPT | Performed by: FAMILY MEDICINE

## 2021-02-11 PROCEDURE — 36415 COLL VENOUS BLD VENIPUNCTURE: CPT | Performed by: FAMILY MEDICINE

## 2021-02-11 PROCEDURE — 99213 OFFICE O/P EST LOW 20 MIN: CPT | Performed by: FAMILY MEDICINE

## 2021-02-11 PROCEDURE — 83880 ASSAY OF NATRIURETIC PEPTIDE: CPT | Performed by: FAMILY MEDICINE

## 2021-02-11 ASSESSMENT — PAIN SCALES - GENERAL: PAINLEVEL: NO PAIN (0)

## 2021-02-11 NOTE — TELEPHONE ENCOUNTER
Hoang calls and says that he just got home from the [facility] clinic because he has edema in his feet and ankles. Pt. Says that the  Did not prescribe anything for pt. RN then checked Epic and read to pt. His discharge instructions. Pt. Says that he has no money to buy any compression stockings. Pt. Says that he will call his clinic in the am. COVID 19 Nurse Triage Plan/Patient Instructions    Please be aware that novel coronavirus (COVID-19) may be circulating in the community. If you develop symptoms such as fever, cough, or SOB or if you have concerns about the presence of another infection including coronavirus (COVID-19), please contact your health care provider or visit [email or web address].     Disposition/Instructions    Virtual Visit with provider recommended. Reference Visit Selection Guide.    Thank you for taking steps to prevent the spread of this virus.  o Limit your contact with others.  o Wear a simple mask to cover your cough.  o Wash your hands well and often.    Resources    [facility]: About COVID-19: [email or web address]    CDC: What to Do If You're Sick: www.cdc.gov/coronavirus/2019-ncov/about/steps-when-sick.html    CDC: Ending Home Isolation: www.cdc.gov/coronavirus/2019-ncov/hcp/disposition-in-home-patients.html     CDC: Caring for Someone: www.cdc.gov/coronavirus/2019-ncov/if-you-are-sick/care-for-someone.html     [facility]: Interim Guidance for Hospital Discharge to Home: www.health.[facility].mn.us/diseases/coronavirus/hcp/hospdischarge.pdf    [facility] clinical trials (COVID-19 research studies): clinicalaffairs.[facility]/n-clinical-trials     Below are the COVID-19 hotlines at the [facility] ([facility]). Interpreters are available.   o For health questions: Call 677-673-2889 or 1-836.887.6040 (7 a.m. to 7 p.m.)  o For questions about schools and childcare: Call 191-347-5266 or 1-820.729.2983 (7 a.m. to 7 p.m.)                     Reason for  Disposition    [1] Caller requesting NON-URGENT health information AND [2] PCP's office is the best resource    Additional Information    Negative: [1] Caller is not with the adult (patient) AND [2] reporting urgent symptoms    Negative: Lab result questions    Negative: Medication questions    Negative: Caller can't be reached by phone    Negative: Caller has already spoken to PCP or another triager    Negative: RN needs further essential information from caller in order to complete triage    Negative: Requesting regular office appointment    Protocols used: INFORMATION ONLY CALL-A-AH

## 2021-02-11 NOTE — NURSING NOTE
"Chief Complaint   Patient presents with     Edema     in both feet, x3 days, there is pain all the way up to his knees.      Health Maintenance       Initial /75   Pulse 94   Temp 97  F (36.1  C) (Tympanic)   Wt 93 kg (205 lb)   SpO2 100%   BMI 31.87 kg/m   Estimated body mass index is 31.87 kg/m  as calculated from the following:    Height as of 2/1/21: 1.708 m (5' 7.24\").    Weight as of this encounter: 93 kg (205 lb).  Medication Reconciliation: complete  Tiny Amaral CMA  "

## 2021-02-11 NOTE — PATIENT INSTRUCTIONS
Patient Education     Coping with Edema  What is edema?  Edema is the build-up of fluid in the body, which causes swelling. Swelling most commonly occurs in the feet, ankles, lower legs or hands.  Swelling can occur in the belly or chest may be a sign of a more severe problem.  Certain medicines or conditions can make the swelling worse.  Symptoms include:    Feet and lower legs get larger when you sit or walk.    Hands feel tight when you make a fist.    When you push on the skin, skin stays dented.    Shiny, tight skin.    Fast weight gain.  How is it treated?  Your care team may give you a medicine to reduce the swelling.  They may also suggest that you meet with a dietitian. He or she can help with food choices to reduce the swelling.  What can I do about the swelling?    Place your feet above your heart 3 times a day: Sit with your feet up on a stool with a pillow. Sit on the bed or couch with two pillows under your feet.    Do not stand for long periods of time.    Wear loose-fitting clothes.    Do not cross your legs.    Reduce the salt in your diet. These foods are high in salt:  ? Chips, soup  ? Frozen meals, TV dinners  ? Elliott, lunch meat, ham  ? Sauces (soy, canned spaghetti sauce)    Walk or do other exercise.    Wear compression stockings.    Drink water as normal.    Weigh yourself every day at the same time to keep track of weight gain.  When should I call my care team?  Call your care team if:    You have a hard time breathing.    You gained 5 pounds or more in 1 week.    Your hands or feet feel cold when you touch them.    You are peeing very little or not at all.    Swelling is moving up your arms or legs.    Your tongue is swelling.    You cannot eat for more than a day  If you have any side effects, call us. We can help you manage these problems.  For more information,  see:  www.chemocare.com  www.cancer.org/treatment/treatmentsandsideeffects/physicalsideeffects/dealingwithsymptomsathome  www.cancer.gov/cancertopics/coping/chemotherapy-and-you  Comments:  __________________________________________  __________________________________________  __________________________________________  __________________________________________  __________________________________________  __________________________________________  __________________________________________  For informational purposes only. Not to replace the advice of your health care provider.  Copyright   2014 [employer]. All rights reserved. [employer] 607788 - REV 03/16.

## 2021-02-11 NOTE — PROGRESS NOTES
SUBJECTIVE:  Hoang Kiser is a 35 year old male who scheduled an appointment to discuss the swelling in both leg(s). The patient reports that this started about 3 days ago. The patient has been seen for this problem before. The patient denies symptom(s) that have come along with the swelling. The patient specifically denies shortness of breath and chest pain.    The patient describes the symptoms as persistent.    The patient reports that the following symptoms occur concominantly along with the presentling complaint: none    The patient reports that the symptoms are  moderate in nature.    Any additional relevant history includes:  He started back on adderrall about 2 weeks ago   He was on Starterrra before that   He was on Ritalin as a kid and also was on adderrall for many years before starting on  straterra       Medications taken that can cause edema:  Dihydropyridines (amlodipine):Yes  Dopamine agonists (mirapex, requip): No  Gabapentin (Neurontin): No   Pregabalin (Lyrica): No        Past Medical, social, family histories, medications, and allergies reviewed and updated      Current Outpatient Medications:      ADDERALL XR 30 MG 24 hr capsule, , Disp: , Rfl:      albuterol (PROAIR HFA/PROVENTIL HFA/VENTOLIN HFA) 108 (90 Base) MCG/ACT inhaler, INHALE ONE TO TWO PUFFS INTO THE LUNGS EVERY FOUR HOURS AS NEEDED FOR SHORTNESS OF BREATH/ DYSPNEA OR WHEEZING (Patient not taking: Reported on 2/10/2021), Disp: 18 g, Rfl: 1     buprenorphine HCl-naloxone HCl (SUBOXONE) 2-0.5 MG per film, PLACE 1 FILM UNDER THE TONGUE THREE TIMES DAILY, Disp: 84 Film, Rfl: 0     cloNIDine (CATAPRES) 0.1 MG tablet, Take 1-2 tablets (0.1-0.2 mg) by mouth nightly as needed (sleep) (Patient not taking: Reported on 2/10/2021), Disp: 60 tablet, Rfl: 1     cyclobenzaprine (FLEXERIL) 5 MG tablet, Take 1-2 tablets (5-10 mg) by mouth 3 times daily as needed for muscle spasms, Disp: 120 tablet, Rfl: 1     diazepam (VALIUM) 2 MG tablet, Take  1 tab 45 minutes prior to procedure if needed for anxiety., Disp: 1 tablet, Rfl: 0     DULoxetine (CYMBALTA) 30 MG capsule, Take 1 cap daily until 2 weeks of Viibryd 30 mg daily and discontinue, Disp: 14 capsule, Rfl: 0     hydrOXYzine (ATARAX) 25 MG tablet, Take 1 tablet (25 mg) by mouth nightly as needed (at HS PRN), Disp: 45 tablet, Rfl: 3     naloxone (NARCAN) 4 MG/0.1ML nasal spray, Spray 1 spray (4 mg) into one nostril alternating nostrils as needed for opioid reversal every 2-3 minutes until assistance arrives, Disp: 0.2 mL, Rfl: 0     naproxen (NAPROSYN) 500 MG tablet, TAKE 1 TABLET BY MOUTH TWICE DAILY WITH MEALS, Disp: 62 tablet, Rfl: 11     order for DME, Equipment being ordered: Digital home blood pressure monitor kit, Disp: 1 each, Rfl: 0     order for DME, Equipment being ordered: 4 wheeled walker with bench seat., Disp: 1 Units, Rfl: 0     oxybutynin ER (DITROPAN-XL) 10 MG 24 hr tablet, TAKE 1 TABLET BY MOUTH ONCE DAILY *1 TOTAL FILL* **MUST KEEP APPOINTMENT ON 7/31/20 FOR MORE REFILLS*, Disp: 90 tablet, Rfl: 1     SF 5000 PLUS 1.1 % CREA, , Disp: , Rfl:      vilazodone (VIIBRYD) 10 MG TABS tablet, Take 3 tablets (30 mg) by mouth At Bedtime, Disp: 90 tablet, Rfl: 1     zolpidem (AMBIEN) 10 MG tablet, Take 1 tablet (10 mg) by mouth nightly as needed for sleep, Disp: 30 tablet, Rfl: 1    OBJECTIVE:  /75   Pulse 94   Temp 97  F (36.1  C) (Tympanic)   Wt 93 kg (205 lb)   SpO2 100%   BMI 31.87 kg/m      EXAM:  GENERAL APPEARANCE: healthy, alert and no distress  EYES: EOMI,  PERRL  HENT: ear canals and TM's normal and nose and mouth without ulcers or lesions  RESP: lungs clear to auscultation - no rales, rhonchi or wheezes  CV: regular rates and rhythm, normal S1 S2, no S3 or S4 and no murmur, click or rub -  ABDOMEN:  soft, nontender, no HSM or masses and bowel sounds normal  EXTREMITIES: 1+ edema on left and 1+ edema on the right up to the proximal shin    EKG: Normal sinus rhythym, normal  axis, non specific ST-T changes        ASSESSMENT/PLAN:  Pedal edema: The patient appears in no apparent distress currently and we will procede with checking a TSH, albumin, BUN, creatinine and a BNP. I already have explained to the patient that venous insufficiency is the most common etiology for lower extremity edema. If the labs come back normal we can start treatment with compression stockings or a low dose diuretic. I explained the risk of hypokalemia with any diuretic. He expressed that he would like to try one of these but he is not sure yet.   If the results are normal I will send the patient a MyChart note and let him make a decision about treatment.

## 2021-02-11 NOTE — TELEPHONE ENCOUNTER
Reason for Call:  Other appointment    Detailed comments: Pt calling to check to see if he should be seen later on today with his PCP for he has heart concerns that need to be addressed for he went in for Urgent Care yesterday and felt that they did not address his needs. He would like a call back for further advisement.    Phone Number Patient can be reached at: Cell number on file:    Telephone Information:   Mobile [phone]       Best Time: anytime    Can we leave a detailed message on this number? YES    Call taken on 2/11/2021 at 7:56 AM by Donnell Moreno

## 2021-02-12 ENCOUNTER — MYC MEDICAL ADVICE (OUTPATIENT)
Dept: FAMILY MEDICINE | Facility: CLINIC | Age: 36
End: 2021-02-12

## 2021-02-12 ENCOUNTER — TELEPHONE (OUTPATIENT)
Dept: FAMILY MEDICINE | Facility: CLINIC | Age: 36
End: 2021-02-12

## 2021-02-12 NOTE — RESULT ENCOUNTER NOTE
Hoang,  I have reviewed the results of the laboratory tests that we recently ordered. All of the lab work performed was normal or considered normal for you. As we discussed today the treatment available is wearing compression stockings or taking a diuretic. I would recommend(ed) the stockings, avoiding sodium and elevating your feet. Please let me know your thoughts.   Sincerely,   Phill Floyd MD

## 2021-02-12 NOTE — LETTER
February 15, 2021    Hoang Kiser  [address] [facility] [id number]        Medication Permission Form/Medication Orders      Hoang Kiser  1985        Hoang Margi was prescribed the following medication     Medication:  Naproxen 500 mg twice a day as needed with food.   If he is not having pain or discomfort he does not need to take it.       Thank You.      Phill Floyd MD

## 2021-02-12 NOTE — TELEPHONE ENCOUNTER
Reason for Call:  Other prescription    Detailed comments: Patient states he has been doing everything you have advised him to do. He has been elevating his legs to help with the fluid, taking short walks, etc.He is requesting you prescribe a diuretic per a previous conversation he states you had with him. Please call to discuss.    Phone Number Patient can be reached at: Cell number on file:    Telephone Information:   Mobile [phone]       Best Time: anytime    Can we leave a detailed message on this number? YES    Call taken on 2/12/2021 at 3:53 PM by Ana Garcia

## 2021-02-12 NOTE — TELEPHONE ENCOUNTER
Patient also sent [employer] message today so you can respond to patient through that.    Diane BORGESN, RN

## 2021-02-15 ENCOUNTER — MYC MEDICAL ADVICE (OUTPATIENT)
Dept: PALLIATIVE MEDICINE | Facility: CLINIC | Age: 36
End: 2021-02-15

## 2021-02-15 NOTE — TELEPHONE ENCOUNTER
Called the patient and informed him that the letter is completed. Per his request it has been mailed to:[address] [facility] [place] [address].  Ana Garcia TC

## 2021-02-15 NOTE — TELEPHONE ENCOUNTER
"Reason for Call:  Other call back    Detailed comments: Patient is asking if you could address/sign off on a \"Residual Function Capacity \" form. I advised he may need to send the form to us via [employer] to have the provider review first, but if the provider knows what it is, does he need to be seen in person or how would he like to proceed please. Please call to advise.       Phone Number Patient can be reached at: Cell number on file:    Telephone Information:   Mobile [phone]       Best Time: anytime    Can we leave a detailed message on this number? YES    Call taken on 2/15/2021 at 4:59 PM by Ana Garcia      "

## 2021-02-15 NOTE — TELEPHONE ENCOUNTER
To provider, please see my chart message from patient needing a letter for Naproxen as PRN. Letter pended in Epic letters for you to complete if you agree.  Route back to team when done.   MOLLY Arvizu

## 2021-02-16 ENCOUNTER — MYC MEDICAL ADVICE (OUTPATIENT)
Dept: FAMILY MEDICINE | Facility: CLINIC | Age: 36
End: 2021-02-16

## 2021-02-16 DIAGNOSIS — R39.15 URINARY URGENCY: ICD-10-CM

## 2021-02-16 DIAGNOSIS — N39.41 URGE INCONTINENCE: ICD-10-CM

## 2021-02-17 ENCOUNTER — TELEPHONE (OUTPATIENT)
Dept: PALLIATIVE MEDICINE | Facility: CLINIC | Age: 36
End: 2021-02-17

## 2021-02-17 ENCOUNTER — MYC MEDICAL ADVICE (OUTPATIENT)
Dept: FAMILY MEDICINE | Facility: CLINIC | Age: 36
End: 2021-02-17

## 2021-02-17 RX ORDER — OXYBUTYNIN CHLORIDE 10 MG/1
TABLET, EXTENDED RELEASE ORAL
Qty: 90 TABLET | Refills: 1 | Status: SHIPPED | OUTPATIENT
Start: 2021-02-17 | End: 2021-03-16

## 2021-02-17 NOTE — PROGRESS NOTES
[facility] - Procedure Note    Date of Visit: 2/18/2021    Procedure performed: T1-T2 interlaminar epidural steroid injection with fluoroscopic guidance  Diagnosis: Cervical spondylosis; Cervical radiculitis/radiculopathy  : Brynn Trujillo MD   Anesthesia: none    Indications: Hoang Kiser is a 35 year old male who is seen at the request of Dr. Feldman for cervical epidural steroid injection. The patient describes neck pain radiating to the bilateral hands. The patient has been exhibiting symptoms consistent with cervical intraspinal inflammation and radiculopathy. Symptoms have been persistent, disabling, and intermittently severe. The patient reports minimal improvement with conservative treatment, including previous RENUKA in [place], medications and PT.    This is a repeat injection.  Previous RENUKA done by myself on 7/19/2019 provided good pain relief for a period of 6 months.       Cervical MRI was done on 6/17/2019 which showed   Findings:  The cervical vertebrae are normally aligned.  There is no disc height  narrowing at any level.  There is normal signal within and normal  contour of the cervical spinal cord.  The findings on a level by level  basis are as follows:     C2-3:  Mild facet arthropathy bilaterally. Mild left neural foraminal  stenosis. Right neural foramen and spinal canal are patent.     C3-4:  Small left eccentric posterior disc bulge. Facet arthropathy  bilaterally. Mild left neural foraminal stenosis. Neural foramen and  spinal canal are patent.     C4-5:  Facet arthropathy bilaterally. No spinal canal or neural  foraminal stenosis.     C5-6:  Small posterior disc bulge. Facet arthropathy bilaterally.  Right central annular fissure. No spinal canal or neural foraminal  stenosis.     C6-7:  Right central/subarticular disc protrusion. No spinal canal or  neuroforaminal stenosis.     C7-T1:  No spinal canal or neuroforaminal stenosis.     No  abnormality of the paraspinous soft tissues.                                                                    Impression:   Mild multilevel cervical spondylosis with mild left neural foraminal  stenosis at C2-3 and C3-4.       Allergies:      Allergies   Allergen Reactions     Amitriptyline      Ineffective in reducing spasms/movement, increased fatigue  Other reaction(s): Other (see comments)     Buspirone Hcl Other (See Comments)     Panic attacks     Doxycycline Diarrhea, Fatigue, GI Disturbance and Nausea     Other reaction(s): GI intolerance     Trazodone Fatigue     Other reaction(s): Other (see comments)     Cephalexin Diarrhea     Other reaction(s): GI intolerance        Vitals:  BP (!) 132/93 (BP Location: Left arm, Cuff Size: Adult Regular)   Pulse 76   SpO2 99%     Review of Systems: The patient denies recent fever, chills, illness, use of antibiotics or anticoagulants. All other 10-point review of systems negative.     Procedure: The procedure and risks were explained, and informed written consent was obtained from the patient. Risks include but are not limited to: infection, bleeding, increased pain, and damage to soft tissue, nerve, muscle, and vasculature structures. After getting informed consent, patient was brought into the procedure suite and was placed in a prone position on the procedure table. A Pause for the Cause was performed. Patient was prepped and draped in sterile fashion.     The T1-T2 interspace was identified with use of fluoroscopy in AP view. A 25-gauge, 1.5 inch needle was used to anesthetize the skin and subcutaneous tissue entry site with a total of 2 ml of 1% lidocaine. Under fluoroscopic visualization, a 22-gauge, 3.5 inch Tuohy epidural needle was slowly advanced towards the epidural space a few millimeters left of midline. The latter part of the needle advancement was guided with fluoroscopy in the lateral view. The epidural space was identified using loss of resistance  technique. After negative aspiration for heme and cerebrospinal fluid, a total of 1 mL of non-ionic contrast was injected to confirm needle placement. 9 mL of contrast was wasted. Epidurogram confirmed spread within the posterior epidural space. 2 ml of 10mg/ml of dexamethasone and 1 ml of preservative free 1% lidocaine was injected. The needle was removed.  Images were saved to PACS.    The patient tolerated the procedure well, and there was no evidence of procedural complications. No new sensory or motor deficits were noted following the procedure. The patient was stable and able to ambulate on discharge home. Post-procedure instructions were provided.     Pre-procedure pain score: 2/10 in the neck, 1/10 in the arm  Post-procedure pain score: 1/10 in the neck, 1/10 in the arm    Assessment/Plan: Hoang Kiser is a 35 year old male s/p cervical interlaminar epidural steroid injection today for cervical spondylosis and radiculitis/radiculopathy.     1. Following today's procedure, the patient was advised to contact the [facility] for any of the following:   Fever, chills, or night sweats   New onset of pain, numbness, or weakness   Any questions/concerns regarding the procedure  If unable to contact the Pain Center, the patient was instructed to go to a local Emergency Room for any complications.   2. The patient will receive a follow-up call in 1 week.   3. Follow-up with the referring provider in 2 weeks for post-procedure evaluation.      MIYA SILVA MD   Pain Management & Addiction Medicine

## 2021-02-17 NOTE — TELEPHONE ENCOUNTER
Spoke to patient, reminded patient of date, time and location of appointment.      Reminded patient to eat and drink as usual, hold any blood thinners or pain medications that they were asked to hold per nurse.     Reminded patient they will need a  for this appointment.      Reminded patient that both patient and  must wear a mask during their visit.        Renee Sutherland MA  [facility]

## 2021-02-18 ENCOUNTER — RADIOLOGY INJECTION OFFICE VISIT (OUTPATIENT)
Dept: PALLIATIVE MEDICINE | Facility: CLINIC | Age: 36
End: 2021-02-18
Payer: COMMERCIAL

## 2021-02-18 VITALS — DIASTOLIC BLOOD PRESSURE: 93 MMHG | HEART RATE: 76 BPM | SYSTOLIC BLOOD PRESSURE: 132 MMHG | OXYGEN SATURATION: 99 %

## 2021-02-18 DIAGNOSIS — M54.12 CERVICAL RADICULOPATHY: Primary | ICD-10-CM

## 2021-02-18 PROCEDURE — 62321 NJX INTERLAMINAR CRV/THRC: CPT | Performed by: ANESTHESIOLOGY

## 2021-02-18 NOTE — PATIENT INSTRUCTIONS
[facility] Procedure Discharge Instructions    Today you saw:   Dr. Brynn Trujillo      Your procedure:  Epidural steroid injection       Medications used:  Lidocaine (anesthetic)      Dexamethasone (steroid)     Omnipaque (contrast)    Normal Saline           Do not drive for 6 hours. The effect of the local anesthetic could slow your reflexes.     Avoid strenuous activity for the first 24 hours. You may resume your regular activities after that.     You may shower, however avoid swimming, tub baths or hot tubs for 24 hours following your procedure    You may have a mild to moderate increase in pain for several days following the injection.      You may use ice packs for 10-15 minutes, 3 to 4 times a day at the injection site for comfort    Do not use heat to painful areas for 6 to 8 hours. This will give the local anesthetic time to wear off and prevent you from accidentally burning your skin.    Unless you have been directed to avoid the use of anti-inflammatory medications (NSAIDS-ibuprofen, Aleve, Motrin), you may use these medications or Tylenol for pain control if needed.     With diabetes, check your blood sugar more frequently than usual as your blood sugar may be higher than normal for 10-14 days following a steroid injection. Contact your doctor who manages your diabetes if your blood sugar is higher than usual    Possible side effects of steroids that you may experience include flushing, elevated blood pressure, increased appetite, mild headaches and restlessness.  All of these symptoms will get better with time.    It may take up to 14 days for the steroid medication to start working although you may feel the effect as early as a few days after the procedure.     Follow up with your referring provider in 2-3 weeks      If you experience any of the following, call the pain center line during work hours at [phone] or on-call physician after hours at [phone]:  -Fever over 100  degree F  -Swelling, bleeding, redness, drainage, warmth at the injection site  -Progressive weakness or numbness in your arms  -Unusual headache that is not relieved by Tylenol or your regular headache medication  -Unusual new onset of pain that is not improving

## 2021-02-18 NOTE — NURSING NOTE
Pre-procedure Intake    Have you been fasting? No    If yes, for how long?     Are you taking a prescribed blood thinner such as coumadin, Plavix, Xarelto?    No    If yes, when did you take your last dose?     Do you take aspirin?   NO    If cervical procedure, have you held aspirin for 6 days?   NA    Do you have any allergies to contrast dye, iodine, steroid and/or numbing medications?  NO    Are you currently taking antibiotics or have an active infection?  NO    Have you had a fever/elevated temperature within the past week? NO    Are you currently taking oral steroids? NO    Do you have a ? Yes       Are you pregnant or breastfeeding?  NO    Are the vital signs normal?  no

## 2021-02-18 NOTE — NURSING NOTE
Discharge Information    IV Discontiued Time:  NA    Amount of Fluid Infused:  NA    Discharge Criteria = When patient returns to baseline or as per MD order    Consciousness:  Pt is fully awake    Circulation:  BP +/- 20% of pre-procedure level    Respiration:  Patient is able to breathe deeply    O2 Sat:  Patient is able to maintain O2 Sat >92% on room air    Activity:  Moves 4 extremities on command    Ambulation:  Patient is able to stand and walk or stand and pivot into wheelchair    Dressing:  Clean/dry or No Dressing    Notes:   Discharge instructions and AVS given to patient    Patient meets criteria for discharge?  YES    Admitted to PCU?  No    Responsible adult present to accompany patient home?  Yes    Signature/Title:    Yelitza Spears RN Care Coordinator  [facility] [place]

## 2021-02-19 ENCOUNTER — VIRTUAL VISIT (OUTPATIENT)
Dept: FAMILY MEDICINE | Facility: CLINIC | Age: 36
End: 2021-02-19
Payer: COMMERCIAL

## 2021-02-19 ENCOUNTER — MYC MEDICAL ADVICE (OUTPATIENT)
Dept: ADDICTION MEDICINE | Facility: CLINIC | Age: 36
End: 2021-02-19

## 2021-02-19 DIAGNOSIS — M54.40 CHRONIC LOW BACK PAIN WITH SCIATICA, SCIATICA LATERALITY UNSPECIFIED, UNSPECIFIED BACK PAIN LATERALITY: Primary | ICD-10-CM

## 2021-02-19 DIAGNOSIS — Z02.9 ADMINISTRATIVE ENCOUNTER: Primary | ICD-10-CM

## 2021-02-19 DIAGNOSIS — G89.29 CHRONIC LOW BACK PAIN WITH SCIATICA, SCIATICA LATERALITY UNSPECIFIED, UNSPECIFIED BACK PAIN LATERALITY: Primary | ICD-10-CM

## 2021-02-19 DIAGNOSIS — F32.1 MODERATE MAJOR DEPRESSION (H): ICD-10-CM

## 2021-02-19 DIAGNOSIS — F90.0 ATTENTION DEFICIT HYPERACTIVITY DISORDER (ADHD), PREDOMINANTLY INATTENTIVE TYPE: ICD-10-CM

## 2021-02-19 DIAGNOSIS — G25.9 MOVEMENT DISORDER: ICD-10-CM

## 2021-02-19 PROCEDURE — 99214 OFFICE O/P EST MOD 30 MIN: CPT | Mod: 95 | Performed by: FAMILY MEDICINE

## 2021-02-19 RX ORDER — BUPRENORPHINE AND NALOXONE 4; 1 MG/1; MG/1
1 FILM, SOLUBLE BUCCAL; SUBLINGUAL 3 TIMES DAILY
Qty: 12 FILM | Refills: 0 | Status: SHIPPED | OUTPATIENT
Start: 2021-02-19 | End: 2021-02-25

## 2021-02-19 ASSESSMENT — ANXIETY QUESTIONNAIRES
GAD7 TOTAL SCORE: 17
1. FEELING NERVOUS, ANXIOUS, OR ON EDGE: NEARLY EVERY DAY
5. BEING SO RESTLESS THAT IT IS HARD TO SIT STILL: NEARLY EVERY DAY
6. BECOMING EASILY ANNOYED OR IRRITABLE: NEARLY EVERY DAY
7. FEELING AFRAID AS IF SOMETHING AWFUL MIGHT HAPPEN: SEVERAL DAYS
GAD7 TOTAL SCORE: 17
GAD7 TOTAL SCORE: 17
2. NOT BEING ABLE TO STOP OR CONTROL WORRYING: SEVERAL DAYS
4. TROUBLE RELAXING: NEARLY EVERY DAY
7. FEELING AFRAID AS IF SOMETHING AWFUL MIGHT HAPPEN: SEVERAL DAYS
3. WORRYING TOO MUCH ABOUT DIFFERENT THINGS: NEARLY EVERY DAY

## 2021-02-19 ASSESSMENT — PATIENT HEALTH QUESTIONNAIRE - PHQ9
SUM OF ALL RESPONSES TO PHQ QUESTIONS 1-9: 10
SUM OF ALL RESPONSES TO PHQ QUESTIONS 1-9: 10
10. IF YOU CHECKED OFF ANY PROBLEMS, HOW DIFFICULT HAVE THESE PROBLEMS MADE IT FOR YOU TO DO YOUR WORK, TAKE CARE OF THINGS AT HOME, OR GET ALONG WITH OTHER PEOPLE: EXTREMELY DIFFICULT

## 2021-02-19 NOTE — PROGRESS NOTES
Shoaib is a 35 year old who is being evaluated via a billable video visit.      How would you like to obtain your AVS? Yvettet   Pt will be using cell phone, please send text to: [phone]   Will anyone else be joining your video visit? No    Video Start Time: 9:00 am    Assessment & Plan     1.  Administrative encounter  Went through questions on Functional Capacity form, will be faxed to number provided on paper and copy mailed to patient.    2.  Moderate major depression (H)  Reviewed diagnoses listed.  Patient currently working with psychiatry.     3.  Attention deficit hyperactivity disorder (ADHD), predominantly inattentive type  Continue meds with psychiatry.  Not addressed today other than for form.    4.  Movement disorder  Continue current management plan with provider, not addressed today other than for form.      34 minutes spent on the date of the encounter doing chart review, history and exam, documentation and further activities as noted above     See Patient Instructions    Return in about 10 months (around 12/19/2021) for Preventative Care Visit.    Valencia Turner MD  [facility]   Shoaib is a 35 year old who presents for the following health issues     HPI     Pt needs Functional Capacity Letter per .  Letter looks at his chronic medical conditions, prognosis, and what physical/mental limitations he may have with regards to employment, as well as accommodations needed.      Answers for HPI/ROS submitted by the patient on 2/19/2021   Chronic problems general questions HPI Form  If you checked off any problems, how difficult have these problems made it for you to do your work, take care of things at home, or get along with other people?: Extremely difficult  PHQ9 TOTAL SCORE: 10  JIN 7 TOTAL SCORE: 17  How many servings of fruits and vegetables do you eat daily?: 2-3  On average, how many sweetened beverages do you drink each day (Examples:  soda, juice, sweet tea, etc.  Do NOT count diet or artificially sweetened beverages)?: 2  How many minutes a day do you exercise enough to make your heart beat faster?: 60 or more  How many days a week do you exercise enough to make your heart beat faster?: 6  How many days per week do you miss taking your medication?: 0      Depression Followup    How are you doing with your depression since your last visit? No change    Are you having other symptoms that might be associated with depression? No    Have you had a significant life event?  No     Are you feeling anxious or having panic attacks?   Yes:  Pt reports Anxiety has gone up some    Do you have any concerns with your use of alcohol or other drugs? No     Pt is currently working with a psychiatrist on medication adjustments, discontinuing Duloxetine and currently on Vibryd.  Gucci is prescribing ADHD medication with [facility].  Overall is in the process of getting things done.    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 12-10-20 = 2.5 months sober     PHQ 11/14/2020 12/10/2020 2/19/2021   PHQ-9 Total Score 10 10 10   Q9: Thoughts of better off dead/self-harm past 2 weeks Not at all Not at all Not at all   Some encounter information is confidential and restricted. Go to Review Flowsheets activity to see all data.     JIN-7 SCORE 11/14/2020 12/10/2020 2/19/2021   Total Score - - -   Total Score 10 (moderate anxiety) 10 (moderate anxiety) 17 (severe anxiety)   Total Score 10 10 17   Some encounter information is confidential and  restricted. Go to Review [employer] activity to see all data.     Last PHQ-9 2/19/2021   1.  Little interest or pleasure in doing things 1   2.  Feeling down, depressed, or hopeless 1   3.  Trouble falling or staying asleep, or sleeping too much 2   4.  Feeling tired or having little energy 1   5.  Poor appetite or overeating 1   6.  Feeling bad about yourself 1   7.  Trouble concentrating 2   8.  Moving slowly or restless 1   Q9: Thoughts of better off dead/self-harm past 2 weeks 0   PHQ-9 Total Score 10   Difficulty at work, home, or with people -   Some encounter information is confidential and restricted. Go to Review [employer] activity to see all data.     JIN-7  2/19/2021   1. Feeling nervous, anxious, or on edge 3   2. Not being able to stop or control worrying 1   3. Worrying too much about different things 3   4. Trouble relaxing 3   5. Being so restless that it is hard to sit still 3   6. Becoming easily annoyed or irritable 3   7. Feeling afraid, as if something awful might happen 1   JIN-7 Total Score 17   If you checked any problems, how difficult have they made it for you to do your work, take care of things at home, or get along with other people? -   Some encounter information is confidential and restricted. Go to Review [employer] activity to see all data.     Current Outpatient Medications   Medication Sig Dispense Refill     ADDERALL XR 30 MG 24 hr capsule        albuterol (PROAIR HFA/PROVENTIL HFA/VENTOLIN HFA) 108 (90 Base) MCG/ACT inhaler INHALE ONE TO TWO PUFFS INTO THE LUNGS EVERY FOUR HOURS AS NEEDED FOR SHORTNESS OF BREATH/ DYSPNEA OR WHEEZING 18 g 1     buprenorphine HCl-naloxone HCl (SUBOXONE) 2-0.5 MG per film PLACE 1 FILM UNDER THE TONGUE THREE TIMES DAILY 84 Film 0     cloNIDine (CATAPRES) 0.1 MG tablet Take 1-2 tablets (0.1-0.2 mg) by mouth nightly as needed (sleep) 60 tablet 1     cyclobenzaprine (FLEXERIL) 5 MG tablet Take 1-2 tablets (5-10 mg) by mouth 3 times daily as needed for  muscle spasms 120 tablet 1     hydrOXYzine (ATARAX) 25 MG tablet Take 1 tablet (25 mg) by mouth nightly as needed (at HS PRN) 45 tablet 3     naloxone (NARCAN) 4 MG/0.1ML nasal spray Spray 1 spray (4 mg) into one nostril alternating nostrils as needed for opioid reversal every 2-3 minutes until assistance arrives 0.2 mL 0     naproxen (NAPROSYN) 500 MG tablet TAKE 1 TABLET BY MOUTH TWICE DAILY WITH MEALS 62 tablet 11     order for DME Equipment being ordered: Digital home blood pressure monitor kit 1 each 0     oxybutynin ER (DITROPAN-XL) 10 MG 24 hr tablet TAKE 1 TABLET BY MOUTH ONCE DAILY 90 tablet 1     SF 5000 PLUS 1.1 % CREA        vilazodone (VIIBRYD) 10 MG TABS tablet Take 3 tablets (30 mg) by mouth At Bedtime 90 tablet 1     zolpidem (AMBIEN) 10 MG tablet Take 1 tablet (10 mg) by mouth nightly as needed for sleep 30 tablet 1     order for DME Equipment being ordered: 4 wheeled walker with bench seat. 1 Units 0       Review of Systems   Constitutional, HEENT, cardiovascular, pulmonary, gi and gu systems are negative, except as otherwise noted.      Objective           Vitals:  No vitals were obtained today due to virtual visit.    Physical Exam   GENERAL: Healthy, alert and no distress  EYES: Eyes grossly normal to inspection.  No discharge or erythema, or obvious scleral/conjunctival abnormalities.  RESP: No audible wheeze, cough, or visible cyanosis.  No visible retractions or increased work of breathing.    SKIN: Visible skin clear. No significant rash, abnormal pigmentation or lesions.  NEURO: Cranial nerves grossly intact.  Mentation and speech appropriate for age.  PSYCH: Mentation appears normal, affect normal/bright, judgement and insight intact, normal speech and appearance well-groomed.            Video-Visit Details    Type of service:  Video Visit    Video End Time:9:28 am    Originating Location (pt. Location): Home    Distant Location (provider location):  [facility]      Platform used for Video Visit: Anil

## 2021-02-20 ASSESSMENT — PATIENT HEALTH QUESTIONNAIRE - PHQ9: SUM OF ALL RESPONSES TO PHQ QUESTIONS 1-9: 10

## 2021-02-20 ASSESSMENT — ANXIETY QUESTIONNAIRES: GAD7 TOTAL SCORE: 17

## 2021-02-21 ENCOUNTER — MYC MEDICAL ADVICE (OUTPATIENT)
Dept: PALLIATIVE MEDICINE | Facility: CLINIC | Age: 36
End: 2021-02-21

## 2021-02-22 ENCOUNTER — ALLIED HEALTH/NURSE VISIT (OUTPATIENT)
Dept: BEHAVIORAL HEALTH | Facility: CLINIC | Age: 36
End: 2021-02-22
Payer: COMMERCIAL

## 2021-02-22 DIAGNOSIS — R69 DIAGNOSIS DEFERRED: Primary | ICD-10-CM

## 2021-02-22 NOTE — PROGRESS NOTES
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  This has been going on for quite some time.  I did meet him previously in 2016.  Exam at that time showed some intermittent contractions superior pharyngeal constrictor muscle as well as the levator palatini muscle.  We did discuss botulinum toxin injections but he decided to hold off at that time.  Currently he notes that he palatal contractures are less than they were before but continue to because of difficulties.  He notes they can last up to 15 minutes at a time and are present approximately 10% of the time during the day every day.  This does cause him some problems when eating and in social interactions.  He also has vocal strain and vocal fatigue.  He has no swallowing problems.  He has no airway difficulties the majority of the time but occasionally with his palatal contractures he will notice some difficulty breathing through his nose.  He also has a history of chronic pain. He has lumbar degenerative disc disease and a functional neurologic movement disorder. He is also being treated for depression and attention deficit disorder.     Last 2 Scores for Patient-Answered VHI Questionnaire  VHI Total Score 1/5/2016 2/21/2021   VHI Total Score 18 19       Last 2 Scores for Patient-Answered CSI Questionnaire  CSI Total Score 1/5/2016   CSI Total Score 8         Last 2 Scores for Patient-Answered EAT Questionnaire  EAT Total Score 1/5/2016   EAT Total Score 5           PAST MEDICAL HISTORY:   Past Medical History:   Diagnosis Date     Arthritis 2/27/2018     Benign positional vertigo 12/2016    Started after my groin/back injury. Sitting on Toilet.     Depressive disorder 2003    I am on 120mg of Duoluxetine.     Dysphonia 2014    Nothing significant.     Gastroesophageal reflux disease 2004    Occassional. Spicy foods set it off.     Hearing problem 2015    Theorized Diag: Essential Palatal Myoclonus      History of electroencephalogram 4/10/2019    EEG     Procedure Date: 2019    EEG #:  .      DATE OF EE2019.    SOURCE FILE DURATION:  15 minutes.  This EEG did not have a video.    PATIENT INFORMATION:  The patient is a 33-year-old male with irregular movements.  It is not entirely clear the etiology of these movements.  EEG is being done to evaluate for seizures.    MEDICATIONS:  Adderall, doxepin, Cymbalta, Robaxin.      History of MRI of brain and brain stem 2015    MR BRAIN W/O & W CONTRAST, 2015  Impression: No suspicious intracranial findings.      Hoarseness     Dry mouth, started after taking Tizanidine     Neuralgia, neuritis, and radiculitis, unspecified 2012     normal emg 2017 2017    Interpretation: This is a normal study. There is no electrophysiologic evidence of a lumbosacral radiculopathy affecting the right or left lower extremity on the basis of this study.    Rodney Mena MD Department of Neurology       Panic attack 2016     Seizures (H)     Grew out of it. Absence Seizures. [id number]     Tinnitus     Had it most of my life. Got worse in        PAST SURGICAL HISTORY:   Past Surgical History:   Procedure Laterality Date     COLONOSCOPY  02/15/18    Has not happened yet.     COLONOSCOPY N/A 2/15/2018    Procedure: COMBINED COLONOSCOPY, SINGLE OR MULTIPLE BIOPSY/POLYPECTOMY BY BIOPSY;;  Surgeon: Liam Kincaid MD;  Location: [facility] OR     COLONOSCOPY WITH CO2 INSUFFLATION N/A 2/15/2018    Procedure: COLONOSCOPY WITH CO2 INSUFFLATION;  COLON-FAMILY HX OF COLON CANCER/ SYPURA;  Surgeon: Liam Kincaid MD;  Location: [facility] OR     HC TOOTH EXTRACTION W/FORCEP Bilateral      PE TUBES         FAMILY HISTORY:   Family History   Problem Relation Age of Onset     Lipids Father         hyperlipidemia     Hyperlipidemia Father      Obesity Father      Arthritis Mother      Hyperlipidemia Mother      Depression Mother       Anxiety Disorder Mother      Mental Illness Mother      Obesity Mother      Asthma Brother      Asthma Sister      Depression Other      Hearing Loss Other      Psychotic Disorder Other      Obesity Other      Cerebrovascular Disease Paternal Grandfather      Alzheimer Disease Paternal Grandfather      Depression Brother      Mental Illness Brother         Bipolar     Asthma Brother      Colitis Brother      Skin Cancer Brother      Depression Sister      Asthma Sister      Substance Abuse Sister         Alcohol     Mental Illness Brother         Bipolar     Asthma Brother      Colitis Brother      Asthma Sister      Obesity Sister      Asthma Brother      Obesity Brother      Obesity Maternal Grandmother      Genetic Disorder Maternal Grandmother         Epilepsy     Obesity Paternal Grandmother      Colon Cancer Other      Genetic Disorder Other         Cerebral Palsy     Genetic Disorder Maternal Grandfather         Multiple Sclerosis     Genetic Disorder Other         Epilepsy       SOCIAL HISTORY:   Social History     Tobacco Use     Smoking status: Current Every Day Smoker     Packs/day: 0.50     Years: 10.00     Pack years: 5.00     Types: Cigarettes     Start date: 1/1/2002     Smokeless tobacco: Never Used     Tobacco comment: Transdermal patches have helped me the most in the past   Substance Use Topics     Alcohol use: No     Alcohol/week: 2.0 - 4.0 standard drinks     Comment: rarely       REVIEW OF SYSTEMS: Ten point review of systems was performed and is negative except for:   [facility] ENT ROS 2/9/2021   Constitutional Weight gain, Unexplained fatigue, Problems with sleep   Neurology Numbness, Headache   Psychology Frequently feeling anxious   Ears, Nose, Throat Ear pain, Ringing/noise in ears   Gastrointestinal/Genitourinary Heartburn/indigestion   Musculoskeletal Sore or stiff joints, Swollen joints, Back pain, Neck pain, Swollen legs/feet        ALLERGIES: Amitriptyline, Buspirone hcl, Doxycycline,  Trazodone, and Cephalexin    MEDICATIONS:   Current Outpatient Medications   Medication Sig Dispense Refill     ADDERALL XR 30 MG 24 hr capsule        albuterol (PROAIR HFA/PROVENTIL HFA/VENTOLIN HFA) 108 (90 Base) MCG/ACT inhaler INHALE ONE TO TWO PUFFS INTO THE LUNGS EVERY FOUR HOURS AS NEEDED FOR SHORTNESS OF BREATH/ DYSPNEA OR WHEEZING 18 g 1     buprenorphine HCl-naloxone HCl (SUBOXONE) 2-0.5 MG per film PLACE 1 FILM UNDER THE TONGUE THREE TIMES DAILY 84 Film 0     buprenorphine HCl-naloxone HCl (SUBOXONE) 4-1 MG per film Place 1 Film under the tongue 3 times daily 12 Film 0     cloNIDine (CATAPRES) 0.1 MG tablet Take 1-2 tablets (0.1-0.2 mg) by mouth nightly as needed (sleep) 60 tablet 1     cyclobenzaprine (FLEXERIL) 5 MG tablet Take 1-2 tablets (5-10 mg) by mouth 3 times daily as needed for muscle spasms 120 tablet 1     hydrOXYzine (ATARAX) 25 MG tablet Take 1 tablet (25 mg) by mouth nightly as needed (at HS PRN) 45 tablet 3     naloxone (NARCAN) 4 MG/0.1ML nasal spray Spray 1 spray (4 mg) into one nostril alternating nostrils as needed for opioid reversal every 2-3 minutes until assistance arrives 0.2 mL 0     naproxen (NAPROSYN) 500 MG tablet TAKE 1 TABLET BY MOUTH TWICE DAILY WITH MEALS 62 tablet 11     order for DME Equipment being ordered: Digital home blood pressure monitor kit 1 each 0     order for DME Equipment being ordered: 4 wheeled walker with bench seat. 1 Units 0     oxybutynin ER (DITROPAN-XL) 10 MG 24 hr tablet TAKE 1 TABLET BY MOUTH ONCE DAILY 90 tablet 1     SF 5000 PLUS 1.1 % CREA        vilazodone (VIIBRYD) 10 MG TABS tablet Take 3 tablets (30 mg) by mouth At Bedtime 90 tablet 1     zolpidem (AMBIEN) 10 MG tablet Take 1 tablet (10 mg) by mouth nightly as needed for sleep 30 tablet 1         PHYSICAL EXAMINATION:  He  is awake, alert and in no apparent distress.    His tympanic membranes are clear and intact bilaterally. External auditory canals are clear.  Nasal exam shows a mild septal  deviation without obstruction.  Examination of the oral cavity shows no suspicious lesions.  There is symmetric movement of the tongue and soft palate.    The oropharynx is clear.  His neck is supple without significant adenopathy.  Pulse is regular.  Upper airway is clear.  Cranial nerves II-XII are grossly intact.       PROCEDURE: A flexible laryngoscopy  was performed.  Informed consent was obtained and a time out was performed. 3% lidocaine and 0.25% phenylephrine was sprayed into the nasal cavity and allowed 3 to 5 minutes for effect. The scope was passed through the right sided nostril.  Examination of the nasal cavity showed good position of the soft palate and no stenosis of the nasopharynx.  The palatal myoclonus that was more obvious seen on transoral examination was less prominent on nasal endoscopy.  The scope was then passed through to the hypopharynx.  Examination showed the vocal folds to be mobile and meet in the midline.  No nodules, polyps or ulcerations are seen.  There is mild inflammation or erythema of the supraglottic or glottic larynx.  With phonation there is severe contraction of the supraglottic larynx.  This was contraction was primarily in the anterior- posterior dimension but some lateral contraction was seen as well.  Therapy probes provided some improvement in his vocal quality.  The hypopharynx is otherwise clear as is the subglottis.     Nasopharynx        Larynx at rest:        Phonation        IMPRESSION/PLAN: Palatal myoclonus which is symptomatic.  He also has a muscle tension dysphonia which is bothersome to him.  Our plan is to schedule him for a botulinum toxin injection into the levator palatini muscle bilaterally.  We will start a dose of 5 units per side.  Additionally we will initiate a trial of speech therapy for his muscle tension dysphonia.

## 2021-02-22 NOTE — PROGRESS NOTES
Behavioral Health Home Services  [facility]: [place]        Social Work Care Navigator Note      Patient: Hoang Kiser  Date: February 22, 2021  Preferred Name: Shaoib    Previous PHQ-9:   PHQ-9 SCORE 11/14/2020 12/10/2020 2/19/2021   PHQ-9 Total Score - - -   PHQ-9 Total Score MyChart 10 (Moderate depression) 10 (Moderate depression) 10 (Moderate depression)   PHQ-9 Total Score 10 10 10   Some encounter information is confidential and restricted. Go to Review [employer]sheets activity to see all data.     Previous JIN-7:   JIN-7 SCORE 11/14/2020 12/10/2020 2/19/2021   Total Score - - -   Total Score 10 (moderate anxiety) 10 (moderate anxiety) 17 (severe anxiety)   Total Score 10 10 17   Some encounter information is confidential and restricted. Go to Review [employer]sheets activity to see all data.     MOLLY LEVEL:  MOLLY Score (Last Two) 11/14/2020 12/10/2020   MOLLY Raw Score 31 31   Activation Score 59.3 59.3   MOLLY Level 3 3       Preferred Contact:  Need for : No  Preferred Contact: Cell      Type of Contact Today: Phone call (patient / identified key support person reached)      Data: (subjective / Objective):  Recent ED/IP Admission or Discharge?   None    Patient Goals:  Goal Areas: Health;Mental Health;Chemical Health;Employment / Volunteer;Financial and Social Service Benefits  Patient stated goals: Patient would like to resume ILS services. He was working with an ILS worker and is in the process of trying to find a new one. Patient would like to do outpatient treatment at a facility that is not associated with [facility]. Patient wants to continue to work on his sobriety and health. At this time he has gone 6 days without using. Patient continues to try and work on self-advocacy skills. He was seeing a therapist but reports that his therapist was discontinued because he had a UA that showed substance use. Patient is going to try and contact an old therapist he used to see and try to set up an appointment.  Patient wants to take a break from volunteering at this time and focus on his health. Patient continues to work on his coping and stress management skills by taking walks, playing guitar and making comfort boards.         [facility] Core Service Provided:  Care Coordination: provided care management services/referrals necessary to ensure patient and their identified supports have access to medical, behavioral health, pharmacology and recovery support services.  Ensured that patient's care is integrated across all settings and services.   Health and Wellness Promotion    Current Stressors / Issues / Care Plan Objective Addressed Today:  SWCC called patient for scheduled check in. Patient reports that he was able to do a virtual appointment with Dr. Leo Turner and get his Residual Functional Capacity form completed.   Patient has been having a lot of tooth pain and has a dental appointment scheduled for this Friday. Patient states that he requested additional pain medication to help address the tooth pain. Pain medication was increased but patient is noticing fluid build up in his ankles and weight loss since having the medications increased. He wants to know if he can be on something different. SWCC advised that she would route a note to Dr. Nicholson about this. Patient stated that he thought about going to the ED last night after experiencing tightness in his chest. SWCC asked if he is still experiencing this pain. Patient states that the pain has gone away. SWCC advised that in the future the ED would be appropriate for chest pain. SWCC asked patient if he told his house staff about the pain in his chest. He stated that he did and they monitored it and are continuing too.  Patient and SWCC scheduled next phone call for March.     Intervention:  Motivational Interviewing: Supported Autonomy, Collaboration, Evocation, Permission to raise concern or advise and Open-ended questions   Target Behavior(s): Explored current social  supports and reinforced opportunities to increase engagement    Assessment: (Progress on Goals / Homework):  Patient would benefit from continued coordination in reaching their goals set for the Behavioral Health Home ([facility]) program. SWCC reviewed Health Action Plan goals and will continue to monitor progress and work with patient and their care team.    Plan: (Homework, other):  Patient was encouraged to continue to seek condition-related information and education. SWCC, patient, and team will continue to work towards progress on reaching goals set for the Behavioral Health Home ([facility]) program.     Scheduled a Phone follow up appointment with SW CC in 1 week     Patient has set self-identified goals and will monitor progress until the next appointment on: 03/03/2021.     LAKSHMI Kebede, Social Work Care Coordinator               Next 5 appointments (look out 90 days)    Mar 01, 2021 11:00 AM  Return Visit with Peng Nicholson MD  [facility] ([facility] ) [address] [place] [address]  [address]  [facility] [address]  [phone]

## 2021-02-22 NOTE — Clinical Note
Hi Dr. Nicholson,    I was just on the phone with patient and he was reporting side effects he was having since having medication dose increased. Side effects include fluid build up in ankles and weight loss. He states he did have some tightness in his chest yesterday but this has gone away. He did tell his house staff about the chest tightness and we discussed going to the ED if this continues. I just wanted to pass this information along to you. Please let me know if you have any questions.    Janet Ng [facility]  Behavioral Health Home ([facility])   [facility]  [phone]

## 2021-02-23 ENCOUNTER — TELEPHONE (OUTPATIENT)
Dept: FAMILY MEDICINE | Facility: CLINIC | Age: 36
End: 2021-02-23

## 2021-02-23 ENCOUNTER — OFFICE VISIT (OUTPATIENT)
Dept: OTOLARYNGOLOGY | Facility: CLINIC | Age: 36
End: 2021-02-23
Payer: COMMERCIAL

## 2021-02-23 ENCOUNTER — VIRTUAL VISIT (OUTPATIENT)
Dept: OTOLARYNGOLOGY | Facility: CLINIC | Age: 36
End: 2021-02-23
Payer: COMMERCIAL

## 2021-02-23 VITALS
OXYGEN SATURATION: 96 % | TEMPERATURE: 98 F | HEART RATE: 82 BPM | HEIGHT: 67 IN | BODY MASS INDEX: 31.23 KG/M2 | WEIGHT: 199 LBS

## 2021-02-23 DIAGNOSIS — G25.3 PALATAL MYOCLONUS: Primary | ICD-10-CM

## 2021-02-23 DIAGNOSIS — R49.0 DYSPHONIA: ICD-10-CM

## 2021-02-23 DIAGNOSIS — G25.3 PALATAL MYOCLONUS: ICD-10-CM

## 2021-02-23 DIAGNOSIS — R49.0 DYSPHONIA: Primary | ICD-10-CM

## 2021-02-23 PROCEDURE — 92524 BEHAVRAL QUALIT ANALYS VOICE: CPT | Mod: GN | Performed by: SPEECH-LANGUAGE PATHOLOGIST

## 2021-02-23 PROCEDURE — 99213 OFFICE O/P EST LOW 20 MIN: CPT | Mod: 25 | Performed by: OTOLARYNGOLOGY

## 2021-02-23 PROCEDURE — 31575 DIAGNOSTIC LARYNGOSCOPY: CPT | Performed by: OTOLARYNGOLOGY

## 2021-02-23 PROCEDURE — 99207 PR NO CHARGE LOS: CPT | Performed by: SPEECH-LANGUAGE PATHOLOGIST

## 2021-02-23 ASSESSMENT — PAIN SCALES - GENERAL: PAINLEVEL: MILD PAIN (3)

## 2021-02-23 ASSESSMENT — MIFFLIN-ST. JEOR: SCORE: 1796.29

## 2021-02-23 NOTE — PATIENT INSTRUCTIONS
Loren Solano M.M. (voice), M.A., CCC/SLP  Speech-Language Pathologist  [facility] Trained Vocologist  [facility]  [email or web address]  Pronouns: she/her      Tips for topical hydration     Dry mouth products: spry and  xylimelts

## 2021-02-23 NOTE — LETTER
2/23/2021       RE: Hoang Kiser  [address] [facility] [address]     Dear Colleague,    Thank you for referring your patient, Hoang Kiser, to the [facility] [place] at [facility]. Please see a copy of my visit note below.    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  This has been going on for quite some time.  I did meet him previously in 2016.  Exam at that time showed some intermittent contractions superior pharyngeal constrictor muscle as well as the levator palatini muscle.  We did discuss botulinum toxin injections but he decided to hold off at that time.  Currently he notes that he palatal contractures are less than they were before but continue to because of difficulties.  He notes they can last up to 15 minutes at a time and are present approximately 10% of the time during the day every day.  This does cause him some problems when eating and in social interactions.  He also has vocal strain and vocal fatigue.  He has no swallowing problems.  He has no airway difficulties the majority of the time but occasionally with his palatal contractures he will notice some difficulty breathing through his nose.  He also has a history of chronic pain. He has lumbar degenerative disc disease and a functional neurologic movement disorder. He is also being treated for depression and attention deficit disorder.     Last 2 Scores for Patient-Answered VHI Questionnaire  VHI Total Score 1/5/2016 2/21/2021   VHI Total Score 18 19       Last 2 Scores for Patient-Answered CSI Questionnaire  CSI Total Score 1/5/2016   CSI Total Score 8         Last 2 Scores for Patient-Answered EAT Questionnaire  EAT Total Score 1/5/2016   EAT Total Score 5           PAST MEDICAL HISTORY:   Past Medical History:   Diagnosis Date     Arthritis 2/27/2018     Benign positional  vertigo 2016    Started after my groin/back injury. Sitting on Toilet.     Depressive disorder     I am on 120mg of Duoluxetine.     Dysphonia     Nothing significant.     Gastroesophageal reflux disease     Occassional. Spicy foods set it off.     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      DATE OF EE2019.    SOURCE FILE DURATION:  15 minutes.  This EEG did not have a video.    PATIENT INFORMATION:  The patient is a 33-year-old male with irregular movements.  It is not entirely clear the etiology of these movements.  EEG is being done to evaluate for seizures.    MEDICATIONS:  Adderall, doxepin, Cymbalta, Robaxin.      History of MRI of brain and brain stem 2015    MR BRAIN W/O & W CONTRAST, 2015  Impression: No suspicious intracranial findings.      Hoarseness     Dry mouth, started after taking Tizanidine     Neuralgia, neuritis, and radiculitis, unspecified 2012     normal emg 2017 2017    Interpretation: This is a normal study. There is no electrophysiologic evidence of a lumbosacral radiculopathy affecting the right or left lower extremity on the basis of this study.    Rodney Mena MD Department of Neurology       Panic attack 2016     Seizures (H)     Grew out of it. Absence Seizures. [id number]     Tinnitus     Had it most of my life. Got worse in        PAST SURGICAL HISTORY:   Past Surgical History:   Procedure Laterality Date     COLONOSCOPY  02/15/18    Has not happened yet.     COLONOSCOPY N/A 2/15/2018    Procedure: COMBINED COLONOSCOPY, SINGLE OR MULTIPLE BIOPSY/POLYPECTOMY BY BIOPSY;;  Surgeon: Liam Kincaid MD;  Location: [facility] OR     COLONOSCOPY WITH CO2 INSUFFLATION N/A 2/15/2018    Procedure: COLONOSCOPY WITH CO2 INSUFFLATION;  COLON-FAMILY HX OF COLON CANCER/ SYPURA;  Surgeon: Sanjiv  Liam Sibley MD;  Location: [facility] OR     HC TOOTH EXTRACTION W/FORCEP Bilateral 2003     PE TUBES  1990       FAMILY HISTORY:   Family History   Problem Relation Age of Onset     Lipids Father         hyperlipidemia     Hyperlipidemia Father      Obesity Father      Arthritis Mother      Hyperlipidemia Mother      Depression Mother      Anxiety Disorder Mother      Mental Illness Mother      Obesity Mother      Asthma Brother      Asthma Sister      Depression Other      Hearing Loss Other      Psychotic Disorder Other      Obesity Other      Cerebrovascular Disease Paternal Grandfather      Alzheimer Disease Paternal Grandfather      Depression Brother      Mental Illness Brother         Bipolar     Asthma Brother      Colitis Brother      Skin Cancer Brother      Depression Sister      Asthma Sister      Substance Abuse Sister         Alcohol     Mental Illness Brother         Bipolar     Asthma Brother      Colitis Brother      Asthma Sister      Obesity Sister      Asthma Brother      Obesity Brother      Obesity Maternal Grandmother      Genetic Disorder Maternal Grandmother         Epilepsy     Obesity Paternal Grandmother      Colon Cancer Other      Genetic Disorder Other         Cerebral Palsy     Genetic Disorder Maternal Grandfather         Multiple Sclerosis     Genetic Disorder Other         Epilepsy       SOCIAL HISTORY:   Social History     Tobacco Use     Smoking status: Current Every Day Smoker     Packs/day: 0.50     Years: 10.00     Pack years: 5.00     Types: Cigarettes     Start date: 1/1/2002     Smokeless tobacco: Never Used     Tobacco comment: Transdermal patches have helped me the most in the past   Substance Use Topics     Alcohol use: No     Alcohol/week: 2.0 - 4.0 standard drinks     Comment: rarely       REVIEW OF SYSTEMS: Ten point review of systems was performed and is negative except for:   [facility] ROS 2/9/2021   Constitutional Weight gain, Unexplained fatigue, Problems with sleep    Neurology Numbness, Headache   Psychology Frequently feeling anxious   Ears, Nose, Throat Ear pain, Ringing/noise in ears   Gastrointestinal/Genitourinary Heartburn/indigestion   Musculoskeletal Sore or stiff joints, Swollen joints, Back pain, Neck pain, Swollen legs/feet        ALLERGIES: Amitriptyline, Buspirone hcl, Doxycycline, Trazodone, and Cephalexin    MEDICATIONS:   Current Outpatient Medications   Medication Sig Dispense Refill     ADDERALL XR 30 MG 24 hr capsule        albuterol (PROAIR HFA/PROVENTIL HFA/VENTOLIN HFA) 108 (90 Base) MCG/ACT inhaler INHALE ONE TO TWO PUFFS INTO THE LUNGS EVERY FOUR HOURS AS NEEDED FOR SHORTNESS OF BREATH/ DYSPNEA OR WHEEZING 18 g 1     buprenorphine HCl-naloxone HCl (SUBOXONE) 2-0.5 MG per film PLACE 1 FILM UNDER THE TONGUE THREE TIMES DAILY 84 Film 0     buprenorphine HCl-naloxone HCl (SUBOXONE) 4-1 MG per film Place 1 Film under the tongue 3 times daily 12 Film 0     cloNIDine (CATAPRES) 0.1 MG tablet Take 1-2 tablets (0.1-0.2 mg) by mouth nightly as needed (sleep) 60 tablet 1     cyclobenzaprine (FLEXERIL) 5 MG tablet Take 1-2 tablets (5-10 mg) by mouth 3 times daily as needed for muscle spasms 120 tablet 1     hydrOXYzine (ATARAX) 25 MG tablet Take 1 tablet (25 mg) by mouth nightly as needed (at HS PRN) 45 tablet 3     naloxone (NARCAN) 4 MG/0.1ML nasal spray Spray 1 spray (4 mg) into one nostril alternating nostrils as needed for opioid reversal every 2-3 minutes until assistance arrives 0.2 mL 0     naproxen (NAPROSYN) 500 MG tablet TAKE 1 TABLET BY MOUTH TWICE DAILY WITH MEALS 62 tablet 11     order for DME Equipment being ordered: Digital home blood pressure monitor kit 1 each 0     order for DME Equipment being ordered: 4 wheeled walker with bench seat. 1 Units 0     oxybutynin ER (DITROPAN-XL) 10 MG 24 hr tablet TAKE 1 TABLET BY MOUTH ONCE DAILY 90 tablet 1     SF 5000 PLUS 1.1 % CREA        vilazodone (VIIBRYD) 10 MG TABS tablet Take 3 tablets (30 mg) by mouth  At Bedtime 90 tablet 1     zolpidem (AMBIEN) 10 MG tablet Take 1 tablet (10 mg) by mouth nightly as needed for sleep 30 tablet 1         PHYSICAL EXAMINATION:  He  is awake, alert and in no apparent distress.    His tympanic membranes are clear and intact bilaterally. External auditory canals are clear.  Nasal exam shows a mild septal deviation without obstruction.  Examination of the oral cavity shows no suspicious lesions.  There is symmetric movement of the tongue and soft palate.    The oropharynx is clear.  His neck is supple without significant adenopathy.  Pulse is regular.  Upper airway is clear.  Cranial nerves II-XII are grossly intact.       PROCEDURE: A flexible laryngoscopy  was performed.  Informed consent was obtained and a time out was performed. 3% lidocaine and 0.25% phenylephrine was sprayed into the nasal cavity and allowed 3 to 5 minutes for effect. The scope was passed through the right sided nostril.  Examination of the nasal cavity showed good position of the soft palate and no stenosis of the nasopharynx.  The palatal myoclonus that was more obvious seen on transoral examination was less prominent on nasal endoscopy.  The scope was then passed through to the hypopharynx.  Examination showed the vocal folds to be mobile and meet in the midline.  No nodules, polyps or ulcerations are seen.  There is mild inflammation or erythema of the supraglottic or glottic larynx.  With phonation there is severe contraction of the supraglottic larynx.  This was contraction was primarily in the anterior- posterior dimension but some lateral contraction was seen as well.  Therapy probes provided some improvement in his vocal quality.  The hypopharynx is otherwise clear as is the subglottis.     Nasopharynx        Larynx at rest:        Phonation        IMPRESSION/PLAN: Palatal myoclonus which is symptomatic.  He also has a muscle tension dysphonia which is bothersome to him.  Our plan is to schedule him for a  botulinum toxin injection into the levator palatini muscle bilaterally.  We will start a dose of 5 units per side.  Additionally we will initiate a trial of speech therapy for his muscle tension dysphonia.          Again, thank you for allowing me to participate in the care of your patient.      Sincerely,    Jorge Nelson MD

## 2021-02-23 NOTE — TELEPHONE ENCOUNTER
Provider: Are you willing to give requested medication or would you like an appointment (what kind)? Last appointment was 2/11/2021.  Thank you. Tuyet Wong R.N.

## 2021-02-23 NOTE — PROGRESS NOTES
"  Hoang Kiser is a 35 year old male who is being cared for via a billable virtual visit.        The patient has been notified and verbally consented to the following statements:     This video visit will be conducted between you and your provider.    Patient has opted to conduct today's video visit vs an in-person appointment, and is not able to attend due to possible exposure to COVID-19.      If during the course of the call the provider feels a video visit is not appropriate, you will not be charged for this service.    Provider has received verbal consent for billable virtual visit from the patient? Yes    Preferred method for receiving information: email    Call initiated at: 12:22 PM  Platform used to conduct today's virtual appointment: AM Well video  Location of provider: Residence  Location of patient: [facility]  Jorge Nelson Jr., M.D., F.A.C.S.  Hailey Steel M.D., M.P.H.  Jahaira Duran M.D.  Ammy Penn, Ph.D., CCC-SLP  Loren Solano M.M. (voice), M.A., CCC-SLP  Wood Álvarez M.M. (voice), M.A., CCC-SLP  ALEXANDRE Bernal (voice), M.S., CCC-SLP       Evaluation report    Clinician: Loren Solano M.M. (voice), M.A., CCC-SLP  Evaluated in conjunction with: Dr. Patel \"Shep\" Leslie  Referring physician:  Leslie  Patient: Hoang Kiser  Date of Visit: 2/23/2021    HISTORY  Chief complaint: Hoang Kiser is a 35 year old presenting today for evaluation of voice concerns.  Salient history: He has a history significant for palatal myoclonus. He also has a history of chronic pain. He has lumbar degenerative disc disease and a functional neurologic movement disorder. He is also being treated for depression and attention deficit disorder.     He does not use his voice as much due to the limitations of COVID-19, and he does not work. He is currently in the middle of a disability case.  He would like to return to singing and making music again. 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   He would like to sing and project again in a healthy way, but he is not sure if he is using his breath properly.    CURRENT SYMPTOMS INCLUDE  VOICE    Difficulty primarily with his singing voice; strain and fatigue    THROAT ISSUES    Sometimes he will get a dry throat from his medications.  He will use candy mints and gum more often.      SWALLOWING    Denies issues    RESPIRATION    Denies issues    ADDITIONAL    Reflux: intermittent    He is aware of the drying effects of his use of the following:    Energy drinks    Medications for depression    Cigarettes     OTHER PERTINENT HISTORY    Complex medical history: please also refer to Dr. Nelson's dictation.     Past Medical History:   Diagnosis Date     Arthritis 2018     Benign positional vertigo 2016    Started after my groin/back injury. Sitting on Toilet.     Depressive disorder     I am on 120mg of Duoluxetine.     Dysphonia     Nothing significant.     Gastroesophageal reflux disease 2004    Occassional. Spicy foods set it off.     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      DATE OF EE2019.    SOURCE FILE DURATION:  15 minutes.  This EEG did not have a video.    PATIENT INFORMATION:  The patient is a 33-year-old male with irregular movements.  It is not entirely clear the etiology of these movements.  EEG is being done to evaluate for seizures.    MEDICATIONS:  Adderall, doxepin, Cymbalta, Robaxin.      History of MRI of brain and brain stem 2015    MR BRAIN W/O & W CONTRAST, 2015  Impression: No suspicious intracranial findings.      Hoarseness     Dry mouth, started after taking Tizanidine     Neuralgia, neuritis, and radiculitis,  "unspecified 04/30/2012     normal emg 2017 8/8/2017    Interpretation: This is a normal study. There is no electrophysiologic evidence of a lumbosacral radiculopathy affecting the right or left lower extremity on the basis of this study.    Rodney Mena MD Department of Neurology       Panic attack 12/6/2016     Seizures (H) 1986    Grew out of it. Absence Seizures. [id number]     Tinnitus 2015    Had it most of my life. Got worse in 2015     Past Surgical History:   Procedure Laterality Date     COLONOSCOPY  02/15/18    Has not happened yet.     COLONOSCOPY N/A 2/15/2018    Procedure: COMBINED COLONOSCOPY, SINGLE OR MULTIPLE BIOPSY/POLYPECTOMY BY BIOPSY;;  Surgeon: Liam Kincaid MD;  Location: [facility] OR     COLONOSCOPY WITH CO2 INSUFFLATION N/A 2/15/2018    Procedure: COLONOSCOPY WITH CO2 INSUFFLATION;  COLON-FAMILY HX OF COLON CANCER/ SYPURA;  Surgeon: Liam Kincaid MD;  Location: [facility] OR     HC TOOTH EXTRACTION W/FORCEP Bilateral 2003     PE TUBES  1990       OBJECTIVE  PATIENT REPORTED MEASURES  Patient Supplied Answers To VHI Questionnaire  Voice Handicap Index (VHI-10) 2/21/2021   My voice makes it difficult for people to hear me 2   People have difficulty understanding me in a noisy room 2   My voice difficulties restrict my personal and social life.  2   I feel left out of conversations because of my voice 2   My voice problem causes me to lose income 2   I feel as though I have to strain to produce voice 2   The clarity of my voice is unpredictable 1   My voice problem upsets me 2   My voice makes me feel handicapped 2   People ask, \"What's wrong with your voice?\" 2   VHI-10 19       Patient Supplied Answers To CSI Questionnaire  No flowsheet data found.    Patient Supplied Answers To EAT Questionnaire  No flowsheet data found.      PERCEPTUAL EVALUATION (CPT 14708)  POSTURE / TENSION:     jaw    tounge base    upper body    neck and shoulders    BREATHING:     inspirations are inadequate in " volume and frequency    clavicular elevation on inspiration    phonation is not coordinated with respiration    LARYNGEAL PALPATION:     firm musculature    VOICE:    Roughness: Mild to moderate intermittent    Breathiness: WNL    Strain: Mild    Loudness    Conversational speech:  WNL    Projected speech:  WNL    Pitch:    Conversational speech:  WNL    Pitch glide: neurologically normal    Resonance:    Conversational speech:  WNL    Singing vs. Speech: Singing is more effortful    CAPE-V Overall Severity:  25/100    COUGH/THROAT CLEARING:    Not observed      LARYNGEAL FUNCTION STUDIES (CPT 30720)  Recommend to be completed when able to come into clinic.    LARYNGEAL EXAMINATION  Recommend to be completed when able to come into clinic.  Procedure: Flexible endoscopy with chip-tip technology without stroboscopy, right nostril; topical anesthesia with 3% Lidocaine and 0.25% phenylephrine was applied.   Performed by: Dr. Nelson  The laryngeal and pharyngeal structures were evaluated for gross appearance, mobility, function, and focal lesions / abnormalities of the associated mucosa.    All findings were within normal limits with the exception of the following salient features:   This exam shows:    Laryngeal and Vocal Fold Mucosa    Essentially healthy laryngeal mucosa    Mild signs of reflux along the posterior commissure    Neurological and Functional Integrity of the Larynx    Vocal folds are mobile and meet at midline; movement is brisk and symmetric; exam is neurologically normal     Airway is patent    Supraglottic Function and Therapy Probes    Moderate four-way constriction of the supraglottic larynx during connected speech    THERAPY PROBES: Improvement was elicited with use of forward resonant stimuli, coordination of respiration and phonation and use of yawn sigh       Moderate 4-way constriction    The laryngeal exam was reviewed with Mr. Kiser, and I provided pertinent explanations, as well as  written and oral information.    ASSESSMENT / PLAN  IMPRESSIONS: Hoang Kiser is a 35 year old male with a complex medical history including palatal myoclonus, presenting today with Dysphonia (R49.0) as evidenced by today's evaluation.   Remarkable findings and recommendations of the evaluation included:      Dr. Nelson has offered, and plans to proceed with a botulinum toxin injection to the levator palatini muscle bilaterally    He is also recommended a trial course of speech therapy for muscle tension dysphonia    STIMULABILITY: results of therapy probes during perceptual and laryngeal evaluation demonstrate improvement with use of forward resonant stimuli, coordination of respiration and phonation and use of yawn sigh    ADDITIONAL RECOMMENDATIONS:     A course of speech therapy is recommended to optimize vocal technique, improve voice quality and promote reduced discomfort, effort and fatigue.    He demonstrates a Good prognosis for improvement given adherence to therapeutic recommendations.     Positive indicators: positive response to therapy probes diagnosis is known to respond to treatment high level of comittment    Negative indicators: none    DURATION / FREQUENCY: 3 biweekly one-hour sessions did you bring home to school library books:.  A total of 6-8 sessions may be necessary.    GOALS:  Patient goal:   1. To improve and maintain a healthy voice quality  2. To understand the problem and fix it as much as possible    Short-term goal(s): Within the first 4 sessions, Mr. Kiser:  1. will utilize silent inhalation with good low-respiratory engagement 75% of the time during therapy tasks with minimal clinician support  2. will demonstrate semi-occluded vocal tract (SOVT) exercises with at least 80% accuracy with no clinician support  3. will accurately identify target vs. habitual voice quality during therapy tasks in 4 out of 5 trials with no clinician support    Long-term goal(s): In 6 months,   Margi will:  1. Report resolution of dysphonia, and use of optimal voice quality to meet personal, social, and professional needs, 90% of the time during a typical week of vocal activities    Certification period:   Certification date from: 2/23/21  Certification date to: 5/24/21      This treatment plan was developed with the patient who agreed with the recommendations.      TOTAL SERVICE TIME:   Call Initiated at: 12:22 PM  Call Ended at: 12:58p           CPT Billing Codes:   EVALUATION OF VOICE AND RESONANCE (61451)  NO CHARGE FACILITY FEE (72580)      Loren Solano M.M. (voice) MLLOYD., CCC/SLP  Speech-Language Pathologist  [facility] Trained Vocologist  [facility]  [phone]  [email or web address]  Pronouns: she/her      *this report was created in part through the use of computerized dictation software, and though reviewed following completion, some typographic errors may persist.  If there is confusion regarding any of this notes contents, please contact me for clarification

## 2021-02-23 NOTE — LETTER
"2/23/2021       RE: Hoang Kiser  [address] [facility] [address]     Dear Colleague,    Thank you for referring your patient, Hoang Kiser, to the [facility] [place] [facility]. Please see a copy of my visit note below.      Hoang Kiser is a 35 year old male who is being cared for via a billable virtual visit.        The patient has been notified and verbally consented to the following statements:     This video visit will be conducted between you and your provider.    Patient has opted to conduct today's video visit vs an in-person appointment, and is not able to attend due to possible exposure to COVID-19.      If during the course of the call the provider feels a video visit is not appropriate, you will not be charged for this service.    Provider has received verbal consent for billable virtual visit from the patient? Yes    Preferred method for receiving information: email    Call initiated at: 12:22 PM  Platform used to conduct today's virtual appointment: AM Amadou video  Location of provider: Residence  Location of patient: [facility] VOICE [facility]  Jorge Nelson Jr., M.D., F.A.C.S.  Hailey Steel M.D., M.P.H.  Jahaira Duran M.D.  Ammy Penn, Ph.D., CCC-SLP  Loren Solano M.M. (voice), M.A., CCC-SLP  Wood Álvarez M.M. (voice), M.A., CCC-SLP  ALEXANDRE Bernal (voice), M.S., CCC-SLP       Evaluation report    Clinician: Loren Solano M.M. (voice), M.A., CCC-SLP  Evaluated in conjunction with: Dr. Patel \"Elizabeth\" Leslie  Referring physician:  Leslie  Patient: Hoang Kiser  Date of Visit: 2/23/2021    HISTORY  Chief complaint: Hoang Kiser is a 35 year old presenting today for evaluation of voice concerns.  Salient history: He has a history significant for palatal myoclonus. He also has a history of chronic pain. He has lumbar degenerative disc disease and a functional neurologic " movement disorder. He is also being treated for depression and attention deficit disorder.     He does not use his voice as much due to the limitations of COVID-19, and he does not work. He is currently in the middle of a disability case.  He would like to return to singing and making music again. 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   He would like to sing and project again in a healthy way, but he is not sure if he is using his breath properly.    CURRENT SYMPTOMS INCLUDE  VOICE    Difficulty primarily with his singing voice; strain and fatigue    THROAT ISSUES    Sometimes he will get a dry throat from his medications.  He will use candy mints and gum more often.      SWALLOWING    Denies issues    RESPIRATION    Denies issues    ADDITIONAL    Reflux: intermittent    He is aware of the drying effects of his use of the following:    Energy drinks    Medications for depression    Cigarettes     OTHER PERTINENT HISTORY    Complex medical history: please also refer to Dr. Nelson's dictation.     Past Medical History:   Diagnosis Date     Arthritis 2018     Benign positional vertigo 2016    Started after my groin/back injury. Sitting on Toilet.     Depressive disorder     I am on 120mg of Duoluxetine.     Dysphonia     Nothing significant.     Gastroesophageal reflux disease 2004    Occassional. Spicy foods set it off.     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      DATE OF EE2019.    SOURCE FILE DURATION:  15 minutes.  This EEG did not have a video.    PATIENT INFORMATION:  The patient is a 33-year-old male with irregular movements.  It is not entirely clear the etiology of these movements.  EEG is being done to evaluate for  "seizures.    MEDICATIONS:  Adderall, doxepin, Cymbalta, Robaxin.      History of MRI of brain and brain stem 12/11/2015    MR BRAIN W/O & W CONTRAST, 12/11/2015  Impression: No suspicious intracranial findings.      Hoarseness 2017    Dry mouth, started after taking Tizanidine     Neuralgia, neuritis, and radiculitis, unspecified 04/30/2012     normal emg 2017 8/8/2017    Interpretation: This is a normal study. There is no electrophysiologic evidence of a lumbosacral radiculopathy affecting the right or left lower extremity on the basis of this study.    Rodney Mena MD Department of Neurology       Panic attack 12/6/2016     Seizures (H) 1986    Grew out of it. Absence Seizures. [id number]     Tinnitus 2015    Had it most of my life. Got worse in 2015     Past Surgical History:   Procedure Laterality Date     COLONOSCOPY  02/15/18    Has not happened yet.     COLONOSCOPY N/A 2/15/2018    Procedure: COMBINED COLONOSCOPY, SINGLE OR MULTIPLE BIOPSY/POLYPECTOMY BY BIOPSY;;  Surgeon: Liam Kincaid MD;  Location: [facility] OR     COLONOSCOPY WITH CO2 INSUFFLATION N/A 2/15/2018    Procedure: COLONOSCOPY WITH CO2 INSUFFLATION;  COLON-FAMILY HX OF COLON CANCER/ SYPURA;  Surgeon: Liam Kincaid MD;  Location: [facility] OR     HC TOOTH EXTRACTION W/FORCEP Bilateral 2003     PE TUBES  1990       OBJECTIVE  PATIENT REPORTED MEASURES  Patient Supplied Answers To VHI Questionnaire  Voice Handicap Index (VHI-10) 2/21/2021   My voice makes it difficult for people to hear me 2   People have difficulty understanding me in a noisy room 2   My voice difficulties restrict my personal and social life.  2   I feel left out of conversations because of my voice 2   My voice problem causes me to lose income 2   I feel as though I have to strain to produce voice 2   The clarity of my voice is unpredictable 1   My voice problem upsets me 2   My voice makes me feel handicapped 2   People ask, \"What's wrong with your voice?\" 2   VHI-10 19 "       Patient Supplied Answers To CSI Questionnaire  No flowsheet data found.    Patient Supplied Answers To EAT Questionnaire  No flowsheet data found.      PERCEPTUAL EVALUATION (CPT 42229)  POSTURE / TENSION:     jaw    tounge base    upper body    neck and shoulders    BREATHING:     inspirations are inadequate in volume and frequency    clavicular elevation on inspiration    phonation is not coordinated with respiration    LARYNGEAL PALPATION:     firm musculature    VOICE:    Roughness: Mild to moderate intermittent    Breathiness: WNL    Strain: Mild    Loudness    Conversational speech:  WNL    Projected speech:  WNL    Pitch:    Conversational speech:  WNL    Pitch glide: neurologically normal    Resonance:    Conversational speech:  WNL    Singing vs. Speech: Singing is more effortful    CAPE-V Overall Severity:  25/100    COUGH/THROAT CLEARING:    Not observed      LARYNGEAL FUNCTION STUDIES (CPT 69247)  Recommend to be completed when able to come into clinic.    LARYNGEAL EXAMINATION  Recommend to be completed when able to come into clinic.  Procedure: Flexible endoscopy with chip-tip technology without stroboscopy, right nostril; topical anesthesia with 3% Lidocaine and 0.25% phenylephrine was applied.   Performed by: Dr. Nelson  The laryngeal and pharyngeal structures were evaluated for gross appearance, mobility, function, and focal lesions / abnormalities of the associated mucosa.    All findings were within normal limits with the exception of the following salient features:   This exam shows:    Laryngeal and Vocal Fold Mucosa    Essentially healthy laryngeal mucosa    Mild signs of reflux along the posterior commissure    Neurological and Functional Integrity of the Larynx    Vocal folds are mobile and meet at midline; movement is brisk and symmetric; exam is neurologically normal     Airway is patent    Supraglottic Function and Therapy Probes    Moderate four-way constriction of the supraglottic  larynx during connected speech    THERAPY PROBES: Improvement was elicited with use of forward resonant stimuli, coordination of respiration and phonation and use of yawn sigh       Moderate 4-way constriction    The laryngeal exam was reviewed with Mr. Kiser, and I provided pertinent explanations, as well as written and oral information.    ASSESSMENT / PLAN  IMPRESSIONS: Hoang Kiser is a 35 year old male with a complex medical history including palatal myoclonus, presenting today with Dysphonia (R49.0) as evidenced by today's evaluation.   Remarkable findings and recommendations of the evaluation included:      Dr. 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    He demonstrates a Good prognosis for improvement given adherence to therapeutic recommendations.     Positive indicators: positive response to therapy probes diagnosis is known to respond to treatment high level of comittment    Negative indicators: none    DURATION / FREQUENCY: 3 biweekly one-hour sessions did you bring home to school library books:.  A total of 6-8 sessions may be necessary.    GOALS:  Patient goal:   1. To improve and maintain a healthy voice quality  2. To understand the problem and fix it as much as possible    Short-term goal(s): Within the first 4 sessions, Mr. Kiser:  1. will utilize silent inhalation with good low-respiratory engagement 75% of the time during therapy tasks with  minimal clinician support  2. will demonstrate semi-occluded vocal tract (SOVT) exercises with at least 80% accuracy with no clinician support  3. will accurately identify target vs. habitual voice quality during therapy tasks in 4 out of 5 trials with no clinician support    Long-term goal(s): In 6 months, Mr. Kiser will:  1. Report resolution of dysphonia, and use of optimal voice quality to meet personal, social, and professional needs, 90% of the time during a typical week of vocal activities    Certification period:   Certification date from: 2/23/21  Certification date to: 5/24/21      This treatment plan was developed with the patient who agreed with the recommendations.      TOTAL SERVICE TIME:   Call Initiated at: 12:22 PM  Call Ended at: 12:58p           CPT Billing Codes:   EVALUATION OF VOICE AND RESONANCE (92074)  NO CHARGE FACILITY FEE (56137)      Loren Solano M.M. (voice), M.A., CCC/SLP  Speech-Language Pathologist  [facility] Trained Vocologist  [facility]  [phone]  [email or web address]  Pronouns: she/her      *this report was created in part through the use of computerized dictation software, and though reviewed following completion, some typographic errors may persist.  If there is confusion regarding any of this notes contents, please contact me for clarification                                                                                               Outpatient Speech Language Therapy Evaluation  PLAN OF TREATMENT FOR OUTPATIENT REHABILITATION  (COMPLETE FOR INITIAL CLAIMS ONLY)  Patient's Last Name, First Name, M.I.  YOB: 1985  Hoang Kiser                        Provider's Name  Loren Solano, SLP Medical Record No.  [id number]                               Onset Date:  2/23/21   Start of Care Date: 2/23/21     Type: Speech Language Therapy Medical Diagnosis: No primary diagnosis found.                        Therapy Diagnosis:  No primary  diagnosis found.   Visits from SOC:  1   _________________________________________________________________________________  Plan of Treatment:   Speech therapy    DURATION/FREQUENCY OF TREATMENT  Six weekly, one-hour sessions, with two monthly one-hour follow-up sessions    PROGNOSIS  Good prognosis for voice improvement with speech therapy and regular practice of therapeutic activities.    BARRIERS TO LEARNING/TEACHING AND LEARNING NEEDS  None/Unremarkable    GOALS:  Patient goal:   3. To improve and maintain a healthy voice quality  4. To understand the problem and fix it as much as possible    Short-term goal(s): Within the first 4 sessions, Mr. Kiser:  4. will utilize silent inhalation with good low-respiratory engagement 75% of the time during therapy tasks with minimal clinician support  5. will demonstrate semi-occluded vocal tract (SOVT) exercises with at least 80% accuracy with no clinician support  6. will accurately identify target vs. habitual voice quality during therapy tasks in 4 out of 5 trials with no clinician support    Long-term goal(s): In 6 months, Mr. Kiser will:  Report resolution of dysphonia, and use of optimal voice quality to meet personal, social, and professional needs, 90% of the time during a typical week of vocal activities  _________________________________________________________________________________    I CERTIFY THE NEED FOR THESE SERVICES FURNISHED UNDER        THIS PLAN OF TREATMENT AND WHILE UNDER MY CARE     (Physician attestation of this document indicates review and certification of the therapy plan).     Certification date from: 2/23/21  Certification date to: 5/24/21    Referring Provider: Goding         Again, thank you for allowing me to participate in the care of your patient.      Sincerely,    Loren Solano, SLP

## 2021-02-23 NOTE — NURSING NOTE
"Chief Complaint   Patient presents with     RECHECK     Discuss botox injections       Pulse 82, temperature 98  F (36.7  C), temperature source Temporal, height 1.702 m (5' 7\"), weight 90.3 kg (199 lb), SpO2 96 %.    Estrella Felix, EMT  "

## 2021-02-23 NOTE — PATIENT INSTRUCTIONS
1.  You were seen in the ENT Clinic today by . If you have any questions or concerns after your appointment, please call [phone]. Press option #1 for scheduling related needs. Press option #3 for Nurse advice.    2.   has recommended the following:   - Speech therapy with Loren Solano    3.  Plan is to return to clinic 3/16/21 a t 8:45 for botox      Myrna Sanchez, ANDRESN  [phone]  [facility] Otolaryngology

## 2021-02-23 NOTE — TELEPHONE ENCOUNTER
Reason for Call:  Other prescription    Detailed comments: Patient is requesting smoking cessation lozenges please. He states he needs to stop smoking for his health overall.      Phone Number Patient can be reached at: Other phone number:  No current phone number available. Please message patient back on [employer].      Best Time: anytime    Can we leave a detailed message on this number? NO- Please message patient via [employer] at this time.    Call taken on 2/23/2021 at 5:24 PM by Ana Garcia

## 2021-02-25 DIAGNOSIS — M54.40 CHRONIC LOW BACK PAIN WITH SCIATICA, SCIATICA LATERALITY UNSPECIFIED, UNSPECIFIED BACK PAIN LATERALITY: ICD-10-CM

## 2021-02-25 DIAGNOSIS — G89.29 CHRONIC LOW BACK PAIN WITH SCIATICA, SCIATICA LATERALITY UNSPECIFIED, UNSPECIFIED BACK PAIN LATERALITY: ICD-10-CM

## 2021-02-25 RX ORDER — BUPRENORPHINE HYDROCHLORIDE, NALOXONE HYDROCHLORIDE 4; 1 MG/1; MG/1
FILM, SOLUBLE BUCCAL; SUBLINGUAL
Qty: 12 FILM | Refills: 0 | Status: SHIPPED | OUTPATIENT
Start: 2021-02-25 | End: 2021-03-25

## 2021-02-25 NOTE — TELEPHONE ENCOUNTER
Left message on answering machine for patient to call back to [phone].  Ok to advise him Dr. Floyd wants him to do an e visit for the prescription he requested.    To do an e visit you go to your [employer] page, press on menu in the corner then e visit on drop down.    Silvia HUERTA, RN

## 2021-02-26 ENCOUNTER — MYC MEDICAL ADVICE (OUTPATIENT)
Dept: PALLIATIVE MEDICINE | Facility: CLINIC | Age: 36
End: 2021-02-26

## 2021-02-26 ENCOUNTER — VIRTUAL VISIT (OUTPATIENT)
Dept: PALLIATIVE MEDICINE | Facility: CLINIC | Age: 36
End: 2021-02-26
Payer: COMMERCIAL

## 2021-02-26 DIAGNOSIS — G89.4 CHRONIC PAIN SYNDROME: Primary | ICD-10-CM

## 2021-02-26 DIAGNOSIS — F19.11 HISTORY OF SUBSTANCE ABUSE (H): ICD-10-CM

## 2021-02-26 DIAGNOSIS — M62.838 MUSCLE SPASM: ICD-10-CM

## 2021-02-26 PROCEDURE — 99213 OFFICE O/P EST LOW 20 MIN: CPT | Mod: 95 | Performed by: NURSE PRACTITIONER

## 2021-02-26 NOTE — TELEPHONE ENCOUNTER
Prescription pended per patients request. Please review.     KATERINA CardenasN, RN  Care Coordinator  [facility] [place]

## 2021-02-26 NOTE — TELEPHONE ENCOUNTER
Sherrell message sent to the patient to initiate an E-visit in regards to his request for smoking cessation lozenges per Dr. Floyd.  Ana Garcia TC

## 2021-02-26 NOTE — PROGRESS NOTES
[facility]      Hoang Kiser is a 35 year old male who is being evaluated via a billable virtual visit.      VIDEO VISIT   How would you like to obtain your AVS? MyChart  If you are dropped from the video visit, the video invite should be resent to: Text to cell phone: cell  Will anyone else be joining your video visit? No  If patient encounters technical issues they should call [phone]    Video-Visit Details  Type of service:  Video Visit  Video Start Time: 3:36 PM  Video End Time: 3:48 PM  Originating Location (pt. Location): Home  Distant Location (provider location):  [facility] [facility]   Platform used for Video Visit: [employer]        CHIEF COMPLAINT: Chronic pain    INTERVAL HISTORY:  Last seen on 1/15/21.        Recommendations/plan at the last visit included:  1. Schedule physical therapy assessment with MARIBELL. They wiill call you to schedule.   2. Schedule VIDEO follow-up with CARLOS A Higuera NP-C in 1-2 months   3. Procedures recommended: Cervical epidural injection. We will call you to schedule.   4. Medication recommendations:   1. No medication changes at this time.     Since last visit:   - T1-T2 interlaminar epidural steroid injection went well. Needs to have two root canals and crown which is not covered by insurance. Has tried clove oil without relief.   - Is back taking Adderall and Ambien again.     Pain Information:   Pain rating: intensity ranges from 3/10 to 9/10, and averages 5/10 on a 0-10 scale.    Annual Requirements last collected:  N/A, no longer prescribed any controlled substances due to meth use/controlled substance agreement violation.      Current Pain Relevant Medications:  Adderall 30 mg daily  Hydroxyzine 25 mg at HS PRN  Tizanidine 4 mg 1 to 2 tablets at bedtime as needed for spasm  Zolpidem 10 mg at bedtime as needed    Previous Pain Relevant Medications: (H--helped; HI--Helped initially; SWH--Somewhat helpful; NH--No help;  W--worse; SE--side effects; ?--Unsure if helpful)   NOTE: This medication information taken from patient's intake form, not medical records.                         Opiates: Tramadol: H, Hydrocodone: H, Morphine: H                        NSAIDS: Ibuprofen:H, naproxen:SWH, Relafen: NH                        Muscle Relaxants: Cyclobenzaprine:H,Med interaction, Tizanidine:H, Baclofen: SWH                        Anti-migraine mediations: Prednisone:H                        Anti-depressants: Bupropion:SE, Celexa:SE, Duloxetine:H, Trazodone:Too strong, Venlafaxine:too strong, Amitriptyline:SE                         Sleep aids:Anbien: H                        Anxiolytics: Clonazepam:H                        Neuropathics: Tegretol:taken for seizures in childhood, Gabapentin:H                                          Topicals: Lidocaine:H                        Other medications not covered above: Tylenol:NH, Adderall: H      Any illicit drug use: Denies current use as of 2/28/2021  Caffeine use: 2-3 per day  Nicotine use: 3/4 pack per day  Any use of prescriptions other than how they were prescribed:denies      [place] Board of Pharmacy Data Base Reviewed:    YES; As expected, no concern for misuse/abuse of controlled medications based on this report.   Concern for multiple controlled substances including stimulants and opiates.  7/27/19: Concern for misuse of opiates and stimulants as noted in office visit on this date.   10/29/19: Requesting early refill due to overuse of opiate medication. #45 tabs to last 30 days. Refill declined.  8/31/20: Due to multiple no showed appts, refills of opiates are declined. Opiates are discontinued until has video appt.   10/8/20: Shoaib admits that he has been using methamphetamine since early 2020.   2/28/2020: No concerning refills however he is prescribed stimulants and hypnotics in the setting of substance abuse disorder.     Is Narcan prescribed for opiate use >50 MME daily or  concurrent use of opiates and benzodiazepines? YES    [place] Board of Pharmacy Data Base Reviewed:    YES; As expected, no concern for misuse/abuse of controlled medications based on this report. Reviewed [facility] March 7, 2021 - no concerning fills.    _______________________________________________      Physical Exam unable to be completed due to virtual visit. There were no vitals taken for this visit or reported by patient.     Review of Systems: A 10-point review of systems was negative, with the exception of any concerns as noted and chronic pain issues.      General: Verbal, alert and no distress   Psychiatric:  affect and mood normal, mentation appears normal    DIRE Score for ongoing opioid management is calculated as follows:    Diagnosis = 2 pts (slowly progressive; moderate pain/objective findings)    Intractability = 2 pts (most treatments tried; patient not fully engaged/barriers)    Risk        Psych = 1 pt (serious personality dysfunction/mental illness that significantly interferes with care)         Chem Hlth = 1 pt (active or very recent use of illicit drugs; excessive alcohol/drug abuse)       Reliability = 1 pt (medication misuse; missed appointments; rarely follows through)       Social = 1 pt (life in chaos; little family support/close relationships; loss normal life rolls)       (Psych + Chem hlth + Reliability + Social) = 8    Efficacy = 1 pt (poor function; minimal pain relief despite mod/high med dose)    DIRE Score = 9        7-13: likely NOT suitable candidate for long-term opioid analgesia       14-21: may be a suitable candidate for long-term opioid analgesia        DIAGNOSTIC TESTS:  MRI CERVICAL SPINE WITHOUT CONTRAST 12/17/2020 t.   COMPARISON: Scan dated 6/17/2019.  FINDINGS: The cervical spinal cord and visualized portions of the  thoracic spinal cord appear normal.  The visualized portions of the  brainstem and cerebellum appear normal.  Alignment is normal.  The  paraspinal  soft tissues appear normal. The bone marrow has normal  signal intensity. Craniocervical Junction and C1-C2:  Normal.  C2-C3:  Mild degenerative change in the facet joints. The central  canal and neural foramina are patent.  C3-C4:  Mild annular disc bulge. Mild degenerative change in the facet  joints. Mild-moderate left foraminal stenosis due to an uncinate spur.  C4-C5:  The disc appears normal. The central canal is normal. There  are mild degenerative changes in the facet joints. The neural foramina  are patent.  C5-C6:  There is a new area small right central disc protrusion  causing slight mass effect on the dural sac. The central canal is  mildly narrowed due to this disc protrusion. The neural foramina are  patent. Mild degenerative change in the facet joints.  C6-C7:  There is a broad-based disc protrusion extending to the right  and left of midline. This extends slightly greater to the right of  midline and extends laterally into the region of the right C6-C7  neural foramen. This is increased in size when compared to the prior  scan. The central canal is mildly narrowed. Moderate right and mild  left foraminal stenosis.  C7-T1: Normal disc, facet joints, spinal canal and neural foramina.  T1-T2:  Normal disc, facet joints, spinal canal and neural foramina.   IMPRESSION:    1. Multilevel degenerative change. The degenerative changes have  increased when compared to 6/17/2019.  2. There is a new small right central disc protrusion at C5-C6.  3. Broad-based C6-C7 disc protrusion extending greater to the right of  midline. This extends laterally into the region of the right C6-C7  neural foramen. This could affect the right C7 nerve root.     MRI LUMBAR SPINE WITHOUT CONTRAST   12/17/2020   COMPARISON: Scan dated 5/2/2018  FINDINGS: The report is dictated assuming five lumbar-type vertebral  bodies, and radiographic correlation may be necessary.  The distal  spinal cord and cauda equina appear normal.  The  tip of the conus is  at the L1.  The bone marrow appears normal.  The paraspinal soft  tissues appear normal.  T12-L1:   Normal disc, facet joints, spinal canal and neural foramina.  L1-L2:   Normal disc, facet joints, spinal canal and neural foramina. L2-L3:   Normal disc, facet joints, spinal canal and neural foramina. L3-L4:   Normal disc, facet joints, spinal canal and neural foramina.   L4-L5:  Mild annular disc bulge. Central canal is normal. Mild  degenerative change in the facet joints. The neural foramen are patent.  L5-S1:  Mild annular disc bulge. Mild degenerative change in the facet  joints. The central canal is normal. The right neural foramen is  patent. There is mild left foraminal stenosis due to the bulging disc.  This is unchanged.  IMPRESSION:  Mild degenerative changes. When compared to the previous study of 5/2/2018, there has not been any significant change     ASSESSMENT:   1.  Lumbar DDD, mild  2.  Lumbar muscle spasm  3.  Labral tear, right hip  4.  Hx: anxiety, chemical dependence in remission.  5.  Functional neurologic movement disorder  6.  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  Polysubstance abuse, questionable sobriety.      Shoaib returns to the pain center ongoing pain which is not relieved with the Suboxone he is currently taking.  Reviewed that I cannot prescribe opiates for him due to his substance abuse disorder and controlled substance agreement violations.  Reviewed discussing with Dr. Nicholson if he needs a higher baseline dose of Suboxone. It would be up to Dr. Nicholson if Shoaib is allowed to take opiates for acute pain.  Shoaib also notes that he is having painful spasms at night and requests a refill of tizanidine.  That is provided.    Tobacco use: Advised complete tobacco cessation.       Plan:    Diagnosis reviewed, treatment option addressed, and risk/benifits discussed.  Self-care instructions given.  I am recommending a multidisciplinary treatment plan to help this patient  better manage pain.      1. Schedule VIDEO follow-up with CARLOS A Higuera NP-C in 3 months or sooner as needed.  2. Medication recommendations:   1. Tizanidine 4 m to 2 tablets at bedtime as needed    I have reviewed the note as documented above.  This accurately captures the substance of my conversation with the patient.    BILLING TIME DOCUMENTATION:   TOTAL TIME includes:   Time spent preparing to see the patient: 3 minutes (reviewing records and tests)  Time spend face to face with the patient: 12 minutes  Time spent ordering tests, medications, procedures and referrals: 0 minutes  Time spent Referring and communicating with other healthcare professionals: 0 minutes  Documenting clinical information in Epic: 0 minutes    The total TIME spent on this patient on the day of the appointment was 15 minutes.     CARLOS A Bass, NP-C  [facility] [place]

## 2021-02-26 NOTE — PROGRESS NOTES
Initially tried calling patient twice prior to his visit with Tamiko. No answer, left a message to call back. He did not. Tamiko met with him over video visit and completed her visit. I tried to call him back again x2 after the visit to complete the rooming piece. No answer, no call back.   Bisi/MA  [facility]

## 2021-03-02 ENCOUNTER — VIRTUAL VISIT (OUTPATIENT)
Dept: PSYCHIATRY | Facility: CLINIC | Age: 36
End: 2021-03-02
Attending: NURSE PRACTITIONER
Payer: COMMERCIAL

## 2021-03-02 ENCOUNTER — MYC MEDICAL ADVICE (OUTPATIENT)
Dept: ADDICTION MEDICINE | Facility: CLINIC | Age: 36
End: 2021-03-02

## 2021-03-02 ENCOUNTER — TELEPHONE (OUTPATIENT)
Dept: FAMILY MEDICINE | Facility: CLINIC | Age: 36
End: 2021-03-02

## 2021-03-02 ENCOUNTER — TELEPHONE (OUTPATIENT)
Dept: PSYCHIATRY | Facility: CLINIC | Age: 36
End: 2021-03-02

## 2021-03-02 ENCOUNTER — NURSE TRIAGE (OUTPATIENT)
Dept: NURSING | Facility: CLINIC | Age: 36
End: 2021-03-02

## 2021-03-02 ENCOUNTER — DOCUMENTATION ONLY (OUTPATIENT)
Dept: PSYCHIATRY | Facility: CLINIC | Age: 36
End: 2021-03-02

## 2021-03-02 DIAGNOSIS — M54.40 CHRONIC LOW BACK PAIN WITH SCIATICA, SCIATICA LATERALITY UNSPECIFIED, UNSPECIFIED BACK PAIN LATERALITY: ICD-10-CM

## 2021-03-02 DIAGNOSIS — F32.89 OTHER DEPRESSION: Primary | ICD-10-CM

## 2021-03-02 DIAGNOSIS — F11.21 OPIOID USE DISORDER, SEVERE, IN SUSTAINED REMISSION (H): ICD-10-CM

## 2021-03-02 DIAGNOSIS — G89.29 CHRONIC LOW BACK PAIN WITH SCIATICA, SCIATICA LATERALITY UNSPECIFIED, UNSPECIFIED BACK PAIN LATERALITY: ICD-10-CM

## 2021-03-02 DIAGNOSIS — F41.9 ANXIETY: ICD-10-CM

## 2021-03-02 DIAGNOSIS — F15.21 AMPHETAMINE USE DISORDER, MODERATE, IN EARLY REMISSION (H): ICD-10-CM

## 2021-03-02 PROCEDURE — 99213 OFFICE O/P EST LOW 20 MIN: CPT | Mod: 95 | Performed by: NURSE PRACTITIONER

## 2021-03-02 RX ORDER — BUPRENORPHINE AND NALOXONE 2; .5 MG/1; MG/1
FILM, SOLUBLE BUCCAL; SUBLINGUAL
Qty: 84 FILM | Refills: 0 | Status: CANCELLED | OUTPATIENT
Start: 2021-03-02

## 2021-03-02 RX ORDER — BUPRENORPHINE AND NALOXONE 2; .5 MG/1; MG/1
FILM, SOLUBLE BUCCAL; SUBLINGUAL
Qty: 42 FILM | Refills: 0 | Status: SHIPPED | OUTPATIENT
Start: 2021-03-02 | End: 2021-03-16

## 2021-03-02 ASSESSMENT — PAIN SCALES - GENERAL: PAINLEVEL: SEVERE PAIN (7)

## 2021-03-02 NOTE — PROGRESS NOTES
Pt is seeing a new provider and has a follow up on 3/8/2021.  Since pt will be transferring care, no further refill on psychiatric medications from this writer, pt should be getting new refill from [facility] for the future. Meagan Bowman, JACINTO, 3/2/2021

## 2021-03-02 NOTE — TELEPHONE ENCOUNTER
Called patient and explained to him the need to schedule a follow up appointment with Dr. Nicholson. He will call back and schedule follow up appointment. Reported that he has only one script left. He said he was in an emergency situation yesterday and asked the staffs at his home to call and cancel his appointment, but they did not.     Date of Last Office Visit: 2/1/2021  Date of Next Office Visit: see notes above  No shows since last visit: one yesterday  Cancellations since last visit: none    Medication requested: Suboxone 4-1 mg per film  Date last ordered: 2/25/2021 Qty: 12 Refills: 0     [place] of [place] ?: yes  Medication last filled date: 2/25/2021 Qty filled: 12  Other [place] ?: none  If yes, is this a new medication?: none  If yes, name of medication: none and date filled: none    Lapse in medication adherence greater than 5 days?: no  If yes, call patient and gather details: no  Medication refill request verified as identical to current order?: yes  Result of Last DAM, VPA, Li+ Level, CBC, or Carbamazepine Level (at or since last visit): see labs in chart    []Medication refilled per  Medication Refill in Ambulatory Care  policy.  [x]Medication unable to be refilled by RN due to criteria not met as indicated below:    []Eligibility - not seen in the last year   []Supervision - no future appointment   []Compliance - no shows, cancellations or lapse in therapy   []Verification - order discrepancy   [x]Controlled medication   [x]Medication not included in policy   []90-day supply request   []Other

## 2021-03-02 NOTE — PROGRESS NOTES
"VIDEO VISIT  Hoang Kiser is a 35 year old patient who is being evaluated via a billable video visit.      The patient has been notified of following:   \"This video visit will be conducted via a call between you and your physician/provider. We have found that certain health care needs can be provided without the need for an in-person physical exam. This service lets us provide the care you need with a video conversation. If a prescription is necessary we can send it directly to your pharmacy. If lab work is needed we can place an order for that and you can then stop by our lab to have the test done at a later time. Insurers are generally covering virtual visits as they would in-office visits so billing should not be different than normal.  If for some reason you do get billed incorrectly, you should contact the billing office to correct it and that number is in the AVS .    Video Conference to be completed via:  Edy    Patient has given verbal consent for video visit?:  Yes    Patient would prefer that any video invitations be sent by: Send to e-mail at: [email or web address]      How would patient like to obtain AVS?:  Sherrell    AVS SmartPhrase [PsychAVS] has been placed in 'Patient Instructions':  Yes    "

## 2021-03-02 NOTE — PROGRESS NOTES
Start Time:  1530         End Time: 1536    Telemedicine Visit: The patient's condition can be safely assessed and treated via synchronous audio and visual telemedicine encounter.      Reason for Telemedicine Visit: Due to COVID 19 pandemic, clinic switching all appointments to telemedicine     Originating Site (Patient Location): Patient's home    Distant Site (Provider Location): Provider Remote Setting    Consent:  The patient/guardian has verbally consented to: the potential risks and benefits of telemedicine (video visit) versus in person care; bill my insurance or make self-payment for services provided; and responsibility for payment of non-covered services.     Mode of Communication:  Video Conference via [employer]    As the provider I attest to compliance with applicable laws and regulations related to telemedicine.    Psychiatry Clinic Progress Note                                                                  Patient Name: Hoang Kiser  YOB: 1985  MRN: [id number]  Date of Service:  3/2/2021  Last Seen:2/2/2021    Hoang Kiser is a 35 year old person assigned male at birth, identifies as cisgender male who uses the name Shoaib and pronoun lilia.       Shoaib Kiser is a 35 year old year old adult who presents for ongoing psychiatric care.  Shoaib Kiser was last seen on 2/2/2021.      At that time,     Medication Ordered/Consults/Labs/tests Ordered:      Medication:   -Increase Viibryd to 30 mg daily at bedtime for your mood and anxiety.  -After being on Viibryd 30 mg daily dose for 2 weeks, discontinue Cymbalta  -Continue all other medications for now  OTC Recommendations: Recommend 5MTHF (5-methyltetrahydrofolate) supplement 15 mg daily  Lab Orders:  none  Referrals: Follow up with individual therapy referral to set up an appointment.  Release of Information: none  Future Treatment Considerations: Per symptoms.   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  Psych critical item history includes mutiple psychotropic trials, SUBSTANCE USE: amphetamines and opiates and substance use treatment .   Medical complication includes functional movement disorder     Previous Psychiatric Meds: Celexa, Wellbutrin, Trazodone, Effexor, Amitriptyline, Gabapentin, Tegretol, Zoloft, reports all not effective, Ritalin (movement worse), Klonopin (tapered off 8/28/2020), Adderall (stopped due to methamphetamine use concurrently, effective), abilify (sleep talking, walking, cognitive slowness), Cymbalta (higher than 90 mg caused SI)    Interim History                                                                                                        4, 4     On 2/18/2021, pt sent via [employer] that he thinks Viibryd is working and he also wanted to go back on benzodiazaine for anxiety.  Replied to pt that initially he reported this was for functional movement disorder and due to high risk of long term benzodiazapine use, this writer will not prescribe ongoing benzodizapine.  If pt feels this is what he needs, recommended pt would seek providers outside of the clinic as the clinic does not provide long term benzodiazapine especially context of opioid use.    Since the last visit,  -Increased Viibryd to 30 mg, feels he can feel more emotions, denies SI, SIB or HI.  -Discontinued Cymbalta about 2 weeks ago, no other changes than feeling more emotions  -Started Adderall by Anabel Dove, [facility] psych service and started on Adderall on 2/3.  Pt has a follow up appointment on 3/8 and plan to transfer all psych care to her.  -Pt noted he wants Hydroxyzine refilled for his allergy  -Has not started 5MTHF supplement due to financial difficulties    Denies any symptoms suggestive of hypomania or  psychosis.    Current Suicidality/Hx of Suicide Attempts: Denies both  CoCominent Medical concerns: Chronic pain    Medication Side Effects: The patient denies all medication side effects.      Medical Review of Systems     Apart from the symptoms mentioned int he HPI, the 14 point review of systems, including constitutional, HEENT, cardiovascular, respiratory, gastrointestinal, genitourinary, musculoskeletal, integumentary, endocrine, neurological, hematologic and allergic is entirely negative except chronic pain.    Substance Use   Pt has been staying substance free since last seen.    Social/ Family History                                  [per patient report]                                 1ea,1ea   Living arrangements: lives in group home, feels safe.  Social Support: NA, group home leader/members.  Access to gun: none    Allergy                                Amitriptyline, Buspirone hcl, Doxycycline, Trazodone, and Cephalexin    Current Medications                                                                                                       Current Outpatient Medications   Medication Sig Dispense Refill     ADDERALL XR 30 MG 24 hr capsule        albuterol (PROAIR HFA/PROVENTIL HFA/VENTOLIN HFA) 108 (90 Base) MCG/ACT inhaler INHALE ONE TO TWO PUFFS INTO THE LUNGS EVERY FOUR HOURS AS NEEDED FOR SHORTNESS OF BREATH/ DYSPNEA OR WHEEZING 18 g 1     buprenorphine HCl-naloxone HCl (SUBOXONE) 2-0.5 MG per film PLACE 1 FILM UNDER THE TONGUE THREE TIMES DAILY 84 Film 0     cloNIDine (CATAPRES) 0.1 MG tablet Take 1-2 tablets (0.1-0.2 mg) by mouth nightly as needed (sleep) 60 tablet 1     hydrOXYzine (ATARAX) 25 MG tablet Take 1 tablet (25 mg) by mouth nightly as needed (at HS PRN) 45 tablet 3     naloxone (NARCAN) 4 MG/0.1ML nasal spray Spray 1 spray (4 mg) into one nostril alternating nostrils as needed for opioid reversal every 2-3 minutes until assistance arrives 0.2 mL 0     naproxen (NAPROSYN) 500 MG  "tablet TAKE 1 TABLET BY MOUTH TWICE DAILY WITH MEALS 62 tablet 11     order for DME Equipment being ordered: Digital home blood pressure monitor kit 1 each 0     order for DME Equipment being ordered: 4 wheeled walker with bench seat. 1 Units 0     oxybutynin ER (DITROPAN-XL) 10 MG 24 hr tablet TAKE 1 TABLET BY MOUTH ONCE DAILY 90 tablet 1     SF 5000 PLUS 1.1 % CREA        SUBOXONE 4-1 MG per film PLACE 1 FILM UNDER THE TONGUE THREE TIMES DAILY (OK TO TAKE IN ADDITION TO 2-0.5MG FILM ON HAND) *1 TOTAL FILL* 12 Film 0     tiZANidine (ZANAFLEX) 4 MG tablet Take 1-2 tablets (4-8 mg) by mouth nightly as needed for muscle spasms 60 tablet 1     vilazodone (VIIBRYD) 10 MG TABS tablet Take 3 tablets (30 mg) by mouth At Bedtime 90 tablet 1     zolpidem (AMBIEN) 10 MG tablet Take 1 tablet (10 mg) by mouth nightly as needed for sleep 30 tablet 1        Mental Status Exam                                                                                   9, 14 cog        Alertness: oriented, drowsy and sedated  Appearance:  Casually dressed and Adequately groomed  Behavior/Demeanor: cooperative and calm, with fair  eye contact   Speech: regular rate and rhythm and slightly slurred speech  Mood :  \"okay\"  Affect: slightly subdued; was congruent to mood; was congruent to content  Thought Process (Associations):  Linear and Goal directed  Thought process (Rate):  Slightly slowed  Thought content:  no overt psychosis, denies suicidal ideation, intent or thoughts and patient does not appear to be responding to internal stimuli  Perception:  Reports none;  Denies auditory hallucinations and visual hallucinations  Attention/Concentration:  Fair  Memory:  Immediate recall intact and Short-term memory intact  Language: intact  Fund of Knowledge/Intelligence:  Average  Abstraction:  [place]  Insight:  Adequate  Judgment:  Adequate for safety    Physical Exam     Motor activity/EPS:  Normal  Psychomotor: normal or unremarkable    Labs " and Results      Pertinent findings on review include: Review of records with relevant information reported in the HPI.  Reviewed pt's past medical record and obtained collateral information.      [place] PRESCRIPTION MONITORING PROGRAM [] was checked today:  indicates Ambien 2/24, Suboxone 2/19 and Adderall 2/3 .  Adderall is prescribed by Anabel Dove of [facility] psych service.    PHQ9 Today:  N/A  PHQ 11/19/2020 12/10/2020 2/19/2021   PHQ-9 Total Score 17 10 10   Q9: Thoughts of better off dead/self-harm past 2 weeks Not at all Not at all Not at all       JIN 7 Today: N/A  JIN-7 SCORE 11/19/2020 12/10/2020 2/19/2021   Total Score - - -   Total Score - 10 (moderate anxiety) 17 (severe anxiety)   Total Score 12 10 17       Recent Labs   Lab Test 02/11/21  1428 02/10/21  1748 01/30/20  0942   CR 0.84 1.01 1.01   GFRESTIMATED >90 >90 >90     Recent Labs   Lab Test 02/11/21  1428 01/30/20  0942   AST 43 19   ALT 60 31   ALKPHOS 72 60       PSYCHOTROPIC DRUG INTERACTIONS:    no.  MANAGEMENT:  N/A    Impression/Assessment      Shoaib Kiser is a 35 year old adult  who presents for med management follow up.  Pt appears sedated, slightly subdued, not anxious, denies SI, SIB or HI.  Pt tapered off Cymbalta without any problem and has been tolerating Viibryd 30 mg daily.  Pt started seeing new provider and was prescribed Adderall on 2/3/2021 and plan to transfer all psychiatric care to new provider.  Pt has sufficient refill of all medications until 3/8/2021 and noted he takes Hydroxyzine for allergy PRN.  Recommended pt to obtain Hydroxyzine from PCP as he is not taking the medication for psychiatric reason.  Will not refill future refills as he is seeing new provider.  Recommended to continue on current medication regimen since pt cannot follow up with this provider and appears mostly stable until seeing new provider.  Epic messaged pain management, scheduling and nurse parnter that he is transferring care to outside  provider.      Diagnosis                                                                    Depression (MDD vs Depression due to another medical condition vs substance induced depression)  Hx dx of ADD and PTSD  Amphetamine use disorder, moderate in early remission  Opioid use disorder, severe, in sustained remission    Treatment Recommendation & Plan       Medication Ordered/Consults/Labs/tests Ordered:     Medication: Continue on current medication regimen.  OTC Recommendations: none  Lab Orders:  none  Referrals: none  Release of Information: none  Future Treatment Considerations: Per symptoms.   Return for Follow Up: follow up with new provider    -Discussed safety plan for suicidal thoughts  -Discussed plan for suicidality  -Discussed available emergency services  -Patient agrees with the treatment plan  -Encouraged to continue outpatient therapy to gain more coping mechanism for stress.    Treatment Risk Statement: Discussed with the patient my impressions, as well as recommended studies. I educated patient on the differential diagnosis and prognosis. I discussed with the patient the risks and benefits of medications versus no interventions, including efficacy, dose, possible side effects and length of treatment and the importance of medication compliance.  The patient understands the risks, benefits, adverse effects and alternatives. Agrees to treatment with the capacity to do so. No medical contraindications to treatment. The patient also understands the risks of using street drugs or alcohol.     CRISIS NUMBERS:   Provided routinely in AVS.    22 minutes spent on the date of the encounter doing chart review, history and exam, documentation and further activities as noted above      Meagan Bowman CNP,  3/2/2021

## 2021-03-02 NOTE — PATIENT INSTRUCTIONS
-Continue on current medication regimen.    -Follow up with new provider follow up appointment on 3/8.  Will recommend to sign release of information so new provider could obtain previous record to provide continued care        **For crisis resources, please see the information at the end of this document**     Patient Education      Thank you for coming to the [facility] [place] [facility].    Lab Testing:  If you had lab testing today and your results are reassuring or normal they will be mailed to you or sent through [employer] within 7 days. If the lab tests need quick action we will call you with the results. The phone number we will call with results is # [phone] (home) . If this is not the best number please call our clinic and change the number.    Medication Refills:  If you need any refills please call your pharmacy and they will contact us. Our fax number for refills is [phone]. Please allow three business for refill processing. If you need to  your refill at a new pharmacy, please contact the new pharmacy directly. The new pharmacy will help you get your medications transferred.     Scheduling:  If you have any concerns about today's visit or wish to schedule another appointment please call our office during normal business hours [phone] (8-5:00 M-F)    Contact Us:  Please call [phone] during business hours (8-5:00 M-F).  If after clinic hours, or on the weekend, please call  [phone].    Financial Assistance [phone]  [facility] Billing [phone]  Central Billing Office, ealth: [phone]  [place] Billing [phone]  Medical Records [phone]  [place] Patient Bill of Rights [email or web address]       MENTAL HEALTH CRISIS NUMBERS:  For a medical emergency please call  911 or go to the nearest ER.     [facility]:   [facility] -[phone]    Crisis Residence [facility] -[phone]   Walk-In Counseling Center [facility] -[phone]   COPE 24/7 [place] Mobile Team -[phone] (adults)/[phone] (child)  CHILD: [facility] needs assessment team - [phone]      [facility]:   [facility] - [phone]   Walk-in counseling [facility] - [phone]   Walk-in counseling  - [phone]   Crisis Residence [facility] - [phone]  Urgent Care Adult Mental Vbtxmj-[phone] mobile unit/ 24/7 crisis line    National Crisis Numbers:   National Suicide Prevention Lifeline: 4-235-182-TALK (957-919-4558)  Poison Control Center - 1-493.195.6432  [employer]/resources for a list of additional resources (SOS)  Trans Lifeline a hotline for transgender people 1-656.907.2760  [employer] a hotline for LGBT youth 1-923-234-4913  Crisis Text Line: For any crisis 24/7   To: 225986  see www.crisistextline.org  - IF MAKING A CALL FEELS TOO HARD, send a text!         Again thank you for choosing [facility] MENTAL HEALTH & ADDICTION [facility] and please let us know how we can best partner with you to improve you and your family's health.    You may be receiving a survey regarding this appointment. We would love to have your feedback, both positive and negative. The survey is done by an external company, so your answers are anonymous.

## 2021-03-02 NOTE — CONFIDENTIAL NOTE
Med refilled  Sent My chart message to patient    Next appointment needs to be in clinic   -either 3/16/21 in clinic  or   -3/11/21 at 1:30

## 2021-03-02 NOTE — TELEPHONE ENCOUNTER
Reason for Call:  Medication or medication refill:    Do you use a [facility]?  Name of the pharmacy and phone number for the current request:  [facility] - [place], [place] - [address]    Name of the medication requested: SUBOXONE 4-1 MG per film [      Other request: Patient states that he only has one strip left and wants this refilled asap.    Can we leave a detailed message on this number? YES    Phone number patient can be reached at: Home number on file [phone] (home)    Best Time: any    Call taken on 3/2/2021 at 1:12 PM by Erna Mcmanus

## 2021-03-02 NOTE — TELEPHONE ENCOUNTER
On March 2, 2021, at 1:51 PM, writer called patient at [phone] to confirm Virtual Visit. Writer unable to make contact with patient. Writer left detailed voice message for call back. [phone] left as call back number. Zohreh Mendoza, [facility]     Statement Selected

## 2021-03-03 ENCOUNTER — ALLIED HEALTH/NURSE VISIT (OUTPATIENT)
Dept: BEHAVIORAL HEALTH | Facility: CLINIC | Age: 36
End: 2021-03-03
Payer: COMMERCIAL

## 2021-03-03 ENCOUNTER — MYC MEDICAL ADVICE (OUTPATIENT)
Dept: FAMILY MEDICINE | Facility: CLINIC | Age: 36
End: 2021-03-03

## 2021-03-03 ENCOUNTER — APPOINTMENT (OUTPATIENT)
Dept: ULTRASOUND IMAGING | Facility: CLINIC | Age: 36
End: 2021-03-03
Attending: EMERGENCY MEDICINE
Payer: COMMERCIAL

## 2021-03-03 ENCOUNTER — HOSPITAL ENCOUNTER (EMERGENCY)
Facility: CLINIC | Age: 36
Discharge: HOME | End: 2021-03-03
Attending: EMERGENCY MEDICINE | Admitting: EMERGENCY MEDICINE
Payer: COMMERCIAL

## 2021-03-03 ENCOUNTER — NURSE TRIAGE (OUTPATIENT)
Dept: NURSING | Facility: CLINIC | Age: 36
End: 2021-03-03

## 2021-03-03 VITALS
SYSTOLIC BLOOD PRESSURE: 117 MMHG | HEIGHT: 68 IN | BODY MASS INDEX: 29.86 KG/M2 | OXYGEN SATURATION: 97 % | HEART RATE: 69 BPM | DIASTOLIC BLOOD PRESSURE: 84 MMHG | WEIGHT: 197 LBS | TEMPERATURE: 98 F | RESPIRATION RATE: 16 BRPM

## 2021-03-03 DIAGNOSIS — R69 DIAGNOSIS DEFERRED: Primary | ICD-10-CM

## 2021-03-03 DIAGNOSIS — R60.0 PERIPHERAL EDEMA: ICD-10-CM

## 2021-03-03 DIAGNOSIS — I82.4Y1 ACUTE DEEP VEIN THROMBOSIS (DVT) OF PROXIMAL VEIN OF RIGHT LOWER EXTREMITY (H): ICD-10-CM

## 2021-03-03 LAB
ALBUMIN SERPL-MCNC: 3.6 G/DL (ref 3.4–5)
ALBUMIN UR-MCNC: NEGATIVE MG/DL
ALP SERPL-CCNC: 64 U/L (ref 40–150)
ALT SERPL W P-5'-P-CCNC: 55 U/L (ref 0–70)
ANION GAP SERPL CALCULATED.3IONS-SCNC: 4 MMOL/L (ref 3–14)
APPEARANCE UR: CLEAR
AST SERPL W P-5'-P-CCNC: 43 U/L (ref 0–45)
BASOPHILS # BLD AUTO: 0 10E9/L (ref 0–0.2)
BASOPHILS NFR BLD AUTO: 0.5 %
BILIRUB SERPL-MCNC: 0.3 MG/DL (ref 0.2–1.3)
BILIRUB UR QL STRIP: NEGATIVE
BUN SERPL-MCNC: 11 MG/DL (ref 7–30)
CALCIUM SERPL-MCNC: 8.4 MG/DL (ref 8.5–10.1)
CHLORIDE SERPL-SCNC: 108 MMOL/L (ref 94–109)
CO2 SERPL-SCNC: 28 MMOL/L (ref 20–32)
COLOR UR AUTO: ABNORMAL
CREAT SERPL-MCNC: 0.9 MG/DL (ref 0.66–1.25)
DIFFERENTIAL METHOD BLD: ABNORMAL
EOSINOPHIL # BLD AUTO: 0.1 10E9/L (ref 0–0.7)
EOSINOPHIL NFR BLD AUTO: 3.3 %
ERYTHROCYTE [DISTWIDTH] IN BLOOD BY AUTOMATED COUNT: 12.5 % (ref 10–15)
GFR SERPL CREATININE-BSD FRML MDRD: >90 ML/MIN/{1.73_M2}
GLUCOSE SERPL-MCNC: 92 MG/DL (ref 70–99)
GLUCOSE UR STRIP-MCNC: NEGATIVE MG/DL
HCT VFR BLD AUTO: 35.1 % (ref 40–53)
HGB BLD-MCNC: 12.1 G/DL (ref 13.3–17.7)
HGB UR QL STRIP: NEGATIVE
IMM GRANULOCYTES # BLD: 0 10E9/L (ref 0–0.4)
IMM GRANULOCYTES NFR BLD: 0.2 %
KETONES UR STRIP-MCNC: NEGATIVE MG/DL
LEUKOCYTE ESTERASE UR QL STRIP: NEGATIVE
LYMPHOCYTES # BLD AUTO: 1.3 10E9/L (ref 0.8–5.3)
LYMPHOCYTES NFR BLD AUTO: 29.6 %
MCH RBC QN AUTO: 30.3 PG (ref 26.5–33)
MCHC RBC AUTO-ENTMCNC: 34.5 G/DL (ref 31.5–36.5)
MCV RBC AUTO: 88 FL (ref 78–100)
MONOCYTES # BLD AUTO: 0.7 10E9/L (ref 0–1.3)
MONOCYTES NFR BLD AUTO: 16.1 %
MUCOUS THREADS #/AREA URNS LPF: PRESENT /LPF
NEUTROPHILS # BLD AUTO: 2.1 10E9/L (ref 1.6–8.3)
NEUTROPHILS NFR BLD AUTO: 50.3 %
NITRATE UR QL: NEGATIVE
NRBC # BLD AUTO: 0 10*3/UL
NRBC BLD AUTO-RTO: 0 /100
PH UR STRIP: 6.5 PH (ref 5–7)
PLATELET # BLD AUTO: 209 10E9/L (ref 150–450)
POTASSIUM SERPL-SCNC: 3.6 MMOL/L (ref 3.4–5.3)
PROT SERPL-MCNC: 6.5 G/DL (ref 6.8–8.8)
RBC # BLD AUTO: 3.99 10E12/L (ref 4.4–5.9)
RBC #/AREA URNS AUTO: <1 /HPF (ref 0–2)
SODIUM SERPL-SCNC: 140 MMOL/L (ref 133–144)
SOURCE: ABNORMAL
SP GR UR STRIP: 1.01 (ref 1–1.03)
SQUAMOUS #/AREA URNS AUTO: 0 /HPF (ref 0–1)
UROBILINOGEN UR STRIP-MCNC: 0 MG/DL (ref 0–2)
WBC # BLD AUTO: 4.2 10E9/L (ref 4–11)
WBC #/AREA URNS AUTO: 1 /HPF (ref 0–5)

## 2021-03-03 PROCEDURE — 93970 EXTREMITY STUDY: CPT

## 2021-03-03 PROCEDURE — 250N000013 HC RX MED GY IP 250 OP 250 PS 637: Performed by: EMERGENCY MEDICINE

## 2021-03-03 PROCEDURE — 80053 COMPREHEN METABOLIC PANEL: CPT | Performed by: EMERGENCY MEDICINE

## 2021-03-03 PROCEDURE — 81001 URINALYSIS AUTO W/SCOPE: CPT | Performed by: EMERGENCY MEDICINE

## 2021-03-03 PROCEDURE — 99284 EMERGENCY DEPT VISIT MOD MDM: CPT | Mod: 25

## 2021-03-03 PROCEDURE — 85025 COMPLETE CBC W/AUTO DIFF WBC: CPT | Performed by: EMERGENCY MEDICINE

## 2021-03-03 RX ORDER — POTASSIUM CHLORIDE 750 MG/1
10 TABLET, EXTENDED RELEASE ORAL DAILY
Qty: 5 TABLET | Refills: 0 | Status: SHIPPED | OUTPATIENT
Start: 2021-03-03 | End: 2021-03-08

## 2021-03-03 RX ORDER — HYDROCHLOROTHIAZIDE 12.5 MG/1
12.5 TABLET ORAL DAILY
Qty: 5 TABLET | Refills: 0 | Status: SHIPPED | OUTPATIENT
Start: 2021-03-03 | End: 2021-03-25

## 2021-03-03 RX ORDER — APIXABAN 5 MG (74)
KIT ORAL
Qty: 1 EACH | Refills: 0 | Status: SHIPPED | OUTPATIENT
Start: 2021-03-03 | End: 2021-03-25

## 2021-03-03 RX ADMIN — APIXABAN 10 MG: 5 TABLET, FILM COATED ORAL at 19:33

## 2021-03-03 ASSESSMENT — MIFFLIN-ST. JEOR: SCORE: 1809.44

## 2021-03-03 ASSESSMENT — ENCOUNTER SYMPTOMS: APPETITE CHANGE: 0

## 2021-03-03 NOTE — ED PROVIDER NOTES
"  History   Chief Complaint:  Leg Swelling     The history is provided by the patient.      Hoang Kiser is a 35 year old male with history of chronic pain syndrome who presents via EMS from his group home with bilateral leg swelling. He notes 4 to 5 weeks ago he noted his bilateral ankle started to swell up. About a week later, he was seen by his primary care physician who recommended compression stockings. He has been using his compression stockings as recommended, but he now feels swelling in his thighs. He called EMS today because he is concerned about a DVT. He was recently started on Suboxone and stopped taking daily Naproxen. He denies recent change in diet in terms of salt or fluid intake.     Review of Systems   Constitutional: Negative for appetite change.   Cardiovascular: Positive for leg swelling.   All other systems reviewed and are negative.    Allergies:  Amitriptyline  Buspirone Hcl  Doxycycline  Trazodone  Cephalexin    Medications:  Adderall XR 30 mg   Suboxone   Oxybutynin  Ambien     Past Medical History:    Arthritis   ADHD  Agoraphobia   Benign positional vertigo  Chronic pain syndrome  Depressive disorder   DDD  Dysphonia  GERD  Hearing problem  Panic attack  PTSD  Seizures  Stimulant dependence   Tinnitus      Past Surgical History:    Tooth extraction   PEtubes     Family History:    Father: hyperlipidemia  Mother: arthritis, depression, anxiety, obesity   Brother: asthma  Sister: asthma     Social History:  Patient presents to the ER via EMS.   Patient lives at group home.     Physical Exam     Patient Vitals for the past 24 hrs:   BP Temp Temp src Pulse Resp SpO2 Height Weight   03/03/21 1845 -- -- -- -- -- 97 % -- --   03/03/21 1715 -- -- -- -- -- 99 % -- --   03/03/21 1700 -- -- -- -- -- 98 % -- --   03/03/21 1531 117/84 98  F (36.7  C) Oral 69 16 99 % 1.737 m (5' 8.4\") 89.4 kg (197 lb)       Physical Exam    GENERAL: well developed, pleasant  HEAD: atraumatic  EYES: pupils " reactive, extraocular muscles intact, conjunctivae normal  ENT:  mucus membranes moist  NECK:  trachea midline, normal range of motion  RESPIRATORY: no tachypnea, breath sounds clear to auscultation   CVS: normal S1/S2, no murmurs, intact distal pulses  ABDOMEN: soft, nontender, nondistention  MUSCULOSKELETAL: no deformities. Edema to bilateral lower extremity with compression stockings in place   SKIN: warm and dry, no acute rashes or ulceration  NEURO: GCS 15, cranial nerves intact, alert and oriented x3  PSYCH:  Mood/affect normal      Emergency Department Course     Imaging:  US Lower Extremity Venous Duplex Bilateral  IMPRESSION: Positive for deep venous thrombosis in the right lower  extremity, in one of two posterior tibial veins. Negative for deep  venous thrombosis in the left lower extremity.  Reading per radiology     Laboratory:   CBC: WBC 4.2, HGB 12.1(L),   CMP: calcium 8.4, protein total 6.5(L) o/w WNL (Creatinine 0.90)       UA with microscopic: mucous present o/w WNL     Emergency Department Course:    Reviewed:  I reviewed nursing notes, vitals, past medical history and care everywhere    Assessments:  1534 I obtained history and examined the patient as noted above.   1900 I rechecked the patient and explained findings.     Interventions:  1933 Eliquis 10 mg Oral    Disposition:  The patient was discharged to home.       Impression & Plan     CMS Diagnoses: None    Medical Decision Making:  Patient presents with leg pain and swelling that is progressed.  Notes he is wearing his peripheral stockings is noting but having increasing pain and swelling.  Denies chest pain or shortness of breath.  Ultrasound shows DVT on the right although not on the left.  Discussed treatment with him including blood thinners and the risks.  He is more concerned about getting a diuretic to help with the edema.  Discussed with him can write him for short amount although he needs to follow-up with his primary care  doctor.  He was given his first dose here.  Patient is not to take any NSAIDs specifically he needs to this discontinue his naproxen and any ibuprofen Advil, motrin etc.      Diagnosis:    ICD-10-CM    1. Acute deep vein thrombosis (DVT) of proximal vein of right lower extremity (H)  I82.4Y1    2. Peripheral edema  R60.9        Discharge Medications:  Discharge Medication List as of 3/3/2021  7:19 PM      START taking these medications    Details   Apixaban Starter Pack (ELIQUIS DVT/PE STARTER PACK) 5 MG TBPK Take 10 mg by mouth 2 times daily for 7 days, THEN 5 mg 2 times daily for 23 days., Disp-1 each, R-0, Local Print      hydrochlorothiazide (HYDRODIURIL) 12.5 MG tablet Take 1 tablet (12.5 mg) by mouth daily for 5 days, Disp-5 tablet, R-0, Local Print      potassium chloride ER (KLOR-CON M) 10 MEQ CR tablet Take 1 tablet (10 mEq) by mouth daily for 5 days, Disp-5 tablet, R-0, Local Print             Scribe Disclosure:  I, Tamiko Mcnulty, am serving as a scribe at 3:34 PM on 3/3/2021 to document services personally performed by Behzad Trivedi MD based on my observations and the provider's statements to me.            Behzad Trivedi MD  03/03/21 2020

## 2021-03-03 NOTE — TELEPHONE ENCOUNTER
Silvia from [facility] pharmacy calling requesting SUBOXONE 4-1 MG per film. Informed on call providers do not prescribed SUBOXONE after hours and advised to call clinic in the morning to request medication.  Caller verbalized understanding. Denies further questions.      Rich Melendez RN  [facility]

## 2021-03-03 NOTE — TELEPHONE ENCOUNTER
"Triage Call:    -Patient is having \"evere edema symptoms,\" he states.   -Patient states he is having severe swelling in bilateral ankles, feet, lower legs, and past the knee to the lower thigh.   -Patient states that he is having moderate to severe pain in both lower legs and calf's.   -Patient states he significantly has more swelling on the L. Lower leg.   -Patient states that he is unable to walk due to the swelling.  -Patient denies any chest pain or SOB currently.   Patient wants RN to speak with his  Alo as patient states, \"I can't even think right now.\"   -RN spoke with Alo and advised per protocol, recommendations are for patient to go to ER/ED now. RN advised that if patients sx worsening, 911 is recommended.   -Alo stated that they are having a nurse come today to assess patient. RN advised per patients sx he is currently describing, ER is recommended now. If patient does not have a ride then advised to call 911. Alo (Home Coordinator) verbalized understanding and agrees with plan.     Idalia Lainez RN, BSN Nurse Triage Advisor 2:24 PM 3/3/2021     Reason for Disposition    Unable to walk    Additional Information    Negative: Sounds like a life-threatening emergency to the triager    Negative: Chest pain    Negative: Small area of swelling and followed an insect bite to the area    Negative: Followed a knee injury    Negative: Ankle or foot injury    Negative: Pregnant with leg swelling or edema    Negative: Looks like a broken bone or dislocated joint (e.g., crooked or deformed)    Negative: Sounds like a life-threatening emergency to the triager    Negative: Followed a hip injury    Negative: Followed a knee injury    Negative: Followed an ankle or foot injury    Negative: Back pain radiating (shooting) into leg(s)    Negative: Foot pain is the main symptom    Negative: Ankle pain is the main symptom    Negative: Knee pain is the main symptom    Negative: Leg " swelling is the main symptom    Negative: Entire foot is cool or blue in comparison to other side    Negative: Difficulty breathing    Negative: Chest pain    Thigh, calf, or ankle swelling in both legs, but one side is definitely more swollen    SEVERE swelling (e.g., swelling extends above knee, entire leg is swollen, weeping fluid)    Negative: Difficulty breathing at rest    Negative: Entire foot is cool or blue in comparison to other side    Protocols used: LEG PAIN-A-OH, LEG SWELLING AND EDEMA-A-OH    COVID 19 Nurse Triage Plan/Patient Instructions    Please be aware that novel coronavirus (COVID-19) may be circulating in the community. If you develop symptoms such as fever, cough, or SOB or if you have concerns about the presence of another infection including coronavirus (COVID-19), please contact your health care provider or visit [email or web address].     Disposition/Instructions    ED Visit recommended. Follow protocol based instructions.     Bring Your Own Device:  Please also bring your smart device(s) (smart phones, tablets, laptops) and their charging cables for your personal use and to communicate with your care team during your visit.    Thank you for taking steps to prevent the spread of this virus.  o Limit your contact with others.  o Wear a simple mask to cover your cough.  o Wash your hands well and often.    Resources    [facility]: About COVID-19: [email or web address]    CDC: What to Do If You're Sick: www.cdc.gov/coronavirus/2019-ncov/about/steps-when-sick.html    CDC: Ending Home Isolation: www.cdc.gov/coronavirus/2019-ncov/hcp/disposition-in-home-patients.html     CDC: Caring for Someone: www.cdc.gov/coronavirus/2019-ncov/if-you-are-sick/care-for-someone.html     [facility]: Interim Guidance for Hospital Discharge to Home: www.health.[facility].[place].us/diseases/coronavirus/hcp/hospdischarge.pdf    [facility] clinical trials (COVID-19 research studies):  clinicalaffairs.[facility]-clinical-trials     Below are the COVID-19 hotlines at the [place] Department of Health ([facility]). Interpreters are available.   o For health questions: Call 722-437-3732 or 1-912.788.4536 (7 a.m. to 7 p.m.)  o For questions about schools and childcare: Call 943-751-4865 or 1-455.360.2855 (7 a.m. to 7 p.m.)

## 2021-03-03 NOTE — PROGRESS NOTES
Behavioral Health Home Services  [facility]: [place]        Social Work Care Navigator Note      Patient: Hoang Kiser  Date: March 3, 2021  Preferred Name: Shoaib    Previous PHQ-9:   PHQ-9 SCORE 11/14/2020 12/10/2020 2/19/2021   PHQ-9 Total Score - - -   PHQ-9 Total Score MyChart 10 (Moderate depression) 10 (Moderate depression) 10 (Moderate depression)   PHQ-9 Total Score 10 10 10   Some encounter information is confidential and restricted. Go to Review Flowsheets activity to see all data.     Previous JIN-7:   JIN-7 SCORE 11/14/2020 12/10/2020 2/19/2021   Total Score - - -   Total Score 10 (moderate anxiety) 10 (moderate anxiety) 17 (severe anxiety)   Total Score 10 10 17   Some encounter information is confidential and restricted. Go to Review Flowsheets activity to see all data.     MOLLY LEVEL:  MOLLY Score (Last Two) 11/14/2020 12/10/2020   MOLLY Raw Score 31 31   Activation Score 59.3 59.3   MOLLY Level 3 3       Preferred Contact:  Need for : No  Preferred Contact: Cell      Type of Contact Today: Phone call (patient / identified key support person reached)      Data: (subjective / Objective):  Recent ED/IP Admission or Discharge?   None    Patient Goals:  Goal Areas: Health;Mental Health;Chemical Health;Employment / Volunteer;Financial and Social Service Benefits  Patient stated goals: Patient would like to resume ILS services. He was working with an ILS worker and is in the process of trying to find a new one. Patient would like to do outpatient treatment at a facility that is not associated with [place]. Patient wants to continue to work on his sobriety and health. At this time he has gone 6 days without using. Patient continues to try and work on self-advocacy skills. He was seeing a therapist but reports that his therapist was discontinued because he had a UA that showed substance use. Patient is going to try and contact an old therapist he used to see and try to set up an appointment.  Patient wants to take a break from volunteering at this time and focus on his health. Patient continues to work on his coping and stress management skills by taking walks, playing guitar and making comfort boards.         [facility] Core Service Provided:  Care Coordination: provided care management services/referrals necessary to ensure patient and their identified supports have access to medical, behavioral health, pharmacology and recovery support services.  Ensured that patient's care is integrated across all settings and services.     Current Stressors / Issues / Care Plan Objective Addressed Today:  [facility] attempted to contact patient at his cell number for scheduled monthly check in. Call went straight to [place]. Patient called [facility] back from a different number. Patient stated that he has a new SIM card coming for his cell phone and his cell phone does not work until he receives this. Patient reports that his mood has been alright. Yesterday he was experiencing pain from his waste to his toes. He does have an appointment with pain management provider coming up. CC asked how his tooth pain has been. Patient states that dentist is recommending root canal for 2 of this teeth. Patient is still needing to schedule appointment for this. [facility] advised that his house staff should be able to help him schedule this appointment. Patient reports that house staff do not typically assist with this. [facility] encouraged patient to ask house staff for assistance if he does run into issues scheduling appointments.   Patient stated that he has been able to get out of the house more now that the weather is getting nicer. Patient is going to be starting speech therapy to help his vocal chords and the hoarseness in his throat. He will also be getting botox injections to help relax the muscles in his throat.   Patient advised that he will be starting with a new psychiatrist on March 8th. Patient reports that he chose to switch to a new  psychiatrist.   CC asked how waiver services have been. Eliseo reports his waiver services are going well. He is waiting on a heat pad and pillow topper that his  ordered to help with pain.   SWCC advised patient that his Health Action Plan is due to be updated next month. Patient and SWCC scheduled HAP review for 4/5/21 at 10:30am.      Intervention:  Motivational Interviewing: Expressed Empathy/Understanding, Supported Autonomy, Collaboration, Evocation, Permission to raise concern or advise and Open-ended questions   Target Behavior(s): Explored and resolved challenges to attending appointments as scheduled, Explored current exercise activities and thoughts about how this influences mood and Explored current social supports and reinforced opportunities to increase engagement    Assessment: (Progress on Goals / Homework):  Patient would benefit from continued coordination in reaching their goals set for the [facility]) program. SWCC reviewed Health Action Plan goals and will continue to monitor progress and work with patient and their care team.    Plan: (Homework, other):  Patient was encouraged to continue to seek condition-related information and education. SWCC, patient, and team will continue to work towards progress on reaching goals set for the [facility] program.      Scheduled a Phone follow up appointment with SW  in 4 weeks     Patient has set self-identified goals and will monitor progress until the next appointment on: 04/05/2021.     LAKSHMI Kebede, Social Work Care Coordinator

## 2021-03-04 ENCOUNTER — ALLIED HEALTH/NURSE VISIT (OUTPATIENT)
Dept: BEHAVIORAL HEALTH | Facility: CLINIC | Age: 36
End: 2021-03-04
Payer: COMMERCIAL

## 2021-03-04 ENCOUNTER — TELEPHONE (OUTPATIENT)
Dept: FAMILY MEDICINE | Facility: CLINIC | Age: 36
End: 2021-03-04

## 2021-03-04 DIAGNOSIS — R69 DIAGNOSIS DEFERRED: Primary | ICD-10-CM

## 2021-03-04 NOTE — TELEPHONE ENCOUNTER
Patient has been wearing compression socks for quite awhile. He wants to know if he should be wearing them all the time or taking them off at night.  He states when he takes them off his feet/lower legs swell up a lot. Per chart, he went to ED yesterday.  He states went because fluid was starting to build up into waist.  They did an ultrasound on his leg and he has a clot in his right leg.  He just started taking diuretics and was started on Eliquis.  He has appointment tomorrow at 12:15pm with Dr. Phill Floyd for follow up.  I advised that he continue to wear his compression socks as he has been and speak with Dr. Phill Floyd at his appointment tomorrow about when to wear them and if should remove them at bedtime each nigh.  He states will do so.  Aimee ACEVES

## 2021-03-04 NOTE — PROGRESS NOTES
Behavioral Health Home Services  [facility]: [place]        Social Work Care Navigator Note      Patient: Hoang Kiser  Date: March 4, 2021  Preferred Name: Shoaib    Previous PHQ-9:   PHQ-9 SCORE 11/14/2020 12/10/2020 2/19/2021   PHQ-9 Total Score - - -   PHQ-9 Total Score MyChart 10 (Moderate depression) 10 (Moderate depression) 10 (Moderate depression)   PHQ-9 Total Score 10 10 10   Some encounter information is confidential and restricted. Go to Review Flowsheets activity to see all data.     Previous JIN-7:   JIN-7 SCORE 11/14/2020 12/10/2020 2/19/2021   Total Score - - -   Total Score 10 (moderate anxiety) 10 (moderate anxiety) 17 (severe anxiety)   Total Score 10 10 17   Some encounter information is confidential and restricted. Go to Review Flowsheets activity to see all data.     MOLLY LEVEL:  MOLLY Score (Last Two) 11/14/2020 12/10/2020   MOLLY Raw Score 31 31   Activation Score 59.3 59.3   MOLLY Level 3 3       Preferred Contact:  Need for : No  Preferred Contact: Cell      Type of Contact Today: Phone call (patient / identified key support person reached)      Data: (subjective / Objective):  Patient Goals:  Goal Areas: Health;Mental Health;Chemical Health;Employment / Volunteer;Financial and Social Service Benefits  Patient stated goals: Patient would like to resume ILS services. He was working with an ILS worker and is in the process of trying to find a new one. Patient would like to do outpatient treatment at a facility that is not associated with [place]. Patient wants to continue to work on his sobriety and health. At this time he has gone 6 days without using. Patient continues to try and work on self-advocacy skills. He was seeing a therapist but reports that his therapist was discontinued because he had a UA that showed substance use. Patient is going to try and contact an old therapist he used to see and try to set up an appointment. Patient wants to take a break from volunteering at  this time and focus on his health. Patient continues to work on his coping and stress management skills by taking walks, playing guitar and making comfort boards.       Recent ED/IP Admission or Discharge?   None    Current Stressors / Issues:      What were some the circumstances or situations that led up to the emergency room visit?    Pain in leg with swelling that has worsened.     What concerns do you still have regarding (reason for admission)?    Patient reports leg continues to be painful but better today. Patient reports he needs to  his rxs today.      What recommendations did the doctors and team make?  START taking these medications     Details   Apixaban Starter Pack (ELIQUIS DVT/PE STARTER PACK) 5 MG TBPK Take 10 mg by mouth 2 times daily for 7 days, THEN 5 mg 2 times daily for 23 days., Disp-1 each, R-0, Local Print       hydrochlorothiazide (HYDRODIURIL) 12.5 MG tablet Take 1 tablet (12.5 mg) by mouth daily for 5 days, Disp-5 tablet, R-0, Local Print       potassium chloride ER (KLOR-CON M) 10 MEQ CR tablet Take 1 tablet (10 mEq) by mouth daily for 5 days, Disp-5 tablet, R-0, Local Print         Patient given information on DVT and leg swelling for after care instructions, [facility] and patient reviewed these today, and follow-up with PCP. [facility] was able to schedule appointment with patient today for his PCP tomorrow.        Were you able to schedule and/or attend recommended appointments? Yes - patient reports he will be calling [facility] to set up medical ride for tomorrow.    Did you  any new prescriptions? Not yet - patient is working with pharmacy to get rxs filled, housing staff helping patient with this.      What types of health care support might better meet your needs?    Patient reports he feels supportive and has housing staff that are helping him pick-up his prescriptions and make sure he gets to his appointments.      How confident do you feel about your ability to communicate these  needs to your primary care team?      Patient reports he feels he is able to communicate his needs.      What do you think would help you avoid another visit the emergency room or admission to the hospital in the next six months?    Take medications as directed, follow-up with PCP and follow after care instructions.    Intervention:  Motivational Interviewing: Expressed Empathy/Understanding, Supported Autonomy, Collaboration, Evocation, Permission to raise concern or advise, Open-ended questions, Reflections: simple and complex and Rolled with resistance: Emphasized patient autonomy, Simple reflection, Reframed sustain talk in the direction of change and Evoked patient agenda     Assessment: (Pt engagement & support needed for successful care transition):  Patient would benefit from continued coordination in reaching their goals set for the Behavioral Health Home ([facility]) program. SWCC reviewed Health Action Plan goals and will continue to monitor progress and work with patient and their care team.      Plan: (Homework, other):  Patient was encouraged to continue to seek condition-related information and education.      Scheduled a Phone follow up appointment with SW CC in 4 weeks     Patient has set self-identified goals and will monitor progress until the next appointment on: 04/05/2021.       ODESSA [facility]  Behavioral Health Home ([facility])   [facility]  [phone]  March 4, 2021  1:21 PM         Routed note to patient's primary care provider.

## 2021-03-04 NOTE — ED NOTES
Patient and I spoke with the , Leo. We discussed his discharge and Leo reported that she would send an Uber to pick patient up. Patient walked to the ED lobby where he is awaiting his ride.

## 2021-03-05 ENCOUNTER — OFFICE VISIT (OUTPATIENT)
Dept: FAMILY MEDICINE | Facility: CLINIC | Age: 36
End: 2021-03-05
Payer: COMMERCIAL

## 2021-03-05 ENCOUNTER — TELEPHONE (OUTPATIENT)
Dept: FAMILY MEDICINE | Facility: CLINIC | Age: 36
End: 2021-03-05

## 2021-03-05 VITALS
SYSTOLIC BLOOD PRESSURE: 116 MMHG | WEIGHT: 202 LBS | DIASTOLIC BLOOD PRESSURE: 74 MMHG | OXYGEN SATURATION: 99 % | HEART RATE: 91 BPM | TEMPERATURE: 97.8 F | BODY MASS INDEX: 30.36 KG/M2

## 2021-03-05 DIAGNOSIS — I82.441 ACUTE DEEP VEIN THROMBOSIS (DVT) OF TIBIAL VEIN OF RIGHT LOWER EXTREMITY (H): ICD-10-CM

## 2021-03-05 DIAGNOSIS — Z72.0 TOBACCO ABUSE: Primary | ICD-10-CM

## 2021-03-05 DIAGNOSIS — I82.441 ACUTE DEEP VEIN THROMBOSIS (DVT) OF TIBIAL VEIN OF RIGHT LOWER EXTREMITY (H): Primary | ICD-10-CM

## 2021-03-05 PROCEDURE — 99214 OFFICE O/P EST MOD 30 MIN: CPT | Performed by: FAMILY MEDICINE

## 2021-03-05 NOTE — TELEPHONE ENCOUNTER
Patient received a prescription today for Eilquis.  The orders say to start this after finishing starter pack of Eliquis.  Patient is currently taking Xarelto.  Eliquis is not covered by insurance.  Xarelto is covered by insurance.    Do you want to discontinue Eliquis and send a prescription for Xarelto?   Please advise  Dayanara Boyer RN

## 2021-03-05 NOTE — PROGRESS NOTES
Subjective:  Hoang is a 35 year old male who presents today for follow-up on a recent emergency department visit  at [facility] on 3-3. The patient went to the hospital for symptoms of pain in his legs and increaasded swelling up to his thighs. He was diagnosed with a DVT in his right lower extremity(ies) . The patient was treated with Eliquis and hydrochlorothiazide . He was asked to follow up today specifically to check up on to get a continuing prescription(s) . At this time the patient reports a little improvement of the original symptoms. In addition the patient reports the following new symptoms:none.     He denies ever having a blood clot         Current Outpatient Medications:      ADDERALL XR 30 MG 24 hr capsule, , Disp: , Rfl:      albuterol (PROAIR HFA/PROVENTIL HFA/VENTOLIN HFA) 108 (90 Base) MCG/ACT inhaler, INHALE ONE TO TWO PUFFS INTO THE LUNGS EVERY FOUR HOURS AS NEEDED FOR SHORTNESS OF BREATH/ DYSPNEA OR WHEEZING, Disp: 18 g, Rfl: 1     Apixaban Starter Pack (ELIQUIS DVT/PE STARTER PACK) 5 MG TBPK, Take 10 mg by mouth 2 times daily for 7 days, THEN 5 mg 2 times daily for 23 days., Disp: 1 each, Rfl: 0     buprenorphine HCl-naloxone HCl (SUBOXONE) 2-0.5 MG per film, PLACE 1 FILM UNDER THE TONGUE THREE TIMES DAILY, Disp: 42 Film, Rfl: 0     cloNIDine (CATAPRES) 0.1 MG tablet, Take 1-2 tablets (0.1-0.2 mg) by mouth nightly as needed (sleep), Disp: 60 tablet, Rfl: 1     hydrochlorothiazide (HYDRODIURIL) 12.5 MG tablet, Take 1 tablet (12.5 mg) by mouth daily for 5 days, Disp: 5 tablet, Rfl: 0     hydrOXYzine (ATARAX) 25 MG tablet, Take 1 tablet (25 mg) by mouth nightly as needed (at HS PRN), Disp: 45 tablet, Rfl: 3     order for DME, Equipment being ordered: Digital home blood pressure monitor kit, Disp: 1 each, Rfl: 0     order for DME, Equipment being ordered: 4 wheeled walker with bench seat., Disp: 1 Units, Rfl: 0     potassium chloride ER (KLOR-CON M) 10 MEQ CR tablet, Take 1 tablet (10  mEq) by mouth daily for 5 days, Disp: 5 tablet, Rfl: 0     SF 5000 PLUS 1.1 % CREA, , Disp: , Rfl:      SUBOXONE 4-1 MG per film, PLACE 1 FILM UNDER THE TONGUE THREE TIMES DAILY (OK TO TAKE IN ADDITION TO 2-0.5MG FILM ON HAND) *1 TOTAL FILL*, Disp: 12 Film, Rfl: 0     tiZANidine (ZANAFLEX) 4 MG tablet, Take 1-2 tablets (4-8 mg) by mouth nightly as needed for muscle spasms, Disp: 60 tablet, Rfl: 1     vilazodone (VIIBRYD) 10 MG TABS tablet, Take 3 tablets (30 mg) by mouth At Bedtime, Disp: 90 tablet, Rfl: 1     zolpidem (AMBIEN) 10 MG tablet, Take 1 tablet (10 mg) by mouth nightly as needed for sleep, Disp: 30 tablet, Rfl: 1     naloxone (NARCAN) 4 MG/0.1ML nasal spray, Spray 1 spray (4 mg) into one nostril alternating nostrils as needed for opioid reversal every 2-3 minutes until assistance arrives (Patient not taking: Reported on 3/5/2021), Disp: 0.2 mL, Rfl: 0     oxybutynin ER (DITROPAN-XL) 10 MG 24 hr tablet, TAKE 1 TABLET BY MOUTH ONCE DAILY (Patient not taking: Reported on 3/5/2021), Disp: 90 tablet, Rfl: 1    Current Facility-Administered Medications:      iohexol (OMNIPAQUE) 300 mg/mL injection 10 mL, 10 mL, Cervical, Once, Brynn Trujillo MD    Past Medical History:   Diagnosis Date     Arthritis 2018     Benign positional vertigo 2016    Started after my groin/back injury. Sitting on Toilet.     Depressive disorder     I am on 120mg of Duoluxetine.     Dysphonia     Nothing significant.     Gastroesophageal reflux disease 2004    Occassional. Spicy foods set it off.     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      DATE OF EE2019.    SOURCE FILE DURATION:  15 minutes.  This EEG did not have a video.    PATIENT INFORMATION:  The patient is a 33-year-old male with irregular movements.  It is not entirely clear the etiology of these movements.  EEG is  being done to evaluate for seizures.    MEDICATIONS:  Adderall, doxepin, Cymbalta, Robaxin.      History of MRI of brain and brain stem 12/11/2015    MR BRAIN W/O & W CONTRAST, 12/11/2015  Impression: No suspicious intracranial findings.      Hoarseness 2017    Dry mouth, started after taking Tizanidine     Neuralgia, neuritis, and radiculitis, unspecified 04/30/2012     normal emg 2017 8/8/2017    Interpretation: This is a normal study. There is no electrophysiologic evidence of a lumbosacral radiculopathy affecting the right or left lower extremity on the basis of this study.    Rodney Mena MD Department of Neurology       Panic attack 12/6/2016     Seizures (H) 1986    Grew out of it. Absence Seizures. [id number]     Tinnitus 2015    Had it most of my life. Got worse in 2015       OBJECTIVE:  /74   Pulse 91   Temp 97.8  F (36.6  C) (Tympanic)   Wt 91.6 kg (202 lb)   SpO2 99%   BMI 30.36 kg/m    GENERAL APPEARANCE: healthy, alert and no distress  EYES: EOMI,  PERRL  HENT: ear canals and TM's normal and nose and mouth without ulcers or lesions  RESP: lungs clear to auscultation - no rales, rhonchi or wheezes  CV: regular rates and rhythm, normal S1 S2, no S3 or S4 and no murmur, click or rub -  ABDOMEN:  soft, nontender, no HSM or masses and bowel sounds normal  MS: extremities normal- no gross deformities noted, no evidence of inflammation in joints, FROM in all extremities.  EXTREMITIES: + 2 edema up to knees  Brisk cap refill(s)     ASSESSMENT:35 year old  35 year old male who recently was had an emergency department visit for leg edema secondary to a DVT      Plan: At this time we will continue the present management.  Continue Eliquis for 3 month(s)   Follow up here in clinic in 3 month(s)   We will repeat the ultrasound   If the DVT has resolved we will discontinue the anticoagulation.   I think hypercoagulable labs are in order here.   Smoking cessation discussed and we will use the Nicotrol  lori Bolton  voiced understanding to informations discussed today.

## 2021-03-07 NOTE — PATIENT INSTRUCTIONS
After Visit Instructions:     Thank you for coming to [facility] for your care. It is my goal to partner with you to help you reach your optimal state of health.     Continue daily self-care, identifying contributing factors, and monitoring variations in pain level. Continue to integrate self-care into your life.      1. Schedule VIDEO follow-up with CARLOS A Higuera NP-C in 3 months or sooner as needed.  2. Medication recommendations:   1. Tizanidine 4 m to 2 tablets at bedtime as needed      CARLOS A Bass NP-C  [facility]  [facility]    Clinic Number:  [phone]     Call with any questions about your care and for scheduling assistance.     Calls are returned Monday through Friday between 8 AM and 4:30 PM. We usually get back to you within 2 business days depending on the issue/request.    If we are prescribing your medications:    For opioid medication refills, call the clinic or send a Pipefish message 7 days in advance.  Please include:    Name of requested medication    Name of the pharmacy.    For non-opioid medications, call your pharmacy directly to request a refill. Please allow 3-4 days to be processed.     Per [place] State Law:    All controlled substance prescriptions must be filled within 30 days of being written.      For those controlled substances allowing refills, pickup must occur within 30 days of last fill.      We believe regular attendance is key to your success in our program!      Any time you are unable to keep your appointment we ask that you call us at least 24 hours in advance to cancel.This will allow us to offer the appointment time to another patient.   Multiple missed appointments may lead to dismissal from the clinic.

## 2021-03-08 ENCOUNTER — TELEPHONE (OUTPATIENT)
Dept: FAMILY MEDICINE | Facility: CLINIC | Age: 36
End: 2021-03-08

## 2021-03-08 ENCOUNTER — MYC MEDICAL ADVICE (OUTPATIENT)
Dept: FAMILY MEDICINE | Facility: CLINIC | Age: 36
End: 2021-03-08

## 2021-03-08 DIAGNOSIS — Z72.0 TOBACCO ABUSE: ICD-10-CM

## 2021-03-08 NOTE — TELEPHONE ENCOUNTER
Patient's insurance does not cover  nicotine (NICOTROL) 10 MG inhaler, Preferred is Nicotine BRADY or Gum. Did you want to start a PA or try alternative above.   Please advise.    MOLLY Arvizu

## 2021-03-09 ENCOUNTER — MYC MEDICAL ADVICE (OUTPATIENT)
Dept: FAMILY MEDICINE | Facility: CLINIC | Age: 36
End: 2021-03-09

## 2021-03-10 ENCOUNTER — MYC MEDICAL ADVICE (OUTPATIENT)
Dept: FAMILY MEDICINE | Facility: CLINIC | Age: 36
End: 2021-03-10

## 2021-03-12 ENCOUNTER — MYC MEDICAL ADVICE (OUTPATIENT)
Dept: FAMILY MEDICINE | Facility: CLINIC | Age: 36
End: 2021-03-12

## 2021-03-12 DIAGNOSIS — R60.0 PEDAL EDEMA: Primary | ICD-10-CM

## 2021-03-13 ENCOUNTER — MYC MEDICAL ADVICE (OUTPATIENT)
Dept: ADDICTION MEDICINE | Facility: CLINIC | Age: 36
End: 2021-03-13

## 2021-03-13 RX ORDER — HYDROCHLOROTHIAZIDE 25 MG/1
25 TABLET ORAL EVERY MORNING
Qty: 90 TABLET | Refills: 1 | Status: SHIPPED | OUTPATIENT
Start: 2021-03-13 | End: 2021-07-01

## 2021-03-15 ENCOUNTER — TRANSFERRED RECORDS (OUTPATIENT)
Dept: HEALTH INFORMATION MANAGEMENT | Facility: CLINIC | Age: 36
End: 2021-03-15

## 2021-03-16 ENCOUNTER — VIRTUAL VISIT (OUTPATIENT)
Dept: ADDICTION MEDICINE | Facility: CLINIC | Age: 36
End: 2021-03-16
Payer: COMMERCIAL

## 2021-03-16 DIAGNOSIS — M54.40 CHRONIC LOW BACK PAIN WITH SCIATICA, SCIATICA LATERALITY UNSPECIFIED, UNSPECIFIED BACK PAIN LATERALITY: Primary | ICD-10-CM

## 2021-03-16 DIAGNOSIS — F15.90 STIMULANT USE DISORDER: ICD-10-CM

## 2021-03-16 DIAGNOSIS — G25.9 FUNCTIONAL MOVEMENT DISORDER: ICD-10-CM

## 2021-03-16 DIAGNOSIS — G89.29 CHRONIC LOW BACK PAIN WITH SCIATICA, SCIATICA LATERALITY UNSPECIFIED, UNSPECIFIED BACK PAIN LATERALITY: Primary | ICD-10-CM

## 2021-03-16 PROCEDURE — 99213 OFFICE O/P EST LOW 20 MIN: CPT | Mod: 95 | Performed by: FAMILY MEDICINE

## 2021-03-16 RX ORDER — BUPRENORPHINE HYDROCHLORIDE AND NALOXONE HYDROCHLORIDE DIHYDRATE 8; 2 MG/1; MG/1
TABLET SUBLINGUAL
Qty: 42 TABLET | Refills: 0 | Status: SHIPPED | OUTPATIENT
Start: 2021-03-16 | End: 2021-03-25

## 2021-03-16 ASSESSMENT — ANXIETY QUESTIONNAIRES
1. FEELING NERVOUS, ANXIOUS, OR ON EDGE: MORE THAN HALF THE DAYS
GAD7 TOTAL SCORE: 13
7. FEELING AFRAID AS IF SOMETHING AWFUL MIGHT HAPPEN: SEVERAL DAYS
2. NOT BEING ABLE TO STOP OR CONTROL WORRYING: SEVERAL DAYS
6. BECOMING EASILY ANNOYED OR IRRITABLE: SEVERAL DAYS
IF YOU CHECKED OFF ANY PROBLEMS ON THIS QUESTIONNAIRE, HOW DIFFICULT HAVE THESE PROBLEMS MADE IT FOR YOU TO DO YOUR WORK, TAKE CARE OF THINGS AT HOME, OR GET ALONG WITH OTHER PEOPLE: EXTREMELY DIFFICULT
3. WORRYING TOO MUCH ABOUT DIFFERENT THINGS: MORE THAN HALF THE DAYS
5. BEING SO RESTLESS THAT IT IS HARD TO SIT STILL: NEARLY EVERY DAY

## 2021-03-16 ASSESSMENT — PATIENT HEALTH QUESTIONNAIRE - PHQ9
SUM OF ALL RESPONSES TO PHQ QUESTIONS 1-9: 10
5. POOR APPETITE OR OVEREATING: NEARLY EVERY DAY

## 2021-03-16 NOTE — PROGRESS NOTES
"  SUBJECTIVE:                                                    ADDICTION MEDICINE NOTE    Hoang Kiser is a 35 year old male who presents by video today for Addiction Medicine consult         [place] Board of Pharmacy Data Base Reviewed:    Yes ;          Brief History:    Here on request of Pain provider, Tamiko Stevens    Being treated for Lumbar DDD, Functional neurologic movement disorder    Also being treated by  [facility] for Depression, Attention Deficit Disorder    Referred to Addiction medicine due to positive UDS for methamphetamine    Patient admits to history of Addiction    Was treated in an MI/CD program in [place] in 2015, [facility] in 2016 for 13 months, and [facility] in 2019    Now says he's \"burnt out on treatment \"    Was involved with recovery support groups in past    Says he has had up to 4 years sobriety in the past    Past drug use includes prescription opioids, cocaine    Has been prescribed benzodiazepines in the past    Started using Methamphetamine early 2020    Says he didn't really use it to get high nor does he crave it but instead used it to self medicate his ADD    Says he has trouble with focusing and methamphetamine helped    Has been prescribed Adderall but felt dose insufficient at times    Now using Naprosyn for pain    Realizes he cannot use methamphetamine and doesn't see a problem stopping    Last use 1 week ago    Weaning off left over hydrocodone; no withdrawal but feels pain not controlled    Was recommended to seek treatment but has procrastinated    Advised [facility] out-patient MI/CD program may be helpful and a good fit    Not requesting any MAT as has no craving    Lives in a group home where medications are controlled and dispensed      HPI:    Video visit 3/16/21:    Recently had more pain from dental issues; needs two root canals    Also diagnosed with DVT    Would like increased dose of Suboxone     Would like tabs    Discussed pros and " cons    Will increase Suboxone to 12 mg daily    Discussed at length    Re-check 4 weeks in clinic    Questions answered      Social History     Social History Narrative    He lives in [facility] in a chemical treatment center - there are 11 people there. He arrives today through [address] ride    He has 3 brothers and 3 sisters. He has not children. He has never been .      Mother and father are alive    One sister is an alcoholic and bulemic    depression is present in the family : mother, sister and 2 brothers are bipolar.      He denies bipolar. He has depression.    He denies recurring thoughts. He has had substance abuse issues. He has had cravings in the past.    He exercises and plays guitar and draws.      He has used meth as well as prescription pain killers.    He has used marijuana when young. Used cocaine in the past.    Has not used iv drugs    He does not drink alcohol.      50% Colombian and is Nepalese, english and french and a bit native.    He smokes cigarettes - 1 pack per day.        single. lives in [place]. estela is father           Problem list and histories reviewed & adjusted, as indicated.  Additional history: as documented      ADDERALL XR 30 MG 24 hr capsule,   albuterol (PROAIR HFA/PROVENTIL HFA/VENTOLIN HFA) 108 (90 Base) MCG/ACT inhaler, INHALE ONE TO TWO PUFFS INTO THE LUNGS EVERY FOUR HOURS AS NEEDED FOR SHORTNESS OF BREATH/ DYSPNEA OR WHEEZING  apixaban ANTICOAGULANT (ELIQUIS) 5 MG tablet, Take 1 tablet (5 mg) by mouth 2 times daily  Apixaban Starter Pack (ELIQUIS DVT/PE STARTER PACK) 5 MG TBPK, Take 10 mg by mouth 2 times daily for 7 days, THEN 5 mg 2 times daily for 23 days.  cloNIDine (CATAPRES) 0.1 MG tablet, Take 1-2 tablets (0.1-0.2 mg) by mouth nightly as needed (sleep)  hydrochlorothiazide (HYDRODIURIL) 12.5 MG tablet, Take 1 tablet (12.5 mg) by mouth daily for 5 days  hydrochlorothiazide (HYDRODIURIL) 25 MG tablet, Take 1 tablet (25 mg) by mouth every  morning  hydrOXYzine (ATARAX) 25 MG tablet, Take 1 tablet (25 mg) by mouth nightly as needed (at HS PRN)  nicotine (NICODERM CQ) 7 MG/24HR 24 hr patch, Place 1 patch onto the skin every 24 hours  nicotine (NICOTROL) 10 MG inhaler, Use 1 cartridge as needed for urge to smoke by puffing over course of 20min.  Use 6-16 cart/day; reduce number of cart/day over 6-12 weeks.  order for DME, Equipment being ordered: Digital home blood pressure monitor kit  order for DME, Equipment being ordered: 4 wheeled walker with bench seat.  rivaroxaban ANTICOAGULANT (XARELTO ANTICOAGULANT) 20 MG TABS tablet, Take 1 tablet (20 mg) by mouth daily (with dinner) Start this after taking the starting pack of Rivaroxaban  SF 5000 PLUS 1.1 % CREA,   SUBOXONE 4-1 MG per film, PLACE 1 FILM UNDER THE TONGUE THREE TIMES DAILY (OK TO TAKE IN ADDITION TO 2-0.5MG FILM ON HAND) *1 TOTAL FILL*  tiZANidine (ZANAFLEX) 4 MG tablet, Take 1-2 tablets (4-8 mg) by mouth nightly as needed for muscle spasms  vilazodone (VIIBRYD) 10 MG TABS tablet, Take 3 tablets (30 mg) by mouth At Bedtime  zolpidem (AMBIEN) 10 MG tablet, Take 1 tablet (10 mg) by mouth nightly as needed for sleep    iohexol (OMNIPAQUE) 300 mg/mL injection 10 mL        Allergies   Allergen Reactions     Amitriptyline      Ineffective in reducing spasms/movement, increased fatigue  Other reaction(s): Other (see comments)     Buspirone Hcl Other (See Comments)     Panic attacks     Doxycycline Diarrhea, Fatigue, GI Disturbance and Nausea     Other reaction(s): GI intolerance     Trazodone Fatigue     Other reaction(s): Other (see comments)     Cephalexin Diarrhea     Other reaction(s): GI intolerance           REVIEW OF SYSTEMS:  General:  No acute withdrawal symptoms.  No recent infections or fever  Eyes:  No vision concerns.  No double vision.    Resp: No coughing, wheezing or shortness of breath  CV: No chest pains or palpitations  GI: No nausea, vomiting, abdominal pain, diarrhea.  No  constipation  : No urinary frequency or dysuria    Musculoskeletal: No significant muscle or joint pains other than as above.  No edema  Neurologic: No numbness, tingling, weakness, problems with balance or coordination  Psychiatric: No acute concerns other than as above.   Skin: No rashes or areas of acute infection    OBJECTIVE:    PHYSICAL EXAM:  There were no vitals taken for this visit.    GENERAL: Healthy, alert and no distress  EYES: Eyes grossly normal to inspection.  No discharge or erythema, or obvious scleral/conjunctival abnormalities.  RESP: No audible wheeze, cough, or visible cyanosis.  No visible retractions or increased work of breathing.    SKIN: Visible skin clear. No significant rash, abnormal pigmentation or lesions.  NEURO: Cranial nerves grossly intact.  Mentation and speech appropriate for age.  PSYCH: Mentation appears normal, affect normal/bright, judgement and insight intact, normal speech and appearance well-groomed.    No results found for any visits on 03/16/21.        ASSESSMENT:        Chronic back pain  -  Plan:  Suboxone     PLAN:    Addiction Medicine recommendations:    Suboxone 4 mg TID    Follow up 4 weeks    Video start time: 10:15  Video end time: 10:30  Platform: TrackaPhone  Patient location: home    Peng Nicholson MD  [facility]  [phone]

## 2021-03-16 NOTE — PROGRESS NOTES
Outpatient Speech Language Therapy Evaluation  PLAN OF TREATMENT FOR OUTPATIENT REHABILITATION  (COMPLETE FOR INITIAL CLAIMS ONLY)  Patient's Last Name, First Name, M.I.  YOB: 1985  Hoang Kiser  ANIL                        Provider's Name  Loren Solano, SLP Medical Record No.  [id number]                               Onset Date:  2/23/21   Start of Care Date: 2/23/21     Type: Speech Language Therapy Medical Diagnosis: No primary diagnosis found.                        Therapy Diagnosis:  No primary diagnosis found.   Visits from SOC:  1   _________________________________________________________________________________  Plan of Treatment:   Speech therapy    DURATION/FREQUENCY OF TREATMENT  Six weekly, one-hour sessions, with two monthly one-hour follow-up sessions    PROGNOSIS  Good prognosis for voice improvement with speech therapy and regular practice of therapeutic activities.    BARRIERS TO LEARNING/TEACHING AND LEARNING NEEDS  None/Unremarkable    GOALS:  Patient goal:   1. To improve and maintain a healthy voice quality  2. To understand the problem and fix it as much as possible    Short-term goal(s): Within the first 4 sessions, Mr. Kiser:  1. will utilize silent inhalation with good low-respiratory engagement 75% of the time during therapy tasks with minimal clinician support  2. will demonstrate semi-occluded vocal tract (SOVT) exercises with at least 80% accuracy with no clinician support  3. will accurately identify target vs. habitual voice quality during therapy tasks in 4 out of 5 trials with no clinician support    Long-term goal(s): In 6 months, Mr. Kiser will:  Report resolution of dysphonia, and use of optimal voice quality to meet personal, social, and professional needs, 90% of the time during a typical week of vocal  activities  _________________________________________________________________________________    I CERTIFY THE NEED FOR THESE SERVICES FURNISHED UNDER        THIS PLAN OF TREATMENT AND WHILE UNDER MY CARE     (Physician attestation of this document indicates review and certification of the therapy plan).     Certification date from: 2/23/21  Certification date to: 5/24/21    Referring Provider: Leslie

## 2021-03-17 ASSESSMENT — ANXIETY QUESTIONNAIRES: GAD7 TOTAL SCORE: 13

## 2021-03-20 ENCOUNTER — MYC MEDICAL ADVICE (OUTPATIENT)
Dept: PALLIATIVE MEDICINE | Facility: CLINIC | Age: 36
End: 2021-03-20

## 2021-03-22 NOTE — TELEPHONE ENCOUNTER
"Per Dr Bowman\" Pt is seeing a new provider and has a follow up on 3/8/2021.  Since pt will be transferring care, no further refill on psychiatric medications from this writer, pt should be getting new refill from Anabel Dove, [facility] for the future. Meagan Bowman, CNP, 3/2/2021  \"  "

## 2021-03-25 ENCOUNTER — HOSPITAL ENCOUNTER (EMERGENCY)
Facility: CLINIC | Age: 36
Discharge: PSYCHIATRIC OR SUBSTANCE USE FACILITY | End: 2021-03-25
Attending: EMERGENCY MEDICINE | Admitting: EMERGENCY MEDICINE
Payer: COMMERCIAL

## 2021-03-25 ENCOUNTER — HOSPITAL ENCOUNTER (INPATIENT)
Facility: CLINIC | Age: 36
LOS: 4 days | Discharge: NURSING, REHAB OR LONG-TERM CARE | End: 2021-03-29
Attending: PSYCHIATRY & NEUROLOGY | Admitting: PSYCHIATRY & NEUROLOGY
Payer: COMMERCIAL

## 2021-03-25 VITALS
HEIGHT: 67 IN | HEART RATE: 88 BPM | TEMPERATURE: 98 F | OXYGEN SATURATION: 99 % | DIASTOLIC BLOOD PRESSURE: 71 MMHG | RESPIRATION RATE: 14 BRPM | SYSTOLIC BLOOD PRESSURE: 113 MMHG | WEIGHT: 190.2 LBS | BODY MASS INDEX: 29.85 KG/M2

## 2021-03-25 DIAGNOSIS — F41.9 ANXIETY: ICD-10-CM

## 2021-03-25 DIAGNOSIS — F32.A DEPRESSION, UNSPECIFIED DEPRESSION TYPE: ICD-10-CM

## 2021-03-25 DIAGNOSIS — R45.86 LABILE MOOD: ICD-10-CM

## 2021-03-25 DIAGNOSIS — R45.87 IMPULSIVE: ICD-10-CM

## 2021-03-25 PROBLEM — R45.851 SUICIDAL IDEATION: Status: ACTIVE | Noted: 2021-03-25

## 2021-03-25 LAB
AMPHETAMINES UR QL SCN: POSITIVE
BARBITURATES UR QL: NEGATIVE
BENZODIAZ UR QL: NEGATIVE
CANNABINOIDS UR QL SCN: NEGATIVE
COCAINE UR QL: NEGATIVE
LABORATORY COMMENT REPORT: NORMAL
OPIATES UR QL SCN: NEGATIVE
PCP UR QL SCN: NEGATIVE
SARS-COV-2 RNA RESP QL NAA+PROBE: NEGATIVE
SPECIMEN SOURCE: NORMAL

## 2021-03-25 PROCEDURE — C9803 HOPD COVID-19 SPEC COLLECT: HCPCS

## 2021-03-25 PROCEDURE — 99285 EMERGENCY DEPT VISIT HI MDM: CPT | Mod: 25

## 2021-03-25 PROCEDURE — U0005 INFEC AGEN DETEC AMPLI PROBE: HCPCS | Performed by: EMERGENCY MEDICINE

## 2021-03-25 PROCEDURE — 124N000002 HC R&B MH UMMC

## 2021-03-25 PROCEDURE — 250N000013 HC RX MED GY IP 250 OP 250 PS 637: Performed by: PSYCHIATRY & NEUROLOGY

## 2021-03-25 PROCEDURE — 80307 DRUG TEST PRSMV CHEM ANLYZR: CPT | Performed by: EMERGENCY MEDICINE

## 2021-03-25 PROCEDURE — 250N000013 HC RX MED GY IP 250 OP 250 PS 637: Performed by: EMERGENCY MEDICINE

## 2021-03-25 PROCEDURE — 90791 PSYCH DIAGNOSTIC EVALUATION: CPT

## 2021-03-25 PROCEDURE — U0003 INFECTIOUS AGENT DETECTION BY NUCLEIC ACID (DNA OR RNA); SEVERE ACUTE RESPIRATORY SYNDROME CORONAVIRUS 2 (SARS-COV-2) (CORONAVIRUS DISEASE [COVID-19]), AMPLIFIED PROBE TECHNIQUE, MAKING USE OF HIGH THROUGHPUT TECHNOLOGIES AS DESCRIBED BY CMS-2020-01-R: HCPCS | Performed by: EMERGENCY MEDICINE

## 2021-03-25 RX ORDER — VILAZODONE HYDROCHLORIDE 20 MG/1
20 TABLET ORAL AT BEDTIME
COMMUNITY

## 2021-03-25 RX ORDER — VILAZODONE HYDROCHLORIDE 10 MG/1
30 TABLET ORAL AT BEDTIME
Status: DISCONTINUED | OUTPATIENT
Start: 2021-03-25 | End: 2021-03-26

## 2021-03-25 RX ORDER — HYDROXYZINE HYDROCHLORIDE 25 MG/1
25 TABLET, FILM COATED ORAL
Status: DISCONTINUED | OUTPATIENT
Start: 2021-03-25 | End: 2021-03-25 | Stop reason: HOSPADM

## 2021-03-25 RX ORDER — BUPRENORPHINE HYDROCHLORIDE AND NALOXONE HYDROCHLORIDE DIHYDRATE 2; .5 MG/1; MG/1
2 TABLET SUBLINGUAL 3 TIMES DAILY
COMMUNITY
End: 2021-04-13

## 2021-03-25 RX ORDER — LANOLIN ALCOHOL/MO/W.PET/CERES
3 CREAM (GRAM) TOPICAL
Status: DISCONTINUED | OUTPATIENT
Start: 2021-03-25 | End: 2021-03-25 | Stop reason: HOSPADM

## 2021-03-25 RX ORDER — HYDROXYZINE HYDROCHLORIDE 25 MG/1
25 TABLET, FILM COATED ORAL EVERY 4 HOURS PRN
Status: DISCONTINUED | OUTPATIENT
Start: 2021-03-25 | End: 2021-03-29 | Stop reason: HOSPADM

## 2021-03-25 RX ORDER — ALBUTEROL SULFATE 90 UG/1
2 AEROSOL, METERED RESPIRATORY (INHALATION) EVERY 4 HOURS PRN
Status: DISCONTINUED | OUTPATIENT
Start: 2021-03-25 | End: 2021-03-29 | Stop reason: HOSPADM

## 2021-03-25 RX ORDER — HYDROCHLOROTHIAZIDE 25 MG/1
25 TABLET ORAL EVERY MORNING
Status: DISCONTINUED | OUTPATIENT
Start: 2021-03-26 | End: 2021-03-29 | Stop reason: HOSPADM

## 2021-03-25 RX ORDER — AMOXICILLIN 250 MG
1 CAPSULE ORAL 2 TIMES DAILY PRN
Status: DISCONTINUED | OUTPATIENT
Start: 2021-03-25 | End: 2021-03-29 | Stop reason: HOSPADM

## 2021-03-25 RX ORDER — ZOLPIDEM TARTRATE 10 MG/1
10 TABLET ORAL
Status: DISCONTINUED | OUTPATIENT
Start: 2021-03-25 | End: 2021-03-26

## 2021-03-25 RX ORDER — NICOTINE 21 MG/24HR
1 PATCH, TRANSDERMAL 24 HOURS TRANSDERMAL ONCE
Status: DISCONTINUED | OUTPATIENT
Start: 2021-03-25 | End: 2021-03-25 | Stop reason: HOSPADM

## 2021-03-25 RX ORDER — LORAZEPAM 1 MG/1
1 TABLET ORAL ONCE
Status: COMPLETED | OUTPATIENT
Start: 2021-03-25 | End: 2021-03-25

## 2021-03-25 RX ORDER — OLANZAPINE 10 MG/2ML
10 INJECTION, POWDER, FOR SOLUTION INTRAMUSCULAR 3 TIMES DAILY PRN
Status: DISCONTINUED | OUTPATIENT
Start: 2021-03-25 | End: 2021-03-26

## 2021-03-25 RX ORDER — MAGNESIUM HYDROXIDE/ALUMINUM HYDROXICE/SIMETHICONE 120; 1200; 1200 MG/30ML; MG/30ML; MG/30ML
30 SUSPENSION ORAL EVERY 4 HOURS PRN
Status: DISCONTINUED | OUTPATIENT
Start: 2021-03-25 | End: 2021-03-29 | Stop reason: HOSPADM

## 2021-03-25 RX ORDER — CLONIDINE HYDROCHLORIDE 0.1 MG/1
.1-.2 TABLET ORAL
Status: DISCONTINUED | OUTPATIENT
Start: 2021-03-25 | End: 2021-03-29 | Stop reason: HOSPADM

## 2021-03-25 RX ORDER — OLANZAPINE 10 MG/1
10 TABLET ORAL 3 TIMES DAILY PRN
Status: DISCONTINUED | OUTPATIENT
Start: 2021-03-25 | End: 2021-03-26

## 2021-03-25 RX ORDER — ACETAMINOPHEN 325 MG/1
650 TABLET ORAL EVERY 4 HOURS PRN
Status: DISCONTINUED | OUTPATIENT
Start: 2021-03-25 | End: 2021-03-29 | Stop reason: HOSPADM

## 2021-03-25 RX ORDER — VILAZODONE HYDROCHLORIDE 10 MG/1
10 TABLET ORAL AT BEDTIME
COMMUNITY

## 2021-03-25 RX ORDER — OLANZAPINE 2.5 MG/1
2.5 TABLET, FILM COATED ORAL ONCE
Status: COMPLETED | OUTPATIENT
Start: 2021-03-25 | End: 2021-03-25

## 2021-03-25 RX ORDER — BUPRENORPHINE HYDROCHLORIDE AND NALOXONE HYDROCHLORIDE DIHYDRATE 2; .5 MG/1; MG/1
2 TABLET SUBLINGUAL 3 TIMES DAILY
Status: DISCONTINUED | OUTPATIENT
Start: 2021-03-26 | End: 2021-03-29 | Stop reason: HOSPADM

## 2021-03-25 RX ADMIN — NICOTINE 1 PATCH: 21 PATCH, EXTENDED RELEASE TRANSDERMAL at 17:18

## 2021-03-25 RX ADMIN — LORAZEPAM 1 MG: 1 TABLET ORAL at 17:19

## 2021-03-25 RX ADMIN — RIVAROXABAN 20 MG: 10 TABLET, FILM COATED ORAL at 23:31

## 2021-03-25 RX ADMIN — OLANZAPINE 2.5 MG: 2.5 TABLET, FILM COATED ORAL at 17:19

## 2021-03-25 RX ADMIN — VILAZODONE HYDROCHLORIDE 30 MG: 10 TABLET ORAL at 23:32

## 2021-03-25 ASSESSMENT — ENCOUNTER SYMPTOMS
NERVOUS/ANXIOUS: 1
SLEEP DISTURBANCE: 1
APPETITE CHANGE: 1
DYSPHORIC MOOD: 1

## 2021-03-25 ASSESSMENT — MIFFLIN-ST. JEOR
SCORE: 1740.16
SCORE: 1756.37

## 2021-03-25 ASSESSMENT — ACTIVITIES OF DAILY LIVING (ADL)
ORAL_HYGIENE: INDEPENDENT
HYGIENE/GROOMING: INDEPENDENT
LAUNDRY: WITH SUPERVISION
DRESS: INDEPENDENT

## 2021-03-25 NOTE — ED NOTES
Bed: ED17  Expected date:   Expected time:   Means of arrival:   Comments:  [facility] - 443 - 35 M suicidal eta 3439

## 2021-03-25 NOTE — ED PROVIDER NOTES
"  History   Chief Complaint:  Suicidal      HPI   Hoang Kiser is a 35 year old male with a history of substance abuse, ADHD depression, anxiety, PTSD with recent DVT on Eliquis who presents via EMS from group home for mental health evaluation after making suicidal comments. The patient states that his \"feelings are out of control.\" He has been very depressed, anxious, and expresses wanting to harm himself. He states that he would harm himself by taking a pillow to himself. He denies harming himself today. He also states that he feels intermittent bouts of rage. He is unsure what brings these episodes about and states that they feel random. Contrary to EMS report, he is not homicidal and did not state that he wanted to \"blow this place (group home) up.\" He states that he threw trash at his door today but has not broken anything or threatened to. He states that he has not been eating or sleeping well. He has a psychiatrist but does not see a therapist. He denies recent alcohol or street drug use.     Review of Systems   Constitutional: Positive for appetite change.   Psychiatric/Behavioral: Positive for dysphoric mood, sleep disturbance and suicidal ideas. Negative for self-injury. The patient is nervous/anxious.    All other systems reviewed and are negative.        Allergies:  Amitriptyline  Buspirone Hcl  Doxycycline  Trazodone  Cephalexin    Medications:  Adderall   Albuterol   Eliquis  Xarelto  Suboxone   Catapres  Hydrochlorothiazide   Atarax  Nicotine patch   Zanaflex   Ambien    Past Medical History:    Arthritis   Vertigo   Depression  Dysphonia  GERD  Neuralgia  Panic attack   Seizures  Chronic pain syndrome  Social phobia   Stimulant dependence   PTSD  Tic disorder  ADHD  Insomnia  DVT    Past Surgical History:    Colonoscopy x2  [place] teeth extraction  PEtubes     Family History:    Hyperlipidemia - father   Arthritis - mother   Depression - mother   Mental illness - mother, brother   Asthma - " "brother, sister   Substance abuse - sister  Colitis - brother     Social History:  Presents to the ED: EMS   Drug use: Prior history of meth, none in \"a long time\"  Living Situation: Group home with ~5 residents  PCP: Phill Floyd    Physical Exam     Patient Vitals for the past 24 hrs:   BP Temp Temp src Pulse Resp SpO2 Height Weight   03/25/21 1459 (!) 131/90 98  F (36.7  C) Temporal 94 14 99 % 1.702 m (5' 7\") 86.3 kg (190 lb 3.2 oz)       Physical Exam  General: Resting comfortably on the gurney    Tearful at times  Head:  The scalp, face, and head appear normal  Eyes:  The pupils are equal, round, and reactive to light    There is no nystagmus    Extraocular muscles are intact    Conjunctivae and sclerae are normal  ENT:    The nose is normal    Pinnae are normal    The oropharynx is normal    Uvula is in the midline  Neck:  Normal range of motion    There is no rigidity noted    There is no midline cervical spine pain/tenderness    Trachea is in the midline    No mass is detected  CV:  Regular rate and underlying rhythm     Normal S1/S2, no S3/S4    No pathological murmur detected  Resp:  Lungs are clear    There is no tachypnea    Non-labored    No rales    No wheezing   GI:  Abdomen is soft, there is no rigidity    No distension    No tympani    No rebound tenderness     Non-surgical without peritoneal features  MS:  Normal muscular tone    Symmetric motor strength    No major joint effusions    No asymmetric leg swelling, no calf tenderness  Skin:  No rash or acute skin lesions noted  Neuro: Speech is normal and fluent  Psych:  Awake. Alert.      Flat affect.  Appropriate interactions.    He does have suicidal thoughts, no homicidal thoughts or ideation    The articulated suicidal plan would have been to cover his face with a pillow    Patient admits to significant anxiety, and intermittent feelings of rage    The patient desires to get his emotions in control    Patient does have psychomotor " slowing    His mood is labile, he is tearful at one moment, and then fairly functional for a period of time, only to   get tearful again.  Lymph: No anterior cervical lymphadenopathy noted    Emergency Department Course     Emergency Department Course:    Reviewed:  I reviewed the patient's nursing notes, vitals and past medical history.     Assessments:  1450 I performed an exam of the patient in room ED17 as documented above.    Consults:    4802 I spoke with DEC regarding the patient's presentation.   4:52 PM I spoke with Tom from the DEC therapy department, we both agree the patient warrants admission        Disposition:  Patient will require hospitalization.  The therapist is trying to make arrangements for this.  At this point will be voluntary.  The patient has been signed out to Dr. Saini as it is the end of my shift and she can manage any interim issues    Impression & Plan     CMS Diagnoses: None    Medical Decision Making:  Hoang Kiser is a 35 year old male who presents to the emergency department today for evaluation of increasing depression, increasing anxiety, intermittent episodes of behavioral outburst (like throwing his trash can against the door today).  The patient had some suicidal thoughts today.  He feels like his mind is getting increasingly out of control and wishes to have that improved.  He does not have an active suicidal plan at this moment.  There is no homicidal ideation.  No hallucinations at this time.  The patient will require consultation with the emergency department therapist.  I have ordered a low-dose of Zyprexa and lorazepam for his anxiety.  The patient will be admitted to the psychiatric service, when a bed is available, for medication adjustment hopefully to improve his mood stabilization, depression, anxiety, and impulsivity.    Diagnosis:  Depression  Anxiety  Suicidal thoughts  Impulsive behavior  Labile mood    Scribe Disclosure:  I, Dang Darling, am  serving as a scribe at 3:10 PM on 3/25/2021 to document services personally performed by Fili Harmon MD based on my observations and the provider's statements to me.    This note was completed in part using Dragon voice recognition software. Although reviewed after completion, some word and grammatical errors may occur.      Fili Harmon MD  03/25/21 7875       Fili Harmon MD  03/25/21 4445

## 2021-03-25 NOTE — ED TRIAGE NOTES
Have not been able to control emotions.  Has been experiencing anxiety, verbal outbursts, and anger. Feels like harming self. Does not have a plan to harm self.

## 2021-03-25 NOTE — PHARMACY-ADMISSION MEDICATION HISTORY
Pharmacy Medication History  Admission medication history interview status for the 3/25/2021  admission is complete. See EPIC admission navigator for prior to admission medications     Location of Interview: Phone  Medication history sources: Group Home [phone]    Significant changes made to the medication list:  Modified Adderall, Suboxone  Removed Eliquis, Nicotrol inhaler    In the past week, patient estimated taking medication this percent of the time: greater than 90%    Additional medication history information:   Pt asks for Nicotine patch if he wants it  Pt takes Ambien and Zanaflex every night    Medication reconciliation completed by provider prior to medication history? No    Time spent in this activity: 20 minutes    Prior to Admission medications    Medication Sig Last Dose Taking? Auth Provider   ADDERALL XR 30 MG 24 hr capsule Take 30 mg by mouth daily  3/25/2021 at am Yes Reported, Patient   albuterol (PROAIR HFA/PROVENTIL HFA/VENTOLIN HFA) 108 (90 Base) MCG/ACT inhaler INHALE ONE TO TWO PUFFS INTO THE LUNGS EVERY FOUR HOURS AS NEEDED FOR SHORTNESS OF BREATH/ DYSPNEA OR WHEEZING prn Yes Phill Floyd MD   buprenorphine-naloxone (SUBOXONE) 2-0.5 MG SUBL sublingual tablet Place 2 tablets under the tongue 3 times daily 3/25/2021 at 1300 Yes Unknown, Entered By History   hydrochlorothiazide (HYDRODIURIL) 25 MG tablet Take 1 tablet (25 mg) by mouth every morning 3/25/2021 at am Yes Phill Floyd MD   nicotine (NICODERM CQ) 7 MG/24HR 24 hr patch Place 1 patch onto the skin every 24 hours unsure Yes Phill Floyd MD   rivaroxaban ANTICOAGULANT (XARELTO ANTICOAGULANT) 20 MG TABS tablet Take 1 tablet (20 mg) by mouth daily (with dinner) Start this after taking the starting pack of Rivaroxaban 3/24/2021 at Unknown time Yes Phill Floyd MD   tiZANidine (ZANAFLEX) 4 MG tablet Take 1-2 tablets (4-8 mg) by mouth nightly as needed for muscle spasms 3/24/2021 at Unknown time Yes Hilda  CARLOS A Carlton CNP   vilazodone (VIIBRYD) 10 MG TABS tablet Take 10 mg by mouth At Bedtime 10 mg + 20mg = 30mg 3/24/2021 at Unknown time Yes Unknown, Entered By History   vilazodone (VIIBRYD) 20 MG TABS tablet Take 20 mg by mouth At Bedtime 10 mg + 20mg = 30mg 3/24/2021 at hs Yes Unknown, Entered By History   zolpidem (AMBIEN) 10 MG tablet Take 1 tablet (10 mg) by mouth nightly as needed for sleep 3/24/2021 at Unknown time Yes Meagan Bowman APRN CNP   cloNIDine (CATAPRES) 0.1 MG tablet Take 1-2 tablets (0.1-0.2 mg) by mouth nightly as needed (sleep) Meagan Burk APRN CNP   hydrOXYzine (ATARAX) 25 MG tablet Take 1 tablet (25 mg) by mouth nightly as needed (at HS PRN) Meagan Burk APRN CNP   order for DME Equipment being ordered: Digital home blood pressure monitor kit   Hailey Green CNP   order for DME Equipment being ordered: 4 wheeled walker with bench seat.   Tamiko Stevens APRN CNP       The information provided in this note is only as accurate as the sources available at the time of update(s)

## 2021-03-26 LAB
ALBUMIN SERPL-MCNC: 3.5 G/DL (ref 3.4–5)
ALP SERPL-CCNC: 96 U/L (ref 40–150)
ALT SERPL W P-5'-P-CCNC: 32 U/L (ref 0–70)
ANION GAP SERPL CALCULATED.3IONS-SCNC: 7 MMOL/L (ref 3–14)
AST SERPL W P-5'-P-CCNC: 25 U/L (ref 0–45)
BASOPHILS # BLD AUTO: 0 10E9/L (ref 0–0.2)
BASOPHILS NFR BLD AUTO: 0.5 %
BILIRUB SERPL-MCNC: 0.4 MG/DL (ref 0.2–1.3)
BUN SERPL-MCNC: 11 MG/DL (ref 7–30)
CALCIUM SERPL-MCNC: 9 MG/DL (ref 8.5–10.1)
CHLORIDE SERPL-SCNC: 105 MMOL/L (ref 94–109)
CHOLEST SERPL-MCNC: 213 MG/DL
CO2 SERPL-SCNC: 26 MMOL/L (ref 20–32)
CREAT SERPL-MCNC: 0.92 MG/DL (ref 0.66–1.25)
DIFFERENTIAL METHOD BLD: NORMAL
EOSINOPHIL # BLD AUTO: 0.2 10E9/L (ref 0–0.7)
EOSINOPHIL NFR BLD AUTO: 2.6 %
ERYTHROCYTE [DISTWIDTH] IN BLOOD BY AUTOMATED COUNT: 12.2 % (ref 10–15)
GFR SERPL CREATININE-BSD FRML MDRD: >90 ML/MIN/{1.73_M2}
GLUCOSE SERPL-MCNC: 104 MG/DL (ref 70–99)
HCT VFR BLD AUTO: 42.6 % (ref 40–53)
HDLC SERPL-MCNC: 41 MG/DL
HGB BLD-MCNC: 15 G/DL (ref 13.3–17.7)
IMM GRANULOCYTES # BLD: 0 10E9/L (ref 0–0.4)
IMM GRANULOCYTES NFR BLD: 0.2 %
LDLC SERPL CALC-MCNC: 145 MG/DL
LYMPHOCYTES # BLD AUTO: 1 10E9/L (ref 0.8–5.3)
LYMPHOCYTES NFR BLD AUTO: 17.9 %
MCH RBC QN AUTO: 30.1 PG (ref 26.5–33)
MCHC RBC AUTO-ENTMCNC: 35.2 G/DL (ref 31.5–36.5)
MCV RBC AUTO: 85 FL (ref 78–100)
MONOCYTES # BLD AUTO: 0.9 10E9/L (ref 0–1.3)
MONOCYTES NFR BLD AUTO: 15.8 %
NEUTROPHILS # BLD AUTO: 3.7 10E9/L (ref 1.6–8.3)
NEUTROPHILS NFR BLD AUTO: 63 %
NONHDLC SERPL-MCNC: 172 MG/DL
NRBC # BLD AUTO: 0 10*3/UL
NRBC BLD AUTO-RTO: 0 /100
PLATELET # BLD AUTO: 266 10E9/L (ref 150–450)
POTASSIUM SERPL-SCNC: 3.7 MMOL/L (ref 3.4–5.3)
PROT SERPL-MCNC: 7.2 G/DL (ref 6.8–8.8)
RBC # BLD AUTO: 4.99 10E12/L (ref 4.4–5.9)
SODIUM SERPL-SCNC: 138 MMOL/L (ref 133–144)
TRIGL SERPL-MCNC: 133 MG/DL
WBC # BLD AUTO: 5.8 10E9/L (ref 4–11)

## 2021-03-26 PROCEDURE — 85025 COMPLETE CBC W/AUTO DIFF WBC: CPT | Performed by: PSYCHIATRY & NEUROLOGY

## 2021-03-26 PROCEDURE — 124N000002 HC R&B MH UMMC

## 2021-03-26 PROCEDURE — 80061 LIPID PANEL: CPT | Performed by: PSYCHIATRY & NEUROLOGY

## 2021-03-26 PROCEDURE — 99207 PR CONSULT E&M CHANGED TO INITIAL LEVEL: CPT | Performed by: PHYSICIAN ASSISTANT

## 2021-03-26 PROCEDURE — 250N000013 HC RX MED GY IP 250 OP 250 PS 637: Performed by: PSYCHIATRY & NEUROLOGY

## 2021-03-26 PROCEDURE — 99221 1ST HOSP IP/OBS SF/LOW 40: CPT | Mod: AI | Performed by: PSYCHIATRY & NEUROLOGY

## 2021-03-26 PROCEDURE — 36415 COLL VENOUS BLD VENIPUNCTURE: CPT | Performed by: PSYCHIATRY & NEUROLOGY

## 2021-03-26 PROCEDURE — 99222 1ST HOSP IP/OBS MODERATE 55: CPT | Performed by: PHYSICIAN ASSISTANT

## 2021-03-26 PROCEDURE — 80053 COMPREHEN METABOLIC PANEL: CPT | Performed by: PSYCHIATRY & NEUROLOGY

## 2021-03-26 PROCEDURE — G0177 OPPS/PHP; TRAIN & EDUC SERV: HCPCS

## 2021-03-26 PROCEDURE — 90853 GROUP PSYCHOTHERAPY: CPT

## 2021-03-26 RX ORDER — LEVOMEFOLATE CALCIUM 15 MG
15 TABLET ORAL DAILY
COMMUNITY
End: 2021-06-04

## 2021-03-26 RX ORDER — VILAZODONE HYDROCHLORIDE 40 MG/1
40 TABLET ORAL
Status: DISCONTINUED | OUTPATIENT
Start: 2021-03-27 | End: 2021-03-29 | Stop reason: HOSPADM

## 2021-03-26 RX ORDER — MIRTAZAPINE 15 MG/1
15 TABLET, FILM COATED ORAL AT BEDTIME
Status: DISCONTINUED | OUTPATIENT
Start: 2021-03-26 | End: 2021-03-29 | Stop reason: HOSPADM

## 2021-03-26 RX ORDER — OLANZAPINE 5 MG/1
5 TABLET ORAL EVERY 6 HOURS PRN
Status: DISCONTINUED | OUTPATIENT
Start: 2021-03-26 | End: 2021-03-29 | Stop reason: HOSPADM

## 2021-03-26 RX ADMIN — HYDROCHLOROTHIAZIDE 25 MG: 25 TABLET ORAL at 08:17

## 2021-03-26 RX ADMIN — MIRTAZAPINE 15 MG: 15 TABLET ORAL at 20:04

## 2021-03-26 RX ADMIN — NICOTINE 7 MG/24 HR DAILY TRANSDERMAL PATCH 1 PATCH: at 08:18

## 2021-03-26 RX ADMIN — ACETAMINOPHEN 650 MG: 325 TABLET, FILM COATED ORAL at 08:27

## 2021-03-26 RX ADMIN — BUPRENORPHINE AND NALOXONE 2 TABLET: 2; .5 TABLET SUBLINGUAL at 08:18

## 2021-03-26 RX ADMIN — BUPRENORPHINE AND NALOXONE 2 TABLET: 2; .5 TABLET SUBLINGUAL at 13:17

## 2021-03-26 RX ADMIN — BUPRENORPHINE AND NALOXONE 2 TABLET: 2; .5 TABLET SUBLINGUAL at 20:04

## 2021-03-26 RX ADMIN — RIVAROXABAN 20 MG: 10 TABLET, FILM COATED ORAL at 16:35

## 2021-03-26 ASSESSMENT — ACTIVITIES OF DAILY LIVING (ADL)
ORAL_HYGIENE: INDEPENDENT
DRESS: INDEPENDENT
HYGIENE/GROOMING: INDEPENDENT
LAUNDRY: WITH SUPERVISION

## 2021-03-26 NOTE — PLAN OF CARE
Patient was recorded as asleep through the night with no behavioral or medical concerns. He received his medications at about 2330.

## 2021-03-26 NOTE — ED NOTES
Assumed care of patient, awaiting covid test to come back then transport to [facility]. Patient resting on stretcher watching TV.

## 2021-03-26 NOTE — PROGRESS NOTES
Black flip flop sandles, , grey underware + shorts, black T - shirt, [place] cigarettes, glasses (given to patient)

## 2021-03-26 NOTE — ED NOTES
Lab contacted regarding COVID swab result for patient. Swab was shipped to the [facility] to be ran, unknown when result will come through.

## 2021-03-26 NOTE — PHARMACY-ADMISSION MEDICATION HISTORY
Please see Admission Medication History note completed by pharmacist on 3/25/21 under previous encounter at [facility] for information regarding prior to admission medications.    Add Narcan 4 mg nasal spray - last filled 02/25/2021 - to PTA med list.    Of note from [facility] clinic visit, it was recommended the patient start taking 5-MTHF 15 mg per day. No fill history in dispense report for this supplement.    Dispense report:  Adderall XR 30 mg last filled 03/08/2021 for quantity 30 for 30 day supply  Suboxone 2/0.5 mg tablets last filled 03/16/2021 for quantity 168 for 28 day supply  Zolpidem 10 mg last filled 02/24/2021 for quantity 30 for 30 day supply    Prior to Admission medications    Medication Sig   ADDERALL XR 30 MG 24 hr capsule Take 30 mg by mouth daily    albuterol (PROAIR HFA/PROVENTIL HFA/VENTOLIN HFA) 108 (90 Base) MCG/ACT inhaler INHALE ONE TO TWO PUFFS INTO THE LUNGS EVERY FOUR HOURS AS NEEDED FOR SHORTNESS OF BREATH/ DYSPNEA OR WHEEZING   buprenorphine-naloxone (SUBOXONE) 2-0.5 MG SUBL sublingual tablet Place 2 tablets under the tongue 3 times daily   hydrochlorothiazide (HYDRODIURIL) 25 MG tablet Take 1 tablet (25 mg) by mouth every morning   methylfolate (DEPLIN) 15 MG TABS tablet Take 15 mg by mouth daily   naloxone (NARCAN) 4 MG/0.1ML nasal spray Spray 4 mg into one nostril alternating nostrils once as needed for opioid reversal every 2-3 minutes until assistance arrives   rivaroxaban ANTICOAGULANT (XARELTO ANTICOAGULANT) 20 MG TABS tablet Take 1 tablet (20 mg) by mouth daily (with dinner) Start this after taking the starting pack of Rivaroxaban   tiZANidine (ZANAFLEX) 4 MG tablet Take 1-2 tablets (4-8 mg) by mouth nightly as needed for muscle spasms   vilazodone (VIIBRYD) 10 MG TABS tablet Take 10 mg by mouth At Bedtime 10 mg + 20mg = 30mg   vilazodone (VIIBRYD) 20 MG TABS tablet Take 20 mg by mouth At Bedtime 10 mg + 20mg = 30mg   zolpidem (AMBIEN) 10 MG  tablet Take 1 tablet (10 mg) by mouth nightly as needed for sleep   cloNIDine (CATAPRES) 0.1 MG tablet Take 1-2 tablets (0.1-0.2 mg) by mouth nightly as needed (sleep)   hydrOXYzine (ATARAX) 25 MG tablet Take 1 tablet (25 mg) by mouth nightly as needed (at HS PRN)   nicotine (NICODERM CQ) 7 MG/24HR 24 hr patch Place 1 patch onto the skin every 24 hours       Sarina Romero, PharmD, BCPP  Behavioral Health Pharmacist  [facility] Ascom: [phone] or [phone]

## 2021-03-26 NOTE — PLAN OF CARE
INITIAL OT NOTE  Problem: OT General Care Plan  Goal: OT Goal 1  Description: Will attend OT groups and participate actively in all OT opportunities. Will assess and set goals.    Pt attended 1 out of 3 OT groups offered.   Pt actively participated in a structured occupational therapy group of 2 patients total x45 minutes with a focus on a visual-spatial leisure task. Pt was able to follow 2-step directions of the novel task, though appeared to have some difficulty with strategic planning and problem solving throughout the task. With additional time required, pt demonstrated ability to find simple solutions, though required additional verbal and visual cues for more complex pieces of the task. Pt remained focused and engaged for the full duration of group. Was quiet and reserved, though appeared comfortable interacting with peers and staff. Calm, pleasant, and cooperative with a restricted affect. Will continue to assess. Pt will be given self-assessment form, and OT staff will explain the purpose of including them in their treatment plan and offer options for meeting their needs. Initial assessment will be completed upon further observation.       Outcome: No Change
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
## 2021-03-26 NOTE — CONSULTS
[facility]  Consult Note - Hospitalist Service     Date of Admission:  3/25/2021  Consult Requested by: Dr. Sullivan  Reason for Consult: Hx of DVT     Assessment & Plan   Hoang Kiser is a 35 year old male admitted on 3/25/2021. He has a history of substance abuse, ADHD, depression, anxiety, PTSD, functional neurologic disorder, recent hx of DVT on Xeralto admitted to station 10N for worsening anxiety, depression and behavioral outbursts.     DVT   Evaluated in the ED 3/3 for pain and swelling of his leg up to thigh. US positive for DVT. Started on Eliquis. Followed up with PCP who recommended repeat US after 3 month.   - Continue PTA Xarelto   - Continue PTA hydrochlorothiazide   - Offered issa stocking, per nursing unable to have on station 10N  - Follow up with PCP in 3 month for repeat US as previously planned   - Consider anticoagulation labs once off Xeralto     - Encourage smoking cessation     Functional neurologic disorder   Follows with Dr. Cao of neurology. Last seen 7/2020, note reviewed. PTA on tizanidine, vilazodone.   - Continue PTA medications   - Continued follow up with Dr. Cao as scheduled/PRN     Chronic pain   Follows with Dr. Stevens of pain and palliative care. PTA on Suboxone.   - Continue PTA medications if okay with primary care team     Anxiety   Depression   Behavioral outbursts   - Management per psychiatry        The patient's care was discussed with the Bedside Nurse and Patient.    Currently patient is medically stable and medicine will sign off. Thank you for allowing us to be a part of this patients care. Please notify on call VALENTINA if any intercurrent medical issues arise.       SUKHWINDER Maddox  [facility]  Contact information available via [facility] Paging/Directory    ______________________________________________________________________    Chief Complaint   Lower extremity  edema     History is obtained from the patient and review of medical record.     History of Present Illness   Hoang Kiser is a 35 year old male who has a history of substance abuse, ADHD, depression, anxiety, PTSD, functional neurologic disorder, recent hx of DVT on Xeralto admitted to station 10N for worsening anxiety, depression and behavioral outbursts. Patient reports he was seen in the ED and diagnosed with right lower extremity DVT 3/3/21 and started on Xeralto and hydrochlorothiazide. He followed up with with PCP. Reports his swelling of bilateral lower extremities is improving. He reports chronic pain in his legs and hips. Follows with pain and palliative care for chronic pain. Also reports, functional neurologic disorder for which he follows with Dr. Cao of neurology. Denies any significant changes in symptoms recently. Reports intermittent SOB what is unchanged from baseline. Denies chest pain, fevers, chills, nausea, vomiting, abdominal pain, constipation, diarrhea, dysuria.     Review of Systems   The 10 point Review of Systems is negative other than noted in the HPI.     Past Medical History    I have reviewed this patient's medical history and updated it with pertinent information if needed.   Past Medical History:   Diagnosis Date     Arthritis 2018     Benign positional vertigo 2016    Started after my groin/back injury. Sitting on Toilet.     Depressive disorder     I am on 120mg of Duoluxetine.     Dysphonia     Nothing significant.     Gastroesophageal reflux disease 2004    Occassional. Spicy foods set it off.     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      DATE OF EE2019.    SOURCE FILE DURATION:  15 minutes.  This EEG did not have a video.    PATIENT INFORMATION:  The patient is a 33-year-old male with irregular movements.  It is  not entirely clear the etiology of these movements.  EEG is being done to evaluate for seizures.    MEDICATIONS:  Adderall, doxepin, Cymbalta, Robaxin.      History of MRI of brain and brain stem 12/11/2015    MR BRAIN W/O & W CONTRAST, 12/11/2015  Impression: No suspicious intracranial findings.      Hoarseness 2017    Dry mouth, started after taking Tizanidine     Neuralgia, neuritis, and radiculitis, unspecified 04/30/2012     normal emg 2017 8/8/2017    Interpretation: This is a normal study. There is no electrophysiologic evidence of a lumbosacral radiculopathy affecting the right or left lower extremity on the basis of this study.    Rodney Mena MD Department of Neurology       Panic attack 12/6/2016     Seizures (H) 1986    Grew out of it. Absence Seizures. [id number]     Tinnitus 2015    Had it most of my life. Got worse in 2015       Past Surgical History   I have reviewed this patient's surgical history and updated it with pertinent information if needed.  Past Surgical History:   Procedure Laterality Date     COLONOSCOPY  02/15/18    Has not happened yet.     COLONOSCOPY N/A 2/15/2018    Procedure: COMBINED COLONOSCOPY, SINGLE OR MULTIPLE BIOPSY/POLYPECTOMY BY BIOPSY;;  Surgeon: Liam Kincaid MD;  Location: [facility] OR     COLONOSCOPY WITH CO2 INSUFFLATION N/A 2/15/2018    Procedure: COLONOSCOPY WITH CO2 INSUFFLATION;  COLON-FAMILY HX OF COLON CANCER/ SYPURA;  Surgeon: Liam Kincaid MD;  Location: [facility] OR     HC TOOTH EXTRACTION W/FORCEP Bilateral 2003     PE TUBES  1990       Social History   I have reviewed this patient's social history and updated it with pertinent information if needed.  Social History     Tobacco Use     Smoking status: Current Every Day Smoker     Packs/day: 0.50     Years: 10.00     Pack years: 5.00     Types: Cigarettes     Start date: 1/1/2002     Smokeless tobacco: Never Used     Tobacco comment: Transdermal patches have helped me the most in the past   Substance  Use Topics     Alcohol use: No     Alcohol/week: 2.0 - 4.0 standard drinks     Comment: rarely     Drug use: No     Comment: hx of meth, 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 12-10-20 = 2.5 months sober       Family History   I have reviewed this patient's family history and updated it with pertinent information if needed.  Family History   Problem Relation Age of Onset     Lipids Father         hyperlipidemia     Hyperlipidemia Father      Obesity Father      Arthritis Mother      Hyperlipidemia Mother      Depression Mother      Anxiety Disorder Mother      Mental Illness Mother      Obesity Mother      Asthma Brother      Asthma Sister      Depression Other      Hearing Loss Other      Psychotic Disorder Other      Obesity Other      Cerebrovascular Disease Paternal Grandfather      Alzheimer Disease Paternal Grandfather      Depression Brother      Mental Illness Brother         Bipolar     Asthma Brother      Colitis Brother      Skin Cancer Brother      Depression Sister      Asthma Sister      Substance Abuse Sister         Alcohol     Mental Illness Brother         Bipolar     Asthma Brother      Colitis Brother      Asthma Sister      Obesity Sister      Asthma Brother      Obesity Brother      Obesity Maternal Grandmother      Genetic Disorder Maternal Grandmother         Epilepsy     Obesity Paternal Grandmother      Colon Cancer Other      Genetic Disorder Other         Cerebral Palsy     Genetic Disorder Maternal Grandfather         Multiple Sclerosis     Genetic Disorder Other         Epilepsy       Medications   Facility-Administered Medications Prior to Admission   Medication Dose Route Frequency Provider Last Rate Last Admin     iohexol (OMNIPAQUE) 300 mg/mL injection 10 mL  10 mL Cervical Once Brynn Trujillo MD         Medications Prior to Admission   Medication Sig Dispense Refill Last Dose     ADDERALL XR 30 MG 24 hr capsule Take 30 mg by mouth daily    3/25/2021 at Unknown time     albuterol  (PROAIR HFA/PROVENTIL HFA/VENTOLIN HFA) 108 (90 Base) MCG/ACT inhaler INHALE ONE TO TWO PUFFS INTO THE LUNGS EVERY FOUR HOURS AS NEEDED FOR SHORTNESS OF BREATH/ DYSPNEA OR WHEEZING 18 g 1 3/24/2021 at Unknown time     buprenorphine-naloxone (SUBOXONE) 2-0.5 MG SUBL sublingual tablet Place 2 tablets under the tongue 3 times daily   3/25/2021 at Unknown time     hydrochlorothiazide (HYDRODIURIL) 25 MG tablet Take 1 tablet (25 mg) by mouth every morning 90 tablet 1 3/25/2021 at Unknown time     methylfolate (DEPLIN) 15 MG TABS tablet Take 15 mg by mouth daily   Not started yet     naloxone (NARCAN) 4 MG/0.1ML nasal spray Spray 4 mg into one nostril alternating nostrils once as needed for opioid reversal every 2-3 minutes until assistance arrives        rivaroxaban ANTICOAGULANT (XARELTO ANTICOAGULANT) 20 MG TABS tablet Take 1 tablet (20 mg) by mouth daily (with dinner) Start this after taking the starting pack of Rivaroxaban 30 tablet 2 3/24/2021 at Unknown time     tiZANidine (ZANAFLEX) 4 MG tablet Take 1-2 tablets (4-8 mg) by mouth nightly as needed for muscle spasms 60 tablet 1 3/24/2021 at Unknown time     vilazodone (VIIBRYD) 10 MG TABS tablet Take 10 mg by mouth At Bedtime 10 mg + 20mg = 30mg   3/24/2021 at Unknown time     vilazodone (VIIBRYD) 20 MG TABS tablet Take 20 mg by mouth At Bedtime 10 mg + 20mg = 30mg   3/24/2021 at Unknown time     zolpidem (AMBIEN) 10 MG tablet Take 1 tablet (10 mg) by mouth nightly as needed for sleep 30 tablet 1 3/24/2021 at Unknown time     cloNIDine (CATAPRES) 0.1 MG tablet Take 1-2 tablets (0.1-0.2 mg) by mouth nightly as needed (sleep) 60 tablet 1 Unknown at Unknown time     hydrOXYzine (ATARAX) 25 MG tablet Take 1 tablet (25 mg) by mouth nightly as needed (at HS PRN) 45 tablet 3 Unknown at Unknown time     nicotine (NICODERM CQ) 7 MG/24HR 24 hr patch Place 1 patch onto the skin every 24 hours 30 patch 1 Unknown at Unknown time     order for DME Equipment being ordered: Digital  "home blood pressure monitor kit 1 each 0      order for DME Equipment being ordered: 4 wheeled walker with bench seat. 1 Units 0        Allergies   Allergies   Allergen Reactions     Amitriptyline      Ineffective in reducing spasms/movement, increased fatigue  Other reaction(s): Other (see comments)     Buspirone Hcl Other (See Comments)     Panic attacks     Doxycycline Diarrhea, Fatigue, GI Disturbance and Nausea     Other reaction(s): GI intolerance     Trazodone Fatigue     Other reaction(s): Other (see comments)     Cephalexin Diarrhea     Other reaction(s): GI intolerance       Physical Exam   Blood pressure 124/79, pulse 93, temperature 98.1  F (36.7  C), temperature source Oral, resp. rate 16, height 1.702 m (5' 7.01\"), weight 84.6 kg (186 lb 9.6 oz).  GENERAL: Alert and oriented x 3. NAD. Slight delayed responses.   HEENT: Anicteric sclera. PERRL. Mucous membranes moist and without lesions.   CV: RRR. S1, S2. No murmurs appreciated.   RESPIRATORY: Effort normal on RA. Lungs CTAB with no wheezing, rales, rhonchi.   GI: Abdomen soft and non distended, bowel sounds present. No tenderness, rebound, guarding.   MUSCULOSKELETAL: No joint swelling or tenderness. Moves all extremities.   NEUROLOGICAL: No focal deficits. Strength 5/5 bilaterally in upper and lower extremities.   EXTREMITIES: 1+ bilateral lower extremity edema. Intact bilateral pedal pulses.   SKIN: No jaundice. No rashes.       Data   Results for orders placed or performed during the hospital encounter of 03/25/21 (from the past 24 hour(s))   CBC with platelets differential   Result Value Ref Range    WBC 5.8 4.0 - 11.0 10e9/L    RBC Count 4.99 4.4 - 5.9 10e12/L    Hemoglobin 15.0 13.3 - 17.7 g/dL    Hematocrit 42.6 40.0 - 53.0 %    MCV 85 78 - 100 fl    MCH 30.1 26.5 - 33.0 pg    MCHC 35.2 31.5 - 36.5 g/dL    RDW 12.2 10.0 - 15.0 %    Platelet Count 266 150 - 450 10e9/L    Diff Method Automated Method     % Neutrophils 63.0 %    % Lymphocytes 17.9 " %    % Monocytes 15.8 %    % Eosinophils 2.6 %    % Basophils 0.5 %    % Immature Granulocytes 0.2 %    Nucleated RBCs 0 0 /100    Absolute Neutrophil 3.7 1.6 - 8.3 10e9/L    Absolute Lymphocytes 1.0 0.8 - 5.3 10e9/L    Absolute Monocytes 0.9 0.0 - 1.3 10e9/L    Absolute Eosinophils 0.2 0.0 - 0.7 10e9/L    Absolute Basophils 0.0 0.0 - 0.2 10e9/L    Abs Immature Granulocytes 0.0 0 - 0.4 10e9/L    Absolute Nucleated RBC 0.0    Comprehensive metabolic panel   Result Value Ref Range    Sodium 138 133 - 144 mmol/L    Potassium 3.7 3.4 - 5.3 mmol/L    Chloride 105 94 - 109 mmol/L    Carbon Dioxide 26 20 - 32 mmol/L    Anion Gap 7 3 - 14 mmol/L    Glucose 104 (H) 70 - 99 mg/dL    Urea Nitrogen 11 7 - 30 mg/dL    Creatinine 0.92 0.66 - 1.25 mg/dL    GFR Estimate >90 >60 mL/min/[1.73_m2]    GFR Estimate If Black >90 >60 mL/min/[1.73_m2]    Calcium 9.0 8.5 - 10.1 mg/dL    Bilirubin Total 0.4 0.2 - 1.3 mg/dL    Albumin 3.5 3.4 - 5.0 g/dL    Protein Total 7.2 6.8 - 8.8 g/dL    Alkaline Phosphatase 96 40 - 150 U/L    ALT 32 0 - 70 U/L    AST 25 0 - 45 U/L   Lipid panel   Result Value Ref Range    Cholesterol 213 (H) <200 mg/dL    Triglycerides 133 <150 mg/dL    HDL Cholesterol 41 >39 mg/dL    LDL Cholesterol Calculated 145 (H) <100 mg/dL    Non HDL Cholesterol 172 (H) <130 mg/dL
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
## 2021-03-26 NOTE — PLAN OF CARE
"            Work Completed: CTC (writer) met with trx team, provided update, and reviewed pt's chart. CTC complete initial team note. CTC met with pt to introduce themselves and complete initial psychosocial assessment (IPA) interview. Pt was able to participate in the interview effectively, but needed some redirection to stay on track. Pt reported that his primary issue is his living situation, which he blames for his current decompensation and subsequent hospitalization. Pt appeared distracted at times, \"spcing out\", but pt reported that this was due to a neurological disorder. Pt also endorsed having a hard time concentrating due to his ADHD. After completing the IPA, pt and CTC discussed trx/d/c planning. Pt was agreeable to a referral for therapy and anger management services. He already has a lot of services in place and is accepting that his needs to work with his CADI CM on his housing situation as that is not something addressed by being hospitalized. Pt is hoping for a medication adjustment while here to help him manage his emotions better.    Discharge plan or goal: Pt will likely discharge back to his AFC once he stabilizes.                Barriers to discharge: Safe discharge plan, medication evaluation, and ongoing symptom presentation.     "

## 2021-03-26 NOTE — PLAN OF CARE
BEHAVIORAL TEAM DISCUSSION    Participants: Dr. Denise PERRY, Raine No RN, Luis Palacio [facility]  Progress: New Admit  Anticipated length of stay: 7 days  Continued Stay Criteria/Rationale: Evaluation and assessment for hospitalization  Medical/Physical: Pt was reported to have some motor issues or tardive dyskinesia-like symptoms, attending will discuss with pt when they meet.  Precautions:   Behavioral Orders   Procedures     Code 1 - Restrict to Unit     Routine Programming     As clinically indicated     Single Room     Mood swings, agitated by others     Status 15     Every 15 minutes.     Suicide precautions     Patients on Suicide Precautions should have a Combination Diet ordered that includes a Diet selection(s) AND a Behavioral Tray selection for Safe Tray - with utensils, or Safe Tray - NO utensils       Plan: Continue evaluation and assessment for stabilization and aftercare needs.  Rationale for change in precautions or plan: None.

## 2021-03-26 NOTE — PLAN OF CARE
"Admission:     Admitted voluntarily to [facility] for suicidal ideation. Reports his emotions are \"out of control\". Increasing depression, increasing anxiety, increasing anger, labile mood, poor sleep, poor appetite, poor concentration, and suicidal thoughts. States this is his first inpatient admission for mental health. Says, \"this is all new to me\". Has a past history of substance abuse, but denies current issues with substances. States he moved into a group home last August. Cites current stressors as new staff at group home. States \"it is a [place] group home\". States the staff speak in Tanzanian, and says \"I can't stay there, I don't feel safe. I can't go back there. I have to get out of there\". He says, \"I need some sleep. Sleep is the most important thing for me right now\".     Presents with delayed speech, is having a lot of difficulty concentrating. Appears sleep deprived, anxious. Is polite and cooperative completing admission, but struggles to answer questions. Oriented to room and unit. Encouraged to voice needs, questions, and concerns. Pt verbalizes understanding. Will defer remainder of interview at this time to provide for uninterrupted rest. See flowsheets for details.   "

## 2021-03-26 NOTE — PLAN OF CARE
Shift Summary  Psych  New admit from yesterday. Acclimating ok. Affect is tense and mood is labile. Presents polite and cooperative. Able to communicate needs. Speech is delayed but clear with no eye contact. Distractable with poor concentration. Has chronic SI though. Feels safe. Contracts for safety. Visible in milieu but isolative and withdrawn to self. Did not participate in any group activity.   Prn: none  Physical  Pt alert and oriented x 3. C/o general pain especially in lower back and hips. Medicated with Tylenol. Vital sings WNL (see flow sheet for details). Slept 6.5 hours last night. Good medication compliance is noted. Seems tolerating medications well. Appetite fair. No problem with bowel and bladder.   Prn: none  Continue to monitor pt's status Q 15 minutes and stabilize the patient's symptoms with the use of medications and therapeutic programming.

## 2021-03-26 NOTE — PROGRESS NOTES
SPIRITUAL HEALTH SERVICES  SPIRITUAL ASSESSMENT Progress Note  [facility] ([facility]) 10N     REFERRAL SOURCE: request at admission for chaplaincy care    Shoaib declined a visit today.    PLAN: SHS remains available, as needed.    Ana Parham  Chaplain Resident  Pager: [phone]

## 2021-03-27 PROCEDURE — 250N000013 HC RX MED GY IP 250 OP 250 PS 637: Performed by: PSYCHIATRY & NEUROLOGY

## 2021-03-27 PROCEDURE — 124N000002 HC R&B MH UMMC

## 2021-03-27 RX ADMIN — VILAZODONE HYDROCHLORIDE 40 MG: 40 TABLET ORAL at 08:50

## 2021-03-27 RX ADMIN — RIVAROXABAN 20 MG: 10 TABLET, FILM COATED ORAL at 16:45

## 2021-03-27 RX ADMIN — BUPRENORPHINE AND NALOXONE 2 TABLET: 2; .5 TABLET SUBLINGUAL at 08:50

## 2021-03-27 RX ADMIN — MIRTAZAPINE 15 MG: 15 TABLET ORAL at 19:27

## 2021-03-27 RX ADMIN — BUPRENORPHINE AND NALOXONE 2 TABLET: 2; .5 TABLET SUBLINGUAL at 13:11

## 2021-03-27 RX ADMIN — HYDROCHLOROTHIAZIDE 25 MG: 25 TABLET ORAL at 08:50

## 2021-03-27 RX ADMIN — BUPRENORPHINE AND NALOXONE 2 TABLET: 2; .5 TABLET SUBLINGUAL at 19:27

## 2021-03-27 RX ADMIN — NICOTINE 7 MG/24 HR DAILY TRANSDERMAL PATCH 1 PATCH: at 08:56

## 2021-03-27 ASSESSMENT — ACTIVITIES OF DAILY LIVING (ADL)
DRESS: INDEPENDENT
HYGIENE/GROOMING: INDEPENDENT
LAUNDRY: WITH SUPERVISION
ORAL_HYGIENE: INDEPENDENT

## 2021-03-27 NOTE — PROVIDER NOTIFICATION
03/27/21 0600   Sleep/Rest/Relaxation   Sleep/Rest/Relaxation (WDL) WDL   Sleep/Rest/Relaxation appears asleep   Night Time # Hours 7 hours     Patient slept 7 hours this shift. No complaints of any discomfort. No PRN meds needed.

## 2021-03-27 NOTE — PLAN OF CARE
Shift Summary  Psych  Visible in milieu. Isolative and withdrawn to self. Seems flat and depressed. Still has suicidal ideation with no plan. Reports anxiety but look calm. Denies needing anything for anxiety.   Pt presents polite and cooperative. Able to communicate needs. No eye contact during conversation most of the time. Subjectively does not look psychotic. Denies self injury behavior. Did not participate in community meeting.   Prn: none  Physical  Pt alert and oriented x 3. Pt said he has pain and is always there. Denies needing any intervention for it. Vital sings WNL (see flow sheet for details). Slept 7.0 hours last night. Good medication compliance is noted. Seems tolerating medications well. Denies any side effect from medicaion. Appetite adequate. No problem with bowel and bladder.   Prn: none  Continue to monitor pt's status Q 15 minutes and stabilize the patient's symptoms with the use of medications and therapeutic programming.

## 2021-03-27 NOTE — PLAN OF CARE
"  Problem: Behavioral Health Plan of Care  Goal: Plan of Care Review  Outcome: No Change      Pt was isolative and withdrawn. Presented with an irritable mood and tense affect. Pt had poor eye contact but made his needs known appropriately. Stated \" I'd like to know when I can leave.\"  Agreed with staff response without any problem. Reported sleep and appetite as good.  Denied any medication side effects.   Plan: Status 15s; Build trust with pt. Continue to build on strengths. Encourage healthy coping.     "

## 2021-03-28 PROCEDURE — 124N000002 HC R&B MH UMMC

## 2021-03-28 PROCEDURE — 250N000013 HC RX MED GY IP 250 OP 250 PS 637: Performed by: PSYCHIATRY & NEUROLOGY

## 2021-03-28 RX ADMIN — HYDROCHLOROTHIAZIDE 25 MG: 25 TABLET ORAL at 08:55

## 2021-03-28 RX ADMIN — RIVAROXABAN 20 MG: 10 TABLET, FILM COATED ORAL at 18:11

## 2021-03-28 RX ADMIN — NICOTINE 7 MG/24 HR DAILY TRANSDERMAL PATCH 1 PATCH: at 08:56

## 2021-03-28 RX ADMIN — BUPRENORPHINE AND NALOXONE 2 TABLET: 2; .5 TABLET SUBLINGUAL at 19:03

## 2021-03-28 RX ADMIN — VILAZODONE HYDROCHLORIDE 40 MG: 40 TABLET ORAL at 08:55

## 2021-03-28 RX ADMIN — OLANZAPINE 5 MG: 5 TABLET, FILM COATED ORAL at 20:06

## 2021-03-28 RX ADMIN — BUPRENORPHINE AND NALOXONE 2 TABLET: 2; .5 TABLET SUBLINGUAL at 08:54

## 2021-03-28 RX ADMIN — MIRTAZAPINE 15 MG: 15 TABLET ORAL at 19:03

## 2021-03-28 RX ADMIN — BUPRENORPHINE AND NALOXONE 2 TABLET: 2; .5 TABLET SUBLINGUAL at 13:06

## 2021-03-28 ASSESSMENT — ACTIVITIES OF DAILY LIVING (ADL)
DRESS: INDEPENDENT
LAUNDRY: WITH SUPERVISION
HYGIENE/GROOMING: INDEPENDENT
ORAL_HYGIENE: INDEPENDENT

## 2021-03-28 ASSESSMENT — MIFFLIN-ST. JEOR: SCORE: 1746.06

## 2021-03-28 NOTE — PLAN OF CARE
"Patient has been isolative to his room. He came out for dinner and requested his medications at about 1830. He received them at 1930. Patient denies all mental health symptoms. When asked about anxiety and depression he replied \"generally, I am good.\" He went back to sleep after receiving his medication. Vitals are WNL.   "

## 2021-03-28 NOTE — PLAN OF CARE
Shift Summary  Psych  Pt seems calm and flat. Visible in milieu. Isolative and withdrawn to self. Slightly more comfortable with eye contact. Denies any suicidal ideation, homicidal ideation, Self injury behavior, hallucination or racing thought. Subjectively does not look psychotic. Pt feels stable and  ready to go back to group home.   Prn: none  Physical  Pt alert and oriented x 3. Does not appear to be in any kind of distress or pain. Vital sings WNL (see flow sheet for details). Slept 7.0 hours last night. Good medication compliance is noted. Seems tolerating medications well. No side effect reported by pt or noted by this writer. Appetite fair. No problem with bowel and bladder.   Prn: none  Continue to monitor pt's status Q 15 minutes and stabilize the patient's symptoms with the use of medications and therapeutic programming.

## 2021-03-29 VITALS
BODY MASS INDEX: 29.49 KG/M2 | WEIGHT: 187.9 LBS | HEART RATE: 82 BPM | TEMPERATURE: 98 F | DIASTOLIC BLOOD PRESSURE: 83 MMHG | OXYGEN SATURATION: 95 % | RESPIRATION RATE: 16 BRPM | SYSTOLIC BLOOD PRESSURE: 129 MMHG | HEIGHT: 67 IN

## 2021-03-29 PROCEDURE — 99238 HOSP IP/OBS DSCHRG MGMT 30/<: CPT | Performed by: PSYCHIATRY & NEUROLOGY

## 2021-03-29 PROCEDURE — 250N000013 HC RX MED GY IP 250 OP 250 PS 637: Performed by: PSYCHIATRY & NEUROLOGY

## 2021-03-29 PROCEDURE — G0177 OPPS/PHP; TRAIN & EDUC SERV: HCPCS

## 2021-03-29 RX ADMIN — BUPRENORPHINE AND NALOXONE 2 TABLET: 2; .5 TABLET SUBLINGUAL at 13:22

## 2021-03-29 RX ADMIN — BUPRENORPHINE AND NALOXONE 2 TABLET: 2; .5 TABLET SUBLINGUAL at 08:32

## 2021-03-29 RX ADMIN — HYDROCHLOROTHIAZIDE 25 MG: 25 TABLET ORAL at 08:32

## 2021-03-29 RX ADMIN — NICOTINE 7 MG/24 HR DAILY TRANSDERMAL PATCH 1 PATCH: at 08:33

## 2021-03-29 RX ADMIN — VILAZODONE HYDROCHLORIDE 40 MG: 40 TABLET ORAL at 08:32

## 2021-03-29 RX ADMIN — BUPRENORPHINE AND NALOXONE 2 TABLET: 2; .5 TABLET SUBLINGUAL at 19:27

## 2021-03-29 RX ADMIN — ACETAMINOPHEN 650 MG: 325 TABLET, FILM COATED ORAL at 08:40

## 2021-03-29 RX ADMIN — RIVAROXABAN 20 MG: 10 TABLET, FILM COATED ORAL at 17:39

## 2021-03-29 ASSESSMENT — ACTIVITIES OF DAILY LIVING (ADL)
HYGIENE/GROOMING: INDEPENDENT
LAUNDRY: WITH SUPERVISION
LAUNDRY: WITH SUPERVISION
DRESS: STREET CLOTHES
ORAL_HYGIENE: INDEPENDENT
ORAL_HYGIENE: INDEPENDENT
DRESS: SCRUBS (BEHAVIORAL HEALTH);INDEPENDENT
HYGIENE/GROOMING: INDEPENDENT

## 2021-03-29 NOTE — PLAN OF CARE
Work Completed: FELIX (writer) met with trx team, provided update, and reviewed pt's chart. CTC met with pt to discuss referral options, which pt was agreeable to and signed FIGUEROA. Pt expressed feeling ready to discharge soon. Pt decline having any questions or concerns after talking with CTC. [facility] made follow-up appointments for pt and updated AVS.      Discharge plan or goal: Pt will likely discharge back to his AFC once he stabilizes.                 Barriers to discharge: Safe discharge plan, medication evaluation, and ongoing symptom presentation.

## 2021-03-29 NOTE — PLAN OF CARE
Patient is flat, isolative, pleasant, calm, and cooperative.  Patient is up on the unit intermittently but keeps to self.  Patient denies anxiety, depression, SI, and SIB.  Patient denies hallucinations and no clear delusional thinking is observed.  Patient is hoping to discharge back to group home soon and feels he is ready to discharge.  Patient endorsed lower back and hip pain.  Initially rates pain at 6/10.  PRN acetaminophen 650 mg administered and patient rated pain at 4/10 at reassessment.  Patient is medication compliant.  Will continue to monitor.  Lilia Marshall RN

## 2021-03-29 NOTE — DISCHARGE SUMMARY
DISCHARGE SUMMARY    Admission Date: 03/25/2021  Discharge Date: 03/29/2021    This was the first [facility] psychiatric admission and the first psychiatric admission ever for this 35 year old single white male with a long history of depression, ADHD, PTSD, JIN, as well as amphetamine and opiate abuse who was brought here via ambulance from his group home because of suicidal ideation and disruptive behavior.  His Tox screen upon admission was positive for amphetamines. He was SARS-CoV-2 negative.    His outpatient psychiatrist has been Dr. Brian Duckworth who prescribed the following medications: Viibryd 30 mg at bedtime, Adderall 30 mg QAM and Suboxone 4-1 mg TID. He has been taking his medications as prescribed and experienced initial and midcycle insomnia.    He had an extensive history of opiate abuse, benzodiazepine abuse and methamphetamine abuse. He has a history of 15 chemical dependency treatment attempts, both inpatient and outpatient with the last treatment between October of 2019 through April of 2020. His longest sobriety was 5 years, according to him. He relapsed on meth 4 times since October 2020.    He was admitted to [facility] when he was seen by the undersigned. He appeared to be mildly depressed with fleeting suicidal thoughts. I resumed his Viibryd with the dose increase to 40 mg and changed it to QAM and added Remeron 15 mg at bedtime. His Suboxone was continued in the same doses. His Adderall was held.    Within the next several days the patient displayed no disruptive behavior. His depression had started to dissipate and his night sleep had normalized. He has been feeling better subjectively and requested to be discharged today. No flashbacks were noted or reported during his hospital stay    He had a recent history of DVT in the R lower extremity and was on Eliquis.    Lab Work  His CBC was WNL.  His blood chemistry was notable for Cholesterol of 213, LDL Cholesterol of 145, Non HDL  Cholesterol of 172 and normal Triglycerides. His blood Glucose was 104.    MENTAL STATUS EXAMINATION UPON DISCHARGE  Revealed a normally built and normally developed, pleasant, friendly and engaging 35 year old white male appearing his stated age. He was alert and oriented X 3. He showed no objective signs of depression or undue anxiety. He adamantly denied suicidal thoughts. No overt psychotic features were noted or elicited. He had good insight into his problems and his judgement did not seem to be impaired.    DISCHARGE DIAGNOSES    Major Depression, recurrent  PTSD  ADHD  Amphetamine Use Disorder  Opiate Use Disorder, on Suboxone maintenance    DISCHARGE MEDICATIONS    Viibryd 40 mg QAM  Remeron 15 mg at bedtime  Suboxone 2-0.5 mg 2 Tab Sublingually TID  Xarelto 20 mg QPM    The patient was given prescriptions for 30 day supply of Viibryd and Remeron    DISPOSITION    Return to his Group Home. Maintain illicit drug free status. Psychiatric follow up with Dr. Duckworth.    Carlin Walker MD

## 2021-03-29 NOTE — DISCHARGE INSTRUCTIONS
Behavioral Discharge Planning and Instructions    Summary: You were admitted on 3/25/2021  due to Depression, Anxiety, Suicidal Ideations and Agressive Behaviors.  You were treated by Dr. Denise PERRY, and discharged on 03/29/2021 from [facility] to Group Home    Main Diagnosis:  PTSD  ADHD  Amphetamine Use Disorder  Opiate Use Disorder on Suboxone maintenance.    Health Care Follow-up:   Therapy Appointment - [facility]  Date/Time: 4/6/21 at 12:00pm, this is a Virtual appointment  Provider: Eusebia Daley  Address: [address] [place] [address] [address], [place] [address]  Phone: [phone]  Fax: [phone]  This is a virtual appointment that will be done via Zoom. Prior to your appointment they will send you the link and intake paperwork to your email: [email or web address]. Please call them if you have any questions or concerns.    Anger Management Program/Therapy - [facility]    Date/Time: 4/7/21 at 5:00pm.  Provider: Andres Maravilla  Phone Number: [phone]  Fax: [phone]   You need to call and confirm your appointment the day you discharge to keep your spot as this was a referral appointment.  The Anger Management program is an 8-week program that meets every Wednesday evening from 5-6:30pm. This is a virtual appointment that will be done via Zoom. Prior to your appointment they will send you the link to attend to email: [email or web address]. Please call them if you have any questions or concerns    Psychiatry/Med-Management Appointment - [facility]  Date/Time: 4/7/21 at 11:00am, this is a virtual appointment  Provider: Dr. Brian Duckworth  Address: [address], [place] [address]  Phone: [phone]  Fax: [phone]  This is a virtual appointment like you have done previously and if you have questions or concerns, please reach out to them.    Attend all scheduled appointments with your outpatient providers. Call at least 24 hours in advance if you need  "to reschedule an appointment to ensure continued access to your outpatient providers.     Major Treatments, Procedures and Findings:  You were provided with: a psychiatric assessment, assessed for medical stability, medication evaluation and/or management, group therapy and milieu management    Symptoms to Report: feeling more aggressive, increased confusion, losing more sleep, mood getting worse, thoughts of suicide or increased urges/cravings to use    Early warning signs can include: increased depression or anxiety sleep disturbances increased thoughts or behaviors of suicide or self-harm  or increased urges/cravings to use    Safety and Wellness:  Take all medicines as directed.  Make no changes unless your doctor suggests them.      Follow treatment recommendations.  Refrain from alcohol and non-prescribed drugs.  If there is a concern for safety, call 911.    Resources:   Crisis Intervention: 243.317.3952 or 322-979-6724 (TTY: 431.718.3568).  Call anytime for help.  National [place] on Mental Illness (www.[place].org): 742.342.6774 or 370-711-1583.  Alcoholics Anonymous (www.alcoholics-anonymous.org): Check your phone book for your local chapter.  Suicide Awareness Voices of Education (SAVE) (www.save.org): 198-233-BTDA (0297)  National Suicide Prevention Line (www.mentalhealthmn.org): 388-387-TVPM (9945)  Mental Health Consumer/Survivor Network of [place] (www.mhcsn.net): 238.177.4977 or 654-415-6958  Mental Health Association of [place] (www.mentalhealth.org): 619.105.4304 or 686-022-6188  Self- Management and Recovery Training., SMART-- Toll free: 825.249.5578  www.CTQuan.[employer]il[employer]ing net  [facility] Crisis (COPE) Response - Adult 946 168-8573  Text 4 Life: txt \"LIFE\" to [address] for immediate support and crisis intervention    General Medication Instructions:   See your medication sheet(s) for instructions.   Take all medicines as directed.  Make no changes unless your doctor suggests them.   Go to all your doctor " visits.  Be sure to have all your required lab tests. This way, your medicines can be refilled on time.  Do not use any drugs not prescribed by your doctor.  Avoid alcohol.    Advance Directives:   Scanned document on file with [employer]? No scanned doc  Is document scanned? Pt states no documents  Honoring Choices Your Rights Handout: Informed and given  Was more information offered? Pt declined    The Treatment team has appreciated the opportunity to work with you. If you have any questions or concerns about your recent admission, you can contact the unit which can receive your call 24 hours a day, 7 days a week. They will be able to get in touch with a Provider if needed. The unit number is [phone] .

## 2021-03-29 NOTE — PLAN OF CARE
"Problem: OT General Care Plan  Goal: OT Goal 1  Description: Will attend OT groups and participate actively in all OT opportunities. Will assess and set goals.    Pt attended 1 out of 3 OT groups offered. Pt actively participated in a structured occupational therapy group of 3 patients total x45 minutes with a focus on facilitating self-esteem via self-reflection questions. Pt shared particularly thoughtful and insightful responses regarding past achievements, positive social supports, and hobbies/skills. He discussed the importance of eliminating stigma around mental illness, and shared that a stressor for him is that his \" doesn't believe in mental illness or taking medications.\" He also discussed the importance of taking his medications. He identified his goal for \"restoration of autonomy\" regarding returning to work and/or moving to a more independent living environment. He identified \"listening to music and cardio\" as effective coping skills he uses. Calm, pleasant, cooperative, and engaged throughout group. Politely thanked writer for group at the end.    "

## 2021-03-29 NOTE — PLAN OF CARE
Patient has been calm this shift. He has been visible in the milieu but appears withdrawn. He denies SI, SIB, HI or hallucinations. He is medication compliant and has been eating adequately. Vitals are WNL. Patient reported some anxiety later in the shift and was offered PRN medications. RN will continue to monitor.

## 2021-03-30 ENCOUNTER — ALLIED HEALTH/NURSE VISIT (OUTPATIENT)
Dept: BEHAVIORAL HEALTH | Facility: CLINIC | Age: 36
End: 2021-03-30
Payer: COMMERCIAL

## 2021-03-30 ENCOUNTER — NURSE TRIAGE (OUTPATIENT)
Dept: FAMILY MEDICINE | Facility: CLINIC | Age: 36
End: 2021-03-30

## 2021-03-30 DIAGNOSIS — Z79.01 LONG TERM CURRENT USE OF ANTICOAGULANT THERAPY: ICD-10-CM

## 2021-03-30 DIAGNOSIS — R04.0 EPISTAXIS: ICD-10-CM

## 2021-03-30 DIAGNOSIS — R69 DIAGNOSIS DEFERRED: Primary | ICD-10-CM

## 2021-03-30 DIAGNOSIS — Z72.0 TOBACCO ABUSE: Primary | ICD-10-CM

## 2021-03-30 RX ORDER — SODIUM CHLORIDE/ALOE VERA
SPRAY, NON-AEROSOL (ML) NASAL
Qty: 22 ML | Refills: 4 | Status: SHIPPED | OUTPATIENT
Start: 2021-03-30 | End: 2021-10-14

## 2021-03-30 NOTE — PROGRESS NOTES
Patient was discharged at about 1955. He left with a cab. He was given discharge instructions and was educated about his medications. Patient was receptive to this information. He expressed appreciation for his care while on 10N. He denies any mental health symptoms except anxiety related to him waiting to leave the building. He also received all his belongings

## 2021-03-30 NOTE — TELEPHONE ENCOUNTER
RN returned call to pt and relayed provider message below as written. Pt indicates understanding of issues and agrees with the plan.    Pt requests that the saline gel be sent to [facility] pharmacy as an order so it can be submitted to insurance. If not covered by insurance, pt will purchase from [facility] at the OTC cost.     Please sign pending order for OTC saline gel to [facility] if appropriate.    DEANNA Best, RN

## 2021-03-30 NOTE — Clinical Note
Dr. Wan Alexander- See ED follow up note.     Chip- Patient is wanting a referral for therapy. Looks like this has not been placed yet. He would like to work with someone who does EMDR. I wanted to check and see if you knew anyone in the  system that offers this.     Janet Ng, LGSW Behavioral Health Home ([facility])   [facility]  [phone]

## 2021-03-30 NOTE — TELEPHONE ENCOUNTER
Routed to provider to review and advise regarding both items below:  1. Pt requests that a new order for nicotine lozenges be sent to [facility] instead of current order for patches.   Pt states lozenges are more effective for him. Pt is concerned that smoking contributes to the small amounts of blood in his nose.     2. Please advise if you have additional recommendations regarding intermittent blood in nose at crack in skin (not dripping) while pt is taking Xarelto.      Pt is calling stating since a recent Mental Health treatment stay he has noticed a crack in skin of nose which bleeds if he touches it with a tissue. Pt states he believes that dry air, upper respiratory infection nasal sx, smoking, and being on Xarelto are contributing to the blood in nose.    No hx of active dripping bleeding from the nose, or other sx. RN reviewed home care for nosebleeds per protocol (humidifier and petroleum jelly).    DEANNA Best, RN    Additional Information    Negative: Fainted (passed out), or too weak to stand following large blood loss    Negative: Sounds like a life-threatening emergency to the triager    Negative: Nosebleed followed nose injury    Negative: Bleeding present > 30 minutes and using correct method of direct pressure    Negative: Bleeding now and second call after being instructed in correct technique of direct pressure    Negative: Lightheadedness or dizziness    Negative: Pale skin (pallor) of new onset or worsening    Negative: Has nasal packing (inserted by health care provider to control bleeding) and now has new rash    Negative: Has nasal packing and now has bleeding around the packing (Exception: few drops or ooze)    Negative: Patient sounds very sick or weak to the triager    Negative: Large amount of blood has been lost (e.g., one cup)    Taking Coumadin (warfarin) or other strong blood thinner, or known bleeding disorder (e.g., thrombocytopenia)     Bleeding is only spots on a tissue, no  "active bleeding or dripping.    Negative: Bleeding recurs 3 or more times in 24 hours despite direct pressure    Negative: Has skin bruises or bleeding gums that are not caused by an injury    Negative: Has nasal packing and now has fever > 100.5 F (38.1 C)    Negative: Patient wants to be seen    Mild-moderate nosebleed and bleeding has stopped now    Answer Assessment - Initial Assessment Questions  1. AMOUNT OF BLEEDING: \"How bad is the bleeding?\" \"How much blood was lost?\" \"Has the bleeding stopped?\"    - MILD: needed a couple tissues    - MODERATE: needed many tissues    - SEVERE: large blood clots, soaked many tissues, lasted more than 30 minutes       Minimal bleeding. No bleeding actively coming out at any time. Pt only notices blood on a tissue if he puts a tissue in the nose.    2. ONSET: \"When did the nosebleed start?\"       Pt states he noticed nose irritation while recently at an Mental Health treatment facility.     3. FREQUENCY: \"How many nosebleeds have you had in the last 24 hours?\"       No continuous or active bleeding, just blood on a tissue if placed against the upper part of septum inside the nose.    4. RECURRENT SYMPTOMS: \"Have there been other recent nosebleeds?\" If so, ask: \"How long did it take you to stop the bleeding?\" \"What worked best?\"       no    5. CAUSE: \"What do you think caused this nosebleed?\"      Developed an URI infection, tested negative for COVID more than once, and the nosebleeds during inpatient mental health stay.    6. LOCAL FACTORS: \"Do you have any cold symptoms?\", \"Have you been rubbing or picking at your nose?\"      Dry air, crack in skin of nose, upper respiratory infection nasal sx, on Xarelto    7. SYSTEMIC FACTORS: \"Do you have high blood pressure or any bleeding problems?\"     On Xarelto    8. BLOOD THINNERS: \"Do you take any blood thinners?\" (e.g., coumadin, heparin, aspirin, Plavix)      Xarelto    9. OTHER SYMPTOMS: \"Do you have any other symptoms?\" (e.g., " lightheadedness)      No other sx    Protocols used: NOSEBLEED-A-OH

## 2021-03-30 NOTE — PROGRESS NOTES
Behavioral Health Home Services  [facility]: [place]        Social Work Care Navigator Note      Patient: Hoang Kiser  Date: March 30, 2021  Preferred Name: Shoaib    Previous PHQ-9:   PHQ-9 SCORE 12/10/2020 2/19/2021 3/16/2021   PHQ-9 Total Score - - -   PHQ-9 Total Score MyChart 10 (Moderate depression) 10 (Moderate depression) -   PHQ-9 Total Score 10 10 10   Some encounter information is confidential and restricted. Go to Review Flowsheets activity to see all data.     Previous JIN-7:   JIN-7 SCORE 12/10/2020 2/19/2021 3/16/2021   Total Score - - -   Total Score 10 (moderate anxiety) 17 (severe anxiety) -   Total Score 10 17 13   Some encounter information is confidential and restricted. Go to Review Flowsheets activity to see all data.     MOLLY LEVEL:  MOLLY Score (Last Two) 11/14/2020 12/10/2020   MOLLY Raw Score 31 31   Activation Score 59.3 59.3   MOLLY Level 3 3       Preferred Contact:  Need for : No  Preferred Contact: Cell      Type of Contact Today: Phone call (patient / identified key support person reached)      Data: (subjective / Objective):  Patient Goals:  Goal Areas: Health;Mental Health;Chemical Health;Employment / Volunteer;Financial and Social Service Benefits  Patient stated goals: Patient would like to resume ILS services. He was working with an ILS worker and is in the process of trying to find a new one. Patient would like to do outpatient treatment at a facility that is not associated with [facility]. Patient wants to continue to work on his sobriety and health. At this time he has gone 6 days without using. Patient continues to try and work on self-advocacy skills. He was seeing a therapist but reports that his therapist was discontinued because he had a UA that showed substance use. Patient is going to try and contact an old therapist he used to see and try to set up an appointment. Patient wants to take a break from volunteering at this time and focus on his health. Patient  continues to work on his coping and stress management skills by taking walks, playing guitar and making comfort boards.       Recent ED/IP Admission or Discharge?   [place] ED Admission date: 03/25/2021    Current Stressors / Issues:      What were some the circumstances or situations that led up to the emergency room visit?    Suicidal Ideation     What concerns do you still have regarding (reason for admission)?    Patient reports that he is feeling better mentally. He has some concerns with a wound in his nose that started when he was in the hospital that he suspects is a result from blood thinners and dry weather.       What recommendations did the doctors and team make?    Prescribed medication to help with sleep and recommended therapist.    Were you able to schedule and/or attend recommended appointments? No Recommended to start therapy but appointment has not been set up yet.     Did you  any new prescriptions? Yes      What types of health care support might better meet your needs?    Therapy- Patient has a preference to work with someone who knows EMDR.       How confident do you feel about your ability to communicate these needs to your primary care team?      Patient reports he is comfortable talking with provider.       What do you think would help you avoid another visit the emergency room or admission to the hospital in the next six months?    Patient is going to start therapy. He states that COVID and isolating has had an impact on his mental health so being able to be around others more is helpful.     Intervention:  Motivational Interviewing: Expressed Empathy/Understanding, Permission to raise concern or advise and Open-ended questions     Assessment: (Pt engagement & support needed for successful care transition):  Patient would benefit from continued coordination in reaching their goals set for the Behavioral Health Home ([facility]) program. [facility] reviewed Health Action Plan goals and will  continue to monitor progress and work with patient and their care team.    Plan: (Homework, other):  Patient was encouraged to continue to seek condition-related information and education. [facility], patient, and team will continue to work towards progress on reaching goals set for the Behavioral Health Home (BH) program.     Janet Ng [facility]  Behavioral Health Home ([facility])   [facility]  [phone]    Routed note to patient's primary care provider.            Next 5 appointments (look out 90 days)    Apr 12, 2021 10:15 AM  Return Visit with Peng Nicholson MD  [facility] ([facility] ) [address] [place] [address]  [address]  [facility] [id number] [phone]

## 2021-03-30 NOTE — TELEPHONE ENCOUNTER
Nicotine lozenges prescription(s) sent    He should apply nasal saline gel to his nose before bed every night and during the day as needed for dryness or bleeding.

## 2021-03-31 ENCOUNTER — DOCUMENTATION ONLY (OUTPATIENT)
Dept: BEHAVIORAL HEALTH | Facility: CLINIC | Age: 36
End: 2021-03-31

## 2021-03-31 DIAGNOSIS — F43.10 POSTTRAUMATIC STRESS DISORDER WITH DISSOCIATIVE SYMPTOMS: Primary | Chronic | ICD-10-CM

## 2021-03-31 NOTE — PROGRESS NOTES
Patient had reach out to his behavioral home  requesting an EMDR therapist within the [facility].  [facility] placed an order March 31, 2021

## 2021-04-01 ENCOUNTER — TELEPHONE (OUTPATIENT)
Dept: FAMILY MEDICINE | Facility: CLINIC | Age: 36
End: 2021-04-01

## 2021-04-01 NOTE — TELEPHONE ENCOUNTER
Patient has been thinking about how he has been feeling lately, would like to know if he should be prescribed potassium sodium dietary supplements again. Having concerns about headaches and electrolytes. Please call to discuss. Jasmin Colbert -

## 2021-04-02 ENCOUNTER — VIRTUAL VISIT (OUTPATIENT)
Dept: OTOLARYNGOLOGY | Facility: CLINIC | Age: 36
End: 2021-04-02
Payer: COMMERCIAL

## 2021-04-02 DIAGNOSIS — G25.3 PALATAL MYOCLONUS: ICD-10-CM

## 2021-04-02 DIAGNOSIS — R49.0 DYSPHONIA: Primary | ICD-10-CM

## 2021-04-02 PROCEDURE — 92507 TX SP LANG VOICE COMM INDIV: CPT | Mod: GN | Performed by: SPEECH-LANGUAGE PATHOLOGIST

## 2021-04-02 PROCEDURE — 99207 PR NO CHARGE LOS: CPT | Performed by: SPEECH-LANGUAGE PATHOLOGIST

## 2021-04-02 NOTE — PROGRESS NOTES
"Hoang Kiser is a 35 year old male who is being cared for via a billable virtual visit.        The patient has been notified and verbally consented to the following statements:     This video visit will be conducted between you and your provider.    Patient has opted to conduct today's video visit vs an in-person appointment, and is not able to attend due to possible exposure to COVID-19.      If during the course of the call the provider feels a video visit is not appropriate, you will not be charged for this service.    Provider has received verbal consent for billable virtual visit from the patient? Yes    Preferred method for receiving information: email/my chart    Call initiated at: 8:03 AM  Platform used to conduct today's virtual appointment: AM Well Video  Location of provider: Residence  Location of patient: [facility]ICE CLINIC  THERAPY NOTE (CPT 36036)  Patient: Hoang Kiser  Date of Service: 4/2/2021  Referring physician: Dr. Patel \"Shep\" Leslie  Impressions from most recent evaluation (2/23/2021):  \"IMPRESSIONS: Hoang Kiser is a 35 year old male with a complex medical history including palatal myoclonus, presenting today with Dysphonia (R49.0) as evidenced by today's evaluation.   Remarkable findings and recommendations of the evaluation included:       Dr. 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 Kiser reports the following:     Overall he reports that symptoms are stable.  This is his initial therapy appointment    Enjoys singing on a daily basis.    Voice has improved in overall and tonal quality.    On a daily basis his voice becomes dry.  He is on medications that are drying.    Just got a new humidifier, which has been helpful.     Was this morning as his " "\"morning voice\"    OBJECTIVE:  Mr. Kiser presents today with the following:  BREATHING:   ? inspirations are inadequate in volume and frequency  ? clavicular elevation on inspiration  ? phonation is not coordinated with respiration     LARYNGEAL PALPATION:   ? firm musculature     VOICE:  ? Roughness: Mild to moderate intermittent  ? Breathiness: WNL  ? Strain: Mild  ? Loudness    Conversational speech:  WNL    Projected speech:  WNL  ? Pitch:    Conversational speech:  WNL    Pitch glide: neurologically normal  ? Resonance:    Conversational speech:  WNL      PATIENT REPORTED MEASURES:  Patient Supplied Answers To SLP QOL Questionnaire  No flowsheet data found.    THERAPEUTIC ACTIVITIES  Exercises and techniques for optimal vocal hygiene including:    Systemic hydration, including strategies for increasing daily water intake    Topical hydration - Gargling (saline and plain water), saline nasal irrigation, humidification, steam, guaifenesin to reduce the thickened secretions / laryngeal irritation.    Semi-Occluded Vocal Tract (SOVT) exercises instructed to reduce laryngeal tension, promote vocal fold pliability, and coordinate respiration and phonation    Straw phonation with water resistance was found to be most facilitating     Sustained phonation, and voice vs. voiceless productions used to promote easy voicing and raise awareness of laryngeal tension    Ascending and descending glides utilized to promote vocal fold pliability    \"Messa di voce\", gradual crescendo and decrescendo to vary medial compression was also utilized to promote vocal fold pliability.    Instructed on the benefits of using these exercises for improved coordination of breath flow with phonation and tissue mobilization.    Instructed on the importance of using these exercises as a warm-up / cool down,  and to re-calibrate the voice throughout the day.    Counseling and Education:    Asked many questions about the nature of his symptoms, " and I answered all of these thoroughly.    A revised regimen for home practice was instructed.    I provided handouts of today's therapeutic activities to facilitate practice.      ASSESSMENT/PLAN  PROGRESS TOWARD LONG TERM GOALS:   Adequate progress; please see above    IMPRESSIONS: Dysphonia (R49.0) in the context of a complex medical Hx. Mr. Kiser demonstrated good learning today    PLAN: I will see Mr. Kiser in 2 weeks, at which point we will continue therapy.   For practice goals see AVS.     TOTAL SERVICE TIME:   Call Initiated at: 8:03 AM  Call Ended at: 9am           CPT Billing Codes:   TREATMENT (80853)  NO CHARGE FACILITY FEE (13870)    Loren Solano M.M. (voice), M.A., CCC/SLP  Speech-Language Pathologist  [facility] Trained Vocologist  [facility]  [phone]  [email or web address]  Pronouns: she/her      *this report was created in part through the use of computerized dictation software, and though reviewed following completion, some typographic errors may persist.  If there is confusion regarding any of this notes contents, please contact me for clarification

## 2021-04-02 NOTE — PATIENT INSTRUCTIONS
"After Visit Summary    Patient: Shoaib Kiser  Date of Visit: 4/2/2021    Today' s Plan:    Tips for topical hydration handout    Semioccluded vocal tract exercise (\"bubbles\")    Vocal Health Strategies:     Systemic Hydration (internal hydration of the entire body):  - Sip liquids throughout the day, rather than consume a large volume of liquid at one time.      Topical Hydration (hydration for the surface mucosa of the larynx and vocal folds):    Please follow strategies learned on the \"Tips for Topical Hydration\" handout  o Nasal Irrigation/Nasal Spray    Saline spray 2-3 times per day: 3 puffs in each nostril; sniff and then blow your nose  o Gargling    See gargling recommendations the gargling protocol    Gargle saline in the morning and at night, and plain water after your each meal (approximately 5 times a day).  You can also switch before you swallow plain water to help rehydrate your mouth.  Spit out saline.     Gargle    With a voice    Tilt your head side to side    Breathing tips: Please remember to keep your shoulders down and your chest relaxed when you take a breath in    Voice (2-3x/day unless otherwise noted):    Semi-occluded vocal tract exercise:   o Bubbles (straw in 1 to 1.5  of water)for 30-60 seconds:  o 3x: blow bubbles and add a sustained  who  or an  oo  (using a comfortable pitch Bb2 today)  o 3x: blow bubbles and vary  who  gliding up and down             Up and down like a sine wave  o 1-2x: Happy birthday bubbles (keep connected)  These exercises are great for:    *Instructed on the importance of using these exercises as a warm-up / cool down,  and to re-calibrate the voice throughout the day.    *tissue mobilization exercise - Improving the condition and pliability of the vocal folds.    *Abdominal breathing and applying optimal breath flow to speech/singing.    Sunny Mcnally (example of straw phonation with water resistance): "   [email or web address]&oq=[email or web address]=isabel&ie=UTF-8       Loren Solano M.M. (voice), MStevieA., CCC/SLP  Speech-Language Pathologist  Certificate of Vocology  [facility]  [phone]  [email or web address]  Pronouns: she/her

## 2021-04-02 NOTE — LETTER
"4/2/2021       RE: Hoang Kiser  [address] [facility] [address]     Dear Colleague,    Thank you for referring your patient, Hoang Kiser, to the [facility]ICE CLINIC [place] [facility]. Please see a copy of my visit note below.    Hoang Kiser is a 35 year old male who is being cared for via a billable virtual visit.        The patient has been notified and verbally consented to the following statements:     This video visit will be conducted between you and your provider.    Patient has opted to conduct today's video visit vs an in-person appointment, and is not able to attend due to possible exposure to COVID-19.      If during the course of the call the provider feels a video visit is not appropriate, you will not be charged for this service.    Provider has received verbal consent for billable virtual visit from the patient? Yes    Preferred method for receiving information: email/my chart    Call initiated at: 8:03 AM  Platform used to conduct today's virtual appointment: AM Photo[employer] Video  Location of provider: Residence  Location of patient: [facility] VOICE [facility]  THERAPY NOTE (CPT 42578)  Patient: Hoang Kiser  Date of Service: 4/2/2021  Referring physician: Dr. Patel \"Elizabeth\" Leslie  Impressions from most recent evaluation (2/23/2021):  \"IMPRESSIONS: Hoang Kiser is a 35 year old male with a complex medical history including palatal myoclonus, presenting today with Dysphonia (R49.0) as evidenced by today's evaluation.   Remarkable findings and recommendations of the evaluation included:       Dr. 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 Kiser reports the following:     Overall he reports " "that symptoms are stable.  This is his initial therapy appointment    Enjoys singing on a daily basis.    Voice has improved in overall and tonal quality.    On a daily basis his voice becomes dry.  He is on medications that are drying.    Just got a new humidifier, which has been helpful.     Was this morning as his \"morning voice\"    OBJECTIVE:  Mr. Kiser presents today with the following:  BREATHING:   ? inspirations are inadequate in volume and frequency  ? clavicular elevation on inspiration  ? phonation is not coordinated with respiration     LARYNGEAL PALPATION:   ? firm musculature     VOICE:  ? Roughness: Mild to moderate intermittent  ? Breathiness: WNL  ? Strain: Mild  ? Loudness    Conversational speech:  WNL    Projected speech:  WNL  ? Pitch:    Conversational speech:  WNL    Pitch glide: neurologically normal  ? Resonance:    Conversational speech:  WNL      PATIENT REPORTED MEASURES:  Patient Supplied Answers To SLP QOL Questionnaire  No flowsheet data found.    THERAPEUTIC ACTIVITIES  Exercises and techniques for optimal vocal hygiene including:    Systemic hydration, including strategies for increasing daily water intake    Topical hydration - Gargling (saline and plain water), saline nasal irrigation, humidification, steam, guaifenesin to reduce the thickened secretions / laryngeal irritation.    Semi-Occluded Vocal Tract (SOVT) exercises instructed to reduce laryngeal tension, promote vocal fold pliability, and coordinate respiration and phonation    Straw phonation with water resistance was found to be most facilitating     Sustained phonation, and voice vs. voiceless productions used to promote easy voicing and raise awareness of laryngeal tension    Ascending and descending glides utilized to promote vocal fold pliability    \"Messa di voce\", gradual crescendo and decrescendo to vary medial compression was also utilized to promote vocal fold pliability.    Instructed on the benefits of using " these exercises for improved coordination of breath flow with phonation and tissue mobilization.    Instructed on the importance of using these exercises as a warm-up / cool down,  and to re-calibrate the voice throughout the day.    Counseling and Education:    Asked many questions about the nature of his symptoms, and I answered all of these thoroughly.    A revised regimen for home practice was instructed.    I provided handouts of today's therapeutic activities to facilitate practice.      ASSESSMENT/PLAN  PROGRESS TOWARD LONG TERM GOALS:   Adequate progress; please see above    IMPRESSIONS: Dysphonia (R49.0) in the context of a complex medical Hx. Mr. Kiser demonstrated good learning today    PLAN: I will see Mr. Kiser in 2 weeks, at which point we will continue therapy.   For practice goals see AVS.     TOTAL SERVICE TIME:   Call Initiated at: 8:03 AM  Call Ended at: 9am           CPT Billing Codes:   TREATMENT (59796)  NO CHARGE FACILITY FEE (62412)    Loren Solano M.M. (voice), M.A., CCC/SLP  Speech-Language Pathologist  [facility] Trained Vocologist  [facility]  [phone]  [email or web address]  Pronouns: she/her      *this report was created in part through the use of computerized dictation software, and though reviewed following completion, some typographic errors may persist.  If there is confusion regarding any of this notes contents, please contact me for clarification        Again, thank you for allowing me to participate in the care of your patient.      Sincerely,    Loren Solano, SLP

## 2021-04-05 ENCOUNTER — ALLIED HEALTH/NURSE VISIT (OUTPATIENT)
Dept: BEHAVIORAL HEALTH | Facility: CLINIC | Age: 36
End: 2021-04-05
Payer: COMMERCIAL

## 2021-04-05 ENCOUNTER — IMMUNIZATION (OUTPATIENT)
Dept: NURSING | Facility: CLINIC | Age: 36
End: 2021-04-05
Payer: COMMERCIAL

## 2021-04-05 ENCOUNTER — TELEPHONE (OUTPATIENT)
Dept: FAMILY MEDICINE | Facility: CLINIC | Age: 36
End: 2021-04-05

## 2021-04-05 ENCOUNTER — TELEPHONE (OUTPATIENT)
Dept: PSYCHIATRY | Facility: CLINIC | Age: 36
End: 2021-04-05

## 2021-04-05 DIAGNOSIS — R69 DIAGNOSIS DEFERRED: Primary | ICD-10-CM

## 2021-04-05 PROCEDURE — 91300 PR COVID VAC PFIZER DIL RECON 30 MCG/0.3 ML IM: CPT

## 2021-04-05 PROCEDURE — 0001A PR COVID VAC PFIZER DIL RECON 30 MCG/0.3 ML IM: CPT

## 2021-04-05 ASSESSMENT — PATIENT HEALTH QUESTIONNAIRE - PHQ9: SUM OF ALL RESPONSES TO PHQ QUESTIONS 1-9: 14

## 2021-04-05 NOTE — TELEPHONE ENCOUNTER
"Writer received incoming fax from [facility] [place] with the message \"per home care staff, this medication has been discontinued. Please confirm or contact care home\". Writer could not find documentation of discontinuation reason. Message was sent to provider for more information. Zohreh Mendoza CMA    "

## 2021-04-05 NOTE — TELEPHONE ENCOUNTER
Per Epic, Dr Peng Nicholson from Addiction Medicine discontinued this medication for patient.   Routing to this provider to clarify that it has been discontinued.     Diane Mcgraw BSN, RN

## 2021-04-05 NOTE — PROGRESS NOTES
Behavioral Health Home Services  [facility]: [place]        Social Work Care Navigator Note      Patient: Hoang Kiser  Date: April 5, 2021  Preferred Name: Shoaib    Previous PHQ-9:   PHQ-9 SCORE 2/19/2021 3/16/2021 4/5/2021   PHQ-9 Total Score - - -   PHQ-9 Total Score MyChart 10 (Moderate depression) - -   PHQ-9 Total Score 10 10 14   Some encounter information is confidential and restricted. Go to Review Flowsheets activity to see all data.     Previous JIN-7:   JIN-7 SCORE 12/10/2020 2/19/2021 3/16/2021   Total Score - - -   Total Score 10 (moderate anxiety) 17 (severe anxiety) -   Total Score 10 17 13   Some encounter information is confidential and restricted. Go to Review Flowsheets activity to see all data.     MOLLY LEVEL:  MOLLY Score (Last Two) 11/14/2020 12/10/2020   MOLLY Raw Score 31 31   Activation Score 59.3 59.3   MOLLY Level 3 3       Preferred Contact:  Need for : No  Preferred Contact: Cell      Type of Contact Today: Phone call (patient / identified key support person reached)      Data: (subjective / Objective):  Recent ED/IP Admission or Discharge?   None    Patient Goals:  Goal Areas: Health;Mental Health;Chemical Health;Employment / Volunteer;Financial and Social Service Benefits  Patient stated goals: Patient would like to look into going back to school.Patient would like to start EMDR therapy. He has an appointment scheduled for July. Patient continues to try and work on self- advocacy skills. Patient continues to work on his coping and stress management skills by taking walks, playing guitar and making comfort boards. Patient would like to look into working with a new addiction medicine and pain management provider.         [facility] Core Service Provided:  Care Coordination: provided care management services/referrals necessary to ensure patient and their identified supports have access to medical, behavioral health, pharmacology and recovery support services.  Ensured that patient's  care is integrated across all settings and services.     Current Stressors / Issues / Care Plan Objective Addressed Today:  [facility] contacted patient for HAP review. See flow sheet for updated goals.  [facility] updated PHQ-9 following up from hospitalization last week. Patient declined SI but his score increased from 10 to 14 over the last month. [facility] asked patient about referral for EMDR. Patient states that someone reached out to him and he is scheduled to start this therapy in July. [facility] will check with [facility] and see if patient can do visits with [facility] until he can start EMDR.   Patient states that his CADI  is helping him look into other housing but there is not much available right now. Patient is looking forward to getting out of the house more and being able to interact with others.     Intervention:  Motivational Interviewing: Expressed Empathy/Understanding, Permission to raise concern or advise and Open-ended questions   Target Behavior(s): Explored thoughts and readiness to participate in individual therapy, Explored current exercise activities and thoughts about how this influences mood and Explored current social supports and reinforced opportunities to increase engagement    Assessment: (Progress on Goals / Homework):  Patient would benefit from continued coordination in reaching their goals set for the Behavioral Health Home ([facility]) program. [facility] reviewed Health Action Plan goals and will continue to monitor progress and work with patient and their care team.    Plan: (Homework, other):  Patient was encouraged to continue to seek condition-related information and education. [facility], patient, and team will continue to work towards progress on reaching goals set for the Behavioral Health Home ([facility]) program.       Scheduled a Phone follow up appointment with [facility] in 4 weeks     Patient has set self-identified goals and will monitor progress until the next appointment on: 05/05/2021.     Janet Ng,  BSW, Social Work Care Coordinator               Next 5 appointments (look out 90 days)    Apr 12, 2021 10:15 AM  Return Visit with Peng Nicholson MD  [facility] ([facility] ) [address]   [address]  [facility] [address]  [phone]

## 2021-04-05 NOTE — LETTER
Behavioral Health Home (Located within [facility]): Health Action Plan  Located within [facility] Clinic: [place]    [facility]      Name: Hoang Kiser  Preferred Name: Shoaib  : 1985  MRN: [id number]        My Goals  Goal Areas: Health;Mental Health;Chemical Health;Employment / Volunteer;Financial and Social Service Benefits    Patient stated goals: Patient would like to look into going back to school.Patient would like to start EMDR therapy. He has an appointment scheduled for July. Patient continues to try and work on self- advocacy skills. Patient continues to work on his coping and stress management skills by taking walks, playing guitar and making comfort boards. Patient would like to look into working with a new addiction medicine and pain management provider.     Strengths related to each goal: Patient is motivated; Patient is accepting of change; Patient is very organized; Patient is self-aware of his abilities and strengths; Patient works with his care team frequently; Patient is involved in his care    Services and Supports Needed: Referral for EMDR.[facility] will check and see if [facility] can work with patient while waiting for EMDR to start.     Activities / Actions of Team to support goal(s): [facility] will contact patient monthly for follow up; [facility] will provide resources for requested services. Care Team will remain available to patient for questions/ concerns/ resources.    Activities / Actions of Patient / Parent / Guardian to support goal(s): It is a requirement that patient's primary care physician is through the [facility] system and that they are on Medical Assistance/Medicaid. If either of these were to change or if patient needs any type of assistance, they are to reach out to their Behavioral Health [place] (Loc[facility] within [facility]) team.        Recommended Referral  Tobacco cessation referrals made?: No  Mental Health / Chemical Dependency Referrals: Yes (Pt wants outpatient treatments outside [place])  Substance Use Referrals: Not  Applicable  Mental Health Referrals: None  Dental        My Team Members and Their Contact Information  Patient Care Team       Relationship Specialty Notifications Start End    Phill Floyd MD PCP - General Family Practice  2/25/16     Phone: [phone] Fax: [phone] [address] [facility] [address]    Brian Isaacs MD PCP - Mental Health/Behavioral Medicine Psychiatry  3/29/21     Phone: [phone] Fax: [phone]         BRIAN ISAACS MD [address] [place] [address]    Phill Floyd MD Assigned PCP   5/17/15     Phone: [phone] Fax: [phone] [address] [facility] [address]    Bryant Cao MD Referring Physician Neurology  11/23/15     Refer Evaluate for palatal tremor - essential vs symptomatic vs functional vs tic related.     Phone: [phone] Fax: [phone]         [address] [facility] [address]    Jorge Nelson MD MD Otolaryngology  11/23/15     Phone: [phone] Fax: [phone]         [address] [place] [address]    Miley Trivedi MD MD Otolaryngology  10/17/17     Phone: [phone] Fax: [phone]         [address] [facility] [address] [facility] [address]    Melinda Diaz AuD Audiologist Audiology  10/17/17     Phone: [phone] Fax: [phone]         [address] [facility] [address] [facility] [address]    Faustino Feldman MD MD Family Medicine - Sports Medicine  5/28/19     Phone: [phone] Fax: [phone]         [facility] [facility] [address] [facility] [address]    William Burr worker   1/8/20     BRANDEE Izaguirre  - [facility]    Phone: [phone]         Janet Ng, BSW  Clinic Admissions 3/11/20     [facility] [phone]    Meagan Bowman APRN CNP Nurse Practitioner Nurse Practitioner  10/14/20     Phone: [phone] Fax: [phone]         [address] [address]    Bryant Cao MD  Assigned Neuroscience Provider   10/23/20     Phone: [phone] Fax: [phone]         [address] [facility] [address]    Meagan Bowman APRN CNP Assigned Behavioral Health Provider   10/23/20     Phone: [phone] Fax: [phone]         [address] [facility] [facility] [address]    Jorge Nelson MD Assigned Surgical Provider   2/28/21     Phone: [phone] Fax: [phone]         [address] [facility] [address]          My Wellness Plan  Safety Concerns: Suicide Ideation (Hospitalized last month for SI. Declines current SI)  Recommendations / Plan for safety concerns: Call 911, Contact [facility] (Ph: [phone]); Contact Care Team; Talk to staff at home. Start attending therapy.   Crisis Plan (emergencies / when urgent support needed): Patient lives in a Adult Foster Care home. Safety risks are monitored by staff at the home.           Hoang Kiser co-developed the Health Action Plan with the [facility] Team and received a copy of this document.  Date Health Action Plan Completed/Updated: 04/05/21

## 2021-04-05 NOTE — TELEPHONE ENCOUNTER
Message: Per care home staff, this medication has been discontinued. Please confirm or contact home care. Thanks!    Drug: Oxybutynin Tab 10mg ER

## 2021-04-05 NOTE — Clinical Note
Dane Saunders,    You helped place an EMDR referral for this patient last week. They reached out to him but he could not get his first appointment scheduled until July. I was going to schedule him on your calendar so that he could have someone to work with until he can start EMDR but it would not let me schedule on your calendar. Are you able to see if you can get this patient scheduled? He is pretty flexible.    Janet Ng LGSW Behavioral Health Home ([facility])   [facility]  [phone]

## 2021-04-06 NOTE — TELEPHONE ENCOUNTER
I spoke with Tom at the pharmacy and gave the information below from the message from Dr. Floyd.  Thank you. Tuyet Wong R.N.    Pharmacy phone [facility] [phone]

## 2021-04-06 NOTE — TELEPHONE ENCOUNTER
I have not prescribed it and do not side effects it on his medication list so the patient should not be taking it.   Phill Floyd MD

## 2021-04-08 DIAGNOSIS — G47.00 INSOMNIA, UNSPECIFIED TYPE: ICD-10-CM

## 2021-04-08 NOTE — TELEPHONE ENCOUNTER
Writer received response from provider. Patient is no longer receiving care from this clinic. This was routed back to the pharmacy so they can discuss medications with the new provider. Zohreh Mendoza, LIANE

## 2021-04-09 RX ORDER — ZOLPIDEM TARTRATE 10 MG/1
TABLET ORAL
Qty: 30 TABLET | Refills: 1 | OUTPATIENT
Start: 2021-04-09

## 2021-04-09 NOTE — TELEPHONE ENCOUNTER
Medication Refill (Ambien )    Meagan Bowman, CARLOS A CNP  You 6 minutes ago (8:36 AM)     This pt is not seen by me any longer, has outside psych provider.  Please do not refill and pt needs to request refill from new provider.  thanks    Message text       - Meds refused per providers request   - Med tab changed to reflect this     Placed a call to [facility] [place]. - [place], [place] - [address] And spoke with pharmacist, who agreed to send the Ambien to correct pharmacy.     No further action needed by this writer

## 2021-04-09 NOTE — TELEPHONE ENCOUNTER
Last seen: 3/2  RTC: 3/8  Cancel: none   No-show: none   Next appt: none     Incoming refill from pharmacy via Rx Auth     Medication requested: zolpidem (AMBIEN) 10 MG tablet  Directions: Take 1 tablet (10 mg) by mouth nightly as needed for sleep - Oral  Qty: 30  Last refilled: 2/24 #30, 1/26 #30    Will route to provider for approval

## 2021-04-13 ENCOUNTER — VIRTUAL VISIT (OUTPATIENT)
Dept: ADDICTION MEDICINE | Facility: CLINIC | Age: 36
End: 2021-04-13
Payer: COMMERCIAL

## 2021-04-13 DIAGNOSIS — F11.20 UNCOMPLICATED OPIOID DEPENDENCE (H): Primary | ICD-10-CM

## 2021-04-13 DIAGNOSIS — M54.40 CHRONIC LOW BACK PAIN WITH SCIATICA, SCIATICA LATERALITY UNSPECIFIED, UNSPECIFIED BACK PAIN LATERALITY: ICD-10-CM

## 2021-04-13 DIAGNOSIS — G89.29 CHRONIC LOW BACK PAIN WITH SCIATICA, SCIATICA LATERALITY UNSPECIFIED, UNSPECIFIED BACK PAIN LATERALITY: ICD-10-CM

## 2021-04-13 PROCEDURE — 99214 OFFICE O/P EST MOD 30 MIN: CPT | Mod: 95 | Performed by: FAMILY MEDICINE

## 2021-04-13 RX ORDER — BUPRENORPHINE AND NALOXONE 8; 2 MG/1; MG/1
1 FILM, SOLUBLE BUCCAL; SUBLINGUAL 2 TIMES DAILY
Qty: 56 FILM | Refills: 0 | Status: SHIPPED | OUTPATIENT
Start: 2021-04-13 | End: 2021-05-12

## 2021-04-14 ENCOUNTER — TELEPHONE (OUTPATIENT)
Dept: FAMILY MEDICINE | Facility: CLINIC | Age: 36
End: 2021-04-14

## 2021-04-14 NOTE — TELEPHONE ENCOUNTER
Gloria has a question on the Bupren-Nalox he is now taking 2 tab 3x a day tablets AND then he got a new script for under 2x a day under the tongue. She's not sure if  that means add that or quit the tablets and only do the new script for under the tongue.     Please call and confirm You can leave a message on [facility] phone line its secure if shes not available.    Closure 4 Information: This tab is for additional flaps and grafts above and beyond our usual structured repairs.  Please note if you enter information here it will not currently bill and you will need to add the billing information manually.

## 2021-04-18 ENCOUNTER — MYC MEDICAL ADVICE (OUTPATIENT)
Dept: FAMILY MEDICINE | Facility: CLINIC | Age: 36
End: 2021-04-18

## 2021-04-23 ENCOUNTER — APPOINTMENT (OUTPATIENT)
Dept: ULTRASOUND IMAGING | Facility: CLINIC | Age: 36
End: 2021-04-23
Attending: EMERGENCY MEDICINE
Payer: COMMERCIAL

## 2021-04-23 ENCOUNTER — TELEPHONE (OUTPATIENT)
Dept: BEHAVIORAL HEALTH | Facility: CLINIC | Age: 36
End: 2021-04-23

## 2021-04-23 ENCOUNTER — HOSPITAL ENCOUNTER (EMERGENCY)
Facility: CLINIC | Age: 36
Discharge: NURSING, REHAB OR LONG-TERM CARE | End: 2021-04-23
Attending: EMERGENCY MEDICINE | Admitting: EMERGENCY MEDICINE
Payer: COMMERCIAL

## 2021-04-23 VITALS
RESPIRATION RATE: 20 BRPM | SYSTOLIC BLOOD PRESSURE: 133 MMHG | HEART RATE: 96 BPM | OXYGEN SATURATION: 97 % | TEMPERATURE: 97.6 F | DIASTOLIC BLOOD PRESSURE: 66 MMHG

## 2021-04-23 DIAGNOSIS — R20.2 PARESTHESIAS: ICD-10-CM

## 2021-04-23 LAB
ALBUMIN SERPL-MCNC: 4.2 G/DL (ref 3.4–5)
ALP SERPL-CCNC: 91 U/L (ref 40–150)
ALT SERPL W P-5'-P-CCNC: 60 U/L (ref 0–70)
ANION GAP SERPL CALCULATED.3IONS-SCNC: 4 MMOL/L (ref 3–14)
AST SERPL W P-5'-P-CCNC: 58 U/L (ref 0–45)
BASOPHILS # BLD AUTO: 0 10E9/L (ref 0–0.2)
BASOPHILS NFR BLD AUTO: 0.3 %
BILIRUB SERPL-MCNC: 0.5 MG/DL (ref 0.2–1.3)
BUN SERPL-MCNC: 18 MG/DL (ref 7–30)
CALCIUM SERPL-MCNC: 9.4 MG/DL (ref 8.5–10.1)
CHLORIDE SERPL-SCNC: 102 MMOL/L (ref 94–109)
CO2 SERPL-SCNC: 30 MMOL/L (ref 20–32)
CREAT SERPL-MCNC: 1.14 MG/DL (ref 0.66–1.25)
DIFFERENTIAL METHOD BLD: NORMAL
EOSINOPHIL # BLD AUTO: 0 10E9/L (ref 0–0.7)
EOSINOPHIL NFR BLD AUTO: 0.1 %
ERYTHROCYTE [DISTWIDTH] IN BLOOD BY AUTOMATED COUNT: 12.3 % (ref 10–15)
ETHANOL SERPL-MCNC: <0.01 G/DL
GFR SERPL CREATININE-BSD FRML MDRD: 82 ML/MIN/{1.73_M2}
GLUCOSE SERPL-MCNC: 99 MG/DL (ref 70–99)
HCT VFR BLD AUTO: 40.7 % (ref 40–53)
HGB BLD-MCNC: 13.7 G/DL (ref 13.3–17.7)
IMM GRANULOCYTES # BLD: 0 10E9/L (ref 0–0.4)
IMM GRANULOCYTES NFR BLD: 0.1 %
INTERPRETATION ECG - MUSE: NORMAL
LYMPHOCYTES # BLD AUTO: 1.3 10E9/L (ref 0.8–5.3)
LYMPHOCYTES NFR BLD AUTO: 17.6 %
MCH RBC QN AUTO: 28.5 PG (ref 26.5–33)
MCHC RBC AUTO-ENTMCNC: 33.7 G/DL (ref 31.5–36.5)
MCV RBC AUTO: 85 FL (ref 78–100)
MONOCYTES # BLD AUTO: 0.7 10E9/L (ref 0–1.3)
MONOCYTES NFR BLD AUTO: 9.2 %
NEUTROPHILS # BLD AUTO: 5.2 10E9/L (ref 1.6–8.3)
NEUTROPHILS NFR BLD AUTO: 72.7 %
NRBC # BLD AUTO: 0 10*3/UL
NRBC BLD AUTO-RTO: 0 /100
PLATELET # BLD AUTO: 302 10E9/L (ref 150–450)
POTASSIUM SERPL-SCNC: 3.7 MMOL/L (ref 3.4–5.3)
PROT SERPL-MCNC: 7.7 G/DL (ref 6.8–8.8)
RBC # BLD AUTO: 4.8 10E12/L (ref 4.4–5.9)
SODIUM SERPL-SCNC: 136 MMOL/L (ref 133–144)
TROPONIN I SERPL-MCNC: <0.015 UG/L (ref 0–0.04)
WBC # BLD AUTO: 7.2 10E9/L (ref 4–11)

## 2021-04-23 PROCEDURE — 93971 EXTREMITY STUDY: CPT | Mod: LT

## 2021-04-23 PROCEDURE — 80053 COMPREHEN METABOLIC PANEL: CPT | Performed by: EMERGENCY MEDICINE

## 2021-04-23 PROCEDURE — 84484 ASSAY OF TROPONIN QUANT: CPT | Performed by: EMERGENCY MEDICINE

## 2021-04-23 PROCEDURE — 82077 ASSAY SPEC XCP UR&BREATH IA: CPT | Performed by: EMERGENCY MEDICINE

## 2021-04-23 PROCEDURE — 99285 EMERGENCY DEPT VISIT HI MDM: CPT | Mod: 25

## 2021-04-23 PROCEDURE — 93005 ELECTROCARDIOGRAM TRACING: CPT

## 2021-04-23 PROCEDURE — 85025 COMPLETE CBC W/AUTO DIFF WBC: CPT | Performed by: EMERGENCY MEDICINE

## 2021-04-23 ASSESSMENT — ENCOUNTER SYMPTOMS
HEADACHES: 1
NUMBNESS: 1

## 2021-04-23 NOTE — ED PROVIDER NOTES
History   Chief Complaint:  Numbness and Tingling     HPI   Hoang Kiser is a 35 year old anticoagulated male with history of depressive disorder, substance abuse, and suicidal ideation who presents via EMS from his group home with numbness and tingling. The patient reports that he is experiencing numbness and tingling in his lower extremities and left arm. 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  He indicates that he has a slight headache with pressure near his temples. The patient admits to doing a small line of meth last night.  He denies any muscle weakness or visual disturbances.  He also denies any suicidal ideation.  He is concerned that his current paresthesias are secondary to another DVT.  He denies any chest pain or shortness of breath as well.  However he does endorse having some anxiety.    Review of Systems   Neurological: Positive for numbness and headaches.   Psychiatric/Behavioral: Negative for suicidal ideas.         Allergies:  Amitriptyline  Buspirone  Doxycycline  Trazodone  Cephalexin    Medications:  Adderall  Proair  Suboxone  Clonidine  Hydrochlorothiazide  Deplin  Narcan  Nicoderm  Eliquis  Tizanidine  Viibrid  Ambien    Past Medical History:    Arthritis  Benign positional vertigo  Depressive disorder  Dysphonia  GERD  Hearing problem  Neuralgia, neuritis, and radiculitis  Panic attacks  Seizures  Tinnitus  Suicidal ideation  Stimulant dependence  PTSD  Substance abuse  Crohn's disease  Degenerative disc disease  Primary insomnia    Past Surgical History:    Tooth extraction  PE tubes    Family History:    Hyperlipidemia, father  Obesity, mother  Hyperlipidemia, mother  Depression, mother  Anxiety, mother  Asthma, brother  Asthma, sister  Bipolar, brother  Colitis, brother  Skin cancer, brother  Substance abuse, sister    Social History:  Arrives via EMS  Unaccompanied in ED    Physical Exam     Patient Vitals for the past 24  hrs:   BP Temp Temp src Pulse Resp SpO2   04/23/21 0618 139/85 97.6  F (36.4  C) Oral 111 16 98 %       Physical Exam  Nursing note and vitals reviewed.  Constitutional:  Oriented to person, place, and time. Cooperative.   HENT:   Nose:    Nose normal.   Mouth/Throat:   Mask is covering   Eyes:    Conjunctivae normal and EOM are normal.      Pupils are equal, round, and reactive to light.   Neck:    Trachea normal.   Cardiovascular:  Normal rate, regular rhythm, normal heart sounds and normal pulses. No murmur heard.  Pulmonary/Chest:  Effort normal and breath sounds normal.   Abdominal:   Soft. Normal appearance and bowel sounds are normal.      There is no tenderness.      There is no rebound and no CVA tenderness.   Musculoskeletal:  Extremities atraumatic x 4.  Bilateral lower extremities are normal in appearance without any significant swelling or tenderness to palpation.  Lymphadenopathy:  No cervical adenopathy.   Neurological:   Alert and oriented to person, place, and time. Normal strength.      No cranial nerve deficit or sensory deficit, including no sensory deficit in any of his extremities currently. GCS eye subscore is 4. GCS verbal subscore is 5. GCS motor subscore is 6.   Skin:    Skin is intact. No rash noted.   Psychiatric:   Normal mood and affect other than some anxiety present.  Denies suicidal ideation.    Emergency Department Course   Imaging:  US Lower Extremity Venous Duplex Left  1.  No deep venous thrombosis in the left lower extremity.    JACKI SHERMAN MD    Read per radiology    Laboratory:  CBC: WBC 7.2, HGB 13.7,   CMP: AST 58 (H) o/w WNL (Creatinine 1.14)      Troponin (Collected 0648): <0.015    Alcohol ethyl: <0.01    Emergency Department Course:    Reviewed:  I reviewed nursing notes, vitals, past medical history and care everywhere    Assessments:  0632 I obtained history and examined the patient as noted above.   0805 I rechecked the patient and explained findings. All  questions are answered and the patient is ready for discharge.      Disposition:  The patient was discharged to home.     Impression & Plan   Medical Decision Making:  This is a 35-year-old male who came in for further evaluation of paresthesias.  I felt it was reasonable to check the above blood work as well as an ultrasound to rule out another DVT in his left leg.  However my suspicion was low for that given the fact that he is anticoagulated.  I suspect that his paresthesias might be related to anxiety, as he apparently was anxious earlier as well.  His work-up here is unremarkable, which is reassuring.  I do not think that he requires anything else such as a stroke work-up.  Therefore at this point he was discharged back to his group home.  I recommended close outpatient follow-up though and certainly returning with any concerns or worsening symptoms.    Diagnosis:    ICD-10-CM    1. Paresthesias  R20.2        Discharge Medications:  New Prescriptions    No medications on file       Scribe Disclosure:  I, Joel Huddleston, am serving as a scribe at 8:12 AM on 4/23/2021 to document services personally performed by Peter Hammonds MD based on my observations and the provider's statements to me.              Peter Hammonds MD  04/23/21 2274

## 2021-04-23 NOTE — ED NOTES
Pt signed taxi waiver.  Writer spoke to [facility] staff, and informed her Shoaib is being discharged

## 2021-04-23 NOTE — TELEPHONE ENCOUNTER
Behavioral Health Home Services  [facility]: [place]        Social Work Care Navigator Note      Patient: Hoang Kiser  Date: April 23, 2021  Preferred Name: Shoaib    Previous PHQ-9:   PHQ-9 SCORE 2/19/2021 3/16/2021 4/5/2021   PHQ-9 Total Score - - -   PHQ-9 Total Score MyChart 10 (Moderate depression) - -   PHQ-9 Total Score 10 10 14   Some encounter information is confidential and restricted. Go to Review Flowsheets activity to see all data.     Previous JIN-7:   JIN-7 SCORE 12/10/2020 2/19/2021 3/16/2021   Total Score - - -   Total Score 10 (moderate anxiety) 17 (severe anxiety) -   Total Score 10 17 13   Some encounter information is confidential and restricted. Go to Review Flowsheets activity to see all data.     MOLLY LEVEL:  MOLLY Score (Last Two) 11/14/2020 12/10/2020   MOLLY Raw Score 31 31   Activation Score 59.3 59.3   MOLLY Level 3 3       Preferred Contact:  Need for : No  Preferred Contact: Cell      Type of Contact Today: Phone call (not reached/unavailable)      Data: (subjective / Objective):  Attempted to reach patient, but was unsuccessful.  Plan to attempt again.  LAKSHMI Kebede        Next 5 appointments (look out 90 days)    Apr 23, 2021 11:15 AM  SHORT with Phill Floyd MD  [facility] ([facility] ) [address] [facility] [address] [phone]   May 10, 2021 10:15 AM  Return Visit with Peng Nicholson MD  [facility] ([facility] ) [address]   [address]  [facility] [address] [phone]

## 2021-04-23 NOTE — ED NOTES
Bed: [facility]  Expected date:   Expected time:   Means of arrival:   Comments:  Pete 435 35 m meth use eta 0626

## 2021-04-24 ENCOUNTER — HOSPITAL ENCOUNTER (EMERGENCY)
Facility: CLINIC | Age: 36
Discharge: NURSING, REHAB OR LONG-TERM CARE | End: 2021-04-24
Attending: EMERGENCY MEDICINE | Admitting: EMERGENCY MEDICINE
Payer: COMMERCIAL

## 2021-04-24 VITALS
WEIGHT: 188 LBS | HEART RATE: 87 BPM | OXYGEN SATURATION: 98 % | RESPIRATION RATE: 18 BRPM | TEMPERATURE: 97.8 F | BODY MASS INDEX: 29.44 KG/M2 | DIASTOLIC BLOOD PRESSURE: 69 MMHG | SYSTOLIC BLOOD PRESSURE: 113 MMHG

## 2021-04-24 DIAGNOSIS — F22 PARANOIA (H): ICD-10-CM

## 2021-04-24 DIAGNOSIS — F22 DELUSION (H): ICD-10-CM

## 2021-04-24 DIAGNOSIS — F15.10 METHAMPHETAMINE ABUSE (H): ICD-10-CM

## 2021-04-24 LAB
LABORATORY COMMENT REPORT: NORMAL
SARS-COV-2 RNA RESP QL NAA+PROBE: NEGATIVE
SPECIMEN SOURCE: NORMAL

## 2021-04-24 PROCEDURE — C9803 HOPD COVID-19 SPEC COLLECT: HCPCS

## 2021-04-24 PROCEDURE — 90791 PSYCH DIAGNOSTIC EVALUATION: CPT

## 2021-04-24 PROCEDURE — 250N000013 HC RX MED GY IP 250 OP 250 PS 637: Performed by: PSYCHIATRY & NEUROLOGY

## 2021-04-24 PROCEDURE — 99214 OFFICE O/P EST MOD 30 MIN: CPT | Performed by: PSYCHIATRY & NEUROLOGY

## 2021-04-24 PROCEDURE — 99285 EMERGENCY DEPT VISIT HI MDM: CPT | Mod: 25

## 2021-04-24 PROCEDURE — 87635 SARS-COV-2 COVID-19 AMP PRB: CPT | Performed by: EMERGENCY MEDICINE

## 2021-04-24 RX ORDER — HYDROXYZINE HYDROCHLORIDE 25 MG/1
25-50 TABLET, FILM COATED ORAL 3 TIMES DAILY PRN
Status: DISCONTINUED | OUTPATIENT
Start: 2021-04-24 | End: 2021-04-24 | Stop reason: HOSPADM

## 2021-04-24 RX ORDER — NICOTINE 21 MG/24HR
1 PATCH, TRANSDERMAL 24 HOURS TRANSDERMAL DAILY
Status: DISCONTINUED | OUTPATIENT
Start: 2021-04-24 | End: 2021-04-24 | Stop reason: HOSPADM

## 2021-04-24 RX ORDER — POLYETHYLENE GLYCOL 3350 17 G
4 POWDER IN PACKET (EA) ORAL
Status: DISCONTINUED | OUTPATIENT
Start: 2021-04-24 | End: 2021-04-24 | Stop reason: HOSPADM

## 2021-04-24 RX ADMIN — NICOTINE 1 PATCH: 14 PATCH, EXTENDED RELEASE TRANSDERMAL at 12:03

## 2021-04-24 RX ADMIN — HYDROXYZINE HYDROCHLORIDE 50 MG: 25 TABLET, FILM COATED ORAL at 12:03

## 2021-04-24 ASSESSMENT — ENCOUNTER SYMPTOMS
SLEEP DISTURBANCE: 1
DYSPHORIC MOOD: 1

## 2021-04-24 NOTE — PROGRESS NOTES
Pt has been resting with no complaints. He ate lunch and breakfast. He was anxious and wanted to leave to smoke, but his PRN nicotine patch has been effective. He is weepy at times, believing the electrical at Group Home has caused harm. He denies SI/HI. He does have some discomfort to his Rt leg with ambulation, his feet are puffy. Encouraged to elevate, is not wanting /needing anything for pin, denies SOB.

## 2021-04-24 NOTE — ED TRIAGE NOTES
Patient presents from  after being unable to sleep.  Patient states that he feels like the HVAC system in his  is transmitting currents through his body and making him feel strange.  Patient is alert and oriented.  Taking medications as prescribed.  States that he last did meth about 48 hours ago.  Denies SI.  Patient was the one who called 911 after the staff where not able to turn off the breaker to his room.  Patient is paranoid and manic.

## 2021-04-24 NOTE — PLAN OF CARE
35 year old male received from ER due to psychosis. Reports he is concerned about electricity leaking out of the HVAC system at his group home, causing waves of pain up and down his arms and legs.  . denies SI/HI. Previous MH history includes living in group home. Patient presents with blunt affect, depressed in mood. Patient search completed with two staff members present. Nursing assessment complete including patient and medication profiles. Risk assessments completed addressing suicide and safety issues. Care plan initiated. Video monitoring in progress.

## 2021-04-24 NOTE — DISCHARGE INSTRUCTIONS
Safety Plan Provided? Yes:   If I am feeling unsafe or I am in a crisis, I will:  Contact my established care providers  Call the National Suicide Prevention Lifeline: [phone]  Go to the nearest emergency room  Call 911    Warning signs that I or other people might notice when a crisis is developing for me: Drug use.  Auditory and visual hallucinations increase.     Things I am able to do on my own to cope or help me feel better: Listen to music.  Create art; graphic design.  Meditate.    Things that I am able to do with others to cope or help me better: See a therapist.  Call my sister or my family.  Things I can use or do for distraction: Get outside and go for a walk.  Listen to music.  Talk to the group home staff that I like.  Meditate.    Changes I can make to support my mental health and wellness: Talk to my  and brainstorm ideas of alternative living situations where I can be more independent or have responsibilities.  People in my life that I can ask for help: My sister, group home .  My outpatient providers.    Your [facility] has a mental health crisis team you can call 24/7: [facility] Crisis Line Number: [phone]  Other things that are important when I m in crisis: Family is important to me.    Additional resources and information:     Follow-Up Plans and Providers: See attached appointment sheet.  You have a telehealth appointment with Alysha Stevens MA from [facility] on 4/26/21 at 1pm.

## 2021-04-24 NOTE — ED NOTES
Pt changed into scrubs, belongings removed and locked in pt locker. Pt given ice water per request.

## 2021-04-24 NOTE — PLAN OF CARE
Pt discharged back to his Group Home in [place]. Staff from group [place] sent transportation. Pt denied SI/HI. He has AVS and verbalized understanding of safety plan and d/d instructions. His only belongings were a lighter and clothing, and they were returned.

## 2021-04-24 NOTE — ED NOTES
Patient changed into scrubs with no issues.  Patient reports that he moved into his GH in August and since has been having increased paranoia.  He reports that he has not been sleeping well which has been increasing his paranoid thoughts.  He denies auditory or visual hallucinations.  Patient talks about his HVAC system and how he believes the facility is out to get him by sending electrical currents through it.  He states he does meth and also does Suboxone.  Patient is alert and oriented and is pleasant and cooperative.  He wants to be here in the ED and is not on a CHIDI. Patient was able to walk back from the EMS garage. Will continue to monitor.
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
## 2021-04-24 NOTE — ED NOTES
35 year old male received from ER due to paranoia, delusions, anxiety and substance use. Reports no SI/HI. Previous MH history includes SI, panic attacks, attention deficit disorder, PTSD, depression. Patient presents with blunted affect, Calm in mood. Patient search completed with two staff members present. Nursing assessment complete including patient and medication profiles. Risk assessments completed addressing suicide and safety issues. Care plan initiated. Video monitoring in progress.

## 2021-04-27 ENCOUNTER — ALLIED HEALTH/NURSE VISIT (OUTPATIENT)
Dept: BEHAVIORAL HEALTH | Facility: CLINIC | Age: 36
End: 2021-04-27
Payer: COMMERCIAL

## 2021-04-27 DIAGNOSIS — R69 DIAGNOSIS DEFERRED: Primary | ICD-10-CM
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
## 2021-05-04 ENCOUNTER — IMMUNIZATION (OUTPATIENT)
Dept: NURSING | Facility: CLINIC | Age: 36
End: 2021-05-04
Attending: INTERNAL MEDICINE
Payer: COMMERCIAL

## 2021-05-04 ENCOUNTER — NURSE TRIAGE (OUTPATIENT)
Dept: NURSING | Facility: CLINIC | Age: 36
End: 2021-05-04

## 2021-05-04 PROCEDURE — 0002A PR COVID VAC PFIZER DIL RECON 30 MCG/0.3 ML IM: CPT

## 2021-05-04 PROCEDURE — 91300 PR COVID VAC PFIZER DIL RECON 30 MCG/0.3 ML IM: CPT

## 2021-05-04 NOTE — TELEPHONE ENCOUNTER
"Unsure who Gloria is in relation to the pt. No FIGUEROA or consent to communicate on file for Gloria.     Attempted to reach pt to gather more details. Pt did not answer phone call. LVM.     Called back Gloria ([phone]). Gloria stated she is the pt's nurse at [employer] [facility]. Gloria stated she had had a question is regards to how pt should have been taking his Suboxone but \"the issue is resolved now\".  Gloria indicated she has no questions or concerns at this time.     "

## 2021-05-04 NOTE — TELEPHONE ENCOUNTER
Pt states she rec'd a message from [facility] to return a call to Jennifer. No other information.   Upon review of EHR no message was found. Advised pt to call back tomorrow AM when clinic reopens. Pt voiced understanding and agreement.

## 2021-05-05 ENCOUNTER — TELEPHONE (OUTPATIENT)
Dept: BEHAVIORAL HEALTH | Facility: CLINIC | Age: 36
End: 2021-05-05

## 2021-05-05 NOTE — TELEPHONE ENCOUNTER
Behavioral Health Home Services  [facility]: [place]        Social Work Care Navigator Note      Patient: Hoang Kiser  Date: May 5, 2021  Preferred Name: Shoaib    Previous PHQ-9:   PHQ-9 SCORE 2/19/2021 3/16/2021 4/5/2021   PHQ-9 Total Score - - -   PHQ-9 Total Score MyChart 10 (Moderate depression) - -   PHQ-9 Total Score 10 10 14   Some encounter information is confidential and restricted. Go to Review Flowsheets activity to see all data.     Previous JIN-7:   JIN-7 SCORE 12/10/2020 2/19/2021 3/16/2021   Total Score - - -   Total Score 10 (moderate anxiety) 17 (severe anxiety) -   Total Score 10 17 13   Some encounter information is confidential and restricted. Go to Review Flowsheets activity to see all data.     MOLLY LEVEL:  MOLLY Score (Last Two) 11/14/2020 12/10/2020   MOLLY Raw Score 31 31   Activation Score 59.3 59.3   MOLLY Level 3 3       Preferred Contact:  Need for : No  Preferred Contact: Cell      Type of Contact Today: Phone call (not reached/unavailable)      Data: (subjective / Objective):  Attempted to reach patient, but was unsuccessful.  Plan to attempt again.  LAKSHMI Kebede        Next 5 appointments (look out 90 days)    May 10, 2021 10:15 AM  Return Visit with Peng Nicholson MD  [facility] ([facility] ) [address]   [address]  [facility] [id number] [phone]

## 2021-05-07 ENCOUNTER — TELEPHONE (OUTPATIENT)
Dept: BEHAVIORAL HEALTH | Facility: CLINIC | Age: 36
End: 2021-05-07

## 2021-05-07 NOTE — TELEPHONE ENCOUNTER
Behavioral Health Home Services  [facility]: [place]        Social Work Care Navigator Note      Patient: Hoang Kiser  Date: May 7, 2021  Preferred Name: Shoaib    Previous PHQ-9:   PHQ-9 SCORE 2/19/2021 3/16/2021 4/5/2021   PHQ-9 Total Score - - -   PHQ-9 Total Score MyChart 10 (Moderate depression) - -   PHQ-9 Total Score 10 10 14   Some encounter information is confidential and restricted. Go to Review Flowsheets activity to see all data.     Previous JIN-7:   JIN-7 SCORE 12/10/2020 2/19/2021 3/16/2021   Total Score - - -   Total Score 10 (moderate anxiety) 17 (severe anxiety) -   Total Score 10 17 13   Some encounter information is confidential and restricted. Go to Review Flowsheets activity to see all data.     MOLLY LEVEL:  MOLLY Score (Last Two) 11/14/2020 12/10/2020   MOLLY Raw Score 31 31   Activation Score 59.3 59.3   MOLLY Level 3 3       Preferred Contact:  Need for : No  Preferred Contact: Cell      Type of Contact Today: Phone call (not reached/unavailable)      Data: (subjective / Objective):  Patient returned prior call and left message for [facility]. [facility] attempted to reach patient but was unsuccessful.    Janet Ng LGSW Behavioral Health Home ([facility])   [facility]  [phone]          Next 5 appointments (look out 90 days)    May 10, 2021 10:15 AM  Return Visit with Peng Nicholson MD  [facility] ([facility] ) [address] [place], [place] [address]   [address]  [facility] [id number] [phone]   May 21, 2021  3:00 PM  Office Visit with Phill Floyd MD  [facility] ([facility] ) [address] [facility] [address] [phone]

## 2021-05-07 NOTE — TELEPHONE ENCOUNTER
Behavioral Health Home Services  [facility]: [place]        Social Work Care Navigator Note      Patient: Hoang Kiser  Date: May 7, 2021  Preferred Name: Shoaib    Previous PHQ-9:   PHQ-9 SCORE 2/19/2021 3/16/2021 4/5/2021   PHQ-9 Total Score - - -   PHQ-9 Total Score MyChart 10 (Moderate depression) - -   PHQ-9 Total Score 10 10 14   Some encounter information is confidential and restricted. Go to Review Flowsheets activity to see all data.     Previous JIN-7:   JIN-7 SCORE 12/10/2020 2/19/2021 3/16/2021   Total Score - - -   Total Score 10 (moderate anxiety) 17 (severe anxiety) -   Total Score 10 17 13   Some encounter information is confidential and restricted. Go to Review Flowsheets activity to see all data.     MOLLY LEVEL:  MOLLY Score (Last Two) 11/14/2020 12/10/2020   MOLLY Raw Score 31 31   Activation Score 59.3 59.3   MOLLY Level 3 3       Preferred Contact:  Need for : No  Preferred Contact: Cell      Type of Contact Today: Phone call (not reached/unavailable)      Data: (subjective / Objective):  Patient left message for [facility] prior day during after hours returning call from missed appointment.  [facility] attempted to call patient back today but got his voicemail. Left a message requesting call back.    Janet Ng LGSW Behavioral Health Home ([facility])   [facility]  [phone]        Next 5 appointments (look out 90 days)    May 10, 2021 10:15 AM  Return Visit with Peng Nicholson MD  [facility] ([facility] ) [address]   [address]  [facility] [address] [phone]   May 21, 2021  3:00 PM  Office Visit with Phill Floyd MD  [facility] ([facility] ) [address] [facility] [address] [phone]

## 2021-05-08 ENCOUNTER — NURSE TRIAGE (OUTPATIENT)
Dept: NURSING | Facility: CLINIC | Age: 36
End: 2021-05-08

## 2021-05-08 NOTE — TELEPHONE ENCOUNTER
Patient says he is trying to reach [facility]. Advised not sure who is referring and his chart does not show any recent call from a [facility]. Patient says he will call back.    Abraham Johnosn RN/[facility], 5/08/21, 2:45 PM.    Reason for Disposition    General information question, no triage required and triager able to answer question    Additional Information    Negative: RN needs further essential information from caller in order to complete triage    Negative: Requesting regular office appointment    Negative: [1] Caller requesting NON-URGENT health information AND [2] PCP's office is the best resource    Negative: Health Information question, no triage required and triager able to answer question    Protocols used: INFORMATION ONLY CALL - NO TRIAGE-A-

## 2021-05-12 DIAGNOSIS — F11.20 UNCOMPLICATED OPIOID DEPENDENCE (H): ICD-10-CM

## 2021-05-12 RX ORDER — BUPRENORPHINE AND NALOXONE 8; 2 MG/1; MG/1
1 FILM, SOLUBLE BUCCAL; SUBLINGUAL 2 TIMES DAILY
Qty: 30 FILM | Refills: 0 | Status: SHIPPED | OUTPATIENT
Start: 2021-05-12 | End: 2021-06-09 | Stop reason: ALTCHOICE

## 2021-05-12 NOTE — TELEPHONE ENCOUNTER
Route to Addiction med    Pt requesting refill of suboxone    Med cued    Sushma Gardner RN   [facility]

## 2021-05-19 NOTE — TELEPHONE ENCOUNTER
Patient responding to Dr. Phill Floyd's message yesterday.   Diane Mcgraw BSN, RN     Information: Selecting Yes will display possible errors in your note based on the variables you have selected. This validation is only offered as a suggestion for you. PLEASE NOTE THAT THE VALIDATION TEXT WILL BE REMOVED WHEN YOU FINALIZE YOUR NOTE. IF YOU WANT TO FAX A PRELIMINARY NOTE YOU WILL NEED TO TOGGLE THIS TO 'NO' IF YOU DO NOT WANT IT IN YOUR FAXED NOTE.

## 2021-05-25 ENCOUNTER — MYC MEDICAL ADVICE (OUTPATIENT)
Dept: FAMILY MEDICINE | Facility: CLINIC | Age: 36
End: 2021-05-25

## 2021-05-28 ENCOUNTER — HOSPITAL ENCOUNTER (EMERGENCY)
Facility: CLINIC | Age: 36
Discharge: HOME | End: 2021-05-29
Attending: EMERGENCY MEDICINE | Admitting: EMERGENCY MEDICINE
Payer: COMMERCIAL

## 2021-05-28 DIAGNOSIS — R45.1 AGITATION: ICD-10-CM

## 2021-05-28 PROCEDURE — 99285 EMERGENCY DEPT VISIT HI MDM: CPT | Mod: 25

## 2021-05-28 PROCEDURE — 90791 PSYCH DIAGNOSTIC EVALUATION: CPT

## 2021-05-28 PROCEDURE — C9803 HOPD COVID-19 SPEC COLLECT: HCPCS

## 2021-05-29 VITALS
TEMPERATURE: 98 F | SYSTOLIC BLOOD PRESSURE: 114 MMHG | DIASTOLIC BLOOD PRESSURE: 59 MMHG | HEART RATE: 73 BPM | OXYGEN SATURATION: 97 % | RESPIRATION RATE: 16 BRPM

## 2021-05-29 PROCEDURE — 87635 SARS-COV-2 COVID-19 AMP PRB: CPT | Performed by: EMERGENCY MEDICINE

## 2021-05-29 PROCEDURE — 250N000013 HC RX MED GY IP 250 OP 250 PS 637: Performed by: EMERGENCY MEDICINE

## 2021-05-29 RX ORDER — LEVOMEFOLATE CALCIUM 15 MG
15 TABLET ORAL DAILY
Status: DISCONTINUED | OUTPATIENT
Start: 2021-05-29 | End: 2021-05-29 | Stop reason: HOSPADM

## 2021-05-29 RX ORDER — OLANZAPINE 5 MG/1
5 TABLET, ORALLY DISINTEGRATING ORAL ONCE
Status: COMPLETED | OUTPATIENT
Start: 2021-05-29 | End: 2021-05-29

## 2021-05-29 RX ORDER — OLANZAPINE 5 MG/1
5 TABLET, ORALLY DISINTEGRATING ORAL EVERY 12 HOURS PRN
Qty: 20 TABLET | Refills: 0 | Status: SHIPPED | OUTPATIENT
Start: 2021-05-29 | End: 2021-12-16

## 2021-05-29 RX ORDER — DEXTROAMPHETAMINE SACCHARATE, AMPHETAMINE ASPARTATE MONOHYDRATE, DEXTROAMPHETAMINE SULFATE AND AMPHETAMINE SULFATE 2.5; 2.5; 2.5; 2.5 MG/1; MG/1; MG/1; MG/1
30 CAPSULE, EXTENDED RELEASE ORAL DAILY
Status: DISCONTINUED | OUTPATIENT
Start: 2021-05-29 | End: 2021-05-29 | Stop reason: HOSPADM

## 2021-05-29 RX ORDER — HYDROCHLOROTHIAZIDE 25 MG/1
25 TABLET ORAL EVERY MORNING
Status: DISCONTINUED | OUTPATIENT
Start: 2021-05-30 | End: 2021-05-29 | Stop reason: HOSPADM

## 2021-05-29 RX ADMIN — OLANZAPINE 5 MG: 5 TABLET, ORALLY DISINTEGRATING ORAL at 12:38

## 2021-05-29 ASSESSMENT — ENCOUNTER SYMPTOMS
COUGH: 0
CHILLS: 0
ABDOMINAL PAIN: 0
FEVER: 0

## 2021-05-29 NOTE — ED NOTES
Patient is currently sitting in the hallway because he states the air in his room is to loud so he cannot sleep. Patient doesn't follow a single track of conversation and starts rambling about his family and things he meant to do. He continuously looks back over his shoulder. When asked if he was comfortable in the chair he adjusted his body and sat forward and no longer spoke to me.

## 2021-05-29 NOTE — DISCHARGE INSTRUCTIONS
Please continue with your home medications and follow up with your therapists.      Discharge Instructions  Mental Health Concerns    You were seen today for mental health concerns, such as depression, anxiety, or suicidal thinking. Your provider feels that you do not require hospitalization at this time. However, your symptoms may become worse, and you may need to return to the Emergency Department. Most treatments of depression and suicidal thoughts are a process rather than a single intervention.  Medications and counseling can take several weeks or more to help.    Generally, every Emergency Department visit should have a follow-up clinic visit with either a primary or a specialty clinic/provider. Please follow-up as instructed by your emergency provider today.    By accepting these discharge instructions:  You promise to not harm yourself or others.  You agree that if you feel you are becoming unable to keep that promise, you will do something to help yourself before you do anything to harm yourself or others.   You agree to keep any safety plan arranged on your visit here today.  You agree to take any medication prescribed or recommended by your provider.  If you are getting worse, you can contact a friend or a family member, contact your counselor or family provider, contact a crisis line, or other options discussed with the provider or therapist today.  At any time, you can call 911 and return to the Emergency Department for more help.  You understand that follow-up is essential to your treatment, and you will make and keep appointments recommended on your visit today.    How to improve your mental health and prevent suicide:  Involve others by letting family, friends, counselors know.  Do not isolate yourself.  Avoid alcohol or drugs. Remove weapons, poisons from your home.  Try to stick to routines for eating, sleeping and getting regular exercise.    Try to get into sunlight. Bright natural light not only  treats seasonal affective disorder but also depression.  Increase safe activities that you enjoy.    If you feel worse, contact [phone] ([phone]), or call 911, or your primary provider/counselor for additional assistance.    If you were given a prescription for medicine here today, be sure to read all of the information (including the package insert) that comes with your prescription.  This will include important information about the medicine, its side effects, and any warnings that you need to know about.  The pharmacist who fills the prescription can provide more information and answer questions you may have about the medicine.  If you have questions or concerns that the pharmacist cannot address, please call or return to the Emergency Department.   Remember that you can always come back to the Emergency Department if you are not able to see your regular provider in the amount of time listed above, if you get any new symptoms, or if there is anything that worries you.
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
## 2021-05-29 NOTE — ED PROVIDER NOTES
Patient signed out to myself awaiting possible transfer back to group home.  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  Eduardo went and spoke to the patient and he was acting psychotic asking if he could eat her pen that she was using to write down the history with.  Patient was given Zyprexa and hoping that taking a nap would help him clear his medications.  Group home notes he typically is not psychotic.  Patient be signed out to my partner awaiting cleared mentation.  Urine drug screen is added on.     Behzad Trivedi MD  05/29/21 1021

## 2021-05-29 NOTE — ED NOTES
[facility] ED Mental Health Handoff Note:       Brief HPI:  This is a 35 year old male signed out to me by Dr. Trivedi.  See initial ED Provider note for full details of the presentation.  The patient received a dose of Zyprexa ODT at approximately 12:30 PM.  I reassessed the patient at 5:30 PM he is resting calm and comfortably, denies any suicidal or homicidal ideation, denies any current hallucinations.  He states he feels calm.  He feels comfortable with discharge back to his group home, we have contacted the group home they are willing to take him back.  I have prescribed as needed Zyprexa for home use.      Hold Status:  Active Orders   Legal    Health Officer Authority to Detain (CHIDI)     Frequency: Effective Now     Start Date/Time: 05/29/21 0044      Number of Occurrences: Until Specified     Order Comments: agitation             Exam:   Patient Vitals for the past 24 hrs:   BP Temp Temp src Pulse Resp SpO2   05/29/21 1235 127/78 97.8  F (36.6  C) Temporal 91 18 98 %   05/29/21 0004 128/84 98  F (36.7  C) Temporal 92 -- 100 %   General: Calm resting comfortably in bed  Cardiovascular: Well-perfused  Lungs: No respiratory distress  Psychiatric: Resting comfortably, denies suicidal or homicidal ideation, denies active hallucinations.    ED Course:    Medications   amphetamine-dextroamphetamine (ADDERALL XR) per 24 hr capsule 30 mg (30 mg Oral Not Given 5/29/21 1334)   hydrochlorothiazide (HYDRODIURIL) tablet 25 mg (has no administration in time range)   methylfolate (DEPLIN) tablet 15 mg (has no administration in time range)   nicotine (NICODERM CQ) 7 MG/24HR 24 hr patch 1 patch (has no administration in time range)   nicotine Patch in Place (has no administration in time range)   OLANZapine zydis (zyPREXA) ODT tab 5 mg (5 mg Oral Given 5/29/21 1238)            There were no significant events during my shift.    Patient was evaluated following administration of Zyprexa and a time to sleep, his symptoms  have largely resolved and he feels comfortable with discharged home, his group home feels comfortable taking him back.  I will prescribe as needed Zyprexa to be utilized at the group home facility.      Impression:    ICD-10-CM    1. Agitation  R45.1        Plan:    1. Discharged.      RESULTS:   Results for orders placed or performed during the hospital encounter of 05/28/21 (from the past 24 hour(s))   Asymptomatic SARS-CoV-2 COVID-19 Virus (Coronavirus) by PCR     Status: None    Collection Time: 05/29/21 12:27 AM    Specimen: Nasopharyngeal   Result Value Ref Range    SARS-CoV-2 Virus Specimen Source Nasopharyngeal     SARS-CoV-2 PCR Result NEGATIVE     SARS-CoV-2 PCR Comment (Note)              David Garcia MD                     Trigger, David Ramirez MD  05/29/21 2922

## 2021-05-29 NOTE — ED NOTES
"Patient was pacing then repeatedly open and closed the door saying he was possessed. A few moments later he was standing in the meadows and as I was talking to him patient states that he is 110% good and when stating his name and birthday stopped repeatedly to hit the air to his right. When question he about that he states he doesn't know who it is that is distracting him. When asked if they are speaking to him patient stated sometimes but they aren't telling him to do anything at the moment. Patient then said \"well one of them is and they are telling me to take a shit.\" Patient then went back into his room to watch TV.  "

## 2021-05-29 NOTE — ED NOTES
"Pt yelling at talking to himself, when the writer spoke to his the pt states \"I think I might need some anti-psychotic medication\".  "

## 2021-05-29 NOTE — ED NOTES
Pt. Group home called and report given to Care attendant. They are accepting pt back, and recommendations given and agreed that it would help the pt.    The pharmacy they use for medication delivery is:  [facility]  [address] [place], [place]  Phone: [phone]  Fax: [phone]

## 2021-05-29 NOTE — ED NOTES
Bed: BH3  Expected date:   Expected time:   Means of arrival:   Comments:  [address] 35m group home agitation green

## 2021-05-29 NOTE — CONSULTS
"5/28/2021  Hoang Kiser 1985     [facility] Mental Health Assessment:    Started at: 12:30pm  Completed at: 1:00pm  What type of assessment are you doing today? Full DA    1.  Presenting Problem:      Referral Method to ED? Medics     What brings the patient to the ED today? Pt brought to ED due to erratic and threatening behavior at his . This writer interviewed pt 13 hours after arrival. Pt had been noted to be engaging in bizarre behavior on the unit since his arrival.     Pt is sitting wrapped in a blanket with his eyes darting to his right and then to my pen. He can not tell me why he is here but does say that the  staff last night \"kept looking at me and thinking at me\" and then staff called police. Collateral reports say that the pt was yelling, hitting walls and threatening staff and threatening to hurt \"someone, anyone.\" This was reported by staff onsite of  as well as their on call nurse who could hear it through the phone.     Pt now denies SI/HI and endorses auditory and command hallucinations. Pt is disorganized, labile and vague. Pt again seen with eyes darting and he states that the voice he hears is to that direction. I ask about my pen, he states \"yeah, I am going to eat your blue pen.\" I suggest that it may not taste good and the pt shows a moment of lucidity, smiles stating \"yeah, I think you are probably right.\"  I again begin to ask questions about the pt's mental health, medications and symptoms. He becomes overwhelmed and starts to cry briefly. He reports medication compliance and that this all started March 12, 2020. He has beliefs about EMFs (electria magnetic fields) affecting him which is consistent with his presentation 1 month ago after using methamphetamine. Pt is becoming quite somnolent and unable to engage. Nursing reports he had been given Zyprexa just prior to my arrival on the unit.     Due to pt's state of psychosis and medication given, no further assessment could be " completed. 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 Pt will be allowed to clear and be reassessed by provider who will determine if further MH assessment is indicated.  will allow him to return when he is back at baseline. (Kind, humble, not psychotic)      Has this happened before? Yes Similar incident on 5/23/2021. Pt saw psychiatrist the following day who prescibed trazadone for sleep but the pharmacy did not receive the order.)    Duration of presenting problem: Polysubstance use for many years, brief episodes of psychosis    Additional Stressors: [place]     2.  Risk Assessment:  Suicide and Self-Harm    ESS-6  1.a. Over the past 2 weeks, have you had thoughts of killing yourself? No   1.b. Have you ever attempted to kill yourself and, if yes, when did this last happen? Pt unable to answer.   2. Recent or current suicide plan? Pt unable to answer.   3. Recent or current intent to act on ideation? No  4. Lifetime psychiatric hospitalization? Yes  5. Pattern of excessive substance use? Yes  6. Current irritability, agitation, or aggression? Yes  ESS-6 Score: 3    SI: N/A  Plan: No  Intent: No   Prior Attempts: Pt unable to answer.      Protective Factors: 24 hour staff, some attempt at sobriety and/or harm reduiction    Hopes and goals for the future: Pt unable to answer.     Coping Skills: What helps and doesn't help? Pt unable to answer.     Additional Risk Factors Related to Safety and Suicide: Yes: Active substance abuse and Depressive symptoms    Is the patient engaged in self injurious behaviors? No     Risk to Others    Aggressive/Assaultive/Homicidal Risk Factors: Yes: Agitation / Hyperactivity and Impaired Self Control     Duty to Warn? No     Was a Child Protection Report Made? No       Was a Adult Protection  "Report Made? No        Sexually inappropriate behavior? No        Vulnerability to sexual exploitation? No     Additional information:       3. Mental Health Symptoms and Substance Use  Current Symptoms and Mental Health History    GAIN Short Screener (GAIN-SS) administered? NA    Attention, Hyperactivity, and Impulsivity Symptoms      Patient reported symptoms related to hyperactivity, inattention, or impulsivity? Yes: Disorganized/Forgetful, Hyperactive, Impulsive, Inattentive and Restless       Anxiety Symptoms    Patient reported anxiety symptoms? Yes: Panic attacks and Generalized Symptoms: Agitation, Cognitive anxiety - feelings of doom, racing thoughts, difficulty concentrating , Pacing and Physiological anxiety - sweating, flushing, shaking, shortness of breath, or racing heart         Behavioral Difficulties    Patient reported behavioral difficulties? Yes: Agitation, Disruptive and Impulsivity/Disinhibition       Mood Symptoms    Patient reported mood disorder symptoms? Yes: Aggression and Crying or feels like crying       Eating Disorders and Appetite Disturbance      Patient reported appetite symptoms? No       Interpersonal Functioning     Patient reported difficulties that may be associated with personality and interpersonal functioning? No      Learning Disabilities/Cognitive/Developmental Disorders    Patient reported concerns related to learning disabilities, cognitive challenges, and/or developmental disorders? No     Sleep Disturbance    Patient reported difficulties with sleep? No        Psychosis Symptoms    Patient reported symptoms of psychosis? Yes: Hallucinations: Auditory and Command, Delusions: Persecutory: believing his exgirlfriend sent 70 - 80 people to \"think at him\" on March 12,2020, Paranoid: EMFs are hurting him and Other:     , Paranoia and Grossly Disorganized or Catatonic        Trauma and Post-Traumatic Stress Disorder    Physical Abuse: Pt unable to answer.  "   Emotional/Psychological Abuse: Pt unable to answer.   Sexual Abuse: Pt unable to answer.   Loss of a friend or family member to suicide: Pt unable to answer.   Other Traumatic Event: Yes Carries PTSD diagnosis but Pt unable to answer with more detail at thisa time.      Patient reported trauma related symptoms? Pt unable to answer.      Impact of Mental Health on Functioning      Negative Impact Score: 10/10   Subjective Impact on functioning: Pt can not function at this time.   How do symptoms vary from baseline? Pt is not psychotic at baseline.     Current and Historical Substance Use Note:    IIs there a history of, or current, substance use? Yes, pt has a history of stimulant use disorder, benzodiazepine abuse, opioid use disorder-on maintenance therapy. Pt can not answer any more about his use at this time.     Have you been to chemical dependency treatment or detox before? Yes: Chart reflects at 15 times.        Drug screen completed? No   BAL/Breathalyzer completed? No       Mental Status Exam:    Affect: Labile  Appearance: Disheveled   Attention Span/Concentration: Attentive    Eye Contact: Variable  Fund of Knowledge: Delayed   Language /Speech Content: Non-fluent  Language /Speech Volume: Soft   Language /Speech Rate/Productions: Minimally Responsive   Recent Memory: Poor  Remote Memory: Poor  Mood: Anxious   Orientation:   Person: Yes   Place: No  Time of Day: Yes   Date: No   Situation (Do they understand why they are here?): No   Psychomotor Behavior: Agitated   Thought Content: Delusions, Hallucinations and Paranoia  Thought Form: Flight of Ideas and Obsessive/Perseverative    4. Social and Environmental Conditions   Is the patient their own guardian? Yes    Living Situation: Group home: [facility] [phone], nurse Gloria [phone]    Support system and quality of connections: Pt can not answer    Income source: Unknown    Issues with employment or education: Pt can not answer    Legal  Concerns  Do you have any history of or current involvement with the legal system? Pt can not answer    Spiritual and Cultural Influences  Do you have any Anabaptism beliefs that are important in your life? Pt can not answer     Do you have any cultural influences in your life that impact your mental health care? Pt can not answer        5. Psychiatric History, Medical History, and Current Care      Patient Mental Health Services   Does the patient have a history of mental health concerns/diagnoses? Yes depression, ADHD, PTSD, JIN, as well as amphetamine and opiate abuse      Current Providers  Primary Care Provider: depression, ADHD, PTSD, JIN, as well as amphetamine and opiate abuse    Psychiatrist: Yes Dr. Brian Duckworth    Therapist: Pt can not answer   : Pt can not answer   [facility]: Pt can not answer   ACT Team: Pt can not answer   Other: Pt can not answer    History of Commitment? Pt can not answer  History of Psychiatric Hospitalizations? Yes One at [facility] 3/25/21 -3/29/21   History of programmatic care? Pt can not answer    Family Mental Health History   Family History of Mental Health or Chemical Dependency Issues? Pt can not answer     Development and Physical Health Challenges  Delays or concerns meeting developmental milestones? Pt can not answer  Current psychotropic medications? Yes Viibryd 40 mg QAM Remeron 15 mg at bedtime, Suboxone 2-0.5 mg 2 Tab Sublingually TID, Xarelto 20 mg QPM  Medication Compliant? Yes   Recent medication changes? Yes    History of concussion or TBI? Unknown     Additional Information:     6. Collateral Information and Collaboration    Collaboration with medical staff:Referral Information:   Nursing and Referring Provider     Collateral Information/Sources: Medical Records: [facility] and Community Provider: DECLAN Castro nurse on call [phone]    7. 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 Patient Care/Treatment Plan  Summary of Patient Presentation and needs  What are the basic needs for this patient in this moment? Pt needs to clear from substances.       Consultations :  Attending provider consulted? Yes  Attending Name: Behzad Trivedi MD  Attending concurs with disposition? Yes     Recommended disposition: Group Home: Return to  is appropriate if pt clears from substances. If psychosis is still evident, further assessment will be needed.      Does the patient agree with the recommended level of care? Pt can not answer    Final disposition: Other: Pending    Disposition Details: Pending    If Inpatient, is patient admitted voluntary? N/A   Patient aware of potential for transfer if there is not appropriate placement? NA  Patient is willing to travel outside of the [facility] for placement? NA   Central Intake Notified? NA    10. Patient Care Document: Safety and After Care Planning:          Safety Plan Provided? No    Follow-Up Plans and Providers: None at this time    Follow-Up Plan:  After care plan provided to the patient/guardian by: Pending  After care plan provided to any additional sources/parties? No    Duration of face to face time with patient in minutes: .50 hrs    CPT code(s) utilized: 56477 - Psychotherapy (with patient) - 30 (16-37*) min      Ann Tracy, SELENESW

## 2021-05-29 NOTE — ED NOTES
Collateral:     [facility] [phone], Gloria on-call nurse [phone]    Ann Tracy, [facility] HospitalSW

## 2021-05-29 NOTE — ED NOTES
"Pt asks this writer to repeat/rephrase questions and \"put them in your own words\" while trying to evaluate pt. Pt will answer some questions and ignore others. Pt admits that he does not like his Group Home and does not feel safe there as they do not help him. He also states that [place] and live in a tent if he has to. He does state he thinks he needs a CT scan as he has not had one since 2015 because \"CT scans let you know what wrong in your head.\"  "

## 2021-05-29 NOTE — ED NOTES
"Collateral taken by REI Desai:    Situation from medical record: 11:36 PM 5/28:  \"Hoang Kiser is a 35 year old male, with a history of depression, seizures, panic attacks, PTSD, and substance abuse, who presents to the emergency department, from group home, for evaluation of homicidal statements. Per EMS, the patient had a verbal altercation with a staff member and threatened to kill them, so they called paramedics and brought him to the emergency department for further evaluation. Here in the emergency department, the patient is vague about his answers and says he cannot remember what had lead to him coming to the emergency department today. He says he thinks he could be possessed by demons. He says he has used methamphetamines in the past, but has not used any in the past two weeks. He denies any alcohol or other illicit drug use.\"      12:51 AM:  \"Patient was pacing then repeatedly open and closed the door saying he was possessed. A few moments later he was standing in the meadows and as [ED Staff] was talking to him patient states that he is 110% good and when stating his name and birthday stopped repeatedly to hit the air to his right. When question he about that he states he doesn't know who it is that is distracting him. When asked if they are speaking to him patient stated sometimes but they aren't telling him to do anything at the moment. Patient then said \"well one of them is and they are telling me to take a shit.\" Patient then went back into his room to watch TV.\"    1:28 am \"Patient is currently sitting in the hallway because he states the air in his room is to loud so he cannot sleep. Patient doesn't follow a single track of conversation and starts rambling about his family and things he meant to do. He continuously looks back over his shoulder. When asked if he was comfortable in the chair he adjusted his body and sat forward and no longer spoke to [ED Staff].\"    Background:  Patient has previous " diagnoses of PTSD, Panic Disorder, MDD, Social phobia, DARREN (polysubstances), and ADHD.  Current medications include Adderall, Hydroxyzine (verify).  Patient has had past prescriptions for Narcan and Suboxone.

## 2021-05-29 NOTE — ED PROVIDER NOTES
History   Chief Complaint:  Homicidal Statements    History is limited secondary to poor historian. Available history is provided by EMS.     BEVERLY Kiser is a 35 year old male, with a history of depression, seizures, panic attacks, PTSD, and substance abuse, who presents to the emergency department, from group home, for evaluation of homicidal statements. Per EMS, the patient had a verbal altercation with a staff member and threatened to kill them, so they called paramedics and brought him to the emergency department for further evaluation. Here in the emergency department, the patient is vague about his answers and says he cannot remember what had lead to him coming to the emergency department today. He says he thinks he could be possessed by demons. He says he has used methamphetamines in the past, but has not used any in the past two weeks. He denies any alcohol or other illicit drug use. He has not had any fever, cough, or chills. He has not had any chest pain or abdominal pain as well.     Review of Systems   Constitutional: Negative for chills and fever.   Respiratory: Negative for cough.    Cardiovascular: Negative for chest pain.   Gastrointestinal: Negative for abdominal pain.   All other systems reviewed and are negative.      Allergies:  Amitriptyline  Buspirone  Doxycycline  Trazodone  Cephalexin     Medications:  Adderall  Proair  Suboxone  Clonidine  Hydrochlorothiazide  Deplin  Narcan  Eliquis  Tizanidine  Viibrid  Ambien     Past Medical History:    Arthritis  Benign positional vertigo  Depressive disorder  Dysphonia  GERD  Hearing problem  Neuralgia, neuritis, and radiculitis  Panic attacks  Seizures  Tinnitus  Suicidal ideation  Stimulant dependence  PTSD  Substance abuse  Crohn's disease  Degenerative disc disease  Primary insomnia     Past Surgical History:    Tooth extraction  PE tubes     Family History:     Obesity  Hyperlipidemia  Depression  Anxiety  Asthma  [place]  Colitis  Skin cancer  Substance abuse    Social History:  Smoking status: No  Alcohol use: No  Drug use: None in the past two weeks  PCP: Phill Floyd  Presents to the ED via EMS    Physical Exam     Patient Vitals for the past 24 hrs:   BP Temp Temp src Pulse SpO2   05/29/21 0004 128/84 98  F (36.7  C) Temporal 92 100 %       Physical Exam  General: Appears well-developed and well-nourished.   Head: No signs of trauma.   Mouth/Throat: Oropharynx is clear and moist.   Eyes: Conjunctivae are normal. Pupils are equal, round, and reactive to light.   CV: Normal rate and regular rhythm.    Resp: Effort normal and breath sounds normal. No respiratory distress.   GI: Soft. There is no tenderness.  No rebound or guarding.  Normal bowel sounds.    MSK: Normal range of motion.   Neuro: The patient is alert and oriented.  Strength in upper/lower extremities normal and symmetrical. Sensation normal. Speech normal.  Skin: Skin is warm and dry. No rash noted.   Psych: Poor historian, having to stop and think when asked questions about tonight's events.    Emergency Department Course   Laboratory:  COVID-19 Virus PCR: Negative    Emergency Department Course:    Reviewed:  I reviewed the patient's nursing notes, vitals, past available medical records.     Assessments:  2357: I obtained history and examined the patient as noted above.     0400: I spoke with DEC concerning the patient's mental health assessment. Will await group home call in the morning.     0430: I rechecked the patient and explained findings. Understands plan.    0700: Patient signed out to Dr. Trivedi pending group home call.     Disposition:  Care of the patient was transferred to my colleague, Dr. Trivedi, pending group home call.    Impression & Plan    Medical Decision Making:  Hoang Kiser is a 35-year-old gentleman who presents from a group home after reportedly getting into argument and  threatening staff there.  History is somewhat difficult to fully ascertain from the patient. I did have DEC speak with the patient and with the group home.  DEC reports somewhat similar interaction with the patient, also reports that somewhat difficult to get information from the current group home staff.  Patient has been calm and cooperative throughout his time in the ER.  Patient was signed out to Dr. Trivedi with the plan for repeat assessment in the morning with the morning staff at the group home to see if he can return as he is not having any thoughts of wanting to harm himself or others. He has been quite calm for a number of hours.    Diagnosis:    ICD-10-CM    1. Agitation  R45.1        Scribe Disclosure:  I, Grant Parra, am serving as a scribe at 11:57 PM on 5/28/2021 to document services personally performed by Eduin Arnold MD based on my observations and the provider's statements to me.      Eduin Arnold MD  05/29/21 [id number]

## 2021-05-30 NOTE — ED NOTES
"Went to get update on pt address to get EMS wheelchair ride home, and pt. States \"I don't want to go back there, that is where all my problems come from. I've been there since August.\"  "

## 2021-06-01 ENCOUNTER — ALLIED HEALTH/NURSE VISIT (OUTPATIENT)
Dept: BEHAVIORAL HEALTH | Facility: CLINIC | Age: 36
End: 2021-06-01
Payer: COMMERCIAL

## 2021-06-01 DIAGNOSIS — R69 DIAGNOSIS DEFERRED: Primary | ICD-10-CM

## 2021-06-01 NOTE — PROGRESS NOTES
Behavioral Health Home Services  [facility]: [facility] Work Care Navigator Note      Patient: Hoang Kiser  Date: June 1, 2021  Preferred Name: Shoaib    Previous PHQ-9:   PHQ-9 SCORE 2/19/2021 3/16/2021 4/5/2021   PHQ-9 Total Score - - -   PHQ-9 Total Score MyChart 10 (Moderate depression) - -   PHQ-9 Total Score 10 10 14   Some encounter information is confidential and restricted. Go to Review Flowsheets activity to see all data.     Previous JIN-7:   JIN-7 SCORE 12/10/2020 2/19/2021 3/16/2021   Total Score - - -   Total Score 10 (moderate anxiety) 17 (severe anxiety) -   Total Score 10 17 13   Some encounter information is confidential and restricted. Go to Review Flowsheets activity to see all data.     MOLLY LEVEL:  MOLLY Score (Last Two) 11/14/2020 12/10/2020   MOLLY Raw Score 31 31   Activation Score 59.3 59.3   MOLLY Level 3 3       Preferred Contact:  Need for : No  Preferred Contact: Cell      Type of Contact Today: Phone call (patient / identified key support person reached)      Data: (subjective / Objective):  Patient Goals:  Goal Areas: Health;Mental Health;Chemical Health;Employment / Volunteer;Financial and Social Service Benefits  Patient stated goals: Patient would like to look into going back to school.Patient would like to start EMDR therapy. He has an appointment scheduled for July. Patient continues to try and work on self- advocacy skills. Patient continues to work on his coping and stress management skills by taking walks, playing guitar and making comfort boards. Patient would like to look into working with a new addiction medicine and pain management provider.       Recent ED/IP Admission or Discharge?   [facility] ED Admission date: 05/28/2021    Current Stressors / Issues:      What were some the circumstances or situations that led up to the emergency room visit?    Patient stated that he was having a disagreement with staff and staff felt threatened. Patient stated  that he was not threatening staff but agreed to go to hospital.     What concerns do you still have regarding (reason for admission)?    Patient states that he does not want to continue to live in his group home [place] and this [place] not good for him.       What recommendations did the doctors and team make?    Patient was prescribed Zyprexa upon discharging from the hospital.     Were you able to schedule and/or attend recommended appointments? Yes    Did you  any new prescriptions? No Prescription is at the pharmacy but patient still needs to pick it up.      What types of health care support might better meet your needs?    Patient wants to attend recovery meetings and start working out more.       How confident do you feel about your ability to communicate these needs to your primary care team?      Patient feels comfortable talking with his PCP. Patient mentioned that his sleep has improved but is still not great. SWCC suggested talking to PCP about this at tomorrow's visit.       What do you think would help you avoid another visit the emergency room or admission to the hospital in the next six months?    Patient states that he feels like these issues are going to continue to occur while he is living at his group home. SWCC advised that new housing should be discussed with [employer]  since they fund the housing. Patient states that he has tried talking to [employer]  about this but feels like nothing is getting done. SWCC advised that issues with [employer]  can be mediated by reaching out to supervisor at case management agency. Patient stated that he will try this. He wants to switch case management agencies. SWCC advised that this request would also need to go through a supervisor.     Intervention:  Motivational Interviewing: Expressed Empathy/Understanding, Supported Autonomy, Collaboration, Evocation, Permission to raise concern or advise, Open-ended  questions and Rolled with resistance: Shifted topic to defuse resistance     Assessment: (Pt engagement & support needed for successful care transition):  Patient would benefit from continued coordination in reaching their goals set for the Behavioral Health Home ([facility]) program. SWCC reviewed Health Action Plan goals and will continue to monitor progress and work with patient and their care team.    Plan: (Homework, other):  Patient was encouraged to continue to seek condition-related information and education. SWCC, patient, and team will continue to work towards progress on reaching goals set for the Behavioral Health Home ([facility]) program.      Scheduled a Phone follow up appointment with SW CC in 3 weeks     Patient has set self-identified goals and will monitor progress until the next appointment on: 06/23/2021.   LAKSHMI Kebede, Social Work Care Coordinator     Routed note to patient's primary care provider.            Next 5 appointments (look out 90 days)    Jun 02, 2021 12:00 PM  Office Visit with Phill Floyd MD  [facility] ([facility] ) [address] [facility] [id number] [phone]

## 2021-06-03 ENCOUNTER — MYC MEDICAL ADVICE (OUTPATIENT)
Dept: FAMILY MEDICINE | Facility: CLINIC | Age: 36
End: 2021-06-03

## 2021-06-04 NOTE — TELEPHONE ENCOUNTER
Patient has pending appointment with PCP:    Next 5 appointments (look out 90 days)    Jun 25, 2021  3:00 PM  Office Visit with Phill Floyd MD  [facility] ([facility] ) [address] [facility] [address]  [phone]        Diane Mcgraw BSN, RN

## 2021-06-07 ENCOUNTER — NURSE TRIAGE (OUTPATIENT)
Dept: NURSING | Facility: CLINIC | Age: 36
End: 2021-06-07

## 2021-06-07 ENCOUNTER — TELEPHONE (OUTPATIENT)
Dept: OTOLARYNGOLOGY | Facility: CLINIC | Age: 36
End: 2021-06-07

## 2021-06-07 ENCOUNTER — ALLIED HEALTH/NURSE VISIT (OUTPATIENT)
Dept: BEHAVIORAL HEALTH | Facility: CLINIC | Age: 36
End: 2021-06-07
Payer: COMMERCIAL

## 2021-06-07 ENCOUNTER — MYC MEDICAL ADVICE (OUTPATIENT)
Dept: FAMILY MEDICINE | Facility: CLINIC | Age: 36
End: 2021-06-07

## 2021-06-07 DIAGNOSIS — R69 DIAGNOSIS DEFERRED: Primary | ICD-10-CM

## 2021-06-07 PROCEDURE — 99207 PR NO BILLABLE SERVICE THIS VISIT: CPT | Performed by: SPEECH-LANGUAGE PATHOLOGIST

## 2021-06-07 NOTE — TELEPHONE ENCOUNTER
Patient was able to be reached for today's appt by phone. He was still sleeping and reported extreme fatigue.  He wished to cancel and stated that he will be moving.     Loren Solano M.M. (voice), M.A., CCC/SLP  Speech-Language Pathologist  [place] Trained Vocologist  [facility] Voice [facility]  [email or web address]  she/her  [email or web address]

## 2021-06-07 NOTE — PROGRESS NOTES
Behavioral Health Home Services  [facility] Clinic: [place]        Social Work Care Navigator Note      Patient: Hoang Kiser  Date: June 7, 2021  Preferred Name: Shoaib    Previous PHQ-9:   PHQ-9 SCORE 2/19/2021 3/16/2021 4/5/2021   PHQ-9 Total Score - - -   PHQ-9 Total Score MyChart 10 (Moderate depression) - -   PHQ-9 Total Score 10 10 14   Some encounter information is confidential and restricted. Go to Review Flowsheets activity to see all data.     Previous JIN-7:   JIN-7 SCORE 12/10/2020 2/19/2021 3/16/2021   Total Score - - -   Total Score 10 (moderate anxiety) 17 (severe anxiety) -   Total Score 10 17 13   Some encounter information is confidential and restricted. Go to Review Flowsheets activity to see all data.     MOLLY LEVEL:  MOLLY Score (Last Two) 11/14/2020 12/10/2020   MOLLY Raw Score 31 31   Activation Score 59.3 59.3   MOLLY Level 3 3       Preferred Contact:  Need for : No  Preferred Contact: Cell      Type of Contact Today: Phone call (patient / identified key support person reached)      Data: (subjective / Objective):  Recent ED/IP Admission or Discharge?   None    Patient Goals:  Goal Areas: Health;Mental Health;Chemical Health;Employment / Volunteer;Financial and Social Service Benefits  Patient stated goals: Patient would like to look into going back to school.Patient would like to start EMDR therapy. He has an appointment scheduled for July. Patient continues to try and work on self- advocacy skills. Patient continues to work on his coping and stress management skills by taking walks, playing guitar and making comfort boards. Patient would like to look into working with a new addiction medicine and pain management provider.         [facility] Core Service Provided:  Care Coordination: provided care management services/referrals necessary to ensure patient and their identified supports have access to medical, behavioral health, pharmacology and recovery support services.  Ensured that patient's  care is integrated across all settings and services.     Current Stressors / Issues / Care Plan Objective Addressed Today:  Patient left message for [facility].    [facility] returned call. Patient advised that he talked with his [employer]  and his parents about wanting to move. The plan is for him to move around July 1st and live with his parents up in [place]. [facility] asked if patient has thoughts about what clinic he would like to use for Primary Care after he moves. Patient states that he would like to go through [facility]. [facility] advised that writer will check and see if patient can stay with current [facility] or will need to be transferred to a [facility].   Patient discussed his excitement about moving. He is hoping to get a job and save up some money to go back to school once he moves up there.  [facility] asked patient to keep writer informed regarding any updates with his move this coming month.     Intervention:  Motivational Interviewing: Supported Autonomy, Collaboration, Evocation, Permission to raise concern or advise and Open-ended questions   Target Behavior(s): Explored current social supports and reinforced opportunities to increase engagement    Assessment: (Progress on Goals / Homework):  Patient would benefit from continued coordination in reaching their goals set for the Behavioral Health Home ([facility]) program. [facility] reviewed Health Action Plan goals and will continue to monitor progress and work with patient and their care team.    Plan: (Homework, other):  Patient was encouraged to continue to seek condition-related information and education. CC, patient, and team will continue to work towards progress on reaching goals set for the Behavioral Health Home ([facility]) program.     Janet Ng [facility]  Behavioral Health Home ([facility])   [facility]  [phone]            Next 5 appointments (look out 90 days)    Jun 09, 2021  4:00 PM  Return Visit with  Rashard Milligan MD  [facility] ([facility] ) [address]  [address]  [facility] [address]  [phone]   Jun 25, 2021  3:00 PM  Office Visit with Phill Floyd MD  [facility] ([facility] ) [address] [facility] [address] [phone]

## 2021-06-08 NOTE — TELEPHONE ENCOUNTER
Patient calling reporting his group home staff gave him his morning medication  hydrochlorothiazide (HYDRODIURIL) 25 MG tablet and Vilavodone 30 mg again this evening. Patient worried about taking the medications twice a day when he only take it in the morning and not in the evening as well. Patient states he also takes Trazadone 25 mg tablet and takes 2 tablets total of 25 mg to assist with sleep. Patient reports he occasionally will take cloNIDine (CATAPRES) 0.1 MG tablet for sleep as well. Patient has not taken any Trazadone and cloNIDINE tonight and wondering if he should take those medication given that he received Vilavodone and Hydrochlorothiazide twice today.     Paged on-call provider NALLELY ANDREWS MD for [facility], via tomoguides at 9:28 pm to call RN directly at     . Pal called RN at 9:36pm and advised patient to not take Trazadone 25 mg tablet medication tonight and can take cloNIDine (CATAPRES) 0.1 MG tablet. Advised patient to monitor symptoms and if patient developed heart palpitations, felt warm and muscles are rigid to be seen at the emergency department.     RN called patient and advised provider's recommendation. Caller verbalized understanding. Denies further questions.      Rich Melendez RN  [facility] Nurse Advisors           Reason for Disposition    [1] DOUBLE DOSE (an extra dose or lesser amount) of prescription drug AND [2] NO symptoms (Exception: a double dose of antibiotics)    Additional Information    Negative: Severe difficulty breathing (e.g., struggling for each breath, speaks in single words)    Negative: Shock suspected (e.g., cold/pale/clammy skin, too weak to stand, low BP, rapid pulse)    Negative: Difficult to awaken or acting confused (e.g., disoriented, slurred speech)    Negative: Bluish (or gray) lips or face now    Negative: Seizure    Negative: [1] Intentional overdose AND [2] suicidal thoughts or ideas    Negative:  Suicide attempt, known or suspected    Negative: Sounds like a life-threatening emergency to the triager    Negative: [1] HARMFUL SUBSTANCE or ACID or ALKALI ingestion (e.g., toilet , drain , lye, Clinitest tablets, ammonia, bleaches) AND [2] any symptoms (e.g., mouth pain, sore throat, breathing difficulty)    Negative: [1] PETROLEUM PRODUCT ingestion (e.g., kerosene, gasoline, benzene, furniture polish, lighter fluid) AND [2] any symptoms (e.g., breathing difficulty, coughing, vomiting)    Negative: [1] Poison Center advised caller to go to ED AND [2] caller seeking second opinion    Negative: Patient sounds very sick or weak to the triager    Negative: [1] HARMFUL SUBSTANCE or ACID or ALKALI ingestion (e.g., toilet , drain , lye, Clinitest tablets, ammonia, bleaches) AND [2] NO symptoms    Negative: [1] PETROLEUM PRODUCT ingestion (e.g., kerosene, gasoline, benzene, furniture polish, lighter fluid) AND [2] NO symptoms    Negative: MORE THAN A DOUBLE DOSE of a prescription or over-the-counter (OTC) drug    Negative: [1] DOUBLE DOSE (an extra dose or lesser amount) of prescription drug AND [2] any symptoms (e.g., dizziness, nausea, pain, sleepiness)    Negative: [1] DOUBLE DOSE (an extra dose or lesser amount) of over-the-counter (OTC) drug AND [2] any symptoms (e.g., dizziness, nausea, pain, sleepiness)    Negative: Took another person's prescription drug    Negative: Mercury spill (e.g., broken glass thermometer, broken spiral CFL lightbulb)    Negative: All OTHER POTENTIALLY HARMFUL SUBSTANCES (e.g., nearly all chemicals, plants, more than a double dose of a drug, took someone else's medicine)    Negative: Patient or caller provides unclear information about type or amount of substance    Negative: Triager unable to answer question    Protocols used: POISONING-A-

## 2021-06-09 ENCOUNTER — OFFICE VISIT (OUTPATIENT)
Dept: ADDICTION MEDICINE | Facility: CLINIC | Age: 36
End: 2021-06-09
Payer: COMMERCIAL

## 2021-06-09 VITALS
WEIGHT: 183.2 LBS | SYSTOLIC BLOOD PRESSURE: 124 MMHG | DIASTOLIC BLOOD PRESSURE: 72 MMHG | OXYGEN SATURATION: 98 % | TEMPERATURE: 97.4 F | HEART RATE: 89 BPM | BODY MASS INDEX: 28.69 KG/M2

## 2021-06-09 DIAGNOSIS — F11.20 UNCOMPLICATED OPIOID DEPENDENCE (H): Primary | ICD-10-CM

## 2021-06-09 DIAGNOSIS — M54.40 CHRONIC LOW BACK PAIN WITH SCIATICA, SCIATICA LATERALITY UNSPECIFIED, UNSPECIFIED BACK PAIN LATERALITY: ICD-10-CM

## 2021-06-09 DIAGNOSIS — G89.29 CHRONIC LOW BACK PAIN WITH SCIATICA, SCIATICA LATERALITY UNSPECIFIED, UNSPECIFIED BACK PAIN LATERALITY: ICD-10-CM

## 2021-06-09 DIAGNOSIS — F32.0 CURRENT MILD EPISODE OF MAJOR DEPRESSIVE DISORDER, UNSPECIFIED WHETHER RECURRENT (H): ICD-10-CM

## 2021-06-09 DIAGNOSIS — F11.20 UNCOMPLICATED OPIOID DEPENDENCE (H): ICD-10-CM

## 2021-06-09 DIAGNOSIS — G25.9 FUNCTIONAL MOVEMENT DISORDER: ICD-10-CM

## 2021-06-09 LAB
AMPHETAMINES UR QL: ABNORMAL NG/ML
BARBITURATES UR QL SCN: NOT DETECTED NG/ML
BENZODIAZ UR QL SCN: NOT DETECTED NG/ML
BUPRENORPHINE UR QL: NOT DETECTED NG/ML
CANNABINOIDS UR QL: NOT DETECTED NG/ML
COCAINE UR QL SCN: NOT DETECTED NG/ML
D-METHAMPHET UR QL: NOT DETECTED NG/ML
METHADONE UR QL SCN: NOT DETECTED NG/ML
OPIATES UR QL SCN: NOT DETECTED NG/ML
OXYCODONE UR QL SCN: NOT DETECTED NG/ML
PCP UR QL SCN: NOT DETECTED NG/ML
PROPOXYPH UR QL: NOT DETECTED NG/ML
TRICYCLICS UR QL SCN: NOT DETECTED NG/ML

## 2021-06-09 PROCEDURE — 80306 DRUG TEST PRSMV INSTRMNT: CPT | Performed by: FAMILY MEDICINE

## 2021-06-09 PROCEDURE — 99214 OFFICE O/P EST MOD 30 MIN: CPT | Performed by: FAMILY MEDICINE

## 2021-06-09 RX ORDER — BUPRENORPHINE HYDROCHLORIDE AND NALOXONE HYDROCHLORIDE DIHYDRATE 8; 2 MG/1; MG/1
1 TABLET SUBLINGUAL 2 TIMES DAILY
Qty: 60 TABLET | Refills: 0 | Status: SHIPPED | OUTPATIENT
Start: 2021-06-09 | End: 2021-07-02

## 2021-06-09 RX ORDER — MIRTAZAPINE 15 MG/1
15 TABLET, FILM COATED ORAL AT BEDTIME
Qty: 30 TABLET | Refills: 1 | Status: SHIPPED | OUTPATIENT
Start: 2021-06-09 | End: 2021-12-16

## 2021-06-09 NOTE — PROGRESS NOTES
"  SUBJECTIVE                                                    SUBSTANCE USE DISORDER FOLLOW UP:    Hoang Kiser is a 35 year old male who presents to clinic today for follow up of Opioid Use Disorder (OUD).    Visit performed In Person, face-to-face        Recent HPI Details:  Reviewed history from Dr. Nicholson's note from April 13    TODAY'S VISIT  HPI Jun 9, 2021  - No suboxone for the past 1.5 weeks. Says he feels \"like fibromyalgia symptoms\". Wants to restart this; history of substance use concerns. No substance use in past 2 weeks  - Very restless from inside out, \"almost like a buzzing inside\"  - No vomiting. Temperature changes, insomnia  - Living in a group home, but doesn't feel he needs to stay there any longer. They have not helped him keep appointments  - Moving to [facility] [place] [facility] next month - July 1.  - Rare alcohol use; one beer every 2-3 months  - No longer having mental health concerns currently  - No movement disorder symptoms currently          A/P                                                    ASSESSMENT/PLAN    Diagnoses and all orders for this visit:  Uncomplicated opioid dependence (H)  -     Urine Drugs of Abuse Screen Panel 13; Future  -     buprenorphine-naloxone (SUBOXONE) 8-2 MG SUBL sublingual tablet; Place 1 tablet under the tongue 2 times daily  Chronic low back pain with sciatica, sciatica laterality unspecified, unspecified back pain laterality  -     Urine Drugs of Abuse Screen Panel 13; Future  -     buprenorphine-naloxone (SUBOXONE) 8-2 MG SUBL sublingual tablet; Place 1 tablet under the tongue 2 times daily  Functional movement disorder  Current mild episode of major depressive disorder, unspecified whether recurrent (H)  -     mirtazapine (REMERON) 15 MG tablet; Take 1 tablet (15 mg) by mouth At Bedtime      Orders Placed This Encounter   Medications     mirtazapine (REMERON) 15 MG tablet     Sig: Take 1 tablet (15 mg) by mouth At Bedtime     Dispense:  30 tablet     " Refill:  1     buprenorphine-naloxone (SUBOXONE) 8-2 MG SUBL sublingual tablet     Sig: Place 1 tablet under the tongue 2 times daily     Dispense:  60 tablet     Refill:  0     NADEAN: [id number]; Please substitute for covered alternative per insurance request.       - Takes eliquis and hydrochlorothiazide; seeing his PCP later this month. Wanting to transfer care to [facility] (near [place], [place]).  - No longer having symptoms of his movement disorder that previously caused him significant issues  - Continue remeron per previous care plan  - Continue suboxone, no changes indicated  - Will follow-up with Mary Jones, nurse care coordinator at [facility], to discuss transfer of care to their clinic once he moves to the area      Patient has narcan?  Yes  Reported IVDU in past 2 months?  No  Infectious disease screening UTD?  Yes   - HIV test date: 2019   - HepC test date: 2018      RTC:    ENCOUNTER FOR LONG TERM USE OF HIGH RISK MEDICATION   High Risk Drug Monitoring?  YES   Drug being monitored: buprenrphine   Reason for drug: Opioid Use Disorder (OUD)   What is being monitored?: Dosage, Cravings, Trigger, side effects, and continued abstinence.    Counseled the patient on the importance of having a recovery program in addition to medication to manage recovery.  Components include avoiding isolating, having willingness to change, avoiding triggers and managing cravings. Encouraged having some type of sober network and practicing honesty with trusted support person(s). Encouraged other services such as counseling, 12 step or other self-help organizations.      Opioid warning reviewed.  Risk of overdose following a period of abstinence due to decrease tolerance was discussed including risk of death.  Strongly recommended abstain from alcohol, benzodiazepines, THC, opioids and other drugs of abuse.  Increased risk of return to opioid use after use of these substances discussed.  Increased risk of overdose/death  with use of other substances particularly benzodiazepines/alcohol reviewed.    [facility] Board of Pharmacy Data Base Reviewed;   Consistent with patient reports and Epic records.            OBJECTIVE                                                    PHYSICAL EXAM:  /72   Pulse 89   Temp 97.4  F (36.3  C) (Temporal)   Wt 83.1 kg (183 lb 3.2 oz)   SpO2 98%   BMI 28.69 kg/m      GENERAL: healthy, alert and no distress  EYES: Eyes grossly normal to inspection, PERRL and conjunctivae and sclerae normal  RESP: No respiratory distress  MENTAL STATUS EXAM  Appearance/Behavior: No appearant distress  Speech: Normal  Mood/Affect: normal affect  Insight: Fair    LAB  Results for orders placed or performed in visit on 06/09/21   Urine Drugs of Abuse Screen Panel 13     Status: Abnormal   Result Value Ref Range    Cannabinoids (53-hia-2-carboxy-9-THC) Not Detected NDET^Not Detected ng/mL    Phencyclidine (Phencyclidine) Not Detected NDET^Not Detected ng/mL    Cocaine (Benzoylecgonine) Not Detected NDET^Not Detected ng/mL    Methamphetamine (d-Methamphetamine) Not Detected NDET^Not Detected ng/mL    Opiates (Morphine) Not Detected NDET^Not Detected ng/mL    Amphetamine (d-Amphetamine) Detected, Abnormal Result (A) NDET^Not Detected ng/mL    Benzodiazepines (Nordiazepam) Not Detected NDET^Not Detected ng/mL    Tricyclic Antidepressants (Desipramine) Not Detected NDET^Not Detected ng/mL    Methadone (Methadone) Not Detected NDET^Not Detected ng/mL    Barbiturates (Butalbital) Not Detected NDET^Not Detected ng/mL    Oxycodone (Oxycodone) Not Detected NDET^Not Detected ng/mL    Propoxyphene (Norpropoxyphene) Not Detected NDET^Not Detected ng/mL    Buprenorphine (Buprenorphine) Not Detected NDET^Not Detected ng/mL         HISTORY                                                    Problem list reviewed & adjusted, as indicated.  Patient Active Problem List   Diagnosis     Neuropathy     Moderate major depression (H)      Tobacco abuse     Attention deficit hyperactivity disorder (ADHD), predominantly inattentive type     Primary insomnia     Panic disorder without agoraphobia     Abnormal involuntary movement     Tic disorder     Anxiety     Posttraumatic stress disorder with dissociative symptoms     Chronic left hip pain     Chronic pain of right hip     Degenerative disc disease at L5-S1 level     Functional movement disorder     Family history of Crohn's disease     Chronic low back pain     Chronic pain syndrome     History of substance abuse (H)     Family history of multiple myeloma     History of electroencephalogram     Nonallergic rhinitis     Fatigue, unspecified type     Generalized social phobia     Stimulant dependence (H)     Chronic post-traumatic stress disorder (PTSD)     Movement disorder     Suicidal ideation         MEDICATION LIST (prior to visit)  albuterol (PROAIR HFA/PROVENTIL HFA/VENTOLIN HFA) 108 (90 Base) MCG/ACT inhaler, INHALE ONE TO TWO PUFFS INTO THE LUNGS EVERY FOUR HOURS AS NEEDED FOR SHORTNESS OF BREATH/ DYSPNEA OR WHEEZING  [place] Saline Nasal No-Drip GEL, Apply gel inside the nose every night before bed and during the day as needed for dryness or bleeding  cloNIDine (CATAPRES) 0.1 MG tablet, Take 1-2 tablets (0.1-0.2 mg) by mouth nightly as needed (sleep)  hydrOXYzine (ATARAX) 25 MG tablet, Take 1 tablet (25 mg) by mouth nightly as needed (at HS PRN)  naloxone (NARCAN) 4 MG/0.1ML nasal spray, Spray 4 mg into one nostril alternating nostrils once as needed for opioid reversal every 2-3 minutes until assistance arrives  nicotine (NICODERM CQ) 7 MG/24HR 24 hr patch, Place 1 patch onto the skin every 24 hours  nicotine (NICORETTE) 4 MG lozenge, Place 1 lozenge (4 mg) inside cheek as needed for smoking cessation Maximum is 5 per 6 hours or 20 per day  OLANZapine zydis (ZYPREXA) 5 MG ODT, Take 1 tablet (5 mg) by mouth every 12 hours as needed for agitation  order for DME, Equipment being ordered:  Digital home blood pressure monitor kit  order for DME, Equipment being ordered: 4 wheeled walker with bench seat.  tiZANidine (ZANAFLEX) 4 MG tablet, Take 1-2 tablets (4-8 mg) by mouth nightly as needed for muscle spasms  vilazodone (VIIBRYD) 10 MG TABS tablet, Take 10 mg by mouth At Bedtime 10 mg + 20mg = 30mg  vilazodone (VIIBRYD) 20 MG TABS tablet, Take 20 mg by mouth At Bedtime 10 mg + 20mg = 30mg    iohexol (OMNIPAQUE) 300 mg/mL injection 10 mL        Allergies   Allergen Reactions     Amitriptyline      Ineffective in reducing spasms/movement, increased fatigue  Other reaction(s): Other (see comments)     Buspirone Hcl Other (See Comments)     Panic attacks     Doxycycline Diarrhea, Fatigue, GI Disturbance and Nausea     Other reaction(s): GI intolerance     Trazodone Fatigue     Other reaction(s): Other (see comments)     Cephalexin Diarrhea     Other reaction(s): GI intolerance           Rashard Milligan MD  [facility] Addiction Medicine  [phone]

## 2021-06-14 ENCOUNTER — MYC MEDICAL ADVICE (OUTPATIENT)
Dept: FAMILY MEDICINE | Facility: CLINIC | Age: 36
End: 2021-06-14

## 2021-06-14 DIAGNOSIS — Z59.00 HOUSING LACK: ICD-10-CM
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
## 2021-06-14 DIAGNOSIS — F15.20 STIMULANT DEPENDENCE (H): Primary | ICD-10-CM

## 2021-06-14 SDOH — ECONOMIC STABILITY - HOUSING INSECURITY: HOMELESSNESS UNSPECIFIED: Z59.00

## 2021-06-17 ENCOUNTER — ALLIED HEALTH/NURSE VISIT (OUTPATIENT)
Dept: BEHAVIORAL HEALTH | Facility: CLINIC | Age: 36
End: 2021-06-17
Payer: COMMERCIAL

## 2021-06-17 DIAGNOSIS — R69 DIAGNOSIS DEFERRED: Primary | ICD-10-CM

## 2021-06-17 NOTE — PROGRESS NOTES
Behavioral Health Home Services  [facility] [place]        Social Work Care Navigator Note      Patient: Hoang Kiser  Date: June 17, 2021  Preferred Name: Shoaib    Previous PHQ-9:   PHQ-9 SCORE 2/19/2021 3/16/2021 4/5/2021   PHQ-9 Total Score - - -   PHQ-9 Total Score MyChart 10 (Moderate depression) - -   PHQ-9 Total Score 10 10 14   Some encounter information is confidential and restricted. Go to Review Flowsheets activity to see all data.     Previous JIN-7:   JIN-7 SCORE 12/10/2020 2/19/2021 3/16/2021   Total Score - - -   Total Score 10 (moderate anxiety) 17 (severe anxiety) -   Total Score 10 17 13   Some encounter information is confidential and restricted. Go to Review Flowsheets activity to see all data.     MOLLY LEVEL:  MOLLY Score (Last Two) 11/14/2020 12/10/2020   MOLLY Raw Score 31 31   Activation Score 59.3 59.3   MOLLY Level 3 3       Preferred Contact:  Need for : No  Preferred Contact: Cell      Type of Contact Today: Phone call (patient / identified key support person reached)      Data: (subjective / Objective):  Recent ED/IP Admission or Discharge?   None    Patient Goals:  Goal Areas: Health;Mental Health;Chemical Health;Employment / Volunteer;Financial and Social Service Benefits  Patient stated goals: Patient would like to look into going back to school.Patient would like to start EMDR therapy. He has an appointment scheduled for July. Patient continues to try and work on self- advocacy skills. Patient continues to work on his coping and stress management skills by taking walks, playing guitar and making comfort boards. Patient would like to look into working with a new addiction medicine and pain management provider.         [facility] Core Service Provided:  Care Coordination: provided care management services/referrals necessary to ensure patient and their identified supports have access to medical, behavioral health, pharmacology and recovery support services.  Ensured that patient's  care is integrated across all settings and services.     Current Stressors / Issues / Care Plan Objective Addressed Today:  Patient left message for [facility].    [facility] returned phone call. Patient stated that he thought he had a scheduled appointment with [facility] today. CC advised that this appointment is next week. Patient updated [facility] that he is still thinking about moving up to [facility] next month. He asked [facility] if writer had heard of Semi Independent Waiver. SWCC advised that there is Semi Independent services covered under CADI Waivers but not specifically a Semi Independent Waiver. Patient discussed that he wants to get a job and his own apartment. He discussed looking into jobs that could be 40K + a year. CC encouraged patient to talk with his [facility]  and [facility] financial worker about this since making over a certain amount could effect his insurance and disqualify him for a waiver. Patient discussed wanting to find low cost housing. SWCC discussed the option of moving in with his parents and working with an ILS worker to find affordable housing. SWCC and patient also discussed patient's care through [place] being transferred if patient moves in with his parents. Patient will keep [facility] updated on his moving plans.     Intervention:  Motivational Interviewing: Supported Autonomy, Collaboration, Evocation, Permission to raise concern or advise and Open-ended questions   Target Behavior(s): Explored current social supports and reinforced opportunities to increase engagement    Assessment: (Progress on Goals / Homework):  Patient would benefit from continued coordination in reaching their goals set for the Behavioral Health Home ([facility]) program. [facility] reviewed Health Action Plan goals and will continue to monitor progress and work with patient and their care team.    Plan: (Homework, other):  Patient was encouraged to continue to seek condition-related information and education.[facility],  patient, and team will continue to work towards progress on reaching goals set for the Behavioral Health Home ([facility]) program.      Scheduled a Phone follow up appointment with BROWN BURGESS in 1 week     Patient has set self-identified goals and will monitor progress until the next appointment on: 07/26/2021.     LAKSHMI Kebede, Social Work Care Coordinator               Next 5 appointments (look out 90 days)    Jun 25, 2021  3:00 PM  Office Visit with Phill Floyd MD  [facility] ([facility] ) [address] [facility] [address] [phone]

## 2021-06-20 NOTE — PROGRESS NOTES
"                                  [facility]   [facility] [place]  Behavioral Medicine Visit    Patient Name: Hoang Kiser     YOB: 1985   Medical Record Number: [id number]  Date: 4/17/2019                SUBJECTIVE: Patient reports he received a copy of his neuropsychological exam, it is unclear whether he received any specific review of this with the neuropsychologist.  He reports feeling quite concerned and upset about the mention of somatization, which he interprets as his movements being only psychologically related to distress.  Patient reports that he often feels stuck in ambivalence and cognitive dissidence, which seems to be more about not having enough energy to accomplish basic tasks such as ADLs or bigger commitments related to projects such as attending an NA meeting.  Discussed how he has seemed to attribute lack of energy or varied energy levels to his sense of self, and this seems to frequently engage in self shaming.  Discussed how this negatively interacts with his mood and thus his experience of his pain.  Encouraged him to focus on flexibility with himself, and to explore daily what is realistic to expect in terms of his energy level and accomplishments for the day.    OBJECTIVE: Patient is here for follow-up appointment.  He reports that his pain varies to mildly worse to mildly improved depending upon when he has his \"attacks.\"  He reports that his mood is mildly worse which he largely attributes to feeling down about himself after reviewing the neuropsychological report.  Patient reports his activity level has been the same.  He reports that his stress level varies.  His sleep is been moderately improved.  He reports engaging in self-care for his pain 2-3 times per day.    Length of Visit: 60 minutes      Assessment: Current Emotional / Mental Status    Appearance:   Appropriate   Eye Contact:   Fair   Psychomotor " Behavior: Normal  Restless   Attitude:   Cooperative   Orientation:   All  Speech  Rate / Production:             Normal   Volume:              Normal   Mood:    Anxious  Sad   Affect:    Appropriate   Thought Content:  Clear   Thought Form:  Coherent  Logical   Insight:    Fair     ASSESSMENT:   DSM-V: Pain Disorder   Anxiety   Moderate major depression (H)    Progress toward goals: good.    Pain Status: had fluctuating course    Emotional Status: worsened              Medication / chemical use concerns: None    PLAN:   Next Appointment: Hoang Kiser will schedule a follow-up appointment in 2 week(s).  Assignment: Focus on his energy level as it relates to ability to complete tasks versus internalizing self shaming for not completing tasks.  Objectives / interventions for next session: Continue to focus on how shame negatively interacts with his mood and his pain.    Sol Kemp PsyD LP  Outpatient Clinic Therapist  [facility]     The patient is a 41y Female complaining of shortness of breath.

## 2021-06-23 ENCOUNTER — ALLIED HEALTH/NURSE VISIT (OUTPATIENT)
Dept: BEHAVIORAL HEALTH | Facility: CLINIC | Age: 36
End: 2021-06-23
Payer: COMMERCIAL

## 2021-06-23 DIAGNOSIS — R69 DIAGNOSIS DEFERRED: Primary | ICD-10-CM

## 2021-06-23 NOTE — PROGRESS NOTES
Behavioral Health Home Services  [facility]: [place]        Social Work Care Navigator Note      Patient: Hoang Kiser  Date: June 23, 2021  Preferred Name: Shoaib    Previous PHQ-9:   PHQ-9 SCORE 2/19/2021 3/16/2021 4/5/2021   PHQ-9 Total Score - - -   PHQ-9 Total Score MyChart 10 (Moderate depression) - -   PHQ-9 Total Score 10 10 14   Some encounter information is confidential and restricted. Go to Review Flowsheets activity to see all data.     Previous JIN-7:   JIN-7 SCORE 12/10/2020 2/19/2021 3/16/2021   Total Score - - -   Total Score 10 (moderate anxiety) 17 (severe anxiety) -   Total Score 10 17 13   Some encounter information is confidential and restricted. Go to Review Flowsheets activity to see all data.     MOLLY LEVEL:  MOLLY Score (Last Two) 11/14/2020 12/10/2020   MOLLY Raw Score 31 31   Activation Score 59.3 59.3   MOLLY Level 3 3       Preferred Contact:  Need for : No  Preferred Contact: Cell      Type of Contact Today: Phone call (patient / identified key support person reached)      Data: (subjective / Objective):  Recent ED/IP Admission or Discharge?   None    Patient Goals:  Goal Areas: Health;Mental Health;Chemical Health;Employment / Volunteer;Financial and Social Service Benefits  Patient stated goals: Patient would like to look into going back to school.Patient would like to start EMDR therapy. He has an appointment scheduled for July. Patient continues to try and work on self- advocacy skills. Patient continues to work on his coping and stress management skills by taking walks, playing guitar and making comfort boards. Patient would like to look into working with a new addiction medicine and pain management provider.         [facility] Core Service Provided:  Care Coordination: provided care management services/referrals necessary to ensure patient and their identified supports have access to medical, behavioral health, pharmacology and recovery support services.  Ensured that patient's  care is integrated across all settings and services.     Current Stressors / Issues / Care Plan Objective Addressed Today:  [facility] contacted patient for scheduled check in. Patient answered and stated that he needed to mute writer to finish checking out at store. SWCC was muted for minutes. SWCC hung up and called patient back. He states that he forgot writer was on call. CC asked patient if he had an update on his living situation. Patient states that he is tentatively moving July 1st. He stated that he wants HSS services and asked if writer could support with this. [facility] advised that CADI  would be the most appropriate person to place this referral since the CSSP is required for the assessment.  [facility] advised that writer can support patient by putting a call into  about this request. Patient asked for [facility] to do this. [facility] did not have FIGUEROA on file for CADI . CC asked patient for verbal consent for FIGUEROA. Patient paused and did not answer and made some comments in the background. SWCC pointed out that patient seemed distracted and asked if he was busy. Patient stated that his ADD was just affecting him. Patient stated that he wants a new CADI . [facility] advised that this request can be made by calling 's supervisor and putting in a request. [facility] asked if patient would like writer to reach out in the meantime about patient's request for HSS. Patient made a comment stating that he never signed an FIGUEROA and this was forged by his staff. SWCC clarified that writer does not have an FIGUEROA and that is why [facility] was asking for consent. Patient stated that he did not want to consent to an FIGUEROA. CC clarified that writer cannot reach out to his  then. Patient stated that he will do it himself. CC advised patient that his [facility] services will transfer to [facility] if moves. [facility] asked patient to call writer if he does end up moving.        Intervention:  Motivational Interviewing: Supported Autonomy, Collaboration, Evocation, Permission to raise concern or advise and Open-ended questions   Target Behavior(s): Explored current social supports and reinforced opportunities to increase engagement    Assessment: (Progress on Goals / Homework):  Patient would benefit from continued coordination in reaching their goals set for the Behavioral Health Home ([facility]) program. CC reviewed Health Action Plan goals and will continue to monitor progress and work with patient and their care team.    Plan: (Homework, other):  Patient was encouraged to continue to seek condition-related information and education. SWCC, patient, and team will continue to work towards progress on reaching goals set for the Behavioral Health Home ([facility]) program.       Janet Ng LGSW Behavioral Health Home ([facility])   [facility]  [phone]              Next 5 appointments (look out 90 days)    Jun 25, 2021  3:00 PM  Office Visit with Phill Floyd MD  [facility] ([facility] ) [address] [facility] [id number] [phone]

## 2021-06-24 ENCOUNTER — TELEPHONE (OUTPATIENT)
Dept: FAMILY MEDICINE | Facility: CLINIC | Age: 36
End: 2021-06-24

## 2021-06-24 NOTE — TELEPHONE ENCOUNTER
Diana, RN with the group home  [facility], called the clinic.    She wanted to report that the patient would like to change back to Benzodiazepine instead of the Olanzapine that was changed at an ER Visit on 5/28/21 by the ER provider.    Diana state it is not working and cannot sleep.    Patient has an appointment tomorrow with .  Let Diana know, will add this topic to the appointment notes to discuss at that time.    Sarina Morrison RN, [facility]

## 2021-06-24 NOTE — TELEPHONE ENCOUNTER
Returned call to Diana at the group home.   She looked in the patients record and saw that patient is seeing 2 providers through [facility] in [place]:  Dr Bowman and Dr Brian Duckworth.  She will contact them with the below request.     Diane BORGESN, RN

## 2021-06-25 DIAGNOSIS — I82.441 ACUTE DEEP VEIN THROMBOSIS (DVT) OF TIBIAL VEIN OF RIGHT LOWER EXTREMITY (H): ICD-10-CM

## 2021-06-25 NOTE — TELEPHONE ENCOUNTER
Requested Prescriptions   Pending Prescriptions Disp Refills     XARELTO ANTICOAGULANT 20 MG TABS tablet [Pharmacy Med Name: Xarelto 20 MG Tablet] 30 tablet 10     Sig: TAKE 1 TABLET BY MOUTH ONCE DAILY WITH DINNER *1 TOTAL FILL*       Direct Oral Anticoagulant Agents Failed - 6/25/2021 12:01 PM        Failed - Creatinine Clearance greater than 50 ml/min on file in past 3 mos     No lab results found.          Passed - Normal Platelets on file in past 12 months     Recent Labs   Lab Test 04/23/21  0648                  Passed - Medication is active on med list        Passed - Serum creatinine less than or equal to 1.4 on file in past 3 mos     Recent Labs   Lab Test 04/23/21  0648   CR 1.14       Ok to refill medication if creatinine is low          Passed - Patient is 18 years of age or older        Passed - Recent (6 mo) or future (30 days) visit within the authorizing provider's specialty           Diane BORGESN, RN

## 2021-06-27 ENCOUNTER — TELEPHONE (OUTPATIENT)
Dept: FAMILY MEDICINE | Facility: CLINIC | Age: 36
End: 2021-06-27

## 2021-06-27 NOTE — TELEPHONE ENCOUNTER
Reason for Call:  Other prescription    Detailed comments: Patient had called stating that his water pills have been affecting his skin and would like to know if he could possibly taper off the water pills.    Phone Number Patient can be reached at: Cell number on file:    Telephone Information:   Mobile [phone]       Best Time: Anytime    Can we leave a detailed message on this number? YES    Call taken on 6/27/2021 at 4:51 PM by Diana Parks

## 2021-06-28 ENCOUNTER — HOSPITAL ENCOUNTER (EMERGENCY)
Facility: CLINIC | Age: 36
Discharge: HOME | End: 2021-06-28
Payer: COMMERCIAL

## 2021-06-28 VITALS
DIASTOLIC BLOOD PRESSURE: 70 MMHG | HEART RATE: 90 BPM | WEIGHT: 200 LBS | RESPIRATION RATE: 16 BRPM | BODY MASS INDEX: 31.39 KG/M2 | HEIGHT: 67 IN | OXYGEN SATURATION: 97 % | TEMPERATURE: 97.3 F | SYSTOLIC BLOOD PRESSURE: 106 MMHG

## 2021-06-28 RX ORDER — DEXTROAMPHETAMINE SULFATE, DEXTROAMPHETAMINE SACCHARATE, AMPHETAMINE SULFATE AND AMPHETAMINE ASPARTATE 7.5; 7.5; 7.5; 7.5 MG/1; MG/1; MG/1; MG/1
1 CAPSULE, EXTENDED RELEASE ORAL EVERY MORNING
COMMUNITY
Start: 2021-06-08

## 2021-06-28 RX ORDER — TRAZODONE HYDROCHLORIDE 50 MG/1
1 TABLET, FILM COATED ORAL
COMMUNITY
Start: 2021-06-01 | End: 2022-01-18

## 2021-06-28 ASSESSMENT — MIFFLIN-ST. JEOR: SCORE: 1800.82

## 2021-06-28 NOTE — TELEPHONE ENCOUNTER
Patient states that there is no issue with his hydrochlorothiazide (HYDRODIURIL) 25 MG tablet at this time and he never said .  He reports sensitive to electronics.  He has to unplug everything. His mental health has not been good since living here. The conditions are not good.   This is why he is moving. He is moving 7/1/2021. He has a lump on the back of his neck on the left side. It has been there for a week.  It is not on the right side of his neck.  He has been ill since he moved to [place] in his current house.  He would like to get in for an appointment.  There is no pain in the lump pain, but it causes him confusion, numbness throughout his entire body (x 3-4 months) He hasn't used elicited drugs in the last 4 weeks. I struggling to understand what his need is today.  I asked him what he would like the primary care clinics to do, would he like to be seen?  He said yes.  He asked if he could go to urgent care for his symptoms.  He denies: chest pain, SOB, dizziness, weakness, feeling faint, passing out.  He is alert and oriented to self and location at this time.      Nursing advice:  Patient declines being sent central scheduling to make an appointment and he will seek care in urgent care today. Patient was given signs and symptoms to be seen sooner, go to the E.R. or call 911.  Patient verbalizes good understanding, agrees with plan and states he needs no further support. Tuyet Wong R.N.

## 2021-06-28 NOTE — ED PROVIDER NOTES
ED Provider Note  [facility]      History   No chief complaint on file.    HPI  Hoang Kiser is a 35 year old male with a PMH of tic disorder, stimulant dependence, DDD, insomnia, DVT, MDD, ADHD, PTSD, panic disorder, generalized social phobia and suicidal ideation who presents to the ED today complaining of a lump on his neck. ***    Past Medical History  Past Medical History:   Diagnosis Date     Arthritis 2018     Benign positional vertigo 2016    Started after my groin/back injury. Sitting on Toilet.     Depressive disorder     I am on 120mg of Duoluxetine.     Dysphonia     Nothing significant.     Gastroesophageal reflux disease 2004    Occassional. Spicy foods set it off.     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      DATE OF EE2019.    SOURCE FILE DURATION:  15 minutes.  This EEG did not have a video.    PATIENT INFORMATION:  The patient is a 33-year-old male with irregular movements.  It is not entirely clear the etiology of these movements.  EEG is being done to evaluate for seizures.    MEDICATIONS:  Adderall, doxepin, Cymbalta, Robaxin.      History of MRI of brain and brain stem 2015    MR BRAIN W/O & W CONTRAST, 2015  Impression: No suspicious intracranial findings.      Hoarseness     Dry mouth, started after taking Tizanidine     Neuralgia, neuritis, and radiculitis, unspecified 2012     normal emg 2017 2017    Interpretation: This is a normal study. There is no electrophysiologic evidence of a lumbosacral radiculopathy affecting the right or left lower extremity on the basis of this study.    Rodney Mena MD Department of Neurology       Panic attack 2016     Seizures (H)     Grew out of it. Absence Seizures. [id number]     Tinnitus 2015    Had it most of my life. Got worse  in 2015     Past Surgical History:   Procedure Laterality Date     COLONOSCOPY  02/15/18    Has not happened yet.     COLONOSCOPY N/A 2/15/2018    Procedure: COMBINED COLONOSCOPY, SINGLE OR MULTIPLE BIOPSY/POLYPECTOMY BY BIOPSY;;  Surgeon: Liam Kincaid MD;  Location: [facility] OR     COLONOSCOPY WITH CO2 INSUFFLATION N/A 2/15/2018    Procedure: COLONOSCOPY WITH CO2 INSUFFLATION;  COLON-FAMILY HX OF COLON CANCER/ SYPURA;  Surgeon: iLam Kincaid MD;  Location: [facility] OR     HC TOOTH EXTRACTION W/FORCEP Bilateral 2003     PE TUBES  1990     albuterol (PROAIR HFA/PROVENTIL HFA/VENTOLIN HFA) 108 (90 Base) MCG/ACT inhaler  A[place] Saline Nasal No-Drip GEL  buprenorphine-naloxone (SUBOXONE) 8-2 MG SUBL sublingual tablet  cloNIDine (CATAPRES) 0.1 MG tablet  hydrochlorothiazide (HYDRODIURIL) 25 MG tablet  hydrOXYzine (ATARAX) 25 MG tablet  mirtazapine (REMERON) 15 MG tablet  naloxone (NARCAN) 4 MG/0.1ML nasal spray  nicotine (NICODERM CQ) 7 MG/24HR 24 hr patch  nicotine (NICORETTE) 4 MG lozenge  OLANZapine zydis (ZYPREXA) 5 MG ODT  order for DME  order for DME  rivaroxaban ANTICOAGULANT (XARELTO ANTICOAGULANT) 20 MG TABS tablet  tiZANidine (ZANAFLEX) 4 MG tablet  vilazodone (VIIBRYD) 10 MG TABS tablet  vilazodone (VIIBRYD) 20 MG TABS tablet      Allergies   Allergen Reactions     Amitriptyline      Ineffective in reducing spasms/movement, increased fatigue  Other reaction(s): Other (see comments)     Buspirone Hcl Other (See Comments)     Panic attacks     Doxycycline Diarrhea, Fatigue, GI Disturbance and Nausea     Other reaction(s): GI intolerance     Trazodone Fatigue     Other reaction(s): Other (see comments)     Cephalexin Diarrhea     Other reaction(s): GI intolerance     Family History  Family History   Problem Relation Age of Onset     Lipids Father         hyperlipidemia     Hyperlipidemia Father      Obesity Father      Arthritis Mother      Hyperlipidemia Mother      Depression Mother      Anxiety Disorder  "Mother      Mental Illness Mother      Obesity Mother      Asthma Brother      Asthma Sister      Depression Other      Hearing Loss Other      Psychotic Disorder Other      Obesity Other      Cerebrovascular Disease Paternal Grandfather      Alzheimer Disease Paternal Grandfather      Depression Brother      Mental Illness Brother         Bipolar     Asthma Brother      Colitis Brother      Skin Cancer Brother      Depression Sister      Asthma Sister      Substance Abuse Sister         Alcohol     Mental Illness Brother         Bipolar     Asthma Brother      Colitis Brother      Asthma Sister      Obesity Sister      Asthma Brother      Obesity Brother      Obesity Maternal Grandmother      Genetic Disorder Maternal Grandmother         Epilepsy     Obesity Paternal Grandmother      Colon Cancer Other      Genetic Disorder Other         Cerebral Palsy     Genetic Disorder Maternal Grandfather         Multiple Sclerosis     Genetic Disorder Other         Epilepsy     Social History   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 12-10-20 = 2.5 months sober      Past medical history, past surgical history, medications, allergies, family history, and social history were reviewed with the patient. No additional pertinent items.       Review of Systems  {Complete vs limited ROS:863886::\"A complete review of systems was performed with pertinent positives and negatives noted in the HPI, and all other systems negative.\"}    Physical Exam      Physical Exam  ***  ED Course      Procedures        {ED " Course Selections:836727}       No results found for any visits on 06/28/21.  Medications - No data to display     Assessments & Plan (with Medical Decision Making)   ***    I have reviewed the nursing notes. I have reviewed the findings, diagnosis, plan and need for follow up with the patient.    New Prescriptions    No medications on file       Final diagnoses:   None       --  ***  [facility] EMERGENCY DEPARTMENT  6/28/2021

## 2021-07-01 ENCOUNTER — VIRTUAL VISIT (OUTPATIENT)
Dept: FAMILY MEDICINE | Facility: CLINIC | Age: 36
End: 2021-07-01
Payer: COMMERCIAL

## 2021-07-01 DIAGNOSIS — I82.401 ACUTE DEEP VEIN THROMBOSIS (DVT) OF RIGHT LOWER EXTREMITY, UNSPECIFIED VEIN (H): Primary | ICD-10-CM

## 2021-07-01 PROCEDURE — 99213 OFFICE O/P EST LOW 20 MIN: CPT | Mod: 95 | Performed by: FAMILY MEDICINE

## 2021-07-01 NOTE — PROGRESS NOTES
"Shoaib is a 35 year old who is being evaluated via a billable video visit.      How would you like to obtain your AVS? MyChart  If the video visit is dropped, the invitation should be resent by: Text to cell phone: [phone] back up  Will anyone else be joining your video visit? No    Video Start Time: 2:13 PM    Assessment & Plan     Acute deep vein thrombosis (DVT) of right lower extremity, unspecified vein (H)    - US Lower Extremity Venous Duplex Right; Future             BMI:   Estimated body mass index is 31.32 kg/m  as calculated from the following:    Height as of 6/28/21: 1.702 m (5' 7\").    Weight as of 6/28/21: 90.7 kg (200 lb).           No follow-ups on file.    Phill Floyd MD  [facility]    Subjective   Shoaib is a 35 year old who presents for the following health issues     HPI   Follow up with everything that has been going on.          Review of Systems         Objective           Vitals:  No vitals were obtained today due to virtual visit.    Physical Exam   GENERAL: Healthy, alert and no distress  EYES: Eyes grossly normal to inspection.  No discharge or erythema, or obvious scleral/conjunctival abnormalities.  RESP: No audible wheeze, cough, or visible cyanosis.  No visible retractions or increased work of breathing.    SKIN: Visible skin clear. No significant rash, abnormal pigmentation or lesions.  NEURO: Cranial nerves grossly intact.  Mentation and speech appropriate for age.  PSYCH: Mentation appears normal, affect normal/bright, judgement and insight intact, normal speech and appearance well-groomed.                Video-Visit Details    Type of service:  Video Visit    Video End Time:2:23 PM    Originating Location (pt. Location): Other outside and he eventually walked home    Distant Location (provider location):  [facility]     Platform used for Video Visit: Anil"   --------------------------------------------------------------------------------------------------------------------------------------  SUBJECTIVE:  Hoang Kiser is a 35 year old male who scheduled an appointment to discuss the following issues:    He was diagnosed with a right lower extremity(ies) DVT on 3-3-21.  He was prescribed Eliquis but has been switched to Xarelto    His swelling has significant(ly) decreased.  He has not worn the compression stockings for a few weeks   His lower shin still indents a little bit when he pushes in   This occurs in the mid shin   He denies any pain in his legs    He has been active and stretching       On July 16th he will move in with his parents who live in the [facility]. He will transfer care to the [facility] near there.     He would like to stop his diuretic if is possible as well          Past Medical, social, family histories, medications, and allergies reviewed and updated   ROS: other than that noted above all other review of systems was negative    ROS:       Current Outpatient Medications:      ADDERALL XR 30 MG 24 hr capsule, Take 1 capsule by mouth every morning, Disp: , Rfl:      albuterol (PROAIR HFA/PROVENTIL HFA/VENTOLIN HFA) 108 (90 Base) MCG/ACT inhaler, INHALE ONE TO TWO PUFFS INTO THE LUNGS EVERY FOUR HOURS AS NEEDED FOR SHORTNESS OF BREATH/ DYSPNEA OR WHEEZING, Disp: 18 g, Rfl: 1     Ayr Saline Nasal No-Drip GEL, Apply gel inside the nose every night before bed and during the day as needed for dryness or bleeding, Disp: 22 mL, Rfl: 4     buprenorphine-naloxone (SUBOXONE) 8-2 MG SUBL sublingual tablet, Place 1 tablet under the tongue 2 times daily, Disp: 60 tablet, Rfl: 0     cloNIDine (CATAPRES) 0.1 MG tablet, Take 1-2 tablets (0.1-0.2 mg) by mouth nightly as needed (sleep), Disp: 60 tablet, Rfl: 1     hydrOXYzine (ATARAX) 25 MG tablet, Take 1 tablet (25 mg) by mouth nightly as needed (at HS PRN), Disp: 45 tablet, Rfl: 3      mirtazapine (REMERON) 15 MG tablet, Take 1 tablet (15 mg) by mouth At Bedtime, Disp: 30 tablet, Rfl: 1     naloxone (NARCAN) 4 MG/0.1ML nasal spray, Spray 4 mg into one nostril alternating nostrils once as needed for opioid reversal every 2-3 minutes until assistance arrives, Disp: , Rfl:      nicotine (NICODERM CQ) 7 MG/24HR 24 hr patch, Place 1 patch onto the skin every 24 hours, Disp: 30 patch, Rfl: 1     nicotine (NICORETTE) 4 MG lozenge, Place 1 lozenge (4 mg) inside cheek as needed for smoking cessation Maximum is 5 per 6 hours or 20 per day, Disp: 108 lozenge, Rfl: 1     OLANZapine zydis (ZYPREXA) 5 MG ODT, Take 1 tablet (5 mg) by mouth every 12 hours as needed for agitation, Disp: 20 tablet, Rfl: 0     order for DME, Equipment being ordered: Digital home blood pressure monitor kit, Disp: 1 each, Rfl: 0     order for DME, Equipment being ordered: 4 wheeled walker with bench seat., Disp: 1 Units, Rfl: 0     rivaroxaban ANTICOAGULANT (XARELTO ANTICOAGULANT) 20 MG TABS tablet, Take 1 tablet (20 mg) by mouth daily (with dinner), Disp: 90 tablet, Rfl: 0     tiZANidine (ZANAFLEX) 4 MG tablet, Take 1-2 tablets (4-8 mg) by mouth nightly as needed for muscle spasms, Disp: 60 tablet, Rfl: 1     traZODone (DESYREL) 50 MG tablet, Take 1 tablet by mouth nightly as needed, Disp: , Rfl:      vilazodone (VIIBRYD) 10 MG TABS tablet, Take 10 mg by mouth At Bedtime 10 mg + 20mg = 30mg, Disp: , Rfl:      vilazodone (VIIBRYD) 20 MG TABS tablet, Take 20 mg by mouth At Bedtime 10 mg + 20mg = 30mg, Disp: , Rfl:     Current Facility-Administered Medications:      iohexol (OMNIPAQUE) 300 mg/mL injection 10 mL, 10 mL, Cervical, Once, Brynn Trujillo MD    OBJECTIVE:  There were no vitals taken for this visit.    EXAM: patient was outside walking when I called him.   GENERAL APPEARANCE: healthy, alert and no distress      No results found for any visits on 07/01/21.      ASSESSMENT/PLAN:    (I82.401) Acute deep vein thrombosis (DVT) of  right lower extremity, unspecified vein (H)  (primary encounter diagnosis)  Comment:   Plan: US Lower Extremity Venous Duplex Right        If the clot has resolved I will discontinue the anticoagulant and also advise the patient to discontinue his diuretic       I recommend(ed) that he work with his psychiatry(ist) on the sleep and concerns with olanzapine

## 2021-07-01 NOTE — PATIENT INSTRUCTIONS
Please call our [facility] Scheduling Line at [phone] to schedule your:  Ultrasound

## 2021-07-02 ENCOUNTER — TELEPHONE (OUTPATIENT)
Dept: ADDICTION MEDICINE | Facility: CLINIC | Age: 36
End: 2021-07-02

## 2021-07-02 DIAGNOSIS — F11.20 UNCOMPLICATED OPIOID DEPENDENCE (H): ICD-10-CM

## 2021-07-02 DIAGNOSIS — G89.29 CHRONIC LOW BACK PAIN WITH SCIATICA, SCIATICA LATERALITY UNSPECIFIED, UNSPECIFIED BACK PAIN LATERALITY: ICD-10-CM

## 2021-07-02 DIAGNOSIS — M54.40 CHRONIC LOW BACK PAIN WITH SCIATICA, SCIATICA LATERALITY UNSPECIFIED, UNSPECIFIED BACK PAIN LATERALITY: ICD-10-CM

## 2021-07-02 RX ORDER — BUPRENORPHINE HYDROCHLORIDE AND NALOXONE HYDROCHLORIDE DIHYDRATE 8; 2 MG/1; MG/1
1 TABLET SUBLINGUAL 2 TIMES DAILY
Qty: 30 TABLET | Refills: 0 | Status: SHIPPED | OUTPATIENT
Start: 2021-07-02 | End: 2021-07-13

## 2021-07-02 NOTE — TELEPHONE ENCOUNTER
Called Shoaib to follow-up from our appt. Reports adherence to suboxone, doing well, and denies any substance use. He would like to taper soon; advised he wait until he moves and gets settled.    He had indicated he will be moving to [place], [place] at the beginning of the month. Record review indicates he will be moving in 1-2 weeks from now, which he confirms today. He will run out of suboxone before then. Provided 2 week bridge today.    Discussing case with RN care coordinator Mary Jones - working to assess his ability to establish care with the opioid use disorder clinic at [facility]. Shoaib is open to a phone call today to discuss enrollment, though this isn't urgent. Forwarding note.    Rashard Milligan MD

## 2021-07-07 ENCOUNTER — TELEPHONE (OUTPATIENT)
Dept: BEHAVIORAL HEALTH | Facility: CLINIC | Age: 36
End: 2021-07-07

## 2021-07-07 ENCOUNTER — ALLIED HEALTH/NURSE VISIT (OUTPATIENT)
Dept: BEHAVIORAL HEALTH | Facility: CLINIC | Age: 36
End: 2021-07-07
Payer: COMMERCIAL

## 2021-07-07 DIAGNOSIS — R69 DIAGNOSIS DEFERRED: Primary | ICD-10-CM

## 2021-07-07 NOTE — TELEPHONE ENCOUNTER
Behavioral Health Home Services  [facility]: [place]        Social Work Care Navigator Note      Patient: Hoang Kiser  Date: July 7, 2021  Preferred Name: Shoaib    Previous PHQ-9:   PHQ-9 SCORE 2/19/2021 3/16/2021 4/5/2021   PHQ-9 Total Score - - -   PHQ-9 Total Score MyChart 10 (Moderate depression) - -   PHQ-9 Total Score 10 10 14   Some encounter information is confidential and restricted. Go to Review Flowsheets activity to see all data.     Previous JIN-7:   JIN-7 SCORE 12/10/2020 2/19/2021 3/16/2021   Total Score - - -   Total Score 10 (moderate anxiety) 17 (severe anxiety) -   Total Score 10 17 13   Some encounter information is confidential and restricted. Go to Review Flowsheets activity to see all data.     MOLLY LEVEL:  MOLLY Score (Last Two) 11/14/2020 12/10/2020   MOLLY Raw Score 31 31   Activation Score 59.3 59.3   MOLLY Level 3 3       Preferred Contact:  Need for : No  Preferred Contact: Cell      Type of Contact Today: Phone call (not reached/unavailable)      Data: (subjective / Objective):  Attempted to reach patient, but was unsuccessful.  Plan to attempt again.  LAKSHMI Kebede        Next 5 appointments (look out 90 days)    Jul 16, 2021  1:00 PM  (Arrive by 12:45 PM)  SHORT with Allison Heath MD  [facility] [place] ([facility] [place] ) [address]  [place] [address]  [phone]
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
## 2021-07-12 DIAGNOSIS — G89.29 CHRONIC LOW BACK PAIN WITH SCIATICA, SCIATICA LATERALITY UNSPECIFIED, UNSPECIFIED BACK PAIN LATERALITY: ICD-10-CM

## 2021-07-12 DIAGNOSIS — M54.40 CHRONIC LOW BACK PAIN WITH SCIATICA, SCIATICA LATERALITY UNSPECIFIED, UNSPECIFIED BACK PAIN LATERALITY: ICD-10-CM

## 2021-07-12 DIAGNOSIS — F11.20 UNCOMPLICATED OPIOID DEPENDENCE (H): ICD-10-CM

## 2021-07-13 ENCOUNTER — TELEPHONE (OUTPATIENT)
Dept: ADDICTION MEDICINE | Facility: CLINIC | Age: 36
End: 2021-07-13

## 2021-07-13 ENCOUNTER — NURSE TRIAGE (OUTPATIENT)
Dept: NURSING | Facility: CLINIC | Age: 36
End: 2021-07-13

## 2021-07-13 DIAGNOSIS — F11.20 UNCOMPLICATED OPIOID DEPENDENCE (H): ICD-10-CM

## 2021-07-13 DIAGNOSIS — G89.29 CHRONIC LOW BACK PAIN WITH SCIATICA, SCIATICA LATERALITY UNSPECIFIED, UNSPECIFIED BACK PAIN LATERALITY: ICD-10-CM

## 2021-07-13 DIAGNOSIS — M54.40 CHRONIC LOW BACK PAIN WITH SCIATICA, SCIATICA LATERALITY UNSPECIFIED, UNSPECIFIED BACK PAIN LATERALITY: ICD-10-CM

## 2021-07-13 RX ORDER — BUPRENORPHINE HYDROCHLORIDE AND NALOXONE HYDROCHLORIDE DIHYDRATE 8; 2 MG/1; MG/1
TABLET SUBLINGUAL
Qty: 60 TABLET | Refills: 5 | OUTPATIENT
Start: 2021-07-13

## 2021-07-13 RX ORDER — BUPRENORPHINE HYDROCHLORIDE AND NALOXONE HYDROCHLORIDE DIHYDRATE 8; 2 MG/1; MG/1
1 TABLET SUBLINGUAL 2 TIMES DAILY
Qty: 30 TABLET | Refills: 0 | Status: SHIPPED | OUTPATIENT
Start: 2021-07-13 | End: 2021-07-13 | Stop reason: ALTCHOICE

## 2021-07-13 NOTE — TELEPHONE ENCOUNTER
Per Dr. Milligan's last visit note, from 6/9/21, pt was going to move to [facility] on 7/1/21.    Called [facility] and spoke to cynthia Luz. Informed Natty of the above. Natty indicated the pharmacy did not received a discharge notice from the pt's group home. Natty states the pharmacy has been sending pt's Rx to the [facility], group [place] in  [place]. Reports some meds were delivered to facility yesterday.    Called [facility] and spoke to Dinorah. Dinorah stated pt was going to discharge on 7/1/21, but changed his mind. Dinorah is requesting that Suboxone be sent to the [facility].     Pended med with updated pharmacy. Will route encounter to Dr. Milligan for review and directives.

## 2021-07-13 NOTE — TELEPHONE ENCOUNTER
Reason for Call:  Other prescription    buprenorphine-naloxone (SUBOXONE) 8-2 MG SUBL sublingual tablet    Detailed comments:  [facility] called stating pt is out of supply. Prescription has been sent on 7/2 to [id number] - [place], [place] - [address]. [facility] is requesting that prescription be sent to them, for it's the pharmacy that pt use. Please further assist.    Pharmacy # [phone]    Best Time: ASAP      Call taken on 7/13/2021 at 10:58 AM by Jessica Castillo

## 2021-07-13 NOTE — TELEPHONE ENCOUNTER
The following Suboxone Rx was written on 7/2/21:  buprenorphine-naloxone (SUBOXONE) 8-2 MG SUBL sublingual tablet 30 tablet 0 7/2/2021  No   Sig - Route: Place 1 tablet under the tongue 2 times daily - Sublingual     Called [facility] and spoke to Estefania, pharmacy tech. Per Estefania Rx is ready for  today.     Attempted to reach pt via phone to inform him of the above. Pt did not answer phone call. LVM. Sent a Clari message.     Will decline current med refill request.

## 2021-07-13 NOTE — TELEPHONE ENCOUNTER
Called [facility] and spoke to Zackary, pharmacist. Per Zackary, Rx was already picked up today. Zackary was unable to say who picked up med.    Spoke to Dinorah who stated staff did not  his med at the [facility]. Dinorah stated pt told staff about an hour ago that he was leaving the facility. Dinorah also stated pt has a hx of telling staff he is leaving to see family, having staff set up his suboxone for the days he will be gone, and then returning to the  sooner than planned and stating he does not know where his extra Suboxone films went.     Attempted to reach pt via phone call to inquire about whether he picked up Rx from [facility]. Pt did not answer call. LVM.     Called [facility] and spoke to Isi pharmacist. Asked Isi to cancel Suboxone Rx recently sent in by Dr. Milligan, as med has now been discontinued. Isi stated she would cancel med from system.
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
## 2021-07-13 NOTE — TELEPHONE ENCOUNTER
Diana is calling asking to speak with Mitzy regarding patient's medication, please call her at [phone].  Geeta Bose   [facility]  Central Scheduler

## 2021-07-14 RX ORDER — BUPRENORPHINE HYDROCHLORIDE AND NALOXONE HYDROCHLORIDE DIHYDRATE 8; 2 MG/1; MG/1
1 TABLET SUBLINGUAL 2 TIMES DAILY
Qty: 30 TABLET | Refills: 0
Start: 2021-07-14 | End: 2021-08-04
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
## 2021-07-14 NOTE — TELEPHONE ENCOUNTER
Diana RN calling from Group Home and patient picked up Suboxone medications on his own when he shouldn't have.  He gave them the container finally, but it was missing 2.5 tablets than what should have been in the container.  She is requesting direction from a provider to not give the ordered dose for evening, so that patient is not overdosed.    Medication is ordered to be 1 tablet by mouth twice a day.      Paged on-call for PCP, addiction med does not have a provider on-call.  Paged Dr. Bains at 8:35pm and at 8:45pm.  He will call Group Home RN and give order to hold night time dose due to concern for possible overdose.    Sarina Martinez, RN on 7/13/2021 at 8:47 PM

## 2021-07-14 NOTE — TELEPHONE ENCOUNTER
Med re-entered on list. Not prescribed/printed.    Note forwarded to coordinator at [facility]; may not present for visit on Friday, though appears [place] Unsure at this time. Last call made by our staff, Shoaib hung up before full discussion could be had.    Rashard Milligan MD

## 2021-07-17 ENCOUNTER — MYC MEDICAL ADVICE (OUTPATIENT)
Dept: FAMILY MEDICINE | Facility: CLINIC | Age: 36
End: 2021-07-17

## 2021-07-21 ENCOUNTER — TELEPHONE (OUTPATIENT)
Dept: PSYCHOLOGY | Facility: CLINIC | Age: 36
End: 2021-07-21

## 2021-07-21 ENCOUNTER — APPOINTMENT (OUTPATIENT)
Dept: PSYCHOLOGY | Facility: CLINIC | Age: 36
End: 2021-07-21
Attending: SOCIAL WORKER
Payer: COMMERCIAL

## 2021-07-21 NOTE — TELEPHONE ENCOUNTER
Late cancel for intake appointment-therapist called client after he did not join via video.  Client requested to reschedule appointment due to just waking up and not feeling well.  Discussed attendance policy.  Scheduled client with next available appointment on August 10th at 8am. Offered to schedule additional appointments; client declined.

## 2021-08-04 ENCOUNTER — TELEPHONE (OUTPATIENT)
Dept: FAMILY MEDICINE | Facility: OTHER | Age: 36
End: 2021-08-04

## 2021-08-04 ENCOUNTER — MYC MEDICAL ADVICE (OUTPATIENT)
Dept: CARE COORDINATION | Facility: OTHER | Age: 36
End: 2021-08-04

## 2021-08-04 NOTE — TELEPHONE ENCOUNTER
Call from patient requesting appt to establish with a provider for the management of suboxone. Patient has been followed by a provider (Dr. Rashard Prince) previously and has moved back to [place]. Dr. Prince has suggested [facility] for follow up and management of pain and suboxone.     Patient reports he had a chemical addiction in the past.     Patient can be reached at [phone].    Please advise.

## 2021-08-05 NOTE — TELEPHONE ENCOUNTER
Assembly message sent to pt, which he has read.  Waiting for a reply.    Mary Jones RN-BSN, [facility] Care Coordinator  [phone] opt. #[phone]

## 2021-08-10 ENCOUNTER — TELEPHONE (OUTPATIENT)
Dept: PSYCHOLOGY | Facility: CLINIC | Age: 36
End: 2021-08-10

## 2021-08-10 ENCOUNTER — APPOINTMENT (OUTPATIENT)
Dept: PSYCHOLOGY | Facility: CLINIC | Age: 36
End: 2021-08-10
Payer: COMMERCIAL

## 2021-08-10 NOTE — PROGRESS NOTES
"    Assessment & Plan   1. Encounter to establish care    2. Reactive airway disease that is not asthma  - albuterol (PROAIR HFA/PROVENTIL HFA/VENTOLIN HFA) 108 (90 Base) MCG/ACT inhaler; INHALE ONE TO TWO PUFFS INTO THE LUNGS EVERY FOUR HOURS AS NEEDED FOR SHORTNESS OF BREATH/ DYSPNEA OR WHEEZING  Dispense: 18 g; Refill: 1    3. Screening examination for sexually transmitted disease  - GC/Chlamydia by PCR - HI,        Ordering of each unique test  Prescription drug management     BMI:   Estimated body mass index is 32.24 kg/m  as calculated from the following:    Height as of this encounter: 1.69 m (5' 6.54\").    Weight as of this encounter: 92.1 kg (203 lb).   Weight management plan: Discussed healthy diet and exercise guidelines        No follow-ups on file.    Diana Jackman, [facility] Shoaib is a 36 year old who presents for the following health issues     HPI     New Patient/Transfer of Care    Shoaib is a former patient of Dr. Phill Floyd in [place], [place]. Patient has recently moved to the area and looking to establish primary care services. He reports that he will be working with Dr. Heath for opioid use disorder in [place] and continuation with  saboxone treatment. Primary concerns this visit are follow up of his reactive airway and be tested for STDs.     Reactive Airway   He reports that he occasionally uses an albuterol inhaler if ill and would like to have this renewed. Not acutely ill today. Denies formal testing of Asthma/COPD with PFTs in the past.     STD testing  Dr. Heath ordered Hpet C, B, HIV screening today. Patient declines Syphilis testing. He only wants his urine tested for gonorrhea and chlamydia. Denies any known exposures or at risk behavior since last testing.       Medications Reviewed-  Shoaib reports he is not needing any refills today other than the albuterol. Other than medicaitons fo mental health and saboxone, he is on " "hydrochlorothiazide and  Xarelto . Blood pressure at goal today and the Xarelto is for history of DVT.     Care Team  -Dr. Brian Duckworth with [facility] for psychiatry and medication management.   -Dr. Heath for pain management  - Establishing with me, Diana Jackman CNP, for primary care.         Review of Systems   Constitutional, HEENT, cardiovascular, pulmonary, gi and gu systems are negative, except as otherwise noted.      Objective    /64 (BP Location: Right arm, Patient Position: Chair, Cuff Size: Adult Regular)   Pulse 86   Temp 97.6  F (36.4  C) (Tympanic)   Ht 1.69 m (5' 6.54\")   Wt 92.1 kg (203 lb)   SpO2 95%   BMI 32.24 kg/m    Body mass index is 32.24 kg/m .  Physical Exam   GENERAL: healthy, alert and no distress  EYES: Eyes grossly normal to inspection, PERRL and conjunctivae and sclerae normal  NECK: no adenopathy, no asymmetry, masses, or scars and thyroid normal to palpation  RESP: lungs clear to auscultation - no rales, rhonchi or wheezes  CV: regular rate and rhythm, normal S1 S2, no S3 or S4, no murmur, click or rub, no peripheral edema and peripheral pulses strong  ABDOMEN: soft, nontender, no hepatosplenomegaly, no masses and bowel sounds normal  MS: no gross musculoskeletal defects noted, no edema  PSYCH: mentation appears normal, affect normal/bright    Results for orders placed or performed in visit on 08/13/21   GC/Chlamydia by PCR - [facility]     Status: Normal    Specimen: Urine, Voided   Result Value Ref Range    Chlamydia Trachomatis Negative Negative    Neisseria gonorrhoeae Negative Negative    Narrative    Assay performed using [employer] real-time, reverse-transcriptase PCR.               "

## 2021-08-10 NOTE — TELEPHONE ENCOUNTER
No Show-Therapist called client after he did not join appointment via video.  Left a voice message with information on how to join appointment.  This is the second late cancel/no show for an intake appointment.

## 2021-08-13 ENCOUNTER — OFFICE VISIT (OUTPATIENT)
Dept: FAMILY MEDICINE | Facility: OTHER | Age: 36
End: 2021-08-13
Attending: NURSE PRACTITIONER
Payer: COMMERCIAL

## 2021-08-13 ENCOUNTER — PATIENT OUTREACH (OUTPATIENT)
Dept: CARE COORDINATION | Facility: OTHER | Age: 36
End: 2021-08-13

## 2021-08-13 ENCOUNTER — APPOINTMENT (OUTPATIENT)
Dept: LAB | Facility: OTHER | Age: 36
End: 2021-08-13
Payer: COMMERCIAL

## 2021-08-13 ENCOUNTER — OFFICE VISIT (OUTPATIENT)
Dept: FAMILY MEDICINE | Facility: OTHER | Age: 36
End: 2021-08-13
Attending: FAMILY MEDICINE
Payer: COMMERCIAL

## 2021-08-13 ENCOUNTER — NURSE TRIAGE (OUTPATIENT)
Dept: NURSING | Facility: CLINIC | Age: 36
End: 2021-08-13

## 2021-08-13 VITALS
DIASTOLIC BLOOD PRESSURE: 68 MMHG | HEIGHT: 67 IN | WEIGHT: 204.8 LBS | OXYGEN SATURATION: 97 % | TEMPERATURE: 96.9 F | SYSTOLIC BLOOD PRESSURE: 108 MMHG | HEART RATE: 73 BPM | BODY MASS INDEX: 32.15 KG/M2 | RESPIRATION RATE: 16 BRPM

## 2021-08-13 VITALS
BODY MASS INDEX: 31.86 KG/M2 | HEIGHT: 67 IN | SYSTOLIC BLOOD PRESSURE: 128 MMHG | DIASTOLIC BLOOD PRESSURE: 64 MMHG | WEIGHT: 203 LBS | TEMPERATURE: 97.6 F | HEART RATE: 86 BPM | OXYGEN SATURATION: 95 %

## 2021-08-13 DIAGNOSIS — F11.20 UNCOMPLICATED OPIOID DEPENDENCE (H): ICD-10-CM

## 2021-08-13 DIAGNOSIS — J98.9 REACTIVE AIRWAY DISEASE THAT IS NOT ASTHMA: ICD-10-CM

## 2021-08-13 DIAGNOSIS — G89.29 CHRONIC LOW BACK PAIN WITH SCIATICA, SCIATICA LATERALITY UNSPECIFIED, UNSPECIFIED BACK PAIN LATERALITY: ICD-10-CM

## 2021-08-13 DIAGNOSIS — F11.90 OPIOID USE DISORDER: Primary | ICD-10-CM

## 2021-08-13 DIAGNOSIS — Z11.3 SCREENING EXAMINATION FOR SEXUALLY TRANSMITTED DISEASE: ICD-10-CM

## 2021-08-13 DIAGNOSIS — M54.40 CHRONIC LOW BACK PAIN WITH SCIATICA, SCIATICA LATERALITY UNSPECIFIED, UNSPECIFIED BACK PAIN LATERALITY: ICD-10-CM

## 2021-08-13 DIAGNOSIS — Z76.89 ENCOUNTER TO ESTABLISH CARE: Primary | ICD-10-CM

## 2021-08-13 LAB
ALBUMIN SERPL-MCNC: 3.7 G/DL (ref 3.4–5)
ALP SERPL-CCNC: 96 U/L (ref 40–150)
ALT SERPL W P-5'-P-CCNC: 47 U/L (ref 0–70)
AMPHETAMINES UR QL: DETECTED
ANION GAP SERPL CALCULATED.3IONS-SCNC: 5 MMOL/L (ref 3–14)
AST SERPL W P-5'-P-CCNC: 32 U/L (ref 0–45)
BARBITURATES UR QL SCN: NOT DETECTED
BENZODIAZ UR QL SCN: NOT DETECTED
BILIRUB DIRECT SERPL-MCNC: <0.1 MG/DL (ref 0–0.2)
BILIRUB SERPL-MCNC: 0.2 MG/DL (ref 0.2–1.3)
BUN SERPL-MCNC: 15 MG/DL (ref 7–30)
BUPRENORPHINE UR QL: DETECTED
CALCIUM SERPL-MCNC: 9.2 MG/DL (ref 8.5–10.1)
CANNABINOIDS UR QL: NOT DETECTED
CHLORIDE BLD-SCNC: 105 MMOL/L (ref 94–109)
CO2 SERPL-SCNC: 28 MMOL/L (ref 20–32)
COCAINE UR QL SCN: NOT DETECTED
CREAT SERPL-MCNC: 0.92 MG/DL (ref 0.66–1.25)
D-METHAMPHET UR QL: NOT DETECTED
ERYTHROCYTE [DISTWIDTH] IN BLOOD BY AUTOMATED COUNT: 13.2 % (ref 10–15)
GFR SERPL CREATININE-BSD FRML MDRD: >90 ML/MIN/1.73M2
GLUCOSE BLD-MCNC: 92 MG/DL (ref 70–99)
HCT VFR BLD AUTO: 42.2 % (ref 40–53)
HGB BLD-MCNC: 14.5 G/DL (ref 13.3–17.7)
MCH RBC QN AUTO: 29.2 PG (ref 26.5–33)
MCHC RBC AUTO-ENTMCNC: 34.4 G/DL (ref 31.5–36.5)
MCV RBC AUTO: 85 FL (ref 78–100)
METHADONE UR QL SCN: NOT DETECTED
OPIATES UR QL SCN: NOT DETECTED
OXYCODONE UR QL SCN: NOT DETECTED
PCP UR QL SCN: NOT DETECTED
PLATELET # BLD AUTO: 259 10E3/UL (ref 150–450)
POTASSIUM BLD-SCNC: 3.7 MMOL/L (ref 3.4–5.3)
PROPOXYPH UR QL: NOT DETECTED
PROT SERPL-MCNC: 7.3 G/DL (ref 6.8–8.8)
RBC # BLD AUTO: 4.97 10E6/UL (ref 4.4–5.9)
SODIUM SERPL-SCNC: 138 MMOL/L (ref 133–144)
TRICYCLICS UR QL SCN: NOT DETECTED
WBC # BLD AUTO: 4.6 10E3/UL (ref 4–11)

## 2021-08-13 PROCEDURE — 87389 HIV-1 AG W/HIV-1&-2 AB AG IA: CPT | Mod: ZL | Performed by: FAMILY MEDICINE

## 2021-08-13 PROCEDURE — G0463 HOSPITAL OUTPT CLINIC VISIT: HCPCS

## 2021-08-13 PROCEDURE — 87491 CHLMYD TRACH DNA AMP PROBE: CPT | Mod: ZL | Performed by: NURSE PRACTITIONER

## 2021-08-13 PROCEDURE — 82374 ASSAY BLOOD CARBON DIOXIDE: CPT | Mod: ZL | Performed by: FAMILY MEDICINE

## 2021-08-13 PROCEDURE — 99214 OFFICE O/P EST MOD 30 MIN: CPT | Performed by: FAMILY MEDICINE

## 2021-08-13 PROCEDURE — 80306 DRUG TEST PRSMV INSTRMNT: CPT | Mod: ZL | Performed by: FAMILY MEDICINE

## 2021-08-13 PROCEDURE — 82248 BILIRUBIN DIRECT: CPT | Mod: ZL | Performed by: FAMILY MEDICINE

## 2021-08-13 PROCEDURE — 86803 HEPATITIS C AB TEST: CPT | Mod: ZL | Performed by: FAMILY MEDICINE

## 2021-08-13 PROCEDURE — 85027 COMPLETE CBC AUTOMATED: CPT | Mod: ZL | Performed by: FAMILY MEDICINE

## 2021-08-13 PROCEDURE — 87340 HEPATITIS B SURFACE AG IA: CPT | Mod: ZL | Performed by: FAMILY MEDICINE

## 2021-08-13 PROCEDURE — 86706 HEP B SURFACE ANTIBODY: CPT | Mod: ZL | Performed by: FAMILY MEDICINE

## 2021-08-13 PROCEDURE — 99213 OFFICE O/P EST LOW 20 MIN: CPT | Performed by: NURSE PRACTITIONER

## 2021-08-13 PROCEDURE — 36415 COLL VENOUS BLD VENIPUNCTURE: CPT | Mod: ZL | Performed by: FAMILY MEDICINE

## 2021-08-13 RX ORDER — HYDROCHLOROTHIAZIDE 25 MG/1
25 TABLET ORAL EVERY MORNING
COMMUNITY
Start: 2021-08-06 | End: 2021-09-14 | Stop reason: ALTCHOICE

## 2021-08-13 RX ORDER — BUPRENORPHINE HYDROCHLORIDE AND NALOXONE HYDROCHLORIDE DIHYDRATE 8; 2 MG/1; MG/1
1 TABLET SUBLINGUAL 2 TIMES DAILY
Qty: 56 TABLET | Refills: 0 | Status: SHIPPED | OUTPATIENT
Start: 2021-08-13 | End: 2021-09-10

## 2021-08-13 RX ORDER — ALBUTEROL SULFATE 90 UG/1
AEROSOL, METERED RESPIRATORY (INHALATION)
Qty: 18 G | Refills: 1 | Status: SHIPPED | OUTPATIENT
Start: 2021-08-13 | End: 2021-09-20

## 2021-08-13 RX ORDER — CYCLOBENZAPRINE HCL 5 MG
TABLET ORAL
COMMUNITY
Start: 2021-08-06 | End: 2021-12-16

## 2021-08-13 SDOH — ECONOMIC STABILITY: TRANSPORTATION INSECURITY
IN THE PAST 12 MONTHS, HAS THE LACK OF TRANSPORTATION KEPT YOU FROM MEDICAL APPOINTMENTS OR FROM GETTING MEDICATIONS?: YES

## 2021-08-13 SDOH — ECONOMIC STABILITY: TRANSPORTATION INSECURITY
IN THE PAST 12 MONTHS, HAS LACK OF TRANSPORTATION KEPT YOU FROM MEETINGS, WORK, OR FROM GETTING THINGS NEEDED FOR DAILY LIVING?: YES

## 2021-08-13 ASSESSMENT — MIFFLIN-ST. JEOR
SCORE: 1817.6
SCORE: 1802.04

## 2021-08-13 ASSESSMENT — PAIN SCALES - GENERAL
PAINLEVEL: MODERATE PAIN (4)
PAINLEVEL: MILD PAIN (3)

## 2021-08-13 ASSESSMENT — ANXIETY QUESTIONNAIRES
IF YOU CHECKED OFF ANY PROBLEMS ON THIS QUESTIONNAIRE, HOW DIFFICULT HAVE THESE PROBLEMS MADE IT FOR YOU TO DO YOUR WORK, TAKE CARE OF THINGS AT HOME, OR GET ALONG WITH OTHER PEOPLE: SOMEWHAT DIFFICULT
GAD7 TOTAL SCORE: 6
2. NOT BEING ABLE TO STOP OR CONTROL WORRYING: NOT AT ALL
5. BEING SO RESTLESS THAT IT IS HARD TO SIT STILL: SEVERAL DAYS
7. FEELING AFRAID AS IF SOMETHING AWFUL MIGHT HAPPEN: SEVERAL DAYS
6. BECOMING EASILY ANNOYED OR IRRITABLE: SEVERAL DAYS
4. TROUBLE RELAXING: SEVERAL DAYS
1. FEELING NERVOUS, ANXIOUS, OR ON EDGE: SEVERAL DAYS
3. WORRYING TOO MUCH ABOUT DIFFERENT THINGS: SEVERAL DAYS

## 2021-08-13 ASSESSMENT — PATIENT HEALTH QUESTIONNAIRE - PHQ9: SUM OF ALL RESPONSES TO PHQ QUESTIONS 1-9: 5

## 2021-08-13 NOTE — NURSING NOTE
"Chief Complaint   Patient presents with     Establish Care       Initial /64 (BP Location: Right arm, Patient Position: Chair, Cuff Size: Adult Regular)   Pulse 86   Temp 97.6  F (36.4  C) (Tympanic)   Ht 1.69 m (5' 6.54\")   Wt 92.1 kg (203 lb)   SpO2 95%   BMI 32.24 kg/m   Estimated body mass index is 32.24 kg/m  as calculated from the following:    Height as of this encounter: 1.69 m (5' 6.54\").    Weight as of this encounter: 92.1 kg (203 lb).  Medication Reconciliation: complete  Teodora Kern LPN    "

## 2021-08-13 NOTE — PROGRESS NOTES
"Follow-up Buprenorphine Visit           HPI       Hoang Kiser is here for f/u on buprenophine/naloxone (Suboxone) therapy for opioid use disorder.  No chief complaint on file.      Shoaib is currently taking 8/2 mg of Buprenorphine/Naloxone 2 times daily.     Transfer from Dr. Milligan.  How/when did your opioid use begin? Onset high school, college; [id number] ; percocet, hydrocodone.    Chronic pain since 2016.  Facet joints.  Rhizotomy procedures.  DDD.  Evaluation for disability not completed.    Relapse on methamphetamine with COVID pandemic.  Clean since spring.    Preferred route? Pill form; no IV use  Inpatient? Yes 2015 - 2 months - [facility]; 2016/2017-- [facility]  Outpatient? 2018 - 6 month; [facility]  Other substance use? Meth  Social alcohol    On Flexeril and Adderall XR - brought in bottles too.    Suboxone - started 1/2021.  1st time. No prior off the street.    Status since last visit:    Since last visit patient has been: doing well.     Intensity:     There has been: no craving.      Suboxone Dose: adequate.  \"my pain is coming back a little\"    When did you take your last dose? today  Progression of Symptoms:     Cues to use and relapse - denies    Recovery program has been: solid.   Accompanying Signs & Symptoms:    Side Effects: none.    Sobriety:     Status: no use since last visit.      Drug Screen: obtained.    Precipitating factors:    Triggers have been: non-existent.   Alleviating factors:    Contact with sponsor has been: no sponsor.     Family and support system has been: helpful. Sister - alcoholic; mom - staying with them; waiting for apartment - CADI waiver; has   Other Therapies Tried :     Patient has been going to recovery meetings:not at all. Prior NA meetings - \"got burnt by them\".  Meditating; Buddhism; Taoist with mom    Other medical concerns: No    Any ED visits? No       History   Smoking Status     Current Every Day Smoker     " Packs/day: 0.50     Years: 10.00     Types: Cigarettes     Start date: 1/1/2002   Smokeless Tobacco     Never Used     Comment: Transdermal patches have helped me the most in the past       Problem, Medication and Allergy Lists were reviewed and updated if needed.  reviewed and are current..    Pharmacy Database reviewed? Yes, 8.13.21, as expected.  Last fill 8.5.21 #14 - 7 day supply.    Patient is a new patient to this clinic and so  I reviewed/updated the Past Medical History, the Family History and the Social History .         Review of Systems:   Review of Systems  CONSTITUTIONAL: NEGATIVE for fever, chills, change in weight  INTEGUMENTARY/SKIN: NEGATIVE for worrisome rashes, moles or lesions  EYES: NEGATIVE for vision changes or irritation  ENT/MOUTH: NEGATIVE for ear, mouth and throat problems  RESP: NEGATIVE for significant cough or SOB  CV: NEGATIVE for chest pain, palpitations or peripheral edema  GI: NEGATIVE for nausea, abdominal pain, heartburn, or change in bowel habits  : NEGATIVE for frequency, dysuria, or hematuria  MUSCULOSKELETAL: NEGATIVE for significant arthralgias or myalgia  NEURO: NEGATIVE for weakness, dizziness or paresthesias  ENDOCRINE: NEGATIVE for temperature intolerance, skin/hair changes  PSYCHIATRIC: NEGATIVE for changes in mood or affect          Physical Exam:   There were no vitals filed for this visit.  There is no height or weight on file to calculate BMI.  Vitals were reviewed   Physical Exam  GENERAL APPEARANCE: alert, comfortable appearing  EYES: Eyes grossly normal to inspection  HENT:  nose no rhinorrhea  RESP: lungs clear to auscultation - no rales, rhonchi or wheezes and no resp distress  CV: regular rates and rhythm, normal S1 S2,no murmur   ABDOMEN:soft, non-tender, non-distended, no hepatosplenomegaly or masses  SKIN: no rashes, no jaundice, no obvious masses.   NEURO:  no tremor  MENTAL STATUS EXAM:  Appearance/Behavior:No apparent distress  Speech:  Normal  Mood/Affect: normal affect  Insight: Fair      Results:     Results for orders placed or performed in visit on 08/13/21 (from the past 24 hour(s))   Basic metabolic panel   Result Value Ref Range    Sodium 138 133 - 144 mmol/L    Potassium 3.7 3.4 - 5.3 mmol/L    Chloride 105 94 - 109 mmol/L    Carbon Dioxide (CO2) 28 20 - 32 mmol/L    Anion Gap 5 3 - 14 mmol/L    Urea Nitrogen 15 7 - 30 mg/dL    Creatinine 0.92 0.66 - 1.25 mg/dL    Calcium 9.2 8.5 - 10.1 mg/dL    Glucose 92 70 - 99 mg/dL    GFR Estimate >90 >60 mL/min/1.73m2   CBC with platelets   Result Value Ref Range    WBC Count 4.6 4.0 - 11.0 10e3/uL    RBC Count 4.97 4.40 - 5.90 10e6/uL    Hemoglobin 14.5 13.3 - 17.7 g/dL    Hematocrit 42.2 40.0 - 53.0 %    MCV 85 78 - 100 fL    MCH 29.2 26.5 - 33.0 pg    MCHC 34.4 31.5 - 36.5 g/dL    RDW 13.2 10.0 - 15.0 %    Platelet Count 259 150 - 450 10e3/uL   Hepatic function panel   Result Value Ref Range    Bilirubin Total 0.2 0.2 - 1.3 mg/dL    Bilirubin Direct <0.1 0.0 - 0.2 mg/dL    Protein Total 7.3 6.8 - 8.8 g/dL    Albumin 3.7 3.4 - 5.0 g/dL    Alkaline Phosphatase 96 40 - 150 U/L    AST 32 0 - 45 U/L    ALT 47 0 - 70 U/L   Urine Drugs of Abuse Screen Panel 13   Result Value Ref Range    Cannabinoids (30-zos-2-carboxy-9-THC) Not Detected Not Detected, Indeterminate    Phencyclidine Not Detected Not Detected, Indeterminate    Cocaine (Benzoylecgonine) Not Detected Not Detected, Indeterminate    Methamphetamine (d-Methamphetamine) Not Detected Not Detected, Indeterminate    Opiates (Morphine) Not Detected Not Detected, Indeterminate    Amphetamine (d-Amphetamine) Detected (A) Not Detected, Indeterminate    Benzodiazepines (Nordiazepam) Not Detected Not Detected, Indeterminate    Tricyclic Antidepressants (Desipramine) Not Detected Not Detected, Indeterminate    Methadone Not Detected Not Detected, Indeterminate    Barbiturates (Butalbital) Not Detected Not Detected, Indeterminate    Oxycodone Not Detected  Not Detected, Indeterminate    Propoxyphene (Norpropoxyphene) Not Detected Not Detected, Indeterminate    Buprenorphine Detected (A) Not Detected, Indeterminate     *Note: Due to a large number of results and/or encounters for the requested time period, some results have not been displayed. A complete set of results can be found in Results Review.       Assessment and Plan     Was naloxone nasal spray prescribed? Yes.  Prior.  1. Opioid use disorder (H)  Doing well overall with MAT.  Desires to continue.  Controlled substance contract signed.  Copy given to patient.  - Basic metabolic panel  - CBC with platelets  - Hepatic function panel  - Hepatitis B Surface Antibody  - Hepatitis B surface antigen  - Hepatitis C Screen Reflex to HCV RNA Quant and Genotype  - HIV Antigen Antibody Combo  - Urine Drugs of Abuse Screen Panel 13  - buprenorphine-naloxone (SUBOXONE) 8-2 MG SUBL sublingual tablet; Place 1 tablet under the tongue 2 times daily  Dispense: 56 tablet; Refill: 0    2. Uncomplicated opioid dependence (H)    3. Chronic low back pain with sciatica, sciatica laterality unspecified, unspecified back pain laterality  Defer to PCP    Dosage will not need adjustment today.  Continue at 8 mg  2 time(s) a day of  buprenorphine/naloxone (Suboxone).     Follow up Plan  Stage 3 (4 weeks): Please make a clinic appointment for follow up with me (CHANDA HARRISON) or another Suboxone provider in 1 month for suboxone follow up.    Greater than 15 minutes was spent with this patient, over half of which was on counseling and coordination of care which includes importance of recovery activities,which may include  attendance at NA/AA meetings, sober support network, and the importance of not isolating, being open, honest, and willing to change, possible triggers and how to avoid them, and managing cravings.    There are no discontinued medications.    Options for treatment and follow-up care were reviewed with the patient. Shoaib  engaged in the decision making process and verbalized understanding of the options discussed and agreed with the final plan.    I saw and examined this patient.  I have read through the note and made appropriate changes and agree with the RN Care Coordinator's assessment and plan.     Allison Evans MD

## 2021-08-13 NOTE — PROGRESS NOTES
Clinic Care Coordination Contact  Care Team Conversations    CC attended office visit with pt and Dr. Heath this date.   Please refer to Dr. Heath's note for plan of care.  Suboxone program went over in detail with pt.  Informed consent and treatment agreement signed.  Electronic communication form signed.  Narcan education provided.  Dicussed risks/benefits.  Risks of benzos/ETOH use discussed.  All questions answered.  Encouraged him to call/text CC with any questions/concerns/problems.  Verbalized understanding.    Mary Jones RN-BSN, [facility] Care Coordinator  [phone] opt. #3

## 2021-08-13 NOTE — TELEPHONE ENCOUNTER
Pt is calling.    See mychart message from today.    Returning a call. Possibly regarding a referral.  I advised him that he is advised to schedule a follow up appointment with Diana Jackman CNP in 1 month. She would like to see you then.  He verbalized understanding and stated that he would call back to schedule.    Geeta Mac RN  [facility] Nurse Advisor  8/13/2021 at 5:10 PM

## 2021-08-13 NOTE — NURSING NOTE
"Chief Complaint   Patient presents with     Recheck Medication       Initial /68 (BP Location: Right arm, Patient Position: Chair, Cuff Size: Adult Large)   Pulse 73   Temp 96.9  F (36.1  C) (Tympanic)   Resp 16   Ht 1.702 m (5' 7\")   Wt 92.9 kg (204 lb 12.8 oz)   SpO2 97%   BMI 32.08 kg/m   Estimated body mass index is 32.08 kg/m  as calculated from the following:    Height as of this encounter: 1.702 m (5' 7\").    Weight as of this encounter: 92.9 kg (204 lb 12.8 oz).  Medication Reconciliation: complete  Pamela M. Lechevalier, LPN    "

## 2021-08-14 LAB
C TRACH DNA SPEC QL PROBE+SIG AMP: NEGATIVE
N GONORRHOEA DNA SPEC QL NAA+PROBE: NEGATIVE

## 2021-08-14 ASSESSMENT — ANXIETY QUESTIONNAIRES: GAD7 TOTAL SCORE: 6

## 2021-08-16 LAB
HBV SURFACE AB SERPL IA-ACNC: 7.54 M[IU]/ML
HBV SURFACE AG SERPL QL IA: NONREACTIVE
HCV AB SERPL QL IA: NONREACTIVE
HIV 1+2 AB+HIV1 P24 AG SERPL QL IA: NONREACTIVE

## 2021-08-17 ENCOUNTER — MYC MEDICAL ADVICE (OUTPATIENT)
Dept: FAMILY MEDICINE | Facility: OTHER | Age: 36
End: 2021-08-17

## 2021-08-18 ENCOUNTER — ALLIED HEALTH/NURSE VISIT (OUTPATIENT)
Dept: BEHAVIORAL HEALTH | Facility: CLINIC | Age: 36
End: 2021-08-18

## 2021-08-18 DIAGNOSIS — R69 DIAGNOSIS DEFERRED: Primary | ICD-10-CM

## 2021-08-18 NOTE — PROGRESS NOTES
Behavioral Health Home Services  [facility] [place]        Social Work Care Navigator Note      Patient: Hoang Kiser  Date: August 18, 2021  Preferred Name: Shoaib    Previous PHQ-9:   PHQ-9 SCORE 3/16/2021 4/5/2021 8/13/2021   PHQ-9 Total Score - - -   PHQ-9 Total Score MyChart - - -   PHQ-9 Total Score 10 14 5   Some encounter information is confidential and restricted. Go to Review Flowsheets activity to see all data.     Previous JIN-7:   JIN-7 SCORE 2/19/2021 3/16/2021 8/13/2021   Total Score - - -   Total Score 17 (severe anxiety) - -   Total Score 17 13 6   Some encounter information is confidential and restricted. Go to Review Flowsheets activity to see all data.     MOLLY LEVEL:  MOLLY Score (Last Two) 12/10/2020 8/13/2021   MOLLY Raw Score 31 35   Activation Score 59.3 72.1   MOLLY Level 3 3       Preferred Contact:  Need for : No  Preferred Contact: Cell      Type of Contact Today: Phone call (patient / identified key support person reached)      Data: (subjective / Objective):  Recent ED/IP Admission or Discharge?   None    Patient Goals:  Goal Areas: Health;Mental Health;Chemical Health;Employment / Volunteer;Financial and Social Service Benefits  Patient stated goals: Patient would like to look into going back to school.Patient would like to start EMDR therapy. He has an appointment scheduled for July. Patient continues to try and work on self- advocacy skills. Patient continues to work on his coping and stress management skills by taking walks, playing guitar and making comfort boards. Patient would like to look into working with a new addiction medicine and pain management provider.         [facility] Core Service Provided:  Care Coordination: provided care management services/referrals necessary to ensure patient and their identified supports have access to medical, behavioral health, pharmacology and recovery support services.  Ensured that patient's care is integrated across all settings and  services.     Current Stressors / Issues / Care Plan Objective Addressed Today:  SWCC contacted patient for check in. SWCC advised that it appears patient moved to [facility]. Patient confirmed he did move to [facility] and is living with his parents. Patient just got a job at [employer]. He has contacted an agency that will help him find subsidized housing. Patient's CADI case won't transfer to [facility] until November.   Patient was able to get established with a new PCP. He is also looking for an internal medicine provider. CC advised that writer will let the [facility] SWCC at the [facility] know that patient transferred so that he can transfer to a closer .    Intervention:  Motivational Interviewing: Supported Autonomy, Collaboration, Evocation and Permission to raise concern or advise   Target Behavior(s): Explored current social supports and reinforced opportunities to increase engagement    Assessment: (Progress on Goals / Homework):  Patient would benefit from continued coordination in reaching their goals set for the [facility] [place] ([facility]) program. SWCC reviewed Health Action Plan goals and will continue to monitor progress and work with patient and their care team.    Plan: (Homework, other):  Patient was encouraged to continue to seek condition-related information and education.      Janet Ng LGSW Behavioral Health Home ([facility])   [facility]  [phone]            Next 5 appointments (look out 90 days)    Sep 10, 2021  3:00 PM  (Arrive by 2:45 PM)  SHORT with Allison Heath MD  [facility] [place] ([facility] [place] ) [address]  [place] [address]  [phone]

## 2021-08-18 NOTE — Clinical Note
Hello,    This was a [facility] patient of mine at [place] but he has now moved to [facility] so I am looking to transfer his case to you. Are you able to take on another patient? If so I will go ahead and remove myself from his care team. Please let me know if you have any questions.     Thank you,    Janet Ng LGSW Behavioral Health Home ([facility])   [facility]  [phone]

## 2021-08-23 ENCOUNTER — TELEPHONE (OUTPATIENT)
Dept: BEHAVIORAL HEALTH | Facility: OTHER | Age: 36
End: 2021-08-23

## 2021-08-23 NOTE — TELEPHONE ENCOUNTER
Received referral for possible [facility] services from   [facility] [place]  ([facility])    [facility] that patient has moved to this area and is interested in enrolling in [facility] services.    Attempted to reach patient, but was unsuccessful.  Plan to attempt again.       COMPA Horton  Social Work Care Coordinator  [facility] [place]   [phone] opt 1

## 2021-08-25 ENCOUNTER — TELEPHONE (OUTPATIENT)
Dept: BEHAVIORAL HEALTH | Facility: OTHER | Age: 36
End: 2021-08-25

## 2021-08-25 NOTE — TELEPHONE ENCOUNTER
Received referral for possible [facility] services from   [facility]  ([facility])    [facility] that patient has moved to this area and is interested in enrolling in [facility] services.   Second attempt to reach patient, but was unsuccessful.          COMPA Horton  Social Work Care Coordinator  [facility]al [facility] Home   [phone] opt 1

## 2021-08-28 ENCOUNTER — HOSPITAL ENCOUNTER (EMERGENCY)
Facility: HOSPITAL | Age: 36
Discharge: HOME | End: 2021-08-28
Attending: EMERGENCY MEDICINE | Admitting: EMERGENCY MEDICINE
Payer: COMMERCIAL

## 2021-08-28 ENCOUNTER — APPOINTMENT (OUTPATIENT)
Dept: GENERAL RADIOLOGY | Facility: HOSPITAL | Age: 36
End: 2021-08-28
Attending: EMERGENCY MEDICINE
Payer: COMMERCIAL

## 2021-08-28 VITALS
TEMPERATURE: 98.8 F | HEART RATE: 73 BPM | RESPIRATION RATE: 15 BRPM | OXYGEN SATURATION: 98 % | SYSTOLIC BLOOD PRESSURE: 117 MMHG | DIASTOLIC BLOOD PRESSURE: 78 MMHG

## 2021-08-28 DIAGNOSIS — R07.89 CHEST WALL PAIN: ICD-10-CM

## 2021-08-28 LAB
ALBUMIN SERPL-MCNC: 3.6 G/DL (ref 3.4–5)
ALP SERPL-CCNC: 90 U/L (ref 40–150)
ALT SERPL W P-5'-P-CCNC: 59 U/L (ref 0–70)
ANION GAP SERPL CALCULATED.3IONS-SCNC: 5 MMOL/L (ref 3–14)
AST SERPL W P-5'-P-CCNC: 31 U/L (ref 0–45)
BASOPHILS # BLD AUTO: 0 10E3/UL (ref 0–0.2)
BASOPHILS NFR BLD AUTO: 1 %
BILIRUB SERPL-MCNC: 0.2 MG/DL (ref 0.2–1.3)
BUN SERPL-MCNC: 10 MG/DL (ref 7–30)
CALCIUM SERPL-MCNC: 8.8 MG/DL (ref 8.5–10.1)
CHLORIDE BLD-SCNC: 103 MMOL/L (ref 94–109)
CO2 SERPL-SCNC: 29 MMOL/L (ref 20–32)
CREAT SERPL-MCNC: 0.99 MG/DL (ref 0.66–1.25)
D DIMER PPP FEU-MCNC: <0.3 UG/ML FEU (ref 0–0.5)
EOSINOPHIL # BLD AUTO: 0.2 10E3/UL (ref 0–0.7)
EOSINOPHIL NFR BLD AUTO: 4 %
ERYTHROCYTE [DISTWIDTH] IN BLOOD BY AUTOMATED COUNT: 13.4 % (ref 10–15)
GFR SERPL CREATININE-BSD FRML MDRD: >90 ML/MIN/1.73M2
GLUCOSE BLD-MCNC: 96 MG/DL (ref 70–99)
HCT VFR BLD AUTO: 40.5 % (ref 40–53)
HGB BLD-MCNC: 14.2 G/DL (ref 13.3–17.7)
IMM GRANULOCYTES # BLD: 0 10E3/UL
IMM GRANULOCYTES NFR BLD: 0 %
INR PPP: 1.01 (ref 0.85–1.15)
LACTATE SERPL-SCNC: 1.7 MMOL/L (ref 0.7–2)
LIPASE SERPL-CCNC: 56 U/L (ref 73–393)
LYMPHOCYTES # BLD AUTO: 1.8 10E3/UL (ref 0.8–5.3)
LYMPHOCYTES NFR BLD AUTO: 37 %
MCH RBC QN AUTO: 30 PG (ref 26.5–33)
MCHC RBC AUTO-ENTMCNC: 35.1 G/DL (ref 31.5–36.5)
MCV RBC AUTO: 86 FL (ref 78–100)
MONOCYTES # BLD AUTO: 0.6 10E3/UL (ref 0–1.3)
MONOCYTES NFR BLD AUTO: 12 %
NEUTROPHILS # BLD AUTO: 2.2 10E3/UL (ref 1.6–8.3)
NEUTROPHILS NFR BLD AUTO: 46 %
NRBC # BLD AUTO: 0 10E3/UL
NRBC BLD AUTO-RTO: 0 /100
PLATELET # BLD AUTO: 279 10E3/UL (ref 150–450)
POTASSIUM BLD-SCNC: 3.4 MMOL/L (ref 3.4–5.3)
PROT SERPL-MCNC: 7.4 G/DL (ref 6.8–8.8)
RBC # BLD AUTO: 4.73 10E6/UL (ref 4.4–5.9)
SODIUM SERPL-SCNC: 137 MMOL/L (ref 133–144)
TROPONIN I SERPL-MCNC: <0.015 UG/L (ref 0–0.04)
WBC # BLD AUTO: 4.9 10E3/UL (ref 4–11)

## 2021-08-28 PROCEDURE — 99285 EMERGENCY DEPT VISIT HI MDM: CPT | Mod: 25

## 2021-08-28 PROCEDURE — 85025 COMPLETE CBC W/AUTO DIFF WBC: CPT | Performed by: EMERGENCY MEDICINE

## 2021-08-28 PROCEDURE — 84484 ASSAY OF TROPONIN QUANT: CPT | Performed by: EMERGENCY MEDICINE

## 2021-08-28 PROCEDURE — 85610 PROTHROMBIN TIME: CPT | Performed by: EMERGENCY MEDICINE

## 2021-08-28 PROCEDURE — 36415 COLL VENOUS BLD VENIPUNCTURE: CPT | Performed by: EMERGENCY MEDICINE

## 2021-08-28 PROCEDURE — 71045 X-RAY EXAM CHEST 1 VIEW: CPT

## 2021-08-28 PROCEDURE — 99284 EMERGENCY DEPT VISIT MOD MDM: CPT | Performed by: EMERGENCY MEDICINE

## 2021-08-28 PROCEDURE — 85379 FIBRIN DEGRADATION QUANT: CPT | Performed by: EMERGENCY MEDICINE

## 2021-08-28 PROCEDURE — 83690 ASSAY OF LIPASE: CPT | Performed by: EMERGENCY MEDICINE

## 2021-08-28 PROCEDURE — 93005 ELECTROCARDIOGRAM TRACING: CPT

## 2021-08-28 PROCEDURE — 82040 ASSAY OF SERUM ALBUMIN: CPT | Performed by: EMERGENCY MEDICINE

## 2021-08-28 PROCEDURE — 83605 ASSAY OF LACTIC ACID: CPT | Performed by: EMERGENCY MEDICINE

## 2021-08-28 PROCEDURE — 93010 ELECTROCARDIOGRAM REPORT: CPT | Performed by: INTERNAL MEDICINE

## 2021-08-28 PROCEDURE — 250N000013 HC RX MED GY IP 250 OP 250 PS 637: Performed by: EMERGENCY MEDICINE

## 2021-08-28 RX ORDER — ACETAMINOPHEN 325 MG/1
650 TABLET ORAL ONCE
Status: COMPLETED | OUTPATIENT
Start: 2021-08-28 | End: 2021-08-28

## 2021-08-28 RX ADMIN — ACETAMINOPHEN 650 MG: 325 TABLET, FILM COATED ORAL at 20:27

## 2021-08-28 ASSESSMENT — ENCOUNTER SYMPTOMS
MYALGIAS: 0
ABDOMINAL PAIN: 1
SHORTNESS OF BREATH: 0
PSYCHIATRIC NEGATIVE: 1
NECK STIFFNESS: 0
CONSTITUTIONAL NEGATIVE: 1
EYES NEGATIVE: 1
MUSCULOSKELETAL NEGATIVE: 1
ALLERGIC/IMMUNOLOGIC NEGATIVE: 1
NECK PAIN: 0
ENDOCRINE NEGATIVE: 1
NEUROLOGICAL NEGATIVE: 1
CHILLS: 0
HEMATOLOGIC/LYMPHATIC NEGATIVE: 1
FEVER: 0

## 2021-08-28 NOTE — ED NOTES
"Pt arrives ambulatory with c/o a few days of pain between shoulder blades, worse with moving and palpation. Some front chest wall pain. Pt also c/o his brain feeling \"slow.\" Denies any recent illness or cough. Denies f/c, n/v/d.  "

## 2021-08-28 NOTE — ED PROVIDER NOTES
History     Chief Complaint   Patient presents with     Chest Wall Pain     c/o chest feels inflammed. notes muscles spasms b/t shoulder blades. notes symptoms for the past couple of days.      HPI  Hoang Kiser is a 36 year old male who presents today with complaints of chest wall pain.  Patient states that he has had tenderness over his anterior chest wall region and spasms between his shoulders for the last few days.  Symptoms gradually worsening prompting ER visits.  Patient denies shortness of breath.  No recent trauma.  Has otherwise been at baseline state of health.    Allergies:  Allergies   Allergen Reactions     Amitriptyline      Ineffective in reducing spasms/movement, increased fatigue  Other reaction(s): Other (see comments)     Buspirone Hcl Other (See Comments)     Panic attacks     Doxycycline Diarrhea, Fatigue, GI Disturbance and Nausea     Other reaction(s): GI intolerance     Cephalexin Diarrhea     Other reaction(s): GI intolerance       Problem List:    Patient Active Problem List    Diagnosis Date Noted     Chronic pain syndrome 2019     Priority: High     Suicidal ideation 2021     Priority: Medium     Movement disorder 2020     Priority: Medium     Fatigue, unspecified type 2020     Priority: Medium     Generalized social phobia 2019     Priority: Medium     Stimulant dependence (H) 2019     Priority: Medium     Chronic post-traumatic stress disorder (PTSD) 2019     Priority: Medium     Nonallergic rhinitis 2019     Priority: Medium     History of electroencephalogram 04/10/2019     Priority: Medium     EEG         Procedure Date: 2019      EEG #:  .        DATE OF EE2019.      SOURCE FILE DURATION:  15 minutes.  This EEG did not have a video.      PATIENT INFORMATION:  The patient is a 33-year-old male with irregular movements.  It is not entirely clear the etiology of these movements.  EEG is being done to  evaluate for seizures.      MEDICATIONS:  Adderall, doxepin, Cymbalta, Robaxin.      TECHNICAL SUMMARY: This video EEG monitoring procedure was performed with 23 scalp electrodes in 10-20 system placements, and additional scalp, precordial and other surface electrodes used for electrical referencing and artifact detection. Video was reviewed intermittently by EEG technologist and physician for electroclinical seizures.      BACKGROUND ACTIVITY:  During wakefulness, the background activity consists of synchronous and symmetric, well modulated, 9-10 Hz posterior dominant rhythm. The posterior dominant rhythm attenuated with eye opening. During drowsiness, the background activity waxed and waned and there were periods of slowing and attenuation of the posterior alpha rhythm. Stage I sleep and Stage II sleep was recorded in which synchronous and symmetrical vertex waves , K-complexes and sleep spindles  were identified.  No focal abnormalities were observed. Throughout the recording, the patient had a lot of movement artifact and blinking.  That made it difficult to interpret many segments of the EEG.  He was awake for much of the record.  Towards the end, he did become drowsy at which point there was intermittent slowing in the left and right temporal region.  Of note, as the patient became drowsy, the frequency of his movements decreased and this was best seen at 09:39.  In drowsiness, sometimes, for instance at 09:43, he did have large movements followed by upper body movements.      ACTIVATION PROCEDURES:  Photic stimulation and hyperventilation was done with no significant abnormalities.      ICTAL:  The patient did state that photic stimulation aggravated his movement and gave him a feeling of dissociation.      EPILEPTIFORM DISCHARGES:  None.      ICTAL:  None.      IMPRESSION:  This awake and drowsy routine EEG with no video was normal.  The patient had a lot of movements during this EEG and expressed that he  had a feeling of disassociation during photic stimulation.  Of note, as patient became more drowsy the frequency of movements decreased.  However,he continued to have these movements in stage 1 sleep. If clinically indicated, may consider further video EEG to understand if the patient has these movements in sleep.  No electrographic seizures, no epileptiform discharges are seen.  Clinical correlation is advised.         BREA PATEL MD             D: 03/15/2019   T: 2019   MT: buffy      Name:     VLADISLAV LEUNG   MRN:      [id number]        Account:        [id number]   :      1985           Procedure Date: 2019      Document: [id number]           3/18/2019 11:57 AM - Brea Patel MD     Lab and Collection     EEG (Order: [id number]) - 3/18/2019   Result History     EEG (Order #[id number]) on 3/15/2019 - Order Result History Report   Patient Release Status:     This result is not viewable by the patient.          Transcription                []Hide copied text    []Hover for details      Procedure Date: 2019      EEG #:  .        DATE OF EE2019.      SOURCE FILE DURATION:  15 minutes.  This EEG did not have a video.      PATIENT INFORMATION:  The patient is a 33-year-old male with irregular movements.  It is not entirely clear the etiology of these movements.  EEG is being done to evaluate for seizures.      MEDICATIONS:  Adderall, doxepin, Cymbalta, Robaxin.      TECHNICAL SUMMARY: This video EEG monitoring procedure was performed with 23 scalp electrodes in 10-20 system placements, and additional scalp, precordial and other surface electrodes used for electrical referencing and artifact detection. Video was reviewed intermittently by EEG technologist and physician for electroclinical seizures.      BACKGROUND ACTIVITY:  During wakefulness, the background activity consists of synchronous and symmetric, well modulated, 9-10 Hz posterior dominant rhythm. The posterior  dominant rhythm attenuated with eye opening. During drowsiness, the background activity waxed and waned and there were periods of slowing and attenuation of the posterior alpha rhythm. Stage I sleep and Stage II sleep was recorded in which synchronous and symmetrical vertex waves , K-complexes and sleep spindles  were identified.  No focal abnormalities were observed. Throughout the recording, the patient had a lot of movement artifact and blinking.  That made it difficult to interpret many segments of the EEG.  He was awake for much of the record.  Towards the end, he did become drowsy at which point there was intermittent slowing in the left and right temporal region.  Of note, as the patient became drowsy, the frequency of his movements decreased and this was best seen at 09:39.  In drowsiness, sometimes, for instance at 09:43, he did have large movements followed by upper body movements.      ACTIVATION PROCEDURES:  Photic stimulation and hyperventilation was done with no significant abnormalities.      ICTAL:  The patient did state that photic stimulation aggravated his movement and gave him a feeling of dissociation.      EPILEPTIFORM DISCHARGES:  None.      ICTAL:  None.      IMPRESSION:  This awake and drowsy routine EEG with no video was normal.  The patient had a lot of movements during this EEG and expressed that he had a feeling of disassociation during photic stimulation.  Of note, as patient became more drowsy the frequency of movements decreased.  However,he continued to have these movements in stage 1 sleep. If clinically indicated, may consider further video EEG to understand if the patient has these movements in sleep.  No electrographic seizures, no epileptiform discharges are seen.  Clinical correlation is advised.         HILARIA PATEL MD             D: 03/15/2019   T: 2019   MT: buffy      Name:     VLADISLAV LEUNG   MRN:      [id number]        Account:        [id number]   :       1985           Procedure Date: 03/13/2019      Document: [id number]               Document Information               Family history of multiple myeloma 02/26/2019     Priority: Medium     History of substance abuse (H) 02/11/2019     Priority: Medium     hx of meth, none since 2015        Family history of Crohn's disease 08/23/2018     Priority: Medium     Functional movement disorder 08/14/2018     Priority: Medium     Chronic left hip pain 04/02/2018     Priority: Medium     Chronic pain of right hip 04/02/2018     Priority: Medium     Degenerative disc disease at L5-S1 level 02/27/2018     Priority: Medium     Anxiety 12/06/2016     Priority: Medium     Posttraumatic stress disorder with dissociative symptoms 09/27/2016     Priority: Medium     Abnormal involuntary movement 06/02/2016     Priority: Medium     Tic disorder 06/02/2016     Priority: Medium     Panic disorder without agoraphobia 04/05/2016     Priority: Medium     Primary insomnia 12/10/2015     Priority: Medium     Attention deficit hyperactivity disorder (ADHD), predominantly inattentive type 10/26/2015     Priority: Medium     Patient is followed by [facility] for ongoing prescription of stimulants.  All refills should be approved by this provider, or covering partner.    Medication(s): adderall XR 25 and adderall XR 10.   Maximum quantity per month: 30 of each  Clinic visit frequency required: Q 6  months     Controlled substance agreement on file: No  Neuropsych evaluation for ADD completed:  Yes, completed 8-5-13, on file and diagnosis confirmed in letters    Last [facility] website verification:  none   [email or web address]           Tobacco abuse 07/10/2015     Priority: Medium     Moderate major depression (H) 05/05/2015     Priority: Medium     on and off medication for depression since 2003       Neuropathy 03/21/2013     Priority: Medium     Chronic low back pain 02/11/2019     Priority: Low        Past Medical History:     Past Medical History:   Diagnosis Date     Arthritis 2/27/2018     Benign positional vertigo 12/2016     Depressive disorder 2003     Dysphonia 2014     Gastroesophageal reflux disease 2004     Hearing problem 2015     History of electroencephalogram 4/10/2019     History of MRI of brain and brain stem 12/11/2015     Hoarseness 2017     Neuralgia, neuritis, and radiculitis, unspecified 04/30/2012     normal emg 2017 8/8/2017     Panic attack 12/6/2016     Seizures (H) 1986     Tinnitus 2015       Past Surgical History:    Past Surgical History:   Procedure Laterality Date     COLONOSCOPY  02/15/18    Has not happened yet.     COLONOSCOPY N/A 2/15/2018    Procedure: COMBINED COLONOSCOPY, SINGLE OR MULTIPLE BIOPSY/POLYPECTOMY BY BIOPSY;;  Surgeon: Liam Kincaid MD;  Location: [facility] OR     COLONOSCOPY WITH CO2 INSUFFLATION N/A 2/15/2018    Procedure: COLONOSCOPY WITH CO2 INSUFFLATION;  COLON-FAMILY HX OF COLON CANCER/ SYPURA;  Surgeon: Liam Kincaid MD;  Location: [facility] OR     HC TOOTH EXTRACTION W/FORCEP Bilateral 2003     PE TUBES  1990       Family History:    Family History   Problem Relation Age of Onset     Lipids Father         hyperlipidemia     Hyperlipidemia Father      Obesity Father      Arthritis Mother      Hyperlipidemia Mother      Depression Mother      Anxiety Disorder Mother      Mental Illness Mother      Obesity Mother      Asthma Brother      Asthma Sister      Depression Other      Hearing Loss Other      Psychotic Disorder Other      Obesity Other      Cerebrovascular Disease Paternal Grandfather      Alzheimer Disease Paternal Grandfather      Depression Brother      Mental Illness Brother         Bipolar     Asthma Brother      Colitis Brother      Skin Cancer Brother      Depression Sister      Asthma Sister      Substance Abuse Sister         Alcohol     Mental Illness Brother         Bipolar     Asthma Brother      Colitis Brother      Asthma Sister      Obesity Sister       Asthma Brother      Obesity Brother      Obesity Maternal Grandmother      Genetic Disorder Maternal Grandmother         Epilepsy     Obesity Paternal Grandmother      Colon Cancer Other      Genetic Disorder Other         Cerebral Palsy     Genetic Disorder Maternal Grandfather         Multiple Sclerosis     Genetic Disorder Other         Epilepsy       Social History:  Marital Status:  Single [1]  Social History     Tobacco Use     Smoking status: Current Every Day Smoker     Packs/day: 0.50     Years: 10.00     Pack years: 5.00     Types: Cigarettes     Start date: 1/1/2002     Smokeless tobacco: Never Used     Tobacco comment: Transdermal patches have helped me the most in the past   Vaping Use     Vaping Use: Former     Substances: Nicotine, Flavoring     Devices: Pre-filled or refillable cartridge   Substance Use Topics     Alcohol use: No     Alcohol/week: 2.0 - 4.0 standard drinks     Comment: rarely     Drug use: Not Currently     Types: Methamphetamines     Comment: hx of meth, 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 12-10-20 = 2.5 months sober        Medications:    ADDERALL XR 30 MG 24 hr capsule  albuterol (PROAIR HFA/PROVENTIL HFA/VENTOLIN HFA) 108 (90 Base) MCG/ACT inhaler  buprenorphine-naloxone (SUBOXONE) 8-2 MG SUBL sublingual tablet  cyclobenzaprine (FLEXERIL) 5 MG tablet  hydrochlorothiazide (HYDRODIURIL) 25 MG tablet  hydrOXYzine (ATARAX) 25 MG tablet  mirtazapine (REMERON) 15 MG tablet  OLANZapine zydis (ZYPREXA) 5 MG ODT  rivaroxaban ANTICOAGULANT (XARELTO ANTICOAGULANT) 20 MG TABS tablet  vilazodone (VIIBRYD) 10 MG TABS tablet  vilazodone (VIIBRYD) 20 MG TABS tablet  [place] Saline Nasal No-Drip GEL  cloNIDine (CATAPRES) 0.1 MG tablet  naloxone (NARCAN) 4 MG/0.1ML nasal spray  nicotine (NICODERM CQ) 7 MG/24HR 24 hr patch  nicotine (NICORETTE) 4 MG lozenge  order for DME  traZODone (DESYREL) 50 MG tablet          Review of Systems   Constitutional: Negative.  Negative for chills and fever.   HENT:  Negative.    Eyes: Negative.    Respiratory: Negative for shortness of breath.    Cardiovascular: Positive for chest pain.   Gastrointestinal: Positive for abdominal pain.   Endocrine: Negative.    Genitourinary: Negative.    Musculoskeletal: Negative.  Negative for myalgias, neck pain and neck stiffness.   Skin: Negative.    Allergic/Immunologic: Negative.    Neurological: Negative.    Hematological: Negative.    Psychiatric/Behavioral: Negative.        Physical Exam   BP: 146/89  Pulse: 84  Temp: (!) 96.5  F (35.8  C)  Resp: 14  SpO2: 99 %      Physical Exam  Constitutional:       General: He is not in acute distress.     Appearance: Normal appearance. He is normal weight. He is not toxic-appearing.   HENT:      Head: Normocephalic and atraumatic.      Mouth/Throat:      Mouth: Mucous membranes are moist.   Eyes:      Extraocular Movements: Extraocular movements intact.      Pupils: Pupils are equal, round, and reactive to light.   Cardiovascular:      Rate and Rhythm: Normal rate and regular rhythm.   Pulmonary:      Effort: Pulmonary effort is normal.      Breath sounds: Normal breath sounds.   Abdominal:      General: Abdomen is flat.      Palpations: Abdomen is soft.   Musculoskeletal:         General: Normal range of motion.      Cervical back: Normal range of motion. No rigidity.   Skin:     General: Skin is warm.      Capillary Refill: Capillary refill takes less than 2 seconds.   Neurological:      General: No focal deficit present.      Mental Status: He is alert.      Gait: Gait normal.      Deep Tendon Reflexes: Reflexes normal.   Psychiatric:         Mood and Affect: Mood normal.         Behavior: Behavior normal.         Thought Content: Thought content normal.         Judgment: Judgment normal.         ED Course        Procedures         EKG -sinus rhythm with intraventricular conduction delay       Results for orders placed or performed during the hospital encounter of 08/28/21 (from the past 24  hour(s))   CBC with platelets differential    Narrative    The following orders were created for panel order CBC with platelets differential.  Procedure                               Abnormality         Status                     ---------                               -----------         ------                     CBC with platelets and d...[[id number]]                      Final result                 Please view results for these tests on the individual orders.   D dimer quantitative   Result Value Ref Range    D-Dimer Quantitative <0.30 0.00 - 0.50 ug/mL FEU    Narrative    This D-dimer assay is intended for use in conjunction with a clinical pretest probability assessment model to exclude pulmonary embolism (PE) and deep venous thrombosis (DVT) in outpatients suspected of PE or DVT. The cut-off value is 0.50 ug/mL FEU.   INR   Result Value Ref Range    INR 1.01 0.85 - 1.15   Comprehensive metabolic panel   Result Value Ref Range    Sodium 137 133 - 144 mmol/L    Potassium 3.4 3.4 - 5.3 mmol/L    Chloride 103 94 - 109 mmol/L    Carbon Dioxide (CO2) 29 20 - 32 mmol/L    Anion Gap 5 3 - 14 mmol/L    Urea Nitrogen 10 7 - 30 mg/dL    Creatinine 0.99 0.66 - 1.25 mg/dL    Calcium 8.8 8.5 - 10.1 mg/dL    Glucose 96 70 - 99 mg/dL    Alkaline Phosphatase 90 40 - 150 U/L    AST 31 0 - 45 U/L    ALT 59 0 - 70 U/L    Protein Total 7.4 6.8 - 8.8 g/dL    Albumin 3.6 3.4 - 5.0 g/dL    Bilirubin Total 0.2 0.2 - 1.3 mg/dL    GFR Estimate >90 >60 mL/min/1.73m2   Lipase   Result Value Ref Range    Lipase 56 (L) 73 - 393 U/L   Lactic acid whole blood   Result Value Ref Range    Lactic Acid 1.7 0.7 - 2.0 mmol/L   Troponin I   Result Value Ref Range    Troponin I <0.015 0.000 - 0.045 ug/L   CBC with platelets and differential   Result Value Ref Range    WBC Count 4.9 4.0 - 11.0 10e3/uL    RBC Count 4.73 4.40 - 5.90 10e6/uL    Hemoglobin 14.2 13.3 - 17.7 g/dL    Hematocrit 40.5 40.0 - 53.0 %    MCV 86 78 - 100 fL    MCH 30.0 26.5 -  33.0 pg    MCHC 35.1 31.5 - 36.5 g/dL    RDW 13.4 10.0 - 15.0 %    Platelet Count 279 150 - 450 10e3/uL    % Neutrophils 46 %    % Lymphocytes 37 %    % Monocytes 12 %    % Eosinophils 4 %    % Basophils 1 %    % Immature Granulocytes 0 %    NRBCs per 100 WBC 0 <1 /100    Absolute Neutrophils 2.2 1.6 - 8.3 10e3/uL    Absolute Lymphocytes 1.8 0.8 - 5.3 10e3/uL    Absolute Monocytes 0.6 0.0 - 1.3 10e3/uL    Absolute Eosinophils 0.2 0.0 - 0.7 10e3/uL    Absolute Basophils 0.0 0.0 - 0.2 10e3/uL    Absolute Immature Granulocytes 0.0 <=0.0 10e3/uL    Absolute NRBCs 0.0 10e3/uL   XR Chest Port 1 View    Narrative    PROCEDURE:  XR CHEST PORT 1 VIEW    HISTORY: 36-year-old with complaints of chest wall pain.  Rule out  pneumothorax. .    COMPARISON:  None.    FINDINGS:    The cardiomediastinal contours are normal.  No focal consolidation, effusion or pneumothorax.      Impression    IMPRESSION:  Negative portable chest.      ADAM IZQUIERDO MD         SYSTEM ID:  [id number]     *Note: Due to a large number of results and/or encounters for the requested time period, some results have not been displayed. A complete set of results can be found in Results Review.       Medications - No data to display    Assessments & Plan (with Medical Decision Making)     36-year-old with complaints of chest wall pain.  Symptoms present for the last few days.  No recent trauma.  On exam pain clearly reproducible with palpation over chest wall.  Patient otherwise has no history of hypertension or coronary artery disease.      Patient had a work-up as above including EKG and labs.  All results largely unremarkable and did not point to acute cause for pain.  Patient treated symptomatically with Tylenol and feeling better. Patient is going to follow-up with primary care doctor in 12 to 24 hours.  Patient understands to return if he develops any new or worsening symptoms.  Etiology of chest pain not clear at this time.    Due to the nature of  this electronic medical record, laboratory results, imaging results, diagnosis, other information and medications reported above may not represent information available to me at the the time of my care and disposition. Medications reported above may have not been ordered by me.     Portions of the record may have been created with voice recognition software. Occasional wrong-word or 'sound-a- like' substitution may have occurred due to the inherent limitations of voice recognition software. Though the chart has been reviewed, there may be inadvertent transcription errors. Read the chart carefully and recognize, using context, where substitutions have occurred.       New Prescriptions    No medications on file       Final diagnoses:   Chest wall pain       8/28/2021   [facility]     Luz Elena Jane MD  08/31/21 0427

## 2021-08-29 NOTE — ED NOTES
Pt reports that he is feeling better. Pain has almost subsided. He also verbalizes that he feels like he needs to follow up with his primary care doctor regarding his regular medications and having them reviewed again. He mentions that he feels like he might like to switch from his Suboxone back to an opioid. He is comfortable waiting to visit with his primary regarding review of his medications.

## 2021-08-29 NOTE — DISCHARGE INSTRUCTIONS
1) Follow the aftercare instructions provided  2) You have been given a prescription for a medication that can cause an allergic reactions. Return to the ER if you develop any itching, tongue swelling, wheezing or shortness of breath.  3) You must follow up with your doctor in 12 to 24 hours or return to the ER for a recheck.

## 2021-09-01 ENCOUNTER — TELEPHONE (OUTPATIENT)
Dept: BEHAVIORAL HEALTH | Facility: CLINIC | Age: 36
End: 2021-09-01

## 2021-09-01 NOTE — TELEPHONE ENCOUNTER
[facility]  [facility]: [place]        Social Work Care Navigator Note      Patient: Hoang Kiser  Date: September 1, 2021  Preferred Name: Shoaib    Previous PHQ-9:   PHQ-9 SCORE 3/16/2021 4/5/2021 8/13/2021   PHQ-9 Total Score - - -   PHQ-9 Total Score MyChart - - -   PHQ-9 Total Score 10 14 5   Some encounter information is confidential and restricted. Go to Review Flowsheets activity to see all data.     Previous JIN-7:   JIN-7 SCORE 2/19/2021 3/16/2021 8/13/2021   Total Score - - -   Total Score 17 (severe anxiety) - -   Total Score 17 13 6   Some encounter information is confidential and restricted. Go to Review Flowsheets activity to see all data.     MOLLY LEVEL:  MOLLY Score (Last Two) 12/10/2020 8/13/2021   MOLLY Raw Score 31 35   Activation Score 59.3 72.1   MOLLY Level 3 3       Preferred Contact:  Need for : No  Preferred Contact: Cell      Type of Contact Today: Phone call (not reached/unavailable)      Data: (subjective / Objective):  [facility] left message for patient letting him know that [facility] has attempted to contact patient to set up services. [facility] advised patient to call Danae ([facility]) directly if he would like to continue [facility] services.     Janet Ng LGSW [facility] ([facility])   [facility]  [phone]        Next 5 appointments (look out 90 days)    Sep 10, 2021  3:00 PM  (Arrive by 2:45 PM)  SHORT with Allison Heath MD  [facility] [place] ([facility] [place] ) [address]  [place] [address]  [phone]

## 2021-09-09 NOTE — PROGRESS NOTES
Follow-up Buprenorphine Visit           HPI       Hoang Kiser is here for f/u on buprenophine/naloxone (Suboxone) therapy for opioid use disorder.  Recheck Medication      Shoaib is currently taking 8/2 mg of Buprenorphine/Naloxone 2 times daily.     Status since last visit:    Since last visit patient has been: doing well.     Intensity:     There has been: no craving.      Suboxone Dose: adequate.      When did you take your last dose? today  Progression of Symptoms:     Cues to use and relapse None    Recovery program has been: active.   Accompanying Signs & Symptoms:    Side Effects: none.    Sobriety:     Status: no use since last visit.      Drug Screen: obtained.    Precipitating factors:    Triggers have been: non-existent.   Alleviating factors:    Contact with sponsor has been: no sponsor.     Family and support system has been: helpful.   Other Therapies Tried :     Patient has been going to recovery meetings:not at all.      Prior NA    Other medical concerns: No    Any ED visits?  YES 8.28.21 - chest wall pain.  See note.    Working at [employer] [place].    Enjoys music, art.      History   Smoking Status     Current Every Day Smoker     Packs/day: 0.50     Years: 10.00     Types: Cigarettes     Start date: 1/1/2002   Smokeless Tobacco     Never Used     Comment: Transdermal patches have helped me the most in the past       Problem, Medication and Allergy Lists were reviewed and updated if needed.  reviewed and are current..    Pharmacy Database reviewed? Yes, 9.10.21, as expected.  Last fill 8.13.21 #56.    Patient is an established patient of this clinic..         Review of Systems:   Review of Systems  CONSTITUTIONAL: NEGATIVE for fever, chills, change in weight  INTEGUMENTARY/SKIN: NEGATIVE for worrisome rashes, moles or lesions  EYES: NEGATIVE for vision changes or irritation  ENT/MOUTH: NEGATIVE for ear, mouth and throat problems  RESP: NEGATIVE for significant cough or SOB  CV:  "NEGATIVE for chest pain, palpitations or peripheral edema  GI: NEGATIVE for nausea, abdominal pain, heartburn, or change in bowel habits  : NEGATIVE for frequency, dysuria, or hematuria  MUSCULOSKELETAL: NEGATIVE for significant arthralgias or myalgia  NEURO: NEGATIVE for weakness, dizziness or paresthesias  ENDOCRINE: NEGATIVE for temperature intolerance, skin/hair changes  PSYCHIATRIC: NEGATIVE for changes in mood or affect          Physical Exam:     Vitals:    09/10/21 1450   BP: 122/72   BP Location: Right arm   Patient Position: Sitting   Cuff Size: Adult Regular   Pulse: 90   Temp: 98.9  F (37.2  C)   TempSrc: Tympanic   SpO2: 96%   Weight: 93.9 kg (207 lb)   Height: 1.702 m (5' 7\")     Body mass index is 32.42 kg/m .  Vitals were reviewed   Physical Exam  GENERAL APPEARANCE: alert, comfortable appearing  EYES: Eyes grossly normal to inspection  HENT:  nose no rhinorrhea  RESP: lungs clear to auscultation - no rales, rhonchi or wheezes and no resp distress  CV: regular rates and rhythm, normal S1 S2,no murmur   ABDOMEN:soft, non-tender, non-distended, no hepatosplenomegaly or masses  SKIN: no rashes, no jaundice, no obvious masses.   NEURO:  no tremor  MENTAL STATUS EXAM:  Appearance/Behavior:No apparent distress  Speech: Normal  Mood/Affect: flat; his speech is very slow, deliberate; minimal eye contact  Insight: Fair      Results:    Results from the last 24 hours  Results for orders placed or performed in visit on 09/10/21 (from the past 24 hour(s))   Urine Drugs of Abuse Screen (Tox13)   Result Value Ref Range    Cannabinoids (11-tan-0-carboxy-9-THC) Not Detected Not Detected, Indeterminate    Phencyclidine Not Detected Not Detected, Indeterminate    Cocaine (Benzoylecgonine) Not Detected Not Detected, Indeterminate    Methamphetamine (d-Methamphetamine) Not Detected Not Detected, Indeterminate    Opiates (Morphine) Not Detected Not Detected, Indeterminate    Amphetamine (d-Amphetamine) Detected (A) Not " Detected, Indeterminate    Benzodiazepines (Nordiazepam) Not Detected Not Detected, Indeterminate    Tricyclic Antidepressants (Desipramine) Not Detected Not Detected, Indeterminate    Methadone Not Detected Not Detected, Indeterminate    Barbiturates (Butalbital) Not Detected Not Detected, Indeterminate    Oxycodone Not Detected Not Detected, Indeterminate    Propoxyphene (Norpropoxyphene) Not Detected Not Detected, Indeterminate    Buprenorphine Detected (A) Not Detected, Indeterminate     *Note: Due to a large number of results and/or encounters for the requested time period, some results have not been displayed. A complete set of results can be found in Results Review.       Assessment and Plan     Was naloxone nasal spray prescribed? Yes - prior.  (F11.99) Opioid use disorder (H)  (primary encounter diagnosis)  Comment: stable.    Patient comments that he wants to go back to opiates at some point, as Suboxone doesn't help as much with pain.   Discussed that we can do a pain clinic referral at any time, but would not be changing him to opiates.  Plan: Urine Drugs of Abuse Screen (Tox13),         buprenorphine-naloxone (SUBOXONE) 8-2 MG SUBL         sublingual tablet            (Z23) Need for vaccination  Comment: vaccine updated  Plan: HEPATITIS B VACCINE, ADULT, IM              Dosage will not need adjustment today.  Continue at 8 mg  2 time(s) a day of  buprenorphine/naloxone (Suboxone).     Follow up Plan  Stage 3 (4 weeks): Please make a clinic appointment for follow up with me (CHANDA HARRISON) or another Suboxone provider in 1 month for suboxone follow up.    Greater than 10 minutes was spent with this patient, over half of which was on counseling and coordination of care which includes importance of recovery activities,which may include  attendance at NA/AA meetings, sober support network, and the importance of not isolating, being open, honest, and willing to change, possible triggers and how to avoid  them, and managing cravings.    There are no discontinued medications.    Options for treatment and follow-up care were reviewed with the patient. Shoaib engaged in the decision making process and verbalized understanding of the options discussed and agreed with the final plan.    I saw and examined this patient.  I have read through the note and made appropriate changes and agree with the RN Care Coordinator's assessment and plan.     Allison Evans MD

## 2021-09-10 ENCOUNTER — MYC MEDICAL ADVICE (OUTPATIENT)
Dept: FAMILY MEDICINE | Facility: OTHER | Age: 36
End: 2021-09-10

## 2021-09-10 ENCOUNTER — OFFICE VISIT (OUTPATIENT)
Dept: FAMILY MEDICINE | Facility: OTHER | Age: 36
End: 2021-09-10
Attending: FAMILY MEDICINE
Payer: COMMERCIAL

## 2021-09-10 ENCOUNTER — APPOINTMENT (OUTPATIENT)
Dept: LAB | Facility: OTHER | Age: 36
End: 2021-09-10
Attending: FAMILY MEDICINE
Payer: COMMERCIAL

## 2021-09-10 ENCOUNTER — PATIENT OUTREACH (OUTPATIENT)
Dept: CARE COORDINATION | Facility: OTHER | Age: 36
End: 2021-09-10

## 2021-09-10 VITALS
DIASTOLIC BLOOD PRESSURE: 72 MMHG | BODY MASS INDEX: 32.49 KG/M2 | TEMPERATURE: 98.9 F | HEIGHT: 67 IN | OXYGEN SATURATION: 96 % | HEART RATE: 90 BPM | SYSTOLIC BLOOD PRESSURE: 122 MMHG | WEIGHT: 207 LBS

## 2021-09-10 DIAGNOSIS — Z23 NEED FOR VACCINATION: ICD-10-CM

## 2021-09-10 DIAGNOSIS — F11.90 OPIOID USE DISORDER: Primary | ICD-10-CM

## 2021-09-10 LAB
AMPHETAMINES UR QL: DETECTED
BARBITURATES UR QL SCN: NOT DETECTED
BENZODIAZ UR QL SCN: NOT DETECTED
BUPRENORPHINE UR QL: DETECTED
CANNABINOIDS UR QL: NOT DETECTED
COCAINE UR QL SCN: NOT DETECTED
D-METHAMPHET UR QL: NOT DETECTED
METHADONE UR QL SCN: NOT DETECTED
OPIATES UR QL SCN: NOT DETECTED
OXYCODONE UR QL SCN: NOT DETECTED
PCP UR QL SCN: NOT DETECTED
PROPOXYPH UR QL: NOT DETECTED
TRICYCLICS UR QL SCN: NOT DETECTED

## 2021-09-10 PROCEDURE — 80306 DRUG TEST PRSMV INSTRMNT: CPT | Mod: ZL | Performed by: FAMILY MEDICINE

## 2021-09-10 PROCEDURE — 99213 OFFICE O/P EST LOW 20 MIN: CPT | Performed by: FAMILY MEDICINE

## 2021-09-10 PROCEDURE — G0463 HOSPITAL OUTPT CLINIC VISIT: HCPCS

## 2021-09-10 PROCEDURE — G0010 ADMIN HEPATITIS B VACCINE: HCPCS

## 2021-09-10 RX ORDER — BUPRENORPHINE HYDROCHLORIDE AND NALOXONE HYDROCHLORIDE DIHYDRATE 8; 2 MG/1; MG/1
1 TABLET SUBLINGUAL 2 TIMES DAILY
Qty: 62 TABLET | Refills: 0 | Status: SHIPPED | OUTPATIENT
Start: 2021-09-10 | End: 2021-10-11

## 2021-09-10 ASSESSMENT — MIFFLIN-ST. JEOR: SCORE: 1827.58

## 2021-09-10 ASSESSMENT — PAIN SCALES - GENERAL: PAINLEVEL: MODERATE PAIN (4)

## 2021-09-10 NOTE — NURSING NOTE
"Chief Complaint   Patient presents with     Recheck Medication       Initial /72 (BP Location: Right arm, Patient Position: Sitting, Cuff Size: Adult Regular)   Pulse 90   Temp 98.9  F (37.2  C) (Tympanic)   Ht 1.702 m (5' 7\")   Wt 93.9 kg (207 lb)   SpO2 96%   BMI 32.42 kg/m   Estimated body mass index is 32.42 kg/m  as calculated from the following:    Height as of this encounter: 1.702 m (5' 7\").    Weight as of this encounter: 93.9 kg (207 lb).  Medication Reconciliation: complete  Sapna Arenas LPN  "

## 2021-09-10 NOTE — PROGRESS NOTES
Clinic Care Coordination Contact  Care Team Conversations    CC attended office visit with pt and Dr. Heath this date.  Please refer to Dr. Heath's note for plan of care.   Currently working at [employer] in [place] in the [employer] [employer] [facility] [facility] [employer].  Enjoying it so far - it is fast paced.  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  Very talented.  He is still wanting to go back on opioids for breakthrough pain.   He will talk to his PCP and ask for referrals.  All questions answered.  Encouraged him to contact CC with any questions/concerns/problems.  Verbalized understanding.    Mary Jones RN-BSN, [facility] Care Coordinator  [phone] opt. #3

## 2021-09-13 NOTE — PROGRESS NOTES
Assessment & Plan   1. Folliculitis  Stop clipping your skin. Clipping with anything, including a clean nail clippers opens your skin up to risk for serious infections. Start mupirocin to folliculitis areas. Keep clean, dry, and covered.   - mupirocin (BACTROBAN) 2 % external ointment; Apply topically 3 times daily for 14 days  Dispense: 30 g; Refill: 0    2. Use of energy drinks  Please consider reducing your intake of energy drinks and caffeine in general.  - Vitamin B12; Future    3. Localized edema  This appears mild, longstanding and unchanged. I am updating labs and switching him hydrochlorothiazide to a short course of lasix. Given his history of DVT and history of drug abuse, I am ordering a follow up Echo and referring to cardiology.   - Basic metabolic panel; Future  - CBC with platelets; Future  - Echocardiogram Complete; Future  - Adult Cardiology Eval Referral  - furosemide (LASIX) 20 MG tablet; Take 1 tablet (20 mg) by mouth daily  Dispense: 30 tablet; Refill: 0  - EKG 12-lead complete w/read - (Clinic Performed)    4. Acute deep vein thrombosis (DVT) of tibial vein of right lower extremity (H)  Virtual visit with Dr. Floyd on 7/1/21 reviewed as well as subsequent [employer] communications. Shoaib is due for a repeat US for follow up on his hx of DVT. I see it in as a future order. It has yet to be completed. I have recommended he have this scheduled. Hx of DVT on 3/3/21 per chart and on xarelto for this. We have discussed that anticoagulants can often be stopped after a time. I will more fully review the original plan of care from his records and his US results when they come through.   - rivaroxaban ANTICOAGULANT (XARELTO ANTICOAGULANT) 20 MG TABS tablet; Take 1 tablet (20 mg) by mouth daily (with dinner)  Dispense: 90 tablet; Refill: 0    9/18/21: After further review 3 months of anticoagulation was indicated. This was continued pending US, which has not been completed. I will reorder this so it is  "sent to me and expedite him getting scheduled given the change in [place] locations.     5. Long term current use of anticoagulant therapy  - rivaroxaban ANTICOAGULANT (XARELTO ANTICOAGULANT) 20 MG TABS tablet; Take 1 tablet (20 mg) by mouth daily (with dinner)  Dispense: 90 tablet; Refill: 0      Ordering of each unique test  Prescription drug management           No follow-ups on file.    Diana Jackman, CNP  [facility] - RANJEET Cramer is a 36 year old who presents for the following health issues     HPI     Concern - foliculitis   Onset: Ongoing   Description: red, raised. Clips the follicle with nail clippers.   Intensity: moderate  Progression of Symptoms:  improving  Accompanying Signs & Symptoms: none  Previous history of similar problem: this has been ongoing  Precipitating factors:        Worsened by: stress  Alleviating factors:        Improved by: none  Therapies tried and outcome: None    Patient would like to discuss flexeril and adderall. Concerns about taking medications together.  - recommended using flexeril sparingly, only as needed and at night.     Review of Systems   Constitutional, HEENT, cardiovascular, pulmonary, gi and gu systems are negative, except as otherwise noted.      Objective    /76 (BP Location: Right arm, Patient Position: Sitting, Cuff Size: Adult Large)   Pulse 88   Temp 97.5  F (36.4  C) (Tympanic)   Resp 20   Ht 1.702 m (5' 7\")   Wt 94.3 kg (208 lb)   SpO2 95%   BMI 32.58 kg/m    Body mass index is 32.58 kg/m .  Physical Exam   GENERAL: healthy, alert and no distress  EYES: Eyes grossly normal to inspection, PERRL and conjunctivae and sclerae normal  RESP: lungs clear to auscultation - no rales, rhonchi or wheezes  CV: regular rate and rhythm, normal S1 S2, no S3 or S4, no murmur, click or rub, no peripheral edema and peripheral pulses strong  SKIN: Extensive areas of folliculitis to upper arms in various stages of healing. A few are " open. No drainage or edema. Surrounding skin is intact.

## 2021-09-14 ENCOUNTER — OFFICE VISIT (OUTPATIENT)
Dept: FAMILY MEDICINE | Facility: OTHER | Age: 36
End: 2021-09-14
Attending: NURSE PRACTITIONER
Payer: COMMERCIAL

## 2021-09-14 VITALS
TEMPERATURE: 97.5 F | SYSTOLIC BLOOD PRESSURE: 118 MMHG | DIASTOLIC BLOOD PRESSURE: 76 MMHG | HEIGHT: 67 IN | BODY MASS INDEX: 32.65 KG/M2 | OXYGEN SATURATION: 95 % | HEART RATE: 88 BPM | RESPIRATION RATE: 20 BRPM | WEIGHT: 208 LBS

## 2021-09-14 DIAGNOSIS — Z79.01 LONG TERM CURRENT USE OF ANTICOAGULANT THERAPY: ICD-10-CM

## 2021-09-14 DIAGNOSIS — I82.441 ACUTE DEEP VEIN THROMBOSIS (DVT) OF TIBIAL VEIN OF RIGHT LOWER EXTREMITY (H): ICD-10-CM

## 2021-09-14 DIAGNOSIS — R60.0 LOCALIZED EDEMA: ICD-10-CM

## 2021-09-14 DIAGNOSIS — Z78.9 USE OF ENERGY DRINKS: ICD-10-CM

## 2021-09-14 DIAGNOSIS — L73.9 FOLLICULITIS: Primary | ICD-10-CM

## 2021-09-14 PROCEDURE — G0463 HOSPITAL OUTPT CLINIC VISIT: HCPCS

## 2021-09-14 PROCEDURE — 99214 OFFICE O/P EST MOD 30 MIN: CPT | Performed by: NURSE PRACTITIONER

## 2021-09-14 PROCEDURE — 93010 ELECTROCARDIOGRAM REPORT: CPT | Performed by: NURSE PRACTITIONER

## 2021-09-14 RX ORDER — MUPIROCIN 20 MG/G
OINTMENT TOPICAL 3 TIMES DAILY
Qty: 30 G | Refills: 0 | Status: SHIPPED | OUTPATIENT
Start: 2021-09-14 | End: 2021-09-28

## 2021-09-14 RX ORDER — FUROSEMIDE 20 MG
20 TABLET ORAL DAILY
Qty: 30 TABLET | Refills: 0 | Status: SHIPPED | OUTPATIENT
Start: 2021-09-14 | End: 2021-10-11

## 2021-09-14 ASSESSMENT — PAIN SCALES - GENERAL: PAINLEVEL: MILD PAIN (3)

## 2021-09-14 ASSESSMENT — MIFFLIN-ST. JEOR: SCORE: 1832.11

## 2021-09-14 NOTE — NURSING NOTE
"Chief Complaint   Patient presents with     Recheck Medication     Other     Folliculitis       Initial /76 (BP Location: Right arm, Patient Position: Sitting, Cuff Size: Adult Large)   Pulse 88   Temp 97.5  F (36.4  C) (Tympanic)   Resp 20   Ht 1.702 m (5' 7\")   Wt 94.3 kg (208 lb)   SpO2 95%   BMI 32.58 kg/m   Estimated body mass index is 32.58 kg/m  as calculated from the following:    Height as of this encounter: 1.702 m (5' 7\").    Weight as of this encounter: 94.3 kg (208 lb).  Medication Reconciliation: complete  Nellie Magana LPN    "

## 2021-09-17 DIAGNOSIS — J98.9 REACTIVE AIRWAY DISEASE THAT IS NOT ASTHMA: ICD-10-CM

## 2021-09-19 NOTE — TELEPHONE ENCOUNTER
Last Written Prescription Date:  8-  Last Fill Quantity: 1,   # refills: 1  Last Office Visit: 9-  Future Office visit:    Next 5 appointments (look out 90 days)    Oct 11, 2021  8:45 AM  (Arrive by 8:30 AM)  SHORT with Allison Heath MD  [facility] ([facility] ) [address]  [place] [address]  [phone]   Oct 14, 2021  9:00 AM  (Arrive by 8:45 AM)  SHORT with Diana Jackman CNP  [facility] ([facility] ) [address]  [facility] [address]  [phone]           Routing refill request to provider for review/approval because:  Drug not on the [facility], UMP or [facility] refill protocol or controlled substance

## 2021-09-20 RX ORDER — ALBUTEROL SULFATE 90 UG/1
AEROSOL, METERED RESPIRATORY (INHALATION)
Qty: 18 G | Refills: 1 | Status: SHIPPED | OUTPATIENT
Start: 2021-09-20 | End: 2021-10-25

## 2021-09-22 ENCOUNTER — NURSE TRIAGE (OUTPATIENT)
Dept: FAMILY MEDICINE | Facility: OTHER | Age: 36
End: 2021-09-22

## 2021-09-22 ENCOUNTER — OFFICE VISIT (OUTPATIENT)
Dept: FAMILY MEDICINE | Facility: OTHER | Age: 36
End: 2021-09-22
Payer: COMMERCIAL

## 2021-09-22 DIAGNOSIS — Z20.822 SUSPECTED COVID-19 VIRUS INFECTION: Primary | ICD-10-CM

## 2021-09-22 DIAGNOSIS — Z20.822 SUSPECTED COVID-19 VIRUS INFECTION: ICD-10-CM

## 2021-09-22 PROCEDURE — U0003 INFECTIOUS AGENT DETECTION BY NUCLEIC ACID (DNA OR RNA); SEVERE ACUTE RESPIRATORY SYNDROME CORONAVIRUS 2 (SARS-COV-2) (CORONAVIRUS DISEASE [COVID-19]), AMPLIFIED PROBE TECHNIQUE, MAKING USE OF HIGH THROUGHPUT TECHNOLOGIES AS DESCRIBED BY CMS-2020-01-R: HCPCS | Mod: ZL

## 2021-09-22 NOTE — TELEPHONE ENCOUNTER
"    Reason for Disposition    [1] COVID-19 infection suspected by caller or triager AND [2] mild symptoms (cough, fever, or others) AND [3] no complications or SOB    Answer Assessment - Initial Assessment Questions  1. COVID-19 DIAGNOSIS: \"Who made your Coronavirus (COVID-19) diagnosis?\" \"Was it confirmed by a positive lab test?\" If not diagnosed by a HCP, ask \"Are there lots of cases (community spread) where you live?\" (See public health department website, if unsure)      no  2. COVID-19 EXPOSURE: \"Was there any known exposure to COVID before the symptoms began?\" CDC Definition of close contact: within 6 feet (2 meters) for a total of 15 minutes or more over a 24-hour period.       no  3. ONSET: \"When did the COVID-19 symptoms start?\"       yesterday  4. WORST SYMPTOM: \"What is your worst symptom?\" (e.g., cough, fever, shortness of breath, muscle aches)      Body aches chills   5. COUGH: \"Do you have a cough?\" If so, ask: \"How bad is the cough?\"        no  6. FEVER: \"Do you have a fever?\" If so, ask: \"What is your temperature, how was it measured, and when did it start?\"      chills  7. RESPIRATORY STATUS: \"Describe your breathing?\" (e.g., shortness of breath, wheezing, unable to speak)       no  8. BETTER-SAME-WORSE: \"Are you getting better, staying the same or getting worse compared to yesterday?\"  If getting worse, ask, \"In what way?\"      same  9. HIGH RISK DISEASE: \"Do you have any chronic medical problems?\" (e.g., asthma, heart or lung disease, weak immune system, obesity, etc.)      asthma  10. PREGNANCY: \"Is there any chance you are pregnant?\" \"When was your last menstrual period?\"        no  11. OTHER SYMPTOMS: \"Do you have any other symptoms?\"  (e.g., chills, fatigue, headache, loss of smell or taste, muscle pain, sore throat; new loss of smell or taste especially support the diagnosis of COVID-19)        Fatigue headache muscle aches    Protocols used: CORONAVIRUS (COVID-19) DIAGNOSED OR [id number] " 3.25

## 2021-09-23 LAB — SARS-COV-2 RNA RESP QL NAA+PROBE: NEGATIVE

## 2021-09-25 ENCOUNTER — HEALTH MAINTENANCE LETTER (OUTPATIENT)
Age: 36
End: 2021-09-25

## 2021-09-29 NOTE — PROGRESS NOTES
"Patient Name: Hoang Kiser      YOB: 1985     Medical Record Number: [id number]  Diagnosis:    Chronic pain  Chronic low back pain       Visit Info Length of Visit: 45  Arrived: On Time   Exercise/Activity Education Instruction:  Postural Training/Anatomy  Pacing/Energy Conservation  Home Program  Self Care  Activity:  Therapeutic Exercise 30':  Aerobic Conditioning (*see \"Strength/Functional Actitivities Record for details of exercises performed)  Tmill walk x 10'  Stretching:  Upper Ext  bilateral, Lower Ext  bilateral  Postural Training:  standing, sitting  Ed and activation of Transverse Abdominus (TA), madi before any dynamic joint movement  Added SLGlut med with emphasis on TA activation first, then movement  Ed on his hip flexor overuse and how this 'shuts down' his TA.  Self manual palpation very helpful for pt awareness.  Performed partial sit up with approp technique     Neuro re-ed 15':  Ed on neutral spine in all positions  Diaphragm breathing ed in supine with self manual cues  Emphasis on sequence of 'breathe, stabilize, move' concept with functional application  Body mechanics ed for supine to/from sit  Self care ed topic #3 with h/o   Notes: Tolerated session well again and pt is actively engaged in his pain PT HEP and self care efforts.  Progressing well toward goals.   Demonstrates/Verbalizes Technique: 3, 4 (1= poor technique-difficulty performing exercises,significant cues required; 5= good technique-performs exercises without cues)  Body Awareness:  3 (1=low awareness; 5=high awareness)  Posture/Stability:   3 (1= poor posture, stability; 5= good posture, stability)   Motivational Level:   Ask questions, Eager to learn and Cooperative Participation:  Full   Patient Satisfaction:  satisfied Response to Teaching:   cooperative, needs reinforcement and asked questions  Factors that affect learning: None   Plan of Care  Recommend to continue PT to support reactivation and " integration of self regulation pain management skills.   Therapist: Faustino Ryan PT                 Date: 3/21/2018        Overweight

## 2021-09-30 ENCOUNTER — TELEPHONE (OUTPATIENT)
Dept: BEHAVIORAL HEALTH | Facility: CLINIC | Age: 36
End: 2021-09-30

## 2021-09-30 NOTE — TELEPHONE ENCOUNTER
DOCUMENTATION ONLY    [facility] updated brief needs flow sheet to reflect discharge of patient from [facility] program. [facility] also removed self from care team.    Janet Ng [facility]  [facility] Home ([facility])   [facility]  [phone]

## 2021-10-06 ENCOUNTER — E-VISIT (OUTPATIENT)
Dept: FAMILY MEDICINE | Facility: OTHER | Age: 36
End: 2021-10-06
Attending: NURSE PRACTITIONER
Payer: COMMERCIAL

## 2021-10-06 DIAGNOSIS — I82.441 ACUTE DEEP VEIN THROMBOSIS (DVT) OF TIBIAL VEIN OF RIGHT LOWER EXTREMITY (H): ICD-10-CM

## 2021-10-06 DIAGNOSIS — Z79.01 LONG TERM CURRENT USE OF ANTICOAGULANT THERAPY: ICD-10-CM

## 2021-10-06 DIAGNOSIS — Z20.822 SUSPECTED COVID-19 VIRUS INFECTION: Primary | ICD-10-CM

## 2021-10-06 RX ORDER — RIVAROXABAN 20 MG/1
TABLET, FILM COATED ORAL
Qty: 30 TABLET | OUTPATIENT
Start: 2021-10-06

## 2021-10-06 NOTE — TELEPHONE ENCOUNTER
Next 5 appointments (look out 90 days)      Oct 11, 2021  8:45 AM  (Arrive by 8:30 AM)  SHORT with Allison Heath MD  [facility] [address] [place] [address]  [phone]     Oct 14, 2021  9:00 AM  (Arrive by 8:45 AM)  SHORT with Diana Jackman CNP  [facility] ([facility] ) [address]  [facility] [address]  [phone]          Requested Prescriptions   Pending Prescriptions Disp Refills    XARELTO ANTICOAGULANT 20 MG TABS tablet [Pharmacy Med Name: XARELTO 20MG TABLETS] 30 tablet      Sig: TAKE 1 TABLET BY MOUTH DAILY WITH DINNER        Direct Oral Anticoagulant Agents Failed - 10/6/2021  3:09 AM        Failed - Creatinine Clearance greater than 50 ml/min on file in past 3 mos     No lab results found.          Passed - Normal Platelets on file in past 12 months       Recent Labs   Lab Test 08/28/21  1844                  Passed - Medication is active on med list        Passed - Serum creatinine less than or equal to 1.4 on file in past 3 mos     Recent Labs   Lab Test 08/28/21  1844   CR 0.99       Ok to refill medication if creatinine is low          Passed - Patient is 18 years of age or older        Passed - Recent (6 mo) or future (30 days) visit within the authorizing provider's specialty

## 2021-10-07 ENCOUNTER — NURSE TRIAGE (OUTPATIENT)
Dept: FAMILY MEDICINE | Facility: OTHER | Age: 36
End: 2021-10-07

## 2021-10-07 DIAGNOSIS — Z20.822 SUSPECTED 2019 NOVEL CORONAVIRUS INFECTION: Primary | ICD-10-CM

## 2021-10-07 NOTE — TELEPHONE ENCOUNTER
"    Reason for Disposition    [1] COVID-19 infection suspected by caller or triager AND [2] mild symptoms (cough, fever, or others) AND [3] negative COVID-19 rapid test    Answer Assessment - Initial Assessment Questions  1. COVID-19 DIAGNOSIS: \"Who made your Coronavirus (COVID-19) diagnosis?\" \"Was it confirmed by a positive lab test?\" If not diagnosed by a HCP, ask \"Are there lots of cases (community spread) where you live?\" (See public health department website, if unsure)      no  2. COVID-19 EXPOSURE: \"Was there any known exposure to COVID before the symptoms began?\" CDC Definition of close contact: within 6 feet (2 meters) for a total of 15 minutes or more over a 24-hour period.       no  3. ONSET: \"When did the COVID-19 symptoms start?\"       2 days ago  4. WORST SYMPTOM: \"What is your worst symptom?\" (e.g., cough, fever, shortness of breath, muscle aches)      Body aches sob   5. COUGH: \"Do you have a cough?\" If Yes, ask: \"How bad is the cough?\"        no  6. FEVER: \"Do you have a fever?\" If Yes, ask: \"What is your temperature, how was it measured, and when did it start?\"      no  7. RESPIRATORY STATUS: \"Describe your breathing?\" (e.g., shortness of breath, wheezing, unable to speak)       wheezing  8. BETTER-SAME-WORSE: \"Are you getting better, staying the same or getting worse compared to yesterday?\"  If getting worse, ask, \"In what way?\"      same  9. HIGH RISK DISEASE: \"Do you have any chronic medical problems?\" (e.g., asthma, heart or lung disease, weak immune system, obesity, etc.)      no  10. PREGNANCY: \"Is there any chance you are pregnant?\" \"When was your last menstrual period?\"        no  11. OTHER SYMPTOMS: \"Do you have any other symptoms?\"  (e.g., chills, fatigue, headache, loss of smell or taste, muscle pain, sore throat; new loss of smell or taste especially support the diagnosis of COVID-19)        Fatigue headache sore throat loss of taste and smell    Protocols used: CORONAVIRUS (COVID-19) " DIAGNOSED OR [id number] 8.25.2021

## 2021-10-08 ENCOUNTER — OFFICE VISIT (OUTPATIENT)
Dept: FAMILY MEDICINE | Facility: OTHER | Age: 36
End: 2021-10-08
Attending: NURSE PRACTITIONER
Payer: COMMERCIAL

## 2021-10-08 DIAGNOSIS — Z20.822 SUSPECTED 2019 NOVEL CORONAVIRUS INFECTION: ICD-10-CM

## 2021-10-08 PROCEDURE — U0003 INFECTIOUS AGENT DETECTION BY NUCLEIC ACID (DNA OR RNA); SEVERE ACUTE RESPIRATORY SYNDROME CORONAVIRUS 2 (SARS-COV-2) (CORONAVIRUS DISEASE [COVID-19]), AMPLIFIED PROBE TECHNIQUE, MAKING USE OF HIGH THROUGHPUT TECHNOLOGIES AS DESCRIBED BY CMS-2020-01-R: HCPCS | Mod: ZL

## 2021-10-09 ENCOUNTER — TELEPHONE (OUTPATIENT)
Dept: FAMILY MEDICINE | Facility: OTHER | Age: 36
End: 2021-10-09

## 2021-10-09 LAB — SARS-COV-2 RNA RESP QL NAA+PROBE: POSITIVE

## 2021-10-10 ENCOUNTER — MYC MEDICAL ADVICE (OUTPATIENT)
Dept: FAMILY MEDICINE | Facility: OTHER | Age: 36
End: 2021-10-10

## 2021-10-10 NOTE — TELEPHONE ENCOUNTER
Coronavirus (COVID-19) Notification    Reason for call  Notify of POSITIVE  COVID-19 lab result, assess symptoms,  review [facility] recommendations    Lab Result   Lab test for 2019-nCoV rRt-PCR or SARS-COV-2 PCR  Oropharyngeal AND/OR nasopharyngeal swabs were POSITIVE for 2019-nCoV RNA [OR] SARS-COV-2 RNA (COVID-19) RNA     We have been unable to reach Patient by phone at this time to notify of their Positive COVID-19 result.  Left voicemail message requesting a call back to [phone] [facility] for results.        POSITIVE COVID-19 Letter sent.    Rivka Carrillo LPN

## 2021-10-11 DIAGNOSIS — R60.0 LOCALIZED EDEMA: ICD-10-CM

## 2021-10-11 DIAGNOSIS — F11.90 OPIOID USE DISORDER: ICD-10-CM

## 2021-10-11 RX ORDER — BUPRENORPHINE HYDROCHLORIDE AND NALOXONE HYDROCHLORIDE DIHYDRATE 8; 2 MG/1; MG/1
1 TABLET SUBLINGUAL 2 TIMES DAILY
Qty: 56 TABLET | Refills: 0 | Status: SHIPPED | OUTPATIENT
Start: 2021-10-11 | End: 2021-12-16

## 2021-10-11 RX ORDER — FUROSEMIDE 20 MG
TABLET ORAL
Qty: 30 TABLET | Refills: 5 | Status: SHIPPED | OUTPATIENT
Start: 2021-10-11 | End: 2021-12-01

## 2021-10-12 NOTE — TELEPHONE ENCOUNTER
[employer] message sent this date to set up a follow up appt.  Will wait for response.    Mary Jones RN-BSN, [facility] Care Coordinator  [phone] opt. #3        
PDMP reviewed -   Ativan from Dr Martinez 9/27/21 - 12 tablets.  ER physician.  Will need to discuss at follow up.  Refill done.  
Suboxone - was had appt this date, but tested positive for Covid on 10.8.21.  Will contact him this date to reschedule      Last Written Prescription Date:  9.10.21  Last Fill Quantity: #62,   # refills: 0  Last Office Visit: 9.10.21  Future Office visit:    Next 5 appointments (look out 90 days)    Oct 14, 2021  9:00 AM  (Arrive by 8:45 AM)  SHORT with Diana Jackman, CNP  [facility] ([facility] ) [address]  [facility] [address]  [phone]           Routing refill request to provider for review/approval because:  Drug not on the FM[facility], P or  Health refill protocol or controlled substance  
71 y/o female presents to the ED c/o worsening SOB, cough, fever, myalgia x2 weeks.  states was tested COVID+ last week at [facility].  states sob has been worsening over time.  +fever, cough.  no other acute complaints at this time.  doesnt see a MD regularly.      pmh-denies   psh-denies   sochx-nonsmoker, denies alcohol, drug use.  lives at home

## 2021-10-24 DIAGNOSIS — J98.9 REACTIVE AIRWAY DISEASE WITHOUT ASTHMA: ICD-10-CM

## 2021-10-25 RX ORDER — ALBUTEROL SULFATE 90 UG/1
AEROSOL, METERED RESPIRATORY (INHALATION)
Qty: 18 G | Refills: 1 | Status: SHIPPED | OUTPATIENT
Start: 2021-10-25 | End: 2021-12-01

## 2021-11-17 DIAGNOSIS — F11.90 OPIOID USE DISORDER: ICD-10-CM

## 2021-11-17 RX ORDER — BUPRENORPHINE HYDROCHLORIDE AND NALOXONE HYDROCHLORIDE DIHYDRATE 8; 2 MG/1; MG/1
1 TABLET SUBLINGUAL 2 TIMES DAILY
Qty: 56 TABLET | Refills: 0 | Status: CANCELLED | OUTPATIENT
Start: 2021-11-17

## 2021-11-17 NOTE — TELEPHONE ENCOUNTER
buprenorphine-naloxone (SUBOXONE) 8-2 MG SUBL sublingual tablet      Last Written Prescription Date:  10/11/21  Last Fill Quantity: 56,   # refills: 0  Last Office Visit: 09/10/21  Future Office visit:     Next 5 appointments (look out 90 days)    Dec 10, 2021 11:15 AM  (Arrive by 11:00 AM)  SHORT with Allison Heath MD  [facility] ([facility] ) [address]  [place] [address]  [phone]            Routing refill request to provider for review/approval because:  Drug not on the [facility], UMP or [facility] refill protocol or controlled substance

## 2021-11-17 NOTE — TELEPHONE ENCOUNTER
PDMP reviewed.  Last fill 10/11/21.  Missed appointment in 10/2021 due to covid.  But what about 11/2021?    Lorazepam from Dr Auguste 9/27/21.

## 2021-11-17 NOTE — TELEPHONE ENCOUNTER
CC tried reaching out via [employer] asking to reschedule for around November 10.  [employer] message has still not been read.

## 2021-11-22 NOTE — TELEPHONE ENCOUNTER
CC has not heard from him.    Mary Jones, RN-BSN, [facility] Care Coordinator  [phone] opt. #3

## 2021-11-28 ENCOUNTER — APPOINTMENT (OUTPATIENT)
Dept: CT IMAGING | Facility: HOSPITAL | Age: 36
End: 2021-11-28
Attending: EMERGENCY MEDICINE
Payer: COMMERCIAL

## 2021-11-28 ENCOUNTER — HOSPITAL ENCOUNTER (EMERGENCY)
Facility: HOSPITAL | Age: 36
Discharge: HOME | End: 2021-11-29
Attending: EMERGENCY MEDICINE | Admitting: EMERGENCY MEDICINE
Payer: COMMERCIAL

## 2021-11-28 DIAGNOSIS — R10.9 ABDOMINAL PAIN, UNSPECIFIED ABDOMINAL LOCATION: ICD-10-CM

## 2021-11-28 LAB
ALBUMIN UR-MCNC: 10 MG/DL
APPEARANCE UR: ABNORMAL
BASOPHILS # BLD AUTO: 0.1 10E3/UL (ref 0–0.2)
BASOPHILS NFR BLD AUTO: 1 %
BILIRUB UR QL STRIP: NEGATIVE
COLOR UR AUTO: YELLOW
EOSINOPHIL # BLD AUTO: 0.1 10E3/UL (ref 0–0.7)
EOSINOPHIL NFR BLD AUTO: 1 %
ERYTHROCYTE [DISTWIDTH] IN BLOOD BY AUTOMATED COUNT: 13.4 % (ref 10–15)
GLUCOSE UR STRIP-MCNC: NEGATIVE MG/DL
HCT VFR BLD AUTO: 44.2 % (ref 40–53)
HGB BLD-MCNC: 15.6 G/DL (ref 13.3–17.7)
HGB UR QL STRIP: NEGATIVE
IMM GRANULOCYTES # BLD: 0 10E3/UL
IMM GRANULOCYTES NFR BLD: 0 %
KETONES UR STRIP-MCNC: NEGATIVE MG/DL
LEUKOCYTE ESTERASE UR QL STRIP: NEGATIVE
LYMPHOCYTES # BLD AUTO: 2.8 10E3/UL (ref 0.8–5.3)
LYMPHOCYTES NFR BLD AUTO: 33 %
MCH RBC QN AUTO: 30.4 PG (ref 26.5–33)
MCHC RBC AUTO-ENTMCNC: 35.3 G/DL (ref 31.5–36.5)
MCV RBC AUTO: 86 FL (ref 78–100)
MONOCYTES # BLD AUTO: 0.8 10E3/UL (ref 0–1.3)
MONOCYTES NFR BLD AUTO: 10 %
NEUTROPHILS # BLD AUTO: 4.8 10E3/UL (ref 1.6–8.3)
NEUTROPHILS NFR BLD AUTO: 55 %
NITRATE UR QL: NEGATIVE
NRBC # BLD AUTO: 0 10E3/UL
NRBC BLD AUTO-RTO: 0 /100
PH UR STRIP: 7.5 [PH] (ref 4.7–8)
PLATELET # BLD AUTO: 285 10E3/UL (ref 150–450)
RBC # BLD AUTO: 5.13 10E6/UL (ref 4.4–5.9)
RBC URINE: 1 /HPF
SP GR UR STRIP: 1.03 (ref 1–1.03)
SQUAMOUS EPITHELIAL: 0 /HPF
UROBILINOGEN UR STRIP-MCNC: NORMAL MG/DL
WBC # BLD AUTO: 8.5 10E3/UL (ref 4–11)
WBC URINE: 1 /HPF

## 2021-11-28 PROCEDURE — 99283 EMERGENCY DEPT VISIT LOW MDM: CPT | Mod: 25

## 2021-11-28 PROCEDURE — 81003 URINALYSIS AUTO W/O SCOPE: CPT | Performed by: EMERGENCY MEDICINE

## 2021-11-28 PROCEDURE — 85004 AUTOMATED DIFF WBC COUNT: CPT | Performed by: EMERGENCY MEDICINE

## 2021-11-28 PROCEDURE — 99284 EMERGENCY DEPT VISIT MOD MDM: CPT | Performed by: EMERGENCY MEDICINE

## 2021-11-28 PROCEDURE — 74176 CT ABD & PELVIS W/O CONTRAST: CPT

## 2021-11-28 PROCEDURE — 36415 COLL VENOUS BLD VENIPUNCTURE: CPT | Performed by: EMERGENCY MEDICINE

## 2021-11-28 PROCEDURE — 96360 HYDRATION IV INFUSION INIT: CPT

## 2021-11-28 PROCEDURE — 70450 CT HEAD/BRAIN W/O DYE: CPT

## 2021-11-28 PROCEDURE — 258N000003 HC RX IP 258 OP 636: Performed by: EMERGENCY MEDICINE

## 2021-11-28 PROCEDURE — 80048 BASIC METABOLIC PNL TOTAL CA: CPT | Performed by: EMERGENCY MEDICINE

## 2021-11-28 RX ORDER — SODIUM CHLORIDE 9 MG/ML
INJECTION, SOLUTION INTRAVENOUS CONTINUOUS
Status: DISCONTINUED | OUTPATIENT
Start: 2021-11-29 | End: 2021-11-29 | Stop reason: HOSPADM

## 2021-11-28 RX ADMIN — SODIUM CHLORIDE 1000 ML: 9 INJECTION, SOLUTION INTRAVENOUS at 23:59

## 2021-11-29 VITALS
SYSTOLIC BLOOD PRESSURE: 131 MMHG | OXYGEN SATURATION: 96 % | HEART RATE: 93 BPM | TEMPERATURE: 98.3 F | RESPIRATION RATE: 18 BRPM | DIASTOLIC BLOOD PRESSURE: 91 MMHG

## 2021-11-29 LAB
ANION GAP SERPL CALCULATED.3IONS-SCNC: 8 MMOL/L (ref 3–14)
BUN SERPL-MCNC: 16 MG/DL (ref 7–30)
CALCIUM SERPL-MCNC: 9.4 MG/DL (ref 8.5–10.1)
CHLORIDE BLD-SCNC: 105 MMOL/L (ref 94–109)
CO2 SERPL-SCNC: 25 MMOL/L (ref 20–32)
CREAT SERPL-MCNC: 0.98 MG/DL (ref 0.66–1.25)
GFR SERPL CREATININE-BSD FRML MDRD: >90 ML/MIN/1.73M2
GLUCOSE BLD-MCNC: 105 MG/DL (ref 70–99)
POTASSIUM BLD-SCNC: 3.8 MMOL/L (ref 3.4–5.3)
SODIUM SERPL-SCNC: 138 MMOL/L (ref 133–144)

## 2021-11-29 ASSESSMENT — ENCOUNTER SYMPTOMS
FLANK PAIN: 1
COUGH: 1
CONSTITUTIONAL NEGATIVE: 1
NEUROLOGICAL NEGATIVE: 1
ABDOMINAL PAIN: 1
DIARRHEA: 1

## 2021-11-29 NOTE — ED PROVIDER NOTES
"  History     Chief Complaint   Patient presents with     Abdominal Pain     c/o left lower quadrant abdominal pain      HPI  Hoang Kiser is a 36 year old male who was to the emergency department with multiple complaints.  He states that he has had intermittent visual disturbance for the past 2 months.  He states he gets occasional \"flashes\" in front of his eyes.  He has occasional mild headaches.  He denies any drainage from his eyes.  He denies sinus pain or sore throat.  He states he has a mild nonproductive cough.  He has pain across his chest wall with deep inspiration and with cough.  He was positive for Covid last month.  He also states that he has some left flank and left lower abdominal pain which she has had intermittently for the last 2 months as well.  He has had occasional loose stools.  He also admits to some dysuria.  He has not had nausea or vomiting.  He has not had melena or hematochezia.    Allergies:  Allergies   Allergen Reactions     Amitriptyline      Ineffective in reducing spasms/movement, increased fatigue  Other reaction(s): Other (see comments)     Buspirone Hcl Other (See Comments)     Panic attacks     Doxycycline Diarrhea, Fatigue, GI Disturbance and Nausea     Other reaction(s): GI intolerance     Cephalexin Diarrhea     Other reaction(s): GI intolerance       Problem List:    Patient Active Problem List    Diagnosis Date Noted     Chronic pain syndrome 02/11/2019     Priority: High     Suicidal ideation 03/25/2021     Priority: Medium     Movement disorder 07/08/2020     Priority: Medium     Fatigue, unspecified type 07/02/2020     Priority: Medium     Generalized social phobia 12/17/2019     Priority: Medium     Stimulant dependence (H) 12/17/2019     Priority: Medium     Chronic post-traumatic stress disorder (PTSD) 12/17/2019     Priority: Medium     Nonallergic rhinitis 08/01/2019     Priority: Medium     History of electroencephalogram 04/10/2019     Priority: Medium     " EEG         Procedure Date: 2019      EEG #:  .        DATE OF EE2019.      SOURCE FILE DURATION:  15 minutes.  This EEG did not have a video.      PATIENT INFORMATION:  The patient is a 33-year-old male with irregular movements.  It is not entirely clear the etiology of these movements.  EEG is being done to evaluate for seizures.      MEDICATIONS:  Adderall, doxepin, Cymbalta, Robaxin.      TECHNICAL SUMMARY: This video EEG monitoring procedure was performed with 23 scalp electrodes in 10-20 system placements, and additional scalp, precordial and other surface electrodes used for electrical referencing and artifact detection. Video was reviewed intermittently by EEG technologist and physician for electroclinical seizures.      BACKGROUND ACTIVITY:  During wakefulness, the background activity consists of synchronous and symmetric, well modulated, 9-10 Hz posterior dominant rhythm. The posterior dominant rhythm attenuated with eye opening. During drowsiness, the background activity waxed and waned and there were periods of slowing and attenuation of the posterior alpha rhythm. Stage I sleep and Stage II sleep was recorded in which synchronous and symmetrical vertex waves , K-complexes and sleep spindles  were identified.  No focal abnormalities were observed. Throughout the recording, the patient had a lot of movement artifact and blinking.  That made it difficult to interpret many segments of the EEG.  He was awake for much of the record.  Towards the end, he did become drowsy at which point there was intermittent slowing in the left and right temporal region.  Of note, as the patient became drowsy, the frequency of his movements decreased and this was best seen at 09:39.  In drowsiness, sometimes, for instance at 09:43, he did have large movements followed by upper body movements.      ACTIVATION PROCEDURES:  Photic stimulation and hyperventilation was done with no significant abnormalities.       ICTAL:  The patient did state that photic stimulation aggravated his movement and gave him a feeling of dissociation.      EPILEPTIFORM DISCHARGES:  None.      ICTAL:  None.      IMPRESSION:  This awake and drowsy routine EEG with no video was normal.  The patient had a lot of movements during this EEG and expressed that he had a feeling of disassociation during photic stimulation.  Of note, as patient became more drowsy the frequency of movements decreased.  However,he continued to have these movements in stage 1 sleep. If clinically indicated, may consider further video EEG to understand if the patient has these movements in sleep.  No electrographic seizures, no epileptiform discharges are seen.  Clinical correlation is advised.         BREA PATEL MD             D: 03/15/2019   T: 2019   MT: buffy      Name:     VLADISLAV LEUNG   MRN:      [id number]        Account:        [id number]   :      1985           Procedure Date: 2019      Document: [id number]           3/18/2019 11:57 AM - Brea Patel MD     Lab and Collection     EEG (Order: [id number]) - 3/18/2019   Result History     EEG (Order #[id number]) on 3/15/2019 - Order Result History Report   Patient Release Status:     This result is not viewable by the patient.          Transcription                []Hide copied text    []Hover for details      Procedure Date: 2019      EEG #:  .        DATE OF EE2019.      SOURCE FILE DURATION:  15 minutes.  This EEG did not have a video.      PATIENT INFORMATION:  The patient is a 33-year-old male with irregular movements.  It is not entirely clear the etiology of these movements.  EEG is being done to evaluate for seizures.      MEDICATIONS:  Adderall, doxepin, Cymbalta, Robaxin.      TECHNICAL SUMMARY: This video EEG monitoring procedure was performed with 23 scalp electrodes in 10-20 system placements, and additional scalp, precordial and other surface electrodes  used for electrical referencing and artifact detection. Video was reviewed intermittently by EEG technologist and physician for electroclinical seizures.      BACKGROUND ACTIVITY:  During wakefulness, the background activity consists of synchronous and symmetric, well modulated, 9-10 Hz posterior dominant rhythm. The posterior dominant rhythm attenuated with eye opening. During drowsiness, the background activity waxed and waned and there were periods of slowing and attenuation of the posterior alpha rhythm. Stage I sleep and Stage II sleep was recorded in which synchronous and symmetrical vertex waves , K-complexes and sleep spindles  were identified.  No focal abnormalities were observed. Throughout the recording, the patient had a lot of movement artifact and blinking.  That made it difficult to interpret many segments of the EEG.  He was awake for much of the record.  Towards the end, he did become drowsy at which point there was intermittent slowing in the left and right temporal region.  Of note, as the patient became drowsy, the frequency of his movements decreased and this was best seen at 09:39.  In drowsiness, sometimes, for instance at 09:43, he did have large movements followed by upper body movements.      ACTIVATION PROCEDURES:  Photic stimulation and hyperventilation was done with no significant abnormalities.      ICTAL:  The patient did state that photic stimulation aggravated his movement and gave him a feeling of dissociation.      EPILEPTIFORM DISCHARGES:  None.      ICTAL:  None.      IMPRESSION:  This awake and drowsy routine EEG with no video was normal.  The patient had a lot of movements during this EEG and expressed that he had a feeling of disassociation during photic stimulation.  Of note, as patient became more drowsy the frequency of movements decreased.  However,he continued to have these movements in stage 1 sleep. If clinically indicated, may consider further video EEG to understand  if the patient has these movements in sleep.  No electrographic seizures, no epileptiform discharges are seen.  Clinical correlation is advised.         HILARIA PATEL MD             D: 03/15/2019   T: 2019   MT: buffy      Name:     VLADISLAV LEUNG   MRN:      [id number]        Account:        [id number]   :      1985           Procedure Date: 2019      Document: [id number]               Document Information               Family history of multiple myeloma 2019     Priority: Medium     History of substance abuse (H) 2019     Priority: Medium     hx of meth, none since         Family history of Crohn's disease 2018     Priority: Medium     Functional movement disorder 2018     Priority: Medium     Chronic left hip pain 2018     Priority: Medium     Chronic pain of right hip 2018     Priority: Medium     Degenerative disc disease at L5-S1 level 2018     Priority: Medium     Anxiety 2016     Priority: Medium     Posttraumatic stress disorder with dissociative symptoms 2016     Priority: Medium     Abnormal involuntary movement 2016     Priority: Medium     Tic disorder 2016     Priority: Medium     Panic disorder without agoraphobia 2016     Priority: Medium     Primary insomnia 12/10/2015     Priority: Medium     Attention deficit hyperactivity disorder (ADHD), predominantly inattentive type 10/26/2015     Priority: Medium     Patient is followed by [facility] for ongoing prescription of stimulants.  All refills should be approved by this provider, or covering partner.    Medication(s): adderall XR 25 and adderall XR 10.   Maximum quantity per month: 30 of each  Clinic visit frequency required: Q 6  months     Controlled substance agreement on file: No  Neuropsych evaluation for ADD completed:  Yes, completed 13, on file and diagnosis confirmed in letters    Last [place] website verification:  none    [email or web address]           Tobacco abuse 07/10/2015     Priority: Medium     Moderate major depression (H) 05/05/2015     Priority: Medium     on and off medication for depression since 2003       Neuropathy 03/21/2013     Priority: Medium     Chronic low back pain 02/11/2019     Priority: Low        Past Medical History:    Past Medical History:   Diagnosis Date     Arthritis 2/27/2018     Benign positional vertigo 12/2016     Depressive disorder 2003     Dysphonia 2014     Gastroesophageal reflux disease 2004     Hearing problem 2015     History of electroencephalogram 4/10/2019     History of MRI of brain and brain stem 12/11/2015     Hoarseness 2017     Neuralgia, neuritis, and radiculitis, unspecified 04/30/2012     normal emg 2017 8/8/2017     Panic attack 12/6/2016     Seizures (H) 1986     Tinnitus 2015       Past Surgical History:    Past Surgical History:   Procedure Laterality Date     COLONOSCOPY  02/15/18    Has not happened yet.     COLONOSCOPY N/A 2/15/2018    Procedure: COMBINED COLONOSCOPY, SINGLE OR MULTIPLE BIOPSY/POLYPECTOMY BY BIOPSY;;  Surgeon: Liam Kincaid MD;  Location: [facility] OR     COLONOSCOPY WITH CO2 INSUFFLATION N/A 2/15/2018    Procedure: COLONOSCOPY WITH CO2 INSUFFLATION;  COLON-FAMILY HX OF COLON CANCER/ SYPURA;  Surgeon: Liam Kincaid MD;  Location: [facility] OR     HC TOOTH EXTRACTION W/FORCEP Bilateral 2003     PE TUBES  1990       Family History:    Family History   Problem Relation Age of Onset     Lipids Father         hyperlipidemia     Hyperlipidemia Father      Obesity Father      Arthritis Mother      Hyperlipidemia Mother      Depression Mother      Anxiety Disorder Mother      Mental Illness Mother      Obesity Mother      Asthma Brother      Asthma Sister      Depression Other      Hearing Loss Other      Psychotic Disorder Other      Obesity Other      Cerebrovascular Disease Paternal Grandfather      Alzheimer Disease Paternal Grandfather       Depression Brother      Mental Illness Brother         Bipolar     Asthma Brother      Colitis Brother      Skin Cancer Brother      Depression Sister      Asthma Sister      Substance Abuse Sister         Alcohol     Mental Illness Brother         Bipolar     Asthma Brother      Colitis Brother      Asthma Sister      Obesity Sister      Asthma Brother      Obesity Brother      Obesity Maternal Grandmother      Genetic Disorder Maternal Grandmother         Epilepsy     Obesity Paternal Grandmother      Colon Cancer Other      Genetic Disorder Other         Cerebral Palsy     Genetic Disorder Maternal Grandfather         Multiple Sclerosis     Genetic Disorder Other         Epilepsy       Social History:  Marital Status:  Single [1]  Social History     Tobacco Use     Smoking status: Current Every Day Smoker     Packs/day: 0.50     Years: 10.00     Pack years: 5.00     Types: Cigarettes     Start date: 1/1/2002     Smokeless tobacco: Never Used     Tobacco comment: Transdermal patches have helped me the most in the past   Vaping Use     Vaping Use: Former     Substances: Nicotine, Flavoring     Devices: Pre-filled or refillable cartridge   Substance Use Topics     Alcohol use: No     Alcohol/week: 2.0 - 4.0 standard drinks     Comment: rarely     Drug use: Not Currently     Types: Methamphetamines     Comment: hx of meth, 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 12-10-20 = 2.5 months sober        Medications:    omeprazole (PRILOSEC) 20 MG DR capsule  ADDERALL XR 30 MG 24 hr capsule  albuterol (VENTOLIN HFA) 108 (90 Base) MCG/ACT inhaler  buprenorphine-naloxone (SUBOXONE) 8-2 MG SUBL sublingual tablet  cloNIDine (CATAPRES) 0.1 MG tablet  cyclobenzaprine (FLEXERIL) 5 MG tablet  furosemide (LASIX) 20 MG tablet  hydrOXYzine (ATARAX) 25 MG tablet  mirtazapine (REMERON) 15 MG tablet  naloxone (NARCAN) 4 MG/0.1ML nasal spray  OLANZapine zydis (ZYPREXA) 5 MG ODT  order for DME  rivaroxaban ANTICOAGULANT (XARELTO ANTICOAGULANT) 20  "MG TABS tablet  traZODone (DESYREL) 50 MG tablet  vilazodone (VIIBRYD) 10 MG TABS tablet  vilazodone (VIIBRYD) 20 MG TABS tablet          Review of Systems   Constitutional: Negative.    HENT: Negative.    Eyes: Negative for visual disturbance.   Respiratory: Positive for cough.    Cardiovascular: Positive for chest pain.   Gastrointestinal: Positive for abdominal pain and diarrhea.   Genitourinary: Positive for flank pain.   Skin: Negative.    Neurological: Negative.    Please see history of chief complaint.  All other appropriate systems reviewed and are found unremarkable.    Physical Exam   BP: 150/100  Pulse: 105  Temp: 98.3  F (36.8  C)  Resp: 18  SpO2: 98 %      Physical Exam is a 36-year-old gentleman who is awake alert oriented person place and time.  He is very pleasant and cooperative with my exam.  HEENT normocephalic extraocular muscles intact pupils equally round and reactive to light.  Funduscopic exam is unremarkable.  Tongue midline palate intact oropharynx is clear.  Neck is supple his range of motion without pain.  No evidence of nuchal irritation.  Lungs are clear bilaterally.  Heart maintains regular rhythm S1 and S2 sounds are appreciated.  Patient does have reproducible anterior chest wall tenderness to palpation.  No deformity of the chest.  The abdomen is soft no mass no organomegaly no rebound patient does have voluntary guarding tenderness to palpation in the left upper abdomen.  There is also some tenderness to percussion on the left flank.  Extremities have full range of motion and 5/5 strength no edema is noted.  Neurologic exam no focal deficit.  Dermatologic exam no diffuse skin rashes or lesions.    ED Course        CT scan of the head is obtained and interpreted by radiology as \"no evidence of acute intracranial pathology\" CT scan of the abdomen and pelvis without contrast is obtained and interpreted by radiology as \"#1 no definite urinary collecting system stone.  #2 cystitis could " "be considered.  #3 esophageal wall thickening as above.  Considerations include esophagitis, sequelae of gastroesophageal reflux.  4 minimal colonic fecal retention.  5 gastroparesis not excluded.\"           The patient remained very stable throughout his stay in the department. I discussed the lab and CT findings with him. He will be discharged with a prescription for Protonix. I will advise him to be rechecked by his primary care provider soon as possible this coming week.       Results for orders placed or performed during the hospital encounter of 11/28/21 (from the past 24 hour(s))   UA with Microscopic reflex to Culture    Specimen: Urine, Midstream   Result Value Ref Range    Color Urine Yellow Colorless, Straw, Light Yellow, Yellow    Appearance Urine Cloudy (A) Clear    Glucose Urine Negative Negative mg/dL    Bilirubin Urine Negative Negative    Ketones Urine Negative Negative mg/dL    Specific Gravity Urine 1.028 1.003 - 1.035    Blood Urine Negative Negative    pH Urine 7.5 4.7 - 8.0    Protein Albumin Urine 10  (A) Negative mg/dL    Urobilinogen Urine Normal Normal, 2.0 mg/dL    Nitrite Urine Negative Negative    Leukocyte Esterase Urine Negative Negative    RBC Urine 1 <=2 /HPF    WBC Urine 1 <=5 /HPF    Squamous Epithelials Urine 0 <=1 /HPF    Narrative    Urine Culture not indicated   CBC with platelets differential    Narrative    The following orders were created for panel order CBC with platelets differential.  Procedure                               Abnormality         Status                     ---------                               -----------         ------                     CBC with platelets and d...[[id number]]                      Final result                 Please view results for these tests on the individual orders.   Basic metabolic panel   Result Value Ref Range    Sodium 138 133 - 144 mmol/L    Potassium 3.8 3.4 - 5.3 mmol/L    Chloride 105 94 - 109 mmol/L    Carbon Dioxide (CO2) " 25 20 - 32 mmol/L    Anion Gap 8 3 - 14 mmol/L    Urea Nitrogen 16 7 - 30 mg/dL    Creatinine 0.98 0.66 - 1.25 mg/dL    Calcium 9.4 8.5 - 10.1 mg/dL    Glucose 105 (H) 70 - 99 mg/dL    GFR Estimate >90 >60 mL/min/1.73m2   CBC with platelets and differential   Result Value Ref Range    WBC Count 8.5 4.0 - 11.0 10e3/uL    RBC Count 5.13 4.40 - 5.90 10e6/uL    Hemoglobin 15.6 13.3 - 17.7 g/dL    Hematocrit 44.2 40.0 - 53.0 %    MCV 86 78 - 100 fL    MCH 30.4 26.5 - 33.0 pg    MCHC 35.3 31.5 - 36.5 g/dL    RDW 13.4 10.0 - 15.0 %    Platelet Count 285 150 - 450 10e3/uL    % Neutrophils 55 %    % Lymphocytes 33 %    % Monocytes 10 %    % Eosinophils 1 %    % Basophils 1 %    % Immature Granulocytes 0 %    NRBCs per 100 WBC 0 <1 /100    Absolute Neutrophils 4.8 1.6 - 8.3 10e3/uL    Absolute Lymphocytes 2.8 0.8 - 5.3 10e3/uL    Absolute Monocytes 0.8 0.0 - 1.3 10e3/uL    Absolute Eosinophils 0.1 0.0 - 0.7 10e3/uL    Absolute Basophils 0.1 0.0 - 0.2 10e3/uL    Absolute Immature Granulocytes 0.0 <=0.0 10e3/uL    Absolute NRBCs 0.0 10e3/uL     *Note: Due to a large number of results and/or encounters for the requested time period, some results have not been displayed. A complete set of results can be found in Results Review.       Medications   0.9% sodium chloride BOLUS (0 mLs Intravenous Stopped 11/29/21 0100)     Followed by   sodium chloride 0.9% infusion (has no administration in time range)       Assessments & Plan (with Medical Decision Making)     I have reviewed the nursing notes.    I have reviewed the findings, diagnosis, plan and need for follow up with the patient.      Discharge Medication List as of 11/29/2021 12:46 AM      START taking these medications    Details   omeprazole (PRILOSEC) 20 MG DR capsule Take 1 capsule (20 mg) by mouth daily for 10 days, Disp-10 capsule, R-0, E-Prescribe             Final diagnoses:   Abdominal pain, unspecified abdominal location       11/28/2021   [facility]      Fili Fernandez, DO  11/29/21 0156

## 2021-12-01 DIAGNOSIS — I82.441 ACUTE DEEP VEIN THROMBOSIS (DVT) OF TIBIAL VEIN OF RIGHT LOWER EXTREMITY (H): ICD-10-CM

## 2021-12-01 DIAGNOSIS — Z79.01 LONG TERM CURRENT USE OF ANTICOAGULANT THERAPY: ICD-10-CM

## 2021-12-01 DIAGNOSIS — J98.9 REACTIVE AIRWAY DISEASE WITHOUT ASTHMA: ICD-10-CM

## 2021-12-01 DIAGNOSIS — R60.0 LOCALIZED EDEMA: ICD-10-CM

## 2021-12-01 RX ORDER — ALBUTEROL SULFATE 90 UG/1
AEROSOL, METERED RESPIRATORY (INHALATION)
Qty: 18 G | Refills: 1 | Status: SHIPPED | OUTPATIENT
Start: 2021-12-01 | End: 2021-12-27

## 2021-12-01 RX ORDER — FUROSEMIDE 20 MG
20 TABLET ORAL DAILY
Qty: 30 TABLET | Refills: 5 | Status: SHIPPED | OUTPATIENT
Start: 2021-12-01 | End: 2022-02-16
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
## 2021-12-16 ENCOUNTER — MYC MEDICAL ADVICE (OUTPATIENT)
Dept: FAMILY MEDICINE | Facility: OTHER | Age: 36
End: 2021-12-16

## 2021-12-16 ENCOUNTER — NURSE TRIAGE (OUTPATIENT)
Dept: FAMILY MEDICINE | Facility: OTHER | Age: 36
End: 2021-12-16
Payer: COMMERCIAL

## 2021-12-16 ENCOUNTER — OFFICE VISIT (OUTPATIENT)
Dept: FAMILY MEDICINE | Facility: OTHER | Age: 36
End: 2021-12-16
Attending: NURSE PRACTITIONER
Payer: COMMERCIAL

## 2021-12-16 VITALS
HEIGHT: 67 IN | OXYGEN SATURATION: 99 % | SYSTOLIC BLOOD PRESSURE: 124 MMHG | BODY MASS INDEX: 33.59 KG/M2 | DIASTOLIC BLOOD PRESSURE: 82 MMHG | TEMPERATURE: 97.1 F | WEIGHT: 214 LBS | RESPIRATION RATE: 20 BRPM | HEART RATE: 104 BPM

## 2021-12-16 DIAGNOSIS — M62.838 MUSCLE SPASM: ICD-10-CM

## 2021-12-16 DIAGNOSIS — T14.8XXA EXCORIATION: ICD-10-CM

## 2021-12-16 DIAGNOSIS — F41.9 ANXIETY: Primary | ICD-10-CM

## 2021-12-16 LAB
ERYTHROCYTE [DISTWIDTH] IN BLOOD BY AUTOMATED COUNT: 13.6 % (ref 10–15)
HCT VFR BLD AUTO: 44.1 % (ref 40–53)
HGB BLD-MCNC: 15.1 G/DL (ref 13.3–17.7)
HOLD SPECIMEN: NORMAL
MCH RBC QN AUTO: 30.2 PG (ref 26.5–33)
MCHC RBC AUTO-ENTMCNC: 34.2 G/DL (ref 31.5–36.5)
MCV RBC AUTO: 88 FL (ref 78–100)
PLATELET # BLD AUTO: 300 10E3/UL (ref 150–450)
RBC # BLD AUTO: 5 10E6/UL (ref 4.4–5.9)
WBC # BLD AUTO: 6.6 10E3/UL (ref 4–11)

## 2021-12-16 PROCEDURE — 85027 COMPLETE CBC AUTOMATED: CPT | Mod: ZL | Performed by: NURSE PRACTITIONER

## 2021-12-16 PROCEDURE — 99214 OFFICE O/P EST MOD 30 MIN: CPT | Performed by: NURSE PRACTITIONER

## 2021-12-16 PROCEDURE — 84443 ASSAY THYROID STIM HORMONE: CPT | Mod: ZL | Performed by: NURSE PRACTITIONER

## 2021-12-16 PROCEDURE — 36415 COLL VENOUS BLD VENIPUNCTURE: CPT | Mod: ZL | Performed by: NURSE PRACTITIONER

## 2021-12-16 PROCEDURE — G0463 HOSPITAL OUTPT CLINIC VISIT: HCPCS

## 2021-12-16 PROCEDURE — 82040 ASSAY OF SERUM ALBUMIN: CPT | Mod: ZL | Performed by: NURSE PRACTITIONER

## 2021-12-16 ASSESSMENT — ENCOUNTER SYMPTOMS
SLEEP DISTURBANCE: 1
HALLUCINATIONS: 1
NEUROLOGICAL NEGATIVE: 1
RESPIRATORY NEGATIVE: 1
CONSTITUTIONAL NEGATIVE: 1
EYES NEGATIVE: 1

## 2021-12-16 ASSESSMENT — PAIN SCALES - GENERAL: PAINLEVEL: MODERATE PAIN (4)

## 2021-12-16 ASSESSMENT — MIFFLIN-ST. JEOR: SCORE: 1859.33

## 2021-12-16 NOTE — TELEPHONE ENCOUNTER
"    Reason for Disposition    Patient wants to be seen    Answer Assessment - Initial Assessment Questions  1. APPEARANCE of RASH: \"Describe the rash.\"       Red spots on legs  2. LOCATION: \"Where is the rash located?\"       shins  3. NUMBER: \"How many spots are there?\"       several  4. SIZE: \"How big are the spots?\" (Inches, centimeters or compare to size of a coin)       Pin head clusters  5. ONSET: \"When did the rash start?\"       8 weeks ago  6. ITCHING: \"Does the rash itch?\" If so, ask: \"How bad is the itch?\"  (Scale 1-10; or mild, moderate, severe)      Yes they itch but no bleeding  7. PAIN: \"Does the rash hurt?\" If so, ask: \"How bad is the pain?\"  (Scale 1-10; or mild, moderate, severe)      Itches   8. OTHER SYMPTOMS: \"Do you have any other symptoms?\" (e.g., fever)      no  9. PREGNANCY: \"Is there any chance you are pregnant?\" \"When was your last menstrual period?\"      no    Protocols used: RASH OR REDNESS - [id number]      "

## 2021-12-16 NOTE — NURSING NOTE
"Chief Complaint   Patient presents with     Derm Problem     Nutrition Counseling       Initial BP (!) 142/78 (BP Location: Right arm, Patient Position: Sitting, Cuff Size: Adult Regular)   Pulse 104   Temp 97.1  F (36.2  C) (Tympanic)   Resp 20   Ht 1.702 m (5' 7\")   Wt 97.1 kg (214 lb)   SpO2 99%   BMI 33.52 kg/m   Estimated body mass index is 33.52 kg/m  as calculated from the following:    Height as of this encounter: 1.702 m (5' 7\").    Weight as of this encounter: 97.1 kg (214 lb).  Medication Reconciliation: complete  Nellie Magana LPN    "
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
## 2021-12-17 LAB
ALBUMIN SERPL-MCNC: 4.1 G/DL (ref 3.4–5)
ALP SERPL-CCNC: 102 U/L (ref 40–150)
ALT SERPL W P-5'-P-CCNC: 66 U/L (ref 0–70)
ANION GAP SERPL CALCULATED.3IONS-SCNC: 5 MMOL/L (ref 3–14)
AST SERPL W P-5'-P-CCNC: 30 U/L (ref 0–45)
BILIRUB SERPL-MCNC: 0.2 MG/DL (ref 0.2–1.3)
BUN SERPL-MCNC: 11 MG/DL (ref 7–30)
CALCIUM SERPL-MCNC: 9 MG/DL (ref 8.5–10.1)
CHLORIDE BLD-SCNC: 107 MMOL/L (ref 94–109)
CO2 SERPL-SCNC: 27 MMOL/L (ref 20–32)
CREAT SERPL-MCNC: 1.06 MG/DL (ref 0.66–1.25)
GFR SERPL CREATININE-BSD FRML MDRD: 90 ML/MIN/1.73M2
GLUCOSE BLD-MCNC: 103 MG/DL (ref 70–99)
POTASSIUM BLD-SCNC: 4 MMOL/L (ref 3.4–5.3)
PROT SERPL-MCNC: 7.6 G/DL (ref 6.8–8.8)
SODIUM SERPL-SCNC: 139 MMOL/L (ref 133–144)
TSH SERPL DL<=0.005 MIU/L-ACNC: 1.14 MU/L (ref 0.4–4)

## 2021-12-17 RX ORDER — HYDROXYZINE HYDROCHLORIDE 25 MG/1
25 TABLET, FILM COATED ORAL EVERY 6 HOURS PRN
Qty: 45 TABLET | Refills: 3 | Status: SHIPPED | OUTPATIENT
Start: 2021-12-17 | End: 2023-03-09

## 2021-12-17 RX ORDER — CYCLOBENZAPRINE HCL 5 MG
TABLET ORAL
Qty: 30 TABLET | Refills: 0 | Status: SHIPPED | OUTPATIENT
Start: 2021-12-17 | End: 2022-01-18

## 2021-12-17 RX ORDER — OLANZAPINE 5 MG/1
5 TABLET, ORALLY DISINTEGRATING ORAL EVERY 12 HOURS PRN
Qty: 20 TABLET | Refills: 0 | Status: SHIPPED | OUTPATIENT
Start: 2021-12-17 | End: 2021-12-30

## 2021-12-20 PROBLEM — L98.9 SKIN DISORDER: Status: ACTIVE | Noted: 2021-12-20

## 2021-12-21 ENCOUNTER — NURSE TRIAGE (OUTPATIENT)
Dept: FAMILY MEDICINE | Facility: OTHER | Age: 36
End: 2021-12-21
Payer: COMMERCIAL

## 2021-12-21 DIAGNOSIS — Z20.822 SUSPECTED 2019 NOVEL CORONAVIRUS INFECTION: Primary | ICD-10-CM

## 2021-12-21 NOTE — TELEPHONE ENCOUNTER
"Pt calling and asking for prednisone and an antiviral.He states he has chills and bodyaches.Head feels fuzzy when he gets a fever and this is normal for him.This started today.Updated would need to be tested if he thinks he has the flu. Had to phrase questions in different ways or repeat self thru assessment at time.Diffucult to assess at times. Spoke with PCP and OK to do test panel Covid/RSV/Flu. He can come tomorrow for testing.Scheduled. Advised if s/s worsen go to UC/ED.He verbalized understanding.    Please note pt states that he was positive in Oct for Covid but tested Negative for it in November.See positive test but not negative in Nove.  Will this give false Covid reading?  Please advise. Continue with testing for Covid? Did covid/rsv/flu.  Please advise.      Zonia Garcia RN'    Discharge Instructions for COVID-19 Patients  You have--or may have--COVID-19. Please follow the instructions listed below.   If you have a weakened immune system, discuss with your doctor any other actions you need to take.  How can I protect others?  If you have symptoms (fever, cough, body aches or trouble breathing):    Stay home and away from others (self-isolate) until:  ? Your other symptoms have resolved (gotten better). And   ? You've had no fever--and no medicine that reduces fever--for 1 full day (24 hours). And   ? At least 10 days have passed since your symptoms started. (You may need to wait 20 days. Follow the advice of your care team.)  If you don't show symptoms, but testing showed that you have COVID-19:    Stay home and away from others (self-isolate) until at least 10 days have passed since the date of your first positive COVID-19 test.  During this time    Stay in your own room, even for meals. Use your own bathroom if you can.    Stay away from others in your home. No hugging, kissing or shaking hands. No visitors.    Don't go to work, school or anywhere else.    Clean \"high touch\" surfaces often (doorknobs, " counters, handles). Use household cleaning spray or wipes.    You'll find a full list of  on the EPA website: www.epa.gov/pesticide-registration/list-n-disinfectants-use-against-sars-cov-2.    Cover your mouth and nose with a mask or other face covering to avoid spreading germs.    Wash your hands and face often. Use soap and water.    Caregivers in these groups are at risk for severe illness due to COVID-19:  ? People 65 years and older  ? People who live in a nursing home or long-term care facility  ? People with chronic disease (lung, heart, cancer, diabetes, kidney, liver, immunologic)  ? People who have a weakened immune system, including those who:    Are in cancer treatment    Take medicine that weakens the immune system, such as corticosteroids    Had a bone marrow or organ transplant    Have an immune deficiency    Have poorly controlled HIV or AIDS    Are obese (body mass index of 40 or higher)    Smoke regularly    Caregivers should wear gloves while washing dishes, handling laundry and cleaning bedrooms and bathrooms.    Use caution when washing and drying laundry: Don't shake dirty laundry and use the warmest water setting that you can.    For more tips on managing your health at home, go to www.cdc.gov/coronavirus/2019-ncov/downloads/10Things.pdf.  How can I take care of myself at home?  1. Get lots of rest. Drink extra fluids (unless a doctor has told you not to).  2. Take Tylenol (acetaminophen) for fever or pain. If you have liver or kidney problems, ask your family doctor if it's okay to take Tylenol.   Adults can take either:   ? 650 mg (two 325 mg pills) every 4 to 6 hours, or   ? 1,000 mg (two 500 mg pills) every 8 hours as needed.  ? Note: Don't take more than 3,000 mg in one day. Acetaminophen is found in many medicines (both prescribed and over-the-counter medicines). Read all labels to be sure you don't take too much.   For children, check the Tylenol bottle for the right dose. The  dose is based on the child's age or weight.  3. If you have other health problems (like cancer, heart failure, an organ transplant or severe kidney disease): Call your specialty clinic if you don't feel better in the next 2 days.  4. Know when to call 911. Emergency warning signs include:  ? Trouble breathing or shortness of breath  ? Pain or pressure in the chest that doesn't go away  ? Feeling confused like you haven't felt before, or not being able to wake up  ? Bluish-colored lips or face  5. Your doctor may have prescribed a blood thinner medicine. Follow their instructions.  Where can I get more information?    [facility] - About COVID-19:   [email or web address]    CDC - What to Do If You're Sick: www.cdc.gov/coronavirus/2019-ncov/about/steps-when-sick.html    [facility] - Ending Home Isolation: www.cdc.gov/coronavirus/2019-ncov/hcp/disposition-in-home-patients.html    [facility] - Caring for Someone: www.cdc.gov/coronavirus/2019-ncov/if-you-are-sick/care-for-someone.html    [facility] - Interim Guidance for Hospital Discharge to Home: www.health.[facility].[place]/diseases/coronavirus/hcp/hospdischarge.pdf    Below are the COVID-19 hotlines at the [facility] ([facility]). Interpreters are available.  ? For health questions: Call 245-926-3325 or 1-156.417.7032 (7 a.m. to 7 p.m.)  ? For questions about schools and childcare: Call 450-662-2012 or 1-651.148.3081 (7 a.m. to 7 p.m.)    For informational purposes only. Not to replace the advice of your health care provider. Clinically reviewed by Dr. Tino Cardenas.   Copyright   2020 [employer]. All rights reserved. [employer] 125504 - REV 01/05/21.      Reason for Disposition    COVID-19 Home Isolation, questions about    COVID-19 Testing, questions about    Additional Information    Negative: [1] COVID-19 exposure AND [2] no symptoms    Negative: COVID-19 vaccine reaction suspected (e.g., fever, headache, muscle aches) occurring 1 to 3  days after getting vaccine    Negative: COVID-19 vaccine, questions about    Negative: [1] Lives with someone known to have influenza (flu test positive) AND [2] flu-like symptoms (e.g., cough, runny nose, sore throat, SOB; with or without fever)    Negative: [1] Adult with possible COVID-19 symptoms AND [2] triager concerned about severity of symptoms or other causes    Negative: COVID-19 and breastfeeding, questions about    Negative: SEVERE or constant chest pain or pressure (Exception: mild central chest pain, present only when coughing)    Negative: MODERATE difficulty breathing (e.g., speaks in phrases, SOB even at rest, pulse 100-120)    Negative: [1] Headache AND [2] stiff neck (can't touch chin to chest)    Negative: MILD difficulty breathing (e.g., minimal/no SOB at rest, SOB with walking, pulse <100)    Negative: Chest pain or pressure    Negative: Patient sounds very sick or weak to the triager    Negative: Fever > 103 F (39.4 C)    Negative: [1] Fever > 101 F (38.3 C) AND [2] age > 60 years    Negative: [1] Fever > 100.0 F (37.8 C) AND [2] bedridden (e.g., nursing home patient, CVA, chronic illness, recovering from surgery)    Negative: HIGH RISK for severe COVID complications (e.g., age > 64 years, obesity with BMI > 25, pregnant, chronic lung disease or other chronic medical condition)  (Exception: Already seen by PCP and no new or worsening symptoms.)    Negative: [1] HIGH RISK patient AND [2] influenza is widespread in the community AND [3] ONE OR MORE respiratory symptoms: cough, sore throat, runny or stuffy nose    Negative: [1] HIGH RISK patient AND [2] influenza exposure within the last 7 days AND [3] ONE OR MORE respiratory symptoms: cough, sore throat, runny or stuffy nose    Negative: [1] COVID-19 infection suspected by caller or triager AND [2] mild symptoms (cough, fever, or others) AND [3] negative COVID-19 rapid test    Negative: Fever present > 3 days (72 hours)    Negative: [1] Fever  "returns after gone for over 24 hours AND [2] symptoms worse or not improved    Negative: [1] Continuous (nonstop) coughing interferes with work or school AND [2] no improvement using cough treatment per protocol    Negative: Cough present > 3 weeks    Negative: [1] COVID-19 diagnosed by positive lab test AND [2] NO symptoms (e.g., cough, fever, others)    Negative: [1] COVID-19 diagnosed by positive lab test AND [2] mild symptoms (e.g., cough, fever, others) AND [3] no complications or SOB    Negative: [1] COVID-19 diagnosed by doctor (or NP/PA) AND [2] mild symptoms (e.g., cough, fever, others) AND [3] no complications or SOB    Negative: [1] COVID-19 diagnosed AND [2] has mild nausea, vomiting or diarrhea    Negative: COVID-19 Prevention and Healthy Living, questions about    Negative: COVID-19 Disease, questions about    Answer Assessment - Initial Assessment Questions  1. COVID-19 DIAGNOSIS: \"Who made your Coronavirus (COVID-19) diagnosis?\" \"Was it confirmed by a positive lab test?\" If not diagnosed by a HCP, ask \"Are there lots of cases (community spread) where you live?\" (See public health department website, if unsure)    In October of this year, not positive November  2. COVID-19 EXPOSURE: \"Was there any known exposure to COVID before the symptoms began?\" CDC Definition of close contact: within 6 feet (2 meters) for a total of 15 minutes or more over a 24-hour period.       no  3. ONSET: \"When did the COVID-19 symptoms start?\"       today  4. WORST SYMPTOM: \"What is your worst symptom?\" (e.g., cough, fever, shortness of breath, muscle aches)      Body aches  5. COUGH: \"Do you have a cough?\" If Yes, ask: \"How bad is the cough?\"        no  6. FEVER: \"Do you have a fever?\" If Yes, ask: \"What is your temperature, how was it measured, and when did it start?\"      Not sure chills and bodyaches  7. RESPIRATORY STATUS: \"Describe your breathing?\" (e.g., shortness of breath, wheezing, unable to speak)       no  8. " "BETTER-SAME-WORSE: \"Are you getting better, staying the same or getting worse compared to yesterday?\"  If getting worse, ask, \"In what way?\"      na  9. HIGH RISK DISEASE: \"Do you have any chronic medical problems?\" (e.g., asthma, heart or lung disease, weak immune system, obesity, etc.)      no  10. PREGNANCY: \"Is there any chance you are pregnant?\" \"When was your last menstrual period?\"        na  11. OTHER SYMPTOMS: \"Do you have any other symptoms?\"  (e.g., chills, fatigue, headache, loss of smell or taste, muscle pain, sore throat; new loss of smell or taste especially support the diagnosis of COVID-19)        Chills,fatigue,HA,    Protocols used: CORONAVIRUS (COVID-19) DIAGNOSED OR VOCBRQNOT-L-EL 8.25.2021      "

## 2021-12-22 ENCOUNTER — OFFICE VISIT (OUTPATIENT)
Dept: FAMILY MEDICINE | Facility: OTHER | Age: 36
End: 2021-12-22
Attending: NURSE PRACTITIONER
Payer: COMMERCIAL

## 2021-12-22 ENCOUNTER — TELEPHONE (OUTPATIENT)
Dept: FAMILY MEDICINE | Facility: OTHER | Age: 36
End: 2021-12-22

## 2021-12-22 DIAGNOSIS — Z20.822 SUSPECTED 2019 NOVEL CORONAVIRUS INFECTION: ICD-10-CM

## 2021-12-22 LAB
FLUAV RNA SPEC QL NAA+PROBE: NEGATIVE
FLUBV RNA RESP QL NAA+PROBE: NEGATIVE
RSV RNA SPEC NAA+PROBE: NEGATIVE
SARS-COV-2 RNA RESP QL NAA+PROBE: NEGATIVE

## 2021-12-22 PROCEDURE — 87637 SARSCOV2&INF A&B&RSV AMP PRB: CPT | Mod: ZL

## 2021-12-22 NOTE — TELEPHONE ENCOUNTER
Received APPROVAL from [employer] for OLANZapine 5MG dispersible tablets. Effective 11/22/2021-12/22/2022. Forms scanned to [facility].

## 2021-12-22 NOTE — TELEPHONE ENCOUNTER
Received a PA from [employer] for OLANZapine 5MG dispersible tablets. Submitted on CMM. Waiting for a response.

## 2021-12-26 DIAGNOSIS — J98.9 REACTIVE AIRWAY DISEASE WITHOUT ASTHMA: ICD-10-CM

## 2021-12-27 DIAGNOSIS — K21.9 GASTROESOPHAGEAL REFLUX DISEASE WITHOUT ESOPHAGITIS: Primary | ICD-10-CM

## 2021-12-27 RX ORDER — ALBUTEROL SULFATE 90 UG/1
AEROSOL, METERED RESPIRATORY (INHALATION)
Qty: 18 G | Refills: 1 | Status: SHIPPED | OUTPATIENT
Start: 2021-12-27 | End: 2022-01-18

## 2021-12-27 NOTE — TELEPHONE ENCOUNTER
omeprazole      Last Written Prescription Date:  12/20/21  Last Fill Quantity: 10,   # refills: 0  Last Office Visit: 12/16/21  Future Office visit:       Routing refill request to provider for review/approval because:  Drug not active on patient's medication list

## 2021-12-27 NOTE — TELEPHONE ENCOUNTER
Ventolin HFA  Last Written Prescription Date: 12/1/21  Last Fill Quantity: 18 g # of Refills: 1  Last Office Visit: 12/16/21

## 2021-12-30 DIAGNOSIS — F41.9 ANXIETY: ICD-10-CM

## 2021-12-30 RX ORDER — OLANZAPINE 5 MG/1
TABLET, ORALLY DISINTEGRATING ORAL
Qty: 20 TABLET | Refills: 0 | Status: SHIPPED | OUTPATIENT
Start: 2021-12-30 | End: 2022-01-12

## 2021-12-30 NOTE — TELEPHONE ENCOUNTER
Zyprexa      Last Written Prescription Date:  12/17/21  Last Fill Quantity: 20,   # refills: 0  Last Office Visit: 12/16/21  Future Office visit:       Routing refill request to provider for review/approval because:

## 2022-01-12 DIAGNOSIS — F41.9 ANXIETY: ICD-10-CM

## 2022-01-12 RX ORDER — OLANZAPINE 5 MG/1
TABLET, ORALLY DISINTEGRATING ORAL
Qty: 20 TABLET | Refills: 1 | Status: SHIPPED | OUTPATIENT
Start: 2022-01-12 | End: 2022-01-18

## 2022-01-14 NOTE — PROGRESS NOTES
Assessment & Plan     Night-waking disorder  We have discussed the process for evaluation for sleep disorders. It does sound as though has some symptoms consistent with sleep apnea but will send him to Sleep Medicine for evaluation.   - SLEEP EVALUATION & MANAGEMENT REFERRAL - ADULT -; Future    Muscle spasm  - cyclobenzaprine (FLEXERIL) 5 MG tablet; TAKE 1 TO 2 TABLETS BY MOUTH THREE TIMES DAILY AS NEEDED FOR MUSCLE SPASMS    Anxiety  Discussed again that he needs to establish with a mental health provider for medication management. He is agreeable.   - OLANZapine zydis (ZYPREXA) 5 MG ODT; DISSOLVE 1 TABLET(5 MG) ON THE TONGUE EVERY 12 HOURS AS NEEDED FOR AGITATION  - Adult Mental Health Referral; Future    Reactive airway disease without asthma  - albuterol (VENTOLIN HFA) 108 (90 Base) MCG/ACT inhaler; INHALE 1 TO 2 PUFFS INTO THE LUNGS EVERY 4 HOURS AS NEEDED FOR SHORTNESS OF BREATH OR DIFFICULT BREATHING OR WHEEZING    Moderate major depression (H)  - Adult Mental Health Referral; Future    Attention deficit hyperactivity disorder (ADHD), predominantly inattentive type  - Adult Mental Health Referral; Future    Chronic post-traumatic stress disorder (PTSD)  - Adult Mental Health Referral; Future    Tobacco abuse  - nicotine (NICODERM CQ) 14 MG/24HR 24 hr patch; Place 1 patch onto the skin every 24 hours for 28 days  - nicotine (NICODERM CQ) 7 MG/24HR 24 hr patch; Place 1 patch onto the skin every 24 hours for 28 days    Lower extremity edema  - Compression Sleeve/Stocking Order for DME - ONLY FOR DME    Colon cancer screening  - Adult General Surg Referral    Nicotine dependence, uncomplicated, unspecified nicotine product type    Use of energy drinks  - Vitamin B12    Localized edema  - CBC with platelets  - Basic metabolic panel                 No follow-ups on file.    Diana Jackman, [facility] - [place] LUPILLO Cramer is a 36 year old who presents for the following health  issues    HPI     Breathing Follow-Up  Shoaib reports that he has has woken up 4 times in the past 2 weeks with shortness of breath. He wakes up and it feels like he is not breathing and gasping for air. He has no prior hx excessive snoring or sleep apnea. Has been not been evaluated with a sleep study in the past.   Neck circumference 43.5 cm. BMI 33.36Kg/m2.    Was ACT completed today?  Yes    ACT Total Scores 1/18/2022   ACT TOTAL SCORE (Goal Greater than or Equal to 20) 12   In the past 12 months, how many times did you visit the emergency room for your asthma without being admitted to the hospital? 0   In the past 12 months, how many times were you hospitalized overnight because of your asthma? 0     No acute exacerbation      How many days per week do you miss taking your asthma controller medication?  I do not have an asthma controller medication    Please describe any recent triggers for your asthma: Patient is unaware of triggers and waking up at night due to asthma    Have you had any Emergency Room Visits, Urgent Care Visits, or Hospital Admissions since your last office visit?  No      How many servings of fruits and vegetables do you eat daily?  0-1    On average, how many sweetened beverages do you drink each day (Examples: soda, juice, sweet tea, etc.  Do NOT count diet or artificially sweetened beverages)?   1    How many days per week do you exercise enough to make your heart beat faster? 7    How many minutes a day do you exercise enough to make your heart beat faster? 30 - 60    How many days per week do you miss taking your medication? 0    Mental Health  In need of new referral to mental health for medication management. He is requesting refill extension this visit for his medications. Reports he tolerates them well.         Review of Systems   Constitutional, HEENT, cardiovascular, pulmonary, gi and gu systems are negative, except as otherwise noted.      Objective    /72 (BP Location:  "Right arm, Patient Position: Sitting, Cuff Size: Adult Large)   Pulse 108   Temp 97.8  F (36.6  C) (Tympanic)   Resp 16   Ht 1.702 m (5' 7\")   Wt 96.6 kg (213 lb)   SpO2 97%   BMI 33.36 kg/m    Body mass index is 33.36 kg/m .  Physical Exam   GENERAL: healthy, alert and no distress  EYES: Eyes grossly normal to inspection, PERRL and conjunctivae and sclerae normal  HENT: ear canals and TM's normal, nose and mouth without ulcers or lesions  NECK: no adenopathy, no asymmetry, masses, or scars and thyroid normal to palpation  RESP: lungs clear to auscultation - no rales, rhonchi or wheezes  CV: regular rate and rhythm, normal S1 S2, no S3 or S4, no murmur, click or rub, no peripheral edema and peripheral pulses strong  ABDOMEN: soft, nontender, no hepatosplenomegaly, no masses and bowel sounds normal  MS: no gross musculoskeletal defects noted, no edema  SKIN: no suspicious lesions or rashes  NEURO: Normal strength and tone, mentation intact and speech normal  PSYCH: mentation appears normal, anxious, speech pressured, judgement and insight intact and appearance well groomed    Results for orders placed or performed in visit on 01/18/22   Vitamin B12     Status: Normal   Result Value Ref Range    Vitamin B12 474 193 - 986 pg/mL   CBC with platelets     Status: Normal   Result Value Ref Range    WBC Count 6.9 4.0 - 11.0 10e3/uL    RBC Count 5.13 4.40 - 5.90 10e6/uL    Hemoglobin 15.5 13.3 - 17.7 g/dL    Hematocrit 44.7 40.0 - 53.0 %    MCV 87 78 - 100 fL    MCH 30.2 26.5 - 33.0 pg    MCHC 34.7 31.5 - 36.5 g/dL    RDW 13.4 10.0 - 15.0 %    Platelet Count 294 150 - 450 10e3/uL   Basic metabolic panel     Status: Abnormal   Result Value Ref Range    Sodium 137 133 - 144 mmol/L    Potassium 4.0 3.4 - 5.3 mmol/L    Chloride 107 94 - 109 mmol/L    Carbon Dioxide (CO2) 23 20 - 32 mmol/L    Anion Gap 7 3 - 14 mmol/L    Urea Nitrogen 18 7 - 30 mg/dL    Creatinine 0.91 0.66 - 1.25 mg/dL    Calcium 9.4 8.5 - 10.1 mg/dL    " Glucose 133 (H) 70 - 99 mg/dL    GFR Estimate >90 >60 mL/min/1.73m2

## 2022-01-15 ENCOUNTER — HEALTH MAINTENANCE LETTER (OUTPATIENT)
Age: 37
End: 2022-01-15

## 2022-01-18 ENCOUNTER — OFFICE VISIT (OUTPATIENT)
Dept: FAMILY MEDICINE | Facility: OTHER | Age: 37
End: 2022-01-18
Attending: NURSE PRACTITIONER
Payer: COMMERCIAL

## 2022-01-18 VITALS
SYSTOLIC BLOOD PRESSURE: 122 MMHG | WEIGHT: 213 LBS | HEIGHT: 67 IN | OXYGEN SATURATION: 97 % | HEART RATE: 108 BPM | BODY MASS INDEX: 33.43 KG/M2 | RESPIRATION RATE: 16 BRPM | TEMPERATURE: 97.8 F | DIASTOLIC BLOOD PRESSURE: 72 MMHG

## 2022-01-18 DIAGNOSIS — F32.1 MODERATE MAJOR DEPRESSION (H): ICD-10-CM

## 2022-01-18 DIAGNOSIS — G47.20 NIGHT-WAKING DISORDER: Primary | ICD-10-CM

## 2022-01-18 DIAGNOSIS — F90.0 ATTENTION DEFICIT HYPERACTIVITY DISORDER (ADHD), PREDOMINANTLY INATTENTIVE TYPE: ICD-10-CM

## 2022-01-18 DIAGNOSIS — F43.12 CHRONIC POST-TRAUMATIC STRESS DISORDER (PTSD): ICD-10-CM

## 2022-01-18 DIAGNOSIS — J98.9 REACTIVE AIRWAY DISEASE WITHOUT ASTHMA: ICD-10-CM

## 2022-01-18 DIAGNOSIS — M62.838 MUSCLE SPASM: ICD-10-CM

## 2022-01-18 DIAGNOSIS — Z78.9 USE OF ENERGY DRINKS: ICD-10-CM

## 2022-01-18 DIAGNOSIS — F17.200 NICOTINE DEPENDENCE, UNCOMPLICATED, UNSPECIFIED NICOTINE PRODUCT TYPE: ICD-10-CM

## 2022-01-18 DIAGNOSIS — R60.0 LOCALIZED EDEMA: ICD-10-CM

## 2022-01-18 DIAGNOSIS — R60.0 LOWER EXTREMITY EDEMA: ICD-10-CM

## 2022-01-18 DIAGNOSIS — Z12.11 COLON CANCER SCREENING: ICD-10-CM

## 2022-01-18 DIAGNOSIS — F41.9 ANXIETY: ICD-10-CM

## 2022-01-18 DIAGNOSIS — Z72.0 TOBACCO ABUSE: ICD-10-CM

## 2022-01-18 LAB
ANION GAP SERPL CALCULATED.3IONS-SCNC: 7 MMOL/L (ref 3–14)
BUN SERPL-MCNC: 18 MG/DL (ref 7–30)
CALCIUM SERPL-MCNC: 9.4 MG/DL (ref 8.5–10.1)
CHLORIDE BLD-SCNC: 107 MMOL/L (ref 94–109)
CO2 SERPL-SCNC: 23 MMOL/L (ref 20–32)
CREAT SERPL-MCNC: 0.91 MG/DL (ref 0.66–1.25)
ERYTHROCYTE [DISTWIDTH] IN BLOOD BY AUTOMATED COUNT: 13.4 % (ref 10–15)
GFR SERPL CREATININE-BSD FRML MDRD: >90 ML/MIN/1.73M2
GLUCOSE BLD-MCNC: 133 MG/DL (ref 70–99)
HCT VFR BLD AUTO: 44.7 % (ref 40–53)
HGB BLD-MCNC: 15.5 G/DL (ref 13.3–17.7)
MCH RBC QN AUTO: 30.2 PG (ref 26.5–33)
MCHC RBC AUTO-ENTMCNC: 34.7 G/DL (ref 31.5–36.5)
MCV RBC AUTO: 87 FL (ref 78–100)
PLATELET # BLD AUTO: 294 10E3/UL (ref 150–450)
POTASSIUM BLD-SCNC: 4 MMOL/L (ref 3.4–5.3)
RBC # BLD AUTO: 5.13 10E6/UL (ref 4.4–5.9)
SODIUM SERPL-SCNC: 137 MMOL/L (ref 133–144)
WBC # BLD AUTO: 6.9 10E3/UL (ref 4–11)

## 2022-01-18 PROCEDURE — 80048 BASIC METABOLIC PNL TOTAL CA: CPT | Mod: ZL | Performed by: NURSE PRACTITIONER

## 2022-01-18 PROCEDURE — 82607 VITAMIN B-12: CPT | Mod: ZL | Performed by: NURSE PRACTITIONER

## 2022-01-18 PROCEDURE — 85027 COMPLETE CBC AUTOMATED: CPT | Mod: ZL | Performed by: NURSE PRACTITIONER

## 2022-01-18 PROCEDURE — 36415 COLL VENOUS BLD VENIPUNCTURE: CPT | Mod: ZL | Performed by: NURSE PRACTITIONER

## 2022-01-18 PROCEDURE — 99214 OFFICE O/P EST MOD 30 MIN: CPT | Performed by: NURSE PRACTITIONER

## 2022-01-18 PROCEDURE — G0463 HOSPITAL OUTPT CLINIC VISIT: HCPCS

## 2022-01-18 RX ORDER — MIRTAZAPINE 15 MG/1
15 TABLET, FILM COATED ORAL
COMMUNITY
Start: 2021-12-22 | End: 2022-03-21

## 2022-01-18 RX ORDER — ALBUTEROL SULFATE 90 UG/1
AEROSOL, METERED RESPIRATORY (INHALATION)
Qty: 18 G | Refills: 1 | Status: SHIPPED | OUTPATIENT
Start: 2022-01-18 | End: 2022-02-04

## 2022-01-18 RX ORDER — OLANZAPINE 5 MG/1
TABLET, ORALLY DISINTEGRATING ORAL
Qty: 20 TABLET | Refills: 1 | Status: SHIPPED | OUTPATIENT
Start: 2022-01-18 | End: 2022-02-04

## 2022-01-18 RX ORDER — CYCLOBENZAPRINE HCL 5 MG
TABLET ORAL
Qty: 30 TABLET | Refills: 0 | Status: SHIPPED | OUTPATIENT
Start: 2022-01-18 | End: 2022-06-20

## 2022-01-18 RX ORDER — NICOTINE 21 MG/24HR
1 PATCH, TRANSDERMAL 24 HOURS TRANSDERMAL EVERY 24 HOURS
Qty: 28 PATCH | Refills: 0 | Status: SHIPPED | OUTPATIENT
Start: 2022-01-18 | End: 2022-02-16

## 2022-01-18 ASSESSMENT — ANXIETY QUESTIONNAIRES
5. BEING SO RESTLESS THAT IT IS HARD TO SIT STILL: SEVERAL DAYS
GAD7 TOTAL SCORE: 4
1. FEELING NERVOUS, ANXIOUS, OR ON EDGE: SEVERAL DAYS
IF YOU CHECKED OFF ANY PROBLEMS ON THIS QUESTIONNAIRE, HOW DIFFICULT HAVE THESE PROBLEMS MADE IT FOR YOU TO DO YOUR WORK, TAKE CARE OF THINGS AT HOME, OR GET ALONG WITH OTHER PEOPLE: SOMEWHAT DIFFICULT
2. NOT BEING ABLE TO STOP OR CONTROL WORRYING: NOT AT ALL
3. WORRYING TOO MUCH ABOUT DIFFERENT THINGS: SEVERAL DAYS
7. FEELING AFRAID AS IF SOMETHING AWFUL MIGHT HAPPEN: NOT AT ALL
6. BECOMING EASILY ANNOYED OR IRRITABLE: NOT AT ALL

## 2022-01-18 ASSESSMENT — PAIN SCALES - GENERAL: PAINLEVEL: NO PAIN (0)

## 2022-01-18 ASSESSMENT — PATIENT HEALTH QUESTIONNAIRE - PHQ9
SUM OF ALL RESPONSES TO PHQ QUESTIONS 1-9: 4
5. POOR APPETITE OR OVEREATING: SEVERAL DAYS

## 2022-01-18 ASSESSMENT — MIFFLIN-ST. JEOR: SCORE: 1854.79

## 2022-01-18 NOTE — NURSING NOTE
"Chief Complaint   Patient presents with     Colonoscopy     Multiple Sclerosis       Initial /72 (BP Location: Right arm, Patient Position: Sitting, Cuff Size: Adult Large)   Pulse 108   Temp 97.8  F (36.6  C) (Tympanic)   Resp 16   Ht 1.702 m (5' 7\")   Wt 96.6 kg (213 lb)   SpO2 97%   BMI 33.36 kg/m   Estimated body mass index is 33.36 kg/m  as calculated from the following:    Height as of this encounter: 1.702 m (5' 7\").    Weight as of this encounter: 96.6 kg (213 lb).  Medication Reconciliation: complete  Nellie Magana LPN    "

## 2022-01-19 LAB — VIT B12 SERPL-MCNC: 474 PG/ML (ref 193–986)

## 2022-01-19 ASSESSMENT — ANXIETY QUESTIONNAIRES: GAD7 TOTAL SCORE: 4

## 2022-01-19 ASSESSMENT — ASTHMA QUESTIONNAIRES: ACT_TOTALSCORE: 12

## 2022-02-01 ENCOUNTER — DOCUMENTATION ONLY (OUTPATIENT)
Dept: SLEEP MEDICINE | Facility: HOSPITAL | Age: 37
End: 2022-02-01
Payer: COMMERCIAL

## 2022-02-01 NOTE — PROGRESS NOTES
STOP BANG       Name: Hoang Kiser MRN# [id number]   Age: 36 year old YOB: 1985     Stop Bang questionnaire completed with a score of >3 to allow for HST     Have you been told you snore loudly (louder than talking or loud enough to be heard through doors)? NO    Do you often feel tired, fatigued, or sleepy during the daytime? NO    Has anyone observed you stop breathing during your sleep? YES    Do you have or are you being treated for high blood pressure? NO    Is your BMI greater than 35? NO    Is your neck size circumference 16 inches or greater? NO    Are you over 50 years old? NO    Stop Bang Score (# of yes): 2
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
## 2022-02-02 NOTE — PROGRESS NOTES
Chart review prior to sleep testing.    Patient Summary:  36 year old yo male who is referred for concern for sleep-disordered breathing.    Patient Active Problem List    Diagnosis Date Noted     Chronic pain syndrome 2019     Priority: High     Skin disorder 2021     Priority: Medium     Suicidal ideation 2021     Priority: Medium     Movement disorder 2020     Priority: Medium     Fatigue, unspecified type 2020     Priority: Medium     Generalized social phobia 2019     Priority: Medium     Stimulant dependence (H) 2019     Priority: Medium     Chronic post-traumatic stress disorder (PTSD) 2019     Priority: Medium     Nonallergic rhinitis 2019     Priority: Medium     History of electroencephalogram 04/10/2019     Priority: Medium     EEG         Procedure Date: 2019      EEG #:  .        DATE OF EE2019.      SOURCE FILE DURATION:  15 minutes.  This EEG did not have a video.      PATIENT INFORMATION:  The patient is a 33-year-old male with irregular movements.  It is not entirely clear the etiology of these movements.  EEG is being done to evaluate for seizures.      MEDICATIONS:  Adderall, doxepin, Cymbalta, Robaxin.      TECHNICAL SUMMARY: This video EEG monitoring procedure was performed with 23 scalp electrodes in 10-20 system placements, and additional scalp, precordial and other surface electrodes used for electrical referencing and artifact detection. Video was reviewed intermittently by EEG technologist and physician for electroclinical seizures.      BACKGROUND ACTIVITY:  During wakefulness, the background activity consists of synchronous and symmetric, well modulated, 9-10 Hz posterior dominant rhythm. The posterior dominant rhythm attenuated with eye opening. During drowsiness, the background activity waxed and waned and there were periods of slowing and attenuation of the posterior alpha rhythm. Stage I sleep and Stage II  sleep was recorded in which synchronous and symmetrical vertex waves , K-complexes and sleep spindles  were identified.  No focal abnormalities were observed. Throughout the recording, the patient had a lot of movement artifact and blinking.  That made it difficult to interpret many segments of the EEG.  He was awake for much of the record.  Towards the end, he did become drowsy at which point there was intermittent slowing in the left and right temporal region.  Of note, as the patient became drowsy, the frequency of his movements decreased and this was best seen at 09:39.  In drowsiness, sometimes, for instance at 09:43, he did have large movements followed by upper body movements.      ACTIVATION PROCEDURES:  Photic stimulation and hyperventilation was done with no significant abnormalities.      ICTAL:  The patient did state that photic stimulation aggravated his movement and gave him a feeling of dissociation.      EPILEPTIFORM DISCHARGES:  None.      ICTAL:  None.      IMPRESSION:  This awake and drowsy routine EEG with no video was normal.  The patient had a lot of movements during this EEG and expressed that he had a feeling of disassociation during photic stimulation.  Of note, as patient became more drowsy the frequency of movements decreased.  However,he continued to have these movements in stage 1 sleep. If clinically indicated, may consider further video EEG to understand if the patient has these movements in sleep.  No electrographic seizures, no epileptiform discharges are seen.  Clinical correlation is advised.         HILARIA PATEL MD             D: 03/15/2019   T: 2019   MTAnastacia baer      Name:     VLADISLAV LEUNG   MRN:      [id number]        Account:        [id number]   :      1985           Procedure Date: 2019      Document: [id number]           3/18/2019 11:57 CASANDRA - Hilaria Patel MD     Lab and Collection     EEG (Order: [id number]) - 3/18/2019   Result History     EEG  (Order #[id number]) on 3/15/2019 - Order Result History Report   Patient Release Status:     This result is not viewable by the patient.          Transcription                []Hide copied text    []Shell for details      Procedure Date: 2019      EEG #:  .        DATE OF EE2019.      SOURCE FILE DURATION:  15 minutes.  This EEG did not have a video.      PATIENT INFORMATION:  The patient is a 33-year-old male with irregular movements.  It is not entirely clear the etiology of these movements.  EEG is being done to evaluate for seizures.      MEDICATIONS:  Adderall, doxepin, Cymbalta, Robaxin.      TECHNICAL SUMMARY: This video EEG monitoring procedure was performed with 23 scalp electrodes in 10-20 system placements, and additional scalp, precordial and other surface electrodes used for electrical referencing and artifact detection. Video was reviewed intermittently by EEG technologist and physician for electroclinical seizures.      BACKGROUND ACTIVITY:  During wakefulness, the background activity consists of synchronous and symmetric, well modulated, 9-10 Hz posterior dominant rhythm. The posterior dominant rhythm attenuated with eye opening. During drowsiness, the background activity waxed and waned and there were periods of slowing and attenuation of the posterior alpha rhythm. Stage I sleep and Stage II sleep was recorded in which synchronous and symmetrical vertex waves , K-complexes and sleep spindles  were identified.  No focal abnormalities were observed. Throughout the recording, the patient had a lot of movement artifact and blinking.  That made it difficult to interpret many segments of the EEG.  He was awake for much of the record.  Towards the end, he did become drowsy at which point there was intermittent slowing in the left and right temporal region.  Of note, as the patient became drowsy, the frequency of his movements decreased and this was best seen at 09:39.  In drowsiness,  sometimes, for instance at 09:43, he did have large movements followed by upper body movements.      ACTIVATION PROCEDURES:  Photic stimulation and hyperventilation was done with no significant abnormalities.      ICTAL:  The patient did state that photic stimulation aggravated his movement and gave him a feeling of dissociation.      EPILEPTIFORM DISCHARGES:  None.      ICTAL:  None.      IMPRESSION:  This awake and drowsy routine EEG with no video was normal.  The patient had a lot of movements during this EEG and expressed that he had a feeling of disassociation during photic stimulation.  Of note, as patient became more drowsy the frequency of movements decreased.  However,he continued to have these movements in stage 1 sleep. If clinically indicated, may consider further video EEG to understand if the patient has these movements in sleep.  No electrographic seizures, no epileptiform discharges are seen.  Clinical correlation is advised.         HILARIA PATEL MD             D: 03/15/2019   T: 2019   MT: buffy      Name:     VLADISLAV LEUNG   MRN:      [id number]        Account:        [id number]   :      1985           Procedure Date: 2019      Document: [id number]               Document Information               Family history of multiple myeloma 2019     Priority: Medium     History of substance abuse (H) 2019     Priority: Medium     hx of meth, none since         Family history of Crohn's disease 2018     Priority: Medium     Functional movement disorder 2018     Priority: Medium     Chronic left hip pain 2018     Priority: Medium     Chronic pain of right hip 2018     Priority: Medium     Degenerative disc disease at L5-S1 level 2018     Priority: Medium     Anxiety 2016     Priority: Medium     Posttraumatic stress disorder with dissociative symptoms 2016     Priority: Medium     Abnormal involuntary movement 2016      Priority: Medium     Tic disorder 06/02/2016     Priority: Medium     Panic disorder without agoraphobia 04/05/2016     Priority: Medium     Primary insomnia 12/10/2015     Priority: Medium     Attention deficit hyperactivity disorder (ADHD), predominantly inattentive type 10/26/2015     Priority: Medium     Patient is followed by ROMEO RODRIGES for ongoing prescription of stimulants.  All refills should be approved by this provider, or covering partner.    Medication(s): adderall XR 25 and adderall XR 10.   Maximum quantity per month: 30 of each  Clinic visit frequency required: Q 6  months     Controlled substance agreement on file: No  Neuropsych evaluation for ADD completed:  Yes, completed 8-5-13, on file and diagnosis confirmed in letters    Last [facility] website verification:  none   [email or web address]           Tobacco abuse 07/10/2015     Priority: Medium     Moderate major depression (H) 05/05/2015     Priority: Medium     on and off medication for depression since 2003       Neuropathy 03/21/2013     Priority: Medium     Chronic low back pain 02/11/2019     Priority: Low       Current Outpatient Medications   Medication     ADDERALL XR 30 MG 24 hr capsule     albuterol (VENTOLIN HFA) 108 (90 Base) MCG/ACT inhaler     cyclobenzaprine (FLEXERIL) 5 MG tablet     furosemide (LASIX) 20 MG tablet     hydrOXYzine (ATARAX) 25 MG tablet     mirtazapine (REMERON) 15 MG tablet     naloxone (NARCAN) 4 MG/0.1ML nasal spray     nicotine (NICODERM CQ) 14 MG/24HR 24 hr patch     nicotine (NICODERM CQ) 7 MG/24HR 24 hr patch     OLANZapine zydis (ZYPREXA) 5 MG ODT     omeprazole (PRILOSEC) 20 MG DR capsule     rivaroxaban ANTICOAGULANT (XARELTO ANTICOAGULANT) 20 MG TABS tablet     vilazodone (VIIBRYD) 10 MG TABS tablet     vilazodone (VIIBRYD) 20 MG TABS tablet     No current facility-administered medications for this visit.     Pertinent PMHx of insomnia, BPV, GERD, chronic pain syndrome, MDD, ADHD, unclear  "involuntary movements, PTSD with dissociative symptoms, panic disorder with agoraphobia, prior substance abuse.    Noted for gasping arousals with  on visit with Dr. Jackman on 2022.    Prior visits with [facility] sleep clinic:  2019 - Initial consult with Subha Ravi for insomnia.  Plan for actigraphy x 2 weeks and CBT-I.  3/22/2019 - Visit with Dr. Layne of sleep psychology.  2019 - \"\".  2019 - \"\".  Improved, goal sleep windows of 11pm - 8am    STOP-BANG score of 2, with unknown neck circumference.  [place] score of 7.  BMI of Estimated body mass index is 33.36 kg/m  as calculated from the following:    Height as of 22: 1.702 m (5' 7\").    Weight as of 22: 96.6 kg (213 lb).     Per questionnaire: \"Sleep apnea\"    Sxs for 4 months.      Caffeine use:  No for 3+ per day.  No for within 6 hours of bed.    Tobacco use: Yes    Sleep pattern:  10pm - 7am, total sleep time ? hours.  Time to fall asleep: ~10-30 minutes.  Awakenings: 1-3 times per night, 10-60 minutes to return to sleep.  Napping.  1-2 days per week, 1 hours per nap.    Yes for RLS screen.  No for sleep walking.  No for dream enactment behavior.  No for bruxism.    No for morning headaches.  Yes for snoring.  No for observed apnea.  No for FHx of PAU.    SHx:  Single, lives with father / mother / brother.  Not currently working.    A/P:  1.)  Lower pretest likelihood of PAU with STOP-BANG score of 2.   - Would recommend in-lab PSG.    ---  This note was written with the assistance of the Dragon voice-dictation technology software. The final document, although reviewed, may contain errors. For corrections, please contact the office.    James Barnett MD    Sleep Medicine  [facility]  ([phone])  [facility]  ([phone])    "

## 2022-02-03 DIAGNOSIS — J98.9 REACTIVE AIRWAY DISEASE WITHOUT ASTHMA: ICD-10-CM

## 2022-02-03 RX ORDER — ALBUTEROL SULFATE 90 UG/1
AEROSOL, METERED RESPIRATORY (INHALATION)
Qty: 18 G | Refills: 1 | Status: CANCELLED | OUTPATIENT
Start: 2022-02-03

## 2022-02-04 DIAGNOSIS — F41.9 ANXIETY: ICD-10-CM

## 2022-02-04 RX ORDER — OLANZAPINE 5 MG/1
TABLET, ORALLY DISINTEGRATING ORAL
Qty: 20 TABLET | Refills: 1 | Status: SHIPPED | OUTPATIENT
Start: 2022-02-04 | End: 2022-02-25

## 2022-02-04 RX ORDER — ALBUTEROL SULFATE 90 UG/1
AEROSOL, METERED RESPIRATORY (INHALATION)
Qty: 18 G | Refills: 1 | Status: SHIPPED | OUTPATIENT
Start: 2022-02-04 | End: 2022-06-20

## 2022-02-16 ENCOUNTER — PATIENT OUTREACH (OUTPATIENT)
Dept: ONCOLOGY | Facility: CLINIC | Age: 37
End: 2022-02-16
Payer: COMMERCIAL

## 2022-02-16 ENCOUNTER — OFFICE VISIT (OUTPATIENT)
Dept: SURGERY | Facility: OTHER | Age: 37
End: 2022-02-16
Attending: NURSE PRACTITIONER
Payer: COMMERCIAL

## 2022-02-16 VITALS
OXYGEN SATURATION: 97 % | DIASTOLIC BLOOD PRESSURE: 60 MMHG | WEIGHT: 213 LBS | HEIGHT: 67 IN | SYSTOLIC BLOOD PRESSURE: 112 MMHG | HEART RATE: 105 BPM | BODY MASS INDEX: 33.43 KG/M2

## 2022-02-16 DIAGNOSIS — G47.33 OSA (OBSTRUCTIVE SLEEP APNEA): Primary | ICD-10-CM

## 2022-02-16 DIAGNOSIS — I82.441 ACUTE DEEP VEIN THROMBOSIS (DVT) OF TIBIAL VEIN OF RIGHT LOWER EXTREMITY (H): ICD-10-CM

## 2022-02-16 DIAGNOSIS — Z12.11 COLON CANCER SCREENING: ICD-10-CM

## 2022-02-16 DIAGNOSIS — R60.0 LOCALIZED EDEMA: ICD-10-CM

## 2022-02-16 DIAGNOSIS — Z79.01 LONG TERM CURRENT USE OF ANTICOAGULANT THERAPY: ICD-10-CM

## 2022-02-16 PROCEDURE — 99202 OFFICE O/P NEW SF 15 MIN: CPT | Performed by: SURGERY

## 2022-02-16 PROCEDURE — G0463 HOSPITAL OUTPT CLINIC VISIT: HCPCS

## 2022-02-16 RX ORDER — FUROSEMIDE 20 MG
20 TABLET ORAL DAILY
Qty: 30 TABLET | Refills: 5 | Status: SHIPPED | OUTPATIENT
Start: 2022-02-16 | End: 2023-03-09

## 2022-02-16 ASSESSMENT — PAIN SCALES - GENERAL: PAINLEVEL: NO PAIN (0)

## 2022-02-16 NOTE — NURSING NOTE
"Chief Complaint   Patient presents with     Consult     colon cancer screening, referred by Diana Jackman. Last colonoscopy 2/5/18 for ulcerative colitis. family history of colon cancer in uncle at age 21.       Initial /60   Pulse 105   Ht 1.702 m (5' 7\")   Wt 96.6 kg (213 lb)   SpO2 97%   BMI 33.36 kg/m   Estimated body mass index is 33.36 kg/m  as calculated from the following:    Height as of this encounter: 1.702 m (5' 7\").    Weight as of this encounter: 96.6 kg (213 lb).  Medication Reconciliation: complete  MAGGY LIMA LPN    "

## 2022-02-16 NOTE — PATIENT INSTRUCTIONS
Thank you for allowing Dr. Hopkins and our surgical team to participate in your care. Please call our health unit coordinator at [phone] with scheduling questions or the nurse at [phone] with any other questions or concerns.

## 2022-02-16 NOTE — TELEPHONE ENCOUNTER
Xarelto      Last Written Prescription Date:  12/01/21  Last Fill Quantity: 90,   # refills: 0  Last Office Visit: 01/18/22  Future Office visit:    Next 5 appointments (look out 90 days)    Mar 13, 2022 10:10 AM  (Arrive by 9:55 AM)  SHORT with HC COLLECTION  [facility] [address] [address]  [phone]           Routing refill request to provider for review/approval because:  Phone call    Furosemide 20 MG      Last Written Prescription Date:  12/01/21  Last Fill Quantity: 30,   # refills: 5  Last Office Visit: 01/18/22  Future Office visit:    Next 5 appointments (look out 90 days)    Mar 13, 2022 10:10 AM  (Arrive by 9:55 AM)  SHORT with HC COLLECTION  [facility] ([facility] ) [address] [address]  [phone]           Routing refill request to provider for review/approval because:  Phone call    PCP Gasper

## 2022-02-17 PROBLEM — Z92.89 HISTORY OF ELECTROENCEPHALOGRAM: Status: ACTIVE | Noted: 2019-04-10

## 2022-02-18 ENCOUNTER — HOSPITAL ENCOUNTER (OUTPATIENT)
Dept: ULTRASOUND IMAGING | Facility: HOSPITAL | Age: 37
Discharge: HOME | End: 2022-02-18
Attending: NURSE PRACTITIONER | Admitting: NURSE PRACTITIONER
Payer: COMMERCIAL

## 2022-02-18 DIAGNOSIS — I82.441 ACUTE DEEP VEIN THROMBOSIS (DVT) OF TIBIAL VEIN OF RIGHT LOWER EXTREMITY (H): ICD-10-CM

## 2022-02-18 PROCEDURE — 93971 EXTREMITY STUDY: CPT | Mod: RT
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
## 2022-03-21 ENCOUNTER — OFFICE VISIT (OUTPATIENT)
Dept: FAMILY MEDICINE | Facility: OTHER | Age: 37
End: 2022-03-21
Attending: FAMILY MEDICINE
Payer: COMMERCIAL

## 2022-03-21 VITALS
SYSTOLIC BLOOD PRESSURE: 122 MMHG | BODY MASS INDEX: 33.74 KG/M2 | TEMPERATURE: 97 F | WEIGHT: 215 LBS | DIASTOLIC BLOOD PRESSURE: 84 MMHG | HEART RATE: 107 BPM | OXYGEN SATURATION: 98 % | HEIGHT: 67 IN

## 2022-03-21 DIAGNOSIS — M50.30 DDD (DEGENERATIVE DISC DISEASE), CERVICAL: ICD-10-CM

## 2022-03-21 DIAGNOSIS — M25.552 CHRONIC LEFT HIP PAIN: ICD-10-CM

## 2022-03-21 DIAGNOSIS — G89.4 PAIN SYNDROME, CHRONIC: Primary | ICD-10-CM

## 2022-03-21 DIAGNOSIS — G89.29 CHRONIC LEFT HIP PAIN: ICD-10-CM

## 2022-03-21 DIAGNOSIS — M51.369 DDD (DEGENERATIVE DISC DISEASE), LUMBAR: ICD-10-CM

## 2022-03-21 PROCEDURE — G0463 HOSPITAL OUTPT CLINIC VISIT: HCPCS

## 2022-03-21 PROCEDURE — 99214 OFFICE O/P EST MOD 30 MIN: CPT | Performed by: FAMILY MEDICINE

## 2022-03-21 RX ORDER — PREGABALIN 75 MG/1
75 CAPSULE ORAL 2 TIMES DAILY
Qty: 60 CAPSULE | Refills: 1 | Status: SHIPPED | OUTPATIENT
Start: 2022-03-21 | End: 2022-05-16

## 2022-03-21 RX ORDER — DOXEPIN HYDROCHLORIDE 50 MG/1
CAPSULE ORAL
COMMUNITY
Start: 2022-02-28 | End: 2022-11-10

## 2022-03-21 RX ORDER — CYCLOBENZAPRINE HCL 5 MG
TABLET ORAL
Qty: 30 TABLET | Refills: 0 | Status: CANCELLED | OUTPATIENT
Start: 2022-03-21

## 2022-03-21 ASSESSMENT — PATIENT HEALTH QUESTIONNAIRE - PHQ9: SUM OF ALL RESPONSES TO PHQ QUESTIONS 1-9: 14

## 2022-03-21 ASSESSMENT — ANXIETY QUESTIONNAIRES
2. NOT BEING ABLE TO STOP OR CONTROL WORRYING: NEARLY EVERY DAY
1. FEELING NERVOUS, ANXIOUS, OR ON EDGE: NEARLY EVERY DAY
GAD7 TOTAL SCORE: 21
7. FEELING AFRAID AS IF SOMETHING AWFUL MIGHT HAPPEN: NEARLY EVERY DAY
5. BEING SO RESTLESS THAT IT IS HARD TO SIT STILL: NEARLY EVERY DAY
4. TROUBLE RELAXING: NEARLY EVERY DAY
6. BECOMING EASILY ANNOYED OR IRRITABLE: NEARLY EVERY DAY
3. WORRYING TOO MUCH ABOUT DIFFERENT THINGS: NEARLY EVERY DAY

## 2022-03-21 ASSESSMENT — PAIN SCALES - GENERAL: PAINLEVEL: EXTREME PAIN (8)

## 2022-03-21 NOTE — PATIENT INSTRUCTIONS
Referral to [facility] - for 1 time consult and recommendations.    Trial of Lyrica 75 mg twice daily for chronic pain.    Follow up with your psychiatrist as well for medication management and to set up counseling.

## 2022-03-21 NOTE — NURSING NOTE
"Chief Complaint   Patient presents with     Recheck Medication       Initial /84 (BP Location: Right arm, Patient Position: Sitting, Cuff Size: Adult Regular)   Pulse 107   Temp 97  F (36.1  C) (Tympanic)   Ht 1.702 m (5' 7\")   Wt 97.5 kg (215 lb)   SpO2 98%   BMI 33.67 kg/m   Estimated body mass index is 33.67 kg/m  as calculated from the following:    Height as of this encounter: 1.702 m (5' 7\").    Weight as of this encounter: 97.5 kg (215 lb).  Medication Reconciliation: complete  Ana Layton LPN  "

## 2022-03-22 ASSESSMENT — ANXIETY QUESTIONNAIRES: GAD7 TOTAL SCORE: 21

## 2022-03-29 ENCOUNTER — NURSE TRIAGE (OUTPATIENT)
Dept: FAMILY MEDICINE | Facility: OTHER | Age: 37
End: 2022-03-29
Payer: COMMERCIAL

## 2022-03-29 NOTE — TELEPHONE ENCOUNTER
"Pt calling and has upper left rib pain that goes to his upper back for the last 2-3 days with little bit of SOB with activity.  He is not SOB at this time. Did hurt worse when he expands his chest.States pain is horrible at 7-8/10,throbs and radiates.Has kept him from sleeping. He has a history of blood clots in his legs and was taken of Xarelto about a month ago.Per care advice pt needs to be seen in ED.He verbalized understanding.He asked to make an appt and updated ED will give date to follow up with PCP.He verbalized understanding.    Zonia Garcia RN      Reason for Disposition    History of prior \"blood clot\" in leg or lungs (i.e., deep vein thrombosis, pulmonary embolism)    Additional Information    Negative: Severe difficulty breathing (e.g., struggling for each breath, speaks in single words)    Negative: Difficult to awaken or acting confused (e.g., disoriented, slurred speech)    Negative: Shock suspected (e.g., cold/pale/clammy skin, too weak to stand, low BP, rapid pulse)    Negative: [1] Chest pain lasts > 5 minutes AND [2] history of heart disease  (i.e., heart attack, bypass surgery, angina, angioplasty, CHF; not just a heart murmur)    Negative: [1] Chest pain lasts > 5 minutes AND [2] described as crushing, pressure-like, or heavy    Negative: Sounds like a life-threatening emergency to the triager    Negative: Visible sweat on face or sweat dripping down face    Negative: Heart beating < 50 beats per minute OR > 140 beats per minute    Negative: Passed out (i.e., lost consciousness, collapsed and was not responding)    Negative: [1] Chest pain lasts > 5 minutes AND [2] not relieved with nitroglycerin    Negative: [1] Chest pain lasts > 5 minutes AND [2] age > 50    Negative: Followed a chest injury    Negative: [1] Intermittent  chest pain or \"angina\" AND [2] increasing in severity or frequency  (Exception: pains lasting a few seconds)    Negative: Cocaine use within last 3 days    Negative: " "Coughing up blood    Negative: Dizziness or lightheadedness    Negative: Difficulty breathing    Negative: Pain also present in shoulder(s) or arm(s) or jaw  (Exception: pain is clearly made worse by movement)    Negative: SEVERE chest pain    Answer Assessment - Initial Assessment Questions  1. LOCATION: \"Where does it hurt?\"        Left upper chest and wraps to the back,back is more painful, left upper rib cage  2. RADIATION: \"Does the pain go anywhere else?\" (e.g., into neck, jaw, arms, back)      Little to back  3. ONSET: \"When did the chest pain begin?\" (Minutes, hours or days)       2-3 days ago  4. PATTERN \"Does the pain come and go, or has it been constant since it started?\"  \"Does it get worse with exertion?\"    constant has not slept because of it  5. DURATION: \"How long does it last\" (e.g., seconds, minutes, hours)      constant  6. SEVERITY: \"How bad is the pain?\"  (e.g., Scale 1-10; mild, moderate, or severe)     - MILD (1-3): doesn't interfere with normal activities      - MODERATE (4-7): interferes with normal activities or awakens from sleep     - SEVERE (8-10): excruciating pain, unable to do any normal activities        7-8 describes as horrilbe,throbs,radiates.pressure like  7. CARDIAC RISK FACTORS: \"Do you have any history of heart problems or risk factors for heart disease?\" (e.g., prior heart attack, angina; high blood pressure, diabetes, being overweight, high cholesterol, smoking, or strong family history of heart disease)      smokes  8. PULMONARY RISK FACTORS: \"Do you have any history of lung disease?\"  (e.g., blood clots in lung, asthma, emphysema, birth control pills)      Blood clots in legs  9. CAUSE: \"What do you think is causing the chest pain?\"      dont know but concerned about pneumonia.SOB a little bit.SOB with activity,no cough.Not SOB at this times  10. OTHER SYMPTOMS: \"Do you have any other symptoms?\" (e.g., dizziness, nausea, vomiting, sweating, fever, difficulty breathing, " "cough)        no  11. PREGNANCY: \"Is there any chance you are pregnant?\" \"When was your last menstrual period?\"        na    Protocols used: CHEST PAIN-A-AH      "

## 2022-04-02 DIAGNOSIS — K21.9 GASTROESOPHAGEAL REFLUX DISEASE WITHOUT ESOPHAGITIS: ICD-10-CM

## 2022-04-05 NOTE — TELEPHONE ENCOUNTER
PCP to advise    omeprazole       Last Written Prescription Date: 12/28/21 end: 3/28/22    Last Fill Quantity: 90,   # refills: 0  Last Office Visit: 3/21/22  Future Office visit:       Routing refill request to provider for review/approval because:  Drug not active on patient's medication list

## 2022-04-20 ENCOUNTER — NURSE TRIAGE (OUTPATIENT)
Dept: FAMILY MEDICINE | Facility: OTHER | Age: 37
End: 2022-04-20
Payer: COMMERCIAL

## 2022-04-20 NOTE — TELEPHONE ENCOUNTER
Pt calling with upper left rib pain off and on over the last month. Shortness of breath off and on, not all the time, worse with exercise. Pain is a 4/10. Pain increases when laying on left side.     Patient reports going to the ED at [facility] at the end of March 2022 had tests completed and the provider could not find anything. Pain went away after 1 1/2 weeks then returned. This writer is unable to locate ED note. Patient refusing ED again, requesting appointment with Diana Jackman.     Patient unable to come in on 4/20/22. Requesting appointment with Diana Jackman on 4/21/22.    Appointment scheduled:    Next 5 appointments (look out 90 days)    Apr 21, 2022  2:00 PM  (Arrive by 1:45 PM)  SHORT with Diana Jackman CNP  [facility] ([facility] ) [address] [address]  [phone]   May 13, 2022  3:30 PM  (Arrive by 3:15 PM)  SHORT with Allison Heath MD  [facility] ([facility] ) [address] [address]  [phone]            Reason for Disposition    History of prior 'blood clot' in leg or lungs (i.e., deep vein thrombosis, pulmonary embolism)    Additional Information    Negative: Severe difficulty breathing (e.g., struggling for each breath, speaks in single words)    Negative: Passed out (i.e., fainted, collapsed and was not responding)    Negative: Difficult to awaken or acting confused (e.g., disoriented, slurred speech)    Negative: Shock suspected (e.g., cold/pale/clammy skin, too weak to stand, low BP, rapid pulse)    Negative: Chest pain lasting longer than 5 minutes and ANY of the following:* Over 45 years old* Over 30 years old and at least one cardiac risk factor (e.g., diabetes, high blood pressure, high cholesterol, smoker, or strong family history of heart disease)* History of heart disease (i.e., angina, heart attack, heart failure, bypass surgery, takes  "nitroglycerin)* Pain is crushing, pressure-like, or heavy    Negative: Heart beating < 50 beats per minute OR > 140 beats per minute    Negative: Visible sweat on face or sweat dripping down face    Negative: Sounds like a life-threatening emergency to the triager    Negative: Followed an injury to chest    Negative: SEVERE chest pain    Negative: Pain also in shoulder(s) or arm(s) or jaw    Negative: Difficulty breathing    Negative: Cocaine use within last 3 days    Negative: Major surgery in the past month    Negative: Hip or leg fracture (broken bone) in past month (or had cast on leg or ankle in past month)    Negative: Illness requiring prolonged bedrest in past month (e.g., immobilization, long hospital stay)    Negative: Long-distance travel in past month (e.g., car, bus, train, plane; with trip lasting 6 or more hours)    Answer Assessment - Initial Assessment Questions  1. LOCATION: \"Where does it hurt?\"        Pain in left side of chest wall    2. RADIATION: \"Does the pain go anywhere else?\" (e.g., into neck, jaw, arms, back)      No- pain is localized     3. ONSET: \"When did the chest pain begin?\" (Minutes, hours or days)       > 1 month ago    4. PATTERN \"Does the pain come and go, or has it been constant since it started?\"  \"Does it get worse with exertion?\"       Comes and goes     5. DURATION: \"How long does it last\" (e.g., seconds, minutes, hours)           6. SEVERITY: \"How bad is the pain?\"  (e.g., Scale 1-10; mild, moderate, or severe)     - MILD (1-3): doesn't interfere with normal activities      - MODERATE (4-7): interferes with normal activities or awakens from sleep     - SEVERE (8-10): excruciating pain, unable to do any normal activities        Moderate     7. CARDIAC RISK FACTORS: \"Do you have any history of heart problems or risk factors for heart disease?\" (e.g., angina, prior heart attack; diabetes, high blood pressure, high cholesterol, smoker, or strong family history of heart " "disease)      No     8. PULMONARY RISK FACTORS: \"Do you have any history of lung disease?\"  (e.g., blood clots in lung, asthma, emphysema, birth control pills)      No hx of blood clots in lungs. Patient reports previous hx of blood clots in lower extremity    9. CAUSE: \"What do you think is causing the chest pain?\"      Unknown    10. OTHER SYMPTOMS: \"Do you have any other symptoms?\" (e.g., dizziness, nausea, vomiting, sweating, fever, difficulty breathing, cough)        Intermittent cough      11. PREGNANCY: \"Is there any chance you are pregnant?\" \"When was your last menstrual period?\"        Na    Protocols used: CHEST PAIN-A-OH      "

## 2022-05-13 ENCOUNTER — MYC MEDICAL ADVICE (OUTPATIENT)
Dept: FAMILY MEDICINE | Facility: OTHER | Age: 37
End: 2022-05-13
Payer: COMMERCIAL

## 2022-05-13 DIAGNOSIS — G89.29 CHRONIC LEFT HIP PAIN: ICD-10-CM

## 2022-05-13 DIAGNOSIS — M50.30 DDD (DEGENERATIVE DISC DISEASE), CERVICAL: ICD-10-CM

## 2022-05-13 DIAGNOSIS — G89.4 PAIN SYNDROME, CHRONIC: ICD-10-CM

## 2022-05-13 DIAGNOSIS — M51.369 DDD (DEGENERATIVE DISC DISEASE), LUMBAR: ICD-10-CM

## 2022-05-13 DIAGNOSIS — M25.552 CHRONIC LEFT HIP PAIN: ICD-10-CM

## 2022-05-16 RX ORDER — PREGABALIN 75 MG/1
75 CAPSULE ORAL 2 TIMES DAILY
Qty: 60 CAPSULE | Refills: 1 | Status: SHIPPED | OUTPATIENT
Start: 2022-05-16 | End: 2023-03-09

## 2022-05-17 DIAGNOSIS — I82.441 ACUTE DEEP VEIN THROMBOSIS (DVT) OF TIBIAL VEIN OF RIGHT LOWER EXTREMITY (H): ICD-10-CM

## 2022-05-17 DIAGNOSIS — Z79.01 LONG TERM CURRENT USE OF ANTICOAGULANT THERAPY: ICD-10-CM

## 2022-05-18 NOTE — TELEPHONE ENCOUNTER
xarelto      Last Written Prescription Date:  2/16/22  Last Fill Quantity: 90,   # refills: 0  Last Office Visit: 4/21/22  Future Office visit:    Next 5 appointments (look out 90 days)    Jun 13, 2022  8:15 AM  (Arrive by 8:00 AM)  SHORT with Allison Heath MD  [facility] [place] ([facility] [place] ) [address]  [place] [address]  [phone]

## 2022-05-20 RX ORDER — RIVAROXABAN 20 MG/1
TABLET, FILM COATED ORAL
Qty: 90 TABLET | Refills: 0 | Status: SHIPPED | OUTPATIENT
Start: 2022-05-20 | End: 2022-06-20

## 2022-06-20 ENCOUNTER — OFFICE VISIT (OUTPATIENT)
Dept: FAMILY MEDICINE | Facility: OTHER | Age: 37
End: 2022-06-20
Attending: NURSE PRACTITIONER
Payer: COMMERCIAL

## 2022-06-20 VITALS
TEMPERATURE: 97.5 F | WEIGHT: 220.2 LBS | HEART RATE: 100 BPM | DIASTOLIC BLOOD PRESSURE: 78 MMHG | OXYGEN SATURATION: 96 % | SYSTOLIC BLOOD PRESSURE: 128 MMHG | BODY MASS INDEX: 34.49 KG/M2

## 2022-06-20 DIAGNOSIS — M62.838 MUSCLE SPASM: ICD-10-CM

## 2022-06-20 DIAGNOSIS — J98.9 REACTIVE AIRWAY DISEASE WITHOUT ASTHMA: ICD-10-CM

## 2022-06-20 DIAGNOSIS — G89.4 PAIN SYNDROME, CHRONIC: Primary | ICD-10-CM

## 2022-06-20 DIAGNOSIS — L73.9 FOLLICULITIS: ICD-10-CM

## 2022-06-20 PROCEDURE — 99214 OFFICE O/P EST MOD 30 MIN: CPT | Performed by: NURSE PRACTITIONER

## 2022-06-20 PROCEDURE — G0463 HOSPITAL OUTPT CLINIC VISIT: HCPCS

## 2022-06-20 RX ORDER — MUPIROCIN 20 MG/G
OINTMENT TOPICAL 3 TIMES DAILY
Qty: 30 G | Refills: 1 | Status: SHIPPED | OUTPATIENT
Start: 2022-06-20 | End: 2022-11-10

## 2022-06-20 RX ORDER — ALBUTEROL SULFATE 90 UG/1
AEROSOL, METERED RESPIRATORY (INHALATION)
Qty: 18 G | Refills: 1 | Status: SHIPPED | OUTPATIENT
Start: 2022-06-20 | End: 2022-07-21

## 2022-06-20 RX ORDER — CYCLOBENZAPRINE HCL 5 MG
TABLET ORAL
Qty: 30 TABLET | Refills: 0 | Status: SHIPPED | OUTPATIENT
Start: 2022-06-20 | End: 2023-03-09

## 2022-06-20 RX ORDER — NAPROXEN 250 MG/1
250 TABLET ORAL 2 TIMES DAILY PRN
Qty: 60 TABLET | Refills: 0 | Status: SHIPPED | OUTPATIENT
Start: 2022-06-20 | End: 2022-07-19

## 2022-06-20 ASSESSMENT — ANXIETY QUESTIONNAIRES
1. FEELING NERVOUS, ANXIOUS, OR ON EDGE: NOT AT ALL
2. NOT BEING ABLE TO STOP OR CONTROL WORRYING: NOT AT ALL
GAD7 TOTAL SCORE: 1
GAD7 TOTAL SCORE: 1
3. WORRYING TOO MUCH ABOUT DIFFERENT THINGS: SEVERAL DAYS
5. BEING SO RESTLESS THAT IT IS HARD TO SIT STILL: NOT AT ALL
4. TROUBLE RELAXING: NOT AT ALL
7. FEELING AFRAID AS IF SOMETHING AWFUL MIGHT HAPPEN: NOT AT ALL
IF YOU CHECKED OFF ANY PROBLEMS ON THIS QUESTIONNAIRE, HOW DIFFICULT HAVE THESE PROBLEMS MADE IT FOR YOU TO DO YOUR WORK, TAKE CARE OF THINGS AT HOME, OR GET ALONG WITH OTHER PEOPLE: NOT DIFFICULT AT ALL
6. BECOMING EASILY ANNOYED OR IRRITABLE: NOT AT ALL

## 2022-06-20 ASSESSMENT — PAIN SCALES - GENERAL: PAINLEVEL: MODERATE PAIN (4)

## 2022-06-20 ASSESSMENT — PATIENT HEALTH QUESTIONNAIRE - PHQ9: SUM OF ALL RESPONSES TO PHQ QUESTIONS 1-9: 1

## 2022-06-20 ASSESSMENT — ASTHMA QUESTIONNAIRES: ACT_TOTALSCORE: 17

## 2022-06-20 NOTE — PROGRESS NOTES
"  Assessment & Plan     Reactive airway disease without asthma  - albuterol (VENTOLIN HFA) 108 (90 Base) MCG/ACT inhaler; INHALE 1 TO 2 PUFFS INTO THE LUNGS EVERY 4 HOURS AS NEEDED FOR SHORTNESS OF BREATH OR DIFFICULT BREATHING OR WHEEZING  - General PFT Lab (Please always keep checked); Future  - Pulmonary Function Test; Future  - General PFT Lab (Please always keep checked)    Muscle spasm  - cyclobenzaprine (FLEXERIL) 5 MG tablet; TAKE 1 TO 2 TABLETS BY MOUTH THREE TIMES DAILY AS NEEDED FOR MUSCLE SPASMS    Pain syndrome, chronic  Follow up with your pain management provider regarding ongoing naproxen need. You may be able to stop this medication. I filled x 1 month only.  - naproxen (NAPROSYN) 250 MG tablet; Take 1 tablet (250 mg) by mouth 2 times daily as needed for moderate pain Take with food    Folliculitis  - mupirocin (BACTROBAN) 2 % external ointment; Apply topically 3 times daily    Ordering of each unique test  Prescription drug management  No LOS data to display   Time spent doing chart review, history and exam, documentation and further activities per the note     BMI:   Estimated body mass index is 34.49 kg/m  as calculated from the following:    Height as of 3/21/22: 1.702 m (5' 7\").    Weight as of this encounter: 99.9 kg (220 lb 3.2 oz).   Weight management plan: Discussed healthy diet and exercise guidelines      No follow-ups on file.    Diana Jackman, [facility] [place] [facility]    Subjective   Shoaib is a 36 year old, presenting for the following health issues:  Anxiety and A.D.H.D      HPI   Anxiety Follow-Up    How are you doing with your anxiety since your last visit? Comes and goes. Has been dealing with it.    Are you having other symptoms that might be associated with anxiety? No    Have you had a significant life event? No     Are you feeling depressed? No    Do you have any concerns with your use of alcohol or other drugs? No    Shoaib is working with a psychiatrist for he is " anxiety and ADHD-- Dr. Colunga   [facility]. Meets with him every 2-3 months.     ADHD    Onset: since he was 25 years old     Description:   Easily distracted: no  Short attention span: no  Trouble following directions: no   Impulsive behavior: no   Trouble completing tasks: no    Accompanying Signs & Symptoms:        Change in sleep pattern: have a hard time falling asleep. Has back spasms.  Irritability at certain times of the day: no  Socially withdrawn: no  Depression symptoms: no  Anxiety symptoms: some anxiety symptoms    History:  Caffeine intake: Small  Loss of appetite: no  Healthy diet: YES  Did you have problems in school or with previous employment: YES  Family history of ADHD: no  Have you had an evaluation for ADHD in the past: YES  Do you use alcohol or drugs: YES- alcohol maybe once or twice a month    Therapies tried: Adderall (concerta) with total relief    Back Spasms: Previously tolerated flexeril well. He is out and would like to have a refill. He does have an upcoming appointment for pain management through Dr. Heath at end of July.      Social History     Tobacco Use     Smoking status: Current Every Day Smoker     Packs/day: 0.50     Years: 10.00     Pack years: 5.00     Types: Cigarettes     Start date: 1/1/2002     Smokeless tobacco: Never Used     Tobacco comment: Transdermal patches have helped me the most in the past   Vaping Use     Vaping Use: Former     Substances: Nicotine, Flavoring     Devices: Pre-filled or refillable cartridge   Substance Use Topics     Alcohol use: No     Alcohol/week: 2.0 - 4.0 standard drinks     Comment: rarely     Drug use: Not Currently     Types: Methamphetamines     Comment: hx of meth, 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 12-10-20 = 2.5 months sober     JIN-7 SCORE 1/18/2022 3/21/2022 6/20/2022   Total Score - - -   Total Score - - -   Total Score 4 21 1   Some encounter information is confidential and restricted. Go to Review Flowsheets  activity to see all data.     PHQ 1/18/2022 3/21/2022 6/20/2022   PHQ-9 Total Score 4 14 1   Q9: Thoughts of better off dead/self-harm past 2 weeks Not at all Not at all Not at all   Some encounter information is confidential and restricted. Go to Review Flowsheets activity to see all data.     Last PHQ-9 6/20/2022   1.  Little interest or pleasure in doing things 0   2.  Feeling down, depressed, or hopeless 0   3.  Trouble falling or staying asleep, or sleeping too much 1   4.  Feeling tired or having little energy 0   5.  Poor appetite or overeating 0   6.  Feeling bad about yourself 0   7.  Trouble concentrating 0   8.  Moving slowly or restless 0   Q9: Thoughts of better off dead/self-harm past 2 weeks 0   PHQ-9 Total Score 1   Difficulty at work, home, or with people Somewhat difficult   Some encounter information is confidential and restricted. Go to Review tvCompass activity to see all data.     JIN-7  6/20/2022   1. Feeling nervous, anxious, or on edge 0   2. Not being able to stop or control worrying 0   3. Worrying too much about different things 1   4. Trouble relaxing 0   5. Being so restless that it is hard to sit still 0   6. Becoming easily annoyed or irritable 0   7. Feeling afraid, as if something awful might happen 0   JIN-7 Total Score 1   If you checked any problems, how difficult have they made it for you to do your work, take care of things at home, or get along with other people? Not difficult at all   Some encounter information is confidential and restricted. Go to Review tvCompass activity to see all data.         Current Outpatient Medications   Medication     ADDERALL XR 30 MG 24 hr capsule  Currently taking. Reports prescribed by psychiatrist     albuterol (VENTOLIN HFA) 108 (90 Base) MCG/ACT inhaler     cyclobenzaprine (FLEXERIL) 5 MG tablet     doxepin (SINEQUAN) 50 MG capsule  Currently taking. Reports prescribed by psychiatrist     furosemide (LASIX) 20 MG tablet     hydrOXYzine  (ATARAX) 25 MG tablet     omeprazole (PRILOSEC) 20 MG DR capsule     pregabalin (LYRICA) 75 MG capsule  - Managed through Dr. Heath. Has follow up appointment.      vilazodone (VIIBRYD) 10 MG TABS tablet - Currently taking. Reports prescribed by psychiatrist.     vilazodone (VIIBRYD) 20 MG TABS tablet     XARELTO ANTICOAGULANT 20 MG TABS tablet  -Completed. Has not been taking for about 2-3 months. Tolerating this change well. No new concerns for blood clot per symptoms. He has noticed that he is having a harder time than in remote past with weight loss. Unlikely related to this medication being completed.        Review of Systems   Constitutional, HEENT, cardiovascular, pulmonary, GI, , musculoskeletal, neuro, skin, endocrine and psych systems are negative, except as otherwise noted.      Objective    /78 (BP Location: Left arm, Patient Position: Chair, Cuff Size: Adult Regular)   Pulse 100   Temp 97.5  F (36.4  C) (Tympanic)   Wt 99.9 kg (220 lb 3.2 oz)   SpO2 96%   BMI 34.49 kg/m    Body mass index is 34.49 kg/m .  Physical Exam   GENERAL: healthy, alert and no distress  EYES: Eyes grossly normal to inspection, PERRL and conjunctivae and sclerae normal  HENT: ear canals and TM's normal, nose and mouth without ulcers or lesions  NECK: no adenopathy, no asymmetry, masses, or scars and thyroid normal to palpation  RESP: lungs clear to auscultation - no rales, rhonchi or wheezes  CV: regular rate and rhythm, normal S1 S2, no S3 or S4, no murmur, click or rub, no peripheral edema and peripheral pulses strong  ABDOMEN: soft, nontender, no hepatosplenomegaly, no masses and bowel sounds normal  MS: no gross musculoskeletal defects noted, no edema  SKIN: no suspicious lesions or rashes  NEURO: Normal strength and tone, mentation intact and speech normal  PSYCH: mentation appears normal, affect normal/bright    No results found for any visits on 06/20/22.              .  ..

## 2022-06-20 NOTE — NURSING NOTE
"Chief Complaint   Patient presents with     Anxiety     A.D.H.D       Initial /78 (BP Location: Left arm, Patient Position: Chair, Cuff Size: Adult Regular)   Pulse 100   Temp 97.5  F (36.4  C) (Tympanic)   Wt 99.9 kg (220 lb 3.2 oz)   SpO2 96%   BMI 34.49 kg/m   Estimated body mass index is 34.49 kg/m  as calculated from the following:    Height as of 3/21/22: 1.702 m (5' 7\").    Weight as of this encounter: 99.9 kg (220 lb 3.2 oz).  Medication Reconciliation: complete  Sarina Vallejo    "

## 2022-06-20 NOTE — PATIENT INSTRUCTIONS
Follow up with your pain management provider regarding ongoing naproxen need. You may be able to stop this medication. I filled x 1 month only.

## 2022-07-06 ENCOUNTER — TELEPHONE (OUTPATIENT)
Dept: SLEEP MEDICINE | Facility: HOSPITAL | Age: 37
End: 2022-07-06

## 2022-07-17 DIAGNOSIS — G89.4 PAIN SYNDROME, CHRONIC: ICD-10-CM

## 2022-07-19 RX ORDER — NAPROXEN 250 MG/1
TABLET ORAL
Qty: 60 TABLET | Refills: 3 | Status: SHIPPED | OUTPATIENT
Start: 2022-07-19 | End: 2023-06-29

## 2022-07-21 DIAGNOSIS — J98.9 REACTIVE AIRWAY DISEASE WITHOUT ASTHMA: ICD-10-CM

## 2022-07-21 RX ORDER — ALBUTEROL SULFATE 90 UG/1
AEROSOL, METERED RESPIRATORY (INHALATION)
Qty: 18 G | Refills: 1 | Status: SHIPPED | OUTPATIENT
Start: 2022-07-21 | End: 2023-03-09

## 2022-07-30 NOTE — TELEPHONE ENCOUNTER
Called group home, no VM set up. Will try again later. Jasmin Chavez     
Hold for primary. Hailey Green, CNP    
I am not sure of this patient's legal situation regarding his group home or guardianship. Can we contact his  or  to get the details and let me know what I will need to do.   Phill Floyd MD   
Second attempt, directly to VM, no VM set up. Could I get advise on how to proceed? Please see patient mychart message. Jasmin Chavez     
gradual onset
aspiration precautions/fall precautions/seizure precautions

## 2022-09-17 NOTE — PROGRESS NOTES
Brief Social Work Note:    HAP approved by [facility]. [facility] sent patient a copy of approved HAP    COMPA Dennis  [facility]   [facility]  Ph: [phone]     Admission

## 2022-10-28 NOTE — PROGRESS NOTES
"  SUBJECTIVE:                                                    ADDICTION MEDICINE NOTE    Hoang Kiser is a 35 year old male who presents by video today for Addiction Medicine consult         [place] Board of Pharmacy Data Base Reviewed:    Yes ;          Brief History:    Here on request of Pain provider, Tamiko Stevens    Being treated for Lumbar DDD, Functional neurologic movement disorder    Also being treated by  [facility] for Depression, Attention Deficit Disorder    Referred to Addiction medicine due to positive UDS for methamphetamine    Patient admits to history of Addiction    Was treated in an MI/CD program in [place] in 2015, [facility] in 2016 for 13 months, and [facility] in 2019    Now says he's \"burnt out on treatment \"    Was involved with recovery support groups in past    Says he has had up to 4 years sobriety in the past    Past drug use includes prescription opioids, cocaine    Has been prescribed benzodiazepines in the past    Started using Methamphetamine early 2020    Says he didn't really use it to get high nor does he crave it but instead used it to self medicate his ADD    Says he has trouble with focusing and methamphetamine helped    Has been prescribed Adderall but felt dose insufficient at times    Now using Naprosyn for pain    Realizes he cannot use methamphetamine and doesn't see a problem stopping    Last use 1 week ago    Weaning off left over hydrocodone; no withdrawal but feels pain not controlled    Was recommended to seek treatment but has procrastinated    Advised [facility] out-[facility] MI/CD program may be helpful and a good fit    Not requesting any MAT as has no craving    Lives in a group home where medications are controlled and dispensed      HPI:    Video visit 4/13/21    Recently hospitalized on Mental Health unit    \"Needed a break from Covid at group home\"    Remeron added    Feels better    Requests increase dose of Suboxone  for " pain    Discussed at length    Will increase to 8 mg BID    Re-check 1 month              Re-check 4 weeks in clinic    Questions answered      Social History     Social History Narrative    He lives in [facility] in a chemical treatment center - there are 11 people there. He arrives today through [employer] ride    He has 3 brothers and 3 sisters. He has not children. He has never been .      Mother and father are alive    One sister is an alcoholic and bulemic    depression is present in the family : mother, sister and 2 brothers are bipolar.      He denies bipolar. He has depression.    He denies recurring thoughts. He has had substance abuse issues. He has had cravings in the past.    He exercises and plays guitar and draws.      He has used meth as well as prescription pain killers.    He has used marijuana when young. Used cocaine in the past.    Has not used iv drugs    He does not drink alcohol.      50% Montenegrin and is Kitt[place], english and french and a bit native.    He smokes cigarettes - 1 pack per day.        single. lives in [place]. estela is father           Problem list and histories reviewed & adjusted, as indicated.  Additional history: as documented      ADDERALL XR 30 MG 24 hr capsule, Take 30 mg by mouth daily   albuterol (PROAIR HFA/PROVENTIL HFA/VENTOLIN HFA) 108 (90 Base) MCG/ACT inhaler, INHALE ONE TO TWO PUFFS INTO THE LUNGS EVERY FOUR HOURS AS NEEDED FOR SHORTNESS OF BREATH/ DYSPNEA OR WHEEZING  Munday Saline Nasal No-Drip GEL, Apply gel inside the nose every night before bed and during the day as needed for dryness or bleeding  cloNIDine (CATAPRES) 0.1 MG tablet, Take 1-2 tablets (0.1-0.2 mg) by mouth nightly as needed (sleep)  hydrochlorothiazide (HYDRODIURIL) 25 MG tablet, Take 1 tablet (25 mg) by mouth every morning  hydrOXYzine (ATARAX) 25 MG tablet, Take 1 tablet (25 mg) by mouth nightly as needed (at HS PRN)  methylfolate (DEPLIN) 15 MG TABS tablet, Take 15 mg by mouth  daily  naloxone (NARCAN) 4 MG/0.1ML nasal spray, Spray 4 mg into one nostril alternating nostrils once as needed for opioid reversal every 2-3 minutes until assistance arrives  nicotine (NICODERM CQ) 7 MG/24HR 24 hr patch, Place 1 patch onto the skin every 24 hours  nicotine (NICORETTE) 4 MG lozenge, Place 1 lozenge (4 mg) inside cheek as needed for smoking cessation Maximum is 5 per 6 hours or 20 per day  order for DME, Equipment being ordered: Digital home blood pressure monitor kit  order for DME, Equipment being ordered: 4 wheeled walker with bench seat.  rivaroxaban ANTICOAGULANT (XARELTO ANTICOAGULANT) 20 MG TABS tablet, Take 1 tablet (20 mg) by mouth daily (with dinner) Start this after taking the starting pack of Rivaroxaban  tiZANidine (ZANAFLEX) 4 MG tablet, Take 1-2 tablets (4-8 mg) by mouth nightly as needed for muscle spasms  vilazodone (VIIBRYD) 10 MG TABS tablet, Take 10 mg by mouth At Bedtime 10 mg + 20mg = 30mg  vilazodone (VIIBRYD) 20 MG TABS tablet, Take 20 mg by mouth At Bedtime 10 mg + 20mg = 30mg  zolpidem (AMBIEN) 10 MG tablet, Take 1 tablet (10 mg) by mouth nightly as needed for sleep    iohexol (OMNIPAQUE) 300 mg/mL injection 10 mL        Allergies   Allergen Reactions     Amitriptyline      Ineffective in reducing spasms/movement, increased fatigue  Other reaction(s): Other (see comments)     Buspirone Hcl Other (See Comments)     Panic attacks     Doxycycline Diarrhea, Fatigue, GI Disturbance and Nausea     Other reaction(s): GI intolerance     Trazodone Fatigue     Other reaction(s): Other (see comments)     Cephalexin Diarrhea     Other reaction(s): GI intolerance           REVIEW OF SYSTEMS:  General:  No acute withdrawal symptoms.  No recent infections or fever  Eyes:  No vision concerns.  No double vision.    Resp: No coughing, wheezing or shortness of breath  CV: No chest pains or palpitations  GI: No nausea, vomiting, abdominal pain, diarrhea.  No constipation  : No urinary  frequency or dysuria    Musculoskeletal: No significant muscle or joint pains other than as above.  No edema  Neurologic: No numbness, tingling, weakness, problems with balance or coordination  Psychiatric: No acute concerns other than as above.   Skin: No rashes or areas of acute infection    OBJECTIVE:    PHYSICAL EXAM:  There were no vitals taken for this visit.    GENERAL: Healthy, alert and no distress  EYES: Eyes grossly normal to inspection.  No discharge or erythema, or obvious scleral/conjunctival abnormalities.  RESP: No audible wheeze, cough, or visible cyanosis.  No visible retractions or increased work of breathing.    SKIN: Visible skin clear. No significant rash, abnormal pigmentation or lesions.  NEURO: Cranial nerves grossly intact.  Mentation and speech appropriate for age.  PSYCH: Mentation appears normal, affect normal/bright, judgement and insight intact, normal speech and appearance well-groomed.    No results found for any visits on 04/13/21.        ASSESSMENT:      Opioid dependence   Chronic back pain  -  Plan:  Suboxone     PLAN:    Addiction Medicine recommendations:    Suboxone 8 mg BID    Follow up 4 weeks    Video start time: 11:15  Video end time: 11:30  Platform: [employer]  Patient location: home    Peng Nicholson MD  [facility]  [phone]       PROCEDURES:  Selective debridement 28-Oct-2022 10:29:24 debridement , skin graft , wound vac right thigh, rightthighdonor site,  exparel , right femoral field block Afshin Toussaint

## 2022-11-07 ENCOUNTER — NURSE TRIAGE (OUTPATIENT)
Dept: FAMILY MEDICINE | Facility: OTHER | Age: 37
End: 2022-11-07

## 2022-11-07 NOTE — TELEPHONE ENCOUNTER
"Patient encouraged to have his ear checked in the ER/UC today.  Patient refused and said he will keep the appointment for Thursday.  If he does go to UC/ER he will cancel the Thursday appointment.    Reason for Disposition    Unexplained bleeding    Answer Assessment - Initial Assessment Questions  1. LOCATION: \"Which ear is involved?\"       left  2. COLOR: \"What is the color of the discharge?\"       Red blood  3. CONSISTENCY: \"How runny is the discharge? Could it be water?\"       Blood on the q-tip  4. ONSET: \"When did you first notice the discharge?\"      today  5. PAIN: \"Is there any earache?\" \"How bad is it?\"  (Scale 1-10; or mild, moderate, severe)      Ear is throbbing  6. OBJECTS: \"Have you put anything in your ear?\" (e.g., Q-tip, other object)       q tip  7. OTHER SYMPTOMS: \"Do you have any other symptoms?\" (e.g., headache, fever, dizziness, vomiting, runny nose)      Headache back of neck hurts a little on the left side.  8. PREGNANCY: \"Is there any chance you are pregnant?\" \"When was your last menstrual period?\"      *No Answer*    Protocols used: EAR - [id number]      "

## 2022-11-10 ENCOUNTER — OFFICE VISIT (OUTPATIENT)
Dept: FAMILY MEDICINE | Facility: OTHER | Age: 37
End: 2022-11-10
Attending: NURSE PRACTITIONER
Payer: COMMERCIAL

## 2022-11-10 VITALS
DIASTOLIC BLOOD PRESSURE: 76 MMHG | RESPIRATION RATE: 16 BRPM | WEIGHT: 198 LBS | HEIGHT: 67 IN | TEMPERATURE: 97.6 F | BODY MASS INDEX: 31.08 KG/M2 | SYSTOLIC BLOOD PRESSURE: 130 MMHG | HEART RATE: 88 BPM | OXYGEN SATURATION: 97 %

## 2022-11-10 DIAGNOSIS — Z71.1 CONCERN ABOUT EAR DISEASE WITHOUT DIAGNOSIS: Primary | ICD-10-CM

## 2022-11-10 DIAGNOSIS — Z23 NEED FOR VACCINATION: ICD-10-CM

## 2022-11-10 PROCEDURE — G0008 ADMIN INFLUENZA VIRUS VAC: HCPCS

## 2022-11-10 PROCEDURE — G0463 HOSPITAL OUTPT CLINIC VISIT: HCPCS

## 2022-11-10 PROCEDURE — 90472 IMMUNIZATION ADMIN EACH ADD: CPT

## 2022-11-10 PROCEDURE — G0463 HOSPITAL OUTPT CLINIC VISIT: HCPCS | Mod: 25

## 2022-11-10 PROCEDURE — 90686 IIV4 VACC NO PRSV 0.5 ML IM: CPT

## 2022-11-10 PROCEDURE — 90677 PCV20 VACCINE IM: CPT

## 2022-11-10 PROCEDURE — 99212 OFFICE O/P EST SF 10 MIN: CPT | Performed by: NURSE PRACTITIONER

## 2022-11-10 ASSESSMENT — PAIN SCALES - GENERAL: PAINLEVEL: NO PAIN (0)

## 2022-11-10 NOTE — NURSING NOTE
"Chief Complaint   Patient presents with     Ear Problem       Initial /76 (BP Location: Right arm, Patient Position: Sitting, Cuff Size: Adult Regular)   Pulse 88   Temp 97.6  F (36.4  C) (Tympanic)   Resp 16   Ht 1.702 m (5' 7\")   Wt 89.8 kg (198 lb)   SpO2 97%   BMI 31.01 kg/m   Estimated body mass index is 31.01 kg/m  as calculated from the following:    Height as of this encounter: 1.702 m (5' 7\").    Weight as of this encounter: 89.8 kg (198 lb).  Medication Reconciliation: complete  Nellie Magana LPN    "

## 2022-11-10 NOTE — PROGRESS NOTES
"  Assessment & Plan     Concern about ear disease without diagnosis  No otitis media or otitis externa. No scab, lesion, or abnormality to either ear. Bleeding may have been related to previous cleaning by patient with cotton swab. May try using debrox drops to loosen ear wax and a soft dry cloth to wipe wax out of external part of ear canal. Advised against cotton swabs.     Need for vaccination  - Pneumococcal 20 Valent Conjugate (Prevnar 20)  - INFLUENZA VACCINE IM > 6 MONTHS VALENT IIV4 (AFLURIA/FLUZONE)           No follow-ups on file.    Diana Jackman, CNP  [facility]    Pau Cramer is a 37 year old, presenting for the following health issues:  Ear Problem      HPI     Concern - left ear  Onset: 4 days  Description: bleeding from left  Intensity: moderate  Progression of Symptoms:  same  Accompanying Signs & Symptoms: some discomfort  Previous history of similar problem: no  Precipitating factors:        Worsened by: none  Alleviating factors:        Improved by: none  Therapies tried and outcome: None      Review of Systems   Constitutional, HEENT, cardiovascular, pulmonary, gi and gu systems are negative, except as otherwise noted.      Objective    /76 (BP Location: Right arm, Patient Position: Sitting, Cuff Size: Adult Regular)   Pulse 88   Temp 97.6  F (36.4  C) (Tympanic)   Resp 16   Ht 1.702 m (5' 7\")   Wt 89.8 kg (198 lb)   SpO2 97%   BMI 31.01 kg/m    Body mass index is 31.01 kg/m .  Physical Exam   GENERAL: healthy, alert and no distress  HENT: normal cephalic/atraumatic, ear canals and TM's normal, nose and mouth without ulcers or lesions, oropharynx clear and oral mucous membranes moist                "

## 2022-12-16 NOTE — PROGRESS NOTES
" 03/26/21 1900   Groups   Details    (Psychotherapy)   Number of patients attending the group:  3  Group Length:  1 Hours     Group Therapy Type: Psychotherapy     Summary of Group / Topics Discussed:        The  Psychotherapy group goal is to promote insight to positive choice and change. Group processing is within a supportive and safe environment. Patients will process emotions using verbal group and expressive psychotherapy interventions including visual art/writing interventions.     Group interventions support patients by: fostering self awareness, communication/social skills and supports and self efficacy/empowerment     Modalities to reach these goals include: Metaphor ACT/Active Listening/Grief therapy     Subjective -patient report of mood today-\" unpredictable, erratic, not mentally safe where I live.\"       Objective/ Intervention- Goal of group and Therapeutic modality utilized- ACT Acceptance and Committment therapy metaphor about prioritizing and balancing in life- what is important.     Group Response- 3 members engaged     Patient Response-  engaged     Pt was pleasant, cooperative and engaged. Affect was flat and restricted, speech was delayed.He said he loves [employer] arts.  He spoke about wanting to be \" partnered.\"He quietly worked on a graphic depiction of his priorities and life balance. He said the exercise made him feel better. He was able to self reflect, He plans to finish the diagram after group.   " room air

## 2023-02-03 ENCOUNTER — PATIENT OUTREACH (OUTPATIENT)
Dept: OTHER | Facility: HOSPITAL | Age: 38
End: 2023-02-03

## 2023-02-03 NOTE — TELEPHONE ENCOUNTER
Patient Quality Outreach    Patient is due for the following:   Depression  -  PHQ-9 needed    Next Steps:   Patient was assigned appropriate questionnaire to complete    Type of outreach:    Sent [employer] message.      Questions for provider review:    None     Florecita Hopper RN

## 2023-03-01 NOTE — TELEPHONE ENCOUNTER
Dr. Nicholson,    Pending Prescriptions:                       Disp   Refills    SUBOXONE 2-0.5 MG per film [Pharmacy Med *42 Film0            Sig: PLACE 1 FILM UNDER THE TONGUE THREE TIMES DAILY    Last refill date: 1/18/21  Last fill quantity: 42 films  # refills: 0    Pt will be out of med 2/1.    Routing refill request to provider for review/approval because:  Drug not on the [facility] refill protocol   Unable to access       
yes

## 2023-03-08 NOTE — PROGRESS NOTES
"  Assessment & Plan     Viral URI with cough  - Symptomatic Influenza A/B, RSV, & SARS-CoV2 PCR (COVID-19) Nose; Future  - albuterol (VENTOLIN HFA) 108 (90 Base) MCG/ACT inhaler; INHALE 1 TO 2 PUFFS INTO THE LUNGS EVERY 4 HOURS AS NEEDED FOR SHORTNESS OF BREATH OR DIFFICULT BREATHING OR WHEEZING  - Symptomatic Influenza A/B, RSV, & SARS-CoV2 PCR (COVID-19) Nose    Anxiety  refill  - hydrOXYzine (ATARAX) 25 MG tablet; Take 1 tablet (25 mg) by mouth every 6 hours as needed for itching (at HS PRN)    Reactive airway disease without asthma  refill  - albuterol (VENTOLIN HFA) 108 (90 Base) MCG/ACT inhaler; INHALE 1 TO 2 PUFFS INTO THE LUNGS EVERY 4 HOURS AS NEEDED FOR SHORTNESS OF BREATH OR DIFFICULT BREATHING OR WHEEZING    Ordering of each unique test  Prescription drug management  No LOS data to display   Time spent doing chart review, history and exam, documentation and further activities per the note     Nicotine/Tobacco Cessation:  He reports that he has been smoking cigarettes. He started smoking about 21 years ago. He has a 5.00 pack-year smoking history. He has never used smokeless tobacco.  Nicotine/Tobacco Cessation Plan:   Information offered: Patient not interested at this time      BMI:   Estimated body mass index is 31.17 kg/m  as calculated from the following:    Height as of 11/10/22: 1.702 m (5' 7\").    Weight as of this encounter: 90.3 kg (199 lb).   Weight management plan: Discussed healthy diet and exercise guidelines      Return if symptoms worsen or fail to improve.    Diana Jackman, [facility] LUPILLO Cramer is a 37 year oldpresenting for the following health issues:  No chief complaint on file.      HPI     Acute Illness  Acute illness concerns: sinus  Onset/Duration: 3-4 days  Symptoms:  Fever: No. Nothing over 100F.   Chills/Sweats: YES- chills and sweating.  Headache (location?): YES  Sinus Pressure: YES  Conjunctivitis:  No  Ear Pain: YES- a " little  Rhinorrhea: YES  Congestion: YES  Sore Throat: YES  Cough: YES   Wheeze: YES  Decreased Appetite: YES  Nausea: No  Vomiting: No  Diarrhea: No  Dysuria/Freq.: No  Dysuria or Hematuria: No  Fatigue/Achiness: YES  Sick/Strep Exposure: No  Therapies tried and outcome: sudafed, no relief      Has not taken any at home COVID tests.  COVID vaccination x 2  Last  on 5/4/21    Refill request: would like refill of hydroxyzine.     Review of Systems   Constitutional, HEENT, cardiovascular, pulmonary, gi and gu systems are negative, except as otherwise noted.      Objective    There were no vitals taken for this visit.  There is no height or weight on file to calculate BMI.  Physical Exam   GENERAL: healthy, alert and no distress  EYES: Eyes grossly normal to inspection, PERRL and conjunctivae and sclerae normal  HENT: ear canals and TM's normal, nose and mouth without ulcers or lesions  NECK: no adenopathy, no asymmetry, masses, or scars and thyroid normal to palpation  RESP: intermittent wheezes upper lobes. No consolidations.   CV: regular rate and rhythm, normal S1 S2, no S3 or S4, no murmur, click or rub, no peripheral edema and peripheral pulses strong    Results for orders placed or performed in visit on 03/09/23   Symptomatic Influenza A/B, RSV, & SARS-CoV2 PCR (COVID-19) Nose     Status: Normal    Specimen: Nose; Swab   Result Value Ref Range    Influenza A PCR Negative Negative    Influenza B PCR Negative Negative    RSV PCR Negative Negative    SARS CoV2 PCR Negative Negative    Narrative    Testing was performed using the Xpert Xpress CoV2/Flu/RSV Assay on the [employer] GeneXpert Instrument. This test should be ordered for the detection of SARS-CoV-2, influenza, and RSV viruses in individuals who meet clinical and/or epidemiological criteria. Test performance is unknown in asymptomatic patients. This test is for in vitro diagnostic use under the FDA EUA for laboratories certified under CLIA to perform high or  moderate complexity testing. This test has not been FDA cleared or approved. A negative result does not rule out the presence of PCR inhibitors in the specimen or target RNA in concentration below the limit of detection for the assay. If only one viral target is positive but coinfection with multiple targets is suspected, the sample should be re-tested with another FDA cleared, approved, or authorized test, if coinfection would change clinical management. This test was validated by the [facility]. These laboratories are certified under the Clinical Laboratory Improvement Amendments of 1988 (CLIA-88) as qualified to perform high complexity laboratory testing.

## 2023-03-09 ENCOUNTER — OFFICE VISIT (OUTPATIENT)
Dept: FAMILY MEDICINE | Facility: OTHER | Age: 38
End: 2023-03-09
Attending: NURSE PRACTITIONER
Payer: COMMERCIAL

## 2023-03-09 VITALS
OXYGEN SATURATION: 98 % | WEIGHT: 199 LBS | HEART RATE: 84 BPM | SYSTOLIC BLOOD PRESSURE: 114 MMHG | DIASTOLIC BLOOD PRESSURE: 72 MMHG | BODY MASS INDEX: 31.17 KG/M2 | TEMPERATURE: 97.3 F | RESPIRATION RATE: 12 BRPM

## 2023-03-09 DIAGNOSIS — J98.9 REACTIVE AIRWAY DISEASE WITHOUT ASTHMA: ICD-10-CM

## 2023-03-09 DIAGNOSIS — F41.9 ANXIETY: ICD-10-CM

## 2023-03-09 DIAGNOSIS — J06.9 VIRAL URI WITH COUGH: Primary | ICD-10-CM

## 2023-03-09 PROCEDURE — 99212 OFFICE O/P EST SF 10 MIN: CPT | Mod: CS | Performed by: NURSE PRACTITIONER

## 2023-03-09 PROCEDURE — G0463 HOSPITAL OUTPT CLINIC VISIT: HCPCS

## 2023-03-09 PROCEDURE — 87637 SARSCOV2&INF A&B&RSV AMP PRB: CPT | Mod: ZL | Performed by: NURSE PRACTITIONER

## 2023-03-09 RX ORDER — DOXEPIN HYDROCHLORIDE 50 MG/1
50 CAPSULE ORAL AT BEDTIME
COMMUNITY
Start: 2023-02-09

## 2023-03-09 RX ORDER — ALBUTEROL SULFATE 90 UG/1
AEROSOL, METERED RESPIRATORY (INHALATION)
Qty: 8.5 G | Refills: 0 | Status: SHIPPED | OUTPATIENT
Start: 2023-03-09 | End: 2024-04-02

## 2023-03-09 RX ORDER — ALBUTEROL SULFATE 90 UG/1
AEROSOL, METERED RESPIRATORY (INHALATION)
Qty: 8.5 G | Refills: 0 | Status: SHIPPED | OUTPATIENT
Start: 2023-03-09 | End: 2023-03-09

## 2023-03-09 RX ORDER — HYDROXYZINE HYDROCHLORIDE 25 MG/1
25 TABLET, FILM COATED ORAL EVERY 6 HOURS PRN
Qty: 45 TABLET | Refills: 3 | Status: SHIPPED | OUTPATIENT
Start: 2023-03-09

## 2023-03-09 ASSESSMENT — PATIENT HEALTH QUESTIONNAIRE - PHQ9: SUM OF ALL RESPONSES TO PHQ QUESTIONS 1-9: 3

## 2023-03-09 ASSESSMENT — ANXIETY QUESTIONNAIRES
4. TROUBLE RELAXING: NOT AT ALL
6. BECOMING EASILY ANNOYED OR IRRITABLE: NOT AT ALL
7. FEELING AFRAID AS IF SOMETHING AWFUL MIGHT HAPPEN: NOT AT ALL
GAD7 TOTAL SCORE: 0
5. BEING SO RESTLESS THAT IT IS HARD TO SIT STILL: NOT AT ALL
GAD7 TOTAL SCORE: 0
3. WORRYING TOO MUCH ABOUT DIFFERENT THINGS: NOT AT ALL
2. NOT BEING ABLE TO STOP OR CONTROL WORRYING: NOT AT ALL
1. FEELING NERVOUS, ANXIOUS, OR ON EDGE: NOT AT ALL

## 2023-03-09 ASSESSMENT — PAIN SCALES - GENERAL: PAINLEVEL: MILD PAIN (2)

## 2023-04-22 ENCOUNTER — HEALTH MAINTENANCE LETTER (OUTPATIENT)
Age: 38
End: 2023-04-22

## 2023-06-29 ENCOUNTER — OFFICE VISIT (OUTPATIENT)
Dept: FAMILY MEDICINE | Facility: OTHER | Age: 38
End: 2023-06-29
Attending: NURSE PRACTITIONER
Payer: COMMERCIAL

## 2023-06-29 VITALS
BODY MASS INDEX: 29.73 KG/M2 | WEIGHT: 189.4 LBS | DIASTOLIC BLOOD PRESSURE: 82 MMHG | OXYGEN SATURATION: 98 % | HEIGHT: 67 IN | HEART RATE: 102 BPM | RESPIRATION RATE: 14 BRPM | TEMPERATURE: 97.6 F | SYSTOLIC BLOOD PRESSURE: 124 MMHG

## 2023-06-29 DIAGNOSIS — G89.29 CHRONIC LOW BACK PAIN WITH BILATERAL SCIATICA, UNSPECIFIED BACK PAIN LATERALITY: Chronic | ICD-10-CM

## 2023-06-29 DIAGNOSIS — R25.9 ABNORMAL INVOLUNTARY MOVEMENT: Primary | Chronic | ICD-10-CM

## 2023-06-29 DIAGNOSIS — G89.4 PAIN SYNDROME, CHRONIC: ICD-10-CM

## 2023-06-29 DIAGNOSIS — M54.42 CHRONIC LOW BACK PAIN WITH BILATERAL SCIATICA, UNSPECIFIED BACK PAIN LATERALITY: Chronic | ICD-10-CM

## 2023-06-29 DIAGNOSIS — Z12.11 SPECIAL SCREENING FOR MALIGNANT NEOPLASMS, COLON: ICD-10-CM

## 2023-06-29 DIAGNOSIS — M54.41 CHRONIC LOW BACK PAIN WITH BILATERAL SCIATICA, UNSPECIFIED BACK PAIN LATERALITY: Chronic | ICD-10-CM

## 2023-06-29 DIAGNOSIS — E78.00 HYPERCHOLESTEREMIA: ICD-10-CM

## 2023-06-29 DIAGNOSIS — M62.830 SPASM OF MUSCLE OF LOWER BACK: ICD-10-CM

## 2023-06-29 PROCEDURE — G0463 HOSPITAL OUTPT CLINIC VISIT: HCPCS | Performed by: NURSE PRACTITIONER

## 2023-06-29 PROCEDURE — 99214 OFFICE O/P EST MOD 30 MIN: CPT | Performed by: NURSE PRACTITIONER

## 2023-06-29 RX ORDER — NAPROXEN 250 MG/1
TABLET ORAL
Qty: 60 TABLET | Refills: 3 | Status: SHIPPED | OUTPATIENT
Start: 2023-06-29 | End: 2023-11-09

## 2023-06-29 ASSESSMENT — PATIENT HEALTH QUESTIONNAIRE - PHQ9
10. IF YOU CHECKED OFF ANY PROBLEMS, HOW DIFFICULT HAVE THESE PROBLEMS MADE IT FOR YOU TO DO YOUR WORK, TAKE CARE OF THINGS AT HOME, OR GET ALONG WITH OTHER PEOPLE: EXTREMELY DIFFICULT
SUM OF ALL RESPONSES TO PHQ QUESTIONS 1-9: 8
SUM OF ALL RESPONSES TO PHQ QUESTIONS 1-9: 8

## 2023-06-29 ASSESSMENT — ANXIETY QUESTIONNAIRES
GAD7 TOTAL SCORE: 11
3. WORRYING TOO MUCH ABOUT DIFFERENT THINGS: SEVERAL DAYS
IF YOU CHECKED OFF ANY PROBLEMS ON THIS QUESTIONNAIRE, HOW DIFFICULT HAVE THESE PROBLEMS MADE IT FOR YOU TO DO YOUR WORK, TAKE CARE OF THINGS AT HOME, OR GET ALONG WITH OTHER PEOPLE: VERY DIFFICULT
4. TROUBLE RELAXING: MORE THAN HALF THE DAYS
7. FEELING AFRAID AS IF SOMETHING AWFUL MIGHT HAPPEN: MORE THAN HALF THE DAYS
GAD7 TOTAL SCORE: 11
5. BEING SO RESTLESS THAT IT IS HARD TO SIT STILL: MORE THAN HALF THE DAYS
6. BECOMING EASILY ANNOYED OR IRRITABLE: MORE THAN HALF THE DAYS
2. NOT BEING ABLE TO STOP OR CONTROL WORRYING: SEVERAL DAYS
1. FEELING NERVOUS, ANXIOUS, OR ON EDGE: SEVERAL DAYS

## 2023-06-29 ASSESSMENT — PAIN SCALES - GENERAL: PAINLEVEL: MODERATE PAIN (4)

## 2023-06-29 NOTE — PROGRESS NOTES
Assessment & Plan     Abnormal involuntary movement  Shoaib declines drug screening citing not enough time. Denies any drug use. Will need to follow up with neurology to discuss treatment options.   - Adult Neurology  Referral  - Urine Drugs of Abuse Screen (Tox13); Future    Special screening for malignant neoplasms, colon  - Colonoscopy Screening  Referral; Future    Hypercholesteremia  Routine lab due. Will come in fasting.   - Lipid Profile (Chol, Trig, HDL, LDL calc); Future    Pain syndrome, chronic  - naproxen (NAPROSYN) 250 MG tablet; TAKE 1 TABLET(250 MG) BY MOUTH TWICE DAILY WITH FOOD AS NEEDED FOR MODERATE PAIN    Chronic low back pain with bilateral sciatica, unspecified back pain laterality  - naproxen (NAPROSYN) 250 MG tablet; TAKE 1 TABLET(250 MG) BY MOUTH TWICE DAILY WITH FOOD AS NEEDED FOR MODERATE PAIN  - tiZANidine (ZANAFLEX) 4 MG tablet; Take 1 tablet (4 mg) by mouth 3 times daily as needed for muscle spasms    Spasm of muscle of lower back  - tiZANidine (ZANAFLEX) 4 MG tablet; Take 1 tablet (4 mg) by mouth 3 times daily as needed for muscle spasms    Ordering of each unique test  Prescription drug management         No follow-ups on file.    Diana Jackman, CNP  [facility]    Subjective   Shoaib is a 37 year old, presenting for the following health issues:  Neurologic Problem      HPI     Uncontrolled movement:  Shoaib reports being initially diagnosed with a movement disorder had attack around 2017, reportedly after a lifting injury.  I do see a mention through neurology in 2018 with palatal movement within local record. Reports that movement disorder was so severe that previously lived in a group home. States placement was due to the movement disorder and chronic pain.     Has been free of significant symptoms for last couple years. 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      Chronic low back and  sciatic pain- worsened. Having spasms. Would like refill of muscle relaxer.     Depression and Anxiety, PTSD, ADHD  Managed by psychiatry. PDMP reviewed. Adderall XR 30 mg prescribed by Brina Hines MD most recently in May. Had appointment last week. States mental health provider does not yet know just due to timing of symptoms and appointment.       How are you doing with your depression since your last visit? Worsened -movement disorder has come back, has lost job    How are you doing with your anxiety since your last visit?  Worsened -same as above    Are you having other symptoms that might be associated with depression or anxiety? No    Have you had a significant life event? Job Concerns and Health Concerns     Do you have any concerns with your use of alcohol or other drugs? No      Social History     Tobacco Use     Smoking status: Every Day     Packs/day: 0.50     Years: 10.00     Pack years: 5.00     Types: Cigarettes     Start date: 1/1/2002     Smokeless tobacco: Never     Tobacco comments:     Transdermal patches have helped me the most in the past   Vaping Use     Vaping Use: Former     Substances: Nicotine, Flavoring     Devices: Pre-filled or refillable cartridge   Substance Use Topics     Alcohol use: No     Alcohol/week: 2.0 - 4.0 standard drinks of alcohol     Comment: rarely     Drug use: Not Currently     Types: Methamphetamines     Comment: hx of meth, 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 12-10-20 = 2.5 months sober         6/20/2022     1:00 PM 3/9/2023    10:36 AM 6/29/2023    12:11 PM   PHQ   PHQ-9 Total Score 1 3 8   Q9: Thoughts of better off dead/self-harm past 2 weeks Not at all Not at all Not at all         6/20/2022     1:00 PM 3/9/2023    10:36 AM 6/29/2023    12:11 PM   JIN-7 SCORE   Total Score   11 (moderate anxiety)   Total Score 1 0 11         6/29/2023    12:11 PM   Last PHQ-9   1.  Little interest or pleasure in doing things 1   2.  Feeling down, depressed, or hopeless 1   3.   "Trouble falling or staying asleep, or sleeping too much 1   4.  Feeling tired or having little energy 1   5.  Poor appetite or overeating 1   6.  Feeling bad about yourself 1   7.  Trouble concentrating 1   8.  Moving slowly or restless 1   Q9: Thoughts of better off dead/self-harm past 2 weeks 0   PHQ-9 Total Score 8         6/29/2023    12:11 PM   JIN-7    1. Feeling nervous, anxious, or on edge 1   2. Not being able to stop or control worrying 1   3. Worrying too much about different things 1   4. Trouble relaxing 2   5. Being so restless that it is hard to sit still 2   6. Becoming easily annoyed or irritable 2   7. Feeling afraid, as if something awful might happen 2   JIN-7 Total Score 11   If you checked any problems, how difficult have they made it for you to do your work, take care of things at home, or get along with other people? Very difficult         Review of Systems   Constitutional, HEENT, cardiovascular, pulmonary, gi and gu systems are negative, except as otherwise noted.      Objective    /82 (BP Location: Right arm, Patient Position: Sitting, Cuff Size: Adult Regular)   Pulse 102   Temp 97.6  F (36.4  C) (Tympanic)   Resp 14   Ht 1.702 m (5' 7\")   Wt 85.9 kg (189 lb 6.4 oz)   SpO2 98%   BMI 29.66 kg/m    Body mass index is 29.66 kg/m .  Physical Exam   GENERAL: healthy, alert and no distress  EYES: Eyes grossly normal to inspection, PERRL and conjunctivae and sclerae normal  HENT: ear canals and TM's normal, nose and mouth without ulcers or lesions  NECK: no adenopathy, no asymmetry, masses, or scars and thyroid normal to palpation  RESP: lungs clear to auscultation - no rales, rhonchi or wheezes  CV: regular rate and rhythm, normal S1 S2, no S3 or S4, no murmur, click or rub, no peripheral edema and peripheral pulses strong  ABDOMEN: soft, nontender, no hepatosplenomegaly, no masses and bowel sounds normal  MS: no gross musculoskeletal defects noted, no edema  SKIN: no suspicious " lesions or rashes  NEURO: Normal strength and tone, sensory exam grossly normal, mentation intact, speech and quality/strength of voice normal and cranial nerves 2-12 intact. Slow rhythmic trunk and head tremor present. This ceases with standing and walking.    PSYCH: mentation appears normal, affect normal/bright

## 2023-06-29 NOTE — LETTER
June 29, 2023      Hoang Kiser  [address] [place] [id number]        To Whom It May Concern,       Shoaib Kiser (Gabriel) has a diagnosed movement disorder. This has been diagnosed through Neurology and has the potential to affect employment. Any further questions can be directed to [facility].     Sincerely,        Diana Jackman, CNP

## 2023-07-20 NOTE — PROGRESS NOTES
ELIOI to Mary.   Referral to [facility]   HISTORY OF PRESENT ILLNESS  Mr. Kiser is a pleasant 34 year old year old male who presents to clinic today   For followup for cervical injection  Has improvement overall in his neck and radiation into arms/hands    MEDICAL HISTORY  Patient Active Problem List   Diagnosis     Neuropathy     Moderate major depression (H)     Tobacco abuse     Attention deficit hyperactivity disorder (ADHD), predominantly inattentive type     Primary insomnia     Panic disorder without agoraphobia     Abnormal involuntary movement     Tic disorder     Anxiety     Posttraumatic stress disorder with dissociative symptoms     Chronic left hip pain     Chronic pain of right hip     Degenerative disc disease at L5-S1 level     Functional movement disorder     Family history of Crohn's disease     Chronic low back pain     Chronic pain syndrome     History of substance abuse     Family history of multiple myeloma     History of electroencephalogram     Nonallergic rhinitis       Current Outpatient Medications   Medication Sig Dispense Refill     albuterol (PROAIR HFA/PROVENTIL HFA/VENTOLIN HFA) 108 (90 Base) MCG/ACT inhaler Inhale 1-2 puffs into the lungs every 4 hours as needed for shortness of breath / dyspnea or wheezing (cough or wheeze only if necessary) 6.7 g 1     amphetamine-dextroamphetamine (ADDERALL XR) 30 MG per 24 hr capsule Take 30 mg by mouth daily       clonazePAM (KLONOPIN) 0.5 MG tablet TK 1 T PO QD  1     DULoxetine (CYMBALTA) 60 MG EC capsule 2 x 60mg tab by mouth daily 30 capsule 1     gabapentin (NEURONTIN) 100 MG capsule Take 1 capsule (100 mg) by mouth 3 times daily 45 capsule 0     HYDROcodone-acetaminophen (NORCO) 5-325 MG tablet Take 1 tablet by mouth 2 times daily as needed for pain Okay to fill on 7/31/19 26 tablet 0     hydrOXYzine (ATARAX) 25 MG tablet Take 1 tablet (25 mg) by mouth nightly as needed (at HS PRN) (Patient not taking: Reported on 7/15/2019) 45 tablet 3     methocarbamol (ROBAXIN) 500 MG tablet  Take 1-2 tablets (500-1,000 mg) by mouth 4 times daily as needed for muscle spasms 240 tablet 1     morphine (MS CONTIN) 15 MG CR tablet Take 1 tablet (15 mg) by mouth every 12 hours Ok at fill on 8/10/19 60 tablet 0     naloxone (NARCAN) 4 MG/0.1ML nasal spray Spray 1 spray (4 mg) into one nostril alternating nostrils as needed for opioid reversal every 2-3 minutes until assistance arrives 0.2 mL 0     naproxen (NAPROSYN) 500 MG tablet Take 1 tablet (500 mg) by mouth 2 times daily (with meals) 40 tablet 3     oxybutynin ER (DITROPAN-XL) 10 MG 24 hr tablet TK 1 T PO D  3     oxybutynin ER (DITROPAN-XL) 5 MG 24 hr tablet Take 2 tablets (10 mg) by mouth daily 180 tablet 3     SF 5000 PLUS 1.1 % CREA   2       Allergies   Allergen Reactions     Buspirone Hcl Other (See Comments)     Panic attacks     Doxycycline Diarrhea, Fatigue, GI Disturbance and Nausea     Elavil [Amitriptyline]      Ineffective in reducing spasms/movement, increased fatigue     Trazodone Fatigue     Buspirone Anxiety     Keflex [Cephalexin] Diarrhea       Family History   Problem Relation Age of Onset     Lipids Father         hyperlipidemia     Hyperlipidemia Father      Obesity Father      Arthritis Mother      Hyperlipidemia Mother      Depression Mother      Anxiety Disorder Mother      Mental Illness Mother      Obesity Mother      Asthma Brother      Asthma Sister      Depression Other      Hearing Loss Other      Psychotic Disorder Other      Obesity Other      Cerebrovascular Disease Paternal Grandfather      Alzheimer Disease Paternal Grandfather      Depression Brother      Mental Illness Brother         Bipolar     Asthma Brother      Depression Sister      Asthma Sister      Substance Abuse Sister         Alcohol     Mental Illness Brother         Bipolar     Asthma Brother      Asthma Sister      Obesity Sister      Asthma Brother      Obesity Brother      Obesity Maternal Grandmother      Genetic Disorder Maternal Grandmother          Epilepsy     Obesity Paternal Grandmother      Colon Cancer Other      Genetic Disorder Other         Cerebral Palsy     Genetic Disorder Maternal Grandfather         Multiple Sclerosis     Genetic Disorder Other         Epilepsy       Additional medical/Social/Surgical histories reviewed in [facility] and updated as appropriate.     REVIEW OF SYSTEMS (8/12/2019)  10 point ROS of systems including Constitutional, Eyes, Respiratory, Cardiovascular, Gastroenterology, Genitourinary, Integumentary, Musculoskeletal, Psychiatric were all negative except for pertinent positives noted in my HPI.     PHYSICAL EXAM  VSS  Vital Signs: There were no vitals taken for this visit. Patient declined being weighed. There is no height or weight on file to calculate BMI.    General  - normal appearance, in no obvious distress  CV  - normal radial pulse  Pulm  - normal respiratory pattern, non-labored  Musculoskeletal - neck  - inspection: normal bone and joint alignment, no obvious deformity, no scapular winging, no AC step-off  - palpation: no bony or soft tissue tenderness, normal clavicle, non-tender AC  - ROM:  Full rom of neck, pain with flexion and extension of neck  - strength: 5/5  strength, 5/5 in all shoulder planes    Neuro  - no sensory or motor deficit, grossly normal coordination, except for occasional movments that are tic-like, normal muscle tone  Skin  - no ecchymosis, erythema, warmth, or induration, no obvious rash  Psych  - interactive, appropriate, normal mood and affect      ASSESSMENT & PLAN  35 yo male with cervical ddd, disc herniation, radicular pain, improved  Reviewed cervical MRI and injection  Can consider another in 1-2 months  Cont. Gabapentin and followup with his neurologist and pain clinic  F/u 1-2 months      Faustino Feldman MD, CAQSM

## 2023-10-09 DIAGNOSIS — G89.29 CHRONIC LOW BACK PAIN WITH BILATERAL SCIATICA, UNSPECIFIED BACK PAIN LATERALITY: Chronic | ICD-10-CM

## 2023-10-09 DIAGNOSIS — M54.42 CHRONIC LOW BACK PAIN WITH BILATERAL SCIATICA, UNSPECIFIED BACK PAIN LATERALITY: Chronic | ICD-10-CM

## 2023-10-09 DIAGNOSIS — M54.41 CHRONIC LOW BACK PAIN WITH BILATERAL SCIATICA, UNSPECIFIED BACK PAIN LATERALITY: Chronic | ICD-10-CM

## 2023-10-09 DIAGNOSIS — M62.830 SPASM OF MUSCLE OF LOWER BACK: ICD-10-CM

## 2023-10-10 NOTE — TELEPHONE ENCOUNTER
Zanaflex      Last Written Prescription Date:  06/29/23  Last Fill Quantity: 60,   # refills: 2  Last Office Visit: 06/29/23  Future Office visit:

## 2023-11-03 DIAGNOSIS — M62.830 SPASM OF MUSCLE OF LOWER BACK: ICD-10-CM

## 2023-11-03 DIAGNOSIS — M54.42 CHRONIC LOW BACK PAIN WITH BILATERAL SCIATICA, UNSPECIFIED BACK PAIN LATERALITY: Chronic | ICD-10-CM

## 2023-11-03 DIAGNOSIS — G89.29 CHRONIC LOW BACK PAIN WITH BILATERAL SCIATICA, UNSPECIFIED BACK PAIN LATERALITY: Chronic | ICD-10-CM

## 2023-11-03 DIAGNOSIS — M54.41 CHRONIC LOW BACK PAIN WITH BILATERAL SCIATICA, UNSPECIFIED BACK PAIN LATERALITY: Chronic | ICD-10-CM

## 2023-11-03 NOTE — TELEPHONE ENCOUNTER
ZANAFLEX      Last Written Prescription Date:  10-12-23  Last Fill Quantity: 60,   # refills: 0  Last Office Visit: 6-29-23  Future Office visit:       Routing refill request to provider for review/approval because:  Drug not on the [facility], [facility] or [facility] refill protocol or controlled substance

## 2023-11-07 DIAGNOSIS — G89.4 PAIN SYNDROME, CHRONIC: ICD-10-CM

## 2023-11-07 DIAGNOSIS — M54.41 CHRONIC LOW BACK PAIN WITH BILATERAL SCIATICA, UNSPECIFIED BACK PAIN LATERALITY: Chronic | ICD-10-CM

## 2023-11-07 DIAGNOSIS — G89.29 CHRONIC LOW BACK PAIN WITH BILATERAL SCIATICA, UNSPECIFIED BACK PAIN LATERALITY: Chronic | ICD-10-CM

## 2023-11-07 DIAGNOSIS — M54.42 CHRONIC LOW BACK PAIN WITH BILATERAL SCIATICA, UNSPECIFIED BACK PAIN LATERALITY: Chronic | ICD-10-CM

## 2023-11-07 NOTE — TELEPHONE ENCOUNTER
NAPROXEN      Last Written Prescription Date:  6-29-23  Last Fill Quantity: 60,   # refills: 3  Last Office Visit: 6-29-23  Future Office visit:       Routing refill request to provider for review/approval because:  Drug not on the [facility], [facility] or [facility] refill protocol or controlled substance

## 2023-11-09 RX ORDER — NAPROXEN 250 MG/1
TABLET ORAL
Qty: 60 TABLET | Refills: 0 | Status: SHIPPED | OUTPATIENT
Start: 2023-11-09 | End: 2023-12-07

## 2023-11-27 DIAGNOSIS — M54.41 CHRONIC LOW BACK PAIN WITH BILATERAL SCIATICA, UNSPECIFIED BACK PAIN LATERALITY: Chronic | ICD-10-CM

## 2023-11-27 DIAGNOSIS — G89.29 CHRONIC LOW BACK PAIN WITH BILATERAL SCIATICA, UNSPECIFIED BACK PAIN LATERALITY: Chronic | ICD-10-CM

## 2023-11-27 DIAGNOSIS — M62.830 SPASM OF MUSCLE OF LOWER BACK: ICD-10-CM

## 2023-11-27 DIAGNOSIS — M54.42 CHRONIC LOW BACK PAIN WITH BILATERAL SCIATICA, UNSPECIFIED BACK PAIN LATERALITY: Chronic | ICD-10-CM

## 2023-11-28 NOTE — TELEPHONE ENCOUNTER
Tizanidine  Last Written Prescription Date: 11/3/23  Last Fill Quantity: 60 # of Refills: 0  Last Office Visit: 6/29/23

## 2023-12-07 DIAGNOSIS — G89.29 CHRONIC LOW BACK PAIN WITH BILATERAL SCIATICA, UNSPECIFIED BACK PAIN LATERALITY: Chronic | ICD-10-CM

## 2023-12-07 DIAGNOSIS — G89.4 PAIN SYNDROME, CHRONIC: ICD-10-CM

## 2023-12-07 DIAGNOSIS — M54.41 CHRONIC LOW BACK PAIN WITH BILATERAL SCIATICA, UNSPECIFIED BACK PAIN LATERALITY: Chronic | ICD-10-CM

## 2023-12-07 DIAGNOSIS — M54.42 CHRONIC LOW BACK PAIN WITH BILATERAL SCIATICA, UNSPECIFIED BACK PAIN LATERALITY: Chronic | ICD-10-CM

## 2023-12-07 RX ORDER — NAPROXEN 250 MG/1
TABLET ORAL
Qty: 60 TABLET | Refills: 0 | Status: SHIPPED | OUTPATIENT
Start: 2023-12-07 | End: 2024-01-08

## 2023-12-07 NOTE — TELEPHONE ENCOUNTER
Disp Refills Start End YAIR    naproxen (NAPROSYN) 250 MG tablet 60 tablet 0 11/9/2023  No     Last Office Visit: 6/29/2023  Future Office visit:       Routing refill request to provider for review/approval because:

## 2023-12-24 DIAGNOSIS — G89.29 CHRONIC LOW BACK PAIN WITH BILATERAL SCIATICA, UNSPECIFIED BACK PAIN LATERALITY: Chronic | ICD-10-CM

## 2023-12-24 DIAGNOSIS — M62.830 SPASM OF MUSCLE OF LOWER BACK: ICD-10-CM

## 2023-12-24 DIAGNOSIS — M54.42 CHRONIC LOW BACK PAIN WITH BILATERAL SCIATICA, UNSPECIFIED BACK PAIN LATERALITY: Chronic | ICD-10-CM

## 2023-12-24 DIAGNOSIS — M54.41 CHRONIC LOW BACK PAIN WITH BILATERAL SCIATICA, UNSPECIFIED BACK PAIN LATERALITY: Chronic | ICD-10-CM

## 2023-12-26 NOTE — TELEPHONE ENCOUNTER
Zanaflex      Last Written Prescription Date:  11/30/23  Last Fill Quantity: 60,   # refills: 0  Last Office Visit: 06/29/23  Future Office visit:

## 2024-01-08 DIAGNOSIS — M54.41 CHRONIC LOW BACK PAIN WITH BILATERAL SCIATICA, UNSPECIFIED BACK PAIN LATERALITY: Chronic | ICD-10-CM

## 2024-01-08 DIAGNOSIS — M54.42 CHRONIC LOW BACK PAIN WITH BILATERAL SCIATICA, UNSPECIFIED BACK PAIN LATERALITY: Chronic | ICD-10-CM

## 2024-01-08 DIAGNOSIS — G89.4 PAIN SYNDROME, CHRONIC: ICD-10-CM

## 2024-01-08 DIAGNOSIS — G89.29 CHRONIC LOW BACK PAIN WITH BILATERAL SCIATICA, UNSPECIFIED BACK PAIN LATERALITY: Chronic | ICD-10-CM

## 2024-01-08 RX ORDER — NAPROXEN 250 MG/1
TABLET ORAL
Qty: 60 TABLET | Refills: 0 | Status: SHIPPED | OUTPATIENT
Start: 2024-01-08 | End: 2024-04-02

## 2024-01-08 NOTE — TELEPHONE ENCOUNTER
naproxen (NAPROSYN) 250 MG tablet 60 tablet 0 12/7/2023   Last Office Visit: 06/29/23     Future Office visit:       Routing refill request to provider for review/approval because:

## 2024-01-22 DIAGNOSIS — M54.41 CHRONIC LOW BACK PAIN WITH BILATERAL SCIATICA, UNSPECIFIED BACK PAIN LATERALITY: Chronic | ICD-10-CM

## 2024-01-22 DIAGNOSIS — M54.42 CHRONIC LOW BACK PAIN WITH BILATERAL SCIATICA, UNSPECIFIED BACK PAIN LATERALITY: Chronic | ICD-10-CM

## 2024-01-22 DIAGNOSIS — G89.29 CHRONIC LOW BACK PAIN WITH BILATERAL SCIATICA, UNSPECIFIED BACK PAIN LATERALITY: Chronic | ICD-10-CM

## 2024-01-22 DIAGNOSIS — M62.830 SPASM OF MUSCLE OF LOWER BACK: ICD-10-CM

## 2024-01-22 NOTE — TELEPHONE ENCOUNTER
Zanaflex      Last Written Prescription Date:  12.27.23  Last Fill Quantity: #60,   # refills: 0  Last Office Visit: 6.29.23  Future Office visit:       Routing refill request to provider for review/approval because:  Drug not on the [facility], [facility] or [facility] refill protocol or controlled substance

## 2024-02-17 DIAGNOSIS — G89.29 CHRONIC LOW BACK PAIN WITH BILATERAL SCIATICA, UNSPECIFIED BACK PAIN LATERALITY: Chronic | ICD-10-CM

## 2024-02-17 DIAGNOSIS — M54.42 CHRONIC LOW BACK PAIN WITH BILATERAL SCIATICA, UNSPECIFIED BACK PAIN LATERALITY: Chronic | ICD-10-CM

## 2024-02-17 DIAGNOSIS — M54.41 CHRONIC LOW BACK PAIN WITH BILATERAL SCIATICA, UNSPECIFIED BACK PAIN LATERALITY: Chronic | ICD-10-CM

## 2024-02-17 DIAGNOSIS — M62.830 SPASM OF MUSCLE OF LOWER BACK: ICD-10-CM

## 2024-02-19 NOTE — TELEPHONE ENCOUNTER
Tizanidine      Last Written Prescription Date:  1/22/24   Last Fill Quantity: 60,   # refills: 0  Last Office Visit: 6/29/23  Future Office visit:       Routing refill request to provider for review/approval because:

## 2024-03-16 DIAGNOSIS — M62.830 SPASM OF MUSCLE OF LOWER BACK: ICD-10-CM

## 2024-03-16 DIAGNOSIS — G89.29 CHRONIC LOW BACK PAIN WITH BILATERAL SCIATICA, UNSPECIFIED BACK PAIN LATERALITY: Chronic | ICD-10-CM

## 2024-03-16 DIAGNOSIS — M54.42 CHRONIC LOW BACK PAIN WITH BILATERAL SCIATICA, UNSPECIFIED BACK PAIN LATERALITY: Chronic | ICD-10-CM

## 2024-03-16 DIAGNOSIS — M54.41 CHRONIC LOW BACK PAIN WITH BILATERAL SCIATICA, UNSPECIFIED BACK PAIN LATERALITY: Chronic | ICD-10-CM

## 2024-03-18 NOTE — TELEPHONE ENCOUNTER
TIZANIDINE 4MG TABLETS           Last Written Prescription Date:  2/19/24  Last Fill Quantity: 60,   # refills: 0  Last Office Visit: 6/9/23  Future Office visit:       Routing refill request to provider for review/approval because:    Drug not on the [facility], [facility] or [facility] refill protocol or controlled substance

## 2024-04-02 ENCOUNTER — OFFICE VISIT (OUTPATIENT)
Dept: FAMILY MEDICINE | Facility: OTHER | Age: 39
End: 2024-04-02
Attending: NURSE PRACTITIONER
Payer: COMMERCIAL

## 2024-04-02 VITALS
RESPIRATION RATE: 16 BRPM | BODY MASS INDEX: 34.31 KG/M2 | WEIGHT: 218.6 LBS | TEMPERATURE: 97.1 F | DIASTOLIC BLOOD PRESSURE: 68 MMHG | HEIGHT: 67 IN | OXYGEN SATURATION: 99 % | HEART RATE: 84 BPM | SYSTOLIC BLOOD PRESSURE: 114 MMHG

## 2024-04-02 DIAGNOSIS — M54.41 CHRONIC LOW BACK PAIN WITH BILATERAL SCIATICA, UNSPECIFIED BACK PAIN LATERALITY: Chronic | ICD-10-CM

## 2024-04-02 DIAGNOSIS — G89.29 CHRONIC LOW BACK PAIN WITH BILATERAL SCIATICA, UNSPECIFIED BACK PAIN LATERALITY: Chronic | ICD-10-CM

## 2024-04-02 DIAGNOSIS — K04.7 TOOTH ABSCESS: Primary | ICD-10-CM

## 2024-04-02 DIAGNOSIS — M54.42 CHRONIC LOW BACK PAIN WITH BILATERAL SCIATICA, UNSPECIFIED BACK PAIN LATERALITY: Chronic | ICD-10-CM

## 2024-04-02 DIAGNOSIS — G89.4 PAIN SYNDROME, CHRONIC: ICD-10-CM

## 2024-04-02 DIAGNOSIS — F17.200 NICOTINE DEPENDENCE, UNCOMPLICATED, UNSPECIFIED NICOTINE PRODUCT TYPE: ICD-10-CM

## 2024-04-02 DIAGNOSIS — Z80.0 FAMILY HISTORY OF COLON CANCER: ICD-10-CM

## 2024-04-02 PROCEDURE — 99213 OFFICE O/P EST LOW 20 MIN: CPT | Performed by: NURSE PRACTITIONER

## 2024-04-02 PROCEDURE — 99406 BEHAV CHNG SMOKING 3-10 MIN: CPT | Performed by: NURSE PRACTITIONER

## 2024-04-02 PROCEDURE — G0463 HOSPITAL OUTPT CLINIC VISIT: HCPCS | Mod: 25

## 2024-04-02 RX ORDER — PENICILLIN V POTASSIUM 500 MG/1
500 TABLET, FILM COATED ORAL 4 TIMES DAILY
Qty: 28 TABLET | Refills: 0 | Status: SHIPPED | OUTPATIENT
Start: 2024-04-02 | End: 2024-04-09

## 2024-04-02 RX ORDER — NAPROXEN 250 MG/1
TABLET ORAL
Qty: 60 TABLET | Refills: 0 | Status: SHIPPED | OUTPATIENT
Start: 2024-04-02 | End: 2024-05-03

## 2024-04-02 ASSESSMENT — ANXIETY QUESTIONNAIRES
GAD7 TOTAL SCORE: 0
GAD7 TOTAL SCORE: 0
7. FEELING AFRAID AS IF SOMETHING AWFUL MIGHT HAPPEN: NOT AT ALL
8. IF YOU CHECKED OFF ANY PROBLEMS, HOW DIFFICULT HAVE THESE MADE IT FOR YOU TO DO YOUR WORK, TAKE CARE OF THINGS AT HOME, OR GET ALONG WITH OTHER PEOPLE?: NOT DIFFICULT AT ALL
4. TROUBLE RELAXING: NOT AT ALL
5. BEING SO RESTLESS THAT IT IS HARD TO SIT STILL: NOT AT ALL
7. FEELING AFRAID AS IF SOMETHING AWFUL MIGHT HAPPEN: NOT AT ALL
1. FEELING NERVOUS, ANXIOUS, OR ON EDGE: NOT AT ALL
3. WORRYING TOO MUCH ABOUT DIFFERENT THINGS: NOT AT ALL
2. NOT BEING ABLE TO STOP OR CONTROL WORRYING: NOT AT ALL
GAD7 TOTAL SCORE: 0
IF YOU CHECKED OFF ANY PROBLEMS ON THIS QUESTIONNAIRE, HOW DIFFICULT HAVE THESE PROBLEMS MADE IT FOR YOU TO DO YOUR WORK, TAKE CARE OF THINGS AT HOME, OR GET ALONG WITH OTHER PEOPLE: NOT DIFFICULT AT ALL
6. BECOMING EASILY ANNOYED OR IRRITABLE: NOT AT ALL

## 2024-04-02 ASSESSMENT — PATIENT HEALTH QUESTIONNAIRE - PHQ9
SUM OF ALL RESPONSES TO PHQ QUESTIONS 1-9: 3
SUM OF ALL RESPONSES TO PHQ QUESTIONS 1-9: 3
10. IF YOU CHECKED OFF ANY PROBLEMS, HOW DIFFICULT HAVE THESE PROBLEMS MADE IT FOR YOU TO DO YOUR WORK, TAKE CARE OF THINGS AT HOME, OR GET ALONG WITH OTHER PEOPLE: NOT DIFFICULT AT ALL

## 2024-04-02 ASSESSMENT — PAIN SCALES - GENERAL: PAINLEVEL: MODERATE PAIN (5)

## 2024-04-02 NOTE — PROGRESS NOTES
"  Assessment & Plan     1. Tooth abscess   Follow upw the a dentist as soon as possible.   - penicillin V (VEETID) 500 MG tablet; Take 1 tablet (500 mg) by mouth 4 times daily for 7 days  Dispense: 28 tablet; Refill: 0    2. Nicotine dependence, uncomplicated, unspecified nicotine product type  - SMOKING CESSATION COUNSELING 3-10 MIN    3. Pain syndrome, chronic  - naproxen (NAPROSYN) 250 MG tablet; TAKE 1 TABLET(250 MG) BY MOUTH TWICE DAILY WITH FOOD AS NEEDED FOR MODERATE PAIN  Dispense: 60 tablet; Refill: 0    4. Chronic low back pain with bilateral sciatica, unspecified back pain laterality    - naproxen (NAPROSYN) 250 MG tablet; TAKE 1 TABLET(250 MG) BY MOUTH TWICE DAILY WITH FOOD AS NEEDED FOR MODERATE PAIN  Dispense: 60 tablet; Refill: 0        Prescription drug management      Nicotine/Tobacco Cessation  He reports that he has been smoking cigarettes. He started smoking about 22 years ago. He has a 5 pack-year smoking history. He has never used smokeless tobacco.  Nicotine/Tobacco Cessation Plan  Information offered: Patient not interested at this time      BMI  Estimated body mass index is 34.24 kg/m  as calculated from the following:    Height as of this encounter: 1.702 m (5' 7\").    Weight as of this encounter: 99.2 kg (218 lb 9.6 oz).   Weight management plan: Discussed healthy diet and exercise guidelines      No follow-ups on file.    Pau Cramer is a 38 year old, presenting for the following health issues:  Dental Pain        4/2/2024    12:48 PM   Additional Questions   Roomed by lexie   Accompanied by none     HPI     Concern - dental infection   Onset: last night   Description: right side of face swelling   Intensity: moderate  Progression of Symptoms:  worsening  Accompanying Signs & Symptoms: pain can't get into dentist was going to [facility] they are out 2-3 months going to go to [facility] in [place]   Previous history of similar problem: not sure if infected or abscess   Precipitating " "factors:        Worsened by: cold and hot   Alleviating factors:        Improved by: nothing   Therapies tried and outcome: Tylenol and ibuprofen     Has cephalosporin intolerance but appears per chart that Shoaib has tolerated penicillins in the past.     Planning to see Kayla in the next couple weeks.     Objective    /68 (BP Location: Right arm, Patient Position: Chair, Cuff Size: Adult Large)   Pulse 84   Temp 97.1  F (36.2  C) (Tympanic)   Resp 16   Ht 1.702 m (5' 7\")   Wt 99.2 kg (218 lb 9.6 oz)   SpO2 99%   BMI 34.24 kg/m    Body mass index is 34.24 kg/m .      Physical Exam   GENERAL: alert and no distress  EYES: Eyes grossly normal to inspection, PERRL and conjunctivae and sclerae normal  HENT: normal cephalic/atraumatic, ear canals and TM's normal, mild erythema of superior gumline right. No focal erythema or fluctuance. No drainage and oral mucous membranes moist  NECK: no adenopathy, no asymmetry, masses, or scars  RESP: lungs clear to auscultation - no rales, rhonchi or wheezes  CV: regular rate and rhythm, normal S1 S2, no S3 or S4, no murmur, click or rub, no peripheral edema   MS: negative for tenderness over spine. Bilateral paraspinal tenderness. Negative straight leg lift test.  Extremities normal- no gross deformities noted          Answers submitted by the patient for this visit:  Patient Health Questionnaire (Submitted on 4/2/2024)  If you checked off any problems, how difficult have these problems made it for you to do your work, take care of things at home, or get along with other people?: Not difficult at all  PHQ9 TOTAL SCORE: 3  JIN-7 (Submitted on 4/2/2024)  JIN 7 TOTAL SCORE: 0    Signed Electronically by: Diana Jackman CNP    "

## 2024-04-04 ENCOUNTER — TELEPHONE (OUTPATIENT)
Dept: FAMILY MEDICINE | Facility: OTHER | Age: 39
End: 2024-04-04

## 2024-04-04 DIAGNOSIS — K04.7 TOOTH ABSCESS: Primary | ICD-10-CM

## 2024-04-04 RX ORDER — AMOXICILLIN 500 MG/1
500 CAPSULE ORAL 3 TIMES DAILY
Qty: 21 CAPSULE | Refills: 0 | Status: SHIPPED | OUTPATIENT
Start: 2024-04-04 | End: 2024-04-11

## 2024-04-04 NOTE — TELEPHONE ENCOUNTER
Patient wants to let you know that penicillin is affecting his stomach and would like a call back for a different prescription.  He states that prednisone has worked in the past. Patient has a tooth infection.    [phone]    Thank you  Reva

## 2024-04-19 DIAGNOSIS — M62.830 SPASM OF MUSCLE OF LOWER BACK: ICD-10-CM

## 2024-04-19 DIAGNOSIS — G89.29 CHRONIC LOW BACK PAIN WITH BILATERAL SCIATICA, UNSPECIFIED BACK PAIN LATERALITY: Chronic | ICD-10-CM

## 2024-04-19 DIAGNOSIS — M54.42 CHRONIC LOW BACK PAIN WITH BILATERAL SCIATICA, UNSPECIFIED BACK PAIN LATERALITY: Chronic | ICD-10-CM

## 2024-04-19 DIAGNOSIS — M54.41 CHRONIC LOW BACK PAIN WITH BILATERAL SCIATICA, UNSPECIFIED BACK PAIN LATERALITY: Chronic | ICD-10-CM

## 2024-04-19 NOTE — TELEPHONE ENCOUNTER
Tizanidine  Last Written Prescription Date: 3/18/24  Last Fill Quantity: 60 # of Refills: 0  Last Office Visit: 4/2/24

## 2024-04-29 DIAGNOSIS — M54.41 CHRONIC LOW BACK PAIN WITH BILATERAL SCIATICA, UNSPECIFIED BACK PAIN LATERALITY: Chronic | ICD-10-CM

## 2024-04-29 DIAGNOSIS — G89.29 CHRONIC LOW BACK PAIN WITH BILATERAL SCIATICA, UNSPECIFIED BACK PAIN LATERALITY: Chronic | ICD-10-CM

## 2024-04-29 DIAGNOSIS — G89.4 PAIN SYNDROME, CHRONIC: ICD-10-CM

## 2024-04-29 DIAGNOSIS — M54.42 CHRONIC LOW BACK PAIN WITH BILATERAL SCIATICA, UNSPECIFIED BACK PAIN LATERALITY: Chronic | ICD-10-CM

## 2024-04-29 NOTE — TELEPHONE ENCOUNTER
naproxen (NAPROSYN) 250 MG tablet 60 tablet 0 4/2/2024     Last Office Visit: 04/02/2024  Future Office visit:       Routing refill request to provider for review/approval because:

## 2024-05-03 RX ORDER — NAPROXEN 250 MG/1
TABLET ORAL
Qty: 60 TABLET | Refills: 0 | Status: SHIPPED | OUTPATIENT
Start: 2024-05-03

## 2024-05-11 DIAGNOSIS — M54.42 CHRONIC LOW BACK PAIN WITH BILATERAL SCIATICA, UNSPECIFIED BACK PAIN LATERALITY: Chronic | ICD-10-CM

## 2024-05-11 DIAGNOSIS — M62.830 SPASM OF MUSCLE OF LOWER BACK: ICD-10-CM

## 2024-05-11 DIAGNOSIS — G89.29 CHRONIC LOW BACK PAIN WITH BILATERAL SCIATICA, UNSPECIFIED BACK PAIN LATERALITY: Chronic | ICD-10-CM

## 2024-05-11 DIAGNOSIS — M54.41 CHRONIC LOW BACK PAIN WITH BILATERAL SCIATICA, UNSPECIFIED BACK PAIN LATERALITY: Chronic | ICD-10-CM

## 2024-05-13 NOTE — TELEPHONE ENCOUNTER
Tizanidine      Last Written Prescription Date:  4/22/24  Last Fill Quantity: 60,   # refills: 0  Last Office Visit: 4/2/24  Future Office visit:       Routing refill request to provider for review/approval because:

## 2024-05-14 NOTE — TELEPHONE ENCOUNTER
TIZANIDINE 4MG TABLETS       Routing refill request to provider for review/approval because:  Drug not on the [facility] or [facility] refill protocol or controlled substance

## 2024-06-20 DIAGNOSIS — M62.830 SPASM OF MUSCLE OF LOWER BACK: ICD-10-CM

## 2024-06-20 DIAGNOSIS — M54.41 CHRONIC LOW BACK PAIN WITH BILATERAL SCIATICA, UNSPECIFIED BACK PAIN LATERALITY: Chronic | ICD-10-CM

## 2024-06-20 DIAGNOSIS — M54.42 CHRONIC LOW BACK PAIN WITH BILATERAL SCIATICA, UNSPECIFIED BACK PAIN LATERALITY: Chronic | ICD-10-CM

## 2024-06-20 DIAGNOSIS — G89.29 CHRONIC LOW BACK PAIN WITH BILATERAL SCIATICA, UNSPECIFIED BACK PAIN LATERALITY: Chronic | ICD-10-CM

## 2024-06-20 NOTE — TELEPHONE ENCOUNTER
TIZANIDINE 4MG TABLETS         Last Written Prescription Date:  5/16/24  Last Fill Quantity: 60,   # refills: 0  Last Office Visit: 4/2/24  Future Office visit:       Routing refill request to provider for review/approval because:  Drug not on the [facility], [facility] or [facility] refill protocol or controlled substance

## 2024-07-08 DIAGNOSIS — M54.42 CHRONIC LOW BACK PAIN WITH BILATERAL SCIATICA, UNSPECIFIED BACK PAIN LATERALITY: Chronic | ICD-10-CM

## 2024-07-08 DIAGNOSIS — G89.29 CHRONIC LOW BACK PAIN WITH BILATERAL SCIATICA, UNSPECIFIED BACK PAIN LATERALITY: Chronic | ICD-10-CM

## 2024-07-08 DIAGNOSIS — M54.41 CHRONIC LOW BACK PAIN WITH BILATERAL SCIATICA, UNSPECIFIED BACK PAIN LATERALITY: Chronic | ICD-10-CM

## 2024-07-08 DIAGNOSIS — M62.830 SPASM OF MUSCLE OF LOWER BACK: ICD-10-CM

## 2024-07-09 NOTE — TELEPHONE ENCOUNTER
Zanaflex       Last Written Prescription Date:  6/20/2024  Last Fill Quantity: 60,   # refills: 0  Last Office Visit: 4/02/2024  Future Office visit:

## 2024-07-26 DIAGNOSIS — M54.41 CHRONIC LOW BACK PAIN WITH BILATERAL SCIATICA, UNSPECIFIED BACK PAIN LATERALITY: Chronic | ICD-10-CM

## 2024-07-26 DIAGNOSIS — G89.29 CHRONIC LOW BACK PAIN WITH BILATERAL SCIATICA, UNSPECIFIED BACK PAIN LATERALITY: Chronic | ICD-10-CM

## 2024-07-26 DIAGNOSIS — M54.42 CHRONIC LOW BACK PAIN WITH BILATERAL SCIATICA, UNSPECIFIED BACK PAIN LATERALITY: Chronic | ICD-10-CM

## 2024-07-26 DIAGNOSIS — M62.830 SPASM OF MUSCLE OF LOWER BACK: ICD-10-CM

## 2024-07-26 NOTE — TELEPHONE ENCOUNTER
Disp Refills Start End YAIR   tiZANidine (ZANAFLEX) 4 MG tablet 60 tablet 0 7/9/2024 -- No     Last Office Visit: 04/02/2024  Future Office visit:       Routing refill request to provider for review/approval because:

## 2024-08-05 ENCOUNTER — TELEPHONE (OUTPATIENT)
Dept: FAMILY MEDICINE | Facility: OTHER | Age: 39
End: 2024-08-05

## 2024-08-05 NOTE — TELEPHONE ENCOUNTER
Talked to patient he has appointment in October with [facility] soonest can get in. Was driving so will call to schedule follow up with pcp

## 2024-08-05 NOTE — TELEPHONE ENCOUNTER
Reason for call:  Medication      Have you contacted your pharmacy? Yes   If patient has contacted Pharmacy and it has been over 72hrs, continue to #2  Medication Amoxicillin 500 mg for dental infection. Has an appt with dentist in October  What Pharmacy do you use? [facility]      (Please note that the turn-around-time for prescriptions is 72 business hours; I am sending your request at this time. SEND TO appropriate Care Team Pool )

## 2024-08-05 NOTE — TELEPHONE ENCOUNTER
Unable to evaluate. Would need to be seen.  They were scheduled to be see this summer per my last note. Did they establish with dentist?

## 2024-08-16 DIAGNOSIS — M62.830 SPASM OF MUSCLE OF LOWER BACK: ICD-10-CM

## 2024-08-16 DIAGNOSIS — M54.41 CHRONIC LOW BACK PAIN WITH BILATERAL SCIATICA, UNSPECIFIED BACK PAIN LATERALITY: Chronic | ICD-10-CM

## 2024-08-16 DIAGNOSIS — M54.42 CHRONIC LOW BACK PAIN WITH BILATERAL SCIATICA, UNSPECIFIED BACK PAIN LATERALITY: Chronic | ICD-10-CM

## 2024-08-16 DIAGNOSIS — G89.29 CHRONIC LOW BACK PAIN WITH BILATERAL SCIATICA, UNSPECIFIED BACK PAIN LATERALITY: Chronic | ICD-10-CM

## 2024-08-16 NOTE — TELEPHONE ENCOUNTER
Tizanidine (Zanaflex) 4 MG tablet    Last Written Prescription Date:  07/26/2024  Last Fill Quantity: 60,   # refills: 0  Last Office Visit: 04/02/2024

## 2024-09-06 DIAGNOSIS — M54.41 CHRONIC LOW BACK PAIN WITH BILATERAL SCIATICA, UNSPECIFIED BACK PAIN LATERALITY: Chronic | ICD-10-CM

## 2024-09-06 DIAGNOSIS — M62.830 SPASM OF MUSCLE OF LOWER BACK: ICD-10-CM

## 2024-09-06 DIAGNOSIS — G89.29 CHRONIC LOW BACK PAIN WITH BILATERAL SCIATICA, UNSPECIFIED BACK PAIN LATERALITY: Chronic | ICD-10-CM

## 2024-09-06 DIAGNOSIS — M54.42 CHRONIC LOW BACK PAIN WITH BILATERAL SCIATICA, UNSPECIFIED BACK PAIN LATERALITY: Chronic | ICD-10-CM

## 2024-09-06 NOTE — TELEPHONE ENCOUNTER
TIZANIDINE 4MG TABLETS       Last Written Prescription Date:  8/16/24  Last Fill Quantity: 60,   # refills: 0  Last Office Visit: 4/2/24  Future Office visit:       Routing refill request to provider for review/approval because:  Drug not on the [facility], [facility] or [facility] refill protocol or controlled substance

## 2024-09-27 DIAGNOSIS — G89.29 CHRONIC LOW BACK PAIN WITH BILATERAL SCIATICA, UNSPECIFIED BACK PAIN LATERALITY: Chronic | ICD-10-CM

## 2024-09-27 DIAGNOSIS — M62.830 SPASM OF MUSCLE OF LOWER BACK: ICD-10-CM

## 2024-09-27 DIAGNOSIS — M54.42 CHRONIC LOW BACK PAIN WITH BILATERAL SCIATICA, UNSPECIFIED BACK PAIN LATERALITY: Chronic | ICD-10-CM

## 2024-09-27 DIAGNOSIS — M54.41 CHRONIC LOW BACK PAIN WITH BILATERAL SCIATICA, UNSPECIFIED BACK PAIN LATERALITY: Chronic | ICD-10-CM

## 2024-09-27 NOTE — TELEPHONE ENCOUNTER
TIZANIDINE 4MG TABLETS         Last Written Prescription Date:  9/6/24  Last Fill Quantity: 60,   # refills: 0  Last Office Visit: 4/2/24  Future Office visit:       Routing refill request to provider for review/approval because:  Drug not on the [facility], [facility] or [facility] refill protocol or controlled substance

## 2024-09-27 NOTE — MR AVS SNAPSHOT
After Visit Summary   8/3/2018    Hoang Kiser    MRN: [id number]           Patient Information     Date Of Birth          1985        Visit Information        Provider Department      8/3/2018 10:30 AM Fili Wesley LP [facility]        Today's Diagnoses     Chronic low back pain with sciatica, sciatica laterality unspecified, unspecified back pain laterality    -  1    Neuropathic pain        Lumbar radicular pain        Muscle spasm        Hip pain, right           Follow-ups after your visit        Your next 10 appointments already scheduled     Aug 06, 2018  8:40 AM CDT   Return Visit with Faustino Feldman MD   [facility] ([facility]    [address] [facility] [id number] [phone]            Aug 07, 2018  8:00 AM CDT   New Visit with Ghulam Perez MD   [facility] ([facility])    [address] [facility] [address] [phone]            Aug 08, 2018 10:30 AM CDT   Return Visit with CARLOS A Guy CNP   [facility] ([facility])    [address]  [address]  [facility] [address] [phone]            Aug 17, 2018 11:30 AM CDT   Return Visit with Fili Wesley LP   [facility] ([facility])    [address]  [address]  [facility] [id number] [phone]              Who to contact     If you have questions or need follow up information about today's clinic visit or your schedule please contact [facility] directly at [phone].  Normal or non-critical lab and imaging results will be communicated to you by MyChart, letter or phone within 4 business days after the clinic has received the results. If you do not hear from us within 7 days, please contact the clinic through MyChart or phone. If you have a critical or abnormal lab  Today's date 9/28/2024  Admission date 9/25/2024  Hospital Room /A   Referring Provider DANNIE Candelaria    Reason for initial visit: Evaluation and management of type 2 diabetes    History of Present Illness (taken from previous endocrine note):  This is a 72 year old Male with PMH notable for type 2 diabetes, CKD 3a, HTN, HLD, class 1 obesity and other medical conditions who presented with generalized weakness and SOB and found to be bradycardic and BG > 900 along with other abnormal labs. He had been exhibited polyuria and polydipsia for 2 months prior to presentation. He was started on insulin gtt with much improvement in glycemic control and s/p PPM placement on 9/26/2024. At time of initial visit, patient's daughter helped provide history: patient has had poor diet overall eating 2 big meals and several snacks during the day (which include fruit or desserts). Also drinks regular soda regularly.    Interval History/ROS:  No acute complaints today. BG higher yesterday. No hs reading available. Was drinking Mountain Dew and eating rice at home, which he will stop.    Current hospital regimen for diabetes: Lantus 15 units daily and lispro 4 units cc with low adjustment    Current oral intake status: Consistent Carb Moderate (45-75 Gm/Meal) Diet    Blood sugars while hospitalized:  Recent Labs   Lab 09/27/24  1240 09/27/24  1648 09/28/24  0917   GLUCOSE BEDSIDE 247* 286* 189*      Patient has Type 2 diabetes diagnosed many years ago. Per chart review, A1C have been 6.3 - 7.7% over the past few years. Once A1C reached 7.7% on 1/2024, PCP added Jardiance to metformin regimen at that time. Today, patient and his daughter report he NEVER took the Jardiance. Then, repeat A1C peaked to 11.9% on 7/3/2024, and patient started with better adherence to metformin at that time, but declined basal insulin 10 units as was discussed at that time. Repeat A1C this admission unable to obtain due to \"a hemoglobin variant is  present which interferes with the quantitation of Hemoglobin A1C.\" Patient has meter Rx sent in on 7/2023, but not checking BG currently (never picked up per family).    Patient has not been hospitalized or utilized emergency services for blood sugars in the recent past. Patient sees PCP for diabetes management as an outpatient.    Diabetes complications include:   Retinopathy - Denies. Last dilated eye exam not done.  Nephropathy- has microalbuminuria and CKD stage 3a  Neuropathy- Denies. Patient denies a history of ulcers and/or amputations to the lower extremities.    History:   Past medical history, surgical history, medications, allergies, family history and social history reviewed and in Epic.    Physical Exam:  Visit Vitals  /75 (BP Location: RUE - Right upper extremity, Patient Position: Semi-Sullivan's)   Pulse 70   Temp 98.4 °F (36.9 °C) (Oral)   Resp 15   Ht 5' 3\" (1.6 m)   Wt 86.3 kg (190 lb 4.8 oz)   SpO2 95%   BMI 33.71 kg/m²   Constitutional: Resting in bed, well nourished male and in NAD. Wife in room.  Eyes: Anicteric and conjunctivae not pale.   ENT: Moist oropharynx. Fair dentition.  Respiratory: non-labored. No cyanosis.   Cardiac: + LE edema bilaterally.    GI: obese abdomen, non-distended   Skin: no visible rashes.    Psych: Normal mood and affect.  Musculoskeletal: FORREST x4, no deformities  Neuro: Awake, alert. No tremor. Speech normal.     Lab Review:  Hemoglobin A1C (%)   Date Value   09/26/2024 15.1 (H)     Glucose (mg/dL)   Date Value   09/28/2024 204 (H)     Glomerular Filtration Rate (no units)   Date Value   09/28/2024 66     Creatinine (mg/dL)   Date Value   09/28/2024 1.18 (H)     Assessment:  Type 2 diabetes with hyperglycemia (A1C 11.9% on 7/2024) -- on admission  Current insulin use.   FAZAL, resolved  CKD 3a  Bradycardia s/p PPM today, 9/26/2024    Plan:  Reviewed with patient and family he will need to go home on insulin therapy and will need diet modification at home. He  result, we will notify you by phone as soon as possible.  Submit refill requests through [employer] or call your pharmacy and they will forward the refill request to us. Please allow 3 business days for your refill to be completed.          Additional Information About Your Visit        semantic[employer]art Information     [employer] gives you secure access to your electronic health record. If you see a primary care provider, you can also send messages to your care team and make appointments. If you have questions, please call your primary care clinic.  If you do not have a primary care provider, please call [phone] and they will assist you.         Blood Pressure from Last 3 Encounters:   07/12/18 124/79   07/10/18 122/80   06/26/18 122/73    Weight from Last 3 Encounters:   07/12/18 78 kg (172 lb)   06/12/18 78 kg (172 lb)   05/29/18 78 kg (172 lb)              We Performed the Following     HEALTH & BEHAVIOR ASSESS, INITIAL, EA 15MIN PHD        Primary Care Provider Office Phone # Fax #    Phill Floyd [phone] [address] [facility] [address]        Equal Access to Services     [facility] : Hadii rosemary bobbyo Soevelyn, waaxda luqadaha, qaybta kaalmatanvi hawkins, arnav lorenzana . So [facility] [phone].    ATENCIÓN: Si habla español, tiene a cutler disposición servicios gratuitos de asistencia lingüística. [facility] [phone].    We comply with applicable federal civil rights laws and [place] laws. We do not discriminate on the basis of race, color, national origin, age, disability, sex, sexual orientation, or gender identity.            Thank you!     Thank you for choosing [facility]  for your care. Our goal is always to provide you with excellent care. Hearing back from our patients is one way we can continue to improve our services. Please take a few minutes to complete the written survey that you may receive in the mail after your visit  might be able to eventually come off insulin if he stops rice, soda and sweets.  - increase Lantus to 18 units daily  - increase Humalog to 6 units AC plus LOW scale   - Check BG AC + HS and prn  - CHO restricted diet  - Hypoglycemia protocol prn    Ma is ready for discharge from my viewpoint: yes  Workup needed prior to discharge: None  Medications changes at discharge: basal-bolus insulin, Lantus 15 units daily and lispro 4 units cc, as he will also start metformin at home. Med Rec updated. Patient understands doses will need to be adjusted at home as food from home is different from hospital. He can continue PTA metformin as renal function allows for this. Removed Jardiance from medlist as he was not taking this and not indicated at this time with high A1C.  Follow up appointments needed after discharge:  PCP and CDE (referral placed)    Estimated Date of Discharge documented: 9/30/2024    We will continue to follow and titrate/manage medications as appropriate.     Case discussed and plan developed with attending endocrinologist.    Anjel Wolf NP    I have seen and examined the patient and conducted my own physical exam below.  >50% of the patient care time, including face to face contact and coordination of care-was spent by myself as the primary provider.  100% of the Medical Decision Making for this patient was done by myself.  I have fully reviewed the History and Physical Exam and agree with the full note above by ABHISHEK Wolf NP.    Pt is sitting on the edge of bed, NAD  Family in the room with pt.   No eye erythema.   No accessory muscle use.   No tremors.   Uses cane for ambulation.     ROS: negative.     BG trends reviewed.   Insulin doses adjusted.     Ashlee Ya MD               with us. Thank you!             Your Updated Medication List - Protect others around you: Learn how to safely use, store and throw away your medicines at www.disposemymeds.org.          This list is accurate as of 8/3/18 11:11 AM.  Always use your most recent med list.                   Brand Name Dispense Instructions for use Diagnosis    clonazePAM 0.5 MG tablet    klonoPIN     Take 0.5-1 mg by mouth nightly as needed        CONCERTA PO      Take 54 mg by mouth        DULoxetine 20 MG EC capsule    CYMBALTA     Take 60 mg by mouth daily        FOCALIN XR PO           * HYDROcodone-acetaminophen 5-325 MG per tablet    NORCO    33 tablet    Take 1 tablet by mouth 3 times daily as needed for pain (Max 3 tabs daily. 11 days supply.) Okay to fill on 7/10/18    Hip pain, right       * HYDROcodone-acetaminophen 5-325 MG per tablet    NORCO    75 tablet    Take 1 tablet by mouth 3 times daily as needed for pain (Max 3 times daily. #75 tabs to last 30 days.) Okay to fill on 7/21/18    Hip pain, right       * nicotine 14 MG/24HR 24 hr patch    NICODERM CQ    30 patch    Place 1 patch onto the skin every 24 hours Use this patch first    Tobacco abuse       * nicotine 7 MG/24HR 24 hr patch    NICODERM CQ    30 patch    Place 1 patch onto the skin every 24 hours    Tobacco abuse       orphenadrine 100 MG 12 hr tablet    NORFLEX    60 tablet    Take 1 tablet (100 mg) by mouth 2 times daily    Muscle spasm       tiZANidine 4 MG tablet    ZANAFLEX    60 tablet    Take 1-2 tablets (4-8 mg) by mouth nightly as needed    Lumbar radicular pain, Lumbar paraspinal muscle spasm       * Notice:  This list has 4 medication(s) that are the same as other medications prescribed for you. Read the directions carefully, and ask your doctor or other care provider to review them with you.

## 2024-10-10 DIAGNOSIS — M54.41 CHRONIC LOW BACK PAIN WITH BILATERAL SCIATICA, UNSPECIFIED BACK PAIN LATERALITY: Chronic | ICD-10-CM

## 2024-10-10 DIAGNOSIS — M54.42 CHRONIC LOW BACK PAIN WITH BILATERAL SCIATICA, UNSPECIFIED BACK PAIN LATERALITY: Chronic | ICD-10-CM

## 2024-10-10 DIAGNOSIS — G89.29 CHRONIC LOW BACK PAIN WITH BILATERAL SCIATICA, UNSPECIFIED BACK PAIN LATERALITY: Chronic | ICD-10-CM

## 2024-10-10 DIAGNOSIS — M62.830 SPASM OF MUSCLE OF LOWER BACK: ICD-10-CM

## 2024-10-11 NOTE — TELEPHONE ENCOUNTER
TIZANIDINE 4MG TABLETS       Last Written Prescription Date:  9/27/24  Last Fill Quantity: 60,   # refills: 0  Last Office Visit: 4/2/24  Future Office visit:       Routing refill request to provider for review/approval because:  Drug not on the [facility], [facility] or [facility] refill protocol or controlled substance

## 2024-11-15 DIAGNOSIS — M54.41 CHRONIC LOW BACK PAIN WITH BILATERAL SCIATICA, UNSPECIFIED BACK PAIN LATERALITY: Chronic | ICD-10-CM

## 2024-11-15 DIAGNOSIS — M54.42 CHRONIC LOW BACK PAIN WITH BILATERAL SCIATICA, UNSPECIFIED BACK PAIN LATERALITY: Chronic | ICD-10-CM

## 2024-11-15 DIAGNOSIS — G89.29 CHRONIC LOW BACK PAIN WITH BILATERAL SCIATICA, UNSPECIFIED BACK PAIN LATERALITY: Chronic | ICD-10-CM

## 2024-11-15 DIAGNOSIS — M62.830 SPASM OF MUSCLE OF LOWER BACK: ICD-10-CM

## 2024-11-15 NOTE — TELEPHONE ENCOUNTER
Tizanidine      Last Written Prescription Date:  10/11/24  Last Fill Quantity: 60,   # refills: 0  Last Office Visit: 4/2/24  Future Office visit:       Routing refill request to provider for review/approval because:

## 2024-12-07 DIAGNOSIS — M62.830 SPASM OF MUSCLE OF LOWER BACK: ICD-10-CM

## 2024-12-07 DIAGNOSIS — G89.29 CHRONIC LOW BACK PAIN WITH BILATERAL SCIATICA, UNSPECIFIED BACK PAIN LATERALITY: Chronic | ICD-10-CM

## 2024-12-07 DIAGNOSIS — M54.41 CHRONIC LOW BACK PAIN WITH BILATERAL SCIATICA, UNSPECIFIED BACK PAIN LATERALITY: Chronic | ICD-10-CM

## 2024-12-07 DIAGNOSIS — M54.42 CHRONIC LOW BACK PAIN WITH BILATERAL SCIATICA, UNSPECIFIED BACK PAIN LATERALITY: Chronic | ICD-10-CM

## 2024-12-09 NOTE — TELEPHONE ENCOUNTER
tiZANidine (ZANAFLEX) 4 MG tablet      Last Written Prescription Date:  11-20-24  Last Fill Quantity: 60,   # refills: 0  Last Office Visit: 4-2-24  Future Office visit:       Routing refill request to provider for review/approval because:  Drug not on the [facility], [facility] or [facility] refill protocol or controlled substance

## 2024-12-09 NOTE — TELEPHONE ENCOUNTER
Routing refill request to provider for review/approval because:  Drug not on the [facility] or [facility] refill protocol or controlled substance

## 2025-01-22 ENCOUNTER — TELEPHONE (OUTPATIENT)
Dept: FAMILY MEDICINE | Facility: OTHER | Age: 40
End: 2025-01-22

## 2025-01-22 DIAGNOSIS — G89.29 CHRONIC LOW BACK PAIN WITH BILATERAL SCIATICA, UNSPECIFIED BACK PAIN LATERALITY: Chronic | ICD-10-CM

## 2025-01-22 DIAGNOSIS — M54.42 CHRONIC LOW BACK PAIN WITH BILATERAL SCIATICA, UNSPECIFIED BACK PAIN LATERALITY: Chronic | ICD-10-CM

## 2025-01-22 DIAGNOSIS — M54.41 CHRONIC LOW BACK PAIN WITH BILATERAL SCIATICA, UNSPECIFIED BACK PAIN LATERALITY: Chronic | ICD-10-CM

## 2025-01-22 DIAGNOSIS — M62.830 SPASM OF MUSCLE OF LOWER BACK: ICD-10-CM

## 2025-01-22 NOTE — TELEPHONE ENCOUNTER
Tizanidine      Last Written Prescription Date:  12/9/24  Last Fill Quantity: 60,   # refills: 0  Last Office Visit: 4/2/24  Future Office visit:       Routing refill request to provider for review/approval because:

## 2025-01-22 NOTE — TELEPHONE ENCOUNTER
Reason for call:  Medication      Have you contacted your pharmacy? Yes   If patient has contacted Pharmacy and it has been over 72hrs, continue to #2  Medication tiZANidine (ZANAFLEX) 4 MG tablet  What Pharmacy do you use? [facility]      (Please note that the turn-around-time for prescriptions is 72 business hours; I am sending your request at this time. SEND TO appropriate Care Team Pool )

## 2025-01-28 NOTE — PROGRESS NOTES
{PROVIDER CHARTING PREFERENCE:387429}    Pau Cramer is a 39 year old, presenting for the following health issues:  Depression, Anxiety, and A.D.H.D    HPI     Depression and Anxiety   How are you doing with your depression since your last visit? No change  How are you doing with your anxiety since your last visit?  No change  Are you having other symptoms that might be associated with depression or anxiety? No  Have you had a significant life event? No   Do you have any concerns with your use of alcohol or other drugs? No      Mental Health   [facility] Dr. Duckworth. - Has visit every 2-3 months.     Chronic Muscle Spasm: spasms often occur when supine. Located from top of buttock and up into thoracic area. Occurs on both sides. Has been on stable dosing of tizanidine since about 2018.     Chronic Back   -  low back. Does take naproxen about 3 times a week. Hx of ablation in remote past. MRI lumbar 2020 was stable  - neck and upper back. Hx of pain. This has been improved in the past few years.         Social History     Tobacco Use    Smoking status: Every Day     Current packs/day: 0.50     Average packs/day: 0.5 packs/day for 23.1 years (11.5 ttl pk-yrs)     Types: Cigarettes     Start date: 1/1/2002     Passive exposure: Current    Smokeless tobacco: Never    Tobacco comments:     Transdermal patches have helped me the most in the past   Vaping Use    Vaping status: Former    Substances: Nicotine, Flavoring    Devices: Pre-filled or refillable cartridge   Substance Use Topics    Alcohol use: No     Alcohol/week: 2.0 - 4.0 standard drinks of alcohol     Comment: rarely    Drug use: Not Currently     Types: Methamphetamines     Comment: hx of meth, 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  Little interest or pleasure in doing things 0   2.  Feeling down, depressed, or hopeless 0   3.  Trouble falling or staying asleep, or sleeping too much 1   4.  Feeling tired or having little energy 1   5.  Poor appetite or overeating 0   6.  Feeling bad about yourself 0   7.  Trouble concentrating 0   8.  Moving slowly or restless 0   Q9: Thoughts of better off dead/self-harm past 2 weeks 0   PHQ-9 Total Score 2        Patient-reported         1/30/2025    10:25 AM   JIN-7    1. Feeling nervous, anxious, or on edge 0   2. Not being able to stop or control worrying 0   3. Worrying too much about different things 0   4. Trouble relaxing 0   5. Being so restless that it is hard to sit still 0   6. Becoming easily annoyed or irritable 0   7. Feeling afraid, as if something awful might happen 0   JIN-7 Total Score 0    If you checked any problems, how difficult have they made it for you to do your work, take care of things at home, or get along with other people? Not difficult at all       Patient-reported       Suicide Assessment Five-step Evaluation and Treatment (SAFE-T)  {Provider  Link to Depression Care Package SmartSet :959479}      Objective    /84 (BP Location: Left arm, Patient Position: Sitting, Cuff Size: Adult Large)   Pulse 104   Temp 98.2  F (36.8  C) (Tympanic)   Resp 18   Ht 1.702 m (5' 7\")   Wt 97.1 kg (214 lb)   SpO2 95%   BMI 33.52 kg/m    Body mass index is 33.52 kg/m .  Physical Exam   {Exam List (Optional):542268}    {Diagnostic Test Results (Optional):775717}        Signed Electronically by: Diana Jackman CNP  {Email feedback regarding this note to " [email or web address]   :825050}   musculoskeletal defects noted, no edema    Results for orders placed or performed in visit on 01/30/25   Comprehensive metabolic panel (BMP + Alb, Alk Phos, ALT, AST, Total. Bili, TP)     Status: Abnormal   Result Value Ref Range    Sodium 138 135 - 145 mmol/L    Potassium 4.1 3.4 - 5.3 mmol/L    Carbon Dioxide (CO2) 22 22 - 29 mmol/L    Anion Gap 14 7 - 15 mmol/L    Urea Nitrogen 11.1 6.0 - 20.0 mg/dL    Creatinine 0.96 0.67 - 1.17 mg/dL    GFR Estimate >90 >60 mL/min/1.73m2    Calcium 9.7 8.8 - 10.4 mg/dL    Chloride 102 98 - 107 mmol/L    Glucose 133 (H) 70 - 99 mg/dL    Alkaline Phosphatase 77 40 - 150 U/L    AST 32 0 - 45 U/L    ALT 73 (H) 0 - 70 U/L    Protein Total 7.7 6.4 - 8.3 g/dL    Albumin 4.4 3.5 - 5.2 g/dL    Bilirubin Total 0.3 <=1.2 mg/dL    Patient Fasting > 8hrs? No    TSH with free T4 reflex     Status: Normal   Result Value Ref Range    TSH 1.95 0.30 - 4.20 uIU/mL   Lipid Profile (Chol, Trig, HDL, LDL calc)     Status: Abnormal   Result Value Ref Range    Cholesterol 284 (H) <200 mg/dL    Triglycerides 774 (H) <150 mg/dL    Direct Measure HDL 30 (L) >=40 mg/dL    LDL Cholesterol Calculated      Non HDL Cholesterol 254 (H) <130 mg/dL    Patient Fasting > 8hrs? No     Narrative    Cholesterol  Desirable: < 200 mg/dL  Borderline High: 200 - 239 mg/dL  High: >= 240 mg/dL    Triglycerides  Normal: < 150 mg/dL  Borderline High: 150 - 199 mg/dL  High: 200-499 mg/dL  Very High: >= 500 mg/dL    Direct Measure HDL  Female: >= 50 mg/dL   Male: >= 40 mg/dL    LDL Cholesterol  Desirable: < 100 mg/dL  Above Desirable: 100 - 129 mg/dL   Borderline High: 130 - 159 mg/dL   High:  160 - 189 mg/dL   Very High: >= 190 mg/dL    Non HDL Cholesterol  Desirable: < 130 mg/dL  Above Desirable: 130 - 159 mg/dL  Borderline High: 160 - 189 mg/dL  High: 190 - 219 mg/dL  Very High: >= 220 mg/dL   Extra Tube     Status: None    Narrative    The following orders were created for panel order Extra Tube.  Procedure                                Abnormality         Status                     ---------                               -----------         ------                     Extra Purple Top Tube[[id number]]                            Final result                 Please view results for these tests on the individual orders.   Extra Purple Top Tube     Status: None   Result Value Ref Range    Hold Specimen JIC              Signed Electronically by: Diana Jackman, CNP

## 2025-01-30 ENCOUNTER — OFFICE VISIT (OUTPATIENT)
Dept: FAMILY MEDICINE | Facility: OTHER | Age: 40
End: 2025-01-30
Attending: NURSE PRACTITIONER
Payer: COMMERCIAL

## 2025-01-30 ENCOUNTER — TELEPHONE (OUTPATIENT)
Dept: FAMILY MEDICINE | Facility: OTHER | Age: 40
End: 2025-01-30

## 2025-01-30 VITALS
OXYGEN SATURATION: 95 % | RESPIRATION RATE: 18 BRPM | HEIGHT: 67 IN | DIASTOLIC BLOOD PRESSURE: 84 MMHG | WEIGHT: 214 LBS | BODY MASS INDEX: 33.59 KG/M2 | TEMPERATURE: 98.2 F | SYSTOLIC BLOOD PRESSURE: 134 MMHG | HEART RATE: 104 BPM

## 2025-01-30 DIAGNOSIS — E66.09 CLASS 1 OBESITY DUE TO EXCESS CALORIES WITH SERIOUS COMORBIDITY AND BODY MASS INDEX (BMI) OF 33.0 TO 33.9 IN ADULT: ICD-10-CM

## 2025-01-30 DIAGNOSIS — G89.29 CHRONIC LOW BACK PAIN WITH BILATERAL SCIATICA, UNSPECIFIED BACK PAIN LATERALITY: Chronic | ICD-10-CM

## 2025-01-30 DIAGNOSIS — M62.830 SPASM OF MUSCLE OF LOWER BACK: ICD-10-CM

## 2025-01-30 DIAGNOSIS — E66.811 CLASS 1 OBESITY DUE TO EXCESS CALORIES WITH SERIOUS COMORBIDITY AND BODY MASS INDEX (BMI) OF 33.0 TO 33.9 IN ADULT: ICD-10-CM

## 2025-01-30 DIAGNOSIS — G89.4 PAIN SYNDROME, CHRONIC: ICD-10-CM

## 2025-01-30 DIAGNOSIS — Z13.220 LIPID SCREENING: ICD-10-CM

## 2025-01-30 DIAGNOSIS — M54.42 CHRONIC LOW BACK PAIN WITH BILATERAL SCIATICA, UNSPECIFIED BACK PAIN LATERALITY: Chronic | ICD-10-CM

## 2025-01-30 DIAGNOSIS — M54.41 CHRONIC LOW BACK PAIN WITH BILATERAL SCIATICA, UNSPECIFIED BACK PAIN LATERALITY: Chronic | ICD-10-CM

## 2025-01-30 DIAGNOSIS — E78.2 ELEVATED CHOLESTEROL WITH ELEVATED TRIGLYCERIDES: ICD-10-CM

## 2025-01-30 DIAGNOSIS — F41.9 ANXIETY: ICD-10-CM

## 2025-01-30 DIAGNOSIS — Z13.220 LIPID SCREENING: Primary | ICD-10-CM

## 2025-01-30 LAB
ALBUMIN SERPL BCG-MCNC: 4.4 G/DL (ref 3.5–5.2)
ALP SERPL-CCNC: 77 U/L (ref 40–150)
ALT SERPL W P-5'-P-CCNC: 73 U/L (ref 0–70)
ANION GAP SERPL CALCULATED.3IONS-SCNC: 14 MMOL/L (ref 7–15)
AST SERPL W P-5'-P-CCNC: 32 U/L (ref 0–45)
BILIRUB SERPL-MCNC: 0.3 MG/DL
BUN SERPL-MCNC: 11.1 MG/DL (ref 6–20)
CALCIUM SERPL-MCNC: 9.7 MG/DL (ref 8.8–10.4)
CHLORIDE SERPL-SCNC: 102 MMOL/L (ref 98–107)
CHOLEST SERPL-MCNC: 284 MG/DL
CREAT SERPL-MCNC: 0.96 MG/DL (ref 0.67–1.17)
EGFRCR SERPLBLD CKD-EPI 2021: >90 ML/MIN/1.73M2
FASTING STATUS PATIENT QL REPORTED: NO
FASTING STATUS PATIENT QL REPORTED: NO
GLUCOSE SERPL-MCNC: 133 MG/DL (ref 70–99)
HCO3 SERPL-SCNC: 22 MMOL/L (ref 22–29)
HDLC SERPL-MCNC: 30 MG/DL
HOLD SPECIMEN: NORMAL
LDLC SERPL CALC-MCNC: ABNORMAL MG/DL
NONHDLC SERPL-MCNC: 254 MG/DL
POTASSIUM SERPL-SCNC: 4.1 MMOL/L (ref 3.4–5.3)
PROT SERPL-MCNC: 7.7 G/DL (ref 6.4–8.3)
SODIUM SERPL-SCNC: 138 MMOL/L (ref 135–145)
TRIGL SERPL-MCNC: 774 MG/DL
TSH SERPL DL<=0.005 MIU/L-ACNC: 1.95 UIU/ML (ref 0.3–4.2)

## 2025-01-30 PROCEDURE — 82310 ASSAY OF CALCIUM: CPT | Mod: ZL | Performed by: NURSE PRACTITIONER

## 2025-01-30 PROCEDURE — 84443 ASSAY THYROID STIM HORMONE: CPT | Mod: ZL | Performed by: NURSE PRACTITIONER

## 2025-01-30 PROCEDURE — 82040 ASSAY OF SERUM ALBUMIN: CPT | Mod: ZL | Performed by: NURSE PRACTITIONER

## 2025-01-30 PROCEDURE — 82465 ASSAY BLD/SERUM CHOLESTEROL: CPT | Mod: ZL | Performed by: NURSE PRACTITIONER

## 2025-01-30 PROCEDURE — 36415 COLL VENOUS BLD VENIPUNCTURE: CPT | Mod: ZL | Performed by: NURSE PRACTITIONER

## 2025-01-30 PROCEDURE — G0463 HOSPITAL OUTPT CLINIC VISIT: HCPCS

## 2025-01-30 RX ORDER — NAPROXEN 250 MG/1
TABLET ORAL
Qty: 60 TABLET | Refills: 0 | Status: SHIPPED | OUTPATIENT
Start: 2025-01-30

## 2025-01-30 RX ORDER — HYDROXYZINE HYDROCHLORIDE 25 MG/1
25 TABLET, FILM COATED ORAL EVERY 6 HOURS PRN
Qty: 45 TABLET | Refills: 3 | Status: SHIPPED | OUTPATIENT
Start: 2025-01-30

## 2025-01-30 ASSESSMENT — PATIENT HEALTH QUESTIONNAIRE - PHQ9
SUM OF ALL RESPONSES TO PHQ QUESTIONS 1-9: 2
10. IF YOU CHECKED OFF ANY PROBLEMS, HOW DIFFICULT HAVE THESE PROBLEMS MADE IT FOR YOU TO DO YOUR WORK, TAKE CARE OF THINGS AT HOME, OR GET ALONG WITH OTHER PEOPLE: NOT DIFFICULT AT ALL
SUM OF ALL RESPONSES TO PHQ QUESTIONS 1-9: 2

## 2025-01-30 ASSESSMENT — ANXIETY QUESTIONNAIRES
1. FEELING NERVOUS, ANXIOUS, OR ON EDGE: NOT AT ALL
6. BECOMING EASILY ANNOYED OR IRRITABLE: NOT AT ALL
GAD7 TOTAL SCORE: 0
IF YOU CHECKED OFF ANY PROBLEMS ON THIS QUESTIONNAIRE, HOW DIFFICULT HAVE THESE PROBLEMS MADE IT FOR YOU TO DO YOUR WORK, TAKE CARE OF THINGS AT HOME, OR GET ALONG WITH OTHER PEOPLE: NOT DIFFICULT AT ALL
7. FEELING AFRAID AS IF SOMETHING AWFUL MIGHT HAPPEN: NOT AT ALL
8. IF YOU CHECKED OFF ANY PROBLEMS, HOW DIFFICULT HAVE THESE MADE IT FOR YOU TO DO YOUR WORK, TAKE CARE OF THINGS AT HOME, OR GET ALONG WITH OTHER PEOPLE?: NOT DIFFICULT AT ALL
GAD7 TOTAL SCORE: 0
7. FEELING AFRAID AS IF SOMETHING AWFUL MIGHT HAPPEN: NOT AT ALL
GAD7 TOTAL SCORE: 0
5. BEING SO RESTLESS THAT IT IS HARD TO SIT STILL: NOT AT ALL
2. NOT BEING ABLE TO STOP OR CONTROL WORRYING: NOT AT ALL
3. WORRYING TOO MUCH ABOUT DIFFERENT THINGS: NOT AT ALL
4. TROUBLE RELAXING: NOT AT ALL

## 2025-01-30 ASSESSMENT — PAIN SCALES - GENERAL: PAINLEVEL_OUTOF10: NO PAIN (0)

## 2025-01-30 NOTE — TELEPHONE ENCOUNTER
Call returned to patient on lab results, no answer, message left a call will be returned to patient.     See separate lab encounter for communication on results.

## 2025-02-18 NOTE — TELEPHONE ENCOUNTER
Attended PT through [facility] from 7/18/18 - 2/7/19 and was discharged having plateaued in his improvement. MRI dated 5/2/18 shows facet arthropathy which is what we are treating with MBB/RFA. If updated imaging is required, we can do another MRI.    CARLOS A Bass, NP-C  [facility]         18-Feb-2025 12:00

## 2025-02-21 ENCOUNTER — LAB (OUTPATIENT)
Dept: LAB | Facility: OTHER | Age: 40
End: 2025-02-21
Payer: COMMERCIAL

## 2025-02-21 DIAGNOSIS — E78.2 ELEVATED CHOLESTEROL WITH ELEVATED TRIGLYCERIDES: ICD-10-CM

## 2025-02-21 LAB
CHOLEST SERPL-MCNC: 228 MG/DL
FASTING STATUS PATIENT QL REPORTED: YES
HDLC SERPL-MCNC: 28 MG/DL
LDLC SERPL CALC-MCNC: ABNORMAL MG/DL
NONHDLC SERPL-MCNC: 200 MG/DL
TRIGL SERPL-MCNC: 402 MG/DL

## 2025-02-21 PROCEDURE — 36415 COLL VENOUS BLD VENIPUNCTURE: CPT | Mod: ZL

## 2025-02-21 PROCEDURE — 80061 LIPID PANEL: CPT | Mod: ZL

## 2025-02-26 DIAGNOSIS — M54.42 CHRONIC LOW BACK PAIN WITH BILATERAL SCIATICA, UNSPECIFIED BACK PAIN LATERALITY: Chronic | ICD-10-CM

## 2025-02-26 DIAGNOSIS — G89.29 CHRONIC LOW BACK PAIN WITH BILATERAL SCIATICA, UNSPECIFIED BACK PAIN LATERALITY: Chronic | ICD-10-CM

## 2025-02-26 DIAGNOSIS — M62.830 SPASM OF MUSCLE OF LOWER BACK: ICD-10-CM

## 2025-02-26 DIAGNOSIS — M54.41 CHRONIC LOW BACK PAIN WITH BILATERAL SCIATICA, UNSPECIFIED BACK PAIN LATERALITY: Chronic | ICD-10-CM

## 2025-02-26 DIAGNOSIS — G89.4 PAIN SYNDROME, CHRONIC: ICD-10-CM

## 2025-02-26 RX ORDER — NAPROXEN 250 MG/1
TABLET ORAL
Qty: 60 TABLET | Refills: 5 | Status: SHIPPED | OUTPATIENT
Start: 2025-02-26

## 2025-02-26 NOTE — TELEPHONE ENCOUNTER
TIZANIDINE 4MG TABLETS     Routing refill request to provider for review/approval because:  Drug not on the [facility], [facility] or [facility] refill protocol or controlled substance      NAPROXEN 250MG TABLETS     NSAID Medications [id number]/26/2025 07:57 AM   Protocol Details Normal CBC on file in past 12 months    Always Fail Criteria - Chart Review Required     CBC RESULTS:   Recent Labs   Lab Test 01/18/22  1411   WBC 6.9   RBC 5.13   HGB 15.5   HCT 44.7   MCV 87   MCH 30.2   MCHC 34.7   RDW 13.4

## 2025-02-26 NOTE — TELEPHONE ENCOUNTER
Naprosyn      Last Written Prescription Date:  01/30/25  Last Fill Quantity: 60,   # refills: 0  Last Office Visit: 01/30/25  Future Office visit:       Zanaflex      Last Written Prescription Date:  01/30/25  Last Fill Quantity: 60,   # refills: 0  Last Office Visit: 01/30/25  Future Office visit:

## 2025-02-27 ENCOUNTER — OFFICE VISIT (OUTPATIENT)
Dept: FAMILY MEDICINE | Facility: OTHER | Age: 40
End: 2025-02-27
Attending: NURSE PRACTITIONER
Payer: COMMERCIAL

## 2025-02-27 VITALS
RESPIRATION RATE: 18 BRPM | OXYGEN SATURATION: 96 % | HEART RATE: 77 BPM | SYSTOLIC BLOOD PRESSURE: 136 MMHG | WEIGHT: 216 LBS | TEMPERATURE: 97.8 F | HEIGHT: 67 IN | DIASTOLIC BLOOD PRESSURE: 76 MMHG | BODY MASS INDEX: 33.9 KG/M2

## 2025-02-27 DIAGNOSIS — E78.1 HYPERTRIGLYCERIDEMIA: Primary | ICD-10-CM

## 2025-02-27 PROCEDURE — G0463 HOSPITAL OUTPT CLINIC VISIT: HCPCS

## 2025-02-27 ASSESSMENT — PAIN SCALES - GENERAL: PAINLEVEL_OUTOF10: NO PAIN (0)

## 2025-02-27 NOTE — PROGRESS NOTES
Assessment & Plan     Hypertriglyceridemia  Reviewed recommendations for lifestyle changes:  -Smoking/nicotine cessation strongly advised.  -increase dietary fiber  -consider omega 3 fatty acid supplement or increase foods containing this.   - reduce portion size and get minimum of 150 minutes per week of cardiovascular exercise.     Accepting of nutrition referral pending insurance approval.   - Adult Nutrition  Referral      Nicotine/Tobacco Cessation  He reports that he has been smoking cigarettes. He started smoking about 23 years ago. He has a 11.6 pack-year smoking history. He has been exposed to tobacco smoke. He has never used smokeless tobacco.  Nicotine/Tobacco Cessation Plan  Phone counseling: Place order for Quit Partner Referral            No follow-ups on file.    Pau Cramer is a 39 year old, presenting for the following health issues:  Results        2/27/2025    10:14 AM   Additional Questions   Roomed by yasmeen chopra   Accompanied by none     History of Present Illness       Hyperlipidemia:  He presents for follow up of hyperlipidemia.   He is not taking medication to lower cholesterol. He is not having myalgia or other side effects to statin medications.    He eats 0-1 servings of fruits and vegetables daily.He consumes 2 sweetened beverage(s) daily.He exercises with enough effort to increase his heart rate 20 to 29 minutes per day.  He exercises with enough effort to increase his heart rate 3 or less days per week.   He is taking medications regularly.       Lab results   Component      Latest Ref Rng 2/21/2025  8:04 AM   Cholesterol      <200 mg/dL 228 (H)    Triglycerides      <150 mg/dL 402 (H)    HDL Cholesterol      >=40 mg/dL 28 (L)    LDL Cholesterol Calculated --    Non HDL Cholesterol      <130 mg/dL 200 (H)    Patient Fasting? Yes       Legend:  (H) High  (L) Low    BP Readings from Last 6 Encounters:   02/27/25 136/76   01/30/25 134/84   04/02/24 114/68   06/29/23 124/82  "  03/09/23 114/72   11/10/22 130/76                Objective    BP (!) 137/93 (BP Location: Right arm, Patient Position: Sitting, Cuff Size: Adult Large)   Pulse 77   Temp 97.8  F (36.6  C) (Tympanic)   Resp 18   Ht 1.702 m (5' 7\")   Wt 98 kg (216 lb)   SpO2 96%   BMI 33.83 kg/m    Body mass index is 33.83 kg/m .  Physical Exam   GENERAL: alert and no distress  RESP: lungs clear to auscultation - no rales, rhonchi or wheezes  CV: regular rate and rhythm, normal S1 S2, no S3 or S4, no murmur, click or rub, no peripheral edema  PSYCH: mentation appears normal, affect normal/bright            Signed Electronically by: Diana Jackman, CNP    "

## 2025-03-07 ENCOUNTER — TRANSFERRED RECORDS (OUTPATIENT)
Dept: HEALTH INFORMATION MANAGEMENT | Facility: CLINIC | Age: 40
End: 2025-03-07
Payer: COMMERCIAL

## 2025-05-05 DIAGNOSIS — M54.41 CHRONIC LOW BACK PAIN WITH BILATERAL SCIATICA, UNSPECIFIED BACK PAIN LATERALITY: Chronic | ICD-10-CM

## 2025-05-05 DIAGNOSIS — G89.29 CHRONIC LOW BACK PAIN WITH BILATERAL SCIATICA, UNSPECIFIED BACK PAIN LATERALITY: Chronic | ICD-10-CM

## 2025-05-05 DIAGNOSIS — M62.830 SPASM OF MUSCLE OF LOWER BACK: ICD-10-CM

## 2025-05-05 DIAGNOSIS — M54.42 CHRONIC LOW BACK PAIN WITH BILATERAL SCIATICA, UNSPECIFIED BACK PAIN LATERALITY: Chronic | ICD-10-CM

## 2025-05-05 NOTE — TELEPHONE ENCOUNTER
TIZANIDINE 4MG TABLETS         Last Written Prescription Date:  2/26/25  Last Fill Quantity: 60,   # refills: 1  Last Office Visit: 2/27/25  Future Office visit:    Next 5 appointments (look out 90 days)      Jul 28, 2025 8:00 AM  (Arrive by 7:45 AM)  Provider Visit with Diana Jackman CNP  [facility] ([facility] ) [address]  [facility] [address]  [phone]             Routing refill request to provider for review/approval because:  Drug not on the [facility], [facility] or [facility] refill protocol or controlled substance

## 2025-07-02 NOTE — TELEPHONE ENCOUNTER
3:32 PM    Reason for Call: Phone Call    Description: Pt called stating he was returning a call to go over results from labs today. Not seeing any telephone encounters, tried calling [facility] RN line but no answer. Please call pt back with results info      Preferred method for responding to this message: Telephone Call  What is your phone number ? [phone]    If we cannot reach you directly, may we leave a detailed response at the number you provided? Yes    Can this message wait until your PCP/provider returns, if available today? Not applicable    Allison Desai   [Dear  ___] : Dear  [unfilled], [Consult Letter:] : I had the pleasure of evaluating your patient, [unfilled]. [Please see my note below.] : Please see my note below. [Consult Closing:] : Thank you very much for allowing me to participate in the care of this patient.  If you have any questions, please do not hesitate to contact me. [Sincerely,] : Sincerely, [FreeTextEntry3] : Magan Pineda MD [facility] Associated Professor, [employer]

## 2025-07-22 ENCOUNTER — PATIENT OUTREACH (OUTPATIENT)
Dept: CARE COORDINATION | Facility: CLINIC | Age: 40
End: 2025-07-22
Payer: COMMERCIAL

## 2025-07-23 NOTE — PROGRESS NOTES
{PROVIDER CHARTING PREFERENCE:996641}    Pau Cramer is a 39 year old, presenting for the following health issues:  Depression, Anxiety, and Lipids        7/28/2025     7:46 AM   Additional Questions   Roomed by lexie   Accompanied by none     History of Present Illness       Hyperlipidemia:  He presents for follow up of hyperlipidemia.   He is not taking medication to lower cholesterol. He is not having myalgia or other side effects to statin medications.    He eats 0-1 servings of fruits and vegetables daily.He consumes 1 sweetened beverage(s) daily.He exercises with enough effort to increase his heart rate 20 to 29 minutes per day.  He exercises with enough effort to increase his heart rate 3 or less days per week.   He is taking medications regularly.        Pain follow up:   Low back. No changes since last visit. Muscle spasm 3-4 days of the week lasting  minutes. Tizanidine helps. Takes this about 5 days a week.       Hyperlipidemia Follow-Up    Are you regularly taking any medication or supplement to lower your cholesterol?   No  Are you having muscle aches or other side effects that you think could be caused by your cholesterol lowering medication?  No    The ASCVD Risk score (Koko GARDNER, et al., 2019) failed to calculate for the following reasons:    The 2019 ASCVD risk score is only valid for ages 40 to 79      Depression and Anxiety   How are you doing with your depression since your last visit? Improved   How are you doing with your anxiety since your last visit?  Improved   Are you having other symptoms that might be associated with depression or anxiety? No  Have you had a significant life event? No   Do you have any concerns with your use of alcohol or other drugs? No    Social History     Tobacco Use    Smoking status: Every Day     Current packs/day: 0.50     Average packs/day: 0.5 packs/day for 23.6 years (11.8 ttl pk-yrs)     Types: Cigarettes     Start date: 1/1/2002     Passive  exposure: Current    Smokeless tobacco: Never    Tobacco comments:     Transdermal patches have helped me the most in the past   Vaping Use    Vaping status: Former    Substances: Nicotine, Flavoring    Devices: Pre-filled or refillable cartridge   Substance Use Topics    Alcohol use: No     Alcohol/week: 2.0 - 4.0 standard drinks of alcohol     Comment: rarely    Drug use: Not Currently     Types: Methamphetamines     Comment: hx of meth, 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 12-10-20 = 2.5 months sober         4/2/2024    12:45 PM 1/30/2025    10:25 AM 7/28/2025     7:38 AM   PHQ   PHQ-9 Total Score 3 2  0    Q9: Thoughts of better off dead/self-harm past 2 weeks Not at all Not at all Not at all       Patient-reported         4/2/2024    12:45 PM 1/30/2025    10:25 AM 7/28/2025     7:39 AM   JIN-7 SCORE   Total Score 0 (minimal anxiety) 0 (minimal anxiety) 1 (minimal anxiety)   Total Score 0 0  1        Patient-reported         7/28/2025     7:38 AM   Last PHQ-9   1.  Little interest or pleasure in doing things 0   2.  Feeling down, depressed, or hopeless 0   3.  Trouble falling or staying asleep, or sleeping too much 0   4.  Feeling tired or having little energy 0   5.  Poor appetite or overeating 0   6.  Feeling bad about yourself 0   7.  Trouble concentrating 0   8.  Moving slowly or restless 0   Q9: Thoughts of better off dead/self-harm past 2 weeks 0   PHQ-9 Total Score 0        Patient-reported         7/28/2025     7:39 AM   JIN-7    1. Feeling nervous, anxious, or on edge 0   2. Not being able to stop or control worrying 0   3. Worrying too much about different things 1   4. Trouble relaxing 0   5. Being so restless that it is hard to sit still 0   6. Becoming easily annoyed or irritable 0   7. Feeling afraid, as if something awful might happen 0   JIN-7 Total Score 1    If you checked any problems, how difficult have they made it for you to do your work, take care of things at home, or get along with other  "people? Not difficult at all       Patient-reported         Objective    /64 (BP Location: Left arm, Patient Position: Chair, Cuff Size: Adult Large)   Pulse 89   Temp 97  F (36.1  C) (Tympanic)   Resp 16   Ht 1.702 m (5' 7\")   SpO2 94%   BMI 33.83 kg/m    Body mass index is 33.83 kg/m .      Physical Exam   {Exam List (Optional):349772}            Signed Electronically by: Diana Jackman CNP    " NOAH Jackman, CNP

## 2025-07-28 ENCOUNTER — OFFICE VISIT (OUTPATIENT)
Dept: FAMILY MEDICINE | Facility: OTHER | Age: 40
End: 2025-07-28
Attending: NURSE PRACTITIONER
Payer: COMMERCIAL

## 2025-07-28 VITALS
RESPIRATION RATE: 16 BRPM | HEIGHT: 67 IN | OXYGEN SATURATION: 94 % | BODY MASS INDEX: 33.83 KG/M2 | DIASTOLIC BLOOD PRESSURE: 64 MMHG | HEART RATE: 89 BPM | TEMPERATURE: 97 F | SYSTOLIC BLOOD PRESSURE: 128 MMHG

## 2025-07-28 DIAGNOSIS — M54.41 CHRONIC LOW BACK PAIN WITH BILATERAL SCIATICA, UNSPECIFIED BACK PAIN LATERALITY: Chronic | ICD-10-CM

## 2025-07-28 DIAGNOSIS — Z12.11 COLON CANCER SCREENING: ICD-10-CM

## 2025-07-28 DIAGNOSIS — R73.09 ELEVATED GLUCOSE: ICD-10-CM

## 2025-07-28 DIAGNOSIS — M62.830 SPASM OF MUSCLE OF LOWER BACK: ICD-10-CM

## 2025-07-28 DIAGNOSIS — E78.1 HYPERTRIGLYCERIDEMIA: Primary | ICD-10-CM

## 2025-07-28 DIAGNOSIS — M54.42 CHRONIC LOW BACK PAIN WITH BILATERAL SCIATICA, UNSPECIFIED BACK PAIN LATERALITY: Chronic | ICD-10-CM

## 2025-07-28 DIAGNOSIS — G89.4 PAIN SYNDROME, CHRONIC: ICD-10-CM

## 2025-07-28 DIAGNOSIS — G89.29 CHRONIC LOW BACK PAIN WITH BILATERAL SCIATICA, UNSPECIFIED BACK PAIN LATERALITY: Chronic | ICD-10-CM

## 2025-07-28 LAB
ANION GAP SERPL CALCULATED.3IONS-SCNC: 9 MMOL/L (ref 7–15)
BUN SERPL-MCNC: 8 MG/DL (ref 6–20)
CALCIUM SERPL-MCNC: 9.4 MG/DL (ref 8.8–10.4)
CHLORIDE SERPL-SCNC: 106 MMOL/L (ref 98–107)
CHOLEST SERPL-MCNC: 217 MG/DL
CREAT SERPL-MCNC: 1.1 MG/DL (ref 0.67–1.17)
EGFRCR SERPLBLD CKD-EPI 2021: 88 ML/MIN/1.73M2
EST. AVERAGE GLUCOSE BLD GHB EST-MCNC: 120 MG/DL
FASTING STATUS PATIENT QL REPORTED: YES
FASTING STATUS PATIENT QL REPORTED: YES
GLUCOSE SERPL-MCNC: 104 MG/DL (ref 70–99)
HBA1C MFR BLD: 5.8 %
HCO3 SERPL-SCNC: 25 MMOL/L (ref 22–29)
HDLC SERPL-MCNC: 29 MG/DL
LDLC SERPL CALC-MCNC: ABNORMAL MG/DL
NONHDLC SERPL-MCNC: 188 MG/DL
POTASSIUM SERPL-SCNC: 4.5 MMOL/L (ref 3.4–5.3)
SODIUM SERPL-SCNC: 140 MMOL/L (ref 135–145)
TRIGL SERPL-MCNC: 483 MG/DL

## 2025-07-28 PROCEDURE — 82310 ASSAY OF CALCIUM: CPT | Mod: ZL | Performed by: NURSE PRACTITIONER

## 2025-07-28 PROCEDURE — 3044F HG A1C LEVEL LT 7.0%: CPT | Performed by: NURSE PRACTITIONER

## 2025-07-28 PROCEDURE — 3078F DIAST BP <80 MM HG: CPT | Performed by: NURSE PRACTITIONER

## 2025-07-28 PROCEDURE — 80048 BASIC METABOLIC PNL TOTAL CA: CPT | Mod: ZL | Performed by: NURSE PRACTITIONER

## 2025-07-28 PROCEDURE — 36415 COLL VENOUS BLD VENIPUNCTURE: CPT | Mod: ZL | Performed by: NURSE PRACTITIONER

## 2025-07-28 PROCEDURE — 99213 OFFICE O/P EST LOW 20 MIN: CPT | Performed by: NURSE PRACTITIONER

## 2025-07-28 PROCEDURE — G0463 HOSPITAL OUTPT CLINIC VISIT: HCPCS

## 2025-07-28 PROCEDURE — 3074F SYST BP LT 130 MM HG: CPT | Performed by: NURSE PRACTITIONER

## 2025-07-28 PROCEDURE — 1125F AMNT PAIN NOTED PAIN PRSNT: CPT | Performed by: NURSE PRACTITIONER

## 2025-07-28 PROCEDURE — 83036 HEMOGLOBIN GLYCOSYLATED A1C: CPT | Mod: ZL | Performed by: NURSE PRACTITIONER

## 2025-07-28 PROCEDURE — 80061 LIPID PANEL: CPT | Mod: ZL | Performed by: NURSE PRACTITIONER

## 2025-07-28 RX ORDER — NAPROXEN 250 MG/1
TABLET ORAL
Qty: 60 TABLET | Refills: 5 | Status: SHIPPED | OUTPATIENT
Start: 2025-07-28

## 2025-07-28 RX ORDER — NAPROXEN 250 MG/1
TABLET ORAL
Qty: 60 TABLET | Refills: 5 | OUTPATIENT
Start: 2025-07-28

## 2025-07-28 RX ORDER — PENICILLIN V POTASSIUM 500 MG/1
500 TABLET, FILM COATED ORAL 4 TIMES DAILY
COMMUNITY
Start: 2025-07-27 | End: 2025-08-06

## 2025-07-28 ASSESSMENT — ANXIETY QUESTIONNAIRES
7. FEELING AFRAID AS IF SOMETHING AWFUL MIGHT HAPPEN: NOT AT ALL
IF YOU CHECKED OFF ANY PROBLEMS ON THIS QUESTIONNAIRE, HOW DIFFICULT HAVE THESE PROBLEMS MADE IT FOR YOU TO DO YOUR WORK, TAKE CARE OF THINGS AT HOME, OR GET ALONG WITH OTHER PEOPLE: NOT DIFFICULT AT ALL
6. BECOMING EASILY ANNOYED OR IRRITABLE: NOT AT ALL
1. FEELING NERVOUS, ANXIOUS, OR ON EDGE: NOT AT ALL
2. NOT BEING ABLE TO STOP OR CONTROL WORRYING: NOT AT ALL
GAD7 TOTAL SCORE: 1
GAD7 TOTAL SCORE: 1
5. BEING SO RESTLESS THAT IT IS HARD TO SIT STILL: NOT AT ALL
7. FEELING AFRAID AS IF SOMETHING AWFUL MIGHT HAPPEN: NOT AT ALL
4. TROUBLE RELAXING: NOT AT ALL
GAD7 TOTAL SCORE: 1
8. IF YOU CHECKED OFF ANY PROBLEMS, HOW DIFFICULT HAVE THESE MADE IT FOR YOU TO DO YOUR WORK, TAKE CARE OF THINGS AT HOME, OR GET ALONG WITH OTHER PEOPLE?: NOT DIFFICULT AT ALL
3. WORRYING TOO MUCH ABOUT DIFFERENT THINGS: SEVERAL DAYS

## 2025-07-28 ASSESSMENT — PATIENT HEALTH QUESTIONNAIRE - PHQ9
SUM OF ALL RESPONSES TO PHQ QUESTIONS 1-9: 0
SUM OF ALL RESPONSES TO PHQ QUESTIONS 1-9: 0
10. IF YOU CHECKED OFF ANY PROBLEMS, HOW DIFFICULT HAVE THESE PROBLEMS MADE IT FOR YOU TO DO YOUR WORK, TAKE CARE OF THINGS AT HOME, OR GET ALONG WITH OTHER PEOPLE: NOT DIFFICULT AT ALL

## 2025-07-28 ASSESSMENT — PAIN SCALES - GENERAL: PAINLEVEL_OUTOF10: MODERATE PAIN (5)

## 2025-07-28 NOTE — LETTER
My Depression Action Plan  Name: Hoang Kiser   Date of Birth 1985  Date: 7/28/2025    My doctor: Diana Jackman   My clinic: [facility]  [address]  [place] [address]  [phone]            GREEN    ZONE   Good Control    What it looks like:   Things are going generally well. You have normal ups and downs. You may even feel depressed from time to time, but bad moods usually last less than a day.   What you need to do:  Continue to care for yourself (see self care plan)  Check your depression survival kit and update it as needed  Follow your physician s recommendations including any medication.  Do not stop taking medication unless you consult with your physician first.             YELLOW         ZONE Getting Worse    What it looks like:   Depression is starting to interfere with your life.   It may be hard to get out of bed; you may be starting to isolate yourself from others.  Symptoms of depression are starting to last most all day and this has happened for several days.   You may have suicidal thoughts but they are not constant.   What you need to do:     Call your care team. Your response to treatment will improve if you keep your care team informed of your progress. Yellow periods are signs an adjustment may need to be made.     Continue your self-care.  Just get dressed and ready for the day.  Don't give yourself time to talk yourself out of it.    Talk to someone in your support network.    Open up your Depression Self-Care Plan/Wellness Kit.             RED    ZONE Medical Alert - Get Help    What it looks like:   Depression is seriously interfering with your life.   You may experience these or other symptoms: You can t get out of bed most days, can t work or engage in other necessary activities, you have trouble taking care of basic hygiene, or basic responsibilities, thoughts of suicide or death that will not go away, self-injurious  behavior.     What you need to do:  Call your care team and request a same-day appointment. If they are not available (weekends or after hours) call your local crisis line, emergency room or 911.          Depression Self-Care Plan / Wellness Kit    Many people find that medication and therapy are helpful treatments for managing depression. In addition, making small changes to your everyday life can help to boost your mood and improve your wellbeing. Below are some tips for you to consider. Be sure to talk with your medical provider and/or behavioral health consultant if your symptoms are worsening or not improving.     Sleep   Sleep hygiene  means all of the habits that support good, restful sleep. It includes maintaining a consistent bedtime and wake time, using your bedroom only for sleeping or sex, and keeping the bedroom dark and free of distractions like a computer, smartphone, or television.     Develop a Healthy Routine  Maintain good hygiene. Get out of bed in the morning, make your bed, brush your teeth, take a shower, and get dressed. Don t spend too much time viewing media that makes you feel stressed. Find time to relax each day.    Exercise  Get some form of exercise every day. This will help reduce pain and release endorphins, the  feel good  chemicals in your brain. It can be as simple as just going for a walk or doing some gardening, anything that will get you moving.      Diet  Strive to eat healthy foods, including fruits and vegetables. Drink plenty of water. Avoid excessive sugar, caffeine, alcohol, and other mood-altering substances.     Stay Connected with Others  Stay in touch with friends and family members.    Manage Your Mood  Try deep breathing, massage therapy, biofeedback, or meditation. Take part in fun activities when you can. Try to find something to smile about each day.     Psychotherapy  Be open to working with a therapist if your provider recommends it.     Medication  Be sure to  take your medication as prescribed. Most anti-depressants need to be taken every day. It usually takes several weeks for medications to work. Not all medicines work for all people. It is important to follow-up with your provider to make sure you have a treatment plan that is working for you. Do not stop your medication abruptly without first discussing it with your provider.    Crisis Resources   These hotlines are for both adults and children. They and are open 24 hours a day, 7 days a week unless noted otherwise.    National Suicide Prevention Lifeline   988 or 9-970-252-XCEC (3555)    Crisis Text Line    www.crisistextline.org  Text HOME to 033932 from anywhere in the [place], anytime, about any type of crisis. A live, trained crisis counselor will receive the text and respond quickly.    [place] Lifeline for LGBTQ Youth  A national crisis intervention and suicide lifeline for LGBTQ youth under 25. Provides a safe place to talk without judgement. Call 1-773.762.9268; text START to 410076 or visit www.thetrevorproject.org to talk to a trained counselor.    For Count includes the [facility] crisis numbers, visit the [facility] website at:  https://[place].gov/dhs/people-we-serve/adults/health-care/mental-health/resources/crisis-contacts.jsp

## 2025-08-06 ENCOUNTER — OFFICE VISIT (OUTPATIENT)
Dept: SURGERY | Facility: OTHER | Age: 40
End: 2025-08-06
Attending: NURSE PRACTITIONER
Payer: COMMERCIAL

## 2025-08-06 VITALS
DIASTOLIC BLOOD PRESSURE: 72 MMHG | RESPIRATION RATE: 16 BRPM | OXYGEN SATURATION: 91 % | SYSTOLIC BLOOD PRESSURE: 122 MMHG | BODY MASS INDEX: 32.96 KG/M2 | WEIGHT: 210 LBS | HEART RATE: 80 BPM | TEMPERATURE: 98.1 F | HEIGHT: 67 IN

## 2025-08-06 DIAGNOSIS — Z80.0 FAMILY HISTORY OF COLON CANCER: ICD-10-CM

## 2025-08-06 DIAGNOSIS — Z86.0100 HISTORY OF COLONIC POLYPS: Primary | ICD-10-CM

## 2025-08-06 PROCEDURE — G0463 HOSPITAL OUTPT CLINIC VISIT: HCPCS | Mod: 25

## 2025-08-06 RX ORDER — BISACODYL 5 MG
TABLET, DELAYED RELEASE (ENTERIC COATED) ORAL
Qty: 4 TABLET | Refills: 0 | Status: SHIPPED | OUTPATIENT
Start: 2025-08-06

## 2025-08-06 ASSESSMENT — PAIN SCALES - GENERAL: PAINLEVEL_OUTOF10: MILD PAIN (2)

## (undated) DEVICE — SOL WATER IRRIG 1000ML BOTTLE 07139-09

## (undated) RX ORDER — FENTANYL CITRATE 50 UG/ML
INJECTION, SOLUTION INTRAMUSCULAR; INTRAVENOUS
Status: DISPENSED
Start: 2018-02-15

## (undated) RX ORDER — BUPIVACAINE HYDROCHLORIDE 5 MG/ML
INJECTION, SOLUTION EPIDURAL; INTRACAUDAL
Status: DISPENSED
Start: 2017-06-02

## (undated) RX ORDER — TRIAMCINOLONE ACETONIDE 40 MG/ML
INJECTION, SUSPENSION INTRA-ARTICULAR; INTRAMUSCULAR
Status: DISPENSED
Start: 2017-06-02

## (undated) RX ORDER — SIMETHICONE 40MG/0.6ML
SUSPENSION, DROPS(FINAL DOSAGE FORM)(ML) ORAL
Status: DISPENSED
Start: 2018-02-15